# Patient Record
Sex: FEMALE | Race: WHITE | NOT HISPANIC OR LATINO | Employment: OTHER | URBAN - METROPOLITAN AREA
[De-identification: names, ages, dates, MRNs, and addresses within clinical notes are randomized per-mention and may not be internally consistent; named-entity substitution may affect disease eponyms.]

---

## 2017-01-05 ENCOUNTER — TELEPHONE (OUTPATIENT)
Dept: ONCOLOGY | Facility: CLINIC | Age: 67
End: 2017-01-05

## 2017-01-05 NOTE — TELEPHONE ENCOUNTER
"Oncology Telephone Triage Note    Assessment: Pt called in reporting severe low back pain and \"pressure\" between her shoulders and on the back of her neck.  She rated the pain in her lower back at an 8/10, and reports that the pain began last night.  She took 2 Norco tabs, which helped the pain to the point where she could sleep.  She also reports joint pain in her hips.  She denies fevers, n/v, swelling, cough.  She is wondering if the pain is related to her Neulasta injection.  She had the On-Pro placed last Wednesday, and the injection was activated on Thursday evening.    Plan:  Discussed with MICHELLE Pan, who agrees that the pain is likely due to the Neulasta.  She recommended continuing to treat with Norco and to call back if the pain continues to get worse.  She also recommended the patient try Claritin during her next cycle to prevent side effects from the Neulasta.  She recommended pt take it starting the last day of chemo for 3 or 4 days total.    Counseling/Education: Caller verbalized understanding of plan. Caller agrees with advice given.    "

## 2017-01-11 ENCOUNTER — TELEPHONE (OUTPATIENT)
Dept: ONCOLOGY | Facility: CLINIC | Age: 67
End: 2017-01-11

## 2017-01-16 ENCOUNTER — INFUSION THERAPY VISIT (OUTPATIENT)
Dept: ONCOLOGY | Facility: CLINIC | Age: 67
End: 2017-01-16
Attending: INTERNAL MEDICINE
Payer: MEDICARE

## 2017-01-16 VITALS
OXYGEN SATURATION: 98 % | DIASTOLIC BLOOD PRESSURE: 59 MMHG | WEIGHT: 122.8 LBS | HEART RATE: 92 BPM | HEIGHT: 60 IN | BODY MASS INDEX: 24.11 KG/M2 | TEMPERATURE: 98.6 F | RESPIRATION RATE: 16 BRPM | SYSTOLIC BLOOD PRESSURE: 129 MMHG

## 2017-01-16 DIAGNOSIS — T45.1X5A CHEMOTHERAPY-INDUCED NEUTROPENIA (H): ICD-10-CM

## 2017-01-16 DIAGNOSIS — C54.9 UTERUS CANCER, SARCOMA (H): Primary | ICD-10-CM

## 2017-01-16 DIAGNOSIS — C54.9 UTERUS CANCER, SARCOMA (H): ICD-10-CM

## 2017-01-16 DIAGNOSIS — T45.1X5A CHEMOTHERAPY INDUCED CARDIOMYOPATHY (H): Primary | ICD-10-CM

## 2017-01-16 DIAGNOSIS — D70.1 CHEMOTHERAPY-INDUCED NEUTROPENIA (H): ICD-10-CM

## 2017-01-16 DIAGNOSIS — I42.7 CHEMOTHERAPY INDUCED CARDIOMYOPATHY (H): Primary | ICD-10-CM

## 2017-01-16 LAB
ALBUMIN SERPL-MCNC: 3.4 G/DL (ref 3.4–5)
ALP SERPL-CCNC: 73 U/L (ref 40–150)
ALT SERPL W P-5'-P-CCNC: 21 U/L (ref 0–50)
ANION GAP SERPL CALCULATED.3IONS-SCNC: 6 MMOL/L (ref 3–14)
AST SERPL W P-5'-P-CCNC: 10 U/L (ref 0–45)
BASOPHILS # BLD AUTO: 0.1 10E9/L (ref 0–0.2)
BASOPHILS NFR BLD AUTO: 0.8 %
BILIRUB SERPL-MCNC: 0.2 MG/DL (ref 0.2–1.3)
BUN SERPL-MCNC: 16 MG/DL (ref 7–30)
CALCIUM SERPL-MCNC: 8.8 MG/DL (ref 8.5–10.1)
CHLORIDE SERPL-SCNC: 111 MMOL/L (ref 94–109)
CO2 SERPL-SCNC: 27 MMOL/L (ref 20–32)
CREAT SERPL-MCNC: 0.6 MG/DL (ref 0.52–1.04)
DIFFERENTIAL METHOD BLD: NORMAL
EOSINOPHIL # BLD AUTO: 0.1 10E9/L (ref 0–0.7)
EOSINOPHIL NFR BLD AUTO: 0.7 %
ERYTHROCYTE [DISTWIDTH] IN BLOOD BY AUTOMATED COUNT: 15 % (ref 10–15)
GFR SERPL CREATININE-BSD FRML MDRD: ABNORMAL ML/MIN/1.7M2
GLUCOSE SERPL-MCNC: 88 MG/DL (ref 70–99)
HCT VFR BLD AUTO: 36.9 % (ref 35–47)
HGB BLD-MCNC: 12 G/DL (ref 11.7–15.7)
IMM GRANULOCYTES # BLD: 0 10E9/L (ref 0–0.4)
IMM GRANULOCYTES NFR BLD: 0.5 %
LYMPHOCYTES # BLD AUTO: 2.1 10E9/L (ref 0.8–5.3)
LYMPHOCYTES NFR BLD AUTO: 28 %
MCH RBC QN AUTO: 29.1 PG (ref 26.5–33)
MCHC RBC AUTO-ENTMCNC: 32.5 G/DL (ref 31.5–36.5)
MCV RBC AUTO: 90 FL (ref 78–100)
MONOCYTES # BLD AUTO: 0.6 10E9/L (ref 0–1.3)
MONOCYTES NFR BLD AUTO: 8.6 %
NEUTROPHILS # BLD AUTO: 4.5 10E9/L (ref 1.6–8.3)
NEUTROPHILS NFR BLD AUTO: 61.4 %
NRBC # BLD AUTO: 0 10*3/UL
NRBC BLD AUTO-RTO: 0 /100
PLATELET # BLD AUTO: 366 10E9/L (ref 150–450)
POTASSIUM SERPL-SCNC: 4 MMOL/L (ref 3.4–5.3)
PROT SERPL-MCNC: 6.4 G/DL (ref 6.8–8.8)
RBC # BLD AUTO: 4.12 10E12/L (ref 3.8–5.2)
SODIUM SERPL-SCNC: 144 MMOL/L (ref 133–144)
WBC # BLD AUTO: 7.3 10E9/L (ref 4–11)

## 2017-01-16 PROCEDURE — 99214 OFFICE O/P EST MOD 30 MIN: CPT | Mod: ZP | Performed by: INTERNAL MEDICINE

## 2017-01-16 PROCEDURE — 96415 CHEMO IV INFUSION ADDL HR: CPT

## 2017-01-16 PROCEDURE — 96367 TX/PROPH/DG ADDL SEQ IV INF: CPT

## 2017-01-16 PROCEDURE — 96411 CHEMO IV PUSH ADDL DRUG: CPT

## 2017-01-16 PROCEDURE — 25000128 H RX IP 250 OP 636: Mod: ZF | Performed by: INTERNAL MEDICINE

## 2017-01-16 PROCEDURE — 96413 CHEMO IV INFUSION 1 HR: CPT

## 2017-01-16 PROCEDURE — 25000125 ZZHC RX 250: Mod: ZF | Performed by: INTERNAL MEDICINE

## 2017-01-16 PROCEDURE — 96375 TX/PRO/DX INJ NEW DRUG ADDON: CPT

## 2017-01-16 PROCEDURE — 80053 COMPREHEN METABOLIC PANEL: CPT | Performed by: INTERNAL MEDICINE

## 2017-01-16 PROCEDURE — 85025 COMPLETE CBC W/AUTO DIFF WBC: CPT | Performed by: INTERNAL MEDICINE

## 2017-01-16 RX ORDER — DOXORUBICIN HYDROCHLORIDE 2 MG/ML
25 INJECTION, SOLUTION INTRAVENOUS ONCE
Status: COMPLETED | OUTPATIENT
Start: 2017-01-16 | End: 2017-01-16

## 2017-01-16 RX ORDER — HEPARIN SODIUM,PORCINE 10 UNIT/ML
5 VIAL (ML) INTRAVENOUS ONCE
Status: COMPLETED | OUTPATIENT
Start: 2017-01-16 | End: 2017-01-16

## 2017-01-16 RX ORDER — DIPHENHYDRAMINE HYDROCHLORIDE AND LIDOCAINE HYDROCHLORIDE AND ALUMINUM HYDROXIDE AND MAGNESIUM HYDRO
5-10 KIT EVERY 6 HOURS PRN
Qty: 237 ML | Refills: 1 | Status: SHIPPED | OUTPATIENT
Start: 2017-01-16 | End: 2017-02-27

## 2017-01-16 RX ADMIN — DEXAMETHASONE SODIUM PHOSPHATE: 10 INJECTION, SOLUTION INTRAMUSCULAR; INTRAVENOUS at 12:27

## 2017-01-16 RX ADMIN — SODIUM CHLORIDE 250 ML: 9 INJECTION, SOLUTION INTRAVENOUS at 12:27

## 2017-01-16 RX ADMIN — SODIUM CHLORIDE 150 MG: 9 INJECTION, SOLUTION INTRAVENOUS at 12:38

## 2017-01-16 RX ADMIN — DACARBAZINE 380 MG: 200 INJECTION, POWDER, FOR SOLUTION INTRAVENOUS at 13:18

## 2017-01-16 RX ADMIN — DOXORUBICIN HYDROCHLORIDE 38 MG: 2 INJECTION, SOLUTION INTRAVENOUS at 13:12

## 2017-01-16 RX ADMIN — SODIUM CHLORIDE, PRESERVATIVE FREE 5 ML: 5 INJECTION INTRAVENOUS at 15:23

## 2017-01-16 ASSESSMENT — PAIN SCALES - GENERAL: PAINLEVEL: NO PAIN (0)

## 2017-01-16 ASSESSMENT — ANXIETY QUESTIONNAIRES
6. BECOMING EASILY ANNOYED OR IRRITABLE: NOT AT ALL
7. FEELING AFRAID AS IF SOMETHING AWFUL MIGHT HAPPEN: SEVERAL DAYS
3. WORRYING TOO MUCH ABOUT DIFFERENT THINGS: SEVERAL DAYS
IF YOU CHECKED OFF ANY PROBLEMS ON THIS QUESTIONNAIRE, HOW DIFFICULT HAVE THESE PROBLEMS MADE IT FOR YOU TO DO YOUR WORK, TAKE CARE OF THINGS AT HOME, OR GET ALONG WITH OTHER PEOPLE: NOT DIFFICULT AT ALL
1. FEELING NERVOUS, ANXIOUS, OR ON EDGE: NOT AT ALL
GAD7 TOTAL SCORE: 2
5. BEING SO RESTLESS THAT IT IS HARD TO SIT STILL: NOT AT ALL
2. NOT BEING ABLE TO STOP OR CONTROL WORRYING: NOT AT ALL

## 2017-01-16 ASSESSMENT — PATIENT HEALTH QUESTIONNAIRE - PHQ9: 5. POOR APPETITE OR OVEREATING: NOT AT ALL

## 2017-01-16 NOTE — Clinical Note
1/16/2017       RE: Maggie Navarrete  [address]  [facility] [address]     Dear Colleague,    Thank you for referring your patient, Maggie Navarrete, to the [facility]. Please see a copy of my visit note below.    [facility]    MEDICAL ONCOLOGY PROGRESS NOTE   Jan 16, 2017    Oncologic history:  1. January 2016, she presents to her PCP with 3 weeks of abdominal pain and severe constipation. She is treated for constipation and improves.  2. 3/21/2016, worsening pelvic pressure prompts pelvic examination with an enlarged uterus noted. Transvaginal ultrasound shows enlargement of subserosal fibroids originally seen on MRI in 2006.  3. 5/9/2016, MRI pelvis is obtained, which shows several enhancing uterine masses involving an enlarged uterus, felt radiographically compatible with benign uterine leiomyomata.  3. Summer 2016, she experiences worsening low back pain thought at first thought to be sacroiliitis, but pain persists after corticosteroid injection. Continues with severe constipation.  4. 9/8/2016, she undergoes colonoscopy for persistent abdominal pain. She has sigmoid polyp snared. Abdominal pain and diarrhea persist after colonoscopy prep.  5. 9/14/2016, she has worsening abdominal pain and is evaluated in the ED. CT-scan shows a large subserosal fibroid measuring 9.4 x 9.6 x 9.8 cm, as well as very large fibroids along the posterior uterine body more inferiorly, increased in size to 14 cm in greatest diameter compared to 10 cm in greatest diameter on MRI of 5/9/2016.  6. 9/29/2016, she has staging CT-CAP, which shows a small left sided pleural effusion and a 3 mm pulmonary nodule in the RML.  7. October 2016, self-refers to [facility].  8. 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  9. 11/4/2016, undergoes total abdominal hysterectomy and BSO, under Dr. Harsh Camarillo. Surgery was originally presumed for leiomyoma,  "however, intraoperatively there was focal adherence of a mass to the pelvic right sidewall and cul-de-sac, which led to peritoneal resection and omentectomy. Final pathology, pT2b: showed leiomyosarcoma involving the cul-de-sac and forming two separate masses measuring 10.5 x 6.5 x 4.5 cm and 10.7 x 7.9 x 7.5 cm.    HISTORY OF PRESENT ILLNESS  Maggie Navarrete is a 66 year old female with a diagnosis leiomyosarcoma.of the uterus. She underwent total abdominal hysterectomy and BSO at [facility], and she is receiving adjuvant chemotherapy with Doxorubicin and Dacarbazine. She has already received 1 cycle of chemotherapy and feels she tolerated it well. She had some fatigue, worse about 5-7 days after her last chemotherapy infusion, but this resolved and she was able to take a trip to [place] with his sister. Otherwise, she denies any new or worrisome signs or symptoms. No nausea or vomiting, though did have some \"indigesion\" with chemotherapy. No diarrhea or constipation type changes. No change in appetite or weight loss, and no abdominal pains. She would like to continue with adjuvant chemotherapy.    Lab Results   Component Value Date    WBC 7.3 01/16/2017    HGB 12.0 01/16/2017    HCT 36.9 01/16/2017     01/16/2017     01/16/2017    POTASSIUM 4.0 01/16/2017    CHLORIDE 111* 01/16/2017    CO2 27 01/16/2017    BUN 16 01/16/2017    CR 0.60 01/16/2017    GLC 88 01/16/2017    AST 10 01/16/2017    ALT 21 01/16/2017    ALKPHOS 73 01/16/2017    BILITOTAL 0.2 01/16/2017    INR 0.98 12/23/2016       REVIEW OF SYSTEMS  A 12-point ROS negative except as in HPI    Current Outpatient Prescriptions   Medication Sig Dispense Refill     RaNITidine HCl (ZANTAC PO) Take 150 mg by mouth daily       HYDROcodone-acetaminophen (NORCO) 5-325 MG per tablet 1-2 tablets every 6 hours as needed for pain 30 tablet 0     LORazepam (ATIVAN) 0.5 MG tablet Take 1 tablet (0.5 mg) by mouth every 4 hours as needed (Anxiety, Nausea/Vomiting or " Sleep) 30 tablet 2     prochlorperazine (COMPAZINE) 10 MG tablet Take 1 tablet (10 mg) by mouth every 6 hours as needed (Nausea/Vomiting) 30 tablet 2     ondansetron (ZOFRAN) 4 MG tablet Take 1 tablet (4 mg) by mouth every 8 hours as needed (Nausea/Vomiting) 30 tablet 3     MELATONIN PO Take 2.5 mg by mouth       enoxaparin (LOVENOX) 80 MG/0.8ML injection        venlafaxine (EFFEXOR-XR) 75 MG 24 hr capsule Take 1 capsule (75 mg) by mouth daily 90 capsule 1     levothyroxine (SYNTHROID,LEVOTHROID) 100 MCG tablet Take 100 mcg by mouth daily       zolpidem (AMBIEN) 5 MG tablet Take 1 tablet (5 mg) by mouth nightly as needed for sleep 25 tablet 0     alendronate (FOSAMAX) 35 MG tablet Take 1 tablet (35 mg) by mouth every 7 days 12 tablet 3     Calcium Citrate-Vitamin D (CALCIUM CITRATE + PO) Take 200 mg by mouth daily as needed (She supplements to meet her 1,200mg goal depending on how much dietary calcium she has gotten that day (up to 400mg once daily))       Multiple Vitamin (MULTIVITAMINS PO) Take 1 tablet by mouth daily.       DPH-Lido-AlHydr-MgHydr-Simeth (FIRST-MOUTHWASH BLM) SUSP Swish and swallow 5-10 mLs in mouth every 6 hours as needed 237 mL 1     tretinoin (RETIN-A) 0.025 % cream Apply a pea-sized amount to cleansed and dried face at night 45 g 3       Allergies   Allergen Reactions     Erythromycin GI Disturbance     Tramadol Nausea     Immunization History   Administered Date(s) Administered     Hepatitis A Vac Ped/Adol-2 Dose 02/13/2006, 03/28/2016     Hepatitis B 02/01/2013, 03/04/2013, 08/07/2013     Influenza (H1N1) 12/17/2009     Influenza (High Dose) 3 valent vaccine 09/28/2016     Influenza (IIV3) 12/09/2004, 10/03/2012     MMR 04/08/2016     Pneumococcal (PCV 13) 03/16/2015     Pneumococcal 23 valent 03/21/2016     TDAP (BOOSTRIX AGES 10-64) 11/03/2011     Tdap (Adacel,Boostrix) 02/19/1998     Typhoid Oral 03/28/2016     Zoster vaccine, live 12/17/2012       Past Medical History   Diagnosis Date      Anxiety      PONV (postoperative nausea and vomiting)      Ptosis of eyelid, bilateral      Thyroid disease      thyroid nodule resolved 2014     Kidney stone 2011     Disorder of bone and cartilage, unspecified      Arthritis      pain in feet and knees     Posterior vitreous detachment of left eye 2011     Posterior vitreous detachment of right eye 2011     GERD (gastroesophageal reflux disease)      Hx of previous reproductive problem 1980     Esophageal reflux 2015     Personal history of colonic polyps 2016     Small, benign     Stomach problems 2015     Abdominal pain, Diarreha         PHYSICAL EXAMINATION  /59 mmHg  Pulse 92  Temp(Src) 98.6  F (37  C) (Oral)  Resp 16  Ht 1.524 m (5')  Wt 55.702 kg (122 lb 12.8 oz)  BMI 23.98 kg/m2  SpO2 98%  Wt Readings from Last 2 Encounters:   01/16/17 55.702 kg (122 lb 12.8 oz)   12/28/16 56.654 kg (124 lb 14.4 oz)     Physical Exam   Constitutional: She is oriented to person, place, and time. She appears well-developed and well-nourished. No distress.   HENT:   Head: Normocephalic and atraumatic.   Right Ear: External ear normal.   Left Ear: External ear normal.   Nose: Nose normal.   Mouth/Throat: Oropharynx is clear and moist.   Eyes: Conjunctivae and EOM are normal. Pupils are equal, round, and reactive to light. No scleral icterus.   Neck: Normal range of motion. Neck supple.   Cardiovascular: Normal rate, regular rhythm and normal heart sounds.    No murmur heard.  Pulmonary/Chest: Effort normal and breath sounds normal. She has no wheezes.   Abdominal: Soft. Bowel sounds are normal. She exhibits no distension. There is no tenderness.   Musculoskeletal: Normal range of motion. She exhibits no edema.   Lymphadenopathy:     She has no cervical adenopathy.   Neurological: She is alert and oriented to person, place, and time. No cranial nerve deficit. She exhibits normal muscle tone. Coordination normal.   Skin: Skin is warm and dry. No rash noted.    Psychiatric: She has a normal mood and affect. Her behavior is normal. Judgment and thought content normal.   Nursing note and vitals reviewed.      IMPRESSION AND PLAN  #1 Locally advanced leiomyosarcoma of the uterus, pT2b Nx M0, Stage IIB, status-post resection and currently receiving adjuvant chemotherapy  It was a pleasure to see Ms. Navarrete today. She has tolerated her first cycle of adjuvant chemotherapy with doxorubicin and dacarbazine with minimal side effects. She would like to continue with adjuvant chemotherapy as laid out by Dr. Le in his consultation note. I believe he was considering 3 cycles of the regimen, though I am not entirely sure if he would recommend proceeding with a total of 4 to 6 cycles. We will tentatively plan to restage with CT-chest and abdomen/pelvis after a minimum of 3 cycles of therapy.    Patient is also interested in genetic analysis of her tumor and is interested in discussing any available tests, which could predict recurrence risk or relapse score. I will look into this for her and we can discuss on her return visit in 3 weeks.    Ab Warner M.D.   of Medicine  Hematology, Oncology and Transplantation  [facility]    1. Uterus cancer, sarcoma (H)

## 2017-01-16 NOTE — MR AVS SNAPSHOT
After Visit Summary   1/16/2017    Maggie Navarrete    MRN: [id number]           Patient Information     Date Of Birth          1950        Visit Information        Provider Department      1/16/2017 11:30 AM UC 30 ATC; UC ONCOLOGY INFUSION [facility]        Today's Diagnoses     Chemotherapy induced cardiomyopathy (H)    -  1     Chemotherapy-induced neutropenia (H)         Uterus cancer, sarcoma (H)           Care Instructions    Contact numbers:  Triage Main Line: [phone]  After hours: [phone]    Call with chills and/or temperature greater than or equal to 100.5 and questions or concerns.    If after hours, weekends, or holidays, call main hospital  at  [phone] and ask for Oncology doctor on call.           January 2017 Sunday Monday Tuesday Wednesday Thursday Friday Saturday   1     2     3     4     5     6     7       8     9     10     11     12     13     14       15     16     [facility] DRAW   10:15 AM   (15 min.)    [facility] LAB DRAW   [facility] Draw     [facility] RETURN   11:00 AM   (30 min.)   Ab Gutierrez MD   [facility]     [facility] ONC INFUSION 120   11:30 AM   (120 min.)   UC ONCOLOGY INFUSION   [facility] 17     [facility] ONC INFUSION 120    1:30 PM   (120 min.)   UC ONCOLOGY INFUSION   [facility] 18     [facility] ONC INFUSION 120    1:00 PM   (120 min.)   UC ONCOLOGY INFUSION   [facility] 19     20     21       22     23     24     25     26     27     28       29     30     31 February 2017 Sunday Monday Tuesday Wednesday Thursday Friday Saturday                  1     2     3     4       5     6     [facility] DRAW    9:30 AM   (15 min.)   [facility] DRAW   [facility] Draw     [facility] RETURN   10:00 AM   (30 min.)   Ab Gutierrez MD   [facility] 7     8     9      10     11       12     13     14     15     16     17     18       19     20     21     22     23     24     25       26     27     28                                      Lab Results:  Recent Results (from the past 12 hour(s))   Comprehensive metabolic panel    Collection Time: 01/16/17 10:56 AM   Result Value Ref Range    Sodium 144 133 - 144 mmol/L    Potassium 4.0 3.4 - 5.3 mmol/L    Chloride 111 (H) 94 - 109 mmol/L    Carbon Dioxide 27 20 - 32 mmol/L    Anion Gap 6 3 - 14 mmol/L    Glucose 88 70 - 99 mg/dL    Urea Nitrogen 16 7 - 30 mg/dL    Creatinine 0.60 0.52 - 1.04 mg/dL    GFR Estimate >90  Non  GFR Calc   >60 mL/min/1.7m2    GFR Estimate If Black >90   GFR Calc   >60 mL/min/1.7m2    Calcium 8.8 8.5 - 10.1 mg/dL    Bilirubin Total 0.2 0.2 - 1.3 mg/dL    Albumin 3.4 3.4 - 5.0 g/dL    Protein Total 6.4 (L) 6.8 - 8.8 g/dL    Alkaline Phosphatase 73 40 - 150 U/L    ALT 21 0 - 50 U/L    AST 10 0 - 45 U/L   CBC with platelets differential    Collection Time: 01/16/17 10:56 AM   Result Value Ref Range    WBC 7.3 4.0 - 11.0 10e9/L    RBC Count 4.12 3.8 - 5.2 10e12/L    Hemoglobin 12.0 11.7 - 15.7 g/dL    Hematocrit 36.9 35.0 - 47.0 %    MCV 90 78 - 100 fl    MCH 29.1 26.5 - 33.0 pg    MCHC 32.5 31.5 - 36.5 g/dL    RDW 15.0 10.0 - 15.0 %    Platelet Count 366 150 - 450 10e9/L    Diff Method Automated Method     % Neutrophils 61.4 %    % Lymphocytes 28.0 %    % Monocytes 8.6 %    % Eosinophils 0.7 %    % Basophils 0.8 %    % Immature Granulocytes 0.5 %    Nucleated RBCs 0 0 /100    Absolute Neutrophil 4.5 1.6 - 8.3 10e9/L    Absolute Lymphocytes 2.1 0.8 - 5.3 10e9/L    Absolute Monocytes 0.6 0.0 - 1.3 10e9/L    Absolute Eosinophils 0.1 0.0 - 0.7 10e9/L    Absolute Basophils 0.1 0.0 - 0.2 10e9/L    Abs Immature Granulocytes 0.0 0 - 0.4 10e9/L    Absolute Nucleated RBC 0.0                Follow-ups after your visit        Your next 10 appointments already scheduled     Jan 17, 2017   1:30 PM   Infusion 120 with UC ONCOLOGY INFUSION, UC 32 ATC   [facility] ([facility])    [address] [place] [id number]   [phone]            Jan 18, 2017  1:00 PM   Infusion 120 with UC ONCOLOGY INFUSION, UC 10 ATC   [facility] ([facility]    [address] [place] [address]   [phone]            Feb 06, 2017  9:30 AM   [facility] Lab Draw with  [facility] LAB DRAW   [facility] Draw ([facility])    [address] [place] [id number]   [phone]            Feb 06, 2017 10:00 AM   (Arrive by 9:45 AM)   Return Visit with Ab Aguilar MD   [facility])    [address] [place] [id number] [phone]              Who to contact     If you have questions or need follow up information about today's clinic visit or your schedule please contact [facility] directly at [phone].  Normal or non-critical lab and imaging results will be communicated to you by [email or web address], letter or phone within 4 business days after the clinic has received the results. If you do not hear from us within 7 days, please contact the clinic through [email or web address] or phone. If you have a critical or abnormal lab result, we will notify you by phone as soon as possible.  Submit refill requests through Dianxin or call your pharmacy and they will forward the refill request to us. Please allow 3 business days for your refill to be completed.          Additional Information About Your Visit        [email or web address] Information     Dianxin gives you secure access to your electronic health record. If you see a primary care provider, you can also send messages to your care team and make appointments. If you have questions, please call your primary care  clinic.  If you do not have a primary care provider, please call [phone] and they will assist you.        Care EveryWhere ID     This is your Care EveryWhere ID. This could be used by other organizations to access your [place] medical records  [id number]         Blood Pressure from Last 3 Encounters:   01/16/17 129/59   12/28/16 145/68   12/27/16 154/70    Weight from Last 3 Encounters:   01/16/17 55.702 kg (122 lb 12.8 oz)   12/28/16 56.654 kg (124 lb 14.4 oz)   12/23/16 53.978 kg (119 lb)              We Performed the Following     CBC with platelets differential     Comprehensive metabolic panel        Primary Care Provider Office Phone # Fax #    Kristine Flynn MD [phone]        [facility] [address] [facility] [address]  [facility] [address]        Thank you!     Thank you for choosing [facility]  for your care. Our goal is always to provide you with excellent care. Hearing back from our patients is one way we can continue to improve our services. Please take a few minutes to complete the written survey that you may receive in the mail after your visit with us. Thank you!             Your Updated Medication List - Protect others around you: Learn how to safely use, store and throw away your medicines at www.disposemymeds.org.          This list is accurate as of: 1/16/17  1:45 PM.  Always use your most recent med list.                   Brand Name Dispense Instructions for use    alendronate 35 MG tablet    FOSAMAX    12 tablet    Take 1 tablet (35 mg) by mouth every 7 days       CALCIUM CITRATE + PO      Take 200 mg by mouth daily as needed (She supplements to meet her 1,200mg goal depending on how much dietary calcium she has gotten that day (up to 400mg once daily))       enoxaparin 80 MG/0.8ML injection    LOVENOX         HYDROcodone-acetaminophen 5-325 MG per tablet    NORCO    30 tablet    1-2 tablets every 6 hours as needed for pain       levothyroxine 100  MCG tablet    SYNTHROID/LEVOTHROID     Take 100 mcg by mouth daily       LORazepam 0.5 MG tablet    ATIVAN    30 tablet    Take 1 tablet (0.5 mg) by mouth every 4 hours as needed (Anxiety, Nausea/Vomiting or Sleep)       MELATONIN PO      Take 2.5 mg by mouth       MULTIVITAMINS PO      Take 1 tablet by mouth daily.       ondansetron 4 MG tablet    ZOFRAN    30 tablet    Take 1 tablet (4 mg) by mouth every 8 hours as needed (Nausea/Vomiting)       prochlorperazine 10 MG tablet    COMPAZINE    30 tablet    Take 1 tablet (10 mg) by mouth every 6 hours as needed (Nausea/Vomiting)       tretinoin 0.025 % cream    RETIN-A    45 g    Apply a pea-sized amount to cleansed and dried face at night       venlafaxine 75 MG 24 hr capsule    EFFEXOR-XR    90 capsule    Take 1 capsule (75 mg) by mouth daily       ZANTAC PO      Take 150 mg by mouth daily       zolpidem 5 MG tablet    AMBIEN    25 tablet    Take 1 tablet (5 mg) by mouth nightly as needed for sleep

## 2017-01-16 NOTE — PATIENT INSTRUCTIONS
Contact numbers:  Triage Main Line: [phone]  After hours: [phone]    Call with chills and/or temperature greater than or equal to 100.5 and questions or concerns.    If after hours, weekends, or holidays, call main hospital  at  [phone] and ask for Oncology doctor on call.           January 2017 Sunday Monday Tuesday Wednesday Thursday Friday Saturday   1     2     3     4     5     6     7       8     9     10     11     12     13     14       15     16     [facility]   10:15 AM   (15 min.)   [facility]     [facility] RETURN   11:00 AM   (30 min.)   Ab Gutierrez MD   [facility]     UMP ONC INFUSION 120   11:30 AM   (120 min.)   UC ONCOLOGY INFUSION   [facility] 17     UMP ONC INFUSION 120    1:30 PM   (120 min.)   UC ONCOLOGY INFUSION   [facility] 18     UMP ONC INFUSION 120    1:00 PM   (120 min.)   UC ONCOLOGY INFUSION   [facility] 19     20     21       22     23     24     25     26     27     28       29     30     31 February 2017 Sunday Monday Tuesday Wednesday Thursday Friday Saturday                  1     2     3     4       5     6     [facility] DRAW    9:30 AM   (15 min.)   [facility] DRAW   [facility] Lab Draw     [facility] RETURN   10:00 AM   (30 min.)   Ab Gutierrez MD   [facility] 7     8     9     10     11       12     13     14     15     16     17     18       19     20     21     22     23     24     25       26     27     28                                      Lab Results:  Recent Results (from the past 12 hour(s))   Comprehensive metabolic panel    Collection Time: 01/16/17 10:56 AM   Result Value Ref Range    Sodium 144 133 - 144 mmol/L    Potassium 4.0 3.4 - 5.3 mmol/L    Chloride 111 (H) 94 - 109 mmol/L    Carbon Dioxide 27 20 - 32 mmol/L    Anion Gap 6 3 -  14 mmol/L    Glucose 88 70 - 99 mg/dL    Urea Nitrogen 16 7 - 30 mg/dL    Creatinine 0.60 0.52 - 1.04 mg/dL    GFR Estimate >90  Non  GFR Calc   >60 mL/min/1.7m2    GFR Estimate If Black >90   GFR Calc   >60 mL/min/1.7m2    Calcium 8.8 8.5 - 10.1 mg/dL    Bilirubin Total 0.2 0.2 - 1.3 mg/dL    Albumin 3.4 3.4 - 5.0 g/dL    Protein Total 6.4 (L) 6.8 - 8.8 g/dL    Alkaline Phosphatase 73 40 - 150 U/L    ALT 21 0 - 50 U/L    AST 10 0 - 45 U/L   CBC with platelets differential    Collection Time: 01/16/17 10:56 AM   Result Value Ref Range    WBC 7.3 4.0 - 11.0 10e9/L    RBC Count 4.12 3.8 - 5.2 10e12/L    Hemoglobin 12.0 11.7 - 15.7 g/dL    Hematocrit 36.9 35.0 - 47.0 %    MCV 90 78 - 100 fl    MCH 29.1 26.5 - 33.0 pg    MCHC 32.5 31.5 - 36.5 g/dL    RDW 15.0 10.0 - 15.0 %    Platelet Count 366 150 - 450 10e9/L    Diff Method Automated Method     % Neutrophils 61.4 %    % Lymphocytes 28.0 %    % Monocytes 8.6 %    % Eosinophils 0.7 %    % Basophils 0.8 %    % Immature Granulocytes 0.5 %    Nucleated RBCs 0 0 /100    Absolute Neutrophil 4.5 1.6 - 8.3 10e9/L    Absolute Lymphocytes 2.1 0.8 - 5.3 10e9/L    Absolute Monocytes 0.6 0.0 - 1.3 10e9/L    Absolute Eosinophils 0.1 0.0 - 0.7 10e9/L    Absolute Basophils 0.1 0.0 - 0.2 10e9/L    Abs Immature Granulocytes 0.0 0 - 0.4 10e9/L    Absolute Nucleated RBC 0.0

## 2017-01-16 NOTE — NURSING NOTE
Chief Complaint   Patient presents with     Blood Draw     Labs drawn by RN along with vitals obtained. Labs sent down to laboratory and patientchecked into providers appt.

## 2017-01-16 NOTE — NURSING NOTE
Maggie Navarrete is a 66 year old female who presents for:  Chief Complaint   Patient presents with     Blood Draw     Labs drawn by RN along with vitals obtained. Labs sent down to laboratory and patientchecked into providers appt.     Oncology Clinic Visit     Patient is here for consultation         Initial Vitals:  /59 mmHg  Pulse 92  Temp(Src) 98.6  F (37  C) (Oral)  Resp 16  Ht 1.524 m (5')  Wt 55.702 kg (122 lb 12.8 oz)  BMI 23.98 kg/m2  SpO2 98% Estimated body mass index is 23.98 kg/(m^2) as calculated from the following:    Height as of this encounter: 1.524 m (5').    Weight as of this encounter: 55.702 kg (122 lb 12.8 oz).. Body surface area is 1.54 meters squared. BP completed using cuff size: NA (Not Taken)  No Pain (0) No LMP recorded. Patient is postmenopausal. Allergies and medications reviewed.     Medications: Medication refills not needed today.  Pharmacy name entered into SnapMyAd:    [id number] [address] AT [facility] [address] & [address] [place] [address] PHARMACY MAIL DELIVERY - [place], [place] - [address]  [facility] - [place], [place] - [address] [facility]    Comments: Patient denied pain when asked. Vitals taken in lab today    6 minutes for nursing intake (face to face time)   Chantal Escoto LPN

## 2017-01-16 NOTE — PROGRESS NOTES
[facility]    MEDICAL ONCOLOGY PROGRESS NOTE   Jan 16, 2017    Oncologic history:  1. January 2016, she presents to her PCP with 3 weeks of abdominal pain and severe constipation. She is treated for constipation and improves.  2. 3/21/2016, worsening pelvic pressure prompts pelvic examination with an enlarged uterus noted. Transvaginal ultrasound shows enlargement of subserosal fibroids originally seen on MRI in 2006.  3. 5/9/2016, MRI pelvis is obtained, which shows several enhancing uterine masses involving an enlarged uterus, felt radiographically compatible with benign uterine leiomyomata.  3. Summer 2016, she experiences worsening low back pain thought at first thought to be sacroiliitis, but pain persists after corticosteroid injection. Continues with severe constipation.  4. 9/8/2016, she undergoes colonoscopy for persistent abdominal pain. She has sigmoid polyp snared. Abdominal pain and diarrhea persist after colonoscopy prep.  5. 9/14/2016, she has worsening abdominal pain and is evaluated in the ED. CT-scan shows a large subserosal fibroid measuring 9.4 x 9.6 x 9.8 cm, as well as very large fibroids along the posterior uterine body more inferiorly, increased in size to 14 cm in greatest diameter compared to 10 cm in greatest diameter on MRI of 5/9/2016.  6. 9/29/2016, she has staging CT-CAP, which shows a small left sided pleural effusion and a 3 mm pulmonary nodule in the RML.  7. October 2016, self-refers to [facility].  8. 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  9. 11/4/2016, undergoes total abdominal hysterectomy and BSO, under Dr. Hasrh Camarillo. Surgery was originally presumed for leiomyoma, however, intraoperatively there was focal adherence of a mass to the pelvic right sidewall and cul-de-sac, which led to peritoneal resection and omentectomy. Final pathology, pT2b: showed leiomyosarcoma involving the cul-de-sac and forming two  "separate masses measuring 10.5 x 6.5 x 4.5 cm and 10.7 x 7.9 x 7.5 cm.    HISTORY OF PRESENT ILLNESS  Maggie Navarrete is a 66 year old female with a diagnosis leiomyosarcoma.of the uterus. She underwent total abdominal hysterectomy and BSO at [place], and she is receiving adjuvant chemotherapy with Doxorubicin and Dacarbazine. She has already received 1 cycle of chemotherapy and feels she tolerated it well. She had some fatigue, worse about 5-7 days after her last chemotherapy infusion, but this resolved and she was able to take a trip to [place] with his sister. Otherwise, she denies any new or worrisome signs or symptoms. No nausea or vomiting, though did have some \"indigesion\" with chemotherapy. No diarrhea or constipation type changes. No change in appetite or weight loss, and no abdominal pains. She would like to continue with adjuvant chemotherapy.    Lab Results   Component Value Date    WBC 7.3 01/16/2017    HGB 12.0 01/16/2017    HCT 36.9 01/16/2017     01/16/2017     01/16/2017    POTASSIUM 4.0 01/16/2017    CHLORIDE 111* 01/16/2017    CO2 27 01/16/2017    BUN 16 01/16/2017    CR 0.60 01/16/2017    GLC 88 01/16/2017    AST 10 01/16/2017    ALT 21 01/16/2017    ALKPHOS 73 01/16/2017    BILITOTAL 0.2 01/16/2017    INR 0.98 12/23/2016       REVIEW OF SYSTEMS  A 12-point ROS negative except as in HPI    Current Outpatient Prescriptions   Medication Sig Dispense Refill     RaNITidine HCl (ZANTAC PO) Take 150 mg by mouth daily       HYDROcodone-acetaminophen (NORCO) 5-325 MG per tablet 1-2 tablets every 6 hours as needed for pain 30 tablet 0     LORazepam (ATIVAN) 0.5 MG tablet Take 1 tablet (0.5 mg) by mouth every 4 hours as needed (Anxiety, Nausea/Vomiting or Sleep) 30 tablet 2     prochlorperazine (COMPAZINE) 10 MG tablet Take 1 tablet (10 mg) by mouth every 6 hours as needed (Nausea/Vomiting) 30 tablet 2     ondansetron (ZOFRAN) 4 MG tablet Take 1 tablet (4 mg) by mouth every 8 hours as needed " (Nausea/Vomiting) 30 tablet 3     MELATONIN PO Take 2.5 mg by mouth       enoxaparin (LOVENOX) 80 MG/0.8ML injection        venlafaxine (EFFEXOR-XR) 75 MG 24 hr capsule Take 1 capsule (75 mg) by mouth daily 90 capsule 1     levothyroxine (SYNTHROID,LEVOTHROID) 100 MCG tablet Take 100 mcg by mouth daily       zolpidem (AMBIEN) 5 MG tablet Take 1 tablet (5 mg) by mouth nightly as needed for sleep 25 tablet 0     alendronate (FOSAMAX) 35 MG tablet Take 1 tablet (35 mg) by mouth every 7 days 12 tablet 3     Calcium Citrate-Vitamin D (CALCIUM CITRATE + PO) Take 200 mg by mouth daily as needed (She supplements to meet her 1,200mg goal depending on how much dietary calcium she has gotten that day (up to 400mg once daily))       Multiple Vitamin (MULTIVITAMINS PO) Take 1 tablet by mouth daily.       DPH-Lido-AlHydr-MgHydr-Simeth (FIRST-MOUTHWASH BLM) SUSP Swish and swallow 5-10 mLs in mouth every 6 hours as needed 237 mL 1     tretinoin (RETIN-A) 0.025 % cream Apply a pea-sized amount to cleansed and dried face at night 45 g 3       Allergies   Allergen Reactions     Erythromycin GI Disturbance     Tramadol Nausea     Immunization History   Administered Date(s) Administered     Hepatitis A Vac Ped/Adol-2 Dose 02/13/2006, 03/28/2016     Hepatitis B 02/01/2013, 03/04/2013, 08/07/2013     Influenza (H1N1) 12/17/2009     Influenza (High Dose) 3 valent vaccine 09/28/2016     Influenza (IIV3) 12/09/2004, 10/03/2012     MMR 04/08/2016     Pneumococcal (PCV 13) 03/16/2015     Pneumococcal 23 valent 03/21/2016     TDAP (BOOSTRIX AGES 10-64) 11/03/2011     Tdap (Adacel,Boostrix) 02/19/1998     Typhoid Oral 03/28/2016     Zoster vaccine, live 12/17/2012       Past Medical History   Diagnosis Date     Anxiety      PONV (postoperative nausea and vomiting)      Ptosis of eyelid, bilateral      Thyroid disease      thyroid nodule resolved 2014     Kidney stone 2011     Disorder of bone and cartilage, unspecified      Arthritis      pain  in feet and knees     Posterior vitreous detachment of left eye 2011     Posterior vitreous detachment of right eye 2011     GERD (gastroesophageal reflux disease)      Hx of previous reproductive problem 1980     Esophageal reflux 2015     Personal history of colonic polyps 2016     Small, benign     Stomach problems 2015     Abdominal pain, Diarreha         PHYSICAL EXAMINATION  /59 mmHg  Pulse 92  Temp(Src) 98.6  F (37  C) (Oral)  Resp 16  Ht 1.524 m (5')  Wt 55.702 kg (122 lb 12.8 oz)  BMI 23.98 kg/m2  SpO2 98%  Wt Readings from Last 2 Encounters:   01/16/17 55.702 kg (122 lb 12.8 oz)   12/28/16 56.654 kg (124 lb 14.4 oz)     Physical Exam   Constitutional: She is oriented to person, place, and time. She appears well-developed and well-nourished. No distress.   HENT:   Head: Normocephalic and atraumatic.   Right Ear: External ear normal.   Left Ear: External ear normal.   Nose: Nose normal.   Mouth/Throat: Oropharynx is clear and moist.   Eyes: Conjunctivae and EOM are normal. Pupils are equal, round, and reactive to light. No scleral icterus.   Neck: Normal range of motion. Neck supple.   Cardiovascular: Normal rate, regular rhythm and normal heart sounds.    No murmur heard.  Pulmonary/Chest: Effort normal and breath sounds normal. She has no wheezes.   Abdominal: Soft. Bowel sounds are normal. She exhibits no distension. There is no tenderness.   Musculoskeletal: Normal range of motion. She exhibits no edema.   Lymphadenopathy:     She has no cervical adenopathy.   Neurological: She is alert and oriented to person, place, and time. No cranial nerve deficit. She exhibits normal muscle tone. Coordination normal.   Skin: Skin is warm and dry. No rash noted.   Psychiatric: She has a normal mood and affect. Her behavior is normal. Judgment and thought content normal.   Nursing note and vitals reviewed.      IMPRESSION AND PLAN  #1 Locally advanced leiomyosarcoma of the uterus, pT2b Nx M0, Stage IIB,  status-post resection and currently receiving adjuvant chemotherapy  It was a pleasure to see Ms. Navarrete today. She has tolerated her first cycle of adjuvant chemotherapy with doxorubicin and dacarbazine with minimal side effects. She would like to continue with adjuvant chemotherapy as laid out by Dr. Le in his consultation note. I believe he was considering 3 cycles of the regimen, though I am not entirely sure if he would recommend proceeding with a total of 4 to 6 cycles. We will tentatively plan to restage with CT-chest and abdomen/pelvis after a minimum of 3 cycles of therapy.    Patient is also interested in genetic analysis of her tumor and is interested in discussing any available tests, which could predict recurrence risk or relapse score. I will look into this for her and we can discuss on her return visit in 3 weeks.    Ab Warner M.D.   of Medicine  Hematology, Oncology and Transplantation  [facility]    1. Uterus cancer, sarcoma (H)

## 2017-01-17 ENCOUNTER — INFUSION THERAPY VISIT (OUTPATIENT)
Dept: ONCOLOGY | Facility: CLINIC | Age: 67
End: 2017-01-17
Attending: INTERNAL MEDICINE
Payer: MEDICARE

## 2017-01-17 VITALS
RESPIRATION RATE: 16 BRPM | HEART RATE: 99 BPM | OXYGEN SATURATION: 97 % | TEMPERATURE: 98.3 F | SYSTOLIC BLOOD PRESSURE: 138 MMHG | DIASTOLIC BLOOD PRESSURE: 62 MMHG

## 2017-01-17 DIAGNOSIS — C54.9 UTERUS CANCER, SARCOMA (H): ICD-10-CM

## 2017-01-17 DIAGNOSIS — T45.1X5A CHEMOTHERAPY INDUCED CARDIOMYOPATHY (H): Primary | ICD-10-CM

## 2017-01-17 DIAGNOSIS — I42.7 CHEMOTHERAPY INDUCED CARDIOMYOPATHY (H): Primary | ICD-10-CM

## 2017-01-17 DIAGNOSIS — T45.1X5A CHEMOTHERAPY-INDUCED NEUTROPENIA (H): ICD-10-CM

## 2017-01-17 DIAGNOSIS — D70.1 CHEMOTHERAPY-INDUCED NEUTROPENIA (H): ICD-10-CM

## 2017-01-17 PROCEDURE — 96411 CHEMO IV PUSH ADDL DRUG: CPT

## 2017-01-17 PROCEDURE — 25000125 ZZHC RX 250: Mod: ZF | Performed by: INTERNAL MEDICINE

## 2017-01-17 PROCEDURE — 96413 CHEMO IV INFUSION 1 HR: CPT

## 2017-01-17 PROCEDURE — 96375 TX/PRO/DX INJ NEW DRUG ADDON: CPT

## 2017-01-17 PROCEDURE — 96415 CHEMO IV INFUSION ADDL HR: CPT

## 2017-01-17 PROCEDURE — 25000128 H RX IP 250 OP 636: Mod: ZF | Performed by: INTERNAL MEDICINE

## 2017-01-17 RX ORDER — HEPARIN SODIUM,PORCINE 10 UNIT/ML
5 VIAL (ML) INTRAVENOUS ONCE
Status: DISCONTINUED | OUTPATIENT
Start: 2017-01-17 | End: 2017-01-17 | Stop reason: HOSPADM

## 2017-01-17 RX ORDER — DOXORUBICIN HYDROCHLORIDE 2 MG/ML
25 INJECTION, SOLUTION INTRAVENOUS ONCE
Status: COMPLETED | OUTPATIENT
Start: 2017-01-17 | End: 2017-01-17

## 2017-01-17 RX ORDER — HEPARIN SODIUM (PORCINE) LOCK FLUSH IV SOLN 100 UNIT/ML 100 UNIT/ML
500 SOLUTION INTRAVENOUS EVERY 8 HOURS
Status: DISCONTINUED | OUTPATIENT
Start: 2017-01-17 | End: 2017-01-17 | Stop reason: HOSPADM

## 2017-01-17 RX ADMIN — DEXAMETHASONE SODIUM PHOSPHATE: 10 INJECTION, SOLUTION INTRAMUSCULAR; INTRAVENOUS at 14:08

## 2017-01-17 RX ADMIN — SODIUM CHLORIDE 250 ML: 9 INJECTION, SOLUTION INTRAVENOUS at 14:08

## 2017-01-17 RX ADMIN — DACARBAZINE 380 MG: 200 INJECTION, POWDER, FOR SOLUTION INTRAVENOUS at 14:29

## 2017-01-17 RX ADMIN — SODIUM CHLORIDE, PRESERVATIVE FREE 500 UNITS: 5 INJECTION INTRAVENOUS at 16:25

## 2017-01-17 RX ADMIN — DOXORUBICIN HYDROCHLORIDE 38 MG: 2 INJECTION, SOLUTION INTRAVENOUS at 14:23

## 2017-01-17 ASSESSMENT — ANXIETY QUESTIONNAIRES: GAD7 TOTAL SCORE: 2

## 2017-01-17 NOTE — PROGRESS NOTES
Infusion Nursing Note:  Patient presents today for Cycle 2 Day 2 Doxorubicin / Dacarbazime.    Patient seen by provider today: No    Note: Patient offers no new complaints or concerns today. She denied fevers / chills.    Intravenous Access:  Implanted Port.    Treatment Conditions:  Not Applicable.      Post Infusion Assessment:  Patient tolerated infusion without incident.  Blood return noted pre and post infusion.  Site patent and intact, free from redness, edema or discomfort.  No evidence of extravasations.  Access discontinued per protocol.    Discharge Plan:   Prescription refills given for Magic Mouthwash.  Discharge instructions reviewed with: Patient.  Patient and/or family verbalized understanding of discharge instructions and all questions answered.  AVS to patient via [employer].  Patient will return tomorrow for next appointment.   Patient discharged in stable condition accompanied by: self.  Departure Mode: Ambulatory.  Face to Face time: 0 minutes.    Beto Melendrez RN.

## 2017-01-17 NOTE — PATIENT INSTRUCTIONS
Contact Numbers    [facility] Main Line: [phone]  [facility] Triage:  [phone]    Call triage with chills and/or temperature greater than or equal to 100.5, uncontrolled nausea/vomiting, diarrhea, constipation, dizziness, shortness of breath, chest pain, bleeding, unexplained bruising, or any new/concerning symptoms, questions/concerns.     If you are having any concerning symptoms or wish to speak to a provider before your next infusion visit, please call your care coordinator or triage to notify them so we can adequately serve you.       After Hours: [phone]    If after hours, weekends, or holidays, call main hospital  and ask for Oncology doctor on call.         January 2017 Sunday Monday Tuesday Wednesday Thursday Friday Saturday   1     2     3     4     5     6     7       8     9     10     11     12     13     14       15     16     [facility] DRAW   10:15 AM   (15 min.)    [facility]     [facility] RETURN   11:00 AM   (30 min.)   Ab Gutierrez MD   [facility]   11:30 AM   (120 min.)   [facility] ON[facility]OGY INFUSION   [facility] 17     [facility] ONC INFUSION 120    1:30 PM   (120 min.)   [facility] ONCOLOGY INFUSION   [facility] 18     [facility] ONC INFUSION 120    1:00 PM   (120 min.)   [facility] ONCOLOGY INFUSION   [facility] 19     20     21       22     23     24     25     26     27     28       29     30     CONSULT HOD   11:00 AM   (60 min.)   Vivienne Zimmerman, BIENVENIDO   [facility] [place], Nutrition Services 31 February 2017 Sunday Monday Tuesday Wednesday Thursday Friday Saturday                  1     2     3     4       5     6     [facility] DRAW    9:30 AM   (15 min.)   [facility] DRAW   [facility] Mason[facility] Lab Draw     [facility] RETURN   10:00 AM   (30 min.)   Ab Gutierrez MD [facility] ONC  INFUSION 120   11:00 AM   (120 min.)    ONCOLOGY INFUSION   [facility] 7     [facility] NEW WITH ROOM    9:00 AM   (60 min.)   Massimo York PSYD   [facility] ONC INFUSION 120   10:30 AM   (120 min.)    ONCOLOGY INFUSION   [facility] 8     [facility] ONC INFUSION 120    2:00 PM   (120 min.)    ONCOLOGY INFUSION   [facility] 9     10     11       12     13     14     15     16     17     18       19     20     21     22     23     24     25       26     27     28                                      Lab Results:  No results found for this or any previous visit (from the past 12 hour(s)).

## 2017-01-18 ENCOUNTER — INFUSION THERAPY VISIT (OUTPATIENT)
Dept: ONCOLOGY | Facility: CLINIC | Age: 67
End: 2017-01-18
Attending: INTERNAL MEDICINE
Payer: MEDICARE

## 2017-01-18 VITALS
TEMPERATURE: 97.1 F | HEART RATE: 77 BPM | DIASTOLIC BLOOD PRESSURE: 68 MMHG | OXYGEN SATURATION: 100 % | RESPIRATION RATE: 20 BRPM | SYSTOLIC BLOOD PRESSURE: 144 MMHG

## 2017-01-18 DIAGNOSIS — C54.9 UTERUS CANCER, SARCOMA (H): ICD-10-CM

## 2017-01-18 DIAGNOSIS — I42.7 CHEMOTHERAPY INDUCED CARDIOMYOPATHY (H): Primary | ICD-10-CM

## 2017-01-18 DIAGNOSIS — T45.1X5A CHEMOTHERAPY-INDUCED NEUTROPENIA (H): ICD-10-CM

## 2017-01-18 DIAGNOSIS — T45.1X5A CHEMOTHERAPY INDUCED CARDIOMYOPATHY (H): Primary | ICD-10-CM

## 2017-01-18 DIAGNOSIS — D70.1 CHEMOTHERAPY-INDUCED NEUTROPENIA (H): ICD-10-CM

## 2017-01-18 PROCEDURE — 96415 CHEMO IV INFUSION ADDL HR: CPT

## 2017-01-18 PROCEDURE — 25000125 ZZHC RX 250: Mod: ZF | Performed by: INTERNAL MEDICINE

## 2017-01-18 PROCEDURE — 96413 CHEMO IV INFUSION 1 HR: CPT

## 2017-01-18 PROCEDURE — 96377 APPLICATON ON-BODY INJECTOR: CPT | Mod: 59

## 2017-01-18 PROCEDURE — 25000128 H RX IP 250 OP 636: Mod: ZF | Performed by: INTERNAL MEDICINE

## 2017-01-18 PROCEDURE — 96375 TX/PRO/DX INJ NEW DRUG ADDON: CPT

## 2017-01-18 PROCEDURE — 96411 CHEMO IV PUSH ADDL DRUG: CPT

## 2017-01-18 RX ORDER — DOXORUBICIN HYDROCHLORIDE 2 MG/ML
25 INJECTION, SOLUTION INTRAVENOUS ONCE
Status: COMPLETED | OUTPATIENT
Start: 2017-01-18 | End: 2017-01-18

## 2017-01-18 RX ORDER — HEPARIN SODIUM (PORCINE) LOCK FLUSH IV SOLN 100 UNIT/ML 100 UNIT/ML
5 SOLUTION INTRAVENOUS ONCE
Status: COMPLETED | OUTPATIENT
Start: 2017-01-18 | End: 2017-01-18

## 2017-01-18 RX ORDER — HEPARIN SODIUM,PORCINE 10 UNIT/ML
5 VIAL (ML) INTRAVENOUS ONCE
Status: DISCONTINUED | OUTPATIENT
Start: 2017-01-18 | End: 2017-01-18 | Stop reason: HOSPADM

## 2017-01-18 RX ADMIN — SODIUM CHLORIDE 250 ML: 9 INJECTION, SOLUTION INTRAVENOUS at 13:17

## 2017-01-18 RX ADMIN — DOXORUBICIN HYDROCHLORIDE 38 MG: 2 INJECTION, SOLUTION INTRAVENOUS at 13:40

## 2017-01-18 RX ADMIN — SODIUM CHLORIDE, PRESERVATIVE FREE 5 ML: 5 INJECTION INTRAVENOUS at 15:51

## 2017-01-18 RX ADMIN — DACARBAZINE 380 MG: 200 INJECTION, POWDER, FOR SOLUTION INTRAVENOUS at 13:47

## 2017-01-18 RX ADMIN — PEGFILGRASTIM 6 MG: KIT SUBCUTANEOUS at 15:00

## 2017-01-18 RX ADMIN — DEXAMETHASONE SODIUM PHOSPHATE: 10 INJECTION, SOLUTION INTRAMUSCULAR; INTRAVENOUS at 13:17

## 2017-01-18 NOTE — PROGRESS NOTES
Infusion Nursing Note:  Maggie Navarrete presents today for Cycle 2 Day 3 Adriamycin, Dacarbazine.    Patient seen by provider today: No    Note: Adriamycin stop time: 13:46pm    Intravenous Access:  Implanted Port.    Treatment Conditions:  Not Applicable.      Post Infusion Assessment:  Patient tolerated infusion without incident.  Blood return noted pre and post infusion.  Blood return noted during Adriamycin administration every 2 cc.  Site patent and intact, free from redness, edema or discomfort.  No evidence of extravasations.    Access discontinued per protocol.  Neulasta Onpro On-Body injector applied to left arm at 3:00 pm with light facing down.  Writer discussed Neulasta injection would start on 1/19/17 at 6:00 pm, approximately 27 hours after application applied today.  Written and Verbal instruction reviewed with patient.  Pt instructed when the dose delivery starts, it will take about 45 minutes to complete.  Pt aware Neulasta Onpro On-Body should have green flashing light and to call triage or on-call MD if injector flashes red or appears to be leaking. Pt aware to keep Onpro On-Body Neualsta 4 inches away from electrical equipment and to avoid showering 4 hours prior to injection.       Discharge Plan:   Patient declined prescription refills.  Discharge instructions reviewed with: Patient and friend.  Patient verbalized understanding of discharge instructions and all questions answered.  Copy of AVS reviewed with patient.  Patient will return 1/19/17 for next appointment.  Patient discharged in stable condition accompanied by: self and friend.  Departure Mode: Ambulatory.    [place] RN

## 2017-01-18 NOTE — MR AVS SNAPSHOT
After Visit Summary   1/18/2017    Maggie Navarrete    MRN: [id number]           Patient Information     Date Of Birth          1950        Visit Information        Provider Department      1/18/2017 1:00 PM BI 10 ATC;  ONCOLOGY INFUSION [facility]        Today's Diagnoses     Chemotherapy induced cardiomyopathy (H)    -  1     Uterus cancer, sarcoma (H)         Chemotherapy-induced neutropenia (H)           Care Instructions    Your Neulasta device will start tomorrow at 6:00pm.  You can remove your device tomorrow at 7:00pm.    Contact Numbers    [facility] Main Line: [phone]  [facility] Triage:  [phone]    Call triage with chills and/or temperature greater than or equal to 100.5, uncontrolled nausea/vomiting, diarrhea, constipation, dizziness, shortness of breath, chest pain, bleeding, unexplained bruising, or any new/concerning symptoms, questions/concerns.     If you are having any concerning symptoms or wish to speak to a provider before your next infusion visit, please call your care coordinator or triage to notify them so we can adequately serve you.       After Hours: [phone]    If after hours, weekends, or holidays, call main hospital  and ask for Oncology doctor on call.        January 2017 Sunday Monday Tuesday Wednesday Thursday Friday Saturday   1     2     3     4     5     6     7       8     9     10     11     12     13     14       15     16     [facility] LAB DRAW   10:15 AM   (15 min.)    [facility] LAB DRAW   [facility] Lab Draw     [facility] RETURN   11:00 AM   (30 min.)   Ab Gutierrez MD   [facility]     [facility] ONC INFUSION 120   11:30 AM   (120 min.)    ONCOLOGY INFUSION   [facility] 17     [facility] ONC INFUSION 120    1:30 PM   (120 min.)    ONCOLOGY INFUSION   [facility] 18     [facility] ONC INFUSION 120    1:00 PM   (120 min.)    ONCOLOGY INFUSION   [facility] 19     20     21       22     23     24     25     26     27     28       29     30     CONSULT HOD   11:00 AM   (60 min.)   Vivienne Zimmerman RD   [facility] [place], Nutrition Services 31 February 2017 Sunday Monday Tuesday Wednesday Thursday Friday Saturday                  1     2     3     4       5     6     [facility] MASONIC LAB DRAW    9:30 AM   (15 min.)    MASONIC LAB DRAW   [facility] Masonic Lab Draw     [facility] RETURN   10:00 AM   (30 min.)   Ab Gutierrez MD   [facility] ONC INFUSION 120   11:00 AM   (120 min.)    ONCOLOGY INFUSION   [facility] 7     [facility] NEW WITH ROOM    9:00 AM   (60 min.)   Massimo York PSYD   [facility] ONC INFUSION 120   10:30 AM   (120 min.)    ONCOLOGY INFUSION   [facility] 8     [facility] ONC INFUSION 120    2:00 PM   (120 min.)    ONCOLOGY INFUSION   [facility] 9     10     11       12     13     14     15     16     17     18       19     20     21     22     23     24     25       26     27     28                                      Lab Results:  No results found for this or any previous visit (from the past 12 hour(s)).            Follow-ups after your visit        Your next 10 appointments already scheduled     Jan 30, 2017 11:00 AM   Consult HOD with Vivienne Still RD   [facility] [place], Nutrition Services ([facility])    [address] [place] [id number]               Feb 06, 2017  9:30 AM   [facility] Lab Draw with  MASONIC LAB DRAW   [facility] Masonic Lab Draw ([facility] [place])    [address] [address]   [phone]            Feb 06, 2017 10:00 AM   (Arrive by 9:45 AM)   Return Visit with Ab Aguilar MD   [facility] ([facility] [place])    [address] [place] [id number]   [phone]            Feb 06, 2017 11:00 AM   Infusion 120 with UC ONCOLOGY INFUSION, [facility] 18 ATC   [facility]    [address] [place] [address]   [phone]            Feb 07, 2017  9:00 AM   (Arrive by 8:45 AM)   NEW WITH ROOM with Massimo York PSYD,  2 114 CONSULT RM   [facility]    [address] [place] [address]   [phone]            Feb 07, 2017 10:30 AM   Infusion 120 with UC ONCOLOGY INFUSION, UC 22 ATC   [facility]    [address] [place] [address]   [phone]            Feb 08, 2017  2:00 PM   Infusion 120 with UC ONCOLOGY INFUSION   [facility]    [address] [place] [address]   [phone]              Who to contact     If you have questions or need follow up information about today's clinic visit or your schedule please contact [facility] directly at [phone].  Normal or non-critical lab and imaging results will be communicated to you by [employer], letter or phone within 4 business days after the clinic has received the results. If you do not hear from us within 7 days, please contact the clinic through [employer] or phone. If you have a critical or abnormal lab result, we will notify you by phone as soon as possible.  Submit refill requests through [employer] or call your pharmacy and they will forward the refill request to us. Please allow 3 business days for your refill to be completed.          Additional Information About Your Visit        [employer] Information     [employer] gives you secure access to your electronic health record. If you see a primary  care provider, you can also send messages to your care team and make appointments. If you have questions, please call your primary care clinic.  If you do not have a primary care provider, please call [phone] and they will assist you.        Care EveryWhere ID     This is your Care EveryWhere ID. This could be used by other organizations to access your [facility] medical records  [id number]        Your Vitals Were     Pulse Temperature Respirations Pulse Oximetry          77 97.1  F (36.2  C) (Oral) 20 100%         Blood Pressure from Last 3 Encounters:   01/18/17 144/68   01/17/17 138/62   01/16/17 129/59    Weight from Last 3 Encounters:   01/16/17 55.702 kg (122 lb 12.8 oz)   12/28/16 56.654 kg (124 lb 14.4 oz)   12/23/16 53.978 kg (119 lb)              Today, you had the following     No orders found for display       Primary Care Provider Office Phone # Fax #    Kristine Flynn MD [phone]        [facility] [address] [facility] [address]  [facility] [address]        Thank you!     Thank you for choosing [facility]  for your care. Our goal is always to provide you with excellent care. Hearing back from our patients is one way we can continue to improve our services. Please take a few minutes to complete the written survey that you may receive in the mail after your visit with us. Thank you!             Your Updated Medication List - Protect others around you: Learn how to safely use, store and throw away your medicines at www.disposemymeds.org.          This list is accurate as of: 1/18/17  2:50 PM.  Always use your most recent med list.                   Brand Name Dispense Instructions for use    alendronate 35 MG tablet    FOSAMAX    12 tablet    Take 1 tablet (35 mg) by mouth every 7 days       CALCIUM CITRATE + PO      Take 200 mg by mouth daily as needed (She supplements to meet her 1,200mg goal depending on how much dietary calcium she has gotten that day (up  to 400mg once daily))       enoxaparin 80 MG/0.8ML injection    LOVENOX         FIRST-MOUTHWASH BLM Susp     237 mL    Swish and swallow 5-10 mLs in mouth every 6 hours as needed       HYDROcodone-acetaminophen 5-325 MG per tablet    NORCO    30 tablet    1-2 tablets every 6 hours as needed for pain       levothyroxine 100 MCG tablet    SYNTHROID/LEVOTHROID     Take 100 mcg by mouth daily       LORazepam 0.5 MG tablet    ATIVAN    30 tablet    Take 1 tablet (0.5 mg) by mouth every 4 hours as needed (Anxiety, Nausea/Vomiting or Sleep)       MELATONIN PO      Take 2.5 mg by mouth       MULTIVITAMINS PO      Take 1 tablet by mouth daily.       ondansetron 4 MG tablet    ZOFRAN    30 tablet    Take 1 tablet (4 mg) by mouth every 8 hours as needed (Nausea/Vomiting)       prochlorperazine 10 MG tablet    COMPAZINE    30 tablet    Take 1 tablet (10 mg) by mouth every 6 hours as needed (Nausea/Vomiting)       tretinoin 0.025 % cream    RETIN-A    45 g    Apply a pea-sized amount to cleansed and dried face at night       venlafaxine 75 MG 24 hr capsule    EFFEXOR-XR    90 capsule    Take 1 capsule (75 mg) by mouth daily       ZANTAC PO      Take 150 mg by mouth daily       zolpidem 5 MG tablet    AMBIEN    25 tablet    Take 1 tablet (5 mg) by mouth nightly as needed for sleep

## 2017-01-18 NOTE — PATIENT INSTRUCTIONS
Your Neulasta device will start tomorrow at 6:00pm.  You can remove your device tomorrow at 7:00pm.    Contact Numbers    [facility] Main Line: [phone]  [facility] Triage:  [phone]    Call triage with chills and/or temperature greater than or equal to 100.5, uncontrolled nausea/vomiting, diarrhea, constipation, dizziness, shortness of breath, chest pain, bleeding, unexplained bruising, or any new/concerning symptoms, questions/concerns.     If you are having any concerning symptoms or wish to speak to a provider before your next infusion visit, please call your care coordinator or triage to notify them so we can adequately serve you.       After Hours: [phone]    If after hours, weekends, or holidays, call main hospital  and ask for Oncology doctor on call.        January 2017 Sunday Monday Tuesday Wednesday Thursday Friday Saturday   1     2     3     4     5     6     7       8     9     10     11     12     13     14       15     16     [facility] DRAW   10:15 AM   (15 min.)    [facility]   [facility]     [facility] RETURN   11:00 AM   (30 min.)   Ab Gutierrez MD   [facility] ONC INFUSION 120   11:30 AM   (120 min.)    ONCOLOGY INFUSION   [facility] 17     [facility] ONC INFUSION 120    1:30 PM   (120 min.)    ONCOLOGY INFUSION   [facility] 18     [facility] ONC INFUSION 120    1:00 PM   (120 min.)    ONCOLOGY INFUSION   [facility] 19     20     21       22     23     24     25     26     27     28       29     30     CONSULT HOD   11:00 AM   (60 min.)   Vivienne Zimmerman, RD   [facility] [place] 31 February 2017 Sunday Monday Tuesday Wednesday Thursday Friday Saturday                  1     2     3     4       5     6     P [facility] DRAW    9:30 AM   (15 min.)    [facility] DRAW   [facility] Draw     [facility]  RETURN   10:00 AM   (30 min.)   Ab Gutierrez MD   [facility] ONC INFUSION 120   11:00 AM   (120 min.)    ONCOLOGY INFUSION   [facility] 7     [facility] NEW WITH ROOM    9:00 AM   (60 min.)   Massimo York PSYD   [facility] ONC INFUSION 120   10:30 AM   (120 min.)    ONCOLOGY INFUSION   formerly [facility] 8     [facility] ONC INFUSION 120    2:00 PM   (120 min.)    ONCOLOGY INFUSION   formerly [facility] 9     10     11       12     13     14     15     16     17     18       19     20     21     22     23     24     25       26     27     28                                      Lab Results:  No results found for this or any previous visit (from the past 12 hour(s)).

## 2017-02-06 ENCOUNTER — INFUSION THERAPY VISIT (OUTPATIENT)
Dept: ONCOLOGY | Facility: CLINIC | Age: 67
End: 2017-02-06
Attending: INTERNAL MEDICINE
Payer: MEDICARE

## 2017-02-06 ENCOUNTER — APPOINTMENT (OUTPATIENT)
Dept: LAB | Facility: CLINIC | Age: 67
End: 2017-02-06
Attending: INTERNAL MEDICINE
Payer: MEDICARE

## 2017-02-06 VITALS
HEART RATE: 108 BPM | WEIGHT: 122.4 LBS | DIASTOLIC BLOOD PRESSURE: 65 MMHG | SYSTOLIC BLOOD PRESSURE: 129 MMHG | OXYGEN SATURATION: 99 % | TEMPERATURE: 97.7 F | RESPIRATION RATE: 18 BRPM | HEIGHT: 60 IN | BODY MASS INDEX: 24.03 KG/M2

## 2017-02-06 DIAGNOSIS — C49.9 LEIOMYOSARCOMA (H): Primary | ICD-10-CM

## 2017-02-06 DIAGNOSIS — T45.1X5A CHEMOTHERAPY-INDUCED NEUTROPENIA (H): ICD-10-CM

## 2017-02-06 DIAGNOSIS — C54.9 UTERUS CANCER, SARCOMA (H): ICD-10-CM

## 2017-02-06 DIAGNOSIS — D70.1 CHEMOTHERAPY-INDUCED NEUTROPENIA (H): ICD-10-CM

## 2017-02-06 DIAGNOSIS — T45.1X5A CHEMOTHERAPY INDUCED CARDIOMYOPATHY (H): Primary | ICD-10-CM

## 2017-02-06 DIAGNOSIS — C54.9 UTERUS CANCER, SARCOMA (H): Primary | ICD-10-CM

## 2017-02-06 DIAGNOSIS — I42.7 CHEMOTHERAPY INDUCED CARDIOMYOPATHY (H): Primary | ICD-10-CM

## 2017-02-06 LAB
ALBUMIN SERPL-MCNC: 3.3 G/DL (ref 3.4–5)
ALP SERPL-CCNC: 74 U/L (ref 40–150)
ALT SERPL W P-5'-P-CCNC: 23 U/L (ref 0–50)
ANION GAP SERPL CALCULATED.3IONS-SCNC: 10 MMOL/L (ref 3–14)
AST SERPL W P-5'-P-CCNC: 9 U/L (ref 0–45)
BASOPHILS # BLD AUTO: 0 10E9/L (ref 0–0.2)
BASOPHILS NFR BLD AUTO: 0.6 %
BILIRUB SERPL-MCNC: 0.2 MG/DL (ref 0.2–1.3)
BUN SERPL-MCNC: 18 MG/DL (ref 7–30)
CALCIUM SERPL-MCNC: 8.7 MG/DL (ref 8.5–10.1)
CHLORIDE SERPL-SCNC: 111 MMOL/L (ref 94–109)
CO2 SERPL-SCNC: 24 MMOL/L (ref 20–32)
CREAT SERPL-MCNC: 0.71 MG/DL (ref 0.52–1.04)
DIFFERENTIAL METHOD BLD: ABNORMAL
EOSINOPHIL # BLD AUTO: 0 10E9/L (ref 0–0.7)
EOSINOPHIL NFR BLD AUTO: 0.6 %
ERYTHROCYTE [DISTWIDTH] IN BLOOD BY AUTOMATED COUNT: 16.4 % (ref 10–15)
GFR SERPL CREATININE-BSD FRML MDRD: 82 ML/MIN/1.7M2
GLUCOSE SERPL-MCNC: 102 MG/DL (ref 70–99)
HCT VFR BLD AUTO: 35.4 % (ref 35–47)
HGB BLD-MCNC: 11.6 G/DL (ref 11.7–15.7)
IMM GRANULOCYTES # BLD: 0 10E9/L (ref 0–0.4)
IMM GRANULOCYTES NFR BLD: 0.6 %
LYMPHOCYTES # BLD AUTO: 1.9 10E9/L (ref 0.8–5.3)
LYMPHOCYTES NFR BLD AUTO: 30.1 %
MCH RBC QN AUTO: 29.2 PG (ref 26.5–33)
MCHC RBC AUTO-ENTMCNC: 32.8 G/DL (ref 31.5–36.5)
MCV RBC AUTO: 89 FL (ref 78–100)
MONOCYTES # BLD AUTO: 0.7 10E9/L (ref 0–1.3)
MONOCYTES NFR BLD AUTO: 11.1 %
NEUTROPHILS # BLD AUTO: 3.6 10E9/L (ref 1.6–8.3)
NEUTROPHILS NFR BLD AUTO: 57 %
NRBC # BLD AUTO: 0 10*3/UL
NRBC BLD AUTO-RTO: 0 /100
PLATELET # BLD AUTO: 440 10E9/L (ref 150–450)
POTASSIUM SERPL-SCNC: 3.8 MMOL/L (ref 3.4–5.3)
PROT SERPL-MCNC: 6.1 G/DL (ref 6.8–8.8)
RBC # BLD AUTO: 3.97 10E12/L (ref 3.8–5.2)
SODIUM SERPL-SCNC: 145 MMOL/L (ref 133–144)
WBC # BLD AUTO: 6.2 10E9/L (ref 4–11)

## 2017-02-06 PROCEDURE — 96415 CHEMO IV INFUSION ADDL HR: CPT

## 2017-02-06 PROCEDURE — 96367 TX/PROPH/DG ADDL SEQ IV INF: CPT

## 2017-02-06 PROCEDURE — 80053 COMPREHEN METABOLIC PANEL: CPT | Performed by: INTERNAL MEDICINE

## 2017-02-06 PROCEDURE — 25000128 H RX IP 250 OP 636: Mod: ZF | Performed by: INTERNAL MEDICINE

## 2017-02-06 PROCEDURE — 96411 CHEMO IV PUSH ADDL DRUG: CPT

## 2017-02-06 PROCEDURE — 25000125 ZZHC RX 250: Mod: ZF | Performed by: INTERNAL MEDICINE

## 2017-02-06 PROCEDURE — 85025 COMPLETE CBC W/AUTO DIFF WBC: CPT | Performed by: INTERNAL MEDICINE

## 2017-02-06 PROCEDURE — 99213 OFFICE O/P EST LOW 20 MIN: CPT | Mod: ZP | Performed by: INTERNAL MEDICINE

## 2017-02-06 PROCEDURE — 96375 TX/PRO/DX INJ NEW DRUG ADDON: CPT

## 2017-02-06 PROCEDURE — 96413 CHEMO IV INFUSION 1 HR: CPT

## 2017-02-06 RX ORDER — HEPARIN SODIUM,PORCINE 10 UNIT/ML
5 VIAL (ML) INTRAVENOUS ONCE
Status: COMPLETED | OUTPATIENT
Start: 2017-02-06 | End: 2017-02-06

## 2017-02-06 RX ORDER — DOXORUBICIN HYDROCHLORIDE 2 MG/ML
25 INJECTION, SOLUTION INTRAVENOUS ONCE
Status: COMPLETED | OUTPATIENT
Start: 2017-02-06 | End: 2017-02-06

## 2017-02-06 RX ORDER — HEPARIN SODIUM (PORCINE) LOCK FLUSH IV SOLN 100 UNIT/ML 100 UNIT/ML
5 SOLUTION INTRAVENOUS EVERY 8 HOURS
Status: DISCONTINUED | OUTPATIENT
Start: 2017-02-06 | End: 2017-02-16 | Stop reason: HOSPADM

## 2017-02-06 RX ADMIN — DOXORUBICIN HYDROCHLORIDE 38 MG: 2 INJECTION, SOLUTION INTRAVENOUS at 11:45

## 2017-02-06 RX ADMIN — SODIUM CHLORIDE, PRESERVATIVE FREE 5 ML: 5 INJECTION INTRAVENOUS at 13:47

## 2017-02-06 RX ADMIN — DEXAMETHASONE SODIUM PHOSPHATE: 10 INJECTION, SOLUTION INTRAMUSCULAR; INTRAVENOUS at 11:03

## 2017-02-06 RX ADMIN — SODIUM CHLORIDE 500 ML: 9 INJECTION, SOLUTION INTRAVENOUS at 11:04

## 2017-02-06 RX ADMIN — SODIUM CHLORIDE 150 MG: 9 INJECTION, SOLUTION INTRAVENOUS at 11:14

## 2017-02-06 RX ADMIN — SODIUM CHLORIDE, PRESERVATIVE FREE 5 ML: 5 INJECTION INTRAVENOUS at 09:39

## 2017-02-06 RX ADMIN — DACARBAZINE 380 MG: 200 INJECTION, POWDER, FOR SOLUTION INTRAVENOUS at 11:49

## 2017-02-06 ASSESSMENT — PAIN SCALES - GENERAL: PAINLEVEL: NO PAIN (0)

## 2017-02-06 NOTE — NURSING NOTE
Maggie Navarrete is a 66 year old female who presents for:  Chief Complaint   Patient presents with     Port Draw     port accessed with power needle for labs and infusion, heparin locked and vitals checked     Oncology Clinic Visit     return patient for follow up/infusion follow up related to leiomyosarcoma         Initial Vitals:  /65 mmHg  Pulse 108  Temp(Src) 97.7  F (36.5  C) (Oral)  Resp 18  Ht 1.524 m (5')  Wt 55.52 kg (122 lb 6.4 oz)  BMI 23.90 kg/m2  SpO2 99% Estimated body mass index is 23.9 kg/(m^2) as calculated from the following:    Height as of this encounter: 1.524 m (5').    Weight as of this encounter: 55.52 kg (122 lb 6.4 oz).. Body surface area is 1.53 meters squared. BP completed using cuff size: NA (Not Taken)  No Pain (0) No LMP recorded. Patient is postmenopausal. Allergies and medications reviewed.     Medications: Medication refills not needed today.  Pharmacy name entered into [employer]:    [id number] [address] AT [facility] [address] & [address] [place] [address] PHARMACY MAIL DELIVERY - [place], [place] - [address]  [facility] - [place], [place] - [address]    Comments: patient denied pain/discomfort.     5 minutes for nursing intake (face to face time)   Laya Mabry CMA

## 2017-02-06 NOTE — LETTER
2/6/2017       RE: Maggie Navarrete  [address]  [facility] [address]     Dear Colleague,    Thank you for referring your patient, Maggie Navarrete, to the [facility]. Please see a copy of my visit note below.    MEDICAL ONCOLOGY PROGRESS NOTE   Feb 6, 2017    Oncologic history:  1. January 2016, she presents to her PCP with 3 weeks of abdominal pain and severe constipation. She is treated for constipation and improves.  2. 3/21/2016, worsening pelvic pressure prompts pelvic examination with an enlarged uterus noted. Transvaginal ultrasound shows enlargement of subserosal fibroids originally seen on MRI in 2006.  3. 5/9/2016, MRI pelvis is obtained, which shows several enhancing uterine masses involving an enlarged uterus, felt radiographically compatible with benign uterine leiomyomata.  3. Summer 2016, she experiences worsening low back pain thought at first thought to be sacroiliitis, but pain persists after corticosteroid injection. Continues with severe constipation.  4. 9/8/2016, she undergoes colonoscopy for persistent abdominal pain. She has sigmoid polyp snared. Abdominal pain and diarrhea persist after colonoscopy prep.  5. 9/14/2016, she has worsening abdominal pain and is evaluated in the ED. CT-scan shows a large subserosal fibroid measuring 9.4 x 9.6 x 9.8 cm, as well as very large fibroids along the posterior uterine body more inferiorly, increased in size to 14 cm in greatest diameter compared to 10 cm in greatest diameter on MRI of 5/9/2016.  6. 9/29/2016, she has staging CT-CAP, which shows a small left sided pleural effusion and a 3 mm pulmonary nodule in the RML.  7. October 2016, self-refers to [facility].  8. 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  9. 11/4/2016, undergoes total abdominal hysterectomy and BSO, under Dr. Harsh Camarillo. Surgery was originally presumed for leiomyoma, however, intraoperatively  there was focal adherence of a mass to the pelvic right sidewall and cul-de-sac, which led to peritoneal resection and omentectomy. Final pathology, pT2b: showed leiomyosarcoma involving the cul-de-sac and forming two separate masses measuring 10.5 x 6.5 x 4.5 cm and 10.7 x 7.9 x 7.5 cm.    HISTORY OF PRESENT ILLNESS  Maggie Navarrete is a 66 year old female with a diagnosis leiomyosarcoma.of the uterus. She is currently receiving adjuvant Doxorubicin and Dacarbazine. She has received 2 cycles of chemotherapy and she continues to tolerate therapy. She has some fatigue, but this typically resolves after the first week of therapy. She denies any new or worrisome signs or symptoms. No abdominal or pelvic pain, no diarrhea or constipation. Fortunately her appetite has not changed much, though she has noticed some taste change. Labs today show normal WBC of 6.2, Hemoglobin of 11.6, and normal platelet count. Renal and liver function tests are normal.    Lab Results   Component Value Date    WBC 6.2 02/06/2017    HGB 11.6 (L) 02/06/2017    HCT 35.4 02/06/2017     02/06/2017     (H) 02/06/2017    POTASSIUM 3.8 02/06/2017    CHLORIDE 111 (H) 02/06/2017    CO2 24 02/06/2017    BUN 18 02/06/2017    CR 0.71 02/06/2017     (H) 02/06/2017    AST 9 02/06/2017    ALT 23 02/06/2017    ALKPHOS 74 02/06/2017    BILITOTAL 0.2 02/06/2017    INR 0.98 12/23/2016       REVIEW OF SYSTEMS  A 12-point ROS negative except as in HPI    Current Outpatient Prescriptions   Medication Sig Dispense Refill     sterile water, bottle, irrigation        RaNITidine HCl (ZANTAC PO) Take 150 mg by mouth daily       LORazepam (ATIVAN) 0.5 MG tablet Take 1 tablet (0.5 mg) by mouth every 4 hours as needed (Anxiety, Nausea/Vomiting or Sleep) 30 tablet 2     ondansetron (ZOFRAN) 4 MG tablet Take 1 tablet (4 mg) by mouth every 8 hours as needed (Nausea/Vomiting) 30 tablet 3     MELATONIN PO Take 2.5 mg by mouth       enoxaparin (LOVENOX) 80  MG/0.8ML injection        venlafaxine (EFFEXOR-XR) 75 MG 24 hr capsule Take 1 capsule (75 mg) by mouth daily 90 capsule 1     levothyroxine (SYNTHROID,LEVOTHROID) 100 MCG tablet Take 100 mcg by mouth daily       zolpidem (AMBIEN) 5 MG tablet Take 1 tablet (5 mg) by mouth nightly as needed for sleep 25 tablet 0     alendronate (FOSAMAX) 35 MG tablet Take 1 tablet (35 mg) by mouth every 7 days 12 tablet 3     tretinoin (RETIN-A) 0.025 % cream Apply a pea-sized amount to cleansed and dried face at night 45 g 3     Calcium Citrate-Vitamin D (CALCIUM CITRATE + PO) Take 200 mg by mouth daily as needed (She supplements to meet her 1,200mg goal depending on how much dietary calcium she has gotten that day (up to 400mg once daily))       Multiple Vitamin (MULTIVITAMINS PO) Take 1 tablet by mouth daily.       DPH-Lido-AlHydr-MgHydr-Simeth (FIRST-MOUTHWASH BLM) SUSP Swish and swallow 5-10 mLs in mouth every 6 hours as needed 237 mL 1     HYDROcodone-acetaminophen (NORCO) 5-325 MG per tablet 1-2 tablets every 6 hours as needed for pain 30 tablet 0     prochlorperazine (COMPAZINE) 10 MG tablet Take 1 tablet (10 mg) by mouth every 6 hours as needed (Nausea/Vomiting) 30 tablet 2       Past Medical History   Diagnosis Date     Anxiety      Arthritis      pain in feet and knees     Disorder of bone and cartilage, unspecified      Esophageal reflux 2015     GERD (gastroesophageal reflux disease)      Hx of previous reproductive problem 1980     Kidney stone 2011     Personal history of colonic polyps 2016     Small, benign     PONV (postoperative nausea and vomiting)      Posterior vitreous detachment of left eye 2011     Posterior vitreous detachment of right eye 2011     Ptosis of eyelid, bilateral      Stomach problems 2015     Abdominal pain, Diarreha     Thyroid disease      thyroid nodule resolved 2014         PHYSICAL EXAMINATION  /65  Pulse 108  Temp 97.7  F (36.5  C) (Oral)  Resp 18  Ht 1.524 m (5')  Wt 55.5 kg (122 lb  6.4 oz)  SpO2 99%  BMI 23.9 kg/m2  Wt Readings from Last 2 Encounters:   02/06/17 55.5 kg (122 lb 6.4 oz)   01/16/17 55.7 kg (122 lb 12.8 oz)     Physical Exam   Constitutional: She is oriented to person, place, and time. She appears well-developed and well-nourished. No distress.   HENT:   Head: Normocephalic and atraumatic.   Mouth/Throat: Oropharynx is clear and moist.   Eyes: Conjunctivae and EOM are normal. Pupils are equal, round, and reactive to light. No scleral icterus.   Neck: Normal range of motion. Neck supple.   Cardiovascular: Normal rate, regular rhythm and normal heart sounds.    No murmur heard.  Pulmonary/Chest: Effort normal and breath sounds normal. She has no wheezes.   Abdominal: Soft. Bowel sounds are normal. She exhibits no distension. There is no tenderness.   Musculoskeletal: Normal range of motion. She exhibits no edema.   Lymphadenopathy:     She has no cervical adenopathy.   Neurological: She is alert and oriented to person, place, and time. No cranial nerve deficit. She exhibits normal muscle tone. Coordination normal.   Skin: Skin is warm and dry. No rash noted.   Psychiatric: She has a normal mood and affect. Her behavior is normal. Judgment and thought content normal.   Nursing note and vitals reviewed.      IMPRESSION AND PLAN  #1 Locally advanced leiomyosarcoma of the uterus, pT2b Nx M0, Stage IIB, status-post resection, currently on adjuvant chemotherapy  It was a pleasure to see Ms. Navarrete today. She continues to do well on adjuvant doxorubicin and dacarbazine. The plan is for her to receive a total of 3 to 4 cycles. We will tentatively plan to restage with CT-chest and abdomen/pelvis after her 3rd cycle of therapy. At that time we can discuss whether to proceed with another 1-3 cycles of therapy, perhaps with input from Dr. Le and Dr. Alvarado.    Ab Warner M.D.   of Medicine  Hematology, Oncology and Transplantation  [facility]  [place]    Uterus cancer, sarcoma (H)

## 2017-02-06 NOTE — MR AVS SNAPSHOT
After Visit Summary   2/6/2017    Maggie Navarrete    MRN: [id number]           Patient Information     Date Of Birth          1950        Visit Information        Provider Department      2/6/2017 11:00 AM  18 ATC;  ONCOLOGY INFUSION [facility]        Today's Diagnoses     Chemotherapy induced cardiomyopathy (H)    -  1     Chemotherapy-induced neutropenia (H)         Uterus cancer, sarcoma (H)           Care Instructions    Contact Numbers  [facility]: [phone]    After Hours:  [phone]  Triage: [phone]    Please call the [facility] Triage line if you experience a temperature greater than or equal to 100.5, shaking chills, have uncontrolled nausea, vomiting and/or diarrhea, dizziness, shortness of breath, chest pain, bleeding, unexplained bruising, or if you have any other new/concerning symptoms, questions or concerns.     If it is after hours, weekends, or holidays, please call the main hospital  at  [phone] and ask to speak to the Oncology doctor on call.     If you are having any concerning symptoms or wish to speak to a provider before your next infusion visit, please call your care coordinator or triage to notify them so we can adequately serve you.     If you need a refill on a narcotic prescription or other medication, please call triage before your infusion appointment.         February 2017 Sunday Monday Tuesday Wednesday Thursday Friday Saturday                  1     2     3     4       5     6     [facility] LAB DRAW    9:30 AM   (15 min.)   [facility] LAB DRAW   [facility] Lab Draw     [facility] RETURN   10:00 AM   (30 min.)   Ab Gutierrez MD   [facility] ONC INFUSION 120   11:00 AM   (120 min.)    ONCOLOGY INFUSION   [facility]     CONSULT HOD    2:30 PM   (60 min.)   Vivienne Zimmerman RD   [facility] [place], Nutrition Services 7     [facility]     9:00 AM   (60 min.)   Massimo York PSYD   [facility]     [facility] ONC INFUSION 120   10:30 AM   (120 min.)   UC ONCOLOGY INFUSION   [facility] 8     [facility] ONC INFUSION 120    2:00 PM   (120 min.)   UC ONCOLOGY INFUSION   [facility] 9     10     11       12     13     14     15     16     17     18       19     20     21     22     23     24     [facility] DRAW   10:30 AM   (15 min.)   [facility] DRAW   [facility] Lab Draw     CT CHEST ABDOMEN PELVIS WWO   11:00 AM   (20 min.)   UCCT2   [facility] CT 25       26     27     [facility] RETURN   11:30 AM   (30 min.)   Ab Gutierrez MD   [facility]     [facility] ONC INFUSION 120    1:00 PM   (120 min.)   UC ONCOLOGY INFUSION   [facility] 28     [facility] ONC INFUSION 120    1:30 PM   (120 min.)   UC ONCOLOGY INFUSION   [facility]                                March 2017 Sunday Monday Tuesday Wednesday Thursday Friday Saturday                  1     [facility] ONC INFUSION 120    1:30 PM   (120 min.)   UC ONCOLOGY INFUSION   [facility] 2     3     4       5     6     7     8     9     10     11       12     13     14     15     16     17     18       19     20     21     22     23     24     25       26     27     28     29     30     31                      Recent Results (from the past 24 hour(s))   Comprehensive metabolic panel    Collection Time: 02/06/17  9:47 AM   Result Value Ref Range    Sodium 145 (H) 133 - 144 mmol/L    Potassium 3.8 3.4 - 5.3 mmol/L    Chloride 111 (H) 94 - 109 mmol/L    Carbon Dioxide 24 20 - 32 mmol/L    Anion Gap 10 3 - 14 mmol/L    Glucose 102 (H) 70 - 99 mg/dL    Urea Nitrogen 18 7 - 30 mg/dL    Creatinine 0.71 0.52 - 1.04 mg/dL    GFR Estimate 82 >60 mL/min/1.7m2    GFR Estimate If Black >90   GFR Calc   >60 mL/min/1.7m2    Calcium 8.7 8.5 - 10.1 mg/dL    Bilirubin  Total 0.2 0.2 - 1.3 mg/dL    Albumin 3.3 (L) 3.4 - 5.0 g/dL    Protein Total 6.1 (L) 6.8 - 8.8 g/dL    Alkaline Phosphatase 74 40 - 150 U/L    ALT 23 0 - 50 U/L    AST 9 0 - 45 U/L   CBC with platelets differential    Collection Time: 02/06/17  9:47 AM   Result Value Ref Range    WBC 6.2 4.0 - 11.0 10e9/L    RBC Count 3.97 3.8 - 5.2 10e12/L    Hemoglobin 11.6 (L) 11.7 - 15.7 g/dL    Hematocrit 35.4 35.0 - 47.0 %    MCV 89 78 - 100 fl    MCH 29.2 26.5 - 33.0 pg    MCHC 32.8 31.5 - 36.5 g/dL    RDW 16.4 (H) 10.0 - 15.0 %    Platelet Count 440 150 - 450 10e9/L    Diff Method Automated Method     % Neutrophils 57.0 %    % Lymphocytes 30.1 %    % Monocytes 11.1 %    % Eosinophils 0.6 %    % Basophils 0.6 %    % Immature Granulocytes 0.6 %    Nucleated RBCs 0 0 /100    Absolute Neutrophil 3.6 1.6 - 8.3 10e9/L    Absolute Lymphocytes 1.9 0.8 - 5.3 10e9/L    Absolute Monocytes 0.7 0.0 - 1.3 10e9/L    Absolute Eosinophils 0.0 0.0 - 0.7 10e9/L    Absolute Basophils 0.0 0.0 - 0.2 10e9/L    Abs Immature Granulocytes 0.0 0 - 0.4 10e9/L    Absolute Nucleated RBC 0.0                  Follow-ups after your visit        Your next 10 appointments already scheduled     Feb 06, 2017  2:30 PM   Consult HOD with Vivienne Still RD   [facility] [place], Nutrition Services ([facility] [place])    [address]  [place] [address]               Feb 07, 2017  9:00 AM   (Arrive by 8:45 AM)   NEW WITH ROOM with Massimo York PSYD,  2 114 CONSULT [facility] ([facility])    [address]  2nd Floor  [place] [facility] [address] [phone]            Feb 07, 2017 10:30 AM   Infusion 120 with UC ONCOLOGY INFUSION, UC 22 [facility] ([facility])    [address] [place] [address]   [phone]            Feb 08, 2017  2:00 PM   Infusion 120 with UC  ONCOLOGY INFUSION,  14 ATC   [facility] ([facility])    [address]  2nd Floor  [place] [facility] [facility] [address]   [phone]            Feb 24, 2017 10:30 AM   [facility] Draw with [facility] LAB DRAW   [facility] Lab Draw ([facility])    [address]  2nd [facility] [address]   [phone]            Feb 24, 2017 11:00 AM   (Arrive by 10:45 AM)   CT CHEST ABDOMEN PELVIS W/O & W CONTRAST with UCCT2   [facility] CT ([facility])    [address]  1st Floor  [facility] [address]   [phone]           Please bring any scans or X-rays taken at other hospitals, if similar tests were done. Also bring a list of your medicines, including vitamins, minerals and over-the-counter drugs. It is safest to leave personal items at home.  Be sure to tell your doctor:   If you have any allergies.   If there s any chance you are pregnant.   If you are breastfeeding.   If you have any special needs.  You may have contrast for this exam. To prepare:   Do not eat or drink for 2 hours before your exam. If you need to take medicine, you may take it with small sips of water. (We may ask you to take liquid medicine as well.)   The day before your exam, drink extra fluids at least six 8-ounce glasses (unless your doctor tells you to restrict your fluids).  Patients over 70 or patients with diabetes or kidney problems:   If you haven t had a blood test (creatinine test) within the last 30 days, go to your clinic or Diagnostic Imaging Department for this test.  If you have diabetes:   If your kidney function is normal, continue taking your metformin (Avandamet, Glucophage, Glucovance, Metaglip) on the day of your exam.   If your kidney function is abnormal, wait 48 hours before restarting this medicine.  You will have oral contrast for this exam:   You will drink the contrast at home. Get  this from your clinic or Diagnostic Imaging Department. Please follow the directions given.  Please wear loose clothing, such as a sweat suit or jogging clothes. Avoid snaps, zippers and other metal. We may ask you to undress and put on a hospital gown.  If you have any questions, please call the Imaging Department where you will have your exam.            Feb 27, 2017 11:30 AM   (Arrive by 11:15 AM)   Return Visit with Ab Aguilar MD   [facility] ([facility] [place])    [address]  [address]nd [facility] [address] [phone]              Future tests that were ordered for you today     Open Future Orders        Priority Expected Expires Ordered    CT Chest Abdomen Pelvis w/o & w Contrast Routine  2/6/2018 2/6/2017            Who to contact     If you have questions or need follow up information about today's clinic visit or your schedule please contact [facility] directly at [phone].  Normal or non-critical lab and imaging results will be communicated to you by [employer], letter or phone within 4 business days after the clinic has received the results. If you do not hear from us within 7 days, please contact the clinic through [employer] or phone. If you have a critical or abnormal lab result, we will notify you by phone as soon as possible.  Submit refill requests through [employer] or call your pharmacy and they will forward the refill request to us. Please allow 3 business days for your refill to be completed.          Additional Information About Your Visit        [employer] Information     [employer] gives you secure access to your electronic health record. If you see a primary care provider, you can also send messages to your care team and make appointments. If you have questions, please call your primary care clinic.  If you do not have a primary care provider, please call [phone] and they will assist you.        Care  EveryWhere ID     This is your Care EveryWhere ID. This could be used by other organizations to access your [facility] medical records  [id number]         Blood Pressure from Last 3 Encounters:   02/06/17 129/65   01/18/17 144/68   01/17/17 138/62    Weight from Last 3 Encounters:   02/06/17 55.52 kg (122 lb 6.4 oz)   01/16/17 55.702 kg (122 lb 12.8 oz)   12/28/16 56.654 kg (124 lb 14.4 oz)              We Performed the Following     CBC with platelets differential     Comprehensive metabolic panel     Treatment Conditions        Primary Care Provider Office Phone # Fax #    Kristine Flynn MD [phone]        [facility] [address]  [place] [address]        Thank you!     Thank you for choosing [facility]  for your care. Our goal is always to provide you with excellent care. Hearing back from our patients is one way we can continue to improve our services. Please take a few minutes to complete the written survey that you may receive in the mail after your visit with us. Thank you!             Your Updated Medication List - Protect others around you: Learn how to safely use, store and throw away your medicines at www.disposemymeds.org.          This list is accurate as of: 2/6/17 11:32 AM.  Always use your most recent med list.                   Brand Name Dispense Instructions for use    alendronate 35 MG tablet    FOSAMAX    12 tablet    Take 1 tablet (35 mg) by mouth every 7 days       CALCIUM CITRATE + PO      Take 200 mg by mouth daily as needed (She supplements to meet her 1,200mg goal depending on how much dietary calcium she has gotten that day (up to 400mg once daily))       enoxaparin 80 MG/0.8ML injection    LOVENOX         FIRST-MOUTHWASH BLM Susp     237 mL    Swish and swallow 5-10 mLs in mouth every 6 hours as needed       HYDROcodone-acetaminophen 5-325 MG per tablet    NORCO    30 tablet    1-2 tablets every 6 hours as needed for pain        levothyroxine 100 MCG tablet    SYNTHROID/LEVOTHROID     Take 100 mcg by mouth daily       LORazepam 0.5 MG tablet    ATIVAN    30 tablet    Take 1 tablet (0.5 mg) by mouth every 4 hours as needed (Anxiety, Nausea/Vomiting or Sleep)       MELATONIN PO      Take 2.5 mg by mouth       MULTIVITAMINS PO      Take 1 tablet by mouth daily.       ondansetron 4 MG tablet    ZOFRAN    30 tablet    Take 1 tablet (4 mg) by mouth every 8 hours as needed (Nausea/Vomiting)       prochlorperazine 10 MG tablet    COMPAZINE    30 tablet    Take 1 tablet (10 mg) by mouth every 6 hours as needed (Nausea/Vomiting)       sterile water (bottle) irrigation          tretinoin 0.025 % cream    RETIN-A    45 g    Apply a pea-sized amount to cleansed and dried face at night       venlafaxine 75 MG 24 hr capsule    EFFEXOR-XR    90 capsule    Take 1 capsule (75 mg) by mouth daily       ZANTAC PO      Take 150 mg by mouth daily       zolpidem 5 MG tablet    AMBIEN    25 tablet    Take 1 tablet (5 mg) by mouth nightly as needed for sleep

## 2017-02-06 NOTE — PATIENT INSTRUCTIONS
Contact Numbers  [facility]: [phone]    After Hours:  [phone]  Triage: [phone]    Please call the [facility] Triage line if you experience a temperature greater than or equal to 100.5, shaking chills, have uncontrolled nausea, vomiting and/or diarrhea, dizziness, shortness of breath, chest pain, bleeding, unexplained bruising, or if you have any other new/concerning symptoms, questions or concerns.     If it is after hours, weekends, or holidays, please call the main hospital  at  [phone] and ask to speak to the Oncology doctor on call.     If you are having any concerning symptoms or wish to speak to a provider before your next infusion visit, please call your care coordinator or triage to notify them so we can adequately serve you.     If you need a refill on a narcotic prescription or other medication, please call triage before your infusion appointment.         February 2017 Sunday Monday Tuesday Wednesday Thursday Friday Saturday                  1     2     3     4       5     6     [facility] LAB DRAW    9:30 AM   (15 min.)    [facility] LAB DRAW   [facility] Lab Draw     [facility] RETURN   10:00 AM   (30 min.)   Ab Gutierrez MD   [facility] ONC INFUSION 120   11:00 AM   (120 min.)    ONCOLOGY INFUSION   [facility]     CONSULT HOD    2:30 PM   (60 min.)   Vivienne Zimmerman RD   [facility] [place], Nutrition Services 7     [facility] WITH ROOM    9:00 AM   (60 min.)   Massimo York PSYD   [facility] ONC INFUSION 120   10:30 AM   (120 min.)    ONCOLOGY INFUSION   [facility] 8     [facility] ONC INFUSION 120    2:00 PM   (120 min.)    ONCOLOGY INFUSION   [facility] 9     10     11       12     13     14     15     16     17     18       19     20     21     22     23     24     [facility] LAB DRAW   10:30 AM   (15 min.)   [facility] LAB  DRAW   [facility] Lab Draw     CT CHEST ABDOMEN PELVIS WWO   11:00 AM   (20 min.)   UCCT2   [facility] CT 25       26     27     [facility] RETURN   11:30 AM   (30 min.)   Ab Gutierrez MD   [facility]     [facility] ONC INFUSION 120    1:00 PM   (120 min.)   UC ONCOLOGY INFUSION   [facility] 28     [facility] ONC INFUSION 120    1:30 PM   (120 min.)   UC ONCOLOGY INFUSION   [facility]                                March 2017 Sunday Monday Tuesday Wednesday Thursday Friday Saturday                  1     [facility] ONC INFUSION 120    1:30 PM   (120 min.)   UC ONCOLOGY INFUSION   [facility] 2     3     4       5     6     7     8     9     10     11       12     13     14     15     16     17     18       19     20     21     22     23     24     25       26     27     28     29     30     31                      Recent Results (from the past 24 hour(s))   Comprehensive metabolic panel    Collection Time: 02/06/17  9:47 AM   Result Value Ref Range    Sodium 145 (H) 133 - 144 mmol/L    Potassium 3.8 3.4 - 5.3 mmol/L    Chloride 111 (H) 94 - 109 mmol/L    Carbon Dioxide 24 20 - 32 mmol/L    Anion Gap 10 3 - 14 mmol/L    Glucose 102 (H) 70 - 99 mg/dL    Urea Nitrogen 18 7 - 30 mg/dL    Creatinine 0.71 0.52 - 1.04 mg/dL    GFR Estimate 82 >60 mL/min/1.7m2    GFR Estimate If Black >90   GFR Calc   >60 mL/min/1.7m2    Calcium 8.7 8.5 - 10.1 mg/dL    Bilirubin Total 0.2 0.2 - 1.3 mg/dL    Albumin 3.3 (L) 3.4 - 5.0 g/dL    Protein Total 6.1 (L) 6.8 - 8.8 g/dL    Alkaline Phosphatase 74 40 - 150 U/L    ALT 23 0 - 50 U/L    AST 9 0 - 45 U/L   CBC with platelets differential    Collection Time: 02/06/17  9:47 AM   Result Value Ref Range    WBC 6.2 4.0 - 11.0 10e9/L    RBC Count 3.97 3.8 - 5.2 10e12/L    Hemoglobin 11.6 (L) 11.7 - 15.7 g/dL    Hematocrit 35.4 35.0 - 47.0 %    MCV 89 78 - 100 fl    MCH 29.2 26.5 - 33.0 pg    MCHC  32.8 31.5 - 36.5 g/dL    RDW 16.4 (H) 10.0 - 15.0 %    Platelet Count 440 150 - 450 10e9/L    Diff Method Automated Method     % Neutrophils 57.0 %    % Lymphocytes 30.1 %    % Monocytes 11.1 %    % Eosinophils 0.6 %    % Basophils 0.6 %    % Immature Granulocytes 0.6 %    Nucleated RBCs 0 0 /100    Absolute Neutrophil 3.6 1.6 - 8.3 10e9/L    Absolute Lymphocytes 1.9 0.8 - 5.3 10e9/L    Absolute Monocytes 0.7 0.0 - 1.3 10e9/L    Absolute Eosinophils 0.0 0.0 - 0.7 10e9/L    Absolute Basophils 0.0 0.0 - 0.2 10e9/L    Abs Immature Granulocytes 0.0 0 - 0.4 10e9/L    Absolute Nucleated RBC 0.0

## 2017-02-06 NOTE — NURSING NOTE
Chief Complaint   Patient presents with     Port Draw     port accessed with power needle for labs and infusion, heparin locked and vitals checked

## 2017-02-06 NOTE — PROGRESS NOTES
Infusion Nursing Note:  Maggie Navarreet presents today for C3D1 Adriamycin-Dacarbazine.    Patient seen by provider today: Yes: Dr Warner    Treatment Conditions:  HGB     11.6   2/6/2017  WBC      6.2   2/6/2017   ANEU      3.6   2/6/2017  PLT      440   2/6/2017     NA      145   2/6/2017                POTASSIUM      3.8   2/6/2017        No results found for this basename: mag         CR     0.71   2/6/2017                SUSAN      8.7   2/6/2017             BILITOTAL      0.2   2/6/2017        ALBUMIN      3.3   2/6/2017                 ALT       23   2/6/2017        AST        9   2/6/2017  Results reviewed, labs MET treatment parameters, ok to proceed with treatment.  ECHO/MUGA completed 11/29/16  EF 70%.    Intravenous Access:  Implanted Port.  Access left intact at time of discharge.      Note:   Results reviewed, copy given to patient.  Proceed with treatment.    + BR checked and noted every 2ccs while administering Adriamycin in a free flowing NS line.  Copy of AVS given to patient. + Blood return from PORT pre and post infusion.  Tolerated infusion without incident. No Prescriptions filled today.   D/C in care of self.  Pt will return 2/7 for C3D2 Adriamycin-Dacarbazine.       Carolyn Kauffman RN    Administrations This Visit     0.9% sodium chloride BOLUS     Admin Date Action Dose Route Administered By             02/06/2017 New Bag 500 mL Intravenous Carolyn Kauffman RN                    dacarbazine (DTIC) 380 mg in NaCl 0.9 % 313 mL CHEMOTHERAPY     Admin Date Action Dose Route Administered By             02/06/2017 New Bag 380 mg Intravenous Carolyn Kauffman RN                    DOXOrubicin (ADRIAMYCIN) injection CHEMO 38 mg     Admin Date Action Dose Route Administered By             02/06/2017 Given 38 mg Intravenous Carolyn Kauffman RN                    fosaprepitant (EMEND) 150 mg in NaCl 0.9 % intermittent infusion     Admin Date Action Dose Route Administered By              02/06/2017 New Bag 150 mg Intravenous Carolyn Kauffman RN                    heparin lock flush 10 UNIT/ML injection 5 mL     Admin Date Action Dose Route Administered By             02/06/2017 Given 5 mL Intracatheter Carolyn Kauffman RN                    ondansetron (ZOFRAN) 8 mg, dexamethasone (DECADRON) 12 mg in NaCl 0.9 % 50 mL intermittent infusion     Admin Date Action Dose Route Administered By             02/06/2017 New Bag   Intravenous Carolyn Kauffman RN                    sodium chloride (PF) 0.9% PF flush 10 mL     Admin Date Action Dose Route Administered By             02/06/2017 Given 10 mL Intracatheter Carolyn Kauffman RN

## 2017-02-07 ENCOUNTER — OFFICE VISIT (OUTPATIENT)
Dept: ONCOLOGY | Facility: CLINIC | Age: 67
End: 2017-02-07
Attending: PSYCHOLOGIST
Payer: MEDICARE

## 2017-02-07 ENCOUNTER — INFUSION THERAPY VISIT (OUTPATIENT)
Dept: ONCOLOGY | Facility: CLINIC | Age: 67
End: 2017-02-07
Attending: INTERNAL MEDICINE
Payer: MEDICARE

## 2017-02-07 VITALS
DIASTOLIC BLOOD PRESSURE: 70 MMHG | RESPIRATION RATE: 18 BRPM | HEART RATE: 94 BPM | OXYGEN SATURATION: 100 % | SYSTOLIC BLOOD PRESSURE: 152 MMHG | TEMPERATURE: 97.2 F

## 2017-02-07 DIAGNOSIS — C54.9 UTERUS CANCER, SARCOMA (H): ICD-10-CM

## 2017-02-07 DIAGNOSIS — D70.1 CHEMOTHERAPY-INDUCED NEUTROPENIA (H): ICD-10-CM

## 2017-02-07 DIAGNOSIS — T45.1X5A CHEMOTHERAPY INDUCED CARDIOMYOPATHY (H): Primary | ICD-10-CM

## 2017-02-07 DIAGNOSIS — F43.22 ADJUSTMENT DISORDER WITH ANXIOUS MOOD: Primary | ICD-10-CM

## 2017-02-07 DIAGNOSIS — T45.1X5A CHEMOTHERAPY-INDUCED NEUTROPENIA (H): ICD-10-CM

## 2017-02-07 DIAGNOSIS — I42.7 CHEMOTHERAPY INDUCED CARDIOMYOPATHY (H): Primary | ICD-10-CM

## 2017-02-07 PROCEDURE — 25000128 H RX IP 250 OP 636: Mod: ZF | Performed by: INTERNAL MEDICINE

## 2017-02-07 PROCEDURE — 90791 PSYCH DIAGNOSTIC EVALUATION: CPT | Mod: ZP | Performed by: PSYCHOLOGIST

## 2017-02-07 PROCEDURE — 96411 CHEMO IV PUSH ADDL DRUG: CPT

## 2017-02-07 PROCEDURE — 96375 TX/PRO/DX INJ NEW DRUG ADDON: CPT

## 2017-02-07 PROCEDURE — 25000125 ZZHC RX 250: Mod: ZF | Performed by: INTERNAL MEDICINE

## 2017-02-07 PROCEDURE — 96413 CHEMO IV INFUSION 1 HR: CPT

## 2017-02-07 PROCEDURE — 96415 CHEMO IV INFUSION ADDL HR: CPT

## 2017-02-07 RX ORDER — DOXORUBICIN HYDROCHLORIDE 2 MG/ML
25 INJECTION, SOLUTION INTRAVENOUS ONCE
Status: COMPLETED | OUTPATIENT
Start: 2017-02-07 | End: 2017-02-07

## 2017-02-07 RX ORDER — HEPARIN SODIUM,PORCINE 10 UNIT/ML
5 VIAL (ML) INTRAVENOUS ONCE
Status: COMPLETED | OUTPATIENT
Start: 2017-02-07 | End: 2017-02-07

## 2017-02-07 RX ADMIN — SODIUM CHLORIDE, PRESERVATIVE FREE 5 ML: 5 INJECTION INTRAVENOUS at 13:03

## 2017-02-07 RX ADMIN — DEXAMETHASONE SODIUM PHOSPHATE: 10 INJECTION, SOLUTION INTRAMUSCULAR; INTRAVENOUS at 10:43

## 2017-02-07 RX ADMIN — SODIUM CHLORIDE 250 ML: 9 INJECTION, SOLUTION INTRAVENOUS at 10:43

## 2017-02-07 RX ADMIN — DOXORUBICIN HYDROCHLORIDE 38 MG: 2 INJECTION, SOLUTION INTRAVENOUS at 10:58

## 2017-02-07 RX ADMIN — DACARBAZINE 380 MG: 200 INJECTION, POWDER, FOR SOLUTION INTRAVENOUS at 11:05

## 2017-02-07 ASSESSMENT — PAIN SCALES - GENERAL: PAINLEVEL: NO PAIN (0)

## 2017-02-07 NOTE — PROGRESS NOTES
"Confidential Summary of Oncology Psychology Initial Visit    Maggie Navarrete is a 66 year old female who was referred by Dr. Pino Aguilar for anxiety, stress, and emotional adjustment to illness.   The patient was seen for an initial 50 minute evaluation on 2/7/2017.    Presenting Concerns: Initially Ms. Navarrete discussed her concerns related to her diagnosis and how it reminds her of her son dying at age 32 due to a \"brain tumor.\" She reported feeling anger due to her diagnosis but is managing that emotional state with mediation and time. She endorsed symptoms of anxiety such as worry, concern regarding the future, and intrusive thoughts about her cancer. Another source of stress for her is her grandchildren live in [place] and she is not able to see them very often. She was able to travel to see them recently but now has no travel plans and worries about their welfare.     Mental Status/Interview:   Orientation: Maggie Navarrete was alert, attentive, and oriented to time, place, and person  Affect: Affect was appropriate to the situation and showed normal range and stability.  Speech: Speech was clear with normal fluency, rate, tone, and volume.  Memory: Although not formally assessed, immediate, recent, and remote memory appeared to be intact.   Insight and Judgement: Maggie Navarrete demonstrates adequate awareness of the issues discussed and was able to come to reasonable conclusions.  Thought: Thought patterns were coherent and logical. Hallucinations were denied and delusions were not evident.   Lethality: Maggie Navarrete denied suicidal or homicidal ideation or intention.        Impression: Ms. Maggie Navarrete is adjusting to her illness and its treatments. She endorses some symptoms of anxiety as well as anger related to her illness. She reported a good support network, financial stability, and fully understands her diagnosis and treatment. She requested an appointment with oncology psychology as a protective " measure to ensure she is managing the emotional aspects of her illness. She was encouraged to continue to engage in emotionally protective behaviors and return for additional appointments as needed.     Diagnosis:   Encounter Diagnosis   Name Primary?     Adjustment disorder with anxious mood Yes     Recommendations/Plan:   1. Maintain her emotionally protective behaviors which were discussed during this appointment  2. Return for additional appointments as needed.     Thank you for this opportunity to participate in your care of this patient.    Massimo oYrk Psy.D., L.P.  Director, Oncology Supportive Care

## 2017-02-07 NOTE — PATIENT INSTRUCTIONS
Contact Numbers  [facility] [place]/After Hours: [phone]  [facility] Triage line: [phone]      Please call the triage or after hours line if you experience a temperature greater than or equal to 100.5, shaking chills, have uncontrolled nausea, vomiting and/or diarrhea, dizziness, shortness of breath, chest pain, bleeding, unexplained bruising, or if you have any other new/concerning symptoms, questions or concerns.     If it is after hours, weekends, or holidays, please call the main hospital  at  [phone] and ask to speak to the Oncology doctor on call.     If you are having any concerning symptoms or wish to speak to a provider before your next infusion visit, please call to notify us so we can adequately serve you.     If you need a refill on a narcotic prescription or other medication, please call before your infusion appointment.                 February 2017 Sunday Monday Tuesday Wednesday Thursday Friday Saturday                  1     2     3     4       5     6     [facility] DRAW    9:30 AM   (15 min.)    [facility]   [facility]     [facility] RETURN   10:00 AM   (30 min.)   Ab Gutierrez MD   [facility] ONC INFUSION 120   11:00 AM   (120 min.)    ONCOLOGY INFUSION   [facility] 7     [facility] NEW WITH ROOM    9:00 AM   (60 min.)   Massimo York PSYD   [facility] ONC INFUSION 120   10:30 AM   (120 min.)    ONCOLOGY INFUSION   [facility] 8     [facility] ONC INFUSION 120    2:00 PM   (120 min.)    ONCOLOGY INFUSION   [facility] 9     10     11       12     13     14     15     16     17     18       19     20     21     22     23     24     [facility] DRAW   10:30 AM   (15 min.)   [facility]   [facility] Lab Draw     CT CHEST ABDOMEN PELVIS WWO   11:00 AM   (20 min.)   UCCT2   [facility] CT 25       26     27     [facility]  RETURN   11:30 AM   (30 min.)   Ab Gutierrez MD   [facility]     CONSULT HOD    1:00 PM   (60 min.)   Vivienne Zimmerman RD   [facility] [place] Services     [facility] ONC INFUSION 120    1:00 PM   (120 min.)    ONCOLOGY INFUSION   [facility] 28     [facility] ONC INFUSION 120    1:30 PM   (120 min.)    ONCOLOGY INFUSION   [facility]                                March 2017 Sunday Monday Tuesday Wednesday Thursday Friday Saturday                  1     [facility] ONC INFUSION 120    1:30 PM   (120 min.)    ONCOLOGY INFUSION   [facility] 2     3     4       5     6     7     8     9     10     11       12     13     14     15     16     17     18       19     20     21     22     23     24     25       26     27     28     29     30     31                       Lab Results:  No results found for this or any previous visit (from the past 12 hour(s)).

## 2017-02-07 NOTE — Clinical Note
"2/7/2017       RE: Maggie Navarrete  [address]  [place] [address]     Dear Colleague,    Thank you for referring your patient, Maggie Navarrete, to the [facility]. Please see a copy of my visit note below.    Confidential Summary of Oncology Psychology Initial Visit    Maggie Navarrete is a 66 year old female who was referred by Dr. Pino Aguilar for anxiety, stress, and emotional adjustment to illness.   The patient was seen for an initial 50 minute evaluation on 2/7/2017.    Presenting Concerns: Initially Ms. Navarrete discussed her concerns related to her diagnosis and how it reminds her of her son dying at age 32 due to a \"brain tumor.\" She reported feeling anger due to her diagnosis but is managing that emotional state with mediation and time. She endorsed symptoms of anxiety such as worry, concern regarding the future, and intrusive thoughts about her cancer. Another source of stress for her is her grandchildren live in [place] and she is not able to see them very often. She was able to travel to see them recently but now has no travel plans and worries about their welfare.     Mental Status/Interview:   Orientation: Maggie Navarrete was alert, attentive, and oriented to time, place, and person  Affect: Affect was appropriate to the situation and showed normal range and stability.  Speech: Speech was clear with normal fluency, rate, tone, and volume.  Memory: Although not formally assessed, immediate, recent, and remote memory appeared to be intact.   Insight and Judgement: Maggie Navarrete demonstrates adequate awareness of the issues discussed and was able to come to reasonable conclusions.  Thought: Thought patterns were coherent and logical. Hallucinations were denied and delusions were not evident.   Lethality: Maggie Navarrete denied suicidal or homicidal ideation or intention.        Impression: Ms. Maggie Navarrete is adjusting to her illness and its treatments. She endorses " some symptoms of anxiety as well as anger related to her illness. She reported a good support network, financial stability, and fully understands her diagnosis and treatment. She requested an appointment with oncology psychology as a protective measure to ensure she is managing the emotional aspects of her illness. She was encouraged to continue to engage in emotionally protective behaviors and return for additional appointments as needed.     Diagnosis:   Encounter Diagnosis   Name Primary?     Adjustment disorder with anxious mood Yes     Recommendations/Plan:   1. Maintain her emotionally protective behaviors which were discussed during this appointment  2. Return for additional appointments as needed.     Thank you for this opportunity to participate in your care of this patient.    Yandy Ferguson.ANDRAE., L.P.  Director, Oncology Supportive Care     Again, thank you for allowing me to participate in the care of your patient.      Sincerely,    Massimo York PSYD

## 2017-02-07 NOTE — PROGRESS NOTES
Infusion Nursing Note:  Pt presents today for C3 D2 Adriamycin/Dacarbazine  No labs or treatment parameters today.    Note: Pt denies any N/V, fever or chills. No new issues/concerns noted overnight.  Proceed with treatment.    Intravenous Access:  Implanted Port.    Post Infusion Assessment: Administered Adriamycin IV push in free flowing NS line while checking for blood return every 2cc.   Patient tolerated infusion without incident.  Blood return noted pre and post infusion.  Port flushed and left intact at end of infusion.    Discharge Plan:   Patient declined prescription refills.  Discharge instructions reviewed with: Patient.  Patient and/or family verbalized understanding of discharge instructions and all questions answered.  [facility].  Pt will return tomorrow, 2/8, for D3 infusion.

## 2017-02-08 ENCOUNTER — INFUSION THERAPY VISIT (OUTPATIENT)
Dept: ONCOLOGY | Facility: CLINIC | Age: 67
End: 2017-02-08
Attending: INTERNAL MEDICINE
Payer: MEDICARE

## 2017-02-08 VITALS
DIASTOLIC BLOOD PRESSURE: 72 MMHG | TEMPERATURE: 96.2 F | RESPIRATION RATE: 16 BRPM | HEART RATE: 90 BPM | OXYGEN SATURATION: 100 % | SYSTOLIC BLOOD PRESSURE: 146 MMHG

## 2017-02-08 DIAGNOSIS — T45.1X5A CHEMOTHERAPY INDUCED CARDIOMYOPATHY (H): Primary | ICD-10-CM

## 2017-02-08 DIAGNOSIS — T45.1X5A CHEMOTHERAPY-INDUCED NEUTROPENIA (H): ICD-10-CM

## 2017-02-08 DIAGNOSIS — I42.7 CHEMOTHERAPY INDUCED CARDIOMYOPATHY (H): Primary | ICD-10-CM

## 2017-02-08 DIAGNOSIS — C54.9 UTERUS CANCER, SARCOMA (H): ICD-10-CM

## 2017-02-08 DIAGNOSIS — D70.1 CHEMOTHERAPY-INDUCED NEUTROPENIA (H): ICD-10-CM

## 2017-02-08 PROCEDURE — 96367 TX/PROPH/DG ADDL SEQ IV INF: CPT

## 2017-02-08 PROCEDURE — 96415 CHEMO IV INFUSION ADDL HR: CPT

## 2017-02-08 PROCEDURE — 96413 CHEMO IV INFUSION 1 HR: CPT

## 2017-02-08 PROCEDURE — 96411 CHEMO IV PUSH ADDL DRUG: CPT

## 2017-02-08 PROCEDURE — 25000128 H RX IP 250 OP 636: Mod: ZF | Performed by: INTERNAL MEDICINE

## 2017-02-08 PROCEDURE — 25000125 ZZHC RX 250: Mod: ZF | Performed by: INTERNAL MEDICINE

## 2017-02-08 PROCEDURE — 96377 APPLICATON ON-BODY INJECTOR: CPT | Mod: 59

## 2017-02-08 RX ORDER — HEPARIN SODIUM,PORCINE 10 UNIT/ML
5 VIAL (ML) INTRAVENOUS ONCE
Status: DISCONTINUED | OUTPATIENT
Start: 2017-02-08 | End: 2017-02-08 | Stop reason: HOSPADM

## 2017-02-08 RX ORDER — DOXORUBICIN HYDROCHLORIDE 2 MG/ML
25 INJECTION, SOLUTION INTRAVENOUS ONCE
Status: COMPLETED | OUTPATIENT
Start: 2017-02-08 | End: 2017-02-08

## 2017-02-08 RX ORDER — HEPARIN SODIUM (PORCINE) LOCK FLUSH IV SOLN 100 UNIT/ML 100 UNIT/ML
500 SOLUTION INTRAVENOUS ONCE
Status: COMPLETED | OUTPATIENT
Start: 2017-02-08 | End: 2017-02-08

## 2017-02-08 RX ADMIN — SODIUM CHLORIDE, PRESERVATIVE FREE 500 UNITS: 5 INJECTION INTRAVENOUS at 16:57

## 2017-02-08 RX ADMIN — SODIUM CHLORIDE 250 ML: 9 INJECTION, SOLUTION INTRAVENOUS at 14:27

## 2017-02-08 RX ADMIN — DOXORUBICIN HYDROCHLORIDE 38 MG: 2 INJECTION, SOLUTION INTRAVENOUS at 14:52

## 2017-02-08 RX ADMIN — DEXAMETHASONE SODIUM PHOSPHATE: 10 INJECTION, SOLUTION INTRAMUSCULAR; INTRAVENOUS at 14:28

## 2017-02-08 RX ADMIN — DACARBAZINE 380 MG: 200 INJECTION, POWDER, FOR SOLUTION INTRAVENOUS at 14:57

## 2017-02-08 RX ADMIN — PEGFILGRASTIM 6 MG: KIT SUBCUTANEOUS at 16:53

## 2017-02-08 NOTE — PROGRESS NOTES
Infusion Nursing Note:  Maggie Navarrete presents today for Cycle 3 Day 3 Adriamycin, Dacarbazine, Neulasta Onpro.    Patient seen by provider today: No    Note: Pt reports feeling tired but otherwise is tolerating chemo well.    Neulasta Onpro On-Body injector applied to right arm at 1700 with light facing down.  Writer discussed Neulasta injection would start tomorrow at 8pm, approximately 27 hours after application applied today.  Written and Verbal instruction reviewed with patient.  Pt instructed when the dose delivery starts, it will take about 45 minutes to complete.  Pt aware Neulasta Onpro On-Body should have green flashing light and to call triage or on-call MD if injector flashes red or appears to be leaking. Pt aware to keep Onpro On-Body Neualsta 4 inches away from electrical equipment and to avoid showering 4 hours prior to injection.     Intravenous Access:  Implanted Port.    Treatment Conditions:  Labs from 2/6 reviewed.    Post Infusion Assessment:  Patient tolerated infusion without incident.  Blood return noted pre and post infusion.  Blood return noted during Adriamycin administration every 2 cc.  Site patent and intact, free from redness, edema or discomfort.  No evidence of extravasations. Access discontinued per protocol.    Discharge Plan:   Copy of AVS reviewed with patient and/or family.  Patient will return 2/27 for next appointment.  Patient discharged in stable condition accompanied by: friend.  Departure Mode: Ambulatory.    Sona Witt RN

## 2017-02-16 NOTE — PROGRESS NOTES
MEDICAL ONCOLOGY PROGRESS NOTE   Feb 6, 2017    Oncologic history:  1. January 2016, she presents to her PCP with 3 weeks of abdominal pain and severe constipation. She is treated for constipation and improves.  2. 3/21/2016, worsening pelvic pressure prompts pelvic examination with an enlarged uterus noted. Transvaginal ultrasound shows enlargement of subserosal fibroids originally seen on MRI in 2006.  3. 5/9/2016, MRI pelvis is obtained, which shows several enhancing uterine masses involving an enlarged uterus, felt radiographically compatible with benign uterine leiomyomata.  3. Summer 2016, she experiences worsening low back pain thought at first thought to be sacroiliitis, but pain persists after corticosteroid injection. Continues with severe constipation.  4. 9/8/2016, she undergoes colonoscopy for persistent abdominal pain. She has sigmoid polyp snared. Abdominal pain and diarrhea persist after colonoscopy prep.  5. 9/14/2016, she has worsening abdominal pain and is evaluated in the ED. CT-scan shows a large subserosal fibroid measuring 9.4 x 9.6 x 9.8 cm, as well as very large fibroids along the posterior uterine body more inferiorly, increased in size to 14 cm in greatest diameter compared to 10 cm in greatest diameter on MRI of 5/9/2016.  6. 9/29/2016, she has staging CT-CAP, which shows a small left sided pleural effusion and a 3 mm pulmonary nodule in the RML.  7. October 2016, self-refers to [facility].  8. 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  9. 11/4/2016, undergoes total abdominal hysterectomy and BSO, under Dr. Harsh Camarillo. Surgery was originally presumed for leiomyoma, however, intraoperatively there was focal adherence of a mass to the pelvic right sidewall and cul-de-sac, which led to peritoneal resection and omentectomy. Final pathology, pT2b: showed leiomyosarcoma involving the cul-de-sac and forming two separate masses measuring  10.5 x 6.5 x 4.5 cm and 10.7 x 7.9 x 7.5 cm.    HISTORY OF PRESENT ILLNESS  Maggie Navarrete is a 66 year old female with a diagnosis leiomyosarcoma.of the uterus. She is currently receiving adjuvant Doxorubicin and Dacarbazine. She has received 2 cycles of chemotherapy and she continues to tolerate therapy. She has some fatigue, but this typically resolves after the first week of therapy. She denies any new or worrisome signs or symptoms. No abdominal or pelvic pain, no diarrhea or constipation. Fortunately her appetite has not changed much, though she has noticed some taste change. Labs today show normal WBC of 6.2, Hemoglobin of 11.6, and normal platelet count. Renal and liver function tests are normal.    Lab Results   Component Value Date    WBC 6.2 02/06/2017    HGB 11.6 (L) 02/06/2017    HCT 35.4 02/06/2017     02/06/2017     (H) 02/06/2017    POTASSIUM 3.8 02/06/2017    CHLORIDE 111 (H) 02/06/2017    CO2 24 02/06/2017    BUN 18 02/06/2017    CR 0.71 02/06/2017     (H) 02/06/2017    AST 9 02/06/2017    ALT 23 02/06/2017    ALKPHOS 74 02/06/2017    BILITOTAL 0.2 02/06/2017    INR 0.98 12/23/2016       REVIEW OF SYSTEMS  A 12-point ROS negative except as in HPI    Current Outpatient Prescriptions   Medication Sig Dispense Refill     sterile water, bottle, irrigation        RaNITidine HCl (ZANTAC PO) Take 150 mg by mouth daily       LORazepam (ATIVAN) 0.5 MG tablet Take 1 tablet (0.5 mg) by mouth every 4 hours as needed (Anxiety, Nausea/Vomiting or Sleep) 30 tablet 2     ondansetron (ZOFRAN) 4 MG tablet Take 1 tablet (4 mg) by mouth every 8 hours as needed (Nausea/Vomiting) 30 tablet 3     MELATONIN PO Take 2.5 mg by mouth       enoxaparin (LOVENOX) 80 MG/0.8ML injection        venlafaxine (EFFEXOR-XR) 75 MG 24 hr capsule Take 1 capsule (75 mg) by mouth daily 90 capsule 1     levothyroxine (SYNTHROID,LEVOTHROID) 100 MCG tablet Take 100 mcg by mouth daily       zolpidem (AMBIEN) 5 MG tablet  Take 1 tablet (5 mg) by mouth nightly as needed for sleep 25 tablet 0     alendronate (FOSAMAX) 35 MG tablet Take 1 tablet (35 mg) by mouth every 7 days 12 tablet 3     tretinoin (RETIN-A) 0.025 % cream Apply a pea-sized amount to cleansed and dried face at night 45 g 3     Calcium Citrate-Vitamin D (CALCIUM CITRATE + PO) Take 200 mg by mouth daily as needed (She supplements to meet her 1,200mg goal depending on how much dietary calcium she has gotten that day (up to 400mg once daily))       Multiple Vitamin (MULTIVITAMINS PO) Take 1 tablet by mouth daily.       DPH-Lido-AlHydr-MgHydr-Simeth (FIRST-MOUTHWASH BLM) SUSP Swish and swallow 5-10 mLs in mouth every 6 hours as needed 237 mL 1     HYDROcodone-acetaminophen (NORCO) 5-325 MG per tablet 1-2 tablets every 6 hours as needed for pain 30 tablet 0     prochlorperazine (COMPAZINE) 10 MG tablet Take 1 tablet (10 mg) by mouth every 6 hours as needed (Nausea/Vomiting) 30 tablet 2       Past Medical History   Diagnosis Date     Anxiety      Arthritis      pain in feet and knees     Disorder of bone and cartilage, unspecified      Esophageal reflux 2015     GERD (gastroesophageal reflux disease)      Hx of previous reproductive problem 1980     Kidney stone 2011     Personal history of colonic polyps 2016     Small, benign     PONV (postoperative nausea and vomiting)      Posterior vitreous detachment of left eye 2011     Posterior vitreous detachment of right eye 2011     Ptosis of eyelid, bilateral      Stomach problems 2015     Abdominal pain, Diarreha     Thyroid disease      thyroid nodule resolved 2014         PHYSICAL EXAMINATION  /65  Pulse 108  Temp 97.7  F (36.5  C) (Oral)  Resp 18  Ht 1.524 m (5')  Wt 55.5 kg (122 lb 6.4 oz)  SpO2 99%  BMI 23.9 kg/m2  Wt Readings from Last 2 Encounters:   02/06/17 55.5 kg (122 lb 6.4 oz)   01/16/17 55.7 kg (122 lb 12.8 oz)     Physical Exam   Constitutional: She is oriented to person, place, and time. She appears  well-developed and well-nourished. No distress.   HENT:   Head: Normocephalic and atraumatic.   Mouth/Throat: Oropharynx is clear and moist.   Eyes: Conjunctivae and EOM are normal. Pupils are equal, round, and reactive to light. No scleral icterus.   Neck: Normal range of motion. Neck supple.   Cardiovascular: Normal rate, regular rhythm and normal heart sounds.    No murmur heard.  Pulmonary/Chest: Effort normal and breath sounds normal. She has no wheezes.   Abdominal: Soft. Bowel sounds are normal. She exhibits no distension. There is no tenderness.   Musculoskeletal: Normal range of motion. She exhibits no edema.   Lymphadenopathy:     She has no cervical adenopathy.   Neurological: She is alert and oriented to person, place, and time. No cranial nerve deficit. She exhibits normal muscle tone. Coordination normal.   Skin: Skin is warm and dry. No rash noted.   Psychiatric: She has a normal mood and affect. Her behavior is normal. Judgment and thought content normal.   Nursing note and vitals reviewed.      IMPRESSION AND PLAN  #1 Locally advanced leiomyosarcoma of the uterus, pT2b Nx M0, Stage IIB, status-post resection, currently on adjuvant chemotherapy  It was a pleasure to see Ms. Navarrete today. She continues to do well on adjuvant doxorubicin and dacarbazine. The plan is for her to receive a total of 3 to 4 cycles. We will tentatively plan to restage with CT-chest and abdomen/pelvis after her 3rd cycle of therapy. At that time we can discuss whether to proceed with another 1-3 cycles of therapy, perhaps with input from Dr. Le and Dr. Alvarado.    Ab Warner M.D.   of Medicine  Hematology, Oncology and Transplantation  [facility]    Uterus cancer, sarcoma (H)

## 2017-02-20 DIAGNOSIS — C54.9 UTERUS CANCER, SARCOMA (H): Primary | ICD-10-CM

## 2017-02-23 ASSESSMENT — ENCOUNTER SYMPTOMS
HEMATURIA: 0
FATIGUE: 0
RECTAL BLEEDING: 0
EYE PAIN: 0
ARTHRALGIAS: 1
HOARSE VOICE: 0
SORE THROAT: 0
SWOLLEN GLANDS: 0
SINUS CONGESTION: 0
JAUNDICE: 0
SKIN CHANGES: 0
WEIGHT LOSS: 0
DECREASED APPETITE: 0
NAIL CHANGES: 1
EYE REDNESS: 1
TASTE DISTURBANCE: 0
ABDOMINAL PAIN: 0
NECK MASS: 0
EYE WATERING: 0
POOR WOUND HEALING: 0
DIFFICULTY URINATING: 0
NECK PAIN: 1
DYSURIA: 0
MYALGIAS: 0
PANIC: 0
NIGHT SWEATS: 0
HALLUCINATIONS: 0
SINUS PAIN: 0
DEPRESSION: 0
INSOMNIA: 0
DECREASED CONCENTRATION: 0
BOWEL INCONTINENCE: 0
SMELL DISTURBANCE: 0
WEIGHT GAIN: 0
VOMITING: 0
BACK PAIN: 0
FLANK PAIN: 0
MUSCLE CRAMPS: 0
ALTERED TEMPERATURE REGULATION: 0
BLOOD IN STOOL: 0
EYE IRRITATION: 1
HEARTBURN: 1
INCREASED ENERGY: 0
POLYPHAGIA: 0
POLYDIPSIA: 0
DIARRHEA: 0
STIFFNESS: 0
FEVER: 0
NERVOUS/ANXIOUS: 0
CHILLS: 0
MUSCLE WEAKNESS: 0
TROUBLE SWALLOWING: 0
BRUISES/BLEEDS EASILY: 1
RECTAL PAIN: 0
JOINT SWELLING: 0
NAUSEA: 0
BLOATING: 0
CONSTIPATION: 0
DOUBLE VISION: 0

## 2017-02-24 DIAGNOSIS — C54.9 UTERUS CANCER, SARCOMA (H): ICD-10-CM

## 2017-02-24 LAB
ALBUMIN SERPL-MCNC: 3.5 G/DL (ref 3.4–5)
ALP SERPL-CCNC: 82 U/L (ref 40–150)
ALT SERPL W P-5'-P-CCNC: 22 U/L (ref 0–50)
ANION GAP SERPL CALCULATED.3IONS-SCNC: 8 MMOL/L (ref 3–14)
AST SERPL W P-5'-P-CCNC: 8 U/L (ref 0–45)
BASOPHILS # BLD AUTO: 0 10E9/L (ref 0–0.2)
BASOPHILS NFR BLD AUTO: 0.4 %
BILIRUB SERPL-MCNC: 0.2 MG/DL (ref 0.2–1.3)
BUN SERPL-MCNC: 21 MG/DL (ref 7–30)
CALCIUM SERPL-MCNC: 9 MG/DL (ref 8.5–10.1)
CHLORIDE SERPL-SCNC: 112 MMOL/L (ref 94–109)
CO2 SERPL-SCNC: 26 MMOL/L (ref 20–32)
CREAT SERPL-MCNC: 0.73 MG/DL (ref 0.52–1.04)
DIFFERENTIAL METHOD BLD: ABNORMAL
EOSINOPHIL # BLD AUTO: 0.1 10E9/L (ref 0–0.7)
EOSINOPHIL NFR BLD AUTO: 1.6 %
ERYTHROCYTE [DISTWIDTH] IN BLOOD BY AUTOMATED COUNT: 18.2 % (ref 10–15)
GFR SERPL CREATININE-BSD FRML MDRD: 80 ML/MIN/1.7M2
GLUCOSE SERPL-MCNC: 83 MG/DL (ref 70–99)
HCT VFR BLD AUTO: 35.7 % (ref 35–47)
HGB BLD-MCNC: 11.7 G/DL (ref 11.7–15.7)
IMM GRANULOCYTES # BLD: 0.2 10E9/L (ref 0–0.4)
IMM GRANULOCYTES NFR BLD: 2.6 %
LYMPHOCYTES # BLD AUTO: 2.5 10E9/L (ref 0.8–5.3)
LYMPHOCYTES NFR BLD AUTO: 28 %
MCH RBC QN AUTO: 29.8 PG (ref 26.5–33)
MCHC RBC AUTO-ENTMCNC: 32.8 G/DL (ref 31.5–36.5)
MCV RBC AUTO: 91 FL (ref 78–100)
MONOCYTES # BLD AUTO: 1 10E9/L (ref 0–1.3)
MONOCYTES NFR BLD AUTO: 10.8 %
NEUTROPHILS # BLD AUTO: 5.1 10E9/L (ref 1.6–8.3)
NEUTROPHILS NFR BLD AUTO: 56.6 %
NRBC # BLD AUTO: 0 10*3/UL
NRBC BLD AUTO-RTO: 0 /100
PLATELET # BLD AUTO: 493 10E9/L (ref 150–450)
POTASSIUM SERPL-SCNC: 4.4 MMOL/L (ref 3.4–5.3)
PROT SERPL-MCNC: 6.7 G/DL (ref 6.8–8.8)
RBC # BLD AUTO: 3.93 10E12/L (ref 3.8–5.2)
SODIUM SERPL-SCNC: 146 MMOL/L (ref 133–144)
WBC # BLD AUTO: 9 10E9/L (ref 4–11)

## 2017-02-24 PROCEDURE — 85025 COMPLETE CBC W/AUTO DIFF WBC: CPT | Performed by: INTERNAL MEDICINE

## 2017-02-24 PROCEDURE — 25000128 H RX IP 250 OP 636: Performed by: INTERNAL MEDICINE

## 2017-02-24 PROCEDURE — 80053 COMPREHEN METABOLIC PANEL: CPT | Performed by: INTERNAL MEDICINE

## 2017-02-24 RX ORDER — HEPARIN SODIUM (PORCINE) LOCK FLUSH IV SOLN 100 UNIT/ML 100 UNIT/ML
5 SOLUTION INTRAVENOUS ONCE
Status: DISCONTINUED | OUTPATIENT
Start: 2017-02-24 | End: 2017-02-24 | Stop reason: CLARIF

## 2017-02-24 NOTE — NURSING NOTE
Chief Complaint   Patient presents with     Port Draw     Labs drawn by RN from VPT     Labs drawn from R AC.  Rayna Zhang RN

## 2017-02-27 ENCOUNTER — ONCOLOGY VISIT (OUTPATIENT)
Dept: ONCOLOGY | Facility: CLINIC | Age: 67
End: 2017-02-27
Attending: INTERNAL MEDICINE
Payer: MEDICARE

## 2017-02-27 ENCOUNTER — HOSPITAL ENCOUNTER (OUTPATIENT)
Dept: NUTRITION | Facility: CLINIC | Age: 67
Discharge: HOME | End: 2017-02-27
Attending: INTERNAL MEDICINE | Admitting: INTERNAL MEDICINE
Payer: MEDICARE

## 2017-02-27 VITALS
TEMPERATURE: 97.6 F | HEIGHT: 60 IN | SYSTOLIC BLOOD PRESSURE: 127 MMHG | DIASTOLIC BLOOD PRESSURE: 85 MMHG | WEIGHT: 126.5 LBS | BODY MASS INDEX: 24.84 KG/M2 | RESPIRATION RATE: 16 BRPM | OXYGEN SATURATION: 100 % | HEART RATE: 106 BPM

## 2017-02-27 DIAGNOSIS — T45.1X5A CHEMOTHERAPY-INDUCED NEUTROPENIA (H): ICD-10-CM

## 2017-02-27 DIAGNOSIS — C54.9 UTERUS CANCER, SARCOMA (H): Primary | ICD-10-CM

## 2017-02-27 DIAGNOSIS — D70.1 CHEMOTHERAPY-INDUCED NEUTROPENIA (H): ICD-10-CM

## 2017-02-27 DIAGNOSIS — K21.9 GASTROESOPHAGEAL REFLUX DISEASE WITHOUT ESOPHAGITIS: ICD-10-CM

## 2017-02-27 DIAGNOSIS — I42.7 CHEMOTHERAPY INDUCED CARDIOMYOPATHY (H): ICD-10-CM

## 2017-02-27 DIAGNOSIS — C54.9 MALIGNANT NEOPLASM OF CORPUS UTERI, EXCEPT ISTHMUS (H): ICD-10-CM

## 2017-02-27 DIAGNOSIS — I42.7 CHEMOTHERAPY INDUCED CARDIOMYOPATHY (H): Primary | ICD-10-CM

## 2017-02-27 DIAGNOSIS — T45.1X5A CHEMOTHERAPY INDUCED CARDIOMYOPATHY (H): Primary | ICD-10-CM

## 2017-02-27 DIAGNOSIS — C54.9 UTERUS CANCER, SARCOMA (H): ICD-10-CM

## 2017-02-27 DIAGNOSIS — T45.1X5A CHEMOTHERAPY INDUCED CARDIOMYOPATHY (H): ICD-10-CM

## 2017-02-27 PROCEDURE — 99214 OFFICE O/P EST MOD 30 MIN: CPT | Mod: ZP | Performed by: INTERNAL MEDICINE

## 2017-02-27 PROCEDURE — 96415 CHEMO IV INFUSION ADDL HR: CPT

## 2017-02-27 PROCEDURE — 99212 OFFICE O/P EST SF 10 MIN: CPT | Mod: ZF

## 2017-02-27 PROCEDURE — 25000125 ZZHC RX 250: Mod: ZF | Performed by: INTERNAL MEDICINE

## 2017-02-27 PROCEDURE — 96375 TX/PRO/DX INJ NEW DRUG ADDON: CPT

## 2017-02-27 PROCEDURE — 96413 CHEMO IV INFUSION 1 HR: CPT

## 2017-02-27 PROCEDURE — 96367 TX/PROPH/DG ADDL SEQ IV INF: CPT

## 2017-02-27 PROCEDURE — 96411 CHEMO IV PUSH ADDL DRUG: CPT

## 2017-02-27 PROCEDURE — 25000128 H RX IP 250 OP 636: Mod: ZF | Performed by: INTERNAL MEDICINE

## 2017-02-27 PROCEDURE — 97802 MEDICAL NUTRITION INDIV IN: CPT | Performed by: DIETITIAN, REGISTERED

## 2017-02-27 RX ORDER — EPINEPHRINE 0.3 MG/.3ML
0.3 INJECTION SUBCUTANEOUS EVERY 5 MIN PRN
Status: CANCELLED | OUTPATIENT
Start: 2017-02-28

## 2017-02-27 RX ORDER — ALBUTEROL SULFATE 90 UG/1
1-2 AEROSOL, METERED RESPIRATORY (INHALATION)
Status: CANCELLED
Start: 2017-03-01

## 2017-02-27 RX ORDER — DOXORUBICIN HYDROCHLORIDE 2 MG/ML
25 INJECTION, SOLUTION INTRAVENOUS ONCE
Status: COMPLETED | OUTPATIENT
Start: 2017-02-27 | End: 2017-02-27

## 2017-02-27 RX ORDER — SODIUM CHLORIDE 9 MG/ML
1000 INJECTION, SOLUTION INTRAVENOUS CONTINUOUS PRN
Status: CANCELLED
Start: 2017-02-28

## 2017-02-27 RX ORDER — LORAZEPAM 2 MG/ML
0.5 INJECTION INTRAMUSCULAR EVERY 4 HOURS PRN
Status: CANCELLED
Start: 2017-03-01

## 2017-02-27 RX ORDER — HEPARIN SODIUM,PORCINE 10 UNIT/ML
5 VIAL (ML) INTRAVENOUS ONCE
Status: CANCELLED | OUTPATIENT
Start: 2017-02-27

## 2017-02-27 RX ORDER — PANTOPRAZOLE SODIUM 40 MG/1
40 TABLET, DELAYED RELEASE ORAL DAILY
Qty: 30 TABLET | Refills: 0 | Status: SHIPPED | OUTPATIENT
Start: 2017-02-27 | End: 2017-05-01

## 2017-02-27 RX ORDER — METHYLPREDNISOLONE SODIUM SUCCINATE 125 MG/2ML
125 INJECTION, POWDER, LYOPHILIZED, FOR SOLUTION INTRAMUSCULAR; INTRAVENOUS
Status: CANCELLED
Start: 2017-02-28

## 2017-02-27 RX ORDER — MEPERIDINE HYDROCHLORIDE 25 MG/ML
25 INJECTION INTRAMUSCULAR; INTRAVENOUS; SUBCUTANEOUS EVERY 30 MIN PRN
Status: CANCELLED | OUTPATIENT
Start: 2017-02-27

## 2017-02-27 RX ORDER — ALBUTEROL SULFATE 0.83 MG/ML
2.5 SOLUTION RESPIRATORY (INHALATION)
Status: CANCELLED | OUTPATIENT
Start: 2017-03-01

## 2017-02-27 RX ORDER — DIPHENHYDRAMINE HYDROCHLORIDE 50 MG/ML
50 INJECTION INTRAMUSCULAR; INTRAVENOUS
Status: CANCELLED
Start: 2017-02-28

## 2017-02-27 RX ORDER — METHYLPREDNISOLONE SODIUM SUCCINATE 125 MG/2ML
125 INJECTION, POWDER, LYOPHILIZED, FOR SOLUTION INTRAMUSCULAR; INTRAVENOUS
Status: CANCELLED
Start: 2017-03-01

## 2017-02-27 RX ORDER — LORAZEPAM 2 MG/ML
0.5 INJECTION INTRAMUSCULAR EVERY 4 HOURS PRN
Status: CANCELLED
Start: 2017-02-27

## 2017-02-27 RX ORDER — DOXORUBICIN HYDROCHLORIDE 2 MG/ML
25 INJECTION, SOLUTION INTRAVENOUS ONCE
Status: CANCELLED | OUTPATIENT
Start: 2017-02-28

## 2017-02-27 RX ORDER — DOXORUBICIN HYDROCHLORIDE 2 MG/ML
25 INJECTION, SOLUTION INTRAVENOUS ONCE
Status: CANCELLED | OUTPATIENT
Start: 2017-02-27

## 2017-02-27 RX ORDER — SODIUM CHLORIDE 9 MG/ML
1000 INJECTION, SOLUTION INTRAVENOUS CONTINUOUS PRN
Status: CANCELLED
Start: 2017-03-01

## 2017-02-27 RX ORDER — EPINEPHRINE 0.3 MG/.3ML
0.3 INJECTION SUBCUTANEOUS EVERY 5 MIN PRN
Status: CANCELLED | OUTPATIENT
Start: 2017-03-01

## 2017-02-27 RX ORDER — DIPHENHYDRAMINE HYDROCHLORIDE 50 MG/ML
50 INJECTION INTRAMUSCULAR; INTRAVENOUS
Status: CANCELLED
Start: 2017-03-01

## 2017-02-27 RX ORDER — HEPARIN SODIUM,PORCINE 10 UNIT/ML
5 VIAL (ML) INTRAVENOUS ONCE
Status: CANCELLED | OUTPATIENT
Start: 2017-02-28

## 2017-02-27 RX ORDER — HEPARIN SODIUM,PORCINE 10 UNIT/ML
5 VIAL (ML) INTRAVENOUS ONCE
Status: CANCELLED | OUTPATIENT
Start: 2017-03-01

## 2017-02-27 RX ORDER — EPINEPHRINE 0.3 MG/.3ML
0.3 INJECTION SUBCUTANEOUS EVERY 5 MIN PRN
Status: CANCELLED | OUTPATIENT
Start: 2017-02-27

## 2017-02-27 RX ORDER — METHYLPREDNISOLONE SODIUM SUCCINATE 125 MG/2ML
125 INJECTION, POWDER, LYOPHILIZED, FOR SOLUTION INTRAMUSCULAR; INTRAVENOUS
Status: CANCELLED
Start: 2017-02-27

## 2017-02-27 RX ORDER — SODIUM CHLORIDE 9 MG/ML
1000 INJECTION, SOLUTION INTRAVENOUS CONTINUOUS PRN
Status: CANCELLED
Start: 2017-02-27

## 2017-02-27 RX ORDER — MEPERIDINE HYDROCHLORIDE 25 MG/ML
25 INJECTION INTRAMUSCULAR; INTRAVENOUS; SUBCUTANEOUS EVERY 30 MIN PRN
Status: CANCELLED | OUTPATIENT
Start: 2017-03-01

## 2017-02-27 RX ORDER — MEPERIDINE HYDROCHLORIDE 25 MG/ML
25 INJECTION INTRAMUSCULAR; INTRAVENOUS; SUBCUTANEOUS EVERY 30 MIN PRN
Status: CANCELLED | OUTPATIENT
Start: 2017-02-28

## 2017-02-27 RX ORDER — ALBUTEROL SULFATE 0.83 MG/ML
2.5 SOLUTION RESPIRATORY (INHALATION)
Status: CANCELLED | OUTPATIENT
Start: 2017-02-27

## 2017-02-27 RX ORDER — ALBUTEROL SULFATE 90 UG/1
1-2 AEROSOL, METERED RESPIRATORY (INHALATION)
Status: CANCELLED
Start: 2017-02-27

## 2017-02-27 RX ORDER — LORAZEPAM 2 MG/ML
0.5 INJECTION INTRAMUSCULAR EVERY 4 HOURS PRN
Status: CANCELLED
Start: 2017-02-28

## 2017-02-27 RX ORDER — DIPHENHYDRAMINE HYDROCHLORIDE 50 MG/ML
50 INJECTION INTRAMUSCULAR; INTRAVENOUS
Status: CANCELLED
Start: 2017-02-27

## 2017-02-27 RX ORDER — ALBUTEROL SULFATE 90 UG/1
1-2 AEROSOL, METERED RESPIRATORY (INHALATION)
Status: CANCELLED
Start: 2017-02-28

## 2017-02-27 RX ORDER — HEPARIN SODIUM,PORCINE 10 UNIT/ML
5 VIAL (ML) INTRAVENOUS ONCE
Status: COMPLETED | OUTPATIENT
Start: 2017-02-27 | End: 2017-02-27

## 2017-02-27 RX ORDER — DOXORUBICIN HYDROCHLORIDE 2 MG/ML
25 INJECTION, SOLUTION INTRAVENOUS ONCE
Status: CANCELLED | OUTPATIENT
Start: 2017-03-01

## 2017-02-27 RX ORDER — ALBUTEROL SULFATE 0.83 MG/ML
2.5 SOLUTION RESPIRATORY (INHALATION)
Status: CANCELLED | OUTPATIENT
Start: 2017-02-28

## 2017-02-27 RX ADMIN — DACARBAZINE 380 MG: 200 INJECTION, POWDER, FOR SOLUTION INTRAVENOUS at 14:13

## 2017-02-27 RX ADMIN — SODIUM CHLORIDE 150 MG: 9 INJECTION, SOLUTION INTRAVENOUS at 13:42

## 2017-02-27 RX ADMIN — SODIUM CHLORIDE 250 ML: 9 INJECTION, SOLUTION INTRAVENOUS at 13:20

## 2017-02-27 RX ADMIN — SODIUM CHLORIDE, PRESERVATIVE FREE 5 ML: 5 INJECTION INTRAVENOUS at 16:07

## 2017-02-27 RX ADMIN — DEXAMETHASONE SODIUM PHOSPHATE: 10 INJECTION, SOLUTION INTRAMUSCULAR; INTRAVENOUS at 13:20

## 2017-02-27 RX ADMIN — DOXORUBICIN HYDROCHLORIDE 38 MG: 2 INJECTION, SOLUTION INTRAVENOUS at 14:06

## 2017-02-27 ASSESSMENT — PAIN SCALES - GENERAL: PAINLEVEL: NO PAIN (0)

## 2017-02-27 NOTE — NURSING NOTE
Maggie Navarrete is a 66 year old female who presents for:  Chief Complaint   Patient presents with     Oncology Clinic Visit     Leiomyosarcoma        Initial Vitals:  /85 (BP Location: Left arm, Patient Position: Chair, Cuff Size: Adult Regular)  Pulse 106  Temp 97.6  F (36.4  C) (Oral)  Resp 16  Ht 1.524 m (5')  Wt 57.4 kg (126 lb 8 oz)  SpO2 100%  BMI 24.71 kg/m2 Estimated body mass index is 24.71 kg/(m^2) as calculated from the following:    Height as of this encounter: 1.524 m (5').    Weight as of this encounter: 57.4 kg (126 lb 8 oz).. Body surface area is 1.56 meters squared. BP completed using cuff size: regular  No Pain (0) No LMP recorded. Patient is postmenopausal. Allergies and medications reviewed.     Medications: Medication refills not needed today.  Pharmacy name entered into [facility]:    [id number] [address] AT [facility] OF [address] & [address] [place] [address] PHARMACY MAIL DELIVERY - [facility] [address] [facility] - [place], [place] - [address] [facility]    Comments:     7 minutes for nursing intake (face to face time)   Reena Cortez LPN

## 2017-02-27 NOTE — MR AVS SNAPSHOT
After Visit Summary   2/27/2017    Maggie Navarrete    MRN: [id number]           Patient Information     Date Of Birth          1950        Visit Information        Provider Department      2/27/2017 1:00 PM UC 18 ATC;  ONCOLOGY INFUSION [facility]        Today's Diagnoses     Chemotherapy induced cardiomyopathy (H)    -  1    Chemotherapy-induced neutropenia (H)        Uterus cancer, sarcoma (H)          Care Instructions    Contact Numbers    [facility] [place]: [phone]  [facility] [place]:  [phone]    Call triage with chills and/or temperature greater than or equal to 100.5, uncontrolled nausea/vomiting, diarrhea, constipation, dizziness, shortness of breath, chest pain, bleeding, unexplained bruising, or any new/concerning symptoms, questions/concerns.     If you are having any concerning symptoms or wish to speak to a provider before your next infusion visit, please call your care coordinator or triage to notify them so we can adequately serve you.       After Hours: [phone]    If after hours, weekends, or holidays, call main hospital  and ask for Oncology doctor on call.           February 2017 Sunday Monday Tuesday Wednesday Thursday Friday Saturday                  1     2     3     4       5     6     [facility] MASON[facility] LAB DRAW    9:30 AM   (15 min.)    MASONIC LAB DRAW   [facility] Lab Draw     [facility] RETURN    9:45 AM   (30 min.)   Ab Gutierrez MD   [facility] ONC INFUSION 120   11:00 AM   (120 min.)    ONCOLOGY INFUSION   [facility] 7     [facility] NEW WITH ROOM    8:45 AM   (60 min.)   Massimo York PsyD   [facility] ONC INFUSION 120   10:30 AM   (120 min.)    ONCOLOGY INFUSION   [facility] 8     [facility] ONC INFUSION 120    2:00 PM   (120 min.)    ONCOLOGY INFUSION   [facility] 9     10     11       12     13     14      15     16     17     18       19     20     21     22     23     24     [facility] LAB DRAW   10:30 AM   (15 min.)   [facility]   [facility]     CT CHEST ABDOMEN PELVIS WWO   10:45 AM   (20 min.)   UCCT2   [facility] CT 25       26     27     [facility] RETURN   11:15 AM   (30 min.)   Ab Gutierrez MD   [facility]     CONSULT HOD    1:00 PM   (60 min.)   Vivienne Zimmerman RD   [facility] [place]     [facility] ONC INFUSION 120    1:00 PM   (120 min.)   UC ONCOLOGY INFUSION   [facility] 28     [facility] ONC INFUSION 120    1:30 PM   (120 min.)   UC ONCOLOGY INFUSION   [facility]                                March 2017 Sunday Monday Tuesday Wednesday Thursday Friday Saturday                  1     [facility] ONC INFUSION 120    1:30 PM   (120 min.)   UC ONCOLOGY INFUSION   [facility] 2     3     4       5     6     7     8     9     10     11       12     13     14     15     16     17     18       19     20     [facility] LAB DRAW    9:00 AM   (15 min.)   [facility] LAB DRAW   [facility] Lab Draw     [facility] RETURN    9:15 AM   (30 min.)   Ab Gutierrez MD   [facility]     [facility] ONC INFUSION 120   11:00 AM   (120 min.)   UC ONCOLOGY INFUSION   [facility] 21     [facility] ONC INFUSION 120    1:30 PM   (120 min.)   UC ONCOLOGY INFUSION   [facility] 22     [facility] ONC INFUSION 120    1:30 PM   (120 min.)   UC ONCOLOGY INFUSION   [facility] 23     24     25       26     27     [facility] NEW   11:45 AM   (60 min.)   Antoni Godfrey MD   [facility] 28     29     30     31                    No results found for this or any previous visit (from the past 24 hour(s)).            Follow-ups after your visit        Your next 10 appointments already scheduled     Feb 28, 2017  1:30 PM CST    Infusion 120 with UC ONCOLOGY INFUSION, UC 27 ATC   [facility]    [address]  2[address]  [facility] [address]   [phone]            Mar 01, 2017  1:30 PM CST   Infusion 120 with UC ONCOLOGY INFUSION, UC 27 ATC   [facility]    [address]  2nd [facility] [address] [phone]            Mar 20, 2017  9:00 AM CDT   [facility] Lab Draw with UC MASONIC LAB DRAW   [facility] Lab Draw ([facility])    [address]  2[address] Floor  [facility] [address] [phone]            Mar 20, 2017  9:30 AM CDT   (Arrive by 9:15 AM)   Return Visit with Ab Aguilar MD   [facility])    [address]  [address]nd [facility] [address] [phone]            Mar 20, 2017 11:00 AM CDT   Infusion 120 with UC ONCOLOGY INFUSION, UC 25 ATC   [facility]    [address]  [address] [facility] [address] [phone]            Mar 21, 2017  1:30 PM CDT   Infusion 120 with UC ONCOLOGY INFUSION, UC 22 ATC   [facility]    [address] [address] [phone]              Who to contact     If you have questions or need follow up information about today's clinic visit or your schedule please contact [facility] directly at [phone].  Normal or non-critical lab and imaging results will be communicated to you by MyChart, letter or phone within 4 business days after the clinic has received the results. If you do not hear from us within 7 days, please contact the clinic through MyChart or phone. If you have a critical or abnormal lab result, we will notify you by phone as soon as  possible.  Submit refill requests through [employer] or call your pharmacy and they will forward the refill request to us. Please allow 3 business days for your refill to be completed.          Additional Information About Your Visit        O[employer]ar[employer] Information     [employer] gives you secure access to your electronic health record. If you see a primary care provider, you can also send messages to your care team and make appointments. If you have questions, please call your primary care clinic.  If you do not have a primary care provider, please call [phone] and they will assist you.        Care EveryWhere ID     This is your Care EveryWhere ID. This could be used by other organizations to access your [place] medical records  [id number]         Blood Pressure from Last 3 Encounters:   02/27/17 127/85   02/08/17 146/72   02/07/17 152/70    Weight from Last 3 Encounters:   02/27/17 57.4 kg (126 lb 8 oz)   02/06/17 55.5 kg (122 lb 6.4 oz)   01/16/17 55.7 kg (122 lb 12.8 oz)              Today, you had the following     No orders found for display         Today's Medication Changes          These changes are accurate as of: 2/27/17  5:37 PM.  If you have any questions, ask your nurse or doctor.               Start taking these medicines.        Dose/Directions    pantoprazole 40 MG EC tablet   Commonly known as:  PROTONIX   Used for:  Gastroesophageal reflux disease without esophagitis   Started by:  Ab Gutierrez MD        Dose:  40 mg   Take 1 tablet (40 mg) by mouth daily   Quantity:  30 tablet   Refills:  0         Stop taking these medicines if you haven't already. Please contact your care team if you have questions.     FIRST-MOUTHWASH BLM Susp   Stopped by:  Ab Gutierrez MD           sterile water (bottle) irrigation   Stopped by:  Ab Gutierrez MD                Where to get your medicines      These medications were sent to [facility] [address] [place] [address] 740  [facility]  [address] [facility] [address]     Phone:  [phone]     pantoprazole 40 MG EC tablet                Primary Care Provider Office Phone # Fax #    Kristine Flynn MD PhD [phone]        [facility] [address] [facility] [address]  [facility] [address]        Thank you!     Thank you for choosing [facility]  for your care. Our goal is always to provide you with excellent care. Hearing back from our patients is one way we can continue to improve our services. Please take a few minutes to complete the written survey that you may receive in the mail after your visit with us. Thank you!             Your Updated Medication List - Protect others around you: Learn how to safely use, store and throw away your medicines at www.disposemymeds.org.          This list is accurate as of: 2/27/17  5:37 PM.  Always use your most recent med list.                   Brand Name Dispense Instructions for use    alendronate 35 MG tablet    FOSAMAX    12 tablet    Take 1 tablet (35 mg) by mouth every 7 days       CALCIUM CITRATE + PO      Take 200 mg by mouth daily as needed (She supplements to meet her 1,200mg goal depending on how much dietary calcium she has gotten that day (up to 400mg once daily)) Reported on 2/27/2017       enoxaparin 80 MG/0.8ML injection    LOVENOX     80 mg daily       HYDROcodone-acetaminophen 5-325 MG per tablet    NORCO    30 tablet    1-2 tablets every 6 hours as needed for pain       levothyroxine 100 MCG tablet    SYNTHROID/LEVOTHROID     Take 100 mcg by mouth daily       LORazepam 0.5 MG tablet    ATIVAN    30 tablet    Take 1 tablet (0.5 mg) by mouth every 4 hours as needed (Anxiety, Nausea/Vomiting or Sleep)       MELATONIN PO      Take 2.5 mg by mouth nightly as needed       MULTIVITAMINS PO      Take 1 tablet by mouth daily Reported on 2/27/2017       ondansetron 4 MG tablet    ZOFRAN    30 tablet    Take 1 tablet (4 mg) by mouth  every 8 hours as needed (Nausea/Vomiting)       pantoprazole 40 MG EC tablet    PROTONIX    30 tablet    Take 1 tablet (40 mg) by mouth daily       prochlorperazine 10 MG tablet    COMPAZINE    30 tablet    Take 1 tablet (10 mg) by mouth every 6 hours as needed (Nausea/Vomiting)       tretinoin 0.025 % cream    RETIN-A    45 g    Apply a pea-sized amount to cleansed and dried face at night       venlafaxine 75 MG 24 hr capsule    EFFEXOR-XR    90 capsule    Take 1 capsule (75 mg) by mouth daily       ZANTAC PO      Take 150 mg by mouth 2 times daily       zolpidem 5 MG tablet    AMBIEN    25 tablet    Take 1 tablet (5 mg) by mouth nightly as needed for sleep

## 2017-02-27 NOTE — MR AVS SNAPSHOT
After Visit Summary   2/27/2017    Maggie Navarrete    MRN: [id number]           Patient Information     Date Of Birth          1950        Visit Information        Provider Department      2/27/2017 11:30 AM Ab Gutierrez MD [facility]        Today's Diagnoses     Uterus cancer, sarcoma (H)    -  1    Gastroesophageal reflux disease without esophagitis        Malignant neoplasm of corpus uteri, except isthmus (H)        Chemotherapy-induced neutropenia (H)        Chemotherapy induced cardiomyopathy (H)           Follow-ups after your visit        Additional Services     ONC/HEME ADULT REFERRAL       Your provider has referred you to: [facility]: [facility] [phone]   [email or web address]    Please be aware that coverage of these services is subject to the terms and limitations of your health insurance plan.  Call member services at your health plan with any benefit or coverage questions.      Please bring the following with you to your appointment:    (1) Any X-Rays, CTs or MRIs which have been performed.  Contact the facility where they were done to arrange for  prior to your scheduled appointment.   (2) List of current medications  (3) This referral request   (4) Any documents/labs given to you for this referral                  Follow-up notes from your care team     Return in about 3 weeks (around 3/20/2017).      Your next 10 appointments already scheduled     Feb 28, 2017  1:30 PM CST   Infusion 120 with UC ONCOLOGY INFUSION,  27 ATC   [facility] ([facility])    [address] [place] [address]  2nd [facility] [address] [phone]            Mar 01, 2017  1:30 PM CST   Infusion 120 with UC ONCOLOGY INFUSION, UC 27 ATC   [facility] ([facility])    [address] [place] [address]    [phone]            Mar 20, 2017  9:00 AM CDT   [facility] Lab Draw with UC MASONIC LAB DRAW   [facility] Lab Draw ([facility])    [address] [place] [id number] [phone]            Mar 20, 2017  9:30 AM CDT   (Arrive by 9:15 AM)   Return Visit with Ab Aguilar MD   [facility] ([facility])    [address] [place] [id number] [phone]            Mar 20, 2017 11:00 AM CDT   Infusion 120 with UC ONCOLOGY INFUSION, UC 25 ATC   [facility] ([facility])    [address] [place] [id number] [phone]            Mar 21, 2017  1:30 PM CDT   Infusion 120 with UC ONCOLOGY INFUSION, UC 22 ATC   [facility] ([facility])    [address] [place] [id number] [phone]              Who to contact     If you have questions or need follow up information about today's clinic visit or your schedule please contact [facility] directly at [phone].  Normal or non-critical lab and imaging results will be communicated to you by MyChart, letter or phone within 4 business days after the clinic has received the results. If you do not hear from us within 7 days, please contact the clinic through MyChart or phone. If you have a critical or abnormal lab result, we will notify you by phone as soon as possible.  Submit refill requests through [employer] or call your pharmacy and they will forward the refill request to us. Please allow 3 business days for your refill to be completed.          Additional Information About Your Visit        [employer] Inside Information     [employer] gives you secure access to your electronic health record. If you see a primary care provider, you can also send messages to your care team and make appointments.  If you have questions, please call your primary care clinic.  If you do not have a primary care provider, please call [phone] and they will assist you.        Care EveryWhere ID     This is your Care EveryWhere ID. This could be used by other organizations to access your [place] medical records  [id number]        Your Vitals Were     Pulse Temperature Respirations Height Pulse Oximetry BMI (Body Mass Index)    106 97.6  F (36.4  C) (Oral) 16 1.524 m (5') 100% 24.71 kg/m2       Blood Pressure from Last 3 Encounters:   02/27/17 127/85   02/08/17 146/72   02/07/17 152/70    Weight from Last 3 Encounters:   02/27/17 57.4 kg (126 lb 8 oz)   02/06/17 55.5 kg (122 lb 6.4 oz)   01/16/17 55.7 kg (122 lb 12.8 oz)              We Performed the Following     ONC/HEME ADULT REFERRAL          Today's Medication Changes          These changes are accurate as of: 2/27/17  5:11 PM.  If you have any questions, ask your nurse or doctor.               Start taking these medicines.        Dose/Directions    pantoprazole 40 MG EC tablet   Commonly known as:  PROTONIX   Used for:  Gastroesophageal reflux disease without esophagitis   Started by:  Ab Gutierrez MD        Dose:  40 mg   Take 1 tablet (40 mg) by mouth daily   Quantity:  30 tablet   Refills:  0         Stop taking these medicines if you haven't already. Please contact your care team if you have questions.     FIRST-MOUTHWASH BLM Susp   Stopped by:  Ab Gutierrez MD           sterile water (bottle) irrigation   Stopped by:  Ab Gutierrez MD                Where to get your medicines      These medications were sent to [facility] [address] [place] [address] 697 [facility]  700 [facility] [address]     Phone:  [phone]     pantoprazole 40 MG EC tablet                Primary Care Provider Office Phone # Fax #    Kristine Flynn MD PhD [phone]        [facility] [address] [place] [address]  381  [facility] [address]        Thank you!     Thank you for choosing [facility]  for your care. Our goal is always to provide you with excellent care. Hearing back from our patients is one way we can continue to improve our services. Please take a few minutes to complete the written survey that you may receive in the mail after your visit with us. Thank you!             Your Updated Medication List - Protect others around you: Learn how to safely use, store and throw away your medicines at www.disposemymeds.org.          This list is accurate as of: 2/27/17  5:11 PM.  Always use your most recent med list.                   Brand Name Dispense Instructions for use    alendronate 35 MG tablet    FOSAMAX    12 tablet    Take 1 tablet (35 mg) by mouth every 7 days       CALCIUM CITRATE + PO      Take 200 mg by mouth daily as needed (She supplements to meet her 1,200mg goal depending on how much dietary calcium she has gotten that day (up to 400mg once daily)) Reported on 2/27/2017       enoxaparin 80 MG/0.8ML injection    LOVENOX     80 mg daily       HYDROcodone-acetaminophen 5-325 MG per tablet    NORCO    30 tablet    1-2 tablets every 6 hours as needed for pain       levothyroxine 100 MCG tablet    SYNTHROID/LEVOTHROID     Take 100 mcg by mouth daily       LORazepam 0.5 MG tablet    ATIVAN    30 tablet    Take 1 tablet (0.5 mg) by mouth every 4 hours as needed (Anxiety, Nausea/Vomiting or Sleep)       MELATONIN PO      Take 2.5 mg by mouth nightly as needed       MULTIVITAMINS PO      Take 1 tablet by mouth daily Reported on 2/27/2017       ondansetron 4 MG tablet    ZOFRAN    30 tablet    Take 1 tablet (4 mg) by mouth every 8 hours as needed (Nausea/Vomiting)       pantoprazole 40 MG EC tablet    PROTONIX    30 tablet    Take 1 tablet (40 mg) by mouth daily       prochlorperazine 10 MG tablet    COMPAZINE    30 tablet    Take 1 tablet (10 mg) by mouth every 6 hours as needed (Nausea/Vomiting)        tretinoin 0.025 % cream    RETIN-A    45 g    Apply a pea-sized amount to cleansed and dried face at night       venlafaxine 75 MG 24 hr capsule    EFFEXOR-XR    90 capsule    Take 1 capsule (75 mg) by mouth daily       ZANTAC PO      Take 150 mg by mouth 2 times daily       zolpidem 5 MG tablet    AMBIEN    25 tablet    Take 1 tablet (5 mg) by mouth nightly as needed for sleep

## 2017-02-27 NOTE — PATIENT INSTRUCTIONS
Contact Numbers    [facility] [place] [phone]  [facility] [place]:  [phone]    Call triage with chills and/or temperature greater than or equal to 100.5, uncontrolled nausea/vomiting, diarrhea, constipation, dizziness, shortness of breath, chest pain, bleeding, unexplained bruising, or any new/concerning symptoms, questions/concerns.     If you are having any concerning symptoms or wish to speak to a provider before your next infusion visit, please call your care coordinator or triage to notify them so we can adequately serve you.       After Hours: [phone]    If after hours, weekends, or holidays, call main hospital  and ask for Oncology doctor on call.           February 2017 Sunday Monday Tuesday Wednesday Thursday Friday Saturday                  1     2     3     4       5     6     [facility] MASONIC LAB DRAW    9:30 AM   (15 min.)    MASONIC LAB DRAW   [facility] Lab Draw     [facility] RETURN    9:45 AM   (30 min.)   Ab Gutierrez MD   [facility] ONC INFUSION 120   11:00 AM   (120 min.)    ONCOLOGY INFUSION   [facility] 7     [facility] NEW WITH ROOM    8:45 AM   (60 min.)   Massimo York PsyD   [facility] ONC INFUSION 120   10:30 AM   (120 min.)    ONCOLOGY INFUSION   [facility] 8     [facility] ONC INFUSION 120    2:00 PM   (120 min.)    ONCOLOGY INFUSION   [facility] 9     10     11       12     13     14     15     16     17     18       19     20     21     22     23     24     [facility] MASONIC LAB DRAW   10:30 AM   (15 min.)    [facility] LAB DRAW   [facility] Lab Draw     CT CHEST ABDOMEN PELVIS WWO   10:45 AM   (20 min.)   UCCT2   [facility] CT 25       26     27     [facility] RETURN   11:15 AM   (30 min.)   Ab Gutierrez MD   [facility]     CONSULT HOD    1:00 PM   (60 min.)   Vivienne Zimmerman, BIENVENIDO   [facility] [place],  Nutrition Services     [facility] ONC INFUSION 120    1:00 PM   (120 min.)   UC ONCOLOGY INFUSION   [facility] 28     UMP ONC INFUSION 120    1:30 PM   (120 min.)   UC ONCOLOGY INFUSION   [facility]                                March 2017 Sunday Monday Tuesday Wednesday Thursday Friday Saturday                  1     [facility] ONC INFUSION 120    1:30 PM   (120 min.)   UC ONCOLOGY INFUSION   [facility] 2     3     4       5     6     7     8     9     10     11       12     13     14     15     16     17     18       19     20     [facility] MASONIC LAB DRAW    9:00 AM   (15 min.)   UC MASONIC LAB DRAW   [facility] Lab Draw     UMP RETURN    9:15 AM   (30 min.)   Ab Gutierrez MD   [facility]     UMP ONC INFUSION 120   11:00 AM   (120 min.)    ONCOLOGY INFUSION   [facility] 21     [facility] ONC INFUSION 120    1:30 PM   (120 min.)    ONCOLOGY INFUSION   [facility] 22     [facility] ONC INFUSION 120    1:30 PM   (120 min.)    ONCOLOGY INFUSION   [facility] 23     24     25       26     27     UMP NEW   11:45 AM   (60 min.)   Antoni Godfrey MD   [facility] 28     29     30     31                    No results found for this or any previous visit (from the past 24 hour(s)).

## 2017-02-27 NOTE — PROGRESS NOTES
"CLINICAL NUTRITION SERVICES - ASSESSMENT NOTE    REASON FOR ASSESSMENT  Maggie Navarrete is a 66 year old female seen by the dietitian for Medical Nutrition Therapy related to endometrial cancer referred by Dr. Pino Aguilar.   Pt accompanied by self.       NUTRITION HISTORY  Factors affecting nutrition intake include:decreased appetite and nausea    Maggie reports that her intake has been good/fair with her ongoing chemo treatments.  She reports that she has 3-4 bad days post treatment.  Her weight only fluctuates 1-2 lbs.    She has been taking anti-nausea meds which help a lot.  With these, she reports constipation so she takes MiraLax.    She reports that she typically has 2-3 meals/day, sometimes skipping dinner if she eats a late lunch.    She admits that she is not drinking enough water.  She does all of her shopping at the co-op and chooses mostly organic foods.     Diet recall:  Breakfast:  Smoothie (yogurt, fruit, flaxseed) or Danish toast or pancakes, +/- eggs  Lunch:  Soup and/or sandwich  Dinner:  Cottage cheese, fruit or potatoes, meat, vegetables  Snack:  fruit  Beverages:   Water, hot chocolate (home made), skim milk     ANTHROPOMETRICS  Height: 60\"  Weight: 122 lbs/55kg  BMI: 24.71 kg/m   Weight Status:  Normal BMI  IBW: 100 lbs  % IBW: 122%  Weight History:   Wt Readings from Last 5 Encounters:   02/27/17 57.4 kg (126 lb 8 oz)   02/06/17 55.5 kg (122 lb 6.4 oz)   01/16/17 55.7 kg (122 lb 12.8 oz)   12/28/16 56.7 kg (124 lb 14.4 oz)   12/23/16 54 kg (119 lb)     LABS  Labs reviewed    MEDICATIONS/VITAMINS/MINERALS  Medications reviewed    PROCEDURES WITH NUTRITIONAL IMPLICATIONS  Chemotherapy     ASSESSED NUTRITION NEEDS:  Estimated Energy Needs: 1650 kcals (30 Kcal/Kg)  Justification: maintenance  Estimated Protein Needs: 65 grams protein (1.2g pro/Kg)  Justification: maintenance  Estimated Fluid Needs: 2000  mL  Justification: maintenance    MALNUTRITION:  % Weight Loss:  None noted  % Intake:  " Decreased intake does not meet criteria for malnutrition   Subcutaneous Fat Loss:  None observed  Muscle Loss:  None observed  Fluid Retention:  None noted    Malnutrition Diagnosis: Patient does not meet two of the above criteria necessary for diagnosing malnutrition    NUTRITION DIAGNOSIS:  No nutrition diagnosis identified at this time    INTERVENTIONS  Recommendations / Nutrition Prescription  1. 5-6 small frequent meals  2. 1700kcal, 65g protein/day  Nutrition Education    Provided written & verbal education on:   - Ways to optimize kcal and protein intake. Discussed calorie and protein needs for maintenance of weight and nutrition status.  Advised pt to aim for 1710kcal and 65g protein via 5-6 small frequent meals.   - Coping with barriers - as chemotherapy progresses, eating may become more difficult and discussed ways to cope with this.    Provided pt with corresponding education materials on:  Six Small Frequent Meals, Tips to Increase the Protein in Your Diet and Protein Sources.      Pt verbalize understanding of materials provided during consult.   Patient Understanding: Excellent  Expected Compliance: Excellent     Goals  1.  Aim for 5-6 small frequent meals  2.  Aim for 1700kcal and 65g protein/day  3. Weight maintenance through radiation treatment      Follow-Up Plans: Pt has RD contact information for questions.    Pt encouraged to follow up with RD in 2 weeks at the time of infusion or radiation treatment.     MONITORING AND EVALUATION:  -Food intake, adequacy   -Weight trends     Time spent with patient: 60 minutes.  Vivienne Still RD, LD

## 2017-02-27 NOTE — LETTER
2/27/2017       RE: Maggie Navarrete  [address]  [facility] [address]     Dear Colleague,    Thank you for referring your patient, Maggie Navarrete, to the [facility]. Please see a copy of my visit note below.    MEDICAL ONCOLOGY PROGRESS NOTE   Feb 27, 2017    Oncologic history:  1. January 2016, she presents to her PCP with 3 weeks of abdominal pain and severe constipation. She is treated for constipation and improves.  2. 3/21/2016, worsening pelvic pressure prompts pelvic examination with an enlarged uterus noted. Transvaginal ultrasound shows enlargement of subserosal fibroids originally seen on MRI in 2006.  3. 5/9/2016, MRI pelvis is obtained, which shows several enhancing uterine masses involving an enlarged uterus, felt radiographically compatible with benign uterine leiomyomata.  3. Summer 2016, she experiences worsening low back pain thought at first thought to be sacroiliitis, but pain persists after corticosteroid injection. Continues with severe constipation.  4. 9/8/2016, she undergoes colonoscopy for persistent abdominal pain. She has sigmoid polyp snared. Abdominal pain and diarrhea persist after colonoscopy prep.  5. 9/14/2016, she has worsening abdominal pain and is evaluated in the ED. CT-scan shows a large subserosal fibroid measuring 9.4 x 9.6 x 9.8 cm, as well as very large fibroids along the posterior uterine body more inferiorly, increased in size to 14 cm in greatest diameter compared to 10 cm in greatest diameter on MRI of 5/9/2016.  6. 9/29/2016, she has staging CT-CAP, which shows a small left sided pleural effusion and a 3 mm pulmonary nodule in the RML.  7. October 2016, self-refers to [facility].  8. 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  9. 11/4/2016, undergoes total abdominal hysterectomy and BSO, under Dr. Harsh Camarillo. Surgery was originally presumed for leiomyoma, however, intraoperatively  there was focal adherence of a mass to the pelvic right sidewall and cul-de-sac, which led to peritoneal resection and omentectomy. Final pathology, pT2b: showed leiomyosarcoma involving the cul-de-sac and forming two separate masses measuring 10.5 x 6.5 x 4.5 cm and 10.7 x 7.9 x 7.5 cm.    HISTORY OF PRESENT ILLNESS  Maggie Navarrete is a 66 year old female with a diagnosis leiomyosarcoma.of the uterus. She is currently receiving adjuvant Doxorubicin and Dacarbazine. She has received 3 cycles of chemotherapy and she continues to tolerate therapy with only some indigestion symptoms. She has tried ranitidine, but that doesn't work very well for her. She otherwise denies nausea, vomiting, diarrhea or abdominal pain.    She did have a restaging CT-scan of the chest/abdomen and pelvis. There is no evidence for metastasis or local recurrence. She denies any new or worrisome signs or symptoms. No pelvic pain, mouth sores, fevers or chills. Labs were reviewed and are essentially normal.    Lab Results   Component Value Date    WBC 9.0 02/24/2017    HGB 11.7 02/24/2017    HCT 35.7 02/24/2017     (H) 02/24/2017     (H) 02/24/2017    POTASSIUM 4.4 02/24/2017    CHLORIDE 112 (H) 02/24/2017    CO2 26 02/24/2017    BUN 21 02/24/2017    CR 0.73 02/24/2017    GLC 83 02/24/2017    AST 8 02/24/2017    ALT 22 02/24/2017    ALKPHOS 82 02/24/2017    BILITOTAL 0.2 02/24/2017    INR 0.98 12/23/2016       REVIEW OF SYSTEMS  A 12-point ROS negative except as in HPI    Current Outpatient Prescriptions   Medication Sig Dispense Refill     pantoprazole (PROTONIX) 40 MG EC tablet Take 1 tablet (40 mg) by mouth daily 30 tablet 0     RaNITidine HCl (ZANTAC PO) Take 150 mg by mouth 2 times daily        ondansetron (ZOFRAN) 4 MG tablet Take 1 tablet (4 mg) by mouth every 8 hours as needed (Nausea/Vomiting) 30 tablet 3     MELATONIN PO Take 2.5 mg by mouth nightly as needed        enoxaparin (LOVENOX) 80 MG/0.8ML injection 80 mg daily         venlafaxine (EFFEXOR-XR) 75 MG 24 hr capsule Take 1 capsule (75 mg) by mouth daily 90 capsule 1     levothyroxine (SYNTHROID,LEVOTHROID) 100 MCG tablet Take 100 mcg by mouth daily        zolpidem (AMBIEN) 5 MG tablet Take 1 tablet (5 mg) by mouth nightly as needed for sleep 25 tablet 0     alendronate (FOSAMAX) 35 MG tablet Take 1 tablet (35 mg) by mouth every 7 days 12 tablet 3     HYDROcodone-acetaminophen (NORCO) 5-325 MG per tablet 1-2 tablets every 6 hours as needed for pain (Patient not taking: Reported on 2/27/2017) 30 tablet 0     LORazepam (ATIVAN) 0.5 MG tablet Take 1 tablet (0.5 mg) by mouth every 4 hours as needed (Anxiety, Nausea/Vomiting or Sleep) (Patient not taking: Reported on 2/27/2017) 30 tablet 2     prochlorperazine (COMPAZINE) 10 MG tablet Take 1 tablet (10 mg) by mouth every 6 hours as needed (Nausea/Vomiting) (Patient not taking: Reported on 2/27/2017) 30 tablet 2     tretinoin (RETIN-A) 0.025 % cream Apply a pea-sized amount to cleansed and dried face at night (Patient not taking: Reported on 2/27/2017) 45 g 3     Calcium Citrate-Vitamin D (CALCIUM CITRATE + PO) Take 200 mg by mouth daily as needed (She supplements to meet her 1,200mg goal depending on how much dietary calcium she has gotten that day (up to 400mg once daily)) Reported on 2/27/2017       Multiple Vitamin (MULTIVITAMINS PO) Take 1 tablet by mouth daily Reported on 2/27/2017         Past Medical History   Diagnosis Date     Anxiety      Arthritis      pain in feet and knees     Disorder of bone and cartilage, unspecified      Esophageal reflux 2015     GERD (gastroesophageal reflux disease)      Hx of previous reproductive problem 1980     Kidney stone 2011     Personal history of colonic polyps 2016     Small, benign     PONV (postoperative nausea and vomiting)      Posterior vitreous detachment of left eye 2011     Posterior vitreous detachment of right eye 2011     Ptosis of eyelid, bilateral      Stomach problems 2015      Abdominal pain, Diarreha     Thyroid disease      thyroid nodule resolved 2014         PHYSICAL EXAMINATION  /85 (BP Location: Left arm, Patient Position: Chair, Cuff Size: Adult Regular)  Pulse 106  Temp 97.6  F (36.4  C) (Oral)  Resp 16  Ht 1.524 m (5')  Wt 57.4 kg (126 lb 8 oz)  SpO2 100%  BMI 24.71 kg/m2  Wt Readings from Last 2 Encounters:   02/27/17 57.4 kg (126 lb 8 oz)   02/06/17 55.5 kg (122 lb 6.4 oz)     Physical Exam   Constitutional: She is oriented to person, place, and time. She appears well-developed and well-nourished. No distress.   HENT:   Head: Normocephalic and atraumatic.   Mouth/Throat: Oropharynx is clear and moist.   Eyes: Conjunctivae and EOM are normal. Pupils are equal, round, and reactive to light. No scleral icterus.   Cardiovascular: Normal rate, regular rhythm and normal heart sounds.    No murmur heard.  Pulmonary/Chest: Effort normal and breath sounds normal. She has no wheezes.   Abdominal: Soft. Bowel sounds are normal. She exhibits no distension. There is no tenderness.   Musculoskeletal: Normal range of motion. She exhibits no edema.   Lymphadenopathy:     She has no cervical adenopathy.   Neurological: She is alert and oriented to person, place, and time. No cranial nerve deficit. She exhibits normal muscle tone. Coordination normal.   Skin: Skin is warm and dry. No rash noted.   Psychiatric: She has a normal mood and affect. Her behavior is normal.   Nursing note and vitals reviewed.      IMPRESSION AND PLAN  #1 Locally advanced leiomyosarcoma of the uterus, pT2b Nx M0, Stage IIB, status-post resection, currently on adjuvant chemotherapy  It was a pleasure to see Ms. Nvaarrete today. She has done very well with adjuvant chemotherapy. Her CT-scan shows no evidence of recurrence or metastasis. She has some stubborn indigestion symptoms during chemotherapy, so we will try pantoprazole 40 mg daily. This can be taken twice daily if needed.    She also has not had a pelvic  exam since her surgical procedure. We are not very well set-up for a pelvic exam in our clinic, and she would be more comfortable with a female provider for that examination. I recommended that she consider meeting with Dr. Sargent, gynecologic oncologist and surgeon. I have placed a consult request and hope she can meet her to review her case.    Otherwise, we will plan for at least 4 to a max of 6 cycles of adjuvant therapy. Multiple questions answered.    bA Warner M.D.   of Medicine  Hematology, Oncology and Transplantation  [facility]    Uterus cancer, sarcoma (H)

## 2017-02-27 NOTE — PROGRESS NOTES
Infusion Nursing Note:  Maggie Navarrete presents today for Cycle 4 Day 1 Doxorubicin and Dacarbazine.    Patient seen and examined by Dr Warner in clinic prior to infusion.      Intravenous Access:  Implanted Port.  Needle left intact prior to infusion.    Treatment Conditions:  Lab Results   Component Value Date    HGB 11.7 02/24/2017     Lab Results   Component Value Date    WBC 9.0 02/24/2017      Lab Results   Component Value Date    ANEU 5.1 02/24/2017     Lab Results   Component Value Date     02/24/2017      Lab Results   Component Value Date     02/24/2017                   Lab Results   Component Value Date    POTASSIUM 4.4 02/24/2017           No results found for: MAG         Lab Results   Component Value Date    CR 0.73 02/24/2017                   Lab Results   Component Value Date    SUSAN 9.0 02/24/2017                Lab Results   Component Value Date    BILITOTAL 0.2 02/24/2017           Lab Results   Component Value Date    ALBUMIN 3.5 02/24/2017                    Lab Results   Component Value Date    ALT 22 02/24/2017           Lab Results   Component Value Date    AST 8 02/24/2017   ECHO/MUGA completed 11/26/16  EF 69%.  Results reviewed, labs MET treatment parameters, ok to proceed with treatment.    Note: Doxorubicin infused into free flowing NS. Positive blood return q2cc.  Port site without redness or swelling.  .      Post Infusion Assessment:  Patient tolerated infusion without incident.    Discharge Plan:   Patient declined prescription refills.  Copy of AVS reviewed with patient and/or family.  Patient will return tomorrow for cycle 4 day 2  Face to Face time: 0.    Dionne Beckwith RN

## 2017-02-28 ENCOUNTER — INFUSION THERAPY VISIT (OUTPATIENT)
Dept: ONCOLOGY | Facility: CLINIC | Age: 67
End: 2017-02-28
Attending: INTERNAL MEDICINE
Payer: MEDICARE

## 2017-02-28 VITALS
TEMPERATURE: 96.5 F | DIASTOLIC BLOOD PRESSURE: 66 MMHG | RESPIRATION RATE: 16 BRPM | SYSTOLIC BLOOD PRESSURE: 128 MMHG | HEART RATE: 106 BPM | OXYGEN SATURATION: 100 %

## 2017-02-28 DIAGNOSIS — C54.9 UTERUS CANCER, SARCOMA (H): ICD-10-CM

## 2017-02-28 DIAGNOSIS — I42.7 CHEMOTHERAPY INDUCED CARDIOMYOPATHY (H): Primary | ICD-10-CM

## 2017-02-28 DIAGNOSIS — D70.1 CHEMOTHERAPY-INDUCED NEUTROPENIA (H): ICD-10-CM

## 2017-02-28 DIAGNOSIS — T45.1X5A CHEMOTHERAPY INDUCED CARDIOMYOPATHY (H): Primary | ICD-10-CM

## 2017-02-28 DIAGNOSIS — T45.1X5A CHEMOTHERAPY-INDUCED NEUTROPENIA (H): ICD-10-CM

## 2017-02-28 PROCEDURE — 96375 TX/PRO/DX INJ NEW DRUG ADDON: CPT

## 2017-02-28 PROCEDURE — 25000125 ZZHC RX 250: Mod: ZF | Performed by: INTERNAL MEDICINE

## 2017-02-28 PROCEDURE — 96413 CHEMO IV INFUSION 1 HR: CPT

## 2017-02-28 PROCEDURE — 25000128 H RX IP 250 OP 636: Mod: ZF | Performed by: INTERNAL MEDICINE

## 2017-02-28 PROCEDURE — 96415 CHEMO IV INFUSION ADDL HR: CPT

## 2017-02-28 PROCEDURE — 96411 CHEMO IV PUSH ADDL DRUG: CPT

## 2017-02-28 RX ORDER — DOXORUBICIN HYDROCHLORIDE 2 MG/ML
25 INJECTION, SOLUTION INTRAVENOUS ONCE
Status: COMPLETED | OUTPATIENT
Start: 2017-02-28 | End: 2017-02-28

## 2017-02-28 RX ORDER — HEPARIN SODIUM,PORCINE 10 UNIT/ML
5 VIAL (ML) INTRAVENOUS ONCE
Status: COMPLETED | OUTPATIENT
Start: 2017-02-28 | End: 2017-02-28

## 2017-02-28 RX ADMIN — SODIUM CHLORIDE, PRESERVATIVE FREE 5 ML: 5 INJECTION INTRAVENOUS at 16:36

## 2017-02-28 RX ADMIN — DACARBAZINE 380 MG: 200 INJECTION, POWDER, FOR SOLUTION INTRAVENOUS at 14:34

## 2017-02-28 RX ADMIN — DOXORUBICIN HYDROCHLORIDE 38 MG: 2 INJECTION, SOLUTION INTRAVENOUS at 14:26

## 2017-02-28 RX ADMIN — SODIUM CHLORIDE 250 ML: 9 INJECTION, SOLUTION INTRAVENOUS at 13:58

## 2017-02-28 RX ADMIN — DEXAMETHASONE SODIUM PHOSPHATE: 10 INJECTION, SOLUTION INTRAMUSCULAR; INTRAVENOUS at 13:58

## 2017-02-28 NOTE — PROGRESS NOTES
Infusion Nursing Note:  Maggie Navarrete presents today for Cycle 4 Day 2 doxorubicin, dacarbazine.    Patient seen by provider today: No   present during visit today: Not Applicable.    Note: Patient was seen by Dr. Aguilar yesterday. She states she is feeling well and denies any complaints or changes.    Intravenous Access:  Implanted Port.    Treatment Conditions:  Lab Results   Component Value Date    HGB 11.7 02/24/2017     Lab Results   Component Value Date    WBC 9.0 02/24/2017      Lab Results   Component Value Date    ANEU 5.1 02/24/2017     Lab Results   Component Value Date     02/24/2017      Lab Results   Component Value Date     02/24/2017                   Lab Results   Component Value Date    POTASSIUM 4.4 02/24/2017           No results found for: MAG         Lab Results   Component Value Date    CR 0.73 02/24/2017                   Lab Results   Component Value Date    SUSAN 9.0 02/24/2017                Lab Results   Component Value Date    BILITOTAL 0.2 02/24/2017           Lab Results   Component Value Date    ALBUMIN 3.5 02/24/2017                    Lab Results   Component Value Date    ALT 22 02/24/2017           Lab Results   Component Value Date    AST 8 02/24/2017     ECHO/MUGA completed 11/29/16  EF 70%.  Not Applicable.      Post Infusion Assessment:  Patient tolerated infusion without incident.  Blood return noted pre and post infusion.  Blood return noted during administration every 3 cc.  Site patent and intact, free from redness, edema or discomfort.  No evidence of extravasations.  Port flushed and left intact for infusion tomorrow.     Discharge Plan:   Patient declined prescription refills.  Discharge instructions reviewed with: Patient.  Patient and/or family verbalized understanding of discharge instructions and all questions answered.  Copy of AVS reviewed with patient and/or family.  Patient will return 3/1/17 for next appointment.  Patient discharged in  stable condition accompanied by: self.  Departure Mode: Ambulatory.  Face to Face time: 0 minutes.    Lourdes Humphreys RN

## 2017-02-28 NOTE — MR AVS SNAPSHOT
After Visit Summary   2/28/2017    Maggie Navarrete    MRN: [id number]           Patient Information     Date Of Birth          1950        Visit Information        Provider Department      2/28/2017 1:30 PM  27 ATC;  ONCOLOGY INFUSION [facility]        Today's Diagnoses     Chemotherapy induced cardiomyopathy (H)    -  1    Chemotherapy-induced neutropenia (H)        Uterus cancer, sarcoma (H)          Care Instructions    Contact Numbers    [facility] Main Line: [phone]  [facility] Triage:  [phone]    Call triage with chills and/or temperature greater than or equal to 100.5, uncontrolled nausea/vomiting, diarrhea, constipation, dizziness, shortness of breath, chest pain, bleeding, unexplained bruising, or any new/concerning symptoms, questions/concerns.     If you are having any concerning symptoms or wish to speak to a provider before your next infusion visit, please call your care coordinator or triage to notify them so we can adequately serve you.       After Hours: [phone]    If after hours, weekends, or holidays, call main hospital  and ask for Oncology doctor on call.         February 2017 Sunday Monday Tuesday Wednesday Thursday Friday Saturday                  1     2     3     4       5     6     [facility] LAB DRAW    9:30 AM   (15 min.)    [facility] DRAW   [facility] Lab Draw     [facility] RETURN    9:45 AM   (30 min.)   Ab Gutierrez MD   [facility] ONC INFUSION 120   11:00 AM   (120 min.)    ONCOLOGY INFUSION   [facility] 7     [facility] NEW WITH ROOM    8:45 AM   (60 min.)   Massimo York PsyD   [facility] ONC INFUSION 120   10:30 AM   (120 min.)    ONCOLOGY INFUSION   [facility] 8     [facility] ONC INFUSION 120    2:00 PM   (120 min.)    ONCOLOGY INFUSION   [facility] 9     10     11       12     13     14     15      16     17     18       19     20     21     22     23     24     [facility]   10:30 AM   (15 min.)   [facility]   [facility] Lab Draw     CT CHEST ABDOMEN PELVIS WWO   10:45 AM   (20 min.)   UCCT2   [facility] CT 25       26     27     [facility] RETURN   11:15 AM   (30 min.)   Ab Gutierrez MD   [facility]     CONSULT HOD    1:00 PM   (60 min.)   Vivienne Zimmerman, BIENVENIDO   [facility] [place] Services     [facility] ONC INFUSION 120    1:00 PM   (120 min.)   [facility] ONCOLOGY INFUSION   [facility] 28     [facility] ONC INFUSION 120    1:30 PM   (120 min.)   [facility] ONCOLOGY INFUSION   [facility]                                March 2017 Sunday Monday Tuesday Wednesday Thursday Friday Saturday                  1     [facility] ONC INFUSION 120    1:30 PM   (120 min.)   [facility] ONCOLOGY INFUSION   [facility] 2     3     4       5     6     7     8     9     10     11       12     13     14     15     16     17     18       19     20     [facility] DRAW    9:00 AM   (15 min.)   [facility] DRAW   [facility] Lab Draw     [facility] RETURN    9:15 AM   (30 min.)   Ab Gutierrez MD   [facility]     [facility] ONC INFUSION 120   11:00 AM   (120 min.)   [facility] ONCOLOGY INFUSION   [facility] 21     [facility] ONC INFUSION 120    1:30 PM   (120 min.)   [facility] ONCOLOGY INFUSION   [facility] 22     [facility] ONC INFUSION 120    1:30 PM   (120 min.)   [facility] ONCOLOGY INFUSION   [facility] 23     24     25       26     27     [facility] NEW   11:45 AM   (60 min.)   Antoni Godfrey MD   [facility] 28     29     30     31                    No results found for this or any previous visit (from the past 24 hour(s)).            Follow-ups after your visit        Your next 10 appointments already scheduled     Mar 01, 2017  1:30 PM CST   Infusion  120 with UC ONCOLOGY INFUSION, UC 27 ATC   [facility] ([facility])    [address] [facility] [address] [phone]            Mar 20, 2017  9:00 AM CDT   [facility] Draw with [facility] DRAW   [facility] Lab Draw ([facility])    [address] [id number] [phone]            Mar 20, 2017  9:30 AM CDT   (Arrive by 9:15 AM)   Return Visit with Ab Aguilar MD   [facility] ([facility])    [address]  [address] [facility] [id number] [phone]            Mar 20, 2017 11:00 AM CDT   Infusion 120 with UC ONCOLOGY INFUSION, UC 25 ATC   [facility] ([facility])    [address] [id number] [phone]            Mar 21, 2017  1:30 PM CDT   Infusion 120 with UC ONCOLOGY INFUSION, UC 22 ATC   [facility] ([facility])    [address] [id number] [phone]            Mar 22, 2017  1:30 PM CDT   Infusion 120 with UC ONCOLOGY INFUSION, UC 28 ATC   [facility] ([facility])    [address] [place] [id number] [phone]              Who to contact     If you have questions or need follow up information about today's clinic visit or your schedule please contact [facility] CANCER [facility] directly at [phone].  Normal or non-critical lab and imaging results will be communicated to you by MyChart, letter or phone within 4 business days after the clinic has received the results. If you do not hear from us within 7 days, please contact the clinic through MyChart or phone. If you have a critical or abnormal lab result, we will notify you by phone as soon as possible.  Submit  refill requests through [employer] or call your pharmacy and they will forward the refill request to us. Please allow 3 business days for your refill to be completed.          Additional Information About Your Visit        Health [employer] Information     [employer] gives you secure access to your electronic health record. If you see a primary care provider, you can also send messages to your care team and make appointments. If you have questions, please call your primary care clinic.  If you do not have a primary care provider, please call [phone] and they will assist you.        Care EveryWhere ID     This is your Care EveryWhere ID. This could be used by other organizations to access your [place] medical records  [id number]        Your Vitals Were     Pulse Temperature Respirations Pulse Oximetry          106 96.5  F (35.8  C) (Oral) 16 100%         Blood Pressure from Last 3 Encounters:   02/28/17 128/66   02/27/17 127/85   02/08/17 146/72    Weight from Last 3 Encounters:   02/27/17 57.4 kg (126 lb 8 oz)   02/06/17 55.5 kg (122 lb 6.4 oz)   01/16/17 55.7 kg (122 lb 12.8 oz)              Today, you had the following     No orders found for display       Primary Care Provider Office Phone # Fax #    Kristine Flynn MD PhD [phone]        [facility] [address] [address]        Thank you!     Thank you for choosing [facility]ER CLIN[facility]  for your care. Our goal is always to provide you with excellent care. Hearing back from our patients is one way we can continue to improve our services. Please take a few minutes to complete the written survey that you may receive in the mail after your visit with us. Thank you!             Your Updated Medication List - Protect others around you: Learn how to safely use, store and throw away your medicines at www.disposemymeds.org.          This list is accurate as of: 2/28/17  2:12 PM.  Always use your most recent med list.                    Brand Name Dispense Instructions for use    alendronate 35 MG tablet    FOSAMAX    12 tablet    Take 1 tablet (35 mg) by mouth every 7 days       CALCIUM CITRATE + PO      Take 200 mg by mouth daily as needed (She supplements to meet her 1,200mg goal depending on how much dietary calcium she has gotten that day (up to 400mg once daily)) Reported on 2/27/2017       enoxaparin 80 MG/0.8ML injection    LOVENOX     80 mg daily       HYDROcodone-acetaminophen 5-325 MG per tablet    NORCO    30 tablet    1-2 tablets every 6 hours as needed for pain       levothyroxine 100 MCG tablet    SYNTHROID/LEVOTHROID     Take 100 mcg by mouth daily       LORazepam 0.5 MG tablet    ATIVAN    30 tablet    Take 1 tablet (0.5 mg) by mouth every 4 hours as needed (Anxiety, Nausea/Vomiting or Sleep)       MELATONIN PO      Take 2.5 mg by mouth nightly as needed       MULTIVITAMINS PO      Take 1 tablet by mouth daily Reported on 2/27/2017       ondansetron 4 MG tablet    ZOFRAN    30 tablet    Take 1 tablet (4 mg) by mouth every 8 hours as needed (Nausea/Vomiting)       pantoprazole 40 MG EC tablet    PROTONIX    30 tablet    Take 1 tablet (40 mg) by mouth daily       prochlorperazine 10 MG tablet    COMPAZINE    30 tablet    Take 1 tablet (10 mg) by mouth every 6 hours as needed (Nausea/Vomiting)       tretinoin 0.025 % cream    RETIN-A    45 g    Apply a pea-sized amount to cleansed and dried face at night       venlafaxine 75 MG 24 hr capsule    EFFEXOR-XR    90 capsule    Take 1 capsule (75 mg) by mouth daily       ZANTAC PO      Take 150 mg by mouth 2 times daily       zolpidem 5 MG tablet    AMBIEN    25 tablet    Take 1 tablet (5 mg) by mouth nightly as needed for sleep

## 2017-02-28 NOTE — PATIENT INSTRUCTIONS
Contact Numbers    [facility] Main Line: [phone]  [facility] Triage:  [phone]    Call triage with chills and/or temperature greater than or equal to 100.5, uncontrolled nausea/vomiting, diarrhea, constipation, dizziness, shortness of breath, chest pain, bleeding, unexplained bruising, or any new/concerning symptoms, questions/concerns.     If you are having any concerning symptoms or wish to speak to a provider before your next infusion visit, please call your care coordinator or triage to notify them so we can adequately serve you.       After Hours: [phone]    If after hours, weekends, or holidays, call main hospital  and ask for Oncology doctor on call.         February 2017 Sunday Monday Tuesday Wednesday Thursday Friday Saturday                  1     2     3     4       5     6     [facility] DRAW    9:30 AM   (15 min.)    [facility] LAB DRAW   [facility] Lab Draw     [facility] RETURN    9:45 AM   (30 min.)   Ab Gutierrez MD   [facility] ONC INFUSION 120   11:00 AM   (120 min.)    ONCOLOGY INFUSION   [facility] 7     [facility] NEW WITH ROOM    8:45 AM   (60 min.)   Massimo York PsyD   [facility] ONC INFUSION 120   10:30 AM   (120 min.)    ONCOLOGY INFUSION   [facility] 8     [facility] ONC INFUSION 120    2:00 PM   (120 min.)    ONCOLOGY INFUSION   [facility] 9     10     11       12     13     14     15     16     17     18       19     20     21     22     23     24     [facility] DRAW   10:30 AM   (15 min.)    [facility] LAB DRAW   [facility] Lab Draw     CT CHEST ABDOMEN PELVIS WWO   10:45 AM   (20 min.)   UCCT2   [facility] CT 25       26     27     [facility] RETURN   11:15 AM   (30 min.)   Ab Gutierrez MD   [facility]     CONSULT HOD    1:00 PM   (60 min.)   Vivienne Zimmerman, BIENVENIDO   [facility]  Services     [facility] ONC INFUSION 120    1:00 PM   (120 min.)   UC ONCOLOGY INFUSION   [facility] 28     [facility] ONC INFUSION 120    1:30 PM   (120 min.)   [facility] ONCOLOGY INFUSION   [facility]                                March 2017 Sunday Monday Tuesday Wednesday Thursday Friday Saturday                  1     [facility] ONC INFUSION 120    1:30 PM   (120 min.)   UC ONCOLOGY INFUSION   [facility] 2     3     4       5     6     7     8     9     10     11       12     13     14     15     16     17     18       19     20     [facility] LAB DRAW    9:00 AM   (15 min.)   [facility] DRAW   [facility] Lab Draw     [facility] RETURN    9:15 AM   (30 min.)   Ab Gutierrez MD   [facility]     [facility] ONC INFUSION 120   11:00 AM   (120 min.)   UC ONCOLOGY INFUSION   [facility] 21     [facility] ONC INFUSION 120    1:30 PM   (120 min.)    ONCOLOGY INFUSION   [facility] 22     [facility] ONC INFUSION 120    1:30 PM   (120 min.)    ONCOLOGY INFUSION   [facility] 23     24     25       26     27     [facility] NEW   11:45 AM   (60 min.)   Antoni Godfrey MD   [facility] 28     29     30     31                    No results found for this or any previous visit (from the past 24 hour(s)).

## 2017-03-01 ENCOUNTER — INFUSION THERAPY VISIT (OUTPATIENT)
Dept: ONCOLOGY | Facility: CLINIC | Age: 67
End: 2017-03-01
Attending: INTERNAL MEDICINE
Payer: MEDICARE

## 2017-03-01 VITALS
DIASTOLIC BLOOD PRESSURE: 67 MMHG | OXYGEN SATURATION: 100 % | SYSTOLIC BLOOD PRESSURE: 146 MMHG | TEMPERATURE: 97.4 F | RESPIRATION RATE: 16 BRPM | HEART RATE: 85 BPM

## 2017-03-01 DIAGNOSIS — T45.1X5A CHEMOTHERAPY-INDUCED NEUTROPENIA (H): ICD-10-CM

## 2017-03-01 DIAGNOSIS — C54.9 UTERUS CANCER, SARCOMA (H): ICD-10-CM

## 2017-03-01 DIAGNOSIS — T45.1X5A CHEMOTHERAPY INDUCED CARDIOMYOPATHY (H): Primary | ICD-10-CM

## 2017-03-01 DIAGNOSIS — I42.7 CHEMOTHERAPY INDUCED CARDIOMYOPATHY (H): Primary | ICD-10-CM

## 2017-03-01 DIAGNOSIS — D70.1 CHEMOTHERAPY-INDUCED NEUTROPENIA (H): ICD-10-CM

## 2017-03-01 PROCEDURE — 96367 TX/PROPH/DG ADDL SEQ IV INF: CPT

## 2017-03-01 PROCEDURE — 96377 APPLICATON ON-BODY INJECTOR: CPT | Mod: 59

## 2017-03-01 PROCEDURE — 25000128 H RX IP 250 OP 636: Mod: ZF | Performed by: INTERNAL MEDICINE

## 2017-03-01 PROCEDURE — 25000125 ZZHC RX 250: Mod: ZF | Performed by: INTERNAL MEDICINE

## 2017-03-01 PROCEDURE — 96413 CHEMO IV INFUSION 1 HR: CPT

## 2017-03-01 PROCEDURE — 96411 CHEMO IV PUSH ADDL DRUG: CPT

## 2017-03-01 PROCEDURE — 96415 CHEMO IV INFUSION ADDL HR: CPT

## 2017-03-01 RX ORDER — HEPARIN SODIUM,PORCINE 10 UNIT/ML
5 VIAL (ML) INTRAVENOUS ONCE
Status: DISCONTINUED | OUTPATIENT
Start: 2017-03-01 | End: 2017-03-01 | Stop reason: HOSPADM

## 2017-03-01 RX ORDER — HEPARIN SODIUM (PORCINE) LOCK FLUSH IV SOLN 100 UNIT/ML 100 UNIT/ML
5 SOLUTION INTRAVENOUS ONCE
Status: COMPLETED | OUTPATIENT
Start: 2017-03-01 | End: 2017-03-01

## 2017-03-01 RX ORDER — DOXORUBICIN HYDROCHLORIDE 2 MG/ML
25 INJECTION, SOLUTION INTRAVENOUS ONCE
Status: COMPLETED | OUTPATIENT
Start: 2017-03-01 | End: 2017-03-01

## 2017-03-01 RX ADMIN — SODIUM CHLORIDE, PRESERVATIVE FREE 5 ML: 5 INJECTION INTRAVENOUS at 16:46

## 2017-03-01 RX ADMIN — DOXORUBICIN HYDROCHLORIDE 38 MG: 2 INJECTION, SOLUTION INTRAVENOUS at 14:35

## 2017-03-01 RX ADMIN — SODIUM CHLORIDE 250 ML: 9 INJECTION, SOLUTION INTRAVENOUS at 14:10

## 2017-03-01 RX ADMIN — DACARBAZINE 380 MG: 200 INJECTION, POWDER, FOR SOLUTION INTRAVENOUS at 14:48

## 2017-03-01 RX ADMIN — PEGFILGRASTIM 6 MG: KIT SUBCUTANEOUS at 16:46

## 2017-03-01 RX ADMIN — DEXAMETHASONE SODIUM PHOSPHATE: 10 INJECTION, SOLUTION INTRAMUSCULAR; INTRAVENOUS at 14:10

## 2017-03-01 NOTE — PROGRESS NOTES
Infusion Nursing Note:  Mgagie Navarrete presents today for C4D3 Adriamycin, Dacarbazine, OnPro Neulasta.    Patient seen by provider today: No    Note: Patient reports some slight dizziness but no other complaints at this time.    Intravenous Access:  Implanted Port.      Treatment Conditions:  Lab Results   Component Value Date    HGB 11.7 02/24/2017     Lab Results   Component Value Date    WBC 9.0 02/24/2017      Lab Results   Component Value Date    ANEU 5.1 02/24/2017     Lab Results   Component Value Date     02/24/2017      Lab Results   Component Value Date     02/24/2017                   Lab Results   Component Value Date    POTASSIUM 4.4 02/24/2017           No results found for: MAG         Lab Results   Component Value Date    CR 0.73 02/24/2017                   Lab Results   Component Value Date    SUSAN 9.0 02/24/2017                Lab Results   Component Value Date    BILITOTAL 0.2 02/24/2017           Lab Results   Component Value Date    ALBUMIN 3.5 02/24/2017                    Lab Results   Component Value Date    ALT 22 02/24/2017           Lab Results   Component Value Date    AST 8 02/24/2017     Results reviewed, labs MET treatment parameters, ok to proceed with treatment.      Post Infusion Assessment:  Patient tolerated infusion without incident.  Blood return noted pre and post infusion.  Blood return noted during Adriamycin administration every 2 cc.  Site patent and intact, free from redness, edema or discomfort.  No evidence of extravasations.  Access discontinued per protocol.    Discharge Plan:   Patient declined prescription refills.  Discharge instructions reviewed with: Patient.  Patient and/or family verbalized understanding of discharge instructions and all questions answered.  AVS to patient via [employer].  Patient will return 3/20/17 for next appointment.   Patient discharged in stable condition accompanied by: self.  Departure Mode: Ambulatory.    oDnna Dominguez  RN

## 2017-03-01 NOTE — MR AVS SNAPSHOT
After Visit Summary   3/1/2017    Maggie Navarrete    MRN: [id number]           Patient Information     Date Of Birth          1950        Visit Information        Provider Department      3/1/2017 1:30 PM  27 ATC;  ONCOLOGY INFUSION Former[facility]        Today's Diagnoses     Chemotherapy induced cardiomyopathy (H)    -  1    Chemotherapy-induced neutropenia (H)        Uterus cancer, sarcoma (H)          Care Instructions    Contact Numbers    [facility] Main Line: [phone]    Triage Nurse Line: [phone]      Please call the [facility] Nurse Triage line if you experience a temperature greater than or equal to 100.5, shaking chills, have uncontrolled nausea, vomiting and/or diarrhea, dizziness, shortness of breath, bleeding not relieved with pressure, or if you have any other questions or concerns.     If it is after hours, weekends, or holidays, please call the main hospital  at  [phone] and ask to speak to the adult Oncology doctor on call.     If you are having any concerning symptoms or wish to speak to a provider before your next infusion visit, please call your care coordinator or triage them so we can adequately serve you.      If you need to refill your narcotic prescription or other medication, please call triage before your infusion appointment.        March 2017 Sunday Monday Tuesday Wednesday Thursday Friday Saturday                  1     [facility] ON[facility] INFUSION 120    1:30 PM   (120 min.)    ONCOLOGY INFUSION   [facility] 2     3     4       5     6     7     8     9     10     11       12     13     14     15     16     17     18       19     20     [facility]    9:00 AM   (15 min.)   [facility] LAB DRAW   [facility] Lab Draw     P RETURN    9:15 AM   (30 min.)   Ab Gutierrez MD   [facility] ONC INFUSION 120   11:00 AM   (120 min.)    ONCOLOGY INFUSION    [facility] 21     [facility] ONC INFUSION 120    1:30 PM   (120 min.)   UC ONCOLOGY INFUSION   [facility] 22     [facility] ONC INFUSION 120    1:30 PM   (120 min.)   UC ONCOLOGY INFUSION   [facility] 23     24     25       26     27     [facility] NEW   11:45 AM   (60 min.)   Antoni Godfrey MD   [facility] 28     29     30     31 April 2017 Sunday Monday Tuesday Wednesday Thursday Friday Saturday                                 1       2     3     4     5     6     7     8       9     10     11     12     13  Happy Birthday!     14     15       16     17     18     19     20     21     22       23     24     25     26     27     28     29       30                                             No results found for this or any previous visit (from the past 24 hour(s)).            Follow-ups after your visit        Your next 10 appointments already scheduled     Mar 20, 2017  9:00 AM CDT   [facility] Lab Draw with  [facility] LAB DRAW   [facility] Lab Draw ([facility])    [address]  [address] [facility] [address]   [phone]            Mar 20, 2017  9:30 AM CDT   (Arrive by 9:15 AM)   Return Visit with Ab Aguilar MD   [facility] ([facility])    [address]  [address] [facility] [address]   [phone]            Mar 20, 2017 11:00 AM CDT   Infusion 120 with UC ONCOLOGY INFUSION, UC 25 ATC   [facility] ([facility])    [address]  2nd Floor  [facility] [address]   [phone]            Mar 21, 2017  1:30 PM CDT   Infusion 120 with UC ONCOLOGY INFUSION, UC 22 ATC   [facility] ([facility])    [address]  2nd Floor  [facility] [address]   [phone]            Mar 22, 2017  1:30 PM CDT    Infusion 120 with UC ONCOLOGY INFUSION, UC 28 ATC   [facility] ([facility])    [address]  [address] [facility] [address] [phone]            Mar 27, 2017 12:00 PM CDT   (Arrive by 11:45 AM)   New Patient Visit with Antoni Godfrey MD   [facility] ([facility])    [address] [address] [phone]              Who to contact     If you have questions or need follow up information about today's clinic visit or your schedule please contact [facility] directly at [phone].  Normal or non-critical lab and imaging results will be communicated to you by All-Scraphart, letter or phone within 4 business days after the clinic has received the results. If you do not hear from us within 7 days, please contact the clinic through All-Scraphart or phone. If you have a critical or abnormal lab result, we will notify you by phone as soon as possible.  Submit refill requests through Omate or call your pharmacy and they will forward the refill request to us. Please allow 3 business days for your refill to be completed.          Additional Information About Your Visit        All-ScrapharPagPop Information     Omate gives you secure access to your electronic health record. If you see a primary care provider, you can also send messages to your care team and make appointments. If you have questions, please call your primary care clinic.  If you do not have a primary care provider, please call [phone] and they will assist you.        Care EveryWhere ID     This is your Care EveryWhere ID. This could be used by other organizations to access your [facility] medical records  [id number]        Your Vitals Were     Pulse Temperature Respirations Pulse Oximetry          85 97.4  F (36.3  C) (Oral) 16 100%         Blood Pressure from Last 3 Encounters:   03/01/17 146/67    02/28/17 128/66   02/27/17 127/85    Weight from Last 3 Encounters:   02/27/17 57.4 kg (126 lb 8 oz)   02/06/17 55.5 kg (122 lb 6.4 oz)   01/16/17 55.7 kg (122 lb 12.8 oz)              Today, you had the following     No orders found for display       Primary Care Provider Office Phone # Fax #    Kristine Flynn MD PhD [phone]        [facility] [address] [facility] [address]  [facility] [address]        Thank you!     Thank you for choosing [facility]  for your care. Our goal is always to provide you with excellent care. Hearing back from our patients is one way we can continue to improve our services. Please take a few minutes to complete the written survey that you may receive in the mail after your visit with us. Thank you!             Your Updated Medication List - Protect others around you: Learn how to safely use, store and throw away your medicines at www.disposemymeds.org.          This list is accurate as of: 3/1/17  4:53 PM.  Always use your most recent med list.                   Brand Name Dispense Instructions for use    alendronate 35 MG tablet    FOSAMAX    12 tablet    Take 1 tablet (35 mg) by mouth every 7 days       CALCIUM CITRATE + PO      Take 200 mg by mouth daily as needed (She supplements to meet her 1,200mg goal depending on how much dietary calcium she has gotten that day (up to 400mg once daily)) Reported on 2/27/2017       enoxaparin 80 MG/0.8ML injection    LOVENOX     80 mg daily       HYDROcodone-acetaminophen 5-325 MG per tablet    NORCO    30 tablet    1-2 tablets every 6 hours as needed for pain       levothyroxine 100 MCG tablet    SYNTHROID/LEVOTHROID     Take 100 mcg by mouth daily       LORazepam 0.5 MG tablet    ATIVAN    30 tablet    Take 1 tablet (0.5 mg) by mouth every 4 hours as needed (Anxiety, Nausea/Vomiting or Sleep)       MELATONIN PO      Take 2.5 mg by mouth nightly as needed       MULTIVITAMINS PO      Take 1 tablet by mouth  daily Reported on 2/27/2017       ondansetron 4 MG tablet    ZOFRAN    30 tablet    Take 1 tablet (4 mg) by mouth every 8 hours as needed (Nausea/Vomiting)       pantoprazole 40 MG EC tablet    PROTONIX    30 tablet    Take 1 tablet (40 mg) by mouth daily       prochlorperazine 10 MG tablet    COMPAZINE    30 tablet    Take 1 tablet (10 mg) by mouth every 6 hours as needed (Nausea/Vomiting)       tretinoin 0.025 % cream    RETIN-A    45 g    Apply a pea-sized amount to cleansed and dried face at night       venlafaxine 75 MG 24 hr capsule    EFFEXOR-XR    90 capsule    Take 1 capsule (75 mg) by mouth daily       ZANTAC PO      Take 150 mg by mouth 2 times daily       zolpidem 5 MG tablet    AMBIEN    25 tablet    Take 1 tablet (5 mg) by mouth nightly as needed for sleep

## 2017-03-01 NOTE — PATIENT INSTRUCTIONS
Contact Numbers    [facility] Main Line: [phone]    Triage Nurse Line: [phone]      Please call the [facility] Nurse Triage line if you experience a temperature greater than or equal to 100.5, shaking chills, have uncontrolled nausea, vomiting and/or diarrhea, dizziness, shortness of breath, bleeding not relieved with pressure, or if you have any other questions or concerns.     If it is after hours, weekends, or holidays, please call the main hospital  at  [phone] and ask to speak to the adult Oncology doctor on call.     If you are having any concerning symptoms or wish to speak to a provider before your next infusion visit, please call your care coordinator or triage them so we can adequately serve you.      If you need to refill your narcotic prescription or other medication, please call triage before your infusion appointment.        March 2017 Sunday Monday Tuesday Wednesday Thursday Friday Saturday                  1     P ONC INFUSION 120    1:30 PM   (120 min.)    ONCOLOGY INFUSION   [facility] 2     3     4       5     6     7     8     9     10     11       12     13     14     15     16     17     18       19     20     [facility] LAB DRAW    9:00 AM   (15 min.)    [facility] LAB DRAW   [facility] Lab Draw     [facility] RETURN    9:15 AM   (30 min.)   bA Gutierrez MD   [facility] ONC INFUSION 120   11:00 AM   (120 min.)    ONCOLOGY INFUSION   [facility] 21     [facility] ONC INFUSION 120    1:30 PM   (120 min.)   UC ONCOLOGY INFUSION   [facility] 22     [facility] ONC INFUSION 120    1:30 PM   (120 min.)    ONCOLOGY INFUSION   [facility] 23     24     25       26     27     [facility] NEW   11:45 AM   (60 min.)   Antoni Godfrey MD   [facility] 28     29     30     31 April 2017 Sunday Monday Tuesday Wednesday  Thursday Friday Saturday                                 1       2     3     4     5     6     7     8       9     10     11     12     13  Happy Birthday!     14     15       16     17     18     19     20     21     22       23     24     25     26     27     28     29       30                                             No results found for this or any previous visit (from the past 24 hour(s)).

## 2017-03-20 ENCOUNTER — INFUSION THERAPY VISIT (OUTPATIENT)
Dept: ONCOLOGY | Facility: CLINIC | Age: 67
End: 2017-03-20
Attending: INTERNAL MEDICINE
Payer: MEDICARE

## 2017-03-20 ENCOUNTER — APPOINTMENT (OUTPATIENT)
Dept: LAB | Facility: CLINIC | Age: 67
End: 2017-03-20
Attending: INTERNAL MEDICINE
Payer: MEDICARE

## 2017-03-20 VITALS
BODY MASS INDEX: 24.61 KG/M2 | HEART RATE: 107 BPM | WEIGHT: 126 LBS | OXYGEN SATURATION: 100 % | SYSTOLIC BLOOD PRESSURE: 120 MMHG | DIASTOLIC BLOOD PRESSURE: 68 MMHG | TEMPERATURE: 98.3 F

## 2017-03-20 DIAGNOSIS — C54.9 UTERUS CANCER, SARCOMA (H): ICD-10-CM

## 2017-03-20 DIAGNOSIS — I42.7 CHEMOTHERAPY INDUCED CARDIOMYOPATHY (H): Primary | ICD-10-CM

## 2017-03-20 DIAGNOSIS — D70.1 CHEMOTHERAPY-INDUCED NEUTROPENIA (H): ICD-10-CM

## 2017-03-20 DIAGNOSIS — T45.1X5A CHEMOTHERAPY INDUCED CARDIOMYOPATHY (H): Primary | ICD-10-CM

## 2017-03-20 DIAGNOSIS — I42.7 CHEMOTHERAPY INDUCED CARDIOMYOPATHY (H): ICD-10-CM

## 2017-03-20 DIAGNOSIS — T45.1X5A CHEMOTHERAPY-INDUCED NEUTROPENIA (H): ICD-10-CM

## 2017-03-20 DIAGNOSIS — T45.1X5A CHEMOTHERAPY INDUCED CARDIOMYOPATHY (H): ICD-10-CM

## 2017-03-20 LAB
ALBUMIN SERPL-MCNC: 3.2 G/DL (ref 3.4–5)
ALP SERPL-CCNC: 79 U/L (ref 40–150)
ALT SERPL W P-5'-P-CCNC: 20 U/L (ref 0–50)
ANION GAP SERPL CALCULATED.3IONS-SCNC: 10 MMOL/L (ref 3–14)
AST SERPL W P-5'-P-CCNC: 12 U/L (ref 0–45)
BASOPHILS # BLD AUTO: 0.1 10E9/L (ref 0–0.2)
BASOPHILS NFR BLD AUTO: 0.9 %
BILIRUB SERPL-MCNC: 0.3 MG/DL (ref 0.2–1.3)
BUN SERPL-MCNC: 18 MG/DL (ref 7–30)
CALCIUM SERPL-MCNC: 8.7 MG/DL (ref 8.5–10.1)
CHLORIDE SERPL-SCNC: 111 MMOL/L (ref 94–109)
CO2 SERPL-SCNC: 24 MMOL/L (ref 20–32)
CREAT SERPL-MCNC: 0.57 MG/DL (ref 0.52–1.04)
DIFFERENTIAL METHOD BLD: ABNORMAL
EOSINOPHIL # BLD AUTO: 0.1 10E9/L (ref 0–0.7)
EOSINOPHIL NFR BLD AUTO: 1.3 %
ERYTHROCYTE [DISTWIDTH] IN BLOOD BY AUTOMATED COUNT: 20.8 % (ref 10–15)
GFR SERPL CREATININE-BSD FRML MDRD: ABNORMAL ML/MIN/1.7M2
GLUCOSE SERPL-MCNC: 102 MG/DL (ref 70–99)
HCT VFR BLD AUTO: 31.6 % (ref 35–47)
HGB BLD-MCNC: 10.1 G/DL (ref 11.7–15.7)
IMM GRANULOCYTES # BLD: 0.1 10E9/L (ref 0–0.4)
IMM GRANULOCYTES NFR BLD: 0.7 %
LYMPHOCYTES # BLD AUTO: 1.4 10E9/L (ref 0.8–5.3)
LYMPHOCYTES NFR BLD AUTO: 20.2 %
MCH RBC QN AUTO: 30.1 PG (ref 26.5–33)
MCHC RBC AUTO-ENTMCNC: 32 G/DL (ref 31.5–36.5)
MCV RBC AUTO: 94 FL (ref 78–100)
MONOCYTES # BLD AUTO: 1 10E9/L (ref 0–1.3)
MONOCYTES NFR BLD AUTO: 13.9 %
NEUTROPHILS # BLD AUTO: 4.4 10E9/L (ref 1.6–8.3)
NEUTROPHILS NFR BLD AUTO: 63 %
NRBC # BLD AUTO: 0 10*3/UL
NRBC BLD AUTO-RTO: 0 /100
PLATELET # BLD AUTO: 495 10E9/L (ref 150–450)
POTASSIUM SERPL-SCNC: 3.8 MMOL/L (ref 3.4–5.3)
PROT SERPL-MCNC: 6.2 G/DL (ref 6.8–8.8)
RBC # BLD AUTO: 3.36 10E12/L (ref 3.8–5.2)
SODIUM SERPL-SCNC: 145 MMOL/L (ref 133–144)
WBC # BLD AUTO: 7 10E9/L (ref 4–11)

## 2017-03-20 PROCEDURE — 80053 COMPREHEN METABOLIC PANEL: CPT | Performed by: INTERNAL MEDICINE

## 2017-03-20 PROCEDURE — 96367 TX/PROPH/DG ADDL SEQ IV INF: CPT

## 2017-03-20 PROCEDURE — 96411 CHEMO IV PUSH ADDL DRUG: CPT

## 2017-03-20 PROCEDURE — 96375 TX/PRO/DX INJ NEW DRUG ADDON: CPT

## 2017-03-20 PROCEDURE — 99212 OFFICE O/P EST SF 10 MIN: CPT | Mod: ZF

## 2017-03-20 PROCEDURE — 96415 CHEMO IV INFUSION ADDL HR: CPT

## 2017-03-20 PROCEDURE — 25000128 H RX IP 250 OP 636: Mod: ZF | Performed by: INTERNAL MEDICINE

## 2017-03-20 PROCEDURE — 25000125 ZZHC RX 250: Mod: ZF | Performed by: INTERNAL MEDICINE

## 2017-03-20 PROCEDURE — 99214 OFFICE O/P EST MOD 30 MIN: CPT | Mod: ZP | Performed by: INTERNAL MEDICINE

## 2017-03-20 PROCEDURE — 96413 CHEMO IV INFUSION 1 HR: CPT

## 2017-03-20 PROCEDURE — 85025 COMPLETE CBC W/AUTO DIFF WBC: CPT | Performed by: INTERNAL MEDICINE

## 2017-03-20 RX ORDER — METHYLPREDNISOLONE SODIUM SUCCINATE 125 MG/2ML
125 INJECTION, POWDER, LYOPHILIZED, FOR SOLUTION INTRAMUSCULAR; INTRAVENOUS
Status: CANCELLED
Start: 2017-03-20

## 2017-03-20 RX ORDER — SODIUM CHLORIDE 9 MG/ML
1000 INJECTION, SOLUTION INTRAVENOUS CONTINUOUS PRN
Status: CANCELLED
Start: 2017-03-20

## 2017-03-20 RX ORDER — DIPHENHYDRAMINE HYDROCHLORIDE 50 MG/ML
50 INJECTION INTRAMUSCULAR; INTRAVENOUS
Status: CANCELLED
Start: 2017-03-20

## 2017-03-20 RX ORDER — ALBUTEROL SULFATE 0.83 MG/ML
2.5 SOLUTION RESPIRATORY (INHALATION)
Status: CANCELLED | OUTPATIENT
Start: 2017-03-21

## 2017-03-20 RX ORDER — ALBUTEROL SULFATE 0.83 MG/ML
2.5 SOLUTION RESPIRATORY (INHALATION)
Status: CANCELLED | OUTPATIENT
Start: 2017-03-22

## 2017-03-20 RX ORDER — HEPARIN SODIUM,PORCINE 10 UNIT/ML
5 VIAL (ML) INTRAVENOUS ONCE
Status: CANCELLED | OUTPATIENT
Start: 2017-03-21 | End: 2017-03-21

## 2017-03-20 RX ORDER — ALBUTEROL SULFATE 90 UG/1
1-2 AEROSOL, METERED RESPIRATORY (INHALATION)
Status: CANCELLED
Start: 2017-03-21

## 2017-03-20 RX ORDER — DOXORUBICIN HYDROCHLORIDE 2 MG/ML
25 INJECTION, SOLUTION INTRAVENOUS ONCE
Status: CANCELLED | OUTPATIENT
Start: 2017-03-22 | End: 2017-03-22

## 2017-03-20 RX ORDER — MEPERIDINE HYDROCHLORIDE 25 MG/ML
25 INJECTION INTRAMUSCULAR; INTRAVENOUS; SUBCUTANEOUS EVERY 30 MIN PRN
Status: CANCELLED | OUTPATIENT
Start: 2017-03-21

## 2017-03-20 RX ORDER — DOXORUBICIN HYDROCHLORIDE 2 MG/ML
25 INJECTION, SOLUTION INTRAVENOUS ONCE
Status: CANCELLED | OUTPATIENT
Start: 2017-03-21 | End: 2017-03-21

## 2017-03-20 RX ORDER — LORAZEPAM 2 MG/ML
0.5 INJECTION INTRAMUSCULAR EVERY 4 HOURS PRN
Status: CANCELLED
Start: 2017-03-21

## 2017-03-20 RX ORDER — MEPERIDINE HYDROCHLORIDE 25 MG/ML
25 INJECTION INTRAMUSCULAR; INTRAVENOUS; SUBCUTANEOUS EVERY 30 MIN PRN
Status: CANCELLED | OUTPATIENT
Start: 2017-03-20

## 2017-03-20 RX ORDER — EPINEPHRINE 0.3 MG/.3ML
0.3 INJECTION SUBCUTANEOUS EVERY 5 MIN PRN
Status: CANCELLED | OUTPATIENT
Start: 2017-03-22

## 2017-03-20 RX ORDER — EPINEPHRINE 0.3 MG/.3ML
0.3 INJECTION SUBCUTANEOUS EVERY 5 MIN PRN
Status: CANCELLED | OUTPATIENT
Start: 2017-03-20

## 2017-03-20 RX ORDER — METHYLPREDNISOLONE SODIUM SUCCINATE 125 MG/2ML
125 INJECTION, POWDER, LYOPHILIZED, FOR SOLUTION INTRAMUSCULAR; INTRAVENOUS
Status: CANCELLED
Start: 2017-03-21

## 2017-03-20 RX ORDER — ALBUTEROL SULFATE 90 UG/1
1-2 AEROSOL, METERED RESPIRATORY (INHALATION)
Status: CANCELLED
Start: 2017-03-20

## 2017-03-20 RX ORDER — LORAZEPAM 2 MG/ML
0.5 INJECTION INTRAMUSCULAR EVERY 4 HOURS PRN
Status: CANCELLED
Start: 2017-03-22

## 2017-03-20 RX ORDER — ALBUTEROL SULFATE 0.83 MG/ML
2.5 SOLUTION RESPIRATORY (INHALATION)
Status: CANCELLED | OUTPATIENT
Start: 2017-03-20

## 2017-03-20 RX ORDER — HEPARIN SODIUM,PORCINE 10 UNIT/ML
5 VIAL (ML) INTRAVENOUS ONCE
Status: CANCELLED | OUTPATIENT
Start: 2017-03-22 | End: 2017-03-22

## 2017-03-20 RX ORDER — SODIUM CHLORIDE 9 MG/ML
1000 INJECTION, SOLUTION INTRAVENOUS CONTINUOUS PRN
Status: CANCELLED
Start: 2017-03-22

## 2017-03-20 RX ORDER — HEPARIN SODIUM (PORCINE) LOCK FLUSH IV SOLN 100 UNIT/ML 100 UNIT/ML
5 SOLUTION INTRAVENOUS EVERY 8 HOURS
Status: DISCONTINUED | OUTPATIENT
Start: 2017-03-20 | End: 2017-03-20 | Stop reason: HOSPADM

## 2017-03-20 RX ORDER — DIPHENHYDRAMINE HYDROCHLORIDE 50 MG/ML
50 INJECTION INTRAMUSCULAR; INTRAVENOUS
Status: CANCELLED
Start: 2017-03-21

## 2017-03-20 RX ORDER — EPINEPHRINE 0.3 MG/.3ML
0.3 INJECTION SUBCUTANEOUS EVERY 5 MIN PRN
Status: CANCELLED | OUTPATIENT
Start: 2017-03-21

## 2017-03-20 RX ORDER — DOXORUBICIN HYDROCHLORIDE 2 MG/ML
25 INJECTION, SOLUTION INTRAVENOUS ONCE
Status: COMPLETED | OUTPATIENT
Start: 2017-03-20 | End: 2017-03-20

## 2017-03-20 RX ORDER — ALBUTEROL SULFATE 90 UG/1
1-2 AEROSOL, METERED RESPIRATORY (INHALATION)
Status: CANCELLED
Start: 2017-03-22

## 2017-03-20 RX ORDER — MEPERIDINE HYDROCHLORIDE 25 MG/ML
25 INJECTION INTRAMUSCULAR; INTRAVENOUS; SUBCUTANEOUS EVERY 30 MIN PRN
Status: CANCELLED | OUTPATIENT
Start: 2017-03-22

## 2017-03-20 RX ORDER — DIPHENHYDRAMINE HYDROCHLORIDE 50 MG/ML
50 INJECTION INTRAMUSCULAR; INTRAVENOUS
Status: CANCELLED
Start: 2017-03-22

## 2017-03-20 RX ORDER — DOXORUBICIN HYDROCHLORIDE 2 MG/ML
25 INJECTION, SOLUTION INTRAVENOUS ONCE
Status: CANCELLED | OUTPATIENT
Start: 2017-03-20 | End: 2017-03-20

## 2017-03-20 RX ORDER — METHYLPREDNISOLONE SODIUM SUCCINATE 125 MG/2ML
125 INJECTION, POWDER, LYOPHILIZED, FOR SOLUTION INTRAMUSCULAR; INTRAVENOUS
Status: CANCELLED
Start: 2017-03-22

## 2017-03-20 RX ORDER — LORAZEPAM 2 MG/ML
0.5 INJECTION INTRAMUSCULAR EVERY 4 HOURS PRN
Status: CANCELLED
Start: 2017-03-20

## 2017-03-20 RX ORDER — HEPARIN SODIUM,PORCINE 10 UNIT/ML
5 VIAL (ML) INTRAVENOUS ONCE
Status: COMPLETED | OUTPATIENT
Start: 2017-03-20 | End: 2017-03-20

## 2017-03-20 RX ORDER — HEPARIN SODIUM,PORCINE 10 UNIT/ML
5 VIAL (ML) INTRAVENOUS ONCE
Status: CANCELLED | OUTPATIENT
Start: 2017-03-20 | End: 2017-03-20

## 2017-03-20 RX ORDER — SODIUM CHLORIDE 9 MG/ML
1000 INJECTION, SOLUTION INTRAVENOUS CONTINUOUS PRN
Status: CANCELLED
Start: 2017-03-21

## 2017-03-20 RX ADMIN — SODIUM CHLORIDE 250 ML: 9 INJECTION, SOLUTION INTRAVENOUS at 11:08

## 2017-03-20 RX ADMIN — DEXAMETHASONE SODIUM PHOSPHATE: 10 INJECTION, SOLUTION INTRAMUSCULAR; INTRAVENOUS at 11:08

## 2017-03-20 RX ADMIN — DACARBAZINE 380 MG: 10 INJECTION, POWDER, FOR SOLUTION INTRAVENOUS at 11:56

## 2017-03-20 RX ADMIN — SODIUM CHLORIDE 150 MG: 9 INJECTION, SOLUTION INTRAVENOUS at 11:23

## 2017-03-20 RX ADMIN — DOXORUBICIN HYDROCHLORIDE 38 MG: 2 INJECTION, SOLUTION INTRAVENOUS at 11:52

## 2017-03-20 RX ADMIN — SODIUM CHLORIDE, PRESERVATIVE FREE 5 ML: 5 INJECTION INTRAVENOUS at 13:48

## 2017-03-20 RX ADMIN — SODIUM CHLORIDE, PRESERVATIVE FREE 5 ML: 5 INJECTION INTRAVENOUS at 09:34

## 2017-03-20 ASSESSMENT — PAIN SCALES - GENERAL: PAINLEVEL: NO PAIN (0)

## 2017-03-20 NOTE — PROGRESS NOTES
Infusion Nursing Note:  Maggie Navarrete presents today for cycle  5, day 1 adriamycin and dacarbazine  Patient seen by provider today: Yes: Dr Pino Aguilar   present during visit today: Not Applicable.    Note: N/A.    Intravenous Access:  Implanted Port.    Treatment Conditions:  Lab Results   Component Value Date    HGB 10.1 03/20/2017     Lab Results   Component Value Date    WBC 7.0 03/20/2017      Lab Results   Component Value Date    ANEU 4.4 03/20/2017     Lab Results   Component Value Date     03/20/2017      Lab Results   Component Value Date     03/20/2017                   Lab Results   Component Value Date    POTASSIUM 3.8 03/20/2017           No results found for: MAG         Lab Results   Component Value Date    CR 0.57 03/20/2017                   Lab Results   Component Value Date    SUSAN 8.7 03/20/2017                Lab Results   Component Value Date    BILITOTAL 0.3 03/20/2017           Lab Results   Component Value Date    ALBUMIN 3.2 03/20/2017                    Lab Results   Component Value Date    ALT 20 03/20/2017           Lab Results   Component Value Date    AST 12 03/20/2017     Results reviewed, labs MET treatment parameters, ok to proceed with treatment.  ECHO/MUGA completed 11/29/16  EF 70%.      Post Infusion Assessment:  Patient tolerated infusion without incident.  Blood return noted pre and post infusion.  Blood return noted during adriamycin administration every 2 cc and given into free flowing NS line  Site patent and intact, free from redness, edema or discomfort.  No evidence of extravasations.  Port flushed and needle left intact for infusion tomorrow.    Discharge Plan:   Patient declined prescription refills.  Discharge instructions reviewed with: Patient.  Patient and/or family verbalized understanding of discharge instructions and all questions answered.  Copy of AVS reviewed with patient and/or family.  Patient will return tomorrow for next  appointment.  Patient discharged in stable condition accompanied by: self.  Departure Mode: Ambulatory.  Face to Face time: 0.    Sona Poe RN

## 2017-03-20 NOTE — NURSING NOTE
Maggie Navarrete is a 66 year old female who presents for:  Chief Complaint   Patient presents with     Oncology Clinic Visit     Leiomyosarcoma        Initial Vitals:  There were no vitals taken for this visit. Estimated body mass index is 24.61 kg/(m^2) as calculated from the following:    Height as of 2/27/17: 1.524 m (5').    Weight as of an earlier encounter on 3/20/17: 57.2 kg (126 lb).. There is no height or weight on file to calculate BSA. BP completed using cuff size: NA (Not Taken)  No Pain (0) No LMP recorded. Patient is postmenopausal. Allergies and medications reviewed.     Medications: Medication refills not needed today.  Pharmacy name entered into [facility]:    [id number] - [facility] [address] AT [facility] [address] & [address] [place] [address] PHARMACY MAIL DELIVERY [place], [place] [address]  [facility] - [place], [place] - [address] [facility] [address]    Comments:     7 minutes for nursing intake (face to face time)   Reena Cortez LPN

## 2017-03-20 NOTE — LETTER
3/20/2017       RE: Maggie Navarrete  [address]  [facility] [address]     Dear Colleague,    Thank you for referring your patient, Maggie Navarrete, to the [facility]. Please see a copy of my visit note below.    MEDICAL ONCOLOGY PROGRESS NOTE   Mar 20, 2017    Oncologic history:  1. January 2016, she presents to her PCP with 3 weeks of abdominal pain and severe constipation. She is treated for constipation and improves.  2. 3/21/2016, worsening pelvic pressure prompts pelvic examination with an enlarged uterus noted. Transvaginal ultrasound shows enlargement of subserosal fibroids originally seen on MRI in 2006.  3. 5/9/2016, MRI pelvis is obtained, which shows several enhancing uterine masses involving an enlarged uterus, felt radiographically compatible with benign uterine leiomyomata.  3. Summer 2016, she experiences worsening low back pain thought at first thought to be sacroiliitis, but pain persists after corticosteroid injection. Continues with severe constipation.  4. 9/8/2016, she undergoes colonoscopy for persistent abdominal pain. She has sigmoid polyp snared. Abdominal pain and diarrhea persist after colonoscopy prep.  5. 9/14/2016, she has worsening abdominal pain and is evaluated in the ED. CT-scan shows a large subserosal fibroid measuring 9.4 x 9.6 x 9.8 cm, as well as very large fibroids along the posterior uterine body more inferiorly, increased in size to 14 cm in greatest diameter compared to 10 cm in greatest diameter on MRI of 5/9/2016.  6. 9/29/2016, she has staging CT-CAP, which shows a small left sided pleural effusion and a 3 mm pulmonary nodule in the RML.  7. October 2016, self-refers to [facility].  8. 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  9. 11/4/2016, undergoes total abdominal hysterectomy and BSO, under Dr. Harsh Camarillo. Surgery was originally presumed for leiomyoma, however, intraoperatively  there was focal adherence of a mass to the pelvic right sidewall and cul-de-sac, which led to peritoneal resection and omentectomy. Final pathology, pT2b: showed leiomyosarcoma involving the cul-de-sac and forming two separate masses measuring 10.5 x 6.5 x 4.5 cm and 10.7 x 7.9 x 7.5 cm.    HISTORY OF PRESENT ILLNESS  Maggie Navarrete is a 66 year old female with a diagnosis leiomyosarcoma.of the uterus. She is currently receiving adjuvant Doxorubicin and Dacarbazine and has received 4 cycles of chemotherapy. She is here for consideration of cycle 5. She tolerated her last cycle well without significant side effects. She has had no significant issues with GERD since starting the ranitidine. No other issues with nausea or vomiting. She has had some nasal dryness and occasional nose bleeds. Platelets today are normal at 495 and white count is normal at 7.0. She denies fevers or chills.      Lab Results   Component Value Date    WBC 7.0 03/20/2017    HGB 10.1 (L) 03/20/2017    HCT 31.6 (L) 03/20/2017     (H) 03/20/2017     (H) 02/24/2017    POTASSIUM 4.4 02/24/2017    CHLORIDE 112 (H) 02/24/2017    CO2 26 02/24/2017    BUN 21 02/24/2017    CR 0.73 02/24/2017    GLC 83 02/24/2017    AST 8 02/24/2017    ALT 22 02/24/2017    ALKPHOS 82 02/24/2017    BILITOTAL 0.2 02/24/2017    INR 0.98 12/23/2016       REVIEW OF SYSTEMS  A 12-point ROS negative except as in HPI    Current Outpatient Prescriptions   Medication Sig Dispense Refill     Apixaban (ELIQUIS PO) Take 2.5 mg by mouth 2 times daily       RaNITidine HCl (ZANTAC PO) Take 150 mg by mouth daily as needed        ondansetron (ZOFRAN) 4 MG tablet Take 1 tablet (4 mg) by mouth every 8 hours as needed (Nausea/Vomiting) 30 tablet 3     venlafaxine (EFFEXOR-XR) 75 MG 24 hr capsule Take 1 capsule (75 mg) by mouth daily 90 capsule 1     levothyroxine (SYNTHROID,LEVOTHROID) 100 MCG tablet Take 100 mcg by mouth daily        zolpidem (AMBIEN) 5 MG tablet Take 1 tablet  (5 mg) by mouth nightly as needed for sleep 25 tablet 0     alendronate (FOSAMAX) 35 MG tablet Take 1 tablet (35 mg) by mouth every 7 days 12 tablet 3     pantoprazole (PROTONIX) 40 MG EC tablet Take 1 tablet (40 mg) by mouth daily (Patient not taking: Reported on 3/20/2017) 30 tablet 0     HYDROcodone-acetaminophen (NORCO) 5-325 MG per tablet 1-2 tablets every 6 hours as needed for pain (Patient not taking: Reported on 2/27/2017) 30 tablet 0     LORazepam (ATIVAN) 0.5 MG tablet Take 1 tablet (0.5 mg) by mouth every 4 hours as needed (Anxiety, Nausea/Vomiting or Sleep) (Patient not taking: Reported on 2/27/2017) 30 tablet 2     prochlorperazine (COMPAZINE) 10 MG tablet Take 1 tablet (10 mg) by mouth every 6 hours as needed (Nausea/Vomiting) (Patient not taking: Reported on 2/27/2017) 30 tablet 2     MELATONIN PO Take 2.5 mg by mouth nightly as needed Reported on 3/20/2017       tretinoin (RETIN-A) 0.025 % cream Apply a pea-sized amount to cleansed and dried face at night (Patient not taking: Reported on 2/27/2017) 45 g 3     Calcium Citrate-Vitamin D (CALCIUM CITRATE + PO) Take 200 mg by mouth daily as needed (She supplements to meet her 1,200mg goal depending on how much dietary calcium she has gotten that day (up to 400mg once daily)) Reported on 3/20/2017       Multiple Vitamin (MULTIVITAMINS PO) Take 1 tablet by mouth daily Reported on 3/20/2017         Past Medical History   Diagnosis Date     Anxiety      Arthritis      pain in feet and knees     Disorder of bone and cartilage, unspecified      Esophageal reflux 2015     GERD (gastroesophageal reflux disease)      Hx of previous reproductive problem 1980     Kidney stone 2011     Personal history of colonic polyps 2016     Small, benign     PONV (postoperative nausea and vomiting)      Posterior vitreous detachment of left eye 2011     Posterior vitreous detachment of right eye 2011     Ptosis of eyelid, bilateral      Stomach problems 2015     Abdominal pain,  Diarreha     Thyroid disease      thyroid nodule resolved 2014         PHYSICAL EXAMINATION  There were no vitals taken for this visit.  Wt Readings from Last 2 Encounters:   02/27/17 57.4 kg (126 lb 8 oz)   02/06/17 55.5 kg (122 lb 6.4 oz)     Physical Exam   Constitutional: She is oriented to person, place, and time. She appears well-developed and well-nourished. No distress.   HENT:   Head: Normocephalic and atraumatic.   Mouth/Throat: Oropharynx is clear and moist.   Eyes: Conjunctivae and EOM are normal. Pupils are equal, round, and reactive to light. No scleral icterus.   Cardiovascular: Normal rate, regular rhythm and normal heart sounds.    No murmur heard.  Pulmonary/Chest: Effort normal and breath sounds normal. She has no wheezes.   Abdominal: Soft. Bowel sounds are normal. She exhibits no distension. There is no tenderness.   Musculoskeletal: Normal range of motion. She exhibits no edema.   Lymphadenopathy:     She has no cervical adenopathy.   Neurological: She is alert and oriented to person, place, and time. No cranial nerve deficit. She exhibits normal muscle tone. Coordination normal.   Skin: Skin is warm and dry. No rash noted.   Psychiatric: She has a normal mood and affect. Her behavior is normal.   Nursing note and vitals reviewed.      IMPRESSION AND PLAN  #1 Locally advanced leiomyosarcoma of the uterus, pT2b Nx M0, Stage IIB, status-post resection, currently on adjuvant chemotherapy  Continue with cycle 5 of chemotherapy. She will receive 6 cycles of adjuvant therapy and then will enter observation. I will have her follow in concert with gynecologic oncology for routine pelvic examinations in addition to observation CT-imaging.    Ab Warner M.D.   of Medicine  Hematology, Oncology and Transplantation  [facility]

## 2017-03-20 NOTE — MR AVS SNAPSHOT
After Visit Summary   3/20/2017    Maggie Navarrete    MRN: [id number]           Patient Information     Date Of Birth          1950        Visit Information        Provider Department      3/20/2017 9:30 AM Ab Gutierrez MD [facility]        Today's Diagnoses     Uterus cancer, sarcoma (H)        Chemotherapy-induced neutropenia (H)        Chemotherapy induced cardiomyopathy (H)           Follow-ups after your visit        Your next 10 appointments already scheduled     Apr 04, 2017  8:40 AM CDT   (Arrive by 8:25 AM)   Return Visit with Rebeka Kim PA-C   [facility] ([facility])    [address]  [address] [facility] [place] [address] [phone]            Apr 10, 2017  8:15 AM CDT   [facility] with [facility]   [facility] ([facility])    [address]  [address] [facility] [place] [address] [phone]            Apr 10, 2017  8:30 AM CDT   (Arrive by 8:15 AM)   Return Visit with Ab Aguilar MD   [facility] ([facility])    [address]  [address] [facility] [place] [address] [phone]            Apr 10, 2017  9:00 AM CDT   Infusion 120 with UC ONCOLOGY INFUSION, UC 18 ATC   [facility] ([facility])    [address]  [address] [facility] [place] [address] [phone]            Apr 11, 2017  2:00 PM CDT   Infusion 120 with UC ONCOLOGY INFUSION, UC 14 ATC   [facility] ([facility])    [address]  [address]nd [address]  [facility] [address]   [phone]            Apr 12, 2017  1:30 PM CDT   Infusion 120 with [facility] ONCOLOGY INFUSION, UC 22 ATC   [facility] ([facility])    [address]  [address] [facility] [place] [address]    [phone]              Who to contact     If you have questions or need follow up information about today's clinic visit or your schedule please contact [facility] directly at [phone].  Normal or non-critical lab and imaging results will be communicated to you by MyChart, letter or phone within 4 business days after the clinic has received the results. If you do not hear from us within 7 days, please contact the clinic through MyChart or phone. If you have a critical or abnormal lab result, we will notify you by phone as soon as possible.  Submit refill requests through [employer] or call your pharmacy and they will forward the refill request to us. Please allow 3 business days for your refill to be completed.          Additional Information About Your Visit        [employer] Information     [employer] gives you secure access to your electronic health record. If you see a primary care provider, you can also send messages to your care team and make appointments. If you have questions, please call your primary care clinic.  If you do not have a primary care provider, please call [phone] and they will assist you.        Care EveryWhere ID     This is your Care EveryWhere ID. This could be used by other organizations to access your [place] medical records  [id number]         Blood Pressure from Last 3 Encounters:   03/27/17 121/65   03/22/17 149/65   03/21/17 128/62    Weight from Last 3 Encounters:   03/27/17 57.4 kg (126 lb 8 oz)   03/22/17 58.1 kg (128 lb)   03/20/17 57.2 kg (126 lb)              Today, you had the following     No orders found for display         Today's Medication Changes          These changes are accurate as of: 3/20/17 11:59 PM.  If you have any questions, ask your nurse or doctor.               Stop taking these medicines if you haven't already. Please contact your care team if you have questions.     enoxaparin 80 MG/0.8ML injection   Commonly known as:  LOVENOX    Stopped by:  Ab Gutierrez MD                    Primary Care Provider Office Phone # Fax #    Kristine Fernando Flynn MD PhD [phone]        [facility] [address]  [facility] [address]        Thank you!     Thank you for choosing [facility]  for your care. Our goal is always to provide you with excellent care. Hearing back from our patients is one way we can continue to improve our services. Please take a few minutes to complete the written survey that you may receive in the mail after your visit with us. Thank you!             Your Updated Medication List - Protect others around you: Learn how to safely use, store and throw away your medicines at www.disposemymeds.org.          This list is accurate as of: 3/20/17 11:59 PM.  Always use your most recent med list.                   Brand Name Dispense Instructions for use    alendronate 35 MG tablet    FOSAMAX    12 tablet    Take 1 tablet (35 mg) by mouth every 7 days       CALCIUM CITRATE + PO      Take 200 mg by mouth daily as needed (She supplements to meet her 1,200mg goal depending on how much dietary calcium she has gotten that day (up to 400mg once daily)) Reported on 3/20/2017       ELIQUIS PO      Take 2.5 mg by mouth 2 times daily       HYDROcodone-acetaminophen 5-325 MG per tablet    NORCO    30 tablet    1-2 tablets every 6 hours as needed for pain       levothyroxine 100 MCG tablet    SYNTHROID/LEVOTHROID     Take 100 mcg by mouth daily       LORazepam 0.5 MG tablet    ATIVAN    30 tablet    Take 1 tablet (0.5 mg) by mouth every 4 hours as needed (Anxiety, Nausea/Vomiting or Sleep)       MELATONIN PO      Take 2.5 mg by mouth nightly as needed Reported on 3/20/2017       MULTIVITAMINS PO      Take 1 tablet by mouth daily Reported on 3/20/2017       ondansetron 4 MG tablet    ZOFRAN    30 tablet    Take 1 tablet (4 mg) by mouth every 8 hours as needed (Nausea/Vomiting)       pantoprazole 40 MG  EC tablet    PROTONIX    30 tablet    Take 1 tablet (40 mg) by mouth daily       prochlorperazine 10 MG tablet    COMPAZINE    30 tablet    Take 1 tablet (10 mg) by mouth every 6 hours as needed (Nausea/Vomiting)       venlafaxine 75 MG 24 hr capsule    EFFEXOR-XR    90 capsule    Take 1 capsule (75 mg) by mouth daily       ZANTAC PO      Take 150 mg by mouth daily as needed       zolpidem 5 MG tablet    AMBIEN    25 tablet    Take 1 tablet (5 mg) by mouth nightly as needed for sleep

## 2017-03-20 NOTE — PATIENT INSTRUCTIONS
Contact Numbers    [facility] Main Line: [phone]  [facility] Triage:  [phone]    Call triage with chills and/or temperature greater than or equal to 100.5, uncontrolled nausea/vomiting, diarrhea, constipation, dizziness, shortness of breath, chest pain, bleeding, unexplained bruising, or any new/concerning symptoms, questions/concerns.     If you are having any concerning symptoms or wish to speak to a provider before your next infusion visit, please call your care coordinator or triage to notify them so we can adequately serve you.       After Hours: [phone]    If after hours, weekends, or holidays, call main hospital  and ask for Oncology doctor on call.           March 2017 Sunday Monday Tuesday Wednesday Thursday Friday Saturday                  1     [facility] ONC INFUSION 120    1:30 PM   (120 min.)    ONCOLOGY INFUSION   [facility] 2     3     4       5     6     7     8     9     10     11       12     13     14     15     16     17     18       19     20     [facility] D[facility]    9:00 AM   (15 min.)    [facility] DRAW   [facility] Draw     [facility] RETURN    9:15 AM   (30 min.)   Ab Gutierrez MD   [facility]     [facility] ONC INFUSION 120   11:00 AM   (120 min.)   [facility] ONCOLOGY INFUSION   [facility] 21     [facility] ONC INFUSION 120    1:30 PM   (120 min.)    ONCOLOGY INFUSION   [facility] 22     [facility] ONC INFUSION 120    1:30 PM   (120 min.)    ONCOLOGY INFUSION   [facility] 23     24     25       26     27     [facility] NEW   11:45 AM   (60 min.)   Antoni Godfrey MD   [facility] 28     29     30     31                     April 2017 Sunday Monday Tuesday Wednesday Thursday Friday Saturday                                 1       2     3     4     5     6     7     8       9     10     11     12     13  Happy Birthday!     14     15       16     17     18     19      20     21     22       23     24     25     26     27     28     29       30                                               Recent Results (from the past 24 hour(s))   Comprehensive metabolic panel    Collection Time: 03/20/17  9:42 AM   Result Value Ref Range    Sodium 145 (H) 133 - 144 mmol/L    Potassium 3.8 3.4 - 5.3 mmol/L    Chloride 111 (H) 94 - 109 mmol/L    Carbon Dioxide 24 20 - 32 mmol/L    Anion Gap 10 3 - 14 mmol/L    Glucose 102 (H) 70 - 99 mg/dL    Urea Nitrogen 18 7 - 30 mg/dL    Creatinine 0.57 0.52 - 1.04 mg/dL    GFR Estimate >90  Non  GFR Calc   >60 mL/min/1.7m2    GFR Estimate If Black >90   GFR Calc   >60 mL/min/1.7m2    Calcium 8.7 8.5 - 10.1 mg/dL    Bilirubin Total 0.3 0.2 - 1.3 mg/dL    Albumin 3.2 (L) 3.4 - 5.0 g/dL    Protein Total 6.2 (L) 6.8 - 8.8 g/dL    Alkaline Phosphatase 79 40 - 150 U/L    ALT 20 0 - 50 U/L    AST 12 0 - 45 U/L   CBC with platelets differential    Collection Time: 03/20/17  9:42 AM   Result Value Ref Range    WBC 7.0 4.0 - 11.0 10e9/L    RBC Count 3.36 (L) 3.8 - 5.2 10e12/L    Hemoglobin 10.1 (L) 11.7 - 15.7 g/dL    Hematocrit 31.6 (L) 35.0 - 47.0 %    MCV 94 78 - 100 fl    MCH 30.1 26.5 - 33.0 pg    MCHC 32.0 31.5 - 36.5 g/dL    RDW 20.8 (H) 10.0 - 15.0 %    Platelet Count 495 (H) 150 - 450 10e9/L    Diff Method Automated Method     % Neutrophils 63.0 %    % Lymphocytes 20.2 %    % Monocytes 13.9 %    % Eosinophils 1.3 %    % Basophils 0.9 %    % Immature Granulocytes 0.7 %    Nucleated RBCs 0 0 /100    Absolute Neutrophil 4.4 1.6 - 8.3 10e9/L    Absolute Lymphocytes 1.4 0.8 - 5.3 10e9/L    Absolute Monocytes 1.0 0.0 - 1.3 10e9/L    Absolute Eosinophils 0.1 0.0 - 0.7 10e9/L    Absolute Basophils 0.1 0.0 - 0.2 10e9/L    Abs Immature Granulocytes 0.1 0 - 0.4 10e9/L    Absolute Nucleated RBC 0.0

## 2017-03-20 NOTE — PROGRESS NOTES
MEDICAL ONCOLOGY PROGRESS NOTE   Mar 20, 2017    Oncologic history:  1. January 2016, she presents to her PCP with 3 weeks of abdominal pain and severe constipation. She is treated for constipation and improves.  2. 3/21/2016, worsening pelvic pressure prompts pelvic examination with an enlarged uterus noted. Transvaginal ultrasound shows enlargement of subserosal fibroids originally seen on MRI in 2006.  3. 5/9/2016, MRI pelvis is obtained, which shows several enhancing uterine masses involving an enlarged uterus, felt radiographically compatible with benign uterine leiomyomata.  3. Summer 2016, she experiences worsening low back pain thought at first thought to be sacroiliitis, but pain persists after corticosteroid injection. Continues with severe constipation.  4. 9/8/2016, she undergoes colonoscopy for persistent abdominal pain. She has sigmoid polyp snared. Abdominal pain and diarrhea persist after colonoscopy prep.  5. 9/14/2016, she has worsening abdominal pain and is evaluated in the ED. CT-scan shows a large subserosal fibroid measuring 9.4 x 9.6 x 9.8 cm, as well as very large fibroids along the posterior uterine body more inferiorly, increased in size to 14 cm in greatest diameter compared to 10 cm in greatest diameter on MRI of 5/9/2016.  6. 9/29/2016, she has staging CT-CAP, which shows a small left sided pleural effusion and a 3 mm pulmonary nodule in the RML.  7. October 2016, self-refers to [facility].  8. 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  9. 11/4/2016, undergoes total abdominal hysterectomy and BSO, under Dr. Harsh Camarillo. Surgery was originally presumed for leiomyoma, however, intraoperatively there was focal adherence of a mass to the pelvic right sidewall and cul-de-sac, which led to peritoneal resection and omentectomy. Final pathology, pT2b: showed leiomyosarcoma involving the cul-de-sac and forming two separate masses measuring  10.5 x 6.5 x 4.5 cm and 10.7 x 7.9 x 7.5 cm.    HISTORY OF PRESENT ILLNESS  Maggie Navarrete is a 66 year old female with a diagnosis leiomyosarcoma.of the uterus. She is currently receiving adjuvant Doxorubicin and Dacarbazine and has received 4 cycles of chemotherapy. She is here for consideration of cycle 5. She tolerated her last cycle well without significant side effects. She has had no significant issues with GERD since starting the ranitidine. No other issues with nausea or vomiting. She has had some nasal dryness and occasional nose bleeds. Platelets today are normal at 495 and white count is normal at 7.0. She denies fevers or chills.      Lab Results   Component Value Date    WBC 7.0 03/20/2017    HGB 10.1 (L) 03/20/2017    HCT 31.6 (L) 03/20/2017     (H) 03/20/2017     (H) 02/24/2017    POTASSIUM 4.4 02/24/2017    CHLORIDE 112 (H) 02/24/2017    CO2 26 02/24/2017    BUN 21 02/24/2017    CR 0.73 02/24/2017    GLC 83 02/24/2017    AST 8 02/24/2017    ALT 22 02/24/2017    ALKPHOS 82 02/24/2017    BILITOTAL 0.2 02/24/2017    INR 0.98 12/23/2016       REVIEW OF SYSTEMS  A 12-point ROS negative except as in HPI    Current Outpatient Prescriptions   Medication Sig Dispense Refill     Apixaban (ELIQUIS PO) Take 2.5 mg by mouth 2 times daily       RaNITidine HCl (ZANTAC PO) Take 150 mg by mouth daily as needed        ondansetron (ZOFRAN) 4 MG tablet Take 1 tablet (4 mg) by mouth every 8 hours as needed (Nausea/Vomiting) 30 tablet 3     venlafaxine (EFFEXOR-XR) 75 MG 24 hr capsule Take 1 capsule (75 mg) by mouth daily 90 capsule 1     levothyroxine (SYNTHROID,LEVOTHROID) 100 MCG tablet Take 100 mcg by mouth daily        zolpidem (AMBIEN) 5 MG tablet Take 1 tablet (5 mg) by mouth nightly as needed for sleep 25 tablet 0     alendronate (FOSAMAX) 35 MG tablet Take 1 tablet (35 mg) by mouth every 7 days 12 tablet 3     pantoprazole (PROTONIX) 40 MG EC tablet Take 1 tablet (40 mg) by mouth daily (Patient not  taking: Reported on 3/20/2017) 30 tablet 0     HYDROcodone-acetaminophen (NORCO) 5-325 MG per tablet 1-2 tablets every 6 hours as needed for pain (Patient not taking: Reported on 2/27/2017) 30 tablet 0     LORazepam (ATIVAN) 0.5 MG tablet Take 1 tablet (0.5 mg) by mouth every 4 hours as needed (Anxiety, Nausea/Vomiting or Sleep) (Patient not taking: Reported on 2/27/2017) 30 tablet 2     prochlorperazine (COMPAZINE) 10 MG tablet Take 1 tablet (10 mg) by mouth every 6 hours as needed (Nausea/Vomiting) (Patient not taking: Reported on 2/27/2017) 30 tablet 2     MELATONIN PO Take 2.5 mg by mouth nightly as needed Reported on 3/20/2017       tretinoin (RETIN-A) 0.025 % cream Apply a pea-sized amount to cleansed and dried face at night (Patient not taking: Reported on 2/27/2017) 45 g 3     Calcium Citrate-Vitamin D (CALCIUM CITRATE + PO) Take 200 mg by mouth daily as needed (She supplements to meet her 1,200mg goal depending on how much dietary calcium she has gotten that day (up to 400mg once daily)) Reported on 3/20/2017       Multiple Vitamin (MULTIVITAMINS PO) Take 1 tablet by mouth daily Reported on 3/20/2017         Past Medical History   Diagnosis Date     Anxiety      Arthritis      pain in feet and knees     Disorder of bone and cartilage, unspecified      Esophageal reflux 2015     GERD (gastroesophageal reflux disease)      Hx of previous reproductive problem 1980     Kidney stone 2011     Personal history of colonic polyps 2016     Small, benign     PONV (postoperative nausea and vomiting)      Posterior vitreous detachment of left eye 2011     Posterior vitreous detachment of right eye 2011     Ptosis of eyelid, bilateral      Stomach problems 2015     Abdominal pain, Diarreha     Thyroid disease      thyroid nodule resolved 2014         PHYSICAL EXAMINATION  There were no vitals taken for this visit.  Wt Readings from Last 2 Encounters:   02/27/17 57.4 kg (126 lb 8 oz)   02/06/17 55.5 kg (122 lb 6.4 oz)      Physical Exam   Constitutional: She is oriented to person, place, and time. She appears well-developed and well-nourished. No distress.   HENT:   Head: Normocephalic and atraumatic.   Mouth/Throat: Oropharynx is clear and moist.   Eyes: Conjunctivae and EOM are normal. Pupils are equal, round, and reactive to light. No scleral icterus.   Cardiovascular: Normal rate, regular rhythm and normal heart sounds.    No murmur heard.  Pulmonary/Chest: Effort normal and breath sounds normal. She has no wheezes.   Abdominal: Soft. Bowel sounds are normal. She exhibits no distension. There is no tenderness.   Musculoskeletal: Normal range of motion. She exhibits no edema.   Lymphadenopathy:     She has no cervical adenopathy.   Neurological: She is alert and oriented to person, place, and time. No cranial nerve deficit. She exhibits normal muscle tone. Coordination normal.   Skin: Skin is warm and dry. No rash noted.   Psychiatric: She has a normal mood and affect. Her behavior is normal.   Nursing note and vitals reviewed.      IMPRESSION AND PLAN  #1 Locally advanced leiomyosarcoma of the uterus, pT2b Nx M0, Stage IIB, status-post resection, currently on adjuvant chemotherapy  Continue with cycle 5 of chemotherapy. She will receive 6 cycles of adjuvant therapy and then will enter observation. I will have her follow in concert with gynecologic oncology for routine pelvic examinations in addition to observation CT-imaging.    Ab Warner M.D.   of Medicine  Hematology, Oncology and Transplantation  [facility]

## 2017-03-20 NOTE — NURSING NOTE
Chief Complaint   Patient presents with     Port Draw     Patient is here today for labs to be drawn via her Port by RN. Vitals charted,labs collected and patient is checked into her appt.

## 2017-03-20 NOTE — MR AVS SNAPSHOT
After Visit Summary   3/20/2017    Maggie Navarrete    MRN: [id number]           Patient Information     Date Of Birth          1950        Visit Information        Provider Department      3/20/2017 11:00 AM UC 25 ATC;  ONCOLOGY INFUSION [facility]        Today's Diagnoses     Chemotherapy induced cardiomyopathy (H)    -  1    Uterus cancer, sarcoma (H)        Chemotherapy-induced neutropenia (H)          Care Instructions    Contact Numbers    [facility] Main Line: [phone]  [facility] Triage:  [phone]    Call triage with chills and/or temperature greater than or equal to 100.5, uncontrolled nausea/vomiting, diarrhea, constipation, dizziness, shortness of breath, chest pain, bleeding, unexplained bruising, or any new/concerning symptoms, questions/concerns.     If you are having any concerning symptoms or wish to speak to a provider before your next infusion visit, please call your care coordinator or triage to notify them so we can adequately serve you.       After Hours: [phone]    If after hours, weekends, or holidays, call main hospital  and ask for Oncology doctor on call.           March 2017 Sunday Monday Tuesday Wednesday Thursday Friday Saturday                  1     UMP ONC INFUSION 120    1:30 PM   (120 min.)    ONCOLOGY INFUSION   [facility] 2     3     4       5     6     7     8     9     10     11       12     13     14     15     16     17     18       19     20     [facility] DRAW    9:00 AM   (15 min.)    [facility] DRAW   [facility] Lab Draw     UMP RETURN    9:15 AM   (30 min.)   Ab Gutierrez MD   [facility]     UMP ONC INFUSION 120   11:00 AM   (120 min.)    ONCOLOGY INFUSION   [facility] 21     UMP ONC INFUSION 120    1:30 PM   (120 min.)    ONCOLOGY INFUSION   [facility] 22     UMP ONC INFUSION 120    1:30 PM   (120 min.)     ONCOLOGY INFUSION   [facility] 23     24     25       26     27     P NEW   11:45 AM   (60 min.)   Antoni Godfrey MD   [facility] 28 29 30 31 April 2017 Sunday Monday Tuesday Wednesday Thursday Friday Saturday                                 1       2     3     4     5     6     7     8       9     10     11     12     13  Happy Birthday!     14     15       16     17     18     19     20     21     22       23     24     25     26     27     28     29       30                                               Recent Results (from the past 24 hour(s))   Comprehensive metabolic panel    Collection Time: 03/20/17  9:42 AM   Result Value Ref Range    Sodium 145 (H) 133 - 144 mmol/L    Potassium 3.8 3.4 - 5.3 mmol/L    Chloride 111 (H) 94 - 109 mmol/L    Carbon Dioxide 24 20 - 32 mmol/L    Anion Gap 10 3 - 14 mmol/L    Glucose 102 (H) 70 - 99 mg/dL    Urea Nitrogen 18 7 - 30 mg/dL    Creatinine 0.57 0.52 - 1.04 mg/dL    GFR Estimate >90  Non  GFR Calc   >60 mL/min/1.7m2    GFR Estimate If Black >90   GFR Calc   >60 mL/min/1.7m2    Calcium 8.7 8.5 - 10.1 mg/dL    Bilirubin Total 0.3 0.2 - 1.3 mg/dL    Albumin 3.2 (L) 3.4 - 5.0 g/dL    Protein Total 6.2 (L) 6.8 - 8.8 g/dL    Alkaline Phosphatase 79 40 - 150 U/L    ALT 20 0 - 50 U/L    AST 12 0 - 45 U/L   CBC with platelets differential    Collection Time: 03/20/17  9:42 AM   Result Value Ref Range    WBC 7.0 4.0 - 11.0 10e9/L    RBC Count 3.36 (L) 3.8 - 5.2 10e12/L    Hemoglobin 10.1 (L) 11.7 - 15.7 g/dL    Hematocrit 31.6 (L) 35.0 - 47.0 %    MCV 94 78 - 100 fl    MCH 30.1 26.5 - 33.0 pg    MCHC 32.0 31.5 - 36.5 g/dL    RDW 20.8 (H) 10.0 - 15.0 %    Platelet Count 495 (H) 150 - 450 10e9/L    Diff Method Automated Method     % Neutrophils 63.0 %    % Lymphocytes 20.2 %    % Monocytes 13.9 %    % Eosinophils 1.3 %    % Basophils 0.9 %    % Immature  Granulocytes 0.7 %    Nucleated RBCs 0 0 /100    Absolute Neutrophil 4.4 1.6 - 8.3 10e9/L    Absolute Lymphocytes 1.4 0.8 - 5.3 10e9/L    Absolute Monocytes 1.0 0.0 - 1.3 10e9/L    Absolute Eosinophils 0.1 0.0 - 0.7 10e9/L    Absolute Basophils 0.1 0.0 - 0.2 10e9/L    Abs Immature Granulocytes 0.1 0 - 0.4 10e9/L    Absolute Nucleated RBC 0.0                Follow-ups after your visit        Your next 10 appointments already scheduled     Mar 21, 2017  1:30 PM CDT   Infusion 120 with UC ONCOLOGY INFUSION, UC 22 ATC   [facility] ([facility])    [address] [place] [id number] [phone]            Mar 22, 2017  1:30 PM CDT   Infusion 120 with UC ONCOLOGY INFUSION, UC 28 ATC   [facility] ([facility])    [address] [place] [address] [phone]            Mar 27, 2017 12:00 PM CDT   (Arrive by 11:45 AM)   New Patient Visit with Antoni Godfrey MD   [facility] ([facility])    [address] [place] [id number] [phone]              Who to contact     If you have questions or need follow up information about today's clinic visit or your schedule please contact [facility] directly at [phone].  Normal or non-critical lab and imaging results will be communicated to you by MyChart, letter or phone within 4 business days after the clinic has received the results. If you do not hear from us within 7 days, please contact the clinic through MyChart or phone. If you have a critical or abnormal lab result, we will notify you by phone as soon as possible.  Submit refill requests through [employer] or call your pharmacy and they will forward the refill request to us. Please allow 3 business days for your refill to be completed.          Additional Information About Your Visit         [employer] Information     [employer] gives you secure access to your electronic health record. If you see a primary care provider, you can also send messages to your care team and make appointments. If you have questions, please call your primary care clinic.  If you do not have a primary care provider, please call [phone] and they will assist you.        Care EveryWhere ID     This is your Care EveryWhere ID. This could be used by other organizations to access your [facility] medical records  [id number]        Your Vitals Were     Pulse Temperature Pulse Oximetry BMI (Body Mass Index)          107 98.3  F (36.8  C) (Oral) 100% 24.61 kg/m2         Blood Pressure from Last 3 Encounters:   03/20/17 120/68   03/01/17 146/67   02/28/17 128/66    Weight from Last 3 Encounters:   03/20/17 57.2 kg (126 lb)   02/27/17 57.4 kg (126 lb 8 oz)   02/06/17 55.5 kg (122 lb 6.4 oz)              We Performed the Following     CBC with platelets differential     Comprehensive metabolic panel     Treatment Conditions          Today's Medication Changes          These changes are accurate as of: 3/20/17 11:39 AM.  If you have any questions, ask your nurse or doctor.               Stop taking these medicines if you haven't already. Please contact your care team if you have questions.     enoxaparin 80 MG/0.8ML injection   Commonly known as:  LOVENOX   Stopped by:  Ab Gutierrez MD                    Primary Care Provider Office Phone # Fax #    Kristine Flynn MD PhD [phone]        [facility] [address] [facility] [address]  [facility] [address]        Thank you!     Thank you for choosing [facility]  for your care. Our goal is always to provide you with excellent care. Hearing back from our patients is one way we can continue to improve our services. Please take a few minutes to complete the written survey that you may receive in the mail after your visit with us. Thank you!              Your Updated Medication List - Protect others around you: Learn how to safely use, store and throw away your medicines at www.disposemymeds.org.          This list is accurate as of: 3/20/17 11:39 AM.  Always use your most recent med list.                   Brand Name Dispense Instructions for use    alendronate 35 MG tablet    FOSAMAX    12 tablet    Take 1 tablet (35 mg) by mouth every 7 days       CALCIUM CITRATE + PO      Take 200 mg by mouth daily as needed (She supplements to meet her 1,200mg goal depending on how much dietary calcium she has gotten that day (up to 400mg once daily)) Reported on 3/20/2017       ELIQUIS PO      Take 2.5 mg by mouth 2 times daily       HYDROcodone-acetaminophen 5-325 MG per tablet    NORCO    30 tablet    1-2 tablets every 6 hours as needed for pain       levothyroxine 100 MCG tablet    SYNTHROID/LEVOTHROID     Take 100 mcg by mouth daily       LORazepam 0.5 MG tablet    ATIVAN    30 tablet    Take 1 tablet (0.5 mg) by mouth every 4 hours as needed (Anxiety, Nausea/Vomiting or Sleep)       MELATONIN PO      Take 2.5 mg by mouth nightly as needed Reported on 3/20/2017       MULTIVITAMINS PO      Take 1 tablet by mouth daily Reported on 3/20/2017       ondansetron 4 MG tablet    ZOFRAN    30 tablet    Take 1 tablet (4 mg) by mouth every 8 hours as needed (Nausea/Vomiting)       pantoprazole 40 MG EC tablet    PROTONIX    30 tablet    Take 1 tablet (40 mg) by mouth daily       prochlorperazine 10 MG tablet    COMPAZINE    30 tablet    Take 1 tablet (10 mg) by mouth every 6 hours as needed (Nausea/Vomiting)       tretinoin 0.025 % cream    RETIN-A    45 g    Apply a pea-sized amount to cleansed and dried face at night       venlafaxine 75 MG 24 hr capsule    EFFEXOR-XR    90 capsule    Take 1 capsule (75 mg) by mouth daily       ZANTAC PO      Take 150 mg by mouth daily as needed       zolpidem 5 MG tablet    AMBIEN    25 tablet    Take 1 tablet (5 mg) by mouth nightly as needed  for sleep

## 2017-03-21 ENCOUNTER — INFUSION THERAPY VISIT (OUTPATIENT)
Dept: ONCOLOGY | Facility: CLINIC | Age: 67
End: 2017-03-21
Attending: INTERNAL MEDICINE
Payer: MEDICARE

## 2017-03-21 VITALS
RESPIRATION RATE: 16 BRPM | OXYGEN SATURATION: 100 % | HEART RATE: 97 BPM | DIASTOLIC BLOOD PRESSURE: 62 MMHG | SYSTOLIC BLOOD PRESSURE: 128 MMHG | TEMPERATURE: 97.9 F

## 2017-03-21 DIAGNOSIS — C54.9 UTERUS CANCER, SARCOMA (H): ICD-10-CM

## 2017-03-21 DIAGNOSIS — D70.1 CHEMOTHERAPY-INDUCED NEUTROPENIA (H): ICD-10-CM

## 2017-03-21 DIAGNOSIS — T45.1X5A CHEMOTHERAPY INDUCED CARDIOMYOPATHY (H): Primary | ICD-10-CM

## 2017-03-21 DIAGNOSIS — T45.1X5A CHEMOTHERAPY-INDUCED NEUTROPENIA (H): ICD-10-CM

## 2017-03-21 DIAGNOSIS — I42.7 CHEMOTHERAPY INDUCED CARDIOMYOPATHY (H): Primary | ICD-10-CM

## 2017-03-21 PROCEDURE — 96411 CHEMO IV PUSH ADDL DRUG: CPT

## 2017-03-21 PROCEDURE — 96375 TX/PRO/DX INJ NEW DRUG ADDON: CPT

## 2017-03-21 PROCEDURE — 25000128 H RX IP 250 OP 636: Mod: ZF | Performed by: INTERNAL MEDICINE

## 2017-03-21 PROCEDURE — 25000125 ZZHC RX 250: Mod: ZF | Performed by: INTERNAL MEDICINE

## 2017-03-21 PROCEDURE — 96415 CHEMO IV INFUSION ADDL HR: CPT

## 2017-03-21 PROCEDURE — 96413 CHEMO IV INFUSION 1 HR: CPT

## 2017-03-21 RX ORDER — DOXORUBICIN HYDROCHLORIDE 2 MG/ML
25 INJECTION, SOLUTION INTRAVENOUS ONCE
Status: COMPLETED | OUTPATIENT
Start: 2017-03-21 | End: 2017-03-21

## 2017-03-21 RX ORDER — HEPARIN SODIUM,PORCINE 10 UNIT/ML
5 VIAL (ML) INTRAVENOUS ONCE
Status: COMPLETED | OUTPATIENT
Start: 2017-03-21 | End: 2017-03-21

## 2017-03-21 RX ADMIN — DACARBAZINE 380 MG: 200 INJECTION, POWDER, FOR SOLUTION INTRAVENOUS at 14:56

## 2017-03-21 RX ADMIN — DEXAMETHASONE SODIUM PHOSPHATE: 10 INJECTION, SOLUTION INTRAMUSCULAR; INTRAVENOUS at 14:30

## 2017-03-21 RX ADMIN — DOXORUBICIN HYDROCHLORIDE 38 MG: 2 INJECTION, SOLUTION INTRAVENOUS at 14:46

## 2017-03-21 RX ADMIN — SODIUM CHLORIDE 250 ML: 9 INJECTION, SOLUTION INTRAVENOUS at 14:13

## 2017-03-21 RX ADMIN — SODIUM CHLORIDE, PRESERVATIVE FREE 5 ML: 5 INJECTION INTRAVENOUS at 17:01

## 2017-03-21 NOTE — PATIENT INSTRUCTIONS
Contact Numbers    [facility] [place]: [phone]  [facility] Triage:  [phone]    Call triage with chills and/or temperature greater than or equal to 100.5, uncontrolled nausea/vomiting, diarrhea, constipation, dizziness, shortness of breath, chest pain, bleeding, unexplained bruising, or any new/concerning symptoms, questions/concerns.     If you are having any concerning symptoms or wish to speak to a provider before your next infusion visit, please call your care coordinator or triage to notify them so we can adequately serve you.       After Hours: [phone]    If after hours, weekends, or holidays, call main hospital  and ask for Oncology doctor on call.           March 2017 Sunday Monday Tuesday Wednesday Thursday Friday Saturday                  1     UMP ONC INFUSION 120    1:30 PM   (120 min.)    ONCOLOGY INFUSION   [facility] 2     3     4       5     6     7     8     9     10     11       12     13     14     15     16     17     18       19     20     [facility] DRAW    9:00 AM   (15 min.)    [facility] DRAW   [facility] Lab Draw     UMP RETURN    9:15 AM   (30 min.)   Ab Gutierrez MD   [facility]     UMP ONC INFUSION 120   11:00 AM   (120 min.)   UC ONCOLOGY INFUSION   [facility] 21     UMP ONC INFUSION 120    1:30 PM   (120 min.)    ONCOLOGY INFUSION   [facility] 22     UMP ONC INFUSION 120    1:30 PM   (120 min.)    ONCOLOGY INFUSION   [facility] 23     24     25       26     27     UMP NEW   11:45 AM   (60 min.)   Antoni Godfrey MD   [facility] 28     29     30     31                     April 2017 Sunday Monday Tuesday Wednesday Thursday Friday Saturday                                 1       2     3     4     5     6     7     8       9     10     11     12     13  Happy Birthday!     14     15       16     17     18     19      20     21     22       23     24     25     26     27     28     29       30                                             No results found for this or any previous visit (from the past 24 hour(s)).

## 2017-03-21 NOTE — PROGRESS NOTES
Infusion Nursing Note:    Patient presents today for Cycle 5 Day 2 Adriamycin, Dacarbazine.      Lab Results   Component Value Date    HGB 10.1 03/20/2017     Lab Results   Component Value Date    WBC 7.0 03/20/2017      Lab Results   Component Value Date    ANEU 4.4 03/20/2017     Lab Results   Component Value Date     03/20/2017      Lab Results   Component Value Date     03/20/2017                   Lab Results   Component Value Date    POTASSIUM 3.8 03/20/2017           No results found for: MAG         Lab Results   Component Value Date    CR 0.57 03/20/2017                   Lab Results   Component Value Date    SUSAN 8.7 03/20/2017                Lab Results   Component Value Date    BILITOTAL 0.3 03/20/2017           Lab Results   Component Value Date    ALBUMIN 3.2 03/20/2017                    Lab Results   Component Value Date    ALT 20 03/20/2017           Lab Results   Component Value Date    AST 12 03/20/2017       Note: Pt reports no changes overnight, denies fever, nausea, or vomiting.    Echo 11/29/16 Echo EF 70%    Intravenous Access:  Implanted Port.    Post Infusion Assessment:  Patient tolerated infusion without incident.  Blood return noted pre and post infusion.  Blood return noted during administration every 2 cc during adriamycin.  No evidence of extravasations.  Port flushed and left intact for tomorrow.    Discharge Plan:   Patient declined prescription refills.  AVS to patient via [employer].  Patient will return tomorrow for next appointment.   Patient discharged in stable condition accompanied by: self.  Departure Mode: Ambulatory.

## 2017-03-21 NOTE — MR AVS SNAPSHOT
After Visit Summary   3/21/2017    Maggie Navarrete    MRN: [id number]           Patient Information     Date Of Birth          1950        Visit Information        Provider Department      3/21/2017 1:30 PM  22 ATC;  ONCOLOGY INFUSION [facility]        Today's Diagnoses     Chemotherapy induced cardiomyopathy (H)    -  1    Uterus cancer, sarcoma (H)        Chemotherapy-induced neutropenia (H)          Care Instructions    Contact Numbers    [facility] Main Line: [phone]  [facility] Triage:  [phone]    Call triage with chills and/or temperature greater than or equal to 100.5, uncontrolled nausea/vomiting, diarrhea, constipation, dizziness, shortness of breath, chest pain, bleeding, unexplained bruising, or any new/concerning symptoms, questions/concerns.     If you are having any concerning symptoms or wish to speak to a provider before your next infusion visit, please call your care coordinator or triage to notify them so we can adequately serve you.       After Hours: [phone]    If after hours, weekends, or holidays, call main hospital  and ask for Oncology doctor on call.           March 2017 Sunday Monday Tuesday Wednesday Thursday Friday Saturday                  1     UMP ONC INFUSION 120    1:30 PM   (120 min.)    ONCOLOGY INFUSION   [facility] 2     3     4       5     6     7     8     9     10     11       12     13     14     15     16     17     18       19     20     [facility] DRAW    9:00 AM   (15 min.)    [facility] DRAW   [facility] Lab Draw     UMP RETURN    9:15 AM   (30 min.)   Ab Gutierrez MD   [facility]     UMP ONC INFUSION 120   11:00 AM   (120 min.)    ONCOLOGY INFUSION   [facility] 21     UMP ONC INFUSION 120    1:30 PM   (120 min.)    ONCOLOGY INFUSION   [facility] 22     UMP ONC INFUSION 120    1:30 PM   (120 min.)     ONCOLOGY INFUSION   [facility] 23     24     25       26     27     UMP NEW   11:45 AM   (60 min.)   Antoni Godfrey MD   [facility] 28 29 30 31 April 2017 Sunday Monday Tuesday Wednesday Thursday Friday Saturday                                 1       2     3     4     5     6     7     8       9     10     11     12     13  Happy Birthday!     14     15       16     17     18     19     20     21     22       23     24     25     26     27     28     29       30                                             No results found for this or any previous visit (from the past 24 hour(s)).          Follow-ups after your visit        Your next 10 appointments already scheduled     Mar 22, 2017  1:30 PM CDT   Infusion 120 with  ONCOLOGY INFUSION, UC 28 ATC   [facility] ([facility])    [address] [place] [address] [phone]            Mar 27, 2017 12:00 PM CDT   (Arrive by 11:45 AM)   New Patient Visit with Antoni Godfrey MD   [facility] ([facility])    [address] [place] [address] [phone]              Who to contact     If you have questions or need follow up information about today's clinic visit or your schedule please contact [facility] directly at [phone].  Normal or non-critical lab and imaging results will be communicated to you by MyChart, letter or phone within 4 business days after the clinic has received the results. If you do not hear from us within 7 days, please contact the clinic through MyChart or phone. If you have a critical or abnormal lab result, we will notify you by phone as soon as possible.  Submit refill requests through [employer] or call your pharmacy and they will forward the refill request to us. Please allow 3 business  days for your refill to be completed.          Additional Information About Your Visit        MyChart Information     [employer] gives you secure access to your electronic health record. If you see a primary care provider, you can also send messages to your care team and make appointments. If you have questions, please call your primary care clinic.  If you do not have a primary care provider, please call [phone] and they will assist you.        Care EveryWhere ID     This is your Care EveryWhere ID. This could be used by other organizations to access your [facility] medical records  [id number]        Your Vitals Were     Pulse Temperature Respirations Pulse Oximetry          97 97.9  F (36.6  C) (Oral) 16 100%         Blood Pressure from Last 3 Encounters:   03/21/17 128/62   03/20/17 120/68   03/01/17 146/67    Weight from Last 3 Encounters:   03/20/17 57.2 kg (126 lb)   02/27/17 57.4 kg (126 lb 8 oz)   02/06/17 55.5 kg (122 lb 6.4 oz)              Today, you had the following     No orders found for display       Primary Care Provider Office Phone # Fax #    Kristine Flynn MD PhD [phone]        [facility]IC[facility] [address] [facility] [address]  [facility] [address]        Thank you!     Thank you for choosing [facility]  for your care. Our goal is always to provide you with excellent care. Hearing back from our patients is one way we can continue to improve our services. Please take a few minutes to complete the written survey that you may receive in the mail after your visit with us. Thank you!             Your Updated Medication List - Protect others around you: Learn how to safely use, store and throw away your medicines at www.disposemymeds.org.          This list is accurate as of: 3/21/17  5:42 PM.  Always use your most recent med list.                   Brand Name Dispense Instructions for use    alendronate 35 MG tablet    FOSAMAX    12 tablet    Take 1 tablet (35 mg) by  mouth every 7 days       CALCIUM CITRATE + PO      Take 200 mg by mouth daily as needed (She supplements to meet her 1,200mg goal depending on how much dietary calcium she has gotten that day (up to 400mg once daily)) Reported on 3/20/2017       ELIQUIS PO      Take 2.5 mg by mouth 2 times daily       HYDROcodone-acetaminophen 5-325 MG per tablet    NORCO    30 tablet    1-2 tablets every 6 hours as needed for pain       levothyroxine 100 MCG tablet    SYNTHROID/LEVOTHROID     Take 100 mcg by mouth daily       LORazepam 0.5 MG tablet    ATIVAN    30 tablet    Take 1 tablet (0.5 mg) by mouth every 4 hours as needed (Anxiety, Nausea/Vomiting or Sleep)       MELATONIN PO      Take 2.5 mg by mouth nightly as needed Reported on 3/20/2017       MULTIVITAMINS PO      Take 1 tablet by mouth daily Reported on 3/20/2017       ondansetron 4 MG tablet    ZOFRAN    30 tablet    Take 1 tablet (4 mg) by mouth every 8 hours as needed (Nausea/Vomiting)       pantoprazole 40 MG EC tablet    PROTONIX    30 tablet    Take 1 tablet (40 mg) by mouth daily       prochlorperazine 10 MG tablet    COMPAZINE    30 tablet    Take 1 tablet (10 mg) by mouth every 6 hours as needed (Nausea/Vomiting)       venlafaxine 75 MG 24 hr capsule    EFFEXOR-XR    90 capsule    Take 1 capsule (75 mg) by mouth daily       ZANTAC PO      Take 150 mg by mouth daily as needed       zolpidem 5 MG tablet    AMBIEN    25 tablet    Take 1 tablet (5 mg) by mouth nightly as needed for sleep

## 2017-03-22 ENCOUNTER — INFUSION THERAPY VISIT (OUTPATIENT)
Dept: ONCOLOGY | Facility: CLINIC | Age: 67
End: 2017-03-22
Attending: INTERNAL MEDICINE
Payer: MEDICARE

## 2017-03-22 VITALS
OXYGEN SATURATION: 99 % | HEART RATE: 92 BPM | BODY MASS INDEX: 25 KG/M2 | WEIGHT: 128 LBS | DIASTOLIC BLOOD PRESSURE: 65 MMHG | TEMPERATURE: 97.8 F | SYSTOLIC BLOOD PRESSURE: 149 MMHG

## 2017-03-22 DIAGNOSIS — I42.7 CHEMOTHERAPY INDUCED CARDIOMYOPATHY (H): Primary | ICD-10-CM

## 2017-03-22 DIAGNOSIS — T45.1X5A CHEMOTHERAPY-INDUCED NEUTROPENIA (H): ICD-10-CM

## 2017-03-22 DIAGNOSIS — D70.1 CHEMOTHERAPY-INDUCED NEUTROPENIA (H): ICD-10-CM

## 2017-03-22 DIAGNOSIS — T45.1X5A CHEMOTHERAPY INDUCED CARDIOMYOPATHY (H): Primary | ICD-10-CM

## 2017-03-22 DIAGNOSIS — C54.9 UTERUS CANCER, SARCOMA (H): ICD-10-CM

## 2017-03-22 PROCEDURE — 25000125 ZZHC RX 250: Mod: ZF | Performed by: INTERNAL MEDICINE

## 2017-03-22 PROCEDURE — 96411 CHEMO IV PUSH ADDL DRUG: CPT

## 2017-03-22 PROCEDURE — 96375 TX/PRO/DX INJ NEW DRUG ADDON: CPT

## 2017-03-22 PROCEDURE — 25000128 H RX IP 250 OP 636: Mod: ZF | Performed by: INTERNAL MEDICINE

## 2017-03-22 PROCEDURE — 96377 APPLICATON ON-BODY INJECTOR: CPT | Mod: 59

## 2017-03-22 PROCEDURE — 96415 CHEMO IV INFUSION ADDL HR: CPT

## 2017-03-22 PROCEDURE — 96413 CHEMO IV INFUSION 1 HR: CPT

## 2017-03-22 RX ORDER — HEPARIN SODIUM (PORCINE) LOCK FLUSH IV SOLN 100 UNIT/ML 100 UNIT/ML
500 SOLUTION INTRAVENOUS
Status: COMPLETED | OUTPATIENT
Start: 2017-03-22 | End: 2017-03-22

## 2017-03-22 RX ORDER — HEPARIN SODIUM,PORCINE 10 UNIT/ML
5 VIAL (ML) INTRAVENOUS ONCE
Status: DISCONTINUED | OUTPATIENT
Start: 2017-03-22 | End: 2017-03-22 | Stop reason: HOSPADM

## 2017-03-22 RX ORDER — DOXORUBICIN HYDROCHLORIDE 2 MG/ML
25 INJECTION, SOLUTION INTRAVENOUS ONCE
Status: COMPLETED | OUTPATIENT
Start: 2017-03-22 | End: 2017-03-22

## 2017-03-22 RX ADMIN — DACARBAZINE 380 MG: 200 INJECTION, POWDER, FOR SOLUTION INTRAVENOUS at 14:31

## 2017-03-22 RX ADMIN — SODIUM CHLORIDE 250 ML: 9 INJECTION, SOLUTION INTRAVENOUS at 14:12

## 2017-03-22 RX ADMIN — PEGFILGRASTIM 6 MG: KIT SUBCUTANEOUS at 16:20

## 2017-03-22 RX ADMIN — SODIUM CHLORIDE, PRESERVATIVE FREE 500 UNITS: 5 INJECTION INTRAVENOUS at 16:30

## 2017-03-22 RX ADMIN — DEXAMETHASONE SODIUM PHOSPHATE: 10 INJECTION, SOLUTION INTRAMUSCULAR; INTRAVENOUS at 14:12

## 2017-03-22 RX ADMIN — DOXORUBICIN HYDROCHLORIDE 38 MG: 2 INJECTION, SOLUTION INTRAVENOUS at 14:30

## 2017-03-22 NOTE — MR AVS SNAPSHOT
After Visit Summary   3/22/2017    Maggie Navarrete    MRN: [id number]           Patient Information     Date Of Birth          1950        Visit Information        Provider Department      3/22/2017 1:30 PM [facility] ATC; [facility]        Today's Diagnoses     Chemotherapy induced cardiomyopathy (H)    -  1    Chemotherapy-induced neutropenia (H)        Uterus cancer, sarcoma (H)           Follow-ups after your visit        Your next 10 appointments already scheduled     Mar 27, 2017 12:00 PM CDT   (Arrive by 11:45 AM)   New Patient Visit with Antoni Godfrey MD   [facility] ([facility])    [address]  2nd Floor  [facility] [address] [phone]              Who to contact     If you have questions or need follow up information about today's clinic visit or your schedule please contact [facility] directly at [phone].  Normal or non-critical lab and imaging results will be communicated to you by MyChart, letter or phone within 4 business days after the clinic has received the results. If you do not hear from us within 7 days, please contact the clinic through [employer] or phone. If you have a critical or abnormal lab result, we will notify you by phone as soon as possible.  Submit refill requests through [employer] or call your pharmacy and they will forward the refill request to us. Please allow 3 business days for your refill to be completed.          Additional Information About Your Visit        [employer] Information     [employer] gives you secure access to your electronic health record. If you see a primary care provider, you can also send messages to your care team and make appointments. If you have questions, please call your primary care clinic.  If you do not have a primary care provider, please call [phone] and they will assist you.        Care EveryWhere ID      This is your Care EveryWhere ID. This could be used by other organizations to access your [facility] medical records  [id number]        Your Vitals Were     Pulse Temperature Pulse Oximetry BMI (Body Mass Index)          92 97.8  F (36.6  C) (Tympanic) 99% 25 kg/m2         Blood Pressure from Last 3 Encounters:   03/22/17 149/65   03/21/17 128/62   03/20/17 120/68    Weight from Last 3 Encounters:   03/22/17 58.1 kg (128 lb)   03/20/17 57.2 kg (126 lb)   02/27/17 57.4 kg (126 lb 8 oz)              Today, you had the following     No orders found for display       Primary Care Provider Office Phone # Fax #    Kristine Flynn MD PhD [phone]        [facility] [address]  [facility] [address]        Thank you!     Thank you for choosing [facility]  for your care. Our goal is always to provide you with excellent care. Hearing back from our patients is one way we can continue to improve our services. Please take a few minutes to complete the written survey that you may receive in the mail after your visit with us. Thank you!             Your Updated Medication List - Protect others around you: Learn how to safely use, store and throw away your medicines at www.disposemymeds.org.          This list is accurate as of: 3/22/17  4:44 PM.  Always use your most recent med list.                   Brand Name Dispense Instructions for use    alendronate 35 MG tablet    FOSAMAX    12 tablet    Take 1 tablet (35 mg) by mouth every 7 days       CALCIUM CITRATE + PO      Take 200 mg by mouth daily as needed (She supplements to meet her 1,200mg goal depending on how much dietary calcium she has gotten that day (up to 400mg once daily)) Reported on 3/20/2017       ELIQUIS PO      Take 2.5 mg by mouth 2 times daily       HYDROcodone-acetaminophen 5-325 MG per tablet    NORCO    30 tablet    1-2 tablets every 6 hours as needed for pain       levothyroxine 100 MCG tablet     SYNTHROID/LEVOTHROID     Take 100 mcg by mouth daily       LORazepam 0.5 MG tablet    ATIVAN    30 tablet    Take 1 tablet (0.5 mg) by mouth every 4 hours as needed (Anxiety, Nausea/Vomiting or Sleep)       MELATONIN PO      Take 2.5 mg by mouth nightly as needed Reported on 3/20/2017       MULTIVITAMINS PO      Take 1 tablet by mouth daily Reported on 3/20/2017       ondansetron 4 MG tablet    ZOFRAN    30 tablet    Take 1 tablet (4 mg) by mouth every 8 hours as needed (Nausea/Vomiting)       pantoprazole 40 MG EC tablet    PROTONIX    30 tablet    Take 1 tablet (40 mg) by mouth daily       prochlorperazine 10 MG tablet    COMPAZINE    30 tablet    Take 1 tablet (10 mg) by mouth every 6 hours as needed (Nausea/Vomiting)       venlafaxine 75 MG 24 hr capsule    EFFEXOR-XR    90 capsule    Take 1 capsule (75 mg) by mouth daily       ZANTAC PO      Take 150 mg by mouth daily as needed       zolpidem 5 MG tablet    AMBIEN    25 tablet    Take 1 tablet (5 mg) by mouth nightly as needed for sleep

## 2017-03-24 ASSESSMENT — ENCOUNTER SYMPTOMS
POOR WOUND HEALING: 0
LEG SWELLING: 0
PALPITATIONS: 1
VOMITING: 0
TINGLING: 0
SORE THROAT: 0
SPUTUM PRODUCTION: 0
NAIL CHANGES: 1
CLAUDICATION: 0
SHORTNESS OF BREATH: 0
COUGH: 1
EYE IRRITATION: 1
NUMBNESS: 0
RESPIRATORY PAIN: 0
POLYDIPSIA: 0
COUGH DISTURBING SLEEP: 0
EYE PAIN: 0
DEPRESSION: 0
LIGHT-HEADEDNESS: 1
POLYPHAGIA: 0
PARALYSIS: 0
INSOMNIA: 0
SMELL DISTURBANCE: 0
FATIGUE: 1
POSTURAL DYSPNEA: 0
DIARRHEA: 0
TROUBLE SWALLOWING: 0
EYE REDNESS: 1
JAUNDICE: 0
TASTE DISTURBANCE: 1
BOWEL INCONTINENCE: 0
SINUS PAIN: 0
SINUS CONGESTION: 0
SPEECH CHANGE: 0
HEARTBURN: 1
SYNCOPE: 0
SNORES LOUDLY: 0
HOARSE VOICE: 0
INCREASED ENERGY: 1
WEIGHT GAIN: 0
DOUBLE VISION: 0
NERVOUS/ANXIOUS: 0
SEIZURES: 0
DECREASED APPETITE: 0
FEVER: 0
PANIC: 0
MEMORY LOSS: 1
NIGHT SWEATS: 0
TACHYCARDIA: 1
HYPOTENSION: 0
EYE WATERING: 1
SKIN CHANGES: 0
RECTAL BLEEDING: 0
DIZZINESS: 1
HALLUCINATIONS: 0
HEADACHES: 0
DECREASED CONCENTRATION: 0
LEG PAIN: 0
NECK MASS: 0
NAUSEA: 1
DYSPNEA ON EXERTION: 0
HYPERTENSION: 0
ORTHOPNEA: 0
TREMORS: 0
CHILLS: 0
CONSTIPATION: 1
ALTERED TEMPERATURE REGULATION: 0
RECTAL PAIN: 0
ABDOMINAL PAIN: 0
EXERCISE INTOLERANCE: 0
DISTURBANCES IN COORDINATION: 0
BLOATING: 0
WHEEZING: 0
HEMOPTYSIS: 0
WEIGHT LOSS: 0
LOSS OF CONSCIOUSNESS: 0
BLOOD IN STOOL: 0
SLEEP DISTURBANCES DUE TO BREATHING: 0
WEAKNESS: 0

## 2017-03-27 ENCOUNTER — ONCOLOGY VISIT (OUTPATIENT)
Dept: ONCOLOGY | Facility: CLINIC | Age: 67
End: 2017-03-27
Attending: OBSTETRICS & GYNECOLOGY
Payer: COMMERCIAL

## 2017-03-27 VITALS
OXYGEN SATURATION: 100 % | BODY MASS INDEX: 24.84 KG/M2 | RESPIRATION RATE: 20 BRPM | DIASTOLIC BLOOD PRESSURE: 65 MMHG | HEART RATE: 108 BPM | HEIGHT: 60 IN | WEIGHT: 126.5 LBS | SYSTOLIC BLOOD PRESSURE: 121 MMHG | TEMPERATURE: 97.9 F

## 2017-03-27 DIAGNOSIS — C54.9 MALIGNANT NEOPLASM OF CORPUS UTERI, EXCEPT ISTHMUS (H): ICD-10-CM

## 2017-03-27 PROCEDURE — 99212 OFFICE O/P EST SF 10 MIN: CPT | Mod: ZF

## 2017-03-27 PROCEDURE — 99205 OFFICE O/P NEW HI 60 MIN: CPT | Mod: ZP | Performed by: OBSTETRICS & GYNECOLOGY

## 2017-03-27 RX ORDER — TYPHOID VACC,LIVE,ATTENUATED 2B UNIT
CAPSULE,DELAYED RELEASE (ENTERIC COATED) ORAL
Refills: 0 | COMMUNITY
Start: 2016-03-30 | End: 2017-05-01

## 2017-03-27 RX ORDER — ATOVAQUONE AND PROGUANIL HYDROCHLORIDE 250; 100 MG/1; MG/1
TABLET, FILM COATED ORAL
Refills: 0 | COMMUNITY
Start: 2016-03-28 | End: 2017-04-10

## 2017-03-27 RX ORDER — DICYCLOMINE HYDROCHLORIDE 10 MG/1
CAPSULE ORAL
COMMUNITY
Start: 2016-09-21 | End: 2017-04-10

## 2017-03-27 RX ORDER — TRETINOIN 0.25 MG/G
CREAM TOPICAL
Refills: 3 | COMMUNITY
Start: 2016-10-10 | End: 2017-05-01

## 2017-03-27 RX ORDER — HYDROMORPHONE HYDROCHLORIDE 2 MG/1
TABLET ORAL
Refills: 0 | COMMUNITY
Start: 2016-11-02 | End: 2017-05-01

## 2017-03-27 ASSESSMENT — PAIN SCALES - GENERAL: PAINLEVEL: NO PAIN (0)

## 2017-03-27 NOTE — NURSING NOTE
Maggie Navarrete is a 66 year old female who presents for:  Chief Complaint   Patient presents with     Oncology Clinic Visit     new patient for consultation related to leimyosarcoma         Initial Vitals:  /65  Pulse 108  Temp 97.9  F (36.6  C) (Oral)  Resp 20  Ht 1.524 m (5')  Wt 57.4 kg (126 lb 8 oz)  SpO2 100%  BMI 24.71 kg/m2 Estimated body mass index is 24.71 kg/(m^2) as calculated from the following:    Height as of this encounter: 1.524 m (5').    Weight as of this encounter: 57.4 kg (126 lb 8 oz).. Body surface area is 1.56 meters squared. BP completed using cuff size: regular   No Pain (0) No LMP recorded. Patient is postmenopausal. Allergies and medications reviewed.     Medications: Medication refills not needed today.  Pharmacy name entered into [employer]:    [facility] [address] AT [facility] [address] [place] [address] PHARMACY MAIL DELIVERY - [facility] [address]  [facility] - [place], [place] - [address]    Comments: patient denied pain/discomfort.     5 minutes for nursing intake (face to face time)   Laya Mabry CMA

## 2017-03-27 NOTE — LETTER
3/27/2017       RE: Maggie Navarrete  [address]  [facility] [address]     Dear Colleague,    Thank you for referring your patient, Maggie Navarrete, to the [facility]. Please see a copy of my visit note below.                            Consult Notes on Referred Patient    Date: 3/27/2017       Dr. Ab Aguilar MD  [address] [address]       RE: Maggie Navarrete  : 1950  ALE: 3/27/2017    Dear Dr. Ab Aguilar:    I had the pleasure of seeing your patient Maggie Navarrete here at [facility] 3/27/2017.  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 She was followed for a number of months (please see Oncologic History below) before having surgery at [place] in 2016.     Dr. Aguilar in medical oncology has been coordinating her chemotherapy with doxorubicin and dacarbacin. She tolerates this well--some shortness of breath and nausea after chemotherapy, which resolve gradually with time. She has taken Zofran for her nausea. She also has some GERD which she manages with Zantac.     Currently she denies nausea, vomiting, pain. Minimally short of breath as above. Has not noticed that her abdomen is swelling. After some initial nausea, she has a very good appetite.     She is looking forward to finishing her chemotherapy on 2017 and then soon afterwards will celebrate her birthday and a week later go to [place] with her sister.     Today she does have questions about her initial care, specifically whether her treatment was comprised because she did not have surgery initially after noting enlarging fibroids on imaging.     Oncologic history:  2016, she presents to her PCP with 3 weeks of abdominal pain and severe constipation. She is treated for constipation and improves.    3/21/2016, worsening pelvic pressure prompts pelvic examination  with an enlarged uterus noted. Transvaginal ultrasound shows enlargement of subserosal fibroids originally seen on MRI in 2006.    5/9/2016, MRI pelvis is obtained, which shows several enhancing uterine masses involving an enlarged uterus, felt radiographically compatible with benign uterine leiomyomata.    Summer 2016, she experiences worsening low back pain thought at first thought to be sacroiliitis, but pain persists after corticosteroid injection. Continues with severe constipation.    9/8/2016, she undergoes colonoscopy for persistent abdominal pain. She has sigmoid polyp snared. Abdominal pain and diarrhea persist after colonoscopy prep.    9/14/2016, she has worsening abdominal pain and is evaluated in the ED. CT-scan shows a large subserosal fibroid measuring 9.4 x 9.6 x 9.8 cm, as well as very large fibroids along the posterior uterine body more inferiorly, increased in size to 14 cm in greatest diameter compared to 10 cm in greatest diameter on MRI of 5/9/2016.    12/26/2016-present: s/p 5 cycles 25 mg/m2 doxorubicin, 250 mg/m2 dacarbazine.     9/29/2016, she has staging CT-CAP, which shows a small left sided pleural effusion and a 3 mm pulmonary nodule in the RML.    October 2016, self-refers to [facility].    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    11/4/2016, undergoes total abdominal hysterectomy and BSO, under Dr. Harsh Camarillo. Surgery was originally presumed for leiomyoma, however, intraoperatively there was focal adherence of a mass to the pelvic right sidewall and cul-de-sac, which led to peritoneal resection and omentectomy. Final pathology, pT2b: showed leiomyosarcoma involving the cul-de-sac and forming two separate masses measuring 10.5 x 6.5 x 4.5 cm and 10.7 x 7.9 x 7.5 cm.    Review of Systems:  Systemic-         no weight changes; no fever; no chills; no night sweats; no appetite changes  Skin-         no rashes, or  lesions  Pulmonary-  no cough; no shortness of breath after recovering from chemotherapy  Cardiovascular- no chest pain; no palpitations  Gastrointestinal- no diarrhea; no constipation; no abdominal pain; no changes in bowel  habits; no blood in stool  Genitourinary- no urinary frequency; no urinary urgency; no dysuria; no pain; no abnormal vaginal discharge; no abnormal vaginal bleeding  Musculoskeletal- no myalgias; no arthralgias; no back pain  Psychiatric-        no depressed mood; no anxiety    Hematologic- no tender lymph nodes; no noticeable swellings or lumps   Endocrine- no hot flashes; no heat/cold intolerance         Neurological- no tremor; no numbness and tingling; no headaches; no difficulty  sleeping    Past Medical History:   Past Medical History:   Diagnosis Date     Anxiety      Arthritis     pain in feet and knees     Disorder of bone and cartilage, unspecified      Esophageal reflux 2015     GERD (gastroesophageal reflux disease)      Hx of previous reproductive problem 1980     Kidney stone 2011     Personal history of colonic polyps 2016    Small, benign     PONV (postoperative nausea and vomiting)      Posterior vitreous detachment of left eye 2011     Posterior vitreous detachment of right eye 2011     Ptosis of eyelid, bilateral      Stomach problems 2015    Abdominal pain, Diarreha     Thyroid disease     thyroid nodule resolved 2014       Past Surgical History:   Past Surgical History:   Procedure Laterality Date     ABDOMEN SURGERY  Now    Pain, diarrhea     BREAST SURGERY  2002    right breast benign     COLONOSCOPY  2008    due in 2018     COLONOSCOPY N/A 9/8/2016    Procedure: COMBINED COLONOSCOPY, SINGLE OR MULTIPLE BIOPSY/POLYPECTOMY BY BIOPSY;  Surgeon: Abner Pepper MD;  Location:  GI     ENT SURGERY  1975    tonsillectomy     GENITOURINARY SURGERY  1953    bladder surgery      GI SURGERY  2015    Severe constipation     GYN SURGERY  1977    C section     GYN SURGERY   1981    laparoscopy     GYN SURGERY  2007    myomectomy     REPAIR PTOSIS BILATERAL  2/15/2012    Procedure:REPAIR PTOSIS BILATERAL; BILATERAL UPPER LID PTOSIS REPAIR; Surgeon:BRYCE REYNOLDS; Location:[facility]       Health Maintenance:  Health Maintenance Due   Topic Date Due     DEPRESSION ACTION PLAN Q1 YR (NO INBASKET)  1950     ADVANCE DIRECTIVE PLANNING Q5 YRS (NO INBASKET)  04/13/1968     FALL RISK ASSESSMENT  04/13/2015     PHQ-9 Q3 MONTHS (NO INBASKET)  11/01/2016     MAMMO Q1 YR INBASKET MESSAGE  12/14/2016     Last Pap Smear: Last prior to hysterectomy 3/2015, NILM. All others NILM.   She has not had a history of abnormal Pap smears.  Last Mammogram: 9/13/2012              Result: Bi Rads 1      She has not had a history of abnormal mammograms.  Last DEXA Scan: 5/6/2015, osteopenia                  Current Medications:   has a current medication list which includes the following prescription(s): atovaquone-proguanil, dicyclomine, enoxaparin, enoxaparin, tretinoin, vivotif, sterile water (bottle), apixaban, pantoprazole, ranitidine hcl, lorazepam, prochlorperazine, ondansetron, melatonin, venlafaxine, levothyroxine, zolpidem, alendronate, calcium citrate-vitamin d, multiple vitamin, hydromorphone, and hydrocodone-acetaminophen.     Allergies:    Allergies   Allergen Reactions     Erythromycin GI Disturbance     Tramadol Nausea        Social History:  Social History   Substance Use Topics     Smoking status: Never Smoker     Smokeless tobacco: Never Used     Alcohol use Yes      Comment: Rarely     History   Drug Use No       Family History:   The patient's family history is notable for .  Family History   Problem Relation Age of Onset     Family History Negative Mother      glaucoma     Glaucoma Mother      HEART DISEASE Father      Glaucoma Father      HEART DISEASE Maternal Grandmother      HEART DISEASE Paternal Grandmother      CANCER Paternal Grandfather      stomach     Thyroid Disease Sister       Retinal detachment Sister      Hypertension Sister      Lipids Sister      HEART DISEASE Paternal Aunt      HEART DISEASE Paternal Uncle      Hypertension Sister      CEREBROVASCULAR DISEASE Sister      Anxiety Disorder Mother      Anxiety Disorder Sister      Depression Sister      Thyroid Disease Sister      Heart Failure Mother      DIABETES Mother      DIABETES Father      DIABETES Maternal Grandmother      Coronary Artery Disease Mother      Coronary Artery Disease Father      Hypertension Mother      Hypertension Sister      CEREBROVASCULAR DISEASE No family hx of      Breast Cancer Mother      Depression Mother      Depression Father      Depression Sister      Anxiety Disorder No family hx of      Anxiety Disorder Sister      OSTEOPOROSIS Father      OSTEOPOROSIS Other      Thyroid Disease Sister      Obesity Mother        Physical Exam:   /65  Pulse 108  Temp 97.9  F (36.6  C) (Oral)  Resp 20  Ht 1.524 m (5')  Wt 57.4 kg (126 lb 8 oz)  SpO2 100%  BMI 24.71 kg/m2  Body mass index is 24.71 kg/(m^2).    Gen:  healthy and alert, no distress  HEENT: no thyromegaly, no palpable nodules or masses  CV: regular rate and rhythm, no gallops, rubs or murmurs   Pulm: lungs clear, no rales, rhonchi or wheezes, normal diaphragmatic excursion  MSK: extremities non tender and without edema  Skin: no lesions or rashes   Neuro: normal gait, no gross defects   Psych: appropriate mood and affect  Heme: normal cervical, supraclavicular and inguinal lymph nodes  Abd: Soft, non-tender, non-distended, no organomegaly or masses  Pelvic: External genitalia and urethral meatus appears normal.  Vagina is smooth without nodularity or masses.  Cervix appears normal and without lesions.  Bimanual exam reveal no masses, nodularity or fullness.  Recto-vaginal exam confirms these findings.    Labs/Imagin2017 CT  IMPRESSION: In this patient with history of leiomyosarcoma of the  uterus:  1. No CT evidence of disease  progression.  2. Unchanged mildly prominent para-aortic lymph nodes, compared to  outside institution CT 11/29/2016. Continued attention on follow-up  imaging is recommended.  3. 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  4. Calcified and noncalcified pulmonary nodules, stable from  11/29/2016. Continued attention on follow-up imaging.    Assessment:  Maggie Navarrete is a 66 year old woman with IIB leiomyosarcoma, s/p hysterectomy, currently receiving doxorubicin/dacarbazine chemotherapy.     A total of 60 minutes was spent with the patient, 40 minutes of which were spent in counseling the patient and/or treatment planning.    Plan:   1.)    Continue with last cycle of chemotherapy as per Dr. Aguilar in medical Oncology. No need for repeat CT scan as most recent (2/24/2017) was not concerning. She will return to Gynecologic Oncology clinic after completing chemotherapy for surveillance. Because of the unpredictable nature of leiomyosarcoma recurrence, she will receive follow up every 3 months initially for 1 year and then every 6 months.   2.) Genetic risk factors were assessed and the patient does not meet the qualifications for a referral  3.) Labs and/or tests ordered include:  None currently.    Thank you for allowing us to participate in the care of your patient.       Sincerely,    Jodi Frazier, PGY2  OB/Gyn  3/27/2017, 12:56 PM     Attending note  I have examined this patient with Dr. Frazier who acted as a  Scribe.  I agree with the above findiings and plan.    Antoni Godfrey    CC  Patient Care Team:  Kristine Flynn MD PhD as PCP - General (Family Practice)  Filemon Rodríguez MD as MD (Family Practice)  Omaira Saul APRN CNP as Nurse Practitioner (Nurse Practitioner)  JOAN GOMEZ      Answers for HPI/ROS submitted by the patient on 3/24/2017   General Symptoms: Yes  Skin Symptoms: Yes  HENT Symptoms: Yes  EYE SYMPTOMS: Yes  HEART SYMPTOMS: Yes  LUNG  SYMPTOMS: Yes  INTESTINAL SYMPTOMS: Yes  URINARY SYMPTOMS: No  GYNECOLOGIC SYMPTOMS: No  BREAST SYMPTOMS: No  SKELETAL SYMPTOMS: No  BLOOD SYMPTOMS: No  NERVOUS SYSTEM SYMPTOMS: Yes  MENTAL HEALTH SYMPTOMS: Yes  Fever: No  Loss of appetite: No  Weight loss: No  Weight gain: No  Fatigue: Yes  Night sweats: No  Chills: No  Increased stress: No  Excessive hunger: No  Excessive thirst: No  Feeling hot or cold when others believe the temperature is normal: No  Loss of height: No  Post-operative complications: No  Surgical site pain: No  Hallucinations: No  Change in or Loss of Energy: Yes  Hyperactivity: No  Confusion: No  Changes in hair: Yes  Changes in moles/birth marks: No  Itching: No  Rashes: No  Changes in nails: Yes  Acne: No  Hair in places you don't want it: No  Change in facial hair: Yes  Warts: No  Non-healing sores: No  Scarring: No  Flaking of skin: No  Color changes of hands/feet in cold : No  Sun sensitivity: No  Skin thickening: No  Ear pain: No  Ear discharge: No  Hearing loss: No  Tinnitus: Yes  Nosebleeds: Yes  Congestion: No  Sinus pain: No  Trouble swallowing: No   Voice hoarseness: No  Mouth sores: Yes  Sore throat: No  Tooth pain: Yes  Gum tenderness: Yes  Bleeding gums: No  Change in taste: Yes  Change in sense of smell: No  Dry mouth: No  Hearing aid used: No  Neck lump: No  Eye pain: No  Vision loss: No  Dry eyes: Yes  Watery eyes: Yes  Eye bulging: No  Double vision: No  Flashing of lights: No  Spots: No  Floaters: No  Redness: Yes  Crossed eyes: No  Tunnel Vision: No  Yellowing of eyes: No  Eye irritation: Yes  Cough: Yes  Sputum or phlegm: No  Coughing up blood: No  Difficulty breating or shortness of breath: No  Snoring: No  Wheezing: No  Difficulty breathing on exertion: No  Respiratory pain: No  Nighttime Cough: No  Difficulty breathing when lying flat: No  Chest pain or pressure: No  Fast or irregular heartbeat: Yes  Pain in legs with walking: No  Swelling in feet or ankles: No  Trouble  breathing while lying down: No  Fingers or Toes appear blue: No  High blood pressure: No  Low blood pressure: No  Fainting: No  Murmurs: No  Chest pain on exertion: No  Chest pain at rest: No  Cramping pain in leg during exercise: No  Pacemaker: No  Varicose veins: No  Edema or swelling: No  Fast heart beat: Yes  Wake up at night with shortness of breath: No  Heart flutters: No  Light-headedness: Yes  Exercise intolerance: No  Heart burn or indigestion: Yes  Nausea: Yes  Vomiting: No  Abdominal pain: No  Bloating: No  Constipation: Yes  Diarrhea: No  Blood in stool: No  Black stools: No  Rectal or Anal pain: No  Fecal incontinence: No  Rectal bleeding: No  Yellowing of skin or eyes: No  Vomit with blood: No  Change in stools: No  Hemorrhoids: Yes  Trouble with coordination: No  Dizziness or trouble with balance: Yes  Fainting or black-out spells: No  Memory loss: Yes  Headache: No  Seizures: No  Speech problems: No  Tingling: No  Tremor: No  Weakness: No  Difficulty walking: No  Paralysis: No  Numbness: No  Nervous or Anxious: No  Depression: No  Trouble sleeping: No  Trouble thinking or concentrating: No  Mood changes: No  Panic attacks: No      Again, thank you for allowing me to participate in the care of your patient.      Sincerely,    Antoni Godfrey MD

## 2017-03-27 NOTE — PROGRESS NOTES
Consult Notes on Referred Patient    Date: 3/27/2017       Dr. Ab Aguilar MD  [address] [address]       RE: Maggie Navarrete  : 1950  ALE: 3/27/2017    Dear Dr. Ab Aguilar:    I had the pleasure of seeing your patient Maggie Navarrete here at the Gynecologic Cancer Clinic at the [facility] on 3/27/2017.  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 She was followed for a number of months (please see Oncologic History below) before having surgery at [place] in 2016.     Dr. Aguilar in medical oncology has been coordinating her chemotherapy with doxorubicin and dacarbacin. She tolerates this well--some shortness of breath and nausea after chemotherapy, which resolve gradually with time. She has taken Zofran for her nausea. She also has some GERD which she manages with Zantac.     Currently she denies nausea, vomiting, pain. Minimally short of breath as above. Has not noticed that her abdomen is swelling. After some initial nausea, she has a very good appetite.     She is looking forward to finishing her chemotherapy on 2017 and then soon afterwards will celebrate her birthday and a week later go to [place] with her sister.     Today she does have questions about her initial care, specifically whether her treatment was comprised because she did not have surgery initially after noting enlarging fibroids on imaging.     Oncologic history:  2016, she presents to her PCP with 3 weeks of abdominal pain and severe constipation. She is treated for constipation and improves.    3/21/2016, worsening pelvic pressure prompts pelvic examination with an enlarged uterus noted. Transvaginal ultrasound shows enlargement of subserosal fibroids originally seen on MRI in .    2016, MRI pelvis is obtained, which shows several enhancing uterine masses involving an enlarged uterus,  felt radiographically compatible with benign uterine leiomyomata.    Summer 2016, she experiences worsening low back pain thought at first thought to be sacroiliitis, but pain persists after corticosteroid injection. Continues with severe constipation.    9/8/2016, she undergoes colonoscopy for persistent abdominal pain. She has sigmoid polyp snared. Abdominal pain and diarrhea persist after colonoscopy prep.    9/14/2016, she has worsening abdominal pain and is evaluated in the ED. CT-scan shows a large subserosal fibroid measuring 9.4 x 9.6 x 9.8 cm, as well as very large fibroids along the posterior uterine body more inferiorly, increased in size to 14 cm in greatest diameter compared to 10 cm in greatest diameter on MRI of 5/9/2016.    12/26/2016-present: s/p 5 cycles 25 mg/m2 doxorubicin, 250 mg/m2 dacarbazine.     9/29/2016, she has staging CT-CAP, which shows a small left sided pleural effusion and a 3 mm pulmonary nodule in the RML.    October 2016, self-refers to [facility].    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    11/4/2016, undergoes total abdominal hysterectomy and BSO, under Dr. Harsh Camarillo. Surgery was originally presumed for leiomyoma, however, intraoperatively there was focal adherence of a mass to the pelvic right sidewall and cul-de-sac, which led to peritoneal resection and omentectomy. Final pathology, pT2b: showed leiomyosarcoma involving the cul-de-sac and forming two separate masses measuring 10.5 x 6.5 x 4.5 cm and 10.7 x 7.9 x 7.5 cm.    Review of Systems:  Systemic-         no weight changes; no fever; no chills; no night sweats; no appetite changes  Skin-         no rashes, or lesions  Pulmonary-  no cough; no shortness of breath after recovering from chemotherapy  Cardiovascular- no chest pain; no palpitations  Gastrointestinal- no diarrhea; no constipation; no abdominal pain; no changes in bowel  habits; no blood in  stool  Genitourinary- no urinary frequency; no urinary urgency; no dysuria; no pain; no abnormal vaginal discharge; no abnormal vaginal bleeding  Musculoskeletal- no myalgias; no arthralgias; no back pain  Psychiatric-        no depressed mood; no anxiety    Hematologic- no tender lymph nodes; no noticeable swellings or lumps   Endocrine- no hot flashes; no heat/cold intolerance         Neurological- no tremor; no numbness and tingling; no headaches; no difficulty  sleeping    Past Medical History:   Past Medical History:   Diagnosis Date     Anxiety      Arthritis     pain in feet and knees     Disorder of bone and cartilage, unspecified      Esophageal reflux 2015     GERD (gastroesophageal reflux disease)      Hx of previous reproductive problem 1980     Kidney stone 2011     Personal history of colonic polyps 2016    Small, benign     PONV (postoperative nausea and vomiting)      Posterior vitreous detachment of left eye 2011     Posterior vitreous detachment of right eye 2011     Ptosis of eyelid, bilateral      Stomach problems 2015    Abdominal pain, Diarreha     Thyroid disease     thyroid nodule resolved 2014       Past Surgical History:   Past Surgical History:   Procedure Laterality Date     ABDOMEN SURGERY  Now    Pain, diarrhea     BREAST SURGERY  2002    right breast benign     COLONOSCOPY  2008    due in 2018     COLONOSCOPY N/A 9/8/2016    Procedure: COMBINED COLONOSCOPY, SINGLE OR MULTIPLE BIOPSY/POLYPECTOMY BY BIOPSY;  Surgeon: Abner Pepper MD;  Location:  GI     ENT SURGERY  1975    tonsillectomy     GENITOURINARY SURGERY  1953    bladder surgery      GI SURGERY  2015    Severe constipation     GYN SURGERY  1977    C section     GYN SURGERY  1981    laparoscopy     GYN SURGERY  2007    myomectomy     REPAIR PTOSIS BILATERAL  2/15/2012    Procedure:REPAIR PTOSIS BILATERAL; BILATERAL UPPER LID PTOSIS REPAIR; Surgeon:BRYCE REYNOLDS; Location[facility]       Health Maintenance:  Health  Maintenance Due   Topic Date Due     DEPRESSION ACTION PLAN Q1 YR (NO INBASKET)  1950     ADVANCE DIRECTIVE PLANNING Q5 YRS (NO INBASKET)  04/13/1968     FALL RISK ASSESSMENT  04/13/2015     PHQ-9 Q3 MONTHS (NO INBASKET)  11/01/2016     MAMMO Q1 YR INBASKET MESSAGE  12/14/2016     Last Pap Smear: Last prior to hysterectomy 3/2015, NILM. All others NILM.   She has not had a history of abnormal Pap smears.  Last Mammogram: 9/13/2012              Result: Bi Rads 1      She has not had a history of abnormal mammograms.  Last DEXA Scan: 5/6/2015, osteopenia                  Current Medications:   has a current medication list which includes the following prescription(s): atovaquone-proguanil, dicyclomine, enoxaparin, enoxaparin, tretinoin, vivotif, sterile water (bottle), apixaban, pantoprazole, ranitidine hcl, lorazepam, prochlorperazine, ondansetron, melatonin, venlafaxine, levothyroxine, zolpidem, alendronate, calcium citrate-vitamin d, multiple vitamin, hydromorphone, and hydrocodone-acetaminophen.     Allergies:    Allergies   Allergen Reactions     Erythromycin GI Disturbance     Tramadol Nausea        Social History:  Social History   Substance Use Topics     Smoking status: Never Smoker     Smokeless tobacco: Never Used     Alcohol use Yes      Comment: Rarely     History   Drug Use No       Family History:   The patient's family history is notable for .  Family History   Problem Relation Age of Onset     Family History Negative Mother      glaucoma     Glaucoma Mother      HEART DISEASE Father      Glaucoma Father      HEART DISEASE Maternal Grandmother      HEART DISEASE Paternal Grandmother      CANCER Paternal Grandfather      stomach     Thyroid Disease Sister      Retinal detachment Sister      Hypertension Sister      Lipids Sister      HEART DISEASE Paternal Aunt      HEART DISEASE Paternal Uncle      Hypertension Sister      CEREBROVASCULAR DISEASE Sister      Anxiety Disorder Mother      Anxiety  Disorder Sister      Depression Sister      Thyroid Disease Sister      Heart Failure Mother      DIABETES Mother      DIABETES Father      DIABETES Maternal Grandmother      Coronary Artery Disease Mother      Coronary Artery Disease Father      Hypertension Mother      Hypertension Sister      CEREBROVASCULAR DISEASE No family hx of      Breast Cancer Mother      Depression Mother      Depression Father      Depression Sister      Anxiety Disorder No family hx of      Anxiety Disorder Sister      OSTEOPOROSIS Father      OSTEOPOROSIS Other      Thyroid Disease Sister      Obesity Mother        Physical Exam:   /65  Pulse 108  Temp 97.9  F (36.6  C) (Oral)  Resp 20  Ht 1.524 m (5')  Wt 57.4 kg (126 lb 8 oz)  SpO2 100%  BMI 24.71 kg/m2  Body mass index is 24.71 kg/(m^2).    Gen:  healthy and alert, no distress  HEENT: no thyromegaly, no palpable nodules or masses  CV: regular rate and rhythm, no gallops, rubs or murmurs   Pulm: lungs clear, no rales, rhonchi or wheezes, normal diaphragmatic excursion  MSK: extremities non tender and without edema  Skin: no lesions or rashes   Neuro: normal gait, no gross defects   Psych: appropriate mood and affect  Heme: normal cervical, supraclavicular and inguinal lymph nodes  Abd: Soft, non-tender, non-distended, no organomegaly or masses  Pelvic: External genitalia and urethral meatus appears normal.  Vagina is smooth without nodularity or masses.  Cervix appears normal and without lesions.  Bimanual exam reveal no masses, nodularity or fullness.  Recto-vaginal exam confirms these findings.    Labs/Imagin2017 CT  IMPRESSION: In this patient with history of leiomyosarcoma of the  uterus:  1. No CT evidence of disease progression.  2. Unchanged mildly prominent para-aortic lymph nodes, compared to  outside institution CT 2016. Continued attention on follow-up  imaging is recommended.  3. 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  4. Calcified and noncalcified pulmonary nodules, stable from  11/29/2016. Continued attention on follow-up imaging.    Assessment:  Maggie Navarrete is a 66 year old woman with IIB leiomyosarcoma, s/p hysterectomy, currently receiving doxorubicin/dacarbazine chemotherapy.     A total of 60 minutes was spent with the patient, 40 minutes of which were spent in counseling the patient and/or treatment planning.    Plan:   1.)    Continue with last cycle of chemotherapy as per Dr. Aguilar in medical Oncology. No need for repeat CT scan as most recent (2/24/2017) was not concerning. She will return to Gynecologic Oncology clinic after completing chemotherapy for surveillance. Because of the unpredictable nature of leiomyosarcoma recurrence, she will receive follow up every 3 months initially for 1 year and then every 6 months.   2.) Genetic risk factors were assessed and the patient does not meet the qualifications for a referral  3.) Labs and/or tests ordered include:  None currently.    Thank you for allowing us to participate in the care of your patient.       Sincerely,    Jodi Frazier, PGY2  OB/Gyn  3/27/2017, 12:56 PM     Attending note  I have examined this patient with Dr. Frazier who acted as a  Scribe.  I agree with the above findiings and plan.    Antoni Godfrey    CC  Patient Care Team:  Kristine Flynn MD PhD as PCP - General (Family Practice)  Filemon Rodríguez MD as MD (Family Practice)  Omaira Saul APRN CNP as Nurse Practitioner (Nurse Practitioner)  JOAN GOMEZ      Answers for HPI/ROS submitted by the patient on 3/24/2017   General Symptoms: Yes  Skin Symptoms: Yes  HENT Symptoms: Yes  EYE SYMPTOMS: Yes  HEART SYMPTOMS: Yes  LUNG SYMPTOMS: Yes  INTESTINAL SYMPTOMS: Yes  URINARY SYMPTOMS: No  GYNECOLOGIC SYMPTOMS: No  BREAST SYMPTOMS: No  SKELETAL SYMPTOMS: No  BLOOD SYMPTOMS: No  NERVOUS SYSTEM SYMPTOMS: Yes  MENTAL HEALTH SYMPTOMS: Yes  Fever: No  Loss of appetite:  No  Weight loss: No  Weight gain: No  Fatigue: Yes  Night sweats: No  Chills: No  Increased stress: No  Excessive hunger: No  Excessive thirst: No  Feeling hot or cold when others believe the temperature is normal: No  Loss of height: No  Post-operative complications: No  Surgical site pain: No  Hallucinations: No  Change in or Loss of Energy: Yes  Hyperactivity: No  Confusion: No  Changes in hair: Yes  Changes in moles/birth marks: No  Itching: No  Rashes: No  Changes in nails: Yes  Acne: No  Hair in places you don't want it: No  Change in facial hair: Yes  Warts: No  Non-healing sores: No  Scarring: No  Flaking of skin: No  Color changes of hands/feet in cold : No  Sun sensitivity: No  Skin thickening: No  Ear pain: No  Ear discharge: No  Hearing loss: No  Tinnitus: Yes  Nosebleeds: Yes  Congestion: No  Sinus pain: No  Trouble swallowing: No   Voice hoarseness: No  Mouth sores: Yes  Sore throat: No  Tooth pain: Yes  Gum tenderness: Yes  Bleeding gums: No  Change in taste: Yes  Change in sense of smell: No  Dry mouth: No  Hearing aid used: No  Neck lump: No  Eye pain: No  Vision loss: No  Dry eyes: Yes  Watery eyes: Yes  Eye bulging: No  Double vision: No  Flashing of lights: No  Spots: No  Floaters: No  Redness: Yes  Crossed eyes: No  Tunnel Vision: No  Yellowing of eyes: No  Eye irritation: Yes  Cough: Yes  Sputum or phlegm: No  Coughing up blood: No  Difficulty breating or shortness of breath: No  Snoring: No  Wheezing: No  Difficulty breathing on exertion: No  Respiratory pain: No  Nighttime Cough: No  Difficulty breathing when lying flat: No  Chest pain or pressure: No  Fast or irregular heartbeat: Yes  Pain in legs with walking: No  Swelling in feet or ankles: No  Trouble breathing while lying down: No  Fingers or Toes appear blue: No  High blood pressure: No  Low blood pressure: No  Fainting: No  Murmurs: No  Chest pain on exertion: No  Chest pain at rest: No  Cramping pain in leg during exercise:  No  Pacemaker: No  Varicose veins: No  Edema or swelling: No  Fast heart beat: Yes  Wake up at night with shortness of breath: No  Heart flutters: No  Light-headedness: Yes  Exercise intolerance: No  Heart burn or indigestion: Yes  Nausea: Yes  Vomiting: No  Abdominal pain: No  Bloating: No  Constipation: Yes  Diarrhea: No  Blood in stool: No  Black stools: No  Rectal or Anal pain: No  Fecal incontinence: No  Rectal bleeding: No  Yellowing of skin or eyes: No  Vomit with blood: No  Change in stools: No  Hemorrhoids: Yes  Trouble with coordination: No  Dizziness or trouble with balance: Yes  Fainting or black-out spells: No  Memory loss: Yes  Headache: No  Seizures: No  Speech problems: No  Tingling: No  Tremor: No  Weakness: No  Difficulty walking: No  Paralysis: No  Numbness: No  Nervous or Anxious: No  Depression: No  Trouble sleeping: No  Trouble thinking or concentrating: No  Mood changes: No  Panic attacks: No

## 2017-03-27 NOTE — MR AVS SNAPSHOT
After Visit Summary   3/27/2017    Maggie Navarrete    MRN: [id number]           Patient Information     Date Of Birth          1950        Visit Information        Provider Department      3/27/2017 12:00 PM Antoni Godfrey MD [facility]        Today's Diagnoses     Malignant neoplasm of corpus uteri, except isthmus (H)           Follow-ups after your visit        Your next 10 appointments already scheduled     Apr 10, 2017  8:15 AM CDT   [facility] Draw with [facility] DRAW   [facility] Draw ([facility])    [address] [place] [id number] [phone]            Apr 10, 2017  8:30 AM CDT   (Arrive by 8:15 AM)   Return Visit with Ab Aguilar MD   [facility] ([facility])    [address] [place] [id number] [phone]            Apr 10, 2017  9:00 AM CDT   Infusion 120 with UC ONCOLOGY INFUSION, UC 18 ATC   [facility] ([facility])    [address] [place] [id number] [phone]            Apr 11, 2017  2:00 PM CDT   Infusion 120 with UC ONCOLOGY INFUSION, UC 14 ATC   [facility] ([facility])    [address] [place] [id number] [phone]            Apr 12, 2017  1:30 PM CDT   Infusion 120 with UC ONCOLOGY INFUSION, UC 22 ATC   [facility] ([facility])    [address] [place] [id number] [phone]              Who to contact     If you have questions or need follow up information about today's clinic visit or your schedule please contact [facility] directly at [phone].  Normal or non-critical lab and imaging results will be communicated to you by MyChart, letter or phone  within 4 business days after the clinic has received the results. If you do not hear from us within 7 days, please contact the clinic through [employer] or phone. If you have a critical or abnormal lab result, we will notify you by phone as soon as possible.  Submit refill requests through [employer] or call your pharmacy and they will forward the refill request to us. Please allow 3 business days for your refill to be completed.          Additional Information About Your Visit        [employer] Information     [employer] gives you secure access to your electronic health record. If you see a primary care provider, you can also send messages to your care team and make appointments. If you have questions, please call your primary care clinic.  If you do not have a primary care provider, please call [phone] and they will assist you.        Care EveryWhere ID     This is your Care EveryWhere ID. This could be used by other organizations to access your [facility] medical records  [id number]        Your Vitals Were     Pulse Temperature Respirations Height Pulse Oximetry BMI (Body Mass Index)    108 97.9  F (36.6  C) (Oral) 20 1.524 m (5') 100% 24.71 kg/m2       Blood Pressure from Last 3 Encounters:   03/27/17 121/65   03/22/17 149/65   03/21/17 128/62    Weight from Last 3 Encounters:   03/27/17 57.4 kg (126 lb 8 oz)   03/22/17 58.1 kg (128 lb)   03/20/17 57.2 kg (126 lb)              Today, you had the following     No orders found for display       Primary Care Provider Office Phone # Fax #    Kristine Flynn MD PhD [phone]        [facility] [address] [facility] [address]  [facility] [address]        Thank you!     Thank you for choosing [facility]  for your care. Our goal is always to provide you with excellent care. Hearing back from our patients is one way we can continue to improve our services. Please take a few minutes to complete the written survey that you may receive in the mail  after your visit with us. Thank you!             Your Updated Medication List - Protect others around you: Learn how to safely use, store and throw away your medicines at www.disposemymeds.org.          This list is accurate as of: 3/27/17 11:59 PM.  Always use your most recent med list.                   Brand Name Dispense Instructions for use    alendronate 35 MG tablet    FOSAMAX    12 tablet    Take 1 tablet (35 mg) by mouth every 7 days       atovaquone-proguanil 250-100 MG per tablet    MALARONE     TK 1 T PO D. START 2 DAYS B EXPOSURE TO MALARIA AND. CONTINUE D TILL 7 DAYS AFTER EXPOSURE       CALCIUM CITRATE + PO      Take 200 mg by mouth daily as needed (She supplements to meet her 1,200mg goal depending on how much dietary calcium she has gotten that day (up to 400mg once daily)) Reported on 3/20/2017       dicyclomine 10 MG capsule    BENTYL         ELIQUIS PO      Take 2.5 mg by mouth 2 times daily       * enoxaparin 60 MG/0.6ML injection    LOVENOX         * enoxaparin 80 MG/0.8ML injection    LOVENOX         HYDROcodone-acetaminophen 5-325 MG per tablet    NORCO    30 tablet    1-2 tablets every 6 hours as needed for pain       HYDROmorphone 2 MG tablet    DILAUDID     Reported on 3/27/2017       levothyroxine 100 MCG tablet    SYNTHROID/LEVOTHROID     Take 100 mcg by mouth daily       LORazepam 0.5 MG tablet    ATIVAN    30 tablet    Take 1 tablet (0.5 mg) by mouth every 4 hours as needed (Anxiety, Nausea/Vomiting or Sleep)       MELATONIN PO      Take 2.5 mg by mouth nightly as needed Reported on 3/20/2017       MULTIVITAMINS PO      Take 1 tablet by mouth daily Reported on 3/20/2017       ondansetron 4 MG tablet    ZOFRAN    30 tablet    Take 1 tablet (4 mg) by mouth every 8 hours as needed (Nausea/Vomiting)       pantoprazole 40 MG EC tablet    PROTONIX    30 tablet    Take 1 tablet (40 mg) by mouth daily       prochlorperazine 10 MG tablet    COMPAZINE    30 tablet    Take 1 tablet (10 mg) by  mouth every 6 hours as needed (Nausea/Vomiting)       sterile water (bottle) irrigation          tretinoin 0.025 % cream    RETIN-A     SINTIA A PEA-SIZED AMOUNT TO CLEANSED AND DRIED FACE AT NIGHT       venlafaxine 75 MG 24 hr capsule    EFFEXOR-XR    90 capsule    Take 1 capsule (75 mg) by mouth daily       VIVOTIF CR capsule   Generic drug:  typhoid      TK 1 C  PO QOD.       ZANTAC PO      Take 150 mg by mouth daily as needed       zolpidem 5 MG tablet    AMBIEN    25 tablet    Take 1 tablet (5 mg) by mouth nightly as needed for sleep       * Notice:  This list has 2 medication(s) that are the same as other medications prescribed for you. Read the directions carefully, and ask your doctor or other care provider to review them with you.

## 2017-04-03 ENCOUNTER — TELEPHONE (OUTPATIENT)
Dept: ONCOLOGY | Facility: CLINIC | Age: 67
End: 2017-04-03

## 2017-04-03 NOTE — TELEPHONE ENCOUNTER
[facility] Telephone Triage Note    Assessment: Patient called in to triage reporting the following symptoms: mouth sores since last Thursday. Using magic mouth wash, peroxide, baking soda, salt water and honey. None effective. Less solid intake due to pain.    Recommendations: Discussed with Rebeka MARTINEZ.  Provided home care advice per verbal order to continue 4 times daily (1 tsp baking soda with 1 cup water) and Clinic visit  tomorrow per patient's request. Pt request Dexamethasone for mouth sores.    Follow-Up: Message sent to schedulers to add pt on to schedule.

## 2017-04-04 ENCOUNTER — ONCOLOGY VISIT (OUTPATIENT)
Dept: ONCOLOGY | Facility: CLINIC | Age: 67
End: 2017-04-04
Attending: PHYSICIAN ASSISTANT
Payer: COMMERCIAL

## 2017-04-04 VITALS
WEIGHT: 125 LBS | OXYGEN SATURATION: 100 % | HEIGHT: 60 IN | HEART RATE: 101 BPM | RESPIRATION RATE: 18 BRPM | SYSTOLIC BLOOD PRESSURE: 111 MMHG | TEMPERATURE: 98.2 F | BODY MASS INDEX: 24.54 KG/M2 | DIASTOLIC BLOOD PRESSURE: 74 MMHG

## 2017-04-04 DIAGNOSIS — C54.9 UTERUS CANCER, SARCOMA (H): ICD-10-CM

## 2017-04-04 DIAGNOSIS — K12.31 MUCOSITIS DUE TO CHEMOTHERAPY: Primary | ICD-10-CM

## 2017-04-04 PROCEDURE — 99214 OFFICE O/P EST MOD 30 MIN: CPT | Mod: ZP | Performed by: PHYSICIAN ASSISTANT

## 2017-04-04 PROCEDURE — 99212 OFFICE O/P EST SF 10 MIN: CPT | Mod: ZF

## 2017-04-04 RX ORDER — DEXAMETHASONE 0.5 MG/5ML
ELIXIR ORAL
Qty: 240 ML | Refills: 0 | Status: SHIPPED | OUTPATIENT
Start: 2017-04-04 | End: 2017-05-01

## 2017-04-04 NOTE — Clinical Note
4/4/2017       RE: Maggie Navarrete  [address]  [facility] [address]     Dear Colleague,    Thank you for referring your patient, Maggie Navarrete, to the [facility]. Please see a copy of my visit note below.    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  Please see Dr. Aguilar's dictation on 3/20/2017 for complete oncologic history. She is being treated with Doxorubicin and Dacarbazine and her last treatment was cycle 4 on 2/27/2017.     INTERVAL HISTORY:  Maggie called in yesterday regarding ongoing mouth sores since last chemotherapy and possible treatment. Maggie has had mouth sores following each cycle of chemotherapy. This time has been the worst. She has tried magic mouth wash, peroxide, baking soda with salt water, and honey. None have been effective and the sores are starting to impact her ability to eat. Her sister is followed by an oncologist and she asked her about what Maggie could do for her mouth sores. The recommendation was for dexamethasone mouth rinses. Maggie would like to try this for her ongoing mouth sores.     Review of Systems: Patient denies any of the following except if noted above: fever, chills, vision or hearing changes, chest pain, cough, dyspnea, abdominal pain, nausea, vomiting, diarrhea, constipation, urinary concerns, headaches, numbness, tingling or issues with mood.     Current Outpatient Prescriptions   Medication Sig Dispense Refill     dexamethasone 0.5 MG/5ML ELIX 10 ml 4 times a day swish for 2 minutes before spitting it out until symptoms resolve 240 mL 0     atovaquone-proguanil (MALARONE) 250-100 MG per tablet TK 1 T PO D. START 2 DAYS B EXPOSURE TO MALARIA AND. CONTINUE D TILL 7 DAYS AFTER EXPOSURE  0     dicyclomine (BENTYL) 10 MG capsule        enoxaparin (LOVENOX) 80 MG/0.8ML injection        enoxaparin (LOVENOX) 60 MG/0.6ML injection        HYDROmorphone  (DILAUDID) 2 MG tablet Reported on 3/27/2017  0     tretinoin (RETIN-A) 0.025 % cream SINTIA A PEA-SIZED AMOUNT TO CLEANSED AND DRIED FACE AT NIGHT  3     VIVOTIF CR capsule TK 1 C  PO QOD.  0     sterile water, bottle, irrigation        Apixaban (ELIQUIS PO) Take 2.5 mg by mouth 2 times daily       pantoprazole (PROTONIX) 40 MG EC tablet Take 1 tablet (40 mg) by mouth daily 30 tablet 0     RaNITidine HCl (ZANTAC PO) Take 150 mg by mouth daily as needed        HYDROcodone-acetaminophen (NORCO) 5-325 MG per tablet 1-2 tablets every 6 hours as needed for pain 30 tablet 0     LORazepam (ATIVAN) 0.5 MG tablet Take 1 tablet (0.5 mg) by mouth every 4 hours as needed (Anxiety, Nausea/Vomiting or Sleep) 30 tablet 2     prochlorperazine (COMPAZINE) 10 MG tablet Take 1 tablet (10 mg) by mouth every 6 hours as needed (Nausea/Vomiting) 30 tablet 2     ondansetron (ZOFRAN) 4 MG tablet Take 1 tablet (4 mg) by mouth every 8 hours as needed (Nausea/Vomiting) 30 tablet 3     MELATONIN PO Take 2.5 mg by mouth nightly as needed Reported on 3/20/2017       venlafaxine (EFFEXOR-XR) 75 MG 24 hr capsule Take 1 capsule (75 mg) by mouth daily 90 capsule 1     levothyroxine (SYNTHROID,LEVOTHROID) 100 MCG tablet Take 100 mcg by mouth daily        zolpidem (AMBIEN) 5 MG tablet Take 1 tablet (5 mg) by mouth nightly as needed for sleep 25 tablet 0     alendronate (FOSAMAX) 35 MG tablet Take 1 tablet (35 mg) by mouth every 7 days 12 tablet 3     Calcium Citrate-Vitamin D (CALCIUM CITRATE + PO) Take 200 mg by mouth daily as needed (She supplements to meet her 1,200mg goal depending on how much dietary calcium she has gotten that day (up to 400mg once daily)) Reported on 3/20/2017       Multiple Vitamin (MULTIVITAMINS PO) Take 1 tablet by mouth daily Reported on 3/20/2017          Allergies   Allergen Reactions     Erythromycin GI Disturbance     Tramadol Nausea     PHYSICAL EXAM:  /74 (BP Location: Left arm, Patient Position: Chair)  Pulse 101   Temp 98.2  F (36.8  C) (Oral)  Resp 18  Ht 1.524 m (5')  Wt 56.7 kg (125 lb)  SpO2 100%  BMI 24.41 kg/m2   Wt Readings from Last 10 Encounters:   04/04/17 56.7 kg (125 lb)   03/27/17 57.4 kg (126 lb 8 oz)   03/22/17 58.1 kg (128 lb)   03/20/17 57.2 kg (126 lb)   02/27/17 57.4 kg (126 lb 8 oz)   02/06/17 55.5 kg (122 lb 6.4 oz)   01/16/17 55.7 kg (122 lb 12.8 oz)   12/28/16 56.7 kg (124 lb 14.4 oz)   12/23/16 54 kg (119 lb)   12/20/16 54.4 kg (119 lb 14.4 oz)     General: alert, cooperative in no apparent distress  Skin: color pink, skin warm and moist, nails without clubbing or cyanosis, no suspicious nevi, rash, petechiae or ecchymoses  HEENT: normocephalic, atraumatic, PERRL, mucositis on sides of tongue with ulceration on the left side of tongue, mild erythema on side of tongue, dentition good, pharynx without exudate.   Neck: neck supple, no cervical or supraclavicular adenopathy  Respiratory: thorax is symmetric with good expansion, lungs clear to ausculation bilaterally, no wheezes, crackles or rhonchi  Cardiovascular: RRR, no murmurs or extra sounds  Gastrointestinal: active bowel sounds, soft, non-tender; no palpable masses or hepatosplenomegally  Peripheral Vascular: extremities are warm, without edema.     No labs done at today's visit.     Impression and Plan:  1. Oncology. 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 She is due for C6 on 4/10/2017. She is already scheduled for her labs, infusion and appointment with Dr. Aguilar on Monday.     2. 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 She has tried multiple treatment modalities without relief. She would like to try the dexamethasone oral elixir which was recently approved for chemotherapy induced mucositis.   - continue to use salt and soda   - follow with dexamethasone 10 ml/1 mg 4 times a day - swish for 2 minutes and spit.       Patricia L  Rich WELDON NP-C [facility]      Again, thank you for allowing me to participate in the care of your patient.      Sincerely,    EDGARD BlackburnC

## 2017-04-04 NOTE — LETTER
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  Please see Dr. Aguilar's dictation on 3/20/2017 for complete oncologic history. She is being treated with Doxorubicin and Dacarbazine and her last treatment was cycle 4 on 2/27/2017.     INTERVAL HISTORY:  Maggie called in yesterday regarding ongoing mouth sores since last chemotherapy and possible treatment. Maggie has had mouth sores following each cycle of chemotherapy. This time has been the worst. She has tried magic mouth wash, peroxide, baking soda with salt water, and honey. None have been effective and the sores are starting to impact her ability to eat. Her sister is followed by an oncologist and she asked her about what Maggie could do for her mouth sores. The recommendation was for dexamethasone mouth rinses. Maggie would like to try this for her ongoing mouth sores.     Review of Systems: Patient denies any of the following except if noted above: fever, chills, vision or hearing changes, chest pain, cough, dyspnea, abdominal pain, nausea, vomiting, diarrhea, constipation, urinary concerns, headaches, numbness, tingling or issues with mood.     Current Outpatient Prescriptions   Medication Sig Dispense Refill     dexamethasone 0.5 MG/5ML ELIX 10 ml 4 times a day swish for 2 minutes before spitting it out until symptoms resolve 240 mL 0     atovaquone-proguanil (MALARONE) 250-100 MG per tablet TK 1 T PO D. START 2 DAYS B EXPOSURE TO MALARIA AND. CONTINUE D TILL 7 DAYS AFTER EXPOSURE  0     dicyclomine (BENTYL) 10 MG capsule        enoxaparin (LOVENOX) 80 MG/0.8ML injection        enoxaparin (LOVENOX) 60 MG/0.6ML injection        HYDROmorphone (DILAUDID) 2 MG tablet Reported on 3/27/2017  0     tretinoin (RETIN-A) 0.025 % cream SINTIA A PEA-SIZED AMOUNT TO CLEANSED AND DRIED FACE AT NIGHT  3     VIVOTIF CR  capsule TK 1 C  PO QOD.  0     sterile water, bottle, irrigation        Apixaban (ELIQUIS PO) Take 2.5 mg by mouth 2 times daily       pantoprazole (PROTONIX) 40 MG EC tablet Take 1 tablet (40 mg) by mouth daily 30 tablet 0     RaNITidine HCl (ZANTAC PO) Take 150 mg by mouth daily as needed        HYDROcodone-acetaminophen (NORCO) 5-325 MG per tablet 1-2 tablets every 6 hours as needed for pain 30 tablet 0     LORazepam (ATIVAN) 0.5 MG tablet Take 1 tablet (0.5 mg) by mouth every 4 hours as needed (Anxiety, Nausea/Vomiting or Sleep) 30 tablet 2     prochlorperazine (COMPAZINE) 10 MG tablet Take 1 tablet (10 mg) by mouth every 6 hours as needed (Nausea/Vomiting) 30 tablet 2     ondansetron (ZOFRAN) 4 MG tablet Take 1 tablet (4 mg) by mouth every 8 hours as needed (Nausea/Vomiting) 30 tablet 3     MELATONIN PO Take 2.5 mg by mouth nightly as needed Reported on 3/20/2017       venlafaxine (EFFEXOR-XR) 75 MG 24 hr capsule Take 1 capsule (75 mg) by mouth daily 90 capsule 1     levothyroxine (SYNTHROID,LEVOTHROID) 100 MCG tablet Take 100 mcg by mouth daily        zolpidem (AMBIEN) 5 MG tablet Take 1 tablet (5 mg) by mouth nightly as needed for sleep 25 tablet 0     alendronate (FOSAMAX) 35 MG tablet Take 1 tablet (35 mg) by mouth every 7 days 12 tablet 3     Calcium Citrate-Vitamin D (CALCIUM CITRATE + PO) Take 200 mg by mouth daily as needed (She supplements to meet her 1,200mg goal depending on how much dietary calcium she has gotten that day (up to 400mg once daily)) Reported on 3/20/2017       Multiple Vitamin (MULTIVITAMINS PO) Take 1 tablet by mouth daily Reported on 3/20/2017          Allergies   Allergen Reactions     Erythromycin GI Disturbance     Tramadol Nausea     PHYSICAL EXAM:  /74 (BP Location: Left arm, Patient Position: Chair)  Pulse 101  Temp 98.2  F (36.8  C) (Oral)  Resp 18  Ht 1.524 m (5')  Wt 56.7 kg (125 lb)  SpO2 100%  BMI 24.41 kg/m2   Wt Readings from Last 10 Encounters:   04/04/17  56.7 kg (125 lb)   03/27/17 57.4 kg (126 lb 8 oz)   03/22/17 58.1 kg (128 lb)   03/20/17 57.2 kg (126 lb)   02/27/17 57.4 kg (126 lb 8 oz)   02/06/17 55.5 kg (122 lb 6.4 oz)   01/16/17 55.7 kg (122 lb 12.8 oz)   12/28/16 56.7 kg (124 lb 14.4 oz)   12/23/16 54 kg (119 lb)   12/20/16 54.4 kg (119 lb 14.4 oz)     General: alert, cooperative in no apparent distress  Skin: color pink, skin warm and moist, nails without clubbing or cyanosis, no suspicious nevi, rash, petechiae or ecchymoses  HEENT: normocephalic, atraumatic, PERRL, mucositis on sides of tongue with ulceration on the left side of tongue, mild erythema on side of tongue, dentition good, pharynx without exudate.   Neck: neck supple, no cervical or supraclavicular adenopathy  Respiratory: thorax is symmetric with good expansion, lungs clear to ausculation bilaterally, no wheezes, crackles or rhonchi  Cardiovascular: RRR, no murmurs or extra sounds  Gastrointestinal: active bowel sounds, soft, non-tender; no palpable masses or hepatosplenomegally  Peripheral Vascular: extremities are warm, without edema.     No labs done at today's visit.     Impression and Plan:  1. Oncology. 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 She is due for C6 on 4/10/2017. She is already scheduled for her labs, infusion and appointment with Dr. Aguilar on Monday.     2. 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 She has tried multiple treatment modalities without relief. She would like to try the dexamethasone oral elixir which was recently approved for chemotherapy induced mucositis.   - continue to use salt and soda   - follow with dexamethasone 10 ml/1 mg 4 times a day - swish for 2 minutes and spit.       Patricia Villanueva APRN NP-C [facility]    Patient was seen and examined by me, as noted above. SVITLANA Murphy acted as a scribe. I have reviewed and edited the above note.  Rebeka  JOLLY Kim

## 2017-04-04 NOTE — NURSING NOTE
Maggie Navarrete is a 66 year old female who presents for:  Chief Complaint   Patient presents with     Oncology Clinic Visit     Chemotherapy induced cardiomyopathy         Initial Vitals:  /74 (BP Location: Left arm, Patient Position: Chair)  Pulse 101  Temp 98.2  F (36.8  C) (Oral)  Resp 18  Ht 1.524 m (5')  Wt 56.7 kg (125 lb)  SpO2 100%  BMI 24.41 kg/m2 Estimated body mass index is 24.41 kg/(m^2) as calculated from the following:    Height as of this encounter: 1.524 m (5').    Weight as of this encounter: 56.7 kg (125 lb).. Body surface area is 1.55 meters squared. BP completed using cuff size: regular  Data Unavailable No LMP recorded. Patient is postmenopausal. Allergies and medications reviewed.     Medications: Medication refills not needed today.  Pharmacy name entered into [employer]:    [id number] - [facility] [address] AT [facility] [address] [place] [address] PHARMACY MAIL DELIVERY - [place], [place] - [address] [facility] [address]  [id number] - [place], [place] - [address] [facility]    Comments:     7 minutes for nursing intake (face to face time)   Mariah Burgos MA

## 2017-04-04 NOTE — PROGRESS NOTES
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  Please see Dr. Aguilar's dictation on 3/20/2017 for complete oncologic history. She is being treated with Doxorubicin and Dacarbazine and her last treatment was cycle 4 on 2/27/2017.     INTERVAL HISTORY:  Maggie called in yesterday regarding ongoing mouth sores since last chemotherapy and possible treatment. Maggie has had mouth sores following each cycle of chemotherapy. This time has been the worst. She has tried magic mouth wash, peroxide, baking soda with salt water, and honey. None have been effective and the sores are starting to impact her ability to eat. Her sister is followed by an oncologist and she asked her about what Maggie could do for her mouth sores. The recommendation was for dexamethasone mouth rinses. Maggie would like to try this for her ongoing mouth sores.     Review of Systems: Patient denies any of the following except if noted above: fever, chills, vision or hearing changes, chest pain, cough, dyspnea, abdominal pain, nausea, vomiting, diarrhea, constipation, urinary concerns, headaches, numbness, tingling or issues with mood.     Current Outpatient Prescriptions   Medication Sig Dispense Refill     dexamethasone 0.5 MG/5ML ELIX 10 ml 4 times a day swish for 2 minutes before spitting it out until symptoms resolve 240 mL 0     atovaquone-proguanil (MALARONE) 250-100 MG per tablet TK 1 T PO D. START 2 DAYS B EXPOSURE TO MALARIA AND. CONTINUE D TILL 7 DAYS AFTER EXPOSURE  0     dicyclomine (BENTYL) 10 MG capsule        enoxaparin (LOVENOX) 80 MG/0.8ML injection        enoxaparin (LOVENOX) 60 MG/0.6ML injection        HYDROmorphone (DILAUDID) 2 MG tablet Reported on 3/27/2017  0     tretinoin (RETIN-A) 0.025 % cream SINTIA A PEA-SIZED AMOUNT TO CLEANSED AND DRIED FACE AT NIGHT  3     VIVOTIF CR capsule TK 1 C  PO QOD.  0     sterile water, bottle, irrigation        Apixaban  (ELIQUIS PO) Take 2.5 mg by mouth 2 times daily       pantoprazole (PROTONIX) 40 MG EC tablet Take 1 tablet (40 mg) by mouth daily 30 tablet 0     RaNITidine HCl (ZANTAC PO) Take 150 mg by mouth daily as needed        HYDROcodone-acetaminophen (NORCO) 5-325 MG per tablet 1-2 tablets every 6 hours as needed for pain 30 tablet 0     LORazepam (ATIVAN) 0.5 MG tablet Take 1 tablet (0.5 mg) by mouth every 4 hours as needed (Anxiety, Nausea/Vomiting or Sleep) 30 tablet 2     prochlorperazine (COMPAZINE) 10 MG tablet Take 1 tablet (10 mg) by mouth every 6 hours as needed (Nausea/Vomiting) 30 tablet 2     ondansetron (ZOFRAN) 4 MG tablet Take 1 tablet (4 mg) by mouth every 8 hours as needed (Nausea/Vomiting) 30 tablet 3     MELATONIN PO Take 2.5 mg by mouth nightly as needed Reported on 3/20/2017       venlafaxine (EFFEXOR-XR) 75 MG 24 hr capsule Take 1 capsule (75 mg) by mouth daily 90 capsule 1     levothyroxine (SYNTHROID,LEVOTHROID) 100 MCG tablet Take 100 mcg by mouth daily        zolpidem (AMBIEN) 5 MG tablet Take 1 tablet (5 mg) by mouth nightly as needed for sleep 25 tablet 0     alendronate (FOSAMAX) 35 MG tablet Take 1 tablet (35 mg) by mouth every 7 days 12 tablet 3     Calcium Citrate-Vitamin D (CALCIUM CITRATE + PO) Take 200 mg by mouth daily as needed (She supplements to meet her 1,200mg goal depending on how much dietary calcium she has gotten that day (up to 400mg once daily)) Reported on 3/20/2017       Multiple Vitamin (MULTIVITAMINS PO) Take 1 tablet by mouth daily Reported on 3/20/2017          Allergies   Allergen Reactions     Erythromycin GI Disturbance     Tramadol Nausea     PHYSICAL EXAM:  /74 (BP Location: Left arm, Patient Position: Chair)  Pulse 101  Temp 98.2  F (36.8  C) (Oral)  Resp 18  Ht 1.524 m (5')  Wt 56.7 kg (125 lb)  SpO2 100%  BMI 24.41 kg/m2   Wt Readings from Last 10 Encounters:   04/04/17 56.7 kg (125 lb)   03/27/17 57.4 kg (126 lb 8 oz)   03/22/17 58.1 kg (128 lb)    03/20/17 57.2 kg (126 lb)   02/27/17 57.4 kg (126 lb 8 oz)   02/06/17 55.5 kg (122 lb 6.4 oz)   01/16/17 55.7 kg (122 lb 12.8 oz)   12/28/16 56.7 kg (124 lb 14.4 oz)   12/23/16 54 kg (119 lb)   12/20/16 54.4 kg (119 lb 14.4 oz)     General: alert, cooperative in no apparent distress  Skin: color pink, skin warm and moist, nails without clubbing or cyanosis, no suspicious nevi, rash, petechiae or ecchymoses  HEENT: normocephalic, atraumatic, PERRL, mucositis on sides of tongue with ulceration on the left side of tongue, mild erythema on side of tongue, dentition good, pharynx without exudate.   Neck: neck supple, no cervical or supraclavicular adenopathy  Respiratory: thorax is symmetric with good expansion, lungs clear to ausculation bilaterally, no wheezes, crackles or rhonchi  Cardiovascular: RRR, no murmurs or extra sounds  Gastrointestinal: active bowel sounds, soft, non-tender; no palpable masses or hepatosplenomegally  Peripheral Vascular: extremities are warm, without edema.     No labs done at today's visit.     Impression and Plan:  1. Oncology. 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 She is due for C6 on 4/10/2017. She is already scheduled for her labs, infusion and appointment with Dr. Aguilar on Monday.     2. 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 She has tried multiple treatment modalities without relief. She would like to try the dexamethasone oral elixir which was recently approved for chemotherapy induced mucositis.   - continue to use salt and soda   - follow with dexamethasone 10 ml/1 mg 4 times a day - swish for 2 minutes and spit.       Patricia SHANE OCN    Patient was seen and examined by me, as noted above. SVITLANA Murphy acted as a scribe. I have reviewed and edited the above note.  Rebeka Kim PA-C

## 2017-04-04 NOTE — MR AVS SNAPSHOT
After Visit Summary   4/4/2017    Maggie Navarrete    MRN: [id number]           Patient Information     Date Of Birth          1950        Visit Information        Provider Department      4/4/2017 8:40 AM Rebeka Kim PA-C [facility]        Today's Diagnoses     Mucositis due to chemotherapy    -  1    Uterus cancer, sarcoma (H)           Follow-ups after your visit        Your next 10 appointments already scheduled     Apr 10, 2017  8:15 AM CDT   [facility] Lab Draw with [facility] LAB DRAW   [facility] Draw ([facility])    [address] [place] [id number] [phone]            Apr 10, 2017  8:30 AM CDT   (Arrive by 8:15 AM)   Return Visit with Ab Aguilar MD   [facility] ([facility])    [address] [place] [id number] [phone]            Apr 10, 2017  9:00 AM CDT   Infusion 120 with UC ONCOLOGY INFUSION, UC 18 ATC   [facility] ([facility])    [address] [id number] [phone]            Apr 11, 2017  2:00 PM CDT   Infusion 120 with UC ONCOLOGY INFUSION, UC 14 ATC   [facility] ([facility])    [address] [place] [id number] [phone]            Apr 12, 2017  1:30 PM CDT   Infusion 120 with UC ONCOLOGY INFUSION, UC 22 ATC   [facility])    [address] [place] [id number] [phone]              Who to contact     If you have questions or need follow up information about today's clinic visit or your schedule please contact [facility] directly at [phone].  Normal or non-critical lab and imaging results will be communicated to you by MyChart, letter or  phone within 4 business days after the clinic has received the results. If you do not hear from us within 7 days, please contact the clinic through F[employer] or phone. If you have a critical or abnormal lab result, we will notify you by phone as soon as possible.  Submit refill requests through F[employer]usion or call your pharmacy and they will forward the refill request to us. Please allow 3 business days for your refill to be completed.          Additional Information About Your Visit        [facility] [facility] Information     Fyusion gives you secure access to your electronic health record. If you see a primary care provider, you can also send messages to your care team and make appointments. If you have questions, please call your primary care clinic.  If you do not have a primary care provider, please call [phone] and they will assist you.        Care EveryWhere ID     This is your Care EveryWhere ID. This could be used by other organizations to access your [place] medical records  [id number]        Your Vitals Were     Pulse Temperature Respirations Height Pulse Oximetry BMI (Body Mass Index)    101 98.2  F (36.8  C) (Oral) 18 1.524 m (5') 100% 24.41 kg/m2       Blood Pressure from Last 3 Encounters:   04/04/17 111/74   03/27/17 121/65   03/22/17 149/65    Weight from Last 3 Encounters:   04/04/17 56.7 kg (125 lb)   03/27/17 57.4 kg (126 lb 8 oz)   03/22/17 58.1 kg (128 lb)              Today, you had the following     No orders found for display         Today's Medication Changes          These changes are accurate as of: 4/4/17 11:59 PM.  If you have any questions, ask your nurse or doctor.               Start taking these medicines.        Dose/Directions    dexamethasone 0.5 MG/5ML Elix   Used for:  Mucositis due to chemotherapy   Started by:  Rebeka Kim PA-C        10 ml 4 times a day swish for 2 minutes before spitting it out until symptoms resolve   Quantity:  240 mL   Refills:  0            Where to get  your medicines      These medications were sent to [facility] [address] [place] [address] - [address]  [address] [facility] [place] [place] [address]     Phone:  [phone]     dexamethasone 0.5 MG/5ML Elix                Primary Care Provider Office Phone # Fax #    Kristine Flynn MD PhD [phone]        [facility] [address] [facility] [address]  [facility] [address]        Thank you!     Thank you for choosing [facility]  for your care. Our goal is always to provide you with excellent care. Hearing back from our patients is one way we can continue to improve our services. Please take a few minutes to complete the written survey that you may receive in the mail after your visit with us. Thank you!             Your Updated Medication List - Protect others around you: Learn how to safely use, store and throw away your medicines at www.disposemymeds.org.          This list is accurate as of: 4/4/17 11:59 PM.  Always use your most recent med list.                   Brand Name Dispense Instructions for use    alendronate 35 MG tablet    FOSAMAX    12 tablet    Take 1 tablet (35 mg) by mouth every 7 days       atovaquone-proguanil 250-100 MG per tablet    MALARONE     TK 1 T PO D. START 2 DAYS B EXPOSURE TO MALARIA AND. CONTINUE D TILL 7 DAYS AFTER EXPOSURE       CALCIUM CITRATE + PO      Take 200 mg by mouth daily as needed (She supplements to meet her 1,200mg goal depending on how much dietary calcium she has gotten that day (up to 400mg once daily)) Reported on 3/20/2017       dexamethasone 0.5 MG/5ML Elix     240 mL    10 ml 4 times a day swish for 2 minutes before spitting it out until symptoms resolve       dicyclomine 10 MG capsule    BENTYL         ELIQUIS PO      Take 2.5 mg by mouth 2 times daily       * enoxaparin 60 MG/0.6ML injection    LOVENOX         * enoxaparin 80 MG/0.8ML injection    LOVENOX         HYDROcodone-acetaminophen 5-325 MG per tablet     NORCO    30 tablet    1-2 tablets every 6 hours as needed for pain       HYDROmorphone 2 MG tablet    DILAUDID     Reported on 3/27/2017       levothyroxine 100 MCG tablet    SYNTHROID/LEVOTHROID     Take 100 mcg by mouth daily       LORazepam 0.5 MG tablet    ATIVAN    30 tablet    Take 1 tablet (0.5 mg) by mouth every 4 hours as needed (Anxiety, Nausea/Vomiting or Sleep)       MELATONIN PO      Take 2.5 mg by mouth nightly as needed Reported on 3/20/2017       MULTIVITAMINS PO      Take 1 tablet by mouth daily Reported on 3/20/2017       ondansetron 4 MG tablet    ZOFRAN    30 tablet    Take 1 tablet (4 mg) by mouth every 8 hours as needed (Nausea/Vomiting)       pantoprazole 40 MG EC tablet    PROTONIX    30 tablet    Take 1 tablet (40 mg) by mouth daily       prochlorperazine 10 MG tablet    COMPAZINE    30 tablet    Take 1 tablet (10 mg) by mouth every 6 hours as needed (Nausea/Vomiting)       sterile water (bottle) irrigation          tretinoin 0.025 % cream    RETIN-A     SINTIA A PEA-SIZED AMOUNT TO CLEANSED AND DRIED FACE AT NIGHT       venlafaxine 75 MG 24 hr capsule    EFFEXOR-XR    90 capsule    Take 1 capsule (75 mg) by mouth daily       VIVOTIF CR capsule   Generic drug:  typhoid      TK 1 C  PO QOD.       ZANTAC PO      Take 150 mg by mouth daily as needed       zolpidem 5 MG tablet    AMBIEN    25 tablet    Take 1 tablet (5 mg) by mouth nightly as needed for sleep       * Notice:  This list has 2 medication(s) that are the same as other medications prescribed for you. Read the directions carefully, and ask your doctor or other care provider to review them with you.

## 2017-04-10 ENCOUNTER — ONCOLOGY VISIT (OUTPATIENT)
Dept: ONCOLOGY | Facility: CLINIC | Age: 67
End: 2017-04-10
Attending: INTERNAL MEDICINE
Payer: MEDICARE

## 2017-04-10 ENCOUNTER — APPOINTMENT (OUTPATIENT)
Dept: LAB | Facility: CLINIC | Age: 67
End: 2017-04-10
Attending: INTERNAL MEDICINE
Payer: MEDICARE

## 2017-04-10 VITALS
HEIGHT: 60 IN | TEMPERATURE: 97.5 F | BODY MASS INDEX: 24.72 KG/M2 | SYSTOLIC BLOOD PRESSURE: 111 MMHG | RESPIRATION RATE: 16 BRPM | DIASTOLIC BLOOD PRESSURE: 67 MMHG | OXYGEN SATURATION: 100 % | WEIGHT: 125.9 LBS | HEART RATE: 91 BPM

## 2017-04-10 DIAGNOSIS — C49.9 LEIOMYOSARCOMA (H): Primary | ICD-10-CM

## 2017-04-10 PROCEDURE — 99214 OFFICE O/P EST MOD 30 MIN: CPT | Mod: ZP | Performed by: INTERNAL MEDICINE

## 2017-04-10 PROCEDURE — 36591 DRAW BLOOD OFF VENOUS DEVICE: CPT

## 2017-04-10 PROCEDURE — 25000128 H RX IP 250 OP 636: Mod: ZF | Performed by: INTERNAL MEDICINE

## 2017-04-10 PROCEDURE — 99212 OFFICE O/P EST SF 10 MIN: CPT | Mod: ZF

## 2017-04-10 RX ORDER — HEPARIN SODIUM (PORCINE) LOCK FLUSH IV SOLN 100 UNIT/ML 100 UNIT/ML
5 SOLUTION INTRAVENOUS ONCE
Status: COMPLETED | OUTPATIENT
Start: 2017-04-10 | End: 2017-04-10

## 2017-04-10 RX ADMIN — SODIUM CHLORIDE, PRESERVATIVE FREE 5 ML: 5 INJECTION INTRAVENOUS at 08:39

## 2017-04-10 ASSESSMENT — PAIN SCALES - GENERAL: PAINLEVEL: NO PAIN (0)

## 2017-04-10 NOTE — MR AVS SNAPSHOT
After Visit Summary   4/10/2017    Maggie Navarrete    MRN: [id number]           Patient Information     Date Of Birth          1950        Visit Information        Provider Department      4/10/2017 8:30 AM Ab Gutierrez MD [facility]        Today's Diagnoses     Leiomyosarcoma (H)    -  1       Follow-ups after your visit        Follow-up notes from your care team     Return in about 3 weeks (around 5/1/2017).      Your next 10 appointments already scheduled     May 01, 2017 10:00 AM CDT   [facility] Lab Draw with  [place] LAB DRAW   [facility] Draw ([facility])    [address] [place] [id number] [phone]            May 01, 2017 10:30 AM CDT   (Arrive by 10:15 AM)   Return Visit with Ab Aguilar MD   [facility] ([facility])    [address] [place] [address] [phone]              Who to contact     If you have questions or need follow up information about today's clinic visit or your schedule please contact [facility] directly at [phone].  Normal or non-critical lab and imaging results will be communicated to you by [employer], letter or phone within 4 business days after the clinic has received the results. If you do not hear from us within 7 days, please contact the clinic through [employer] or phone. If you have a critical or abnormal lab result, we will notify you by phone as soon as possible.  Submit refill requests through [employer] or call your pharmacy and they will forward the refill request to us. Please allow 3 business days for your refill to be completed.          Additional Information About Your Visit        MyChart Information     Inter[employer] gives you secure access to your electronic health record. If you see a primary care provider, you can also send messages to your care team and  make appointments. If you have questions, please call your primary care clinic.  If you do not have a primary care provider, please call [phone] and they will assist you.        Care EveryWhere ID     This is your Care EveryWhere ID. This could be used by other organizations to access your [place] medical records  [id number]        Your Vitals Were     Pulse Temperature Respirations Height Pulse Oximetry BMI (Body Mass Index)    91 97.5  F (36.4  C) (Oral) 16 1.524 m (5') 100% 24.59 kg/m2       Blood Pressure from Last 3 Encounters:   04/10/17 111/67   04/04/17 111/74   03/27/17 121/65    Weight from Last 3 Encounters:   04/10/17 57.1 kg (125 lb 14.4 oz)   04/04/17 56.7 kg (125 lb)   03/27/17 57.4 kg (126 lb 8 oz)              Today, you had the following     No orders found for display         Today's Medication Changes          These changes are accurate as of: 4/10/17  1:00 PM.  If you have any questions, ask your nurse or doctor.               These medicines have changed or have updated prescriptions.        Dose/Directions    dexamethasone 0.5 MG/5ML Elix   This may have changed:    - when to take this  - reasons to take this  - additional instructions   Used for:  Mucositis due to chemotherapy        10 ml 4 times a day swish for 2 minutes before spitting it out until symptoms resolve   Quantity:  240 mL   Refills:  0         Stop taking these medicines if you haven't already. Please contact your care team if you have questions.     atovaquone-proguanil 250-100 MG per tablet   Commonly known as:  MALARONE   Stopped by:  Ab Gutierrez MD           dicyclomine 10 MG capsule   Commonly known as:  BENTYL   Stopped by:  Ab Gutierrez MD           enoxaparin 60 MG/0.6ML injection   Commonly known as:  LOVENOX   Stopped by:  Ab Gutierrez MD           enoxaparin 80 MG/0.8ML injection   Commonly known as:  LOVENOX   Stopped by:  Ab Gutierrez MD                     Primary Care Provider Office Phone # Fax #    Kristine Flynn MD PhD [phone]        [facility] [address] [facility] [address]  [facility] [address]        Thank you!     Thank you for choosing [facility]  for your care. Our goal is always to provide you with excellent care. Hearing back from our patients is one way we can continue to improve our services. Please take a few minutes to complete the written survey that you may receive in the mail after your visit with us. Thank you!             Your Updated Medication List - Protect others around you: Learn how to safely use, store and throw away your medicines at www.disposemymeds.org.          This list is accurate as of: 4/10/17  1:00 PM.  Always use your most recent med list.                   Brand Name Dispense Instructions for use    alendronate 35 MG tablet    FOSAMAX    12 tablet    Take 1 tablet (35 mg) by mouth every 7 days       CALCIUM CITRATE + PO      Take 200 mg by mouth daily as needed (She supplements to meet her 1,200mg goal depending on how much dietary calcium she has gotten that day (up to 400mg once daily)) Reported on 4/10/2017       dexamethasone 0.5 MG/5ML Elix     240 mL    10 ml 4 times a day swish for 2 minutes before spitting it out until symptoms resolve       ELIQUIS PO      Take 2.5 mg by mouth 2 times daily       HYDROcodone-acetaminophen 5-325 MG per tablet    NORCO    30 tablet    1-2 tablets every 6 hours as needed for pain       HYDROmorphone 2 MG tablet    DILAUDID     Reported on 4/10/2017       levothyroxine 100 MCG tablet    SYNTHROID/LEVOTHROID     Take 100 mcg by mouth daily       LORazepam 0.5 MG tablet    ATIVAN    30 tablet    Take 1 tablet (0.5 mg) by mouth every 4 hours as needed (Anxiety, Nausea/Vomiting or Sleep)       MELATONIN PO      Take 2.5 mg by mouth nightly as needed Reported on 4/10/2017       MULTIVITAMINS PO      Take 1 tablet by mouth daily Reported on 4/10/2017        ondansetron 4 MG tablet    ZOFRAN    30 tablet    Take 1 tablet (4 mg) by mouth every 8 hours as needed (Nausea/Vomiting)       pantoprazole 40 MG EC tablet    PROTONIX    30 tablet    Take 1 tablet (40 mg) by mouth daily       prochlorperazine 10 MG tablet    COMPAZINE    30 tablet    Take 1 tablet (10 mg) by mouth every 6 hours as needed (Nausea/Vomiting)       sterile water (bottle) irrigation          tretinoin 0.025 % cream    RETIN-A     Reported on 4/10/2017       venlafaxine 75 MG 24 hr capsule    EFFEXOR-XR    90 capsule    Take 1 capsule (75 mg) by mouth daily       VIVOTIF CR capsule   Generic drug:  typhoid      Reported on 4/10/2017       ZANTAC PO      Take 150 mg by mouth daily as needed       zolpidem 5 MG tablet    AMBIEN    25 tablet    Take 1 tablet (5 mg) by mouth nightly as needed for sleep

## 2017-04-10 NOTE — LETTER
4/10/2017       RE: Maggie Navarrete  [address]  [facility] [address]     Dear Colleague,    Thank you for referring your patient, Maggie Navarrete, to the [facility]. Please see a copy of my visit note below.    MEDICAL ONCOLOGY PROGRESS NOTE   Apr 10, 2017    Oncologic history:  1. January 2016, she presents to her PCP with 3 weeks of abdominal pain and severe constipation. She is treated for constipation and improves.  2. 3/21/2016, worsening pelvic pressure prompts pelvic examination with an enlarged uterus noted. Transvaginal ultrasound shows enlargement of subserosal fibroids originally seen on MRI in 2006.  3. 5/9/2016, MRI pelvis is obtained, which shows several enhancing uterine masses involving an enlarged uterus, felt radiographically compatible with benign uterine leiomyomata.  3. Summer 2016, she experiences worsening low back pain thought at first thought to be sacroiliitis, but pain persists after corticosteroid injection. Continues with severe constipation.  4. 9/8/2016, she undergoes colonoscopy for persistent abdominal pain. She has sigmoid polyp snared. Abdominal pain and diarrhea persist after colonoscopy prep.  5. 9/14/2016, she has worsening abdominal pain and is evaluated in the ED. CT-scan shows a large subserosal fibroid measuring 9.4 x 9.6 x 9.8 cm, as well as very large fibroids along the posterior uterine body more inferiorly, increased in size to 14 cm in greatest diameter compared to 10 cm in greatest diameter on MRI of 5/9/2016.  6. 9/29/2016, she has staging CT-CAP, which shows a small left sided pleural effusion and a 3 mm pulmonary nodule in the RML.  7. October 2016, self-refers to [facility].  8. 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  9. 11/4/2016, undergoes total abdominal hysterectomy and BSO, under Dr. Harsh Camarillo. Surgery was originally presumed for leiomyoma, however, intraoperatively  there was focal adherence of a mass to the pelvic right sidewall and cul-de-sac, which led to peritoneal resection and omentectomy. Final pathology, pT2b: showed leiomyosarcoma involving the cul-de-sac and forming two separate masses measuring 10.5 x 6.5 x 4.5 cm and 10.7 x 7.9 x 7.5 cm.  10. 12/26/2016, she starts adjuvant chemotherapy with doxorubicin 25 mg/m2/day and DTIC 250 mg/m2/day , days 1-3 every 21 weeks.    HISTORY OF PRESENT ILLNESS  Maggie Navarrete is a 66 year old female with a diagnosis leiomyosarcoma.of the uterus. She is currently receiving adjuvant Doxorubicin and Dacarbazine and has received 5 cycles of chemotherapy. She is here for consideration of cycle 6. She had worsening of mucositis with her last cycle and felt it to be severe this time. She saw Rebeka Kim on the 4th and she was started on dexamethasone swish and spit. She has also been trying Honey for relief of symptoms and finds this helps. She was also able to visit with Dr. Godfrey on 3/27 and plan is for clinical monitoring and observation visits after completion of adjuvant chemotherapy treatments.    Labs obtained today are satisfactory for chemotherapy. Hemoglobin is 9.3 g/dL, white count and platelet counts are normal.    Lab Results   Component Value Date    WBC 6.2 04/10/2017    HGB 9.3 (L) 04/10/2017    HCT 29.8 (L) 04/10/2017     (H) 04/10/2017     04/10/2017    POTASSIUM 3.9 04/10/2017    CHLORIDE 110 (H) 04/10/2017    CO2 25 04/10/2017    BUN 18 04/10/2017    CR 0.60 04/10/2017    GLC 80 04/10/2017    AST 12 04/10/2017    ALT 17 04/10/2017    ALKPHOS 79 04/10/2017    BILITOTAL 0.3 04/10/2017    INR 0.98 12/23/2016       REVIEW OF SYSTEMS  A 12-point ROS negative except as in HPI    Current Outpatient Prescriptions   Medication Sig Dispense Refill     dexamethasone 0.5 MG/5ML ELIX 10 ml 4 times a day swish for 2 minutes before spitting it out until symptoms resolve (Patient taking differently: daily as needed 10  ml 4 times a day swish for 2 minutes before spitting it out until symptoms resolve) 240 mL 0     sterile water, bottle, irrigation        Apixaban (ELIQUIS PO) Take 2.5 mg by mouth 2 times daily       RaNITidine HCl (ZANTAC PO) Take 150 mg by mouth daily as needed        HYDROcodone-acetaminophen (NORCO) 5-325 MG per tablet 1-2 tablets every 6 hours as needed for pain 30 tablet 0     LORazepam (ATIVAN) 0.5 MG tablet Take 1 tablet (0.5 mg) by mouth every 4 hours as needed (Anxiety, Nausea/Vomiting or Sleep) 30 tablet 2     ondansetron (ZOFRAN) 4 MG tablet Take 1 tablet (4 mg) by mouth every 8 hours as needed (Nausea/Vomiting) 30 tablet 3     venlafaxine (EFFEXOR-XR) 75 MG 24 hr capsule Take 1 capsule (75 mg) by mouth daily 90 capsule 1     levothyroxine (SYNTHROID,LEVOTHROID) 100 MCG tablet Take 100 mcg by mouth daily        alendronate (FOSAMAX) 35 MG tablet Take 1 tablet (35 mg) by mouth every 7 days 12 tablet 3     HYDROmorphone (DILAUDID) 2 MG tablet Reported on 4/10/2017  0     tretinoin (RETIN-A) 0.025 % cream Reported on 4/10/2017  3     VIVOTIF CR capsule Reported on 4/10/2017  0     pantoprazole (PROTONIX) 40 MG EC tablet Take 1 tablet (40 mg) by mouth daily (Patient not taking: Reported on 4/10/2017) 30 tablet 0     prochlorperazine (COMPAZINE) 10 MG tablet Take 1 tablet (10 mg) by mouth every 6 hours as needed (Nausea/Vomiting) (Patient not taking: Reported on 4/10/2017) 30 tablet 2     MELATONIN PO Take 2.5 mg by mouth nightly as needed Reported on 4/10/2017       zolpidem (AMBIEN) 5 MG tablet Take 1 tablet (5 mg) by mouth nightly as needed for sleep (Patient not taking: Reported on 4/10/2017) 25 tablet 0     Calcium Citrate-Vitamin D (CALCIUM CITRATE + PO) Take 200 mg by mouth daily as needed (She supplements to meet her 1,200mg goal depending on how much dietary calcium she has gotten that day (up to 400mg once daily)) Reported on 4/10/2017       Multiple Vitamin (MULTIVITAMINS PO) Take 1 tablet by  mouth daily Reported on 4/10/2017         Past Medical History:   Diagnosis Date     Anxiety      Arthritis     pain in feet and knees     Disorder of bone and cartilage, unspecified      Esophageal reflux 2015     GERD (gastroesophageal reflux disease)      Hx of previous reproductive problem 1980     Kidney stone 2011     Personal history of colonic polyps 2016    Small, benign     PONV (postoperative nausea and vomiting)      Posterior vitreous detachment of left eye 2011     Posterior vitreous detachment of right eye 2011     Ptosis of eyelid, bilateral      Stomach problems 2015    Abdominal pain, Diarreha     Thyroid disease     thyroid nodule resolved 2014         PHYSICAL EXAMINATION  /67 (BP Location: Right arm, Patient Position: Chair, Cuff Size: Adult Regular)  Pulse 91  Temp 97.5  F (36.4  C) (Oral)  Resp 16  Ht 1.524 m (5')  Wt 57.1 kg (125 lb 14.4 oz)  SpO2 100%  BMI 24.59 kg/m2  Wt Readings from Last 2 Encounters:   04/10/17 57.1 kg (125 lb 14.4 oz)   04/04/17 56.7 kg (125 lb)     Physical Exam   Constitutional: She is oriented to person, place, and time. She appears well-developed and well-nourished. No distress.   HENT:   Head: Atraumatic.   Right and left lateral tongue with healing mucositis   Eyes: Conjunctivae and EOM are normal. Pupils are equal, round, and reactive to light. No scleral icterus.   Cardiovascular: Normal rate, regular rhythm and normal heart sounds.    No murmur heard.  Pulmonary/Chest: Effort normal and breath sounds normal. She has no wheezes.   Musculoskeletal: Normal range of motion. She exhibits no edema.   Lymphadenopathy:     She has no cervical adenopathy.   Neurological: She is alert and oriented to person, place, and time. No cranial nerve deficit. She exhibits normal muscle tone. Coordination normal.   Skin: Skin is warm and dry. No rash noted.   Psychiatric: She has a normal mood and affect. Her behavior is normal.   Nursing note and vitals  reviewed.      IMPRESSION AND PLAN  #1 Locally advanced leiomyosarcoma of the uterus, pT2b Nx M0, Stage IIB, status-post resection, currently on adjuvant chemotherapy  We will hold chemotherapy today so as not to cause her painful mucositis during an upcoming planned trip to [facility] with her sister in 10 days. I reviewed the ability to reduce the dose of chemotherapy. It would be reasonable to reduce the dose of doxorubicin by 20%. Or could consider reduction of the DTIC by similar amount. I recommend we wait until she returns from her trip in about 3 weeks for her to receive cycle 6. She will think about it and let us know how she would like to proceed. She is thinking she might just forego cycle 6 entirely.  I will send a message to Dr. Le at [facility] to update him on the status of adjuvant therapy.    Plan to see her back in about 3 weeks for a review of her status and final decision on cycle 6.    Ab Warner M.D.   of Medicine  Hematology, Oncology and Transplantation  [facility]

## 2017-04-10 NOTE — PROGRESS NOTES
MEDICAL ONCOLOGY PROGRESS NOTE   Apr 10, 2017    Oncologic history:  1. January 2016, she presents to her PCP with 3 weeks of abdominal pain and severe constipation. She is treated for constipation and improves.  2. 3/21/2016, worsening pelvic pressure prompts pelvic examination with an enlarged uterus noted. Transvaginal ultrasound shows enlargement of subserosal fibroids originally seen on MRI in 2006.  3. 5/9/2016, MRI pelvis is obtained, which shows several enhancing uterine masses involving an enlarged uterus, felt radiographically compatible with benign uterine leiomyomata.  3. Summer 2016, she experiences worsening low back pain thought at first thought to be sacroiliitis, but pain persists after corticosteroid injection. Continues with severe constipation.  4. 9/8/2016, she undergoes colonoscopy for persistent abdominal pain. She has sigmoid polyp snared. Abdominal pain and diarrhea persist after colonoscopy prep.  5. 9/14/2016, she has worsening abdominal pain and is evaluated in the ED. CT-scan shows a large subserosal fibroid measuring 9.4 x 9.6 x 9.8 cm, as well as very large fibroids along the posterior uterine body more inferiorly, increased in size to 14 cm in greatest diameter compared to 10 cm in greatest diameter on MRI of 5/9/2016.  6. 9/29/2016, she has staging CT-CAP, which shows a small left sided pleural effusion and a 3 mm pulmonary nodule in the RML.  7. October 2016[facility]  8. 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  9. 11/4/2016, undergoes total abdominal hysterectomy and BSO, under Dr. Harsh Camarillo. Surgery was originally presumed for leiomyoma, however, intraoperatively there was focal adherence of a mass to the pelvic right sidewall and cul-de-sac, which led to peritoneal resection and omentectomy. Final pathology, pT2b: showed leiomyosarcoma involving the cul-de-sac and forming two separate masses measuring  10.5 x 6.5 x 4.5 cm and 10.7 x 7.9 x 7.5 cm.  10. 12/26/2016, she starts adjuvant chemotherapy with doxorubicin 25 mg/m2/day and DTIC 250 mg/m2/day , days 1-3 every 21 weeks.    HISTORY OF PRESENT ILLNESS  Maggie Navarrete is a 66 year old female with a diagnosis leiomyosarcoma.of the uterus. She is currently receiving adjuvant Doxorubicin and Dacarbazine and has received 5 cycles of chemotherapy. She is here for consideration of cycle 6. She had worsening of mucositis with her last cycle and felt it to be severe this time. She saw Rebeka Kim on the 4th and she was started on dexamethasone swish and spit. She has also been trying Honey for relief of symptoms and finds this helps. She was also able to visit with Dr. Godfrey on 3/27 and plan is for clinical monitoring and observation visits after completion of adjuvant chemotherapy treatments.    Labs obtained today are satisfactory for chemotherapy. Hemoglobin is 9.3 g/dL, white count and platelet counts are normal.    Lab Results   Component Value Date    WBC 6.2 04/10/2017    HGB 9.3 (L) 04/10/2017    HCT 29.8 (L) 04/10/2017     (H) 04/10/2017     04/10/2017    POTASSIUM 3.9 04/10/2017    CHLORIDE 110 (H) 04/10/2017    CO2 25 04/10/2017    BUN 18 04/10/2017    CR 0.60 04/10/2017    GLC 80 04/10/2017    AST 12 04/10/2017    ALT 17 04/10/2017    ALKPHOS 79 04/10/2017    BILITOTAL 0.3 04/10/2017    INR 0.98 12/23/2016       REVIEW OF SYSTEMS  A 12-point ROS negative except as in HPI    Current Outpatient Prescriptions   Medication Sig Dispense Refill     dexamethasone 0.5 MG/5ML ELIX 10 ml 4 times a day swish for 2 minutes before spitting it out until symptoms resolve (Patient taking differently: daily as needed 10 ml 4 times a day swish for 2 minutes before spitting it out until symptoms resolve) 240 mL 0     sterile water, bottle, irrigation        Apixaban (ELIQUIS PO) Take 2.5 mg by mouth 2 times daily       RaNITidine HCl (ZANTAC PO) Take 150 mg by  mouth daily as needed        HYDROcodone-acetaminophen (NORCO) 5-325 MG per tablet 1-2 tablets every 6 hours as needed for pain 30 tablet 0     LORazepam (ATIVAN) 0.5 MG tablet Take 1 tablet (0.5 mg) by mouth every 4 hours as needed (Anxiety, Nausea/Vomiting or Sleep) 30 tablet 2     ondansetron (ZOFRAN) 4 MG tablet Take 1 tablet (4 mg) by mouth every 8 hours as needed (Nausea/Vomiting) 30 tablet 3     venlafaxine (EFFEXOR-XR) 75 MG 24 hr capsule Take 1 capsule (75 mg) by mouth daily 90 capsule 1     levothyroxine (SYNTHROID,LEVOTHROID) 100 MCG tablet Take 100 mcg by mouth daily        alendronate (FOSAMAX) 35 MG tablet Take 1 tablet (35 mg) by mouth every 7 days 12 tablet 3     HYDROmorphone (DILAUDID) 2 MG tablet Reported on 4/10/2017  0     tretinoin (RETIN-A) 0.025 % cream Reported on 4/10/2017  3     VIVOTIF CR capsule Reported on 4/10/2017  0     pantoprazole (PROTONIX) 40 MG EC tablet Take 1 tablet (40 mg) by mouth daily (Patient not taking: Reported on 4/10/2017) 30 tablet 0     prochlorperazine (COMPAZINE) 10 MG tablet Take 1 tablet (10 mg) by mouth every 6 hours as needed (Nausea/Vomiting) (Patient not taking: Reported on 4/10/2017) 30 tablet 2     MELATONIN PO Take 2.5 mg by mouth nightly as needed Reported on 4/10/2017       zolpidem (AMBIEN) 5 MG tablet Take 1 tablet (5 mg) by mouth nightly as needed for sleep (Patient not taking: Reported on 4/10/2017) 25 tablet 0     Calcium Citrate-Vitamin D (CALCIUM CITRATE + PO) Take 200 mg by mouth daily as needed (She supplements to meet her 1,200mg goal depending on how much dietary calcium she has gotten that day (up to 400mg once daily)) Reported on 4/10/2017       Multiple Vitamin (MULTIVITAMINS PO) Take 1 tablet by mouth daily Reported on 4/10/2017         Past Medical History:   Diagnosis Date     Anxiety      Arthritis     pain in feet and knees     Disorder of bone and cartilage, unspecified      Esophageal reflux 2015     GERD (gastroesophageal reflux  disease)      Hx of previous reproductive problem 1980     Kidney stone 2011     Personal history of colonic polyps 2016    Small, benign     PONV (postoperative nausea and vomiting)      Posterior vitreous detachment of left eye 2011     Posterior vitreous detachment of right eye 2011     Ptosis of eyelid, bilateral      Stomach problems 2015    Abdominal pain, Diarreha     Thyroid disease     thyroid nodule resolved 2014         PHYSICAL EXAMINATION  /67 (BP Location: Right arm, Patient Position: Chair, Cuff Size: Adult Regular)  Pulse 91  Temp 97.5  F (36.4  C) (Oral)  Resp 16  Ht 1.524 m (5')  Wt 57.1 kg (125 lb 14.4 oz)  SpO2 100%  BMI 24.59 kg/m2  Wt Readings from Last 2 Encounters:   04/10/17 57.1 kg (125 lb 14.4 oz)   04/04/17 56.7 kg (125 lb)     Physical Exam   Constitutional: She is oriented to person, place, and time. She appears well-developed and well-nourished. No distress.   HENT:   Head: Atraumatic.   Right and left lateral tongue with healing mucositis   Eyes: Conjunctivae and EOM are normal. Pupils are equal, round, and reactive to light. No scleral icterus.   Cardiovascular: Normal rate, regular rhythm and normal heart sounds.    No murmur heard.  Pulmonary/Chest: Effort normal and breath sounds normal. She has no wheezes.   Musculoskeletal: Normal range of motion. She exhibits no edema.   Lymphadenopathy:     She has no cervical adenopathy.   Neurological: She is alert and oriented to person, place, and time. No cranial nerve deficit. She exhibits normal muscle tone. Coordination normal.   Skin: Skin is warm and dry. No rash noted.   Psychiatric: She has a normal mood and affect. Her behavior is normal.   Nursing note and vitals reviewed.      IMPRESSION AND PLAN  #1 Locally advanced leiomyosarcoma of the uterus, pT2b Nx M0, Stage IIB, status-post resection, currently on adjuvant chemotherapy  We will hold chemotherapy today so as not to cause her painful mucositis during an  upcoming planned trip to [facility] with her sister in 10 days. I reviewed the ability to reduce the dose of chemotherapy. It would be reasonable to reduce the dose of doxorubicin by 20%. Or could consider reduction of the DTIC by similar amount. I recommend we wait until she returns from her trip in about 3 weeks for her to receive cycle 6. She will think about it and let us know how she would like to proceed. She is thinking she might just forego cycle 6 entirely.  I will send a message to Dr. Le at [facility] to update him on the status of adjuvant therapy.    Plan to see her back in about 3 weeks for a review of her status and final decision on cycle 6.    Ab Warner M.D.   of Medicine  Hematology, Oncology and Transplantation  [facility]

## 2017-04-10 NOTE — NURSING NOTE
Chief Complaint   Patient presents with     Port Draw     Labs drawn by RN from port. VS taken.      Port accessed by RN with 20g 3/4in Power Port needle. Labs drawn, port flushed with NS and Heparin.   Rayna Zhang RN

## 2017-05-01 ENCOUNTER — APPOINTMENT (OUTPATIENT)
Dept: LAB | Facility: CLINIC | Age: 67
End: 2017-05-01
Attending: INTERNAL MEDICINE
Payer: MEDICARE

## 2017-05-01 ENCOUNTER — ONCOLOGY VISIT (OUTPATIENT)
Dept: ONCOLOGY | Facility: CLINIC | Age: 67
End: 2017-05-01
Attending: INTERNAL MEDICINE
Payer: MEDICARE

## 2017-05-01 VITALS
HEART RATE: 98 BPM | SYSTOLIC BLOOD PRESSURE: 130 MMHG | BODY MASS INDEX: 25.76 KG/M2 | OXYGEN SATURATION: 99 % | HEIGHT: 60 IN | WEIGHT: 131.2 LBS | DIASTOLIC BLOOD PRESSURE: 63 MMHG | TEMPERATURE: 98.4 F | RESPIRATION RATE: 17 BRPM

## 2017-05-01 DIAGNOSIS — T45.1X5A: ICD-10-CM

## 2017-05-01 DIAGNOSIS — E04.1 THYROID NODULE: ICD-10-CM

## 2017-05-01 DIAGNOSIS — C54.9 MALIGNANT NEOPLASM OF CORPUS UTERI, EXCEPT ISTHMUS (H): Primary | ICD-10-CM

## 2017-05-01 LAB
ALBUMIN SERPL-MCNC: 3.5 G/DL (ref 3.4–5)
ALP SERPL-CCNC: 93 U/L (ref 40–150)
ALT SERPL W P-5'-P-CCNC: 23 U/L (ref 0–50)
ANION GAP SERPL CALCULATED.3IONS-SCNC: 10 MMOL/L (ref 3–14)
AST SERPL W P-5'-P-CCNC: 17 U/L (ref 0–45)
BASOPHILS # BLD AUTO: 0 10E9/L (ref 0–0.2)
BASOPHILS NFR BLD AUTO: 0.7 %
BILIRUB SERPL-MCNC: 0.5 MG/DL (ref 0.2–1.3)
BUN SERPL-MCNC: 19 MG/DL (ref 7–30)
CALCIUM SERPL-MCNC: 8.9 MG/DL (ref 8.5–10.1)
CHLORIDE SERPL-SCNC: 109 MMOL/L (ref 94–109)
CO2 SERPL-SCNC: 26 MMOL/L (ref 20–32)
CREAT SERPL-MCNC: 0.61 MG/DL (ref 0.52–1.04)
DIFFERENTIAL METHOD BLD: NORMAL
EOSINOPHIL # BLD AUTO: 0.6 10E9/L (ref 0–0.7)
EOSINOPHIL NFR BLD AUTO: 9.9 %
ERYTHROCYTE [DISTWIDTH] IN BLOOD BY AUTOMATED COUNT: 14.3 % (ref 10–15)
GFR SERPL CREATININE-BSD FRML MDRD: >90 ML/MIN/1.7M2
GLUCOSE SERPL-MCNC: 80 MG/DL (ref 70–99)
HCT VFR BLD AUTO: 39.3 % (ref 35–47)
HGB BLD-MCNC: 12.5 G/DL (ref 11.7–15.7)
IMM GRANULOCYTES # BLD: 0 10E9/L (ref 0–0.4)
IMM GRANULOCYTES NFR BLD: 0.2 %
LYMPHOCYTES # BLD AUTO: 1.8 10E9/L (ref 0.8–5.3)
LYMPHOCYTES NFR BLD AUTO: 30.2 %
MCH RBC QN AUTO: 31.7 PG (ref 26.5–33)
MCHC RBC AUTO-ENTMCNC: 31.8 G/DL (ref 31.5–36.5)
MCV RBC AUTO: 100 FL (ref 78–100)
MONOCYTES # BLD AUTO: 0.6 10E9/L (ref 0–1.3)
MONOCYTES NFR BLD AUTO: 9.7 %
NEUTROPHILS # BLD AUTO: 2.9 10E9/L (ref 1.6–8.3)
NEUTROPHILS NFR BLD AUTO: 49.3 %
NRBC # BLD AUTO: 0 10*3/UL
NRBC BLD AUTO-RTO: 0 /100
PLATELET # BLD AUTO: 247 10E9/L (ref 150–450)
POTASSIUM SERPL-SCNC: 4.2 MMOL/L (ref 3.4–5.3)
PROT SERPL-MCNC: 6.4 G/DL (ref 6.8–8.8)
RBC # BLD AUTO: 3.94 10E12/L (ref 3.8–5.2)
SODIUM SERPL-SCNC: 145 MMOL/L (ref 133–144)
T4 FREE SERPL-MCNC: 1.06 NG/DL (ref 0.76–1.46)
TSH SERPL DL<=0.005 MIU/L-ACNC: 0.21 MU/L (ref 0.4–4)
WBC # BLD AUTO: 5.9 10E9/L (ref 4–11)

## 2017-05-01 PROCEDURE — 84439 ASSAY OF FREE THYROXINE: CPT | Performed by: INTERNAL MEDICINE

## 2017-05-01 PROCEDURE — 80053 COMPREHEN METABOLIC PANEL: CPT | Performed by: INTERNAL MEDICINE

## 2017-05-01 PROCEDURE — 85025 COMPLETE CBC W/AUTO DIFF WBC: CPT | Performed by: INTERNAL MEDICINE

## 2017-05-01 PROCEDURE — 36415 COLL VENOUS BLD VENIPUNCTURE: CPT

## 2017-05-01 PROCEDURE — 84443 ASSAY THYROID STIM HORMONE: CPT | Performed by: INTERNAL MEDICINE

## 2017-05-01 PROCEDURE — 99212 OFFICE O/P EST SF 10 MIN: CPT | Mod: ZF

## 2017-05-01 PROCEDURE — 99213 OFFICE O/P EST LOW 20 MIN: CPT | Mod: GC | Performed by: INTERNAL MEDICINE

## 2017-05-01 ASSESSMENT — PAIN SCALES - GENERAL: PAINLEVEL: NO PAIN (0)

## 2017-05-01 NOTE — NURSING NOTE
Oncology Rooming Note    May 1, 2017 10:39 AM   Maggie Navarrete is a 67 year old female who presents for: Blood Draw and Oncology Clinic Visit    Initial Vitals: /63  Pulse 98  Temp 98.4  F (36.9  C) (Oral)  Resp 17  Ht 1.524 m (5')  Wt 59.5 kg (131 lb 3.2 oz)  SpO2 99%  BMI 25.62 kg/m2 Estimated body mass index is 25.62 kg/(m^2) as calculated from the following:    Height as of this encounter: 1.524 m (5').    Weight as of this encounter: 59.5 kg (131 lb 3.2 oz). Body surface area is 1.59 meters squared.  No Pain (0) Comment: Data Unavailable   No LMP recorded. Patient is postmenopausal.  Allergies reviewed: Yes  Medications reviewed: Yes    Medications: Medication refills not needed today.  Pharmacy name entered into [id number]:    [id number] - [facility] [address] AT [facility] OF [address] & [address] [place] [address] PHARMACY MAIL DELIVERY - [facility] [address] [facility] - [place], [place] - [address] [facility]    Clinical concerns:no Dr. Pringle  was notified.    5 minutes for nursing intake (face to face time)     Laya Mabry CMA

## 2017-05-01 NOTE — MR AVS SNAPSHOT
After Visit Summary   5/1/2017    Maggie Navarrete    MRN: [id number]           Patient Information     Date Of Birth          1950        Visit Information        Provider Department      5/1/2017 10:30 AM Ab Gutierrez MD [facility]        Today's Diagnoses     Malignant neoplasm of corpus uteri, except isthmus (H)    -  1    Thyroid nodule        Adverse effect of doxorubicin           Follow-ups after your visit        Follow-up notes from your care team     Return in about 4 weeks (around 5/29/2017).      Your next 10 appointments already scheduled     May 09, 2017   Procedure with Eric Gibson PA-C   [facility] ([facility] [place])    [address] [place] [id number] [phone]           Located in the Clinics and Surgery Center at [address] [place] [address].   parking is very convenient and highly recommended.  is a $6 flat rate fee.  Both  and self parkers should enter the main arrival plaza from [facility]; parking attendants will direct you based on your parking preference.            May 09, 2017  1:30 PM CDT   (Arrive by 12:00 PM)   IR PORT REMOVAL RIGHT with [id number]   [facility] ([facility] [place])    [address] [place] [id number]              1. Your doctor will need to do a history and physical within 7 days before this procedure. 2. Your doctor decide will which medications should not be taken the morning of the exam. 3. Laboratory tests are to be obtained by your doctor prior to the exam (Basic Metabolic Panel, CBCP, PTT and INR) (No labs needed if you are having a tunneled catheter exchange or removal) 4. If you have allergies to x-ray contrast or iodine, contact your doctor or a Radiology nurse prior to the exam day for instructions. 5. Someone will need to drive you to and  from the hospital. 6. If you are or may be pregnant, contact your doctor or a Radiology nurse prior to the day of the exam. 7. If you have diabetes, check with your doctor or a Radiology nurse to see if your insulin needs to be adjusted for the exam. 8. If you are taking a medication called Glucophage or Glucovance; these medications need to be held the day of the exam and for approximately 48 hours following. A blood sample must be drawn so your creatinine level can be checked before resuming this medication. 9. If you are taking Coumadin (to thin you blood) please contact your doctor or a Radiology nurse at least 3 days before the exam for special instructions. 10. You should not have received contrast within 48 hours of this exam. 11. The day before your exam you may eat your regular diet and are encouraged to drink at least 2 quarts of clear liquids. Drink no alcoholic beverages for 24 hours prior to the exam. 12. If you have a colostomy you will need to irrigate it with tap water at 8PM the evening before and again at 6AM the morning of the exam. 13. Do not smoke for 24 hours prior to the procedure. 14. Birth to 4 years: - Breast feeding must be stopped 4 hours prior to exam - Solid food or formula must be stopped 6 hours prior to exam - Tube feedings must be stopped 6 hours prior to exam 15. 4-10 years old: - Nothing to eat or drink 6 hours prior to exam 16. 10+ years old: - Nothing to eat or drink 8 hours prior to exam 17. The morning of the exam you may brush your teeth and take medications as directed with a sip of water. 18. When discharged, you cannot drive until morning, and an adult must be with you until then. You should stay in the [facility] overnight. 19. Bring a list of all drugs you are taking; include supplements and over-the-counter medications. Wear comfortable clothes and leave your valuables at home.            Jun 01, 2017 10:30 AM CDT   Ech Complete with [id number]   [facility] ([facility])    [address]  3rd Floor  [facility] [id number] [phone]           1.  Please bring or wear a comfortable two-piece outfit. 2.  You may eat, drink and take your normal medicines. 3.  For any questions that cannot be answered, please contact the ordering physician            Jun 01, 2017 11:40 AM CDT   (Arrive by 11:25 AM)   CT CHEST ABDOMEN PELVIS W/O & W CONTRAST with UCCT2   [facility] CT ([facility] [place])    [address]  [address] [facility] [address]   [phone]           Please bring any scans or X-rays taken at other hospitals, if similar tests were done. Also bring a list of your medicines, including vitamins, minerals and over-the-counter drugs. It is safest to leave personal items at home.  Be sure to tell your doctor:   If you have any allergies.   If there s any chance you are pregnant.   If you are breastfeeding.   If you have any special needs.  You may have contrast for this exam. To prepare:   Do not eat or drink for 2 hours before your exam. If you need to take medicine, you may take it with small sips of water. (We may ask you to take liquid medicine as well.)   The day before your exam, drink extra fluids at least six 8-ounce glasses (unless your doctor tells you to restrict your fluids).  Patients over 70 or patients with diabetes or kidney problems:   If you haven t had a blood test (creatinine test) within the last 30 days, go to your clinic or Diagnostic Imaging Department for this test.  If you have diabetes:   If your kidney function is normal, continue taking your metformin (Avandamet, Glucophage, Glucovance, Metaglip) on the day of your exam.   If your kidney function is abnormal, wait 48 hours before restarting this medicine.  You will have oral contrast for this exam:   You will drink the contrast at home. Get this from your clinic or Diagnostic Imaging Department. Please follow the  directions given.  Please wear loose clothing, such as a sweat suit or jogging clothes. Avoid snaps, zippers and other metal. We may ask you to undress and put on a hospital gown.  If you have any questions, please call the Imaging Department where you will have your exam.            Jun 02, 2017  1:15 PM CDT   [facility] Lab Draw with  [facility] LAB DRAW   [facility] Lab Draw ([facility])    [address] [place] [id number] [phone]            Jun 02, 2017  2:00 PM CDT   (Arrive by 1:45 PM)   Return Visit with Ab Aguilar MD   [facility] ([facility])    [address] [place] [id number] [phone]            Jun 19, 2017  4:30 PM CDT   (Arrive by 4:15 PM)   Return Visit with Antoni Godfrey MD   [facility] ([facility])    [address] [place] [id number] [phone]              Who to contact     If you have questions or need follow up information about today's clinic visit or your schedule please contact [facility] directly at [phone].  Normal or non-critical lab and imaging results will be communicated to you by Flexiroamhart, letter or phone within 4 business days after the clinic has received the results. If you do not hear from us within 7 days, please contact the clinic through MyChart or phone. If you have a critical or abnormal lab result, we will notify you by phone as soon as possible.  Submit refill requests through Libratone or call your pharmacy and they will forward the refill request to us. Please allow 3 business days for your refill to be completed.          Additional Information About Your Visit        Libratone Information     L[employer]atone gives you secure access to your electronic health record. If you see a primary care provider, you can also send messages  to your care team and make appointments. If you have questions, please call your primary care clinic.  If you do not have a primary care provider, please call [phone] and they will assist you.        Care EveryWhere ID     This is your Care EveryWhere ID. This could be used by other organizations to access your [place] medical records  [id number]        Your Vitals Were     Pulse Temperature Respirations Height Pulse Oximetry BMI (Body Mass Index)    98 98.4  F (36.9  C) (Oral) 17 1.524 m (5') 99% 25.62 kg/m2       Blood Pressure from Last 3 Encounters:   No data found for BP    Weight from Last 3 Encounters:   No data found for Wt              Today, you had the following     No orders found for display       Primary Care Provider Office Phone # Fax #    Kristine Flynn MD PhD [phone]        [facility] [address] [facility] [address]  [facility] [address]        Thank you!     Thank you for choosing [facility]  for your care. Our goal is always to provide you with excellent care. Hearing back from our patients is one way we can continue to improve our services. Please take a few minutes to complete the written survey that you may receive in the mail after your visit with us. Thank you!             Your Updated Medication List - Protect others around you: Learn how to safely use, store and throw away your medicines at www.disposemymeds.org.          This list is accurate as of: 5/1/17 11:59 PM.  Always use your most recent med list.                   Brand Name Dispense Instructions for use    alendronate 35 MG tablet    FOSAMAX    12 tablet    Take 1 tablet (35 mg) by mouth every 7 days       CALCIUM CITRATE + PO      Take 200 mg by mouth daily as needed (She supplements to meet her 1,200mg goal depending on how much dietary calcium she has gotten that day (up to 400mg once daily)) Reported on 5/1/2017       levothyroxine 100 MCG tablet    SYNTHROID/LEVOTHROID     Take  100 mcg by mouth daily       MELATONIN PO      Take 2.5 mg by mouth nightly as needed Reported on 5/1/2017       MULTIVITAMINS PO      Take 1 tablet by mouth daily Reported on 4/10/2017       sterile water (bottle) irrigation      Reported on 5/1/2017       venlafaxine 75 MG 24 hr capsule    EFFEXOR-XR    90 capsule    Take 1 capsule (75 mg) by mouth daily       ZANTAC PO      Take 150 mg by mouth daily as needed       zolpidem 5 MG tablet    AMBIEN    25 tablet    Take 1 tablet (5 mg) by mouth nightly as needed for sleep

## 2017-05-01 NOTE — LETTER
5/1/2017       RE: Maggie Navarrete  [address]  [facility] [id number]     Dear Colleague,    Thank you for referring your patient, Maggie Navarrete, to the [facility]. Please see a copy of my visit note below.    MEDICAL ONCOLOGY PROGRESS NOTE   May 1, 2017    Oncologic history:  1. January 2016, she presents to her PCP with 3 weeks of abdominal pain and severe constipation. She is treated for constipation and improves.  2. 3/21/2016, worsening pelvic pressure prompts pelvic examination with an enlarged uterus noted. Transvaginal ultrasound shows enlargement of subserosal fibroids originally seen on MRI in 2006.  3. 5/9/2016, MRI pelvis is obtained, which shows several enhancing uterine masses involving an enlarged uterus, felt radiographically compatible with benign uterine leiomyomata.  3. Summer 2016, she experiences worsening low back pain thought at first thought to be sacroiliitis, but pain persists after corticosteroid injection. Continues with severe constipation.  4. 9/8/2016, she undergoes colonoscopy for persistent abdominal pain. She has sigmoid polyp snared. Abdominal pain and diarrhea persist after colonoscopy prep.  5. 9/14/2016, she has worsening abdominal pain and is evaluated in the ED. CT-scan shows a large subserosal fibroid measuring 9.4 x 9.6 x 9.8 cm, as well as very large fibroids along the posterior uterine body more inferiorly, increased in size to 14 cm in greatest diameter compared to 10 cm in greatest diameter on MRI of 5/9/2016.  6. 9/29/2016, she has staging CT-CAP, which shows a small left sided pleural effusion and a 3 mm pulmonary nodule in the RML.  7. October 2016, self-refers to [facility].  8. 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  9. 11/4/2016, undergoes total abdominal hysterectomy and BSO, under Dr. Harsh Camarillo. Surgery was originally presumed for leiomyoma, however, intraoperatively  there was focal adherence of a mass to the pelvic right sidewall and cul-de-sac, which led to peritoneal resection and omentectomy. Final pathology, pT2b: showed leiomyosarcoma involving the cul-de-sac and forming two separate masses measuring 10.5 x 6.5 x 4.5 cm and 10.7 x 7.9 x 7.5 cm.  10. 12/26/2016, she starts adjuvant chemotherapy with doxorubicin 25 mg/m2/day and DTIC 250 mg/m2/day , days 1-3 every 21 weeks.    HISTORY OF PRESENT ILLNESS  HISTORY OF PRESENT ILLNESS:    Ms. Navarrete is here for followup with the diagnosis of leiomyosarcoma of the uterus.  She is currently on adjuvant chemotherapy with doxorubicin and dacarbazine.  She has received 5 cycles out of 6.  She presented last visit with severe mucositis, so her sixth cycle was postponed.  She is here today and she feels great.  She just came back from a trip to [facility], which she enjoyed a lot.  She says her mucositis has healed completely.  She was eating and enjoying food with no problems.  She denies any other symptoms such as headaches, bone pain, shortness of breath, chest pain, nausea, vomiting, diarrhea or any other problems.  She decided not to receive her sixth cycle of adjuvant chemotherapy.                  Lab Results   Component Value Date    WBC 5.9 05/01/2017    HGB 12.5 05/01/2017    HCT 39.3 05/01/2017     05/01/2017     04/10/2017    POTASSIUM 3.9 04/10/2017    CHLORIDE 110 (H) 04/10/2017    CO2 25 04/10/2017    BUN 18 04/10/2017    CR 0.60 04/10/2017    GLC 80 04/10/2017    AST 12 04/10/2017    ALT 17 04/10/2017    ALKPHOS 79 04/10/2017    BILITOTAL 0.3 04/10/2017    INR 0.98 12/23/2016     Recent Labs   Lab Test  04/10/17   0849  03/20/17   0942   NA  142  145*   POTASSIUM  3.9  3.8   CHLORIDE  110*  111*   CO2  25  24   ANIONGAP  8  10   GLC  80  102*   BUN  18  18   CR  0.60  0.57   SUSAN  8.6  8.7         REVIEW OF SYSTEMS  A 12-point ROS negative except as in HPI    Current Outpatient Prescriptions   Medication Sig  Dispense Refill     RaNITidine HCl (ZANTAC PO) Take 150 mg by mouth daily as needed        venlafaxine (EFFEXOR-XR) 75 MG 24 hr capsule Take 1 capsule (75 mg) by mouth daily 90 capsule 1     levothyroxine (SYNTHROID,LEVOTHROID) 100 MCG tablet Take 100 mcg by mouth daily        zolpidem (AMBIEN) 5 MG tablet Take 1 tablet (5 mg) by mouth nightly as needed for sleep 25 tablet 0     alendronate (FOSAMAX) 35 MG tablet Take 1 tablet (35 mg) by mouth every 7 days 12 tablet 3     Calcium Citrate-Vitamin D (CALCIUM CITRATE + PO) Take 200 mg by mouth daily as needed (She supplements to meet her 1,200mg goal depending on how much dietary calcium she has gotten that day (up to 400mg once daily)) Reported on 5/1/2017       Multiple Vitamin (MULTIVITAMINS PO) Take 1 tablet by mouth daily Reported on 4/10/2017       sterile water, bottle, irrigation Reported on 5/1/2017       MELATONIN PO Take 2.5 mg by mouth nightly as needed Reported on 5/1/2017         Past Medical History:   Diagnosis Date     Anxiety      Arthritis     pain in feet and knees     Disorder of bone and cartilage, unspecified      Esophageal reflux 2015     GERD (gastroesophageal reflux disease)      Hx of previous reproductive problem 1980     Kidney stone 2011     Personal history of colonic polyps 2016    Small, benign     PONV (postoperative nausea and vomiting)      Posterior vitreous detachment of left eye 2011     Posterior vitreous detachment of right eye 2011     Ptosis of eyelid, bilateral      Stomach problems 2015    Abdominal pain, Diarreha     Thyroid disease     thyroid nodule resolved 2014         PHYSICAL EXAMINATION  /63  Pulse 98  Temp 98.4  F (36.9  C) (Oral)  Resp 17  Ht 1.524 m (5')  Wt 59.5 kg (131 lb 3.2 oz)  SpO2 99%  BMI 25.62 kg/m2  Wt Readings from Last 2 Encounters:   05/01/17 59.5 kg (131 lb 3.2 oz)   04/10/17 57.1 kg (125 lb 14.4 oz)     Physical Exam   Constitutional: She is oriented to person, place, and time. She  appears well-developed and well-nourished. No distress.   HENT:   Head: Atraumatic.   Right and left lateral tongue with healing mucositis   Eyes: Conjunctivae and EOM are normal. Pupils are equal, round, and reactive to light. No scleral icterus.   Cardiovascular: Normal rate, regular rhythm and normal heart sounds.    No murmur heard.  Pulmonary/Chest: Effort normal and breath sounds normal. She has no wheezes.   Musculoskeletal: Normal range of motion. She exhibits no edema.   Lymphadenopathy:     She has no cervical adenopathy.   Neurological: She is alert and oriented to person, place, and time. No cranial nerve deficit. She exhibits normal muscle tone. Coordination normal.   Skin: Skin is warm and dry. No rash noted.   Psychiatric: She has a normal mood and affect. Her behavior is normal.   Nursing note and vitals reviewed.      IMPRESSION AND PLAN    #1 Locally advanced leiomyosarcoma of the uterus, pT2b Nx M0, Stage IIB, status-post resection, currently on adjuvant chemotherapy  The 6th cycle of chemotherapy was held due to painful mucositis. She had a good trip to [facility] and her mucositis completely healed.    We discuss moving forward with the last cycle of adjuvant chemo however she has decided that five cycles are enough for her and she doesn't want to proceed with the 6th.  We will get baseline TTE and remove the port since she completed her adjuvant chemo.    We will continue with surveillance at this time.      Patient seen and discussed with dr. Alana M.D.    Debbie Whitehead MD  Hem-Onc Fellow      Again, thank you for allowing me to participate in the care of your patient.      Sincerely,    Ab Aguilar MD

## 2017-05-01 NOTE — PROGRESS NOTES
MEDICAL ONCOLOGY PROGRESS NOTE   May 1, 2017    Oncologic history:  1. January 2016, she presents to her PCP with 3 weeks of abdominal pain and severe constipation. She is treated for constipation and improves.  2. 3/21/2016, worsening pelvic pressure prompts pelvic examination with an enlarged uterus noted. Transvaginal ultrasound shows enlargement of subserosal fibroids originally seen on MRI in 2006.  3. 5/9/2016, MRI pelvis is obtained, which shows several enhancing uterine masses involving an enlarged uterus, felt radiographically compatible with benign uterine leiomyomata.  3. Summer 2016, she experiences worsening low back pain thought at first thought to be sacroiliitis, but pain persists after corticosteroid injection. Continues with severe constipation.  4. 9/8/2016, she undergoes colonoscopy for persistent abdominal pain. She has sigmoid polyp snared. Abdominal pain and diarrhea persist after colonoscopy prep.  5. 9/14/2016, she has worsening abdominal pain and is evaluated in the ED. CT-scan shows a large subserosal fibroid measuring 9.4 x 9.6 x 9.8 cm, as well as very large fibroids along the posterior uterine body more inferiorly, increased in size to 14 cm in greatest diameter compared to 10 cm in greatest diameter on MRI of 5/9/2016.  6. 9/29/2016, she has staging CT-CAP, which shows a small left sided pleural effusion and a 3 mm pulmonary nodule in the RML.  7. October 2016, self-refers to [facility].  8. 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  9. 11/4/2016, undergoes total abdominal hysterectomy and BSO, under Dr. Harsh Camarillo. Surgery was originally presumed for leiomyoma, however, intraoperatively there was focal adherence of a mass to the pelvic right sidewall and cul-de-sac, which led to peritoneal resection and omentectomy. Final pathology, pT2b: showed leiomyosarcoma involving the cul-de-sac and forming two separate masses measuring  10.5 x 6.5 x 4.5 cm and 10.7 x 7.9 x 7.5 cm.  10. 12/26/2016, she starts adjuvant chemotherapy with doxorubicin 25 mg/m2/day and DTIC 250 mg/m2/day , days 1-3 every 21 weeks.    HISTORY OF PRESENT ILLNESS  Ms. Navarrete is here for followup with the diagnosis of leiomyosarcoma of the uterus.  She is currently on adjuvant chemotherapy with doxorubicin and dacarbazine.  She has received 5 cycles out of 6.  She presented last visit with severe mucositis, so her sixth cycle was postponed.  She is here today and she feels great.  She just came back from a trip to [facility], which she enjoyed a lot.  She says her mucositis has healed completely.  She was eating and enjoying food with no problems.  She denies any other symptoms such as headaches, bone pain, shortness of breath, chest pain, nausea, vomiting, diarrhea or any other problems.  She decided not to receive her sixth cycle of adjuvant chemotherapy.        Lab Results   Component Value Date    WBC 5.9 05/01/2017    HGB 12.5 05/01/2017    HCT 39.3 05/01/2017     05/01/2017     04/10/2017    POTASSIUM 3.9 04/10/2017    CHLORIDE 110 (H) 04/10/2017    CO2 25 04/10/2017    BUN 18 04/10/2017    CR 0.60 04/10/2017    GLC 80 04/10/2017    AST 12 04/10/2017    ALT 17 04/10/2017    ALKPHOS 79 04/10/2017    BILITOTAL 0.3 04/10/2017    INR 0.98 12/23/2016     Recent Labs   Lab Test  04/10/17   0849  03/20/17   0942   NA  142  145*   POTASSIUM  3.9  3.8   CHLORIDE  110*  111*   CO2  25  24   ANIONGAP  8  10   GLC  80  102*   BUN  18  18   CR  0.60  0.57   SUSAN  8.6  8.7         REVIEW OF SYSTEMS  A 12-point ROS negative except as in HPI    Current Outpatient Prescriptions   Medication Sig Dispense Refill     RaNITidine HCl (ZANTAC PO) Take 150 mg by mouth daily as needed        venlafaxine (EFFEXOR-XR) 75 MG 24 hr capsule Take 1 capsule (75 mg) by mouth daily 90 capsule 1     levothyroxine (SYNTHROID,LEVOTHROID) 100 MCG tablet Take 100 mcg by mouth daily        zolpidem  (AMBIEN) 5 MG tablet Take 1 tablet (5 mg) by mouth nightly as needed for sleep 25 tablet 0     alendronate (FOSAMAX) 35 MG tablet Take 1 tablet (35 mg) by mouth every 7 days 12 tablet 3     Calcium Citrate-Vitamin D (CALCIUM CITRATE + PO) Take 200 mg by mouth daily as needed (She supplements to meet her 1,200mg goal depending on how much dietary calcium she has gotten that day (up to 400mg once daily)) Reported on 5/1/2017       Multiple Vitamin (MULTIVITAMINS PO) Take 1 tablet by mouth daily Reported on 4/10/2017       sterile water, bottle, irrigation Reported on 5/1/2017       MELATONIN PO Take 2.5 mg by mouth nightly as needed Reported on 5/1/2017         Past Medical History:   Diagnosis Date     Anxiety      Arthritis     pain in feet and knees     Disorder of bone and cartilage, unspecified      Esophageal reflux 2015     GERD (gastroesophageal reflux disease)      Hx of previous reproductive problem 1980     Kidney stone 2011     Personal history of colonic polyps 2016    Small, benign     PONV (postoperative nausea and vomiting)      Posterior vitreous detachment of left eye 2011     Posterior vitreous detachment of right eye 2011     Ptosis of eyelid, bilateral      Stomach problems 2015    Abdominal pain, Diarreha     Thyroid disease     thyroid nodule resolved 2014         PHYSICAL EXAMINATION  /63  Pulse 98  Temp 98.4  F (36.9  C) (Oral)  Resp 17  Ht 1.524 m (5')  Wt 59.5 kg (131 lb 3.2 oz)  SpO2 99%  BMI 25.62 kg/m2  Wt Readings from Last 2 Encounters:   05/01/17 59.5 kg (131 lb 3.2 oz)   04/10/17 57.1 kg (125 lb 14.4 oz)     Physical Exam   Constitutional: She is oriented to person, place, and time. She appears well-developed and well-nourished. No distress.   HENT:   Head: Atraumatic.   Right and left lateral tongue with healing mucositis   Eyes: Conjunctivae and EOM are normal. Pupils are equal, round, and reactive to light. No scleral icterus.   Cardiovascular: Normal rate, regular  rhythm and normal heart sounds.    No murmur heard.  Pulmonary/Chest: Effort normal and breath sounds normal. She has no wheezes.   Musculoskeletal: Normal range of motion. She exhibits no edema.   Lymphadenopathy:     She has no cervical adenopathy.   Neurological: She is alert and oriented to person, place, and time. No cranial nerve deficit. She exhibits normal muscle tone. Coordination normal.   Skin: Skin is warm and dry. No rash noted.   Psychiatric: She has a normal mood and affect. Her behavior is normal.   Nursing note and vitals reviewed.      IMPRESSION AND PLAN  #1 Locally advanced leiomyosarcoma of the uterus, pT2b Nx M0, Stage IIB, status-post resection, currently on adjuvant chemotherapy  The 6th cycle of chemotherapy was held due to painful mucositis. She had a good trip to [facility] and her mucositis completely healed.   We discussed moving forward with the last cycle of adjuvant chemo however she has decided that five cycles are enough for her and she doesn't want to proceed with the 6th. We will get baseline TTE and remove the port since she completed her adjuvant chemo.    We will continue with surveillance at this time.    Patient seen and discussed with Dr. Warner.  Debbie Whitehead MD  Hem-Onc Fellow    ---  I saw and evaluated the patient. I agree with the findings and the plan of care as documented in the residents' note. We will remove the port and continue with observation.    Ab Warner M.D.   of Medicine  Hematology, Oncology and Transplantation  [facility]

## 2017-05-01 NOTE — NURSING NOTE
Chief Complaint   Patient presents with     Blood Draw     Pt with uterus ca labs collected via venipuncture.

## 2017-05-04 DIAGNOSIS — C55 LEIOMYOSARCOMA OF UTERUS (H): Primary | ICD-10-CM

## 2017-05-09 ENCOUNTER — SURGERY (OUTPATIENT)
Age: 67
End: 2017-05-09

## 2017-05-09 ENCOUNTER — HOSPITAL ENCOUNTER (OUTPATIENT)
Facility: AMBULATORY SURGERY CENTER | Age: 67
End: 2017-05-09
Attending: PHYSICIAN ASSISTANT

## 2017-05-09 VITALS
OXYGEN SATURATION: 100 % | SYSTOLIC BLOOD PRESSURE: 129 MMHG | RESPIRATION RATE: 14 BRPM | WEIGHT: 131 LBS | HEIGHT: 60 IN | BODY MASS INDEX: 25.72 KG/M2 | TEMPERATURE: 98.3 F | DIASTOLIC BLOOD PRESSURE: 78 MMHG

## 2017-05-09 LAB
APTT PPP: 25 SEC (ref 22–37)
ERYTHROCYTE [DISTWIDTH] IN BLOOD BY AUTOMATED COUNT: 13.3 % (ref 10–15)
HCT VFR BLD AUTO: 41 % (ref 35–47)
HGB BLD-MCNC: 13.4 G/DL (ref 11.7–15.7)
INR PPP: 0.93 (ref 0.86–1.14)
MCH RBC QN AUTO: 31.6 PG (ref 26.5–33)
MCHC RBC AUTO-ENTMCNC: 32.7 G/DL (ref 31.5–36.5)
MCV RBC AUTO: 97 FL (ref 78–100)
PLATELET # BLD AUTO: 206 10E9/L (ref 150–450)
RBC # BLD AUTO: 4.24 10E12/L (ref 3.8–5.2)
WBC # BLD AUTO: 6.1 10E9/L (ref 4–11)

## 2017-05-09 RX ORDER — LIDOCAINE 40 MG/G
CREAM TOPICAL
Status: DISCONTINUED | OUTPATIENT
Start: 2017-05-09 | End: 2017-05-10 | Stop reason: HOSPADM

## 2017-05-09 RX ORDER — SODIUM CHLORIDE 9 MG/ML
INJECTION, SOLUTION INTRAVENOUS CONTINUOUS
Status: DISCONTINUED | OUTPATIENT
Start: 2017-05-09 | End: 2017-05-10 | Stop reason: HOSPADM

## 2017-05-09 RX ADMIN — SODIUM CHLORIDE 50 ML: 9 INJECTION, SOLUTION INTRAVENOUS at 13:10

## 2017-05-09 RX ADMIN — Medication 8 ML: at 13:09

## 2017-05-09 NOTE — IP AVS SNAPSHOT
MRN:[id number]                      After Visit Summary   5/9/2017    Maggie Navarrete    MRN: [id number]           Thank you!     Thank you for choosing [facility] for your care. Our goal is always to provide you with excellent care. Hearing back from our patients is one way we can continue to improve our services. Please take a few minutes to complete the written survey that you may receive in the mail after you visit with us. Thank you!        Patient Information     Date Of Birth          1950        About your hospital stay     You were admitted on:  May 9, 2017 You last received care in the:  [facility]    You were discharged on:  May 9, 2017       Who to Call     For medical emergencies, please call 911.  For non-urgent questions about your medical care, please call your primary care provider or clinic, [phone]  For questions related to your surgery, please call your surgery clinic        Attending Provider     Provider Specialty    Eric Gibson PA-C Radiology       Primary Care Provider Office Phone # Fax #    Rhea Lin [phone]        [facility]ANS [address] [place] [address] [phone]  [place] [phone]        Your next 10 appointments already scheduled     May 09, 2017   Procedure with Eric Gibson PA-C   [facility] ([facility])    [address] [place] [address]  [address] [place] [id number] [phone]           Located in the Clinics and Surgery Center at [address] [place] [address].   parking is very convenient and highly recommended.  is a $6 flat rate fee.  Both  and self parkers should enter the main arrival plaza from [facility]; parking attendants will direct you based on your parking preference.            May 09, 2017  1:30 PM CDT   (Arrive by 12:00 PM)   IR PORT REMOVAL RIGHT with [id number]   [facility] (  [facility] [place])    [address] [facility]  5th Floor  [facility] [address]              1. Your doctor will need to do a history and physical within 7 days before this procedure. 2. Your doctor decide will which medications should not be taken the morning of the exam. 3. Laboratory tests are to be obtained by your doctor prior to the exam (Basic Metabolic Panel, CBCP, PTT and INR) (No labs needed if you are having a tunneled catheter exchange or removal) 4. If you have allergies to x-ray contrast or iodine, contact your doctor or a Radiology nurse prior to the exam day for instructions. 5. Someone will need to drive you to and from the hospital. 6. If you are or may be pregnant, contact your doctor or a Radiology nurse prior to the day of the exam. 7. If you have diabetes, check with your doctor or a Radiology nurse to see if your insulin needs to be adjusted for the exam. 8. If you are taking a medication called Glucophage or Glucovance; these medications need to be held the day of the exam and for approximately 48 hours following. A blood sample must be drawn so your creatinine level can be checked before resuming this medication. 9. If you are taking Coumadin (to thin you blood) please contact your doctor or a Radiology nurse at least 3 days before the exam for special instructions. 10. You should not have received contrast within 48 hours of this exam. 11. The day before your exam you may eat your regular diet and are encouraged to drink at least 2 quarts of clear liquids. Drink no alcoholic beverages for 24 hours prior to the exam. 12. If you have a colostomy you will need to irrigate it with tap water at 8PM the evening before and again at 6AM the morning of the exam. 13. Do not smoke for 24 hours prior to the procedure. 14. Birth to 4 years: - Breast feeding must be stopped 4 hours prior to exam - Solid food or formula must be stopped 6 hours prior to exam - Tube feedings must be stopped  6 hours prior to exam 15. 4-10 years old: - Nothing to eat or drink 6 hours prior to exam 16. 10+ years old: - Nothing to eat or drink 8 hours prior to exam 17. The morning of the exam you may brush your teeth and take medications as directed with a sip of water. 18. When discharged, you cannot drive until morning, and an adult must be with you until then. You should stay in the [facility] overnight. 19. Bring a list of all drugs you are taking; include supplements and over-the-counter medications. Wear comfortable clothes and leave your valuables at home.            Jun 01, 2017 10:30 AM CDT   Ech Complete with [address] Echo ([facility])    [address] [facility]  3rd Floor  [facility] [address] [phone]           1.  Please bring or wear a comfortable two-piece outfit. 2.  You may eat, drink and take your normal medicines. 3.  For any questions that cannot be answered, please contact the ordering physician            Jun 01, 2017 11:40 AM CDT   (Arrive by 11:25 AM)   CT CHEST ABDOMEN PELVIS W/O & W CONTRAST with [address] [facility] [address] ([facility])    [address] [facility]  1st [facility] [address] [phone]           Please bring any scans or X-rays taken at other hospitals, if similar tests were done. Also bring a list of your medicines, including vitamins, minerals and over-the-counter drugs. It is safest to leave personal items at home.  Be sure to tell your doctor:   If you have any allergies.   If there s any chance you are pregnant.   If you are breastfeeding.   If you have any special needs.  You may have contrast for this exam. To prepare:   Do not eat or drink for 2 hours before your exam. If you need to take medicine, you may take it with small sips of water. (We may ask you to take liquid medicine as well.)   The day before your exam, drink extra fluids at least six 8-ounce glasses (unless your doctor  tells you to restrict your fluids).  Patients over 70 or patients with diabetes or kidney problems:   If you haven t had a blood test (creatinine test) within the last 30 days, go to your clinic or Diagnostic Imaging Department for this test.  If you have diabetes:   If your kidney function is normal, continue taking your metformin (Avandamet, Glucophage, Glucovance, Metaglip) on the day of your exam.   If your kidney function is abnormal, wait 48 hours before restarting this medicine.  You will have oral contrast for this exam:   You will drink the contrast at home. Get this from your clinic or Diagnostic Imaging Department. Please follow the directions given.  Please wear loose clothing, such as a sweat suit or jogging clothes. Avoid snaps, zippers and other metal. We may ask you to undress and put on a hospital gown.  If you have any questions, please call the Imaging Department where you will have your exam.            Jun 02, 2017  1:15 PM CDT   [facility] Lab Draw with  [facility] LAB DRAW   [facility] ([place] [place] [facility])    [address] [place] [address]   [phone]            Jun 02, 2017  2:00 PM CDT   (Arrive by 1:45 PM)   Return Visit with Ab Aguilar MD   [facility])    [address] [place] [address]   [phone]            Jun 19, 2017  4:30 PM CDT   (Arrive by 4:15 PM)   Return Visit with Antoni Godfrey MD   [facility])    [address] [place] [address]   [phone]              Further instructions from your care team         A collaboration between [facility] and [facility]  Experts in minimally invasive, targeted treatments performed using imaging guidance    Venous Access Device, Port Catheter or  Tunneled Central Line Removal    Today you had your existing venous access device removed, either because it was no longer needed or because there was malfunction or infection issues.    One of our Radiology PAs performed this procedure for you today:  ? SETH Casas, JOLLY      After you go home:  - Drink plenty of fluids.  Generally 6-8 (8 ounce) glasses a day is recommended.  - Resume your regular diet unless otherwise ordered by a medical provider.  - Keep any applied tape/gauze dressings clean and dry.  Change tape/gauze dressings if they get wet or soiled.  - You may shower the following day after procedure, however cover and protect from moisture any tape/gauze dressings.  You may let water hit and run over dried skin glue, but do not scrub.  Pat the area dry after showering.  - Port removal incisions are closed with absorbable suture, meaning they do not need to be removed at a later date, and a topical skin adhesive (skin glue).  This glue will wear off in 7-14 days.  Do not remove before this time.  If 14 days have passed and residual glue is present, you may gently remove it.  - You may remove tape/gauze dressings after 5 days if the site looks closed and in the process of healing.  - Do not apply gels, lotions, or ointments to the glue site for the first 10 days as this may cause the glue to prematurely soften and fail.  - Do not perform strenuous activities or lift greater than 10 pounds for the next three days.  - If there is bleeding or oozing from the procedure site, apply firm pressure to the area for 5-10 minutes.  If the bleeding continues seek medical advice at the numbers below.  - Mild procedure site discomfort can be treated with an ice pack and over-the-counter pain relievers.              For 24 hours after any sedation used:  - Relax and take it easy.  No strenuous activities.  - Do not drive or operate machines at home or at work.  - No alcohol consumption.  - Do not make any  important or legal decisions.    Call our Interventional Radiology (IR) service if:  - If you start bleeding from the procedure site.  If you do start to bleed from the site, lie down and hold some pressure on the site.  Our radiology provider can help you decide if you need to return to the hospital.  - If you have new or worsening pain related to the procedure.  - If you have concerning swelling at the procedure site.  - If you develop persistent nausea or vomiting.  - If you develop hives or a rash or any unexplained itching.  - If you have a fever of greater than 100.5  F and chills in the first 5 days after procedure.  - Any other concerns related to your procedure.      [facility]  Interventional Radiology (IR)  [address] [facility]  2nd [facility] [place] [address]    Contact Number:  [phone]  (IR control desk)  - Monday - Friday 8:00 am - 4:30 pm    After hours for urgent concerns:  [phone]  - After 4:30 pm Monday - Friday, Weekends and Holidays.   - Ask for Interventional Radiology on-call.  Someone is available 24 hours a day.  - [facility] toll free number:  [phone]        Pending Results     Date and Time Order Name Status Description    5/9/2017 1211 IR PORT REMOVAL RIGHT In process             Admission Information     Date & Time Provider Department Dept. Phone    5/9/2017 Eric Gibson PA-C [facility] [phone]      Your Vitals Were     Blood Pressure Temperature Respirations Height Weight Pulse Oximetry    129/74 98.4  F (36.9  C) (Oral) 12 1.524 m (5') 59.4 kg (131 lb) 100%    BMI (Body Mass Index)                   25.58 kg/m2           MyChart Information     [employer] gives you secure access to your electronic health record. If you see a primary care provider, you can also send messages to your care team and make appointments. If you have questions, please call your primary care clinic.  If you do not  have a primary care provider, please call [phone] and they will assist you.      [employer] is an electronic gateway that provides easy, online access to your medical records. With [employer], you can request a clinic appointment, read your test results, renew a prescription or communicate with your care team.     To access your existing account, please contact your [facility] or call [phone] for assistance.        Care EveryWhere ID     This is your Care EveryWhere ID. This could be used by other organizations to access your [facility] medical records  [id number]           Review of your medicines      UNREVIEWED medicines. Ask your doctor about these medicines        Dose / Directions    alendronate 35 MG tablet   Commonly known as:  FOSAMAX   Used for:  Osteopenia        Dose:  35 mg   Take 1 tablet (35 mg) by mouth every 7 days   Quantity:  12 tablet   Refills:  3       CALCIUM CITRATE + PO        Dose:  200 mg   Take 200 mg by mouth daily as needed (She supplements to meet her 1,200mg goal depending on how much dietary calcium she has gotten that day (up to 400mg once daily)) Reported on 5/1/2017   Refills:  0       levothyroxine 100 MCG tablet   Commonly known as:  SYNTHROID/LEVOTHROID   Indication:  6 days on, 1 day off.        Dose:  100 mcg   Take 100 mcg by mouth daily   Refills:  0       MELATONIN PO        Dose:  2.5 mg   Take 2.5 mg by mouth nightly as needed Reported on 5/1/2017   Refills:  0       MULTIVITAMINS PO        Dose:  1 tablet   Take 1 tablet by mouth daily Reported on 4/10/2017   Refills:  0       sterile water (bottle) irrigation        Reported on 5/1/2017   Refills:  0       venlafaxine 75 MG 24 hr capsule   Commonly known as:  EFFEXOR-XR   Used for:  Generalized anxiety disorder        Dose:  75 mg   Take 1 capsule (75 mg) by mouth daily   Quantity:  90 capsule   Refills:  1       ZANTAC PO        Dose:  150 mg   Take 150 mg by mouth daily as needed    Refills:  0       zolpidem 5 MG tablet   Commonly known as:  AMBIEN   Used for:  Insomnia, unspecified        Dose:  5 mg   Take 1 tablet (5 mg) by mouth nightly as needed for sleep   Quantity:  25 tablet   Refills:  0                Protect others around you: Learn how to safely use, store and throw away your medicines at www.disposemymeds.org.             Medication List: This is a list of all your medications and when to take them. Check marks below indicate your daily home schedule. Keep this list as a reference.      Medications           Morning Afternoon Evening Bedtime As Needed    alendronate 35 MG tablet   Commonly known as:  FOSAMAX   Take 1 tablet (35 mg) by mouth every 7 days                                CALCIUM CITRATE + PO   Take 200 mg by mouth daily as needed (She supplements to meet her 1,200mg goal depending on how much dietary calcium she has gotten that day (up to 400mg once daily)) Reported on 5/1/2017                                levothyroxine 100 MCG tablet   Commonly known as:  SYNTHROID/LEVOTHROID   Take 100 mcg by mouth daily                                MELATONIN PO   Take 2.5 mg by mouth nightly as needed Reported on 5/1/2017                                MULTIVITAMINS PO   Take 1 tablet by mouth daily Reported on 4/10/2017                                sterile water (bottle) irrigation   Reported on 5/1/2017                                venlafaxine 75 MG 24 hr capsule   Commonly known as:  EFFEXOR-XR   Take 1 capsule (75 mg) by mouth daily                                ZANTAC PO   Take 150 mg by mouth daily as needed                                zolpidem 5 MG tablet   Commonly known as:  AMBIEN   Take 1 tablet (5 mg) by mouth nightly as needed for sleep

## 2017-05-09 NOTE — DISCHARGE INSTRUCTIONS
A collaboration between [facility] and [facility]  Experts in minimally invasive, targeted treatments performed using imaging guidance    Venous Access Device, Port Catheter or Tunneled Central Line Removal    Today you had your existing venous access device removed, either because it was no longer needed or because there was malfunction or infection issues.    One of our Radiology PAs performed this procedure for you today:  ? SETH Casas, JOLLY      After you go home:  - Drink plenty of fluids.  Generally 6-8 (8 ounce) glasses a day is recommended.  - Resume your regular diet unless otherwise ordered by a medical provider.  - Keep any applied tape/gauze dressings clean and dry.  Change tape/gauze dressings if they get wet or soiled.  - You may shower the following day after procedure, however cover and protect from moisture any tape/gauze dressings.  You may let water hit and run over dried skin glue, but do not scrub.  Pat the area dry after showering.  - Port removal incisions are closed with absorbable suture, meaning they do not need to be removed at a later date, and a topical skin adhesive (skin glue).  This glue will wear off in 7-14 days.  Do not remove before this time.  If 14 days have passed and residual glue is present, you may gently remove it.  - You may remove tape/gauze dressings after 5 days if the site looks closed and in the process of healing.  - Do not apply gels, lotions, or ointments to the glue site for the first 10 days as this may cause the glue to prematurely soften and fail.  - Do not perform strenuous activities or lift greater than 10 pounds for the next three days.  - If there is bleeding or oozing from the procedure site, apply firm pressure to the area for 5-10 minutes.  If the bleeding continues seek medical advice at the numbers below.  - Mild procedure site discomfort can be treated with an ice pack and  over-the-counter pain relievers.              For 24 hours after any sedation used:  - Relax and take it easy.  No strenuous activities.  - Do not drive or operate machines at home or at work.  - No alcohol consumption.  - Do not make any important or legal decisions.    Call our Interventional Radiology (IR) service if:  - If you start bleeding from the procedure site.  If you do start to bleed from the site, lie down and hold some pressure on the site.  Our radiology provider can help you decide if you need to return to the hospital.  - If you have new or worsening pain related to the procedure.  - If you have concerning swelling at the procedure site.  - If you develop persistent nausea or vomiting.  - If you develop hives or a rash or any unexplained itching.  - If you have a fever of greater than 100.5  F and chills in the first 5 days after procedure.  - Any other concerns related to your procedure.      [facility]  Interventional Radiology (IR)  [address] [place] [address]    Contact Number:  [phone]  (IR control desk)  - Monday - Friday 8:00 am - 4:30 pm    After hours for urgent concerns:  [phone]  - After 4:30 pm Monday - Friday, Weekends and Holidays.   - Ask for Interventional Radiology on-call.  Someone is available 24 hours a day.  - [facility] toll free number:  [phone]

## 2017-05-09 NOTE — PROCEDURES
Interventional Radiology Brief Post Procedure Note    Procedure: @RISFRMTLINK([id number])@    Proceduralist: Eric Gibson PA-C and None    Assistant: None    Time Out: Prior to the start of the procedure and with procedural staff participation, I verbally confirmed the patient s identity using two indicators, relevant allergies, that the procedure was appropriate and matched the consent or emergent situation, and that the correct equipment/implants were available. Immediately prior to starting the procedure I conducted the Time Out with the procedural staff and re-confirmed the patient s name, procedure, and site/side. (The Joint Commission universal protocol was followed.)  Yes    Sedation: None. Local Anesthestic used    Findings: Completed removal of single lumen venous chest port via right chest. Dx: Uterine cancer; no longer needed. Paige    Estimated Blood Loss: Minimal    Fluoroscopy Time: Not applicable    SPECIMENS: None    Complications: 1. None     Condition: Stable    Plan:     Comments: See dictated procedure note for full details.    Eric Gibson PA-C

## 2017-05-09 NOTE — BRIEF OP NOTE
Completed removal of single lumen venous chest port via right chest. Dx: no longer needed. Paige

## 2017-05-09 NOTE — IP AVS SNAPSHOT
[facility]    [address] [place] [address]    Phone:  [phone]    Fax:  [phone]                                       After Visit Summary   5/9/2017    Maggie Navarrete    MRN: [id number]           After Visit Summary Signature Page     I have received my discharge instructions, and my questions have been answered. I have discussed any challenges I see with this plan with the nurse or doctor.    ..........................................................................................................................................  Patient/Patient Representative Signature      ..........................................................................................................................................  Patient Representative Print Name and Relationship to Patient    ..................................................               ................................................  Date                                            Time    ..........................................................................................................................................  Reviewed by Signature/Title    ...................................................              ..............................................  Date                                                            Time

## 2017-05-18 ENCOUNTER — OFFICE VISIT (OUTPATIENT)
Dept: INTERNAL MEDICINE | Facility: CLINIC | Age: 67
End: 2017-05-18

## 2017-05-18 VITALS
DIASTOLIC BLOOD PRESSURE: 85 MMHG | BODY MASS INDEX: 25.58 KG/M2 | WEIGHT: 131 LBS | SYSTOLIC BLOOD PRESSURE: 124 MMHG | HEART RATE: 90 BPM

## 2017-05-18 DIAGNOSIS — F41.1 GENERALIZED ANXIETY DISORDER: ICD-10-CM

## 2017-05-18 DIAGNOSIS — M85.9 DISORDER OF BONE DENSITY AND STRUCTURE, UNSPECIFIED: ICD-10-CM

## 2017-05-18 DIAGNOSIS — Z00.00 ROUTINE GENERAL MEDICAL EXAMINATION AT A HEALTH CARE FACILITY: Primary | ICD-10-CM

## 2017-05-18 DIAGNOSIS — Z12.31 VISIT FOR SCREENING MAMMOGRAM: ICD-10-CM

## 2017-05-18 DIAGNOSIS — G47.00 INSOMNIA, UNSPECIFIED: ICD-10-CM

## 2017-05-18 DIAGNOSIS — M85.80 OSTEOPENIA: ICD-10-CM

## 2017-05-18 RX ORDER — VENLAFAXINE HYDROCHLORIDE 75 MG/1
75 CAPSULE, EXTENDED RELEASE ORAL DAILY
Qty: 90 CAPSULE | Refills: 3 | Status: SHIPPED | OUTPATIENT
Start: 2017-05-18 | End: 2018-05-29

## 2017-05-18 RX ORDER — ZOLPIDEM TARTRATE 5 MG/1
5 TABLET ORAL
Qty: 25 TABLET | Refills: 0 | Status: SHIPPED | OUTPATIENT
Start: 2017-05-18 | End: 2017-12-11

## 2017-05-18 ASSESSMENT — PAIN SCALES - GENERAL: PAINLEVEL: NO PAIN (0)

## 2017-05-18 NOTE — MR AVS SNAPSHOT
After Visit Summary   5/18/2017    Maggie Navarrete    MRN: [id number]           Patient Information     Date Of Birth          1950        Visit Information        Provider Department      5/18/2017 9:40 AM Rhea Lin MD [facility]        Today's Diagnoses     Visit for screening mammogram    -  1    Osteopenia        Disorder of bone density and structure, unspecified         Insomnia, unspecified        Generalized anxiety disorder          Care Instructions    [facility] Center Medication Refill Request Information:  * Please contact your pharmacy regarding ANY request for medication refills.  ** [facility] Prescription Fax = [phone]  * Please allow 3 business days for routine medication refills.  * Please allow 5 business days for controlled substance medication refills.     [facility] Center Test Result notification information:  *You will be notified with in 7-10 days of your appointment day regarding the results of your test.  If you are on MyChart you will be notified as soon as the provider has reviewed the results and signed off on them.    Primary Care Center [phone]           Follow-ups after your visit        Follow-up notes from your care team     Return in about 2 months (around 7/18/2017) for test results (DXA).      Your next 10 appointments already scheduled     Jun 01, 2017 10:30 AM CDT   Ech Complete with [address] [place] ([facility])    [address] [place] [address] [phone]           1.  Please bring or wear a comfortable two-piece outfit. 2.  You may eat, drink and take your normal medicines. 3.  For any questions that cannot be answered, please contact the ordering physician            Jun 01, 2017 11:40 AM CDT   (Arrive by 11:25 AM)   CT CHEST ABDOMEN PELVIS W/O & W CONTRAST with [address] [facility] ([facility])    [address] [place] [address]  Se  1st Floor  [facility] [address] [phone]           Please bring any scans or X-rays taken at other hospitals, if similar tests were done. Also bring a list of your medicines, including vitamins, minerals and over-the-counter drugs. It is safest to leave personal items at home.  Be sure to tell your doctor:   If you have any allergies.   If there s any chance you are pregnant.   If you are breastfeeding.   If you have any special needs.  You may have contrast for this exam. To prepare:   Do not eat or drink for 2 hours before your exam. If you need to take medicine, you may take it with small sips of water. (We may ask you to take liquid medicine as well.)   The day before your exam, drink extra fluids at least six 8-ounce glasses (unless your doctor tells you to restrict your fluids).  Patients over 70 or patients with diabetes or kidney problems:   If you haven t had a blood test (creatinine test) within the last 30 days, go to your clinic or Diagnostic Imaging Department for this test.  If you have diabetes:   If your kidney function is normal, continue taking your metformin (Avandamet, Glucophage, Glucovance, Metaglip) on the day of your exam.   If your kidney function is abnormal, wait 48 hours before restarting this medicine.  You will have oral contrast for this exam:   You will drink the contrast at home. Get this from your clinic or Diagnostic Imaging Department. Please follow the directions given.  Please wear loose clothing, such as a sweat suit or jogging clothes. Avoid snaps, zippers and other metal. We may ask you to undress and put on a hospital gown.  If you have any questions, please call the Imaging Department where you will have your exam.            Jun 02, 2017  1:15 PM CDT   [facility] Draw with  [facility]   [facility] ([facility])    [address]  [address] [facility] [address] [phone]            Jun 02, 2017   2:00 PM CDT   (Arrive by 1:45 PM)   Return Visit with Ab Aguilar MD   [facility] ([facility])    [address] [place] [address] [phone]            Jun 19, 2017  4:30 PM CDT   (Arrive by 4:15 PM)   Return Visit with Antoni Godfrey MD   [facility] ([facility])    [address] [address] [phone]              Future tests that were ordered for you today     Open Future Orders        Priority Expected Expires Ordered    MA SCREENING DIGITAL BILAT - Future  (s+30) Routine  5/18/2018 5/18/2017    Dexa hip/pelvis/spine* Routine  5/18/2018 5/18/2017            Who to contact     Please call your clinic at [phone] to:    Ask questions about your health    Make or cancel appointments    Discuss your medicines    Learn about your test results    Speak to your doctor   If you have compliments or concerns about an experience at your clinic, or if you wish to file a complaint, please contact [facility] Patient Relations at [phone] or email us at [email or web address]         Additional Information About Your Visit        [email or web address] Information     ipDatatel gives you secure access to your electronic health record. If you see a primary care provider, you can also send messages to your care team and make appointments. If you have questions, please call your primary care clinic.  If you do not have a primary care provider, please call [phone] and they will assist you.      ipDatatel is an electronic gateway that provides easy, online access to your medical records. With ipDatatel, you can request a clinic appointment, read your test results, renew a prescription or communicate with your care team.     To access your existing account, please contact your [facility] Clinic or call  [phone] for assistance.        Care EveryWhere ID     This is your Care EveryWhere ID. This could be used by other organizations to access your [place] medical records  [id number]        Your Vitals Were     Pulse BMI (Body Mass Index)                90 25.58 kg/m2           Blood Pressure from Last 3 Encounters:   05/18/17 124/85   05/09/17 129/78   05/01/17 130/63    Weight from Last 3 Encounters:   05/18/17 59.4 kg (131 lb)   05/09/17 59.4 kg (131 lb)   05/01/17 59.5 kg (131 lb 3.2 oz)                 Where to get your medicines      These medications were sent to [facility] - [place], [place] - [address]  [address] [address]     Phone:  [phone]     venlafaxine 75 MG 24 hr capsule         Some of these will need a paper prescription and others can be bought over the counter.  Ask your nurse if you have questions.     Bring a paper prescription for each of these medications     zolpidem 5 MG tablet          Primary Care Provider Office Phone # Fax #    Rhea Lin [phone]        PH[facility] [address] [facility] [address] [address]  [facility] [address]        Thank you!     Thank you for choosing [facility]  for your care. Our goal is always to provide you with excellent care. Hearing back from our patients is one way we can continue to improve our services. Please take a few minutes to complete the written survey that you may receive in the mail after your visit with us. Thank you!             Your Updated Medication List - Protect others around you: Learn how to safely use, store and throw away your medicines at www.disposemymeds.org.          This list is accurate as of: 5/18/17 10:33 AM.  Always use your most recent med list.                   Brand Name Dispense Instructions for use    alendronate 35 MG tablet    FOSAMAX    12 tablet    Take 1 tablet (35 mg) by mouth every 7 days       CALCIUM CITRATE + PO      Take 200 mg  by mouth daily as needed (She supplements to meet her 1,200mg goal depending on how much dietary calcium she has gotten that day (up to 400mg once daily)) Reported on 5/1/2017       levothyroxine 100 MCG tablet    SYNTHROID/LEVOTHROID     Take 100 mcg by mouth daily       MELATONIN PO      Take 2.5 mg by mouth nightly as needed Reported on 5/1/2017       MULTIVITAMINS PO      Take 1 tablet by mouth daily Reported on 4/10/2017       sterile water (bottle) irrigation      Reported on 5/1/2017       venlafaxine 75 MG 24 hr capsule    EFFEXOR-XR    90 capsule    Take 1 capsule (75 mg) by mouth daily       ZANTAC PO      Take 150 mg by mouth daily as needed       zolpidem 5 MG tablet    AMBIEN    25 tablet    Take 1 tablet (5 mg) by mouth nightly as needed for sleep

## 2017-05-18 NOTE — PATIENT INSTRUCTIONS
[facility] Medication Refill Request Information:  * Please contact your pharmacy regarding ANY request for medication refills.  ** [facility] Prescription Fax = [phone]  * Please allow 3 business days for routine medication refills.  * Please allow 5 business days for controlled substance medication refills.     [facility] Test Result notification information:  *You will be notified with in 7-10 days of your appointment day regarding the results of your test.  If you are on MyChart you will be notified as soon as the provider has reviewed the results and signed off on them.    [facility] [phone]

## 2017-05-18 NOTE — PROGRESS NOTES
CC:  Establish care/preventative health maintenance    S:  Maggie is here for the above.  She is the sister of another patient of mine (Radha).  We reviewed her medical history. She was diagnosed with leiomyosarcoma of the uterus ultimately in 2016 and underwent RACHEL/BSO at that time. Started adjuvant chemotherapy Dec 2016.  Had associated mucositis, otherwise tolerated it.  Decided not to do the final of six cycles.  Also had thyroid surgery at [place] for a nodule which turned out to be benign.  Is on replacement levothyroxine.    We reviewed her routine health care maintenance:  Osteopenia:  Was on Fosamax for 10-15 years in the past, at some point stopped it then restarted.  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  We discussed this.    Otherwise 10 point ROS negative.  Patient Active Problem List   Diagnosis     MDD (major depressive disorder)     Presbyopia - Both Eyes     Osteopenia     CAROLA (generalized anxiety disorder)     Vaginal atrophy     Uterus cancer, sarcoma (H)     Chemotherapy-induced neutropenia (H)     Chemotherapy induced cardiomyopathy (H)     Past Medical History:   Diagnosis Date     Anxiety      Arthritis     pain in feet and knees     Disorder of bone and cartilage, unspecified      Esophageal reflux      GERD (gastroesophageal reflux disease)      Hx of previous reproductive problem      Kidney stone      Personal history of colonic polyps     Small, benign     PONV (postoperative nausea and vomiting)      Posterior vitreous detachment of left eye      Posterior vitreous detachment of right eye      Ptosis of eyelid, bilateral       Stomach problems 2015    Abdominal pain, Diarreha     Thyroid disease     thyroid nodule resolved 2014     Past Surgical History:   Procedure Laterality Date     ABDOMEN SURGERY  Now    Pain, diarrhea     BREAST SURGERY  2002    right breast benign     COLONOSCOPY  2008    due in 2018     COLONOSCOPY N/A 9/8/2016    Procedure: COMBINED COLONOSCOPY, SINGLE OR MULTIPLE BIOPSY/POLYPECTOMY BY BIOPSY;  Surgeon: Abner Pepper MD;  Location:  GI     ENT SURGERY  1975    tonsillectomy     GENITOURINARY SURGERY  1953    bladder surgery      GI SURGERY  2015    Severe constipation     GYN SURGERY  1977    C section     GYN SURGERY  1981    laparoscopy     GYN SURGERY  2007    myomectomy     REMOVE PORT VASCULAR ACCESS Right 5/9/2017    Procedure: REMOVE PORT VASCULAR ACCESS;  Right Port Removal;  Surgeon: Eric Gibson PA-C;  Location:  OR     REPAIR PTOSIS BILATERAL  2/15/2012    Procedure:REPAIR PTOSIS BILATERAL; BILATERAL UPPER LID PTOSIS REPAIR; Surgeon:BRYCE REYNOLDS; Location:[facility]     Family History   Problem Relation Age of Onset     Family History Negative Mother      glaucoma     Glaucoma Mother      Anxiety Disorder Mother      Heart Failure Mother      DIABETES Mother      Coronary Artery Disease Mother      Hypertension Mother      Breast Cancer Mother      Depression Mother      Obesity Mother      HEART DISEASE Father      Glaucoma Father      DIABETES Father      Coronary Artery Disease Father      Depression Father      OSTEOPOROSIS Father      HEART DISEASE Maternal Grandmother      DIABETES Maternal Grandmother      HEART DISEASE Paternal Grandmother      CANCER Paternal Grandfather      stomach     Thyroid Disease Sister      Retinal detachment Sister      Hypertension Sister      Hypertension Sister      Lipids Sister      CEREBROVASCULAR DISEASE Sister      Depression Sister      HEART DISEASE Paternal Aunt      HEART DISEASE Paternal Uncle      Anxiety Disorder Sister       Depression Sister      Thyroid Disease Sister      Thyroid Disease Sister      OSTEOPOROSIS Other      Social History     Social History     Marital status: Single     Spouse name: N/A     Number of children: N/A     Years of education: N/A     Occupational History     Not on file.     Social History Main Topics     Smoking status: Never Smoker     Smokeless tobacco: Never Used     Alcohol use Yes      Comment: Social, not often per patient.     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 Walking daily.    How much calcium per day? Supplement and through diet       How much caffeine per day? 3 times a week    How much vitamin D per day? Supplement    Do you/your family wear seatbelts?  Yes    Do you/your family use safety helmets? NA    Do you/your family use sunscreen? Sometimes    Do you/your family keep firearms in the home? No    Do you/your family have a smoke detector(s)? Yes    Do you feel safe in your home? Yes    Has anyone ever touched you in an unwanted manner? 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  2 grandsons now in [place] with the mother.  In a house.  .       Current Outpatient Prescriptions   Medication Sig Dispense Refill     sterile water, bottle, irrigation Reported on 2017       RaNITidine HCl (ZANTAC PO) Take 150 mg by mouth daily as needed        MELATONIN PO Take 2.5 mg by mouth nightly as needed Reported on 2017       venlafaxine (EFFEXOR-XR) 75  MG 24 hr capsule Take 1 capsule (75 mg) by mouth daily 90 capsule 1     levothyroxine (SYNTHROID,LEVOTHROID) 100 MCG tablet Take 100 mcg by mouth daily        zolpidem (AMBIEN) 5 MG tablet Take 1 tablet (5 mg) by mouth nightly as needed for sleep 25 tablet 0     alendronate (FOSAMAX) 35 MG tablet Take 1 tablet (35 mg) by mouth every 7 days 12 tablet 3     Calcium Citrate-Vitamin D (CALCIUM CITRATE + PO) Take 200 mg by mouth daily as needed (She supplements to meet her 1,200mg goal depending on how much dietary calcium she has gotten that day (up to 400mg once daily)) Reported on 5/1/2017       Multiple Vitamin (MULTIVITAMINS PO) Take 1 tablet by mouth daily Reported on 4/10/2017       Allergies   Allergen Reactions     Erythromycin GI Disturbance     Tramadol Nausea       Physical Examination:  General:  Pleasant, alert, no acute distress  Eyes:  Pupils 2-3 mm, sclera white, EOM's full.    Ears:  TM's normal, EAC's patent, clear of cerumen  Nose:  Nasal passages clear, turbinates not swollen  Throat/Mouth:  No pharyngeal erythema or exudate, oral mucosa and tongue moist, no suspicious oral lesions  Neck:  Full AROM, supple, thyroid smooth, symmetric, not enlarged, no nodules  Lungs:  Clear to auscultation throughout, no wheezes, rhonchi or rales.  C/V:  Regular rate and rhythm, no murmurs, rubs or gallops.  No JVD, no carotid bruits.  No peripheral edema.   Abdomen:  Not distended.  Bowel sounds active.  No tenderness, no hepatosplenomegaly or masses.  No CVA tenderness or masses.  Lymph:  No cervical lymph nodes.  Neuro:  Alert and oriented, face symmetric. No tremor.  Gait steady.    M/S:   No joint deformities noted.  No joint swelling.  Skin:   No rashes or jaundice.  Normal moisture, good skin turgor.  Psych:  Broad range affect.  Not psychomotor slowed.  No signs of anxiety or agitation.    Maggie was seen today for referral.    Diagnoses and all orders for this visit:    Routine physical/establish  care    Visit for screening mammogram  -     MA SCREENING DIGITAL BILAT - Future  (s+30); Future    Osteopenia  -     Dexa hip/pelvis/spine*; Future    Insomnia, unspecified, intermittent  -     Refill zolpidem (AMBIEN) 5 MG tablet; Take 1 tablet (5 mg) by mouth nightly as needed for sleep    Generalized anxiety disorder, controlled  -     Refill venlafaxine (EFFEXOR-XR) 75 MG 24 hr capsule; Take 1 capsule (75 mg) by mouth daily    Follow up 2 months and as needed.    Rhea Lin M.D.  Internal Medicine  [facility] Center   pager [phone]

## 2017-05-18 NOTE — NURSING NOTE
Chief Complaint   Patient presents with     Referral     pt would like referrals     Ashlee Hdz CMA at 9:58 AM on 5/18/2017.

## 2017-05-27 ENCOUNTER — HEALTH MAINTENANCE LETTER (OUTPATIENT)
Age: 67
End: 2017-05-27

## 2017-05-31 ASSESSMENT — ENCOUNTER SYMPTOMS
BACK PAIN: 1
MUSCLE WEAKNESS: 0
NECK PAIN: 0
JOINT SWELLING: 0
MYALGIAS: 0
STIFFNESS: 1
MUSCLE CRAMPS: 0
ARTHRALGIAS: 1

## 2017-06-01 ENCOUNTER — RADIANT APPOINTMENT (OUTPATIENT)
Dept: CARDIOLOGY | Facility: CLINIC | Age: 67
End: 2017-06-01
Attending: INTERNAL MEDICINE

## 2017-06-01 DIAGNOSIS — T45.1X5A: ICD-10-CM

## 2017-06-01 DIAGNOSIS — C54.9 MALIGNANT NEOPLASM OF CORPUS UTERI, EXCEPT ISTHMUS (H): ICD-10-CM

## 2017-06-01 DIAGNOSIS — M85.80 OSTEOPENIA: Primary | ICD-10-CM

## 2017-06-02 ENCOUNTER — APPOINTMENT (OUTPATIENT)
Dept: LAB | Facility: CLINIC | Age: 67
End: 2017-06-02
Attending: INTERNAL MEDICINE
Payer: MEDICARE

## 2017-06-02 ENCOUNTER — ONCOLOGY VISIT (OUTPATIENT)
Dept: ONCOLOGY | Facility: CLINIC | Age: 67
End: 2017-06-02
Attending: INTERNAL MEDICINE
Payer: MEDICARE

## 2017-06-02 VITALS
WEIGHT: 132.3 LBS | TEMPERATURE: 99.4 F | DIASTOLIC BLOOD PRESSURE: 67 MMHG | RESPIRATION RATE: 16 BRPM | BODY MASS INDEX: 25.84 KG/M2 | HEART RATE: 109 BPM | OXYGEN SATURATION: 99 % | SYSTOLIC BLOOD PRESSURE: 145 MMHG

## 2017-06-02 DIAGNOSIS — E03.9 ACQUIRED HYPOTHYROIDISM: Primary | ICD-10-CM

## 2017-06-02 DIAGNOSIS — E04.1 THYROID NODULE: ICD-10-CM

## 2017-06-02 DIAGNOSIS — C54.9 UTERUS CANCER, SARCOMA (H): ICD-10-CM

## 2017-06-02 LAB
ALBUMIN SERPL-MCNC: 3.2 G/DL (ref 3.4–5)
ALP SERPL-CCNC: 98 U/L (ref 40–150)
ALT SERPL W P-5'-P-CCNC: 23 U/L (ref 0–50)
ANION GAP SERPL CALCULATED.3IONS-SCNC: 8 MMOL/L (ref 3–14)
AST SERPL W P-5'-P-CCNC: 17 U/L (ref 0–45)
BASOPHILS # BLD AUTO: 0 10E9/L (ref 0–0.2)
BASOPHILS NFR BLD AUTO: 0.4 %
BILIRUB SERPL-MCNC: 0.3 MG/DL (ref 0.2–1.3)
BUN SERPL-MCNC: 21 MG/DL (ref 7–30)
CALCIUM SERPL-MCNC: 8.9 MG/DL (ref 8.5–10.1)
CHLORIDE SERPL-SCNC: 108 MMOL/L (ref 94–109)
CO2 SERPL-SCNC: 28 MMOL/L (ref 20–32)
CREAT SERPL-MCNC: 0.84 MG/DL (ref 0.52–1.04)
DIFFERENTIAL METHOD BLD: NORMAL
EOSINOPHIL # BLD AUTO: 0.2 10E9/L (ref 0–0.7)
EOSINOPHIL NFR BLD AUTO: 3.3 %
ERYTHROCYTE [DISTWIDTH] IN BLOOD BY AUTOMATED COUNT: 13 % (ref 10–15)
GFR SERPL CREATININE-BSD FRML MDRD: 68 ML/MIN/1.7M2
GLUCOSE SERPL-MCNC: 100 MG/DL (ref 70–99)
HCT VFR BLD AUTO: 39 % (ref 35–47)
HGB BLD-MCNC: 13 G/DL (ref 11.7–15.7)
IMM GRANULOCYTES # BLD: 0 10E9/L (ref 0–0.4)
IMM GRANULOCYTES NFR BLD: 0.1 %
LYMPHOCYTES # BLD AUTO: 2.6 10E9/L (ref 0.8–5.3)
LYMPHOCYTES NFR BLD AUTO: 37.7 %
MCH RBC QN AUTO: 31.1 PG (ref 26.5–33)
MCHC RBC AUTO-ENTMCNC: 33.3 G/DL (ref 31.5–36.5)
MCV RBC AUTO: 93 FL (ref 78–100)
MONOCYTES # BLD AUTO: 0.5 10E9/L (ref 0–1.3)
MONOCYTES NFR BLD AUTO: 7.5 %
NEUTROPHILS # BLD AUTO: 3.5 10E9/L (ref 1.6–8.3)
NEUTROPHILS NFR BLD AUTO: 51 %
NRBC # BLD AUTO: 0 10*3/UL
NRBC BLD AUTO-RTO: 0 /100
PLATELET # BLD AUTO: 216 10E9/L (ref 150–450)
POTASSIUM SERPL-SCNC: 4.1 MMOL/L (ref 3.4–5.3)
PROT SERPL-MCNC: 6.4 G/DL (ref 6.8–8.8)
RBC # BLD AUTO: 4.18 10E12/L (ref 3.8–5.2)
SODIUM SERPL-SCNC: 143 MMOL/L (ref 133–144)
TSH SERPL DL<=0.05 MIU/L-ACNC: 0.04 MU/L (ref 0.4–4)
WBC # BLD AUTO: 7 10E9/L (ref 4–11)

## 2017-06-02 PROCEDURE — 85025 COMPLETE CBC W/AUTO DIFF WBC: CPT | Performed by: INTERNAL MEDICINE

## 2017-06-02 PROCEDURE — 99213 OFFICE O/P EST LOW 20 MIN: CPT | Performed by: INTERNAL MEDICINE

## 2017-06-02 PROCEDURE — 84443 ASSAY THYROID STIM HORMONE: CPT | Performed by: INTERNAL MEDICINE

## 2017-06-02 PROCEDURE — 99211 OFF/OP EST MAY X REQ PHY/QHP: CPT | Mod: ZF

## 2017-06-02 PROCEDURE — 36415 COLL VENOUS BLD VENIPUNCTURE: CPT | Performed by: INTERNAL MEDICINE

## 2017-06-02 PROCEDURE — 80053 COMPREHEN METABOLIC PANEL: CPT | Performed by: INTERNAL MEDICINE

## 2017-06-02 RX ORDER — LEVOTHYROXINE SODIUM 75 UG/1
75 TABLET ORAL DAILY
Qty: 30 TABLET | Refills: 3 | Status: SHIPPED | OUTPATIENT
Start: 2017-06-02 | End: 2017-06-02

## 2017-06-02 RX ORDER — LEVOTHYROXINE SODIUM 75 UG/1
75 TABLET ORAL DAILY
Qty: 30 TABLET | Refills: 3 | Status: SHIPPED | OUTPATIENT
Start: 2017-06-02 | End: 2017-09-15

## 2017-06-02 ASSESSMENT — PAIN SCALES - GENERAL: PAINLEVEL: NO PAIN (0)

## 2017-06-02 NOTE — LETTER
6/2/2017       RE: Maggie Navarrete  [address]  [facility] [address]     Dear Colleague,    Thank you for referring your patient, Maggie Navarrete, to the [facility]. Please see a copy of my visit note below.    MEDICAL ONCOLOGY PROGRESS NOTE   Jun 2, 2017    Oncologic history:  1. January 2016, she presents to her PCP with 3 weeks of abdominal pain and severe constipation. She is treated for constipation and improves.  2. 3/21/2016, worsening pelvic pressure prompts pelvic examination with an enlarged uterus noted. Transvaginal ultrasound shows enlargement of subserosal fibroids originally seen on MRI in 2006.  3. 5/9/2016, MRI pelvis is obtained, which shows several enhancing uterine masses involving an enlarged uterus, felt radiographically compatible with benign uterine leiomyomata.  3. Summer 2016, she experiences worsening low back pain thought at first thought to be sacroiliitis, but pain persists after corticosteroid injection. Continues with severe constipation.  4. 9/8/2016, she undergoes colonoscopy for persistent abdominal pain. She has sigmoid polyp snared. Abdominal pain and diarrhea persist after colonoscopy prep.  5. 9/14/2016, she has worsening abdominal pain and is evaluated in the ED. CT-scan shows a large subserosal fibroid measuring 9.4 x 9.6 x 9.8 cm, as well as very large fibroids along the posterior uterine body more inferiorly, increased in size to 14 cm in greatest diameter compared to 10 cm in greatest diameter on MRI of 5/9/2016.  6. 9/29/2016, she has staging CT-CAP, which shows a small left sided pleural effusion and a 3 mm pulmonary nodule in the RML.  7. October 2016, self-refers to [facility].  8. 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  9. 11/4/2016, undergoes total abdominal hysterectomy and BSO, under Dr. Harsh Camarillo. Surgery was originally presumed for leiomyoma, however, intraoperatively  there was focal adherence of a mass to the pelvic right sidewall and cul-de-sac, which led to peritoneal resection and omentectomy. Final pathology, pT2b: showed leiomyosarcoma involving the cul-de-sac and forming two separate masses measuring 10.5 x 6.5 x 4.5 cm and 10.7 x 7.9 x 7.5 cm.  10. 12/26/2016, she starts adjuvant chemotherapy with doxorubicin 25 mg/m2/day and DTIC 250 mg/m2/day , days 1-3 every 21 weeks.  11. 3/23/2017, she completes 5 cycles of adjuvant doxorubicin and dacarbazine    HISTORY OF PRESENT ILLNESS  Ms. Navarrete presents today in followup. She had complete resection and RACHEL for leiomyosarcoma of the uterus.  She completed 5 cycles of adjuvant chemotherapy with doxorubicin and dacarbazine in March. Since then she has been able to have her port removed, and she has recovered completely from adjuvant therapy treatments. Her hair has started to regrow and she denies any persistent or lingering symptoms or side effects from treatment. She currently denies nausea, vomiting, loss of appetite, diarrhea, or constipation. No headaches, shortness of breath, chest pain, or fevers/chills.    She had an echocardiogram, which showed a normal cardiac ejection fraction of 65% with no decrement resulting from therapy. CT-scan for restaging and this showed no evidence of recurrent or metastatic disease. She has small bilateral and stable nodularities in the lungs as well as a stable 9 mm cystic focus in the uncinate process of the pancreas, not significantly changed since at least 9/14/2016. This may represent sequelae of pancreatitis or potentially a side branch intraductal papillary mucinous neoplasm.    REVIEW OF SYSTEMS  A 12-point ROS negative except as in HPI    Current Outpatient Prescriptions   Medication Sig Dispense Refill     levothyroxine (SYNTHROID/LEVOTHROID) 75 MCG tablet Take 1 tablet (75 mcg) by mouth daily 30 tablet 3     zolpidem (AMBIEN) 5 MG tablet Take 1 tablet (5 mg) by mouth nightly as  needed for sleep 25 tablet 0     venlafaxine (EFFEXOR-XR) 75 MG 24 hr capsule Take 1 capsule (75 mg) by mouth daily 90 capsule 3     RaNITidine HCl (ZANTAC PO) Take 150 mg by mouth daily as needed        levothyroxine (SYNTHROID,LEVOTHROID) 100 MCG tablet Take 100 mcg by mouth daily        alendronate (FOSAMAX) 35 MG tablet Take 1 tablet (35 mg) by mouth every 7 days 12 tablet 3     Calcium Citrate-Vitamin D (CALCIUM CITRATE + PO) Take 200 mg by mouth daily as needed (She supplements to meet her 1,200mg goal depending on how much dietary calcium she has gotten that day (up to 400mg once daily)) Reported on 5/1/2017       Multiple Vitamin (MULTIVITAMINS PO) Take 1 tablet by mouth daily Reported on 4/10/2017       [DISCONTINUED] levothyroxine (SYNTHROID/LEVOTHROID) 75 MCG tablet Take 1 tablet (75 mcg) by mouth daily 30 tablet 3       Past Medical History:   Diagnosis Date     Anxiety      Arthritis     pain in feet and knees     Disorder of bone and cartilage, unspecified      Esophageal reflux 2015     GERD (gastroesophageal reflux disease)      Hx of previous reproductive problem 1980     Kidney stone 2011     Personal history of colonic polyps 2016    Small, benign     PONV (postoperative nausea and vomiting)      Posterior vitreous detachment of left eye 2011     Posterior vitreous detachment of right eye 2011     Ptosis of eyelid, bilateral      Stomach problems 2015    Abdominal pain, Diarreha     Thyroid disease     thyroid nodule resolved 2014       PHYSICAL EXAMINATION  /67  Pulse 109  Temp 99.4  F (37.4  C)  Resp 16  Wt 60 kg (132 lb 4.8 oz)  SpO2 99%  BMI 25.84 kg/m2  Wt Readings from Last 2 Encounters:   06/02/17 60 kg (132 lb 4.8 oz)   05/18/17 59.4 kg (131 lb)     Physical Exam   Constitutional: She is oriented to person, place, and time. She appears well-developed and well-nourished. No distress.   HENT:   Head: Atraumatic.   Eyes: Conjunctivae and EOM are normal. Pupils are equal, round,  and reactive to light. No scleral icterus.   Cardiovascular: Normal rate, regular rhythm and normal heart sounds.    No murmur heard.  Pulmonary/Chest: Effort normal and breath sounds normal. She has no wheezes.   Abdominal: Soft. She exhibits no distension. There is no tenderness.   Musculoskeletal: Normal range of motion. She exhibits no edema.   Lymphadenopathy:     She has no cervical adenopathy.   Neurological: She is alert and oriented to person, place, and time. No cranial nerve deficit. She exhibits normal muscle tone. Coordination normal.   Skin: Skin is warm and dry. No rash noted.   Psychiatric: She has a normal mood and affect. Her behavior is normal.   Nursing note and vitals reviewed.    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  It was a pleasure to see Ms. Navarrete today. She has completely recovered from her adjuvant therapy treatment and an echocardiogram confirmed normal cardiac function. Her hair has started to grow back in and life is starting to settle into a more familiar routine. She has had her port removed, and her staging CT-scan today was negative for recurrent or metastatic disease.    We will plan to have her return in 3-4 months time with repeat CT-CAP. Questions answered.    Ab Warner M.D.   of Medicine  Hematology, Oncology and Transplantation  [facility]

## 2017-06-02 NOTE — MR AVS SNAPSHOT
After Visit Summary   6/2/2017    Maggie Navarrete    MRN: [id number]           Patient Information     Date Of Birth          1950        Visit Information        Provider Department      6/2/2017 2:00 PM Ab Gutierrez MD [facility]        Today's Diagnoses     Acquired hypothyroidism    -  1    Uterus cancer, sarcoma (H)        Thyroid nodule           Follow-ups after your visit        Your next 10 appointments already scheduled     Jun 19, 2017  4:30 PM CDT   (Arrive by 4:15 PM)   Return Visit with Antoni Godfrey MD   [facility] ([facility])    [address] [place] [address]  2nd Floor  [facility] [address] [phone]              Future tests that were ordered for you today     Open Future Orders        Priority Expected Expires Ordered    CT Chest Abdomen Pelvis w/o & w Contrast Routine  6/2/2018 6/2/2017    FAMILY PRACTICE REFERRAL Routine  6/1/2018 6/1/2017            Who to contact     If you have questions or need follow up information about today's clinic visit or your schedule please contact [facility] directly at [phone].  Normal or non-critical lab and imaging results will be communicated to you by MyChart, letter or phone within 4 business days after the clinic has received the results. If you do not hear from us within 7 days, please contact the clinic through [employer] or phone. If you have a critical or abnormal lab result, we will notify you by phone as soon as possible.  Submit refill requests through [employer]bercheck or call your pharmacy and they will forward the refill request to us. Please allow 3 business days for your refill to be completed.          Additional Information About Your Visit        MyChart Information     I[employer]check gives you secure access to your electronic health record. If you see a primary care provider, you can also send messages to your care team and make  appointments. If you have questions, please call your primary care clinic.  If you do not have a primary care provider, please call [phone] and they will assist you.        Care EveryWhere ID     This is your Care EveryWhere ID. This could be used by other organizations to access your [facility] medical records  [id number]        Your Vitals Were     Pulse Temperature Respirations Pulse Oximetry BMI (Body Mass Index)       109 99.4  F (37.4  C) 16 99% 25.84 kg/m2        Blood Pressure from Last 3 Encounters:   06/02/17 145/67   05/18/17 124/85   05/09/17 129/78    Weight from Last 3 Encounters:   06/02/17 60 kg (132 lb 4.8 oz)   05/18/17 59.4 kg (131 lb)   05/09/17 59.4 kg (131 lb)              We Performed the Following     CBC with platelets differential - NOW     CBC with platelets differential     Comprehensive metabolic panel - NOW     Comprehensive metabolic panel     TSH          Today's Medication Changes          These changes are accurate as of: 6/2/17  4:14 PM.  If you have any questions, ask your nurse or doctor.               These medicines have changed or have updated prescriptions.        Dose/Directions    * levothyroxine 100 MCG tablet   Commonly known as:  SYNTHROID/LEVOTHROID   Indication:  6 days on, 1 day off.   This may have changed:  Another medication with the same name was added. Make sure you understand how and when to take each.   Changed by:  Kristine Flynn MD PhD        Dose:  100 mcg   Take 100 mcg by mouth daily   Refills:  0       * levothyroxine 75 MCG tablet   Commonly known as:  SYNTHROID/LEVOTHROID   This may have changed:  You were already taking a medication with the same name, and this prescription was added. Make sure you understand how and when to take each.   Used for:  Acquired hypothyroidism   Changed by:  Ab Gutierrez MD        Dose:  75 mcg   Take 1 tablet (75 mcg) by mouth daily   Quantity:  30 tablet   Refills:  3       * Notice:  This list has  2 medication(s) that are the same as other medications prescribed for you. Read the directions carefully, and ask your doctor or other care provider to review them with you.         Where to get your medicines      Some of these will need a paper prescription and others can be bought over the counter.  Ask your nurse if you have questions.     Bring a paper prescription for each of these medications     levothyroxine 75 MCG tablet                Primary Care Provider Office Phone # Fax #    Rhea Lin [phone]        [facility] [address] [facility] [address]  [facility] [address]        Thank you!     Thank you for choosing [facility]  for your care. Our goal is always to provide you with excellent care. Hearing back from our patients is one way we can continue to improve our services. Please take a few minutes to complete the written survey that you may receive in the mail after your visit with us. Thank you!             Your Updated Medication List - Protect others around you: Learn how to safely use, store and throw away your medicines at www.disposemymeds.org.          This list is accurate as of: 6/2/17  4:14 PM.  Always use your most recent med list.                   Brand Name Dispense Instructions for use    alendronate 35 MG tablet    FOSAMAX    12 tablet    Take 1 tablet (35 mg) by mouth every 7 days       CALCIUM CITRATE + PO      Take 200 mg by mouth daily as needed (She supplements to meet her 1,200mg goal depending on how much dietary calcium she has gotten that day (up to 400mg once daily)) Reported on 5/1/2017       * levothyroxine 100 MCG tablet    SYNTHROID/LEVOTHROID     Take 100 mcg by mouth daily       * levothyroxine 75 MCG tablet    SYNTHROID/LEVOTHROID    30 tablet    Take 1 tablet (75 mcg) by mouth daily       MULTIVITAMINS PO      Take 1 tablet by mouth daily Reported on 4/10/2017       venlafaxine 75 MG 24 hr capsule    EFFEXOR-XR    90 capsule     Take 1 capsule (75 mg) by mouth daily       ZANTAC PO      Take 150 mg by mouth daily as needed       zolpidem 5 MG tablet    AMBIEN    25 tablet    Take 1 tablet (5 mg) by mouth nightly as needed for sleep       * Notice:  This list has 2 medication(s) that are the same as other medications prescribed for you. Read the directions carefully, and ask your doctor or other care provider to review them with you.

## 2017-06-02 NOTE — NURSING NOTE
Oncology Rooming Note    June 2, 2017 1:48 PM   Maggie Navarrete is a 67 year old female who presents for:    Chief Complaint   Patient presents with     Labs Only     venipuncture, vitals checked     Oncology Clinic Visit     leiomyosarcoma      Initial Vitals: /67  Pulse 109  Temp 99.4  F (37.4  C)  Resp 16  Wt 60 kg (132 lb 4.8 oz)  SpO2 99%  BMI 25.84 kg/m2 Estimated body mass index is 25.84 kg/(m^2) as calculated from the following:    Height as of 5/9/17: 1.524 m (5').    Weight as of this encounter: 60 kg (132 lb 4.8 oz). Body surface area is 1.59 meters squared.  No Pain (0) Comment: Data Unavailable   No LMP recorded. Patient is postmenopausal.  Allergies reviewed: Yes  Medications reviewed: Yes    Medications: Medication refills not needed today.  Pharmacy name entered into [facility]-[employer]:    [id number] - [facility] [address] [facility] [address] [place] [address] PHARMACY MAIL DELIVERY - [facility] [address] [facility] - [place], [place] - [address] [facility]    Clinical concerns: no doc was NOT notified.    3 minutes for nursing intake (face to face time)     Gustabo Chambers CMA

## 2017-06-02 NOTE — PROGRESS NOTES
MEDICAL ONCOLOGY PROGRESS NOTE   Jun 2, 2017    Oncologic history:  1. January 2016, she presents to her PCP with 3 weeks of abdominal pain and severe constipation. She is treated for constipation and improves.  2. 3/21/2016, worsening pelvic pressure prompts pelvic examination with an enlarged uterus noted. Transvaginal ultrasound shows enlargement of subserosal fibroids originally seen on MRI in 2006.  3. 5/9/2016, MRI pelvis is obtained, which shows several enhancing uterine masses involving an enlarged uterus, felt radiographically compatible with benign uterine leiomyomata.  3. Summer 2016, she experiences worsening low back pain thought at first thought to be sacroiliitis, but pain persists after corticosteroid injection. Continues with severe constipation.  4. 9/8/2016, she undergoes colonoscopy for persistent abdominal pain. She has sigmoid polyp snared. Abdominal pain and diarrhea persist after colonoscopy prep.  5. 9/14/2016, she has worsening abdominal pain and is evaluated in the ED. CT-scan shows a large subserosal fibroid measuring 9.4 x 9.6 x 9.8 cm, as well as very large fibroids along the posterior uterine body more inferiorly, increased in size to 14 cm in greatest diameter compared to 10 cm in greatest diameter on MRI of 5/9/2016.  6. 9/29/2016, she has staging CT-CAP, which shows a small left sided pleural effusion and a 3 mm pulmonary nodule in the RML.  7. October 2016, self-refers to [facility].  8. 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  9. 11/4/2016, undergoes total abdominal hysterectomy and BSO, under Dr. Harsh Camarillo. Surgery was originally presumed for leiomyoma, however, intraoperatively there was focal adherence of a mass to the pelvic right sidewall and cul-de-sac, which led to peritoneal resection and omentectomy. Final pathology, pT2b: showed leiomyosarcoma involving the cul-de-sac and forming two separate masses measuring  10.5 x 6.5 x 4.5 cm and 10.7 x 7.9 x 7.5 cm.  10. 12/26/2016, she starts adjuvant chemotherapy with doxorubicin 25 mg/m2/day and DTIC 250 mg/m2/day , days 1-3 every 21 weeks.  11. 3/23/2017, she completes 5 cycles of adjuvant doxorubicin and dacarbazine    HISTORY OF PRESENT ILLNESS  Ms. Navarrete presents today in followup. She had complete resection and RACHEL for leiomyosarcoma of the uterus.  She completed 5 cycles of adjuvant chemotherapy with doxorubicin and dacarbazine in March. Since then she has been able to have her port removed, and she has recovered completely from adjuvant therapy treatments. Her hair has started to regrow and she denies any persistent or lingering symptoms or side effects from treatment. She currently denies nausea, vomiting, loss of appetite, diarrhea, or constipation. No headaches, shortness of breath, chest pain, or fevers/chills.    She had an echocardiogram, which showed a normal cardiac ejection fraction of 65% with no decrement resulting from therapy. CT-scan for restaging and this showed no evidence of recurrent or metastatic disease. She has small bilateral and stable nodularities in the lungs as well as a stable 9 mm cystic focus in the uncinate process of the pancreas, not significantly changed since at least 9/14/2016. This may represent sequelae of pancreatitis or potentially a side branch intraductal papillary mucinous neoplasm.    REVIEW OF SYSTEMS  A 12-point ROS negative except as in HPI    Current Outpatient Prescriptions   Medication Sig Dispense Refill     levothyroxine (SYNTHROID/LEVOTHROID) 75 MCG tablet Take 1 tablet (75 mcg) by mouth daily 30 tablet 3     zolpidem (AMBIEN) 5 MG tablet Take 1 tablet (5 mg) by mouth nightly as needed for sleep 25 tablet 0     venlafaxine (EFFEXOR-XR) 75 MG 24 hr capsule Take 1 capsule (75 mg) by mouth daily 90 capsule 3     RaNITidine HCl (ZANTAC PO) Take 150 mg by mouth daily as needed        levothyroxine (SYNTHROID,LEVOTHROID) 100 MCG  tablet Take 100 mcg by mouth daily        alendronate (FOSAMAX) 35 MG tablet Take 1 tablet (35 mg) by mouth every 7 days 12 tablet 3     Calcium Citrate-Vitamin D (CALCIUM CITRATE + PO) Take 200 mg by mouth daily as needed (She supplements to meet her 1,200mg goal depending on how much dietary calcium she has gotten that day (up to 400mg once daily)) Reported on 5/1/2017       Multiple Vitamin (MULTIVITAMINS PO) Take 1 tablet by mouth daily Reported on 4/10/2017       [DISCONTINUED] levothyroxine (SYNTHROID/LEVOTHROID) 75 MCG tablet Take 1 tablet (75 mcg) by mouth daily 30 tablet 3       Past Medical History:   Diagnosis Date     Anxiety      Arthritis     pain in feet and knees     Disorder of bone and cartilage, unspecified      Esophageal reflux 2015     GERD (gastroesophageal reflux disease)      Hx of previous reproductive problem 1980     Kidney stone 2011     Personal history of colonic polyps 2016    Small, benign     PONV (postoperative nausea and vomiting)      Posterior vitreous detachment of left eye 2011     Posterior vitreous detachment of right eye 2011     Ptosis of eyelid, bilateral      Stomach problems 2015    Abdominal pain, Diarreha     Thyroid disease     thyroid nodule resolved 2014       PHYSICAL EXAMINATION  /67  Pulse 109  Temp 99.4  F (37.4  C)  Resp 16  Wt 60 kg (132 lb 4.8 oz)  SpO2 99%  BMI 25.84 kg/m2  Wt Readings from Last 2 Encounters:   06/02/17 60 kg (132 lb 4.8 oz)   05/18/17 59.4 kg (131 lb)     Physical Exam   Constitutional: She is oriented to person, place, and time. She appears well-developed and well-nourished. No distress.   HENT:   Head: Atraumatic.   Eyes: Conjunctivae and EOM are normal. Pupils are equal, round, and reactive to light. No scleral icterus.   Cardiovascular: Normal rate, regular rhythm and normal heart sounds.    No murmur heard.  Pulmonary/Chest: Effort normal and breath sounds normal. She has no wheezes.   Abdominal: Soft. She exhibits no  distension. There is no tenderness.   Musculoskeletal: Normal range of motion. She exhibits no edema.   Lymphadenopathy:     She has no cervical adenopathy.   Neurological: She is alert and oriented to person, place, and time. No cranial nerve deficit. She exhibits normal muscle tone. Coordination normal.   Skin: Skin is warm and dry. No rash noted.   Psychiatric: She has a normal mood and affect. Her behavior is normal.   Nursing note and vitals reviewed.    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  It was a pleasure to see Ms. Navarrete today. She has completely recovered from her adjuvant therapy treatment and an echocardiogram confirmed normal cardiac function. Her hair has started to grow back in and life is starting to settle into a more familiar routine. She has had her port removed, and her staging CT-scan today was negative for recurrent or metastatic disease.    We will plan to have her return in 3-4 months time with repeat CT-CAP. Questions answered.    Ab Warner M.D.   of Medicine  Hematology, Oncology and Transplantation  [facility]    Answers for HPI/ROS submitted by the patient on 5/31/2017   General Symptoms: No  Skin Symptoms: No  HENT Symptoms: No  EYE SYMPTOMS: No  HEART SYMPTOMS: No  LUNG SYMPTOMS: No  INTESTINAL SYMPTOMS: No  URINARY SYMPTOMS: No  GYNECOLOGIC SYMPTOMS: No  BREAST SYMPTOMS: No  SKELETAL SYMPTOMS: Yes  BLOOD SYMPTOMS: No  NERVOUS SYSTEM SYMPTOMS: No  MENTAL HEALTH SYMPTOMS: No  Back pain: Yes  Muscle aches: No  Neck pain: No  Swollen joints: No  Joint pain: Yes  Bone pain: No  Muscle cramps: No  Muscle weakness: No  Joint stiffness: Yes  Bone fracture: No

## 2017-06-06 ENCOUNTER — TELEPHONE (OUTPATIENT)
Dept: INTERNAL MEDICINE | Facility: CLINIC | Age: 67
End: 2017-06-06

## 2017-06-19 ENCOUNTER — ONCOLOGY VISIT (OUTPATIENT)
Dept: ONCOLOGY | Facility: CLINIC | Age: 67
End: 2017-06-19
Attending: OBSTETRICS & GYNECOLOGY
Payer: MEDICARE

## 2017-06-19 VITALS
OXYGEN SATURATION: 100 % | DIASTOLIC BLOOD PRESSURE: 81 MMHG | HEART RATE: 90 BPM | HEIGHT: 60 IN | WEIGHT: 135.9 LBS | RESPIRATION RATE: 16 BRPM | TEMPERATURE: 98.3 F | BODY MASS INDEX: 26.68 KG/M2 | SYSTOLIC BLOOD PRESSURE: 129 MMHG

## 2017-06-19 DIAGNOSIS — C54.9 UTERUS CANCER, SARCOMA (H): Primary | ICD-10-CM

## 2017-06-19 PROCEDURE — 99212 OFFICE O/P EST SF 10 MIN: CPT | Mod: ZF

## 2017-06-19 PROCEDURE — 99213 OFFICE O/P EST LOW 20 MIN: CPT | Mod: ZP | Performed by: OBSTETRICS & GYNECOLOGY

## 2017-06-19 PROCEDURE — 99212 OFFICE O/P EST SF 10 MIN: CPT | Mod: ZF | Performed by: OBSTETRICS & GYNECOLOGY

## 2017-06-19 ASSESSMENT — PAIN SCALES - GENERAL: PAINLEVEL: NO PAIN (0)

## 2017-06-19 NOTE — NURSING NOTE
Oncology Rooming Note    June 19, 2017 4:42 PM   Maggie Navarrete is a 67 year old female who presents for:    Chief Complaint   Patient presents with     Oncology Clinic Visit     Uterus cancer, sarcoma (H), 3 Mo F/U Post-Chemo.     Initial Vitals: /81  Pulse 90  Temp 98.3  F (36.8  C) (Oral)  Resp 16  Ht 1.524 m (5')  Wt 61.6 kg (135 lb 14.4 oz)  SpO2 100%  BMI 26.54 kg/m2 Estimated body mass index is 26.54 kg/(m^2) as calculated from the following:    Height as of this encounter: 1.524 m (5').    Weight as of this encounter: 61.6 kg (135 lb 14.4 oz). Body surface area is 1.61 meters squared.  No Pain (0) Comment: Data Unavailable   No LMP recorded. Patient is postmenopausal.  Allergies reviewed: Yes  Medications reviewed: Yes    Medications: Medication refills not needed today.  Pharmacy name entered into [facility]:    [id number] - [facility] [address] AT [facility] OF [address] & [address] [place] [address] PHARMACY MAIL DELIVERY - [place], [place] - [address] [facility] - [place], [place] - [address] [facility]    Clinical concerns: None Dr Godfrey was NOT notified.    7 minutes for nursing intake (face to face time)     Reena Cortez LPN

## 2017-06-19 NOTE — MR AVS SNAPSHOT
After Visit Summary   6/19/2017    Maggie Navarrete    MRN: [id number]           Patient Information     Date Of Birth          1950        Visit Information        Provider Department      6/19/2017 4:30 PM Antoni Godfrey MD [facility]        Today's Diagnoses     Uterus cancer, sarcoma (H)    -  1       Follow-ups after your visit        Your next 10 appointments already scheduled     Jul 30, 2018 10:20 AM CDT   (Arrive by 10:05 AM)   Return Active Treatment with SHIRAZ Parks CNP   [facility] ([facility])    [address]  [address]  [facility] [id number] [phone]            Aug 17, 2018 11:00 AM CDT   CT CHEST ABDOMEN PELVIS W/O & W CONTRAST with UCCT1   [facility] CT ([facility])    [address]  1st Floor  [facility] [id number] [phone]           Please bring any scans or X-rays taken at other hospitals, if similar tests were done. Also bring a list of your medicines, including vitamins, minerals and over-the-counter drugs. It is safest to leave personal items at home.  Be sure to tell your doctor:   If you have any allergies.   If there s any chance you are pregnant.   If you are breastfeeding.  How to prepare:   Do not eat or drink for 2 hours before your exam. If you need to take medicine, you may take it with small sips of water. (We may ask you to take liquid medicine as well.)   Please wear loose clothing, such as a sweat suit or jogging clothes. Avoid snaps, zippers and other metal. We may ask you to undress and put on a hospital gown.  Please arrive 30 minutes early for your CT. Once in the department you might be asked to drink water 15-20 minutes prior to your exam.  If indicated you may be asked to drink an oral contrast in advance of your CT.  If this is the case, the imaging team will let you know or be in contact with you  prior to your appointment  Patients over 70 or patients with diabetes or kidney problems:   If you haven t had a blood test (creatinine test) within the last 30 days, the Cardiologist/Radiologist may require you to get this test prior to your exam.  If you have diabetes:   Continue to take your metformin medication on the day of your exam  If you have any questions, please call the Imaging Department where you will have your exam.            Aug 17, 2018 11:30 AM CDT   Lab with  LAB   [facility] ([facility])    [address]  1st Floor  [facility] [id number] [phone]            Aug 20, 2018  3:30 PM CDT   (Arrive by 3:15 PM)   Return Visit with Ab Aguilar MD   [facility] ([facility])    [address]  [address]  [facility] [address] [phone]              Who to contact     If you have questions or need follow up information about today's clinic visit or your schedule please contact [facility] directly at [phone].  Normal or non-critical lab and imaging results will be communicated to you by MyChart, letter or phone within 4 business days after the clinic has received the results. If you do not hear from us within 7 days, please contact the clinic through [employer] or phone. If you have a critical or abnormal lab result, we will notify you by phone as soon as possible.  Submit refill requests through [employer] or call your pharmacy and they will forward the refill request to us. Please allow 3 business days for your refill to be completed.          Additional Information About Your Visit        [employer] Information     [employer] gives you secure access to your electronic health record. If you see a primary care provider, you can also send messages to your care team and make appointments. If you have questions, please call your primary care clinic.  If you do not have a primary  care provider, please call [phone] and they will assist you.        Care EveryWhere ID     This is your Care EveryWhere ID. This could be used by other organizations to access your [place] medical records  [id number]        Your Vitals Were     Pulse Temperature Respirations Height Pulse Oximetry BMI (Body Mass Index)    90 98.3  F (36.8  C) (Oral) 16 1.524 m (5') 100% 26.54 kg/m2       Blood Pressure from Last 3 Encounters:   04/23/18 133/72   04/20/18 155/90   01/15/18 134/82    Weight from Last 3 Encounters:   04/23/18 65.2 kg (143 lb 12.8 oz)   04/20/18 65.2 kg (143 lb 11.2 oz)   01/15/18 64.4 kg (142 lb)              Today, you had the following     No orders found for display       Primary Care Provider Office Phone # Fax #    Rhea Lin [phone]       [address] [place] [address] [address] [place] [address]        Equal Access to Services     [facility]: Hadii sivakumar nance hadasho Soomaali, waaxda luqadaha, qaybta kaalmada adeegyagisselle, geneva mendoza . So [facility] [phone].    ATENCIÓN: Si habla español, tiene a cage disposición servicios gratuitos de asistencia lingüística. [facility] [phone].    We comply with applicable federal civil rights laws and [place] laws. We do not discriminate on the basis of race, color, national origin, age, disability, sex, sexual orientation, or gender identity.            Thank you!     Thank you for choosing [facility]  for your care. Our goal is always to provide you with excellent care. Hearing back from our patients is one way we can continue to improve our services. Please take a few minutes to complete the written survey that you may receive in the mail after your visit with us. Thank you!             Your Updated Medication List - Protect others around you: Learn how to safely use, store and throw away your medicines at www.disposemymeds.org.          This list is accurate as of 6/19/17 11:59 PM.   Always use your most recent med list.                   Brand Name Dispense Instructions for use Diagnosis    CALCIUM CITRATE + PO      Take 200 mg by mouth daily as needed (She supplements to meet her 1,200mg goal depending on how much dietary calcium she has gotten that day (up to 400mg once daily)) Reported on 5/1/2017        MULTIVITAMINS PO      Take 1 tablet by mouth daily Reported on 4/10/2017        venlafaxine 75 MG 24 hr capsule    EFFEXOR-XR    90 capsule    Take 1 capsule (75 mg) by mouth daily    Generalized anxiety disorder

## 2017-06-19 NOTE — PROGRESS NOTES
Follow Up Note  Gynecologic Oncology   June 19, 2017     HPI:  Ms. Navarrete presents today for follow up regarding a previous diagnosis of Stage IIb Leiomyosarcoma. She was originally diagnosed during surgery indicated for a presumed leiomyoma in November 2016. At that time, RACHEL / BSO, peritoneal resection, and omentectomy was performed. As of this month she received a CT scan (6/1/17) which was negative for metastatic disease, echocardiogram (6/1/17) which showed normal cardiac function, and had her port removed without complication (5/9/17). Her last administration of chemotherapy was March 2017; she completed 5/6 cycles and discontinued early due to painful mucositis.     Today she presents for follow up. She reports feeling well and is in good spirits. She denies abdominal pain, nausea, vomiting, diarrhea, constipation, bloody stool, dysuria, hematuria, vaginal discharge, or vaginal bleeding. She does not have any specific concerns at this time.      Her oncologic history is included below:    Oncologic history:  January 2016, she presents to her PCP with 3 weeks of abdominal pain and severe constipation. She is treated for constipation and improves.    3/21/2016, worsening pelvic pressure prompts pelvic examination with an enlarged uterus noted. Transvaginal ultrasound shows enlargement of subserosal fibroids originally seen on MRI in 2006.    5/9/2016, MRI pelvis is obtained, which shows several enhancing uterine masses involving an enlarged uterus, felt radiographically compatible with benign uterine leiomyomata.    Summer 2016, she experiences worsening low back pain thought at first thought to be sacroiliitis, but pain persists after corticosteroid injection. Continues with severe constipation.    9/8/2016, she undergoes colonoscopy for persistent abdominal pain. She has sigmoid polyp snared. Abdominal pain and diarrhea persist after colonoscopy prep.    9/14/2016, she has worsening abdominal pain and is  evaluated in the ED. CT-scan shows a large subserosal fibroid measuring 9.4 x 9.6 x 9.8 cm, as well as very large fibroids along the posterior uterine body more inferiorly, increased in size to 14 cm in greatest diameter compared to 10 cm in greatest diameter on MRI of 5/9/2016.    12/26/2016-present: s/p 5 cycles 25 mg/m2 doxorubicin, 250 mg/m2 dacarbazine.     9/29/2016, she has staging CT-CAP, which shows a small left sided pleural effusion and a 3 mm pulmonary nodule in the RML.    October 2016, self-refers to [facility].    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    11/4/2016, undergoes total abdominal hysterectomy and BSO, under Dr. Harsh Camarillo. Surgery was originally presumed for leiomyoma, however, intraoperatively there was focal adherence of a mass to the pelvic right sidewall and cul-de-sac, which led to peritoneal resection and omentectomy. Final pathology, pT2b: showed leiomyosarcoma involving the cul-de-sac and forming two separate masses measuring 10.5 x 6.5 x 4.5 cm and 10.7 x 7.9 x 7.5 cm.    Review of Systems:  Systemic-         no weight changes; no fever; no chills; no night sweats; no appetite changes  Skin-         no rashes, or lesions  Pulmonary-  no cough; no shortness of breath   Cardiovascular- no chest pain; no palpitations  Gastrointestinal- no diarrhea; no constipation; no abdominal pain; no changes in bowel  habits; no blood in stool  Genitourinary- no urinary frequency; no urinary urgency; no dysuria; no pain; no abnormal vaginal discharge; no abnormal vaginal bleeding  Musculoskeletal- no myalgias; no arthralgias; no back pain  Psychiatric-        no depressed mood; no anxiety    Hematologic- no tender lymph nodes; no noticeable swellings or lumps   Endocrine- no hot flashes; no heat/cold intolerance         Neurological- no tremor; no numbness and tingling; no headaches; no difficulty  sleeping    Physical Exam:   /81   Pulse 90  Temp 98.3  F (36.8  C) (Oral)  Resp 16  Ht 1.524 m (5')  Wt 61.6 kg (135 lb 14.4 oz)  SpO2 100%  BMI 26.54 kg/m2  Body mass index is 26.54 kg/(m^2).    Gen:  healthy and alert, no distress  HEENT: no thyromegaly, no palpable nodules or masses  CV: regular rate and rhythm, no gallops, rubs or murmurs   Pulm: lungs clear, no rales, rhonchi or wheezes, normal diaphragmatic excursion  Breast Exam: No masses or irregularities noted. No dimpling, breast or muscle tenderness. No axillary lymphadenopathy. No nipple discharge.   MSK: extremities non tender and without edema  Skin: no lesions or rashes   Neuro: normal gait, no gross defects   Psych: appropriate mood and affect  Heme: normal cervical, supraclavicular and inguinal lymph nodes  Abd: Soft, non-tender, non-distended, no organomegaly or masses  Pelvic: External genitalia and urethral meatus appears normal.  Vagina is smooth without nodularity or masses. Bimanual exam reveal no masses, nodularity or fullness.  Recto-vaginal exam confirms these findings.    Labs/Imaging:  CT Chest / Abdomen / Pelvis (6/1/17)  IMPRESSION: In this patient with history of neuroma sarcoma of the  uterus,   1. Stable scattered bilateral pulmonary nodules. No new/suspicious  pulmonary nodules.  2. Stable small retroperitoneal lymph nodes. No new lymphadenopathy  within the abdomen or pelvis.  3. Otherwise no new evidence of metastatic disease to the chest,  abdomen or pelvis.      I have personally reviewed the examination and initial interpretation  and I agree with the findings.    Echocardiogram (6/1/17)  Interpretation Summary  Global and regional left ventricular function is normal with an EF of 55-60%.  Global peak LV longitudinal strain is averaged at -19%. This is within  reported normal limits (normal <-18%).  Right ventricular function, chamber size, wall motion, and thickness are  normal.    Assessment:  Maggie Navarrete is a 66 year old woman with IIB  leiomyosarcoma, s/p RACHEL / BSO, peritoneal resection, and omentectomy, and 5/6 chemotherapy cycles with doxorubicin and dacarbazine.       Plan:   1.)    Stage IIB Leiomyosarcoma  Patient shows no signs or symptoms suggestive of malignancy at this time. In the context of recently negative CT findings, it is likely that she is currently in remission. However, given the potential for metastatic recurrence she should continue with follow up for pelvic exams at regular intervals (every 3 months for the next two years)  - Follow up in 3 months  - Chest XR annually  2.) Genetic risk factors were assessed and the patient does not meet the qualifications for a referral  3.) Labs and/or tests ordered include:  None currently.    Scribed by Tony Yu, MS4, on behalf of Antoni Godfrey MD.        Antoni Godfrey

## 2017-06-19 NOTE — LETTER
6/19/2017       RE: Maggie Navarrete  [address]  [facility] [address]     Dear Colleague,    Thank you for referring your patient, Maggie Navarrete, to the [facility]. Please see a copy of my visit note below.    Follow Up Note  Gynecologic Oncology   June 19, 2017     HPI:  Ms. Navarrete presents today for follow up regarding a previous diagnosis of Stage IIb Leiomyosarcoma. She was originally diagnosed during surgery indicated for a presumed leiomyoma in November 2016. At that time, RACHEL / BSO, peritoneal resection, and omentectomy was performed. As of this month she received a CT scan (6/1/17) which was negative for metastatic disease, echocardiogram (6/1/17) which showed normal cardiac function, and had her port removed without complication (5/9/17). Her last administration of chemotherapy was March 2017; she completed 5/6 cycles and discontinued early due to painful mucositis.     Today she presents for follow up. She reports feeling well and is in good spirits. She denies abdominal pain, nausea, vomiting, diarrhea, constipation, bloody stool, dysuria, hematuria, vaginal discharge, or vaginal bleeding. She does not have any specific concerns at this time.      Her oncologic history is included below:    Oncologic history:  January 2016, she presents to her PCP with 3 weeks of abdominal pain and severe constipation. She is treated for constipation and improves.    3/21/2016, worsening pelvic pressure prompts pelvic examination with an enlarged uterus noted. Transvaginal ultrasound shows enlargement of subserosal fibroids originally seen on MRI in 2006.    5/9/2016, MRI pelvis is obtained, which shows several enhancing uterine masses involving an enlarged uterus, felt radiographically compatible with benign uterine leiomyomata.    Summer 2016, she experiences worsening low back pain thought at first thought to be sacroiliitis, but pain persists after corticosteroid injection. Continues  with severe constipation.    9/8/2016, she undergoes colonoscopy for persistent abdominal pain. She has sigmoid polyp snared. Abdominal pain and diarrhea persist after colonoscopy prep.    9/14/2016, she has worsening abdominal pain and is evaluated in the ED. CT-scan shows a large subserosal fibroid measuring 9.4 x 9.6 x 9.8 cm, as well as very large fibroids along the posterior uterine body more inferiorly, increased in size to 14 cm in greatest diameter compared to 10 cm in greatest diameter on MRI of 5/9/2016.    12/26/2016-present: s/p 5 cycles 25 mg/m2 doxorubicin, 250 mg/m2 dacarbazine.     9/29/2016, she has staging CT-CAP, which shows a small left sided pleural effusion and a 3 mm pulmonary nodule in the RML.    October 2016, self-refers to [facility].    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    11/4/2016, undergoes total abdominal hysterectomy and BSO, under Dr. Harsh Camarillo. Surgery was originally presumed for leiomyoma, however, intraoperatively there was focal adherence of a mass to the pelvic right sidewall and cul-de-sac, which led to peritoneal resection and omentectomy. Final pathology, pT2b: showed leiomyosarcoma involving the cul-de-sac and forming two separate masses measuring 10.5 x 6.5 x 4.5 cm and 10.7 x 7.9 x 7.5 cm.    Review of Systems:  Systemic-         no weight changes; no fever; no chills; no night sweats; no appetite changes  Skin-         no rashes, or lesions  Pulmonary-  no cough; no shortness of breath   Cardiovascular- no chest pain; no palpitations  Gastrointestinal- no diarrhea; no constipation; no abdominal pain; no changes in bowel  habits; no blood in stool  Genitourinary- no urinary frequency; no urinary urgency; no dysuria; no pain; no abnormal vaginal discharge; no abnormal vaginal bleeding  Musculoskeletal- no myalgias; no arthralgias; no back pain  Psychiatric-        no depressed mood; no anxiety     Hematologic- no tender lymph nodes; no noticeable swellings or lumps   Endocrine- no hot flashes; no heat/cold intolerance         Neurological- no tremor; no numbness and tingling; no headaches; no difficulty  sleeping    Physical Exam:   /81  Pulse 90  Temp 98.3  F (36.8  C) (Oral)  Resp 16  Ht 1.524 m (5')  Wt 61.6 kg (135 lb 14.4 oz)  SpO2 100%  BMI 26.54 kg/m2  Body mass index is 26.54 kg/(m^2).    Gen:  healthy and alert, no distress  HEENT: no thyromegaly, no palpable nodules or masses  CV: regular rate and rhythm, no gallops, rubs or murmurs   Pulm: lungs clear, no rales, rhonchi or wheezes, normal diaphragmatic excursion  Breast Exam: No masses or irregularities noted. No dimpling, breast or muscle tenderness. No axillary lymphadenopathy. No nipple discharge.   MSK: extremities non tender and without edema  Skin: no lesions or rashes   Neuro: normal gait, no gross defects   Psych: appropriate mood and affect  Heme: normal cervical, supraclavicular and inguinal lymph nodes  Abd: Soft, non-tender, non-distended, no organomegaly or masses  Pelvic: External genitalia and urethral meatus appears normal.  Vagina is smooth without nodularity or masses. Bimanual exam reveal no masses, nodularity or fullness.  Recto-vaginal exam confirms these findings.    Labs/Imaging:  CT Chest / Abdomen / Pelvis (6/1/17)  IMPRESSION: In this patient with history of neuroma sarcoma of the  uterus,   1. Stable scattered bilateral pulmonary nodules. No new/suspicious  pulmonary nodules.  2. Stable small retroperitoneal lymph nodes. No new lymphadenopathy  within the abdomen or pelvis.  3. Otherwise no new evidence of metastatic disease to the chest,  abdomen or pelvis.      I have personally reviewed the examination and initial interpretation  and I agree with the findings.    Echocardiogram (6/1/17)  Interpretation Summary  Global and regional left ventricular function is normal with an EF of 55-60%.  Global peak LV  longitudinal strain is averaged at -19%. This is within  reported normal limits (normal <-18%).  Right ventricular function, chamber size, wall motion, and thickness are  normal.    Assessment:  Maggie Navarrete is a 66 year old woman with IIB leiomyosarcoma, s/p RACHEL / BSO, peritoneal resection, and omentectomy, and 5/6 chemotherapy cycles with doxorubicin and dacarbazine.       Plan:   1.)    Stage IIB Leiomyosarcoma  Patient shows no signs or symptoms suggestive of malignancy at this time. In the context of recently negative CT findings, it is likely that she is currently in remission. However, given the potential for metastatic recurrence she should continue with follow up for pelvic exams at regular intervals (every 3 months for the next two years)  - Follow up in 3 months  - Chest XR annually  2.) Genetic risk factors were assessed and the patient does not meet the qualifications for a referral  3.) Labs and/or tests ordered include:  None currently.    Scribed by Tony Yu, MS4, on behalf of Antoni Godfrey MD.          Again, thank you for allowing me to participate in the care of your patient.      Sincerely,    Antoni Godfrey MD

## 2017-06-28 DIAGNOSIS — H35.371 ERM OD (EPIRETINAL MEMBRANE, RIGHT EYE): Primary | ICD-10-CM

## 2017-07-05 ENCOUNTER — OFFICE VISIT (OUTPATIENT)
Dept: OPHTHALMOLOGY | Facility: CLINIC | Age: 67
End: 2017-07-05
Attending: OPHTHALMOLOGY
Payer: MEDICARE

## 2017-07-05 ENCOUNTER — RADIANT APPOINTMENT (OUTPATIENT)
Dept: MAMMOGRAPHY | Facility: CLINIC | Age: 67
End: 2017-07-05

## 2017-07-05 DIAGNOSIS — H35.371 ERM OD (EPIRETINAL MEMBRANE, RIGHT EYE): ICD-10-CM

## 2017-07-05 DIAGNOSIS — Z12.31 VISIT FOR SCREENING MAMMOGRAM: ICD-10-CM

## 2017-07-05 PROCEDURE — 99213 OFFICE O/P EST LOW 20 MIN: CPT | Mod: ZF

## 2017-07-05 PROCEDURE — 92134 CPTRZ OPH DX IMG PST SGM RTA: CPT | Mod: ZF | Performed by: OPHTHALMOLOGY

## 2017-07-05 ASSESSMENT — TONOMETRY
OD_IOP_MMHG: 18
IOP_METHOD: TONOPEN
OS_IOP_MMHG: 18

## 2017-07-05 ASSESSMENT — CONF VISUAL FIELD
OD_NORMAL: 1
OS_NORMAL: 1

## 2017-07-05 ASSESSMENT — VISUAL ACUITY
OS_CC+: -1
CORRECTION_TYPE: CONTACTS
METHOD: SNELLEN - LINEAR
OD_CC: 20/20
OS_CC: 20/20

## 2017-07-05 ASSESSMENT — EXTERNAL EXAM - LEFT EYE: OS_EXAM: NORMAL

## 2017-07-05 ASSESSMENT — SLIT LAMP EXAM - LIDS
COMMENTS: NORMAL
COMMENTS: NORMAL

## 2017-07-05 ASSESSMENT — EXTERNAL EXAM - RIGHT EYE: OD_EXAM: NORMAL

## 2017-07-05 NOTE — MR AVS SNAPSHOT
After Visit Summary   7/5/2017    Maggie Navarrete    MRN: [id number]           Patient Information     Date Of Birth          1950        Visit Information        Provider Department      7/5/2017 3:00 PM Melissa Nguyen MD [facility]        Today's Diagnoses     ERM OD (epiretinal membrane, right eye)           Follow-ups after your visit        Follow-up notes from your care team     Return in about 1 year (around 7/5/2018).      Your next 10 appointments already scheduled     Jul 10, 2017 10:30 AM CDT   Return Visit with Kristine Flynn MD PhD   [facility] ([facility])    [place] [facility]  3rd Flr,[address]  [facility] [address]  [facility] [address]   [phone]            Sep 08, 2017 11:00 AM CDT   (Arrive by 10:45 AM)   CT CHEST ABDOMEN PELVIS W/O & W CONTRAST with UCCT1   [facility] ([facility])    [address]  1st Floor  [facility] [address] [phone]           Please bring any scans or X-rays taken at other hospitals, if similar tests were done. Also bring a list of your medicines, including vitamins, minerals and over-the-counter drugs. It is safest to leave personal items at home.  Be sure to tell your doctor:   If you have any allergies.   If there s any chance you are pregnant.   If you are breastfeeding.   If you have any special needs.  You may have contrast for this exam. To prepare:   Do not eat or drink for 2 hours before your exam. If you need to take medicine, you may take it with small sips of water. (We may ask you to take liquid medicine as well.)   The day before your exam, drink extra fluids at least six 8-ounce glasses (unless your doctor tells you to restrict your fluids).  Patients over 70 or patients with diabetes or kidney problems:   If you haven t had a blood test (creatinine test) within the last 30 days, go to your clinic or Diagnostic Imaging  Department for this test.  If you have diabetes:   If your kidney function is normal, continue taking your metformin (Avandamet, Glucophage, Glucovance, Metaglip) on the day of your exam.   If your kidney function is abnormal, wait 48 hours before restarting this medicine.  You will have oral contrast for this exam:   You will drink the contrast at home. Get this from your clinic or Diagnostic Imaging Department. Please follow the directions given.  Please wear loose clothing, such as a sweat suit or jogging clothes. Avoid snaps, zippers and other metal. We may ask you to undress and put on a hospital gown.  If you have any questions, please call the Imaging Department where you will have your exam.            Sep 11, 2017  3:30 PM CDT   (Arrive by 3:15 PM)   Return Visit with Antoni Godfrey MD   [facility] ([facility])    [address] [place] [address] [phone]            Sep 11, 2017  4:30 PM CDT   (Arrive by 4:15 PM)   Return Visit with Ab Aguilar MD   [facility] ([facility])    [address] [place] [address] [phone]              Who to contact     Please call your clinic at [phone] to:    Ask questions about your health    Make or cancel appointments    Discuss your medicines    Learn about your test results    Speak to your doctor   If you have compliments or concerns about an experience at your clinic, or if you wish to file a complaint, please contact [facility] Patient Relations at [phone] or email us at [email or web address]         Additional Information About Your Visit        MyChart Information     [employer] gives you secure access to your electronic health record. If you see a primary care provider, you can also send messages to your care team and make appointments. If you have  questions, please call your primary care clinic.  If you do not have a primary care provider, please call [phone] and they will assist you.      [employer] is an electronic gateway that provides easy, online access to your medical records. With [employer], you can request a clinic appointment, read your test results, renew a prescription or communicate with your care team.     To access your existing account, please contact your [facility] or call [phone] for assistance.        Care EveryWhere ID     This is your Care EveryWhere ID. This could be used by other organizations to access your [place] medical records  [id number]         Blood Pressure from Last 3 Encounters:   06/19/17 129/81   06/02/17 145/67   05/18/17 124/85    Weight from Last 3 Encounters:   06/19/17 61.6 kg (135 lb 14.4 oz)   06/02/17 60 kg (132 lb 4.8 oz)   05/18/17 59.4 kg (131 lb)              We Performed the Following     OCT Retina Spectralis OU (both eyes)        Primary Care Provider Office Phone # Fax #    Rhea Lin [phone]        PHYSICIANS [address] [facility] [address]  [facility] [address]        Equal Access to Services     OSCAR ADLER : Hadii aad ku hadasho Soomaali, waaxda luqadaha, qaybta kaalmada adeegyada, geneva perdue. So [facility] [phone].    ATENCIÓN: Si habla español, tiene a cage disposición servicios gratuitos de asistencia lingüística. Llame al [phone].    We comply with applicable federal civil rights laws and [place] laws. We do not discriminate on the basis of race, color, national origin, age, disability sex, sexual orientation or gender identity.            Thank you!     Thank you for choosing [facility]  for your care. Our goal is always to provide you with excellent care. Hearing back from our patients is one way we can continue to improve our services. Please take a few minutes to complete the written survey that you  may receive in the mail after your visit with us. Thank you!             Your Updated Medication List - Protect others around you: Learn how to safely use, store and throw away your medicines at www.disposemymeds.org.          This list is accurate as of: 7/5/17  5:28 PM.  Always use your most recent med list.                   Brand Name Dispense Instructions for use Diagnosis    alendronate 35 MG tablet    FOSAMAX    12 tablet    Take 1 tablet (35 mg) by mouth every 7 days    Osteopenia       CALCIUM CITRATE + PO      Take 200 mg by mouth daily as needed (She supplements to meet her 1,200mg goal depending on how much dietary calcium she has gotten that day (up to 400mg once daily)) Reported on 5/1/2017        * levothyroxine 100 MCG tablet    SYNTHROID/LEVOTHROID     Take 100 mcg by mouth daily        * levothyroxine 75 MCG tablet    SYNTHROID/LEVOTHROID    30 tablet    Take 1 tablet (75 mcg) by mouth daily    Acquired hypothyroidism       MULTIVITAMINS PO      Take 1 tablet by mouth daily Reported on 4/10/2017        venlafaxine 75 MG 24 hr capsule    EFFEXOR-XR    90 capsule    Take 1 capsule (75 mg) by mouth daily    Generalized anxiety disorder       ZANTAC PO      Take 150 mg by mouth daily as needed        zolpidem 5 MG tablet    AMBIEN    25 tablet    Take 1 tablet (5 mg) by mouth nightly as needed for sleep    Insomnia, unspecified       * Notice:  This list has 2 medication(s) that are the same as other medications prescribed for you. Read the directions carefully, and ask your doctor or other care provider to review them with you.

## 2017-07-05 NOTE — PROGRESS NOTES
CC: follow up Posterior vitreous detachment (PVD)   HPI: Maggie Navarrete is a  63 year old year-old patient presenting for follow up myopia, Posterior vitreous detachment (PVD). Vision stable although having some difficulty at night with glare. Wears CTLs and doing well in them.     OCULAR IMAGING  Optical Coherence Tomography 7-5-17  Right eye: normal foveal contour  Left eye: normal foveal contour; superior temporal area of Retinal pigment epithelium changes, without  Subretinal fluid     Assessment & Plan:     1. Vitreous degeneration, bilateral  Stable  Retina detachment precautions were discussed with the patient (presence or increased in flashes, floaters or a curtain in the visual field) and was asked to return if any of the those occur    2. Myopia, bilateral  Stable     3. Senile nuclear sclerosis, bilateral  Mild. Observe    4. Contact lens user  -doing well  -ATs as needed  - do not overwear CL     5. Trace Epiretinal membrane right eye   No metamorphopsia  - mild, observe,    return to clinic: 1 yr with Optical Coherence Tomography and fundus pictures        ~~~~~~~~~~~~~~~~~~~~~~~~~~~~~~~~~~   Complete documentation of historical and exam elements from today's encounter can be found in the full encounter summary report (not reduplicated in this progress note).  I personally obtained the chief complaint(s) and history of present illness.  I confirmed and edited as necessary the review of systems, past medical/surgical history, family history, social history, and examination findings as documented by others; and I examined the patient myself.  I personally reviewed the relevant tests, images, and reports as documented above.  I formulated and edited as necessary the assessment and plan and discussed the findings and management plan with the patient and family    Melissa Nguyen MD  .  Retina Service   Department of Ophthalmology and Visual Neurosciences   [facility]  [place]  Phone: [phone]   Fax: [phone]

## 2017-07-05 NOTE — NURSING NOTE
Chief Complaints and History of Present Illnesses   Patient presents with     Yearly Exam     yearly retina check     HPI    Affected eye(s):  Both   Symptoms:     No floaters   No flashes   No glare   No halos      Duration:  1 year      Do you have eye pain now?:  No      Comments:  vitreous degeneration yearly follow up  Wear ctl very happy  systane BID BE  systane gel QD BE  Penelope Ponce COA 3:35 PM July 5, 2017

## 2017-07-10 ENCOUNTER — OFFICE VISIT (OUTPATIENT)
Dept: FAMILY MEDICINE | Facility: CLINIC | Age: 67
End: 2017-07-10
Attending: FAMILY MEDICINE
Payer: MEDICARE

## 2017-07-10 VITALS
BODY MASS INDEX: 26.5 KG/M2 | HEIGHT: 60 IN | DIASTOLIC BLOOD PRESSURE: 79 MMHG | SYSTOLIC BLOOD PRESSURE: 124 MMHG | WEIGHT: 135 LBS | HEART RATE: 98 BPM

## 2017-07-10 DIAGNOSIS — M85.89 OSTEOPENIA OF MULTIPLE SITES: Primary | ICD-10-CM

## 2017-07-10 LAB
ANION GAP SERPL CALCULATED.3IONS-SCNC: 9 MMOL/L (ref 3–14)
BUN SERPL-MCNC: 20 MG/DL (ref 7–30)
CALCIUM SERPL-MCNC: 9.3 MG/DL (ref 8.5–10.1)
CHLORIDE SERPL-SCNC: 109 MMOL/L (ref 94–109)
CO2 SERPL-SCNC: 26 MMOL/L (ref 20–32)
CREAT SERPL-MCNC: 0.72 MG/DL (ref 0.52–1.04)
GFR SERPL CREATININE-BSD FRML MDRD: 81 ML/MIN/1.7M2
GLUCOSE SERPL-MCNC: 76 MG/DL (ref 70–99)
MAGNESIUM SERPL-MCNC: 2.2 MG/DL (ref 1.6–2.3)
PHOSPHATE SERPL-MCNC: 3.7 MG/DL (ref 2.5–4.5)
POTASSIUM SERPL-SCNC: 4.3 MMOL/L (ref 3.4–5.3)
SODIUM SERPL-SCNC: 144 MMOL/L (ref 133–144)

## 2017-07-10 PROCEDURE — 99213 OFFICE O/P EST LOW 20 MIN: CPT | Mod: ZF

## 2017-07-10 PROCEDURE — 80048 BASIC METABOLIC PNL TOTAL CA: CPT | Performed by: FAMILY MEDICINE

## 2017-07-10 PROCEDURE — 83735 ASSAY OF MAGNESIUM: CPT | Performed by: FAMILY MEDICINE

## 2017-07-10 PROCEDURE — 36415 COLL VENOUS BLD VENIPUNCTURE: CPT | Performed by: FAMILY MEDICINE

## 2017-07-10 PROCEDURE — 84100 ASSAY OF PHOSPHORUS: CPT | Performed by: FAMILY MEDICINE

## 2017-07-10 ASSESSMENT — PAIN SCALES - GENERAL: PAINLEVEL: NO PAIN (0)

## 2017-07-10 NOTE — PATIENT INSTRUCTIONS
Stop alendronate  Postpone dental work till Sept  Patient should take 1200mg of calcium/day in divided doses supplements and diet combined and vitamin D3 1000IU/day.  Walking 30 min/day 5x/wk  Blood work to day   DXA next Junly 2018 and then see me   1.

## 2017-07-10 NOTE — MR AVS SNAPSHOT
After Visit Summary   7/10/2017    Maggie Navarrete    MRN: [id number]           Patient Information     Date Of Birth          1950        Visit Information        Provider Department      7/10/2017 10:30 AM Kristine Flynn MD PhD [facility]        Today's Diagnoses     Osteopenia of multiple sites    -  1      Care Instructions    Stop alendronate  Postpone dental work till Sept  Patient should take 1200mg of calcium/day in divided doses supplements and diet combined and vitamin D3 1000IU/day.  Walking 30 min/day 5x/wk  Blood work to day   DXA next Junly 2018 and then see me             Follow-ups after your visit        Follow-up notes from your care team     Return in about 1 year (around 7/10/2018).      Your next 10 appointments already scheduled     Sep 08, 2017 11:00 AM CDT   (Arrive by 10:45 AM)   CT CHEST ABDOMEN PELVIS W/O & W CONTRAST with UCCT1   [facility] CT ([facility])    [address] [address] [phone]           Please bring any scans or X-rays taken at other hospitals, if similar tests were done. Also bring a list of your medicines, including vitamins, minerals and over-the-counter drugs. It is safest to leave personal items at home.  Be sure to tell your doctor:   If you have any allergies.   If there s any chance you are pregnant.   If you are breastfeeding.   If you have any special needs.  You may have contrast for this exam. To prepare:   Do not eat or drink for 2 hours before your exam. If you need to take medicine, you may take it with small sips of water. (We may ask you to take liquid medicine as well.)   The day before your exam, drink extra fluids at least six 8-ounce glasses (unless your doctor tells you to restrict your fluids).  Patients over 70 or patients with diabetes or kidney problems:   If you haven t had a blood test (creatinine test) within the last 30  days, go to your clinic or Diagnostic Imaging Department for this test.  If you have diabetes:   If your kidney function is normal, continue taking your metformin (Avandamet, Glucophage, Glucovance, Metaglip) on the day of your exam.   If your kidney function is abnormal, wait 48 hours before restarting this medicine.  You will have oral contrast for this exam:   You will drink the contrast at home. Get this from your clinic or Diagnostic Imaging Department. Please follow the directions given.  Please wear loose clothing, such as a sweat suit or jogging clothes. Avoid snaps, zippers and other metal. We may ask you to undress and put on a hospital gown.  If you have any questions, please call the Imaging Department where you will have your exam.            Sep 11, 2017  4:30 PM CDT   (Arrive by 4:15 PM)   Return Visit with Ab Aguilar MD   [facility]    [address] [place] [address] [phone]            Oct 09, 2017  9:30 AM CDT   (Arrive by 9:15 AM)   Return Visit with Antoni Godfrey MD   [facility]    [address] [place] [address] [phone]              Future tests that were ordered for you today     Open Future Orders        Priority Expected Expires Ordered    Phosphorus Routine  7/10/2018 7/10/2017    Magnesium Routine  7/10/2018 7/10/2017            Who to contact     Please call your clinic at [phone] to:    Ask questions about your health    Make or cancel appointments    Discuss your medicines    Learn about your test results    Speak to your doctor   If you have compliments or concerns about an experience at your clinic, or if you wish to file a complaint, please contact [facility] Patient Relations at [phone] or email us at [email or web address]          "Additional Information About Your Visit        [employer] Information     [employer] gives you secure access to your electronic health record. If you see a primary care provider, you can also send messages to your care team and make appointments. If you have questions, please call your primary care clinic.  If you do not have a primary care provider, please call [phone] and they will assist you.      [employer] is an electronic gateway that provides easy, online access to your medical records. With [employer], you can request a clinic appointment, read your test results, renew a prescription or communicate with your care team.     To access your existing account, please contact your [facility] or call [phone] for assistance.        Care EveryWhere ID     This is your Care EveryWhere ID. This could be used by other organizations to access your [place] medical records  [id number]        Your Vitals Were     Pulse Height BMI (Body Mass Index)             98 1.518 m (4' 11.75\") 26.59 kg/m2          Blood Pressure from Last 3 Encounters:   07/10/17 124/79   06/19/17 129/81   06/02/17 145/67    Weight from Last 3 Encounters:   07/10/17 61.2 kg (135 lb)   06/19/17 61.6 kg (135 lb 14.4 oz)   06/02/17 60 kg (132 lb 4.8 oz)              We Performed the Following     Basic metabolic panel        Primary Care Provider Office Phone # Fax #    Rhea Lin [phone]        PHYSICIANS [address] [facility] [address]  [facility] [address]        Equal Access to Services     OSCAR ADLER AH: Hadii aad ku hadasho Soomaali, waaxda luqadaha, qaybta kaalmada adeegyada, geneva perdue. So [facility] [phone].    ATENCIÓN: Si habla español, tiene a cage disposición servicios gratuitos de asistencia lingüística. Llame al [phone].    We comply with applicable federal civil rights laws and [place] laws. We do not discriminate on the basis of race, color, national " origin, age, disability sex, sexual orientation or gender identity.            Thank you!     Thank you for choosing [facility]  for your care. Our goal is always to provide you with excellent care. Hearing back from our patients is one way we can continue to improve our services. Please take a few minutes to complete the written survey that you may receive in the mail after your visit with us. Thank you!             Your Updated Medication List - Protect others around you: Learn how to safely use, store and throw away your medicines at www.disposemymeds.org.          This list is accurate as of: 7/10/17 11:30 AM.  Always use your most recent med list.                   Brand Name Dispense Instructions for use Diagnosis    alendronate 35 MG tablet    FOSAMAX    12 tablet    Take 1 tablet (35 mg) by mouth every 7 days    Osteopenia       CALCIUM CITRATE + PO      Take 200 mg by mouth daily as needed (She supplements to meet her 1,200mg goal depending on how much dietary calcium she has gotten that day (up to 400mg once daily)) Reported on 5/1/2017        levothyroxine 75 MCG tablet    SYNTHROID/LEVOTHROID    30 tablet    Take 1 tablet (75 mcg) by mouth daily    Acquired hypothyroidism       MULTIVITAMINS PO      Take 1 tablet by mouth daily Reported on 4/10/2017        venlafaxine 75 MG 24 hr capsule    EFFEXOR-XR    90 capsule    Take 1 capsule (75 mg) by mouth daily    Generalized anxiety disorder       ZANTAC PO      Take 150 mg by mouth daily as needed        zolpidem 5 MG tablet    AMBIEN    25 tablet    Take 1 tablet (5 mg) by mouth nightly as needed for sleep    Insomnia, unspecified

## 2017-07-10 NOTE — LETTER
7/10/2017       RE: Maggie Navarrete  [address]  [facility] [address]     Dear Colleague,    Thank you for referring your patient, Maggie Navarrete, to the [facility] at [facility]. Please see a copy of my visit note below.    Maggie is a  67 year old female post menopausal  that presents today for osteoporosis follow up patient was last seen 2015 for OP. Since then she was diagnosed with leiomyosarcoma and is s/p total abdominal hysteretomy and bilateral salpingo-oophorectomy on 16 and has completed 5 cycles of adjuvant doxorubicin and dacarbazine as of 2017.  She reports her regular daily routines such as taking vitamins and eating had been really disrupted but is getting back into a routine.      As for her osteoporosis, she took alendronate 35 mg for 10 years, had a drug holiday for 1 year then started alendronate again in . She has taken alendronate 35 mg consistently for the last 4 years even through her leiomyosarcoma treatment.  She is anticipating dental work in August.      Referring Physician: Referred Self    HPI   Have you ever had a bone density test? Yes  Where = [facility]  When = 17  Spine Tscore L1-L4 = -1.9 LOSS - 4.9%  Left neck Tscore = -2.1  Total left hip Tscore = -1.7 LOSS -2.3%  Right neck Tscore = -2.2  Total Right hip Tscore = -1.7 No loss   Have you received any x-ray dye or contrast in the last ten days? No  How many servings of dairy products do you consume per day? 2-3 Type: coffee with a cup of milk in morning, most days ice cream or yogurt, some cheese  Do you take a multi-vitamin daily? Sometimes, can't take with levothyroxine  Do you take a vitamin D supplement? No, in the multivitamin (1000 IU/day)   Do you take a calcium supplement daily?  Calcium citrate, hasn't taken for awhile  Do you take a supplement containing strontium?  No  Are you exposed to natural sunlight at least 20 minutes three times a week? Yes    Social History   reports that she has never smoked. She has never used smokeless tobacco. She reports that she drinks alcohol. She reports that she does not use illicit drugs.  Do you smoke cigarettes? No  Do you exercise? Yes. Details: walking dogs every day  Do you drink alcohol? Yes. Details: very occasional, 2 drinks a month    Medication History  Have you used any of the following medications?   Actonel (Risedronate): No   Aredia (Pamidronate): No   Boniva (Ibindronate): No   Didronil (Etidronate): No   Evista (Raloxifene): No   Fosamax (Alendronate): Yes   Forteo (Parathyroid hormone) injections: No   HCTZ (Thiazide): No   Calcitonin nasal spray: No   Reclast or Zometa (Zolendronate): No   Prolia (Denosumab): No    Current Outpatient Prescriptions   Medication Sig Dispense Refill     levothyroxine (SYNTHROID/LEVOTHROID) 75 MCG tablet Take 1 tablet (75 mcg) by mouth daily 30 tablet 3     zolpidem (AMBIEN) 5 MG tablet Take 1 tablet (5 mg) by mouth nightly as needed for sleep 25 tablet 0     venlafaxine (EFFEXOR-XR) 75 MG 24 hr capsule Take 1 capsule (75 mg) by mouth daily 90 capsule 3     RaNITidine HCl (ZANTAC PO) Take 150 mg by mouth daily as needed        [DISCONTINUED] levothyroxine (SYNTHROID,LEVOTHROID) 100 MCG tablet Take 100 mcg by mouth daily        alendronate (FOSAMAX) 35 MG tablet Take 1 tablet (35 mg) by mouth every 7 days 12 tablet 3     Calcium Citrate-Vitamin D (CALCIUM CITRATE + PO) Take 200 mg by mouth daily as needed (She supplements to meet her 1,200mg goal depending on how much dietary calcium she has gotten that day (up to 400mg once daily)) Reported on 5/1/2017       Multiple Vitamin (MULTIVITAMINS PO) Take 1 tablet by mouth daily Reported on 4/10/2017            Allergies   Allergen Reactions     Erythromycin GI Disturbance     Tramadol Nausea     Past Medical History  Family History   Problem Relation  Age of Onset     Family History Negative Mother      glaucoma     Glaucoma Mother      Anxiety Disorder Mother      Heart Failure Mother      DIABETES Mother      Coronary Artery Disease Mother      Hypertension Mother      Breast Cancer Mother      Depression Mother      Obesity Mother      HEART DISEASE Father      Glaucoma Father      DIABETES Father      Coronary Artery Disease Father      Depression Father      OSTEOPOROSIS Father      HEART DISEASE Maternal Grandmother      DIABETES Maternal Grandmother      HEART DISEASE Paternal Grandmother      CANCER Paternal Grandfather      stomach     Thyroid Disease Sister      Retinal detachment Sister      Hypertension Sister      Hypertension Sister      Lipids Sister      CEREBROVASCULAR DISEASE Sister      Depression Sister      HEART DISEASE Paternal Aunt      HEART DISEASE Paternal Uncle      Anxiety Disorder Sister      Depression Sister      Thyroid Disease Sister      Thyroid Disease Sister      OSTEOPOROSIS Other        ROS:  General: weight gain  Head/Eyes: none  Ears/Nose/Throat: none  Cardiovascular: none  Respiratory: none  Gastrointestinal: none  Breast: none  Genitourinary: incontinence, frequency/urgency and vaginal dryness  Sexual Function: none  Musculoskeletal: joint pain, back pain, loss of balance and numbness/tingling  Skin: none  Neurological: numbness/tingling  Mental Health: none  Endocrine: none    Clinic Measurements  Vitals: There were no vitals taken for this visit.  BMI= There is no height or weight on file to calculate BMI.    Physical exam  Constitutional: Well appearing woman in no acute distress.   Psychological: appropriate mood.  Neck: No thyroidmegaly.  no carotid bruits.  Cardiovascular: regular rate and rhythm, normal S1 and S2, no murmurs, rubs or gallops, peripheral pulses full and symmetric   Respiratory: clear to auscultation, no wheezes or crackles, normal breath sounds..  Musculoskeletal: full range of motion    Spine:  "Straight, not tender and adequate range of motion in spinal flexion, extension,lateral movement, rotational movement  Skin: no concerning lesions, no jaundice.  Neurological: normal gait, no tremor.     LAB /79  Pulse 98  Ht 1.518 m (4' 11.75\")  Wt 61.2 kg (135 lb)  BMI 26.59 kg/m2   Vertebra; Fracture Assessment: N/A  Dexa Scan: 5/25/17    Risk Factors: age, sex, post-menopausal, chemo therapy, hypothyroidism    ASSESSMENT:  Maggie is a 68 yo F with hx of leiomyosarcoma s/p RACHEL and BSO, s/p 5 cycles of chemo with osteopenia by spine and femoral neck and total hip measurements on DEXA 5/17.  She took alendronate 35 mg x 10 years, 1 year drug holiday, and now has been takign alendronate 35 mg x 4 years.  She tolerates the medication well. She has not been taking a multivitamin, calcium or vitamin D regularly. She is anticipating dental work in late August and will now move it up to September and will stop alendronate now.    PLAN:  1. Stop alendronate - will reassess medications in a year, possibly Prolia at that time  2. Postpone dental work until September  3. Take 1200 mg of calcium/day in divided doses supplements and diet combined  4. Take vitamin D3 1000 IU/day - in her multivitamin  5.  Continue to walk 30 minutes/day 5 times a week  6.  Blood work today (BMP, magnesium, phos)    Amy Schumer, MD  Ob/Gyn PGY-1    I saw the patient with the resident and agree with the findings and the plan of care as documented in the resident's note.  I personally reviewed history and exam findings.  Key findings: DEXA is showing insignificant loss at the hip and minor loss at the spine - pt has been on alendronate 4 yrs and has planned dental work - will stop alendronate (it has a lasting effect)   and get a f/u DXA next July 2018 to assess bone loss      Kristine Flynn MD, PhD  I spent  25 minutes with this patient.  > 50% of time spent in counseling and coordination of care      "

## 2017-07-10 NOTE — PROGRESS NOTES
Maggie is a  67 year old female post menopausal  that presents today for osteoporosis follow up patient was last seen 2015 for OP. Since then she was diagnosed with leiomyosarcoma and is s/p total abdominal hysteretomy and bilateral salpingo-oophorectomy on 16 and has completed 5 cycles of adjuvant doxorubicin and dacarbazine as of 2017.  She reports her regular daily routines such as taking vitamins and eating had been really disrupted but is getting back into a routine.      As for her osteoporosis, she took alendronate 35 mg for 10 years, had a drug holiday for 1 year then started alendronate again in . She has taken alendronate 35 mg consistently for the last 4 years even through her leiomyosarcoma treatment.  She is anticipating dental work in August.      Referring Physician: Referred Self    HPI     Have you ever had a bone density test? Yes  Where = [facility]  When = 17  Spine Tscore L1-L4 = -1.9 LOSS - 4.9%  Left neck Tscore = -2.1  Total left hip Tscore = -1.7 LOSS -2.3%  Right neck Tscore = -2.2  Total Right hip Tscore = -1.7 No loss   Have you received any x-ray dye or contrast in the last ten days? No  How many servings of dairy products do you consume per day? 2-3 Type: coffee with a cup of milk in morning, most days ice cream or yogurt, some cheese  Do you take a multi-vitamin daily? Sometimes, can't take with levothyroxine  Do you take a vitamin D supplement? No, in the multivitamin (1000 IU/day)   Do you take a calcium supplement daily?  Calcium citrate, hasn't taken for awhile  Do you take a supplement containing strontium? No  Are you exposed to natural sunlight at least 20 minutes three times a week? Yes    Social History   reports that she has never smoked. She has never used smokeless tobacco. She reports that she drinks alcohol. She reports that she does not use illicit drugs.  Do you smoke cigarettes?  No  Do you exercise? Yes. Details: walking dogs every day  Do you drink alcohol? Yes. Details: very occasional, 2 drinks a month    Medication History  Have you used any of the following medications?   Actonel (Risedronate): No   Aredia (Pamidronate): No   Boniva (Ibindronate): No   Didronil (Etidronate): No   Evista (Raloxifene): No   Fosamax (Alendronate): Yes   Forteo (Parathyroid hormone) injections: No   HCTZ (Thiazide): No   Calcitonin nasal spray: No   Reclast or Zometa (Zolendronate): No   Prolia (Denosumab): No    Current Outpatient Prescriptions   Medication Sig Dispense Refill     levothyroxine (SYNTHROID/LEVOTHROID) 75 MCG tablet Take 1 tablet (75 mcg) by mouth daily 30 tablet 3     zolpidem (AMBIEN) 5 MG tablet Take 1 tablet (5 mg) by mouth nightly as needed for sleep 25 tablet 0     venlafaxine (EFFEXOR-XR) 75 MG 24 hr capsule Take 1 capsule (75 mg) by mouth daily 90 capsule 3     RaNITidine HCl (ZANTAC PO) Take 150 mg by mouth daily as needed        [DISCONTINUED] levothyroxine (SYNTHROID,LEVOTHROID) 100 MCG tablet Take 100 mcg by mouth daily        alendronate (FOSAMAX) 35 MG tablet Take 1 tablet (35 mg) by mouth every 7 days 12 tablet 3     Calcium Citrate-Vitamin D (CALCIUM CITRATE + PO) Take 200 mg by mouth daily as needed (She supplements to meet her 1,200mg goal depending on how much dietary calcium she has gotten that day (up to 400mg once daily)) Reported on 5/1/2017       Multiple Vitamin (MULTIVITAMINS PO) Take 1 tablet by mouth daily Reported on 4/10/2017            Allergies   Allergen Reactions     Erythromycin GI Disturbance     Tramadol Nausea       Past Medical History    Family History   Problem Relation Age of Onset     Family History Negative Mother      glaucoma     Glaucoma Mother      Anxiety Disorder Mother      Heart Failure Mother      DIABETES Mother      Coronary Artery Disease Mother      Hypertension Mother      Breast Cancer Mother      Depression Mother      Obesity  "Mother      HEART DISEASE Father      Glaucoma Father      DIABETES Father      Coronary Artery Disease Father      Depression Father      OSTEOPOROSIS Father      HEART DISEASE Maternal Grandmother      DIABETES Maternal Grandmother      HEART DISEASE Paternal Grandmother      CANCER Paternal Grandfather      stomach     Thyroid Disease Sister      Retinal detachment Sister      Hypertension Sister      Hypertension Sister      Lipids Sister      CEREBROVASCULAR DISEASE Sister      Depression Sister      HEART DISEASE Paternal Aunt      HEART DISEASE Paternal Uncle      Anxiety Disorder Sister      Depression Sister      Thyroid Disease Sister      Thyroid Disease Sister      OSTEOPOROSIS Other        ROS:  General: weight gain  Head/Eyes: none  Ears/Nose/Throat: none  Cardiovascular: none  Respiratory: none  Gastrointestinal: none  Breast: none  Genitourinary: incontinence, frequency/urgency and vaginal dryness  Sexual Function: none  Musculoskeletal: joint pain, back pain, loss of balance and numbness/tingling  Skin: none  Neurological: numbness/tingling  Mental Health: none  Endocrine: none    Clinic Measurements  Vitals: There were no vitals taken for this visit.  BMI= There is no height or weight on file to calculate BMI.    Physical exam  Constitutional: Well appearing woman in no acute distress.   Psychological: appropriate mood.  Neck: No thyroidmegaly.  no carotid bruits.  Cardiovascular: regular rate and rhythm, normal S1 and S2, no murmurs, rubs or gallops, peripheral pulses full and symmetric   Respiratory: clear to auscultation, no wheezes or crackles, normal breath sounds..  Musculoskeletal: full range of motion    Spine: Straight, not tender and adequate range of motion in spinal flexion, extension,lateral movement, rotational movement  Skin: no concerning lesions, no jaundice.  Neurological: normal gait, no tremor.     LAB /79  Pulse 98  Ht 1.518 m (4' 11.75\")  Wt 61.2 kg (135 lb)  BMI " 26.59 kg/m2   Vertebra; Fracture Assessment: N/A  Dexa Scan: 5/25/17    Risk Factors: age, sex, post-menopausal, chemo therapy, hypothyroidism    ASSESSMENT:  Maggie is a 66 yo F with hx of leiomyosarcoma s/p RACHEL and BSO, s/p 5 cycles of chemo with osteopenia by spine and femoral neck and total hip measurements on DEXA 5/17.  She took alendronate 35 mg x 10 years, 1 year drug holiday, and now has been takign alendronate 35 mg x 4 years.  She tolerates the medication well. She has not been taking a multivitamin, calcium or vitamin D regularly. She is anticipating dental work in late August and will now move it up to September and will stop alendronate now.    PLAN:  1. Stop alendronate - will reassess medications in a year, possibly Prolia at that time  2. Postpone dental work until September  3. Take 1200 mg of calcium/day in divided doses supplements and diet combined  4. Take vitamin D3 1000 IU/day - in her multivitamin  5.  Continue to walk 30 minutes/day 5 times a week  6.  Blood work today (BMP, magnesium, phos)    Amy Schumer, MD  Ob/Gyn PGY-1    I saw the patient with the resident and agree with the findings and the plan of care as documented in the resident's note.  I personally reviewed history and exam findings.  Key findings: DEXA is showing insignificant loss at the hip and minor loss at the spine - pt has been on alendronate 4 yrs and has planned dental work - will stop alendronate (it has a lasting effect)   and get a f/u DXA next July 2018 to assess bone loss      Kristine Flynn MD, PhD  I spent  25 minutes with this patient.  > 50% of time spent in counseling and coordination of care

## 2017-09-15 DIAGNOSIS — E03.9 ACQUIRED HYPOTHYROIDISM: ICD-10-CM

## 2017-09-15 NOTE — TELEPHONE ENCOUNTER
levothyroxine     Last Written Prescription Date: 6/2/17  Last Quantity: 30, # refills: 3  Last Office Visit with [facility], [facility] or [facility] prescribing provider: 6/2/17 with Dr. Aguilar  Next office visit: 9/21/17 with Dr. Aguilar       TSH   Date Value Ref Range Status   06/02/2017 0.04 (L) 0.40 - 4.00 mU/L Final

## 2017-09-21 ENCOUNTER — ONCOLOGY VISIT (OUTPATIENT)
Dept: ONCOLOGY | Facility: CLINIC | Age: 67
End: 2017-09-21
Attending: INTERNAL MEDICINE
Payer: MEDICARE

## 2017-09-21 VITALS
WEIGHT: 142.2 LBS | SYSTOLIC BLOOD PRESSURE: 130 MMHG | HEIGHT: 60 IN | DIASTOLIC BLOOD PRESSURE: 79 MMHG | RESPIRATION RATE: 16 BRPM | TEMPERATURE: 97.3 F | OXYGEN SATURATION: 99 % | HEART RATE: 109 BPM | BODY MASS INDEX: 27.92 KG/M2

## 2017-09-21 DIAGNOSIS — C49.9 LEIOMYOSARCOMA (H): Primary | ICD-10-CM

## 2017-09-21 PROCEDURE — 99213 OFFICE O/P EST LOW 20 MIN: CPT | Mod: ZP | Performed by: INTERNAL MEDICINE

## 2017-09-21 PROCEDURE — 99212 OFFICE O/P EST SF 10 MIN: CPT | Mod: ZF

## 2017-09-21 RX ORDER — LEVOTHYROXINE SODIUM 100 UG/1
75 TABLET ORAL
COMMUNITY
Start: 2017-05-04 | End: 2017-12-11 | Stop reason: DRUGHIGH

## 2017-09-21 ASSESSMENT — ENCOUNTER SYMPTOMS
TREMORS: 0
DIZZINESS: 0
MYALGIAS: 0
NUMBNESS: 0
WEAKNESS: 0
HEADACHES: 0
LOSS OF CONSCIOUSNESS: 0
PARALYSIS: 0
DISTURBANCES IN COORDINATION: 0
MUSCLE CRAMPS: 0
ARTHRALGIAS: 1
SEIZURES: 0
STIFFNESS: 0
MUSCLE WEAKNESS: 0
BACK PAIN: 1
NECK PAIN: 1
JOINT SWELLING: 0
MEMORY LOSS: 0
TINGLING: 0
SPEECH CHANGE: 0

## 2017-09-21 ASSESSMENT — PAIN SCALES - GENERAL: PAINLEVEL: NO PAIN (0)

## 2017-09-21 NOTE — NURSING NOTE
"Oncology Rooming Note    September 21, 2017 6:40 PM   Maggie Navarrete is a 67 year old female who presents for:    Chief Complaint   Patient presents with     Oncology Clinic Visit     Return-     Initial Vitals: /79 (BP Location: Left arm, Patient Position: Sitting, Cuff Size: Adult Regular)  Pulse 109  Temp 97.3  F (36.3  C) (Tympanic)  Resp 16  Ht 1.518 m (4' 11.76\")  Wt 64.5 kg (142 lb 3.2 oz)  SpO2 99%  BMI 27.99 kg/m2 Estimated body mass index is 27.99 kg/(m^2) as calculated from the following:    Height as of this encounter: 1.518 m (4' 11.76\").    Weight as of this encounter: 64.5 kg (142 lb 3.2 oz). Body surface area is 1.65 meters squared.  No Pain (0) Comment: Data Unavailable   No LMP recorded. Patient is postmenopausal.  Allergies reviewed: Yes  Medications reviewed: Yes    Medications: Medication refills not needed today.  Pharmacy name entered into PacerPro:    [id number] - [facility] [address] AT [facility] OF [address] & [address] [place] [address] PHARMACY MAIL DELIVERY - [place], [place] - [address] [facility] - [place], [place] - [address] [facility]    Clinical concerns: No new concerns    6 minutes for nursing intake (face to face time)     Diane Ruiz LPN            "

## 2017-09-21 NOTE — LETTER
9/21/2017       RE: Maggie Navarrete  [address]  [facility] [address]     Dear Colleague,    Thank you for referring your patient, Maggie Navarrete, to the [facility]. Please see a copy of my visit note below.    MEDICAL ONCOLOGY PROGRESS NOTE   Sep 21, 2017    Oncologic history:  1. January 2016, she presents to her PCP with 3 weeks of abdominal pain and severe constipation. She is treated for constipation and improves.  2. 3/21/2016, worsening pelvic pressure prompts pelvic examination with an enlarged uterus noted. Transvaginal ultrasound shows enlargement of subserosal fibroids originally seen on MRI in 2006.  3. 5/9/2016, MRI pelvis is obtained, which shows several enhancing uterine masses involving an enlarged uterus, felt radiographically compatible with benign uterine leiomyomata.  3. Summer 2016, she experiences worsening low back pain thought at first thought to be sacroiliitis, but pain persists after corticosteroid injection. Continues with severe constipation.  4. 9/8/2016, she undergoes colonoscopy for persistent abdominal pain. She has sigmoid polyp snared. Abdominal pain and diarrhea persist after colonoscopy prep.  5. 9/14/2016, she has worsening abdominal pain and is evaluated in the ED. CT-scan shows a large subserosal fibroid measuring 9.4 x 9.6 x 9.8 cm, as well as very large fibroids along the posterior uterine body more inferiorly, increased in size to 14 cm in greatest diameter compared to 10 cm in greatest diameter on MRI of 5/9/2016.  6. 9/29/2016, she has staging CT-CAP, which shows a small left sided pleural effusion and a 3 mm pulmonary nodule in the RML.  7. October 2016, self-refers to [facility].  8. 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  9. 11/4/2016, undergoes total abdominal hysterectomy and BSO, under Dr. Harsh Camarillo. Surgery was originally presumed for leiomyoma, however, intraoperatively  there was focal adherence of a mass to the pelvic right sidewall and cul-de-sac, which led to peritoneal resection and omentectomy. Final pathology, pT2b: showed leiomyosarcoma involving the cul-de-sac and forming two separate masses measuring 10.5 x 6.5 x 4.5 cm and 10.7 x 7.9 x 7.5 cm.  10. 12/26/2016, she starts adjuvant chemotherapy with doxorubicin 25 mg/m2/day and DTIC 250 mg/m2/day , days 1-3 every 21 weeks.  11. 3/23/2017, she completes 5 cycles of adjuvant doxorubicin and dacarbazine    HISTORY OF PRESENT ILLNESS  Ms. Navarrete presents today in followup. She completed 5 cycles of adjuvant chemotherapy with doxorubicin and dacarbazine in March and she presents today for routine observation visit and review of restaging scans. She had CT-CAP which shows no evidence of metastatic disease in the chest or in the liver. There is a nonspecific focus of enhancement in liver favored to be a vascular shunt (segment 4A). Stable/unchanged small pulmonary nodules. Small 2mm left lower lobe subpleural nodule. Nothing for pelvic recurrence. She will be seeing Dr. Godfrey in gyn-onc next week for review.    She feels well and denies nausea, vomiting, loss of appetite, diarrhea, or constipation. No headaches, shortness of breath, chest pain, or fevers/chills.      REVIEW OF SYSTEMS  A 12-point ROS negative except as in HPI    Current Outpatient Prescriptions   Medication Sig Dispense Refill     zolpidem (AMBIEN) 5 MG tablet Take 1 tablet (5 mg) by mouth nightly as needed for sleep 25 tablet 0     venlafaxine (EFFEXOR-XR) 75 MG 24 hr capsule Take 1 capsule (75 mg) by mouth daily 90 capsule 3     RaNITidine HCl (ZANTAC PO) Take 150 mg by mouth daily as needed        Calcium Citrate-Vitamin D (CALCIUM CITRATE + PO) Take 200 mg by mouth daily as needed (She supplements to meet her 1,200mg goal depending on how much dietary calcium she has gotten that day (up to 400mg once daily)) Reported on 5/1/2017       Multiple Vitamin  "(MULTIVITAMINS PO) Take 1 tablet by mouth daily Reported on 4/10/2017       levothyroxine (SYNTHROID/LEVOTHROID) 75 MCG tablet TAKE 1 TABLET EVERY DAY 90 tablet 3     levothyroxine (SYNTHROID/LEVOTHROID) 100 MCG tablet        alendronate (FOSAMAX) 35 MG tablet Take 1 tablet (35 mg) by mouth every 7 days (Patient not taking: Reported on 9/21/2017) 12 tablet 3       Past Medical History:   Diagnosis Date     Anxiety      Arthritis     pain in feet and knees     Disorder of bone and cartilage, unspecified      Esophageal reflux 2015     GERD (gastroesophageal reflux disease)      Hx of previous reproductive problem 1980     Kidney stone 2011     Personal history of colonic polyps 2016    Small, benign     PONV (postoperative nausea and vomiting)      Posterior vitreous detachment of left eye 2011     Posterior vitreous detachment of right eye 2011     Ptosis of eyelid, bilateral      Stomach problems 2015    Abdominal pain, Diarreha     Thyroid disease     thyroid nodule resolved 2014       PHYSICAL EXAMINATION  /79 (BP Location: Left arm, Patient Position: Sitting, Cuff Size: Adult Regular)  Pulse 109  Temp 97.3  F (36.3  C) (Tympanic)  Resp 16  Ht 1.518 m (4' 11.76\")  Wt 64.5 kg (142 lb 3.2 oz)  SpO2 99%  BMI 27.99 kg/m2  Wt Readings from Last 2 Encounters:   09/21/17 64.5 kg (142 lb 3.2 oz)   07/10/17 61.2 kg (135 lb)     Physical Exam   Constitutional: She is oriented to person, place, and time. She appears well-developed and well-nourished. No distress.   HENT:   Head: Atraumatic.   Mouth/Throat: Oropharynx is clear and moist.   Eyes: Conjunctivae and EOM are normal. Pupils are equal, round, and reactive to light. No scleral icterus.   Neck: Normal range of motion.   Cardiovascular: Normal rate, regular rhythm and normal heart sounds.    No murmur heard.  Pulmonary/Chest: Effort normal and breath sounds normal. She has no wheezes.   Abdominal: Soft. She exhibits no distension. There is no tenderness. "   Musculoskeletal: Normal range of motion. She exhibits no edema.   Lymphadenopathy:     She has no cervical adenopathy.   Neurological: She is alert and oriented to person, place, and time. No cranial nerve deficit. She exhibits normal muscle tone. Coordination normal.   Skin: Skin is warm and dry. No rash noted.   Psychiatric: She has a normal mood and affect. Her behavior is normal.   Nursing note and vitals reviewed.    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  It was a pleasure to see Ms. Navarrete today. Restaging CT-scan today is negative for recurrent or metastatic disease. She will be seeing Dr. Godfrey next week for his review.  I will plan to see her back in 3-4 months time with repeat CT-CAP.  Questions answered.    Ab Warner M.D.   of Medicine  Hematology, Oncology and Transplantation  [facility]

## 2017-09-21 NOTE — MR AVS SNAPSHOT
After Visit Summary   9/21/2017    Maggie Navarrete    MRN: [id number]           Patient Information     Date Of Birth          1950        Visit Information        Provider Department      9/21/2017 6:30 PM Ab Gutierrez MD [facility]        Today's Diagnoses     Leiomyosarcoma (H)    -  1       Follow-ups after your visit        Follow-up notes from your care team     Return in about 3 months (around 12/21/2017).      Your next 10 appointments already scheduled     Oct 09, 2017  9:30 AM CDT   (Arrive by 9:15 AM)   Return Visit with Antoni Godfrey MD   [facility] ([facility] [place])    [address] [facility] [address]  [address] [facility] [address] [phone]              Who to contact     If you have questions or need follow up information about today's clinic visit or your schedule please contact [facility] directly at [phone].  Normal or non-critical lab and imaging results will be communicated to you by vMobohart, letter or phone within 4 business days after the clinic has received the results. If you do not hear from us within 7 days, please contact the clinic through vMobohart or phone. If you have a critical or abnormal lab result, we will notify you by phone as soon as possible.  Submit refill requests through Pharnext or call your pharmacy and they will forward the refill request to us. Please allow 3 business days for your refill to be completed.          Additional Information About Your Visit        MyChart Information     Pharnext gives you secure access to your electronic health record. If you see a primary care provider, you can also send messages to your care team and make appointments. If you have questions, please call your primary care clinic.  If you do not have a primary care provider, please call [phone] and they will assist you.        Care EveryWhere ID     This is your  "Care EveryWhere ID. This could be used by other organizations to access your [place] medical records  [id number]        Your Vitals Were     Pulse Temperature Respirations Height Pulse Oximetry BMI (Body Mass Index)    109 97.3  F (36.3  C) (Tympanic) 16 1.518 m (4' 11.76\") 99% 27.99 kg/m2       Blood Pressure from Last 3 Encounters:   09/21/17 130/79   07/10/17 124/79   06/19/17 129/81    Weight from Last 3 Encounters:   09/21/17 64.5 kg (142 lb 3.2 oz)   07/10/17 61.2 kg (135 lb)   06/19/17 61.6 kg (135 lb 14.4 oz)              Today, you had the following     No orders found for display       Primary Care Provider Office Phone # Fax #    Rhea Lin [phone]       [address] [place] [address] 741  [facility] [address]        Equal Access to Services     RENNY [facility] : Hadii aad ku hadasho Soomaali, waaxda luqadaha, qaybta kaalmada adeegyada, waxay russelin eusebia mendoza . So [facility] [phone].    ATENCIÓN: Si habla español, tiene a cage disposición servicios gratuitos de asistencia lingüística. Llame al [phone].    We comply with applicable federal civil rights laws and [place] laws. We do not discriminate on the basis of race, color, national origin, age, disability sex, sexual orientation or gender identity.            Thank you!     Thank you for choosing [facility]  for your care. Our goal is always to provide you with excellent care. Hearing back from our patients is one way we can continue to improve our services. Please take a few minutes to complete the written survey that you may receive in the mail after your visit with us. Thank you!             Your Updated Medication List - Protect others around you: Learn how to safely use, store and throw away your medicines at www.disposemymeds.org.          This list is accurate as of: 9/21/17 11:59 PM.  Always use your most recent med list.                   Brand Name Dispense Instructions for use " Diagnosis    alendronate 35 MG tablet    FOSAMAX    12 tablet    Take 1 tablet (35 mg) by mouth every 7 days    Osteopenia       CALCIUM CITRATE + PO      Take 200 mg by mouth daily as needed (She supplements to meet her 1,200mg goal depending on how much dietary calcium she has gotten that day (up to 400mg once daily)) Reported on 5/1/2017        * levothyroxine 100 MCG tablet    SYNTHROID/LEVOTHROID          * levothyroxine 75 MCG tablet    SYNTHROID/LEVOTHROID    90 tablet    TAKE 1 TABLET EVERY DAY    Acquired hypothyroidism       MULTIVITAMINS PO      Take 1 tablet by mouth daily Reported on 4/10/2017        venlafaxine 75 MG 24 hr capsule    EFFEXOR-XR    90 capsule    Take 1 capsule (75 mg) by mouth daily    Generalized anxiety disorder       ZANTAC PO      Take 150 mg by mouth daily as needed        zolpidem 5 MG tablet    AMBIEN    25 tablet    Take 1 tablet (5 mg) by mouth nightly as needed for sleep    Insomnia, unspecified       * Notice:  This list has 2 medication(s) that are the same as other medications prescribed for you. Read the directions carefully, and ask your doctor or other care provider to review them with you.

## 2017-09-25 RX ORDER — LEVOTHYROXINE SODIUM 75 UG/1
TABLET ORAL
Qty: 90 TABLET | Refills: 3 | Status: SHIPPED | OUTPATIENT
Start: 2017-09-25 | End: 2018-03-19

## 2017-09-26 NOTE — PROGRESS NOTES
MEDICAL ONCOLOGY PROGRESS NOTE   Sep 21, 2017    Oncologic history:  1. January 2016, she presents to her PCP with 3 weeks of abdominal pain and severe constipation. She is treated for constipation and improves.  2. 3/21/2016, worsening pelvic pressure prompts pelvic examination with an enlarged uterus noted. Transvaginal ultrasound shows enlargement of subserosal fibroids originally seen on MRI in 2006.  3. 5/9/2016, MRI pelvis is obtained, which shows several enhancing uterine masses involving an enlarged uterus, felt radiographically compatible with benign uterine leiomyomata.  3. Summer 2016, she experiences worsening low back pain thought at first thought to be sacroiliitis, but pain persists after corticosteroid injection. Continues with severe constipation.  4. 9/8/2016, she undergoes colonoscopy for persistent abdominal pain. She has sigmoid polyp snared. Abdominal pain and diarrhea persist after colonoscopy prep.  5. 9/14/2016, she has worsening abdominal pain and is evaluated in the ED. CT-scan shows a large subserosal fibroid measuring 9.4 x 9.6 x 9.8 cm, as well as very large fibroids along the posterior uterine body more inferiorly, increased in size to 14 cm in greatest diameter compared to 10 cm in greatest diameter on MRI of 5/9/2016.  6. 9/29/2016, she has staging CT-CAP, which shows a small left sided pleural effusion and a 3 mm pulmonary nodule in the RML.  7. October 2016, self-refers to [facility].  8. 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  9. 11/4/2016, undergoes total abdominal hysterectomy and BSO, under Dr. Harsh Camarillo. Surgery was originally presumed for leiomyoma, however, intraoperatively there was focal adherence of a mass to the pelvic right sidewall and cul-de-sac, which led to peritoneal resection and omentectomy. Final pathology, pT2b: showed leiomyosarcoma involving the cul-de-sac and forming two separate masses measuring  10.5 x 6.5 x 4.5 cm and 10.7 x 7.9 x 7.5 cm.  10. 12/26/2016, she starts adjuvant chemotherapy with doxorubicin 25 mg/m2/day and DTIC 250 mg/m2/day , days 1-3 every 21 weeks.  11. 3/23/2017, she completes 5 cycles of adjuvant doxorubicin and dacarbazine    HISTORY OF PRESENT ILLNESS  Ms. Navarrete presents today in followup. She completed 5 cycles of adjuvant chemotherapy with doxorubicin and dacarbazine in March and she presents today for routine observation visit and review of restaging scans. She had CT-CAP which shows no evidence of metastatic disease in the chest or in the liver. There is a nonspecific focus of enhancement in liver favored to be a vascular shunt (segment 4A). Stable/unchanged small pulmonary nodules. Small 2mm left lower lobe subpleural nodule. Nothing for pelvic recurrence. She will be seeing Dr. Godfrey in gyn-onc next week for review.    She feels well and denies nausea, vomiting, loss of appetite, diarrhea, or constipation. No headaches, shortness of breath, chest pain, or fevers/chills.    Answers for HPI/ROS submitted by the patient on 9/21/2017   General Symptoms: No  Skin Symptoms: No  HENT Symptoms: No  EYE SYMPTOMS: No  HEART SYMPTOMS: No  LUNG SYMPTOMS: No  INTESTINAL SYMPTOMS: No  URINARY SYMPTOMS: No  GYNECOLOGIC SYMPTOMS: No  BREAST SYMPTOMS: No  SKELETAL SYMPTOMS: Yes  BLOOD SYMPTOMS: No  NERVOUS SYSTEM SYMPTOMS: Yes  MENTAL HEALTH SYMPTOMS: No  Back pain: Yes  Muscle aches: No  Neck pain: Yes  Swollen joints: No  Joint pain: Yes  Bone pain: No  Muscle cramps: No  Muscle weakness: No  Joint stiffness: No  Bone fracture: No  Trouble with coordination: No  Dizziness or trouble with balance: No  Fainting or black-out spells: No  Memory loss: No  Headache: No  Seizures: No  Speech problems: No  Tingling: No  Tremor: No  Weakness: No  Difficulty walking: No  Paralysis: No  Numbness: No    REVIEW OF SYSTEMS  A 12-point ROS negative except as in HPI    Current Outpatient Prescriptions  "  Medication Sig Dispense Refill     zolpidem (AMBIEN) 5 MG tablet Take 1 tablet (5 mg) by mouth nightly as needed for sleep 25 tablet 0     venlafaxine (EFFEXOR-XR) 75 MG 24 hr capsule Take 1 capsule (75 mg) by mouth daily 90 capsule 3     RaNITidine HCl (ZANTAC PO) Take 150 mg by mouth daily as needed        Calcium Citrate-Vitamin D (CALCIUM CITRATE + PO) Take 200 mg by mouth daily as needed (She supplements to meet her 1,200mg goal depending on how much dietary calcium she has gotten that day (up to 400mg once daily)) Reported on 5/1/2017       Multiple Vitamin (MULTIVITAMINS PO) Take 1 tablet by mouth daily Reported on 4/10/2017       levothyroxine (SYNTHROID/LEVOTHROID) 75 MCG tablet TAKE 1 TABLET EVERY DAY 90 tablet 3     levothyroxine (SYNTHROID/LEVOTHROID) 100 MCG tablet        alendronate (FOSAMAX) 35 MG tablet Take 1 tablet (35 mg) by mouth every 7 days (Patient not taking: Reported on 9/21/2017) 12 tablet 3       Past Medical History:   Diagnosis Date     Anxiety      Arthritis     pain in feet and knees     Disorder of bone and cartilage, unspecified      Esophageal reflux 2015     GERD (gastroesophageal reflux disease)      Hx of previous reproductive problem 1980     Kidney stone 2011     Personal history of colonic polyps 2016    Small, benign     PONV (postoperative nausea and vomiting)      Posterior vitreous detachment of left eye 2011     Posterior vitreous detachment of right eye 2011     Ptosis of eyelid, bilateral      Stomach problems 2015    Abdominal pain, Diarreha     Thyroid disease     thyroid nodule resolved 2014       PHYSICAL EXAMINATION  /79 (BP Location: Left arm, Patient Position: Sitting, Cuff Size: Adult Regular)  Pulse 109  Temp 97.3  F (36.3  C) (Tympanic)  Resp 16  Ht 1.518 m (4' 11.76\")  Wt 64.5 kg (142 lb 3.2 oz)  SpO2 99%  BMI 27.99 kg/m2  Wt Readings from Last 2 Encounters:   09/21/17 64.5 kg (142 lb 3.2 oz)   07/10/17 61.2 kg (135 lb)     Physical Exam "   Constitutional: She is oriented to person, place, and time. She appears well-developed and well-nourished. No distress.   HENT:   Head: Atraumatic.   Mouth/Throat: Oropharynx is clear and moist.   Eyes: Conjunctivae and EOM are normal. Pupils are equal, round, and reactive to light. No scleral icterus.   Neck: Normal range of motion.   Cardiovascular: Normal rate, regular rhythm and normal heart sounds.    No murmur heard.  Pulmonary/Chest: Effort normal and breath sounds normal. She has no wheezes.   Abdominal: Soft. She exhibits no distension. There is no tenderness.   Musculoskeletal: Normal range of motion. She exhibits no edema.   Lymphadenopathy:     She has no cervical adenopathy.   Neurological: She is alert and oriented to person, place, and time. No cranial nerve deficit. She exhibits normal muscle tone. Coordination normal.   Skin: Skin is warm and dry. No rash noted.   Psychiatric: She has a normal mood and affect. Her behavior is normal.   Nursing note and vitals reviewed.    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  It was a pleasure to see Ms. Navarrete today. Restaging CT-scan today is negative for recurrent or metastatic disease. She will be seeing Dr. Godfrey next week for his review.  I will plan to see her back in 3-4 months time with repeat CT-CAP.  Questions answered.    Ab Warner M.D.   of Medicine  Hematology, Oncology and Transplantation  [facility]

## 2017-10-06 ASSESSMENT — ENCOUNTER SYMPTOMS
ARTHRALGIAS: 1
MYALGIAS: 0
BACK PAIN: 1
JOINT SWELLING: 0
STIFFNESS: 0
MUSCLE WEAKNESS: 0
MUSCLE CRAMPS: 0
NECK PAIN: 1

## 2017-10-09 ENCOUNTER — ONCOLOGY VISIT (OUTPATIENT)
Dept: ONCOLOGY | Facility: CLINIC | Age: 67
End: 2017-10-09
Attending: OBSTETRICS & GYNECOLOGY
Payer: MEDICARE

## 2017-10-09 VITALS
DIASTOLIC BLOOD PRESSURE: 83 MMHG | SYSTOLIC BLOOD PRESSURE: 129 MMHG | HEIGHT: 60 IN | WEIGHT: 140.3 LBS | HEART RATE: 82 BPM | OXYGEN SATURATION: 100 % | TEMPERATURE: 98 F | RESPIRATION RATE: 16 BRPM | BODY MASS INDEX: 27.55 KG/M2

## 2017-10-09 DIAGNOSIS — C49.9 LEIOMYOSARCOMA (H): Primary | ICD-10-CM

## 2017-10-09 PROCEDURE — 25000128 H RX IP 250 OP 636: Mod: ZF | Performed by: INTERNAL MEDICINE

## 2017-10-09 PROCEDURE — 90662 IIV NO PRSV INCREASED AG IM: CPT | Mod: ZF | Performed by: INTERNAL MEDICINE

## 2017-10-09 PROCEDURE — 99212 OFFICE O/P EST SF 10 MIN: CPT | Mod: ZF

## 2017-10-09 PROCEDURE — 99213 OFFICE O/P EST LOW 20 MIN: CPT | Mod: ZP | Performed by: OBSTETRICS & GYNECOLOGY

## 2017-10-09 PROCEDURE — G0008 ADMIN INFLUENZA VIRUS VAC: HCPCS

## 2017-10-09 RX ADMIN — INFLUENZA A VIRUSA/MICHIGAN/45/2015 X-275 (H1N1) ANTIGEN (FORMALDEHYDE INACTIVATED), INFLUENZA A VIRUS A/HONG KONG/4801/2014 X-263B (H3N2) ANTIGEN (FORMALDEHYDE INACTIVATED), AND INFLUENZA B VIRUS B/BRISBANE/60/2008 ANTIGEN (FORMALDEHYDE INACTIVATED) 0.5 ML: 60; 60; 60 INJECTION, SUSPENSION INTRAMUSCULAR at 10:15

## 2017-10-09 ASSESSMENT — PAIN SCALES - GENERAL: PAINLEVEL: NO PAIN (0)

## 2017-10-09 NOTE — PROGRESS NOTES
Follow Up Note  Gynecologic Oncology   10/9/2017      HPI:  Ms. Navarrete presents today for follow up regarding a previous diagnosis of Stage IIb Leiomyosarcoma. She was originally diagnosed during surgery indicated for a presumed leiomyoma in November 2016. At that time,  She received a CT scan (6/1/17) which was negative for metastatic disease, echocardiogram (6/1/17) which showed normal cardiac function, and had her port removed without complication (5/9/17). Her last administration of chemotherapy was March 2017; she completed 5/6 cycles and discontinued early due to painful mucositis.     Today she presents for follow up. She reports feeling well and is in good spirits. She denies abdominal pain, nausea, vomiting, diarrhea, constipation, bloody stool, dysuria, hematuria, vaginal discharge, or vaginal bleeding. She does not have any specific concerns at this time.      Her oncologic history is included below:    Oncologic history:    Summer 2016, she experiences worsening low back pain thought at first thought to be sacroiliitis, but pain persists after corticosteroid injection. Continues with severe constipation.    9/8/2016, she undergoes colonoscopy for persistent abdominal pain. She has sigmoid polyp snared. Abdominal pain and diarrhea persist after colonoscopy prep.    9/14/2016, she has worsening abdominal pain and is evaluated in the ED. CT-scan shows a large subserosal fibroid measuring 9.4 x 9.6 x 9.8 cm, as well as very large fibroids along the posterior uterine body more inferiorly, increased in size to 14 cm in greatest diameter compared to 10 cm in greatest diameter on MRI of 5/9/2016.    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    11/4/2016, undergoes total abdominal hysterectomy and BSO, under Dr. Harsh Camarillo. Surgery was originally presumed for leiomyoma, however, intraoperatively there was focal adherence of a mass to the pelvic right sidewall  "and cul-de-sac, which led to peritoneal resection and omentectomy. Final pathology, pT2b: showed leiomyosarcoma involving the cul-de-sac and forming two separate masses measuring 10.5 x 6.5 x 4.5 cm and 10.7 x 7.9 x 7.5 cm.     12/26/2016- s/p 6 cycles 25 mg/m2 doxorubicin, 250 mg/m2 dacarbazine.         Review of Systems:  Answers for HPI/ROS submitted by the patient on 10/6/2017   General Symptoms: No  Skin Symptoms: No  HENT Symptoms: No  EYE SYMPTOMS: No  HEART SYMPTOMS: No  LUNG SYMPTOMS: No  INTESTINAL SYMPTOMS: No  URINARY SYMPTOMS: No  GYNECOLOGIC SYMPTOMS: No  BREAST SYMPTOMS: No  SKELETAL SYMPTOMS: Yes  BLOOD SYMPTOMS: No  NERVOUS SYSTEM SYMPTOMS: No  MENTAL HEALTH SYMPTOMS: No  Back pain: Yes  Muscle aches: No  Neck pain: Yes  Swollen joints: No  Joint pain: Yes  Bone pain: No  Muscle cramps: No  Muscle weakness: No  Joint stiffness: No  Bone fracture: No    Physical Exam:   PS 1  VS: /83  Pulse 82  Temp 98  F (36.7  C) (Oral)  Resp 16  Ht 1.518 m (4' 11.76\")  Wt 63.6 kg (140 lb 4.8 oz)  SpO2 100%  BMI 27.62 kg/m2      Gen:  healthy and alert, no distress  HEENT: no thyromegaly, no palpable nodules or masses  CV: regular rate and rhythm, no gallops, rubs or murmurs   Pulm: lungs clear, no rales, rhonchi or wheezes, normal diaphragmatic excursion  Breast Exam: No masses or irregularities noted. No dimpling, breast or muscle tenderness. No axillary lymphadenopathy. No nipple discharge.   MSK: extremities non tender and without edema  Skin: no lesions or rashes   Neuro: normal gait, no gross defects   Psych: appropriate mood and affect  Heme: normal cervical, supraclavicular and inguinal lymph nodes  Abd: Soft, non-tender, non-distended, no organomegaly or masses  Pelvic: External genitalia and urethral meatus appears normal.  Vagina is smooth without nodularity or masses. Bimanual exam reveal no masses, nodularity or fullness.  Recto-vaginal exam confirms these findings.    Labs/Imaging:  CT " Chest / Abdomen / Pelvis (6/1/17)  IMPRESSION: In this patient with history of neuroma sarcoma of the  uterus,   1. Stable scattered bilateral pulmonary nodules. No new/suspicious  pulmonary nodules.  2. Stable small retroperitoneal lymph nodes. No new lymphadenopathy  within the abdomen or pelvis.  3. Otherwise no new evidence of metastatic disease to the chest,  abdomen or pelvis.      I have personally reviewed the examination and initial interpretation  and I agree with the findings.    Echocardiogram (6/1/17)  Interpretation Summary  Global and regional left ventricular function is normal with an EF of 55-60%.  Global peak LV longitudinal strain is averaged at -19%. This is within  reported normal limits (normal <-18%).  Right ventricular function, chamber size, wall motion, and thickness are  normal.    Assessment:  Maggie Navarrete is a 66 year old woman with IIB leiomyosarcoma, s/p RACHEL / BSO, peritoneal resection, and omentectomy, and 5/6 chemotherapy cycles with doxorubicin and dacarbazine.       Plan:   1.)    Stage IIB Leiomyosarcoma  Patient shows no signs or symptoms suggestive of malignancy at this time. In the context of recently negative CT findings, it is likely that she is currently in remission. However, given the potential for metastatic recurrence she should continue with follow up for pelvic exams at regular intervals (every 3 months for the next two years)  - Follow up in 3 months  - Chest XR annually, CT in 6-9 months    2.) Genetic risk factors were assessed and the patient does not meet the qualifications for a referral  3.) Labs and/or tests ordered include:  None currently.     Antoni Godfrey MD.

## 2017-10-09 NOTE — MR AVS SNAPSHOT
After Visit Summary   10/9/2017    Maggie Navarrete    MRN: [id number]           Patient Information     Date Of Birth          1950        Visit Information        Provider Department      10/9/2017 9:30 AM Antoni Godfrey MD [facility]        Today's Diagnoses     Leiomyosarcoma (H)    -  1       Follow-ups after your visit        Follow-up notes from your care team     Return in about 3 months (around 1/9/2018).      Your next 10 appointments already scheduled     Yfn 15, 2018  9:00 AM CST   (Arrive by 8:45 AM)   Return Visit with Antoni Godfrey MD   [facility] ([facility] [place])    [address] [address] [phone]              Who to contact     If you have questions or need follow up information about today's clinic visit or your schedule please contact [facility] directly at [phone].  Normal or non-critical lab and imaging results will be communicated to you by MyChart, letter or phone within 4 business days after the clinic has received the results. If you do not hear from us within 7 days, please contact the clinic through MotorwayBuddyhart or phone. If you have a critical or abnormal lab result, we will notify you by phone as soon as possible.  Submit refill requests through [employer] Wireless or call your pharmacy and they will forward the refill request to us. Please allow 3 business days for your refill to be completed.          Additional Information About Your Visit        MyChart Information     [employer] gives you secure access to your electronic health record. If you see a primary care provider, you can also send messages to your care team and make appointments. If you have questions, please call your primary care clinic.  If you do not have a primary care provider, please call [phone] and they will assist you.        Care EveryWhere ID     This is your  "Care EveryWhere ID. This could be used by other organizations to access your [place] medical records  [id number]        Your Vitals Were     Pulse Temperature Respirations Height Pulse Oximetry BMI (Body Mass Index)    82 98  F (36.7  C) (Oral) 16 1.518 m (4' 11.76\") 100% 27.62 kg/m2       Blood Pressure from Last 3 Encounters:   10/09/17 129/83   09/21/17 130/79   07/10/17 124/79    Weight from Last 3 Encounters:   10/09/17 63.6 kg (140 lb 4.8 oz)   09/21/17 64.5 kg (142 lb 3.2 oz)   07/10/17 61.2 kg (135 lb)              Today, you had the following     No orders found for display       Primary Care Provider Office Phone # Fax #    Rhea Lin [phone]       [address] [place] [address] 7[address]  [facility] [address]        Equal Access to Services     RENNY [facility] : Hadii sivakumar ku hadasho Soomaali, waaxda luqadaha, qaybta kaalmada adeegyada, geneva mendoza . So [facility] [phone].    ATENCIÓN: Si mitzyla aime, tiene a cage disposición servicios gratuitos de asistencia lingüística. Llame al [phone].    We comply with applicable federal civil rights laws and [place] laws. We do not discriminate on the basis of race, color, national origin, age, disability, sex, sexual orientation, or gender identity.            Thank you!     Thank you for choosing [facility]  for your care. Our goal is always to provide you with excellent care. Hearing back from our patients is one way we can continue to improve our services. Please take a few minutes to complete the written survey that you may receive in the mail after your visit with us. Thank you!             Your Updated Medication List - Protect others around you: Learn how to safely use, store and throw away your medicines at www.disposemymeds.org.          This list is accurate as of: 10/9/17 10:37 AM.  Always use your most recent med list.                   Brand Name Dispense Instructions for use Diagnosis    " alendronate 35 MG tablet    FOSAMAX    12 tablet    Take 1 tablet (35 mg) by mouth every 7 days    Osteopenia       CALCIUM CITRATE + PO      Take 200 mg by mouth daily as needed (She supplements to meet her 1,200mg goal depending on how much dietary calcium she has gotten that day (up to 400mg once daily)) Reported on 5/1/2017        * levothyroxine 100 MCG tablet    SYNTHROID/LEVOTHROID          * levothyroxine 75 MCG tablet    SYNTHROID/LEVOTHROID    90 tablet    TAKE 1 TABLET EVERY DAY    Acquired hypothyroidism       MULTIVITAMINS PO      Take 1 tablet by mouth daily Reported on 4/10/2017        venlafaxine 75 MG 24 hr capsule    EFFEXOR-XR    90 capsule    Take 1 capsule (75 mg) by mouth daily    Generalized anxiety disorder       ZANTAC PO      Take 150 mg by mouth daily as needed        zolpidem 5 MG tablet    AMBIEN    25 tablet    Take 1 tablet (5 mg) by mouth nightly as needed for sleep    Insomnia, unspecified       * Notice:  This list has 2 medication(s) that are the same as other medications prescribed for you. Read the directions carefully, and ask your doctor or other care provider to review them with you.

## 2017-10-09 NOTE — LETTER
10/9/2017       RE: Maggie Navarrete  [address]  [facility] [address]     Dear Colleague,    Thank you for referring your patient, Maggie Navarrete, to the [facility]. Please see a copy of my visit note below.    Follow Up Note  Gynecologic Oncology   10/9/2017      HPI:  Ms. Navarrete presents today for follow up regarding a previous diagnosis of Stage IIb Leiomyosarcoma. She was originally diagnosed during surgery indicated for a presumed leiomyoma in November 2016. At that time,  She received a CT scan (6/1/17) which was negative for metastatic disease, echocardiogram (6/1/17) which showed normal cardiac function, and had her port removed without complication (5/9/17). Her last administration of chemotherapy was March 2017; she completed 5/6 cycles and discontinued early due to painful mucositis.     Today she presents for follow up. She reports feeling well and is in good spirits. She denies abdominal pain, nausea, vomiting, diarrhea, constipation, bloody stool, dysuria, hematuria, vaginal discharge, or vaginal bleeding. She does not have any specific concerns at this time.      Her oncologic history is included below:    Oncologic history:    Summer 2016, she experiences worsening low back pain thought at first thought to be sacroiliitis, but pain persists after corticosteroid injection. Continues with severe constipation.    9/8/2016, she undergoes colonoscopy for persistent abdominal pain. She has sigmoid polyp snared. Abdominal pain and diarrhea persist after colonoscopy prep.    9/14/2016, she has worsening abdominal pain and is evaluated in the ED. CT-scan shows a large subserosal fibroid measuring 9.4 x 9.6 x 9.8 cm, as well as very large fibroids along the posterior uterine body more inferiorly, increased in size to 14 cm in greatest diameter compared to 10 cm in greatest diameter on MRI of 5/9/2016.    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    11/4/2016, undergoes total abdominal hysterectomy and BSO, under Dr. Harsh Camarillo. Surgery was originally presumed for leiomyoma, however, intraoperatively there was focal adherence of a mass to the pelvic right sidewall and cul-de-sac, which led to peritoneal resection and omentectomy. Final pathology, pT2b: showed leiomyosarcoma involving the cul-de-sac and forming two separate masses measuring 10.5 x 6.5 x 4.5 cm and 10.7 x 7.9 x 7.5 cm.     12/26/2016- s/p 6 cycles 25 mg/m2 doxorubicin, 250 mg/m2 dacarbazine.         Review of Systems:  Answers for HPI/ROS submitted by the patient on 10/6/2017   General Symptoms: No  Skin Symptoms: No  HENT Symptoms: No  EYE SYMPTOMS: No  HEART SYMPTOMS: No  LUNG SYMPTOMS: No  INTESTINAL SYMPTOMS: No  URINARY SYMPTOMS: No  GYNECOLOGIC SYMPTOMS: No  BREAST SYMPTOMS: No  SKELETAL SYMPTOMS: Yes  BLOOD SYMPTOMS: No  NERVOUS SYSTEM SYMPTOMS: No  MENTAL HEALTH SYMPTOMS: No  Back pain: Yes  Muscle aches: No  Neck pain: Yes  Swollen joints: No  Joint pain: Yes  Bone pain: No  Muscle cramps: No  Muscle weakness: No  Joint stiffness: No  Bone fracture: No    Physical Exam:   PS 1  VS: /83  Pulse 82  Temp 98  F (36.7  C) (Oral)  Resp 16  Ht 1.518 m (4' 11.76\")  Wt 63.6 kg (140 lb 4.8 oz)  SpO2 100%  BMI 27.62 kg/m2      Gen:  healthy and alert, no distress  HEENT: no thyromegaly, no palpable nodules or masses  CV: regular rate and rhythm, no gallops, rubs or murmurs   Pulm: lungs clear, no rales, rhonchi or wheezes, normal diaphragmatic excursion  Breast Exam: No masses or irregularities noted. No dimpling, breast or muscle tenderness. No axillary lymphadenopathy. No nipple discharge.   MSK: extremities non tender and without edema  Skin: no lesions or rashes   Neuro: normal gait, no gross defects   Psych: appropriate mood and affect  Heme: normal cervical, supraclavicular and inguinal lymph nodes  Abd: Soft, non-tender, non-distended, no " organomegaly or masses  Pelvic: External genitalia and urethral meatus appears normal.  Vagina is smooth without nodularity or masses. Bimanual exam reveal no masses, nodularity or fullness.  Recto-vaginal exam confirms these findings.    Labs/Imaging:  CT Chest / Abdomen / Pelvis (6/1/17)  IMPRESSION: In this patient with history of neuroma sarcoma of the  uterus,   1. Stable scattered bilateral pulmonary nodules. No new/suspicious  pulmonary nodules.  2. Stable small retroperitoneal lymph nodes. No new lymphadenopathy  within the abdomen or pelvis.  3. Otherwise no new evidence of metastatic disease to the chest,  abdomen or pelvis.      I have personally reviewed the examination and initial interpretation  and I agree with the findings.    Echocardiogram (6/1/17)  Interpretation Summary  Global and regional left ventricular function is normal with an EF of 55-60%.  Global peak LV longitudinal strain is averaged at -19%. This is within  reported normal limits (normal <-18%).  Right ventricular function, chamber size, wall motion, and thickness are  normal.    Assessment:  Maggie Navarrete is a 66 year old woman with IIB leiomyosarcoma, s/p RACHEL / BSO, peritoneal resection, and omentectomy, and 5/6 chemotherapy cycles with doxorubicin and dacarbazine.       Plan:   1.)    Stage IIB Leiomyosarcoma  Patient shows no signs or symptoms suggestive of malignancy at this time. In the context of recently negative CT findings, it is likely that she is currently in remission. However, given the potential for metastatic recurrence she should continue with follow up for pelvic exams at regular intervals (every 3 months for the next two years)  - Follow up in 3 months  - Chest XR annually, CT in 6-9 months    2.) Genetic risk factors were assessed and the patient does not meet the qualifications for a referral  3.) Labs and/or tests ordered include:  None currently.     nAtoni Godfrey MD.

## 2017-10-09 NOTE — NURSING NOTE
"Oncology Rooming Note    October 9, 2017 9:45 AM   Maggie Navarrete is a 67 year old female who presents for:    Chief Complaint   Patient presents with     Oncology Clinic Visit     return patient visit for 3 month follow up related to Uterus cancer, sarcoma (H)     Initial Vitals: /83  Pulse 82  Temp 98  F (36.7  C) (Oral)  Resp 16  Ht 1.518 m (4' 11.76\")  Wt 63.6 kg (140 lb 4.8 oz)  SpO2 100%  BMI 27.62 kg/m2 Estimated body mass index is 27.62 kg/(m^2) as calculated from the following:    Height as of this encounter: 1.518 m (4' 11.76\").    Weight as of this encounter: 63.6 kg (140 lb 4.8 oz). Body surface area is 1.64 meters squared.  No Pain (0) Comment: Data Unavailable   No LMP recorded. Patient is postmenopausal.  Allergies reviewed: Yes  Medications reviewed: Yes    Medications: Medication refills not needed today.  Pharmacy name entered into [facility]:    [facility] [address] AT formerly [facility] & [address] [place] [address] PHARMACY MAIL DELIVERY - [facility] [address] [facility] [address] [place] [address]    Clinical concerns: no concerns dr. martins was notified.    7 minutes for nursing intake (face to face time)     Laya Mabry CMA                Maggie Navarrete      1.  Has the patient received the information for the influenza vaccine? YES    2.  Does the patient have any of the following contraindications?     Allergy to eggs? No     Allergic reaction to previous influenza vaccines? No     Any other problems to previous influenza vaccines? No     Paralyzed by Guillain-Otis syndrome? No     Currently pregnant? NO     Current moderate or severe illness? No     Allergy to contact lens solution? No    3.  The vaccine has been administered in the usual fashion and the patient was instructed to wait 20 minutes before leaving the building in the event of an allergic reaction: YES    Vaccination given by.  Recorded by Laya" West

## 2017-11-30 ENCOUNTER — TELEPHONE (OUTPATIENT)
Dept: OPHTHALMOLOGY | Facility: CLINIC | Age: 67
End: 2017-11-30

## 2017-11-30 NOTE — TELEPHONE ENCOUNTER
----- Message from Iliana Henriquez sent at 11/30/2017 11:50 AM CST -----  Regarding: Pt wants to know if she should get her contacts checked every year?...  Contact: [phone]      Please call pt at [phone].    Thank you,  Rob MARTIN    Please DO NOT send this message and/or reply back to sender.  Call Center Representatives DO NOT respond to messages.

## 2017-11-30 NOTE — TELEPHONE ENCOUNTER
Left message stating contact lens exams should be done yearly for contact lens users and to continuing to order contacts -- contact lens Rx good for one year typically.  Reviewed should be seen sooner if contact lens provider recommends  Migel Coello RN 12:06 PM 11/30/17    Direct number provided for further questions  Migel Coello RN 12:06 PM 11/30/17

## 2017-12-04 ENCOUNTER — OFFICE VISIT (OUTPATIENT)
Dept: OPTOMETRY | Facility: CLINIC | Age: 67
End: 2017-12-04

## 2017-12-04 DIAGNOSIS — H52.4 MYOPIA WITH PRESBYOPIA OF BOTH EYES: ICD-10-CM

## 2017-12-04 DIAGNOSIS — H52.13 MYOPIA WITH PRESBYOPIA OF BOTH EYES: ICD-10-CM

## 2017-12-04 DIAGNOSIS — H02.889 MEIBOMIAN GLAND DYSFUNCTION: Primary | ICD-10-CM

## 2017-12-04 ASSESSMENT — REFRACTION_MANIFEST
OS_AXIS: 055
OD_SPHERE: -9.25
OS_CYLINDER: +1.00
OS_SPHERE: -9.00
OD_CYLINDER: SPHERE

## 2017-12-04 ASSESSMENT — CONF VISUAL FIELD
OD_NORMAL: 1
OS_NORMAL: 1

## 2017-12-04 ASSESSMENT — REFRACTION_WEARINGRX
OD_CYLINDER: +0.25
OS_SPHERE: -8.75
OS_ADD: +2.50
OD_ADD: +2.50
OS_CYLINDER: +1.00
OD_AXIS: 120
OD_SPHERE: -8.75
OS_AXIS: 032

## 2017-12-04 ASSESSMENT — VISUAL ACUITY
OS_CC: 20/25
OD_CC: 20/20
METHOD: SNELLEN - LINEAR
CORRECTION_TYPE: CONTACTS

## 2017-12-04 ASSESSMENT — EXTERNAL EXAM - LEFT EYE: OS_EXAM: NORMAL

## 2017-12-04 ASSESSMENT — SLIT LAMP EXAM - LIDS
COMMENTS: 1-2+ MGD
COMMENTS: 1-2+ MGD

## 2017-12-04 ASSESSMENT — TONOMETRY
OD_IOP_MMHG: 22
OS_IOP_MMHG: 22
IOP_METHOD: ICARE

## 2017-12-04 ASSESSMENT — EXTERNAL EXAM - RIGHT EYE: OD_EXAM: NORMAL

## 2017-12-04 NOTE — MR AVS SNAPSHOT
After Visit Summary   12/4/2017    Maggie Navarrete    MRN: [id number]           Patient Information     Date Of Birth          1950        Visit Information        Provider Department      12/4/2017 10:30 AM Diane Dunham, [facility]        Today's Diagnoses     Meibomian gland dysfunction    -  1    Myopia with presbyopia of both eyes           Follow-ups after your visit        Your next 10 appointments already scheduled     Dec 11, 2017 12:40 PM CST   (Arrive by 12:25 PM)   Return Visit with Rhea Lin MD   [facility] ([facility])    [address]  4th Floor  [facility] [id number] [phone]            Yfn 15, 2018  9:00 AM CST   (Arrive by 8:45 AM)   Return Visit with Antoni Godfrey MD   [facility] ([facility])    [address]  2nd [facility] [id number] [phone]              Who to contact     Please call your clinic at [phone] to:    Ask questions about your health    Make or cancel appointments    Discuss your medicines    Learn about your test results    Speak to your doctor   If you have compliments or concerns about an experience at your clinic, or if you wish to file a complaint, please contact [facility] Patient Relations at [phone] or email us at [email or web address]         Additional Information About Your Visit        MyChart Information     [employer] gives you secure access to your electronic health record. If you see a primary care provider, you can also send messages to your care team and make appointments. If you have questions, please call your primary care clinic.  If you do not have a primary care provider, please call [phone] and they will assist you.      [employer] is an electronic gateway that provides easy, online access to your medical records. With [employer], you can  request a clinic appointment, read your test results, renew a prescription or communicate with your care team.     To access your existing account, please contact your [facility] or call [phone] for assistance.        Care EveryWhere ID     This is your Care EveryWhere ID. This could be used by other organizations to access your [place] medical records  [id number]         Blood Pressure from Last 3 Encounters:   10/09/17 129/83   09/21/17 130/79   07/10/17 124/79    Weight from Last 3 Encounters:   10/09/17 63.6 kg (140 lb 4.8 oz)   09/21/17 64.5 kg (142 lb 3.2 oz)   07/10/17 61.2 kg (135 lb)              We Performed the Following     REFRACTION [[id number]]        Primary Care Provider Office Phone # Fax #    Rhea Lin [phone]       [address] [place] [address] [address] [place] [id number]        Equal Access to Services     OSCAR ADLER : Hadii aad ku hadasho Soomaali, waaxda luqadaha, qaybta kaalmada adeegyada, waxay russelin haycorby mendoza . So [facility] [phone].    ATENCIÓN: Si mitzyla esphema, tiene a cage disposición servicios gratuitos de asistencia lingüística. Llame al [phone].    We comply with applicable federal civil rights laws and [place] laws. We do not discriminate on the basis of race, color, national origin, age, disability, sex, sexual orientation, or gender identity.            Thank you!     Thank you for choosing EYE CLINIC  for your care. Our goal is always to provide you with excellent care. Hearing back from our patients is one way we can continue to improve our services. Please take a few minutes to complete the written survey that you may receive in the mail after your visit with us. Thank you!             Your Updated Medication List - Protect others around you: Learn how to safely use, store and throw away your medicines at www.disposemymeds.org.          This list is accurate as of: 12/4/17 11:17 AM.  Always use your  most recent med list.                   Brand Name Dispense Instructions for use Diagnosis    alendronate 35 MG tablet    FOSAMAX    12 tablet    Take 1 tablet (35 mg) by mouth every 7 days    Osteopenia       CALCIUM CITRATE + PO      Take 200 mg by mouth daily as needed (She supplements to meet her 1,200mg goal depending on how much dietary calcium she has gotten that day (up to 400mg once daily)) Reported on 5/1/2017        * levothyroxine 100 MCG tablet    SYNTHROID/LEVOTHROID          * levothyroxine 75 MCG tablet    SYNTHROID/LEVOTHROID    90 tablet    TAKE 1 TABLET EVERY DAY    Acquired hypothyroidism       MULTIVITAMINS PO      Take 1 tablet by mouth daily Reported on 4/10/2017        venlafaxine 75 MG 24 hr capsule    EFFEXOR-XR    90 capsule    Take 1 capsule (75 mg) by mouth daily    Generalized anxiety disorder       ZANTAC PO      Take 150 mg by mouth daily as needed        zolpidem 5 MG tablet    AMBIEN    25 tablet    Take 1 tablet (5 mg) by mouth nightly as needed for sleep    Insomnia, unspecified       * Notice:  This list has 2 medication(s) that are the same as other medications prescribed for you. Read the directions carefully, and ask your doctor or other care provider to review them with you.

## 2017-12-04 NOTE — PROGRESS NOTES
A/P  1.) Myopia/Presbyopia OU  -Good comfort/fit in DVO GP's - not updated today  -Spec Rx updated, small myopic shift 2' to cataract    2.) Cataract OU  -Mild, not visually significant  -Continue to monitor    3.) MGD OU  -Mild dry eye symptoms  -Rec starting fish oil/omega-3, Refresh Optive Advance/Systane Balance    Monitor 1-2 years routine, continue f/u with Dr. Nguyen

## 2017-12-11 ENCOUNTER — OFFICE VISIT (OUTPATIENT)
Dept: INTERNAL MEDICINE | Facility: CLINIC | Age: 67
End: 2017-12-11

## 2017-12-11 VITALS
DIASTOLIC BLOOD PRESSURE: 83 MMHG | BODY MASS INDEX: 28.39 KG/M2 | RESPIRATION RATE: 20 BRPM | OXYGEN SATURATION: 100 % | HEART RATE: 91 BPM | WEIGHT: 144.2 LBS | SYSTOLIC BLOOD PRESSURE: 134 MMHG

## 2017-12-11 DIAGNOSIS — G47.00 INSOMNIA, UNSPECIFIED TYPE: ICD-10-CM

## 2017-12-11 DIAGNOSIS — R21 RASH: Primary | ICD-10-CM

## 2017-12-11 DIAGNOSIS — E03.9 HYPOTHYROIDISM, UNSPECIFIED TYPE: ICD-10-CM

## 2017-12-11 LAB — TSH SERPL DL<=0.005 MIU/L-ACNC: 1.06 MU/L (ref 0.4–4)

## 2017-12-11 RX ORDER — ZOLPIDEM TARTRATE 5 MG/1
5 TABLET ORAL
Qty: 25 TABLET | Refills: 0 | Status: SHIPPED | OUTPATIENT
Start: 2017-12-11 | End: 2018-08-15

## 2017-12-11 ASSESSMENT — PAIN SCALES - GENERAL: PAINLEVEL: MODERATE PAIN (5)

## 2017-12-11 NOTE — PROGRESS NOTES
CC:  Rash, left hand, TSH due, rx refill    S:  Here for the above.  Tiny patches of mumps on dorsum of left hand, noticed it after holding hands with young children.  Wondering if it could be warts. Has had eczema in the remote past. Asymptomatic.  Needs zolpidem refill.  Uses it on occasion for travel related insomnia.  Aware of potential side effects and warnings regarding drowsiness, sleep talking/walking, amnesia, not combining it with alcohol/sedatives, not driving while drowsiness, etc.  Planning travel to [facility] for whale watching, and travelling to [place] where her grandchildren are.  Due for TSH.  This would be a f/u TSH after a change in dose from 100 to 75 mcg daily.  Takes the rx properly.  Asymptomatic.  Has gained some weight since not being able to exercise as much as usual following fracture to a metatarsal around [facility].  Discussed typical time to healing and alternative strategies for non weight bearing exercising.  Also discussed meal portion control.    Patient Active Problem List   Diagnosis     MDD (major depressive disorder)     Presbyopia - Both Eyes     Osteopenia     CAROLA (generalized anxiety disorder)     Vaginal atrophy     Uterus cancer, sarcoma (H)     Chemotherapy-induced neutropenia (H)     Chemotherapy induced cardiomyopathy (H)     Current Outpatient Prescriptions   Medication Sig Dispense Refill     zolpidem (AMBIEN) 5 MG tablet Take 1 tablet (5 mg) by mouth nightly as needed for sleep 25 tablet 0     levothyroxine (SYNTHROID/LEVOTHROID) 75 MCG tablet TAKE 1 TABLET EVERY DAY 90 tablet 3     venlafaxine (EFFEXOR-XR) 75 MG 24 hr capsule Take 1 capsule (75 mg) by mouth daily 90 capsule 3     Calcium Citrate-Vitamin D (CALCIUM CITRATE + PO) Take 200 mg by mouth daily as needed (She supplements to meet her 1,200mg goal depending on how much dietary calcium she has gotten that day (up to 400mg once daily)) Reported on 5/1/2017       Multiple Vitamin (MULTIVITAMINS PO)  Take 1 tablet by mouth daily Reported on 4/10/2017       [DISCONTINUED] levothyroxine (SYNTHROID/LEVOTHROID) 100 MCG tablet 75 mcg        [DISCONTINUED] zolpidem (AMBIEN) 5 MG tablet Take 1 tablet (5 mg) by mouth nightly as needed for sleep 25 tablet 0     Allergies   Allergen Reactions     Erythromycin GI Disturbance     Tramadol Nausea     /83 (BP Location: Right arm, Patient Position: Sitting, Cuff Size: Adult Regular)  Pulse 91  Resp 20  Wt 65.4 kg (144 lb 3.2 oz)  SpO2 100%  BMI 28.39 kg/m2  Gen:  Pleasant, NAD  Left hand dorsum has up to 5 mm patches containing a few tiny clear, smooth, regular bordered papules.  No redness, scaling, fissures, features of warts, ulceration.    Maggie was seen today for wart and thyroid problem.    Diagnoses and all orders for this visit:    Rash, most likely dishydrotic eczema (barely present)  Advised her to keep her hands well moisturized, prn 0.5 % OTC hydrocortisone if symptomatic.    Insomnia, unspecified type  -     Refill zolpidem (AMBIEN) 5 MG tablet; Take 1 tablet (5 mg) by mouth nightly as needed for sleep    Hypothyroidism, unspecified type  -     TSH    F/U in May for routine physical  Total time spent 15 minutes.  More than 50% of the time spent with Ms. Navarrete on counseling / coordinating her care        Rhea Lin M.D.  Internal Medicine  Primary Care Center   pager [phone]

## 2017-12-11 NOTE — PATIENT INSTRUCTIONS
[facility]: [phone]     [facility] Medication Refill Request Information:  * Please contact your pharmacy regarding ANY request for medication refills.  ** [facility] Prescription Fax = [phone]  * Please allow 3 business days for routine medication refills.  * Please allow 5 business days for controlled substance medication refills.     [facility] Test Result notification information:  *You will be notified with in 7-10 days of your appointment day regarding the results of your test.  If you are on MyChart you will be notified as soon as the provider has reviewed the results and signed off on them.  At your visit today, we discussed your risk for falls and preventive options.    Fall Prevention  Falls often occur due to slipping, tripping or losing your balance. Millions of people fall every year and injure themselves. Here are ways to reduce your risk of falling again.    Think about your fall, was there anything that caused your fall that can be fixed, removed, or replaced?    Make your home safe by keeping walkways clear of objects you may trip over.    Use non-slip pads under rugs. Do not use area rugs or small throw rugs.    Use non-slip mats in bathtubs and showers.    Install handrails and lights on staircases.    Do not walk in poorly lit areas.    Do not stand on chairs or wobbly ladders.    Use caution when reaching overhead or looking upward. This position can cause a loss of balance.    Be sure your shoes fit properly, have non-slip bottoms and are in good condition.     Wear shoes both inside and out. Avoid going barefoot or wearing slippers.    Be cautious when going up and down stairs, curbs, and when walking on uneven sidewalks.    If your balance is poor, consider using a cane or walker.    If your fall was related to alcohol use, stop or limit alcohol intake.     If your fall was related to use of sleeping medicines, talk to your doctor about this. You may need to  reduce your dosage at bedtime if you awaken during the night to go to the bathroom.      To reduce the need for nighttime bathroom trips:    Avoid drinking fluids for several hours before going to bed    Empty your bladder before going to bed    Men can keep a urinal at the bedside    Stay as active as you can. Balance, flexibility, strength, and endurance all come from exercise. They all play a role in preventing falls. Ask your healthcare provider which types of activity are right for you.    Get your vision checked on a regular basis.    If you have pets, know where they are before you stand up or walk so you don't trip over them.    Use night lights.  Date Last Reviewed: 11/5/2015 2000-2017 [employer]. [address] [place] [address], [place] [address]. All rights reserved. This information is not intended as a substitute for professional medical care. Always follow your healthcare professional's instructions.

## 2017-12-11 NOTE — NURSING NOTE
"Patient has broken right foot and cannot do timed up and go test.Lucy Escoto LPN 12:45 PM on 12/11/2017.  Chief Complaint   Patient presents with     Wart     \" I think I have warts on my right hand.\"     Thyroid Problem     \" I would like my thyroid levels checked.\"   Lucy Escoto LPN 12:45 PM on 12/11/2017    Rooming Note    Health Maintenance  Health Maintenance Due   Topic Date Due     DEPRESSION ACTION PLAN Q1 YR  04/13/1968     ADVANCE DIRECTIVE PLANNING Q5 YRS  04/13/2005     FALL RISK ASSESSMENT  04/13/2015     PHQ-9 Q3 MONTHS  11/01/2016   All health maintenance items discussed and pended.  Lucy Escoto LPN 12:46 PM on 12/11/2017    "

## 2017-12-11 NOTE — MR AVS SNAPSHOT
After Visit Summary   12/11/2017    Maggie Navarrete    MRN: [id number]           Patient Information     Date Of Birth          1950        Visit Information        Provider Department      12/11/2017 12:40 PM Rhea Lin MD [facility]        Today's Diagnoses     Rash    -  1    Insomnia, unspecified type        Hypothyroidism, unspecified type          Care Instructions    Primary Care Center: [phone]     Primary Care Center Medication Refill Request Information:  * Please contact your pharmacy regarding ANY request for medication refills.  ** [facility] Prescription Fax = [phone]  * Please allow 3 business days for routine medication refills.  * Please allow 5 business days for controlled substance medication refills.     Primary Care Center Test Result notification information:  *You will be notified with in 7-10 days of your appointment day regarding the results of your test.  If you are on MyChart you will be notified as soon as the provider has reviewed the results and signed off on them.  At your visit today, we discussed your risk for falls and preventive options.    Fall Prevention  Falls often occur due to slipping, tripping or losing your balance. Millions of people fall every year and injure themselves. Here are ways to reduce your risk of falling again.    Think about your fall, was there anything that caused your fall that can be fixed, removed, or replaced?    Make your home safe by keeping walkways clear of objects you may trip over.    Use non-slip pads under rugs. Do not use area rugs or small throw rugs.    Use non-slip mats in bathtubs and showers.    Install handrails and lights on staircases.    Do not walk in poorly lit areas.    Do not stand on chairs or wobbly ladders.    Use caution when reaching overhead or looking upward. This position can cause a loss of balance.    Be sure your shoes fit properly, have non-slip bottoms and are in good  condition.     Wear shoes both inside and out. Avoid going barefoot or wearing slippers.    Be cautious when going up and down stairs, curbs, and when walking on uneven sidewalks.    If your balance is poor, consider using a cane or walker.    If your fall was related to alcohol use, stop or limit alcohol intake.     If your fall was related to use of sleeping medicines, talk to your doctor about this. You may need to reduce your dosage at bedtime if you awaken during the night to go to the bathroom.      To reduce the need for nighttime bathroom trips:    Avoid drinking fluids for several hours before going to bed    Empty your bladder before going to bed    Men can keep a urinal at the bedside    Stay as active as you can. Balance, flexibility, strength, and endurance all come from exercise. They all play a role in preventing falls. Ask your healthcare provider which types of activity are right for you.    Get your vision checked on a regular basis.    If you have pets, know where they are before you stand up or walk so you don't trip over them.    Use night lights.  Date Last Reviewed: 11/5/2015 2000-2017 [employer]. [address] [place] [address]. All rights reserved. This information is not intended as a substitute for professional medical care. Always follow your healthcare professional's instructions.                Follow-ups after your visit        Follow-up notes from your care team     Return for Physical Exam May 2018.      Your next 10 appointments already scheduled     Dec 11, 2017  1:45 PM CST   LAB with  LAB    Health Lab ([facility])    [address] [place] [address] [phone]           Please do not eat 10-12 hours before your appointment if you are coming in fasting for labs on lipids, cholesterol, or glucose (sugar). This does not apply to pregnant women. Water, hot tea and black coffee (with nothing added)  are okay. Do not drink other fluids, diet soda or chew gum.            Yfn 15, 2018  9:00 AM CST   (Arrive by 8:45 AM)   Return Visit with Antoni Godfrey MD   [facility] ([facility])    [address] [address] [phone]              Who to contact     Please call your clinic at [phone] to:    Ask questions about your health    Make or cancel appointments    Discuss your medicines    Learn about your test results    Speak to your doctor   If you have compliments or concerns about an experience at your clinic, or if you wish to file a complaint, please contact [facility] Patient Relations at [phone] or email us at [email or web address]         Additional Information About Your Visit        MyChart Information     [employer] gives you secure access to your electronic health record. If you see a primary care provider, you can also send messages to your care team and make appointments. If you have questions, please call your primary care clinic.  If you do not have a primary care provider, please call [phone] and they will assist you.      [employer] is an electronic gateway that provides easy, online access to your medical records. With [employer], you can request a clinic appointment, read your test results, renew a prescription or communicate with your care team.     To access your existing account, please contact your [facility] or call [phone] for assistance.        Care EveryWhere ID     This is your Care EveryWhere ID. This could be used by other organizations to access your [facility] medical records  [id number]        Your Vitals Were     Pulse Respirations Pulse Oximetry BMI (Body Mass Index)          91 20 100% 28.39 kg/m2         Blood Pressure from Last 3 Encounters:   12/11/17 134/83   10/09/17 129/83   09/21/17 130/79    Weight from  Last 3 Encounters:   12/11/17 65.4 kg (144 lb 3.2 oz)   10/09/17 63.6 kg (140 lb 4.8 oz)   09/21/17 64.5 kg (142 lb 3.2 oz)                 Today's Medication Changes          These changes are accurate as of: 12/11/17  1:23 PM.  If you have any questions, ask your nurse or doctor.               These medicines have changed or have updated prescriptions.        Dose/Directions    levothyroxine 75 MCG tablet   Commonly known as:  SYNTHROID/LEVOTHROID   This may have changed:  Another medication with the same name was removed. Continue taking this medication, and follow the directions you see here.   Used for:  Acquired hypothyroidism   Changed by:  Ab Gutierrez MD        TAKE 1 TABLET EVERY DAY   Quantity:  90 tablet   Refills:  3            Where to get your medicines      Some of these will need a paper prescription and others can be bought over the counter.  Ask your nurse if you have questions.     Bring a paper prescription for each of these medications     zolpidem 5 MG tablet                Primary Care Provider Office Phone # Fax #    Rhea Lin [phone]       [address] [place] [address] 741  [facility] [address]        Equal Access to Services     RENNY ADLER : Hadii sivakumar nance hademeliao Soelizabeth, waaxda luqadaha, qaybta kaalmada adeegyada, geneva perdue. So [facility] [phone].    ATENCIÓN: Si habla español, tiene a cage disposición servicios gratuitos de asistencia lingüística. Llame al [phone].    We comply with applicable federal civil rights laws and [place] laws. We do not discriminate on the basis of race, color, national origin, age, disability, sex, sexual orientation, or gender identity.            Thank you!     Thank you for choosing [facility]  for your care. Our goal is always to provide you with excellent care. Hearing back from our patients is one way we can continue to improve our services. Please take a few minutes  to complete the written survey that you may receive in the mail after your visit with us. Thank you!             Your Updated Medication List - Protect others around you: Learn how to safely use, store and throw away your medicines at www.disposemymeds.org.          This list is accurate as of: 12/11/17  1:23 PM.  Always use your most recent med list.                   Brand Name Dispense Instructions for use Diagnosis    CALCIUM CITRATE + PO      Take 200 mg by mouth daily as needed (She supplements to meet her 1,200mg goal depending on how much dietary calcium she has gotten that day (up to 400mg once daily)) Reported on 5/1/2017        levothyroxine 75 MCG tablet    SYNTHROID/LEVOTHROID    90 tablet    TAKE 1 TABLET EVERY DAY    Acquired hypothyroidism       MULTIVITAMINS PO      Take 1 tablet by mouth daily Reported on 4/10/2017        venlafaxine 75 MG 24 hr capsule    EFFEXOR-XR    90 capsule    Take 1 capsule (75 mg) by mouth daily    Generalized anxiety disorder       zolpidem 5 MG tablet    AMBIEN    25 tablet    Take 1 tablet (5 mg) by mouth nightly as needed for sleep    Insomnia, unspecified type

## 2018-01-11 ENCOUNTER — TELEPHONE (OUTPATIENT)
Dept: ONCOLOGY | Facility: CLINIC | Age: 68
End: 2018-01-11

## 2018-01-11 NOTE — TELEPHONE ENCOUNTER
Pt called questioning whether or not Dr. Aguilar feels that it is really necessary for her to come in for a f/u appt on 1/22/18 since she has decided not to have a repeat CT CAP despite his recommendations and since she also follows with Dr. Godfrey.     Pt states that she willing keep the appt if Dr. Aguilar feels that it is necessary otherwise would be willing to wait until March when she would like to be re-scanned. Told Pt that I would reach out to Dr. Aguilar with her que and call her back with his response.     Called Pt back to inform her that Dr. Aguilar is ok with her waiting until March to be re-scanned and to be seen for a f/u. Asked Pt is she would like me to transfer her call over to scheduling to help arrange this and she stated that that will call at a later date to do so.

## 2018-01-13 NOTE — PROGRESS NOTES
"Follow Up Note  Gynecologic Oncology     1/15/2018     HPI:  Ms. Navarrete presents today for follow up regarding a previous diagnosis of Stage IIb Leiomyosarcoma. She was originally diagnosed during surgery indicated for a presumed leiomyoma in November 2016. At that time,  She received a CT scan (6/1/17) which was negative for metastatic disease, echocardiogram (6/1/17) which showed normal cardiac function, and had her port removed without complication (5/9/17). Her last administration of chemotherapy was March 2017; she completed 5/6 cycles and discontinued early due to painful mucositis.     Today she presents for follow up. She reports feeling really well, states \"things are going great\". She broke her right foot when she was [place] shopping, but is doing better now. Didn't have surgery, has been walking more now although she hasn't been able to do her normal workout classes (yoga, [place]). She denies abdominal pain, nausea, vomiting, diarrhea, constipation, bloody stool, dysuria, hematuria, vaginal discharge, or vaginal bleeding. She is not sexually active. She does not have any specific concerns at this time.  Going on a [employer] in [place] next month, has been traveling a lot.    Her oncologic history is included below:    Oncologic history:    Summer 2016, she experiences worsening low back pain thought at first thought to be sacroiliitis, but pain persists after corticosteroid injection. Continues with severe constipation.    9/8/2016, she undergoes colonoscopy for persistent abdominal pain. She has sigmoid polyp snared. Abdominal pain and diarrhea persist after colonoscopy prep.    9/14/2016, she has worsening abdominal pain and is evaluated in the ED. CT-scan shows a large subserosal fibroid measuring 9.4 x 9.6 x 9.8 cm, as well as very large fibroids along the posterior uterine body more inferiorly, increased in size to 14 cm in greatest diameter compared to 10 cm in greatest diameter on MRI of " 5/9/2016.    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    11/4/2016, undergoes total abdominal hysterectomy and BSO, under Dr. Harsh Camarillo. Surgery was originally presumed for leiomyoma, however, intraoperatively there was focal adherence of a mass to the pelvic right sidewall and cul-de-sac, which led to peritoneal resection and omentectomy. Final pathology, pT2b: showed leiomyosarcoma involving the cul-de-sac and forming two separate masses measuring 10.5 x 6.5 x 4.5 cm and 10.7 x 7.9 x 7.5 cm.    12/26/2016 - 3/2016: s/p 5/6 cycles 25 mg/m2 doxorubicin, 250 mg/m2 dacarbazine.         Review of Systems:  See below  Answers for HPI/ROS submitted by the patient on 1/14/2018   General Symptoms: No  Skin Symptoms: No  HENT Symptoms: No  EYE SYMPTOMS: No  HEART SYMPTOMS: No  LUNG SYMPTOMS: No  INTESTINAL SYMPTOMS: No  URINARY SYMPTOMS: No  GYNECOLOGIC SYMPTOMS: No  BREAST SYMPTOMS: No  SKELETAL SYMPTOMS: Yes  BLOOD SYMPTOMS: No  NERVOUS SYSTEM SYMPTOMS: No  MENTAL HEALTH SYMPTOMS: No  Back pain: No  Muscle aches: No  Neck pain: No  Swollen joints: No  Joint pain: No  Bone pain: Yes  Muscle cramps: No  Muscle weakness: No  Joint stiffness: No  Bone fracture: No      Physical Exam:     VS: /82  Pulse 86  Temp 97.9  F (36.6  C) (Oral)  Resp 14  Wt 64.4 kg (142 lb)  SpO2 100%  BMI 27.96 kg/m2    Gen:  healthy and alert, no distress  HEENT: no thyromegaly, no palpable nodules or masses  CV: regular rate and rhythm, no gallops, rubs or murmurs   Pulm: lungs clear, no rales, rhonchi or wheezes, normal diaphragmatic excursion  MSK: extremities non tender and without edema  Skin: no lesions or rashes   Neuro: normal gait, no gross defects   Psych: appropriate mood and affect  Heme: normal cervical, supraclavicular and inguinal lymph nodes  Abd: Soft, non-tender, non-distended, no organomegaly or masses  Pelvic: External genitalia and urethral meatus appears  normal.  Vagina is smooth without nodularity or masses. Bimanual exam reveal no masses, nodularity or fullness.  Recto-vaginal exam confirms these findings.        Labs/Imaging:  CT Chest / Abdomen / Pelvis (9/19/17)      IMPRESSION: In this patient with history of sarcoma of the uterus,  1. Nonspecific focus of enhancement in segment 4A is favored as a  vascular shunt. Continued attention would be beneficial.  Alternatively, MRI with Eovist could be considered.  2. Unchanged scattered bilateral pulmonary nodules. New focal  subpleural 2 mm nodule in the left lower lobe, attention on follow-up  recommended.  3. Small unchanged retroperitoneal lymph nodes.      Assessment:  Maggie Navarrete is a woman with IIB leiomyosarcoma, s/p RACHEL/BSO, peritoneal resection, and omentectomy, and 5/6 chemotherapy cycles with doxorubicin and dacarbazine.       Plan:   1.)    Stage IIB Leiomyosarcoma:  Patient shows no signs or symptoms suggestive of malignancy at this time. In the context of recently negative CT findings, it is likely that she is currently in remission. However, given the potential for metastatic recurrence she should continue with follow up for pelvic exams at regular intervals (every 3 months for the next two years)  - Follow up in 3 months  - Chest XR annually, CT scan with next visit.  2.) Genetic risk factors were assessed and the patient does not meet the qualifications for a referral.  3.) Labs and/or tests ordered include:  None currently.    Antoni Godfrey MD.       Tt: 25 mins  CT: 15 mins

## 2018-01-14 ASSESSMENT — ENCOUNTER SYMPTOMS
NECK PAIN: 0
STIFFNESS: 0
BACK PAIN: 0
MUSCLE WEAKNESS: 0
MYALGIAS: 0
JOINT SWELLING: 0
ARTHRALGIAS: 0
MUSCLE CRAMPS: 0

## 2018-01-15 ENCOUNTER — ONCOLOGY VISIT (OUTPATIENT)
Dept: ONCOLOGY | Facility: CLINIC | Age: 68
End: 2018-01-15
Attending: INTERNAL MEDICINE
Payer: COMMERCIAL

## 2018-01-15 VITALS
OXYGEN SATURATION: 100 % | RESPIRATION RATE: 14 BRPM | DIASTOLIC BLOOD PRESSURE: 82 MMHG | TEMPERATURE: 97.9 F | WEIGHT: 142 LBS | HEART RATE: 86 BPM | BODY MASS INDEX: 27.96 KG/M2 | SYSTOLIC BLOOD PRESSURE: 134 MMHG

## 2018-01-15 DIAGNOSIS — C54.9 UTERUS CANCER, SARCOMA (H): ICD-10-CM

## 2018-01-15 DIAGNOSIS — C49.9 LEIOMYOSARCOMA (H): Primary | ICD-10-CM

## 2018-01-15 PROCEDURE — 99214 OFFICE O/P EST MOD 30 MIN: CPT | Mod: ZP | Performed by: OBSTETRICS & GYNECOLOGY

## 2018-01-15 PROCEDURE — G0463 HOSPITAL OUTPT CLINIC VISIT: HCPCS | Mod: ZF

## 2018-01-15 ASSESSMENT — PAIN SCALES - GENERAL: PAINLEVEL: NO PAIN (0)

## 2018-01-15 NOTE — NURSING NOTE
"Oncology Rooming Note    January 15, 2018 9:12 AM   Maggie Navarrete is a 67 year old female who presents for:    Chief Complaint   Patient presents with     Oncology Clinic Visit     CA of Uterus, 3 month f/u     Initial Vitals: /82  Pulse 86  Temp 97.9  F (36.6  C) (Oral)  Resp 14  Wt 64.4 kg (142 lb)  SpO2 100%  BMI 27.96 kg/m2 Estimated body mass index is 27.96 kg/(m^2) as calculated from the following:    Height as of 10/9/17: 1.518 m (4' 11.76\").    Weight as of this encounter: 64.4 kg (142 lb). Body surface area is 1.65 meters squared.  No Pain (0) Comment: Data Unavailable   No LMP recorded. Patient is postmenopausal.  Allergies reviewed: Yes  Medications reviewed: Yes    Medications: Medication refills not needed today.  Pharmacy name entered into [employer]:    [id number] - [facility] [address] AT [facility] [address] [facility]ARMACY MAIL DELIVERY - [facility] [address] [facility] [address] [place] [address] [address] [facility]    Clinical concerns: Patient states there are no new concerns to discuss with provider.  Dr Godfrey was not notified.       6 minutes for nursing intake (face to face time)     Ronna Lane CMA              "

## 2018-01-15 NOTE — LETTER
"1/15/2018       RE: Maggie Navarrete  [address]  [place] [address]     Dear Colleague,    Thank you for referring your patient, Maggie Navarrete, to the [facility]. Please see a copy of my visit note below.    Follow Up Note  Gynecologic Oncology     1/15/2018     HPI:  Ms. Navarrete presents today for follow up regarding a previous diagnosis of Stage IIb Leiomyosarcoma. She was originally diagnosed during surgery indicated for a presumed leiomyoma in November 2016. At that time,  She received a CT scan (6/1/17) which was negative for metastatic disease, echocardiogram (6/1/17) which showed normal cardiac function, and had her port removed without complication (5/9/17). Her last administration of chemotherapy was March 2017; she completed 5/6 cycles and discontinued early due to painful mucositis.     Today she presents for follow up. She reports feeling really well, states \"things are going great\". She broke her right foot when she was [place] shopping, but is doing better now. Didn't have surgery, has been walking more now although she hasn't been able to do her normal workout classes (yoga, [place]). She denies abdominal pain, nausea, vomiting, diarrhea, constipation, bloody stool, dysuria, hematuria, vaginal discharge, or vaginal bleeding. She is not sexually active. She does not have any specific concerns at this time.  Going on a [employer] in [place] next month, has been traveling a lot.    Her oncologic history is included below:    Oncologic history:    Summer 2016, she experiences worsening low back pain thought at first thought to be sacroiliitis, but pain persists after corticosteroid injection. Continues with severe constipation.    9/8/2016, she undergoes colonoscopy for persistent abdominal pain. She has sigmoid polyp snared. Abdominal pain and diarrhea persist after colonoscopy prep.    9/14/2016, she has worsening abdominal pain and is evaluated in the ED. CT-scan shows " a large subserosal fibroid measuring 9.4 x 9.6 x 9.8 cm, as well as very large fibroids along the posterior uterine body more inferiorly, increased in size to 14 cm in greatest diameter compared to 10 cm in greatest diameter on MRI of 5/9/2016.    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    11/4/2016, undergoes total abdominal hysterectomy and BSO, under Dr. Harsh Camarillo. Surgery was originally presumed for leiomyoma, however, intraoperatively there was focal adherence of a mass to the pelvic right sidewall and cul-de-sac, which led to peritoneal resection and omentectomy. Final pathology, pT2b: showed leiomyosarcoma involving the cul-de-sac and forming two separate masses measuring 10.5 x 6.5 x 4.5 cm and 10.7 x 7.9 x 7.5 cm.    12/26/2016 - 3/2016: s/p 5/6 cycles 25 mg/m2 doxorubicin, 250 mg/m2 dacarbazine.         Review of Systems:  See below  Answers for HPI/ROS submitted by the patient on 1/14/2018   General Symptoms: No  Skin Symptoms: No  HENT Symptoms: No  EYE SYMPTOMS: No  HEART SYMPTOMS: No  LUNG SYMPTOMS: No  INTESTINAL SYMPTOMS: No  URINARY SYMPTOMS: No  GYNECOLOGIC SYMPTOMS: No  BREAST SYMPTOMS: No  SKELETAL SYMPTOMS: Yes  BLOOD SYMPTOMS: No  NERVOUS SYSTEM SYMPTOMS: No  MENTAL HEALTH SYMPTOMS: No  Back pain: No  Muscle aches: No  Neck pain: No  Swollen joints: No  Joint pain: No  Bone pain: Yes  Muscle cramps: No  Muscle weakness: No  Joint stiffness: No  Bone fracture: No      Physical Exam:     VS: /82  Pulse 86  Temp 97.9  F (36.6  C) (Oral)  Resp 14  Wt 64.4 kg (142 lb)  SpO2 100%  BMI 27.96 kg/m2    Gen:  healthy and alert, no distress  HEENT: no thyromegaly, no palpable nodules or masses  CV: regular rate and rhythm, no gallops, rubs or murmurs   Pulm: lungs clear, no rales, rhonchi or wheezes, normal diaphragmatic excursion  MSK: extremities non tender and without edema  Skin: no lesions or rashes   Neuro: normal gait, no gross  defects   Psych: appropriate mood and affect  Heme: normal cervical, supraclavicular and inguinal lymph nodes  Abd: Soft, non-tender, non-distended, no organomegaly or masses  Pelvic: External genitalia and urethral meatus appears normal.  Vagina is smooth without nodularity or masses. Bimanual exam reveal no masses, nodularity or fullness.  Recto-vaginal exam confirms these findings.        Labs/Imaging:  CT Chest / Abdomen / Pelvis (9/19/17)      IMPRESSION: In this patient with history of sarcoma of the uterus,  1. Nonspecific focus of enhancement in segment 4A is favored as a  vascular shunt. Continued attention would be beneficial.  Alternatively, MRI with Eovist could be considered.  2. Unchanged scattered bilateral pulmonary nodules. New focal  subpleural 2 mm nodule in the left lower lobe, attention on follow-up  recommended.  3. Small unchanged retroperitoneal lymph nodes.      Assessment:  Maggie Navarrete is a woman with IIB leiomyosarcoma, s/p RACHEL/BSO, peritoneal resection, and omentectomy, and 5/6 chemotherapy cycles with doxorubicin and dacarbazine.       Plan:   1.)    Stage IIB Leiomyosarcoma:  Patient shows no signs or symptoms suggestive of malignancy at this time. In the context of recently negative CT findings, it is likely that she is currently in remission. However, given the potential for metastatic recurrence she should continue with follow up for pelvic exams at regular intervals (every 3 months for the next two years)  - Follow up in 3 months  - Chest XR annually, CT scan with next visit.  2.) Genetic risk factors were assessed and the patient does not meet the qualifications for a referral.  3.) Labs and/or tests ordered include:  None currently.    Antoni Godfrey MD.       Tt: 25 mins  CT: 15 mins

## 2018-01-15 NOTE — MR AVS SNAPSHOT
After Visit Summary   1/15/2018    Maggie Navarrete    MRN: [id number]           Patient Information     Date Of Birth          1950        Visit Information        Provider Department      1/15/2018 9:00 AM Antoni Godfrey MD [facility]        Today's Diagnoses     Leiomyosarcoma (H)    -  1      Care Instructions    Follow up in 3 months with Dr. Godfrey. We will do another CT scan in April as well.          Follow-ups after your visit        Follow-up notes from your care team     Return in about 3 months (around 4/15/2018).      Your next 10 appointments already scheduled     Jan 22, 2018  9:00 AM CST   (Arrive by 8:45 AM)   Return Visit with Ab Aguilar MD   [facility] ([facility])    [address] [place] [address]  [address] [place] [address] [phone]              Who to contact     If you have questions or need follow up information about today's clinic visit or your schedule please contact [facility], located within [facility] directly at [phone].  Normal or non-critical lab and imaging results will be communicated to you by Neurotechhart, letter or phone within 4 business days after the clinic has received the results. If you do not hear from us within 7 days, please contact the clinic through Neurotechhart or phone. If you have a critical or abnormal lab result, we will notify you by phone as soon as possible.  Submit refill requests through Qumas or call your pharmacy and they will forward the refill request to us. Please allow 3 business days for your refill to be completed.          Additional Information About Your Visit        MyChart Information     Qumas gives you secure access to your electronic health record. If you see a primary care provider, you can also send messages to your care team and make appointments. If you have questions, please call your primary care clinic.  If you do not have a  primary care provider, please call [phone] and they will assist you.        Care EveryWhere ID     This is your Care EveryWhere ID. This could be used by other organizations to access your [facility] medical records  [id number]        Your Vitals Were     Pulse Temperature Respirations Pulse Oximetry BMI (Body Mass Index)       86 97.9  F (36.6  C) (Oral) 14 100% 27.96 kg/m2        Blood Pressure from Last 3 Encounters:   01/15/18 134/82   12/11/17 134/83   10/09/17 129/83    Weight from Last 3 Encounters:   01/15/18 64.4 kg (142 lb)   12/11/17 65.4 kg (144 lb 3.2 oz)   10/09/17 63.6 kg (140 lb 4.8 oz)              Today, you had the following     No orders found for display       Primary Care Provider Office Phone # Fax #    Rhea Lin [phone]       [address] [place] [address] [address] [place] [address]        Equal Access to Services     [facility] : Hadii aad ku hadasho Soomaali, waaxda luqadaha, qaybta kaalmada adeegyada, waxay russelin haycorby mendoza . So [facility] [phone].    ATENCIÓN: Si habla español, tiene a cage disposición servicios gratuitos de asistencia lingüística. Llame al [phone].    We comply with applicable federal civil rights laws and [place] laws. We do not discriminate on the basis of race, color, national origin, age, disability, sex, sexual orientation, or gender identity.            Thank you!     Thank you for choosing [facility]  for your care. Our goal is always to provide you with excellent care. Hearing back from our patients is one way we can continue to improve our services. Please take a few minutes to complete the written survey that you may receive in the mail after your visit with us. Thank you!             Your Updated Medication List - Protect others around you: Learn how to safely use, store and throw away your medicines at www.disposemymeds.org.          This list is accurate as of: 1/15/18 10:04 AM.  Always use  your most recent med list.                   Brand Name Dispense Instructions for use Diagnosis    CALCIUM CITRATE + PO      Take 200 mg by mouth daily as needed (She supplements to meet her 1,200mg goal depending on how much dietary calcium she has gotten that day (up to 400mg once daily)) Reported on 5/1/2017        levothyroxine 75 MCG tablet    SYNTHROID/LEVOTHROID    90 tablet    TAKE 1 TABLET EVERY DAY    Acquired hypothyroidism       MULTIVITAMINS PO      Take 1 tablet by mouth daily Reported on 4/10/2017        venlafaxine 75 MG 24 hr capsule    EFFEXOR-XR    90 capsule    Take 1 capsule (75 mg) by mouth daily    Generalized anxiety disorder       zolpidem 5 MG tablet    AMBIEN    25 tablet    Take 1 tablet (5 mg) by mouth nightly as needed for sleep    Insomnia, unspecified type

## 2018-02-07 DIAGNOSIS — T75.3XXD MOTION SICKNESS, SUBSEQUENT ENCOUNTER: ICD-10-CM

## 2018-02-07 RX ORDER — SCOLOPAMINE TRANSDERMAL SYSTEM 1 MG/1
1 PATCH, EXTENDED RELEASE TRANSDERMAL
Qty: 5 PATCH | Refills: 1 | Status: SHIPPED | OUTPATIENT
Start: 2018-02-07 | End: 2018-06-16

## 2018-03-19 DIAGNOSIS — E03.9 ACQUIRED HYPOTHYROIDISM: ICD-10-CM

## 2018-03-19 RX ORDER — LEVOTHYROXINE SODIUM 75 UG/1
75 TABLET ORAL DAILY
Qty: 90 TABLET | Refills: 3 | Status: SHIPPED | OUTPATIENT
Start: 2018-03-19 | End: 2019-02-25

## 2018-04-19 ENCOUNTER — RADIANT APPOINTMENT (OUTPATIENT)
Dept: CT IMAGING | Facility: CLINIC | Age: 68
End: 2018-04-19
Attending: OBSTETRICS & GYNECOLOGY
Payer: MEDICARE

## 2018-04-19 DIAGNOSIS — C54.9 UTERUS CANCER, SARCOMA (H): ICD-10-CM

## 2018-04-19 DIAGNOSIS — C49.9 LEIOMYOSARCOMA (H): ICD-10-CM

## 2018-04-19 LAB
CREAT BLD-MCNC: 0.8 MG/DL (ref 0.52–1.04)
GFR SERPL CREATININE-BSD FRML MDRD: 71 ML/MIN/1.7M2

## 2018-04-19 RX ORDER — IOPAMIDOL 755 MG/ML
86 INJECTION, SOLUTION INTRAVASCULAR ONCE
Status: COMPLETED | OUTPATIENT
Start: 2018-04-19 | End: 2018-04-19

## 2018-04-19 RX ADMIN — IOPAMIDOL 86 ML: 755 INJECTION, SOLUTION INTRAVASCULAR at 11:42

## 2018-04-19 NOTE — DISCHARGE INSTRUCTIONS

## 2018-04-20 ENCOUNTER — ONCOLOGY VISIT (OUTPATIENT)
Dept: ONCOLOGY | Facility: CLINIC | Age: 68
End: 2018-04-20
Attending: INTERNAL MEDICINE
Payer: COMMERCIAL

## 2018-04-20 VITALS
BODY MASS INDEX: 28.21 KG/M2 | SYSTOLIC BLOOD PRESSURE: 155 MMHG | RESPIRATION RATE: 16 BRPM | WEIGHT: 143.7 LBS | TEMPERATURE: 97.4 F | DIASTOLIC BLOOD PRESSURE: 90 MMHG | HEART RATE: 98 BPM | HEIGHT: 60 IN | OXYGEN SATURATION: 100 %

## 2018-04-20 DIAGNOSIS — C49.9 SARCOMA OF SOFT TISSUE (H): Primary | ICD-10-CM

## 2018-04-20 PROCEDURE — 99214 OFFICE O/P EST MOD 30 MIN: CPT | Mod: ZP | Performed by: INTERNAL MEDICINE

## 2018-04-20 PROCEDURE — G0463 HOSPITAL OUTPT CLINIC VISIT: HCPCS | Mod: ZF

## 2018-04-20 ASSESSMENT — PAIN SCALES - GENERAL: PAINLEVEL: NO PAIN (0)

## 2018-04-20 NOTE — MR AVS SNAPSHOT
After Visit Summary   4/20/2018    Maggie Navarrete    MRN: [id number]           Patient Information     Date Of Birth          1950        Visit Information        Provider Department      4/20/2018 3:30 PM Ab Gutierrez MD [facility]        Today's Diagnoses     Sarcoma of soft tissue (H)    -  1       Follow-ups after your visit        Follow-up notes from your care team     Return in about 4 months (around 8/20/2018).      Your next 10 appointments already scheduled     Jul 30, 2018 10:20 AM CDT   (Arrive by 10:05 AM)   Return Active Treatment with SHIRAZ Parks CNP   [facility] ([facility])    [address]  [address]  [facility] [id number] [phone]            Aug 17, 2018 11:00 AM CDT   (Arrive by 10:45 AM)   CT CHEST ABDOMEN PELVIS W/O & W CONTRAST with UCCT1   [facility] [place] [facility] ([facility])    [address]  1st Floor  [facility] [id number] [phone]           Please bring any scans or X-rays taken at other hospitals, if similar tests were done. Also bring a list of your medicines, including vitamins, minerals and over-the-counter drugs. It is safest to leave personal items at home.  Be sure to tell your doctor:   If you have any allergies.   If there s any chance you are pregnant.   If you are breastfeeding.  How to prepare:   Do not eat or drink for 2 hours before your exam. If you need to take medicine, you may take it with small sips of water. (We may ask you to take liquid medicine as well.)   Please wear loose clothing, such as a sweat suit or jogging clothes. Avoid snaps, zippers and other metal. We may ask you to undress and put on a hospital gown.  Please arrive 30 minutes early for your CT. Once in the department you might be asked to drink water 15-20 minutes prior to your exam.  If indicated you may be asked to drink an oral  contrast in advance of your CT.  If this is the case, the imaging team will let you know or be in contact with you prior to your appointment  Patients over 70 or patients with diabetes or kidney problems:   If you haven t had a blood test (creatinine test) within the last 30 days, the Cardiologist/Radiologist may require you to get this test prior to your exam.  If you have diabetes:   Continue to take your metformin medication on the day of your exam  If you have any questions, please call the Imaging Department where you will have your exam.            Aug 17, 2018 11:30 AM CDT   Lab with BI LAB   [facility] ([facility])    [address]  1st Floor  [facility] [id number] [phone]            Aug 20, 2018  3:30 PM CDT   (Arrive by 3:15 PM)   Return Visit with Ab Aguilar MD   [facility] ([facility])    [address]  [address]  [facility] [id number] [phone]              Future tests that were ordered for you today     Open Future Orders        Priority Expected Expires Ordered    CT Chest abdomen pelvis w & w/o contrast Routine  4/23/2019 4/23/2018            Who to contact     If you have questions or need follow up information about today's clinic visit or your schedule please contact [facility] directly at [phone].  Normal or non-critical lab and imaging results will be communicated to you by MyChart, letter or phone within 4 business days after the clinic has received the results. If you do not hear from us within 7 days, please contact the clinic through MyChart or phone. If you have a critical or abnormal lab result, we will notify you by phone as soon as possible.  Submit refill requests through Trackway or call your pharmacy and they will forward the refill request to us. Please allow 3 business days for your refill to be completed.          Additional Information  "About Your Visit        MyChart Information     [employer] gives you secure access to your electronic health record. If you see a primary care provider, you can also send messages to your care team and make appointments. If you have questions, please call your primary care clinic.  If you do not have a primary care provider, please call [phone] and they will assist you.        Care EveryWhere ID     This is your Care EveryWhere ID. This could be used by other organizations to access your [facility] medical records  [id number]        Your Vitals Were     Pulse Temperature Respirations Height Pulse Oximetry BMI (Body Mass Index)    98 97.4  F (36.3  C) (Oral) 16 1.518 m (4' 11.76\") 100% 28.29 kg/m2       Blood Pressure from Last 3 Encounters:   04/23/18 133/72   04/20/18 155/90   01/15/18 134/82    Weight from Last 3 Encounters:   04/23/18 65.2 kg (143 lb 12.8 oz)   04/20/18 65.2 kg (143 lb 11.2 oz)   01/15/18 64.4 kg (142 lb)              Today, you had the following     No orders found for display       Primary Care Provider Office Phone # Fax #    Rhea Lin [phone]       [address] [place] [address] [address] [place] [address]        Equal Access to Services     OSCAR ADLER : Hadii sivakumar nance hadasho Soomaali, waaxda luqadaha, qaybta kaalmada adeegyada, geneva mendoza . So [facility] [phone].    ATENCIÓN: Si habla español, tiene a cage disposición servicios gratuitos de asistencia lingüística. Llame al [phone].    We comply with applicable federal civil rights laws and [place] laws. We do not discriminate on the basis of race, color, national origin, age, disability, sex, sexual orientation, or gender identity.            Thank you!     Thank you for choosing [facility]  for your care. Our goal is always to provide you with excellent care. Hearing back from our patients is one way we can continue to improve our services. Please take a few minutes to " complete the written survey that you may receive in the mail after your visit with us. Thank you!             Your Updated Medication List - Protect others around you: Learn how to safely use, store and throw away your medicines at www.disposemymeds.org.          This list is accurate as of 4/20/18 11:59 PM.  Always use your most recent med list.                   Brand Name Dispense Instructions for use Diagnosis    CALCIUM CITRATE + PO      Take 200 mg by mouth daily as needed (She supplements to meet her 1,200mg goal depending on how much dietary calcium she has gotten that day (up to 400mg once daily)) Reported on 5/1/2017        levothyroxine 75 MCG tablet    SYNTHROID/LEVOTHROID    90 tablet    Take 1 tablet (75 mcg) by mouth daily    Acquired hypothyroidism       MULTIVITAMINS PO      Take 1 tablet by mouth daily Reported on 4/10/2017        scopolamine 72 hr patch    TRANSDERM    5 patch    Place 1 patch onto the skin every 72 hours    Motion sickness, subsequent encounter       venlafaxine 75 MG 24 hr capsule    EFFEXOR-XR    90 capsule    Take 1 capsule (75 mg) by mouth daily    Generalized anxiety disorder       zolpidem 5 MG tablet    AMBIEN    25 tablet    Take 1 tablet (5 mg) by mouth nightly as needed for sleep    Insomnia, unspecified type

## 2018-04-20 NOTE — NURSING NOTE
"Oncology Rooming Note    April 20, 2018 3:59 PM   Maggie Navarrete is a 68 year old female who presents for:    Chief Complaint   Patient presents with     Oncology Clinic Visit     Return: Leiomyosarcoma      Initial Vitals: /90 (BP Location: Right arm, Patient Position: Chair, Cuff Size: Adult Regular)  Pulse 98  Temp 97.4  F (36.3  C) (Oral)  Resp 16  Ht 1.518 m (4' 11.76\")  Wt 65.2 kg (143 lb 11.2 oz)  SpO2 100%  BMI 28.29 kg/m2 Estimated body mass index is 28.29 kg/(m^2) as calculated from the following:    Height as of this encounter: 1.518 m (4' 11.76\").    Weight as of this encounter: 65.2 kg (143 lb 11.2 oz). Body surface area is 1.66 meters squared.  No Pain (0) Comment: Data Unavailable   No LMP recorded. Patient is postmenopausal.  Allergies reviewed: YES  Medications reviewed: YES    Medications: Medication refills not needed today.  Pharmacy name entered into TrumpIT:    [id number] - [facility] [address] AT [facility] OF [address] & [address] [place] [address] PHARMACY [facility] - [place], [place] - [address] [facility] - [place], [place] - [address] [facility]    Clinical concerns: no new concerns.  Pt received flu shot elsewhere. See Immunizations     6 minutes for nursing intake (face to face time)     Amanda Monge CMA                "

## 2018-04-20 NOTE — LETTER
4/20/2018       RE: Maggie Navarrete  [address]  [facility] [address]     Dear Colleague,    Thank you for referring your patient, Maggie Navarrete, to the [facility]. Please see a copy of my visit note below.    [facility]  MEDICAL ONCOLOGY PROGRESS NOTE   Apr 20, 2018    Oncologic history:  1. January 2016, she presents to her PCP with 3 weeks of abdominal pain and severe constipation. She is treated for constipation and improves.  2. 3/21/2016, worsening pelvic pressure prompts pelvic examination with an enlarged uterus noted. Transvaginal ultrasound shows enlargement of subserosal fibroids originally seen on MRI in 2006.  3. 5/9/2016, MRI pelvis is obtained, which shows several enhancing uterine masses involving an enlarged uterus, felt radiographically compatible with benign uterine leiomyomata.  3. Summer 2016, she experiences worsening low back pain thought at first thought to be sacroiliitis, but pain persists after corticosteroid injection. Continues with severe constipation.  4. 9/8/2016, she undergoes colonoscopy for persistent abdominal pain. She has sigmoid polyp snared. Abdominal pain and diarrhea persist after colonoscopy prep.  5. 9/14/2016, she has worsening abdominal pain and is evaluated in the ED. CT-scan shows a large subserosal fibroid measuring 9.4 x 9.6 x 9.8 cm, as well as very large fibroids along the posterior uterine body more inferiorly, increased in size to 14 cm in greatest diameter compared to 10 cm in greatest diameter on MRI of 5/9/2016.  6. 9/29/2016, she has staging CT-CAP, which shows a small left sided pleural effusion and a 3 mm pulmonary nodule in the RML.  7. October 2016, self-refers to [facility].  8. 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  9. 11/4/2016, undergoes total abdominal hysterectomy and BSO, under Dr. Harsh Camarillo. Surgery was originally presumed for leiomyoma,  however, intraoperatively there was focal adherence of a mass to the pelvic right sidewall and cul-de-sac, which led to peritoneal resection and omentectomy. Final pathology, pT2b: showed leiomyosarcoma involving the cul-de-sac and forming two separate masses measuring 10.5 x 6.5 x 4.5 cm and 10.7 x 7.9 x 7.5 cm.  10. 12/26/2016, she starts adjuvant chemotherapy with doxorubicin 25 mg/m2/day and DTIC 250 mg/m2/day , days 1-3 every 21 weeks.  11. 3/23/2017, she completes 5 cycles of adjuvant doxorubicin and dacarbazine    HISTORY OF PRESENT ILLNESS  Ms. Navarrete presents today in follow-up and to review restaging studies.     She is feeling well and denies any major complaints. She denies nausea, vomiting, diarrhea, or abdominal pains. No new urinary symptoms. She continues to travel with her sister and they recently travelled to [place] together. She also travels to [place] to visit with her children and grandchildren a few times a year. Otherwise, she denies headaches, shortness of breath, chest pain, or fevers/chills.    CT-CAP was reviewed together with the patient today, and there is no evidence for recurrent or metastatic disease. Renal function tests show creatinine is normal at 0.8. ECOG performance status is 0.    REVIEW OF SYSTEMS  A 12-point ROS negative except as in HPI    Current Outpatient Prescriptions   Medication Sig Dispense Refill     Calcium Citrate-Vitamin D (CALCIUM CITRATE + PO) Take 200 mg by mouth daily as needed (She supplements to meet her 1,200mg goal depending on how much dietary calcium she has gotten that day (up to 400mg once daily)) Reported on 5/1/2017       levothyroxine (SYNTHROID/LEVOTHROID) 75 MCG tablet Take 1 tablet (75 mcg) by mouth daily 90 tablet 3     Multiple Vitamin (MULTIVITAMINS PO) Take 1 tablet by mouth daily Reported on 4/10/2017       venlafaxine (EFFEXOR-XR) 75 MG 24 hr capsule Take 1 capsule (75 mg) by mouth daily 90 capsule 3     scopolamine (TRANSDERM) 72 hr patch  "Place 1 patch onto the skin every 72 hours (Patient not taking: Reported on 4/20/2018) 5 patch 1     zolpidem (AMBIEN) 5 MG tablet Take 1 tablet (5 mg) by mouth nightly as needed for sleep (Patient not taking: Reported on 4/20/2018) 25 tablet 0       Past Medical History:   Diagnosis Date     Anxiety      Arthritis     pain in feet and knees     Disorder of bone and cartilage, unspecified      Esophageal reflux 2015     GERD (gastroesophageal reflux disease)      Hx of previous reproductive problem 1980     Kidney stone 2011     Personal history of colonic polyps 2016    Small, benign     PONV (postoperative nausea and vomiting)      Posterior vitreous detachment of left eye 2011     Posterior vitreous detachment of right eye 2011     Ptosis of eyelid, bilateral      Stomach problems 2015    Abdominal pain, Diarreha     Thyroid disease     thyroid nodule resolved 2014       PHYSICAL EXAMINATION  /90 (BP Location: Right arm, Patient Position: Chair, Cuff Size: Adult Regular)  Pulse 98  Temp 97.4  F (36.3  C) (Oral)  Resp 16  Ht 1.518 m (4' 11.76\")  Wt 65.2 kg (143 lb 11.2 oz)  SpO2 100%  BMI 28.29 kg/m2  Wt Readings from Last 2 Encounters:   04/23/18 65.2 kg (143 lb 12.8 oz)   04/20/18 65.2 kg (143 lb 11.2 oz)     Physical Exam   Constitutional: She is oriented to person, place, and time. She appears well-developed and well-nourished. No distress.   HENT:   Head: Atraumatic.   Mouth/Throat: Oropharynx is clear and moist.   Eyes: Conjunctivae and EOM are normal. Pupils are equal, round, and reactive to light. No scleral icterus.   Neck: Normal range of motion.   Cardiovascular: Normal rate, regular rhythm and normal heart sounds.    No murmur heard.  Pulmonary/Chest: Effort normal and breath sounds normal. She has no wheezes.   Abdominal: Soft. She exhibits no distension. There is no tenderness.   Musculoskeletal: Normal range of motion. She exhibits no edema.   Lymphadenopathy:     She has no cervical " adenopathy.   Neurological: She is alert and oriented to person, place, and time. No cranial nerve deficit. She exhibits normal muscle tone. Coordination normal.   Skin: Skin is warm and dry. No rash noted.   Psychiatric: She has a normal mood and affect. Her behavior is normal.   Nursing note and vitals reviewed.    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  It was a pleasure to see Ms. Navarrete today. Restaging CT-scan today is negative for recurrent or metastatic disease. She is feeling well and denies any complaints. She will also be seeing Dr. Godfrey next week for his review.    I will plan to see her back in 4 to 6 months with repeat CT-CAP and labs.     Questions answered.    Ab Warner M.D.   of Medicine  Hematology, Oncology and Transplantation

## 2018-04-22 NOTE — PROGRESS NOTES
Follow Up Note  Gynecologic Oncology     4/23/2018     HPI:  Ms. Navarrete presents today for follow up regarding a previous diagnosis of Stage IIb Leiomyosarcoma. She was originally diagnosed during surgery indicated for a presumed leiomyoma in November 2016. At that time,  She received a CT scan (6/1/17) which was negative for metastatic disease, echocardiogram (6/1/17) which showed normal cardiac function, and had her port removed without complication (5/9/17). Her last administration of chemotherapy was March 2017; she completed 5/6 cycles and discontinued early due to painful mucositis.     Today she presents for follow up.     Her oncologic history is included below:    Oncologic history:    Summer 2016, she experiences worsening low back pain thought at first thought to be sacroiliitis, but pain persists after corticosteroid injection. Continues with severe constipation.    9/8/2016, she undergoes colonoscopy for persistent abdominal pain. She has sigmoid polyp snared. Abdominal pain and diarrhea persist after colonoscopy prep.    9/14/2016, she has worsening abdominal pain and is evaluated in the ED. CT-scan shows a large subserosal fibroid measuring 9.4 x 9.6 x 9.8 cm, as well as very large fibroids along the posterior uterine body more inferiorly, increased in size to 14 cm in greatest diameter compared to 10 cm in greatest diameter on MRI of 5/9/2016.    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    11/4/2016, undergoes total abdominal hysterectomy and BSO, under Dr. Harsh Camarillo. Surgery was originally presumed for leiomyoma, however, intraoperatively there was focal adherence of a mass to the pelvic right sidewall and cul-de-sac, which led to peritoneal resection and omentectomy. Final pathology, pT2b: showed leiomyosarcoma involving the cul-de-sac and forming two separate masses measuring 10.5 x 6.5 x 4.5 cm and 10.7 x 7.9 x 7.5 cm.    12/26/2016 -  3/2016: s/p 5/6 cycles 25 mg/m2 doxorubicin, 250 mg/m2 dacarbazine.     2/2017: CT: MPRESSION: In this patient with history of leiomyosarcoma of the  uterus:  1. No CT evidence of disease progression.  2. Unchanged mildly prominent para-aortic lymph nodes, compared to  outside institution CT 11/29/2016. Continued attention on follow-up  imaging is recommended.  3. 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  4. Calcified and noncalcified pulmonary nodules, stable from  11/29/2016. Continued attention on follow-up imaging.    4/2018:  CT:  IMPRESSION:   1. Stable changes of hysterectomy and bilateral salpingo-oophorectomy  without evidence of local recurrence.  2. Stable small pulmonary nodules  3. Mild thickening and hyperenhancement of the gastric mucosa and  proximal duodenum compatible with inflammation/infection.  4. Stable indeterminate hyperenhancing lesion in segment 4A      Review of Systems:  See below        Physical Exam:     VS: /72  Pulse 92  Temp 97.8  F (36.6  C) (Oral)  Resp 16  Wt 65.2 kg (143 lb 12.8 oz)  SpO2 99%  BMI 28.31 kg/m2  PS: 1    Gen:  healthy and alert, no distress  HEENT: no thyromegaly, no palpable nodules or masses  CV: regular rate and rhythm, no gallops, rubs or murmurs   Pulm: lungs clear, no rales, rhonchi or wheezes, normal diaphragmatic excursion  MSK: extremities non tender and without edema  Skin: no lesions or rashes   Neuro: normal gait, no gross defects   Psych: appropriate mood and affect  Heme: normal cervical, supraclavicular and inguinal lymph nodes  Abd: Soft, non-tender, non-distended, no organomegaly or masses  Pelvic: External genitalia and urethral meatus appears normal.  Vagina is smooth without nodularity or masses. Bimanual exam reveal no masses, nodularity or fullness.  Recto-vaginal exam confirms these findings.      Assessment:  Maggie Navarrete is a woman with IIB leiomyosarcoma, s/p RACHEL/BSO, peritoneal resection, and  omentectomy, and 5/6 chemotherapy cycles with doxorubicin and dacarbazine.       Plan:   1.)    Stage IIB Leiomyosarcoma:    Patient shows no signs or symptoms suggestive of malignancy at this time. In the context of recently negative CT findings, it is likely that she is currently in remission. However, given the potential for metastatic recurrence she should continue with follow up for pelvic exams at regular intervals (every 3 months for the next two years)  - Follow up in 3 months  - Chest XR annually, CT scan with next visit.  2.) Genetic risk factors were assessed and the patient does not meet the qualifications for a referral.  3.) Labs and/or tests ordered include:  None currently.    Antoni Godfrey MD.       Tt: 25 mins  CT: 15 mins

## 2018-04-23 ENCOUNTER — ONCOLOGY VISIT (OUTPATIENT)
Dept: ONCOLOGY | Facility: CLINIC | Age: 68
End: 2018-04-23
Attending: OBSTETRICS & GYNECOLOGY
Payer: COMMERCIAL

## 2018-04-23 VITALS
SYSTOLIC BLOOD PRESSURE: 133 MMHG | BODY MASS INDEX: 28.31 KG/M2 | RESPIRATION RATE: 16 BRPM | TEMPERATURE: 97.8 F | HEART RATE: 92 BPM | WEIGHT: 143.8 LBS | OXYGEN SATURATION: 99 % | DIASTOLIC BLOOD PRESSURE: 72 MMHG

## 2018-04-23 DIAGNOSIS — C55 LEIOMYOSARCOMA OF UTERUS (H): Primary | ICD-10-CM

## 2018-04-23 DIAGNOSIS — C54.9 UTERUS CANCER, SARCOMA (H): Primary | ICD-10-CM

## 2018-04-23 PROCEDURE — G0463 HOSPITAL OUTPT CLINIC VISIT: HCPCS | Mod: ZF

## 2018-04-23 PROCEDURE — 99214 OFFICE O/P EST MOD 30 MIN: CPT | Mod: ZP | Performed by: OBSTETRICS & GYNECOLOGY

## 2018-04-23 ASSESSMENT — PAIN SCALES - GENERAL: PAINLEVEL: NO PAIN (0)

## 2018-04-23 NOTE — NURSING NOTE
"Oncology Rooming Note    April 23, 2018 9:32 AM   Maggie Navarrete is a 68 year old female who presents for:    No chief complaint on file.    Initial Vitals: /72  Pulse 92  Temp 97.8  F (36.6  C) (Oral)  Resp 16  Wt 65.2 kg (143 lb 12.8 oz)  SpO2 99%  BMI 28.31 kg/m2 Estimated body mass index is 28.31 kg/(m^2) as calculated from the following:    Height as of 4/20/18: 1.518 m (4' 11.76\").    Weight as of this encounter: 65.2 kg (143 lb 12.8 oz). Body surface area is 1.66 meters squared.  No Pain (0) Comment: Data Unavailable   No LMP recorded. Patient is postmenopausal.  Allergies reviewed: Yes  Medications reviewed: Yes    Medications: Medication refills not needed today.  Pharmacy name entered into [facility]:    [id number] - [facility] [address] AT [facility] OF [address] PHARMACY MAIL DELIVERY - [facility] [address] [facility] [address] - [address]    Clinical concerns: Patient states there are no new concerns to discuss with provider.  Dr Godfrey was not notified.       10 minutes for nursing intake (face to face time)     Ronna Lane CMA                "

## 2018-04-23 NOTE — MR AVS SNAPSHOT
After Visit Summary   4/23/2018    Maggie Navarrete    MRN: [id number]           Patient Information     Date Of Birth          1950        Visit Information        Provider Department      4/23/2018 9:30 AM Antoni Godfrey MD [facility]        Today's Diagnoses     Leiomyosarcoma of uterus (H)    -  1       Follow-ups after your visit        Follow-up notes from your care team     Return in about 3 months (around 7/23/2018).      Your next 10 appointments already scheduled     Jul 30, 2018 10:20 AM CDT   (Arrive by 10:05 AM)   Return Active Treatment with SHIRAZ Parks CNP   [facility] ([facility])    [address] [place] [address]  [address]  [facility] [id number] [phone]            Aug 20, 2018  3:30 PM CDT   (Arrive by 3:15 PM)   Return Visit with Ab Aguilar MD   [facility] ([facility])    [address] [place] [address]  [address]  [facility] [id number] [phone]              Future tests that were ordered for you today     Open Future Orders        Priority Expected Expires Ordered    CT Chest abdomen pelvis w & w/o contrast Routine  4/23/2019 4/23/2018            Who to contact     If you have questions or need follow up information about today's clinic visit or your schedule please contact [facility] directly at [phone].  Normal or non-critical lab and imaging results will be communicated to you by MyChart, letter or phone within 4 business days after the clinic has received the results. If you do not hear from us within 7 days, please contact the clinic through MyChart or phone. If you have a critical or abnormal lab result, we will notify you by phone as soon as possible.  Submit refill requests through [employer]OOK or call your pharmacy and they will forward the refill request to us. Please allow 3 business days for your refill  to be completed.          Additional Information About Your Visit        MyChart Information     [email or web address] gives you secure access to your electronic health record. If you see a primary care provider, you can also send messages to your care team and make appointments. If you have questions, please call your primary care clinic.  If you do not have a primary care provider, please call [phone] and they will assist you.        Care EveryWhere ID     This is your Care EveryWhere ID. This could be used by other organizations to access your [facility] medical records  [id number]        Your Vitals Were     Pulse Temperature Respirations Pulse Oximetry BMI (Body Mass Index)       92 97.8  F (36.6  C) (Oral) 16 99% 28.31 kg/m2        Blood Pressure from Last 3 Encounters:   04/23/18 133/72   04/20/18 155/90   01/15/18 134/82    Weight from Last 3 Encounters:   04/23/18 65.2 kg (143 lb 12.8 oz)   04/20/18 65.2 kg (143 lb 11.2 oz)   01/15/18 64.4 kg (142 lb)              Today, you had the following     No orders found for display       Primary Care Provider Office Phone # Fax #    Rhea Lin [phone]       [address] [place] [address] [address] [place] [address]        Equal Access to Services     OSCAR ADLER : Hadii sivakumar haywardo Sonixonali, waaxda luqadaha, qaybta kaalmada adeegyada, geneva perdue. So [facility] [phone].    ATENCIÓN: Si habla español, tiene a cage disposición servicios gratuitos de asistencia lingüística. [facility] [phone].    We comply with applicable federal civil rights laws and [place] laws. We do not discriminate on the basis of race, color, national origin, age, disability, sex, sexual orientation, or gender identity.            Thank you!     Thank you for choosing [facility]  for your care. Our goal is always to provide you with excellent care. Hearing back from our patients is one way we can continue to improve our services.  Please take a few minutes to complete the written survey that you may receive in the mail after your visit with us. Thank you!             Your Updated Medication List - Protect others around you: Learn how to safely use, store and throw away your medicines at www.disposemymeds.org.          This list is accurate as of 4/23/18 10:25 AM.  Always use your most recent med list.                   Brand Name Dispense Instructions for use Diagnosis    CALCIUM CITRATE + PO      Take 200 mg by mouth daily as needed (She supplements to meet her 1,200mg goal depending on how much dietary calcium she has gotten that day (up to 400mg once daily)) Reported on 5/1/2017        levothyroxine 75 MCG tablet    SYNTHROID/LEVOTHROID    90 tablet    Take 1 tablet (75 mcg) by mouth daily    Acquired hypothyroidism       MULTIVITAMINS PO      Take 1 tablet by mouth daily Reported on 4/10/2017        scopolamine 72 hr patch    TRANSDERM    5 patch    Place 1 patch onto the skin every 72 hours    Motion sickness, subsequent encounter       venlafaxine 75 MG 24 hr capsule    EFFEXOR-XR    90 capsule    Take 1 capsule (75 mg) by mouth daily    Generalized anxiety disorder       zolpidem 5 MG tablet    AMBIEN    25 tablet    Take 1 tablet (5 mg) by mouth nightly as needed for sleep    Insomnia, unspecified type

## 2018-04-23 NOTE — LETTER
4/23/2018       RE: Maggie Navarrete  [address]   [facility] [address]     Dear Colleague,    Thank you for referring your patient, Maggie Navarrete, to the [facility]. Please see a copy of my visit note below.    Follow Up Note  Gynecologic Oncology     4/23/2018     HPI:  Ms. Navarrete presents today for follow up regarding a previous diagnosis of Stage IIb Leiomyosarcoma. She was originally diagnosed during surgery indicated for a presumed leiomyoma in November 2016. At that time,  She received a CT scan (6/1/17) which was negative for metastatic disease, echocardiogram (6/1/17) which showed normal cardiac function, and had her port removed without complication (5/9/17). Her last administration of chemotherapy was March 2017; she completed 5/6 cycles and discontinued early due to painful mucositis.     Today she presents for follow up.     Her oncologic history is included below:    Oncologic history:    Summer 2016, she experiences worsening low back pain thought at first thought to be sacroiliitis, but pain persists after corticosteroid injection. Continues with severe constipation.    9/8/2016, she undergoes colonoscopy for persistent abdominal pain. She has sigmoid polyp snared. Abdominal pain and diarrhea persist after colonoscopy prep.    9/14/2016, she has worsening abdominal pain and is evaluated in the ED. CT-scan shows a large subserosal fibroid measuring 9.4 x 9.6 x 9.8 cm, as well as very large fibroids along the posterior uterine body more inferiorly, increased in size to 14 cm in greatest diameter compared to 10 cm in greatest diameter on MRI of 5/9/2016.    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    11/4/2016, undergoes total abdominal hysterectomy and BSO, under Dr. Harsh Camarillo. Surgery was originally presumed for leiomyoma, however, intraoperatively there was focal adherence of a mass to the pelvic right sidewall  and cul-de-sac, which led to peritoneal resection and omentectomy. Final pathology, pT2b: showed leiomyosarcoma involving the cul-de-sac and forming two separate masses measuring 10.5 x 6.5 x 4.5 cm and 10.7 x 7.9 x 7.5 cm.    12/26/2016 - 3/2016: s/p 5/6 cycles 25 mg/m2 doxorubicin, 250 mg/m2 dacarbazine.     2/2017: CT: MPRESSION: In this patient with history of leiomyosarcoma of the  uterus:  1. No CT evidence of disease progression.  2. Unchanged mildly prominent para-aortic lymph nodes, compared to  outside institution CT 11/29/2016. Continued attention on follow-up  imaging is recommended.  3. 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  4. Calcified and noncalcified pulmonary nodules, stable from  11/29/2016. Continued attention on follow-up imaging.    4/2018:  CT:  IMPRESSION:   1. Stable changes of hysterectomy and bilateral salpingo-oophorectomy  without evidence of local recurrence.  2. Stable small pulmonary nodules  3. Mild thickening and hyperenhancement of the gastric mucosa and  proximal duodenum compatible with inflammation/infection.  4. Stable indeterminate hyperenhancing lesion in segment 4A      Review of Systems:  See below        Physical Exam:     VS: /72  Pulse 92  Temp 97.8  F (36.6  C) (Oral)  Resp 16  Wt 65.2 kg (143 lb 12.8 oz)  SpO2 99%  BMI 28.31 kg/m2  PS: 1    Gen:  healthy and alert, no distress  HEENT: no thyromegaly, no palpable nodules or masses  CV: regular rate and rhythm, no gallops, rubs or murmurs   Pulm: lungs clear, no rales, rhonchi or wheezes, normal diaphragmatic excursion  MSK: extremities non tender and without edema  Skin: no lesions or rashes   Neuro: normal gait, no gross defects   Psych: appropriate mood and affect  Heme: normal cervical, supraclavicular and inguinal lymph nodes  Abd: Soft, non-tender, non-distended, no organomegaly or masses  Pelvic: External genitalia and urethral meatus appears normal.  Vagina is smooth  without nodularity or masses. Bimanual exam reveal no masses, nodularity or fullness.  Recto-vaginal exam confirms these findings.      Assessment:  Maggie Navarrete is a woman with IIB leiomyosarcoma, s/p RACHEL/BSO, peritoneal resection, and omentectomy, and 5/6 chemotherapy cycles with doxorubicin and dacarbazine.       Plan:   1.)    Stage IIB Leiomyosarcoma:    Patient shows no signs or symptoms suggestive of malignancy at this time. In the context of recently negative CT findings, it is likely that she is currently in remission. However, given the potential for metastatic recurrence she should continue with follow up for pelvic exams at regular intervals (every 3 months for the next two years)  - Follow up in 3 months  - Chest XR annually, CT scan with next visit.  2.) Genetic risk factors were assessed and the patient does not meet the qualifications for a referral.  3.) Labs and/or tests ordered include:  None currently.    Antoni Godfrey MD.       Tt: 25 mins  CT: 15 mins

## 2018-04-24 NOTE — PROGRESS NOTES
[facility]  MEDICAL ONCOLOGY PROGRESS NOTE   Apr 20, 2018    Oncologic history:  1. January 2016, she presents to her PCP with 3 weeks of abdominal pain and severe constipation. She is treated for constipation and improves.  2. 3/21/2016, worsening pelvic pressure prompts pelvic examination with an enlarged uterus noted. Transvaginal ultrasound shows enlargement of subserosal fibroids originally seen on MRI in 2006.  3. 5/9/2016, MRI pelvis is obtained, which shows several enhancing uterine masses involving an enlarged uterus, felt radiographically compatible with benign uterine leiomyomata.  3. Summer 2016, she experiences worsening low back pain thought at first thought to be sacroiliitis, but pain persists after corticosteroid injection. Continues with severe constipation.  4. 9/8/2016, she undergoes colonoscopy for persistent abdominal pain. She has sigmoid polyp snared. Abdominal pain and diarrhea persist after colonoscopy prep.  5. 9/14/2016, she has worsening abdominal pain and is evaluated in the ED. CT-scan shows a large subserosal fibroid measuring 9.4 x 9.6 x 9.8 cm, as well as very large fibroids along the posterior uterine body more inferiorly, increased in size to 14 cm in greatest diameter compared to 10 cm in greatest diameter on MRI of 5/9/2016.  6. 9/29/2016, she has staging CT-CAP, which shows a small left sided pleural effusion and a 3 mm pulmonary nodule in the RML.  7. October 2016, self-refers to [facility].  8. 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  9. 11/4/2016, undergoes total abdominal hysterectomy and BSO, under Dr. Harsh Camarillo. Surgery was originally presumed for leiomyoma, however, intraoperatively there was focal adherence of a mass to the pelvic right sidewall and cul-de-sac, which led to peritoneal resection and omentectomy. Final pathology, pT2b: showed leiomyosarcoma involving the cul-de-sac and forming two  separate masses measuring 10.5 x 6.5 x 4.5 cm and 10.7 x 7.9 x 7.5 cm.  10. 12/26/2016, she starts adjuvant chemotherapy with doxorubicin 25 mg/m2/day and DTIC 250 mg/m2/day , days 1-3 every 21 weeks.  11. 3/23/2017, she completes 5 cycles of adjuvant doxorubicin and dacarbazine    HISTORY OF PRESENT ILLNESS  Ms. Navarrete presents today in follow-up and to review restaging studies.     She is feeling well and denies any major complaints. She denies nausea, vomiting, diarrhea, or abdominal pains. No new urinary symptoms. She continues to travel with her sister and they recently travelled to [place] together. She also travels to [place] to visit with her children and grandchildren a few times a year. Otherwise, she denies headaches, shortness of breath, chest pain, or fevers/chills.    CT-CAP was reviewed together with the patient today, and there is no evidence for recurrent or metastatic disease. Renal function tests show creatinine is normal at 0.8. ECOG performance status is 0.    REVIEW OF SYSTEMS  A 12-point ROS negative except as in HPI    Current Outpatient Prescriptions   Medication Sig Dispense Refill     Calcium Citrate-Vitamin D (CALCIUM CITRATE + PO) Take 200 mg by mouth daily as needed (She supplements to meet her 1,200mg goal depending on how much dietary calcium she has gotten that day (up to 400mg once daily)) Reported on 5/1/2017       levothyroxine (SYNTHROID/LEVOTHROID) 75 MCG tablet Take 1 tablet (75 mcg) by mouth daily 90 tablet 3     Multiple Vitamin (MULTIVITAMINS PO) Take 1 tablet by mouth daily Reported on 4/10/2017       venlafaxine (EFFEXOR-XR) 75 MG 24 hr capsule Take 1 capsule (75 mg) by mouth daily 90 capsule 3     scopolamine (TRANSDERM) 72 hr patch Place 1 patch onto the skin every 72 hours (Patient not taking: Reported on 4/20/2018) 5 patch 1     zolpidem (AMBIEN) 5 MG tablet Take 1 tablet (5 mg) by mouth nightly as needed for sleep (Patient not taking: Reported on 4/20/2018) 25 tablet 0  "      Past Medical History:   Diagnosis Date     Anxiety      Arthritis     pain in feet and knees     Disorder of bone and cartilage, unspecified      Esophageal reflux 2015     GERD (gastroesophageal reflux disease)      Hx of previous reproductive problem 1980     Kidney stone 2011     Personal history of colonic polyps 2016    Small, benign     PONV (postoperative nausea and vomiting)      Posterior vitreous detachment of left eye 2011     Posterior vitreous detachment of right eye 2011     Ptosis of eyelid, bilateral      Stomach problems 2015    Abdominal pain, Diarreha     Thyroid disease     thyroid nodule resolved 2014       PHYSICAL EXAMINATION  /90 (BP Location: Right arm, Patient Position: Chair, Cuff Size: Adult Regular)  Pulse 98  Temp 97.4  F (36.3  C) (Oral)  Resp 16  Ht 1.518 m (4' 11.76\")  Wt 65.2 kg (143 lb 11.2 oz)  SpO2 100%  BMI 28.29 kg/m2  Wt Readings from Last 2 Encounters:   04/23/18 65.2 kg (143 lb 12.8 oz)   04/20/18 65.2 kg (143 lb 11.2 oz)     Physical Exam   Constitutional: She is oriented to person, place, and time. She appears well-developed and well-nourished. No distress.   HENT:   Head: Atraumatic.   Mouth/Throat: Oropharynx is clear and moist.   Eyes: Conjunctivae and EOM are normal. Pupils are equal, round, and reactive to light. No scleral icterus.   Neck: Normal range of motion.   Cardiovascular: Normal rate, regular rhythm and normal heart sounds.    No murmur heard.  Pulmonary/Chest: Effort normal and breath sounds normal. She has no wheezes.   Abdominal: Soft. She exhibits no distension. There is no tenderness.   Musculoskeletal: Normal range of motion. She exhibits no edema.   Lymphadenopathy:     She has no cervical adenopathy.   Neurological: She is alert and oriented to person, place, and time. No cranial nerve deficit. She exhibits normal muscle tone. Coordination normal.   Skin: Skin is warm and dry. No rash noted.   Psychiatric: She has a normal mood and " affect. Her behavior is normal.   Nursing note and vitals reviewed.    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  It was a pleasure to see Ms. Navarrete today. Restaging CT-scan today is negative for recurrent or metastatic disease. She is feeling well and denies any complaints. She will also be seeing Dr. Godfrey next week for his review.    I will plan to see her back in 4 to 6 months with repeat CT-CAP and labs.     Questions answered.    Ab Warner M.D.   of Medicine  Hematology, Oncology and Transplantation

## 2018-05-29 DIAGNOSIS — F41.1 GENERALIZED ANXIETY DISORDER: ICD-10-CM

## 2018-05-31 RX ORDER — VENLAFAXINE HYDROCHLORIDE 75 MG/1
75 CAPSULE, EXTENDED RELEASE ORAL DAILY
Qty: 90 CAPSULE | Refills: 1 | Status: SHIPPED | OUTPATIENT
Start: 2018-05-31 | End: 2018-11-27

## 2018-05-31 NOTE — TELEPHONE ENCOUNTER
Called patient and left message to schedule annual physical with Dr. Lin in the Primary Care Clinic.

## 2018-05-31 NOTE — TELEPHONE ENCOUNTER
Patient meets criteria for refill of VENLAFAXINE HCL ER 75 MG Capsule Extended Release 24 Hour.  She is due for physical as of May 2018,scheduling has been notified to contact the pt for appointment.

## 2018-06-13 ASSESSMENT — ENCOUNTER SYMPTOMS
DYSURIA: 0
TASTE DISTURBANCE: 0
MUSCLE WEAKNESS: 0
LOSS OF CONSCIOUSNESS: 0
DOUBLE VISION: 0
DISTURBANCES IN COORDINATION: 0
TINGLING: 0
EYE WATERING: 0
DIFFICULTY URINATING: 0
FLANK PAIN: 0
MYALGIAS: 1
EYE PAIN: 1
SEIZURES: 0
EYE IRRITATION: 1
TREMORS: 0
TROUBLE SWALLOWING: 0
NECK MASS: 0
NUMBNESS: 0
BACK PAIN: 1
SMELL DISTURBANCE: 0
SINUS CONGESTION: 1
NECK PAIN: 1
STIFFNESS: 1
WEAKNESS: 0
PARALYSIS: 0
ARTHRALGIAS: 1
SINUS PAIN: 0
SPEECH CHANGE: 0
MEMORY LOSS: 0
MUSCLE CRAMPS: 0
EYE REDNESS: 0
HEMATURIA: 0
JOINT SWELLING: 0
SORE THROAT: 0
HOARSE VOICE: 0
DIZZINESS: 1
HEADACHES: 1

## 2018-06-13 ASSESSMENT — ACTIVITIES OF DAILY LIVING (ADL)
IN_THE_PAST_7_DAYS,_DID_YOU_NEED_HELP_FROM_OTHERS_TO_PERFORM_EVERYDAY_ACTIVITIES_SUCH_AS_EATING,_GETTING_DRESSED,_GROOMING,_BATHING,_WALKING,_OR_USING_THE_TOILET: N
IN_THE_PAST_7_DAYS,_DID_YOU_NEED_HELP_FROM_OTHERS_TO_TAKE_CARE_OF_THINGS_SUCH_AS_LAUNDRY_AND_HOUSEKEEPING,_BANKING,_SHOPPING,_USING_THE_TELEPHONE,_FOOD_PREPARATION,_TRANSPORTATION,_OR_TAKING_YOUR_OWN_MEDICATIONS?: N

## 2018-06-16 ENCOUNTER — RADIANT APPOINTMENT (OUTPATIENT)
Dept: GENERAL RADIOLOGY | Facility: CLINIC | Age: 68
End: 2018-06-16
Attending: INTERNAL MEDICINE
Payer: MEDICARE

## 2018-06-16 ENCOUNTER — OFFICE VISIT (OUTPATIENT)
Dept: INTERNAL MEDICINE | Facility: CLINIC | Age: 68
End: 2018-06-16
Payer: MEDICARE

## 2018-06-16 VITALS
BODY MASS INDEX: 27.8 KG/M2 | DIASTOLIC BLOOD PRESSURE: 77 MMHG | WEIGHT: 141.6 LBS | SYSTOLIC BLOOD PRESSURE: 117 MMHG | HEART RATE: 89 BPM | HEIGHT: 60 IN

## 2018-06-16 DIAGNOSIS — G89.29 CHRONIC PAIN OF BOTH KNEES: ICD-10-CM

## 2018-06-16 DIAGNOSIS — Z12.31 VISIT FOR SCREENING MAMMOGRAM: ICD-10-CM

## 2018-06-16 DIAGNOSIS — M25.562 CHRONIC PAIN OF BOTH KNEES: ICD-10-CM

## 2018-06-16 DIAGNOSIS — M25.561 CHRONIC PAIN OF BOTH KNEES: ICD-10-CM

## 2018-06-16 DIAGNOSIS — Z00.00 ROUTINE GENERAL MEDICAL EXAMINATION AT A HEALTH CARE FACILITY: Primary | ICD-10-CM

## 2018-06-16 ASSESSMENT — PAIN SCALES - GENERAL: PAINLEVEL: MODERATE PAIN (5)

## 2018-06-16 NOTE — PROGRESS NOTES
[facility]  Primary Care Center   Rhea Lin MD  06/16/2018      Chief Complaint:   Physical       History of Present Illness:   Maggie Navarrete is a 68 year old female with a history of leiomyosarcoma with chemotherapy, thyroid disease, and arthritis, who presents alone for an annual physical.    Cancer status: She saw Dr. Godfrey of oncology about one month ago where she showed no signs of malignancy. Of note, the patient has regular visits with an oncologist and gynecologist to monitor her leiomyosarcoma. She notes that the oncologist wants her to have CT scans more often than her gynecologist. She is hesitant to get CTs because she has read that they can cause sarcomas.  We talked about the events leading up to her self-referring to [facility].    Foot fracture: She broke her foot in November and it was healed on May 17th. She notes her recovery took a while as she was not using her boot properly. She ended up using a bone stimulator which worked to heal her foot. Since it has healed, she has been doing water aerobics and weights at the [facility].     Joint pain: She does note her knees, hips, and neck have been hurting. She states the pain occurs when she initially stands up to move around but subsides within a minute of walking around. The patient does not take Tylenol for the pain right now due to the long-lasting affects it can have. She has not previously been diagnosed with arthritis but assumes she has it. She notes her dad had arthritis. The patient also believes this pain could be due to her weight which she is dissatisfied with. She notes her mother had trigger fingers. The patient does not feel the need to do physical therapy to manage the pain at this point. She denies swelling and redness in these places.    Other concerns discussed:  1. Mammogram-needed   2. Shingrix- advised      Review of Systems:   Pertinent items are noted in HPI, remainder of complete ROS is negative.      Active  Medications:      Calcium Citrate-Vitamin D (CALCIUM CITRATE + PO), Take 200 mg by mouth daily as needed (She supplements to meet her 1,200mg goal depending on how much dietary calcium she has gotten that day (up to 400mg once daily)) Reported on 2017, Disp: , Rfl:      levothyroxine (SYNTHROID/LEVOTHROID) 75 MCG tablet, Take 1 tablet (75 mcg) by mouth daily, Disp: 90 tablet, Rfl: 3     Multiple Vitamin (MULTIVITAMINS PO), Take 1 tablet by mouth daily Reported on 4/10/2017, Disp: , Rfl:      scopolamine (TRANSDERM) 72 hr patch, Place 1 patch onto the skin every 72 hours (Patient not taking: Reported on 2018), Disp: 5 patch, Rfl: 1     venlafaxine (EFFEXOR-XR) 75 MG 24 hr capsule, Take 1 capsule (75 mg) by mouth daily, Disp: 90 capsule, Rfl: 1     zolpidem (AMBIEN) 5 MG tablet, Take 1 tablet (5 mg) by mouth nightly as needed for sleep (Patient not taking: Reported on 2018), Disp: 25 tablet, Rfl: 0      Allergies:   Erythromycin  Tramadol      Past Medical History:  Anxiety  Arthritis  Disorder of bone and cartilage, unspecified  Esophageal reflux  Gastroesophageal reflux disease (GERD)   Benign colonic polyps  Postoperative nausea and vomiting   Posterior vitreous detachment of left and right eye  Ptosis of eyelid, bilateral  Stomach problems  Thyroid disease  Major depressive disorder  Uterus cancer, sarcoma   Chemotherapy-induced neutropenia  Chemotherapy-induced cardiomyopathy     Past Surgical History:  Abdomen surgery  Breast surgery  Tonsillectomy  Bladder surgery  GI surgery   section  Laparoscopy  Myomectomy    Remove port vascular access  Repair ptosis bilateral     Family History:   Glaucoma  Anxiety  Coronary artery disease (Mother, Father)  Diabetes  Hypertension  Breast cancer  Depression  Obesity  Osteoporosis  Stomach cancer  Thyroid disease  Retinal detachment  Cerebrovascular disease     Social History:   The patient is , a nonsmoker, and occasionally consumes alcohol.  "She is a research coordinator for pediatric cancer. She had one son die of brain cancer in 2010.     Physical Exam:   /77  Pulse 89  Ht 1.511 m (4' 11.5\")  Wt 64.2 kg (141 lb 9.6 oz)  BMI 28.12 kg/m2     General:  Pleasant, alert, no acute distress  Eyes:  Pupils 2-3 mm, sclera white, EOM's full.    Ears:  TM's normal, EAC's patent, clear of cerumen  Nose:  Nasal passages clear, turbinates not swollen, no polyps visualized.  Throat/Mouth:  No pharyngeal erythema or exudate, oral mucosa and tongue moist, no suspicious oral lesions  Neck:  Full AROM, supple, thyroid smooth, symmetric, not enlarge, no nodules  Lungs:  Clear to auscultation throughout, no wheezes, rhonchi or rales.  C/V:  Regular rate and rhythm, no murmurs, rubs or gallops.  No JVD, no carotid bruits.  No peripheral edema.    Breast exam:  No masses, skin changes, nipple discharge or retraction.   Abdomen:  Not distended.  Bowel sounds active.  No tenderness, no hepatosplenomegaly or masses.  No CVA tenderness or masses.   Lymph:  No cervical, axillary or inguinal lymph nodes.  Neuro:   Face symmetric. No tremor.  Gait steady.   M/S:  No joint deformities noted.  No joint swelling. Pain along joint line on both knees. Suprapatellar, patellar tendon not tender, insertion sights at medial and lateral collateral ligaments. Anserine bursa on left knee. Can flex past 90 degrees both sides, No crepitance. Negative Drawer's sign bilaterally. Negative Filemon's test.  Skin:  No suspicious lesions.  No rashes or jaundice.  Normal moisture, good skin turgor.   Psych:  Broad range affect.  Not psychomotor slowed.  No signs of anxiety or agitation.     Assessment and Plan:  1. History of leiomyosarcoma   Continue visits with oncology and gynecology     2. Joint pain (knees, right hip)  Patient was advised to take Tylenol as needed to alleviate joint pain. Physical therapy offered for further intervention, declined at this time.   Xray knees    3. Visit " for screening mammogram  Due for a routine mammogram. Discussed scheduling this for the future.   - MA SCREENING DIGITAL BILAT - Future  (s+30)        Follow-up: Patient should follow up in clinic on a p.r.n. basis.         Scribe Disclosure:  We, Petrona Lorenzo and Merry Norton, are serving as the scribes to document services personally performed by Rhea Lin MD at this visit, based upon the provider's statements to me. All documentation has been reviewed by the aforementioned provider prior to being entered into the official medical record.     Portions of this medical record were completed by a scribe. UPON MY REVIEW AND AUTHENTICATION BY ELECTRONIC SIGNATURE, this confirms (a) I performed the applicable clinical services, and (b) the record is accurate.     Answers for HPI/ROS submitted by the patient on 6/13/2018   General Symptoms: No  Skin Symptoms: No  HENT Symptoms: Yes  EYE SYMPTOMS: Yes  HEART SYMPTOMS: No  LUNG SYMPTOMS: No  INTESTINAL SYMPTOMS: No  URINARY SYMPTOMS: Yes  GYNECOLOGIC SYMPTOMS: No  BREAST SYMPTOMS: No  SKELETAL SYMPTOMS: Yes  BLOOD SYMPTOMS: No  NERVOUS SYSTEM SYMPTOMS: Yes  MENTAL HEALTH SYMPTOMS: No  Ear pain: No  Ear discharge: No  Hearing loss: No  Tinnitus: No  Nosebleeds: No  Congestion: Yes  Sinus pain: No  Trouble swallowing: No   Voice hoarseness: No  Mouth sores: No  Sore throat: No  Tooth pain: Yes  Gum tenderness: No  Bleeding gums: No  Change in taste: No  Change in sense of smell: No  Dry mouth: Yes  Hearing aid used: No  Neck lump: No  Eye pain: Yes  Vision loss: No  Dry eyes: Yes  Watery eyes: No  Eye bulging: No  Double vision: No  Flashing of lights: No  Spots: No  Floaters: No  Redness: No  Crossed eyes: No  Tunnel Vision: No  Yellowing of eyes: No  Eye irritation: Yes  Trouble holding urine or incontinence: Yes  Pain or burning: No  Trouble starting or stopping: No  Increased frequency of urination: No  Blood in urine: No  Decreased frequency of  urination: No  Frequent nighttime urination: No  Flank pain: No  Difficulty emptying bladder: No  Back pain: Yes  Muscle aches: Yes  Neck pain: Yes  Swollen joints: No  Joint pain: Yes  Bone pain: Yes  Muscle cramps: No  Muscle weakness: No  Joint stiffness: Yes  Bone fracture: No  Trouble with coordination: No  Dizziness or trouble with balance: Yes  Fainting or black-out spells: No  Memory loss: No  Headache: Yes  Seizures: No  Speech problems: No  Tingling: No  Tremor: No  Weakness: No  Difficulty walking: No  Paralysis: No  Numbness: No

## 2018-06-16 NOTE — MR AVS SNAPSHOT
After Visit Summary   6/16/2018    Maggie Navarrete    MRN: [id number]           Patient Information     Date Of Birth          1950        Visit Information        Provider Department      6/16/2018 11:30 AM Rhea Lin MD [facility]        Today's Diagnoses     Visit for screening mammogram    -  1    Routine general medical examination at a health care facility        Chronic pain of both knees          Care Instructions    Please cell to schedule x-ray:  [phone] [facility] [place]  [phone] [facility]  [phone] [facility]    Breast Center ([facility]) [phone] ([facility] 2nd Floor)  Breast Center ([facility]) [phone] ([address])     X-ray is located on the first floor.           Follow-ups after your visit        Your next 10 appointments already scheduled     Aug 13, 2018 10:20 AM CDT   (Arrive by 10:05 AM)   Return Active Treatment with SHIRAZ Parks CNP   [facility] ([facility])    [address]  [address]  [facility] [address] [phone]            Aug 17, 2018 11:00 AM CDT   CT CHEST ABDOMEN PELVIS W/O & W CONTRAST with UCCT1   [facility] ([facility])    [address]  1st Floor  [facility] [address] [phone]           Please bring any scans or X-rays taken at other hospitals, if similar tests were done. Also bring a list of your medicines, including vitamins, minerals and over-the-counter drugs. It is safest to leave personal items at home.  Be sure to tell your doctor:   If you have any allergies.   If there s any chance you are pregnant.   If you are breastfeeding.  How to prepare:   Do not eat or drink for 2 hours before your exam. If you need to take medicine, you may take it with small sips of water. (We may ask you to take liquid medicine as well.)   Please  wear loose clothing, such as a sweat suit or jogging clothes. Avoid snaps, zippers and other metal. We may ask you to undress and put on a hospital gown.  Please arrive 30 minutes early for your CT. Once in the department you might be asked to drink water 15-20 minutes prior to your exam.  If indicated you may be asked to drink an oral contrast in advance of your CT.  If this is the case, the imaging team will let you know or be in contact with you prior to your appointment  Patients over 70 or patients with diabetes or kidney problems:   If you haven t had a blood test (creatinine test) within the last 30 days, the Cardiologist/Radiologist may require you to get this test prior to your exam.  If you have diabetes:   Continue to take your metformin medication on the day of your exam  If you have any questions, please call the Imaging Department where you will have your exam.            Aug 17, 2018 11:30 AM CDT   Lab with  LAB   [facility] ([facility])    [address] [place] [address] Se  1st Floor  [facility] [address] [phone]            Aug 20, 2018  3:30 PM CDT   (Arrive by 3:15 PM)   Return Visit with Ab Aguilar MD   [facility] ([facility])    [address] [place] [address]  [address]  [facility] [address] [phone]              Future tests that were ordered for you today     Open Future Orders        Priority Expected Expires Ordered    MA SCREENING DIGITAL BILAT - Future  (s+30) Routine  6/16/2019 6/16/2018    XR Knee Bilateral 3 Views Routine 6/16/2018 6/16/2019 6/16/2018            Who to contact     Please call your clinic at [phone] to:    Ask questions about your health    Make or cancel appointments    Discuss your medicines    Learn about your test results    Speak to your doctor            Additional Information About Your Visit        GIV[employer]xhart Information     [employer] gives you secure access to your electronic  "health record. If you see a primary care provider, you can also send messages to your care team and make appointments. If you have questions, please call your primary care clinic.  If you do not have a primary care provider, please call [phone] and they will assist you.      [employer] is an electronic gateway that provides easy, online access to your medical records. With [employer], you can request a clinic appointment, read your test results, renew a prescription or communicate with your care team.     To access your existing account, please contact your [facility] or call [phone] for assistance.        Care EveryWhere ID     This is your Care EveryWhere ID. This could be used by other organizations to access your [facility] medical records  [id number]        Your Vitals Were     Pulse Height BMI (Body Mass Index)             89 1.511 m (4' 11.5\") 28.12 kg/m2          Blood Pressure from Last 3 Encounters:   06/16/18 117/77   04/23/18 133/72   04/20/18 155/90    Weight from Last 3 Encounters:   06/16/18 64.2 kg (141 lb 9.6 oz)   04/23/18 65.2 kg (143 lb 12.8 oz)   04/20/18 65.2 kg (143 lb 11.2 oz)                 Today's Medication Changes          These changes are accurate as of 6/16/18 12:47 PM.  If you have any questions, ask your nurse or doctor.               Stop taking these medicines if you haven't already. Please contact your care team if you have questions.     scopolamine 72 hr patch   Commonly known as:  TRANSDERM   Stopped by:  Rhea Lin MD                    Primary Care Provider Office Phone # Fax #    Rhea Lin [phone]       [address] [place] [address]  [facility] [address]        Equal Access to Services     OSCAR ADLER : shawna Escoto, geneva ferguson. So [facility] [phone].    ATENCIÓN: Si habla español, tiene a cage disposición servicios " april de asistencia lingüística. Chen alves [phone].    We comply with applicable federal civil rights laws and [place] laws. We do not discriminate on the basis of race, color, national origin, age, disability, sex, sexual orientation, or gender identity.            Thank you!     Thank you for choosing [facility]  for your care. Our goal is always to provide you with excellent care. Hearing back from our patients is one way we can continue to improve our services. Please take a few minutes to complete the written survey that you may receive in the mail after your visit with us. Thank you!             Your Updated Medication List - Protect others around you: Learn how to safely use, store and throw away your medicines at www.disposemymeds.org.          This list is accurate as of 6/16/18 12:47 PM.  Always use your most recent med list.                   Brand Name Dispense Instructions for use Diagnosis    CALCIUM CITRATE + PO      Take 200 mg by mouth daily as needed (She supplements to meet her 1,200mg goal depending on how much dietary calcium she has gotten that day (up to 400mg once daily)) Reported on 5/1/2017        levothyroxine 75 MCG tablet    SYNTHROID/LEVOTHROID    90 tablet    Take 1 tablet (75 mcg) by mouth daily    Acquired hypothyroidism       MULTIVITAMINS PO      Take 1 tablet by mouth daily Reported on 4/10/2017        venlafaxine 75 MG 24 hr capsule    EFFEXOR-XR    90 capsule    Take 1 capsule (75 mg) by mouth daily    Generalized anxiety disorder       zolpidem 5 MG tablet    AMBIEN    25 tablet    Take 1 tablet (5 mg) by mouth nightly as needed for sleep    Insomnia, unspecified type

## 2018-06-16 NOTE — PATIENT INSTRUCTIONS
Please cell to schedule x-ray:  [phone] [facility] [place]  [phone] [facility]  [phone] [facility]    Breast Center ([facility]) [phone] ([facility] 2nd Floor)  Breast Center ([facility]) [phone] ([address])     X-ray is located on the first floor.

## 2018-06-17 ASSESSMENT — PATIENT HEALTH QUESTIONNAIRE - PHQ9: SUM OF ALL RESPONSES TO PHQ QUESTIONS 1-9: 2

## 2018-07-16 ENCOUNTER — RADIANT APPOINTMENT (OUTPATIENT)
Dept: MAMMOGRAPHY | Facility: CLINIC | Age: 68
End: 2018-07-16
Attending: INTERNAL MEDICINE
Payer: MEDICARE

## 2018-07-16 DIAGNOSIS — Z12.31 VISIT FOR SCREENING MAMMOGRAM: ICD-10-CM

## 2018-08-15 ENCOUNTER — ONCOLOGY VISIT (OUTPATIENT)
Dept: ONCOLOGY | Facility: CLINIC | Age: 68
End: 2018-08-15
Attending: NURSE PRACTITIONER
Payer: COMMERCIAL

## 2018-08-15 VITALS
SYSTOLIC BLOOD PRESSURE: 123 MMHG | DIASTOLIC BLOOD PRESSURE: 78 MMHG | TEMPERATURE: 98.4 F | BODY MASS INDEX: 28.38 KG/M2 | WEIGHT: 142.9 LBS | OXYGEN SATURATION: 99 % | HEART RATE: 97 BPM

## 2018-08-15 DIAGNOSIS — G47.00 INSOMNIA, UNSPECIFIED TYPE: ICD-10-CM

## 2018-08-15 DIAGNOSIS — C54.9 UTERUS CANCER, SARCOMA (H): Primary | ICD-10-CM

## 2018-08-15 DIAGNOSIS — M25.50 PAIN IN JOINT, MULTIPLE SITES: ICD-10-CM

## 2018-08-15 DIAGNOSIS — R26.89 POOR BALANCE: ICD-10-CM

## 2018-08-15 DIAGNOSIS — R41.3 POOR SHORT TERM MEMORY: ICD-10-CM

## 2018-08-15 PROCEDURE — 99214 OFFICE O/P EST MOD 30 MIN: CPT | Mod: ZP | Performed by: NURSE PRACTITIONER

## 2018-08-15 PROCEDURE — G0463 HOSPITAL OUTPT CLINIC VISIT: HCPCS

## 2018-08-15 RX ORDER — ZOLPIDEM TARTRATE 5 MG/1
5 TABLET ORAL
Qty: 20 TABLET | Refills: 0 | Status: SHIPPED | OUTPATIENT
Start: 2018-08-15 | End: 2019-04-05

## 2018-08-15 RX ORDER — SCOLOPAMINE TRANSDERMAL SYSTEM 1 MG/1
PATCH, EXTENDED RELEASE TRANSDERMAL
COMMUNITY
Start: 2018-02-07 | End: 2018-08-27

## 2018-08-15 ASSESSMENT — PAIN SCALES - GENERAL: PAINLEVEL: NO PAIN (0)

## 2018-08-15 NOTE — MR AVS SNAPSHOT
"              After Visit Summary   8/15/2018    Maggie Navarrete    MRN: [id number]           Patient Information     Date Of Birth          1950        Visit Information        Provider Department      8/15/2018 10:20 AM Cierra Fernandez APRN [facility]        Today's Diagnoses     Uterus cancer, sarcoma (H)    -  1    Pain in joint, multiple sites        Poor balance        Poor short term memory        Insomnia, unspecified type           Follow-ups after your visit        Additional Services     OCCUPATIONAL THERAPY REFERRAL       *This therapy referral will be filtered to a centralized scheduling office at [facility] and the patient will receive a call to schedule an appointment at a [place] location most convenient for them. *     [facility] provides Occupational Therapy evaluation and treatment and many specialty services across the [place] system.  If requesting a specialty program, please choose from the list below.    If you have not heard from the scheduling office within 2 business days, please call [phone] for all locations, with the exception of [place], please call [phone] and [facility], please call [phone]    Treatment: Evaluation & Treatment  Special Instructions/Modalities: cognitive rehab due to short term memory issues after chemo  Special Programs: Cognition Assessment  and cognitive rehab    Please be aware that coverage of these services is subject to the terms and limitations of your health insurance plan.  Call member services at your health plan with any benefit or coverage questions.      **Note to Provider:  If you are referring outside of [place] for the therapy appointment, please list the name of the location in the \"special instructions\" above, print the referral and give to the patient to schedule the appointment.            PHYSICAL THERAPY REFERRAL       *This therapy referral will be " "filtered to a centralized scheduling office at [facility] and the patient will receive a call to schedule an appointment at a [place] location most convenient for them. *     [facility] provides Physical Therapy evaluation and treatment and many specialty services across the [place] system.  If requesting a specialty program, please choose from the list below.    If you have not heard from the scheduling office within 2 business days, please call [phone] for all locations, with the exception of [place], please call [phone] and [facility], please call [phone]  Treatment: Evaluation & Treatment  Special Instructions/Modalities: arthralgias s/p chemotherapy, cancer rehab, balance  Special Programs: Cancer Rehabilitation (PT and OT Evaluate & Treat)    Please be aware that coverage of these services is subject to the terms and limitations of your health insurance plan.  Call member services at your health plan with any benefit or coverage questions.      **Note to Provider:  If you are referring outside of [place] for the therapy appointment, please list the name of the location in the \"special instructions\" above, print the referral and give to the patient to schedule the appointment.                  Your next 10 appointments already scheduled     Aug 20, 2018 10:00 AM CDT   Cancer Rehab Eval with Mara Salguero, PT   [facility] ([facility])    [address]  [address]  [facility] [id number] [phone]            Aug 24, 2018 11:30 AM CDT   Lab with  LAB    Health Lab ([facility])    [address]  1st Floor  [facility] [id number] [phone]            Sep 05, 2018 10:00 AM CDT   Cancer Rehab Eval with Florida Mccann, OT   [facility]    [address]  [address]  [facility] [id number] [phone]              Who to " contact     If you have questions or need follow up information about today's clinic visit or your schedule please contact [facility] directly at [phone].  Normal or non-critical lab and imaging results will be communicated to you by [employer], letter or phone within 4 business days after the clinic has received the results. If you do not hear from us within 7 days, please contact the clinic through [employer] or phone. If you have a critical or abnormal lab result, we will notify you by phone as soon as possible.  Submit refill requests through [employer] or call your pharmacy and they will forward the refill request to us. Please allow 3 business days for your refill to be completed.          Additional Information About Your Visit        [employer] Information     [employer] gives you secure access to your electronic health record. If you see a primary care provider, you can also send messages to your care team and make appointments. If you have questions, please call your primary care clinic.  If you do not have a primary care provider, please call [phone] and they will assist you.        Care EveryWhere ID     This is your Care EveryWhere ID. This could be used by other organizations to access your [place] medical records  [id number]        Your Vitals Were     Pulse Temperature Pulse Oximetry BMI (Body Mass Index)          97 98.4  F (36.9  C) (Oral) 99% 28.38 kg/m2         Blood Pressure from Last 3 Encounters:   08/15/18 123/78   06/16/18 117/77   04/23/18 133/72    Weight from Last 3 Encounters:   08/15/18 64.8 kg (142 lb 14.4 oz)   06/16/18 64.2 kg (141 lb 9.6 oz)   04/23/18 65.2 kg (143 lb 12.8 oz)              We Performed the Following     OCCUPATIONAL THERAPY REFERRAL     PHYSICAL THERAPY REFERRAL          Where to get your medicines      Some of these will need a paper prescription and others can be bought over the counter.  Ask your nurse if you have questions.     Bring a paper  prescription for each of these medications     zolpidem 5 MG tablet          Primary Care Provider Office Phone # Fax #    Rhea Lin [phone]       [address] [place] [address]        Equal Access to Services     OSCAR ADLER : Hadii sivakumar nance yesyo Sonixonali, waaxda luqadaha, qaybta kaalmada adeegyada, geneva russelin hayaaleola randolph ruyary perdue. So [facility] [phone].    ATENCIÓN: Si habla español, tiene a cage disposición servicios gratuitos de asistencia lingüística. Llame al [phone].    We comply with applicable federal civil rights laws and [place] laws. We do not discriminate on the basis of race, color, national origin, age, disability, sex, sexual orientation, or gender identity.            Thank you!     Thank you for choosing [facility]  for your care. Our goal is always to provide you with excellent care. Hearing back from our patients is one way we can continue to improve our services. Please take a few minutes to complete the written survey that you may receive in the mail after your visit with us. Thank you!             Your Updated Medication List - Protect others around you: Learn how to safely use, store and throw away your medicines at www.disposemymeds.org.          This list is accurate as of 8/15/18  4:56 PM.  Always use your most recent med list.                   Brand Name Dispense Instructions for use Diagnosis    CALCIUM CITRATE + PO      Take 200 mg by mouth daily as needed (She supplements to meet her 1,200mg goal depending on how much dietary calcium she has gotten that day (up to 400mg once daily)) Reported on 5/1/2017        levothyroxine 75 MCG tablet    SYNTHROID/LEVOTHROID    90 tablet    Take 1 tablet (75 mcg) by mouth daily    Acquired hypothyroidism       MULTIVITAMINS PO      Take 1 tablet by mouth daily Reported on 4/10/2017        scopolamine 72 hr patch    TRANSDERM     Only uses when traveling         venlafaxine 75 MG 24 hr capsule    EFFEXOR-XR    90 capsule    Take 1 capsule (75 mg) by mouth daily    Generalized anxiety disorder       zolpidem 5 MG tablet    AMBIEN    20 tablet    Take 1 tablet (5 mg) by mouth nightly as needed for sleep (Only uses if traveling or cannot sleep,)    Insomnia, unspecified type

## 2018-08-15 NOTE — NURSING NOTE
"Oncology Rooming Note    August 15, 2018 10:41 AM   Maggie Navarrete is a 68 year old female who presents for:    Chief Complaint   Patient presents with     Oncology Clinic Visit     Uterine Ca; 3 Month f/u     Initial Vitals: /78  Pulse 97  Temp 98.4  F (36.9  C) (Oral)  Wt 64.8 kg (142 lb 14.4 oz)  SpO2 99%  BMI 28.38 kg/m2 Estimated body mass index is 28.38 kg/(m^2) as calculated from the following:    Height as of 6/16/18: 1.511 m (4' 11.5\").    Weight as of this encounter: 64.8 kg (142 lb 14.4 oz). Body surface area is 1.65 meters squared.  No Pain (0) Comment: Data Unavailable   No LMP recorded. Patient has had a hysterectomy.  Allergies reviewed: Yes  Medications reviewed: Yes    Medications: Medication refills not needed today.  Pharmacy name entered into [employer]:    [email or web address] - [place], [place] - [address] [facility] - [place], [place] - [address] [place] [address]    Clinical concerns: Patient states there are no new concerns to discuss with provider.  Cierra was not notified.       8 minutes for nursing intake (face to face time)     Ronna Lane CMA              "

## 2018-08-15 NOTE — LETTER
"8/15/2018       RE: Maggie Navarrete  [address]  [facility] [address]     Dear Colleague,    Thank you for referring your patient, Maggie Navarrete, to the [facility]. Please see a copy of my visit note below.    Gynecologic Oncology Follow-Up Visit    RE: Maggie Navarrete  MRN: [id number]  : 1950  Date of Visit: 08/15/2018    CC: Maggie Navarrete  is a 68 year old female with a history of stage IIB leiomyosarcoma of the uterus. She completed treatment with RACHEL-BSO, peritoneal resection, and omentectomy on 16 followed by completion of five cycles doxorubicin and darcarbazine 3/23/17. She presents today for a three month surveillance visit.    HPI: Maggie comes to the clinic feeling well from a gynecologic perspective. She does note chronic arthralgias since completing chemotherapy as well as occasional issues with balance. Has chronic back pain she relates to \"a bulged disc\" and has exercises she performs this. Plans to get back to a more consistent exercise plan- has a swim class after her visit today. Chronic urinary frequency and urgency for many years, predating her leiomyosarcoma diagnosis. Reports this is stable. Notes vaginal dryness but denies need for intervention- uses Replens if this bothers her. Not sexually active. Reports poor short term memory after chemotherapy. Takes Ambien for sleep rarely (while on vacation and while her family is visiting) and would like a refill of this- has taken 25 in the past eight months and has not had adverse effects. Up to date on seeing her PCP, mammogram, and colonoscopy. She denies unintended weight loss, fatigue, weakness, changes in vision or hearing, shortness of breath, cough, chest pain, abdominal pain, dyspepsia, nausea, vomiting, constipation, diarrhea, bloating, dysuria, hematuria, pelvic pain, vaginal bleeding, vaginal discharge, numbness or tingling, or swelling of the extremities.       Oncology History:  " 2016: Low back pain persisting after corticosteroid injection, severe constipation     9/8/2016: Colonoscopy for persistent abdominal pain, sigmoid polyp snared. Abdominal pain and diarrhea persist after colonoscopy prep.     9/14/2016: she has worsening abdominal pain and is evaluated in the ED. CT-scan shows a large subserosal fibroid measuring 9.4 x 9.6 x 9.8 cm, as well as very large fibroids along the posterior uterine body more inferiorly, increased in size to 14 cm in greatest diameter compared to 10 cm in greatest diameter on MRI of 5/9/2016.     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     11/4/2016: Total abdominal hysterectomy and BSO, under Dr. Harsh Camarillo. Surgery was originally presumed for leiomyoma, however, intraoperatively there was focal adherence of a mass to the pelvic right sidewall and cul-de-sac, which led to peritoneal resection and omentectomy. Final pathology, pT2b: showed leiomyosarcoma involving the cul-de-sac and forming two separate masses measuring 10.5 x 6.5 x 4.5 cm and 10.7 x 7.9 x 7.5 cm.     12/26/2016 - 3/2016: s/p 5/6 cycles 25 mg/m2 doxorubicin, 250 mg/m2 dacarbazine.      2/2017: CT: IMPRESSION: In this patient with history of leiomyosarcoma of the  uterus:  1. No CT evidence of disease progression.  2. Unchanged mildly prominent para-aortic lymph nodes, compared to  outside institution CT 11/29/2016. Continued attention on follow-up  imaging is recommended.  3. 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  4. Calcified and noncalcified pulmonary nodules, stable from  11/29/2016. Continued attention on follow-up imaging.     4/2018:  CT:  IMPRESSION:   1. Stable changes of hysterectomy and bilateral salpingo-oophorectomy  without evidence of local recurrence.  2. Stable small pulmonary nodules  3. Mild thickening and hyperenhancement of the gastric mucosa and  proximal duodenum compatible with  inflammation/infection.  4. Stable indeterminate hyperenhancing lesion in segment 4A    Past Medical History:   Diagnosis Date     Anxiety      Arthritis     pain in feet and knees     Disorder of bone and cartilage, unspecified      Esophageal reflux 2015     GERD (gastroesophageal reflux disease)      Hx of previous reproductive problem 1980     Kidney stone 2011     Personal history of colonic polyps 2016    Small, benign     PONV (postoperative nausea and vomiting)      Posterior vitreous detachment of left eye 2011     Posterior vitreous detachment of right eye 2011     Ptosis of eyelid, bilateral      Stomach problems 2015    Abdominal pain, Diarreha     Thyroid disease     thyroid nodule resolved 2014       Past Surgical History:   Procedure Laterality Date     ABDOMEN SURGERY  Now    Pain, diarrhea     BREAST SURGERY  2002    right breast benign     COLONOSCOPY  2008    due in 2018     COLONOSCOPY N/A 9/8/2016    Procedure: COMBINED COLONOSCOPY, SINGLE OR MULTIPLE BIOPSY/POLYPECTOMY BY BIOPSY;  Surgeon: Abner Pepper MD;  Location:  GI     ENT SURGERY  1975    tonsillectomy     GENITOURINARY SURGERY  1953    bladder surgery      GI SURGERY  2015    Severe constipation     GYN SURGERY  1977    C section     GYN SURGERY  1981    laparoscopy     GYN SURGERY  2007    myomectomy     REMOVE PORT VASCULAR ACCESS Right 5/9/2017    Procedure: REMOVE PORT VASCULAR ACCESS;  Right Port Removal;  Surgeon: Eric Gibson PA-C;  Location:  OR     REPAIR PTOSIS BILATERAL  2/15/2012    Procedure:REPAIR PTOSIS BILATERAL; BILATERAL UPPER LID PTOSIS REPAIR; Surgeon:BRYCE REYNOLDS; Location[facility]       Social History     Social History     Marital status: Single     Spouse name: N/A     Number of children: N/A     Years of education: N/A     Occupational History     Not on file.     Social History Main Topics     Smoking status: Never Smoker     Smokeless tobacco: Never Used     Alcohol use Yes       Comment: Social, not often per patient.     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 Walking daily.    How much calcium per day? Supplement and through diet       How much caffeine per day? 3 times a week    How much vitamin D per day? Supplement    Do you/your family wear seatbelts?  Yes    Do you/your family use safety helmets? NA    Do you/your family use sunscreen? Sometimes    Do you/your family keep firearms in the home? No    Do you/your family have a smoke detector(s)? Yes    Do you feel safe in your home? Yes    Has anyone ever touched you in an unwanted manner? 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  2 grandsons now in [place] with the mother.  In a house.  .         Family History   Problem Relation Age of Onset     Family History Negative Mother      glaucoma     Glaucoma Mother      Anxiety Disorder Mother      Heart Failure Mother      Diabetes Mother      Coronary Artery Disease Mother      Hypertension Mother      Breast Cancer Mother      Depression Mother      Obesity Mother      HEART DISEASE Father      Glaucoma Father      Diabetes Father      Coronary Artery Disease Father      Depression Father      Osteoperosis Father      HEART DISEASE Maternal Grandmother      Diabetes Maternal Grandmother      HEART DISEASE Paternal Grandmother      Cancer Paternal Grandfather      stomach     Thyroid Disease Sister      Retinal detachment Sister      Hypertension  Sister      Hypertension Sister      Lipids Sister      Cerebrovascular Disease Sister      Depression Sister      HEART DISEASE Paternal Aunt      HEART DISEASE Paternal Uncle      Anxiety Disorder Sister      Depression Sister      Thyroid Disease Sister      Thyroid Disease Sister      Osteoperosis Other        Current Outpatient Prescriptions   Medication     Calcium Citrate-Vitamin D (CALCIUM CITRATE + PO)     levothyroxine (SYNTHROID/LEVOTHROID) 75 MCG tablet     Multiple Vitamin (MULTIVITAMINS PO)     venlafaxine (EFFEXOR-XR) 75 MG 24 hr capsule     zolpidem (AMBIEN) 5 MG tablet     No current facility-administered medications for this visit.           Allergies   Allergen Reactions     Erythromycin GI Disturbance     Tramadol Nausea         Health Maintenance  Pap smear: No longer indicated  Mammogram: Last performed 7/16/18, due 7/2019  Colonoscopy: Last performed 9/8/16, due 9/2021  Annual wellness exam: Performed 6/16/18, due 6/2019      Review of Systems  General: + difficulty sleeping. Denies fatigue, changes in weight, weakness, appetite changes, night sweats, hot flashes, fever, chills  HEENT: Denies headaches, hair loss, visual difficulty or disturbances, masses, head injury, tinnitus, hearing loss, epistaxis, congestion, problems with teeth or gums, dysphonia, or dysphagia  Pulmonary: Denies cough, sputum, hemoptysis, shortness of breath, dyspnea on exertion, wheezing, or allergies  Cardiovascular: Denies chest pain, fainting, palpitations, murmurs, activity intolerance, swelling in legs, or high blood pressure  Gastrointestinal: Denies nausea, vomiting, constipation, diarrhea, abdominal pain, bloating, heartburn, melena, hematochezia, or jaundice  Genitourinary: + vaginal dryness, urinary frequency/urgency. Denies dysuria, hematuria, cloudy or malodorous urine, incontinence, repeat urinary tract infections, flank pain, pelvic pain, vaginal bleeding, vaginal discharge  Sexual Function:  N/A  Integumentary: Denies rashes, sores, changing moles, or scarring  Hematologic: Denies swollen lymph nodes, masses, easy bruising, or easy bleeding  Musculoskeletal: + back pain, arthralgias. Denies falls, myalgias, stiffness, muscle weakness, or muscle cramps  Neurologic: + poor short term memory, balance issues. Denies numbness or tingling,  difficulty with walking, dizziness, seizures, or tremors  Psychiatric: Denies anxiety, depression, mood changes, suicidal thoughts, or difficulty concentrating  Endocrine: Denies polydipsia, polyuria, temperature intolerance, or history of thyroid disease      OBJECTIVE:    Physical Exam:    /78  Pulse 97  Temp 98.4  F (36.9  C) (Oral)  Wt 64.8 kg (142 lb 14.4 oz)  SpO2 99%  BMI 28.38 kg/m2    CONSTITUTIONAL: Alert non-toxic appearing female in no acute distress  HEAD: Normocephalic, atraumatic  EYES: PERRLA; no scleral icterus  ENT: Oropharynx pink without lesions  NECK: Neck supple without lymphadenopathy  RESPIRATORY: Lungs clear to auscultation, no increased work of breathing noted  CV: Regular rate and rhythm, S1S2, no clicks, murmurs, rubs, or gallops; bilateral lower extremities without edema, dorsalis pedis pulses 2+ bilaterally  GASTROINTESTINAL: Normoactive bowel sounds x4 quadrants, abdomen soft, non-distended, and non-tender to palpation without masses or organomegaly  GENITOURINARY: External genitalia and urethral meatus pink without lesions, masses, or excoriation. Vagina atrophic and stenotic without masses or lesions, requiring an adolescent Pepe speculum. Vaginal cuff without nodularity, masses, or lesions. Cervix surgically absent. Bimanual exam reveals no masses or fullness. Rectovaginal exam confirms these findings.  LYMPHATIC: Cervical, supraclavicular, and inguinal nodes without lymphadenopathy  MUSCULOSKELETAL: Moves all extremities, no obvious muscle wasting  NEUROLOGIC: No gross deficits, normal gait  SKIN: Appropriate color for  race, warm and dry, no rashes or lesions to unclothed skin  PSYCHIATRIC: Pleasant and interactive, affect bright, makes appropriate eye contact, thought process linear      Assessment/Plan:  1) Stage IIB leiomyosarcoma: No signs of recurrence. Continue surveillance every three months x2 years (through 3/2019) followed by every six months x3 years (through 3/3022) then annually thereafter with pelvic/rectal exam. To follow up next month with Dr. Aguilar and CT CAP per Dr. Aguilar's recommendations. Reviewed signs and symptoms  of recurrence including but not limited to bleeding from vagina, bladder, or rectum, bloating, early satiety, swelling in the lower extremities, abdominal or lower back pain, changes in bowel or bladder patterns, shortness of breath, increased fatigue, unexplained weight loss, and night sweats. Reviewed signs and symptoms for when she should contact the clinic or seek additional care. Patient to contact the clinic with any questions or concerns in the interim.  2) Arthralgias and poor balance: Potentially sequelae from chemotherapy, stable but bothersome. Referral for cancer rehab.  3) Poor short term memory: Stable but bothersome, potentially related to chemotherapy. Referral for cognitive rehab.  4) Health maintenance issues discussed today include to follow up with PCP for co-morbid conditions and non-gynecologic issues. Commended on excellent health maintenance follow up. Ambien refilled, #20, as she uses this sparingly and has had no side effects- reviewed safety, further refills to come from her PCP.  5) Patient verbalized understanding of and agreement with plan.    A total of 20 minutes of face to face time spent with patient, over 50% of which was spent in counseling and coordination of care.    SHIRAZ Parks, FNP-C  Division of Gynecologic Oncology  [facility]  Pager: [phone]

## 2018-08-15 NOTE — PROGRESS NOTES
"Gynecologic Oncology Follow-Up Visit    RE: Maggie Navarrete  MRN: [id number]  : 1950  Date of Visit: 08/15/2018    CC: Maggie Navarrete  is a 68 year old female with a history of stage IIB leiomyosarcoma of the uterus. She completed treatment with RACHEL-BSO, peritoneal resection, and omentectomy on 16 followed by completion of five cycles doxorubicin and darcarbazine 3/23/17. She presents today for a three month surveillance visit.    HPI: Maggie comes to the clinic feeling well from a gynecologic perspective. She does note chronic arthralgias since completing chemotherapy as well as occasional issues with balance. Has chronic back pain she relates to \"a bulged disc\" and has exercises she performs this. Plans to get back to a more consistent exercise plan- has a swim class after her visit today. Chronic urinary frequency and urgency for many years, predating her leiomyosarcoma diagnosis. Reports this is stable. Notes vaginal dryness but denies need for intervention- uses Replens if this bothers her. Not sexually active. Reports poor short term memory after chemotherapy. Takes Ambien for sleep rarely (while on vacation and while her family is visiting) and would like a refill of this- has taken 25 in the past eight months and has not had adverse effects. Up to date on seeing her PCP, mammogram, and colonoscopy. She denies unintended weight loss, fatigue, weakness, changes in vision or hearing, shortness of breath, cough, chest pain, abdominal pain, dyspepsia, nausea, vomiting, constipation, diarrhea, bloating, dysuria, hematuria, pelvic pain, vaginal bleeding, vaginal discharge, numbness or tingling, or swelling of the extremities.       Oncology History:  Summer 2016: Low back pain persisting after corticosteroid injection, severe constipation     2016: Colonoscopy for persistent abdominal pain, sigmoid polyp snared. Abdominal pain and diarrhea persist after colonoscopy prep.     2016: she has " worsening abdominal pain and is evaluated in the ED. CT-scan shows a large subserosal fibroid measuring 9.4 x 9.6 x 9.8 cm, as well as very large fibroids along the posterior uterine body more inferiorly, increased in size to 14 cm in greatest diameter compared to 10 cm in greatest diameter on MRI of 5/9/2016.     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     11/4/2016: Total abdominal hysterectomy and BSO, under Dr. Harsh Camarillo. Surgery was originally presumed for leiomyoma, however, intraoperatively there was focal adherence of a mass to the pelvic right sidewall and cul-de-sac, which led to peritoneal resection and omentectomy. Final pathology, pT2b: showed leiomyosarcoma involving the cul-de-sac and forming two separate masses measuring 10.5 x 6.5 x 4.5 cm and 10.7 x 7.9 x 7.5 cm.     12/26/2016 - 3/2016: s/p 5/6 cycles 25 mg/m2 doxorubicin, 250 mg/m2 dacarbazine.      2/2017: CT: IMPRESSION: In this patient with history of leiomyosarcoma of the  uterus:  1. No CT evidence of disease progression.  2. Unchanged mildly prominent para-aortic lymph nodes, compared to  outside institution CT 11/29/2016. Continued attention on follow-up  imaging is recommended.  3. 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  4. Calcified and noncalcified pulmonary nodules, stable from  11/29/2016. Continued attention on follow-up imaging.     4/2018:  CT:  IMPRESSION:   1. Stable changes of hysterectomy and bilateral salpingo-oophorectomy  without evidence of local recurrence.  2. Stable small pulmonary nodules  3. Mild thickening and hyperenhancement of the gastric mucosa and  proximal duodenum compatible with inflammation/infection.  4. Stable indeterminate hyperenhancing lesion in segment 4A    Past Medical History:   Diagnosis Date     Anxiety      Arthritis     pain in feet and knees     Disorder of bone and cartilage, unspecified      Esophageal reflux  2015     GERD (gastroesophageal reflux disease)      Hx of previous reproductive problem 1980     Kidney stone 2011     Personal history of colonic polyps 2016    Small, benign     PONV (postoperative nausea and vomiting)      Posterior vitreous detachment of left eye 2011     Posterior vitreous detachment of right eye 2011     Ptosis of eyelid, bilateral      Stomach problems 2015    Abdominal pain, Diarreha     Thyroid disease     thyroid nodule resolved 2014       Past Surgical History:   Procedure Laterality Date     ABDOMEN SURGERY  Now    Pain, diarrhea     BREAST SURGERY  2002    right breast benign     COLONOSCOPY  2008    due in 2018     COLONOSCOPY N/A 9/8/2016    Procedure: COMBINED COLONOSCOPY, SINGLE OR MULTIPLE BIOPSY/POLYPECTOMY BY BIOPSY;  Surgeon: Abner Pepper MD;  Location:  GI     ENT SURGERY  1975    tonsillectomy     GENITOURINARY SURGERY  1953    bladder surgery      GI SURGERY  2015    Severe constipation     GYN SURGERY  1977    C section     GYN SURGERY  1981    laparoscopy     GYN SURGERY  2007    myomectomy     REMOVE PORT VASCULAR ACCESS Right 5/9/2017    Procedure: REMOVE PORT VASCULAR ACCESS;  Right Port Removal;  Surgeon: Eric Gibson PA-C;  Location:  OR     REPAIR PTOSIS BILATERAL  2/15/2012    Procedure:REPAIR PTOSIS BILATERAL; BILATERAL UPPER LID PTOSIS REPAIR; Surgeon:BRYCE REYNOLDS; Location:[facility]       Social History     Social History     Marital status: Single     Spouse name: N/A     Number of children: N/A     Years of education: N/A     Occupational History     Not on file.     Social History Main Topics     Smoking status: Never Smoker     Smokeless tobacco: Never Used     Alcohol use Yes      Comment: Social, not often per patient.     Drug use: No     Sexual activity: Not Currently     Partners: Male     Other Topics Concern     Caffeine Concern Yes     coffee few times/wk     Sleep Concern No     Stress Concern Yes     care taker of her  mom     Weight Concern Yes     wants to lose wt        Special Diet No     Exercise Yes     walking 45 minutes/day     Seat Belt Yes     Social History Narrative    How much exercise per week? Walking daily.    How much calcium per day? Supplement and through diet       How much caffeine per day? 3 times a week    How much vitamin D per day? Supplement    Do you/your family wear seatbelts?  Yes    Do you/your family use safety helmets? NA    Do you/your family use sunscreen? Sometimes    Do you/your family keep firearms in the home? No    Do you/your family have a smoke detector(s)? Yes    Do you feel safe in your home? Yes    Has anyone ever touched you in an unwanted manner? 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  2 grandsons now in [place] with the mother.  In a house.  .         Family History   Problem Relation Age of Onset     Family History Negative Mother      glaucoma     Glaucoma Mother      Anxiety Disorder Mother      Heart Failure Mother      Diabetes Mother      Coronary Artery Disease Mother      Hypertension Mother      Breast Cancer Mother      Depression Mother      Obesity Mother      HEART DISEASE Father      Glaucoma Father      Diabetes Father      Coronary Artery Disease Father      Depression Father      Osteoperosis Father      HEART DISEASE Maternal Grandmother      Diabetes Maternal Grandmother      HEART DISEASE Paternal Grandmother      Cancer Paternal Grandfather      stomach     Thyroid Disease Sister      Retinal detachment Sister      Hypertension Sister      Hypertension Sister      Lipids Sister      Cerebrovascular Disease Sister      Depression Sister      HEART DISEASE Paternal Aunt      HEART DISEASE Paternal Uncle      Anxiety Disorder Sister      Depression Sister      Thyroid Disease  Sister      Thyroid Disease Sister      Osteoperosis Other        Current Outpatient Prescriptions   Medication     Calcium Citrate-Vitamin D (CALCIUM CITRATE + PO)     levothyroxine (SYNTHROID/LEVOTHROID) 75 MCG tablet     Multiple Vitamin (MULTIVITAMINS PO)     venlafaxine (EFFEXOR-XR) 75 MG 24 hr capsule     zolpidem (AMBIEN) 5 MG tablet     No current facility-administered medications for this visit.           Allergies   Allergen Reactions     Erythromycin GI Disturbance     Tramadol Nausea         Health Maintenance  Pap smear: No longer indicated  Mammogram: Last performed 7/16/18, due 7/2019  Colonoscopy: Last performed 9/8/16, due 9/2021  Annual wellness exam: Performed 6/16/18, due 6/2019      Review of Systems  General: + difficulty sleeping. Denies fatigue, changes in weight, weakness, appetite changes, night sweats, hot flashes, fever, chills  HEENT: Denies headaches, hair loss, visual difficulty or disturbances, masses, head injury, tinnitus, hearing loss, epistaxis, congestion, problems with teeth or gums, dysphonia, or dysphagia  Pulmonary: Denies cough, sputum, hemoptysis, shortness of breath, dyspnea on exertion, wheezing, or allergies  Cardiovascular: Denies chest pain, fainting, palpitations, murmurs, activity intolerance, swelling in legs, or high blood pressure  Gastrointestinal: Denies nausea, vomiting, constipation, diarrhea, abdominal pain, bloating, heartburn, melena, hematochezia, or jaundice  Genitourinary: + vaginal dryness, urinary frequency/urgency. Denies dysuria, hematuria, cloudy or malodorous urine, incontinence, repeat urinary tract infections, flank pain, pelvic pain, vaginal bleeding, vaginal discharge  Sexual Function: N/A  Integumentary: Denies rashes, sores, changing moles, or scarring  Hematologic: Denies swollen lymph nodes, masses, easy bruising, or easy bleeding  Musculoskeletal: + back pain, arthralgias. Denies falls, myalgias, stiffness, muscle weakness, or muscle  cramps  Neurologic: + poor short term memory, balance issues. Denies numbness or tingling,  difficulty with walking, dizziness, seizures, or tremors  Psychiatric: Denies anxiety, depression, mood changes, suicidal thoughts, or difficulty concentrating  Endocrine: Denies polydipsia, polyuria, temperature intolerance, or history of thyroid disease      OBJECTIVE:    Physical Exam:    /78  Pulse 97  Temp 98.4  F (36.9  C) (Oral)  Wt 64.8 kg (142 lb 14.4 oz)  SpO2 99%  BMI 28.38 kg/m2    CONSTITUTIONAL: Alert non-toxic appearing female in no acute distress  HEAD: Normocephalic, atraumatic  EYES: PERRLA; no scleral icterus  ENT: Oropharynx pink without lesions  NECK: Neck supple without lymphadenopathy  RESPIRATORY: Lungs clear to auscultation, no increased work of breathing noted  CV: Regular rate and rhythm, S1S2, no clicks, murmurs, rubs, or gallops; bilateral lower extremities without edema, dorsalis pedis pulses 2+ bilaterally  GASTROINTESTINAL: Normoactive bowel sounds x4 quadrants, abdomen soft, non-distended, and non-tender to palpation without masses or organomegaly  GENITOURINARY: External genitalia and urethral meatus pink without lesions, masses, or excoriation. Vagina atrophic and stenotic without masses or lesions, requiring an adolescent Pepe speculum. Vaginal cuff without nodularity, masses, or lesions. Cervix surgically absent. Bimanual exam reveals no masses or fullness. Rectovaginal exam confirms these findings.  LYMPHATIC: Cervical, supraclavicular, and inguinal nodes without lymphadenopathy  MUSCULOSKELETAL: Moves all extremities, no obvious muscle wasting  NEUROLOGIC: No gross deficits, normal gait  SKIN: Appropriate color for race, warm and dry, no rashes or lesions to unclothed skin  PSYCHIATRIC: Pleasant and interactive, affect bright, makes appropriate eye contact, thought process linear      Assessment/Plan:  1) Stage IIB leiomyosarcoma: No signs of recurrence. Continue  surveillance every three months x2 years (through 3/2019) followed by every six months x3 years (through 3/3022) then annually thereafter with pelvic/rectal exam. To follow up next month with Dr. Aguilar and CT CAP per Dr. Aguilar's recommendations. Reviewed signs and symptoms  of recurrence including but not limited to bleeding from vagina, bladder, or rectum, bloating, early satiety, swelling in the lower extremities, abdominal or lower back pain, changes in bowel or bladder patterns, shortness of breath, increased fatigue, unexplained weight loss, and night sweats. Reviewed signs and symptoms for when she should contact the clinic or seek additional care. Patient to contact the clinic with any questions or concerns in the interim.  2) Arthralgias and poor balance: Potentially sequelae from chemotherapy, stable but bothersome. Referral for cancer rehab.  3) Poor short term memory: Stable but bothersome, potentially related to chemotherapy. Referral for cognitive rehab.  4) Health maintenance issues discussed today include to follow up with PCP for co-morbid conditions and non-gynecologic issues. Commended on excellent health maintenance follow up. Ambien refilled, #20, as she uses this sparingly and has had no side effects- reviewed safety, further refills to come from her PCP.  5) Patient verbalized understanding of and agreement with plan.    A total of 20 minutes of face to face time spent with patient, over 50% of which was spent in counseling and coordination of care.    SHIRAZ Parks, FNP-C  Division of Gynecologic Oncology  [facility]  Pager: [phone]

## 2018-08-20 ENCOUNTER — HOSPITAL ENCOUNTER (OUTPATIENT)
Dept: PHYSICAL THERAPY | Facility: CLINIC | Age: 68
Setting detail: THERAPIES SERIES
End: 2018-08-20
Attending: NURSE PRACTITIONER
Payer: MEDICARE

## 2018-08-20 PROCEDURE — G8979 MOBILITY GOAL STATUS: HCPCS | Mod: GP,CI | Performed by: PHYSICAL THERAPIST

## 2018-08-20 PROCEDURE — G8978 MOBILITY CURRENT STATUS: HCPCS | Mod: GP,CI | Performed by: PHYSICAL THERAPIST

## 2018-08-20 PROCEDURE — 40000719 ZZHC STATISTIC PT DEPARTMENT NEURO VISIT: Performed by: PHYSICAL THERAPIST

## 2018-08-20 PROCEDURE — 97110 THERAPEUTIC EXERCISES: CPT | Mod: GP | Performed by: PHYSICAL THERAPIST

## 2018-08-20 PROCEDURE — 97161 PT EVAL LOW COMPLEX 20 MIN: CPT | Mod: GP | Performed by: PHYSICAL THERAPIST

## 2018-08-20 NOTE — PROGRESS NOTES
[facility]        OUTPATIENT PHYSICAL THERAPY FUNCTIONAL EVALUATION  PLAN OF TREATMENT FOR OUTPATIENT REHABILITATION  (COMPLETE FOR INITIAL CLAIMS ONLY)  Patient's Last Name, First Name, M.I.  YOB: 1950  Maggie Navarrete     Provider's Name   [facility]   Medical Record No.  [id number]     Start of Care Date:  08/20/18   Onset Date:  11/06/17   Type:     _X__PT   ____OT  ____SLP Medical Diagnosis:        PT Diagnosis:  difficulty with gait Visits from SOC:  1                              __________________________________________________________________________________  Plan of Treatment/Functional Goals:  balance training, gait training, neuromuscular re-education, ROM, strengthening, stretching     Electrical Stimulation/Russion Stimulation     GOALS  SLS  1. Patient will double time in single leg stance, to both sides greater than 20s in order to resume high level  balance from per PLOF and to assist with safe return to classes at the [facility].  11/17/18    HEP  2. Patient will be independent with a home program to include balance and stretching to compliment what she is doing during the week at the [facility].  11/17/18    Therapy Frequency:  other (see comments) Every other week x3 sessions, monthly thereafter  Predicted Duration of Therapy Intervention:  90 days    Mara Salguero, PT                                    I CERTIFY THE NEED FOR THESE SERVICES FURNISHED UNDER        THIS PLAN OF TREATMENT AND WHILE UNDER MY CARE     (Physician co-signature of this document indicates review and certification of the therapy plan).                Certification Date From:  8/20/18    Certification Date To:   11/17/18    Referring Provider:  Cierra Fernandez CNP    Initial Assessment  See Epic Evaluation- Start of Care Date: 08/20/18

## 2018-08-20 NOTE — PROGRESS NOTES
08/20/18 0900   Quick Adds   Quick Adds Certification   Type of Visit Initial OP PT Evaluation   General Information   Start of Care Date 08/20/18   Referring Physician Cierra Fernandez CNP   Orders Evaluate and Treat as Indicated   Additional Orders Cancer Rehab   Order Date 08/15/18   Medical Diagnosis Pain in joint, multiple sites, poor balance   Onset of illness/injury or Date of Surgery 11/06/17   Surgical/Medical history reviewed Yes   Pertinent history of current problem (include personal factors and/or comorbidities that impact the POC) Pt diagnosed with stage II leiomyosarcoma of the uterus in 11/16, s/p RACHEL/SBO periotoneal resection omentectomy, completed 5 cycles of chemo ending 3/23/17 and doing well until she broke her 5th metatarasal when talking fast and tripped. She was in a boot and on modified rest precautions until May and since May, having more hip/knee pain. Most recent xrays show bilateral mild OA of the knee.   Prior level of function comment independent, lives alone, active at the [facility] with water aerobics, strength class and tries to walk her dogs daily x45 minutes   Patient role/Employment history Retired   Living environment [place]   Home/Community Accessibility Comments stair without rails up to bedroom   Assistive Devices Comments no AD   Patient/Family Goals Statement I'm achy, is there anything that can help with this.   Fall Risk Screen   Fall screen completed by PT   Have you fallen 2 or more times in the past year? No   Have you fallen and had an injury in the past year? Yes   Timed Up and Go score (seconds) 7.4s   Is patient a fall risk? No   System Outcome Measures   FACIT Fatigue Subscale (score out of 52). The higher the score, the better the QOL. 48   Pain   Pain comments achy type pain in hips and knees, worse since being in a boot. Reports and observe the first few steps are uncomfortable with noticable trendelenberg pattern.    Cognitive Status Examination    Orientation orientation to person, place and time   Cognitive Comment seeing OT for cognitive 'chemo brain' evaluation. Noticing she's more distracted and forgetful of names/places since chemo. Unsure if this is aging or chemo effect.   Range of Motion (ROM)   ROM Comment right side flexibility more impaired than left with recent boot usage. More discomfort with IT band stretching/gluts on right than left.   Strength   Strength Comments hip flexors 4-/5 bilaterally, otherwise 5/5 BLE   Transfer Skills   Transfer Comments discomfort in knees when rising up to standing with UE assist.   Gait   Gait Comments ambulates unaided, first steps after sitting with depression of right hip.   Gait Special Tests Functional Gait Assessment Score out of 30   Score out of 30 24   Balance Special Tests Single Leg Stance Right,   Right, seconds 6 Seconds   Balance Special Tests Single Leg Stance Left   Left, seconds 12 Seconds   Sensory Examination   Sensory Perception Comments denies N/T   Modality Interventions   Planned Modality Interventions Electrical Stimulation/Russion Stimulation   Planned Therapy Interventions   Planned Therapy Interventions balance training;gait training;neuromuscular re-education;ROM;strengthening;stretching   Clinical Impression   Criteria for Skilled Therapeutic Interventions Met yes, treatment indicated   PT Diagnosis difficulty with gait   Influenced by the following impairments achy pain, limited flexibility in IT band, impaired balance, immobilization limiting balance and deconditioning   Functional limitations due to impairments hip and knee pain from prolonged immobilization, below baseline aerobic tolerance, impaired balance   Clinical Presentation Stable/Uncomplicated   Clinical Presentation Rationale progressing as expected   Clinical Decision Making (Complexity) Low complexity   Therapy Frequency other (see comments)   Predicted Duration of Therapy Intervention (days/wks) 90 days   Risk &  Benefits of therapy have been explained Yes   Patient, Family & other staff in agreement with plan of care Yes   Education Assessment   Preferred Learning Style Listening   Barriers to Learning No barriers   GOALS   PT Eval Goals 1;2   Goal 1   Goal Identifier SLS   Goal Description 1. Patient will double time in single leg stance, to both sides greater than 20s in order to resume high level  balance from per PLOF and to assist with safe return to classes at the [facility].   Target Date 11/17/18   Goal 2   Goal Identifier HEP   Goal Description 2. Patient will be independent with a home program to include balance and stretching to compliment what she is doing during the week at the [facility].   Target Date 11/17/18   Total Evaluation Time   Total Evaluation Time (Minutes) 12

## 2018-08-27 ENCOUNTER — OFFICE VISIT (OUTPATIENT)
Dept: FAMILY MEDICINE | Facility: CLINIC | Age: 68
End: 2018-08-27
Attending: FAMILY MEDICINE
Payer: MEDICARE

## 2018-08-27 VITALS
HEIGHT: 60 IN | WEIGHT: 142.6 LBS | BODY MASS INDEX: 28 KG/M2 | HEART RATE: 83 BPM | DIASTOLIC BLOOD PRESSURE: 83 MMHG | SYSTOLIC BLOOD PRESSURE: 131 MMHG

## 2018-08-27 DIAGNOSIS — M85.89 OSTEOPENIA OF MULTIPLE SITES: Primary | ICD-10-CM

## 2018-08-27 LAB
CALCIUM SERPL-MCNC: 9.1 MG/DL (ref 8.5–10.1)
MAGNESIUM SERPL-MCNC: 2.1 MG/DL (ref 1.6–2.3)
PHOSPHATE SERPL-MCNC: 2.9 MG/DL (ref 2.5–4.5)

## 2018-08-27 PROCEDURE — 83735 ASSAY OF MAGNESIUM: CPT | Performed by: FAMILY MEDICINE

## 2018-08-27 PROCEDURE — 36415 COLL VENOUS BLD VENIPUNCTURE: CPT | Performed by: FAMILY MEDICINE

## 2018-08-27 PROCEDURE — 82310 ASSAY OF CALCIUM: CPT | Performed by: FAMILY MEDICINE

## 2018-08-27 PROCEDURE — 84100 ASSAY OF PHOSPHORUS: CPT | Performed by: FAMILY MEDICINE

## 2018-08-27 PROCEDURE — G0463 HOSPITAL OUTPT CLINIC VISIT: HCPCS

## 2018-08-27 ASSESSMENT — PAIN SCALES - GENERAL: PAINLEVEL: NO PAIN (0)

## 2018-08-27 NOTE — LETTER
8/27/2018       RE: Maggie Navarrete  [address]  [facility] [address]     Dear Colleague,    Thank you for referring your patient, Maggie Navarrete, to the [facility] at [facility]. Please see a copy of my visit note below.    Maggie is a  68 year old female post menopausal] [GR1, P1] that presents today for osteoporosis follow up patient was last seen 7/10/2017. She took alendronate 35mg for 10 yrs and had a 1 yr drug holiday  and then started alendronate in 2013 35mg for the last 4 yrs. Had dental work last fall so stopped alendronate.  Has been off meds now 1 yr. Last DXA was 5/2017.  Did fracture a metatarsal bone in right foot. Wore a boot from mid Nov - May 17 - had to use bone stimulator    Now has healed.   Referring Physician: Referred Dr Rhea Lin     HPI     Have you ever had a bone density test? Yes  Where = [facility]  When = 5/2017,2015,2011,2009  Spine Tscore L1-L4 = -1.9 LOSS - 4.9%  Left neck Tscore = -2.1  Total left hip Tscore = -1.7 LOSS -2.3%  Right neck Tscore = -2.2  Total Right hip Tscore = -1.7 No loss   Have you received any x-ray dye or contrast in the last ten days? No  How many servings of dairy products do you consume per day? 2-3 Type: milk, yogurt, ice cream   Do you take a multi-vitamin daily? Yes  Do you take a vitamin D supplement? No  Do you take a calcium supplement daily?  Calcium citrate 2/day has vit D   Do you take a supplement containing strontium? No  Are you exposed to natural sunlight at least 20 minutes three times a week? Yes    Social History   reports that she has never smoked. She has never used smokeless tobacco. She reports that she drinks alcohol. She reports that she does not use illicit drugs.  Do you smoke cigarettes? No  Do you exercise? Yes. Details: walking daily for 30-45 minutes; weights 2x/wk   Do you drink alcohol? Rarely couple times/yr     Medication History  Have you used  any of the following medications?   Actonel (Risedronate): No   Aredia (Pamidronate): No   Boniva (Ibindronate): No   Didronil (Etidronate): No   Evista (Raloxifene): No   Fosamax (Alendronate): yes in the past 10 yr and off 1 yr and then 4 yrs and stopped 9/2017 - 35mg   Forteo (Parathyroid hormone) injections: No   HCTZ (Thiazide): No   Calcitonin nasal spray: No   Reclast or Zometa (Zolendronate): No   Prolia (Denosumab): No   HT - many yrs    Current Outpatient Prescriptions   Medication Sig Dispense Refill     Calcium Citrate-Vitamin D (CALCIUM CITRATE + PO) Take 200 mg by mouth daily as needed (She supplements to meet her 1,200mg goal depending on how much dietary calcium she has gotten that day (up to 400mg once daily)) Reported on 5/1/2017       levothyroxine (SYNTHROID/LEVOTHROID) 75 MCG tablet Take 1 tablet (75 mcg) by mouth daily 90 tablet 3     Multiple Vitamin (MULTIVITAMINS PO) Take 1 tablet by mouth daily Reported on 4/10/2017       venlafaxine (EFFEXOR-XR) 75 MG 24 hr capsule Take 1 capsule (75 mg) by mouth daily 90 capsule 1     zolpidem (AMBIEN) 5 MG tablet Take 1 tablet (5 mg) by mouth nightly as needed for sleep (Only uses if traveling or cannot sleep,) (Patient not taking: Reported on 8/27/2018) 20 tablet 0          Allergies   Allergen Reactions     Erythromycin GI Disturbance     Tramadol Nausea       Past Medical History    Family History   Problem Relation Age of Onset     Family History Negative Mother      glaucoma     Glaucoma Mother      Anxiety Disorder Mother      Heart Failure Mother      Diabetes Mother      Coronary Artery Disease Mother      Hypertension Mother      Breast Cancer Mother      Depression Mother      Obesity Mother      HEART DISEASE Father      Glaucoma Father      Diabetes Father      Coronary Artery Disease Father      Depression Father      Osteoperosis Father      HEART DISEASE Maternal Grandmother      Diabetes Maternal Grandmother      HEART DISEASE Paternal  Grandmother      Cancer Paternal Grandfather      stomach     Thyroid Disease Sister      Retinal detachment Sister      Hypertension Sister      Hypertension Sister      Lipids Sister      Cerebrovascular Disease Sister      Depression Sister      HEART DISEASE Paternal Aunt      HEART DISEASE Paternal Uncle      Anxiety Disorder Sister      Depression Sister      Thyroid Disease Sister      Thyroid Disease Sister      Osteoperosis Other        ROS:  Answers for HPI/ROS submitted by the patient on 8/24/2018   PHQ-2 Score: 0    General: none  Head/Eyes: none  Ears/Nose/Throat: none  Cardiovascular: none  Respiratory: none  Gastrointestinal: heartburn  Breast: none  Genitourinary: vaginal dryness  Sexual Function: none  Musculoskeletal: joint pain, stiffness and back pain  Skin: none  Neurological: none  Mental Health: none  Endocrine: none    Clinic Measurements  Vitals: /83  Pulse 83  BMI= Body mass index is 28.32 kg/(m^2).    Physical exam  Constitutional: Well appearing woman in no acute distress.   Psychological: appropriate mood.  Neck: No thyroidmegaly.  no carotid bruits.  Cardiovascular: regular rate and rhythm, normal S1 and S2, no murmurs, rubs or gallops, peripheral pulses full and symmetric   Respiratory: clear to auscultation, no wheezes or crackles, normal breath sounds.  Musculoskeletal: good range of motion, no edema and motor strength is equal in the upper and lower extremities    Spine: [Straight/not tender and good range of motion (Flexion, extension,lateral movement, rotational movement].   Skin: no concerning lesions, no jaundice.  Neurological: cranial nerves intact, normal strength, reflexes at patella and biceps normal, normal gait, no tremor.     LAB  Vertebra; Fracture Assessment: will get this if can't get a DXA   Dexa Scan: 5/2017    FRAX Assessment Tool:  12% for 10 risk Major Osteoporotic, 2.4% for 10 risk of Hip Fracture]  Risk Factors: age, sex, post-menopausal, chemo therapy,  hypothyroidism    ASSESSMENT:  PM female with T scores of osteopenia who was on alendronate (35mg)  for 10 yrs, then 1 yr holiday and followed by 4 yrs of alendronate. Last fall she stopped it due to dental work and has been off it.  She had a metatarsal fx which took a long time (6 months) to heal - this is not a fragility fracture.  Her FRAX shows moderate risk of fracture.  We discussed calcium and vit D and exercise.  I think she will likely  need to resume medication but would like to get a DXA first - if insurance won't cover then would get a VFA and DXA next 5/2019 and start her on preventative treatment -  alendronate - would resume with 70mg weekly.         PLAN:  Get a DEXA to assess if loss over the yr holiday  if insurance will pay  If can't get a  DEXA then get a VFA (let me know so I can order it)   Blood work today  Will restart alendronate if osteopenia - take 70mg/wk  If can't get a DEXA now will get one 5/2019 - must be on the same machine  Patient should take 1200mg of calcium/day in divided doses and vitamin D3 1000IU/day.  Dietary calcium is best     I spent a total of 30 minutes face-to-face with the patient during today's office visit.  Over 50% of this time was spent counseling the patient and/or coordinating care regarding review of medications for bones, review of last DXA, review of calcium and exercise..  See note for details.    Thank you for allowing me to participate in the care of your patient.  Please do not hesitate to call with questions or concerns.    Sincerely,    Kristine Flynn MD, PhD    CC Dr Lin

## 2018-08-27 NOTE — PROGRESS NOTES
Maggie is a  68 year old female post menopausal] [GR1, P1] that presents today for osteoporosis follow up patient was last seen 7/10/2017. She took alendronate 35mg for 10 yrs and had a 1 yr drug holiday  and then started alendronate in 2013 35mg for the last 4 yrs. Had dental work last fall so stopped alendronate.  Has been off meds now 1 yr. Last DXA was 5/2017.  Did fracture a metatarsal bone in right foot. Wore a boot from mid Nov - May 17 - had to use bone stimulator    Now has healed.   Referring Physician: Referred Dr Rhea Lin     HPI     Have you ever had a bone density test? Yes  Where = [facility]  When = 5/2017,2015,2011,2009  Spine Tscore L1-L4 = -1.9 LOSS - 4.9%  Left neck Tscore = -2.1  Total left hip Tscore = -1.7 LOSS -2.3%  Right neck Tscore = -2.2  Total Right hip Tscore = -1.7 No loss   Have you received any x-ray dye or contrast in the last ten days? No  How many servings of dairy products do you consume per day? 2-3 Type: milk, yogurt, ice cream   Do you take a multi-vitamin daily? Yes  Do you take a vitamin D supplement? No  Do you take a calcium supplement daily?  Calcium citrate 2/day has vit D   Do you take a supplement containing strontium? No  Are you exposed to natural sunlight at least 20 minutes three times a week? Yes    Social History   reports that she has never smoked. She has never used smokeless tobacco. She reports that she drinks alcohol. She reports that she does not use illicit drugs.  Do you smoke cigarettes? No  Do you exercise? Yes. Details: walking daily for 30-45 minutes; weights 2x/wk   Do you drink alcohol? Rarely couple times/yr     Medication History  Have you used any of the following medications?   Actonel (Risedronate): No   Aredia (Pamidronate): No   Boniva (Ibindronate): No   Didronil (Etidronate): No   Evista (Raloxifene): No   Fosamax (Alendronate): yes in the past 10 yr and off 1 yr and then 4 yrs and stopped 9/2017 - 35mg   Forteo (Parathyroid  hormone) injections: No   HCTZ (Thiazide): No   Calcitonin nasal spray: No   Reclast or Zometa (Zolendronate): No   Prolia (Denosumab): No   HT - many yrs    Current Outpatient Prescriptions   Medication Sig Dispense Refill     Calcium Citrate-Vitamin D (CALCIUM CITRATE + PO) Take 200 mg by mouth daily as needed (She supplements to meet her 1,200mg goal depending on how much dietary calcium she has gotten that day (up to 400mg once daily)) Reported on 5/1/2017       levothyroxine (SYNTHROID/LEVOTHROID) 75 MCG tablet Take 1 tablet (75 mcg) by mouth daily 90 tablet 3     Multiple Vitamin (MULTIVITAMINS PO) Take 1 tablet by mouth daily Reported on 4/10/2017       venlafaxine (EFFEXOR-XR) 75 MG 24 hr capsule Take 1 capsule (75 mg) by mouth daily 90 capsule 1     zolpidem (AMBIEN) 5 MG tablet Take 1 tablet (5 mg) by mouth nightly as needed for sleep (Only uses if traveling or cannot sleep,) (Patient not taking: Reported on 8/27/2018) 20 tablet 0          Allergies   Allergen Reactions     Erythromycin GI Disturbance     Tramadol Nausea       Past Medical History    Family History   Problem Relation Age of Onset     Family History Negative Mother      glaucoma     Glaucoma Mother      Anxiety Disorder Mother      Heart Failure Mother      Diabetes Mother      Coronary Artery Disease Mother      Hypertension Mother      Breast Cancer Mother      Depression Mother      Obesity Mother      HEART DISEASE Father      Glaucoma Father      Diabetes Father      Coronary Artery Disease Father      Depression Father      Osteoperosis Father      HEART DISEASE Maternal Grandmother      Diabetes Maternal Grandmother      HEART DISEASE Paternal Grandmother      Cancer Paternal Grandfather      stomach     Thyroid Disease Sister      Retinal detachment Sister      Hypertension Sister      Hypertension Sister      Lipids Sister      Cerebrovascular Disease Sister      Depression Sister      HEART DISEASE Paternal Aunt      HEART DISEASE  Paternal Uncle      Anxiety Disorder Sister      Depression Sister      Thyroid Disease Sister      Thyroid Disease Sister      Osteoperosis Other        ROS:  Answers for HPI/ROS submitted by the patient on 8/24/2018   PHQ-2 Score: 0    General: none  Head/Eyes: none  Ears/Nose/Throat: none  Cardiovascular: none  Respiratory: none  Gastrointestinal: heartburn  Breast: none  Genitourinary: vaginal dryness  Sexual Function: none  Musculoskeletal: joint pain, stiffness and back pain  Skin: none  Neurological: none  Mental Health: none  Endocrine: none    Clinic Measurements  Vitals: /83  Pulse 83  BMI= Body mass index is 28.32 kg/(m^2).    Physical exam  Constitutional: Well appearing woman in no acute distress.   Psychological: appropriate mood.  Neck: No thyroidmegaly.  no carotid bruits.  Cardiovascular: regular rate and rhythm, normal S1 and S2, no murmurs, rubs or gallops, peripheral pulses full and symmetric   Respiratory: clear to auscultation, no wheezes or crackles, normal breath sounds.  Musculoskeletal: good range of motion, no edema and motor strength is equal in the upper and lower extremities    Spine: [Straight/not tender and good range of motion (Flexion, extension,lateral movement, rotational movement].   Skin: no concerning lesions, no jaundice.  Neurological: cranial nerves intact, normal strength, reflexes at patella and biceps normal, normal gait, no tremor.     LAB  Vertebra; Fracture Assessment: will get this if can't get a DXA   Dexa Scan: 5/2017    FRAX Assessment Tool:  12% for 10 risk Major Osteoporotic, 2.4% for 10 risk of Hip Fracture]  Risk Factors: age, sex, post-menopausal, chemo therapy, hypothyroidism    ASSESSMENT:  PM female with T scores of osteopenia who was on alendronate (35mg)  for 10 yrs, then 1 yr holiday and followed by 4 yrs of alendronate. Last fall she stopped it due to dental work and has been off it.  She had a metatarsal fx which took a long time (6 months) to  heal - this is not a fragility fracture.  Her FRAX shows moderate risk of fracture.  We discussed calcium and vit D and exercise.  I think she will likely  need to resume medication but would like to get a DXA first - if insurance won't cover then would get a VFA and DXA next 5/2019 and start her on preventative treatment -  alendronate - would resume with 70mg weekly.         PLAN:  Get a DEXA to assess if loss over the yr holiday  if insurance will pay  If can't get a  DEXA then get a VFA (let me know so I can order it)   Blood work today  Will restart alendronate if osteopenia - take 70mg/wk  If can't get a DEXA now will get one 5/2019 - must be on the same machine  Patient should take 1200mg of calcium/day in divided doses and vitamin D3 1000IU/day.  Dietary calcium is best     I spent a total of 30 minutes face-to-face with the patient during today's office visit.  Over 50% of this time was spent counseling the patient and/or coordinating care regarding review of medications for bones, review of last DXA, review of calcium and exercise..  See note for details.    Thank you for allowing me to participate in the care of your patient.  Please do not hesitate to call with questions or concerns.    Sincerely,    Kristine Flynn MD, PhD  CC Dr Lin

## 2018-08-27 NOTE — PATIENT INSTRUCTIONS
Get a DEXA if insurance will pay  If can't get a  DEXA then get a VFA (let me know so I can order it)   Blood work today  Will restart alendronate if osteopenia - take 70mg/wk  If can't get a DEXA now will get one 5/2019 - must be on the same machine  Patient should take 1200mg of calcium/day in divided doses and vitamin D3 1000IU/day.  Dietary calcium is best

## 2018-08-27 NOTE — MR AVS SNAPSHOT
After Visit Summary   8/27/2018    Maggie Navarrete    MRN: [id number]           Patient Information     Date Of Birth          1950        Visit Information        Provider Department      8/27/2018 9:00 AM Kristine Flynn MD PhD [facility]        Today's Diagnoses     Osteopenia of multiple sites    -  1      Care Instructions    Get a DEXA if insurance will pay  If can't get a  DEXA then get a VFA (let me know so I can order it)   Blood work today  Will restart alendronate if osteopenia - take 70mg/wk  If can't get a DEXA now will get one 5/2019 - must be on the same machine  Patient should take 1200mg of calcium/day in divided doses and vitamin D3 1000IU/day.  Dietary calcium is best           Follow-ups after your visit        Follow-up notes from your care team     Return in about 1 year (around 8/27/2019).      Your next 10 appointments already scheduled     Sep 05, 2018 10:00 AM CDT   Cancer Rehab Eval with Florida Mccann OT   [facility] ([facility])    [address]  [address] [place] [address]   [phone]            Sep 26, 2018  2:20 PM CDT   CT CHEST ABDOMEN PELVIS W/O & W CONTRAST with UCCT2   [facility] [facility] ([facility])    [address]  [facility] [address] [phone]           Please bring any scans or X-rays taken at other hospitals, if similar tests were done. Also bring a list of your medicines, including vitamins, minerals and over-the-counter drugs. It is safest to leave personal items at home.  Be sure to tell your doctor:   If you have any allergies.   If there s any chance you are pregnant.   If you are breastfeeding.  How to prepare:   Do not eat or drink for 2 hours before your exam. If you need to take medicine, you may take it with small sips of water. (We may ask you to take liquid medicine as well.)   Please wear loose clothing,  such as a sweat suit or jogging clothes. Avoid snaps, zippers and other metal. We may ask you to undress and put on a hospital gown.  Please arrive 30 minutes early for your CT. Once in the department you might be asked to drink water 15-20 minutes prior to your exam.  If indicated you may be asked to drink an oral contrast in advance of your CT.  If this is the case, the imaging team will let you know or be in contact with you prior to your appointment  Patients over 70 or patients with diabetes or kidney problems:   If you haven t had a blood test (creatinine test) within the last 30 days, the Cardiologist/Radiologist may require you to get this test prior to your exam.  If you have diabetes:   Continue to take your metformin medication on the day of your exam  If you have any questions, please call the Imaging Department where you will have your exam.            Sep 27, 2018 10:30 AM CDT   (Arrive by 10:15 AM)   Return Visit with Ab Aguilar MD   [facility] ([facility])    [address] [place] [address]  [address] [place] [address] [phone]              Future tests that were ordered for you today     Open Future Orders        Priority Expected Expires Ordered    Dexa Hip, Pelvis, Spine Routine  8/27/2019 8/27/2018    Magnesium Routine  8/27/2019 8/27/2018    Calcium Routine  8/27/2019 8/27/2018    Phosphorus Routine  8/27/2019 8/27/2018            Who to contact     Please call your clinic at [phone] to:    Ask questions about your health    Make or cancel appointments    Discuss your medicines    Learn about your test results    Speak to your doctor            Additional Information About Your Visit        MyChart Information     [employer] gives you secure access to your electronic health record. If you see a primary care provider, you can also send messages to your care team and make appointments. If you have questions, please call your primary  [facility] clinic.  If you do not have a primary care provider, please call [phone] and they will assist you.      [employer] is an electronic gateway that provides easy, online access to your medical records. With [employer], you can request a clinic appointment, read your test results, renew a prescription or communicate with your care team.     To access your existing account, please contact your [facility] or call [phone] for assistance.        Care EveryWhere ID     This is your Care EveryWhere ID. This could be used by other organizations to access your [facility] medical records  [id number]        Your Vitals Were     Pulse                   83            Blood Pressure from Last 3 Encounters:   08/27/18 131/83   08/15/18 123/78   06/16/18 117/77    Weight from Last 3 Encounters:   08/15/18 64.8 kg (142 lb 14.4 oz)   06/16/18 64.2 kg (141 lb 9.6 oz)   04/23/18 65.2 kg (143 lb 12.8 oz)               Primary Care Provider Office Phone # Fax #    Rhea Lin [phone]       [address] [place] [address] [address]  [facility] [address]        Equal Access to Services     OSCAR ADLER AH: Hadii sivakumar nance hadasho Sonixonali, waaxda luqadaha, qaybta kaalmada adeegyada, geneva perdue. So [facility] [phone].    ATENCIÓN: Si habla español, tiene a cage disposición servicios gratuitos de asistencia lingüística. Llame al [phone].    We comply with applicable federal civil rights laws and [place] laws. We do not discriminate on the basis of race, color, national origin, age, disability, sex, sexual orientation, or gender identity.            Thank you!     Thank you for choosing [facility]  for your care. Our goal is always to provide you with excellent care. Hearing back from our patients is one way we can continue to improve our services. Please take a few minutes to complete the written survey that you may receive in the mail after  your visit with us. Thank you!             Your Updated Medication List - Protect others around you: Learn how to safely use, store and throw away your medicines at www.disposemymeds.org.          This list is accurate as of 8/27/18  9:30 AM.  Always use your most recent med list.                   Brand Name Dispense Instructions for use Diagnosis    CALCIUM CITRATE + PO      Take 200 mg by mouth daily as needed (She supplements to meet her 1,200mg goal depending on how much dietary calcium she has gotten that day (up to 400mg once daily)) Reported on 5/1/2017        levothyroxine 75 MCG tablet    SYNTHROID/LEVOTHROID    90 tablet    Take 1 tablet (75 mcg) by mouth daily    Acquired hypothyroidism       MULTIVITAMINS PO      Take 1 tablet by mouth daily Reported on 4/10/2017        venlafaxine 75 MG 24 hr capsule    EFFEXOR-XR    90 capsule    Take 1 capsule (75 mg) by mouth daily    Generalized anxiety disorder       zolpidem 5 MG tablet    AMBIEN    20 tablet    Take 1 tablet (5 mg) by mouth nightly as needed for sleep (Only uses if traveling or cannot sleep,)    Insomnia, unspecified type

## 2018-08-28 DIAGNOSIS — M85.89 OSTEOPENIA OF MULTIPLE SITES: Primary | ICD-10-CM

## 2018-08-28 NOTE — PROGRESS NOTES
My chart message said pt called insurance and they will pay for a DXA if medically necessary - so will order it to determine if she should restart alendronate following her year holiday.   Kristine Flynn MD, PhD

## 2018-09-04 ENCOUNTER — RADIANT APPOINTMENT (OUTPATIENT)
Dept: BONE DENSITY | Facility: CLINIC | Age: 68
End: 2018-09-04
Attending: FAMILY MEDICINE
Payer: MEDICARE

## 2018-09-04 DIAGNOSIS — M85.89 OSTEOPENIA OF MULTIPLE SITES: ICD-10-CM

## 2018-09-05 ENCOUNTER — HOSPITAL ENCOUNTER (OUTPATIENT)
Dept: OCCUPATIONAL THERAPY | Facility: CLINIC | Age: 68
Setting detail: THERAPIES SERIES
End: 2018-09-05
Attending: NURSE PRACTITIONER
Payer: MEDICARE

## 2018-09-05 PROCEDURE — G9169 MEMORY GOAL STATUS: HCPCS | Mod: GO,CI | Performed by: REHABILITATION PRACTITIONER

## 2018-09-05 PROCEDURE — G9168 MEMORY CURRENT STATUS: HCPCS | Mod: GO,CI | Performed by: REHABILITATION PRACTITIONER

## 2018-09-05 PROCEDURE — 97535 SELF CARE MNGMENT TRAINING: CPT | Mod: GO | Performed by: REHABILITATION PRACTITIONER

## 2018-09-05 PROCEDURE — 40000361 ZZHC STATISTIC OT CANCER REHAB VISIT: Performed by: REHABILITATION PRACTITIONER

## 2018-09-05 PROCEDURE — 97166 OT EVAL MOD COMPLEX 45 MIN: CPT | Mod: GO | Performed by: REHABILITATION PRACTITIONER

## 2018-09-05 NOTE — PROGRESS NOTES
[facility]          OUTPATIENT OCCUPATIONAL THERAPY  EVALUATION  PLAN OF TREATMENT FOR OUTPATIENT REHABILITATION  (COMPLETE FOR INITIAL CLAIMS ONLY)  Patient's Last Name, First Name, M.I.  YOB: 1950  Maggie Navarrete  NIKKI                        Provider's Name  [facility] Medical Record No.  [id number]                               Onset Date:     08/15/18   Start of Care Date:     09/05/18   Type:     ___PT   _X_OT   ___SLP Medical Diagnosis:     stage II leiomyosarcoma of the uterus                          OT Diagnosis:     Impaired ADL/IADL I Visits from SOC:  1   _________________________________________________________________________________  Plan of Treatment/Functional Goals:  ADL training, Cognitive skills, Community/work reintegration, IADL training         Goals  Goal Identifier: 1 Memory compensation strategies  Goal Description: Pt will identify and utilize 2 memory compensation strategies to improve recall for ADL/IADL tasks and use 7/7 days per pt report.   Target Date: 10/31/18     Goal Identifier: 2 problem solving  Goal Description: Pt will demonstrate 90% accuracy on complex problem solving, new learning task with use of 1-2 strategies to improve organization with mod I.  Target Date: 10/31/18     Goal Identifier: 3 Attention  Goal Description: Pt will demonstrate 90% accuracy and score WNL on multiple divided attention tasks to ensure safety with IADL's.   Target Date: 10/31/18              Therapy Frequency: 2x/month x 2 months     Predicted Duration of Therapy Intervention (days/wks): 8 weeks  [place] [facility] OT          I CERTIFY THE NEED FOR THESE SERVICES FURNISHED UNDER        THIS PLAN OF TREATMENT AND WHILE UNDER MY CARE     (Physician co-signature of this document indicates review and certification of the therapy plan).                           Referring Physician: Cierra Fernandez NP     Initial Assessment        See Epic Evaluation      Start Of Care Date: 09/05/18

## 2018-09-05 NOTE — PROGRESS NOTES
09/05/18 1000   Quick Adds   Type of Visit Initial Outpatient Occupational Therapy Evaluation       Present No   General Information   Start Of Care Date 09/05/18   Referring Physician Cierra Fernandez RN   Orders Evaluate and treat as indicated   Other Orders PT   Orders Date 08/15/18   Medical Diagnosis stage II leiomyosarcoma of the uterus   Onset of Illness/Injury or Date of Surgery 08/15/18   Precautions/Limitations No known precautions/limitations   Surgical/Medical History Reviewed Yes   Additional Occupational Profile Info/Pertinent History of Current Problem Pt diagnosed with stage II leiomyosarcoma of the uterus in 11/16, s/p RACHEL/SBO periotoneal resection omentectomy, completed 5 cycles of chemo ending 3/23/17 and doing well until she broke her 5th metatarasal when talking fast and tripped. She was in a boot and on modified rest precautions until May   Comments/Observations Grand kids live in [place], pt   Role/Living Environment   Current Community Support Family/friend caregiver   Patient role/Employment history Retired   Community/Avocational Activities independent, lives alone, active at the [facility] with water aerobics, strength class and tries to walk her dogs daily x45 minutes   Current Living Environment House   Home/Community Accessibility Comments Pt has no difficulty getting around home   Prior Level - Transfers Independent   Prior Level - Ambulation Independent   Prior Level - ADLS Independent   Prior Responsibilities - IADL Meal Preparation;Housekeeping;Laundry;Shopping;Yardwork;Medication management;Finances;Driving;   Patient/family Goals Statement Improve cognitive function, improve recall and attention   Fall Risk Screen   Fall screen completed by PT   System Outcome Measures   FACIT Fatigue Subscale (score out of 52). The higher the score, the better the QOL. 48   Cognitive Status Examination   Orientation Orientation to person, place and time   Level of  Consciousness Alert   Follows Commands and Answers Questions 100% of the time   Personal Safety and Judgment Intact   Memory Impaired   Memory Comments Will further assess   Attention Reports problems attending   Executive Function Self awareness/monitoring impaired;Working memory impaired, decreased storage of information for performing tasks   Cognitive Comment Pt reports some decline in memory, difficulty attending while driving, difficulty with recalling names, reading information and recalling detail, repeating self.  Pt reports also not being able to pathfind at time.    Visual Perception   Visual Perception No deficits were identified;Wears glasses   Visual Perception Comments Wears readers and contacts   Hand Strength   Hand Dominance Right   Left Hand  (pounds) 45 pounds   Right Hand  (pounds) 50 pounds   Activity Tolerance   Activity Tolerance Pt is independent in all ADL tasks, reports endurance is ok, got fatigued with going to fair.     Instrumental Activities of Daily Living Assessment   IADL Assessment/Observations I with managing finances, medications and drives.    Planned Therapy Interventions   Planned Therapy Interventions ADL training;Cognitive skills;Community/work reintegration;IADL training   Adult OT Eval Goals   OT Eval Goals (Adult) 1;2;3   OT Goal 1   Goal Identifier 1 Memory compensation strategies   Goal Description Pt will identify and utilize 2 memory compensation strategies to improve recall for ADL/IADL tasks and use 7/7 days per pt report.    Target Date 10/31/18   OT Goal 2   Goal Identifier 2 problem solving   Goal Description Pt will demonstrate 90% accuracy on complex problem solving, new learning task with use of 1-2 strategies to improve organization with mod I.   Target Date 10/31/18   OT Goal 3   Goal Identifier 3 Attention   Goal Description Pt will demonstrate 90% accuracy and score WNL on multiple divided attention tasks to ensure safety with IADL's.    Target  Date 10/31/18   Clinical Impression   Criteria for Skilled Therapeutic Interventions Met Yes, treatment indicated   OT Diagnosis Impaired ADL/IADL I   Influenced by the following impairments Impaired attention and recall   Assessment of Occupational Performance 3-5 Performance Deficits   Identified Performance Deficits Recall for ADL/IADL, community reintegration and divided attention for IADL tasks   Clinical Decision Making (Complexity) Moderate complexity   Therapy Frequency 2x/month x 2 months   Predicted Duration of Therapy Intervention (days/wks) 8 weeks   Risks and Benefits of Treatment have been explained. Yes   Patient, Family & other staff in agreement with plan of care Yes   Clinical Impression Comments Pt demonstrates clinical need for skilled OT services to improve ADL/IADL independence and safety.  Pt is motivated to improve and is capable of new learning.   Education Assessment   Barriers To Learning Cognitive   Preferred Learning Style Reading;Demonstration   Total Evaluation Time   Total Evaluation Time 18

## 2018-09-20 ENCOUNTER — HOSPITAL ENCOUNTER (OUTPATIENT)
Dept: PHYSICAL THERAPY | Facility: CLINIC | Age: 68
Setting detail: THERAPIES SERIES
End: 2018-09-20
Attending: NURSE PRACTITIONER
Payer: MEDICARE

## 2018-09-20 PROCEDURE — 40000360 ZZHC STATISTIC PT CANCER REHAB VISIT: Performed by: PHYSICAL THERAPIST

## 2018-09-20 PROCEDURE — 97112 NEUROMUSCULAR REEDUCATION: CPT | Mod: GP | Performed by: PHYSICAL THERAPIST

## 2018-09-20 PROCEDURE — 97110 THERAPEUTIC EXERCISES: CPT | Mod: GP | Performed by: PHYSICAL THERAPIST

## 2018-09-21 ENCOUNTER — RADIANT APPOINTMENT (OUTPATIENT)
Dept: CT IMAGING | Facility: CLINIC | Age: 68
End: 2018-09-21
Attending: INTERNAL MEDICINE
Payer: MEDICARE

## 2018-09-21 DIAGNOSIS — C54.9 UTERUS CANCER, SARCOMA (H): ICD-10-CM

## 2018-09-21 RX ORDER — IOPAMIDOL 755 MG/ML
86 INJECTION, SOLUTION INTRAVASCULAR ONCE
Status: COMPLETED | OUTPATIENT
Start: 2018-09-21 | End: 2018-09-21

## 2018-09-21 RX ADMIN — IOPAMIDOL 86 ML: 755 INJECTION, SOLUTION INTRAVASCULAR at 12:33

## 2018-09-21 NOTE — DISCHARGE INSTRUCTIONS

## 2018-09-27 ENCOUNTER — ONCOLOGY VISIT (OUTPATIENT)
Dept: ONCOLOGY | Facility: CLINIC | Age: 68
End: 2018-09-27
Attending: INTERNAL MEDICINE
Payer: COMMERCIAL

## 2018-09-27 VITALS
WEIGHT: 144 LBS | DIASTOLIC BLOOD PRESSURE: 79 MMHG | BODY MASS INDEX: 29.03 KG/M2 | HEIGHT: 59 IN | SYSTOLIC BLOOD PRESSURE: 120 MMHG | RESPIRATION RATE: 16 BRPM | HEART RATE: 101 BPM | OXYGEN SATURATION: 100 %

## 2018-09-27 DIAGNOSIS — C54.9 MALIGNANT NEOPLASM OF CORPUS UTERI, EXCEPT ISTHMUS (H): Primary | ICD-10-CM

## 2018-09-27 PROCEDURE — 99214 OFFICE O/P EST MOD 30 MIN: CPT | Mod: ZP | Performed by: INTERNAL MEDICINE

## 2018-09-27 PROCEDURE — G0463 HOSPITAL OUTPT CLINIC VISIT: HCPCS | Mod: ZF

## 2018-09-27 ASSESSMENT — PAIN SCALES - GENERAL: PAINLEVEL: NO PAIN (0)

## 2018-09-27 NOTE — LETTER
9/27/2018       RE: Maggie Navarrete  [address]  [place] [address]     Dear Colleague,    Thank you for referring your patient, Maggie Navarrete, to the [facility]. Please see a copy of my visit note below.    [facility]  MEDICAL ONCOLOGY PROGRESS NOTE   Sep 27, 2018    Oncologic history:  1. January 2016, she presents to her PCP with 3 weeks of abdominal pain and severe constipation. She is treated for constipation and improves.  2. 3/21/2016, worsening pelvic pressure prompts pelvic examination with an enlarged uterus noted. Transvaginal ultrasound shows enlargement of subserosal fibroids originally seen on MRI in 2006.  3. 5/9/2016, MRI pelvis is obtained, which shows several enhancing uterine masses involving an enlarged uterus, felt radiographically compatible with benign uterine leiomyomata.  3. Summer 2016, she experiences worsening low back pain thought at first thought to be sacroiliitis, but pain persists after corticosteroid injection. Continues with severe constipation.  4. 9/8/2016, she undergoes colonoscopy for persistent abdominal pain. She has sigmoid polyp snared. Abdominal pain and diarrhea persist after colonoscopy prep.  5. 9/14/2016, she has worsening abdominal pain and is evaluated in the ED. CT-scan shows a large subserosal fibroid measuring 9.4 x 9.6 x 9.8 cm, as well as very large fibroids along the posterior uterine body more inferiorly, increased in size to 14 cm in greatest diameter compared to 10 cm in greatest diameter on MRI of 5/9/2016.  6. 9/29/2016, she has staging CT-CAP, which shows a small left sided pleural effusion and a 3 mm pulmonary nodule in the RML.  7. October 2016, self-refers to [facility].  8. 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  9. 11/4/2016, undergoes total abdominal hysterectomy and BSO, under Dr. Harsh Camarillo. Surgery was originally presumed for leiomyoma,  however, intraoperatively there was focal adherence of a mass to the pelvic right sidewall and cul-de-sac, which led to peritoneal resection and omentectomy. Final pathology, pT2b: showed leiomyosarcoma involving the cul-de-sac and forming two separate masses measuring 10.5 x 6.5 x 4.5 cm and 10.7 x 7.9 x 7.5 cm.  10. 12/26/2016, she starts adjuvant chemotherapy with doxorubicin 25 mg/m2/day and DTIC 250 mg/m2/day , days 1-3 every 21 weeks.  11. 3/23/2017, she completes 5 cycles of adjuvant doxorubicin and dacarbazine    HISTORY OF PRESENT ILLNESS  Ms. Navarrete presents today in follow-up and to review restaging studies.     She is feeling well and denies any major complaints. She denies nausea, vomiting, diarrhea, or abdominal pains. No new urinary symptoms. She continues to travel with her sister and they recently travelled to [place] together to visit her children and grandchildren. Otherwise, she denies headaches, shortness of breath, chest pain, or fevers/chills.    CT-CAP was reviewed together with the patient today, and there is no evidence for recurrent or metastatic disease.    ECOG performance status is 0.    REVIEW OF SYSTEMS  A 12-point ROS negative except as in HPI    Current Outpatient Prescriptions   Medication Sig Dispense Refill     Calcium Citrate-Vitamin D (CALCIUM CITRATE + PO) Take 200 mg by mouth daily as needed (She supplements to meet her 1,200mg goal depending on how much dietary calcium she has gotten that day (up to 400mg once daily)) Reported on 5/1/2017       Ibuprofen (ADVIL PO) Take 400 mg by mouth       levothyroxine (SYNTHROID/LEVOTHROID) 75 MCG tablet Take 1 tablet (75 mcg) by mouth daily 90 tablet 3     Multiple Vitamin (MULTIVITAMINS PO) Take 1 tablet by mouth daily Reported on 4/10/2017       venlafaxine (EFFEXOR-XR) 75 MG 24 hr capsule Take 1 capsule (75 mg) by mouth daily 90 capsule 1     zolpidem (AMBIEN) 5 MG tablet Take 1 tablet (5 mg) by mouth nightly as needed for sleep  "(Only uses if traveling or cannot sleep,) (Patient not taking: Reported on 8/27/2018) 20 tablet 0       Past Medical History:   Diagnosis Date     Anxiety      Arthritis     pain in feet and knees     Disorder of bone and cartilage, unspecified      Esophageal reflux 2015     GERD (gastroesophageal reflux disease)      Hx of previous reproductive problem 1980     Kidney stone 2011     Personal history of colonic polyps 2016    Small, benign     PONV (postoperative nausea and vomiting)      Posterior vitreous detachment of left eye 2011     Posterior vitreous detachment of right eye 2011     Ptosis of eyelid, bilateral      Stomach problems 2015    Abdominal pain, Diarreha     Thyroid disease     thyroid nodule resolved 2014       PHYSICAL EXAMINATION  /79 (BP Location: Right arm)  Pulse 101  Resp 16  Ht 1.511 m (4' 11.49\")  Wt 65.3 kg (144 lb)  SpO2 100%  BMI 28.61 kg/m2  Wt Readings from Last 2 Encounters:   09/27/18 65.3 kg (144 lb)   08/27/18 64.7 kg (142 lb 9.6 oz)     Physical Exam   Constitutional: She is oriented to person, place, and time. She appears well-developed and well-nourished. No distress.   HENT:   Head: Atraumatic.   Mouth/Throat: Oropharynx is clear and moist.   Eyes: Conjunctivae and EOM are normal. Pupils are equal, round, and reactive to light. No scleral icterus.   Neck: Normal range of motion.   Cardiovascular: Normal rate, regular rhythm and normal heart sounds.    No murmur heard.  Pulmonary/Chest: Effort normal and breath sounds normal. She has no wheezes.   Abdominal: Soft. She exhibits no distension. There is no tenderness.   Musculoskeletal: Normal range of motion. She exhibits no edema.   Lymphadenopathy:     She has no cervical adenopathy.   Neurological: She is alert and oriented to person, place, and time. No cranial nerve deficit. She exhibits normal muscle tone. Coordination normal.   Skin: Skin is warm and dry. No rash noted.   Psychiatric: She has a normal mood and " affect. Her behavior is normal.   Nursing note and vitals reviewed.    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  It was a pleasure to see Ms. Navarrete today. Restaging CT-scan today is negative for recurrent or metastatic disease. She is feeling well and denies any complaints.    I will plan to see her back in 4 months with MRI abdomen and pelvis and chest X-ray. She prefers non-radiation based imaging going forward.    Questions answered.    Ab Warner M.D.   of Medicine  Hematology, Oncology and Transplantation

## 2018-09-27 NOTE — NURSING NOTE
"Oncology Rooming Note    September 27, 2018 10:44 AM   Maggie Navarrete is a 68 year old female who presents for:    Chief Complaint   Patient presents with     Oncology Clinic Visit     return - Leiomyosarcoma      Initial Vitals: /79 (BP Location: Right arm)  Pulse 101  Resp 16  Ht 1.511 m (4' 11.49\")  Wt 65.3 kg (144 lb)  SpO2 100%  BMI 28.61 kg/m2 Estimated body mass index is 28.61 kg/(m^2) as calculated from the following:    Height as of this encounter: 1.511 m (4' 11.49\").    Weight as of this encounter: 65.3 kg (144 lb). Body surface area is 1.66 meters squared.  No Pain (0) Comment: Data Unavailable   No LMP recorded. Patient has had a hysterectomy.  Allergies reviewed: Yes  Medications reviewed: Yes    Medications: Medication refills not needed today.  Pharmacy name entered into [employer]:    [facility] - [place], [place] - [address]  [facility] - [address]    Clinical concerns: no new concerns      6 minutes for nursing intake (face to face time)     June Johnson CMA            "

## 2018-09-27 NOTE — MR AVS SNAPSHOT
After Visit Summary   9/27/2018    Maggie Navarrete    MRN: [id number]           Patient Information     Date Of Birth          1950        Visit Information        Provider Department      9/27/2018 10:30 AM Ab Gutierrez MD [facility]        Today's Diagnoses     Malignant neoplasm of corpus uteri, except isthmus (H)    -  1       Follow-ups after your visit        Follow-up notes from your care team     Return in about 4 months (around 1/27/2019).      Your next 10 appointments already scheduled     Oct 16, 2018 10:30 AM CDT   Neuro Treatment with [place] Mccann OT   [facility] Occupational Therapy ([facility])    [address] [place] [address]  [address]  [facility] [address]   [phone]            Jan 22, 2019 11:00 AM CST   (Arrive by 10:30 AM)   MR ABDOMEN W/O & W CONTRAST with [address] MRI ([facility])    [address]  1st Floor  [facility] [id number] [phone]           How do I prepare for my exam? (Food and drink instructions) Do not eat or drink for 6 hours prior to exam. **If you will be receiving sedation or general anesthesia, please see special notes below.**  How do I prepare for my exam? (Other instructions) Take your medicines as usual, unless your doctor tells you not to. You may or may not receive IV contrast for this exam pending the discretion of the Radiologist.  **If you will be receiving sedation or general anesthesia, please see special notes below.**  What should I wear: The MRI machine uses a strong magnet. Please wear clothes without metal (snaps, zippers). A sweatsuit works well, or we may give you a hospital gown. Please remove any body piercings and hair extensions before you arrive. You will also remove watches, jewelry, hairpins, wallets, dentures, partial dental plates and hearing aids. You may wear contact lenses, and you may be able to wear your rings. We  have a safe place to keep your personal items, but it is safer to leave them at home.  How long does the exam take: Most tests take 30 to 60 minutes.  HOWEVER, IF YOUR DOCTOR PRESCRIBES ANESTHESIA please plan on spending four to five hours in the recovery room.  What should I bring: Bring a list of your current medicines to your exam (including vitamins, minerals and over-the-counter drugs). Also bring the results of similar scans you may have had.  Do I need a : **If you will be receiving sedation or general anesthesia, please see special notes below.**  What should I do after the exam: No Restrictions, You may resume normal activities.  What is this test: MRI (magnetic resonance imaging) uses a strong magnet and radio waves to look inside the body. An MRA (magnetic resonance angiogram) does the same thing, but it lets us look at your blood vessels. A computer turns the radio waves into pictures showing cross sections of the body, much like slices of bread. This helps us see any problems more clearly. You may receive fluid (called  contrast ) before or during your scan. The fluid helps us see the pictures better. We give the fluid through an IV (small needle in your arm).  Who should I call with questions: If you have any questions, please contact your Imaging Department exam site. Directions, parking instructions, and other information is available on our website, [email or web address].            Jan 22, 2019 12:00 PM CST   XR CHEST 2 VIEWS with UCXR1   [facility] ([facility])    [address] [address] [phone]           How do I prepare for my exam? (Food and drink instructions) No Food and Drink Restrictions.  How do I prepare for my exam? (Other instructions) You do not need to do anything special for this exam.  What should I wear: Wear comfortable clothes.  How long does the exam take: Most scans take less than 5  minutes.  What should I bring: Bring a list of your medicines, including vitamins, minerals and over-the-counter drugs. It is safest to leave personal items at home.  Do I need a :  No  is needed.  What do I need to tell my doctor: Tell your doctor if there s any chance you are pregnant.  What should I do after the exam: No restrictions, You may resume normal activities.  What is this test: An image of a specific body part shown in shades of black and white.  Who should I call with questions: If you have any questions, please call the Imaging Department where you will have your exam. Directions, parking instructions, and other information is available on our website, [email or web address].            Jan 25, 2019 11:00 AM CST   MR PELVIS (GYN) W CONTRAST with [address] MRI ([facility])    [address] [place] [address] [phone]           How do I prepare for my exam? (Food and drink instructions) **If you will be receiving sedation or general anesthesia, please see special notes below.**  How do I prepare for my exam? (Other instructions) Take your medicines as usual, unless your doctor tells you not to. You may or may not receive intravenous (IV) contrast for this exam pending the discretion of the Radiologist.  You do not need to do anything special to prepare.  **If you will be receiving sedation or general anesthesia, please see special notes below.**  What should I wear: The MRI machine uses a strong magnet. Please wear clothes without metal (snaps, zippers). A sweatsuit works well, or we may give you a hospital gown. Please remove any body piercings and hair extensions before you arrive. You will also remove watches, jewelry, hairpins, wallets, dentures, partial dental plates and hearing aids. You may wear contact lenses, and you may be able to wear your rings. We have a safe place to keep your personal items, but it is  safer to leave them at home.  How long does the exam take: Most tests take 30 to 60 minutes.  HOWEVER, IF YOUR DOCTOR PRESCRIBES ANESTHESIA please plan on spending four to five hours in the recovery room.  What should I bring:  Bring a list of your current medicines to your exam (including vitamins, minerals and over-the-counter drugs).  Do I need a :  **If you will be receiving sedation or general anesthesia, please see special notes below.**  What should I do after the exam: No Restrictions, You may resume normal activities.  What is this test: MRI (magnetic resonance imaging) uses a strong magnet and radio waves to look inside the body. An MRA (magnetic resonance angiogram) does the same thing, but it lets us look at your blood vessels. A computer turns the radio waves into pictures showing cross sections of the body, much like slices of bread. This helps us see any problems more clearly. You may receive fluid (called  contrast ) before or during your scan. The fluid helps us see the pictures better. We give the fluid through an IV (small needle in your arm).  Who should I call with questions:  Please call the Imaging Department at your exam site with any questions. Directions, parking instructions, and other information is available on our website, [email or web address].  How do I prepare if I m having sedation or anesthesia? **IMPORTANT** THE INSTRUCTIONS BELOW ARE ONLY FOR THOSE PATIENTS WHO HAVE BEEN TOLD THEY WILL RECEIVE SEDATION OR GENERAL ANESTHESIA DURING THEIR MRI PROCEDURE:  IF YOU WILL RECEIVE SEDATION (take medicine to help you relax during your exam): You must get the medicine from your doctor before you arrive. Bring the medicine to the exam. Do not take it at home. Arrive one hour early. Bring someone who can take you home after the test. Your medicine will make you sleepy. After the exam, you may not drive, take a bus or take a taxi by yourself. No eating 8 hours before your exam. You may  have clear liquids up until 4 hours before your exam. (Clear liquids include water, clear tea, black coffee and fruit juice without pulp.)  IF YOU WILL RECEIVE ANESTHESIA (be asleep for your exam): Arrive 1 1/2 hours early. Bring someone who can take you home after the test. You may not drive, take a bus or take a taxi by yourself. No eating 8 hours before your exam. You may have clear liquids up until 4 hours before your exam. (Clear liquids include water, clear tea, black coffee and fruit juice without pulp.)            Jan 28, 2019 11:15 AM CST   (Arrive by 11:00 AM)   Return Visit with Ab Aguilar MD   [facility] ([facility])    [address]  [address]  [facility] [place] [address] [phone]              Who to contact     If you have questions or need follow up information about today's clinic visit or your schedule please contact [facility] directly at [phone].  Normal or non-critical lab and imaging results will be communicated to you by MyChart, letter or phone within 4 business days after the clinic has received the results. If you do not hear from us within 7 days, please contact the clinic through [employer] or phone. If you have a critical or abnormal lab result, we will notify you by phone as soon as possible.  Submit refill requests through [employer] or call your pharmacy and they will forward the refill request to us. Please allow 3 business days for your refill to be completed.          Additional Information About Your Visit        [employer] Information     [employer] gives you secure access to your electronic health record. If you see a primary care provider, you can also send messages to your care team and make appointments. If you have questions, please call your primary care clinic.  If you do not have a primary care provider, please call [phone] and they will assist you.        Care EveryWhere ID     This is  "your Care EveryWhere ID. This could be used by other organizations to access your [facility] medical records  [id number]        Your Vitals Were     Pulse Respirations Height Pulse Oximetry BMI (Body Mass Index)       101 16 1.511 m (4' 11.49\") 100% 28.61 kg/m2        Blood Pressure from Last 3 Encounters:   09/27/18 120/79   08/27/18 131/83   08/15/18 123/78    Weight from Last 3 Encounters:   09/27/18 65.3 kg (144 lb)   08/27/18 64.7 kg (142 lb 9.6 oz)   08/15/18 64.8 kg (142 lb 14.4 oz)               Primary Care Provider Office Phone # Fax #    Rhea Lin [phone]       [address] [place] [address] [address] [place] [address]        Equal Access to Services     [facility] : Hadii sivakumar marcum Soelizabeth, waaxda liberty, qaybta kaalmada mary, geneva mendoza . So [facility] [phone].    ATENCIÓN: Si habla español, tiene a cage disposición servicios gratuitos de asistencia lingüística. Chen al [phone].    We comply with applicable federal civil rights laws and [place] laws. We do not discriminate on the basis of race, color, national origin, age, disability, sex, sexual orientation, or gender identity.            Thank you!     Thank you for choosing [facility]  for your care. Our goal is always to provide you with excellent care. Hearing back from our patients is one way we can continue to improve our services. Please take a few minutes to complete the written survey that you may receive in the mail after your visit with us. Thank you!             Your Updated Medication List - Protect others around you: Learn how to safely use, store and throw away your medicines at www.disposemymeds.org.          This list is accurate as of 9/27/18 11:59 PM.  Always use your most recent med list.                   Brand Name Dispense Instructions for use Diagnosis    ADVIL PO      Take 400 mg by mouth        CALCIUM CITRATE + PO      Take 200 mg by mouth " daily as needed (She supplements to meet her 1,200mg goal depending on how much dietary calcium she has gotten that day (up to 400mg once daily)) Reported on 5/1/2017        levothyroxine 75 MCG tablet    SYNTHROID/LEVOTHROID    90 tablet    Take 1 tablet (75 mcg) by mouth daily    Acquired hypothyroidism       MULTIVITAMINS PO      Take 1 tablet by mouth daily Reported on 4/10/2017        venlafaxine 75 MG 24 hr capsule    EFFEXOR-XR    90 capsule    Take 1 capsule (75 mg) by mouth daily    Generalized anxiety disorder       zolpidem 5 MG tablet    AMBIEN    20 tablet    Take 1 tablet (5 mg) by mouth nightly as needed for sleep (Only uses if traveling or cannot sleep,)    Insomnia, unspecified type

## 2018-10-02 NOTE — PROGRESS NOTES
[facility]  MEDICAL ONCOLOGY PROGRESS NOTE   Sep 27, 2018    Oncologic history:  1. January 2016, she presents to her PCP with 3 weeks of abdominal pain and severe constipation. She is treated for constipation and improves.  2. 3/21/2016, worsening pelvic pressure prompts pelvic examination with an enlarged uterus noted. Transvaginal ultrasound shows enlargement of subserosal fibroids originally seen on MRI in 2006.  3. 5/9/2016, MRI pelvis is obtained, which shows several enhancing uterine masses involving an enlarged uterus, felt radiographically compatible with benign uterine leiomyomata.  3. Summer 2016, she experiences worsening low back pain thought at first thought to be sacroiliitis, but pain persists after corticosteroid injection. Continues with severe constipation.  4. 9/8/2016, she undergoes colonoscopy for persistent abdominal pain. She has sigmoid polyp snared. Abdominal pain and diarrhea persist after colonoscopy prep.  5. 9/14/2016, she has worsening abdominal pain and is evaluated in the ED. CT-scan shows a large subserosal fibroid measuring 9.4 x 9.6 x 9.8 cm, as well as very large fibroids along the posterior uterine body more inferiorly, increased in size to 14 cm in greatest diameter compared to 10 cm in greatest diameter on MRI of 5/9/2016.  6. 9/29/2016, she has staging CT-CAP, which shows a small left sided pleural effusion and a 3 mm pulmonary nodule in the RML.  7. October 2016, self-refers to [facility].  8. 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  9. 11/4/2016, undergoes total abdominal hysterectomy and BSO, under Dr. Harsh Camarillo. Surgery was originally presumed for leiomyoma, however, intraoperatively there was focal adherence of a mass to the pelvic right sidewall and cul-de-sac, which led to peritoneal resection and omentectomy. Final pathology, pT2b: showed leiomyosarcoma involving the cul-de-sac and forming two  separate masses measuring 10.5 x 6.5 x 4.5 cm and 10.7 x 7.9 x 7.5 cm.  10. 12/26/2016, she starts adjuvant chemotherapy with doxorubicin 25 mg/m2/day and DTIC 250 mg/m2/day , days 1-3 every 21 weeks.  11. 3/23/2017, she completes 5 cycles of adjuvant doxorubicin and dacarbazine    HISTORY OF PRESENT ILLNESS  Ms. Navarrete presents today in follow-up and to review restaging studies.     She is feeling well and denies any major complaints. She denies nausea, vomiting, diarrhea, or abdominal pains. No new urinary symptoms. She continues to travel with her sister and they recently travelled to [place] together to visit her children and grandchildren. Otherwise, she denies headaches, shortness of breath, chest pain, or fevers/chills.    CT-CAP was reviewed together with the patient today, and there is no evidence for recurrent or metastatic disease.    ECOG performance status is 0.    REVIEW OF SYSTEMS  A 12-point ROS negative except as in HPI    Current Outpatient Prescriptions   Medication Sig Dispense Refill     Calcium Citrate-Vitamin D (CALCIUM CITRATE + PO) Take 200 mg by mouth daily as needed (She supplements to meet her 1,200mg goal depending on how much dietary calcium she has gotten that day (up to 400mg once daily)) Reported on 5/1/2017       Ibuprofen (ADVIL PO) Take 400 mg by mouth       levothyroxine (SYNTHROID/LEVOTHROID) 75 MCG tablet Take 1 tablet (75 mcg) by mouth daily 90 tablet 3     Multiple Vitamin (MULTIVITAMINS PO) Take 1 tablet by mouth daily Reported on 4/10/2017       venlafaxine (EFFEXOR-XR) 75 MG 24 hr capsule Take 1 capsule (75 mg) by mouth daily 90 capsule 1     zolpidem (AMBIEN) 5 MG tablet Take 1 tablet (5 mg) by mouth nightly as needed for sleep (Only uses if traveling or cannot sleep,) (Patient not taking: Reported on 8/27/2018) 20 tablet 0       Past Medical History:   Diagnosis Date     Anxiety      Arthritis     pain in feet and knees     Disorder of bone and cartilage,  "unspecified      Esophageal reflux 2015     GERD (gastroesophageal reflux disease)      Hx of previous reproductive problem 1980     Kidney stone 2011     Personal history of colonic polyps 2016    Small, benign     PONV (postoperative nausea and vomiting)      Posterior vitreous detachment of left eye 2011     Posterior vitreous detachment of right eye 2011     Ptosis of eyelid, bilateral      Stomach problems 2015    Abdominal pain, Diarreha     Thyroid disease     thyroid nodule resolved 2014       PHYSICAL EXAMINATION  /79 (BP Location: Right arm)  Pulse 101  Resp 16  Ht 1.511 m (4' 11.49\")  Wt 65.3 kg (144 lb)  SpO2 100%  BMI 28.61 kg/m2  Wt Readings from Last 2 Encounters:   09/27/18 65.3 kg (144 lb)   08/27/18 64.7 kg (142 lb 9.6 oz)     Physical Exam   Constitutional: She is oriented to person, place, and time. She appears well-developed and well-nourished. No distress.   HENT:   Head: Atraumatic.   Mouth/Throat: Oropharynx is clear and moist.   Eyes: Conjunctivae and EOM are normal. Pupils are equal, round, and reactive to light. No scleral icterus.   Neck: Normal range of motion.   Cardiovascular: Normal rate, regular rhythm and normal heart sounds.    No murmur heard.  Pulmonary/Chest: Effort normal and breath sounds normal. She has no wheezes.   Abdominal: Soft. She exhibits no distension. There is no tenderness.   Musculoskeletal: Normal range of motion. She exhibits no edema.   Lymphadenopathy:     She has no cervical adenopathy.   Neurological: She is alert and oriented to person, place, and time. No cranial nerve deficit. She exhibits normal muscle tone. Coordination normal.   Skin: Skin is warm and dry. No rash noted.   Psychiatric: She has a normal mood and affect. Her behavior is normal.   Nursing note and vitals reviewed.    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  It was a pleasure to see Ms. Navarrete today. Restaging CT-scan today is negative for recurrent or metastatic disease. She is feeling well and denies any complaints.    I will plan to see her back in 4 months with MRI abdomen and pelvis and chest X-ray. She prefers non-radiation based imaging going forward.    Questions answered.    Ab Warner M.D.   of Medicine  Hematology, Oncology and Transplantation

## 2018-10-16 ENCOUNTER — HOSPITAL ENCOUNTER (OUTPATIENT)
Dept: OCCUPATIONAL THERAPY | Facility: CLINIC | Age: 68
Setting detail: THERAPIES SERIES
End: 2018-10-16
Attending: NURSE PRACTITIONER
Payer: MEDICARE

## 2018-10-16 PROCEDURE — 40000361 ZZHC STATISTIC OT CANCER REHAB VISIT: Performed by: REHABILITATION PRACTITIONER

## 2018-10-16 PROCEDURE — 97535 SELF CARE MNGMENT TRAINING: CPT | Mod: GO | Performed by: REHABILITATION PRACTITIONER

## 2018-10-16 PROCEDURE — G9169 MEMORY GOAL STATUS: HCPCS | Mod: GO,CI | Performed by: REHABILITATION PRACTITIONER

## 2018-10-16 PROCEDURE — G9170 MEMORY D/C STATUS: HCPCS | Mod: GO,CI | Performed by: REHABILITATION PRACTITIONER

## 2018-10-16 PROCEDURE — 97110 THERAPEUTIC EXERCISES: CPT | Mod: GO | Performed by: REHABILITATION PRACTITIONER

## 2018-10-17 NOTE — PROGRESS NOTES
Outpatient Occupational Therapy Discharge Note     Patient: Maggie Navarrete  : 1950    Beginning/End Dates of Reporting Period:  18 to 10/17/2018    Referring Provider: Cierra Vizcaino Diagnosis: Impaired ADL/IADL I    Client Self Report: Pt reports getting a book from [employer] and comprehension tasks are difficult.  Pt also reports she is trying to learn Nigerien. Pt does feel as if her emotional status directly impacts her recall.     Goals:     Goal Identifier 1 Memory compensation strategies   Goal Description Pt will identify and utilize 2 memory compensation strategies to improve recall for ADL/IADL tasks and use 7/7 days per pt report.    Target Date 10/31/18   Date Met  10/16/18   Progress:     Goal Identifier 2 problem solving   Goal Description Pt will demonstrate 90% accuracy on complex problem solving, new learning task with use of 1-2 strategies to improve organization with mod I.   Target Date 10/31/18   Date Met  10/16/18   Progress:     Goal Identifier 3 Attention   Goal Description Pt will demonstrate 90% accuracy and score WNL on multiple divided attention tasks to ensure safety with IADL's.    Target Date 10/31/18   Date Met   (Goal deferred per pt request)   Progress:       Progress Toward Goals:   Progress this reporting period: Goals 1 and 2, goal 3 discontinued per pt request.  Pt demonstrates good use of memory strategies during ADL tasks.     Plan:  Discharge from therapy.    Discharge:    Reason for Discharge: Patient chooses to discontinue therapy.      Discharge Plan: Patient to continue home program.

## 2018-11-19 ENCOUNTER — ONCOLOGY VISIT (OUTPATIENT)
Dept: ONCOLOGY | Facility: CLINIC | Age: 68
End: 2018-11-19
Attending: NURSE PRACTITIONER
Payer: MEDICARE

## 2018-11-19 VITALS
WEIGHT: 142 LBS | OXYGEN SATURATION: 100 % | TEMPERATURE: 97.1 F | HEART RATE: 91 BPM | SYSTOLIC BLOOD PRESSURE: 118 MMHG | DIASTOLIC BLOOD PRESSURE: 82 MMHG | BODY MASS INDEX: 28.21 KG/M2

## 2018-11-19 DIAGNOSIS — Z85.42 ENCOUNTER FOR FOLLOW-UP SURVEILLANCE OF UTERINE CANCER: Primary | ICD-10-CM

## 2018-11-19 DIAGNOSIS — Z08 ENCOUNTER FOR FOLLOW-UP SURVEILLANCE OF UTERINE CANCER: Primary | ICD-10-CM

## 2018-11-19 PROCEDURE — G0463 HOSPITAL OUTPT CLINIC VISIT: HCPCS | Mod: ZF

## 2018-11-19 PROCEDURE — 99213 OFFICE O/P EST LOW 20 MIN: CPT | Mod: ZP | Performed by: NURSE PRACTITIONER

## 2018-11-19 ASSESSMENT — PAIN SCALES - GENERAL: PAINLEVEL: NO PAIN (0)

## 2018-11-19 NOTE — LETTER
2018       RE: Maggie Navarrete  [address]  [facility] [address]     Dear Colleague,    Thank you for referring your patient, Maggie Navarrete, to the [facility]. Please see a copy of my visit note below.    Gynecologic Oncology Follow-Up Visit    RE: Maggie Navarrete  MRN: [id number]  : 1950  Date of Visit: 2018    CC: Maggie Navarrete  is a 68 year old female with a history of stage IIB leiomyosarcoma of the uterus. She completed treatment with RACHEL-BSO, peritoneal resection, and omentectomy on 16 followed by completion of five cycles doxorubicin and darcarbazine 3/23/17. She presents today for a three month surveillance visit.    HPI: Maggie comes to the clinic feeling well from a gynecologic perspective. She saw cancer rehab for arthralgias/balance and cognitive rehab for memory issues with improvement. Notes vaginal dryness but denies need for intervention- uses [facility] if this bothers her. Not sexually active. No pelvic pain, vaginal bleeding, bloating, early satiety, or abdominal pain. Up to date on seeing her PCP, mammogram, and colonoscopy as well as Dr. Aguilar with medical oncology. Up to date on influenza vaccine. She would like to lose weight and would like to see a dietitian to help with this. Seeing a therapist to help manage her relationship with her daughter in law better who lives in [facility].      Oncology History:  Summer 2016: Low back pain persisting after corticosteroid injection, severe constipation     2016: Colonoscopy for persistent abdominal pain, sigmoid polyp snared. Abdominal pain and diarrhea persist after colonoscopy prep.     2016: she has worsening abdominal pain and is evaluated in the ED. CT-scan shows a large subserosal fibroid measuring 9.4 x 9.6 x 9.8 cm, as well as very large fibroids along the posterior uterine body more inferiorly, increased in size to 14 cm in greatest diameter compared to 10 cm in greatest  diameter on MRI of 5/9/2016.     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     11/4/2016: Total abdominal hysterectomy and BSO, under Dr. Harsh Camarillo. Surgery was originally presumed for leiomyoma, however, intraoperatively there was focal adherence of a mass to the pelvic right sidewall and cul-de-sac, which led to peritoneal resection and omentectomy. Final pathology, pT2b: showed leiomyosarcoma involving the cul-de-sac and forming two separate masses measuring 10.5 x 6.5 x 4.5 cm and 10.7 x 7.9 x 7.5 cm.     12/26/2016 - 3/2016: s/p 5/6 cycles 25 mg/m2 doxorubicin, 250 mg/m2 dacarbazine.      2/2017: CT: IMPRESSION: In this patient with history of leiomyosarcoma of the  uterus:  1. No CT evidence of disease progression.  2. Unchanged mildly prominent para-aortic lymph nodes, compared to  outside institution CT 11/29/2016. Continued attention on follow-up  imaging is recommended.  3. 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  4. Calcified and noncalcified pulmonary nodules, stable from  11/29/2016. Continued attention on follow-up imaging.     4/2018:  CT:  IMPRESSION:   1. Stable changes of hysterectomy and bilateral salpingo-oophorectomy  without evidence of local recurrence.  2. Stable small pulmonary nodules  3. Mild thickening and hyperenhancement of the gastric mucosa and  proximal duodenum compatible with inflammation/infection.  4. Stable indeterminate hyperenhancing lesion in segment 4A    9/21/18: CT CAP impression:  IMPRESSION: This patient with a history of uterine leiomyosarcoma:  1. No evidence of locally recurrent or metastatic disease in the  chest, abdomen, or pelvis.  2. Unchanged sub-6 mm pulmonary nodules.  3. Unchanged arterial enhancing lesions in the liver, stable dating  back to at least 2/24/2017, suggesting hepatic hemangiomas.     Past Medical History:   Diagnosis Date     Anxiety      Arthritis     pain in feet  and knees     Disorder of bone and cartilage, unspecified      Esophageal reflux 2015     GERD (gastroesophageal reflux disease)      Hx of previous reproductive problem 1980     Kidney stone 2011     Personal history of colonic polyps 2016    Small, benign     PONV (postoperative nausea and vomiting)      Posterior vitreous detachment of left eye 2011     Posterior vitreous detachment of right eye 2011     Ptosis of eyelid, bilateral      Stomach problems 2015    Abdominal pain, Diarreha     Thyroid disease     thyroid nodule resolved 2014       Past Surgical History:   Procedure Laterality Date     ABDOMEN SURGERY  Now    Pain, diarrhea     BREAST SURGERY  2002    right breast benign     COLONOSCOPY  2008    due in 2018     COLONOSCOPY N/A 9/8/2016    Procedure: COMBINED COLONOSCOPY, SINGLE OR MULTIPLE BIOPSY/POLYPECTOMY BY BIOPSY;  Surgeon: Abner Pepper MD;  Location:  [facility]     ENT SURGERY  1975    tonsillectomy     GENITOURINARY SURGERY  1953    bladder surgery      GI SURGERY  2015    Severe constipation     GYN SURGERY  1977    C section     GYN SURGERY  1981    laparoscopy     GYN SURGERY  2007    myomectomy     REMOVE PORT VASCULAR ACCESS Right 5/9/2017    Procedure: REMOVE PORT VASCULAR ACCESS;  Right Port Removal;  Surgeon: Eric Gibson PA-C;  Location: [facility]     REPAIR PTOSIS BILATERAL  2/15/2012    Procedure:REPAIR PTOSIS BILATERAL; BILATERAL UPPER LID PTOSIS REPAIR; Surgeon:BRYCE REYNOLDS; Location:[facility]       Social History     Social History     Marital status: Single     Spouse name: N/A     Number of children: N/A     Years of education: N/A     Occupational History     Not on file.     Social History Main Topics     Smoking status: Never Smoker     Smokeless tobacco: Never Used     Alcohol use Yes      Comment: Social, not often per patient.     Drug use: No     Sexual activity: Not Currently     Partners: Male     Other Topics Concern     Caffeine Concern Yes     coffee  few times/wk     Sleep Concern No     Stress Concern Yes     care taker of her mom     Weight Concern Yes     wants to lose wt        Special Diet No     Exercise Yes     walking 45 minutes/day     Seat Belt Yes     Social History Narrative    How much exercise per week? Walking daily.    How much calcium per day? Supplement and through diet       How much caffeine per day? 3 times a week    How much vitamin D per day? Supplement    Do you/your family wear seatbelts?  Yes    Do you/your family use safety helmets? NA    Do you/your family use sunscreen? Sometimes    Do you/your family keep firearms in the home? No    Do you/your family have a smoke detector(s)? Yes    Do you feel safe in your home? Yes    Has anyone ever touched you in an unwanted manner? 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  2 grandsons now in [place] with the mother.  In a house.  .         Family History   Problem Relation Age of Onset     Family History Negative Mother      glaucoma     Glaucoma Mother      Anxiety Disorder Mother      Heart Failure Mother      Diabetes Mother      Coronary Artery Disease Mother      Hypertension Mother      Breast Cancer Mother      Depression Mother      Obesity Mother      HEART DISEASE Father      Glaucoma Father      Diabetes Father      Coronary Artery Disease Father      Depression Father      Osteoporosis Father      HEART DISEASE Maternal Grandmother      Diabetes Maternal Grandmother      HEART DISEASE Paternal Grandmother      Cancer Paternal Grandfather      stomach     Thyroid Disease Sister      Retinal detachment Sister      Hypertension Sister      Hypertension Sister      Lipids Sister      Cerebrovascular Disease Sister      Depression Sister      HEART DISEASE Paternal Aunt      HEART DISEASE Paternal  Uncle      Anxiety Disorder Sister      Depression Sister      Thyroid Disease Sister      Thyroid Disease Sister      Osteoporosis Other        Current Outpatient Prescriptions   Medication     Calcium Citrate-Vitamin D (CALCIUM CITRATE + PO)     Ibuprofen (ADVIL PO)     levothyroxine (SYNTHROID/LEVOTHROID) 75 MCG tablet     Multiple Vitamin (MULTIVITAMINS PO)     venlafaxine (EFFEXOR-XR) 75 MG 24 hr capsule     zolpidem (AMBIEN) 5 MG tablet     No current facility-administered medications for this visit.           Allergies   Allergen Reactions     Erythromycin GI Disturbance     Tramadol Nausea         Health Maintenance  Pap smear: No longer indicated  Mammogram: Last performed 7/16/18, due 7/2019  Colonoscopy: Last performed 9/8/16, due 9/2021  Annual wellness exam: Performed 6/16/18, due 6/2019      Review of Systems  General: Denies fatigue, changes in weight, weakness, appetite changes, night sweats, hot flashes, fever, chills, difficulty sleeping  HEENT: Denies headaches, hair loss, visual difficulty or disturbances, masses, head injury, tinnitus, hearing loss, epistaxis, congestion, problems with teeth or gums, dysphonia, or dysphagia  Pulmonary: Denies cough, sputum, hemoptysis, shortness of breath, dyspnea on exertion, wheezing, or allergies  Cardiovascular: Denies chest pain, fainting, palpitations, murmurs, activity intolerance, swelling in legs, or high blood pressure  Gastrointestinal: + heartburn. Denies nausea, vomiting, constipation, diarrhea, abdominal pain, bloating, melena, hematochezia, or jaundice  Genitourinary: + vaginal dryness. Denies dysuria, urinary urgency/frequency, hematuria, cloudy or malodorous urine, incontinence, repeat urinary tract infections, flank pain, pelvic pain, vaginal bleeding, vaginal discharge  Sexual Function: N/A  Integumentary: Denies rashes, sores, changing moles, or scarring  Hematologic: Denies swollen lymph nodes, masses, easy bruising, or easy  bleeding  Musculoskeletal: + back pain, arthralgias. Denies falls, myalgias, stiffness, muscle weakness, or muscle cramps  Neurologic: + poor short term memory. Denies numbness or tingling, balance issues, difficulty with walking, dizziness, seizures, or tremors  Psychiatric: Denies anxiety, depression, mood changes, suicidal thoughts, or difficulty concentrating  Endocrine: Denies polydipsia, polyuria, temperature intolerance, or history of thyroid disease      OBJECTIVE:    Physical Exam:    /82  Pulse 91  Temp 97.1  F (36.2  C) (Oral)  Wt 64.4 kg (142 lb)  SpO2 100%  BMI 28.21 kg/m2    CONSTITUTIONAL: Alert non-toxic appearing female in no acute distress  HEAD: Normocephalic, atraumatic  EYES: PERRLA; no scleral icterus  ENT: Oropharynx pink without lesions  NECK: Neck supple without lymphadenopathy  RESPIRATORY: Lungs clear to auscultation, no increased work of breathing noted  CV: Regular rate and rhythm, S1S2, no clicks, murmurs, rubs, or gallops; bilateral lower extremities without edema, dorsalis pedis pulses 2+ bilaterally  GASTROINTESTINAL: Normoactive bowel sounds x4 quadrants, abdomen soft, non-distended, and non-tender to palpation without masses or organomegaly  GENITOURINARY: External genitalia and urethral meatus pink without lesions, masses, or excoriation. Vagina hypoestrogenic and stenotic without masses or lesions, requiring an adolescent Pepe speculum. Vaginal cuff without nodularity, masses, or lesions. Cervix surgically absent. Bimanual exam reveals no masses or fullness. Rectovaginal exam confirms these findings.  LYMPHATIC: Cervical, supraclavicular, and inguinal nodes without lymphadenopathy  MUSCULOSKELETAL: Moves all extremities, no obvious muscle wasting  NEUROLOGIC: No gross deficits, normal gait  SKIN: Appropriate color for race, warm and dry, no rashes or lesions to unclothed skin  PSYCHIATRIC: Pleasant and interactive, affect bright, makes appropriate eye contact,  thought process linear      Assessment/Plan:  1) Stage IIB leiomyosarcoma: No signs of recurrence. Continue surveillance every three months x2 years (through 3/2019) followed by every six months x3 years (through 3/3022) then annually thereafter with pelvic/rectal exam. Continue follow up with Dr. Aguilar as scheduled- offered to consolidate her visits and see her after Dr. Aguilar's visit on 1/29/18, however, she declines this. Reviewed signs and symptoms  of recurrence including but not limited to bleeding from vagina, bladder, or rectum, bloating, early satiety, swelling in the lower extremities, abdominal or lower back pain, changes in bowel or bladder patterns, shortness of breath, increased fatigue, unexplained weight loss, and night sweats. Reviewed signs and symptoms for when she should contact the clinic or seek additional care. Patient to contact the clinic with any questions or concerns in the interim.  2) Weight management: BMI 28.21, referral to dietitian placed.  3) Health maintenance issues discussed today include to follow up with PCP for co-morbid conditions and non-gynecologic issues. Commended on excellent health maintenance follow up.   4) Patient verbalized understanding of and agreement with plan.    A total of 20 minutes of face to face time spent with patient, over 50% of which was spent in counseling and coordination of care.    SHIRAZ Parks, FNP-C  Division of Gynecologic Oncology  [facility]  Pager: [phone]

## 2018-11-19 NOTE — NURSING NOTE
"Oncology Rooming Note    November 19, 2018 11:10 AM   Maggie Navarrete is a 68 year old female who presents for:    Chief Complaint   Patient presents with     Oncology Clinic Visit     Uterine CA; 3 month f/u     Initial Vitals: /82  Pulse 91  Temp 97.1  F (36.2  C) (Oral)  Wt 64.4 kg (142 lb)  SpO2 100%  BMI 28.21 kg/m2 Estimated body mass index is 28.21 kg/(m^2) as calculated from the following:    Height as of 9/27/18: 1.511 m (4' 11.49\").    Weight as of this encounter: 64.4 kg (142 lb). Body surface area is 1.64 meters squared.  No Pain (0) Comment: Data Unavailable   No LMP recorded. Patient has had a hysterectomy.  Allergies reviewed: Yes  Medications reviewed: Yes    Medications: Medication refills not needed today.  Pharmacy name entered into [employer]:    [facility] - [place], [place] - [address]  [facility] - [address]    Clinical concerns:      8 minutes for nursing intake (face to face time)     Ronna Lane CMA              "

## 2018-11-19 NOTE — PROGRESS NOTES
Gynecologic Oncology Follow-Up Visit    RE: Maggie Navarrete  MRN: [id number]  : 1950  Date of Visit: 2018    CC: Maggie Navarrete  is a 68 year old female with a history of stage IIB leiomyosarcoma of the uterus. She completed treatment with RACHEL-BSO, peritoneal resection, and omentectomy on 16 followed by completion of five cycles doxorubicin and darcarbazine 3/23/17. She presents today for a three month surveillance visit.    HPI: Magige comes to the clinic feeling well from a gynecologic perspective. She saw cancer rehab for arthralgias/balance and cognitive rehab for memory issues with improvement. Notes vaginal dryness but denies need for intervention- uses [facility] if this bothers her. Not sexually active. No pelvic pain, vaginal bleeding, bloating, early satiety, or abdominal pain. Up to date on seeing her PCP, mammogram, and colonoscopy as well as Dr. Aguilar with medical oncology. Up to date on influenza vaccine. She would like to lose weight and would like to see a dietitian to help with this. Seeing a therapist to help manage her relationship with her daughter in law better who lives in [facility].      Oncology History:  Summer 2016: Low back pain persisting after corticosteroid injection, severe constipation     2016: Colonoscopy for persistent abdominal pain, sigmoid polyp snared. Abdominal pain and diarrhea persist after colonoscopy prep.     2016: she has worsening abdominal pain and is evaluated in the ED. CT-scan shows a large subserosal fibroid measuring 9.4 x 9.6 x 9.8 cm, as well as very large fibroids along the posterior uterine body more inferiorly, increased in size to 14 cm in greatest diameter compared to 10 cm in greatest diameter on MRI of 2016.     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     2016: Total abdominal hysterectomy and BSO, under Dr. Harsh Camarillo. Surgery was originally presumed for leiomyoma,  however, intraoperatively there was focal adherence of a mass to the pelvic right sidewall and cul-de-sac, which led to peritoneal resection and omentectomy. Final pathology, pT2b: showed leiomyosarcoma involving the cul-de-sac and forming two separate masses measuring 10.5 x 6.5 x 4.5 cm and 10.7 x 7.9 x 7.5 cm.     12/26/2016 - 3/2016: s/p 5/6 cycles 25 mg/m2 doxorubicin, 250 mg/m2 dacarbazine.      2/2017: CT: IMPRESSION: In this patient with history of leiomyosarcoma of the  uterus:  1. No CT evidence of disease progression.  2. Unchanged mildly prominent para-aortic lymph nodes, compared to  outside institution CT 11/29/2016. Continued attention on follow-up  imaging is recommended.  3. 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  4. Calcified and noncalcified pulmonary nodules, stable from  11/29/2016. Continued attention on follow-up imaging.     4/2018:  CT:  IMPRESSION:   1. Stable changes of hysterectomy and bilateral salpingo-oophorectomy  without evidence of local recurrence.  2. Stable small pulmonary nodules  3. Mild thickening and hyperenhancement of the gastric mucosa and  proximal duodenum compatible with inflammation/infection.  4. Stable indeterminate hyperenhancing lesion in segment 4A    9/21/18: CT CAP impression:  IMPRESSION: This patient with a history of uterine leiomyosarcoma:  1. No evidence of locally recurrent or metastatic disease in the  chest, abdomen, or pelvis.  2. Unchanged sub-6 mm pulmonary nodules.  3. Unchanged arterial enhancing lesions in the liver, stable dating  back to at least 2/24/2017, suggesting hepatic hemangiomas.     Past Medical History:   Diagnosis Date     Anxiety      Arthritis     pain in feet and knees     Disorder of bone and cartilage, unspecified      Esophageal reflux 2015     GERD (gastroesophageal reflux disease)      Hx of previous reproductive problem 1980     Kidney stone 2011     Personal history of colonic polyps 2016    Small,  benign     PONV (postoperative nausea and vomiting)      Posterior vitreous detachment of left eye 2011     Posterior vitreous detachment of right eye 2011     Ptosis of eyelid, bilateral      Stomach problems 2015    Abdominal pain, Diarreha     Thyroid disease     thyroid nodule resolved 2014       Past Surgical History:   Procedure Laterality Date     ABDOMEN SURGERY  Now    Pain, diarrhea     BREAST SURGERY  2002    right breast benign     COLONOSCOPY  2008    due in 2018     COLONOSCOPY N/A 9/8/2016    Procedure: COMBINED COLONOSCOPY, SINGLE OR MULTIPLE BIOPSY/POLYPECTOMY BY BIOPSY;  Surgeon: Abner Pepper MD;  Location:  GI     ENT SURGERY  1975    tonsillectomy     GENITOURINARY SURGERY  1953    bladder surgery      GI SURGERY  2015    Severe constipation     GYN SURGERY  1977    C section     GYN SURGERY  1981    laparoscopy     GYN SURGERY  2007    myomectomy     REMOVE PORT VASCULAR ACCESS Right 5/9/2017    Procedure: REMOVE PORT VASCULAR ACCESS;  Right Port Removal;  Surgeon: Eric Gibson PA-C;  Location:  OR     REPAIR PTOSIS BILATERAL  2/15/2012    Procedure:REPAIR PTOSIS BILATERAL; BILATERAL UPPER LID PTOSIS REPAIR; Surgeon:BRYCE REYNOLDS; Location:[facility]       Social History     Social History     Marital status: Single     Spouse name: N/A     Number of children: N/A     Years of education: N/A     Occupational History     Not on file.     Social History Main Topics     Smoking status: Never Smoker     Smokeless tobacco: Never Used     Alcohol use Yes      Comment: Social, not often per patient.     Drug use: No     Sexual activity: Not Currently     Partners: Male     Other Topics Concern     Caffeine Concern Yes     coffee few times/wk     Sleep Concern No     Stress Concern Yes     care taker of her mom     Weight Concern Yes     wants to lose wt        Special Diet No     Exercise Yes     walking 45 minutes/day     Seat Belt Yes     Social History Narrative    How  much exercise per week? Walking daily.    How much calcium per day? Supplement and through diet       How much caffeine per day? 3 times a week    How much vitamin D per day? Supplement    Do you/your family wear seatbelts?  Yes    Do you/your family use safety helmets? NA    Do you/your family use sunscreen? Sometimes    Do you/your family keep firearms in the home? No    Do you/your family have a smoke detector(s)? Yes    Do you feel safe in your home? Yes    Has anyone ever touched you in an unwanted manner? 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  2 grandsons now in [place] with the mother.  In a house.  .         Family History   Problem Relation Age of Onset     Family History Negative Mother      glaucoma     Glaucoma Mother      Anxiety Disorder Mother      Heart Failure Mother      Diabetes Mother      Coronary Artery Disease Mother      Hypertension Mother      Breast Cancer Mother      Depression Mother      Obesity Mother      HEART DISEASE Father      Glaucoma Father      Diabetes Father      Coronary Artery Disease Father      Depression Father      Osteoporosis Father      HEART DISEASE Maternal Grandmother      Diabetes Maternal Grandmother      HEART DISEASE Paternal Grandmother      Cancer Paternal Grandfather      stomach     Thyroid Disease Sister      Retinal detachment Sister      Hypertension Sister      Hypertension Sister      Lipids Sister      Cerebrovascular Disease Sister      Depression Sister      HEART DISEASE Paternal Aunt      HEART DISEASE Paternal Uncle      Anxiety Disorder Sister      Depression Sister      Thyroid Disease Sister      Thyroid Disease Sister      Osteoporosis Other        Current Outpatient Prescriptions   Medication     Calcium Citrate-Vitamin D (CALCIUM CITRATE + PO)      Ibuprofen (ADVIL PO)     levothyroxine (SYNTHROID/LEVOTHROID) 75 MCG tablet     Multiple Vitamin (MULTIVITAMINS PO)     venlafaxine (EFFEXOR-XR) 75 MG 24 hr capsule     zolpidem (AMBIEN) 5 MG tablet     No current facility-administered medications for this visit.           Allergies   Allergen Reactions     Erythromycin GI Disturbance     Tramadol Nausea         Health Maintenance  Pap smear: No longer indicated  Mammogram: Last performed 7/16/18, due 7/2019  Colonoscopy: Last performed 9/8/16, due 9/2021  Annual wellness exam: Performed 6/16/18, due 6/2019      Review of Systems  General: Denies fatigue, changes in weight, weakness, appetite changes, night sweats, hot flashes, fever, chills, difficulty sleeping  HEENT: Denies headaches, hair loss, visual difficulty or disturbances, masses, head injury, tinnitus, hearing loss, epistaxis, congestion, problems with teeth or gums, dysphonia, or dysphagia  Pulmonary: Denies cough, sputum, hemoptysis, shortness of breath, dyspnea on exertion, wheezing, or allergies  Cardiovascular: Denies chest pain, fainting, palpitations, murmurs, activity intolerance, swelling in legs, or high blood pressure  Gastrointestinal: + heartburn. Denies nausea, vomiting, constipation, diarrhea, abdominal pain, bloating, melena, hematochezia, or jaundice  Genitourinary: + vaginal dryness. Denies dysuria, urinary urgency/frequency, hematuria, cloudy or malodorous urine, incontinence, repeat urinary tract infections, flank pain, pelvic pain, vaginal bleeding, vaginal discharge  Sexual Function: N/A  Integumentary: Denies rashes, sores, changing moles, or scarring  Hematologic: Denies swollen lymph nodes, masses, easy bruising, or easy bleeding  Musculoskeletal: + back pain, arthralgias. Denies falls, myalgias, stiffness, muscle weakness, or muscle cramps  Neurologic: + poor short term memory. Denies numbness or tingling, balance issues, difficulty with walking, dizziness, seizures, or  tremors  Psychiatric: Denies anxiety, depression, mood changes, suicidal thoughts, or difficulty concentrating  Endocrine: Denies polydipsia, polyuria, temperature intolerance, or history of thyroid disease      OBJECTIVE:    Physical Exam:    /82  Pulse 91  Temp 97.1  F (36.2  C) (Oral)  Wt 64.4 kg (142 lb)  SpO2 100%  BMI 28.21 kg/m2    CONSTITUTIONAL: Alert non-toxic appearing female in no acute distress  HEAD: Normocephalic, atraumatic  EYES: PERRLA; no scleral icterus  ENT: Oropharynx pink without lesions  NECK: Neck supple without lymphadenopathy  RESPIRATORY: Lungs clear to auscultation, no increased work of breathing noted  CV: Regular rate and rhythm, S1S2, no clicks, murmurs, rubs, or gallops; bilateral lower extremities without edema, dorsalis pedis pulses 2+ bilaterally  GASTROINTESTINAL: Normoactive bowel sounds x4 quadrants, abdomen soft, non-distended, and non-tender to palpation without masses or organomegaly  GENITOURINARY: External genitalia and urethral meatus pink without lesions, masses, or excoriation. Vagina hypoestrogenic and stenotic without masses or lesions, requiring an adolescent Pepe speculum. Vaginal cuff without nodularity, masses, or lesions. Cervix surgically absent. Bimanual exam reveals no masses or fullness. Rectovaginal exam confirms these findings.  LYMPHATIC: Cervical, supraclavicular, and inguinal nodes without lymphadenopathy  MUSCULOSKELETAL: Moves all extremities, no obvious muscle wasting  NEUROLOGIC: No gross deficits, normal gait  SKIN: Appropriate color for race, warm and dry, no rashes or lesions to unclothed skin  PSYCHIATRIC: Pleasant and interactive, affect bright, makes appropriate eye contact, thought process linear      Assessment/Plan:  1) Stage IIB leiomyosarcoma: No signs of recurrence. Continue surveillance every three months x2 years (through 3/2019) followed by every six months x3 years (through 3/3022) then annually thereafter with  pelvic/rectal exam. Continue follow up with Dr. Aguilar as scheduled- offered to consolidate her visits and see her after Dr. Aguilar's visit on 1/29/18, however, she declines this. Reviewed signs and symptoms  of recurrence including but not limited to bleeding from vagina, bladder, or rectum, bloating, early satiety, swelling in the lower extremities, abdominal or lower back pain, changes in bowel or bladder patterns, shortness of breath, increased fatigue, unexplained weight loss, and night sweats. Reviewed signs and symptoms for when she should contact the clinic or seek additional care. Patient to contact the clinic with any questions or concerns in the interim.  2) Weight management: BMI 28.21, referral to dietitian placed.  3) Health maintenance issues discussed today include to follow up with PCP for co-morbid conditions and non-gynecologic issues. Commended on excellent health maintenance follow up.   4) Patient verbalized understanding of and agreement with plan.    A total of 20 minutes of face to face time spent with patient, over 50% of which was spent in counseling and coordination of care.    SHIRAZ Parks, FNP-C  Division of Gynecologic Oncology  [facility]  Pager: [phone]

## 2018-11-19 NOTE — MR AVS SNAPSHOT
After Visit Summary   11/19/2018    Maggie Navarrete    MRN: [id number]           Patient Information     Date Of Birth          1950        Visit Information        Provider Department      11/19/2018 11:00 AM Cierra Fernandez APRN CNP [facility]        Today's Diagnoses     Encounter for follow-up surveillance of uterine cancer    -  1       Follow-ups after your visit        Follow-up notes from your care team     Return in about 3 months (around 2/19/2019), or if symptoms worsen or fail to improve, for surveillance with Cierra.      Your next 10 appointments already scheduled     Nov 28, 2018  3:00 PM CST   (Arrive by 2:45 PM)   Return Visit with Vivienne Still RD   [facility]    [address] [place] [address]  [address]  [facility] [id number] [phone]            Jan 22, 2019 11:00 AM CST   (Arrive by 10:30 AM)   MR ABDOMEN W/O & W CONTRAST with [address] [facility] ([facility])    [address] [place] [address]  1st Floor  [facility] [id number] [phone]           How do I prepare for my exam? (Food and drink instructions) Do not eat or drink for 6 hours prior to exam. **If you will be receiving sedation or general anesthesia, please see special notes below.**  How do I prepare for my exam? (Other instructions) Take your medicines as usual, unless your doctor tells you not to. You may or may not receive IV contrast for this exam pending the discretion of the Radiologist.  **If you will be receiving sedation or general anesthesia, please see special notes below.**  What should I wear: The MRI machine uses a strong magnet. Please wear clothes without metal (snaps, zippers). A sweatsuit works well, or we may give you a hospital gown. Please remove any body piercings and hair extensions before you arrive. You will also remove watches, jewelry, hairpins, wallets,  dentures, partial dental plates and hearing aids. You may wear contact lenses, and you may be able to wear your rings. We have a safe place to keep your personal items, but it is safer to leave them at home.  How long does the exam take: Most tests take 30 to 60 minutes.  HOWEVER, IF YOUR DOCTOR PRESCRIBES ANESTHESIA please plan on spending four to five hours in the recovery room.  What should I bring: Bring a list of your current medicines to your exam (including vitamins, minerals and over-the-counter drugs). Also bring the results of similar scans you may have had.  Do I need a : **If you will be receiving sedation or general anesthesia, please see special notes below.**  What should I do after the exam: No Restrictions, You may resume normal activities.  What is this test: MRI (magnetic resonance imaging) uses a strong magnet and radio waves to look inside the body. An MRA (magnetic resonance angiogram) does the same thing, but it lets us look at your blood vessels. A computer turns the radio waves into pictures showing cross sections of the body, much like slices of bread. This helps us see any problems more clearly. You may receive fluid (called  contrast ) before or during your scan. The fluid helps us see the pictures better. We give the fluid through an IV (small needle in your arm).  Who should I call with questions: If you have any questions, please contact your Imaging Department exam site. Directions, parking instructions, and other information is available on our website, [email or web address].            Jan 22, 2019 12:00 PM CST   XR CHEST 2 VIEWS with UCXR1   [facility] ([facility])    [address] [address] [phone]           How do I prepare for my exam? (Food and drink instructions) No Food and Drink Restrictions.  How do I prepare for my exam? (Other instructions) You do not need to do anything special for  this exam.  What should I wear: Wear comfortable clothes.  How long does the exam take: Most scans take less than 5 minutes.  What should I bring: Bring a list of your medicines, including vitamins, minerals and over-the-counter drugs. It is safest to leave personal items at home.  Do I need a :  No  is needed.  What do I need to tell my doctor: Tell your doctor if there s any chance you are pregnant.  What should I do after the exam: No restrictions, You may resume normal activities.  What is this test: An image of a specific body part shown in shades of black and white.  Who should I call with questions: If you have any questions, please call the Imaging Department where you will have your exam. Directions, parking instructions, and other information is available on our website, [email or web address].            Jan 25, 2019 11:00 AM CST   MR PELVIS (GYN) W CONTRAST with [address] [facility] ([facility])    [address] [place] [address] [phone]           How do I prepare for my exam? (Food and drink instructions) **If you will be receiving sedation or general anesthesia, please see special notes below.**  How do I prepare for my exam? (Other instructions) Take your medicines as usual, unless your doctor tells you not to. You may or may not receive intravenous (IV) contrast for this exam pending the discretion of the Radiologist.  You do not need to do anything special to prepare.  **If you will be receiving sedation or general anesthesia, please see special notes below.**  What should I wear: The MRI machine uses a strong magnet. Please wear clothes without metal (snaps, zippers). A sweatsuit works well, or we may give you a hospital gown. Please remove any body piercings and hair extensions before you arrive. You will also remove watches, jewelry, hairpins, wallets, dentures, partial dental plates and hearing aids. You may wear  contact lenses, and you may be able to wear your rings. We have a safe place to keep your personal items, but it is safer to leave them at home.  How long does the exam take: Most tests take 30 to 60 minutes.  HOWEVER, IF YOUR DOCTOR PRESCRIBES ANESTHESIA please plan on spending four to five hours in the recovery room.  What should I bring:  Bring a list of your current medicines to your exam (including vitamins, minerals and over-the-counter drugs).  Do I need a :  **If you will be receiving sedation or general anesthesia, please see special notes below.**  What should I do after the exam: No Restrictions, You may resume normal activities.  What is this test: MRI (magnetic resonance imaging) uses a strong magnet and radio waves to look inside the body. An MRA (magnetic resonance angiogram) does the same thing, but it lets us look at your blood vessels. A computer turns the radio waves into pictures showing cross sections of the body, much like slices of bread. This helps us see any problems more clearly. You may receive fluid (called  contrast ) before or during your scan. The fluid helps us see the pictures better. We give the fluid through an IV (small needle in your arm).  Who should I call with questions:  Please call the Imaging Department at your exam site with any questions. Directions, parking instructions, and other information is available on our website, [email or web address].  How do I prepare if I m having sedation or anesthesia? **IMPORTANT** THE INSTRUCTIONS BELOW ARE ONLY FOR THOSE PATIENTS WHO HAVE BEEN TOLD THEY WILL RECEIVE SEDATION OR GENERAL ANESTHESIA DURING THEIR MRI PROCEDURE:  IF YOU WILL RECEIVE SEDATION (take medicine to help you relax during your exam): You must get the medicine from your doctor before you arrive. Bring the medicine to the exam. Do not take it at home. Arrive one hour early. Bring someone who can take you home after the test. Your medicine will make you sleepy.  After the exam, you may not drive, take a bus or take a taxi by yourself. No eating 8 hours before your exam. You may have clear liquids up until 4 hours before your exam. (Clear liquids include water, clear tea, black coffee and fruit juice without pulp.)  IF YOU WILL RECEIVE ANESTHESIA (be asleep for your exam): Arrive 1 1/2 hours early. Bring someone who can take you home after the test. You may not drive, take a bus or take a taxi by yourself. No eating 8 hours before your exam. You may have clear liquids up until 4 hours before your exam. (Clear liquids include water, clear tea, black coffee and fruit juice without pulp.)            Jan 28, 2019 11:15 AM CST   (Arrive by 11:00 AM)   Return Visit with Ab Aguilar MD   [facility] ([facility])    [address] [place] [address]  [address] [place] [address] [phone]            Feb 22, 2019 11:10 AM CST   (Arrive by 10:55 AM)   Return Visit with SHIRAZ Parks CNP   [facility] ([facility])    [address] [place] [address]  [address]  [facility] [address] [phone]              Who to contact     If you have questions or need follow up information about today's clinic visit or your schedule please contact [facility] directly at [phone].  Normal or non-critical lab and imaging results will be communicated to you by MyChart, letter or phone within 4 business days after the clinic has received the results. If you do not hear from us within 7 days, please contact the clinic through MyChart or phone. If you have a critical or abnormal lab result, we will notify you by phone as soon as possible.  Submit refill requests through [employer] or call your pharmacy and they will forward the refill request to us. Please allow 3 business days for your refill to be completed.          Additional Information About Your Visit        [employer] HospitalPostcard & Tag  Information     [employer] gives you secure access to your electronic health record. If you see a primary care provider, you can also send messages to your care team and make appointments. If you have questions, please call your primary care clinic.  If you do not have a primary care provider, please call [phone] and they will assist you.        Care EveryWhere ID     This is your Care EveryWhere ID. This could be used by other organizations to access your [facility] medical records  [id number]        Your Vitals Were     Pulse Temperature Pulse Oximetry BMI (Body Mass Index)          91 97.1  F (36.2  C) (Oral) 100% 28.21 kg/m2         Blood Pressure from Last 3 Encounters:   11/19/18 118/82   09/27/18 120/79   08/27/18 131/83    Weight from Last 3 Encounters:   11/19/18 64.4 kg (142 lb)   09/27/18 65.3 kg (144 lb)   08/27/18 64.7 kg (142 lb 9.6 oz)              We Performed the Following     DIETITIAN FOLLOW-UP        Primary Care Provider Office Phone # Fax #    Rhea Lin [phone]       [address] [place] [address] [address] [place] [id number]        Equal Access to Services     OSCAR ADLER : Hadii sivakumar haywardo Soelizabeth, waaxda luqadaha, qaybta kaalmada adeegyada, geneva perdue. So [facility] [phone].    ATENCIÓN: Si habla español, tiene a cage disposición servicios gratuitos de asistencia lingüística. Llame al [phone].    We comply with applicable federal civil rights laws and [place] laws. We do not discriminate on the basis of race, color, national origin, age, disability, sex, sexual orientation, or gender identity.            Thank you!     Thank you for choosing [facility]  for your care. Our goal is always to provide you with excellent care. Hearing back from our patients is one way we can continue to improve our services. Please take a few minutes to complete the written survey that you may receive in the mail after your visit with  us. Thank you!             Your Updated Medication List - Protect others around you: Learn how to safely use, store and throw away your medicines at www.disposemymeds.org.          This list is accurate as of 11/19/18 12:23 PM.  Always use your most recent med list.                   Brand Name Dispense Instructions for use Diagnosis    ADVIL PO      Take 400 mg by mouth        CALCIUM CITRATE + PO      Take 200 mg by mouth daily as needed (She supplements to meet her 1,200mg goal depending on how much dietary calcium she has gotten that day (up to 400mg once daily)) Reported on 5/1/2017        levothyroxine 75 MCG tablet    SYNTHROID/LEVOTHROID    90 tablet    Take 1 tablet (75 mcg) by mouth daily    Acquired hypothyroidism       MULTIVITAMINS PO      Take 1 tablet by mouth daily Reported on 4/10/2017        venlafaxine 75 MG 24 hr capsule    EFFEXOR-XR    90 capsule    Take 1 capsule (75 mg) by mouth daily    Generalized anxiety disorder       zolpidem 5 MG tablet    AMBIEN    20 tablet    Take 1 tablet (5 mg) by mouth nightly as needed for sleep (Only uses if traveling or cannot sleep,)    Insomnia, unspecified type

## 2018-11-27 DIAGNOSIS — F41.1 GENERALIZED ANXIETY DISORDER: ICD-10-CM

## 2018-11-28 ENCOUNTER — ONCOLOGY VISIT (OUTPATIENT)
Dept: ONCOLOGY | Facility: CLINIC | Age: 68
End: 2018-11-28
Attending: DIETITIAN, REGISTERED
Payer: MEDICARE

## 2018-11-28 DIAGNOSIS — C54.9 UTERUS CANCER, SARCOMA (H): Primary | ICD-10-CM

## 2018-11-28 DIAGNOSIS — E66.3 OVERWEIGHT: ICD-10-CM

## 2018-11-28 PROCEDURE — 97802 MEDICAL NUTRITION INDIV IN: CPT | Mod: ZF | Performed by: DIETITIAN, REGISTERED

## 2018-11-28 RX ORDER — VENLAFAXINE HYDROCHLORIDE 75 MG/1
CAPSULE, EXTENDED RELEASE ORAL
Qty: 90 CAPSULE | Refills: 1 | Status: SHIPPED | OUTPATIENT
Start: 2018-11-28 | End: 2019-04-05

## 2018-11-28 NOTE — TELEPHONE ENCOUNTER
Last Clinic Visit: 6/16/2018  [facility]  Per last clinic note, follow up on a PRN basis.

## 2018-11-28 NOTE — MR AVS SNAPSHOT
After Visit Summary   11/28/2018    Maggie Navarrete    MRN: [id number]           Patient Information     Date Of Birth          1950        Visit Information        Provider Department      11/28/2018 3:00 PM Vivienne Zimmerman, BIENVENIDO [facility]        Today's Diagnoses     Uterus cancer, sarcoma (H)    -  1    Overweight           Follow-ups after your visit        Your next 10 appointments already scheduled     Jan 22, 2019 11:00 AM CST   (Arrive by 10:30 AM)   MR ABDOMEN W/O & W CONTRAST with [address] [facility] ([facility])    [address] [place] [address] [phone]           How do I prepare for my exam? (Food and drink instructions) Do not eat or drink for 6 hours prior to exam. **If you will be receiving sedation or general anesthesia, please see special notes below.**  How do I prepare for my exam? (Other instructions) Take your medicines as usual, unless your doctor tells you not to. You may or may not receive IV contrast for this exam pending the discretion of the Radiologist.  **If you will be receiving sedation or general anesthesia, please see special notes below.**  What should I wear: The MRI machine uses a strong magnet. Please wear clothes without metal (snaps, zippers). A sweatsuit works well, or we may give you a hospital gown. Please remove any body piercings and hair extensions before you arrive. You will also remove watches, jewelry, hairpins, wallets, dentures, partial dental plates and hearing aids. You may wear contact lenses, and you may be able to wear your rings. We have a safe place to keep your personal items, but it is safer to leave them at home.  How long does the exam take: Most tests take 30 to 60 minutes.  HOWEVER, IF YOUR DOCTOR PRESCRIBES ANESTHESIA please plan on spending four to five hours in the recovery room.  What should I bring: Bring a list of your  current medicines to your exam (including vitamins, minerals and over-the-counter drugs). Also bring the results of similar scans you may have had.  Do I need a : **If you will be receiving sedation or general anesthesia, please see special notes below.**  What should I do after the exam: No Restrictions, You may resume normal activities.  What is this test: MRI (magnetic resonance imaging) uses a strong magnet and radio waves to look inside the body. An MRA (magnetic resonance angiogram) does the same thing, but it lets us look at your blood vessels. A computer turns the radio waves into pictures showing cross sections of the body, much like slices of bread. This helps us see any problems more clearly. You may receive fluid (called  contrast ) before or during your scan. The fluid helps us see the pictures better. We give the fluid through an IV (small needle in your arm).  Who should I call with questions: If you have any questions, please contact your Imaging Department exam site. Directions, parking instructions, and other information is available on our website, [email or web address].            Jan 22, 2019 12:00 PM CST   XR CHEST 2 VIEWS with UCXR1   [facility] ([facility])    [address] [address] [phone]           How do I prepare for my exam? (Food and drink instructions) No Food and Drink Restrictions.  How do I prepare for my exam? (Other instructions) You do not need to do anything special for this exam.  What should I wear: Wear comfortable clothes.  How long does the exam take: Most scans take less than 5 minutes.  What should I bring: Bring a list of your medicines, including vitamins, minerals and over-the-counter drugs. It is safest to leave personal items at home.  Do I need a :  No  is needed.  What do I need to tell my doctor: Tell your doctor if there s any chance you are pregnant.  What  should I do after the exam: No restrictions, You may resume normal activities.  What is this test: An image of a specific body part shown in shades of black and white.  Who should I call with questions: If you have any questions, please call the Imaging Department where you will have your exam. Directions, parking instructions, and other information is available on our website, [email or web address].            Jan 25, 2019 11:00 AM CST   MR PELVIS (GYN) W CONTRAST with UC1   [facility] ([facility])    [address] [place] [address] [phone]           How do I prepare for my exam? (Food and drink instructions) **If you will be receiving sedation or general anesthesia, please see special notes below.**  How do I prepare for my exam? (Other instructions) Take your medicines as usual, unless your doctor tells you not to. You may or may not receive intravenous (IV) contrast for this exam pending the discretion of the Radiologist.  You do not need to do anything special to prepare.  **If you will be receiving sedation or general anesthesia, please see special notes below.**  What should I wear: The MRI machine uses a strong magnet. Please wear clothes without metal (snaps, zippers). A sweatsuit works well, or we may give you a hospital gown. Please remove any body piercings and hair extensions before you arrive. You will also remove watches, jewelry, hairpins, wallets, dentures, partial dental plates and hearing aids. You may wear contact lenses, and you may be able to wear your rings. We have a safe place to keep your personal items, but it is safer to leave them at home.  How long does the exam take: Most tests take 30 to 60 minutes.  HOWEVER, IF YOUR DOCTOR PRESCRIBES ANESTHESIA please plan on spending four to five hours in the recovery room.  What should I bring:  Bring a list of your current medicines to your exam (including vitamins,  minerals and over-the-counter drugs).  Do I need a :  **If you will be receiving sedation or general anesthesia, please see special notes below.**  What should I do after the exam: No Restrictions, You may resume normal activities.  What is this test: MRI (magnetic resonance imaging) uses a strong magnet and radio waves to look inside the body. An MRA (magnetic resonance angiogram) does the same thing, but it lets us look at your blood vessels. A computer turns the radio waves into pictures showing cross sections of the body, much like slices of bread. This helps us see any problems more clearly. You may receive fluid (called  contrast ) before or during your scan. The fluid helps us see the pictures better. We give the fluid through an IV (small needle in your arm).  Who should I call with questions:  Please call the Imaging Department at your exam site with any questions. Directions, parking instructions, and other information is available on our website, [email or web address].  How do I prepare if I m having sedation or anesthesia? **IMPORTANT** THE INSTRUCTIONS BELOW ARE ONLY FOR THOSE PATIENTS WHO HAVE BEEN TOLD THEY WILL RECEIVE SEDATION OR GENERAL ANESTHESIA DURING THEIR MRI PROCEDURE:  IF YOU WILL RECEIVE SEDATION (take medicine to help you relax during your exam): You must get the medicine from your doctor before you arrive. Bring the medicine to the exam. Do not take it at home. Arrive one hour early. Bring someone who can take you home after the test. Your medicine will make you sleepy. After the exam, you may not drive, take a bus or take a taxi by yourself. No eating 8 hours before your exam. You may have clear liquids up until 4 hours before your exam. (Clear liquids include water, clear tea, black coffee and fruit juice without pulp.)  IF YOU WILL RECEIVE ANESTHESIA (be asleep for your exam): Arrive 1 1/2 hours early. Bring someone who can take you home after the test. You may not drive, take a  bus or take a taxi by yourself. No eating 8 hours before your exam. You may have clear liquids up until 4 hours before your exam. (Clear liquids include water, clear tea, black coffee and fruit juice without pulp.)            Jan 28, 2019 11:15 AM CST   (Arrive by 11:00 AM)   Return Visit with Ab Aguilar MD   [facility] ([facility])    [address] [place] [address]  [address]  [facility] [address] [phone]            Feb 22, 2019 11:10 AM CST   (Arrive by 10:55 AM)   Return Visit with SHIRAZ Parks CNP   [facility] ([facility])    [address] [place] [address]  [address]  [facility] [address] [phone]              Who to contact     If you have questions or need follow up information about today's clinic visit or your schedule please contact [facility] directly at [phone].  Normal or non-critical lab and imaging results will be communicated to you by MyChart, letter or phone within 4 business days after the clinic has received the results. If you do not hear from us within 7 days, please contact the clinic through [employer] or phone. If you have a critical or abnormal lab result, we will notify you by phone as soon as possible.  Submit refill requests through [employer] or call your pharmacy and they will forward the refill request to us. Please allow 3 business days for your refill to be completed.          Additional Information About Your Visit        [employer] Information     [employer] gives you secure access to your electronic health record. If you see a primary care provider, you can also send messages to your care team and make appointments. If you have questions, please call your primary care clinic.  If you do not have a primary care provider, please call [phone] and they will assist you.        Care EveryWhere ID     This is your Care EveryWhere ID. This could be used by  other organizations to access your [place] medical records  [id number]         Blood Pressure from Last 3 Encounters:   11/19/18 118/82   09/27/18 120/79   08/27/18 131/83    Weight from Last 3 Encounters:   11/19/18 64.4 kg (142 lb)   09/27/18 65.3 kg (144 lb)   08/27/18 64.7 kg (142 lb 9.6 oz)              We Performed the Following     MNT INDIVIDUAL INITIAL EA 15 MIN        Primary Care Provider Office Phone # Fax #    Rhea Lin [phone]       [address] [place] [address]  [facility] [address]        Equal Access to Services     OSCAR ADLER : Hadnorman Shaver, wazuri koenig, qafernanda juarezalmada mary, geneva perdue. So [facility] [phone].    ATENCIÓN: Si habla español, tiene a cage disposición servicios gratuitos de asistencia lingüística. Llame al [phone].    We comply with applicable federal civil rights laws and [place] laws. We do not discriminate on the basis of race, color, national origin, age, disability, sex, sexual orientation, or gender identity.            Thank you!     Thank you for choosing [facility]  for your care. Our goal is always to provide you with excellent care. Hearing back from our patients is one way we can continue to improve our services. Please take a few minutes to complete the written survey that you may receive in the mail after your visit with us. Thank you!             Your Updated Medication List - Protect others around you: Learn how to safely use, store and throw away your medicines at www.disposemymeds.org.          This list is accurate as of 11/28/18 11:59 PM.  Always use your most recent med list.                   Brand Name Dispense Instructions for use Diagnosis    ADVIL PO      Take 400 mg by mouth        CALCIUM CITRATE + PO      Take 200 mg by mouth daily as needed (She supplements to meet her 1,200mg goal depending on how much dietary calcium she has gotten that day (up  to 400mg once daily)) Reported on 5/1/2017        levothyroxine 75 MCG tablet    SYNTHROID/LEVOTHROID    90 tablet    Take 1 tablet (75 mcg) by mouth daily    Acquired hypothyroidism       MULTIVITAMINS PO      Take 1 tablet by mouth daily Reported on 4/10/2017        venlafaxine 75 MG 24 hr capsule    EFFEXOR-XR    90 capsule    TAKE 1 CAPSULE EVERY DAY    Generalized anxiety disorder       zolpidem 5 MG tablet    AMBIEN    20 tablet    Take 1 tablet (5 mg) by mouth nightly as needed for sleep (Only uses if traveling or cannot sleep,)    Insomnia, unspecified type

## 2018-11-28 NOTE — LETTER
"11/28/2018       RE: Maggie Navarrete  [address]  [facility] [address]     Dear Colleague,    Thank you for referring your patient, Maggie Navarrete, to the [facility]. Please see a copy of my visit note below.    CLINICAL NUTRITION SERVICES - ASSESSMENT NOTE    Maggie Navarrete 68 year old referred for MNT related to weight management, post cancer treatment    Time Spent: 60 minutes  Visit Type:  initial  Referring Physician:  Cierra Fernandez  Pt accompanied by:  self.    NUTRITION HISTORY  Factors affecting nutrition intake include: none at this time  Current diet: general  Current appetite/intake: good    Maggie presents today with desire to lose weight to improve health and reduce risk for cancer.    She is familiar to writer from 1-2 years ago for nutrition during cancer treatment.   Since her cancer treatment ~1.5 years ago, she has gained 20 lbs.    She reports that this is associated with improved health after treatment and foot injury with inability to exercise.   She typically has 3 meals/day, but will eat breakfast 3-4 hours after waking.  She will occasionally skip lunch. She admits to drinking very little water.  She tends to drink more milk than water (~2-3 cups milk/day).   She has been engaging in routine exercise, 4 days/week.  She will do aerobic x 2 days and strength training x 2 days.     Diet Recall  Breakfast 1 pancake (home made), fruit, +/- sausage   Lunch Skips or 2-3 oz meat, vegetables and grain   Dinner Fish or chicken, vegetables, +/- grain, 1 cup 1% milk   Snacks Nuts, fruit   Beverages 2 cups water, 2-3 cups milk   Alcohol No, only special occasions - [place]  (Tiramisu)   Dining out No - all home made meals by herself.      ANTHROPOMETRICS  Height: 59\"  Weight: 142 lb/64kg  BMI: 28  Weight Status:  Overweight BMI 25-29.9  IBW: 100 lbs  % IBW: 142%  Weight History: up ~20 lbs x past 1.5 years    Dosing Weight: 110 " "lbs/50kg      Medications/vitamins/minerals/herbals:   Reviewed    LABS  Labs reviewed    ASSESSED NUTRITION NEEDS:  Estimated Energy Needs: 5839-8510 kcals (20-25 Kcal/Kg)  Justification: overweight  Estimated Protein Needs: 50 grams protein (0.8g pro/Kg)  Justification: maintenance  Estimated Fluid Needs: 4592-7946  mL   Justification: maintenance    NUTRITION DIAGNOSIS:  Food and nutrition-related knowledge deficit related to nutrition needs for weight loss as evidenced by pt with questions regarding calorie requirements for weight loss.     INTERVENTIONS  Nutrition Education     Provided written & verbal education on:   Discussed dietary and behavioral modifications to promote weight loss (portion control, meal consistency, measuring foods, 9\"portion plate method, fiber sources, protein sources, eating pace, exercise and avoidance of disordered eating patterns (skipping meals).  Advised pt to aim for 1200-1300kcal/day for desired weight loss.  Encouraged pt to start tracking her intake (calories) to help ensure she is at goal for weight loss success.  Provided pt with seamus choices for tracking.     Vitamins/minerals - suggested pt continue to take vitamin D and calcium with dx of osteopenia.     Provided pt with corresponding education materials/handouts on:  1200kcal meal plan, 100-kcal snack ideas, list of quick meal ideas and resources for healthful living websites.   and Protein Sources.      Pt verbalize understanding of materials provided during consult.   Patient Understanding: Excellent  Expected Compliance: Excellent    Goals  1.  Aim for 1200-1300kcal and 50g protein/day  2. Weight loss towards 125-130 lbs      Follow-Up Plans: Pt has RD contact information for questions.      Vivienne Still RD, LD          "

## 2018-11-29 NOTE — PROGRESS NOTES
"CLINICAL NUTRITION SERVICES - ASSESSMENT NOTE    Maggie Navarrete 68 year old referred for MNT related to weight management, post cancer treatment    Time Spent: 60 minutes  Visit Type: initial  Referring Physician: Cierra Fernandez  Pt accompanied by: self.    NUTRITION HISTORY  Factors affecting nutrition intake include: none at this time  Current diet: general  Current appetite/intake: good    Maggie presents today with desire to lose weight to improve health and reduce risk for cancer.    She is familiar to writer from 1-2 years ago for nutrition during cancer treatment.   Since her cancer treatment ~1.5 years ago, she has gained 20 lbs.    She reports that this is associated with improved health after treatment and foot injury with inability to exercise.   She typically has 3 meals/day, but will eat breakfast 3-4 hours after waking.  She will occasionally skip lunch. She admits to drinking very little water.  She tends to drink more milk than water (~2-3 cups milk/day).   She has been engaging in routine exercise, 4 days/week.  She will do aerobic x 2 days and strength training x 2 days.     Diet Recall  Breakfast 1 pancake (home made), fruit, +/- sausage   Lunch Skips or 2-3 oz meat, vegetables and grain   Dinner Fish or chicken, vegetables, +/- grain, 1 cup 1% milk   Snacks Nuts, fruit   Beverages 2 cups water, 2-3 cups milk   Alcohol No, only special occasions - [place]  (Tiramisu)   Dining out No - all home made meals by herself.      ANTHROPOMETRICS  Height: 59\"  Weight: 142 lb/64kg  BMI: 28  Weight Status:  Overweight BMI 25-29.9  IBW: 100 lbs  % IBW: 142%  Weight History: up ~20 lbs x past 1.5 years    Dosing Weight: 110 lbs/50kg      Medications/vitamins/minerals/herbals:   Reviewed    LABS  Labs reviewed    ASSESSED NUTRITION NEEDS:  Estimated Energy Needs: 1884-2665 kcals (20-25 Kcal/Kg)  Justification: overweight  Estimated Protein Needs: 50 grams protein (0.8g pro/Kg)  Justification: " "maintenance  Estimated Fluid Needs: 1583-6559  mL   Justification: maintenance    NUTRITION DIAGNOSIS:  Food and nutrition-related knowledge deficit related to nutrition needs for weight loss as evidenced by pt with questions regarding calorie requirements for weight loss.     INTERVENTIONS  Nutrition Education     Provided written & verbal education on:   Discussed dietary and behavioral modifications to promote weight loss (portion control, meal consistency, measuring foods, 9\"portion plate method, fiber sources, protein sources, eating pace, exercise and avoidance of disordered eating patterns (skipping meals).  Advised pt to aim for 1200-1300kcal/day for desired weight loss.  Encouraged pt to start tracking her intake (calories) to help ensure she is at goal for weight loss success.  Provided pt with seamus choices for tracking.     Vitamins/minerals - suggested pt continue to take vitamin D and calcium with dx of osteopenia.     Provided pt with corresponding education materials/handouts on:  1200kcal meal plan, 100-kcal snack ideas, list of quick meal ideas and resources for healthful living websites.   and Protein Sources.      Pt verbalize understanding of materials provided during consult.   Patient Understanding: Excellent  Expected Compliance: Excellent    Goals  1.  Aim for 1200-1300kcal and 50g protein/day  2. Weight loss towards 125-130 lbs      Follow-Up Plans: Pt has RD contact information for questions.      Vivienne Still, BIENVENIDO, LD      " Patient is a 66y old  Female with PMHx of HTN, HLD, DM who presents c/o of worsening left lateral malleolar ulcer and pain x 1 month. Per patient one month ago she had varicosity that "burst" in the same location and subsequently developed progressive blistering and ulceration of the skin. Pt failed outpatient tx of Keflex, bactrim and amoxicillin. Ulcer has worsened since Tuesday per patient, with associated subjective fever/chills, purulent drainage from the wound, N/V, multiple episodes of diarrhea, and severe sharp pain in the LLE not controlled with Percocet. Patient  admitted to vascular service. Wound Cx were taken. She was started on vanco and zosyn and local wound care. Cultures grew pseudomonas. Vanco was stopped. Zosyn continued. Wound improved but pt continued to c/o pain in wound. Pain was controlled with percocet and gabapentin. Pt was d/c home in stable condition. Patient is a 66y old  Female with PMHx of HTN, HLD, DM who presents c/o of worsening left lateral malleolar ulcer and pain x 1 month. Per patient one month ago she had varicosity that "burst" in the same location and subsequently developed progressive blistering and ulceration of the skin. Pt failed outpatient tx of Keflex, bactrim and amoxicillin. Ulcer has worsened since Tuesday per patient, with associated subjective fever/chills, purulent drainage from the wound, N/V, multiple episodes of diarrhea, and severe sharp pain in the LLE not controlled with Percocet. Patient  admitted to vascular service. Wound Cx were taken. She was started on vanco and zosyn and local wound care. Cultures grew pseudomonas. Vanco was stopped. Zosyn continued. Wound improved but pt continued to c/o pain in wound. Pain was controlled with percocet and gabapentin.

## 2019-01-22 ENCOUNTER — ANCILLARY PROCEDURE (OUTPATIENT)
Dept: MRI IMAGING | Facility: CLINIC | Age: 69
End: 2019-01-22
Attending: INTERNAL MEDICINE
Payer: MEDICARE

## 2019-01-22 ENCOUNTER — ANCILLARY PROCEDURE (OUTPATIENT)
Dept: GENERAL RADIOLOGY | Facility: CLINIC | Age: 69
End: 2019-01-22
Attending: INTERNAL MEDICINE
Payer: MEDICARE

## 2019-01-22 DIAGNOSIS — C54.9 MALIGNANT NEOPLASM OF CORPUS UTERI, EXCEPT ISTHMUS (H): ICD-10-CM

## 2019-01-22 NOTE — DISCHARGE INSTRUCTIONS
MRI Contrast Discharge Instructions    The IV contrast you received today will pass out of your body in your  urine. This will happen in the next 24 hours. You will not feel this process.  Your urine will not change color.    Drink at least 4 extra glasses of water or juice today (unless your doctor  has restricted your fluids). This reduces the stress on your kidneys.  You may take your regular medicines.    If you are on dialysis: It is best to have dialysis today.    If you have a reaction: Most reactions happen right away. If you have  any new symptoms after leaving the hospital (such as hives or swelling),  call your hospital at the correct number below. Or call your family doctor.  If you have breathing distress or wheezing, call 911.    Special instructions: ***    I have read and understand the above information.    Signature:______________________________________ Date:___________    Staff:__________________________________________ Date:___________     Time:__________    [place] [facility] Departments:    ___[place]: [phone]  ___[facility]: [phone]  ___[place]: [phone] ___[facility]: [phone]  ___[facility]: [phone]  ___[facility]: [phone]  ___[phone]  ___[facility]: [phone]  ___[phone]

## 2019-01-25 ENCOUNTER — ANCILLARY PROCEDURE (OUTPATIENT)
Dept: MRI IMAGING | Facility: CLINIC | Age: 69
End: 2019-01-25
Attending: INTERNAL MEDICINE
Payer: MEDICARE

## 2019-01-25 DIAGNOSIS — C54.9 MALIGNANT NEOPLASM OF CORPUS UTERI, EXCEPT ISTHMUS (H): ICD-10-CM

## 2019-01-25 RX ORDER — GADOBUTROL 604.72 MG/ML
7.5 INJECTION INTRAVENOUS ONCE
Status: COMPLETED | OUTPATIENT
Start: 2019-01-25 | End: 2019-01-25

## 2019-01-25 RX ADMIN — GADOBUTROL 7 ML: 604.72 INJECTION INTRAVENOUS at 11:35

## 2019-01-28 ENCOUNTER — ONCOLOGY VISIT (OUTPATIENT)
Dept: ONCOLOGY | Facility: CLINIC | Age: 69
End: 2019-01-28
Attending: INTERNAL MEDICINE
Payer: MEDICARE

## 2019-01-28 VITALS
HEIGHT: 60 IN | DIASTOLIC BLOOD PRESSURE: 82 MMHG | HEART RATE: 103 BPM | BODY MASS INDEX: 27.98 KG/M2 | TEMPERATURE: 97.1 F | WEIGHT: 142.5 LBS | SYSTOLIC BLOOD PRESSURE: 136 MMHG | OXYGEN SATURATION: 98 % | RESPIRATION RATE: 16 BRPM

## 2019-01-28 DIAGNOSIS — C49.9 LEIOMYOSARCOMA (H): Primary | ICD-10-CM

## 2019-01-28 PROCEDURE — G0463 HOSPITAL OUTPT CLINIC VISIT: HCPCS | Mod: ZF

## 2019-01-28 PROCEDURE — 99213 OFFICE O/P EST LOW 20 MIN: CPT | Mod: ZP | Performed by: INTERNAL MEDICINE

## 2019-01-28 ASSESSMENT — PAIN SCALES - GENERAL: PAINLEVEL: NO PAIN (0)

## 2019-01-28 ASSESSMENT — MIFFLIN-ST. JEOR: SCORE: 1097.88

## 2019-01-28 NOTE — LETTER
RE: Maggie Navarrete  [address] [facility] [id number]     Dear Colleague,    Thank you for referring your patient, Maggie Navarrete, to the [facility]. Please see a copy of my visit note below.    [facility]  MEDICAL ONCOLOGY PROGRESS NOTE   Jan 28, 2019    Oncologic history:  1. January 2016, she presents to her PCP with 3 weeks of abdominal pain and severe constipation. She is treated for constipation and improves.  2. 3/21/2016, worsening pelvic pressure prompts pelvic examination with an enlarged uterus noted. Transvaginal ultrasound shows enlargement of subserosal fibroids originally seen on MRI in 2006.  3. 5/9/2016, MRI pelvis is obtained, which shows several enhancing uterine masses involving an enlarged uterus, felt radiographically compatible with benign uterine leiomyomata.  3. Summer 2016, she experiences worsening low back pain thought at first thought to be sacroiliitis, but pain persists after corticosteroid injection. Continues with severe constipation.  4. 9/8/2016, she undergoes colonoscopy for persistent abdominal pain. She has sigmoid polyp snared. Abdominal pain and diarrhea persist after colonoscopy prep.  5. 9/14/2016, she has worsening abdominal pain and is evaluated in the ED. CT-scan shows a large subserosal fibroid measuring 9.4 x 9.6 x 9.8 cm, as well as very large fibroids along the posterior uterine body more inferiorly, increased in size to 14 cm in greatest diameter compared to 10 cm in greatest diameter on MRI of 5/9/2016.  6. 9/29/2016, she has staging CT-CAP, which shows a small left sided pleural effusion and a 3 mm pulmonary nodule in the RML.  7. October 2016, self-refers to [facility].  8. 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  9. 11/4/2016, undergoes total abdominal hysterectomy and BSO, under Dr. Harsh Camarillo. Surgery was originally presumed for leiomyoma, however,  intraoperatively there was focal adherence of a mass to the pelvic right sidewall and cul-de-sac, which led to peritoneal resection and omentectomy. Final pathology, pT2b: showed leiomyosarcoma involving the cul-de-sac and forming two separate masses measuring 10.5 x 6.5 x 4.5 cm and 10.7 x 7.9 x 7.5 cm.  10. 12/26/2016, she starts adjuvant chemotherapy with doxorubicin 25 mg/m2/day and DTIC 250 mg/m2/day , days 1-3 every 21 weeks.  11. 3/23/2017, she completes 5 cycles of adjuvant doxorubicin and dacarbazine    HISTORY OF PRESENT ILLNESS  Ms. Navarrete presents today in follow-up and to review restaging studies.     She is feeling well and denies any major complaints. She denies nausea, vomiting, diarrhea, or abdominal pains. No new urinary symptoms. No headaches, shortness of breath, chest pain, or fevers/chills.    MRI abdomen and pelvis are negative for local recurrence or metastasis. Chest X-ray similarly negative. Imaging was reviewed together with the patient today.  ECOG performance status is 0.    Current Outpatient Medications   Medication Sig Dispense Refill     Calcium Citrate-Vitamin D (CALCIUM CITRATE + PO) Take 200 mg by mouth daily as needed (She supplements to meet her 1,200mg goal depending on how much dietary calcium she has gotten that day (up to 400mg once daily)) Reported on 5/1/2017       levothyroxine (SYNTHROID/LEVOTHROID) 75 MCG tablet Take 1 tablet (75 mcg) by mouth daily 90 tablet 3     Multiple Vitamin (MULTIVITAMINS PO) Take 1 tablet by mouth daily Reported on 4/10/2017       venlafaxine (EFFEXOR-XR) 75 MG 24 hr capsule TAKE 1 CAPSULE EVERY DAY 90 capsule 1     zolpidem (AMBIEN) 5 MG tablet Take 1 tablet (5 mg) by mouth nightly as needed for sleep (Only uses if traveling or cannot sleep,) 20 tablet 0     Ibuprofen (ADVIL PO) Take 400 mg by mouth         Past Medical History:   Diagnosis Date     Anxiety      Arthritis     pain in feet and knees     Disorder of bone and cartilage,  unspecified      Esophageal reflux 2015     GERD (gastroesophageal reflux disease)      Hx of previous reproductive problem 1980     Kidney stone 2011     Personal history of colonic polyps 2016    Small, benign     PONV (postoperative nausea and vomiting)      Posterior vitreous detachment of left eye 2011     Posterior vitreous detachment of right eye 2011     Ptosis of eyelid, bilateral      Stomach problems 2015    Abdominal pain, Diarreha     Thyroid disease     thyroid nodule resolved 2014     PHYSICAL EXAMINATION  /82   Pulse 103   Temp 97.1  F (36.2  C) (Oral)   Resp 16   Ht 1.524 m (5')   Wt 64.6 kg (142 lb 8 oz)   SpO2 98%   Breastfeeding? No   BMI 27.83 kg/m     Wt Readings from Last 2 Encounters:   01/28/19 64.6 kg (142 lb 8 oz)   11/19/18 64.4 kg (142 lb)     Physical Exam   Constitutional: She is oriented to person, place, and time. She appears well-developed and well-nourished. No distress.   HENT:   Head: Atraumatic.   Mouth/Throat: Oropharynx is clear and moist.   Eyes: Conjunctivae and EOM are normal. Pupils are equal, round, and reactive to light. No scleral icterus.   Neck: Normal range of motion.   Cardiovascular: Normal rate, regular rhythm and normal heart sounds.   No murmur heard.  Pulmonary/Chest: Effort normal and breath sounds normal. She has no wheezes.   Abdominal: Soft. She exhibits no distension. There is no tenderness.   Musculoskeletal: Normal range of motion. She exhibits no edema.   Lymphadenopathy:     She has no cervical adenopathy.   Neurological: She is alert and oriented to person, place, and time. No cranial nerve deficit. She exhibits normal muscle tone. Coordination normal.   Skin: Skin is warm and dry. No rash noted.   Psychiatric: She has a normal mood and affect. Her behavior is normal.   Nursing note and vitals reviewed.    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  It was a pleasure to see Ms. Navarrete today. Restaging scans today are negative for recurrent or metastatic disease. She is feeling well and denies any complaints.    I will plan to see her back in about 4 months with MRI abdomen and pelvis and chest X-ray. She prefers non-radiation based imaging going forward.    Questions answered.    Ab Warner M.D.   of Medicine  Hematology, Oncology and Transplantation

## 2019-01-28 NOTE — NURSING NOTE
Oncology Rooming Note    January 28, 2019 11:11 AM   Maggie Navarrete is a 68 year old female who presents for:    Chief Complaint   Patient presents with     Oncology Clinic Visit     Return: Leiomyosarcoma     Initial Vitals: /82   Pulse 103   Temp 97.1  F (36.2  C) (Oral)   Resp 16   Ht 1.524 m (5')   Wt 64.6 kg (142 lb 8 oz)   SpO2 98%   Breastfeeding? No   BMI 27.83 kg/m   Estimated body mass index is 27.83 kg/m  as calculated from the following:    Height as of this encounter: 1.524 m (5').    Weight as of this encounter: 64.6 kg (142 lb 8 oz). Body surface area is 1.65 meters squared.  No Pain (0) Comment: Data Unavailable   No LMP recorded. Patient has had a hysterectomy.  Allergies reviewed: Yes  Medications reviewed: Yes    Medications: MEDICATION REFILLS NEEDED TODAY. Provider was notified.  Levothyroxine  Pharmacy name entered into [employer]:    [facility] - [place], [place] [facility] [address] [address]  [facility] - [address]    Clinical concerns: no new concerns today, just would like to hear a good report Dr. Pino Aguilar was notified.    10 minutes for nursing intake (face to face time)     Sonya Frost CMA

## 2019-02-03 NOTE — PROGRESS NOTES
[facility]  MEDICAL ONCOLOGY PROGRESS NOTE   Jan 28, 2019    Oncologic history:  1. January 2016, she presents to her PCP with 3 weeks of abdominal pain and severe constipation. She is treated for constipation and improves.  2. 3/21/2016, worsening pelvic pressure prompts pelvic examination with an enlarged uterus noted. Transvaginal ultrasound shows enlargement of subserosal fibroids originally seen on MRI in 2006.  3. 5/9/2016, MRI pelvis is obtained, which shows several enhancing uterine masses involving an enlarged uterus, felt radiographically compatible with benign uterine leiomyomata.  3. Summer 2016, she experiences worsening low back pain thought at first thought to be sacroiliitis, but pain persists after corticosteroid injection. Continues with severe constipation.  4. 9/8/2016, she undergoes colonoscopy for persistent abdominal pain. She has sigmoid polyp snared. Abdominal pain and diarrhea persist after colonoscopy prep.  5. 9/14/2016, she has worsening abdominal pain and is evaluated in the ED. CT-scan shows a large subserosal fibroid measuring 9.4 x 9.6 x 9.8 cm, as well as very large fibroids along the posterior uterine body more inferiorly, increased in size to 14 cm in greatest diameter compared to 10 cm in greatest diameter on MRI of 5/9/2016.  6. 9/29/2016, she has staging CT-CAP, which shows a small left sided pleural effusion and a 3 mm pulmonary nodule in the RML.  7. October 2016, self-refers to [facility].  8. 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  9. 11/4/2016, undergoes total abdominal hysterectomy and BSO, under Dr. Harsh Camarillo. Surgery was originally presumed for leiomyoma, however, intraoperatively there was focal adherence of a mass to the pelvic right sidewall and cul-de-sac, which led to peritoneal resection and omentectomy. Final pathology, pT2b: showed leiomyosarcoma involving the cul-de-sac and forming two  separate masses measuring 10.5 x 6.5 x 4.5 cm and 10.7 x 7.9 x 7.5 cm.  10. 12/26/2016, she starts adjuvant chemotherapy with doxorubicin 25 mg/m2/day and DTIC 250 mg/m2/day , days 1-3 every 21 weeks.  11. 3/23/2017, she completes 5 cycles of adjuvant doxorubicin and dacarbazine    HISTORY OF PRESENT ILLNESS  Ms. Navarrete presents today in follow-up and to review restaging studies.     She is feeling well and denies any major complaints. She denies nausea, vomiting, diarrhea, or abdominal pains. No new urinary symptoms. No headaches, shortness of breath, chest pain, or fevers/chills.    MRI abdomen and pelvis are negative for local recurrence or metastasis. Chest X-ray similarly negative. Imaging was reviewed together with the patient today.  ECOG performance status is 0.    REVIEW OF SYSTEMS  A 12-point ROS negative except as in HPI    Current Outpatient Medications   Medication Sig Dispense Refill     Calcium Citrate-Vitamin D (CALCIUM CITRATE + PO) Take 200 mg by mouth daily as needed (She supplements to meet her 1,200mg goal depending on how much dietary calcium she has gotten that day (up to 400mg once daily)) Reported on 5/1/2017       levothyroxine (SYNTHROID/LEVOTHROID) 75 MCG tablet Take 1 tablet (75 mcg) by mouth daily 90 tablet 3     Multiple Vitamin (MULTIVITAMINS PO) Take 1 tablet by mouth daily Reported on 4/10/2017       venlafaxine (EFFEXOR-XR) 75 MG 24 hr capsule TAKE 1 CAPSULE EVERY DAY 90 capsule 1     zolpidem (AMBIEN) 5 MG tablet Take 1 tablet (5 mg) by mouth nightly as needed for sleep (Only uses if traveling or cannot sleep,) 20 tablet 0     Ibuprofen (ADVIL PO) Take 400 mg by mouth         Past Medical History:   Diagnosis Date     Anxiety      Arthritis     pain in feet and knees     Disorder of bone and cartilage, unspecified      Esophageal reflux 2015     GERD (gastroesophageal reflux disease)      Hx of previous reproductive problem 1980     Kidney stone 2011     Personal history of colonic  polyps 2016    Small, benign     PONV (postoperative nausea and vomiting)      Posterior vitreous detachment of left eye 2011     Posterior vitreous detachment of right eye 2011     Ptosis of eyelid, bilateral      Stomach problems 2015    Abdominal pain, Diarreha     Thyroid disease     thyroid nodule resolved 2014       PHYSICAL EXAMINATION  /82   Pulse 103   Temp 97.1  F (36.2  C) (Oral)   Resp 16   Ht 1.524 m (5')   Wt 64.6 kg (142 lb 8 oz)   SpO2 98%   Breastfeeding? No   BMI 27.83 kg/m    Wt Readings from Last 2 Encounters:   01/28/19 64.6 kg (142 lb 8 oz)   11/19/18 64.4 kg (142 lb)     Physical Exam   Constitutional: She is oriented to person, place, and time. She appears well-developed and well-nourished. No distress.   HENT:   Head: Atraumatic.   Mouth/Throat: Oropharynx is clear and moist.   Eyes: Conjunctivae and EOM are normal. Pupils are equal, round, and reactive to light. No scleral icterus.   Neck: Normal range of motion.   Cardiovascular: Normal rate, regular rhythm and normal heart sounds.   No murmur heard.  Pulmonary/Chest: Effort normal and breath sounds normal. She has no wheezes.   Abdominal: Soft. She exhibits no distension. There is no tenderness.   Musculoskeletal: Normal range of motion. She exhibits no edema.   Lymphadenopathy:     She has no cervical adenopathy.   Neurological: She is alert and oriented to person, place, and time. No cranial nerve deficit. She exhibits normal muscle tone. Coordination normal.   Skin: Skin is warm and dry. No rash noted.   Psychiatric: She has a normal mood and affect. Her behavior is normal.   Nursing note and vitals reviewed.    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  It was a pleasure to see Ms. Navarrete today. Restaging scans today are negative for recurrent or metastatic disease. She is feeling well and denies  any complaints.    I will plan to see her back in about 4 months with MRI abdomen and pelvis and chest X-ray. She prefers non-radiation based imaging going forward.    Questions answered.    Ab Warner M.D.   of Medicine  Hematology, Oncology and Transplantation

## 2019-02-21 ASSESSMENT — ENCOUNTER SYMPTOMS
LOSS OF CONSCIOUSNESS: 0
SPEECH CHANGE: 0
HEADACHES: 1
SEIZURES: 0
MEMORY LOSS: 0
WEAKNESS: 0
DISTURBANCES IN COORDINATION: 0
TINGLING: 0
NUMBNESS: 0
TREMORS: 0
DIZZINESS: 0
PARALYSIS: 0

## 2019-02-21 NOTE — PROGRESS NOTES
Gynecologic Oncology Follow-Up Visit    RE: Maggie Navarrete  MRN: [id number]  : 1950  Date of Visit: 2019    CC: Maggie Navarrete  is a 68 year old female with a history of stage IIB leiomyosarcoma of the uterus. She completed treatment with RACHEL-BSO, peritoneal resection, and omentectomy on 16 followed by completion of five cycles doxorubicin and darcarbazine 3/23/17. She presents today for a three month surveillance visit.    HPI: Maggie comes to the clinic feeling well from a gynecologic perspective. Notes vaginal dryness but denies need for intervention- uses Replens if this bothers her. Not sexually active. No pelvic pain, vaginal bleeding, bloating, early satiety, or abdominal pain. Up to date on seeing her PCP, mammogram, and colonoscopy as well as Dr. Aguilar with medical oncology. Has been traveling recently. Did fall on the ice a few times and has some soreness to her R shin but denies any changes in sensation, temperature, or strength.      Oncology History:  Summer 2016: Low back pain persisting after corticosteroid injection, severe constipation     2016: Colonoscopy for persistent abdominal pain, sigmoid polyp snared. Abdominal pain and diarrhea persist after colonoscopy prep.     2016: she has worsening abdominal pain and is evaluated in the ED. CT-scan shows a large subserosal fibroid measuring 9.4 x 9.6 x 9.8 cm, as well as very large fibroids along the posterior uterine body more inferiorly, increased in size to 14 cm in greatest diameter compared to 10 cm in greatest diameter on MRI of 2016.     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     2016: Total abdominal hysterectomy and BSO, under Dr. Harsh Camarillo. Surgery was originally presumed for leiomyoma, however, intraoperatively there was focal adherence of a mass to the pelvic right sidewall and cul-de-sac, which led to peritoneal resection and omentectomy.  Final pathology, pT2b: showed leiomyosarcoma involving the cul-de-sac and forming two separate masses measuring 10.5 x 6.5 x 4.5 cm and 10.7 x 7.9 x 7.5 cm.     12/26/2016 - 3/2016: s/p 5/6 cycles 25 mg/m2 doxorubicin, 250 mg/m2 dacarbazine.      2/2017: CT: IMPRESSION: In this patient with history of leiomyosarcoma of the  uterus:  1. No CT evidence of disease progression.  2. Unchanged mildly prominent para-aortic lymph nodes, compared to  outside institution CT 11/29/2016. Continued attention on follow-up  imaging is recommended.  3. 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  4. Calcified and noncalcified pulmonary nodules, stable from  11/29/2016. Continued attention on follow-up imaging.     4/2018:  CT:  IMPRESSION:   1. Stable changes of hysterectomy and bilateral salpingo-oophorectomy  without evidence of local recurrence.  2. Stable small pulmonary nodules  3. Mild thickening and hyperenhancement of the gastric mucosa and  proximal duodenum compatible with inflammation/infection.  4. Stable indeterminate hyperenhancing lesion in segment 4A    9/21/18: CT CAP impression:  IMPRESSION: This patient with a history of uterine leiomyosarcoma:  1. No evidence of locally recurrent or metastatic disease in the  chest, abdomen, or pelvis.  2. Unchanged sub-6 mm pulmonary nodules.  3. Unchanged arterial enhancing lesions in the liver, stable dating  back to at least 2/24/2017, suggesting hepatic hemangiomas.     Past Medical History:   Diagnosis Date     Anxiety      Arthritis     pain in feet and knees     Disorder of bone and cartilage, unspecified      Esophageal reflux 2015     GERD (gastroesophageal reflux disease)      Hx of previous reproductive problem 1980     Kidney stone 2011     Personal history of colonic polyps 2016    Small, benign     PONV (postoperative nausea and vomiting)      Posterior vitreous detachment of left eye 2011     Posterior vitreous detachment of right eye 2011      Ptosis of eyelid, bilateral      Stomach problems 2015    Abdominal pain, Diarreha     Thyroid disease     thyroid nodule resolved 2014       Past Surgical History:   Procedure Laterality Date     ABDOMEN SURGERY  Now    Pain, diarrhea     BREAST SURGERY  2002    right breast benign     COLONOSCOPY  2008    due in 2018     COLONOSCOPY N/A 9/8/2016    Procedure: COMBINED COLONOSCOPY, SINGLE OR MULTIPLE BIOPSY/POLYPECTOMY BY BIOPSY;  Surgeon: Abner Pepper MD;  [facility]     ENT SURGERY  1975    tonsillectomy     GENITOURINARY SURGERY  1953    bladder surgery      GI SURGERY  2015    Severe constipation     GYN SURGERY  1977    C section     GYN SURGERY  1981    laparoscopy     GYN SURGERY  2007    myomectomy     REMOVE PORT VASCULAR ACCESS Right 5/9/2017    Procedure: REMOVE PORT VASCULAR ACCESS;  Right Port Removal;  Surgeon: Eric Gibson PA-C;  Location: [facility]     REPAIR PTOSIS BILATERAL  2/15/2012    Procedure:REPAIR PTOSIS BILATERAL; BILATERAL UPPER LID PTOSIS REPAIR; Surgeon:BRYCE REYNOLDS; Location: [facility]       Social History     Socioeconomic History     Marital status: Single     Spouse name: Not on file     Number of children: Not on file     Years of education: Not on file     Highest education level: Not on file   Occupational History     Not on file   Social Needs     Financial resource strain: Not on file     Food insecurity:     Worry: Not on file     Inability: Not on file     Transportation needs:     Medical: Not on file     Non-medical: Not on file   Tobacco Use     Smoking status: Never Smoker     Smokeless tobacco: Never Used   Substance and Sexual Activity     Alcohol use: Yes     Comment: Social, not often per patient.     Drug use: No     Sexual activity: Not Currently     Partners: Male   Lifestyle     Physical activity:     Days per week: Not on file     Minutes per session: Not on file     Stress: Not on file   Relationships     Social connections:      Talks on phone: Not on file     Gets together: Not on file     Attends Caodaism service: Not on file     Active member of club or organization: Not on file     Attends meetings of clubs or organizations: Not on file     Relationship status: Not on file     Intimate partner violence:     Fear of current or ex partner: Not on file     Emotionally abused: Not on file     Physically abused: Not on file     Forced sexual activity: Not on file   Other Topics Concern     Parent/sibling w/ CABG, MI or angioplasty before 65F 55M? 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 Walking daily.    How much calcium per day? Supplement and through diet       How much caffeine per day? 3 times a week    How much vitamin D per day? Supplement    Do you/your family wear seatbelts?  Yes    Do you/your family use safety helmets? NA    Do you/your family use sunscreen? Sometimes    Do you/your family keep firearms in the home? No    Do you/your family have a smoke detector(s)? Yes    Do you feel safe in your home? Yes    Has anyone ever touched you in an unwanted manner? 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  2 grandsons now in [place] with the mother.  In a house.  .         Family History   Problem Relation Age of Onset     Family History Negative  Mother         glaucoma     Glaucoma Mother      Anxiety Disorder Mother      Heart Failure Mother      Diabetes Mother      Coronary Artery Disease Mother      Hypertension Mother      Breast Cancer Mother      Depression Mother      Obesity Mother      Heart Disease Father      Glaucoma Father      Diabetes Father      Coronary Artery Disease Father      Depression Father      Osteoporosis Father      Heart Disease Maternal Grandmother      Diabetes Maternal Grandmother      Heart Disease Paternal Grandmother      Cancer Paternal Grandfather         stomach     Thyroid Disease Sister      Retinal detachment Sister      Hypertension Sister      Hypertension Sister      Lipids Sister      Cerebrovascular Disease Sister      Depression Sister      Heart Disease Paternal Aunt      Heart Disease Paternal Uncle      Anxiety Disorder Sister      Depression Sister      Thyroid Disease Sister      Thyroid Disease Sister      Osteoporosis Other        Current Outpatient Medications   Medication     Calcium Citrate-Vitamin D (CALCIUM CITRATE + PO)     Ibuprofen (ADVIL PO)     levothyroxine (SYNTHROID/LEVOTHROID) 75 MCG tablet     Multiple Vitamin (MULTIVITAMINS PO)     venlafaxine (EFFEXOR-XR) 75 MG 24 hr capsule     zolpidem (AMBIEN) 5 MG tablet     No current facility-administered medications for this visit.           Allergies   Allergen Reactions     Erythromycin GI Disturbance     Tramadol Nausea         Health Maintenance  Pap smear: No longer indicated  Mammogram: Last performed 7/16/18, due 7/2019  Colonoscopy: Last performed 9/8/16, due 9/2021  Annual wellness exam: Performed 6/16/18, due 6/2019      Review of Systems  Answers for HPI/ROS submitted by the patient on 2/21/2019   General Symptoms: No  Skin Symptoms: No  HENT Symptoms: No  EYE SYMPTOMS: No  HEART SYMPTOMS: No  LUNG SYMPTOMS: No  INTESTINAL SYMPTOMS: No  URINARY SYMPTOMS: No  GYNECOLOGIC SYMPTOMS: No  BREAST SYMPTOMS: No  SKELETAL SYMPTOMS: No  BLOOD  SYMPTOMS: No  NERVOUS SYSTEM SYMPTOMS: Yes  MENTAL HEALTH SYMPTOMS: No  Trouble with coordination: No  Dizziness or trouble with balance: No  Fainting or black-out spells: No  Memory loss: No  Headache: Yes  Seizures: No  Speech problems: No  Tingling: No  Tremor: No  Weakness: No  Difficulty walking: No  Paralysis: No  Numbness: No    I have reviewed the patient's ROS and discussed all pertinent information as noted in the HPI.      OBJECTIVE:    Physical Exam:    /77   Pulse 97   Temp 98.3  F (36.8  C) (Oral)   Wt 64.9 kg (143 lb)   SpO2 99%   BMI 27.93 kg/m      CONSTITUTIONAL: Alert non-toxic appearing female in no acute distress  HEAD: Normocephalic, atraumatic  EYES: PERRLA; no scleral icterus  ENT: Oropharynx pink without lesions  NECK: Neck supple without lymphadenopathy  RESPIRATORY: Lungs clear to auscultation, no increased work of breathing noted  CV: Regular rate and rhythm, S1S2, no clicks, murmurs, rubs, or gallops; bilateral lower extremities without edema, dorsalis pedis pulses 2+ bilaterally  GASTROINTESTINAL: Normoactive bowel sounds x4 quadrants, abdomen soft, non-distended, and non-tender to palpation without masses or organomegaly  GENITOURINARY: External genitalia and urethral meatus pink without lesions, masses, or excoriation. Vagina hypoestrogenic and stenotic without masses or lesions. Vaginal cuff without nodularity, masses, or lesions. Cervix surgically absent. Bimanual exam reveals no masses or fullness. Rectovaginal exam confirms these findings.  LYMPHATIC: Cervical, supraclavicular, and inguinal nodes without lymphadenopathy  MUSCULOSKELETAL: Moves all extremities, no obvious muscle wasting; R shin without ecchymosis or abnormality  NEUROLOGIC: No gross deficits, normal gait  SKIN: Appropriate color for race, warm and dry, no rashes or lesions to unclothed skin  PSYCHIATRIC: Pleasant and interactive, affect bright, makes appropriate eye contact, thought process  linear      Assessment/Plan:  1) Stage IIB leiomyosarcoma: No evidence of recurrent disease. Continue surveillance every six months x3 years (through 3/3022) then annually thereafter with pelvic/rectal exam. Continue follow up with Dr. Aguilar as scheduled. Reviewed signs and symptoms  of recurrence including but not limited to bleeding from vagina, bladder, or rectum, bloating, early satiety, swelling in the lower extremities, abdominal or lower back pain, changes in bowel or bladder patterns, shortness of breath, increased fatigue, unexplained weight loss, and night sweats. Reviewed signs and symptoms for when she should contact the clinic or seek additional care. Patient to contact the clinic with any questions or concerns in the interim.  2) Health maintenance issues discussed today include to follow up with PCP for co-morbid conditions and non-gynecologic issues. Commended on excellent health maintenance follow up. To see her PCP if her R shin worsens or fails to improve.  3) Patient verbalized understanding of and agreement with plan.    A total of 15 minutes of face to face time spent with patient, over 50% of which was spent in counseling and coordination of care.    SHIRAZ Parks, FNP-C  Division of Gynecologic Oncology  [facility] [place]  Pager: [phone]

## 2019-02-22 ENCOUNTER — ONCOLOGY VISIT (OUTPATIENT)
Dept: ONCOLOGY | Facility: CLINIC | Age: 69
End: 2019-02-22
Attending: NURSE PRACTITIONER
Payer: MEDICARE

## 2019-02-22 VITALS
BODY MASS INDEX: 27.93 KG/M2 | SYSTOLIC BLOOD PRESSURE: 115 MMHG | WEIGHT: 143 LBS | TEMPERATURE: 98.3 F | HEART RATE: 97 BPM | DIASTOLIC BLOOD PRESSURE: 77 MMHG | OXYGEN SATURATION: 99 %

## 2019-02-22 DIAGNOSIS — Z85.42 ENCOUNTER FOR FOLLOW-UP SURVEILLANCE OF UTERINE CANCER: Primary | ICD-10-CM

## 2019-02-22 DIAGNOSIS — Z08 ENCOUNTER FOR FOLLOW-UP SURVEILLANCE OF UTERINE CANCER: Primary | ICD-10-CM

## 2019-02-22 PROCEDURE — G0463 HOSPITAL OUTPT CLINIC VISIT: HCPCS | Mod: ZF

## 2019-02-22 PROCEDURE — 99213 OFFICE O/P EST LOW 20 MIN: CPT | Mod: ZP | Performed by: NURSE PRACTITIONER

## 2019-02-22 ASSESSMENT — PAIN SCALES - GENERAL: PAINLEVEL: NO PAIN (0)

## 2019-02-22 NOTE — NURSING NOTE
Oncology Rooming Note    February 22, 2019 11:24 AM   Maggie Navarrete is a 68 year old female who presents for:    Chief Complaint   Patient presents with     Oncology Clinic Visit     Uterine Ca; Return     Initial Vitals: /77   Pulse 97   Temp 98.3  F (36.8  C) (Oral)   Wt 64.9 kg (143 lb)   SpO2 99%   BMI 27.93 kg/m   Estimated body mass index is 27.93 kg/m  as calculated from the following:    Height as of 1/28/19: 1.524 m (5').    Weight as of this encounter: 64.9 kg (143 lb). Body surface area is 1.66 meters squared.  No Pain (0) Comment: Data Unavailable   No LMP recorded. Patient has had a hysterectomy.  Allergies reviewed: Yes  Medications reviewed: Yes    Medications: Medication refills not needed today.  Pharmacy name entered into [facility]:    [facility] - [place], [place] - [address]  [facility] [place] - [address] C[place]

## 2019-02-22 NOTE — LETTER
2019       RE: Maggie Navarrete  [address]  [facility] [address]     Dear Colleague,    Thank you for referring your patient, Maggie Navarrete, to the [facility]. Please see a copy of my visit note below.    Gynecologic Oncology Follow-Up Visit    RE: Maggie Navarrete  MRN: [id number]  : 1950  Date of Visit: 2019    CC: Maggie Navarrete  is a 68 year old female with a history of stage IIB leiomyosarcoma of the uterus. She completed treatment with RACHEL-BSO, peritoneal resection, and omentectomy on 16 followed by completion of five cycles doxorubicin and darcarbazine 3/23/17. She presents today for a three month surveillance visit.    HPI: Maggie comes to the clinic feeling well from a gynecologic perspective. Notes vaginal dryness but denies need for intervention- uses Replens if this bothers her. Not sexually active. No pelvic pain, vaginal bleeding, bloating, early satiety, or abdominal pain. Up to date on seeing her PCP, mammogram, and colonoscopy as well as Dr. Aguilar with medical oncology. Has been traveling recently. Did fall on the ice a few times and has some soreness to her R shin but denies any changes in sensation, temperature, or strength.    Oncology History:  Summer 2016: Low back pain persisting after corticosteroid injection, severe constipation     2016: Colonoscopy for persistent abdominal pain, sigmoid polyp snared. Abdominal pain and diarrhea persist after colonoscopy prep.     2016: she has worsening abdominal pain and is evaluated in the ED. CT-scan shows a large subserosal fibroid measuring 9.4 x 9.6 x 9.8 cm, as well as very large fibroids along the posterior uterine body more inferiorly, increased in size to 14 cm in greatest diameter compared to 10 cm in greatest diameter on MRI of 2016.     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     2016: Total abdominal hysterectomy  and BSO, under Dr. Harsh Camarillo. Surgery was originally presumed for leiomyoma, however, intraoperatively there was focal adherence of a mass to the pelvic right sidewall and cul-de-sac, which led to peritoneal resection and omentectomy. Final pathology, pT2b: showed leiomyosarcoma involving the cul-de-sac and forming two separate masses measuring 10.5 x 6.5 x 4.5 cm and 10.7 x 7.9 x 7.5 cm.     12/26/2016 - 3/2016: s/p 5/6 cycles 25 mg/m2 doxorubicin, 250 mg/m2 dacarbazine.      2/2017: CT: IMPRESSION: In this patient with history of leiomyosarcoma of the  uterus:  1. No CT evidence of disease progression.  2. Unchanged mildly prominent para-aortic lymph nodes, compared to  outside institution CT 11/29/2016. Continued attention on follow-up  imaging is recommended.  3. 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  4. Calcified and noncalcified pulmonary nodules, stable from  11/29/2016. Continued attention on follow-up imaging.     4/2018:  CT:  IMPRESSION:   1. Stable changes of hysterectomy and bilateral salpingo-oophorectomy  without evidence of local recurrence.  2. Stable small pulmonary nodules  3. Mild thickening and hyperenhancement of the gastric mucosa and  proximal duodenum compatible with inflammation/infection.  4. Stable indeterminate hyperenhancing lesion in segment 4A    9/21/18: CT CAP impression:  IMPRESSION: This patient with a history of uterine leiomyosarcoma:  1. No evidence of locally recurrent or metastatic disease in the  chest, abdomen, or pelvis.  2. Unchanged sub-6 mm pulmonary nodules.  3. Unchanged arterial enhancing lesions in the liver, stable dating  back to at least 2/24/2017, suggesting hepatic hemangiomas.     Past Medical History:   Diagnosis Date     Anxiety      Arthritis     pain in feet and knees     Disorder of bone and cartilage, unspecified      Esophageal reflux 2015     GERD (gastroesophageal reflux disease)      Hx of previous reproductive problem  1980     Kidney stone 2011     Personal history of colonic polyps 2016    Small, benign     PONV (postoperative nausea and vomiting)      Posterior vitreous detachment of left eye 2011     Posterior vitreous detachment of right eye 2011     Ptosis of eyelid, bilateral      Stomach problems 2015    Abdominal pain, Diarreha     Thyroid disease     thyroid nodule resolved 2014       Past Surgical History:   Procedure Laterality Date     ABDOMEN SURGERY  Now    Pain, diarrhea     BREAST SURGERY  2002    right breast benign     COLONOSCOPY  2008    due in 2018     COLONOSCOPY N/A 9/8/2016    Procedure: COMBINED COLONOSCOPY, SINGLE OR MULTIPLE BIOPSY/POLYPECTOMY BY BIOPSY;  Surgeon: Abner Pepper MD;  Location:  GI     ENT SURGERY  1975    tonsillectomy     GENITOURINARY SURGERY  1953    bladder surgery      GI SURGERY  2015    Severe constipation     GYN SURGERY  1977    C section     GYN SURGERY  1981    laparoscopy     GYN SURGERY  2007    myomectomy     REMOVE PORT VASCULAR ACCESS Right 5/9/2017    Procedure: REMOVE PORT VASCULAR ACCESS;  Right Port Removal;  Surgeon: Eric Gibson PA-C;  Location:  OR     REPAIR PTOSIS BILATERAL  2/15/2012    Procedure:REPAIR PTOSIS BILATERAL; BILATERAL UPPER LID PTOSIS REPAIR; Surgeon:BRYCE REYNOLDS; Location:[facility]       Social History     Socioeconomic History     Marital status: Single     Spouse name: Not on file     Number of children: Not on file     Years of education: Not on file     Highest education level: Not on file   Occupational History     Not on file   Social Needs     Financial resource strain: Not on file     Food insecurity:     Worry: Not on file     Inability: Not on file     Transportation needs:     Medical: Not on file     Non-medical: Not on file   Tobacco Use     Smoking status: Never Smoker     Smokeless tobacco: Never Used   Substance and Sexual Activity     Alcohol use: Yes     Comment: Social, not often per patient.     Drug  use: No     Sexual activity: Not Currently     Partners: Male   Lifestyle     Physical activity:     Days per week: Not on file     Minutes per session: Not on file     Stress: Not on file   Relationships     Social connections:     Talks on phone: Not on file     Gets together: Not on file     Attends Cheondoism service: Not on file     Active member of club or organization: Not on file     Attends meetings of clubs or organizations: Not on file     Relationship status: Not on file     Intimate partner violence:     Fear of current or ex partner: Not on file     Emotionally abused: Not on file     Physically abused: Not on file     Forced sexual activity: Not on file   Other Topics Concern     Parent/sibling w/ CABG, MI or angioplasty before 65F 55M? 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 Walking daily.    How much calcium per day? Supplement and through diet       How much caffeine per day? 3 times a week    How much vitamin D per day? Supplement    Do you/your family wear seatbelts?  Yes    Do you/your family use safety helmets? NA    Do you/your family use sunscreen? Sometimes    Do you/your family keep firearms in the home? No    Do you/your family have a smoke detector(s)? Yes    Do you feel safe in your home? Yes    Has anyone ever touched you in an unwanted manner? 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  2 grandsons now in [place] with the mother.  In a house.  .         Family History   Problem Relation Age of Onset     Family History Negative Mother         glaucoma     Glaucoma Mother      Anxiety Disorder Mother      Heart Failure Mother      Diabetes Mother      Coronary Artery Disease Mother      Hypertension Mother      Breast Cancer Mother      Depression Mother      Obesity Mother      Heart Disease Father      Glaucoma Father      Diabetes Father      Coronary Artery Disease Father      Depression Father      Osteoporosis Father      Heart Disease Maternal Grandmother      Diabetes Maternal Grandmother      Heart Disease Paternal Grandmother      Cancer Paternal Grandfather         stomach     Thyroid Disease Sister      Retinal detachment Sister      Hypertension Sister      Hypertension Sister      Lipids Sister      Cerebrovascular Disease Sister      Depression Sister      Heart Disease Paternal Aunt      Heart Disease Paternal Uncle      Anxiety Disorder Sister      Depression Sister      Thyroid Disease Sister      Thyroid Disease Sister      Osteoporosis Other        Current Outpatient Medications   Medication     Calcium Citrate-Vitamin D (CALCIUM CITRATE + PO)     Ibuprofen (ADVIL PO)     levothyroxine (SYNTHROID/LEVOTHROID) 75 MCG tablet     Multiple Vitamin (MULTIVITAMINS PO)     venlafaxine (EFFEXOR-XR) 75 MG 24 hr capsule     zolpidem (AMBIEN) 5 MG tablet     No current facility-administered medications for this visit.        Allergies   Allergen Reactions     Erythromycin GI Disturbance     Tramadol Nausea       Health Maintenance  Pap smear: No longer indicated  Mammogram: Last performed 18, due 2019  Colonoscopy: Last performed 16, due 2021  Annual wellness exam: Performed 18, due 2019    I have reviewed the patient's ROS and discussed all pertinent information as noted in the  HPI.    OBJECTIVE:    Physical Exam:    /77   Pulse 97   Temp 98.3  F (36.8  C) (Oral)   Wt 64.9 kg (143 lb)   SpO2 99%   BMI 27.93 kg/m       CONSTITUTIONAL: Alert non-toxic appearing female in no acute distress  HEAD: Normocephalic, atraumatic  EYES: PERRLA; no scleral icterus  ENT: Oropharynx pink without lesions  NECK: Neck supple without lymphadenopathy  RESPIRATORY: Lungs clear to auscultation, no increased work of breathing noted  CV: Regular rate and rhythm, S1S2, no clicks, murmurs, rubs, or gallops; bilateral lower extremities without edema, dorsalis pedis pulses 2+ bilaterally  GASTROINTESTINAL: Normoactive bowel sounds x4 quadrants, abdomen soft, non-distended, and non-tender to palpation without masses or organomegaly  GENITOURINARY: External genitalia and urethral meatus pink without lesions, masses, or excoriation. Vagina hypoestrogenic and stenotic without masses or lesions. Vaginal cuff without nodularity, masses, or lesions. Cervix surgically absent. Bimanual exam reveals no masses or fullness. Rectovaginal exam confirms these findings.  LYMPHATIC: Cervical, supraclavicular, and inguinal nodes without lymphadenopathy  MUSCULOSKELETAL: Moves all extremities, no obvious muscle wasting; R shin without ecchymosis or abnormality  NEUROLOGIC: No gross deficits, normal gait  SKIN: Appropriate color for race, warm and dry, no rashes or lesions to unclothed skin  PSYCHIATRIC: Pleasant and interactive, affect bright, makes appropriate eye contact, thought process linear    Assessment/Plan:  1) Stage IIB leiomyosarcoma: No evidence of recurrent disease. Continue surveillance every six months x3 years (through 3/3022) then annually thereafter with pelvic/rectal exam. Continue follow up with Dr. Aguilar as scheduled. Reviewed signs and symptoms  of recurrence including but not limited to bleeding from vagina, bladder, or rectum, bloating, early satiety, swelling in the lower extremities, abdominal or  lower back pain, changes in bowel or bladder patterns, shortness of breath, increased fatigue, unexplained weight loss, and night sweats. Reviewed signs and symptoms for when she should contact the clinic or seek additional care. Patient to contact the clinic with any questions or concerns in the interim.  2) Health maintenance issues discussed today include to follow up with PCP for co-morbid conditions and non-gynecologic issues. Commended on excellent health maintenance follow up. To see her PCP if her R shin worsens or fails to improve.  3) Patient verbalized understanding of and agreement with plan.    A total of 15 minutes of face to face time spent with patient, over 50% of which was spent in counseling and coordination of care.    SHIRAZ Parks, FNP-C  Division of Gynecologic Oncology  [facility]  Pager: [phone]

## 2019-02-25 DIAGNOSIS — E03.9 ACQUIRED HYPOTHYROIDISM: ICD-10-CM

## 2019-02-25 RX ORDER — LEVOTHYROXINE SODIUM 75 UG/1
75 TABLET ORAL DAILY
Qty: 90 TABLET | Refills: 0 | Status: SHIPPED | OUTPATIENT
Start: 2019-02-25 | End: 2019-05-20

## 2019-02-25 NOTE — TELEPHONE ENCOUNTER
Last Clinic Visit: 6/16/2018  [facility]    TSH overdue - clinic notified

## 2019-04-05 ENCOUNTER — TELEPHONE (OUTPATIENT)
Dept: INTERNAL MEDICINE | Facility: CLINIC | Age: 69
End: 2019-04-05

## 2019-04-05 ENCOUNTER — OFFICE VISIT (OUTPATIENT)
Dept: INTERNAL MEDICINE | Facility: CLINIC | Age: 69
End: 2019-04-05
Payer: MEDICARE

## 2019-04-05 VITALS
SYSTOLIC BLOOD PRESSURE: 115 MMHG | WEIGHT: 140.4 LBS | HEART RATE: 82 BPM | RESPIRATION RATE: 16 BRPM | HEIGHT: 60 IN | DIASTOLIC BLOOD PRESSURE: 80 MMHG | OXYGEN SATURATION: 100 % | BODY MASS INDEX: 27.56 KG/M2

## 2019-04-05 DIAGNOSIS — E03.9 HYPOTHYROIDISM, UNSPECIFIED TYPE: ICD-10-CM

## 2019-04-05 DIAGNOSIS — L60.9 LONGITUDINAL NAIL RIDGE: ICD-10-CM

## 2019-04-05 DIAGNOSIS — F41.1 GENERALIZED ANXIETY DISORDER: ICD-10-CM

## 2019-04-05 DIAGNOSIS — M79.661 PAIN OF RIGHT LOWER LEG: ICD-10-CM

## 2019-04-05 DIAGNOSIS — G47.00 INSOMNIA, UNSPECIFIED TYPE: ICD-10-CM

## 2019-04-05 DIAGNOSIS — M79.661 PAIN OF RIGHT LOWER LEG: Primary | ICD-10-CM

## 2019-04-05 DIAGNOSIS — E78.5 HYPERLIPIDEMIA LDL GOAL <130: ICD-10-CM

## 2019-04-05 LAB
CHOLEST SERPL-MCNC: 208 MG/DL
HDLC SERPL-MCNC: 68 MG/DL
LDLC SERPL CALC-MCNC: 121 MG/DL
NONHDLC SERPL-MCNC: 140 MG/DL
TRIGL SERPL-MCNC: 95 MG/DL
TSH SERPL DL<=0.005 MIU/L-ACNC: 1.14 MU/L (ref 0.4–4)

## 2019-04-05 RX ORDER — VENLAFAXINE HYDROCHLORIDE 75 MG/1
75 CAPSULE, EXTENDED RELEASE ORAL DAILY
Qty: 90 CAPSULE | Refills: 3 | Status: SHIPPED | OUTPATIENT
Start: 2019-04-05 | End: 2019-05-20

## 2019-04-05 RX ORDER — ZOLPIDEM TARTRATE 5 MG/1
5 TABLET ORAL
Qty: 20 TABLET | Refills: 0 | Status: SHIPPED | OUTPATIENT
Start: 2019-04-05 | End: 2019-08-19

## 2019-04-05 ASSESSMENT — MIFFLIN-ST. JEOR: SCORE: 1088.35

## 2019-04-05 ASSESSMENT — PAIN SCALES - GENERAL: PAINLEVEL: NO PAIN (0)

## 2019-04-05 NOTE — PATIENT INSTRUCTIONS
[facility] Medication Refill Request Information:  * Please contact your pharmacy regarding ANY request for medication refills.  ** [facility] Prescription Fax = [phone]  * Please allow 3 business days for routine medication refills.  * Please allow 5 business days for controlled substance medication refills.     [facility] Test Result notification information:  *You will be notified with in 7-10 days of your appointment day regarding the results of your test.  If you are on MyChart you will be notified as soon as the provider has reviewed the results and signed off on them.    [facility] [phone]

## 2019-04-05 NOTE — TELEPHONE ENCOUNTER
Prior Authorization Retail Medication Request    Medication/Dose: Diclofenac 3 % Gel  ICD code (if different than what is on RX):  M79.661  Previously Tried and Failed:  unknown  Rationale:  none    Insurance Name:  medicare  Insurance ID:  [id number]

## 2019-04-05 NOTE — NURSING NOTE
Chief Complaint   Patient presents with     Fall     Pt complains of a fall that happened in January. Complains of right leg pain when applying pressure.         Adrian Ace, EMT on 4/5/2019 at 7:39 AM

## 2019-04-05 NOTE — TELEPHONE ENCOUNTER
Central Prior Authorization Team   Phone: [phone]    PA Initiation    Medication: Diclofenac 3 % Gel  Insurance Company: [employer] [place] - Phone [phone] Fax [phone]  Pharmacy Filling the Rx: [facility] [address] [place], [place] - [address]  Filling Pharmacy Phone: [phone]  Filling Pharmacy Fax: [phone]  Start Date: 4/5/2019

## 2019-04-05 NOTE — PROGRESS NOTES
CC:  Right knee pain    HPI:  Slipped on ice about 3 months ago, landed on right lateral knee  Continues to have right lateral leg pain, just distal to knee, hurts to lie on it or put pressure on it  Hasn't tried OTC analgesics, just avoiding leaning on it, d  Aggravated by doing yoga child's pose, getting up off floor  Burning type pain, severe when painful, resolves quickly with removing pressure to area  No buckling or giving way, no bruising, swelling, redness, rash, no radiation down leg, no leg/foot weakness   Similar episode in past, lasted 1 year, laterla left thigh  Also, thumbnails have ridges and wanted me to take a look at them  And needs some rx refills  And reviewed HCM    Patient Active Problem List   Diagnosis     MDD (major depressive disorder)     Presbyopia - Both Eyes     Osteopenia     CAROLA (generalized anxiety disorder)     Vaginal atrophy     Uterus cancer, sarcoma (H)     Chemotherapy-induced neutropenia (H)     Chemotherapy induced cardiomyopathy (H)     Encounter for follow-up surveillance of uterine cancer     Overweight     Leiomyosarcoma (H)        Current Outpatient Medications   Medication Sig Dispense Refill     Calcium Citrate-Vitamin D (CALCIUM CITRATE + PO) Take 200 mg by mouth daily as needed (She supplements to meet her 1,200mg goal depending on how much dietary calcium she has gotten that day (up to 400mg once daily)) Reported on 5/1/2017       diclofenac sodium 3 % GEL Apply gel to affected area on right leg as directed 100 g 11     Ibuprofen (ADVIL PO) Take 400 mg by mouth       levothyroxine (SYNTHROID/LEVOTHROID) 75 MCG tablet Take 1 tablet (75 mcg) by mouth daily 90 tablet 0     Multiple Vitamin (MULTIVITAMINS PO) Take 1 tablet by mouth daily Reported on 4/10/2017       venlafaxine (EFFEXOR-XR) 75 MG 24 hr capsule Take 1 capsule (75 mg) by mouth daily 90 capsule 3     zolpidem (AMBIEN) 5 MG tablet Take 1 tablet (5 mg) by mouth nightly as needed for sleep (Only uses if  traveling or cannot sleep,) 20 tablet 0     Allergies   Allergen Reactions     Erythromycin GI Disturbance     Tramadol Nausea     /80 (BP Location: Right arm, Patient Position: Sitting, Cuff Size: Adult Regular)   Pulse 82   Resp 16   Ht 1.524 m (5')   Wt 63.7 kg (140 lb 6.4 oz)   SpO2 100%   BMI 27.42 kg/m    Right knee exam reveals tenderness along lateral knee extending distally about 6 inches.  No other tender areas.  Has FROM. Some crepitus on extension.  No Baker's cyst.  Intact MCL, LCL, ACL, PCL's, negative Clemencia's  No motor or light touch sensory deficit  Fingernails normal except for thumbnails appear dry and have some nail-toned vertical ridges.  No onychomycosis, paronychia, beau's lines, pitting, etc.    Maggie was seen today for fall.    Diagnoses and all orders for this visit:    Pain of right lower leg, possibly irritation of peroneal nerve given fall/ trauma to that area.  No other findings on exam  -     diclofenac sodium 3 % GEL; Apply gel to affected area on right leg as directed  -     Avoid any pressure to the area.  RTC if any sensory/motor symptoms develop, or if symptoms persist    Generalized anxiety disorder  -     Refill venlafaxine (EFFEXOR-XR) 75 MG 24 hr capsule; Take 1 capsule (75 mg) by mouth daily    Insomnia, unspecified type  -     Refill zolpidem (AMBIEN) 5 MG tablet; Take 1 tablet (5 mg) by mouth nightly as needed for sleep (Only uses if traveling or cannot sleep,)    Hyperlipidemia LDL goal <130  -     Lipid Profile FASTING; Future    Hypothyroidism, unspecified type  -     TSH; Future    Thumbnail vertical ridges, no other nail findings, not concerning for manifestation of chronic disease or condition  - Moisturize, avoid wet-dry cycling    F/U in June for routine physical.  She will investigate whether it will be a Medicare Wellness or Routine Physical type visit.    Total time spent 40 minutes.  More than 50% of the time spent with Ms. Navarrete on counseling /  coordinating her care      Rhea Lin M.D.  Internal Medicine  [facility]   pager [phone]

## 2019-04-08 NOTE — TELEPHONE ENCOUNTER
Patient's coverage through [facility] is discount plan. Will re-initiated P/A requst through [facility].

## 2019-04-09 NOTE — TELEPHONE ENCOUNTER
PRIOR AUTHORIZATION DENIED    Medication: Diclofenac 3 % Gel - P/A DENIED    Denial Date: 4/8/2019    Denial Rational: The off label use for this patient is not medically accepted per Medicare            Appeal Information:

## 2019-05-13 ENCOUNTER — ANCILLARY PROCEDURE (OUTPATIENT)
Dept: MRI IMAGING | Facility: CLINIC | Age: 69
End: 2019-05-13
Payer: MEDICARE

## 2019-05-13 ENCOUNTER — ANCILLARY PROCEDURE (OUTPATIENT)
Dept: GENERAL RADIOLOGY | Facility: CLINIC | Age: 69
End: 2019-05-13
Payer: MEDICARE

## 2019-05-13 DIAGNOSIS — C49.9 LEIOMYOSARCOMA (H): ICD-10-CM

## 2019-05-13 RX ORDER — GADOBUTROL 604.72 MG/ML
7.5 INJECTION INTRAVENOUS ONCE
Status: COMPLETED | OUTPATIENT
Start: 2019-05-13 | End: 2019-05-13

## 2019-05-13 RX ADMIN — GADOBUTROL 7.5 ML: 604.72 INJECTION INTRAVENOUS at 12:20

## 2019-05-13 NOTE — DISCHARGE INSTRUCTIONS
MRI Contrast Discharge Instructions    The IV contrast you received today will pass out of your body in your  urine. This will happen in the next 24 hours. You will not feel this process.  Your urine will not change color.    Drink at least 4 extra glasses of water or juice today (unless your doctor  has restricted your fluids). This reduces the stress on your kidneys.  You may take your regular medicines.    If you are on dialysis: It is best to have dialysis today.    If you have a reaction: Most reactions happen right away. If you have  any new symptoms after leaving the hospital (such as hives or swelling),  call your hospital at the correct number below. Or call your family doctor.  If you have breathing distress or wheezing, call 911.    Special instructions: ***    I have read and understand the above information.    Signature:______________________________________ Date:___________    Staff:__________________________________________ Date:___________     Time:__________    [place] [facility] Departments:    ___[place]: [phone]  ___[facility]: [phone]  ___[place]: [phone] ___[facility]: [phone]  ___[facility]: [phone]  ___[facility]: [phone]  ___[phone]  ___[facility]: [phone]  ___[phone]

## 2019-05-17 ENCOUNTER — ANCILLARY PROCEDURE (OUTPATIENT)
Dept: MRI IMAGING | Facility: CLINIC | Age: 69
End: 2019-05-17
Payer: MEDICARE

## 2019-05-17 DIAGNOSIS — C49.9 LEIOMYOSARCOMA (H): ICD-10-CM

## 2019-05-17 RX ORDER — GADOBUTROL 604.72 MG/ML
7.5 INJECTION INTRAVENOUS ONCE
Status: COMPLETED | OUTPATIENT
Start: 2019-05-17 | End: 2019-05-17

## 2019-05-17 RX ADMIN — GADOBUTROL 7.5 ML: 604.72 INJECTION INTRAVENOUS at 12:05

## 2019-05-17 NOTE — DISCHARGE INSTRUCTIONS
MRI Contrast Discharge Instructions    The IV contrast you received today will pass out of your body in your  urine. This will happen in the next 24 hours. You will not feel this process.  Your urine will not change color.    Drink at least 4 extra glasses of water or juice today (unless your doctor  has restricted your fluids). This reduces the stress on your kidneys.  You may take your regular medicines.    If you are on dialysis: It is best to have dialysis today.    If you have a reaction: Most reactions happen right away. If you have  any new symptoms after leaving the hospital (such as hives or swelling),  call your hospital at the correct number below. Or call your family doctor.  If you have breathing distress or wheezing, call 911.    Special instructions: ***    I have read and understand the above information.    Signature:______________________________________ Date:___________    Staff:__________________________________________ Date:___________     Time:__________    [place] [facility] Departments:    ___[place]: [phone]  ___[facility]: [phone]  ___[place]: [phone] ___[facility]: [phone]  ___[facility] [place]: [phone]  ___[facility]: [phone]  ___[phone]  ___[facility]: [phone]  ___[phone]

## 2019-05-20 ENCOUNTER — ONCOLOGY VISIT (OUTPATIENT)
Dept: ONCOLOGY | Facility: CLINIC | Age: 69
End: 2019-05-20
Attending: INTERNAL MEDICINE
Payer: MEDICARE

## 2019-05-20 VITALS
DIASTOLIC BLOOD PRESSURE: 82 MMHG | OXYGEN SATURATION: 99 % | RESPIRATION RATE: 16 BRPM | BODY MASS INDEX: 27.82 KG/M2 | HEART RATE: 89 BPM | TEMPERATURE: 97.1 F | HEIGHT: 60 IN | SYSTOLIC BLOOD PRESSURE: 132 MMHG | WEIGHT: 141.7 LBS

## 2019-05-20 DIAGNOSIS — C49.9 SARCOMA (H): Primary | ICD-10-CM

## 2019-05-20 DIAGNOSIS — E03.9 ACQUIRED HYPOTHYROIDISM: ICD-10-CM

## 2019-05-20 DIAGNOSIS — F41.1 GENERALIZED ANXIETY DISORDER: ICD-10-CM

## 2019-05-20 PROCEDURE — G0463 HOSPITAL OUTPT CLINIC VISIT: HCPCS | Mod: ZF

## 2019-05-20 PROCEDURE — 99213 OFFICE O/P EST LOW 20 MIN: CPT | Mod: ZP | Performed by: INTERNAL MEDICINE

## 2019-05-20 ASSESSMENT — PAIN SCALES - GENERAL: PAINLEVEL: NO PAIN (0)

## 2019-05-20 ASSESSMENT — MIFFLIN-ST. JEOR: SCORE: 1089.25

## 2019-05-20 NOTE — PROGRESS NOTES
[facility]  MEDICAL ONCOLOGY PROGRESS NOTE   May 20, 2019    Oncologic history:  1. January 2016, she presents to her PCP with 3 weeks of abdominal pain and severe constipation. She is treated for constipation and improves.  2. 3/21/2016, worsening pelvic pressure prompts pelvic examination with an enlarged uterus noted. Transvaginal ultrasound shows enlargement of subserosal fibroids originally seen on MRI in 2006.  3. 5/9/2016, MRI pelvis is obtained, which shows several enhancing uterine masses involving an enlarged uterus, felt radiographically compatible with benign uterine leiomyomata.  3. Summer 2016, she experiences worsening low back pain thought at first thought to be sacroiliitis, but pain persists after corticosteroid injection. Continues with severe constipation.  4. 9/8/2016, she undergoes colonoscopy for persistent abdominal pain. She has sigmoid polyp snared. Abdominal pain and diarrhea persist after colonoscopy prep.  5. 9/14/2016, she has worsening abdominal pain and is evaluated in the ED. CT-scan shows a large subserosal fibroid measuring 9.4 x 9.6 x 9.8 cm, as well as very large fibroids along the posterior uterine body more inferiorly, increased in size to 14 cm in greatest diameter compared to 10 cm in greatest diameter on MRI of 5/9/2016.  6. 9/29/2016, she has staging CT-CAP, which shows a small left sided pleural effusion and a 3 mm pulmonary nodule in the RML.  7. October 2016, self-refers to [facility].  8. 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  9. 11/4/2016, undergoes total abdominal hysterectomy and BSO, under Dr. Harsh Camarillo. Surgery was originally presumed for leiomyoma, however, intraoperatively there was focal adherence of a mass to the pelvic right sidewall and cul-de-sac, which led to peritoneal resection and omentectomy. Final pathology, pT2b: showed leiomyosarcoma involving the cul-de-sac and forming two  separate masses measuring 10.5 x 6.5 x 4.5 cm and 10.7 x 7.9 x 7.5 cm.  10. 12/26/2016, she starts adjuvant chemotherapy with doxorubicin 25 mg/m2/day and DTIC 250 mg/m2/day , days 1-3 every 21 weeks.  11. 3/23/2017, she completes 5 cycles of adjuvant doxorubicin and dacarbazine    HISTORY OF PRESENT ILLNESS  Ms. Navarrete presents today in follow-up and to review restaging studies.     She is feeling well and denies any major complaints. She denies nausea, vomiting, diarrhea, or abdominal pains. No new urinary symptoms. No headaches, shortness of breath, chest pain, or fevers/chills.    MRI abdomen and pelvis are negative for local recurrence or metastasis. Chest X-ray similarly negative. Imaging was reviewed together with the patient today.  ECOG performance status is 0.    REVIEW OF SYSTEMS  A 12-point ROS negative except as in HPI    Current Outpatient Medications   Medication Sig Dispense Refill     Calcium Citrate-Vitamin D (CALCIUM CITRATE + PO) Take 200 mg by mouth daily as needed (She supplements to meet her 1,200mg goal depending on how much dietary calcium she has gotten that day (up to 400mg once daily)) Reported on 5/1/2017       diclofenac sodium 3 % GEL Apply gel to affected area on right leg as directed 100 g 11     Ibuprofen (ADVIL PO) Take 400 mg by mouth       levothyroxine (SYNTHROID/LEVOTHROID) 75 MCG tablet Take 1 tablet (75 mcg) by mouth daily 90 tablet 0     Multiple Vitamin (MULTIVITAMINS PO) Take 1 tablet by mouth daily Reported on 4/10/2017       venlafaxine (EFFEXOR-XR) 75 MG 24 hr capsule Take 1 capsule (75 mg) by mouth daily 90 capsule 3     zolpidem (AMBIEN) 5 MG tablet Take 1 tablet (5 mg) by mouth nightly as needed for sleep (Only uses if traveling or cannot sleep,) 20 tablet 0       Past Medical History:   Diagnosis Date     Anxiety      Arthritis     pain in feet and knees     Disorder of bone and cartilage, unspecified      Esophageal reflux 2015     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     GERD (gastroesophageal reflux disease)      Hx of previous reproductive problem 1980     Kidney stone 2011     Leiomyosarcoma (H) 11/14/2016     Personal history of colonic polyps 2016    Small, benign     PONV (postoperative nausea and vomiting)      Posterior vitreous detachment of left eye 2011     Posterior vitreous detachment of right eye 2011     Ptosis of eyelid, bilateral      Stomach problems 2015    Abdominal pain, Diarreha     Thyroid disease     thyroid nodule resolved 2014       PHYSICAL EXAMINATION  /82   Pulse 89   Temp 97.1  F (36.2  C) (Oral)   Resp 16   Ht 1.524 m (5')   Wt 64.3 kg (141 lb 11.2 oz)   SpO2 99%   BMI 27.67 kg/m    Wt Readings from Last 2 Encounters:   05/20/19 64.3 kg (141 lb 11.2 oz)   04/05/19 63.7 kg (140 lb 6.4 oz)     Physical Exam   Constitutional: She is oriented to person, place, and time. She appears well-developed and well-nourished. No distress.   HENT:   Head: Atraumatic.   Mouth/Throat: Oropharynx is clear and moist.   Eyes: Pupils are equal, round, and reactive to light. Conjunctivae and EOM are normal. No scleral icterus.   Neck: Normal range of motion.   Cardiovascular:   No murmur heard.  Pulmonary/Chest: Effort normal.   Abdominal: She exhibits no distension. There is no tenderness.   Musculoskeletal: Normal range of motion. She exhibits no edema.   Lymphadenopathy:     She has no cervical adenopathy.   Neurological: She is alert and oriented to person, place, and time. No cranial nerve deficit. She exhibits normal muscle tone. Coordination normal.   Skin: Skin is warm and dry. No rash noted.   Psychiatric: She has a normal mood and affect. Her behavior is normal.   Nursing note and vitals reviewed.    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  It was a pleasure to see Ms. Navarrete today.  Restaging scans today are negative for recurrent or metastatic disease. She is feeling well and denies any complaints. She has pelvic examinations every 6 months.    I will plan to see her back in about 5-6 months with CT-CAP. We will prioritize use of MRI-abdomen and pelvis with chest X-rays when possible.    Questions answered.    Ab Warner M.D.   of Medicine  Hematology, Oncology and Transplantation

## 2019-05-20 NOTE — LETTER
5/20/2019       RE: Maggie Navarrete  [address]  [facility] [address]     Dear Colleague,    Thank you for referring your patient, Maggie Navarrete, to the [facility]. Please see a copy of my visit note below.    [facility]  MEDICAL ONCOLOGY PROGRESS NOTE   May 20, 2019    Oncologic history:  1. January 2016, she presents to her PCP with 3 weeks of abdominal pain and severe constipation. She is treated for constipation and improves.  2. 3/21/2016, worsening pelvic pressure prompts pelvic examination with an enlarged uterus noted. Transvaginal ultrasound shows enlargement of subserosal fibroids originally seen on MRI in 2006.  3. 5/9/2016, MRI pelvis is obtained, which shows several enhancing uterine masses involving an enlarged uterus, felt radiographically compatible with benign uterine leiomyomata.  3. Summer 2016, she experiences worsening low back pain thought at first thought to be sacroiliitis, but pain persists after corticosteroid injection. Continues with severe constipation.  4. 9/8/2016, she undergoes colonoscopy for persistent abdominal pain. She has sigmoid polyp snared. Abdominal pain and diarrhea persist after colonoscopy prep.  5. 9/14/2016, she has worsening abdominal pain and is evaluated in the ED. CT-scan shows a large subserosal fibroid measuring 9.4 x 9.6 x 9.8 cm, as well as very large fibroids along the posterior uterine body more inferiorly, increased in size to 14 cm in greatest diameter compared to 10 cm in greatest diameter on MRI of 5/9/2016.  6. 9/29/2016, she has staging CT-CAP, which shows a small left sided pleural effusion and a 3 mm pulmonary nodule in the RML.  7. October 2016, self-refers to [facility].  8. 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  9. 11/4/2016, undergoes total abdominal hysterectomy and BSO, under Dr. Harsh Camarillo. Surgery was originally presumed for leiomyoma,  however, intraoperatively there was focal adherence of a mass to the pelvic right sidewall and cul-de-sac, which led to peritoneal resection and omentectomy. Final pathology, pT2b: showed leiomyosarcoma involving the cul-de-sac and forming two separate masses measuring 10.5 x 6.5 x 4.5 cm and 10.7 x 7.9 x 7.5 cm.  10. 12/26/2016, she starts adjuvant chemotherapy with doxorubicin 25 mg/m2/day and DTIC 250 mg/m2/day , days 1-3 every 21 weeks.  11. 3/23/2017, she completes 5 cycles of adjuvant doxorubicin and dacarbazine    HISTORY OF PRESENT ILLNESS  Ms. Navarrete presents today in follow-up and to review restaging studies.     She is feeling well and denies any major complaints. She denies nausea, vomiting, diarrhea, or abdominal pains. No new urinary symptoms. No headaches, shortness of breath, chest pain, or fevers/chills.    MRI abdomen and pelvis are negative for local recurrence or metastasis. Chest X-ray similarly negative. Imaging was reviewed together with the patient today.  ECOG performance status is 0.    REVIEW OF SYSTEMS  A 12-point ROS negative except as in HPI    Current Outpatient Medications   Medication Sig Dispense Refill     Calcium Citrate-Vitamin D (CALCIUM CITRATE + PO) Take 200 mg by mouth daily as needed (She supplements to meet her 1,200mg goal depending on how much dietary calcium she has gotten that day (up to 400mg once daily)) Reported on 5/1/2017       diclofenac sodium 3 % GEL Apply gel to affected area on right leg as directed 100 g 11     Ibuprofen (ADVIL PO) Take 400 mg by mouth       levothyroxine (SYNTHROID/LEVOTHROID) 75 MCG tablet Take 1 tablet (75 mcg) by mouth daily 90 tablet 0     Multiple Vitamin (MULTIVITAMINS PO) Take 1 tablet by mouth daily Reported on 4/10/2017       venlafaxine (EFFEXOR-XR) 75 MG 24 hr capsule Take 1 capsule (75 mg) by mouth daily 90 capsule 3     zolpidem (AMBIEN) 5 MG tablet Take 1 tablet (5 mg) by mouth nightly as needed for sleep (Only uses if traveling  or cannot sleep,) 20 tablet 0       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     GERD (gastroesophageal reflux disease)      Hx of previous reproductive problem 1980     Kidney stone 2011     Leiomyosarcoma (H) 11/14/2016     Personal history of colonic polyps 2016    Small, benign     PONV (postoperative nausea and vomiting)      Posterior vitreous detachment of left eye 2011     Posterior vitreous detachment of right eye 2011     Ptosis of eyelid, bilateral      Stomach problems 2015    Abdominal pain, Diarreha     Thyroid disease     thyroid nodule resolved 2014       PHYSICAL EXAMINATION  /82   Pulse 89   Temp 97.1  F (36.2  C) (Oral)   Resp 16   Ht 1.524 m (5')   Wt 64.3 kg (141 lb 11.2 oz)   SpO2 99%   BMI 27.67 kg/m     Wt Readings from Last 2 Encounters:   05/20/19 64.3 kg (141 lb 11.2 oz)   04/05/19 63.7 kg (140 lb 6.4 oz)     Physical Exam   Constitutional: She is oriented to person, place, and time. She appears well-developed and well-nourished. No distress.   HENT:   Head: Atraumatic.   Mouth/Throat: Oropharynx is clear and moist.   Eyes: Pupils are equal, round, and reactive to light. Conjunctivae and EOM are normal. No scleral icterus.   Neck: Normal range of motion.   Cardiovascular:   No murmur heard.  Pulmonary/Chest: Effort normal.   Abdominal: She exhibits no distension. There is no tenderness.   Musculoskeletal: Normal range of motion. She exhibits no edema.   Lymphadenopathy:     She has no cervical adenopathy.   Neurological: She is alert and oriented to person, place, and time. No cranial nerve deficit. She exhibits normal muscle tone. Coordination normal.   Skin: Skin is warm and dry. No rash noted.   Psychiatric: She has a normal mood and affect. Her behavior is normal.   Nursing  note and vitals reviewed.    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  It was a pleasure to see Ms. Navarrete today. Restaging scans today are negative for recurrent or metastatic disease. She is feeling well and denies any complaints. She has pelvic examinations every 6 months.    I will plan to see her back in about 5-6 months with CT-CAP. We will prioritize use of MRI-abdomen and pelvis with chest X-rays when possible.    Questions answered.    Ab Warner M.D.   of Medicine  Hematology, Oncology and Transplantation

## 2019-05-20 NOTE — NURSING NOTE
Oncology Rooming Note    May 20, 2019 10:17 AM   Maggie Navarrete is a 69 year old female who presents for:    Chief Complaint   Patient presents with     Oncology Clinic Visit     RETURN VISIT; LEIOMYOSARCOMA FOLLOW UP; VITALS COMPLETED BY [facility]      Initial Vitals: /82   Pulse 89   Temp 97.1  F (36.2  C) (Oral)   Resp 16   Ht 1.524 m (5')   Wt 64.3 kg (141 lb 11.2 oz)   SpO2 99%   BMI 27.67 kg/m   Estimated body mass index is 27.67 kg/m  as calculated from the following:    Height as of this encounter: 1.524 m (5').    Weight as of this encounter: 64.3 kg (141 lb 11.2 oz). Body surface area is 1.65 meters squared.  No Pain (0) Comment: Data Unavailable   No LMP recorded. Patient has had a hysterectomy.  Allergies reviewed: Yes  Medications reviewed: Yes    Medications: Medication refills not needed today.  Pharmacy name entered into [employer]:    [facility] - [place] [place] [facility] [address] [facility] [address]    Clinical concerns: No new concerns today  Dr. Aguilar  was notified.      Yohana Camacho

## 2019-05-21 RX ORDER — LEVOTHYROXINE SODIUM 75 UG/1
TABLET ORAL
Qty: 90 TABLET | Refills: 3 | Status: SHIPPED | OUTPATIENT
Start: 2019-05-21 | End: 2020-01-17

## 2019-05-21 RX ORDER — VENLAFAXINE HYDROCHLORIDE 75 MG/1
CAPSULE, EXTENDED RELEASE ORAL
Qty: 90 CAPSULE | Refills: 2 | Status: SHIPPED | OUTPATIENT
Start: 2019-05-21 | End: 2019-10-22

## 2019-05-29 ENCOUNTER — DOCUMENTATION ONLY (OUTPATIENT)
Dept: CARE COORDINATION | Facility: CLINIC | Age: 69
End: 2019-05-29

## 2019-06-28 ENCOUNTER — OFFICE VISIT (OUTPATIENT)
Dept: INTERNAL MEDICINE | Facility: CLINIC | Age: 69
End: 2019-06-28
Payer: MEDICARE

## 2019-06-28 VITALS
BODY MASS INDEX: 27.54 KG/M2 | WEIGHT: 141 LBS | OXYGEN SATURATION: 100 % | HEART RATE: 89 BPM | DIASTOLIC BLOOD PRESSURE: 74 MMHG | SYSTOLIC BLOOD PRESSURE: 109 MMHG

## 2019-06-28 DIAGNOSIS — Z78.0 ASYMPTOMATIC POSTMENOPAUSAL STATUS: ICD-10-CM

## 2019-06-28 DIAGNOSIS — M25.561 CHRONIC PAIN OF RIGHT KNEE: Primary | ICD-10-CM

## 2019-06-28 DIAGNOSIS — G89.29 CHRONIC PAIN OF RIGHT KNEE: Primary | ICD-10-CM

## 2019-06-28 DIAGNOSIS — F41.1 GENERALIZED ANXIETY DISORDER: ICD-10-CM

## 2019-06-28 DIAGNOSIS — F41.9 ANXIETY DISORDER, UNSPECIFIED TYPE: ICD-10-CM

## 2019-06-28 DIAGNOSIS — Z12.31 ENCOUNTER FOR SCREENING MAMMOGRAM FOR MALIGNANT NEOPLASM OF BREAST: ICD-10-CM

## 2019-06-28 RX ORDER — VENLAFAXINE HYDROCHLORIDE 37.5 MG/1
37.5 CAPSULE, EXTENDED RELEASE ORAL DAILY
Qty: 90 CAPSULE | Refills: 3 | Status: SHIPPED | OUTPATIENT
Start: 2019-06-28 | End: 2019-07-29

## 2019-06-28 ASSESSMENT — PAIN SCALES - GENERAL: PAINLEVEL: NO PAIN (0)

## 2019-06-28 NOTE — PROGRESS NOTES
CC:  F/u right knee pain and other conditions    HPI:  4/5/19 saw me, had slipped on ice on January, landed on right lateral knee  Now back to being able to move normally, including with yoga and getting up off floor.  No pain, swelling, redness, buckling, falls, etc. X rays tricompartmental OA, no fractures    Followed up with oncology 5/20/19 (Dr. Warner) for hx locally advanced leiomyosarcoma of uterus, s/p resection and 5 cycles of adjuvant doxorubicin and dacarbazine.  Restating scans negaive.  F/U pelvic exams every 6 months., F/U 5-6 months with imaging.    HCM reviewed in detail  Dr. Flynn for bone density management  Considering treatment osteoporosis  Reviewed scans  Due for mammogram  Reviewed meds.  Discussed trial of reducing dose of Effexor.    Patient Active Problem List   Diagnosis     MDD (major depressive disorder)     Presbyopia - Both Eyes     Osteopenia     CAROLA (generalized anxiety disorder)     Vaginal atrophy     Uterus cancer, sarcoma (H)     Chemotherapy-induced neutropenia (H)     Chemotherapy induced cardiomyopathy (H)     Encounter for follow-up surveillance of uterine cancer     Overweight     Leiomyosarcoma (H)     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     GERD (gastroesophageal reflux disease)      Hx of previous reproductive problem 1980     Kidney stone 2011     Leiomyosarcoma (H) 11/14/2016     Personal history of colonic polyps 2016    Small, benign     PONV (postoperative nausea and vomiting)      Posterior vitreous detachment of left eye 2011     Posterior vitreous detachment of right eye 2011     Ptosis of eyelid, bilateral      Stomach problems 2015    Abdominal pain, Diarreha     Thyroid disease     thyroid nodule resolved 2014     Past Surgical History:   Procedure  Laterality Date     ABDOMEN SURGERY  Now    Pain, diarrhea     BREAST SURGERY  2002    right breast benign     COLONOSCOPY  2008    due in 2018     COLONOSCOPY N/A 9/8/2016    Procedure: COMBINED COLONOSCOPY, SINGLE OR MULTIPLE BIOPSY/POLYPECTOMY BY BIOPSY;  Surgeon: Abner Pepper MD;  Location:  GI     ENT SURGERY  1975    tonsillectomy     GENITOURINARY SURGERY  1953    bladder surgery      GI SURGERY  2015    Severe constipation     GYN SURGERY  1977    C section     GYN SURGERY  1981    laparoscopy     GYN SURGERY  2007    myomectomy     hysterecomy  11/14/2016    leiomyosarcoma     OTHER SURGICAL HISTORY Right 10/27/2017    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     REMOVE PORT VASCULAR ACCESS Right 5/9/2017    Procedure: REMOVE PORT VASCULAR ACCESS;  Right Port Removal;  Surgeon: Eric Gibson PA-C;  Location:  OR     REPAIR PTOSIS BILATERAL  2/15/2012    Procedure:REPAIR PTOSIS BILATERAL; BILATERAL UPPER LID PTOSIS REPAIR; Surgeon:BRYCE REYNOLDS; Location:[facility]     Current Outpatient Medications   Medication Sig Dispense Refill     Calcium Citrate-Vitamin D (CALCIUM CITRATE + PO) Take 200 mg by mouth daily as needed (She supplements to meet her 1,200mg goal depending on how much dietary calcium she has gotten that day (up to 400mg once daily)) Reported on 5/1/2017       diclofenac sodium 3 % GEL Apply gel to affected area on right leg as directed 100 g 11     Ibuprofen (ADVIL PO) Take 400 mg by mouth       levothyroxine (SYNTHROID/LEVOTHROID) 75 MCG tablet TAKE 1 TABLET EVERY DAY 90 tablet 3     Multiple Vitamin (MULTIVITAMINS PO) Take 1 tablet by mouth daily Reported on 4/10/2017       venlafaxine (EFFEXOR-XR) 37.5 MG 24 hr capsule Take 1 capsule (37.5 mg) by mouth daily 90 capsule 3     venlafaxine (EFFEXOR-XR) 75 MG 24 hr capsule TAKE 1 CAPSULE EVERY DAY 90 capsule 2     zolpidem (AMBIEN) 5 MG tablet Take 1 tablet (5 mg) by mouth nightly as  needed for sleep (Only uses if traveling or cannot sleep,) 20 tablet 0     Allergies   Allergen Reactions     Erythromycin GI Disturbance     Tramadol Nausea         /74   Pulse 89   Wt 64 kg (141 lb)   SpO2 100%   BMI 27.54 kg/m       BP Readings from Last 6 Encounters:   06/28/19 109/74   05/20/19 132/82   04/05/19 115/80   02/22/19 115/77   01/28/19 136/82   11/19/18 118/82   Physical Examination:    General:  Conversant, generally healthy appearing, no acute distress  Head: atraumatic  Eyes:  Pupils 2-3 mm, sclera white, EOM's full, conjunctiva moist, no lid lag    Ears:  TM's normal, EAC's patent, clear of cerumen  Nose:  Nasal passages clear, turbinates not swollen  Throat/Mouth:  No pharyngeal erythema, exudate, ulcers, oral mucosa and tongue moist, normal hard and soft palate  Neck:  Trachea midline, Full AROM, supple, thyroid smooth, symmetric, not enlarged, no nodules, no neck lymphadenopathy  Lungs:  Clear to auscultation throughout, no wheezes, rhonchi or rales. Normal respiratory effort and no intercostal retractions.  C/V:  Regular rate and rhythm, no murmurs, rubs or gallops.  No JVD, normal carotid upstroke and amplitude, no carotid bruits.  Abdomen:  Not distended.  Bowel sounds active.  No tenderness, no hepatosplenomegaly or masses.  No CVA tenderness or masses.  Lymph:  No cervical lymph nodes.  Neuro: Alert and oriented, face symmetric. No tremor.  M/S:   No joint deformities noted.  No joint swelling.  Skin:   Normal temperature., turgor and texture. No rashes, suspicious lesions,  jaundice or ulcers    Extremities:  No peripheral edema, no digital cyanosis  Psych:  Alert and oriented. Appropriate affect.  Not psychomotor slowed.  No signs of anxiety or agitation.    Maggie was seen today for fall.    Diagnoses and all orders for this visit:    Chronic pain of right knee, fall January 2019, now improved.    Asymptomatic postmenopausal status  -     Mammogram, routine screening;  Future    Anxiety disorder, unspecified type  -     Decrease venlafaxine (EFFEXOR-XR) from 150 mg to 37.5 MG 24 hr capsule; Take 1 capsule (37.5 mg) by mouth daily    Encounter for screening mammogram for malignant neoplasm of breast   -     Mammogram, routine screening; Future    Total time spent 40 minutes.  More than 50% of the time spent with Ms. Navarrete on counseling / coordinating her care      F/U in January, and as needed    Rhea Lin M.D.  Internal Medicine  [facility]   pager [phone]

## 2019-06-28 NOTE — NURSING NOTE
Chief Complaint   Patient presents with     Fall     Pt is here to follow up from a fall from Jan. 2019.      Carol Krishnan LPN at 8:47 AM on 6/28/2019.

## 2019-06-28 NOTE — PATIENT INSTRUCTIONS
[facility] Medication Refill Request Information:  * Please contact your pharmacy regarding ANY request for medication refills.  ** [facility] Prescription Fax = [phone]  * Please allow 3 business days for routine medication refills.  * Please allow 5 business days for controlled substance medication refills.     [facility] Test Result notification information:  *You will be notified with in 7-10 days of your appointment day regarding the results of your test.  If you are on MyChart you will be notified as soon as the provider has reviewed the results and signed off on them.    [facility]: [phone]     Breast [place] ([facility]) [phone] (2nd Floor [facility])

## 2019-07-01 ASSESSMENT — ANXIETY QUESTIONNAIRES
2. NOT BEING ABLE TO STOP OR CONTROL WORRYING: NOT AT ALL
GAD7 TOTAL SCORE: 0
4. TROUBLE RELAXING: NOT AT ALL
7. FEELING AFRAID AS IF SOMETHING AWFUL MIGHT HAPPEN: NOT AT ALL
7. FEELING AFRAID AS IF SOMETHING AWFUL MIGHT HAPPEN: NOT AT ALL
5. BEING SO RESTLESS THAT IT IS HARD TO SIT STILL: NOT AT ALL
GAD7 TOTAL SCORE: 0
6. BECOMING EASILY ANNOYED OR IRRITABLE: NOT AT ALL
1. FEELING NERVOUS, ANXIOUS, OR ON EDGE: NOT AT ALL
3. WORRYING TOO MUCH ABOUT DIFFERENT THINGS: NOT AT ALL

## 2019-07-29 ENCOUNTER — OFFICE VISIT (OUTPATIENT)
Dept: FAMILY MEDICINE | Facility: CLINIC | Age: 69
End: 2019-07-29
Attending: FAMILY MEDICINE
Payer: MEDICARE

## 2019-07-29 VITALS
HEIGHT: 59 IN | HEART RATE: 94 BPM | SYSTOLIC BLOOD PRESSURE: 116 MMHG | WEIGHT: 140.8 LBS | DIASTOLIC BLOOD PRESSURE: 76 MMHG | BODY MASS INDEX: 28.39 KG/M2

## 2019-07-29 DIAGNOSIS — M85.89 OSTEOPENIA OF MULTIPLE SITES: ICD-10-CM

## 2019-07-29 DIAGNOSIS — M85.89 OSTEOPENIA OF MULTIPLE SITES: Primary | ICD-10-CM

## 2019-07-29 LAB
CALCIUM SERPL-MCNC: 9.4 MG/DL (ref 8.5–10.1)
MAGNESIUM SERPL-MCNC: 2.2 MG/DL (ref 1.6–2.3)
PHOSPHATE SERPL-MCNC: 3 MG/DL (ref 2.5–4.5)

## 2019-07-29 PROCEDURE — 83735 ASSAY OF MAGNESIUM: CPT | Performed by: FAMILY MEDICINE

## 2019-07-29 PROCEDURE — 36415 COLL VENOUS BLD VENIPUNCTURE: CPT | Performed by: FAMILY MEDICINE

## 2019-07-29 PROCEDURE — G0463 HOSPITAL OUTPT CLINIC VISIT: HCPCS | Mod: ZF

## 2019-07-29 PROCEDURE — 84100 ASSAY OF PHOSPHORUS: CPT | Performed by: FAMILY MEDICINE

## 2019-07-29 PROCEDURE — 82306 VITAMIN D 25 HYDROXY: CPT | Performed by: FAMILY MEDICINE

## 2019-07-29 PROCEDURE — 82310 ASSAY OF CALCIUM: CPT | Performed by: FAMILY MEDICINE

## 2019-07-29 ASSESSMENT — MIFFLIN-ST. JEOR: SCORE: 1069.29

## 2019-07-29 ASSESSMENT — ANXIETY QUESTIONNAIRES
1. FEELING NERVOUS, ANXIOUS, OR ON EDGE: SEVERAL DAYS
3. WORRYING TOO MUCH ABOUT DIFFERENT THINGS: NOT AT ALL
GAD7 TOTAL SCORE: 1
2. NOT BEING ABLE TO STOP OR CONTROL WORRYING: NOT AT ALL
6. BECOMING EASILY ANNOYED OR IRRITABLE: NOT AT ALL
7. FEELING AFRAID AS IF SOMETHING AWFUL MIGHT HAPPEN: NOT AT ALL
5. BEING SO RESTLESS THAT IT IS HARD TO SIT STILL: NOT AT ALL

## 2019-07-29 ASSESSMENT — PATIENT HEALTH QUESTIONNAIRE - PHQ9
5. POOR APPETITE OR OVEREATING: NOT AT ALL
SUM OF ALL RESPONSES TO PHQ QUESTIONS 1-9: 0

## 2019-07-29 ASSESSMENT — PAIN SCALES - GENERAL: PAINLEVEL: NO PAIN (0)

## 2019-07-29 NOTE — LETTER
7/29/2019       RE: Maggie Navarrete  [address]  [facility] [id number]     Dear Colleague,    Thank you for referring your patient, Maggie Navarrete, to the [facility] at [facility] Please see a copy of my visit note below.    Maggie is a  69 year old female post menopausal] [GR1, P1] that presents today for osteoporosis follow up patient was last seen 8/2018.  . She took alendronate 35mg for 10 yrs and had a 1 yr drug holiday  and then re-started alendronate in 2013 35mg from 4610-4234. Had dental work fall 2017  so stopped alendronate.  Has been off meds now 2 yr. Last DXA was 9/2018.   and no significant bone loss, so continued the drug holiday. Comes in today for f/u.    Referring Physician: Referred Rhea Lin MD    HPI     Have you ever had a bone density test? Yes  Where = [facility]  When = 9/2018,2017,2015,2013,2011,2009   Spine Tscore = -1.8  Left neck Tscore = -1.9  Total left hip Tscore = -1.5  Right neck Tscore = -2.1  Total Right hip Tscore = -1.9  Have you received any x-ray dye or contrast in the last ten days? No  How many servings of dairy products do you consume per day? 2-3 Type: milk 2-3x/day   Do you take a multi-vitamin daily? Yes   Do you take a vitamin D supplement? No  Do you take a calcium supplement daily?  Calcium citrate 1-2 pills;  2pills = 600mg/  Do you take a supplement containing strontium? No  Are you exposed to natural sunlight at least 20 minutes three times a week? Yes    Social History   reports that she has never smoked. She has never used smokeless tobacco. She reports that she drinks alcohol. She reports that she does not use drugs.  Do you smoke cigarettes? No  Do you exercise? Yes. Details: walking 45 min daily; and does strengthening  2x/wk   Do you drink alcohol? Yes. Details: few times/yr     Medication History  Have you used any of the following medications?   Actonel (Risedronate):  No   Aredia (Pamidronate): No   Boniva (Ibindronate): No   Didronil (Etidronate): No   Evista (Raloxifene): No   Fosamax (Alendronate): yes in the past but not since fall 2017    Forteo (Parathyroid hormone) injections: No   HCTZ (Thiazide): No   Calcitonin nasal spray: No   Reclast or Zometa (Zolendronate): No   Prolia (Denosumab): No    Current Outpatient Medications   Medication Sig Dispense Refill     Calcium Citrate-Vitamin D (CALCIUM CITRATE + PO) Take 200 mg by mouth daily as needed (She supplements to meet her 1,200mg goal depending on how much dietary calcium she has gotten that day (up to 400mg once daily)) Reported on 5/1/2017       Ibuprofen (ADVIL PO) Take 400 mg by mouth       levothyroxine (SYNTHROID/LEVOTHROID) 75 MCG tablet TAKE 1 TABLET EVERY DAY 90 tablet 3     Multiple Vitamin (MULTIVITAMINS PO) Take 1 tablet by mouth daily Reported on 4/10/2017       venlafaxine (EFFEXOR-XR) 75 MG 24 hr capsule TAKE 1 CAPSULE EVERY DAY 90 capsule 2     zolpidem (AMBIEN) 5 MG tablet Take 1 tablet (5 mg) by mouth nightly as needed for sleep (Only uses if traveling or cannot sleep,) 20 tablet 0          Allergies   Allergen Reactions     Erythromycin GI Disturbance     Tramadol Nausea       Past Medical History    Family History   Problem Relation Age of Onset     Family History Negative Mother         glaucoma     Glaucoma Mother      Anxiety Disorder Mother      Heart Failure Mother      Diabetes Mother      Coronary Artery Disease Mother      Hypertension Mother      Breast Cancer Mother      Depression Mother      Obesity Mother      Heart Disease Father      Glaucoma Father      Diabetes Father      Coronary Artery Disease Father      Depression Father      Osteoporosis Father      Heart Disease Maternal Grandmother      Diabetes Maternal Grandmother      Heart Disease Paternal Grandmother      Cancer Paternal Grandfather         stomach     Thyroid Disease Sister      Retinal detachment Sister       "Hypertension Sister      Hypertension Sister      Lipids Sister      Cerebrovascular Disease Sister      Depression Sister      Heart Disease Paternal Aunt      Heart Disease Paternal Uncle      Anxiety Disorder Sister      Depression Sister      Thyroid Disease Sister      Thyroid Disease Sister      Osteoporosis Other        ROS:  General: none  Head/Eyes: spots/floaters  Ears/Nose/Throat: none  Cardiovascular: none  Respiratory: none  Gastrointestinal: none  Breast: none  Genitourinary: frequency/urgency and vaginal dryness  Sexual Function: none  Musculoskeletal: joint pain and loss of balance  Skin: none  Neurological: memory loss  Mental Health: none  Endocrine: none    Clinic Measurements  Vitals: /76   Pulse 94   Ht 1.499 m (4' 11\")   Wt 63.9 kg (140 lb 12.8 oz)   BMI 28.44 kg/m     BMI= Body mass index is 28.44 kg/m .    Physical exam  Constitutional: Well appearing woman in no acute distress.   Psychological: appropriate mood.  Neck: No thyroidmegaly.  no carotid bruits.  Cardiovascular: regular rate and rhythm, normal S1 and S2, no murmurs, rubs or gallops, peripheral pulses full and symmetric   Respiratory: clear to auscultation, no wheezes or crackles, normal breath sounds.  Musculoskeletal: good  range of motion, no edema and motor strength is equal in the upper and lower extremities    Spine: [mild upper thoracic curvature, not tender and reasonable range of motion (Flexion, extension,lateral movement, rotational movement].   Skin: no concerning lesions, no jaundice.  Neurological: cranial nerves intact, normal strength, reflexes at patella and biceps normal, normal gait, no tremor.     LAB  Vertebra; Fracture Assessment: NA  Dexa Scan: 9/2018 - due 9/2020    FRAX Assessment Tool:  12% for 10 risk Major Osteoporotic, 2.3% for 10 risk of Hip Fracture  Risk Factors: age, sex, post-menopausal, chemo therapy, hypothyroidism       ASSESSMENT:  70 yo female with osteopenia by T scores who has been " on a drug holiday since fall 2017.  Her DXA 9/2018 did not show significant bone loss -and she wants to continue the drug holiday - discussed calcium and vitamin D and exercise.  Will stay off alendronate another yr. Will check vitamin D level and calcium, mag and phosphorus.    PLAN:  Take another yr of drug holiday  Get DXA next 9/2020  See me after the DXA  Patient should take 1200mg of calcium/day in divided doses  - all dietary and supplements together and vitamin D3 1000IU/day.  Blood work today     I spent a total of 25 minutes face-to-face with the patient during today's office visit.  Over 50% of this time was spent counseling the patient and/or coordinating care regarding DXA 2018, medications, calcium and vit D .  See note for details.          Thank you for allowing me to participate in the care of your patient.  Please do not hesitate to call with questions or concerns.    Sincerely,    Kristine Flynn MD, PhD  Rhea Lin MD       Again, thank you for allowing me to participate in the care of your patient.      Sincerely,    Kristine Flynn MD PhD

## 2019-07-29 NOTE — PATIENT INSTRUCTIONS
Take another yr drug holiday  Get DXA next 9/2020  See me after the DXA  Patient should take 1200mg of calcium/day in divided doses  - all dietary and supplements together and vitamin D3 1000IU/day.  Blood work today

## 2019-07-29 NOTE — PROGRESS NOTES
Maggie is a  69 year old female post menopausal] [GR1, P1] that presents today for osteoporosis follow up patient was last seen 8/2018.  . She took alendronate 35mg for 10 yrs and had a 1 yr drug holiday  and then re-started alendronate in 2013 35mg from 1457-0338. Had dental work fall 2017  so stopped alendronate.  Has been off meds now 2 yr. Last DXA was 9/2018.   and no significant bone loss, so continued the drug holiday. Comes in today for f/u.    Referring Physician: Referred Rhea Lin MD    HPI     Have you ever had a bone density test? Yes  Where = [facility]  When = 9/2018,2017,2015,2013,2011,2009   Spine Tscore = -1.8  Left neck Tscore = -1.9  Total left hip Tscore = -1.5  Right neck Tscore = -2.1  Total Right hip Tscore = -1.9  Have you received any x-ray dye or contrast in the last ten days? No  How many servings of dairy products do you consume per day? 2-3 Type: milk 2-3x/day   Do you take a multi-vitamin daily? Yes   Do you take a vitamin D supplement? No  Do you take a calcium supplement daily?  Calcium citrate 1-2 pills;  2pills = 600mg/  Do you take a supplement containing strontium? No  Are you exposed to natural sunlight at least 20 minutes three times a week? Yes    Social History   reports that she has never smoked. She has never used smokeless tobacco. She reports that she drinks alcohol. She reports that she does not use drugs.  Do you smoke cigarettes? No  Do you exercise? Yes. Details: walking 45 min daily; and does strengthening  2x/wk   Do you drink alcohol? Yes. Details: few times/yr     Medication History  Have you used any of the following medications?   Actonel (Risedronate): No   Aredia (Pamidronate): No   Boniva (Ibindronate): No   Didronil (Etidronate): No   Evista (Raloxifene): No   Fosamax (Alendronate): yes in the past but not since fall 2017    Forteo (Parathyroid hormone) injections: No   HCTZ (Thiazide): No   Calcitonin nasal spray: No   Reclast or Zometa  (Zolendronate): No   Prolia (Denosumab): No    Current Outpatient Medications   Medication Sig Dispense Refill     Calcium Citrate-Vitamin D (CALCIUM CITRATE + PO) Take 200 mg by mouth daily as needed (She supplements to meet her 1,200mg goal depending on how much dietary calcium she has gotten that day (up to 400mg once daily)) Reported on 5/1/2017       Ibuprofen (ADVIL PO) Take 400 mg by mouth       levothyroxine (SYNTHROID/LEVOTHROID) 75 MCG tablet TAKE 1 TABLET EVERY DAY 90 tablet 3     Multiple Vitamin (MULTIVITAMINS PO) Take 1 tablet by mouth daily Reported on 4/10/2017       venlafaxine (EFFEXOR-XR) 75 MG 24 hr capsule TAKE 1 CAPSULE EVERY DAY 90 capsule 2     zolpidem (AMBIEN) 5 MG tablet Take 1 tablet (5 mg) by mouth nightly as needed for sleep (Only uses if traveling or cannot sleep,) 20 tablet 0          Allergies   Allergen Reactions     Erythromycin GI Disturbance     Tramadol Nausea       Past Medical History    Family History   Problem Relation Age of Onset     Family History Negative Mother         glaucoma     Glaucoma Mother      Anxiety Disorder Mother      Heart Failure Mother      Diabetes Mother      Coronary Artery Disease Mother      Hypertension Mother      Breast Cancer Mother      Depression Mother      Obesity Mother      Heart Disease Father      Glaucoma Father      Diabetes Father      Coronary Artery Disease Father      Depression Father      Osteoporosis Father      Heart Disease Maternal Grandmother      Diabetes Maternal Grandmother      Heart Disease Paternal Grandmother      Cancer Paternal Grandfather         stomach     Thyroid Disease Sister      Retinal detachment Sister      Hypertension Sister      Hypertension Sister      Lipids Sister      Cerebrovascular Disease Sister      Depression Sister      Heart Disease Paternal Aunt      Heart Disease Paternal Uncle      Anxiety Disorder Sister      Depression Sister      Thyroid Disease Sister      Thyroid Disease Sister       "Osteoporosis Other        ROS:  General: none  Head/Eyes: spots/floaters  Ears/Nose/Throat: none  Cardiovascular: none  Respiratory: none  Gastrointestinal: none  Breast: none  Genitourinary: frequency/urgency and vaginal dryness  Sexual Function: none  Musculoskeletal: joint pain and loss of balance  Skin: none  Neurological: memory loss  Mental Health: none  Endocrine: none    Clinic Measurements  Vitals: /76   Pulse 94   Ht 1.499 m (4' 11\")   Wt 63.9 kg (140 lb 12.8 oz)   BMI 28.44 kg/m    BMI= Body mass index is 28.44 kg/m .    Physical exam  Constitutional: Well appearing woman in no acute distress.   Psychological: appropriate mood.  Neck: No thyroidmegaly.  no carotid bruits.  Cardiovascular: regular rate and rhythm, normal S1 and S2, no murmurs, rubs or gallops, peripheral pulses full and symmetric   Respiratory: clear to auscultation, no wheezes or crackles, normal breath sounds.  Musculoskeletal: good  range of motion, no edema and motor strength is equal in the upper and lower extremities    Spine: [mild upper thoracic curvature, not tender and reasonable range of motion (Flexion, extension,lateral movement, rotational movement].   Skin: no concerning lesions, no jaundice.  Neurological: cranial nerves intact, normal strength, reflexes at patella and biceps normal, normal gait, no tremor.     LAB  Vertebra; Fracture Assessment: NA  Dexa Scan: 9/2018 - due 9/2020    FRAX Assessment Tool:  12% for 10 risk Major Osteoporotic, 2.3% for 10 risk of Hip Fracture  Risk Factors: age, sex, post-menopausal, chemo therapy, hypothyroidism       ASSESSMENT:  68 yo female with osteopenia by T scores who has been on a drug holiday since fall 2017.  Her DXA 9/2018 did not show significant bone loss -and she wants to continue the drug holiday - discussed calcium and vitamin D and exercise.  Will stay off alendronate another yr. Will check vitamin D level and calcium, mag and phosphorus.    PLAN:  Take another yr " of drug holiday  Get DXA next 9/2020  See me after the DXA  Patient should take 1200mg of calcium/day in divided doses  - all dietary and supplements together and vitamin D3 1000IU/day.  Blood work today     I spent a total of 25 minutes face-to-face with the patient during today's office visit.  Over 50% of this time was spent counseling the patient and/or coordinating care regarding DXA 2018, medications, calcium and vit D .  See note for details.          Thank you for allowing me to participate in the care of your patient.  Please do not hesitate to call with questions or concerns.    Sincerely,    Kristine Flynn MD, PhD  Rhea Lin MD

## 2019-07-30 LAB — DEPRECATED CALCIDIOL+CALCIFEROL SERPL-MC: 42 UG/L (ref 20–75)

## 2019-08-16 NOTE — PROGRESS NOTES
Gynecologic Oncology Follow-Up Visit    RE: Maggie Navarrete  MRN: [id number]  : 1950  Date of Visit: 2019    CC: Maggie Navarrete  is a 69 year old female with a history of stage IIB leiomyosarcoma of the uterus. She completed treatment with RACHEL-BSO, peritoneal resection, and omentectomy on 16 followed by completion of five cycles doxorubicin and darcarbazine 3/23/17. She presents today for a six month surveillance visit.    HPI: Maggie comes to the clinic feeling well from a gynecologic perspective. Notes vaginal dryness but denies need for intervention- uses Replens prior to her pelvic exams but otherwise is not bothersome. Not sexually active. No pelvic pain, vaginal bleeding, bloating, early satiety, or abdominal pain. Up to date on seeing her PCP, mammogram, and colonoscopy as well as Dr. Aguilar with medical oncology. Continues to travel frequently- would like a refill of zolpidem, takes when traveling and has never had any abnormal sleep behaviors with this. Notes anxiety related to her best friend's cancer diagnosis- being treated for melanoma, reports she will be hospitalized for a stem cell transplant. Chronic issues with urinary frequency and incontinence present for at least the past ten years, stable, denies changes and denies need for intervention. Chronic arthralgias secondary to OA, sees Dr. Flynn for osteoporosis. No other concerns today.      Oncology History:  Summer 2016: Low back pain persisting after corticosteroid injection, severe constipation     2016: Colonoscopy for persistent abdominal pain, sigmoid polyp snared. Abdominal pain and diarrhea persist after colonoscopy prep.     2016: she has worsening abdominal pain and is evaluated in the ED. CT-scan shows a large subserosal fibroid measuring 9.4 x 9.6 x 9.8 cm, as well as very large fibroids along the posterior uterine body more inferiorly, increased in size to 14 cm in greatest diameter compared to 10 cm in  greatest diameter on MRI of 5/9/2016.     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     11/4/2016: Total abdominal hysterectomy and BSO, under Dr. Harsh Camarillo. Surgery was originally presumed for leiomyoma, however, intraoperatively there was focal adherence of a mass to the pelvic right sidewall and cul-de-sac, which led to peritoneal resection and omentectomy. Final pathology, pT2b: showed leiomyosarcoma involving the cul-de-sac and forming two separate masses measuring 10.5 x 6.5 x 4.5 cm and 10.7 x 7.9 x 7.5 cm.     12/26/2016 - 3/2016: s/p 5/6 cycles 25 mg/m2 doxorubicin, 250 mg/m2 dacarbazine.      2/2017: CT: IMPRESSION: In this patient with history of leiomyosarcoma of the  uterus:  1. No CT evidence of disease progression.  2. Unchanged mildly prominent para-aortic lymph nodes, compared to  outside institution CT 11/29/2016. Continued attention on follow-up  imaging is recommended.  3. 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  4. Calcified and noncalcified pulmonary nodules, stable from  11/29/2016. Continued attention on follow-up imaging.     4/2018:  CT:  IMPRESSION:   1. Stable changes of hysterectomy and bilateral salpingo-oophorectomy  without evidence of local recurrence.  2. Stable small pulmonary nodules  3. Mild thickening and hyperenhancement of the gastric mucosa and  proximal duodenum compatible with inflammation/infection.  4. Stable indeterminate hyperenhancing lesion in segment 4A    9/21/18: CT CAP impression:  IMPRESSION: This patient with a history of uterine leiomyosarcoma:  1. No evidence of locally recurrent or metastatic disease in the  chest, abdomen, or pelvis.  2. Unchanged sub-6 mm pulmonary nodules.  3. Unchanged arterial enhancing lesions in the liver, stable dating  back to at least 2/24/2017, suggesting hepatic hemangiomas.     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     GERD (gastroesophageal reflux disease)      Hx of previous reproductive problem 1980     Kidney stone 2011     Leiomyosarcoma (H) 11/14/2016     Personal history of colonic polyps 2016    Small, benign     PONV (postoperative nausea and vomiting)      Posterior vitreous detachment of left eye 2011     Posterior vitreous detachment of right eye 2011     Ptosis of eyelid, bilateral      Stomach problems 2015    Abdominal pain, Diarreha     Thyroid disease     thyroid nodule resolved 2014       Past Surgical History:   Procedure Laterality Date     ABDOMEN SURGERY  Now    Pain, diarrhea     BREAST SURGERY  2002    right breast benign     COLONOSCOPY  2008    due in 2018     COLONOSCOPY N/A 9/8/2016    Procedure: COMBINED COLONOSCOPY, SINGLE OR MULTIPLE BIOPSY/POLYPECTOMY BY BIOPSY;  Surgeon: Abner Pepper MD;  Location:  GI     ENT SURGERY  1975    tonsillectomy     GENITOURINARY SURGERY  1953    bladder surgery      GI SURGERY  2015    Severe constipation     GYN SURGERY  1977    C section     GYN SURGERY  1981    laparoscopy     GYN SURGERY  2007    myomectomy     hysterecomy  11/14/2016    leiomyosarcoma     OTHER SURGICAL HISTORY Right 10/27/2017    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     REMOVE PORT VASCULAR ACCESS Right 5/9/2017    Procedure: REMOVE PORT VASCULAR ACCESS;  Right Port Removal;  Surgeon: Eric Gibson PA-C;  Location:  OR     REPAIR PTOSIS BILATERAL  2/15/2012    Procedure:REPAIR PTOSIS BILATERAL; BILATERAL UPPER LID PTOSIS REPAIR; Surgeon:BRYCE REYNOLDS; Location:[facility]       Social History     Socioeconomic History     Marital status: Single     Spouse name: Not on file     Number of children: Not on file     Years of education: Not on file     Highest education  level: Not on file   Occupational History     Not on file   Social Needs     Financial resource strain: Not on file     Food insecurity:     Worry: Not on file     Inability: Not on file     Transportation needs:     Medical: Not on file     Non-medical: Not on file   Tobacco Use     Smoking status: Never Smoker     Smokeless tobacco: Never Used   Substance and Sexual Activity     Alcohol use: Yes     Comment: Social, not often per patient.     Drug use: No     Sexual activity: Not Currently     Partners: Male   Lifestyle     Physical activity:     Days per week: Not on file     Minutes per session: Not on file     Stress: Not on file   Relationships     Social connections:     Talks on phone: Not on file     Gets together: Not on file     Attends [employer] service: Not on file     Active member of club or organization: Not on file     Attends meetings of clubs or organizations: Not on file     Relationship status: Not on file     Intimate partner violence:     Fear of current or ex partner: Not on file     Emotionally abused: Not on file     Physically abused: Not on file     Forced sexual activity: Not on file   Other Topics Concern     Parent/sibling w/ CABG, MI or angioplasty before 65F 55M? 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 Walking daily.    How much calcium per day? Supplement and through diet       How much caffeine per day? 3 times a week    How much vitamin D per day? Supplement    Do you/your  family wear seatbelts?  Yes    Do you/your family use safety helmets? NA    Do you/your family use sunscreen? Sometimes    Do you/your family keep firearms in the home? No    Do you/your family have a smoke detector(s)? Yes    Do you feel safe in your home? Yes    Has anyone ever touched you in an unwanted manner? 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  2 grandsons now in [place] with the mother.  In a house.  .         Family History   Problem Relation Age of Onset     Family History Negative Mother         glaucoma     Glaucoma Mother      Anxiety Disorder Mother      Heart Failure Mother      Diabetes Mother      Coronary Artery Disease Mother      Hypertension Mother      Breast Cancer Mother      Depression Mother      Obesity Mother      Heart Disease Father      Glaucoma Father      Diabetes Father      Coronary Artery Disease Father      Depression Father      Osteoporosis Father      Heart Disease Maternal Grandmother      Diabetes Maternal Grandmother      Heart Disease Paternal Grandmother      Cancer Paternal Grandfather         stomach     Thyroid Disease Sister      Retinal detachment Sister      Hypertension Sister      Hypertension Sister      Lipids Sister      Cerebrovascular Disease Sister      Depression Sister      Heart Disease Paternal Aunt      Heart Disease Paternal Uncle      Anxiety Disorder Sister      Depression Sister      Thyroid Disease Sister      Thyroid Disease Sister      Osteoporosis Other        Current Outpatient Medications   Medication     Calcium Citrate-Vitamin D (CALCIUM CITRATE + PO)     Ibuprofen (ADVIL PO)     levothyroxine (SYNTHROID/LEVOTHROID) 75 MCG tablet     Multiple Vitamin (MULTIVITAMINS PO)     venlafaxine (EFFEXOR-XR) 75 MG 24 hr capsule     zolpidem (AMBIEN) 5 MG tablet  "    No current facility-administered medications for this visit.           Allergies   Allergen Reactions     Erythromycin GI Disturbance     Tramadol Nausea         Health Maintenance  Pap smear: No longer indicated  Mammogram: Last performed 8/19/19, due 8/2020  Colonoscopy: Last performed 9/8/16, due 9/2021  Annual wellness exam: Performed 6/2019, due 6/2020      Review of Systems  10 point ROS performed and negative except as listed in HPI    OBJECTIVE:    Physical Exam:    /80   Pulse 87   Temp 98.3  F (36.8  C) (Oral)   Resp 16   Ht 1.499 m (4' 11\")   Wt 63.5 kg (140 lb)   SpO2 99%   BMI 28.28 kg/m      CONSTITUTIONAL: Alert non-toxic appearing female in no acute distress  HEAD: Normocephalic, atraumatic  EYES: PERRLA; no scleral icterus  ENT: Oropharynx pink without lesions  NECK: Neck supple without lymphadenopathy  RESPIRATORY: Lungs clear to auscultation, no increased work of breathing noted  CV: Regular rate and rhythm, S1S2, no clicks, murmurs, rubs, or gallops; bilateral lower extremities without edema, dorsalis pedis pulses 2+ bilaterally  GASTROINTESTINAL: Normoactive bowel sounds x4 quadrants, abdomen soft, non-distended, and non-tender to palpation without masses or organomegaly  GENITOURINARY: External genitalia and urethral meatus pink without lesions, masses, or excoriation. Vagina hypoestrogenic and stenotic without masses or lesions. Vaginal cuff without nodularity, masses, or lesions. Cervix surgically absent. Bimanual exam reveals no masses or fullness. Rectovaginal exam confirms these findings.  LYMPHATIC: Cervical, supraclavicular, and inguinal nodes without lymphadenopathy  MUSCULOSKELETAL: Moves all extremities, no obvious muscle wasting  NEUROLOGIC: No gross deficits, normal gait  SKIN: Appropriate color for race, warm and dry, no rashes or lesions to unclothed skin  PSYCHIATRIC: Pleasant and interactive, affect bright but slightly tearful when discussing her best friend, " makes appropriate eye contact, thought process linear      Assessment/Plan:  1) Stage IIB leiomyosarcoma: No evidence of recurrent disease. Continue surveillance every six months x3 years (through 3/3022) then annually thereafter with pelvic/rectal exam. Continue follow up with Dr. Aguilar as scheduled. Reviewed signs and symptoms  of recurrence including but not limited to bleeding from vagina, bladder, or rectum, bloating, early satiety, swelling in the lower extremities, abdominal or lower back pain, changes in bowel or bladder patterns, shortness of breath, increased fatigue, unexplained weight loss, and night sweats. Reviewed signs and symptoms for when she should contact the clinic or seek additional care. Patient to contact the clinic with any questions or concerns in the interim.  2) Health maintenance issues discussed today include to follow up with PCP for co-morbid conditions and non-gynecologic issues. Zolpidem 5mg, #20, no refills ordered- uses sparingly with travel and has not had abnormal behaviors with this. [place]  reviewed, safety with zolpidem reviewed.   3) Patient verbalized understanding of and agreement with plan.    A total of 20 minutes of face to face time spent with patient, over 50% of which was spent in counseling and coordination of care.    SHIRAZ Parks, FNP-C  Division of Gynecologic Oncology  [facility]  Pager: [phone]

## 2019-08-19 ENCOUNTER — ANCILLARY PROCEDURE (OUTPATIENT)
Dept: MAMMOGRAPHY | Facility: CLINIC | Age: 69
End: 2019-08-19
Attending: INTERNAL MEDICINE
Payer: MEDICARE

## 2019-08-19 ENCOUNTER — ONCOLOGY VISIT (OUTPATIENT)
Dept: ONCOLOGY | Facility: CLINIC | Age: 69
End: 2019-08-19
Attending: NURSE PRACTITIONER
Payer: MEDICARE

## 2019-08-19 VITALS
OXYGEN SATURATION: 99 % | HEART RATE: 87 BPM | RESPIRATION RATE: 16 BRPM | WEIGHT: 140 LBS | BODY MASS INDEX: 28.22 KG/M2 | HEIGHT: 59 IN | TEMPERATURE: 98.3 F | SYSTOLIC BLOOD PRESSURE: 135 MMHG | DIASTOLIC BLOOD PRESSURE: 80 MMHG

## 2019-08-19 DIAGNOSIS — Z78.0 ASYMPTOMATIC POSTMENOPAUSAL STATUS: ICD-10-CM

## 2019-08-19 DIAGNOSIS — Z12.31 ENCOUNTER FOR SCREENING MAMMOGRAM FOR MALIGNANT NEOPLASM OF BREAST: ICD-10-CM

## 2019-08-19 DIAGNOSIS — C49.9 LEIOMYOSARCOMA (H): Primary | ICD-10-CM

## 2019-08-19 DIAGNOSIS — G47.00 INSOMNIA, UNSPECIFIED TYPE: ICD-10-CM

## 2019-08-19 PROBLEM — Z08 ENCOUNTER FOR FOLLOW-UP SURVEILLANCE OF UTERINE CANCER: Status: RESOLVED | Noted: 2018-11-19 | Resolved: 2019-08-19

## 2019-08-19 PROBLEM — Z85.42 ENCOUNTER FOR FOLLOW-UP SURVEILLANCE OF UTERINE CANCER: Status: RESOLVED | Noted: 2018-11-19 | Resolved: 2019-08-19

## 2019-08-19 PROBLEM — S92.354A CLOSED NONDISPLACED FRACTURE OF FIFTH RIGHT METATARSAL BONE: Status: ACTIVE | Noted: 2018-04-30

## 2019-08-19 PROCEDURE — G0463 HOSPITAL OUTPT CLINIC VISIT: HCPCS | Mod: ZF

## 2019-08-19 PROCEDURE — 99213 OFFICE O/P EST LOW 20 MIN: CPT | Mod: ZP | Performed by: NURSE PRACTITIONER

## 2019-08-19 RX ORDER — ZOLPIDEM TARTRATE 5 MG/1
5 TABLET ORAL
Qty: 20 TABLET | Refills: 0 | Status: SHIPPED | OUTPATIENT
Start: 2019-08-19 | End: 2021-08-04

## 2019-08-19 ASSESSMENT — PAIN SCALES - GENERAL: PAINLEVEL: NO PAIN (0)

## 2019-08-19 ASSESSMENT — MIFFLIN-ST. JEOR: SCORE: 1065.67

## 2019-08-19 NOTE — LETTER
2019       RE: Maggie Navarrete  [address]  [facility] [address]     Dear Colleague,    Thank you for referring your patient, Maggie Navarrete, to the [facility]. Please see a copy of my visit note below.    Gynecologic Oncology Follow-Up Visit    RE: Maggie Navarrete  MRN: [id number]  : 1950  Date of Visit: 2019    CC: Maggie Navarrete  is a 69 year old female with a history of stage IIB leiomyosarcoma of the uterus. She completed treatment with RACHEL-BSO, peritoneal resection, and omentectomy on 16 followed by completion of five cycles doxorubicin and darcarbazine 3/23/17. She presents today for a six month surveillance visit.    HPI: Maggie comes to the clinic feeling well from a gynecologic perspective. Notes vaginal dryness but denies need for intervention- uses Replens prior to her pelvic exams but otherwise is not bothersome. Not sexually active. No pelvic pain, vaginal bleeding, bloating, early satiety, or abdominal pain. Up to date on seeing her PCP, mammogram, and colonoscopy as well as Dr. Aguilar with medical oncology. Continues to travel frequently- would like a refill of zolpidem, takes when traveling and has never had any abnormal sleep behaviors with this. Notes anxiety related to her best friend's cancer diagnosis- being treated for melanoma, reports she will be hospitalized for a stem cell transplant. Chronic issues with urinary frequency and incontinence present for at least the past ten years, stable, denies changes and denies need for intervention. Chronic arthralgias secondary to OA, sees Dr. Flynn for osteoporosis. No other concerns today.      Oncology History:  Summer 2016: Low back pain persisting after corticosteroid injection, severe constipation     2016: Colonoscopy for persistent abdominal pain, sigmoid polyp snared. Abdominal pain and diarrhea persist after colonoscopy prep.     2016: she has worsening abdominal pain and is  evaluated in the ED. CT-scan shows a large subserosal fibroid measuring 9.4 x 9.6 x 9.8 cm, as well as very large fibroids along the posterior uterine body more inferiorly, increased in size to 14 cm in greatest diameter compared to 10 cm in greatest diameter on MRI of 5/9/2016.     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     11/4/2016: Total abdominal hysterectomy and BSO, under Dr. Harsh Camarillo. Surgery was originally presumed for leiomyoma, however, intraoperatively there was focal adherence of a mass to the pelvic right sidewall and cul-de-sac, which led to peritoneal resection and omentectomy. Final pathology, pT2b: showed leiomyosarcoma involving the cul-de-sac and forming two separate masses measuring 10.5 x 6.5 x 4.5 cm and 10.7 x 7.9 x 7.5 cm.     12/26/2016 - 3/2016: s/p 5/6 cycles 25 mg/m2 doxorubicin, 250 mg/m2 dacarbazine.      2/2017: CT: IMPRESSION: In this patient with history of leiomyosarcoma of the  uterus:  1. No CT evidence of disease progression.  2. Unchanged mildly prominent para-aortic lymph nodes, compared to  outside institution CT 11/29/2016. Continued attention on follow-up  imaging is recommended.  3. 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  4. Calcified and noncalcified pulmonary nodules, stable from  11/29/2016. Continued attention on follow-up imaging.     4/2018:  CT:  IMPRESSION:   1. Stable changes of hysterectomy and bilateral salpingo-oophorectomy  without evidence of local recurrence.  2. Stable small pulmonary nodules  3. Mild thickening and hyperenhancement of the gastric mucosa and  proximal duodenum compatible with inflammation/infection.  4. Stable indeterminate hyperenhancing lesion in segment 4A    9/21/18: CT CAP impression:  IMPRESSION: This patient with a history of uterine leiomyosarcoma:  1. No evidence of locally recurrent or metastatic disease in the  chest, abdomen, or pelvis.  2.  Unchanged sub-6 mm pulmonary nodules.  3. Unchanged arterial enhancing lesions in the liver, stable dating  back to at least 2/24/2017, suggesting hepatic hemangiomas.     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     GERD (gastroesophageal reflux disease)      Hx of previous reproductive problem 1980     Kidney stone 2011     Leiomyosarcoma (H) 11/14/2016     Personal history of colonic polyps 2016    Small, benign     PONV (postoperative nausea and vomiting)      Posterior vitreous detachment of left eye 2011     Posterior vitreous detachment of right eye 2011     Ptosis of eyelid, bilateral      Stomach problems 2015    Abdominal pain, Diarreha     Thyroid disease     thyroid nodule resolved 2014       Past Surgical History:   Procedure Laterality Date     ABDOMEN SURGERY  Now    Pain, diarrhea     BREAST SURGERY  2002    right breast benign     COLONOSCOPY  2008    due in 2018     COLONOSCOPY N/A 9/8/2016    Procedure: COMBINED COLONOSCOPY, SINGLE OR MULTIPLE BIOPSY/POLYPECTOMY BY BIOPSY;  Surgeon: Abner Pepper MD;  Location:  GI     ENT SURGERY  1975    tonsillectomy     GENITOURINARY SURGERY  1953    bladder surgery      GI SURGERY  2015    Severe constipation     GYN SURGERY  1977    C section     GYN SURGERY  1981    laparoscopy     GYN SURGERY  2007    myomectomy     hysterecomy  11/14/2016    leiomyosarcoma     OTHER SURGICAL HISTORY Right 10/27/2017    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     REMOVE PORT VASCULAR ACCESS Right 5/9/2017    Procedure: REMOVE PORT VASCULAR ACCESS;  Right Port Removal;  Surgeon: Eric iGbson PA-C;  Location:  OR     REPAIR PTOSIS BILATERAL  2/15/2012    Procedure:REPAIR PTOSIS BILATERAL; BILATERAL UPPER LID PTOSIS  REPAIR; Surgeon:BRYCE REYNOLDS; Location:[facility]       Social History     Socioeconomic History     Marital status: Single     Spouse name: Not on file     Number of children: Not on file     Years of education: Not on file     Highest education level: Not on file   Occupational History     Not on file   Social Needs     Financial resource strain: Not on file     Food insecurity:     Worry: Not on file     Inability: Not on file     Transportation needs:     Medical: Not on file     Non-medical: Not on file   Tobacco Use     Smoking status: Never Smoker     Smokeless tobacco: Never Used   Substance and Sexual Activity     Alcohol use: Yes     Comment: Social, not often per patient.     Drug use: No     Sexual activity: Not Currently     Partners: Male   Lifestyle     Physical activity:     Days per week: Not on file     Minutes per session: Not on file     Stress: Not on file   Relationships     Social connections:     Talks on phone: Not on file     Gets together: Not on file     Attends [employer] service: Not on file     Active member of club or organization: Not on file     Attends meetings of clubs or organizations: Not on file     Relationship status: Not on file     Intimate partner violence:     Fear of current or ex partner: Not on file     Emotionally abused: Not on file     Physically abused: Not on file     Forced sexual activity: Not on file   Other Topics Concern     Parent/sibling w/ CABG, MI or angioplasty before 65F 55M? 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 Walking daily.    How much calcium per day? Supplement and through diet       How much caffeine per day? 3 times a week    How much vitamin D per day? Supplement    Do you/your family wear seatbelts?  Yes    Do you/your family use safety helmets? NA    Do you/your family use sunscreen? Sometimes    Do you/your family keep firearms in the home? No    Do you/your family have a smoke detector(s)? Yes    Do you feel safe in your home? Yes    Has anyone ever touched you in an unwanted manner? 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  2 grandsons now in [place] with the mother.  In a house.  .         Family History   Problem Relation Age of Onset     Family History Negative Mother         glaucoma     Glaucoma Mother      Anxiety Disorder Mother      Heart Failure Mother      Diabetes Mother      Coronary Artery Disease Mother      Hypertension Mother      Breast Cancer Mother      Depression Mother      Obesity Mother      Heart Disease Father      Glaucoma Father      Diabetes Father      Coronary Artery Disease Father      Depression Father      Osteoporosis Father      Heart Disease Maternal Grandmother      Diabetes Maternal Grandmother      Heart Disease Paternal Grandmother      Cancer Paternal Grandfather         stomach     Thyroid Disease Sister      Retinal detachment Sister      Hypertension Sister      Hypertension Sister      Lipids Sister      Cerebrovascular Disease Sister      Depression Sister      Heart Disease Paternal Aunt      Heart Disease Paternal Uncle      Anxiety Disorder Sister      Depression Sister      Thyroid Disease Sister      Thyroid Disease Sister      Osteoporosis Other        Current Outpatient Medications   Medication     Calcium  "Citrate-Vitamin D (CALCIUM CITRATE + PO)     Ibuprofen (ADVIL PO)     levothyroxine (SYNTHROID/LEVOTHROID) 75 MCG tablet     Multiple Vitamin (MULTIVITAMINS PO)     venlafaxine (EFFEXOR-XR) 75 MG 24 hr capsule     zolpidem (AMBIEN) 5 MG tablet     No current facility-administered medications for this visit.           Allergies   Allergen Reactions     Erythromycin GI Disturbance     Tramadol Nausea         Health Maintenance  Pap smear: No longer indicated  Mammogram: Last performed 8/19/19, due 8/2020  Colonoscopy: Last performed 9/8/16, due 9/2021  Annual wellness exam: Performed 6/2019, due 6/2020      Review of Systems  10 point ROS performed and negative except as listed in HPI    OBJECTIVE:    Physical Exam:    /80   Pulse 87   Temp 98.3  F (36.8  C) (Oral)   Resp 16   Ht 1.499 m (4' 11\")   Wt 63.5 kg (140 lb)   SpO2 99%   BMI 28.28 kg/m       CONSTITUTIONAL: Alert non-toxic appearing female in no acute distress  HEAD: Normocephalic, atraumatic  EYES: PERRLA; no scleral icterus  ENT: Oropharynx pink without lesions  NECK: Neck supple without lymphadenopathy  RESPIRATORY: Lungs clear to auscultation, no increased work of breathing noted  CV: Regular rate and rhythm, S1S2, no clicks, murmurs, rubs, or gallops; bilateral lower extremities without edema, dorsalis pedis pulses 2+ bilaterally  GASTROINTESTINAL: Normoactive bowel sounds x4 quadrants, abdomen soft, non-distended, and non-tender to palpation without masses or organomegaly  GENITOURINARY: External genitalia and urethral meatus pink without lesions, masses, or excoriation. Vagina hypoestrogenic and stenotic without masses or lesions. Vaginal cuff without nodularity, masses, or lesions. Cervix surgically absent. Bimanual exam reveals no masses or fullness. Rectovaginal exam confirms these findings.  LYMPHATIC: Cervical, supraclavicular, and inguinal nodes without lymphadenopathy  MUSCULOSKELETAL: Moves all extremities, no obvious muscle " wasting  NEUROLOGIC: No gross deficits, normal gait  SKIN: Appropriate color for race, warm and dry, no rashes or lesions to unclothed skin  PSYCHIATRIC: Pleasant and interactive, affect bright but slightly tearful when discussing her best friend, makes appropriate eye contact, thought process linear      Assessment/Plan:  1) Stage IIB leiomyosarcoma: No evidence of recurrent disease. Continue surveillance every six months x3 years (through 3/3022) then annually thereafter with pelvic/rectal exam. Continue follow up with Dr. Aguilar as scheduled. Reviewed signs and symptoms  of recurrence including but not limited to bleeding from vagina, bladder, or rectum, bloating, early satiety, swelling in the lower extremities, abdominal or lower back pain, changes in bowel or bladder patterns, shortness of breath, increased fatigue, unexplained weight loss, and night sweats. Reviewed signs and symptoms for when she should contact the clinic or seek additional care. Patient to contact the clinic with any questions or concerns in the interim.  2) Health maintenance issues discussed today include to follow up with PCP for co-morbid conditions and non-gynecologic issues. Zolpidem 5mg, #20, no refills ordered- uses sparingly with travel and has not had abnormal behaviors with this. [place]  reviewed, safety with zolpidem reviewed.   3) Patient verbalized understanding of and agreement with plan.    A total of 20 minutes of face to face time spent with patient, over 50% of which was spent in counseling and coordination of care.    SHIRAZ Parks, FNP-C  Division of Gynecologic Oncology  [facility]  Pager: [phone]

## 2019-08-19 NOTE — NURSING NOTE
"Oncology Rooming Note    August 19, 2019 11:45 AM   Maggie Navarrete is a 69 year old female who presents for:    Chief Complaint   Patient presents with     Oncology Clinic Visit     Return Uterine Leiomyosarcoma     Initial Vitals: /80   Pulse 87   Temp 98.3  F (36.8  C) (Oral)   Resp 16   Ht 1.499 m (4' 11\")   Wt 63.5 kg (140 lb)   SpO2 99%   BMI 28.28 kg/m   Estimated body mass index is 28.28 kg/m  as calculated from the following:    Height as of this encounter: 1.499 m (4' 11\").    Weight as of this encounter: 63.5 kg (140 lb). Body surface area is 1.63 meters squared.  No Pain (0) Comment: Data Unavailable   No LMP recorded. Patient has had a hysterectomy.  Allergies reviewed: Yes  Medications reviewed: Yes    Medications: Medication refills not needed today.  Pharmacy name entered into [employer]: [facility] [address] [place] [address] - [address] [place] [address]    Clinical concerns: No new concerns.         Tracy Tello CMA              "

## 2019-09-28 ENCOUNTER — HEALTH MAINTENANCE LETTER (OUTPATIENT)
Age: 69
End: 2019-09-28

## 2019-10-03 ENCOUNTER — TELEPHONE (OUTPATIENT)
Dept: INTERNAL MEDICINE | Facility: CLINIC | Age: 69
End: 2019-10-03

## 2019-10-03 DIAGNOSIS — M25.561 CHRONIC PAIN OF RIGHT KNEE: Primary | ICD-10-CM

## 2019-10-03 DIAGNOSIS — G89.29 CHRONIC PAIN OF RIGHT KNEE: Primary | ICD-10-CM

## 2019-10-03 NOTE — TELEPHONE ENCOUNTER
[facility] Call Center    Phone Message    May a detailed message be left on voicemail: yes    Reason for Call: Order(s): Other:   Reason for requested: PT for knee pain  Date needed: Asap  Provider name: Dr. Lin  Comments: Pt wants the order sent to [facility] in [place], fax # [phone]. Please call Pt back to confirm completion.      Action Taken: Message routed to:  [facility]

## 2019-10-22 ENCOUNTER — TELEPHONE (OUTPATIENT)
Dept: INTERNAL MEDICINE | Facility: CLINIC | Age: 69
End: 2019-10-22

## 2019-10-22 DIAGNOSIS — F41.1 GENERALIZED ANXIETY DISORDER: ICD-10-CM

## 2019-10-22 RX ORDER — VENLAFAXINE HYDROCHLORIDE 75 MG/1
75 CAPSULE, EXTENDED RELEASE ORAL DAILY
Qty: 90 CAPSULE | Refills: 0 | Status: SHIPPED | OUTPATIENT
Start: 2019-10-22 | End: 2020-01-17

## 2019-10-22 NOTE — TELEPHONE ENCOUNTER
[facility] Call Center    Phone Message    May a detailed message be left on voicemail: yes    Reason for Call: Medication Refill Request    Has the patient contacted the pharmacy for the refill? Yes   Name of medication being requested: venlafaxine (EFFEXOR-XR) 75 MG 24 hr capsule  Provider who prescribed the medication: Rhea Lin  Pharmacy: [facility] - [facility] [address] [facility]  Date medication is needed: asap         Action Taken: Message routed to:  [facility]

## 2019-11-07 ENCOUNTER — TRANSFERRED RECORDS (OUTPATIENT)
Dept: HEALTH INFORMATION MANAGEMENT | Facility: CLINIC | Age: 69
End: 2019-11-07

## 2019-11-19 ENCOUNTER — ANCILLARY PROCEDURE (OUTPATIENT)
Dept: CT IMAGING | Facility: CLINIC | Age: 69
End: 2019-11-19
Attending: INTERNAL MEDICINE
Payer: MEDICARE

## 2019-11-19 DIAGNOSIS — C49.9 SARCOMA (H): ICD-10-CM

## 2019-11-19 RX ORDER — IOPAMIDOL 755 MG/ML
86 INJECTION, SOLUTION INTRAVASCULAR ONCE
Status: COMPLETED | OUTPATIENT
Start: 2019-11-19 | End: 2019-11-19

## 2019-11-19 RX ADMIN — IOPAMIDOL 86 ML: 755 INJECTION, SOLUTION INTRAVASCULAR at 10:00

## 2019-11-21 ENCOUNTER — ONCOLOGY VISIT (OUTPATIENT)
Dept: ONCOLOGY | Facility: CLINIC | Age: 69
End: 2019-11-21
Attending: INTERNAL MEDICINE
Payer: MEDICARE

## 2019-11-21 VITALS
BODY MASS INDEX: 28.71 KG/M2 | OXYGEN SATURATION: 100 % | SYSTOLIC BLOOD PRESSURE: 157 MMHG | WEIGHT: 142.4 LBS | HEIGHT: 59 IN | HEART RATE: 84 BPM | TEMPERATURE: 98 F | RESPIRATION RATE: 14 BRPM | DIASTOLIC BLOOD PRESSURE: 93 MMHG

## 2019-11-21 DIAGNOSIS — C49.9 LEIOMYOSARCOMA (H): Primary | ICD-10-CM

## 2019-11-21 PROCEDURE — 99215 OFFICE O/P EST HI 40 MIN: CPT | Mod: ZP | Performed by: INTERNAL MEDICINE

## 2019-11-21 PROCEDURE — G0463 HOSPITAL OUTPT CLINIC VISIT: HCPCS | Mod: ZF

## 2019-11-21 ASSESSMENT — MIFFLIN-ST. JEOR: SCORE: 1076.8

## 2019-11-21 ASSESSMENT — PAIN SCALES - GENERAL: PAINLEVEL: NO PAIN (0)

## 2019-11-21 NOTE — NURSING NOTE
"Oncology Rooming Note    November 21, 2019 3:28 PM   Maggie Navarrete is a 69 year old female who presents for:    Chief Complaint   Patient presents with     Oncology Clinic Visit     [facility] RETURN- LEIOMYOSARCOMA     Initial Vitals: BP (!) 157/93 (BP Location: Right arm, Patient Position: Chair, Cuff Size: Adult Regular)   Pulse 84   Temp 98  F (36.7  C)   Resp 14   Ht 1.499 m (4' 11.02\")   Wt 64.6 kg (142 lb 6.4 oz)   SpO2 100%   BMI 28.75 kg/m   Estimated body mass index is 28.75 kg/m  as calculated from the following:    Height as of this encounter: 1.499 m (4' 11.02\").    Weight as of this encounter: 64.6 kg (142 lb 6.4 oz). Body surface area is 1.64 meters squared.  No Pain (0) Comment: Data Unavailable   No LMP recorded. Patient has had a hysterectomy.  Allergies reviewed: Yes  Medications reviewed: Yes    Medications: Medication refills not needed today.  Pharmacy name entered into [employer]: [facility] [id number]    Clinical concerns: No new concerns. Lauren was notified.      Edmond Prado LPN            "

## 2019-11-21 NOTE — PROGRESS NOTES
[facility]  MEDICAL ONCOLOGY PROGRESS NOTE   Nov 21, 2019    Oncologic history:  1. January 2016, she presents to her PCP with 3 weeks of abdominal pain and severe constipation. She is treated for constipation and improves.  2. 3/21/2016, worsening pelvic pressure prompts pelvic examination with an enlarged uterus noted. Transvaginal ultrasound shows enlargement of subserosal fibroids originally seen on MRI in 2006.  3. 5/9/2016, MRI pelvis is obtained, which shows several enhancing uterine masses involving an enlarged uterus, felt radiographically compatible with benign uterine leiomyomata.  3. Summer 2016, she experiences worsening low back pain thought at first thought to be sacroiliitis, but pain persists after corticosteroid injection. Continues with severe constipation.  4. 9/8/2016, she undergoes colonoscopy for persistent abdominal pain. She has sigmoid polyp snared. Abdominal pain and diarrhea persist after colonoscopy prep.  5. 9/14/2016, she has worsening abdominal pain and is evaluated in the ED. CT-scan shows a large subserosal fibroid measuring 9.4 x 9.6 x 9.8 cm, as well as very large fibroids along the posterior uterine body more inferiorly, increased in size to 14 cm in greatest diameter compared to 10 cm in greatest diameter on MRI of 5/9/2016.  6. 9/29/2016, she has staging CT-CAP, which shows a small left sided pleural effusion and a 3 mm pulmonary nodule in the RML.  7. October 2016, self-refers to [facility] [place] [facility].  8. 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  9. 11/4/2016, undergoes total abdominal hysterectomy and BSO, under Dr. Harsh Camarillo. Surgery was originally presumed for leiomyoma, however, intraoperatively there was focal adherence of a mass to the pelvic right sidewall and cul-de-sac, which led to peritoneal resection and omentectomy. Final pathology, pT2b: showed leiomyosarcoma involving the cul-de-sac and forming two  separate masses measuring 10.5 x 6.5 x 4.5 cm and 10.7 x 7.9 x 7.5 cm.  10. 12/26/2016, she starts adjuvant chemotherapy with doxorubicin 25 mg/m2/day and DTIC 250 mg/m2/day , days 1-3 every 21 weeks.  11. 3/23/2017, she completes 5 cycles of adjuvant doxorubicin and dacarbazine    HISTORY OF PRESENT ILLNESS  Ms. Navarrete presents today in follow-up and to review restaging studies. She is feeling well and denies any major complaints. She denies nausea, vomiting, diarrhea, or abdominal pains. No new urinary symptoms. No headaches, shortness of breath, chest pain, or fevers/chills. She has some additional trips planned in the coming weeks and months.    CT-chest abdomen and pelvis is negative for local recurrence or metastasis.    ECOG performance status is 0.    REVIEW OF SYSTEMS  A 12-point ROS negative except as in HPI    Current Outpatient Medications   Medication Sig Dispense Refill     Calcium Citrate-Vitamin D (CALCIUM CITRATE + PO) Take 200 mg by mouth daily as needed (She supplements to meet her 1,200mg goal depending on how much dietary calcium she has gotten that day (up to 400mg once daily)) Reported on 5/1/2017       levothyroxine (SYNTHROID/LEVOTHROID) 75 MCG tablet TAKE 1 TABLET EVERY DAY 90 tablet 3     Multiple Vitamin (MULTIVITAMINS PO) Take 1 tablet by mouth daily Reported on 4/10/2017       venlafaxine (EFFEXOR-XR) 75 MG 24 hr capsule Take 1 capsule (75 mg) by mouth daily 90 capsule 0     zolpidem (AMBIEN) 5 MG tablet Take 1 tablet (5 mg) by mouth nightly as needed for sleep (Only uses if traveling or cannot sleep,) 20 tablet 0       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     GERD (gastroesophageal reflux disease)      Hx of previous reproductive problem 1980     Kidney stone 2011     Leiomyosarcoma (H)  "11/14/2016     Personal history of colonic polyps 2016    Small, benign     PONV (postoperative nausea and vomiting)      Posterior vitreous detachment of left eye 2011     Posterior vitreous detachment of right eye 2011     Ptosis of eyelid, bilateral      Stomach problems 2015    Abdominal pain, Diarreha     Thyroid disease     thyroid nodule resolved 2014       PHYSICAL EXAMINATION  BP (!) 157/93 (BP Location: Right arm, Patient Position: Chair, Cuff Size: Adult Regular)   Pulse 84   Temp 98  F (36.7  C)   Resp 14   Ht 1.499 m (4' 11.02\")   Wt 64.6 kg (142 lb 6.4 oz)   SpO2 100%   BMI 28.75 kg/m    Wt Readings from Last 2 Encounters:   11/21/19 64.6 kg (142 lb 6.4 oz)   08/19/19 63.5 kg (140 lb)     Physical Exam  Vitals signs and nursing note reviewed.   Constitutional:       General: She is not in acute distress.     Appearance: She is well-developed.   HENT:      Head: Atraumatic.   Eyes:      General: No scleral icterus.     Conjunctiva/sclera: Conjunctivae normal.      Pupils: Pupils are equal, round, and reactive to light.   Neck:      Musculoskeletal: Normal range of motion.   Pulmonary:      Effort: Pulmonary effort is normal.   Musculoskeletal: Normal range of motion.   Skin:     General: Skin is warm and dry.      Findings: No rash.   Neurological:      Mental Status: She is alert and oriented to person, place, and time.      Cranial Nerves: No cranial nerve deficit.      Motor: No abnormal muscle tone.      Coordination: Coordination normal.   Psychiatric:         Behavior: Behavior normal.       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  It was a pleasure to see Ms. Navarrete today. Her recent restaging scans are negative for recurrent or metastatic disease. She is feeling well and denies any complaints. She has pelvic examinations every 6 months.    I will plan to see her " back in about 6 months with MRI-abdomen and pelvis with chest X-rays. CT-CAP yearly.    Questions answered.    Ab Warner M.D.   of Medicine  Hematology, Oncology and Transplantation

## 2019-11-21 NOTE — LETTER
RE: Maggie Navarrete  [address] [facility] [address]     Dear Colleague,    Thank you for referring your patient, Maggie Navarrete, to the [facility]. Please see a copy of my visit note below.    [facility]  MEDICAL ONCOLOGY PROGRESS NOTE   Nov 21, 2019    Oncologic history:  1. January 2016, she presents to her PCP with 3 weeks of abdominal pain and severe constipation. She is treated for constipation and improves.  2. 3/21/2016, worsening pelvic pressure prompts pelvic examination with an enlarged uterus noted. Transvaginal ultrasound shows enlargement of subserosal fibroids originally seen on MRI in 2006.  3. 5/9/2016, MRI pelvis is obtained, which shows several enhancing uterine masses involving an enlarged uterus, felt radiographically compatible with benign uterine leiomyomata.  3. Summer 2016, she experiences worsening low back pain thought at first thought to be sacroiliitis, but pain persists after corticosteroid injection. Continues with severe constipation.  4. 9/8/2016, she undergoes colonoscopy for persistent abdominal pain. She has sigmoid polyp snared. Abdominal pain and diarrhea persist after colonoscopy prep.  5. 9/14/2016, she has worsening abdominal pain and is evaluated in the ED. CT-scan shows a large subserosal fibroid measuring 9.4 x 9.6 x 9.8 cm, as well as very large fibroids along the posterior uterine body more inferiorly, increased in size to 14 cm in greatest diameter compared to 10 cm in greatest diameter on MRI of 5/9/2016.  6. 9/29/2016, she has staging CT-CAP, which shows a small left sided pleural effusion and a 3 mm pulmonary nodule in the RML.  7. October 2016, self-refers to [facility].  8. 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  9. 11/4/2016, undergoes total abdominal hysterectomy and BSO, under Dr. Harsh Camarillo. Surgery was originally presumed for leiomyoma, however,  intraoperatively there was focal adherence of a mass to the pelvic right sidewall and cul-de-sac, which led to peritoneal resection and omentectomy. Final pathology, pT2b: showed leiomyosarcoma involving the cul-de-sac and forming two separate masses measuring 10.5 x 6.5 x 4.5 cm and 10.7 x 7.9 x 7.5 cm.  10. 12/26/2016, she starts adjuvant chemotherapy with doxorubicin 25 mg/m2/day and DTIC 250 mg/m2/day , days 1-3 every 21 weeks.  11. 3/23/2017, she completes 5 cycles of adjuvant doxorubicin and dacarbazine    HISTORY OF PRESENT ILLNESS  Ms. Navarrete presents today in follow-up and to review restaging studies. She is feeling well and denies any major complaints. She denies nausea, vomiting, diarrhea, or abdominal pains. No new urinary symptoms. No headaches, shortness of breath, chest pain, or fevers/chills. She has some additional trips planned in the coming weeks and months.    CT-chest abdomen and pelvis is negative for local recurrence or metastasis.    ECOG performance status is 0.    REVIEW OF SYSTEMS  A 12-point ROS negative except as in HPI    Current Outpatient Medications   Medication Sig Dispense Refill     Calcium Citrate-Vitamin D (CALCIUM CITRATE + PO) Take 200 mg by mouth daily as needed (She supplements to meet her 1,200mg goal depending on how much dietary calcium she has gotten that day (up to 400mg once daily)) Reported on 5/1/2017       levothyroxine (SYNTHROID/LEVOTHROID) 75 MCG tablet TAKE 1 TABLET EVERY DAY 90 tablet 3     Multiple Vitamin (MULTIVITAMINS PO) Take 1 tablet by mouth daily Reported on 4/10/2017       venlafaxine (EFFEXOR-XR) 75 MG 24 hr capsule Take 1 capsule (75 mg) by mouth daily 90 capsule 0     zolpidem (AMBIEN) 5 MG tablet Take 1 tablet (5 mg) by mouth nightly as needed for sleep (Only uses if traveling or cannot sleep,) 20 tablet 0       Past Medical History:   Diagnosis Date     Anxiety      Arthritis     pain in feet and knees     Disorder of bone and cartilage,  "unspecified      Esophageal reflux 2015     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     GERD (gastroesophageal reflux disease)      Hx of previous reproductive problem 1980     Kidney stone 2011     Leiomyosarcoma (H) 11/14/2016     Personal history of colonic polyps 2016    Small, benign     PONV (postoperative nausea and vomiting)      Posterior vitreous detachment of left eye 2011     Posterior vitreous detachment of right eye 2011     Ptosis of eyelid, bilateral      Stomach problems 2015    Abdominal pain, Diarreha     Thyroid disease     thyroid nodule resolved 2014       PHYSICAL EXAMINATION  BP (!) 157/93 (BP Location: Right arm, Patient Position: Chair, Cuff Size: Adult Regular)   Pulse 84   Temp 98  F (36.7  C)   Resp 14   Ht 1.499 m (4' 11.02\")   Wt 64.6 kg (142 lb 6.4 oz)   SpO2 100%   BMI 28.75 kg/m     Wt Readings from Last 2 Encounters:   11/21/19 64.6 kg (142 lb 6.4 oz)   08/19/19 63.5 kg (140 lb)     Physical Exam  Vitals signs and nursing note reviewed.   Constitutional:       General: She is not in acute distress.     Appearance: She is well-developed.   HENT:      Head: Atraumatic.   Eyes:      General: No scleral icterus.     Conjunctiva/sclera: Conjunctivae normal.      Pupils: Pupils are equal, round, and reactive to light.   Neck:      Musculoskeletal: Normal range of motion.   Pulmonary:      Effort: Pulmonary effort is normal.   Musculoskeletal: Normal range of motion.   Skin:     General: Skin is warm and dry.      Findings: No rash.   Neurological:      Mental Status: She is alert and oriented to person, place, and time.      Cranial Nerves: No cranial nerve deficit.      Motor: No abnormal muscle tone.      Coordination: Coordination normal.   Psychiatric:         Behavior: Behavior normal.       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  It was a pleasure to see Ms. Navarrete today. Her recent restaging scans are negative for recurrent or metastatic disease. She is feeling well and denies any complaints. She has pelvic examinations every 6 months.    I will plan to see her back in about 6 months with MRI-abdomen and pelvis with chest X-rays. CT-CAP yearly.    Questions answered.    Ab Warner M.D.   of Medicine  Hematology, Oncology and Transplantation

## 2019-12-16 ENCOUNTER — TRANSFERRED RECORDS (OUTPATIENT)
Dept: HEALTH INFORMATION MANAGEMENT | Facility: CLINIC | Age: 69
End: 2019-12-16

## 2019-12-30 ENCOUNTER — TRANSFERRED RECORDS (OUTPATIENT)
Dept: HEALTH INFORMATION MANAGEMENT | Facility: CLINIC | Age: 69
End: 2019-12-30

## 2020-01-17 DIAGNOSIS — F41.1 GENERALIZED ANXIETY DISORDER: ICD-10-CM

## 2020-01-17 DIAGNOSIS — E03.9 ACQUIRED HYPOTHYROIDISM: ICD-10-CM

## 2020-01-17 RX ORDER — LEVOTHYROXINE SODIUM 75 UG/1
TABLET ORAL
Qty: 90 TABLET | Refills: 1 | Status: SHIPPED | OUTPATIENT
Start: 2020-01-17 | End: 2020-08-07

## 2020-01-17 RX ORDER — VENLAFAXINE HYDROCHLORIDE 75 MG/1
75 CAPSULE, EXTENDED RELEASE ORAL DAILY
Qty: 90 CAPSULE | Refills: 0 | Status: SHIPPED | OUTPATIENT
Start: 2020-01-17 | End: 2020-04-08

## 2020-01-17 NOTE — TELEPHONE ENCOUNTER
venlafaxine (EFFEXOR-XR) 75 MG 24 hr capsule   Last Written Prescription Date:  10-22-19  Last Fill Quantity: 90,   # refills: 0  Last Office Visit : 6-28-19  Future Office visit:  None    Remaining Levothyroxine refills sent to requested pharmacy.      Kathleen M Doege RN

## 2020-01-17 NOTE — TELEPHONE ENCOUNTER
[facility] Call Center    Phone Message    May a detailed message be left on voicemail: yes    Reason for Call: Medication Refill Request    Has the patient contacted the pharmacy for the refill? Yes   Name of medication being requested: venlafaxine (EFFEXOR-XR) 75 MG 24 hr capsule and levothyroxine (SYNTHROID/LEVOTHROID) 75 MCG tablet  Provider who prescribed the medication: [facility]  Pharmacy: [facility] ph: [phone]  Date medication is needed: now for effexor, in a month for synthroid  Please fax orders to [phone]. The pt states there was also [facility] on the new pharmacy info - [phone].   Please send the pt a My Chart mssg when that has been done. Thanks.      Action Taken: Message routed to:  [facility] ([facility] yariel [facility] med

## 2020-01-22 ENCOUNTER — ONCOLOGY VISIT (OUTPATIENT)
Dept: ONCOLOGY | Facility: CLINIC | Age: 70
End: 2020-01-22
Attending: NURSE PRACTITIONER
Payer: MEDICARE

## 2020-01-22 VITALS
DIASTOLIC BLOOD PRESSURE: 80 MMHG | TEMPERATURE: 98 F | WEIGHT: 142 LBS | RESPIRATION RATE: 16 BRPM | BODY MASS INDEX: 28.66 KG/M2 | SYSTOLIC BLOOD PRESSURE: 120 MMHG | OXYGEN SATURATION: 100 % | HEART RATE: 100 BPM

## 2020-01-22 DIAGNOSIS — F43.21 GRIEF: ICD-10-CM

## 2020-01-22 DIAGNOSIS — C49.9 LEIOMYOSARCOMA (H): Primary | ICD-10-CM

## 2020-01-22 PROCEDURE — G0463 HOSPITAL OUTPT CLINIC VISIT: HCPCS | Mod: ZF

## 2020-01-22 PROCEDURE — 99213 OFFICE O/P EST LOW 20 MIN: CPT | Mod: ZP | Performed by: NURSE PRACTITIONER

## 2020-01-22 ASSESSMENT — PAIN SCALES - GENERAL: PAINLEVEL: NO PAIN (0)

## 2020-01-22 NOTE — LETTER
2020       RE: Maggie Navarrete  [address]  [facility] [address]     Dear Colleague,    Thank you for referring your patient, Maggie Navarrete, to the [facility]. Please see a copy of my visit note below.    Gynecologic Oncology Follow-Up Visit    RE: Maggie Navarrete  MRN: [id number]  : 1950  Date of Visit: 2020    CC: Maggie Navarrete  is a 69 year old female with a history of stage IIB leiomyosarcoma of the uterus. She completed treatment with RACHEL-BSO, peritoneal resection, and omentectomy on 16 followed by completion of five cycles doxorubicin and darcarbazine 3/23/17. She presents today for a six month surveillance visit.    HPI: Maggie comes to the clinic feeling well from a gynecologic perspective. Not sexually active. No pelvic pain, vaginal bleeding, bloating, early satiety, or abdominal pain. Up to date on seeing her PCP, mammogram, and colonoscopy as well as Dr. Aguilar with medical oncology. She is grieving the death of her best friend- plans to resume [facility] but is on a six month waiting list. Would like to see psychology in the mean time. No other concerns today.      Oncology History:  Summer 2016: Low back pain persisting after corticosteroid injection, severe constipation     2016: Colonoscopy for persistent abdominal pain, sigmoid polyp snared. Abdominal pain and diarrhea persist after colonoscopy prep.     2016: she has worsening abdominal pain and is evaluated in the ED. CT-scan shows a large subserosal fibroid measuring 9.4 x 9.6 x 9.8 cm, as well as very large fibroids along the posterior uterine body more inferiorly, increased in size to 14 cm in greatest diameter compared to 10 cm in greatest diameter on MRI of 2016.     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     2016: Total abdominal hysterectomy and BSO, under   Harsh Camarillo. Surgery was originally presumed for leiomyoma, however, intraoperatively there was focal adherence of a mass to the pelvic right sidewall and cul-de-sac, which led to peritoneal resection and omentectomy. Final pathology, pT2b: showed leiomyosarcoma involving the cul-de-sac and forming two separate masses measuring 10.5 x 6.5 x 4.5 cm and 10.7 x 7.9 x 7.5 cm.     12/26/2016 - 3/2016: s/p 5/6 cycles 25 mg/m2 doxorubicin, 250 mg/m2 dacarbazine.      2/2017: CT: IMPRESSION: In this patient with history of leiomyosarcoma of the  uterus:  1. No CT evidence of disease progression.  2. Unchanged mildly prominent para-aortic lymph nodes, compared to  outside institution CT 11/29/2016. Continued attention on follow-up  imaging is recommended.  3. 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  4. Calcified and noncalcified pulmonary nodules, stable from  11/29/2016. Continued attention on follow-up imaging.     4/2018:  CT:  IMPRESSION:   1. Stable changes of hysterectomy and bilateral salpingo-oophorectomy  without evidence of local recurrence.  2. Stable small pulmonary nodules  3. Mild thickening and hyperenhancement of the gastric mucosa and  proximal duodenum compatible with inflammation/infection.  4. Stable indeterminate hyperenhancing lesion in segment 4A    9/21/18: CT CAP impression:  IMPRESSION: This patient with a history of uterine leiomyosarcoma:  1. No evidence of locally recurrent or metastatic disease in the  chest, abdomen, or pelvis.  2. Unchanged sub-6 mm pulmonary nodules.  3. Unchanged arterial enhancing lesions in the liver, stable dating  back to at least 2/24/2017, suggesting hepatic hemangiomas.     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     GERD (gastroesophageal  reflux disease)      Hx of previous reproductive problem 1980     Kidney stone 2011     Leiomyosarcoma (H) 11/14/2016     Personal history of colonic polyps 2016    Small, benign     PONV (postoperative nausea and vomiting)      Posterior vitreous detachment of left eye 2011     Posterior vitreous detachment of right eye 2011     Ptosis of eyelid, bilateral      Stomach problems 2015    Abdominal pain, Diarreha     Thyroid disease     thyroid nodule resolved 2014       Past Surgical History:   Procedure Laterality Date     ABDOMEN SURGERY  Now    Pain, diarrhea     BREAST SURGERY  2002    right breast benign     COLONOSCOPY  2008    due in 2018     COLONOSCOPY N/A 9/8/2016    Procedure: COMBINED COLONOSCOPY, SINGLE OR MULTIPLE BIOPSY/POLYPECTOMY BY BIOPSY;  Surgeon: Abner Pepper MD;  Location:  GI     ENT SURGERY  1975    tonsillectomy     GENITOURINARY SURGERY  1953    bladder surgery      GI SURGERY  2015    Severe constipation     GYN SURGERY  1977    C section     GYN SURGERY  1981    laparoscopy     GYN SURGERY  2007    myomectomy     hysterecomy  11/14/2016    leiomyosarcoma     OTHER SURGICAL HISTORY Right 10/27/2017    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     REMOVE PORT VASCULAR ACCESS Right 5/9/2017    Procedure: REMOVE PORT VASCULAR ACCESS;  Right Port Removal;  Surgeon: Eric Gibson PA-C;  Location:  OR     REPAIR PTOSIS BILATERAL  2/15/2012    Procedure:REPAIR PTOSIS BILATERAL; BILATERAL UPPER LID PTOSIS REPAIR; Surgeon:BRYCE REYNOLDS; Location:[facility]       Social History     Socioeconomic History     Marital status: Single     Spouse name: Not on file     Number of children: Not on file     Years of education: Not on file     Highest education level: Not on file   Occupational History     Not on file   Social Needs     Financial resource strain: Not on file     Food insecurity:     Worry: Not on file     Inability: Not on file      Transportation needs:     Medical: Not on file     Non-medical: Not on file   Tobacco Use     Smoking status: Never Smoker     Smokeless tobacco: Never Used   Substance and Sexual Activity     Alcohol use: Yes     Comment: Social, not often per patient.     Drug use: No     Sexual activity: Not Currently     Partners: Male   Lifestyle     Physical activity:     Days per week: Not on file     Minutes per session: Not on file     Stress: Not on file   Relationships     Social connections:     Talks on phone: Not on file     Gets together: Not on file     Attends [employer] service: Not on file     Active member of club or organization: Not on file     Attends meetings of clubs or organizations: Not on file     Relationship status: Not on file     Intimate partner violence:     Fear of current or ex partner: Not on file     Emotionally abused: Not on file     Physically abused: Not on file     Forced sexual activity: Not on file   Other Topics Concern     Parent/sibling w/ CABG, MI or angioplasty before 65F 55M? 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 Walking daily.    How much calcium per day? Supplement and through diet       How much caffeine per day? 3 times a week    How much vitamin D per day? Supplement    Do you/your family wear seatbelts?  Yes    Do you/your family use safety helmets? NA    Do you/your family use sunscreen? Sometimes    Do you/your family keep firearms in the home? No    Do you/your family  have a smoke detector(s)? Yes    Do you feel safe in your home? Yes    Has anyone ever touched you in an unwanted manner? 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  2 grandsons now in [place] with the mother.  In a house.  .         Family History   Problem Relation Age of Onset     Family History Negative Mother         glaucoma     Glaucoma Mother      Anxiety Disorder Mother      Heart Failure Mother      Diabetes Mother      Coronary Artery Disease Mother      Hypertension Mother      Breast Cancer Mother      Depression Mother      Obesity Mother      Heart Disease Father      Glaucoma Father      Diabetes Father      Coronary Artery Disease Father      Depression Father      Osteoporosis Father      Heart Disease Maternal Grandmother      Diabetes Maternal Grandmother      Heart Disease Paternal Grandmother      Cancer Paternal Grandfather         stomach     Thyroid Disease Sister      Retinal detachment Sister      Hypertension Sister      Hypertension Sister      Lipids Sister      Cerebrovascular Disease Sister      Depression Sister      Heart Disease Paternal Aunt      Heart Disease Paternal Uncle      Anxiety Disorder Sister      Depression Sister      Thyroid Disease Sister      Thyroid Disease Sister      Osteoporosis Other        Current Outpatient Medications   Medication     Calcium Citrate-Vitamin D (CALCIUM CITRATE + PO)     levothyroxine (SYNTHROID/LEVOTHROID) 75 MCG tablet     Multiple Vitamin (MULTIVITAMINS PO)     venlafaxine (EFFEXOR-XR) 75 MG 24 hr capsule     zolpidem (AMBIEN) 5 MG tablet     No current facility-administered medications for this visit.           Allergies   Allergen Reactions     Erythromycin GI Disturbance     Tramadol Nausea         Health Maintenance  Pap smear: No longer  indicated  Mammogram: Last performed 8/19/19, due 8/2020  Colonoscopy: Last performed 9/8/16, due 9/2021  Annual wellness exam: Performed 6/2019, due 6/2020      Review of Systems  10 point ROS performed and negative except as listed in HPI    OBJECTIVE:    Physical Exam:    /80   Pulse 100   Temp 98  F (36.7  C) (Oral)   Resp 16   Wt 64.4 kg (142 lb)   SpO2 100%   BMI 28.66 kg/m       CONSTITUTIONAL: Alert non-toxic appearing female in no acute physical distress  HEAD: Normocephalic, atraumatic  EYES: PERRLA; no scleral icterus  ENT: Oropharynx pink without lesions  NECK: Neck supple without lymphadenopathy  RESPIRATORY: Lungs clear to auscultation, no increased work of breathing noted  CV: Regular rate and rhythm, S1S2, no clicks, murmurs, rubs, or gallops; bilateral lower extremities without edema, dorsalis pedis pulses 2+ bilaterally  GASTROINTESTINAL: Normoactive bowel sounds x4 quadrants, abdomen soft, non-distended, and non-tender to palpation without masses or organomegaly  GENITOURINARY: External genitalia and urethral meatus pink without lesions, masses, or excoriation. Vagina hypoestrogenic and stenotic without masses or lesions. Vaginal cuff without nodularity, masses, or lesions. Cervix surgically absent. Bimanual exam reveals no masses or fullness. Rectovaginal exam confirms these findings.  LYMPHATIC: Cervical, supraclavicular, and inguinal nodes without lymphadenopathy  MUSCULOSKELETAL: Moves all extremities, no obvious muscle wasting  NEUROLOGIC: No gross deficits, normal gait  SKIN: Appropriate color for race, warm and dry, no rashes or lesions to unclothed skin  PSYCHIATRIC: Pleasant and interactive, well groomed, affect bright but tearful when discussing the death of her best friend, makes appropriate eye contact, thought process linear      Assessment/Plan:  1) Stage IIB leiomyosarcoma: No evidence of recurrent disease. Continue surveillance every six months x3 years (through 3/2022)  then annually thereafter with pelvic/rectal exam. Continue follow up with Dr. Agiular as scheduled. Reviewed signs and symptoms  of recurrence including but not limited to bleeding from vagina, bladder, or rectum, bloating, early satiety, swelling in the lower extremities, abdominal or lower back pain, changes in bowel or bladder patterns, shortness of breath, increased fatigue, unexplained weight loss, and night sweats. Reviewed signs and symptoms for when she should contact the clinic or seek additional care. Patient to contact the clinic with any questions or concerns in the interim.  2) Health maintenance issues discussed today include to follow up with PCP for co-morbid conditions and non-gynecologic issues. Referral to Dr. York with cancer psychology for grief.  3) Patient verbalized understanding of and agreement with plan.    A total of 20 minutes of face to face time spent with patient, over 50% of which was spent in counseling and coordination of care.    SHIRAZ Parks, FNP-C  Division of Gynecologic Oncology  [facility]  Pager: [phone]

## 2020-01-22 NOTE — PROGRESS NOTES
Gynecologic Oncology Follow-Up Visit    RE: Maggie Navarrete  MRN: [id number]  : 1950  Date of Visit: 2020    CC: Maggie Navarrete  is a 69 year old female with a history of stage IIB leiomyosarcoma of the uterus. She completed treatment with RACHEL-BSO, peritoneal resection, and omentectomy on 16 followed by completion of five cycles doxorubicin and darcarbazine 3/23/17. She presents today for a six month surveillance visit.    HPI: Maggie comes to the clinic feeling well from a gynecologic perspective. Not sexually active. No pelvic pain, vaginal bleeding, bloating, early satiety, or abdominal pain. Up to date on seeing her PCP, mammogram, and colonoscopy as well as Dr. Aguilar with medical oncology. She is grieving the death of her best friend- plans to resume therapy through [facility] but is on a six month waiting list. Would like to see psychology in the mean time. No other concerns today.      Oncology History:  Summer 2016: Low back pain persisting after corticosteroid injection, severe constipation     2016: Colonoscopy for persistent abdominal pain, sigmoid polyp snared. Abdominal pain and diarrhea persist after colonoscopy prep.     2016: she has worsening abdominal pain and is evaluated in the ED. CT-scan shows a large subserosal fibroid measuring 9.4 x 9.6 x 9.8 cm, as well as very large fibroids along the posterior uterine body more inferiorly, increased in size to 14 cm in greatest diameter compared to 10 cm in greatest diameter on MRI of 2016.     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     2016: Total abdominal hysterectomy and BSO, under Dr. Harsh Camarillo. Surgery was originally presumed for leiomyoma, however, intraoperatively there was focal adherence of a mass to the pelvic right sidewall and cul-de-sac, which led to peritoneal resection and omentectomy. Final pathology, pT2b:  showed leiomyosarcoma involving the cul-de-sac and forming two separate masses measuring 10.5 x 6.5 x 4.5 cm and 10.7 x 7.9 x 7.5 cm.     12/26/2016 - 3/2016: s/p 5/6 cycles 25 mg/m2 doxorubicin, 250 mg/m2 dacarbazine.      2/2017: CT: IMPRESSION: In this patient with history of leiomyosarcoma of the  uterus:  1. No CT evidence of disease progression.  2. Unchanged mildly prominent para-aortic lymph nodes, compared to  outside institution CT 11/29/2016. Continued attention on follow-up  imaging is recommended.  3. 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  4. Calcified and noncalcified pulmonary nodules, stable from  11/29/2016. Continued attention on follow-up imaging.     4/2018:  CT:  IMPRESSION:   1. Stable changes of hysterectomy and bilateral salpingo-oophorectomy  without evidence of local recurrence.  2. Stable small pulmonary nodules  3. Mild thickening and hyperenhancement of the gastric mucosa and  proximal duodenum compatible with inflammation/infection.  4. Stable indeterminate hyperenhancing lesion in segment 4A    9/21/18: CT CAP impression:  IMPRESSION: This patient with a history of uterine leiomyosarcoma:  1. No evidence of locally recurrent or metastatic disease in the  chest, abdomen, or pelvis.  2. Unchanged sub-6 mm pulmonary nodules.  3. Unchanged arterial enhancing lesions in the liver, stable dating  back to at least 2/24/2017, suggesting hepatic hemangiomas.     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     GERD (gastroesophageal reflux disease)      Hx of previous reproductive problem 1980     Kidney stone 2011     Leiomyosarcoma (H) 11/14/2016     Personal history of colonic polyps 2016    Small, benign     PONV (postoperative nausea and vomiting)      Posterior vitreous  detachment of left eye 2011     Posterior vitreous detachment of right eye 2011     Ptosis of eyelid, bilateral      Stomach problems 2015    Abdominal pain, Diarreha     Thyroid disease     thyroid nodule resolved 2014       Past Surgical History:   Procedure Laterality Date     ABDOMEN SURGERY  Now    Pain, diarrhea     BREAST SURGERY  2002    right breast benign     COLONOSCOPY  2008    due in 2018     COLONOSCOPY N/A 9/8/2016    Procedure: COMBINED COLONOSCOPY, SINGLE OR MULTIPLE BIOPSY/POLYPECTOMY BY BIOPSY;  Surgeon: Abner Pepper MD;  Location:  GI     ENT SURGERY  1975    tonsillectomy     GENITOURINARY SURGERY  1953    bladder surgery      GI SURGERY  2015    Severe constipation     GYN SURGERY  1977    C section     GYN SURGERY  1981    laparoscopy     GYN SURGERY  2007    myomectomy     hysterecomy  11/14/2016    leiomyosarcoma     OTHER SURGICAL HISTORY Right 10/27/2017    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     REMOVE PORT VASCULAR ACCESS Right 5/9/2017    Procedure: REMOVE PORT VASCULAR ACCESS;  Right Port Removal;  Surgeon: Eric Gibson PA-C;  Location:  OR     REPAIR PTOSIS BILATERAL  2/15/2012    Procedure:REPAIR PTOSIS BILATERAL; BILATERAL UPPER LID PTOSIS REPAIR; Surgeon:BRYCE REYNOLDS; Location:[facility]       Social History     Socioeconomic History     Marital status: Single     Spouse name: Not on file     Number of children: Not on file     Years of education: Not on file     Highest education level: Not on file   Occupational History     Not on file   Social Needs     Financial resource strain: Not on file     Food insecurity:     Worry: Not on file     Inability: Not on file     Transportation needs:     Medical: Not on file     Non-medical: Not on file   Tobacco Use     Smoking status: Never Smoker     Smokeless tobacco: Never Used   Substance and Sexual Activity     Alcohol use: Yes     Comment: Social, not often per  patient.     Drug use: No     Sexual activity: Not Currently     Partners: Male   Lifestyle     Physical activity:     Days per week: Not on file     Minutes per session: Not on file     Stress: Not on file   Relationships     Social connections:     Talks on phone: Not on file     Gets together: Not on file     Attends Baptism service: Not on file     Active member of club or organization: Not on file     Attends meetings of clubs or organizations: Not on file     Relationship status: Not on file     Intimate partner violence:     Fear of current or ex partner: Not on file     Emotionally abused: Not on file     Physically abused: Not on file     Forced sexual activity: Not on file   Other Topics Concern     Parent/sibling w/ CABG, MI or angioplasty before 65F 55M? 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 Walking daily.    How much calcium per day? Supplement and through diet       How much caffeine per day? 3 times a week    How much vitamin D per day? Supplement    Do you/your family wear seatbelts?  Yes    Do you/your family use safety helmets? NA    Do you/your family use sunscreen? Sometimes    Do you/your family keep firearms in the home? No    Do you/your family have a smoke detector(s)? Yes    Do you feel safe in your home? Yes    Has anyone ever touched you in an unwanted manner? 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  2 grandsons now in [place] with the mother.  In a house.  .         Family History   Problem Relation Age of Onset     Family History Negative Mother         glaucoma     Glaucoma Mother      Anxiety Disorder Mother      Heart Failure Mother      Diabetes Mother      Coronary Artery Disease Mother      Hypertension Mother      Breast Cancer Mother      Depression Mother      Obesity Mother      Heart Disease Father      Glaucoma Father      Diabetes Father      Coronary Artery Disease Father      Depression Father      Osteoporosis Father      Heart Disease Maternal Grandmother      Diabetes Maternal Grandmother      Heart Disease Paternal Grandmother      Cancer Paternal Grandfather         stomach     Thyroid Disease Sister      Retinal detachment Sister      Hypertension Sister      Hypertension Sister      Lipids Sister      Cerebrovascular Disease Sister      Depression Sister      Heart Disease Paternal Aunt      Heart Disease Paternal Uncle      Anxiety Disorder Sister      Depression Sister      Thyroid Disease Sister      Thyroid Disease Sister      Osteoporosis Other        Current Outpatient Medications   Medication     Calcium Citrate-Vitamin D (CALCIUM CITRATE + PO)     levothyroxine (SYNTHROID/LEVOTHROID) 75 MCG tablet     Multiple Vitamin (MULTIVITAMINS PO)     venlafaxine (EFFEXOR-XR) 75 MG 24 hr capsule     zolpidem (AMBIEN) 5 MG tablet     No current facility-administered medications for this visit.           Allergies   Allergen Reactions     Erythromycin GI Disturbance     Tramadol Nausea         Health Maintenance  Pap smear: No longer indicated  Mammogram: Last performed 19, due 2020  Colonoscopy: Last performed 16, due 2021  Annual wellness exam: Performed 2019, due 2020      Review of Systems  10 point ROS performed and negative except as listed in  HPI    OBJECTIVE:    Physical Exam:    /80   Pulse 100   Temp 98  F (36.7  C) (Oral)   Resp 16   Wt 64.4 kg (142 lb)   SpO2 100%   BMI 28.66 kg/m      CONSTITUTIONAL: Alert non-toxic appearing female in no acute physical distress  HEAD: Normocephalic, atraumatic  EYES: PERRLA; no scleral icterus  ENT: Oropharynx pink without lesions  NECK: Neck supple without lymphadenopathy  RESPIRATORY: Lungs clear to auscultation, no increased work of breathing noted  CV: Regular rate and rhythm, S1S2, no clicks, murmurs, rubs, or gallops; bilateral lower extremities without edema, dorsalis pedis pulses 2+ bilaterally  GASTROINTESTINAL: Normoactive bowel sounds x4 quadrants, abdomen soft, non-distended, and non-tender to palpation without masses or organomegaly  GENITOURINARY: External genitalia and urethral meatus pink without lesions, masses, or excoriation. Vagina hypoestrogenic and stenotic without masses or lesions. Vaginal cuff without nodularity, masses, or lesions. Cervix surgically absent. Bimanual exam reveals no masses or fullness. Rectovaginal exam confirms these findings.  LYMPHATIC: Cervical, supraclavicular, and inguinal nodes without lymphadenopathy  MUSCULOSKELETAL: Moves all extremities, no obvious muscle wasting  NEUROLOGIC: No gross deficits, normal gait  SKIN: Appropriate color for race, warm and dry, no rashes or lesions to unclothed skin  PSYCHIATRIC: Pleasant and interactive, well groomed, affect bright but tearful when discussing the death of her best friend, makes appropriate eye contact, thought process linear      Assessment/Plan:  1) Stage IIB leiomyosarcoma: No evidence of recurrent disease. Continue surveillance every six months x3 years (through 3/2022) then annually thereafter with pelvic/rectal exam. Continue follow up with Dr. Aguilar as scheduled. Reviewed signs and symptoms  of recurrence including but not limited to bleeding from vagina, bladder, or rectum, bloating, early  satiety, swelling in the lower extremities, abdominal or lower back pain, changes in bowel or bladder patterns, shortness of breath, increased fatigue, unexplained weight loss, and night sweats. Reviewed signs and symptoms for when she should contact the clinic or seek additional care. Patient to contact the clinic with any questions or concerns in the interim.  2) Health maintenance issues discussed today include to follow up with PCP for co-morbid conditions and non-gynecologic issues. Referral to Dr. York with cancer psychology for grief.  3) Patient verbalized understanding of and agreement with plan.    A total of 20 minutes of face to face time spent with patient, over 50% of which was spent in counseling and coordination of care.    SHIRAZ Parks, FNP-C  Division of Gynecologic Oncology  [facility]  Pager: [phone]

## 2020-01-28 ENCOUNTER — OFFICE VISIT (OUTPATIENT)
Dept: ONCOLOGY | Facility: CLINIC | Age: 70
End: 2020-01-28
Attending: PSYCHOLOGIST
Payer: MEDICARE

## 2020-01-28 DIAGNOSIS — F43.21 GRIEF: Primary | ICD-10-CM

## 2020-01-28 PROCEDURE — 90834 PSYTX W PT 45 MINUTES: CPT | Mod: ZP | Performed by: PSYCHOLOGIST

## 2020-01-28 PROCEDURE — 40000114 ZZH STATISTIC NO CHARGE CLINIC VISIT

## 2020-01-31 NOTE — PROGRESS NOTES
Confidential Summary of Oncology Psychology Followup Visit    Maggie Navarrete is a 69 year old female who presents for depression and grief reaction due to the loss of a close friend to cancer. The patient was seen for a 47 minute appointment on 1/28/2020.    Subjective: She reported sadness, loneliness, crying, rumination about her friend, feeling lost, social isolation, and family disruption. The appointment content then changed to her concerns about her grandchildren who currently live in [place] with their mother which makes it difficult for her to see them regularly. She is also concerned about other aspects of the relationship between herself and her daughter-in-law.     Objective: She demonstrated an appropriate range of affect during this appointment.     Diagnosis:   Encounter Diagnosis   Name Primary?     Grief Yes     Recommendations/Plan:  1. Use the communication skills discussed during this appointment with her daughter in law  2. Return for follow-up as needed.     Thank you for this opportunity to participate in your care of this patient.    Massimo York Psy.D., L.P.  Director, Oncology Supportive Care

## 2020-02-03 ENCOUNTER — TRANSFERRED RECORDS (OUTPATIENT)
Dept: HEALTH INFORMATION MANAGEMENT | Facility: CLINIC | Age: 70
End: 2020-02-03

## 2020-03-02 ENCOUNTER — TRANSFERRED RECORDS (OUTPATIENT)
Dept: HEALTH INFORMATION MANAGEMENT | Facility: CLINIC | Age: 70
End: 2020-03-02

## 2020-03-15 ENCOUNTER — HEALTH MAINTENANCE LETTER (OUTPATIENT)
Age: 70
End: 2020-03-15

## 2020-04-03 ENCOUNTER — TELEPHONE (OUTPATIENT)
Dept: ONCOLOGY | Facility: CLINIC | Age: 70
End: 2020-04-03

## 2020-04-03 NOTE — TELEPHONE ENCOUNTER
Pt called in to triage reporting for the past month getting intermittent RLQ pain, mainly occurring at night when she lays on her back. State pain is sharp and may last up to 5 minutes then resolves and does not occur again. Occasionally she has felt this during the day but most consistently is at night. Has not tried any OTC meds, stated no changes in bladder, bowels, fevers, chills, n/v/d, new drainage, lumps or distention. She started taking a lactose pill today to see if that helps. Stated she is still doing PT for her arthritic knees and having more pain in SI joints, feels it in the back. She state the pain is not severe enough to be seen, she just wanted care team to be alerted. She scheduled her next apts in June and ok to wait for call back next week when Dr. Aguilar is back.    Per Rebeka MARTINEZ: ok to monitor but if pain is increasing to where pt feels she needs medication to relieve, feels any lumps, bumps or have changes in bowels, pt to call back.  Pt advised of above and verbalized understanding.

## 2020-04-06 DIAGNOSIS — F41.1 GENERALIZED ANXIETY DISORDER: ICD-10-CM

## 2020-04-08 RX ORDER — VENLAFAXINE HYDROCHLORIDE 75 MG/1
75 CAPSULE, EXTENDED RELEASE ORAL DAILY
Qty: 90 CAPSULE | Refills: 0 | Status: SHIPPED | OUTPATIENT
Start: 2020-04-08 | End: 2020-07-17

## 2020-04-08 NOTE — TELEPHONE ENCOUNTER
venlafaxine (EFFEXOR-XR) 75 MG 24 hr capsule    Last Written Prescription Date:  1/17/20  Last Fill Quantity:90,   # refills: 0  Last Office Visit : 6/28/19  Future Office visit:  none    Creat past due. No message sent per addended protocol. 90 day to pharmacy

## 2020-04-20 ENCOUNTER — VIRTUAL VISIT (OUTPATIENT)
Dept: INTERNAL MEDICINE | Facility: CLINIC | Age: 70
End: 2020-04-20
Payer: MEDICARE

## 2020-04-20 DIAGNOSIS — G57.32 PERONEAL NEUROPATHY AT KNEE, LEFT: Primary | ICD-10-CM

## 2020-04-20 DIAGNOSIS — G57.31 PERONEAL NEUROPATHY AT KNEE, RIGHT: ICD-10-CM

## 2020-04-20 NOTE — PROGRESS NOTES
"Maggie Navarrete is a 70 year old female who is being evaluated via a billable telephone visit.      The patient has been notified of following:     \"This telephone visit will be conducted via a call between you and your physician/provider. We have found that certain health care needs can be provided without the need for a physical exam.  This service lets us provide the care you need with a short phone conversation.  If a prescription is necessary we can send it directly to your pharmacy.  If lab work is needed we can place an order for that and you can then stop by our lab to have the test done at a later time.    Telephone visits are billed at different rates depending on your insurance coverage. During this emergency period, for some insurers they may be billed the same as an in-person visit.  Please reach out to your insurance provider with any questions.    If during the course of the call the physician/provider feels a telephone visit is not appropriate, you will not be charged for this service.\"     Patient has given verbal consent for Telephone visit?  Yes      Subjective     Maggie Navarrete is a 70 year old female who presents to clinic today for the following health issues:    Chief Complaint   Patient presents with     Pain     pain in both knees. Pain has been there for awhile off and on. Been worse for the past few months     History of IIB leiomyosarcoma s/p RACHEL BSO and chemo, osteoporosis.  Patient reports pain in both legs for about a year.  It has been intermittent but worse recently.  She is currently doing PT for knee OA.  Pain is only present when on hands/knees.  States worse along outside of bilat knees, sharp, stabbing like pins and needles. Dissipates when she stands up.  She has tried voltaren gel and ibuprofen.  No swelling, no recent injuries, no neuropathy at rest, no weakness, no foot drop.      Patient Active Problem List   Diagnosis     Presbyopia - Both Eyes     Osteopenia     CAROLA " (generalized anxiety disorder)     Vaginal atrophy     Uterus cancer, sarcoma (H)     Chemotherapy-induced neutropenia (H)     Chemotherapy induced cardiomyopathy (H)     Overweight     Leiomyosarcoma (H)     Ptosis     Dry eyes     Closed nondisplaced fracture of fifth right metatarsal bone     Past Surgical History:   Procedure Laterality Date     ABDOMEN SURGERY  Now    Pain, diarrhea     BREAST SURGERY  2002    right breast benign     COLONOSCOPY  2008    due in 2018     COLONOSCOPY N/A 9/8/2016    Procedure: COMBINED COLONOSCOPY, SINGLE OR MULTIPLE BIOPSY/POLYPECTOMY BY BIOPSY;  Surgeon: Abner Pepper MD;  Location:  GI     ENT SURGERY  1975    tonsillectomy     GENITOURINARY SURGERY  1953    bladder surgery      GI SURGERY  2015    Severe constipation     GYN SURGERY  1977    C section     GYN SURGERY  1981    laparoscopy     GYN SURGERY  2007    myomectomy     hysterecomy  11/14/2016    leiomyosarcoma     OTHER SURGICAL HISTORY Right 10/27/2017    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     REMOVE PORT VASCULAR ACCESS Right 5/9/2017    Procedure: REMOVE PORT VASCULAR ACCESS;  Right Port Removal;  Surgeon: Eric Gibson PA-C;  Location:  OR     REPAIR PTOSIS BILATERAL  2/15/2012    Procedure:REPAIR PTOSIS BILATERAL; BILATERAL UPPER LID PTOSIS REPAIR; Surgeon:BRYCE REYNOLDS; Location: [place]       Social History     Tobacco Use     Smoking status: Never Smoker     Smokeless tobacco: Never Used   Substance Use Topics     Alcohol use: Yes     Comment: Social, not often per patient.     Family History   Problem Relation Age of Onset     Family History Negative Mother         glaucoma     Glaucoma Mother      Anxiety Disorder Mother      Heart Failure Mother      Diabetes Mother      Coronary Artery Disease Mother      Hypertension Mother      Breast Cancer Mother      Depression Mother      Obesity Mother      Heart Disease Father      Glaucoma Father       Diabetes Father      Coronary Artery Disease Father      Depression Father      Osteoporosis Father      Heart Disease Maternal Grandmother      Diabetes Maternal Grandmother      Heart Disease Paternal Grandmother      Cancer Paternal Grandfather         stomach     Thyroid Disease Sister      Retinal detachment Sister      Hypertension Sister      Hypertension Sister      Lipids Sister      Cerebrovascular Disease Sister      Depression Sister      Heart Disease Paternal Aunt      Heart Disease Paternal Uncle      Anxiety Disorder Sister      Depression Sister      Thyroid Disease Sister      Thyroid Disease Sister      Osteoporosis Other          Current Outpatient Medications   Medication Sig Dispense Refill     Calcium Citrate-Vitamin D (CALCIUM CITRATE + PO) Take 200 mg by mouth daily as needed (She supplements to meet her 1,200mg goal depending on how much dietary calcium she has gotten that day (up to 400mg once daily)) Reported on 5/1/2017       levothyroxine (SYNTHROID/LEVOTHROID) 75 MCG tablet TAKE 1 TABLET EVERY DAY 90 tablet 1     Multiple Vitamin (MULTIVITAMINS PO) Take 1 tablet by mouth daily Reported on 4/10/2017       venlafaxine (EFFEXOR-XR) 75 MG 24 hr capsule Take 1 capsule (75 mg) by mouth daily 90 capsule 0     zolpidem (AMBIEN) 5 MG tablet Take 1 tablet (5 mg) by mouth nightly as needed for sleep (Only uses if traveling or cannot sleep,) 20 tablet 0     Allergies   Allergen Reactions     Erythromycin GI Disturbance     Tramadol Nausea       Reviewed and updated as needed this visit by Provider         Review of Systems    ROS: 5 point ROS neg other than the symptoms noted above in the HPI.         Objective   Reported vitals:  There were no vitals taken for this visit.   healthy, alert, no distress and cooperative  PSYCH: Alert and oriented times 3; coherent speech, normal   rate and volume, able to articulate logical thoughts, able   to abstract reason, no tangential thoughts, no  hallucinations   or delusions  Her affect is normal  RESP: No cough, no audible wheezing, able to talk in full sentences  Remainder of exam unable to be completed due to telephone visits    Diagnostic Test Results:  Labs reviewed in Epic  none         Assessment/Plan:  Maggie was seen today for pain.  Given her pain is located along the self-described lateral aspect of both lower legs and presents only in a kneeling position, I do suspect a compression peroneal neuropathy rather than other peripheral neuropathy or neurologic problem. She does not report foot drop. We discussed the diagnosis, evaluation and management options. I am not concerned that this needs urgent evaluation. I did advise modification of her acitivities such as avoiding kneeling, wearing cushioned knee supports, etc.  It is unlikely an EMG would add to her evaluation at this time, but the order was placed at her request. This can wait until after COVID. I also suggested PT evaluation.  The patient is advised to f/u if the symptoms worsen.    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      Phone call duration: 18  Minutes E/M level3  9:15-9:33 AM    Fabiola Simons MD  Internal Medicine

## 2020-04-20 NOTE — NURSING NOTE
Chief Complaint   Patient presents with     Pain     pain in both knees. Pain has been there for awhile off and on. Been worse for the past few months     Kimberly Nissen, EMT at 8:09 AM on 4/20/2020

## 2020-04-21 ENCOUNTER — TRANSFERRED RECORDS (OUTPATIENT)
Dept: HEALTH INFORMATION MANAGEMENT | Facility: CLINIC | Age: 70
End: 2020-04-21

## 2020-05-21 ENCOUNTER — TRANSFERRED RECORDS (OUTPATIENT)
Dept: HEALTH INFORMATION MANAGEMENT | Facility: CLINIC | Age: 70
End: 2020-05-21

## 2020-06-01 ENCOUNTER — ANCILLARY PROCEDURE (OUTPATIENT)
Dept: GENERAL RADIOLOGY | Facility: CLINIC | Age: 70
End: 2020-06-01
Attending: INTERNAL MEDICINE
Payer: MEDICARE

## 2020-06-01 ENCOUNTER — ANCILLARY PROCEDURE (OUTPATIENT)
Dept: MRI IMAGING | Facility: CLINIC | Age: 70
End: 2020-06-01
Attending: INTERNAL MEDICINE
Payer: MEDICARE

## 2020-06-01 DIAGNOSIS — C49.9 LEIOMYOSARCOMA (H): ICD-10-CM

## 2020-06-01 RX ORDER — GADOBUTROL 604.72 MG/ML
7.5 INJECTION INTRAVENOUS ONCE
Status: COMPLETED | OUTPATIENT
Start: 2020-06-01 | End: 2020-06-01

## 2020-06-01 RX ADMIN — GADOBUTROL 6.5 ML: 604.72 INJECTION INTRAVENOUS at 08:56

## 2020-06-01 NOTE — DISCHARGE INSTRUCTIONS
MRI Contrast Discharge Instructions    The IV contrast you received today will pass out of your body in your  urine. This will happen in the next 24 hours. You will not feel this process.  Your urine will not change color.    Drink at least 4 extra glasses of water or juice today (unless your doctor  has restricted your fluids). This reduces the stress on your kidneys.  You may take your regular medicines.    If you are on dialysis: It is best to have dialysis today.    If you have a reaction: Most reactions happen right away. If you have  any new symptoms after leaving the hospital (such as hives or swelling),  call your hospital at the correct number below. Or call your family doctor.  If you have breathing distress or wheezing, call 911.    Special instructions: ***    I have read and understand the above information.    Signature:______________________________________ Date:___________    Staff:__________________________________________ Date:___________     Time:__________    [facility] Departments:    ___[place]: [phone]  ___[facility]: [phone]  ___[place]: [phone] ___[facility]: [phone]  ___[facility]: [phone]  ___[facility]: [phone]  ___[phone]  ___[facility]: [phone]  ___[phone]

## 2020-06-03 ENCOUNTER — ANCILLARY PROCEDURE (OUTPATIENT)
Dept: MRI IMAGING | Facility: CLINIC | Age: 70
End: 2020-06-03
Attending: INTERNAL MEDICINE
Payer: MEDICARE

## 2020-06-03 DIAGNOSIS — C49.9 LEIOMYOSARCOMA (H): ICD-10-CM

## 2020-06-03 RX ORDER — GADOBUTROL 604.72 MG/ML
7.5 INJECTION INTRAVENOUS ONCE
Status: COMPLETED | OUTPATIENT
Start: 2020-06-03 | End: 2020-06-03

## 2020-06-03 RX ADMIN — GADOBUTROL 6.5 ML: 604.72 INJECTION INTRAVENOUS at 11:11

## 2020-06-03 NOTE — DISCHARGE INSTRUCTIONS
MRI Contrast Discharge Instructions    The IV contrast you received today will pass out of your body in your  urine. This will happen in the next 24 hours. You will not feel this process.  Your urine will not change color.    Drink at least 4 extra glasses of water or juice today (unless your doctor  has restricted your fluids). This reduces the stress on your kidneys.  You may take your regular medicines.    If you are on dialysis: It is best to have dialysis today.    If you have a reaction: Most reactions happen right away. If you have  any new symptoms after leaving the hospital (such as hives or swelling),  call your hospital at the correct number below. Or call your family doctor.  If you have breathing distress or wheezing, call 911.    Special instructions: ***    I have read and understand the above information.    Signature:______________________________________ Date:___________    Staff:__________________________________________ Date:___________     Time:__________    [place] [facility] Departments:    ___[place]: [phone]  ___[facility]: [phone]  ___[place]: [phone] ___[facility]: [phone]  ___[facility]: [phone]  ___[facility]: [phone]  ___[phone]  ___[facility]: [phone]  ___[phone]

## 2020-06-04 ENCOUNTER — VIRTUAL VISIT (OUTPATIENT)
Dept: ONCOLOGY | Facility: CLINIC | Age: 70
End: 2020-06-04
Attending: INTERNAL MEDICINE
Payer: MEDICARE

## 2020-06-04 VITALS — BODY MASS INDEX: 27.66 KG/M2 | WEIGHT: 137 LBS

## 2020-06-04 DIAGNOSIS — C49.9 LEIOMYOSARCOMA (H): Primary | ICD-10-CM

## 2020-06-04 PROCEDURE — 99214 OFFICE O/P EST MOD 30 MIN: CPT | Mod: 95 | Performed by: INTERNAL MEDICINE

## 2020-06-04 PROCEDURE — 40001009 ZZH VIDEO/TELEPHONE VISIT; NO CHARGE

## 2020-06-04 ASSESSMENT — PAIN SCALES - GENERAL: PAINLEVEL: MILD PAIN (2)

## 2020-06-04 NOTE — PROGRESS NOTES
"Maggie Navarrete is a 70 year old female who is being evaluated via a billable video visit.      The patient has been notified of following:     \"This video visit will be conducted via a call between you and your physician/provider. We have found that certain health care needs can be provided without the need for an in-person physical exam.  This service lets us provide the care you need with a video conversation.  If a prescription is necessary we can send it directly to your pharmacy.  If lab work is needed we can place an order for that and you can then stop by our lab to have the test done at a later time.    Video visits are billed at different rates depending on your insurance coverage.  Please reach out to your insurance provider with any questions.    If during the course of the call the physician/provider feels a video visit is not appropriate, you will not be charged for this service.\"    Patient has given verbal consent for Video visit? Yes    How would you like to obtain your AVS? [place]    Patient would like the video invitation sent by: Text to cell phone: [phone]    Will anyone else be joining your video visit? No       Edmond Prado LPN      Video-Visit Details    Type of service:  Video Visit     Video Start Time: 2:40 pm  Video End Time: 3:10    Originating Location (pt. Location): Home    Distant Location (provider location):  [facility]     Platform used for Video Visit: Well      [facility]  MEDICAL ONCOLOGY PROGRESS NOTE   Jun 4, 2020    Oncologic history:  1. January 2016, she presents to her PCP with 3 weeks of abdominal pain and severe constipation. She is treated for constipation and improves.  2. 3/21/2016, worsening pelvic pressure prompts pelvic examination with an enlarged uterus noted. Transvaginal ultrasound shows enlargement of subserosal fibroids originally seen on MRI in 2006.  3. 5/9/2016, MRI pelvis is obtained, which shows several " enhancing uterine masses involving an enlarged uterus, felt radiographically compatible with benign uterine leiomyomata.  3. Summer 2016, she experiences worsening low back pain thought at first thought to be sacroiliitis, but pain persists after corticosteroid injection. Continues with severe constipation.  4. 9/8/2016, she undergoes colonoscopy for persistent abdominal pain. She has sigmoid polyp snared. Abdominal pain and diarrhea persist after colonoscopy prep.  5. 9/14/2016, she has worsening abdominal pain and is evaluated in the ED. CT-scan shows a large subserosal fibroid measuring 9.4 x 9.6 x 9.8 cm, as well as very large fibroids along the posterior uterine body more inferiorly, increased in size to 14 cm in greatest diameter compared to 10 cm in greatest diameter on MRI of 5/9/2016.  6. 9/29/2016, she has staging CT-CAP, which shows a small left sided pleural effusion and a 3 mm pulmonary nodule in the RML.  7. October 2016, self-refers to [facility].  8. 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  9. 11/4/2016, undergoes total abdominal hysterectomy and BSO, under Dr. Harsh Camarillo. Surgery was originally presumed for leiomyoma, however, intraoperatively there was focal adherence of a mass to the pelvic right sidewall and cul-de-sac, which led to peritoneal resection and omentectomy. Final pathology, pT2b: showed leiomyosarcoma involving the cul-de-sac and forming two separate masses measuring 10.5 x 6.5 x 4.5 cm and 10.7 x 7.9 x 7.5 cm.  10. 12/26/2016, she starts adjuvant chemotherapy with doxorubicin 25 mg/m2/day and DTIC 250 mg/m2/day , days 1-3 every 21 weeks.  11. 3/23/2017, she completes 5 cycles of adjuvant doxorubicin and dacarbazine    HISTORY OF PRESENT ILLNESS  Ms. Navarrete presents today in follow-up and to review restaging studies. She is feeling well and denies any major complaints. She denies nausea, vomiting, or diarrhea.    She had MRI  abdomen and pelvis, and this shows an enlarging pelvic sidewall mass. The mass measures 2.8 x 3.3 cm in size. This new area of concern was not previously reported, presumably due to proximity to bowel and the similar appearance to adjacent loops of bowel on the contrast enhanced imaging. On review of prior imaging studies, the lesion can be seen as far back as January 2019 as a small nodule measuring 1 cm in size directly adjacent to bowel, close to the pelvic sidewall.    Of note, she called in about 2 months ago with an intermittent right lower quadrant plan mainly occurring at night when she lays on her back. The pain was sharp and lasted up to 5 minutes at a time, then resolves and does not occur again. Occasionally she has felt this during the day but most consistently is at night. She hadn't tried any OTC meds, and stated there was no associated change in bladder, bowels at the time. The pain ultimately resolved on its own and follow-up was arranged for today.    She denies fevers, chills, nausea, vomiting, diarrhea. She denies any abdominal pain today.    ECOG performance status is 0.    REVIEW OF SYSTEMS  A 12-point ROS negative except as in HPI    Current Outpatient Medications   Medication Sig Dispense Refill     Calcium Citrate-Vitamin D (CALCIUM CITRATE + PO) Take 200 mg by mouth daily as needed (She supplements to meet her 1,200mg goal depending on how much dietary calcium she has gotten that day (up to 400mg once daily)) Reported on 5/1/2017       levothyroxine (SYNTHROID/LEVOTHROID) 75 MCG tablet TAKE 1 TABLET EVERY DAY 90 tablet 1     Multiple Vitamin (MULTIVITAMINS PO) Take 1 tablet by mouth daily Reported on 4/10/2017       venlafaxine (EFFEXOR-XR) 75 MG 24 hr capsule Take 1 capsule (75 mg) by mouth daily 90 capsule 0     zolpidem (AMBIEN) 5 MG tablet Take 1 tablet (5 mg) by mouth nightly as needed for sleep (Only uses if traveling or cannot sleep,) 20 tablet 0       Past Medical History:    Diagnosis Date     Anxiety      Arthritis     pain in feet and knees     Disorder of bone and cartilage, unspecified      Esophageal reflux 2015     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     GERD (gastroesophageal reflux disease)      Hx of previous reproductive problem 1980     Kidney stone 2011     Leiomyosarcoma (H) 11/14/2016     Personal history of colonic polyps 2016    Small, benign     PONV (postoperative nausea and vomiting)      Posterior vitreous detachment of left eye 2011     Posterior vitreous detachment of right eye 2011     Ptosis of eyelid, bilateral      Stomach problems 2015    Abdominal pain, Diarreha     Thyroid disease     thyroid nodule resolved 2014       PHYSICAL EXAMINATION  Wt 62.1 kg (137 lb)   BMI 27.66 kg/m    Wt Readings from Last 2 Encounters:   06/04/20 62.1 kg (137 lb)   01/22/20 64.4 kg (142 lb)   General: Well appearing, at home, in no distress  Head: Normocephalic  Eyes: EOMI, No icterus  ENT: Oropharynx clear  Lungs: Easy breathing on room air, speaking in complete sentences without dyspnea  Neuro: Cranial nerves 2-12 intact, no focal deficits  Skin: No rashes on exposed skin    The rest of a comprehensive physical examination is deferred due to PHE (public health emergency) video visit restrictions.      ASSESSMENT AND PLAN  #1 Locally advanced leiomyosarcoma of the uterus, pT2b Nx M0, Stage IIB, status-post resection and 5 cycles of adjuvant doxorubicin and dacarbazine  #2 Local recurrence, leiomyosarcoma of the uterus, right pelvic sidewall adjacent to iliac vessels and bowel  It was a pleasure to see Ms. Navarrete today.    Her recent restaging scans show evidence for recurrence involving the right pelvic side wall and bowel. She had chest x-ray, which was negative, but we will add CT-chest given today's findings to confirm absence of lung metastasis. If there is no evidence for lung metastasis, as I suspect, she could be an appropriate candidate  for re-resection if felt feasible by her surgical team.    We will obtain CT-chest today or ASAP. She will contact her [facility] surgical team to review the case and to be seen ASAP.     Questions answered.    Ab Warner M.D.   of Medicine  Hematology, Oncology and Transplantation

## 2020-06-04 NOTE — LETTER
"    6/4/2020         RE: Maggie Navarrete  [address]  [place] [facility] [place] [facility] [address]        Dear Colleague,    Thank you for referring your patient, Maggie Navarrete, to the [facility]. Please see a copy of my visit note below.    Maggie Navarrete is a 70 year old female who is being evaluated via a billable video visit.      The patient has been notified of following:     \"This video visit will be conducted via a call between you and your physician/provider. We have found that certain health care needs can be provided without the need for an in-person physical exam.  This service lets us provide the care you need with a video conversation.  If a prescription is necessary we can send it directly to your pharmacy.  If lab work is needed we can place an order for that and you can then stop by our lab to have the test done at a later time.    Video visits are billed at different rates depending on your insurance coverage.  Please reach out to your insurance provider with any questions.    If during the course of the call the physician/provider feels a video visit is not appropriate, you will not be charged for this service.\"    Patient has given verbal consent for Video visit? Yes    How would you like to obtain your AVS? [facility]    Patient would like the video invitation sent by: Text to cell phone: [phone]    Will anyone else be joining your video visit? No       Edmond Prado LPN      Video-Visit Details    Type of service:  Video Visit     Video Start Time: 2:40 pm  Video End Time: 3:10    Originating Location (pt. Location): Home    Distant Location (provider location):  [facility]     Platform used for Video Visit: [place]      [facility]  MEDICAL ONCOLOGY PROGRESS NOTE   Jun 4, 2020    Oncologic history:  1. January 2016, she presents to her PCP with 3 weeks of abdominal pain and severe constipation. She is treated for constipation and improves.  2. " 3/21/2016, worsening pelvic pressure prompts pelvic examination with an enlarged uterus noted. Transvaginal ultrasound shows enlargement of subserosal fibroids originally seen on MRI in 2006.  3. 5/9/2016, MRI pelvis is obtained, which shows several enhancing uterine masses involving an enlarged uterus, felt radiographically compatible with benign uterine leiomyomata.  3. Summer 2016, she experiences worsening low back pain thought at first thought to be sacroiliitis, but pain persists after corticosteroid injection. Continues with severe constipation.  4. 9/8/2016, she undergoes colonoscopy for persistent abdominal pain. She has sigmoid polyp snared. Abdominal pain and diarrhea persist after colonoscopy prep.  5. 9/14/2016, she has worsening abdominal pain and is evaluated in the ED. CT-scan shows a large subserosal fibroid measuring 9.4 x 9.6 x 9.8 cm, as well as very large fibroids along the posterior uterine body more inferiorly, increased in size to 14 cm in greatest diameter compared to 10 cm in greatest diameter on MRI of 5/9/2016.  6. 9/29/2016, she has staging CT-CAP, which shows a small left sided pleural effusion and a 3 mm pulmonary nodule in the RML.  7. October 2016, self-refers to [facility].  8. 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  9. 11/4/2016, undergoes total abdominal hysterectomy and BSO, under Dr. Harsh Camarillo. Surgery was originally presumed for leiomyoma, however, intraoperatively there was focal adherence of a mass to the pelvic right sidewall and cul-de-sac, which led to peritoneal resection and omentectomy. Final pathology, pT2b: showed leiomyosarcoma involving the cul-de-sac and forming two separate masses measuring 10.5 x 6.5 x 4.5 cm and 10.7 x 7.9 x 7.5 cm.  10. 12/26/2016, she starts adjuvant chemotherapy with doxorubicin 25 mg/m2/day and DTIC 250 mg/m2/day , days 1-3 every 21 weeks.  11. 3/23/2017, she completes 5 cycles of  adjuvant doxorubicin and dacarbazine    HISTORY OF PRESENT ILLNESS  Ms. Navarrete presents today in follow-up and to review restaging studies. She is feeling well and denies any major complaints. She denies nausea, vomiting, or diarrhea.    She had MRI abdomen and pelvis, and this shows an enlarging pelvic sidewall mass. The mass measures 2.8 x 3.3 cm in size. This new area of concern was not previously reported, presumably due to proximity to bowel and the similar appearance to adjacent loops of bowel on the contrast enhanced imaging. On review of prior imaging studies, the lesion can be seen as far back as January 2019 as a small nodule measuring 1 cm in size directly adjacent to bowel, close to the pelvic sidewall.    Of note, she called in about 2 months ago with an intermittent right lower quadrant plan mainly occurring at night when she lays on her back. The pain was sharp and lasted up to 5 minutes at a time, then resolves and does not occur again. Occasionally she has felt this during the day but most consistently is at night. She hadn't tried any OTC meds, and stated there was no associated change in bladder, bowels at the time. The pain ultimately resolved on its own and follow-up was arranged for today.    She denies fevers, chills, nausea, vomiting, diarrhea. She denies any abdominal pain today.    ECOG performance status is 0.    REVIEW OF SYSTEMS  A 12-point ROS negative except as in HPI    Current Outpatient Medications   Medication Sig Dispense Refill     Calcium Citrate-Vitamin D (CALCIUM CITRATE + PO) Take 200 mg by mouth daily as needed (She supplements to meet her 1,200mg goal depending on how much dietary calcium she has gotten that day (up to 400mg once daily)) Reported on 5/1/2017       levothyroxine (SYNTHROID/LEVOTHROID) 75 MCG tablet TAKE 1 TABLET EVERY DAY 90 tablet 1     Multiple Vitamin (MULTIVITAMINS PO) Take 1 tablet by mouth daily Reported on 4/10/2017       venlafaxine (EFFEXOR-XR) 75 MG  24 hr capsule Take 1 capsule (75 mg) by mouth daily 90 capsule 0     zolpidem (AMBIEN) 5 MG tablet Take 1 tablet (5 mg) by mouth nightly as needed for sleep (Only uses if traveling or cannot sleep,) 20 tablet 0       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     GERD (gastroesophageal reflux disease)      Hx of previous reproductive problem 1980     Kidney stone 2011     Leiomyosarcoma (H) 11/14/2016     Personal history of colonic polyps 2016    Small, benign     PONV (postoperative nausea and vomiting)      Posterior vitreous detachment of left eye 2011     Posterior vitreous detachment of right eye 2011     Ptosis of eyelid, bilateral      Stomach problems 2015    Abdominal pain, Diarreha     Thyroid disease     thyroid nodule resolved 2014       PHYSICAL EXAMINATION  Wt 62.1 kg (137 lb)   BMI 27.66 kg/m    Wt Readings from Last 2 Encounters:   06/04/20 62.1 kg (137 lb)   01/22/20 64.4 kg (142 lb)   General: Well appearing, at home, in no distress  Head: Normocephalic  Eyes: EOMI, No icterus  ENT: Oropharynx clear  Lungs: Easy breathing on room air, speaking in complete sentences without dyspnea  Neuro: Cranial nerves 2-12 intact, no focal deficits  Skin: No rashes on exposed skin    The rest of a comprehensive physical examination is deferred due to PHE (public health emergency) video visit restrictions.      ASSESSMENT AND PLAN  #1 Locally advanced leiomyosarcoma of the uterus, pT2b Nx M0, Stage IIB, status-post resection and 5 cycles of adjuvant doxorubicin and dacarbazine  #2 Local recurrence, leiomyosarcoma of the uterus, right pelvic sidewall adjacent to iliac vessels and bowel  It was a pleasure to see Ms. Navarrete today.    Her recent restaging scans show evidence for recurrence involving the right pelvic side  wall and bowel. She had chest x-ray, which was negative, but we will add CT-chest given today's findings to confirm absence of lung metastasis. If there is no evidence for lung metastasis, as I suspect, she could be an appropriate candidate for re-resection if felt feasible by her surgical team.    We will obtain CT-chest today or ASAP. She will contact her [facility] surgical team to review the case and to be seen ASAP.     Questions answered.    Ab Warner M.D.   of Medicine  Hematology, Oncology and Transplantation

## 2020-06-05 ENCOUNTER — HOSPITAL ENCOUNTER (OUTPATIENT)
Dept: CT IMAGING | Facility: CLINIC | Age: 70
Discharge: HOME | End: 2020-06-05
Attending: INTERNAL MEDICINE | Admitting: INTERNAL MEDICINE
Payer: MEDICARE

## 2020-06-05 DIAGNOSIS — C49.9 LEIOMYOSARCOMA (H): ICD-10-CM

## 2020-06-05 LAB
CREAT BLD-MCNC: 0.7 MG/DL (ref 0.52–1.04)
GFR SERPL CREATININE-BSD FRML MDRD: 83 ML/MIN/{1.73_M2}

## 2020-06-05 PROCEDURE — 71260 CT THORAX DX C+: CPT

## 2020-06-05 PROCEDURE — 25000128 H RX IP 250 OP 636: Performed by: STUDENT IN AN ORGANIZED HEALTH CARE EDUCATION/TRAINING PROGRAM

## 2020-06-05 PROCEDURE — 82565 ASSAY OF CREATININE: CPT

## 2020-06-05 RX ORDER — IOPAMIDOL 755 MG/ML
67 INJECTION, SOLUTION INTRAVASCULAR ONCE
Status: COMPLETED | OUTPATIENT
Start: 2020-06-05 | End: 2020-06-05

## 2020-06-05 RX ADMIN — IOPAMIDOL 67 ML: 755 INJECTION, SOLUTION INTRAVENOUS at 08:54

## 2020-06-25 ENCOUNTER — OFFICE VISIT (OUTPATIENT)
Dept: WOUND CARE | Facility: CLINIC | Age: 70
End: 2020-06-25
Payer: MEDICARE

## 2020-06-25 ENCOUNTER — HOSPITAL ENCOUNTER (OUTPATIENT)
Facility: CLINIC | Age: 70
Setting detail: SPECIMEN
Discharge: HOME | DRG: 698 | End: 2020-06-25
Admitting: INTERNAL MEDICINE
Payer: MEDICARE

## 2020-06-25 DIAGNOSIS — R30.0 DYSURIA: ICD-10-CM

## 2020-06-25 DIAGNOSIS — Z71.89 ENCOUNTER FOR SURGICAL COUNSELING: Primary | ICD-10-CM

## 2020-06-25 DIAGNOSIS — R30.0 DYSURIA: Primary | ICD-10-CM

## 2020-06-25 LAB
ALBUMIN UR-MCNC: >499 MG/DL
APPEARANCE UR: ABNORMAL
BACTERIA #/AREA URNS HPF: ABNORMAL /HPF
BILIRUB UR QL STRIP: NEGATIVE
COLOR UR AUTO: ABNORMAL
GLUCOSE UR STRIP-MCNC: NEGATIVE MG/DL
HGB UR QL STRIP: ABNORMAL
KETONES UR STRIP-MCNC: NEGATIVE MG/DL
LEUKOCYTE ESTERASE UR QL STRIP: ABNORMAL
MUCOUS THREADS #/AREA URNS LPF: PRESENT /LPF
NITRATE UR QL: NEGATIVE
PH UR STRIP: 7 PH (ref 5–7)
RBC #/AREA URNS AUTO: 63 /HPF (ref 0–2)
SOURCE: ABNORMAL
SP GR UR STRIP: 1.02 (ref 1–1.03)
SQUAMOUS #/AREA URNS AUTO: 1 /HPF (ref 0–1)
TRANS CELLS #/AREA URNS HPF: 4 /HPF
UROBILINOGEN UR STRIP-MCNC: 0 MG/DL (ref 0–2)
WBC #/AREA URNS AUTO: >182 /HPF (ref 0–5)
WBC CLUMPS #/AREA URNS HPF: PRESENT /HPF

## 2020-06-25 PROCEDURE — 87186 SC STD MICRODIL/AGAR DIL: CPT | Performed by: INTERNAL MEDICINE

## 2020-06-25 PROCEDURE — 87088 URINE BACTERIA CULTURE: CPT | Performed by: INTERNAL MEDICINE

## 2020-06-25 PROCEDURE — 87086 URINE CULTURE/COLONY COUNT: CPT | Performed by: INTERNAL MEDICINE

## 2020-06-25 NOTE — PROGRESS NOTES
Patient comes to wound clinic for wound assessment per request of dr. Aguilar. She has history of a Open surgical wound due to Surgical Wound: surgery 3 years for Leiomyo sarcoma of the uterus. 1 week ago for recurrent tumor.  Patient comes to clinic with a very well-healed midline incision.  The area that they had been packing is now also healed.  There is no erythema or swelling.  C/o painful urination. She will call triage to get order for UA. I will let Ambika Dickerson know also  Pt has our number should other issues arise. All questions answered for now.   Patient needs to be seen prn.   Dr. Barron was available for supervision of care if needed or if questions should arise and regarding plan of care. Cecilia Chang RN CWON

## 2020-06-26 ENCOUNTER — HOSPITAL ENCOUNTER (INPATIENT)
Facility: CLINIC | Age: 70
LOS: 3 days | Discharge: HOME | DRG: 698 | End: 2020-06-30
Attending: EMERGENCY MEDICINE | Admitting: OBSTETRICS & GYNECOLOGY
Payer: MEDICARE

## 2020-06-26 DIAGNOSIS — N30.00 ACUTE CYSTITIS WITHOUT HEMATURIA: Primary | ICD-10-CM

## 2020-06-26 DIAGNOSIS — A41.9 SEPSIS WITH ACUTE RENAL FAILURE WITHOUT SEPTIC SHOCK, DUE TO UNSPECIFIED ORGANISM, UNSPECIFIED ACUTE RENAL FAILURE TYPE (H): ICD-10-CM

## 2020-06-26 DIAGNOSIS — N28.9 ACUTE RENAL INSUFFICIENCY: ICD-10-CM

## 2020-06-26 DIAGNOSIS — Z20.828 EXPOSURE TO SARS-ASSOCIATED CORONAVIRUS: ICD-10-CM

## 2020-06-26 DIAGNOSIS — N39.0 COMPLICATED URINARY TRACT INFECTION: ICD-10-CM

## 2020-06-26 DIAGNOSIS — N17.9 SEPSIS WITH ACUTE RENAL FAILURE WITHOUT SEPTIC SHOCK, DUE TO UNSPECIFIED ORGANISM, UNSPECIFIED ACUTE RENAL FAILURE TYPE (H): ICD-10-CM

## 2020-06-26 DIAGNOSIS — C49.9 LEIOMYOSARCOMA (H): ICD-10-CM

## 2020-06-26 DIAGNOSIS — R65.20 SEPSIS WITH ACUTE RENAL FAILURE WITHOUT SEPTIC SHOCK, DUE TO UNSPECIFIED ORGANISM, UNSPECIFIED ACUTE RENAL FAILURE TYPE (H): ICD-10-CM

## 2020-06-26 PROCEDURE — 96365 THER/PROPH/DIAG IV INF INIT: CPT | Performed by: EMERGENCY MEDICINE

## 2020-06-26 PROCEDURE — 36415 COLL VENOUS BLD VENIPUNCTURE: CPT | Performed by: EMERGENCY MEDICINE

## 2020-06-26 PROCEDURE — 96361 HYDRATE IV INFUSION ADD-ON: CPT | Performed by: EMERGENCY MEDICINE

## 2020-06-26 PROCEDURE — 96366 THER/PROPH/DIAG IV INF ADDON: CPT | Performed by: EMERGENCY MEDICINE

## 2020-06-26 PROCEDURE — C9803 HOPD COVID-19 SPEC COLLECT: HCPCS | Performed by: EMERGENCY MEDICINE

## 2020-06-26 PROCEDURE — 99285 EMERGENCY DEPT VISIT HI MDM: CPT | Mod: Z6 | Performed by: EMERGENCY MEDICINE

## 2020-06-26 PROCEDURE — 99285 EMERGENCY DEPT VISIT HI MDM: CPT | Mod: 25 | Performed by: EMERGENCY MEDICINE

## 2020-06-26 ASSESSMENT — MIFFLIN-ST. JEOR: SCORE: 1053.86

## 2020-06-27 ENCOUNTER — APPOINTMENT (OUTPATIENT)
Dept: GENERAL RADIOLOGY | Facility: CLINIC | Age: 70
DRG: 698 | End: 2020-06-27
Attending: EMERGENCY MEDICINE
Payer: MEDICARE

## 2020-06-27 ENCOUNTER — APPOINTMENT (OUTPATIENT)
Dept: CT IMAGING | Facility: CLINIC | Age: 70
DRG: 698 | End: 2020-06-27
Payer: MEDICARE

## 2020-06-27 PROBLEM — N39.0 UTI (URINARY TRACT INFECTION): Status: ACTIVE | Noted: 2020-06-27

## 2020-06-27 LAB
ALBUMIN SERPL-MCNC: 2.3 G/DL (ref 3.4–5)
ALBUMIN UR-MCNC: 300 MG/DL
ALP SERPL-CCNC: 120 U/L (ref 40–150)
ALT SERPL W P-5'-P-CCNC: 42 U/L (ref 0–50)
ANION GAP SERPL CALCULATED.3IONS-SCNC: 10 MMOL/L (ref 3–14)
ANION GAP SERPL CALCULATED.3IONS-SCNC: 9 MMOL/L (ref 3–14)
APPEARANCE UR: ABNORMAL
AST SERPL W P-5'-P-CCNC: 30 U/L (ref 0–45)
BACTERIA #/AREA URNS HPF: ABNORMAL /HPF
BASOPHILS # BLD AUTO: 0 10E9/L (ref 0–0.2)
BASOPHILS # BLD AUTO: 0 10E9/L (ref 0–0.2)
BASOPHILS NFR BLD AUTO: 0.2 %
BASOPHILS NFR BLD AUTO: 0.2 %
BILIRUB SERPL-MCNC: 0.4 MG/DL (ref 0.2–1.3)
BILIRUB UR QL STRIP: NEGATIVE
BUN SERPL-MCNC: 29 MG/DL (ref 7–30)
BUN SERPL-MCNC: 32 MG/DL (ref 7–30)
CALCIUM SERPL-MCNC: 7.8 MG/DL (ref 8.5–10.1)
CALCIUM SERPL-MCNC: 8.2 MG/DL (ref 8.5–10.1)
CHLORIDE SERPL-SCNC: 104 MMOL/L (ref 94–109)
CHLORIDE SERPL-SCNC: 107 MMOL/L (ref 94–109)
CO2 SERPL-SCNC: 19 MMOL/L (ref 20–32)
CO2 SERPL-SCNC: 20 MMOL/L (ref 20–32)
COLOR UR AUTO: YELLOW
CREAT SERPL-MCNC: 1.66 MG/DL (ref 0.52–1.04)
CREAT SERPL-MCNC: 1.81 MG/DL (ref 0.52–1.04)
DIFFERENTIAL METHOD BLD: ABNORMAL
DIFFERENTIAL METHOD BLD: ABNORMAL
EOSINOPHIL # BLD AUTO: 0.1 10E9/L (ref 0–0.7)
EOSINOPHIL # BLD AUTO: 0.1 10E9/L (ref 0–0.7)
EOSINOPHIL NFR BLD AUTO: 0.8 %
EOSINOPHIL NFR BLD AUTO: 1.1 %
ERYTHROCYTE [DISTWIDTH] IN BLOOD BY AUTOMATED COUNT: 15.4 % (ref 10–15)
ERYTHROCYTE [DISTWIDTH] IN BLOOD BY AUTOMATED COUNT: 15.4 % (ref 10–15)
GFR SERPL CREATININE-BSD FRML MDRD: 28 ML/MIN/{1.73_M2}
GFR SERPL CREATININE-BSD FRML MDRD: 31 ML/MIN/{1.73_M2}
GLUCOSE SERPL-MCNC: 125 MG/DL (ref 70–99)
GLUCOSE SERPL-MCNC: 137 MG/DL (ref 70–99)
GLUCOSE UR STRIP-MCNC: NEGATIVE MG/DL
HCT VFR BLD AUTO: 31.1 % (ref 35–47)
HCT VFR BLD AUTO: 32.1 % (ref 35–47)
HGB BLD-MCNC: 10.1 G/DL (ref 11.7–15.7)
HGB BLD-MCNC: 10.6 G/DL (ref 11.7–15.7)
HGB UR QL STRIP: ABNORMAL
IMM GRANULOCYTES # BLD: 0.2 10E9/L (ref 0–0.4)
IMM GRANULOCYTES # BLD: 0.2 10E9/L (ref 0–0.4)
IMM GRANULOCYTES NFR BLD: 1.4 %
IMM GRANULOCYTES NFR BLD: 1.8 %
KETONES UR STRIP-MCNC: NEGATIVE MG/DL
LACTATE BLD-SCNC: 0.6 MMOL/L (ref 0.7–2)
LACTATE BLD-SCNC: 1.1 MMOL/L (ref 0.7–2)
LACTATE BLD-SCNC: 1.4 MMOL/L (ref 0.7–2)
LEUKOCYTE ESTERASE UR QL STRIP: ABNORMAL
LYMPHOCYTES # BLD AUTO: 0.4 10E9/L (ref 0.8–5.3)
LYMPHOCYTES # BLD AUTO: 0.6 10E9/L (ref 0.8–5.3)
LYMPHOCYTES NFR BLD AUTO: 3.7 %
LYMPHOCYTES NFR BLD AUTO: 4.4 %
MCH RBC QN AUTO: 29.6 PG (ref 26.5–33)
MCH RBC QN AUTO: 30.2 PG (ref 26.5–33)
MCHC RBC AUTO-ENTMCNC: 32.5 G/DL (ref 31.5–36.5)
MCHC RBC AUTO-ENTMCNC: 33 G/DL (ref 31.5–36.5)
MCV RBC AUTO: 91 FL (ref 78–100)
MCV RBC AUTO: 92 FL (ref 78–100)
MONOCYTES # BLD AUTO: 0.5 10E9/L (ref 0–1.3)
MONOCYTES # BLD AUTO: 0.7 10E9/L (ref 0–1.3)
MONOCYTES NFR BLD AUTO: 4.4 %
MONOCYTES NFR BLD AUTO: 4.6 %
MUCOUS THREADS #/AREA URNS LPF: PRESENT /LPF
NEUTROPHILS # BLD AUTO: 12.5 10E9/L (ref 1.6–8.3)
NEUTROPHILS # BLD AUTO: 9.6 10E9/L (ref 1.6–8.3)
NEUTROPHILS NFR BLD AUTO: 88.6 %
NEUTROPHILS NFR BLD AUTO: 88.8 %
NITRATE UR QL: POSITIVE
NRBC # BLD AUTO: 0 10*3/UL
NRBC # BLD AUTO: 0 10*3/UL
NRBC BLD AUTO-RTO: 0 /100
NRBC BLD AUTO-RTO: 0 /100
PH UR STRIP: 7.5 PH (ref 5–7)
PLATELET # BLD AUTO: 194 10E9/L (ref 150–450)
PLATELET # BLD AUTO: 206 10E9/L (ref 150–450)
POTASSIUM SERPL-SCNC: 3.6 MMOL/L (ref 3.4–5.3)
POTASSIUM SERPL-SCNC: 3.7 MMOL/L (ref 3.4–5.3)
PROT SERPL-MCNC: 6.6 G/DL (ref 6.8–8.8)
RBC # BLD AUTO: 3.41 10E12/L (ref 3.8–5.2)
RBC # BLD AUTO: 3.51 10E12/L (ref 3.8–5.2)
RBC #/AREA URNS AUTO: 3 /HPF (ref 0–2)
SARS-COV-2 PCR COMMENT: NORMAL
SARS-COV-2 RNA SPEC QL NAA+PROBE: NEGATIVE
SARS-COV-2 RNA SPEC QL NAA+PROBE: NORMAL
SODIUM SERPL-SCNC: 133 MMOL/L (ref 133–144)
SODIUM SERPL-SCNC: 136 MMOL/L (ref 133–144)
SOURCE: ABNORMAL
SP GR UR STRIP: 1.02 (ref 1–1.03)
SPECIMEN SOURCE: NORMAL
SPECIMEN SOURCE: NORMAL
SQUAMOUS #/AREA URNS AUTO: <1 /HPF (ref 0–1)
UROBILINOGEN UR STRIP-MCNC: NORMAL MG/DL (ref 0–2)
WBC # BLD AUTO: 10.8 10E9/L (ref 4–11)
WBC # BLD AUTO: 14.2 10E9/L (ref 4–11)
WBC #/AREA URNS AUTO: >182 /HPF (ref 0–5)
WBC CLUMPS #/AREA URNS HPF: PRESENT /HPF

## 2020-06-27 PROCEDURE — 83605 ASSAY OF LACTIC ACID: CPT | Performed by: STUDENT IN AN ORGANIZED HEALTH CARE EDUCATION/TRAINING PROGRAM

## 2020-06-27 PROCEDURE — 99222 1ST HOSP IP/OBS MODERATE 55: CPT | Mod: GC | Performed by: OBSTETRICS & GYNECOLOGY

## 2020-06-27 PROCEDURE — 25800030 ZZH RX IP 258 OP 636: Performed by: STUDENT IN AN ORGANIZED HEALTH CARE EDUCATION/TRAINING PROGRAM

## 2020-06-27 PROCEDURE — 81001 URINALYSIS AUTO W/SCOPE: CPT | Performed by: EMERGENCY MEDICINE

## 2020-06-27 PROCEDURE — 71045 X-RAY EXAM CHEST 1 VIEW: CPT

## 2020-06-27 PROCEDURE — 25000132 ZZH RX MED GY IP 250 OP 250 PS 637: Mod: GY | Performed by: STUDENT IN AN ORGANIZED HEALTH CARE EDUCATION/TRAINING PROGRAM

## 2020-06-27 PROCEDURE — 85025 COMPLETE CBC W/AUTO DIFF WBC: CPT | Performed by: STUDENT IN AN ORGANIZED HEALTH CARE EDUCATION/TRAINING PROGRAM

## 2020-06-27 PROCEDURE — 74176 CT ABD & PELVIS W/O CONTRAST: CPT

## 2020-06-27 PROCEDURE — 87088 URINE BACTERIA CULTURE: CPT | Performed by: EMERGENCY MEDICINE

## 2020-06-27 PROCEDURE — 87040 BLOOD CULTURE FOR BACTERIA: CPT | Performed by: EMERGENCY MEDICINE

## 2020-06-27 PROCEDURE — 80048 BASIC METABOLIC PNL TOTAL CA: CPT | Performed by: STUDENT IN AN ORGANIZED HEALTH CARE EDUCATION/TRAINING PROGRAM

## 2020-06-27 PROCEDURE — 83605 ASSAY OF LACTIC ACID: CPT | Performed by: OBSTETRICS & GYNECOLOGY

## 2020-06-27 PROCEDURE — 87086 URINE CULTURE/COLONY COUNT: CPT | Performed by: EMERGENCY MEDICINE

## 2020-06-27 PROCEDURE — 25000128 H RX IP 250 OP 636: Performed by: STUDENT IN AN ORGANIZED HEALTH CARE EDUCATION/TRAINING PROGRAM

## 2020-06-27 PROCEDURE — 83605 ASSAY OF LACTIC ACID: CPT | Performed by: EMERGENCY MEDICINE

## 2020-06-27 PROCEDURE — 25800030 ZZH RX IP 258 OP 636: Performed by: EMERGENCY MEDICINE

## 2020-06-27 PROCEDURE — U0003 INFECTIOUS AGENT DETECTION BY NUCLEIC ACID (DNA OR RNA); SEVERE ACUTE RESPIRATORY SYNDROME CORONAVIRUS 2 (SARS-COV-2) (CORONAVIRUS DISEASE [COVID-19]), AMPLIFIED PROBE TECHNIQUE, MAKING USE OF HIGH THROUGHPUT TECHNOLOGIES AS DESCRIBED BY CMS-2020-01-R: HCPCS | Performed by: EMERGENCY MEDICINE

## 2020-06-27 PROCEDURE — 12000001 ZZH R&B MED SURG/OB UMMC

## 2020-06-27 PROCEDURE — 87186 SC STD MICRODIL/AGAR DIL: CPT | Performed by: EMERGENCY MEDICINE

## 2020-06-27 PROCEDURE — 36415 COLL VENOUS BLD VENIPUNCTURE: CPT | Performed by: STUDENT IN AN ORGANIZED HEALTH CARE EDUCATION/TRAINING PROGRAM

## 2020-06-27 PROCEDURE — 36415 COLL VENOUS BLD VENIPUNCTURE: CPT | Performed by: OBSTETRICS & GYNECOLOGY

## 2020-06-27 PROCEDURE — 87800 DETECT AGNT MULT DNA DIREC: CPT | Performed by: EMERGENCY MEDICINE

## 2020-06-27 PROCEDURE — 87077 CULTURE AEROBIC IDENTIFY: CPT | Performed by: EMERGENCY MEDICINE

## 2020-06-27 PROCEDURE — 25000128 H RX IP 250 OP 636: Performed by: EMERGENCY MEDICINE

## 2020-06-27 PROCEDURE — 85025 COMPLETE CBC W/AUTO DIFF WBC: CPT | Performed by: EMERGENCY MEDICINE

## 2020-06-27 PROCEDURE — 80053 COMPREHEN METABOLIC PANEL: CPT | Performed by: EMERGENCY MEDICINE

## 2020-06-27 RX ORDER — OXYCODONE HYDROCHLORIDE 5 MG/1
5-10 TABLET ORAL
Status: DISCONTINUED | OUTPATIENT
Start: 2020-06-27 | End: 2020-06-30 | Stop reason: HOSPADM

## 2020-06-27 RX ORDER — ACETAMINOPHEN 325 MG/1
650 TABLET ORAL EVERY 4 HOURS PRN
Status: DISCONTINUED | OUTPATIENT
Start: 2020-06-27 | End: 2020-06-30 | Stop reason: HOSPADM

## 2020-06-27 RX ORDER — PROCHLORPERAZINE 25 MG
12.5 SUPPOSITORY, RECTAL RECTAL EVERY 12 HOURS PRN
Status: DISCONTINUED | OUTPATIENT
Start: 2020-06-27 | End: 2020-06-30 | Stop reason: HOSPADM

## 2020-06-27 RX ORDER — NALOXONE HYDROCHLORIDE 0.4 MG/ML
.1-.4 INJECTION, SOLUTION INTRAMUSCULAR; INTRAVENOUS; SUBCUTANEOUS
Status: DISCONTINUED | OUTPATIENT
Start: 2020-06-27 | End: 2020-06-30 | Stop reason: HOSPADM

## 2020-06-27 RX ORDER — AMOXICILLIN 250 MG
2 CAPSULE ORAL 2 TIMES DAILY PRN
Status: DISCONTINUED | OUTPATIENT
Start: 2020-06-27 | End: 2020-06-30

## 2020-06-27 RX ORDER — ONDANSETRON 4 MG/1
4 TABLET, ORALLY DISINTEGRATING ORAL EVERY 6 HOURS PRN
Status: DISCONTINUED | OUTPATIENT
Start: 2020-06-27 | End: 2020-06-30 | Stop reason: HOSPADM

## 2020-06-27 RX ORDER — LEVOTHYROXINE SODIUM 75 UG/1
75 TABLET ORAL DAILY
Status: DISCONTINUED | OUTPATIENT
Start: 2020-06-27 | End: 2020-06-30 | Stop reason: HOSPADM

## 2020-06-27 RX ORDER — PANTOPRAZOLE SODIUM 40 MG/1
40 TABLET, DELAYED RELEASE ORAL
COMMUNITY
Start: 2020-06-19 | End: 2020-08-31

## 2020-06-27 RX ORDER — PANTOPRAZOLE SODIUM 40 MG/1
40 TABLET, DELAYED RELEASE ORAL
Status: DISCONTINUED | OUTPATIENT
Start: 2020-06-27 | End: 2020-06-30 | Stop reason: HOSPADM

## 2020-06-27 RX ORDER — ONDANSETRON 2 MG/ML
4 INJECTION INTRAMUSCULAR; INTRAVENOUS EVERY 6 HOURS PRN
Status: DISCONTINUED | OUTPATIENT
Start: 2020-06-27 | End: 2020-06-30 | Stop reason: HOSPADM

## 2020-06-27 RX ORDER — PROCHLORPERAZINE MALEATE 5 MG
5 TABLET ORAL EVERY 6 HOURS PRN
Status: DISCONTINUED | OUTPATIENT
Start: 2020-06-27 | End: 2020-06-30 | Stop reason: HOSPADM

## 2020-06-27 RX ORDER — PIPERACILLIN SODIUM, TAZOBACTAM SODIUM 2; .25 G/10ML; G/10ML
2.25 INJECTION, POWDER, LYOPHILIZED, FOR SOLUTION INTRAVENOUS EVERY 6 HOURS
Status: DISCONTINUED | OUTPATIENT
Start: 2020-06-27 | End: 2020-06-27

## 2020-06-27 RX ORDER — POLYETHYLENE GLYCOL 3350 17 G/17G
17 POWDER, FOR SOLUTION ORAL DAILY PRN
Status: DISCONTINUED | OUTPATIENT
Start: 2020-06-27 | End: 2020-06-30 | Stop reason: HOSPADM

## 2020-06-27 RX ORDER — AMOXICILLIN 250 MG
1 CAPSULE ORAL 2 TIMES DAILY PRN
Status: DISCONTINUED | OUTPATIENT
Start: 2020-06-27 | End: 2020-06-30

## 2020-06-27 RX ORDER — SODIUM CHLORIDE 9 MG/ML
INJECTION, SOLUTION INTRAVENOUS CONTINUOUS
Status: DISCONTINUED | OUTPATIENT
Start: 2020-06-27 | End: 2020-06-29

## 2020-06-27 RX ORDER — PIPERACILLIN SODIUM, TAZOBACTAM SODIUM 3; .375 G/15ML; G/15ML
3.38 INJECTION, POWDER, LYOPHILIZED, FOR SOLUTION INTRAVENOUS EVERY 6 HOURS
Status: DISCONTINUED | OUTPATIENT
Start: 2020-06-28 | End: 2020-06-29

## 2020-06-27 RX ORDER — PIPERACILLIN SODIUM, TAZOBACTAM SODIUM 3; .375 G/15ML; G/15ML
3.38 INJECTION, POWDER, LYOPHILIZED, FOR SOLUTION INTRAVENOUS ONCE
Status: COMPLETED | OUTPATIENT
Start: 2020-06-27 | End: 2020-06-27

## 2020-06-27 RX ORDER — LIDOCAINE 40 MG/G
CREAM TOPICAL
Status: DISCONTINUED | OUTPATIENT
Start: 2020-06-27 | End: 2020-06-30 | Stop reason: HOSPADM

## 2020-06-27 RX ORDER — VENLAFAXINE HYDROCHLORIDE 75 MG/1
75 CAPSULE, EXTENDED RELEASE ORAL DAILY
Status: DISCONTINUED | OUTPATIENT
Start: 2020-06-27 | End: 2020-06-30 | Stop reason: HOSPADM

## 2020-06-27 RX ADMIN — PIPERACILLIN AND TAZOBACTAM 3.38 G: 3; .375 INJECTION, POWDER, LYOPHILIZED, FOR SOLUTION INTRAVENOUS at 02:14

## 2020-06-27 RX ADMIN — APIXABAN 2.5 MG: 2.5 TABLET, FILM COATED ORAL at 10:45

## 2020-06-27 RX ADMIN — PIPERACILLIN SODIUM AND TAZOBACTAM SODIUM 2.25 G: 2; .25 INJECTION, POWDER, LYOPHILIZED, FOR SOLUTION INTRAVENOUS at 14:02

## 2020-06-27 RX ADMIN — LEVOTHYROXINE SODIUM 75 MCG: 75 TABLET ORAL at 05:35

## 2020-06-27 RX ADMIN — SODIUM CHLORIDE 1000 ML: 9 INJECTION, SOLUTION INTRAVENOUS at 20:35

## 2020-06-27 RX ADMIN — SODIUM CHLORIDE: 9 INJECTION, SOLUTION INTRAVENOUS at 14:07

## 2020-06-27 RX ADMIN — PANTOPRAZOLE SODIUM 40 MG: 40 TABLET, DELAYED RELEASE ORAL at 10:16

## 2020-06-27 RX ADMIN — PIPERACILLIN SODIUM AND TAZOBACTAM SODIUM 2.25 G: 2; .25 INJECTION, POWDER, LYOPHILIZED, FOR SOLUTION INTRAVENOUS at 08:03

## 2020-06-27 RX ADMIN — ACETAMINOPHEN 650 MG: 325 TABLET, FILM COATED ORAL at 10:45

## 2020-06-27 RX ADMIN — VENLAFAXINE HYDROCHLORIDE 75 MG: 75 CAPSULE, EXTENDED RELEASE ORAL at 10:48

## 2020-06-27 RX ADMIN — ACETAMINOPHEN 650 MG: 325 TABLET, FILM COATED ORAL at 19:59

## 2020-06-27 RX ADMIN — SODIUM CHLORIDE 1000 ML: 9 INJECTION, SOLUTION INTRAVENOUS at 00:56

## 2020-06-27 RX ADMIN — SODIUM CHLORIDE: 9 INJECTION, SOLUTION INTRAVENOUS at 05:27

## 2020-06-27 RX ADMIN — PIPERACILLIN SODIUM AND TAZOBACTAM SODIUM 2.25 G: 2; .25 INJECTION, POWDER, LYOPHILIZED, FOR SOLUTION INTRAVENOUS at 19:43

## 2020-06-27 RX ADMIN — APIXABAN 2.5 MG: 2.5 TABLET, FILM COATED ORAL at 19:43

## 2020-06-27 ASSESSMENT — ENCOUNTER SYMPTOMS
COUGH: 0
ARTHRALGIAS: 0
COLOR CHANGE: 0
SHORTNESS OF BREATH: 1
VOMITING: 1
CONFUSION: 0
FEVER: 1
RHINORRHEA: 1
DYSURIA: 1
HEADACHES: 0
NAUSEA: 1
DIARRHEA: 1
SORE THROAT: 0
APPETITE CHANGE: 1
EYE REDNESS: 0
ACTIVITY CHANGE: 1
ABDOMINAL PAIN: 1
DIFFICULTY URINATING: 0
FATIGUE: 1
PALPITATIONS: 0

## 2020-06-27 ASSESSMENT — ACTIVITIES OF DAILY LIVING (ADL)
ADLS_ACUITY_SCORE: 11
RETIRED_EATING: 0-->INDEPENDENT
DRESS: 0-->INDEPENDENT
RETIRED_COMMUNICATION: 0-->UNDERSTANDS/COMMUNICATES WITHOUT DIFFICULTY
ADLS_ACUITY_SCORE: 13
SWALLOWING: 0-->SWALLOWS FOODS/LIQUIDS WITHOUT DIFFICULTY
TRANSFERRING: 0-->INDEPENDENT
COGNITION: 0 - NO COGNITION ISSUES REPORTED
AMBULATION: 0-->INDEPENDENT
TOILETING: 0-->INDEPENDENT
ADLS_ACUITY_SCORE: 13
ADLS_ACUITY_SCORE: 13
FALL_HISTORY_WITHIN_LAST_SIX_MONTHS: NO
BATHING: 0-->INDEPENDENT

## 2020-06-27 ASSESSMENT — PAIN DESCRIPTION - DESCRIPTORS
DESCRIPTORS: SORE
DESCRIPTORS: SORE

## 2020-06-27 ASSESSMENT — MIFFLIN-ST. JEOR: SCORE: 1061.5

## 2020-06-27 NOTE — PROGRESS NOTES
Writer (charge RN) spoke with Shruti Lynne MD with Gyn Onc regarding negative COVID swab obtained in ED. Per MD pt is LOW SUSPICION and verbal order given to discontinue special isolation precautions.

## 2020-06-27 NOTE — PROGRESS NOTES
Nurse paged regarding patient's sister laure present at bedside with questions, would like to speak with provider. Went to see the patient and her sister.     S: patient feels better since starting abx and getting tylenol this morning. Currently does not feel feverish or have chills.     O:   BP (P) 125/64 (BP Location: Left arm)   Pulse 102   Temp 100.4  F (38  C) (Oral)   Resp 18   Ht 1.524 m (5')   Wt 62 kg (136 lb 11 oz)   SpO2 98%   BMI 26.69 kg/m      A/P: Discussed results with patient and her sister, abd/pelvic CT negative for abscess, showed free fluid in pelvis, right ureteral stent in place without evidence of hydronephrosis. Consistent with normal post op changes. Currently on broad spectrum abx whilst cultures are pending. Will continue to monitor closely for s/sxs of infection    SIRI Lyons MD  Obstetrics & Gynecology, PGY-2  [facility] Pager: [phone]

## 2020-06-27 NOTE — ED NOTES
[facility], [place]   ED Nurse to Floor Handoff     Maggie Navarrete is a 70 year old female who speaks English and lives with family members,  in a home  They arrived in the ED by car from home    ED Chief Complaint: Fever; Shortness of Breath; Nausea; Fatigue; and Abdominal Pain    ED Dx;   Final diagnoses:   Sepsis with acute renal failure without septic shock, due to unspecified organism, unspecified acute renal failure type (H)   Complicated urinary tract infection - s/p recent GYN surgery, ureteral stent, and serrato catheter   Acute renal insufficiency   Leiomyosarcoma (H)         Needed?: No    Allergies:   Allergies   Allergen Reactions     Erythromycin GI Disturbance     Tramadol Nausea   .  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     GERD (gastroesophageal reflux disease)      Hx of previous reproductive problem 1980     Kidney stone 2011     Leiomyosarcoma (H) 11/14/2016     Personal history of colonic polyps 2016    Small, benign     PONV (postoperative nausea and vomiting)      Posterior vitreous detachment of left eye 2011     Posterior vitreous detachment of right eye 2011     Ptosis of eyelid, bilateral      Stomach problems 2015    Abdominal pain, Diarreha     Thyroid disease     thyroid nodule resolved 2014      Baseline Mental status: WDL  Current Mental Status changes: at basesline    Infection present or suspected this encounter: yes urinary  Sepsis suspected: No  Isolation type: Special Precautions-COVID-19     Activity level - Baseline/Home:  Independent  Activity Level - Current:   Independent    Bariatric equipment needed?: No    In the ED these meds were given:   Medications   piperacillin-tazobactam (ZOSYN) 3.375 g vial to attach to NS  100 mL bag (3.375 g Intravenous New Bag 6/27/20 0214)   0.9% sodium chloride BOLUS (1,000 mLs Intravenous New Bag 6/27/20 0056)       Drips running?  No    Home pump  No    Current LDAs  Peripheral IV 04/19/18 Right Upper forearm (Active)   Number of days: 800       Peripheral IV 06/27/20 Right Upper arm (Active)   Site Assessment WDL 06/27/20 0025   Line Status Saline locked 06/27/20 0025   Phlebitis Scale 0-->no symptoms 06/27/20 0025   Infiltration Scale 0 06/27/20 0025   Extravasation? No 06/27/20 0025   Number of days: 0       Port A Cath Single 12/23/16 Right Chest wall (Active)   Number of days: 1282       Incision/Surgical Site 02/15/12 Bilateral Eye (Active)   Number of days: 3055       Incision/Surgical Site 05/09/17 Anterior;Right Chest (Active)   Number of days: 1145       Incision/Surgical Site 05/09/17 Right Chest (Active)   Number of days: 1145       Labs results:   Labs Ordered and Resulted from Time of ED Arrival Up to the Time of Departure from the ED   CBC WITH PLATELETS DIFFERENTIAL - Abnormal; Notable for the following components:       Result Value    WBC 14.2 (*)     RBC Count 3.51 (*)     Hemoglobin 10.6 (*)     Hematocrit 32.1 (*)     RDW 15.4 (*)     Absolute Neutrophil 12.5 (*)     Absolute Lymphocytes 0.6 (*)     All other components within normal limits   COMPREHENSIVE METABOLIC PANEL - Abnormal; Notable for the following components:    Glucose 137 (*)     Urea Nitrogen 32 (*)     Creatinine 1.81 (*)     GFR Estimate 28 (*)     GFR Estimate If Black 32 (*)     Calcium 8.2 (*)     Albumin 2.3 (*)     Protein Total 6.6 (*)     All other components within normal limits   ROUTINE UA WITH MICROSCOPIC REFLEX TO CULTURE - Abnormal; Notable for the following components:    Blood Urine Small (*)     pH Urine 7.5 (*)     Protein Albumin Urine 300 (*)     Nitrite Urine Positive (*)     Leukocyte Esterase Urine Large (*)     WBC Urine >182 (*)     RBC Urine 3 (*)     WBC Clumps Present (*)      Bacteria Urine Moderate (*)     Mucous Urine Present (*)     All other components within normal limits   LACTIC ACID WHOLE BLOOD   COVID-19 VIRUS (CORONAVIRUS) BY PCR   SARS-COV-2 (COVID-19) VIRUS RT-PCR   STRAIGHT CATH FOR URINE   IP ASSIGN PROVIDER TEAM TO TREATMENT TEAM   BLOOD CULTURE   URINE CULTURE AEROBIC BACTERIAL   BLOOD CULTURE       Imaging Studies:   Recent Results (from the past 24 hour(s))   XR Chest Port 1 View    Narrative    EXAM: CHEST SINGLE VIEW PORTABLE  LOCATION: [facility]  DATE/TIME: 6/27/2020 12:12 AM    INDICATION: Shortness of breath and fever.    COMPARISON: 6/1/2020.    FINDINGS: A small dense nodular opacity projecting over the lateral aspect of the mid right lung is again noted and is most likely a calcified granuloma. The lungs are otherwise clear. Normal-sized cardiac silhouette. Mild elevation of the right   hemidiaphragm. Partial visualization of a pigtail catheter projecting over the medial aspect of the upper right hemiabdomen, likely a ureteral stent.      Impression    IMPRESSION: No evidence of active cardiopulmonary disease.        Recent vital signs:   /61   Pulse 97   Temp 100.5  F (38.1  C) (Oral)   Resp 16   Ht 1.524 m (5')   Wt 61.2 kg (135 lb)   SpO2 100%   BMI 26.37 kg/m      [place] Coma Scale Score: 15 (06/27/20 0005)       Cardiac Rhythm: Normal Sinus  Pt needs tele? No  Skin/wound Issues: None    Code Status: Full Code    Pain control: pt had none    Nausea control: pt had none    Abnormal labs/tests/findings requiring intervention: see epic    Family present during ED course? No   Family Comments/Social Situation comments: none    Tasks needing completion: None    Tabatha Fluhrer, RN    [phone] [facility]

## 2020-06-27 NOTE — PLAN OF CARE
D: Patient admitted 6/26 for fever, nausea and fatigue, + UTI. Hx: recurrent stage IIB Leiomyosarcoma, chemo induced CM, colonic polyps, GERD, kidney stones, anxiety, arthritis and DVT's. Recent pelvic wall mass resection on 6/16.    I: Monitored vitals and assessed patient status. CT done today, negative for abcess. Sepsis protocol triggered, lactic 1.1.  Changed: IV zosyn started this AM  Running: NS at 125 ml/hr  PRN: tylenol    A: VSS. A0x4. Tmax 103, team aware. No tele orders. Urinating adequately. +BM. Up ad igor.    P: Anticipate discharge home when medically stable, possibly tomorrow. Continue to assess and monitor, notify Gyn/Onc team with concerns.

## 2020-06-27 NOTE — PROGRESS NOTES
Admission          6/27/2020 5:00 AM  -----------------------------------------------------------  Reason for admission: UTI  Primary team notified of pt arrival.  Admitted from: ED  Via: stretcher  Accompanied by: self  Belongings: Placed in closet; valuables sent home with family  Admission Profile: incomplete  Teaching: orientation to unit and call light- call light within reach, call don't fall, use of console, meal times, when to call for the RN, and enforced importance of safety   Access: PIV  Telemetry: telemetry not ordered  Ht./Wt.: complete  Code Status verified on armband: yes  2 RN Skin Assessment Completed By: Shelton Landeros RN & Morena Hayes RN   Bed surface reassessed with algorithm and charted: yes  New bed surface ordered: no    Pt status: stable    Temp:  [99.1  F (37.3  C)-100.5  F (38.1  C)] 99.1  F (37.3  C)  Pulse:  [] 106  Resp:  [16-20] 20  BP: (107-127)/(58-76) 107/76  SpO2:  [95 %-100 %] 98 %

## 2020-06-27 NOTE — PROVIDER NOTIFICATION
Gyn/Onc team notified regarding patient's temp of 103. Instructed to give tylenol, patient refused at this time. Continue to assess and monitor.

## 2020-06-27 NOTE — ED PROVIDER NOTES
[place] EMERGENCY DEPARTMENT ([facility])  June 26, 2020  History     Chief Complaint   Patient presents with     Fever     Shortness of Breath     Nausea     Fatigue     Abdominal Pain     HPI  Maggie Navarrete is a 70 year old female with a medical history significant for recurrent leiomyosarcoma, colonic polyps, GERD, kidney stone, anxiety, arthritis, presbyopia, ptosis, osteopenia, and chemotherapy induced cardiomyopathy who presents the ED today complaining of fever, nausea, fatigue, SOB, and a possible UTI.     Patient has a history of recurrent leiomyosarcoma, was initially diagnosed about 3 years ago and had surgery.  She did undergo chemotherapy at that time.  She had recurrence of low pelvic pain in February and was ultimately diagnosed with a recurrence of leiomyosarcoma.  On June 16, 9 days ago at [facility], she underwent a laparotomy and tumor resection, FANNY, also had a ureteral stent placed as well as Kwan catheter placed.  Per instructions, the Kwan catheter was left in place until it was removed 3 days ago.  Initially after removal she began to notice some dysuria but this got better over the course of the past few days.  She has not noticed any obvious hematuria.  She is felt very tired and fatigued.  She has been short of breath since surgery.  No cough, no nasal congestion, no sore throat.  No chest pain.  She has had some nausea and vomited a few days ago.  She has had loose stools due to the bowel program she was on due to her postop pain meds.  She is currently off of her pain medications with the exception of Tylenol.  She has had some ongoing right lower quadrant discomfort which is not worse than it has been.  She has had no drainage from the incision and no surrounding redness.  Tonight she noticed a fever of 102.9 and was told to come into the emergency department.    Due to a prior history of postoperative DVT, she was instructed to complete 28 days of anticoagulation  with Eliquis.    I have reviewed the Medications, Allergies, Past Medical and Surgical History, and Social History in the [employer] system.  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     GERD (gastroesophageal reflux disease)      Hx of previous reproductive problem 1980     Kidney stone 2011     Leiomyosarcoma (H) 11/14/2016     Personal history of colonic polyps 2016    Small, benign     PONV (postoperative nausea and vomiting)      Posterior vitreous detachment of left eye 2011     Posterior vitreous detachment of right eye 2011     Ptosis of eyelid, bilateral      Stomach problems 2015    Abdominal pain, Diarreha     Thyroid disease     thyroid nodule resolved 2014       PAST SURGICAL HISTORY:   Past Surgical History:   Procedure Laterality Date     ABDOMEN SURGERY  Now    Pain, diarrhea     BREAST SURGERY  2002    right breast benign     COLONOSCOPY  2008    due in 2018     COLONOSCOPY N/A 9/8/2016    Procedure: COMBINED COLONOSCOPY, SINGLE OR MULTIPLE BIOPSY/POLYPECTOMY BY BIOPSY;  Surgeon: Abner Pepper MD;  Location:  GI     ENT SURGERY  1975    tonsillectomy     GENITOURINARY SURGERY  1953    bladder surgery      GI SURGERY  2015    Severe constipation     GYN SURGERY  1977    C section     GYN SURGERY  1981    laparoscopy     GYN SURGERY  2007    myomectomy     hysterecomy  11/14/2016    leiomyosarcoma     OTHER SURGICAL HISTORY Right 10/27/2017    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     REMOVE PORT VASCULAR ACCESS Right 5/9/2017    Procedure: REMOVE PORT VASCULAR ACCESS;  Right Port Removal;  Surgeon: Eric Gibson PA-C;  Location:  OR     REPAIR PTOSIS BILATERAL  2/15/2012    Procedure:REPAIR PTOSIS BILATERAL; BILATERAL UPPER  LID PTOSIS REPAIR; Surgeon:BRYCE REYNOLDS; Location:[facility]       Past medical history, past surgical history, medications, and allergies were reviewed with the patient. Additional pertinent items: None    FAMILY HISTORY:   Family History   Problem Relation Age of Onset     Family History Negative Mother         glaucoma     Glaucoma Mother      Anxiety Disorder Mother      Heart Failure Mother      Diabetes Mother      Coronary Artery Disease Mother      Hypertension Mother      Breast Cancer Mother      Depression Mother      Obesity Mother      Heart Disease Father      Glaucoma Father      Diabetes Father      Coronary Artery Disease Father      Depression Father      Osteoporosis Father      Heart Disease Maternal Grandmother      Diabetes Maternal Grandmother      Heart Disease Paternal Grandmother      Cancer Paternal Grandfather         stomach     Thyroid Disease Sister      Retinal detachment Sister      Hypertension Sister      Hypertension Sister      Lipids Sister      Cerebrovascular Disease Sister      Depression Sister      Heart Disease Paternal Aunt      Heart Disease Paternal Uncle      Anxiety Disorder Sister      Depression Sister      Thyroid Disease Sister      Thyroid Disease Sister      Osteoporosis Other        SOCIAL HISTORY:   Social History     Tobacco Use     Smoking status: Never Smoker     Smokeless tobacco: Never Used   Substance Use Topics     Alcohol use: Yes     Comment: Social, not often per patient.     Social history was reviewed with the patient. Additional pertinent items: None      Patient's Medications   New Prescriptions    No medications on file   Previous Medications    CALCIUM CITRATE-VITAMIN D (CALCIUM CITRATE + PO)    Take 200 mg by mouth daily as needed (She supplements to meet her 1,200mg goal depending on how much dietary calcium she has gotten that day (up to 400mg once daily)) Reported on 5/1/2017    LEVOTHYROXINE (SYNTHROID/LEVOTHROID) 75 MCG TABLET    TAKE 1  TABLET EVERY DAY    MULTIPLE VITAMIN (MULTIVITAMINS PO)    Take 1 tablet by mouth daily Reported on 4/10/2017    VENLAFAXINE (EFFEXOR-XR) 75 MG 24 HR CAPSULE    Take 1 capsule (75 mg) by mouth daily    ZOLPIDEM (AMBIEN) 5 MG TABLET    Take 1 tablet (5 mg) by mouth nightly as needed for sleep (Only uses if traveling or cannot sleep,)   Modified Medications    No medications on file   Discontinued Medications    No medications on file          Allergies   Allergen Reactions     Erythromycin GI Disturbance     Tramadol Nausea        Review of Systems   Constitutional: Positive for activity change, appetite change, fatigue and fever.   HENT: Positive for rhinorrhea. Negative for congestion and sore throat.         Seasonal allergies   Eyes: Negative for redness.   Respiratory: Positive for shortness of breath. Negative for cough.    Cardiovascular: Negative for chest pain and palpitations.   Gastrointestinal: Positive for abdominal pain, diarrhea, nausea and vomiting.   Genitourinary: Positive for dysuria. Negative for difficulty urinating.   Musculoskeletal: Negative for arthralgias.   Skin: Negative for color change.   Neurological: Negative for headaches.   Psychiatric/Behavioral: Negative for confusion.   All other systems reviewed and are negative.    A complete review of systems was performed with pertinent positives and negatives noted in the HPI, and all other systems negative.    Physical Exam   BP: 127/66  Pulse: 118  Temp: 100.5  F (38.1  C)  Resp: 16  Height: 152.4 cm (5')  Weight: 61.2 kg (135 lb)  SpO2: 98 %      Physical Exam  Vitals signs and nursing note reviewed.   Constitutional:       General: She is not in acute distress.     Appearance: She is well-developed. She is not diaphoretic.      Comments: Tired appearing elderly female   HENT:      Head: Normocephalic and atraumatic.   Eyes:      Conjunctiva/sclera: Conjunctivae normal.      Pupils: Pupils are equal, round, and reactive to light.    Cardiovascular:      Rate and Rhythm: Regular rhythm. Tachycardia present.      Heart sounds: Normal heart sounds. No murmur.   Pulmonary:      Effort: Pulmonary effort is normal. No respiratory distress.      Breath sounds: Normal breath sounds. No wheezing or rales.      Comments: Slightly diminished breath sounds B, no wheezing or rhonchi  Abdominal:      General: Bowel sounds are normal. There is no distension.      Palpations: Abdomen is soft.      Tenderness: There is no abdominal tenderness.      Comments: Midline laparotomy incision c/d/i. No surrounding cellulitis, no drainage.  Mild to moderate TTP around the incision and around RLQ region. Hypoactive bowel sounds   Skin:     General: Skin is warm and dry.   Neurological:      Mental Status: She is alert and oriented to person, place, and time.         ED Course        Procedures                           Results for orders placed or performed during the hospital encounter of 06/26/20 (from the past 24 hour(s))   CBC with platelets differential   Result Value Ref Range    WBC 14.2 (H) 4.0 - 11.0 10e9/L    RBC Count 3.51 (L) 3.8 - 5.2 10e12/L    Hemoglobin 10.6 (L) 11.7 - 15.7 g/dL    Hematocrit 32.1 (L) 35.0 - 47.0 %    MCV 92 78 - 100 fl    MCH 30.2 26.5 - 33.0 pg    MCHC 33.0 31.5 - 36.5 g/dL    RDW 15.4 (H) 10.0 - 15.0 %    Platelet Count 206 150 - 450 10e9/L    Diff Method Automated Method     % Neutrophils 88.6 %    % Lymphocytes 4.4 %    % Monocytes 4.6 %    % Eosinophils 0.8 %    % Basophils 0.2 %    % Immature Granulocytes 1.4 %    Nucleated RBCs 0 0 /100    Absolute Neutrophil 12.5 (H) 1.6 - 8.3 10e9/L    Absolute Lymphocytes 0.6 (L) 0.8 - 5.3 10e9/L    Absolute Monocytes 0.7 0.0 - 1.3 10e9/L    Absolute Eosinophils 0.1 0.0 - 0.7 10e9/L    Absolute Basophils 0.0 0.0 - 0.2 10e9/L    Abs Immature Granulocytes 0.2 0 - 0.4 10e9/L    Absolute Nucleated RBC 0.0    Comprehensive metabolic panel   Result Value Ref Range    Sodium 133 133 - 144 mmol/L     Potassium 3.6 3.4 - 5.3 mmol/L    Chloride 104 94 - 109 mmol/L    Carbon Dioxide 20 20 - 32 mmol/L    Anion Gap 9 3 - 14 mmol/L    Glucose 137 (H) 70 - 99 mg/dL    Urea Nitrogen 32 (H) 7 - 30 mg/dL    Creatinine 1.81 (H) 0.52 - 1.04 mg/dL    GFR Estimate 28 (L) >60 mL/min/[1.73_m2]    GFR Estimate If Black 32 (L) >60 mL/min/[1.73_m2]    Calcium 8.2 (L) 8.5 - 10.1 mg/dL    Bilirubin Total 0.4 0.2 - 1.3 mg/dL    Albumin 2.3 (L) 3.4 - 5.0 g/dL    Protein Total 6.6 (L) 6.8 - 8.8 g/dL    Alkaline Phosphatase 120 40 - 150 U/L    ALT 42 0 - 50 U/L    AST 30 0 - 45 U/L   Lactic acid   Result Value Ref Range    Lactic Acid 1.4 0.7 - 2.0 mmol/L   XR Chest Port 1 View    Narrative    EXAM: CHEST SINGLE VIEW PORTABLE  LOCATION: [facility]  DATE/TIME: 6/27/2020 12:12 AM    INDICATION: Shortness of breath and fever.    COMPARISON: 6/1/2020.    FINDINGS: A small dense nodular opacity projecting over the lateral aspect of the mid right lung is again noted and is most likely a calcified granuloma. The lungs are otherwise clear. Normal-sized cardiac silhouette. Mild elevation of the right   hemidiaphragm. Partial visualization of a pigtail catheter projecting over the medial aspect of the upper right hemiabdomen, likely a ureteral stent.      Impression    IMPRESSION: No evidence of active cardiopulmonary disease.    Symptomatic COVID-19 Virus (Coronavirus) by PCR    Specimen: Nasopharyngeal   Result Value Ref Range    COVID-19 Virus PCR to [facility] - Source Nasopharyngeal     COVID-19 Virus PCR to [facility] - Result       Test received-See reflex to IDDL test SARS CoV2 (COVID-19) Virus RT-PCR   UA with Microscopic reflex to Culture    Specimen: Urine clean catch; Midstream Urine   Result Value Ref Range    Color Urine Yellow     Appearance Urine Slightly Cloudy     Glucose Urine Negative NEG^Negative mg/dL    Bilirubin Urine Negative NEG^Negative    Ketones Urine Negative NEG^Negative mg/dL    Specific Gravity Urine 1.016  1.003 - 1.035    Blood Urine Small (A) NEG^Negative    pH Urine 7.5 (H) 5.0 - 7.0 pH    Protein Albumin Urine 300 (A) NEG^Negative mg/dL    Urobilinogen mg/dL Normal 0.0 - 2.0 mg/dL    Nitrite Urine Positive (A) NEG^Negative    Leukocyte Esterase Urine Large (A) NEG^Negative    Source Midstream Urine     WBC Urine >182 (H) 0 - 5 /HPF    RBC Urine 3 (H) 0 - 2 /HPF    WBC Clumps Present (A) NEG^Negative /HPF    Bacteria Urine Moderate (A) NEG^Negative /HPF    Squamous Epithelial /HPF Urine <1 0 - 1 /HPF    Mucous Urine Present (A) NEG^Negative /LPF   Urine Culture Aerobic Bacterial    Specimen: Midstream Urine   Result Value Ref Range    Specimen Description Midstream Urine     Special Requests Specimen received in preservative     Culture Micro PENDING      Medications   0.9% sodium chloride BOLUS (1,000 mLs Intravenous New Bag 6/27/20 0056)   piperacillin-tazobactam (ZOSYN) 3.375 g vial to attach to  mL bag (3.375 g Intravenous New Bag 6/27/20 0214)             Assessments & Plan (with Medical Decision Making)   Patient presents to the emergency department today for the above complaints.  On my evaluation she does appear to be quite fatigued and somewhat ill-appearing.  Temperature is 100.5 and she is tachycardic in the 110s. Certainly need to consider any possible source of infection in this recently postoperative patient.  UTI certainly possible given the fact that she had a postoperative Kwan catheter for several days.  Sepsis/bacteremia is possible.  Pulmonary source including pneumonia, viral or bacterial would be possible, COVID-19 would be possible.  Also need to consider the possibility of intra-abdominal abscess given her recent surgeries.    We did establish IV access and we did draw blood for laboratory analysis.  CBC demonstrates leukocytosis with a white count of 14.2, hemoglobin is 10.6, CMP demonstrates acute renal insufficiency with a creatinine of 1.81, most recent comparison from 1 week ago  on June 20, creatinine at that time was normal at 0.73 so this does indicate acute renal insufficiency, lactate within normal limits, blood culture x2 was obtained.  Cath UA demonstrates positive nitrates, large LE, greater than 182 white cells, 3 red cells, this was a catheterized specimen, this will be cultured.  Portable chest x-ray shows no acute infiltrates, COVID PCR swab has been ordered and is pending.     Patient did have a urine culture from 2 days ago, June 25, which demonstrated greater than 100,000 CFUs of Proteus which was pansensitive with the exception of resistant to nitrofurantoin.    She does meet sepsis criteria given fever, source of infection, tachycardia, leukocytosis, and acute organ insufficiency with renal insufficiency, so we have elected to treat her with Zosyn and IV fluids.  Patient will need to be admitted to the hospital under the GYN/ONC service at this time.    I have reviewed the nursing notes.    I have reviewed the findings, diagnosis, plan and need for follow up with the patient.    New Prescriptions    No medications on file       Final diagnoses:   Sepsis with acute renal failure without septic shock, due to unspecified organism, unspecified acute renal failure type (H)   Complicated urinary tract infection - s/p recent GYN surgery, ureteral stent, and serrato catheter   Acute renal insufficiency   Leiomyosarcoma (H)       6/26/2020   [facility], [place], EMERGENCY DEPARTMENT     Bety Bell MD  06/27/20 1916

## 2020-06-27 NOTE — PROVIDER NOTIFICATION
Gyn/onc notified of positive blood culture from 6/26 at 0022 showing gram negative rods. Continue to monitor.

## 2020-06-27 NOTE — ED TRIAGE NOTES
Pt states 6/16/2020 had laparotomy for sarcoma.  Pt arrived to ED for abdominal pain, sob, fever, and generalized fatigue since yesterday.

## 2020-06-27 NOTE — PROGRESS NOTES
Pt arrived to unit 6B room 32-1 from ED via stretcher at ~0500, ambulated from stretcher to bed independently. Pt A&O X4, VS: T-max 99.1, pulse 106 (no telemetry ordered), /76, O2 sats > 90% on RA. LS clear, BS+ X4, CMS intact. PIV in R arm patent & infusing NS @ 125mL/hr. Voiding clear yellow urine adequately, passing gas but no BM. Tolerating regular diet with no nausea/emesis. Denied pain. Gets up with minimal SBA, ambulates independently to restroom. Has call light within reach, able to make needs known, will continue to monitor per POC and update primary team with changes.

## 2020-06-27 NOTE — PROGRESS NOTES
Transfer  Transferred to:  bed 12-1   Via: wheelchair  Reason for transfer:Pt no longer appropriate for 6B- improved/worsened patient condition  Family: Aware of transfer  Belongings: Packed and sent with pt  Chart: Delivered with pt to next unit  Medications: Meds sent to new unit with pt  Report given to: Petrona bull RN.   Pt status: Alert and oriented denied pain or discomfort. verbalized understanding the need for transfer.

## 2020-06-27 NOTE — H&P
[facility]  Gynecology Oncology History and Physical    Maggie Navarrete MRN# [id number]   Age: 70 year old YOB: 1950     Date of Admission:  6/26/2020    Primary care provider: Rhea Lin             Chief Complaint:   Fevers, malaise         History of Present Illness:   Maggie Navarrete is a 70 year old female with recurrent stage IIB leiomyosarcoma who presents with post-operatively after undergoing XL and resection of pelvic wall mass on 6/16. She states she had been recovering appropriately after surgery, but developed generalized malaise over the last 1-2 days. She felt fatigued which she initially attributed to surgery. She started feeling fevers/chills today which prompted her to take her temperature at home which was 102F. She otherwise has been tolerating regualr diet. Denies nausea/vomitng. Having regular BMs. Was checked for a UTI at her incision check last week, but wasn't having any symptoms. Was found to have a UTI, but was not started on abx yet. She denies flank pain. Denies cough, CP, SOB.            Cancer Treatment History:   11/4/2016, undergoes total abdominal hysterectomy and BSO, under Dr. Harsh Camarillo. Surgery was originally presumed for leiomyoma, however, intraoperatively there was focal adherence of a mass to the pelvic right sidewall and cul-de-sac, which led to peritoneal resection and omentectomy. Final pathology, pT2b: showed leiomyosarcoma involving the cul-de-sac and forming two separate masses measuring 10.5 x 6.5 x 4.5 cm and 10.7 x 7.9 x 7.5 cm.    12/26/2016 - 3/2017: s/p 5/6 cycles 25 mg/m2 doxorubicin, 250 mg/m2 dacarbazine.    Routine suveillance CT A/P performed  Result Date: 6/10/2020  Impression: 1. Right adnexal mass suspicious for locally recurrent leiomyosarcoma. 2. Benign-appearing left hepatic lesion is stable since 2016, and potentially vascular in etiology. 3. 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    6/16/2020:   XL, tumor debulking, resection of pelvic sidewall mass  FINAL DIAGNOSIS   A.  Peritoneum, right pelvic sidewall mass, resection:   Involved by recurrent leiomyosarcoma (3.7 x 3.5 x 2.2 cm).   B.  Soft tissue, external iliac vessel margin, excision:   Acutely inflamed fibroadipose tissue, negative for tumor.   C.  Vessel, gonadal, excision:  Vessel with organized   thrombus and fibrous tissue, negative for tumor.   D.  Peritoneum, sigmoid mesentery, biopsy:  Fibroadipose   tissue, negative for tumor.   E.  Soft tissue, internal iliac margin, excision:   Fibroadipose tissue, negative for tumor.   F.  Ureter, right, resection:  Benign ureter, negative for   tumor.          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     GERD (gastroesophageal reflux disease)      Hx of previous reproductive problem 1980     Kidney stone 2011     Leiomyosarcoma (H) 11/14/2016     Personal history of colonic polyps 2016    Small, benign     PONV (postoperative nausea and vomiting)      Posterior vitreous detachment of left eye 2011     Posterior vitreous detachment of right eye 2011     Ptosis of eyelid, bilateral      Stomach problems 2015    Abdominal pain, Diarreha     Thyroid disease     thyroid nodule resolved 2014            Past Surgical History:      Past Surgical History:   Procedure Laterality Date     ABDOMEN SURGERY  Now    Pain, diarrhea     BREAST SURGERY  2002    right breast benign     COLONOSCOPY  2008    due in 2018     COLONOSCOPY N/A 9/8/2016    Procedure: COMBINED COLONOSCOPY, SINGLE OR MULTIPLE BIOPSY/POLYPECTOMY BY BIOPSY;  Surgeon: Abner Pepper MD;  Location:  GI     ENT SURGERY  1975    tonsillectomy     GENITOURINARY SURGERY  1953    bladder surgery      GI  SURGERY  2015    Severe constipation     GYN SURGERY  1977    C section     GYN SURGERY  1981    laparoscopy     GYN SURGERY  2007    myomectomy     hysterecomy  11/14/2016    leiomyosarcoma     OTHER SURGICAL HISTORY Right 10/27/2017    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     REMOVE PORT VASCULAR ACCESS Right 5/9/2017    Procedure: REMOVE PORT VASCULAR ACCESS;  Right Port Removal;  Surgeon: Eric Gibson PA-C;  Location:  OR     REPAIR PTOSIS BILATERAL  2/15/2012    Procedure:REPAIR PTOSIS BILATERAL; BILATERAL UPPER LID PTOSIS REPAIR; Surgeon:BRYCE REYNOLDS; Location:[facility]            Social History:     Social History     Tobacco Use     Smoking status: Never Smoker     Smokeless tobacco: Never Used   Substance Use Topics     Alcohol use: Yes     Comment: Social, not often per patient.            Family History:     Family History   Problem Relation Age of Onset     Family History Negative Mother         glaucoma     Glaucoma Mother      Anxiety Disorder Mother      Heart Failure Mother      Diabetes Mother      Coronary Artery Disease Mother      Hypertension Mother      Breast Cancer Mother      Depression Mother      Obesity Mother      Heart Disease Father      Glaucoma Father      Diabetes Father      Coronary Artery Disease Father      Depression Father      Osteoporosis Father      Heart Disease Maternal Grandmother      Diabetes Maternal Grandmother      Heart Disease Paternal Grandmother      Cancer Paternal Grandfather         stomach     Thyroid Disease Sister      Retinal detachment Sister      Hypertension Sister      Hypertension Sister      Lipids Sister      Cerebrovascular Disease Sister      Depression Sister      Heart Disease Paternal Aunt      Heart Disease Paternal Uncle      Anxiety Disorder Sister      Depression Sister      Thyroid Disease Sister      Thyroid Disease Sister      Osteoporosis Other             Immunizations:      Immunization History   Administered Date(s) Administered     HEPA 02/13/2006, 03/28/2016     HepB 02/01/2013, 03/04/2013, 08/07/2013     Influenza (H1N1) 12/17/2009     Influenza (High Dose) 3 valent vaccine 09/28/2016, 10/09/2017, 11/09/2018     Influenza (IIV3) PF 12/09/2004, 10/03/2012     MMR 04/08/2016     Pneumo Conj 13-V (2010&after) 03/16/2015     Pneumococcal 23 valent 03/21/2016     TDAP Vaccine (Boostrix) 11/03/2011     Td (Adult), Adsorbed 02/19/1998     Tdap (Adacel,Boostrix) 02/19/1998     Typhoid Oral 03/28/2016     Zoster vaccine, live 12/17/2012            Allergies:     Allergies   Allergen Reactions     Erythromycin GI Disturbance     Tramadol Nausea            Medications:     No current facility-administered medications for this encounter.      Current Outpatient Medications   Medication Sig     Calcium Citrate-Vitamin D (CALCIUM CITRATE + PO) Take 200 mg by mouth daily as needed (She supplements to meet her 1,200mg goal depending on how much dietary calcium she has gotten that day (up to 400mg once daily)) Reported on 5/1/2017     levothyroxine (SYNTHROID/LEVOTHROID) 75 MCG tablet TAKE 1 TABLET EVERY DAY     Multiple Vitamin (MULTIVITAMINS PO) Take 1 tablet by mouth daily Reported on 4/10/2017     venlafaxine (EFFEXOR-XR) 75 MG 24 hr capsule Take 1 capsule (75 mg) by mouth daily     zolpidem (AMBIEN) 5 MG tablet Take 1 tablet (5 mg) by mouth nightly as needed for sleep (Only uses if traveling or cannot sleep,)            Review of Systems:   CONSTITUTIONAL:  negative  RESPIRATORY:  negative  CARDIOVASCULAR:  negative  GASTROINTESTINAL:  negative  GENITOURINARY:  negative  HEMATOLOGIC/LYMPHATIC:  negative  ENDOCRINE:  negative         Physical Exam:     Patient Vitals for the past 24 hrs:   BP Temp Temp src Pulse Resp SpO2 Height Weight   06/27/20 0215 121/61 -- -- 97 -- 100 % -- --   06/27/20 0110 109/58 -- -- 96 -- 95 % -- --   06/27/20 0040 115/62 -- -- -- -- -- -- --   06/26/20 9890  127/66 100.5  F (38.1  C) Oral 118 16 98 % 1.524 m (5') 61.2 kg (135 lb)     General: NAD  CV: RRR, no m/g/r  Resp: CTAB  Abdomen: soft, nontender, nondistended  Inc: well healing, C/D/I  : deferred  Back: no CVA tenderness  Extremities: WWP, no edema         Data:     Results for orders placed or performed during the hospital encounter of 06/26/20 (from the past 24 hour(s))   CBC with platelets differential   Result Value Ref Range    WBC 14.2 (H) 4.0 - 11.0 10e9/L    RBC Count 3.51 (L) 3.8 - 5.2 10e12/L    Hemoglobin 10.6 (L) 11.7 - 15.7 g/dL    Hematocrit 32.1 (L) 35.0 - 47.0 %    MCV 92 78 - 100 fl    MCH 30.2 26.5 - 33.0 pg    MCHC 33.0 31.5 - 36.5 g/dL    RDW 15.4 (H) 10.0 - 15.0 %    Platelet Count 206 150 - 450 10e9/L    Diff Method Automated Method     % Neutrophils 88.6 %    % Lymphocytes 4.4 %    % Monocytes 4.6 %    % Eosinophils 0.8 %    % Basophils 0.2 %    % Immature Granulocytes 1.4 %    Nucleated RBCs 0 0 /100    Absolute Neutrophil 12.5 (H) 1.6 - 8.3 10e9/L    Absolute Lymphocytes 0.6 (L) 0.8 - 5.3 10e9/L    Absolute Monocytes 0.7 0.0 - 1.3 10e9/L    Absolute Eosinophils 0.1 0.0 - 0.7 10e9/L    Absolute Basophils 0.0 0.0 - 0.2 10e9/L    Abs Immature Granulocytes 0.2 0 - 0.4 10e9/L    Absolute Nucleated RBC 0.0    Comprehensive metabolic panel   Result Value Ref Range    Sodium 133 133 - 144 mmol/L    Potassium 3.6 3.4 - 5.3 mmol/L    Chloride 104 94 - 109 mmol/L    Carbon Dioxide 20 20 - 32 mmol/L    Anion Gap 9 3 - 14 mmol/L    Glucose 137 (H) 70 - 99 mg/dL    Urea Nitrogen 32 (H) 7 - 30 mg/dL    Creatinine 1.81 (H) 0.52 - 1.04 mg/dL    GFR Estimate 28 (L) >60 mL/min/[1.73_m2]    GFR Estimate If Black 32 (L) >60 mL/min/[1.73_m2]    Calcium 8.2 (L) 8.5 - 10.1 mg/dL    Bilirubin Total 0.4 0.2 - 1.3 mg/dL    Albumin 2.3 (L) 3.4 - 5.0 g/dL    Protein Total 6.6 (L) 6.8 - 8.8 g/dL    Alkaline Phosphatase 120 40 - 150 U/L    ALT 42 0 - 50 U/L    AST 30 0 - 45 U/L   Lactic acid   Result Value Ref Range     Lactic Acid 1.4 0.7 - 2.0 mmol/L   XR Chest Port 1 View    Narrative    EXAM: CHEST SINGLE VIEW PORTABLE  LOCATION: [facility]  DATE/TIME: 6/27/2020 12:12 AM    INDICATION: Shortness of breath and fever.    COMPARISON: 6/1/2020.    FINDINGS: A small dense nodular opacity projecting over the lateral aspect of the mid right lung is again noted and is most likely a calcified granuloma. The lungs are otherwise clear. Normal-sized cardiac silhouette. Mild elevation of the right   hemidiaphragm. Partial visualization of a pigtail catheter projecting over the medial aspect of the upper right hemiabdomen, likely a ureteral stent.      Impression    IMPRESSION: No evidence of active cardiopulmonary disease.    Symptomatic COVID-19 Virus (Coronavirus) by PCR    Specimen: Nasopharyngeal   Result Value Ref Range    COVID-19 Virus PCR to [facility] - Source Nasopharyngeal     COVID-19 Virus PCR to [facility] - Result       Test received-See reflex to IDDL test SARS CoV2 (COVID-19) Virus RT-PCR   SARS-CoV-2 COVID-19 Virus (Coronavirus) RT-PCR Nasopharyngeal    Specimen: Nasopharyngeal   Result Value Ref Range    SARS-CoV-2 Virus Specimen Source Nasopharyngeal     SARS-CoV-2 PCR Result NEGATIVE     SARS-CoV-2 PCR Comment       Testing was performed using the Xpert Xpress SARS-CoV-2 Assay on the Cepheid Gene-Xpert   Instrument Systems. Additional information about this Emergency Use Authorization (EUA)   assay can be found via the Lab Guide.     UA with Microscopic reflex to Culture    Specimen: Urine clean catch; Midstream Urine   Result Value Ref Range    Color Urine Yellow     Appearance Urine Slightly Cloudy     Glucose Urine Negative NEG^Negative mg/dL    Bilirubin Urine Negative NEG^Negative    Ketones Urine Negative NEG^Negative mg/dL    Specific Gravity Urine 1.016 1.003 - 1.035    Blood Urine Small (A) NEG^Negative    pH Urine 7.5 (H) 5.0 - 7.0 pH    Protein Albumin Urine 300 (A) NEG^Negative mg/dL     Urobilinogen mg/dL Normal 0.0 - 2.0 mg/dL    Nitrite Urine Positive (A) NEG^Negative    Leukocyte Esterase Urine Large (A) NEG^Negative    Source Midstream Urine     WBC Urine >182 (H) 0 - 5 /HPF    RBC Urine 3 (H) 0 - 2 /HPF    WBC Clumps Present (A) NEG^Negative /HPF    Bacteria Urine Moderate (A) NEG^Negative /HPF    Squamous Epithelial /HPF Urine <1 0 - 1 /HPF    Mucous Urine Present (A) NEG^Negative /LPF   Urine Culture Aerobic Bacterial    Specimen: Midstream Urine   Result Value Ref Range    Specimen Description Midstream Urine     Special Requests Specimen received in preservative     Culture Micro PENDING              Assessment and Plan:   Assessment: 70 year old female with recurrent stage IIB leimyosarcoma now post-op after XL, tumor resection (6/16) admitted for fevers and malaise. UA consistent with complicated UTI vs pyelonephritis. UCx showed pansensitive Proteus 6/25. Will also obtain CT A/P to rule out abscess, otherwise source of fever presumable urine. Blood Cx pending to r/o bacteremia..    Plan:  Dz: Recurrent Stage IIB leiomyosarcoma, s/p interval debulking at [place]  FEN: Reg diet, LR @125m/h  Pain: tylenol, oxycodone PRN  Heme: H/o PE, on Xarelto. Hgb 10.6.  CV: No issues  Pulm: PE as above.   GI: Senokot PRN  /ID: UA consistent with infection. Recent UCx (6/25) with proteus (pan-sensitive), not treated. WBC 14.2. Lactate 1.4. Blood Cx. Pending. BILLY 2/2 sepsis, IVF as above. Cr 1.81, repeat BMP in AM. Zosyn started, can narrow as cultures return and pending CT A/P.  Endocrine: Hypothyroid, home synthroid.  Psych/Neuro/MSK: No issues, post-op.  PPX: SCDs  Dispo:  Home when afebrile on PO antibiotics    Shruti Lynne MD  Gynecology Oncology PGY4  Pager: [phone]  6/27/2020 2:39 AM       Provider Disclosure:   I agree with above History, Review of Systems, Physical exam and Plan. I have reviewed the content of the documentation and have edited it as needed. I have seen and personally performed  the services documented here and the documentation accurately represents those services and the decisions I have made.     Electronically signed by:   Leonard Ferguson MD   Gynecologic Oncology   [facility]

## 2020-06-27 NOTE — PROGRESS NOTES
Admitted/transferred from:   2 RN full   skin assessment completed by Petrona Bashir and Cari VALLADARES  Skin assessment finding: issues found Previous midine inc and bruising.    Interventions/actions: skin interventions Cont to encourage good skin hygiene.      Will continue to monitor.

## 2020-06-27 NOTE — DISCHARGE SUMMARY
Gynecologic Oncology Discharge Summary    Maggie Navarrete  [id number]    Admit Date: 6/26/2020  Discharge Date: 06/30/20   Admitting Provider: Hamida Ferguson MD  Discharge Provider: Gypsy Huang MD    Admission Dx:   -Fevers  -Malaise   -Leiomyosarcoma Stage IIB   -Hx of pulmonary embolism  -GERD  -Hypothyroid    Discharge Dx:  -same as above, s/p resolution of fevers, malaise  -Acute cystitis leading to infection of the right ureteral stent  -Severe Sepsis (fever, tachycardia, BILLY), POA secondary to right ureteral stent infection.   -Acute kidney injury associated with sepsis  -Diarrhea    Patient Active Problem List   Diagnosis     Presbyopia - Both Eyes     Osteopenia     CAROLA (generalized anxiety disorder)     Vaginal atrophy     Uterus cancer, sarcoma (H)     Chemotherapy-induced neutropenia (H)     Chemotherapy induced cardiomyopathy (H)     Overweight     Leiomyosarcoma (H)     Ptosis     Dry eyes     Closed nondisplaced fracture of fifth right metatarsal bone     UTI (urinary tract infection)     Procedures:   - CT Abdomen/pelvis                                                        - Chest XRay    Prior to Admission Medications:  Medications Prior to Admission   Medication Sig Dispense Refill Last Dose     apixaban ANTICOAGULANT (ELIQUIS) 2.5 MG tablet Take 2.5 mg by mouth 2 times daily   6/26/2020 at Unknown time     levothyroxine (SYNTHROID/LEVOTHROID) 75 MCG tablet TAKE 1 TABLET EVERY DAY 90 tablet 1 6/26/2020 at Unknown time     Multiple Vitamin (MULTIVITAMINS PO) Take 1 tablet by mouth daily Reported on 4/10/2017   6/26/2020 at Unknown time     pantoprazole (PROTONIX) 40 MG EC tablet Take 40 mg by mouth daily before breakfast   6/26/2020 at Unknown time     venlafaxine (EFFEXOR-XR) 75 MG 24 hr capsule Take 1 capsule (75 mg) by mouth daily 90 capsule 0 6/26/2020 at Unknown time     zolpidem (AMBIEN) 5 MG tablet Take 1 tablet (5 mg) by mouth nightly as needed for sleep (Only uses if traveling or  cannot sleep,) 20 tablet 0 Past Week at Unknown time     [DISCONTINUED] Calcium Citrate-Vitamin D (CALCIUM CITRATE + PO) Take 200 mg by mouth daily as needed (She supplements to meet her 1,200mg goal depending on how much dietary calcium she has gotten that day (up to 400mg once daily)) Reported on 5/1/2017   Past Week at Unknown time     Discharge Medications:     Review of your medicines      START taking      Dose / Directions   ciprofloxacin 500 MG tablet  Commonly known as:  CIPRO  Indication:  Urinary Tract Infection      Dose:  500 mg  Take 1 tablet (500 mg) by mouth every 12 hours for 9 days  Quantity:  18 tablet  Refills:  0        CONTINUE these medicines which have NOT CHANGED      Dose / Directions   apixaban ANTICOAGULANT 2.5 MG tablet  Commonly known as:  ELIQUIS      Dose:  2.5 mg  Take 2.5 mg by mouth 2 times daily  Refills:  0     levothyroxine 75 MCG tablet  Commonly known as:  SYNTHROID/LEVOTHROID  Used for:  Acquired hypothyroidism      TAKE 1 TABLET EVERY DAY  Quantity:  90 tablet  Refills:  1     MULTIVITAMINS PO      Dose:  1 tablet  Take 1 tablet by mouth daily Reported on 4/10/2017  Refills:  0     pantoprazole 40 MG EC tablet  Commonly known as:  PROTONIX      Dose:  40 mg  Take 40 mg by mouth daily before breakfast  Refills:  0     venlafaxine 75 MG 24 hr capsule  Commonly known as:  EFFEXOR-XR  Used for:  Generalized anxiety disorder      Dose:  75 mg  Take 1 capsule (75 mg) by mouth daily  Quantity:  90 capsule  Refills:  0     zolpidem 5 MG tablet  Commonly known as:  AMBIEN  Used for:  Insomnia, unspecified type      Dose:  5 mg  Take 1 tablet (5 mg) by mouth nightly as needed for sleep (Only uses if traveling or cannot sleep,)  Quantity:  20 tablet  Refills:  0        STOP taking    CALCIUM CITRATE + PO              Where to get your medicines      These medications were sent to [facility] - [place], [place] - [address]  [address]  [address]    Phone:  [phone]     ciprofloxacin 500 MG tablet       Consultations:  - PT, OT    Brief History of Illness:  From H&P: Maggie Navarrete is a 70 year old female with recurrent stage IIB leiomyosarcoma who presents with post-operatively after undergoing XL and resection of pelvic wall mass on 6/16. She states she had been recovering appropriately after surgery, but developed generalized malaise over the last 1-2 days. She felt fatigued which she initially attributed to surgery. She started feeling fevers/chills today which prompted her to take her temperature at home which was 102F. She otherwise has been tolerating regualr diet. Denies nausea/vomitng. Having regular BMs. Was checked for a UTI at her incision check last week, but wasn't having any symptoms. Was found to have a UTI, but was not started on abx yet. She denies flank pain. Denies cough, CP, SOB.    Hospital Course:  Dz:   - Recurrent Stage IIB leiomyosarcoma, s/p interval debulking at [place]  FEN:   - She was maintained on a regular diet with maintained IVF and tolerated it without nausea and vomiting. Patient had mild hypokalemia, which was replaced with oral potassium. By discharge, she was able to maintain her hydration without IVF supplementation.  Pain:   - Her pain was controlled with tylenol and oxycodone PRN.  CV:   - She has no history of CV issues.  Her vital signs were stable while in house and she had no acute CV issues.  PULM:   - She has a history of a pulmonary embolis and was kept on her home eliquis. O2 supplementation was not needed, and her o2 sats remained within normal limits.  She had no acute pulmonary issues while in house.  HEME:   - She has a history of a pulmonary embolis and was kept on her home eliquis. She had no acute heme issues while in house.  GI:   - Patient had some mild diarrhea and C. Diff toxin was obtained and negative. Senokot PRN and protonix was ordered. At the time of discharge, she was tolerating a  regular diet without nausea and vomiting. She had no acute GI issues while in house.   :    - Patient has the history of right ureteral reimplantation and ureteral stent in place. On admission, her UA was consistent with infection, thus she was started on broad spectrum antibiotics, IV zosyn. A CT A/P was ordered to rule out abscess and it was within normal limits. Urine and blood cultures grew proteus mirabilis. Likely, patient's acute cystitis lead to infection of the right ureteral stent, which subsequently lead to bacteremia. The bacteremia induced the severe sepsis, presented as fever, tachycardia, and lactic acidosis. She had BILLY secondary to sepsis, her Cr increased to 1.81, but by the time of discharge it had improved to 1.10. Patient has been afebrile for 24 hours, and then she was transitioned to PO levaquin and was discharged on PO levaquin to complete a total of 14 days of antibiotcis. Prior to discharge, the patient was voiding spontaneously without difficulty.    ID:    - Her zosyn was initially dosed conservatively for her renal function and BILLY, however when the patient was febrile to a Tm of 103F, pharmacy was consulted and her zosyn was increased to cover broader spectrum bacteria with resolution in her fever. Patient was transitioned to oral Levaquin on 6/29.  ENDO:   - Her PTA synthroid was continued.   PSYCH/NEURO:   - She was continued on her home zolpidem PRN.  PPX:    - She was given SCDs, and IS during her hospital course.  She tolerated these prophylactic interventions without incident.  They were discontinued at the time of her discharge.    Discharge Instructions and Follow up:  Ms. Maggie Navarrete was discharged from the hospital with follow up for cancer treatment planning.    Discharge Diet: Regular  Discharge Activity: resume pre-hospitalization activity  Discharge Follow up: follow up with cancer care provider at [facility]    Discharge Disposition:  Discharged to home with sister  and OP PT  Discharge condition: Fair.    Discharge Staff: Gypsy Pineda MD ([email or web address])  N OBGYN PGY-3  Gyn Onc resident pager: [phone]  06/30/2020    I have reviewed and edited Dr. Pineda's Discharge Summary above.  Agree with summary of hospital course.    Gypsy Huang MD, MS, FACOG, FACS  7/3/2020  11:02 AM

## 2020-06-27 NOTE — PROVIDER NOTIFICATION
Notified provider regarding pt lab called and said pt had positive blood cultures on the R arm on 6/27. Proteus species.

## 2020-06-28 LAB
ANION GAP SERPL CALCULATED.3IONS-SCNC: 10 MMOL/L (ref 3–14)
BACTERIA SPEC CULT: ABNORMAL
BASOPHILS # BLD AUTO: 0 10E9/L (ref 0–0.2)
BASOPHILS # BLD AUTO: 0 10E9/L (ref 0–0.2)
BASOPHILS NFR BLD AUTO: 0.6 %
BASOPHILS NFR BLD AUTO: 0.7 %
BUN SERPL-MCNC: 17 MG/DL (ref 7–30)
CALCIUM SERPL-MCNC: 7.2 MG/DL (ref 8.5–10.1)
CHLORIDE SERPL-SCNC: 112 MMOL/L (ref 94–109)
CO2 SERPL-SCNC: 17 MMOL/L (ref 20–32)
CREAT SERPL-MCNC: 1.47 MG/DL (ref 0.52–1.04)
DIFFERENTIAL METHOD BLD: ABNORMAL
DIFFERENTIAL METHOD BLD: ABNORMAL
EOSINOPHIL # BLD AUTO: 0.1 10E9/L (ref 0–0.7)
EOSINOPHIL # BLD AUTO: 0.1 10E9/L (ref 0–0.7)
EOSINOPHIL NFR BLD AUTO: 1.7 %
EOSINOPHIL NFR BLD AUTO: 2.5 %
ERYTHROCYTE [DISTWIDTH] IN BLOOD BY AUTOMATED COUNT: 15.9 % (ref 10–15)
ERYTHROCYTE [DISTWIDTH] IN BLOOD BY AUTOMATED COUNT: 16.1 % (ref 10–15)
GFR SERPL CREATININE-BSD FRML MDRD: 36 ML/MIN/{1.73_M2}
GLUCOSE SERPL-MCNC: 86 MG/DL (ref 70–99)
HCT VFR BLD AUTO: 26.4 % (ref 35–47)
HCT VFR BLD AUTO: 28.4 % (ref 35–47)
HGB BLD-MCNC: 8.6 G/DL (ref 11.7–15.7)
HGB BLD-MCNC: 9.2 G/DL (ref 11.7–15.7)
IMM GRANULOCYTES # BLD: 0 10E9/L (ref 0–0.4)
IMM GRANULOCYTES # BLD: 0 10E9/L (ref 0–0.4)
IMM GRANULOCYTES NFR BLD: 0.5 %
IMM GRANULOCYTES NFR BLD: 0.6 %
LACTATE BLD-SCNC: 0.7 MMOL/L (ref 0.7–2)
LACTATE BLD-SCNC: 0.9 MMOL/L (ref 0.7–2)
LYMPHOCYTES # BLD AUTO: 0.4 10E9/L (ref 0.8–5.3)
LYMPHOCYTES # BLD AUTO: 0.7 10E9/L (ref 0.8–5.3)
LYMPHOCYTES NFR BLD AUTO: 12.8 %
LYMPHOCYTES NFR BLD AUTO: 8.1 %
Lab: ABNORMAL
MCH RBC QN AUTO: 30 PG (ref 26.5–33)
MCH RBC QN AUTO: 30.1 PG (ref 26.5–33)
MCHC RBC AUTO-ENTMCNC: 32.4 G/DL (ref 31.5–36.5)
MCHC RBC AUTO-ENTMCNC: 32.6 G/DL (ref 31.5–36.5)
MCV RBC AUTO: 92 FL (ref 78–100)
MCV RBC AUTO: 93 FL (ref 78–100)
MONOCYTES # BLD AUTO: 0.5 10E9/L (ref 0–1.3)
MONOCYTES # BLD AUTO: 0.6 10E9/L (ref 0–1.3)
MONOCYTES NFR BLD AUTO: 9.8 %
MONOCYTES NFR BLD AUTO: 9.8 %
NEUTROPHILS # BLD AUTO: 4.1 10E9/L (ref 1.6–8.3)
NEUTROPHILS # BLD AUTO: 4.2 10E9/L (ref 1.6–8.3)
NEUTROPHILS NFR BLD AUTO: 73.7 %
NEUTROPHILS NFR BLD AUTO: 79.2 %
NRBC # BLD AUTO: 0 10*3/UL
NRBC # BLD AUTO: 0 10*3/UL
NRBC BLD AUTO-RTO: 0 /100
NRBC BLD AUTO-RTO: 0 /100
PLATELET # BLD AUTO: 174 10E9/L (ref 150–450)
PLATELET # BLD AUTO: 176 10E9/L (ref 150–450)
POTASSIUM SERPL-SCNC: 4 MMOL/L (ref 3.4–5.3)
RBC # BLD AUTO: 2.86 10E12/L (ref 3.8–5.2)
RBC # BLD AUTO: 3.07 10E12/L (ref 3.8–5.2)
SODIUM SERPL-SCNC: 140 MMOL/L (ref 133–144)
SPECIMEN SOURCE: ABNORMAL
WBC # BLD AUTO: 5.2 10E9/L (ref 4–11)
WBC # BLD AUTO: 5.6 10E9/L (ref 4–11)

## 2020-06-28 PROCEDURE — 25000128 H RX IP 250 OP 636: Performed by: STUDENT IN AN ORGANIZED HEALTH CARE EDUCATION/TRAINING PROGRAM

## 2020-06-28 PROCEDURE — 36415 COLL VENOUS BLD VENIPUNCTURE: CPT | Performed by: STUDENT IN AN ORGANIZED HEALTH CARE EDUCATION/TRAINING PROGRAM

## 2020-06-28 PROCEDURE — 83605 ASSAY OF LACTIC ACID: CPT | Performed by: STUDENT IN AN ORGANIZED HEALTH CARE EDUCATION/TRAINING PROGRAM

## 2020-06-28 PROCEDURE — 85025 COMPLETE CBC W/AUTO DIFF WBC: CPT | Performed by: STUDENT IN AN ORGANIZED HEALTH CARE EDUCATION/TRAINING PROGRAM

## 2020-06-28 PROCEDURE — 25000132 ZZH RX MED GY IP 250 OP 250 PS 637: Mod: GY | Performed by: STUDENT IN AN ORGANIZED HEALTH CARE EDUCATION/TRAINING PROGRAM

## 2020-06-28 PROCEDURE — 25800030 ZZH RX IP 258 OP 636: Performed by: STUDENT IN AN ORGANIZED HEALTH CARE EDUCATION/TRAINING PROGRAM

## 2020-06-28 PROCEDURE — 80048 BASIC METABOLIC PNL TOTAL CA: CPT | Performed by: STUDENT IN AN ORGANIZED HEALTH CARE EDUCATION/TRAINING PROGRAM

## 2020-06-28 PROCEDURE — 12000001 ZZH R&B MED SURG/OB UMMC

## 2020-06-28 PROCEDURE — 99233 SBSQ HOSP IP/OBS HIGH 50: CPT | Mod: GC | Performed by: OBSTETRICS & GYNECOLOGY

## 2020-06-28 RX ADMIN — ACETAMINOPHEN 650 MG: 325 TABLET, FILM COATED ORAL at 07:47

## 2020-06-28 RX ADMIN — PIPERACILLIN AND TAZOBACTAM 3.38 G: 3; .375 INJECTION, POWDER, FOR SOLUTION INTRAVENOUS at 01:21

## 2020-06-28 RX ADMIN — APIXABAN 2.5 MG: 2.5 TABLET, FILM COATED ORAL at 20:26

## 2020-06-28 RX ADMIN — SODIUM CHLORIDE: 9 INJECTION, SOLUTION INTRAVENOUS at 18:14

## 2020-06-28 RX ADMIN — SODIUM CHLORIDE: 9 INJECTION, SOLUTION INTRAVENOUS at 06:49

## 2020-06-28 RX ADMIN — LEVOTHYROXINE SODIUM 75 MCG: 75 TABLET ORAL at 04:44

## 2020-06-28 RX ADMIN — OXYCODONE HYDROCHLORIDE 5 MG: 5 TABLET ORAL at 12:43

## 2020-06-28 RX ADMIN — APIXABAN 2.5 MG: 2.5 TABLET, FILM COATED ORAL at 07:47

## 2020-06-28 RX ADMIN — VENLAFAXINE HYDROCHLORIDE 75 MG: 75 CAPSULE, EXTENDED RELEASE ORAL at 07:48

## 2020-06-28 RX ADMIN — PIPERACILLIN AND TAZOBACTAM 3.38 G: 3; .375 INJECTION, POWDER, FOR SOLUTION INTRAVENOUS at 20:22

## 2020-06-28 RX ADMIN — PANTOPRAZOLE SODIUM 40 MG: 40 TABLET, DELAYED RELEASE ORAL at 06:48

## 2020-06-28 RX ADMIN — PIPERACILLIN AND TAZOBACTAM 3.38 G: 3; .375 INJECTION, POWDER, FOR SOLUTION INTRAVENOUS at 07:47

## 2020-06-28 RX ADMIN — PIPERACILLIN AND TAZOBACTAM 3.38 G: 3; .375 INJECTION, POWDER, FOR SOLUTION INTRAVENOUS at 13:49

## 2020-06-28 RX ADMIN — ACETAMINOPHEN 650 MG: 325 TABLET, FILM COATED ORAL at 20:26

## 2020-06-28 ASSESSMENT — PAIN DESCRIPTION - DESCRIPTORS
DESCRIPTORS: SORE

## 2020-06-28 ASSESSMENT — ACTIVITIES OF DAILY LIVING (ADL)
ADLS_ACUITY_SCORE: 11
ADLS_ACUITY_SCORE: 11
ADLS_ACUITY_SCORE: 13
ADLS_ACUITY_SCORE: 11
ADLS_ACUITY_SCORE: 13
ADLS_ACUITY_SCORE: 11

## 2020-06-28 ASSESSMENT — MIFFLIN-ST. JEOR: SCORE: 1096.04

## 2020-06-28 NOTE — PLAN OF CARE
Temperature 102.1  orally this shift, down to 99.6 orally after Tylenol.Lactic acid is .7.Fair appetite,had stools today, had not had a stool for two days. Kwan catheter placed today per order, good urine volumes. Ambulated in halls with sba, abdominal incision healing. Oxycodone for abdominal pain with relief.

## 2020-06-28 NOTE — PROGRESS NOTES
"Pt febrile to 102.9, HR 99. Other VS wnl. Went to evaluate the pt.     S: pt feeling \"a little warm\". Otherwise she denies any new symptoms. Still has mild RLQ abd pain. Denies chest pain, dizziness, shortness of breath. Has gotten up to go to the bathroom.       O:   /45 (BP Location: Left arm)   Pulse 102   Temp 102.9  F (39.4  C) (Oral)   Resp 16   Ht 1.524 m (5')   Wt 62 kg (136 lb 11 oz)   SpO2 97%   BMI 26.69 kg/m      Gen: appears comfortable resting in bed, alert and oriented to person, place, time   CV: RR  Pulm: anterior lung fields CTAB  GI: minimally tender to palpation diffusely, worse in RLQ  : per nurse, voided ~100cc prior to transfer, just had another void of 125cc, UOP adequate       A/P:  70 year old HD#1 admitted for fevers, sepsis, complicated UTI vs pyelonephritis. On D#1 of zosyn. Admitted with fever of 100.5F. Tmax 103F at 0950, defervesced this afternoon, and again febrile to 102.9F at 1945.     - Fluid bolus 1L, continue on maintenance at 125ml/hr  - Recheck VS in 2 hrs   - increased zosyn from 2.25g q6 to 3.375g q6 per pharmacy (renal dosage) to cover for GN  rods/pseudomonas - due to her kidneys will not increase to 4.5g q6  - CT A/p was wnl earlier and not concering for abscess, consider reimaging if clinical picture worsens   - recheck cbc, bmp in the AM or sooner if indicated     Discussed with SIRI Mills MD  Obstetrics & Gynecology, PGY-2  Gyn Onc Pager: [phone]    "

## 2020-06-28 NOTE — PROVIDER NOTIFICATION
Notified provider regarding pt temp of 102.9; OVSS on ra. Awaiting response.     Addendum: Provider at side of bed and assessing pt. Provider ordered LA, increased Zosyn strength, and 1000mL NS bolus. Will recheck temp in a hour and page back to provider results.

## 2020-06-28 NOTE — PLAN OF CARE
Pt was transferred from 6B to  at 1705.     AOx4. UAL. Tmax 102.9 providers notified and seen pt. Pt woke up and was confused of what time it was, once pt was oriented pt has been AOx4. Provides increased pt Zosyn strength, ordered a fluid bolus. OVSS on ra. +flatus, LBM on days. Tolerating reg diet, denies N/V. Pt had x1 unmeasured urine occurrence when pt arrived to unit from 6B, informed pt the need to safe. Pt reporting abd discomfort, medication offered but pt declined, aqua-k pad in use. Previous midline abd inc MERCEDEZ, intact. PIV infusing with IVMF. Cont POC.     Addendum: Lactic acid came back at 0.6 mmol/L.

## 2020-06-28 NOTE — PLAN OF CARE
VSS on Ra, denies nausea, denies pain. Aqua K pad in place. A&Ox4, calls appropriately. Up with SBA in room. Voiding adequately. Passing gas. Int IV Abx + IVMF infusing into R PIV. Previous midline incision CDI, no drainage. Cont POC.

## 2020-06-29 LAB
ANION GAP SERPL CALCULATED.3IONS-SCNC: 9 MMOL/L (ref 3–14)
BACTERIA SPEC CULT: ABNORMAL
BACTERIA SPEC CULT: ABNORMAL
BUN SERPL-MCNC: 13 MG/DL (ref 7–30)
CALCIUM SERPL-MCNC: 7.3 MG/DL (ref 8.5–10.1)
CHLORIDE SERPL-SCNC: 116 MMOL/L (ref 94–109)
CO2 SERPL-SCNC: 16 MMOL/L (ref 20–32)
CREAT SERPL-MCNC: 1.27 MG/DL (ref 0.52–1.04)
GFR SERPL CREATININE-BSD FRML MDRD: 43 ML/MIN/{1.73_M2}
GLUCOSE SERPL-MCNC: 113 MG/DL (ref 70–99)
POTASSIUM SERPL-SCNC: 3.7 MMOL/L (ref 3.4–5.3)
SODIUM SERPL-SCNC: 141 MMOL/L (ref 133–144)
SPECIMEN SOURCE: ABNORMAL

## 2020-06-29 PROCEDURE — 12000001 ZZH R&B MED SURG/OB UMMC

## 2020-06-29 PROCEDURE — 25000132 ZZH RX MED GY IP 250 OP 250 PS 637: Mod: GY | Performed by: STUDENT IN AN ORGANIZED HEALTH CARE EDUCATION/TRAINING PROGRAM

## 2020-06-29 PROCEDURE — 25000132 ZZH RX MED GY IP 250 OP 250 PS 637: Mod: GY | Performed by: NURSE PRACTITIONER

## 2020-06-29 PROCEDURE — 25800030 ZZH RX IP 258 OP 636: Performed by: STUDENT IN AN ORGANIZED HEALTH CARE EDUCATION/TRAINING PROGRAM

## 2020-06-29 PROCEDURE — 36415 COLL VENOUS BLD VENIPUNCTURE: CPT | Performed by: STUDENT IN AN ORGANIZED HEALTH CARE EDUCATION/TRAINING PROGRAM

## 2020-06-29 PROCEDURE — 25000128 H RX IP 250 OP 636: Performed by: OBSTETRICS & GYNECOLOGY

## 2020-06-29 PROCEDURE — 99233 SBSQ HOSP IP/OBS HIGH 50: CPT | Mod: GC | Performed by: OBSTETRICS & GYNECOLOGY

## 2020-06-29 PROCEDURE — 80048 BASIC METABOLIC PNL TOTAL CA: CPT | Performed by: STUDENT IN AN ORGANIZED HEALTH CARE EDUCATION/TRAINING PROGRAM

## 2020-06-29 PROCEDURE — 25000128 H RX IP 250 OP 636: Performed by: STUDENT IN AN ORGANIZED HEALTH CARE EDUCATION/TRAINING PROGRAM

## 2020-06-29 RX ORDER — PIPERACILLIN SODIUM, TAZOBACTAM SODIUM 3; .375 G/15ML; G/15ML
3.38 INJECTION, POWDER, LYOPHILIZED, FOR SOLUTION INTRAVENOUS EVERY 6 HOURS
Status: DISCONTINUED | OUTPATIENT
Start: 2020-06-29 | End: 2020-06-29

## 2020-06-29 RX ORDER — ZOLPIDEM TARTRATE 5 MG/1
5 TABLET ORAL
Status: DISCONTINUED | OUTPATIENT
Start: 2020-06-29 | End: 2020-06-30 | Stop reason: HOSPADM

## 2020-06-29 RX ORDER — CIPROFLOXACIN 500 MG/1
500 TABLET, FILM COATED ORAL EVERY 12 HOURS SCHEDULED
Status: DISCONTINUED | OUTPATIENT
Start: 2020-06-29 | End: 2020-06-30 | Stop reason: HOSPADM

## 2020-06-29 RX ADMIN — APIXABAN 2.5 MG: 2.5 TABLET, FILM COATED ORAL at 19:25

## 2020-06-29 RX ADMIN — ACETAMINOPHEN 650 MG: 325 TABLET, FILM COATED ORAL at 22:19

## 2020-06-29 RX ADMIN — ACETAMINOPHEN 650 MG: 325 TABLET, FILM COATED ORAL at 03:35

## 2020-06-29 RX ADMIN — LEVOTHYROXINE SODIUM 75 MCG: 75 TABLET ORAL at 07:03

## 2020-06-29 RX ADMIN — PIPERACILLIN AND TAZOBACTAM 3.38 G: 3; .375 INJECTION, POWDER, FOR SOLUTION INTRAVENOUS at 13:58

## 2020-06-29 RX ADMIN — PANTOPRAZOLE SODIUM 40 MG: 40 TABLET, DELAYED RELEASE ORAL at 08:24

## 2020-06-29 RX ADMIN — PIPERACILLIN AND TAZOBACTAM 3.38 G: 3; .375 INJECTION, POWDER, FOR SOLUTION INTRAVENOUS at 01:54

## 2020-06-29 RX ADMIN — VENLAFAXINE HYDROCHLORIDE 75 MG: 75 CAPSULE, EXTENDED RELEASE ORAL at 08:24

## 2020-06-29 RX ADMIN — CIPROFLOXACIN HYDROCHLORIDE 500 MG: 500 TABLET, FILM COATED ORAL at 19:25

## 2020-06-29 RX ADMIN — Medication 2.5 MG: at 00:14

## 2020-06-29 RX ADMIN — SODIUM CHLORIDE: 9 INJECTION, SOLUTION INTRAVENOUS at 03:21

## 2020-06-29 RX ADMIN — ZOLPIDEM TARTRATE 5 MG: 5 TABLET, FILM COATED ORAL at 22:19

## 2020-06-29 RX ADMIN — ACETAMINOPHEN 650 MG: 325 TABLET, FILM COATED ORAL at 08:25

## 2020-06-29 RX ADMIN — PIPERACILLIN AND TAZOBACTAM 3.38 G: 3; .375 INJECTION, POWDER, FOR SOLUTION INTRAVENOUS at 08:25

## 2020-06-29 RX ADMIN — APIXABAN 2.5 MG: 2.5 TABLET, FILM COATED ORAL at 08:24

## 2020-06-29 RX ADMIN — OXYCODONE HYDROCHLORIDE 5 MG: 5 TABLET ORAL at 16:38

## 2020-06-29 ASSESSMENT — PAIN DESCRIPTION - DESCRIPTORS
DESCRIPTORS: STABBING
DESCRIPTORS: SORE

## 2020-06-29 ASSESSMENT — ACTIVITIES OF DAILY LIVING (ADL)
ADLS_ACUITY_SCORE: 15

## 2020-06-29 NOTE — PLAN OF CARE
AOx4. UAL. Tmax 99.9 PRN Tylenol given; OVSS on ra. +flatus, LBM on days. Tolerating reg diet, denies N/V. Kwan with AUO. Pt reporting abd discomfort, PRN Oxycodone given and aqua-k pad in use. Previous midline abd inc MERCEDEZ, intact. PIV infusing with IVMF. LA check this jessica came back at 0.9 mmol/L. Cont POC.

## 2020-06-29 NOTE — PLAN OF CARE
Assumed care of pt [id number] hours  Alert & Orientated.  Pain managed with prn tylenol. Temp max this shift (98.7) Temp min (97.1). Up with SBA. Ambulated in the hallway. Had 1 BM. Kwan catheter in place with adequate urine output. PIV S/L in between IV abx. Tolerating reg diet with no complaints of N&V. Previous procedure midline abdominal incision CDI, MERCEDEZ. Continue with POC.

## 2020-06-29 NOTE — PROGRESS NOTES
"Gynecologic Oncology Daily Progress Note  2020    Maggie Navarrete  : 1950  MRN: [id number]    24 hour events:   > 24 h afebrile  Urine and blood cultures proteus+, pan-sensitive     Subjective: The patient is comfortable this morning. Described her night as \"interrupted\". Denies any new pain, SOB at rest, dizziness, fevers/chills.        Objective:  Patient Vitals for the past 24 hrs:   BP Temp Temp src Pulse Heart Rate Resp SpO2 Weight   20 0705 120/58 98.7  F (37.1  C) Oral 88 -- 18 94 % --   20 0322 -- 97.4  F (36.3  C) Oral -- -- -- -- --   20 2214 112/54 99  F (37.2  C) Oral -- 87 16 94 % --   20 2003 130/64 99.9  F (37.7  C) Oral 88 88 16 94 % --   20 1456 -- -- -- -- -- -- -- 65.5 kg (144 lb 4.8 oz)   20 1448 109/48 97.8  F (36.6  C) Oral 81 -- 16 94 % --   20 1209 124/63 97.1  F (36.2  C) Oral 76 -- 16 96 % --   20 0905 -- 99.6  F (37.6  C) Oral -- -- -- -- --   20 0726 124/56 102.1  F (38.9  C) Oral -- 94 16 97 % --       GEN - NAD, sitting comfortably in bed  CV - RRR  PULM - CTAB, no wheezing  ABD - soft, non-distended, appropriately tender  Incision: well approximated, no surrounding erythema   EXT - no edema, non-tender, SCDs in place      I/O last 3 completed shifts:  In: 3660 [P.O.:560; I.V.:3100]  Out: 1200 [Urine:1200]    I/O this shift:  In: 240 [P.O.:240]  Out: 200 [Urine:200]    Over the last 6 hours pt diuresed 1.1 ml/kg/hr     LABS:  BMP  Recent Labs   Lab 2014 20  0549 20  0028    136 133   POTASSIUM 4.0 3.7 3.6   CHLORIDE 112* 107 104   SUSAN 7.2* 7.8* 8.2*   CO2 17* 19* 20   BUN 17 29 32*   CR 1.47* 1.66* 1.81*   GLC 86 125* 137*     CBC  Recent Labs   Lab 20  0549 20  0028   WBC 5.6 5.2 10.8 14.2*   RBC 2.86* 3.07* 3.41* 3.51*   HGB 8.6* 9.2* 10.1* 10.6*   HCT 26.4* 28.4* 31.1* 32.1*   MCV 92 93 91 92   MCH 30.1 30.0 29.6 30.2   MCHC 32.6 32.4 32.5 33.0 "   RDW 16.1* 15.9* 15.4* 15.4*    176 194 206       Assessment: Maggie Navarrete is a 70 year old with history of recurrent Stage IIB leiomyosarcoma , now HD#3 admitted treatment of urosepsis. Currently on zosyn. Overall her clinical picture has improved with lactate 1.1>0.7 >0.9 this morning, > 24 h afebrile, excellent urine output, creatinine function improving (1.81>1.66>1.47) and other vitals wnl.      Plan:  Dz: Recurrent Stage IIB leiomyosarcoma, s/p interval debulking (XL, FANNY, resection pelvic sidewall mass, R ureteroneocystostomy, R ureteral stent placement) at [facility] 6/16/20. Final path: recurrent LMS. XL, BSO 2016, s/p 5C doxorubicin/dacarbazine.    FEN: Reg diet, s/p 1L, LR @125m/h    Pain: tylenol, oxycodone PRN  Heme: H/o PE, on PTA Eliquis. Hgb 10.6>9.2.  CV: No issues  Pulm: H/o PE as above.   GI: Senokot PRN, protonix. CT A/P wnl.   /ID: S/p R ureteral reimplantation + stent. Serrato 6/28. UA +nitrite, large LE. UCx (6/25) proteus (pansensitive, except macrobid), not treated. WBC 14.2>10.8. Lactate 1.4> 0.6> 0.7 Blood Cx: proteus, pan-sensitive. BILLY 2/2 sepsis, IVF as above. Cr 1.81>1.66>1.47. Tm 103F (0950, 6/27) Tl 102.9F (0726, 6/28)  D#4 Zosyn 2.25g>3.375g. COVID neg. Consult ID about long-term antibiotic regimen. Transition to PO regimen when appropriate. Keep serrato in place until 24 hours afebrile, blood cultures neg x 24 hours.   Endocrine: Hypothyroid, home synthroid.  Psych/Neuro/MSK: Home zolpidem PRN.  PPX: SCDs  Dispo:  Home when afebrile on PO antibiotics    Katy Faustin MD  Gyn/Onc, PGY-2  Katy Faustin MD  Gyn/Onc, PGY-2  06/29/20 7:24 AM       Provider Disclosure:   I agree with above History, Review of Systems, Physical exam and Plan. I have reviewed the content of the documentation and have edited it as needed. I have seen and personally performed the services documented here and the documentation accurately represents those services and the decisions I have made.      Electronically signed by:   Leonard Ferguson MD   Gynecologic Oncology   [facility]

## 2020-06-29 NOTE — PLAN OF CARE
[id number]  VSS. A&Ox4. Up SBA. Walked halls. Kwan removed this evening, voiding. PIV SL'd. Tolerating reg diet. Abx switched to po.

## 2020-06-29 NOTE — PLAN OF CARE
Max temp 99 oral, improving. AOVSS on RA. A&Ox4 yet forgetful at times. Denies nausea. Pt reports mild abdominal pain, PRN Tylenol given x1, relief noted. Aqua K pad in place. Tolerating regular diet. Kwan in place for reported chronic urinary retention, having AUO. PRN Ambien given for trouble sleeping. Pt able to get good sleep after this. Up with SBA to bathroom. Passing gas, no BM this shift. Previous procedure midline abdominal incision CDI, MERCEDEZ. IVMF + Int IV Abx infusing into R PIV. Plan to discharge on PO Abx once afebrile.

## 2020-06-29 NOTE — PROGRESS NOTES
Antimicrobial Stewardship Team Note    Antimicrobial Stewardship Program - A joint venture between [facility] and  Physicians to optimize antibiotic management.  NOT a formal consult - Restricted Antimicrobial Review     Patient: Maggie Navarrete  MRN: [id number]  Allergies: Erythromycin and Tramadol    Brief Summary: Maggie Navarrete is a 70 year old female admitted on 6/26/2020 post-operatively after undergoing resection of pelvic wall mass on 6/16/2020 at [facility]. Her PMH is significant for recurrent stage IIB leiomyosarcoma, colonic polyps, GERD, kidney stone, anxiety, arthritis, presbyopia, ptosis, osteopenia, and chemotherapy induced cardiomyopathy.      PHI:   On June 16, patient underwent a laparotomy and tumor resection, lysis of adhesions, ureteral stent and Kwan catheter placement.  Kwan catheter was left in place until 3 days prior to admission. Initially after removal she began to notice some dysuria but this got better over the course of few days.  She has not noticed any obvious hematuria. Patient endorsed feeling very tired, fatigued, and short of breath since surgery. Denied cough, nasal congestion, sore throat, or chest pain. States there has not been any drainage or redness at the incision site. She has had some nausea and vomited a few days ago.  She has had loose stools due to the bowel program she was on due to her postop pain meds. Patient states having some ongoing right lower quadrant discomfort which has not worsened and started feeling fevers/chills the day of admission with a home temp of 102F.    Per note, patient was evaluated for UTI given dysuria at her incision check appointment on 6/25. Patient was not started on antibiotics. However, urine culture returned positive with >100K colonies/ml pan-s Proteus mirabilis.     On presentation, midline laparotomy incision was clean, dry, and intact with no surrounding cellulitis or no drainage. Patient was tachycardic () with  low grade fever Tmax 100.5 and all other VS wnl satting 98% on RA. UA was positive with WBC >182, large LE, and positive nitrites. Zosyn was initiated for UTI. CT abdomen/pelvis without contrast consistent with normal post op changes with small amount of free fluid within the low pelvis, right ureteral stent in place with no hydronephrosis, and stable cystic changes of the left kidney.  COVID-19 test negative. Blood culture from 6/27 positive 2/2 for pan-s Proteus mirabilis as well as repeat urine culture with >100K pan-s Proteus mirabilis.          Active Anti-infective Medications   (From admission, onward)                Start     Stop    06/29/20 1400  piperacillin-tazobactam  3.375 g,   Intravenous,   EVERY 6 HOURS     Urinary Tract Infection        --          Assessment:   Proteus mirabilis bacteremia mostly likely 2/2 UTI as both urine culture from prior to admission and repeat positive for the same strain of Proteus mirabilis in blood cultures. Patient has remained afebrile last 24 hours with all other VS wnl. P. mirabilis from blood and urine cultures are both  pan-susceptible warranting narrower antibiotic therapy from Zosyn. Recommend deescalating to ciprofloxacin to help transition to oral antibiotic in preparation for discharge. Recommend checking EKG to insure QTc is not prolonged prior to initiation of ciprofloxacin. Consider treating for a total of 14 days given bacteremia from complicated UTI in the setting of ureteral stent.     Recommendations:  De-escalate zosyn to ciprofloxacin  mg q12h (renally adjusted)    Pharmacy took the following actions: Called/paged provider, Electronic note created.    Discussed with ID Staff: MD Heather Zavaleta, PharmD, BCIDP  Pager: [phone]    Vital Signs/Clinical Features:  Vitals         06/27 0700  -  06/28 0659 06/28 0700  -  06/29 0659 06/29 0700  -  06/29 1420   Most Recent    Temp ( F) 98.2 -  103    97.1 -  102.1    97.1 -  98.7      97.1 (36.2)    Pulse   102    76 -  88      88     88    Heart Rate 85 -  106    87 -  94      83     83    Resp 16 -  20      16      18     18    /48 -  130/60    109/48 -  130/64    120/58 -  127/53     127/53    SpO2 (%) 94 -  100    94 -  97    94 -  98     98            Labs  Estimated Creatinine Clearance: 34.8 mL/min (A) (based on SCr of 1.27 mg/dL (H)).  Recent Labs   Lab Test 06/02/17  1334 07/10/17  1145 06/27/20  0028 06/27/20  0549 06/28/20  0614 06/29/20  0653   CR 0.84 0.72 1.81* 1.66* 1.47* 1.27*       Recent Labs   Lab Test 05/01/17  1031 05/09/17  1215 06/02/17  1334 06/27/20  0028 06/27/20  0549 06/28/20  0614 06/28/20  2058   WBC 5.9 6.1 7.0 14.2* 10.8 5.2 5.6   ANEU 2.9  --  3.5 12.5* 9.6* 4.1 4.2   ALYM 1.8  --  2.6 0.6* 0.4* 0.4* 0.7*   ZHANG 0.6  --  0.5 0.7 0.5 0.5 0.6   AEOS 0.6  --  0.2 0.1 0.1 0.1 0.1   HGB 12.5 13.4 13.0 10.6* 10.1* 9.2* 8.6*   HCT 39.3 41.0 39.0 32.1* 31.1* 28.4* 26.4*    97 93 92 91 93 92    206 216 206 194 176 174       Recent Labs   Lab Test 02/24/17  1045 03/20/17  0942 04/10/17  0849 05/01/17  1031 06/02/17  1334 06/27/20  0028   BILITOTAL 0.2 0.3 0.3 0.5 0.3 0.4   ALKPHOS 82 79 79 93 98 120   ALBUMIN 3.5 3.2* 3.1* 3.5 3.2* 2.3*   AST 8 12 12 17 17 30   ALT 22 20 17 23 23 42       Recent Labs   Lab Test 03/04/14  1024 06/27/20  0028 06/27/20 2109 06/28/20 0748 06/28/20 2058   LACT  --  1.4 0.6* 0.7 0.9   CRP <0.2 Reference Values:  Low Risk:           <1.0 mg/L  Average Risk:       1.0-3.0 mg/L  High Risk:          >3.0 mg/L  Acute Inflammation: >8.0 mg/L  --   --   --   --              Culture Results:  7-Day Micro Results       Procedure Component Value Units Date/Time    Blood culture [[id number]]  (Abnormal) Collected:  06/27/20 0100    Order Status:  Completed Lab Status:  Final result Updated:  06/29/20 0904    Specimen:  Blood from Arm, Right      Specimen Description Blood Right Arm     Culture Micro Cultured on the 1st day of  incubation:  Proteus mirabilis  Susceptibility testing done on previous specimen        Critical Value/Significant Value, preliminary result only, called to and read back by  Cindy PLEITEZ RN @ 1700. 6/27/20. AV      Urine Culture Aerobic Bacterial [[id number]]  (Abnormal)  (Susceptibility) Collected:  06/27/20 0050    Order Status:  Completed Lab Status:  Final result Updated:  06/28/20 0746    Specimen:  Midstream Urine      Specimen Description Midstream Urine     Special Requests Specimen received in preservative     Culture Micro >100,000 colonies/mL  Proteus mirabilis      Susceptibility       Proteus mirabilis (1)       Antibiotic Interpretation Sensitivity Method Status    AMPICILLIN Sensitive <=2 ug/mL EDE Final    CEFAZOLIN Sensitive <=4 ug/mL EDE Final     Cefazolin EDE breakpoints are for the treatment of uncomplicated urinary tract   infections.  For the treatment of systemic infections, please contact the   laboratory for additional testing.    CEFOXITIN Sensitive <=4 ug/mL EDE Final    CEFTAZIDIME Sensitive <=1 ug/mL EDE Final    CEFTRIAXONE Sensitive <=1 ug/mL EDE Final    CIPROFLOXACIN Sensitive <=0.25 ug/mL EDE Final    GENTAMICIN Sensitive <=1 ug/mL EDE Final    LEVOFLOXACIN Sensitive <=0.12 ug/mL EDE Final    NITROFURANTOIN Resistant   EDE Final     Intrinsically Resistant    TOBRAMYCIN Sensitive <=1 ug/mL EDE Final    Trimethoprim/Sulfa Sensitive <=1/19 ug/mL EDE Final    AMPICILLIN/SULBACTAM Sensitive <=2 ug/mL EDE Final    Piperacillin/Tazo Sensitive <=4 ug/mL EDE Final    AMIKACIN [*]  Sensitive <=2 ug/mL EDE Final    CEFEPIME Sensitive <=1 ug/mL EDE Final    MEROPENEM [*]  Sensitive <=0.25 ug/mL EDE Final               [*]   Suppressed Antibiotic                   Blood culture [[id number]]  (Abnormal)  (Susceptibility) Collected:  06/27/20 0022    Order Status:  Completed Lab Status:  Preliminary result Updated:  06/29/20 0738    Specimen:  Blood from Arm, Right      Specimen Description Blood  Right Arm     Culture Micro Cultured on the 1st day of incubation:  Proteus mirabilis        Critical Value/Significant Value, preliminary result only, called to and read back by  Paula Hyatt RN at 1500 on 6/27/20 by MARIAN.        (Note)  POSITIVE for PROTEUS SPECIES by [employer] multiplex nucleic acid test.  Final identification and antimicrobial susceptibility testing will be  verified by standard methods.    Specimen tested with Verigene multiplex, gram-negative blood culture  nucleic acid test for the following targets: Acinetobacter sp.,  Citrobacter sp., Enterobacter sp., Proteus sp., E. coli, K.  pneumoniae/oxytoca, P. aeruginosa, and the following resistance  markers: CTXM, KPC, NDM, VIM, IMP and OXA.    Critical Value/Significant Value called to and read back by  Petrona Bashir RN 1732 6/27/20 AM        Susceptibility       Proteus mirabilis (1)       Antibiotic Interpretation Sensitivity Method Status    AMIKACIN Sensitive <=2 ug/mL EDE Preliminary    AMPICILLIN Sensitive <=2 ug/mL EDE Preliminary    AMPICILLIN/SULBACTAM Sensitive <=2 ug/mL EDE Preliminary    CEFEPIME Sensitive <=1 ug/mL EDE Preliminary    CEFTAZIDIME Sensitive <=1 ug/mL EDE Preliminary    CEFTRIAXONE Sensitive <=1 ug/mL EDE Preliminary    CIPROFLOXACIN Sensitive <=0.25 ug/mL EDE Preliminary    GENTAMICIN Sensitive <=1 ug/mL EDE Preliminary    LEVOFLOXACIN Sensitive <=0.12 ug/mL EDE Preliminary    Piperacillin/Tazo Sensitive <=4 ug/mL EDE Preliminary    TOBRAMYCIN Sensitive <=1 ug/mL EDE Preliminary    Trimethoprim/Sulfa Sensitive <=1/19 ug/mL EDE Preliminary    MEROPENEM Sensitive <=0.25 ug/mL EDE Preliminary                       Urine Culture Aerobic Bacterial [[id number]]  (Abnormal)  (Susceptibility) Collected:  06/25/20 1600    Order Status:  Completed Lab Status:  Final result Updated:  06/26/20 4370    Specimen:  Midstream Urine      Specimen Description Midstream Urine     Culture Micro >100,000 colonies/mL  Proteus mirabilis         <10,000 colonies/mL  urogenital duane      Susceptibility       Proteus mirabilis (1)       Antibiotic Interpretation Sensitivity Method Status    AMPICILLIN Sensitive <=2 ug/mL EDE Final    CEFAZOLIN Sensitive <=4 ug/mL DEE Final     Cefazolin EDE breakpoints are for the treatment of uncomplicated urinary tract   infections.  For the treatment of systemic infections, please contact the   laboratory for additional testing.    CEFOXITIN Sensitive <=4 ug/mL EDE Final    CEFTAZIDIME Sensitive <=1 ug/mL EDE Final    CEFTRIAXONE Sensitive <=1 ug/mL EDE Final    CIPROFLOXACIN Sensitive <=0.25 ug/mL EDE Final    GENTAMICIN Sensitive <=1 ug/mL EDE Final    LEVOFLOXACIN Sensitive <=0.12 ug/mL EDE Final    NITROFURANTOIN Resistant 64 ug/mL EDE Final     Intrinsically Resistant    TOBRAMYCIN Sensitive <=1 ug/mL EDE Final    Trimethoprim/Sulfa Sensitive <=1/19 ug/mL EDE Final    AMPICILLIN/SULBACTAM Sensitive <=2 ug/mL EDE Final    Piperacillin/Tazo Sensitive <=4 ug/mL EDE Final    AMIKACIN [*]  Sensitive <=2 ug/mL EDE Final    CEFEPIME Sensitive <=1 ug/mL EDE Final    MEROPENEM [*]  Sensitive <=0.25 ug/mL EDE Final               [*]   Suppressed Antibiotic                           Recent Labs   Lab Test 03/04/14  1016 03/16/15  1531 06/25/20  1612 06/27/20  0050   URINEPH 5.5 7.0 7.0 7.5*   NITRITE Negative Negative Negative Positive*   LEUKEST Trace* Negative Moderate* Large*   WBCU 3* <1 >182* >182*                         Imaging: Xr Chest Port 1 View    Result Date: 6/27/2020  EXAM: CHEST SINGLE VIEW PORTABLE LOCATION: [facility] DATE/TIME: 6/27/2020 12:12 AM INDICATION: Shortness of breath and fever. COMPARISON: 6/1/2020. FINDINGS: A small dense nodular opacity projecting over the lateral aspect of the mid right lung is again noted and is most likely a calcified granuloma. The lungs are otherwise clear. Normal-sized cardiac silhouette. Mild elevation of the right hemidiaphragm. Partial visualization of a  pigtail catheter projecting over the medial aspect of the upper right hemiabdomen, likely a ureteral stent.     IMPRESSION: No evidence of active cardiopulmonary disease.     Ct Abdomen Pelvis W/o Contrast    Result Date: 6/27/2020  EXAMINATION: CT ABDOMEN PELVIS W/O CONTRAST, 6/27/2020 9:05 AM TECHNIQUE:  Helical CT images from the lung bases through the symphysis pubis were obtained without IV contrast. COMPARISON: MR 6/3/2020, CT CAP 11/19/2019 HISTORY: Abd pain, acute, generalized; post-op Additional history: 70 year old female with?recurrent stage IIB leimyosarcoma now post-op after XL, tumor resection (6/16)?admitted for?fevers and malaise. UA consistent with complicated UTI vs pyelonephritis. UCx showed pansensitive Proteus 6/25. Will also obtain CT A/P to rule out abscess, otherwise source of fever presumable urine. Blood Cx pending to r/o bacteremia.. FINDINGS: Lung bases: Elevated right hemidiaphragm. Bilateral perihilar atelectasis. Trace right pleural effusion. Abdomen and pelvis: The liver, gallbladder, and pancreas are unremarkable. Granulomatous changes of the spleen. Stable thickening of the left adrenal gland. Unchanged parapelvic cysts of the left kidney. Right ureteral stent with pigtails in place. No hydronephrosis. Minimally distended bladder. Small amount of free fluid within the pelvis. No abdominal free air. Normal diameters of the abdominal vasculature. Status post hysterectomy. Bones and soft tissues: No concerning osseous or soft tissue findings. Sequela of previous midline abdominal incision.     IMPRESSION: 1. Small amount of free fluid within the low pelvis. 2. Right ureteral stent in place. No hydronephrosis. 3. Stable cystic changes of the left kidney. 4. Trace right pleural effusion with associated atelectasis. I have personally reviewed the examination and initial interpretation and I agree with the findings. NASIMA LOTT MD

## 2020-06-30 VITALS
DIASTOLIC BLOOD PRESSURE: 64 MMHG | HEIGHT: 60 IN | HEART RATE: 78 BPM | RESPIRATION RATE: 18 BRPM | BODY MASS INDEX: 28.33 KG/M2 | SYSTOLIC BLOOD PRESSURE: 129 MMHG | OXYGEN SATURATION: 93 % | TEMPERATURE: 97.4 F | WEIGHT: 144.3 LBS

## 2020-06-30 LAB
ANION GAP SERPL CALCULATED.3IONS-SCNC: 6 MMOL/L (ref 3–14)
BACTERIA SPEC CULT: ABNORMAL
BASOPHILS # BLD AUTO: 0 10E9/L (ref 0–0.2)
BASOPHILS NFR BLD AUTO: 0.4 %
BUN SERPL-MCNC: 12 MG/DL (ref 7–30)
C DIFF TOX B STL QL: NEGATIVE
CALCIUM SERPL-MCNC: 8.2 MG/DL (ref 8.5–10.1)
CHLORIDE SERPL-SCNC: 115 MMOL/L (ref 94–109)
CO2 SERPL-SCNC: 18 MMOL/L (ref 20–32)
CREAT SERPL-MCNC: 1.1 MG/DL (ref 0.52–1.04)
DIFFERENTIAL METHOD BLD: ABNORMAL
EOSINOPHIL # BLD AUTO: 0.2 10E9/L (ref 0–0.7)
EOSINOPHIL NFR BLD AUTO: 2.5 %
ERYTHROCYTE [DISTWIDTH] IN BLOOD BY AUTOMATED COUNT: 16.1 % (ref 10–15)
GFR SERPL CREATININE-BSD FRML MDRD: 51 ML/MIN/{1.73_M2}
GLUCOSE SERPL-MCNC: 116 MG/DL (ref 70–99)
HCT VFR BLD AUTO: 26.6 % (ref 35–47)
HGB BLD-MCNC: 8.8 G/DL (ref 11.7–15.7)
IMM GRANULOCYTES # BLD: 0.1 10E9/L (ref 0–0.4)
IMM GRANULOCYTES NFR BLD: 1.1 %
LYMPHOCYTES # BLD AUTO: 1.1 10E9/L (ref 0.8–5.3)
LYMPHOCYTES NFR BLD AUTO: 15 %
MAGNESIUM SERPL-MCNC: 1.9 MG/DL (ref 1.6–2.3)
MCH RBC QN AUTO: 29.4 PG (ref 26.5–33)
MCHC RBC AUTO-ENTMCNC: 33.1 G/DL (ref 31.5–36.5)
MCV RBC AUTO: 89 FL (ref 78–100)
MONOCYTES # BLD AUTO: 0.7 10E9/L (ref 0–1.3)
MONOCYTES NFR BLD AUTO: 9.6 %
NEUTROPHILS # BLD AUTO: 5.2 10E9/L (ref 1.6–8.3)
NEUTROPHILS NFR BLD AUTO: 71.4 %
NRBC # BLD AUTO: 0 10*3/UL
NRBC BLD AUTO-RTO: 0 /100
PLATELET # BLD AUTO: 247 10E9/L (ref 150–450)
PLATELET # BLD EST: ABNORMAL 10*3/UL
POTASSIUM SERPL-SCNC: 3.3 MMOL/L (ref 3.4–5.3)
RBC # BLD AUTO: 2.99 10E12/L (ref 3.8–5.2)
SODIUM SERPL-SCNC: 140 MMOL/L (ref 133–144)
SPECIMEN SOURCE: ABNORMAL
SPECIMEN SOURCE: NORMAL
WBC # BLD AUTO: 7.3 10E9/L (ref 4–11)

## 2020-06-30 PROCEDURE — 80048 BASIC METABOLIC PNL TOTAL CA: CPT | Performed by: OBSTETRICS & GYNECOLOGY

## 2020-06-30 PROCEDURE — 99238 HOSP IP/OBS DSCHRG MGMT 30/<: CPT | Mod: GC | Performed by: OBSTETRICS & GYNECOLOGY

## 2020-06-30 PROCEDURE — 25000132 ZZH RX MED GY IP 250 OP 250 PS 637: Mod: GY | Performed by: NURSE PRACTITIONER

## 2020-06-30 PROCEDURE — 40000893 ZZH STATISTIC PT IP EVAL DEFER

## 2020-06-30 PROCEDURE — 85025 COMPLETE CBC W/AUTO DIFF WBC: CPT | Performed by: OBSTETRICS & GYNECOLOGY

## 2020-06-30 PROCEDURE — 36415 COLL VENOUS BLD VENIPUNCTURE: CPT | Performed by: OBSTETRICS & GYNECOLOGY

## 2020-06-30 PROCEDURE — 25000132 ZZH RX MED GY IP 250 OP 250 PS 637: Mod: GY | Performed by: STUDENT IN AN ORGANIZED HEALTH CARE EDUCATION/TRAINING PROGRAM

## 2020-06-30 PROCEDURE — 87493 C DIFF AMPLIFIED PROBE: CPT | Performed by: NURSE PRACTITIONER

## 2020-06-30 PROCEDURE — 83735 ASSAY OF MAGNESIUM: CPT | Performed by: OBSTETRICS & GYNECOLOGY

## 2020-06-30 RX ORDER — POTASSIUM CHLORIDE 7.45 MG/ML
10 INJECTION INTRAVENOUS
Status: DISCONTINUED | OUTPATIENT
Start: 2020-06-30 | End: 2020-06-30 | Stop reason: HOSPADM

## 2020-06-30 RX ORDER — POTASSIUM CHLORIDE 750 MG/1
20-40 TABLET, EXTENDED RELEASE ORAL
Status: DISCONTINUED | OUTPATIENT
Start: 2020-06-30 | End: 2020-06-30 | Stop reason: HOSPADM

## 2020-06-30 RX ORDER — MAGNESIUM SULFATE HEPTAHYDRATE 40 MG/ML
4 INJECTION, SOLUTION INTRAVENOUS EVERY 4 HOURS PRN
Status: DISCONTINUED | OUTPATIENT
Start: 2020-06-30 | End: 2020-06-30 | Stop reason: HOSPADM

## 2020-06-30 RX ORDER — CIPROFLOXACIN 500 MG/1
500 TABLET, FILM COATED ORAL EVERY 12 HOURS
Qty: 18 TABLET | Refills: 0 | Status: SHIPPED | OUTPATIENT
Start: 2020-06-30 | End: 2020-07-09

## 2020-06-30 RX ORDER — POTASSIUM CHLORIDE 1.5 G/1.58G
20-40 POWDER, FOR SOLUTION ORAL
Status: DISCONTINUED | OUTPATIENT
Start: 2020-06-30 | End: 2020-06-30 | Stop reason: HOSPADM

## 2020-06-30 RX ORDER — POTASSIUM CHLORIDE 29.8 MG/ML
20 INJECTION INTRAVENOUS
Status: DISCONTINUED | OUTPATIENT
Start: 2020-06-30 | End: 2020-06-30 | Stop reason: HOSPADM

## 2020-06-30 RX ORDER — POTASSIUM CL/LIDO/0.9 % NACL 10MEQ/0.1L
10 INTRAVENOUS SOLUTION, PIGGYBACK (ML) INTRAVENOUS
Status: DISCONTINUED | OUTPATIENT
Start: 2020-06-30 | End: 2020-06-30 | Stop reason: HOSPADM

## 2020-06-30 RX ADMIN — ACETAMINOPHEN 650 MG: 325 TABLET, FILM COATED ORAL at 09:56

## 2020-06-30 RX ADMIN — POTASSIUM CHLORIDE 40 MEQ: 750 TABLET, EXTENDED RELEASE ORAL at 11:52

## 2020-06-30 RX ADMIN — APIXABAN 2.5 MG: 2.5 TABLET, FILM COATED ORAL at 09:56

## 2020-06-30 RX ADMIN — LEVOTHYROXINE SODIUM 75 MCG: 75 TABLET ORAL at 07:41

## 2020-06-30 RX ADMIN — PANTOPRAZOLE SODIUM 40 MG: 40 TABLET, DELAYED RELEASE ORAL at 09:56

## 2020-06-30 RX ADMIN — POTASSIUM CHLORIDE 20 MEQ: 750 TABLET, EXTENDED RELEASE ORAL at 13:51

## 2020-06-30 RX ADMIN — VENLAFAXINE HYDROCHLORIDE 75 MG: 75 CAPSULE, EXTENDED RELEASE ORAL at 09:56

## 2020-06-30 RX ADMIN — CIPROFLOXACIN HYDROCHLORIDE 500 MG: 500 TABLET, FILM COATED ORAL at 09:57

## 2020-06-30 ASSESSMENT — PAIN DESCRIPTION - DESCRIPTORS: DESCRIPTORS: ACHING

## 2020-06-30 ASSESSMENT — ACTIVITIES OF DAILY LIVING (ADL)
ADLS_ACUITY_SCORE: 15

## 2020-06-30 NOTE — PROGRESS NOTES
Gynecologic Oncology Daily Progress Note  2020    Maggie Navarrete  : 1950  MRN: [id number]    24 hour events:   - Transitioned from IV Zosyn to PO ciprofloxacin   - Discontinued IVFs   - Removed serrato, spontaneously voiding     Subjective: The patient is comfortable this morning. Denies any new pain, SOB at rest, dizziness, fevers/chills.  Had 3-4 water bowel movements yesterday. Denies any nausea, vomiting or abdominal pain.     Objective:    Patient Vitals for the past 24 hrs:   BP Temp Temp src Pulse Heart Rate Resp SpO2   20 0324 -- 97.3  F (36.3  C) Oral -- -- -- --   20 2300 110/45 98.8  F (37.1  C) Oral 94 94 18 93 %   20 1610 -- 98.3  F (36.8  C) Oral -- -- -- --   20 1433 128/51 98.1  F (36.7  C) Oral -- 93 18 96 %   20 1244 127/53 97.1  F (36.2  C) Oral -- 83 18 98 %   20 0825 -- 98.6  F (37  C) -- -- -- -- --   20 0705 120/58 98.7  F (37.1  C) Oral 88 -- 18 94 %       GEN - NAD, sitting comfortably in bed  CV - RRR  PULM - CTAB, no wheezing  ABD - soft, non-distended, appropriately tender  Incision: well approximated, no surrounding erythema   EXT - no edema, non-tender, SCDs in place      I/O last 3 completed shifts:  In: 2686.25 [P.O.:1160; I.V.:1526.25]  Out: 1575 [Urine:1575]    I/O this shift:  In: -   Out: 300 [Urine:300]    Over the last 6 hours pt diuresed 0.77 ml/kg/hr       Assessment: Maggie Navarrete is a 70 year old with history of recurrent Stage IIB leiomyosarcoma , now HD#4 admitted treatment of complicated UTI and bacteremia.      Plan:  Dz: Recurrent Stage IIB leiomyosarcoma, s/p interval debulking (XL, FANNY, resection pelvic sidewall mass, R ureteroneocystostomy, R ureteral stent placement) at [place] 20. Final path: recurrent LMS.     FEN: Reg diet    Pain: tylenol, oxycodone PRN  Heme: # Anticoagulated: on PTA Eliquis for h/o PE. Hgb stable with anemia of chronic disease.   CV: No issues  Pulm: # H/o PE, management as above.    GI: # Diarrhea: discontinued Senakot, collect C. Diff today.   :   # BILLY, improving, IVFs discontinued yesterday and still diuresing well   ID:   # Proteus mirabilis bacteremia, likely 2/2 UTI: now > 48 hours afebrile on D#2/14 ciprofloxacin PO   # Leukocytosis, resolved  # Lactic acidosis, resolved   Endocrine: # Hypothyroid, home synthroid.  Psych/Neuro/MSK: Home zolpidem PRN.  PPX: SCDs  Dispo: Plan for home today.      Katy Faustin MD  Gyn/Onc, PGY-2  06/30/20 7:01 AM     I have seen and examined the patient.  I have reviewed and edited Dr. Faustin's note above.    Gypsy Huang MD, MS, FACOG, FACS  6/30/2020  7:59 AM

## 2020-06-30 NOTE — PLAN OF CARE
7C PT Defer: Patient greeted supine in bed agreeable to working with therapy. Patient endorses working with OP therapy at baseline for balance and endurance deficits. At time of contact patient demonstrates high level of IND for transfers and walking in room. Patient also with good safety awareness, ability to perform x10 steps in place, and good anticipatory/reactive functional balance. At this time patient appropriate to return home with no acute PT needs.    PT -   Discharge Planner PT   Patient plan for discharge: to sister's house  Current status: IND for all transfers and mobility  Barriers to return to prior living situation: none  Recommendations for discharge: home with sister and OP PT  Rationale for recommendations: based on this contact patient will be appropriate to return home from PT perspective when medically stable and will benefit from continued OP PT.  Entered by: Cristi Barclay 06/30/2020 9:21 AM

## 2020-06-30 NOTE — PLAN OF CARE
VSS. SBA. Regular diet. Denies nausea and pain. Abdominal midline incision C/D/I and open to air. Pt voiding AUOP. Diarrhea x1 overnight. PIV-SL. Pt resting between cares. Continue POC.

## 2020-06-30 NOTE — PLAN OF CARE
Alert & Orientated. Vitals stable.  K+ replaced as per K+ replacement protocol. Up ad igor. Voiding adequate amounts of urine. Tolerating regular diet w/ no complaints of N&V. Two loose stools this shift. C-diff stool sample came back negative. Discharge instructions reviewed and signed by patient with no further questions or concerns. Discharge medications picked up by house orderly and personally given to patient.

## 2020-07-01 ENCOUNTER — PATIENT OUTREACH (OUTPATIENT)
Dept: ONCOLOGY | Facility: CLINIC | Age: 70
End: 2020-07-01

## 2020-07-08 VITALS
DIASTOLIC BLOOD PRESSURE: 63 MMHG | SYSTOLIC BLOOD PRESSURE: 101 MMHG | OXYGEN SATURATION: 99 % | BODY MASS INDEX: 26.37 KG/M2 | TEMPERATURE: 98.3 F | WEIGHT: 135 LBS | HEART RATE: 90 BPM | RESPIRATION RATE: 16 BRPM

## 2020-07-08 DIAGNOSIS — C49.9 LEIOMYOSARCOMA (H): ICD-10-CM

## 2020-07-08 DIAGNOSIS — C49.9 LEIOMYOSARCOMA (H): Primary | ICD-10-CM

## 2020-07-08 LAB
ALBUMIN SERPL-MCNC: 3 G/DL (ref 3.4–5)
ALP SERPL-CCNC: 112 U/L (ref 40–150)
ALT SERPL W P-5'-P-CCNC: 19 U/L (ref 0–50)
ANION GAP SERPL CALCULATED.3IONS-SCNC: 7 MMOL/L (ref 3–14)
AST SERPL W P-5'-P-CCNC: 13 U/L (ref 0–45)
BASOPHILS # BLD AUTO: 0 10E9/L (ref 0–0.2)
BASOPHILS NFR BLD AUTO: 0.4 %
BILIRUB SERPL-MCNC: 0.3 MG/DL (ref 0.2–1.3)
BUN SERPL-MCNC: 34 MG/DL (ref 7–30)
CALCIUM SERPL-MCNC: 9.2 MG/DL (ref 8.5–10.1)
CHLORIDE SERPL-SCNC: 108 MMOL/L (ref 94–109)
CO2 SERPL-SCNC: 25 MMOL/L (ref 20–32)
CREAT SERPL-MCNC: 1.37 MG/DL (ref 0.52–1.04)
DIFFERENTIAL METHOD BLD: ABNORMAL
EOSINOPHIL # BLD AUTO: 0.4 10E9/L (ref 0–0.7)
EOSINOPHIL NFR BLD AUTO: 3.6 %
ERYTHROCYTE [DISTWIDTH] IN BLOOD BY AUTOMATED COUNT: 15.8 % (ref 10–15)
GFR SERPL CREATININE-BSD FRML MDRD: 39 ML/MIN/{1.73_M2}
GLUCOSE SERPL-MCNC: 101 MG/DL (ref 70–99)
HCT VFR BLD AUTO: 33 % (ref 35–47)
HGB BLD-MCNC: 10.5 G/DL (ref 11.7–15.7)
IMM GRANULOCYTES # BLD: 0.1 10E9/L (ref 0–0.4)
IMM GRANULOCYTES NFR BLD: 0.7 %
LYMPHOCYTES # BLD AUTO: 1.7 10E9/L (ref 0.8–5.3)
LYMPHOCYTES NFR BLD AUTO: 17.8 %
MCH RBC QN AUTO: 29.5 PG (ref 26.5–33)
MCHC RBC AUTO-ENTMCNC: 31.8 G/DL (ref 31.5–36.5)
MCV RBC AUTO: 93 FL (ref 78–100)
MONOCYTES # BLD AUTO: 0.9 10E9/L (ref 0–1.3)
MONOCYTES NFR BLD AUTO: 9.5 %
NEUTROPHILS # BLD AUTO: 6.6 10E9/L (ref 1.6–8.3)
NEUTROPHILS NFR BLD AUTO: 68 %
NRBC # BLD AUTO: 0 10*3/UL
NRBC BLD AUTO-RTO: 0 /100
PLATELET # BLD AUTO: 490 10E9/L (ref 150–450)
POTASSIUM SERPL-SCNC: 4.2 MMOL/L (ref 3.4–5.3)
PROT SERPL-MCNC: 7.6 G/DL (ref 6.8–8.8)
RBC # BLD AUTO: 3.56 10E12/L (ref 3.8–5.2)
SODIUM SERPL-SCNC: 140 MMOL/L (ref 133–144)
WBC # BLD AUTO: 9.7 10E9/L (ref 4–11)

## 2020-07-08 PROCEDURE — 85025 COMPLETE CBC W/AUTO DIFF WBC: CPT | Performed by: INTERNAL MEDICINE

## 2020-07-08 PROCEDURE — 80053 COMPREHEN METABOLIC PANEL: CPT | Performed by: INTERNAL MEDICINE

## 2020-07-08 ASSESSMENT — PAIN SCALES - GENERAL: PAINLEVEL: NO PAIN (0)

## 2020-07-08 NOTE — NURSING NOTE
Chief Complaint   Patient presents with     Blood Draw     labs drawn by venipuncture by rn in lab.  vital signs taken.     Labs drawn by venipuncture by RN in lab.  Vital signs taken.      Ratna Villegas RN

## 2020-07-09 ENCOUNTER — VIRTUAL VISIT (OUTPATIENT)
Dept: ONCOLOGY | Facility: CLINIC | Age: 70
End: 2020-07-09
Attending: INTERNAL MEDICINE
Payer: MEDICARE

## 2020-07-09 DIAGNOSIS — C49.9 LEIOMYOSARCOMA (H): Primary | ICD-10-CM

## 2020-07-09 PROCEDURE — 40001009 ZZH VIDEO/TELEPHONE VISIT; NO CHARGE

## 2020-07-09 PROCEDURE — 99214 OFFICE O/P EST MOD 30 MIN: CPT | Mod: 95 | Performed by: INTERNAL MEDICINE

## 2020-07-09 RX ORDER — CIPROFLOXACIN 500 MG/1
500 TABLET, FILM COATED ORAL
COMMUNITY
Start: 2020-07-07 | End: 2020-07-10

## 2020-07-09 NOTE — LETTER
7/9/2020         RE: Maggie Navarrete  [address]  [facility] [address]        Dear Colleague,    Thank you for referring your patient, Maggie Navarrete, to the [facility]. Please see a copy of my visit note below.    [facility]  MEDICAL ONCOLOGY PROGRESS NOTE   Jul 9, 2020    Oncologic history:  1. January 2016, she presents to her PCP with 3 weeks of abdominal pain and severe constipation. She is treated for constipation and improves.  2. 3/21/2016, worsening pelvic pressure prompts pelvic examination with an enlarged uterus noted. Transvaginal ultrasound shows enlargement of subserosal fibroids originally seen on MRI in 2006.  3. 5/9/2016, MRI pelvis is obtained, which shows several enhancing uterine masses involving an enlarged uterus, felt radiographically compatible with benign uterine leiomyomata.  3. Summer 2016, she experiences worsening low back pain thought at first thought to be sacroiliitis, but pain persists after corticosteroid injection. Continues with severe constipation.  4. 9/8/2016, she undergoes colonoscopy for persistent abdominal pain. She has sigmoid polyp snared. Abdominal pain and diarrhea persist after colonoscopy prep.  5. 9/14/2016, she has worsening abdominal pain and is evaluated in the ED. CT-scan shows a large subserosal fibroid measuring 9.4 x 9.6 x 9.8 cm, as well as very large fibroids along the posterior uterine body more inferiorly, increased in size to 14 cm in greatest diameter compared to 10 cm in greatest diameter on MRI of 5/9/2016.  6. 9/29/2016, she has staging CT-CAP, which shows a small left sided pleural effusion and a 3 mm pulmonary nodule in the RML.  7. October 2016, self-refers to [facility].  8. 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  9. 11/4/2016, undergoes total abdominal hysterectomy and BSO, under Dr. Harsh Camarillo. Surgery was originally presumed for  leiomyoma, however, intraoperatively there was focal adherence of a mass to the pelvic right sidewall and cul-de-sac, which led to peritoneal resection and omentectomy. Final pathology, pT2b: showed leiomyosarcoma involving the cul-de-sac and forming two separate masses measuring 10.5 x 6.5 x 4.5 cm and 10.7 x 7.9 x 7.5 cm.  10. 12/26/2016, she starts adjuvant chemotherapy with doxorubicin 25 mg/m2/day and DTIC 250 mg/m2/day , days 1-3 every 21 weeks.  11. 3/23/2017, she completes 5 cycles of adjuvant doxorubicin and dacarbazine  12. 6/3/2020, restaging MRI shows a locally recurrent pelvic sidewall mass measuring 2.8 x 3.3 cm in size. This new area of concern was not previously reported, presumably due to proximity to bowel and the similar appearance to adjacent loops of bowel on the contrast enhanced imaging. On review of prior imaging studies, the lesion can be seen as far back as January 2019 as a small nodule measuring 1 cm in size directly adjacent to bowel, close to the pelvic sidewall.  13. 6/16/2020, she had resection of the mass, which was densely attached to the right ureter and attached to the right side of the sigmoid mesentery and external and internal iliac vessels.  There was external iliac vein injury with primary repair due to involvement of the mass. The ureter was also partially removed as part of the tumor mass. All margins and the ureter that was resected was negative for tumor. Pathology (JR-) showed a 3.7 x 3.5 x 2.2 cm thinly encapsulated mass, which was submitted for frozen sections.      HISTORY OF PRESENT ILLNESS  Ms. Navarrete presents today in follow-up and to review of her current status.     She had an MRI on 6/5/2020, which showed recurrence of the right pelvic sidewall mass. She had a midline incision and underwent resection of the mass, which was densely attached to the right ureter and attached to the right side of the sigmoid mesentery and external and internal iliac vessels.   There was external iliac vein injury with primary repair due to involvement of the mass. The ureter was also partially removed as part of the tumor mass. All margins and the ureter that was resected was negative for tumor.    She notes fatigue and some persistent post-surgical discomfort, but she is overall improving. She denies nausea, vomiting, or diarrhea. She denies fevers, chills, nausea, vomiting, diarrhea.    ECOG performance status is 0.    REVIEW OF SYSTEMS  A 12-point ROS negative except as in HPI    Current Outpatient Medications   Medication Sig Dispense Refill     apixaban ANTICOAGULANT (ELIQUIS) 2.5 MG tablet Take 2.5 mg by mouth 2 times daily       ciprofloxacin (CIPRO) 500 MG tablet Take 1 tablet (500 mg) by mouth every 12 hours for 9 days 18 tablet 0     levothyroxine (SYNTHROID/LEVOTHROID) 75 MCG tablet TAKE 1 TABLET EVERY DAY 90 tablet 1     Multiple Vitamin (MULTIVITAMINS PO) Take 1 tablet by mouth daily Reported on 4/10/2017       pantoprazole (PROTONIX) 40 MG EC tablet Take 40 mg by mouth daily before breakfast       venlafaxine (EFFEXOR-XR) 75 MG 24 hr capsule Take 1 capsule (75 mg) by mouth daily 90 capsule 0     zolpidem (AMBIEN) 5 MG tablet Take 1 tablet (5 mg) by mouth nightly as needed for sleep (Only uses if traveling or cannot sleep,) 20 tablet 0     ciprofloxacin (CIPRO) 500 MG tablet Take 500 mg by mouth         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  Wt Readings from Last 2 Encounters:   07/08/20 61.2 kg (135 lb)   06/28/20 65.5 kg (144 lb 4.8 oz)   General: Well appearing, at home, in no distress  Head: Normocephalic  Eyes: EOMI, No icterus  ENT: Oropharynx clear  Lungs: Easy breathing on room air, speaking in complete sentences without dyspnea  Neuro: Cranial nerves 2-12 intact, no focal deficits  Skin: No rashes on exposed skin    The rest of a comprehensive physical examination is deferred due to PHE (public health emergency) video visit restrictions.      ASSESSMENT AND PLAN  #1 Locally advanced leiomyosarcoma of the uterus, pT2b Nx M0, Stage IIB, status-post resection and 5 cycles of adjuvant doxorubicin and dacarbazine  #2 Local recurrence, leiomyosarcoma of the uterus, right pelvic sidewall adherent to iliac vessels and ureter  It was a pleasure to see Ms. Navarrete today. She was able to have surgical resection of the local recurrence. She has done well with surgery.    We reviewed my preference for adjuvant radiation therapy, given the adherence to structures along the right pelvic sidewall. She is at elevated risk of local recurrence. She also remains at risk for distant recurrence, but she has already had 5 cycles of adjuvant doxorubicin and dacarbazine. We reviewed that adjuvant chemotherapy could be considered, in which case I would recommend she consider gemcitabine and docetaxel for 6 cycles. Alternatively, were she to receive post-operative radiation, a year of Keytruda or Opdivo could be considered. While response rates for immunotherapy are low in sarcoma (<10% response), the 5-year survival benefit of adjuvant chemotherapy is in the range of 5-10% anyway.    She will also meet with the sarcoma oncology group at [place] in the  coming weeks and will also review with them. I will plan to see her afterwards to continue the discussion.    Questions answered.    Ab Warner M.D.   of Medicine  Hematology, Oncology and Transplantation      Maggie Navarrete is a 70 year old female who is being evaluated via a billable video visit.          Secondary Video Option (Doximity), send text message to:  [phone]    I have reviewed and updated the patient's allergies and medication list. Patient was asked if they had any patient reported vital signs to present, if yes, please see documented vitals.  Patient was also asked for their current weight and height, if presented, documented in vitals.    Concerns: Patient has no new concerns.      Refills: Zolpidem      PACO Can      Video-Visit Details    Type of service:  Video Visit    Video Start Time: 2:15 PM  Video End Time: 2:45 PM    Originating Location (pt. Location): Home    Distant Location (provider location):  [facility]     Platform used for Video Visit: Well        Again, thank you for allowing me to participate in the care of your patient.        Sincerely,        Ab Aguilar MD

## 2020-07-09 NOTE — PROGRESS NOTES
[facility]  MEDICAL ONCOLOGY PROGRESS NOTE   Jul 9, 2020    Oncologic history:  1. January 2016, she presents to her PCP with 3 weeks of abdominal pain and severe constipation. She is treated for constipation and improves.  2. 3/21/2016, worsening pelvic pressure prompts pelvic examination with an enlarged uterus noted. Transvaginal ultrasound shows enlargement of subserosal fibroids originally seen on MRI in 2006.  3. 5/9/2016, MRI pelvis is obtained, which shows several enhancing uterine masses involving an enlarged uterus, felt radiographically compatible with benign uterine leiomyomata.  3. Summer 2016, she experiences worsening low back pain thought at first thought to be sacroiliitis, but pain persists after corticosteroid injection. Continues with severe constipation.  4. 9/8/2016, she undergoes colonoscopy for persistent abdominal pain. She has sigmoid polyp snared. Abdominal pain and diarrhea persist after colonoscopy prep.  5. 9/14/2016, she has worsening abdominal pain and is evaluated in the ED. CT-scan shows a large subserosal fibroid measuring 9.4 x 9.6 x 9.8 cm, as well as very large fibroids along the posterior uterine body more inferiorly, increased in size to 14 cm in greatest diameter compared to 10 cm in greatest diameter on MRI of 5/9/2016.  6. 9/29/2016, she has staging CT-CAP, which shows a small left sided pleural effusion and a 3 mm pulmonary nodule in the RML.  7. October 2016, self-refers to [facility].  8. 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  9. 11/4/2016, undergoes total abdominal hysterectomy and BSO, under Dr. Harsh Camarillo. Surgery was originally presumed for leiomyoma, however, intraoperatively there was focal adherence of a mass to the pelvic right sidewall and cul-de-sac, which led to peritoneal resection and omentectomy. Final pathology, pT2b: showed leiomyosarcoma involving the cul-de-sac and forming two  separate masses measuring 10.5 x 6.5 x 4.5 cm and 10.7 x 7.9 x 7.5 cm.  10. 12/26/2016, she starts adjuvant chemotherapy with doxorubicin 25 mg/m2/day and DTIC 250 mg/m2/day , days 1-3 every 21 weeks.  11. 3/23/2017, she completes 5 cycles of adjuvant doxorubicin and dacarbazine  12. 6/3/2020, restaging MRI shows a locally recurrent pelvic sidewall mass measuring 2.8 x 3.3 cm in size. This new area of concern was not previously reported, presumably due to proximity to bowel and the similar appearance to adjacent loops of bowel on the contrast enhanced imaging. On review of prior imaging studies, the lesion can be seen as far back as January 2019 as a small nodule measuring 1 cm in size directly adjacent to bowel, close to the pelvic sidewall.  13. 6/16/2020, she had resection of the mass, which was densely attached to the right ureter and attached to the right side of the sigmoid mesentery and external and internal iliac vessels.  There was external iliac vein injury with primary repair due to involvement of the mass. The ureter was also partially removed as part of the tumor mass. All margins and the ureter that was resected was negative for tumor. Pathology (JR-) showed a 3.7 x 3.5 x 2.2 cm thinly encapsulated mass, which was submitted for frozen sections.      HISTORY OF PRESENT ILLNESS  Ms. Navarrete presents today in follow-up and to review of her current status.     She had an MRI on 6/5/2020, which showed recurrence of the right pelvic sidewall mass. She had a midline incision and underwent resection of the mass, which was densely attached to the right ureter and attached to the right side of the sigmoid mesentery and external and internal iliac vessels.  There was external iliac vein injury with primary repair due to involvement of the mass. The ureter was also partially removed as part of the tumor mass. All margins and the ureter that was resected was negative for tumor.    She notes fatigue and some  persistent post-surgical discomfort, but she is overall improving. She denies nausea, vomiting, or diarrhea. She denies fevers, chills, nausea, vomiting, diarrhea.    ECOG performance status is 0.    REVIEW OF SYSTEMS  A 12-point ROS negative except as in HPI    Current Outpatient Medications   Medication Sig Dispense Refill     apixaban ANTICOAGULANT (ELIQUIS) 2.5 MG tablet Take 2.5 mg by mouth 2 times daily       ciprofloxacin (CIPRO) 500 MG tablet Take 1 tablet (500 mg) by mouth every 12 hours for 9 days 18 tablet 0     levothyroxine (SYNTHROID/LEVOTHROID) 75 MCG tablet TAKE 1 TABLET EVERY DAY 90 tablet 1     Multiple Vitamin (MULTIVITAMINS PO) Take 1 tablet by mouth daily Reported on 4/10/2017       pantoprazole (PROTONIX) 40 MG EC tablet Take 40 mg by mouth daily before breakfast       venlafaxine (EFFEXOR-XR) 75 MG 24 hr capsule Take 1 capsule (75 mg) by mouth daily 90 capsule 0     zolpidem (AMBIEN) 5 MG tablet Take 1 tablet (5 mg) by mouth nightly as needed for sleep (Only uses if traveling or cannot sleep,) 20 tablet 0     ciprofloxacin (CIPRO) 500 MG tablet Take 500 mg by mouth         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  Wt Readings from Last 2  Encounters:   07/08/20 61.2 kg (135 lb)   06/28/20 65.5 kg (144 lb 4.8 oz)   General: Well appearing, at home, in no distress  Head: Normocephalic  Eyes: EOMI, No icterus  ENT: Oropharynx clear  Lungs: Easy breathing on room air, speaking in complete sentences without dyspnea  Neuro: Cranial nerves 2-12 intact, no focal deficits  Skin: No rashes on exposed skin    The rest of a comprehensive physical examination is deferred due to PHE (public health emergency) video visit restrictions.      ASSESSMENT AND PLAN  #1 Locally advanced leiomyosarcoma of the uterus, pT2b Nx M0, Stage IIB, status-post resection and 5 cycles of adjuvant doxorubicin and dacarbazine  #2 Local recurrence, leiomyosarcoma of the uterus, right pelvic sidewall adherent to iliac vessels and ureter  It was a pleasure to see Ms. Navarrete today. She was able to have surgical resection of the local recurrence. She has done well with surgery.    We reviewed my preference for adjuvant radiation therapy, given the adherence to structures along the right pelvic sidewall. She is at elevated risk of local recurrence. She also remains at risk for distant recurrence, but she has already had 5 cycles of adjuvant doxorubicin and dacarbazine. We reviewed that adjuvant chemotherapy could be considered, in which case I would recommend she consider gemcitabine and docetaxel for 6 cycles. Alternatively, were she to receive post-operative radiation, a year of Keytruda or Opdivo could be considered. While response rates for immunotherapy are low in sarcoma (<10% response), the 5-year survival benefit of adjuvant chemotherapy is in the range of 5-10% anyway.    She will also meet with the sarcoma oncology group at [place] in the coming weeks and will also review with them. I will plan to see her afterwards to continue the discussion.    Questions answered.    Ab Warner M.D.   of Medicine  Hematology, Oncology and Transplantation      Maggie JORDAN  "Rosalba is a 70 year old female who is being evaluated via a billable video visit.      The patient has been notified of following:     \"This video visit will be conducted via a call between you and your physician/provider. We have found that certain health care needs can be provided without the need for an in-person physical exam.  This service lets us provide the care you need with a video conversation.  If a prescription is necessary we can send it directly to your pharmacy.  If lab work is needed we can place an order for that and you can then stop by our lab to have the test done at a later time.    Video visits are billed at different rates depending on your insurance coverage.  Please reach out to your insurance provider with any questions.    If during the course of the call the physician/provider feels a video visit is not appropriate, you will not be charged for this service.\"    Patient has given verbal consent for Video visit? Yes    How would you like to obtain your AVS? MyChart       Will anyone else be joining your video visit? Yes: Radha (Sister) . How would they like to receive their invitation? Send to e-mail at: [email or web address]             Secondary Video Option (Doximity), send text message to:  [phone]    I have reviewed and updated the patient's allergies and medication list. Patient was asked if they had any patient reported vital signs to present, if yes, please see documented vitals.  Patient was also asked for their current weight and height, if presented, documented in vitals.    Concerns: Patient has no new concerns.      Refills: Zolpidem      PACO Can      Video-Visit Details    Type of service:  Video Visit    Video Start Time: 2:15 PM  Video End Time: 2:45 PM    Originating Location (pt. Location): Home    Distant Location (provider location):  [facility]     Platform used for Video Visit: [place]"

## 2020-07-14 DIAGNOSIS — F41.1 GENERALIZED ANXIETY DISORDER: ICD-10-CM

## 2020-07-16 NOTE — TELEPHONE ENCOUNTER
venlafaxine (EFFEXOR-XR) 75 MG  Last Written Prescription Date:  4/8/20  Last Fill Quantity: 90,   # refills: 0  Last Office Visit : 4/20/20  Future Office visit:  NONE    Routing refill request to provider for review/approval because:  ABN  CR   90 DAY RF PENDING

## 2020-07-17 RX ORDER — VENLAFAXINE HYDROCHLORIDE 75 MG/1
CAPSULE, EXTENDED RELEASE ORAL
Qty: 90 CAPSULE | Refills: 0 | Status: SHIPPED | OUTPATIENT
Start: 2020-07-17 | End: 2020-10-15

## 2020-07-29 DIAGNOSIS — C49.9 LEIOMYOSARCOMA (H): ICD-10-CM

## 2020-07-29 LAB
ALBUMIN SERPL-MCNC: 3.6 G/DL (ref 3.4–5)
ALP SERPL-CCNC: 102 U/L (ref 40–150)
ALT SERPL W P-5'-P-CCNC: 40 U/L (ref 0–50)
ANION GAP SERPL CALCULATED.3IONS-SCNC: 3 MMOL/L (ref 3–14)
AST SERPL W P-5'-P-CCNC: 24 U/L (ref 0–45)
BASOPHILS # BLD AUTO: 0.1 10E9/L (ref 0–0.2)
BASOPHILS NFR BLD AUTO: 0.7 %
BILIRUB SERPL-MCNC: 0.4 MG/DL (ref 0.2–1.3)
BUN SERPL-MCNC: 26 MG/DL (ref 7–30)
CALCIUM SERPL-MCNC: 9 MG/DL (ref 8.5–10.1)
CHLORIDE SERPL-SCNC: 109 MMOL/L (ref 94–109)
CO2 SERPL-SCNC: 27 MMOL/L (ref 20–32)
CREAT SERPL-MCNC: 0.92 MG/DL (ref 0.52–1.04)
DIFFERENTIAL METHOD BLD: ABNORMAL
EOSINOPHIL # BLD AUTO: 0.2 10E9/L (ref 0–0.7)
EOSINOPHIL NFR BLD AUTO: 2.6 %
ERYTHROCYTE [DISTWIDTH] IN BLOOD BY AUTOMATED COUNT: 15.4 % (ref 10–15)
GFR SERPL CREATININE-BSD FRML MDRD: 63 ML/MIN/{1.73_M2}
GLUCOSE SERPL-MCNC: 112 MG/DL (ref 70–99)
HCT VFR BLD AUTO: 34.6 % (ref 35–47)
HGB BLD-MCNC: 10.9 G/DL (ref 11.7–15.7)
IMM GRANULOCYTES # BLD: 0 10E9/L (ref 0–0.4)
IMM GRANULOCYTES NFR BLD: 0.1 %
LYMPHOCYTES # BLD AUTO: 2.9 10E9/L (ref 0.8–5.3)
LYMPHOCYTES NFR BLD AUTO: 41.4 %
MCH RBC QN AUTO: 29.3 PG (ref 26.5–33)
MCHC RBC AUTO-ENTMCNC: 31.5 G/DL (ref 31.5–36.5)
MCV RBC AUTO: 93 FL (ref 78–100)
MONOCYTES # BLD AUTO: 0.6 10E9/L (ref 0–1.3)
MONOCYTES NFR BLD AUTO: 8.2 %
NEUTROPHILS # BLD AUTO: 3.3 10E9/L (ref 1.6–8.3)
NEUTROPHILS NFR BLD AUTO: 47 %
NRBC # BLD AUTO: 0 10*3/UL
NRBC BLD AUTO-RTO: 0 /100
PLATELET # BLD AUTO: 227 10E9/L (ref 150–450)
POTASSIUM SERPL-SCNC: 4.4 MMOL/L (ref 3.4–5.3)
PROT SERPL-MCNC: 7.5 G/DL (ref 6.8–8.8)
RBC # BLD AUTO: 3.72 10E12/L (ref 3.8–5.2)
SODIUM SERPL-SCNC: 139 MMOL/L (ref 133–144)
WBC # BLD AUTO: 6.9 10E9/L (ref 4–11)

## 2020-07-30 ENCOUNTER — VIRTUAL VISIT (OUTPATIENT)
Dept: ONCOLOGY | Facility: CLINIC | Age: 70
End: 2020-07-30
Attending: INTERNAL MEDICINE
Payer: MEDICARE

## 2020-07-30 DIAGNOSIS — C49.9 LEIOMYOSARCOMA (H): Primary | ICD-10-CM

## 2020-07-30 PROCEDURE — 40001009 ZZH VIDEO/TELEPHONE VISIT; NO CHARGE

## 2020-07-30 PROCEDURE — 99214 OFFICE O/P EST MOD 30 MIN: CPT | Mod: 95 | Performed by: INTERNAL MEDICINE

## 2020-07-30 NOTE — LETTER
7/30/2020         RE: Maggie Navarrete  [address]  [facility] [address]        Dear Colleague,    Thank you for referring your patient, Maggie Navarrete, to the [facility]. Please see a copy of my visit note below.    Maggie Navarrete is a 70 year old female who is being evaluated via a billable video visit.          I have reviewed and updated the patient's allergies and medication list.    Concerns: No new concerns.   Refills: None needed.     Vitals - Patient Reported  Weight (Patient Reported): 65.5 kg (144 lb 6.4 oz)  Height (Patient Reported): 152.4 cm (5')  BMI (Based on Pt Reported Ht/Wt): 28.2  Pain Score: No Pain (0)      Karis Briscoe CMA      Video-Visit Details    Type of service:  Video Visit    Video Start Time: 5:30 PM  Video End Time: 6:00 PM    Originating Location (pt. Location): Home    Distant Location (provider location):  [facility]     Platform used for Video Visit: [facility] ONCOLOGY PROGRESS NOTE  [facility]  Jul 30, 2020    Oncologic history:  1. January 2016, she presents to her PCP with 3 weeks of abdominal pain and severe constipation. She is treated for constipation and improves.  2. 3/21/2016, worsening pelvic pressure prompts pelvic examination with an enlarged uterus noted. Transvaginal ultrasound shows enlargement of subserosal fibroids originally seen on MRI in 2006.  3. 5/9/2016, MRI pelvis is obtained, which shows several enhancing uterine masses involving an enlarged uterus, felt radiographically compatible with benign uterine leiomyomata.  3. Summer 2016, she experiences worsening low back pain thought at first thought to be sacroiliitis, but pain persists after corticosteroid injection. Continues with severe constipation.  4. 9/8/2016, she undergoes colonoscopy for persistent abdominal pain. She has sigmoid polyp snared. Abdominal pain and diarrhea persist after colonoscopy prep.  5. 9/14/2016, she has  worsening abdominal pain and is evaluated in the ED. CT-scan shows a large subserosal fibroid measuring 9.4 x 9.6 x 9.8 cm, as well as very large fibroids along the posterior uterine body more inferiorly, increased in size to 14 cm in greatest diameter compared to 10 cm in greatest diameter on MRI of 5/9/2016.  6. 9/29/2016, she has staging CT-CAP, which shows a small left sided pleural effusion and a 3 mm pulmonary nodule in the RML.  7. October 2016, self-refers to [facility].  8. 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  9. 11/4/2016, undergoes total abdominal hysterectomy and BSO, under Dr. Harsh Camarillo. Surgery was originally presumed for leiomyoma, however, intraoperatively there was focal adherence of a mass to the pelvic right sidewall and cul-de-sac, which led to peritoneal resection and omentectomy. Final pathology, pT2b: showed leiomyosarcoma involving the cul-de-sac and forming two separate masses measuring 10.5 x 6.5 x 4.5 cm and 10.7 x 7.9 x 7.5 cm.  10. 12/26/2016, she starts adjuvant chemotherapy with doxorubicin 25 mg/m2/day and DTIC 250 mg/m2/day , days 1-3 every 21 weeks.  11. 3/23/2017, she completes 5 cycles of adjuvant doxorubicin and dacarbazine  12. 6/3/2020, restaging MRI shows a locally recurrent pelvic sidewall mass measuring 2.8 x 3.3 cm in size. This new area of concern was not previously reported, presumably due to proximity to bowel and the similar appearance to adjacent loops of bowel on the contrast enhanced imaging. On review of prior imaging studies, the lesion can be seen as far back as January 2019 as a small nodule measuring 1 cm in size directly adjacent to bowel, close to the pelvic sidewall.  13. 6/16/2020, she had resection of the mass, which was densely attached to the right ureter and attached to the right side of the sigmoid mesentery and external and internal iliac vessels.  There was external iliac vein injury with  primary repair due to involvement of the mass. The ureter was also partially removed as part of the tumor mass. All margins and the ureter that was resected was negative for tumor. Pathology (JR-) showed a 3.7 x 3.5 x 2.2 cm thinly encapsulated mass, which was submitted for frozen sections.      HISTORY OF PRESENT ILLNESS  Ms. Navarrete presents today in follow-up and to review of her current status.     On 6/16/2020 she had resection of the mass, which was densely attached to the right ureter and attached to the right side of the sigmoid mesentery and external and internal iliac vessels.  There was external iliac vein injury with primary repair due to involvement of the mass. The ureter was also partially removed as part of the tumor mass. All margins and the ureter that was resected was negative for tumor.    She has completely recovered from surgery. She is back to taking long walks outdoors. She denies nausea, vomiting, or diarrhea. She denies fevers, chills, nausea, vomiting, diarrhea.    ECOG performance status is 0.    REVIEW OF SYSTEMS  A 12-point ROS negative except as in HPI    Current Outpatient Medications   Medication Sig Dispense Refill     levothyroxine (SYNTHROID/LEVOTHROID) 75 MCG tablet TAKE 1 TABLET EVERY DAY 90 tablet 1     Multiple Vitamin (MULTIVITAMINS PO) Take 1 tablet by mouth daily Reported on 4/10/2017       pantoprazole (PROTONIX) 40 MG EC tablet Take 40 mg by mouth daily before breakfast       venlafaxine (EFFEXOR-XR) 75 MG 24 hr capsule TAKE 1 CAPSULE DAILY 90 capsule 0     zolpidem (AMBIEN) 5 MG tablet Take 1 tablet (5 mg) by mouth nightly as needed for sleep (Only uses if traveling or cannot sleep,) 20 tablet 0       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  Wt Readings from Last 2 Encounters:   07/08/20 61.2 kg (135 lb)   06/28/20 65.5 kg (144 lb 4.8 oz)   General: Well appearing, at home, in no distress  Head: Normocephalic  Eyes: EOMI, No icterus  ENT: Oropharynx clear  Lungs: Easy breathing on room air, speaking in complete sentences without dyspnea  Neuro: Cranial nerves 2-12 intact, no focal deficits  Skin: No rashes on exposed skin    The rest of a comprehensive physical examination is deferred due to PHE (public health emergency) video visit restrictions.      ASSESSMENT AND PLAN  #1 Locally advanced leiomyosarcoma of the uterus, pT2b Nx M0, Stage IIB, status-post resection (11/2016) and 5 cycles of adjuvant doxorubicin and dacarbazine  #2 Local recurrence, leiomyosarcoma of the uterus, right pelvic sidewall adherent to iliac vessels and ureter s/p resection June 2020  It was a pleasure to see Ms. Navarrete today. In June she had surgical resection of the local recurrence. She has continued to do well after surgery. She was also able to meet with Dr. Michaud at [place], who recommended no adjuvant therapy.    She remains at risk of local recurrence and distant recurrence. However, favorable factors include undergoing complete tumor resection of the single tumor recurrence, and the recurrence was > 12 months from the initial surgery and completion of adjuvant chemotherapy.    Adjuvant chemotherapy could be considered, but would be  associated with toxicity and uncertain benefit beyond the adjuvant therapy she has already received. Radiation therapy would difficult to deliver post-operatively and be associated with toxicity to adjacent bowel and bladder. Immunotherapy would be associated with autoimmune toxicities and uncertain benefit. Her preference is for no additional treatment, which is very reasonable given limited data and additional toxicities associated with treatment.    We will plan to continue with close observation. Will plan to repeat CT-CAP in 2 months.    Questions answered.    Ab Warner M.D.   of Medicine  Hematology, Oncology and Transplantation

## 2020-07-30 NOTE — PROGRESS NOTES
"Maggie Navarrete is a 70 year old female who is being evaluated via a billable video visit.      The patient has been notified of following:     \"This video visit will be conducted via a call between you and your physician/provider. We have found that certain health care needs can be provided without the need for an in-person physical exam.  This service lets us provide the care you need with a video conversation.  If a prescription is necessary we can send it directly to your pharmacy.  If lab work is needed we can place an order for that and you can then stop by our lab to have the test done at a later time.    Video visits are billed at different rates depending on your insurance coverage.  Please reach out to your insurance provider with any questions.    If during the course of the call the physician/provider feels a video visit is not appropriate, you will not be charged for this service.\"    Patient has given verbal consent for Video visit? Yes    How would you like to obtain your AVS? MyChart    If you are dropped from the video visit, the video invite should be resent to: Send to e-mail at: [email or web address]    Will anyone else be joining your video visit? No         I have reviewed and updated the patient's allergies and medication list.    Concerns: No new concerns.   Refills: None needed.     Vitals - Patient Reported  Weight (Patient Reported): 65.5 kg (144 lb 6.4 oz)  Height (Patient Reported): 152.4 cm (5')  BMI (Based on Pt Reported Ht/Wt): 28.2  Pain Score: No Pain (0)      Kairs Briscoe CMA      Video-Visit Details    Type of service:  Video Visit    Video Start Time: 5:30 PM  Video End Time: 6:00 PM    Originating Location (pt. Location): Home    Distant Location (provider location):  [facility]     Platform used for Video Visit: [facility] ONCOLOGY PROGRESS NOTE  [facility]  Jul 30, 2020    Oncologic history:  1. January 2016, she presents to her PCP with 3 weeks " of abdominal pain and severe constipation. She is treated for constipation and improves.  2. 3/21/2016, worsening pelvic pressure prompts pelvic examination with an enlarged uterus noted. Transvaginal ultrasound shows enlargement of subserosal fibroids originally seen on MRI in 2006.  3. 5/9/2016, MRI pelvis is obtained, which shows several enhancing uterine masses involving an enlarged uterus, felt radiographically compatible with benign uterine leiomyomata.  3. Summer 2016, she experiences worsening low back pain thought at first thought to be sacroiliitis, but pain persists after corticosteroid injection. Continues with severe constipation.  4. 9/8/2016, she undergoes colonoscopy for persistent abdominal pain. She has sigmoid polyp snared. Abdominal pain and diarrhea persist after colonoscopy prep.  5. 9/14/2016, she has worsening abdominal pain and is evaluated in the ED. CT-scan shows a large subserosal fibroid measuring 9.4 x 9.6 x 9.8 cm, as well as very large fibroids along the posterior uterine body more inferiorly, increased in size to 14 cm in greatest diameter compared to 10 cm in greatest diameter on MRI of 5/9/2016.  6. 9/29/2016, she has staging CT-CAP, which shows a small left sided pleural effusion and a 3 mm pulmonary nodule in the RML.  7. October 2016, self-refers to [facility].  8. 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  9. 11/4/2016, undergoes total abdominal hysterectomy and BSO, under Dr. Harsh Camarillo. Surgery was originally presumed for leiomyoma, however, intraoperatively there was focal adherence of a mass to the pelvic right sidewall and cul-de-sac, which led to peritoneal resection and omentectomy. Final pathology, pT2b: showed leiomyosarcoma involving the cul-de-sac and forming two separate masses measuring 10.5 x 6.5 x 4.5 cm and 10.7 x 7.9 x 7.5 cm.  10. 12/26/2016, she starts adjuvant chemotherapy with doxorubicin 25 mg/m2/day  and DTIC 250 mg/m2/day , days 1-3 every 21 weeks.  11. 3/23/2017, she completes 5 cycles of adjuvant doxorubicin and dacarbazine  12. 6/3/2020, restaging MRI shows a locally recurrent pelvic sidewall mass measuring 2.8 x 3.3 cm in size. This new area of concern was not previously reported, presumably due to proximity to bowel and the similar appearance to adjacent loops of bowel on the contrast enhanced imaging. On review of prior imaging studies, the lesion can be seen as far back as January 2019 as a small nodule measuring 1 cm in size directly adjacent to bowel, close to the pelvic sidewall.  13. 6/16/2020, she had resection of the mass, which was densely attached to the right ureter and attached to the right side of the sigmoid mesentery and external and internal iliac vessels.  There was external iliac vein injury with primary repair due to involvement of the mass. The ureter was also partially removed as part of the tumor mass. All margins and the ureter that was resected was negative for tumor. Pathology (JR-) showed a 3.7 x 3.5 x 2.2 cm thinly encapsulated mass, which was submitted for frozen sections.      HISTORY OF PRESENT ILLNESS  Ms. Navarrete presents today in follow-up and to review of her current status.     On 6/16/2020 she had resection of the mass, which was densely attached to the right ureter and attached to the right side of the sigmoid mesentery and external and internal iliac vessels.  There was external iliac vein injury with primary repair due to involvement of the mass. The ureter was also partially removed as part of the tumor mass. All margins and the ureter that was resected was negative for tumor.    She has completely recovered from surgery. She is back to taking long walks outdoors. She denies nausea, vomiting, or diarrhea. She denies fevers, chills, nausea, vomiting, diarrhea.    ECOG performance status is 0.    REVIEW OF SYSTEMS  A 12-point ROS negative except as in  HPI    Current Outpatient Medications   Medication Sig Dispense Refill     levothyroxine (SYNTHROID/LEVOTHROID) 75 MCG tablet TAKE 1 TABLET EVERY DAY 90 tablet 1     Multiple Vitamin (MULTIVITAMINS PO) Take 1 tablet by mouth daily Reported on 4/10/2017       pantoprazole (PROTONIX) 40 MG EC tablet Take 40 mg by mouth daily before breakfast       venlafaxine (EFFEXOR-XR) 75 MG 24 hr capsule TAKE 1 CAPSULE DAILY 90 capsule 0     zolpidem (AMBIEN) 5 MG tablet Take 1 tablet (5 mg) by mouth nightly as needed for sleep (Only uses if traveling or cannot sleep,) 20 tablet 0       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  Wt Readings from Last 2 Encounters:   07/08/20 61.2 kg (135 lb)   06/28/20 65.5 kg (144 lb 4.8 oz)   General: Well appearing, at home, in no distress  Head: Normocephalic  Eyes: EOMI, No icterus  ENT: Oropharynx clear  Lungs: Easy breathing on room air, speaking in complete sentences without dyspnea  Neuro: Cranial nerves 2-12 intact, no focal deficits  Skin: No rashes on exposed skin    The rest of a comprehensive physical examination is deferred due to PHE (public health emergency) video visit restrictions.      ASSESSMENT AND PLAN  #1 Locally advanced  leiomyosarcoma of the uterus, pT2b Nx M0, Stage IIB, status-post resection (11/2016) and 5 cycles of adjuvant doxorubicin and dacarbazine  #2 Local recurrence, leiomyosarcoma of the uterus, right pelvic sidewall adherent to iliac vessels and ureter s/p resection June 2020  It was a pleasure to see Ms. Navarrete today. In June she had surgical resection of the local recurrence. She has continued to do well after surgery. She was also able to meet with Dr. Michaud at [place], who recommended no adjuvant therapy.    She remains at risk of local recurrence and distant recurrence. However, favorable factors include undergoing complete tumor resection of the single tumor recurrence, and the recurrence was > 12 months from the initial surgery and completion of adjuvant chemotherapy.    Adjuvant chemotherapy could be considered, but would be associated with toxicity and uncertain benefit beyond the adjuvant therapy she has already received. Radiation therapy would difficult to deliver post-operatively and be associated with toxicity to adjacent bowel and bladder. Immunotherapy would be associated with autoimmune toxicities and uncertain benefit. Her preference is for no additional treatment, which is very reasonable given limited data and additional toxicities associated with treatment.    We will plan to continue with close observation. Will plan to repeat CT-CAP in 2 months.    Questions answered.    Ab Warner M.D.   of Medicine  Hematology, Oncology and Transplantation

## 2020-07-31 NOTE — TELEPHONE ENCOUNTER
Oncology/Surgical Oncology Referral Request:     Specialty Requested: Medical Oncology     Referring Provider: Ab Gutierrez MD     Referring Clinic/Organization: [facility]    Records location: [facility]     Requested Provider (if specified): Not Specified

## 2020-08-03 ENCOUNTER — TELEPHONE (OUTPATIENT)
Dept: ONCOLOGY | Facility: CLINIC | Age: 70
End: 2020-08-03

## 2020-08-03 DIAGNOSIS — E03.9 ACQUIRED HYPOTHYROIDISM: ICD-10-CM

## 2020-08-03 NOTE — TELEPHONE ENCOUNTER
8/3/2020    I called Maggie today, as she left a voicemail message stating she had several questions about our upcoming genetic counseling visit on 8/18/20, but was unable to reach her. I left a non-detailed voicemail with my name and phone number.    Kym Ferraro MS, [facility]  Licensed Genetic Counselor  Office: [phone]  Pager: [phone]

## 2020-08-03 NOTE — TELEPHONE ENCOUNTER
8/3/2020    I called and spoke to Maggie today, regarding several questions she has about her upcoming visit with me on 8/18/2020. She stated she called Medicare to verify whether she would have coverage for the 85157 genetic counseling CPT code, but was informed that her provider needed to call and request this information. I stated that I was unfamiliar with this request from Medicare, as this is not a process we have had to follow in the past. I provided her the phone number for the financial counselors, as they may have more updated information regarding coverage for our video visit. We also discussed the [employer] online patient estimate calculator, available on the [facility] [employer] website and through [employer]. Maggie thanked me for this information and stated she would call the financial counselors.    We then briefly discussed insurance coverage for genetic testing. If during our consult we decide that genetic testing is appropriate for Maggie, the genetic testing laboratory will pursue a pre-authorization/benefits investigation on Maggie's behalf. This does not have to be done prior to our visit. Maggie verbalized understanding.    Maggie denied having any other questions at this time and confirmed that she has my contact information.    Kym Ferraro MS, [facility]  Licensed Genetic Counselor  Office: [phone]  Pager: [phone]

## 2020-08-07 RX ORDER — LEVOTHYROXINE SODIUM 75 UG/1
75 TABLET ORAL DAILY
Qty: 90 TABLET | Refills: 0 | Status: SHIPPED | OUTPATIENT
Start: 2020-08-07 | End: 2020-10-26

## 2020-08-07 NOTE — TELEPHONE ENCOUNTER
Last Clinic Visit: 4/5/19, no upcoming visits scheduled, overdue for TSH, 90 day refill per protocol, routed to clinic for follow up

## 2020-08-14 ENCOUNTER — TELEPHONE (OUTPATIENT)
Dept: OBGYN | Facility: CLINIC | Age: 70
End: 2020-08-14

## 2020-08-14 DIAGNOSIS — M85.80 OSTEOPENIA: Primary | ICD-10-CM

## 2020-08-14 DIAGNOSIS — M85.89 OSTEOPENIA OF MULTIPLE SITES: ICD-10-CM

## 2020-08-14 NOTE — TELEPHONE ENCOUNTER
----- Message from Imelda Conroy sent at 8/14/2020  1:39 PM CDT -----  Regarding: dexa  Contact: [phone]  Pt called looking to schedule a virtual visit with Dr. Flynn for her annual. Pt mentioned that Dr. Flynn wanted her to do a dexa but there are no orders. Please review and follow up with pt    Thanks  Imelda

## 2020-08-18 ENCOUNTER — PRE VISIT (OUTPATIENT)
Dept: ONCOLOGY | Facility: CLINIC | Age: 70
End: 2020-08-18

## 2020-08-18 ENCOUNTER — VIRTUAL VISIT (OUTPATIENT)
Dept: ONCOLOGY | Facility: CLINIC | Age: 70
End: 2020-08-18
Attending: INTERNAL MEDICINE
Payer: MEDICARE

## 2020-08-18 DIAGNOSIS — C55 UTERINE LEIOMYOSARCOMA (H): Primary | ICD-10-CM

## 2020-08-18 DIAGNOSIS — Z80.8 FAMILY HISTORY OF GLIOBLASTOMA: ICD-10-CM

## 2020-08-18 DIAGNOSIS — Z80.0 FAMILY HISTORY OF STOMACH CANCER: ICD-10-CM

## 2020-08-18 DIAGNOSIS — Z80.3 FAMILY HISTORY OF MALIGNANT NEOPLASM OF BREAST: ICD-10-CM

## 2020-08-18 PROCEDURE — 96040 ZZH GENETIC COUNSELING, EACH 30 MINUTES: CPT | Mod: PN,ZF | Performed by: GENETIC COUNSELOR, MS

## 2020-08-18 NOTE — LETTER
"    Cancer Risk Management  Program [facility]  [facility]  [facility]  Mailing Address  Cancer Risk Management Program  [facility]  [address]  [place] [address]    New patient appointments  [phone]  August 20, 2020    Maggie Navarrete  [address]  [facility] [address]      Dear Maggie,    It was a pleasure meeting with you on August 18, 2020. Here is a copy of the progress note from your recent genetic counseling visit to the Cancer Risk Management Program. If you have any additional questions, please feel free to call.    8/18/2020    Maggie Navarrete is a 70 year old female who is being evaluated via a billable video visit.      The patient has been notified of following:     \"This video visit will be conducted via a call between you and your provider. We have found that certain health care needs can be provided without the need for an in-person physical exam. This service lets us provide the care you need with a video conversation. If lab work is needed we can place an order for that and you can then stop by our lab to have the test done at a later time.    Video visits are billed at different rates depending on your insurance coverage. Please reach out to your insurance provider with any questions.    If during the course of the call the provider feels a video visit is not appropriate, you will not be charged for this service.\"    Patient has given verbal consent for Video visit? Yes  How would you like to obtain your AVS? MyChart  If you are dropped from the video visit, the video invite should be resent to: Send to e-mail at: [email or web address]  Will anyone else be joining your video visit? No    Referring Provider: Ab Aguilar MD    Presenting Information:   Given concerns regarding " "the potential for COVID-19 exposure during a clinic visit, Maggie elected for a video genetic counseling visit through the Cancer Risk Management Program to discuss her personal and family history of leiomyosarcoma, glioblastoma, breast cancer, and stomach cancer. We reviewed this history, cancer screening recommendations, and available genetic testing options.    Personal History:  Maggie is a 70 year old female. 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 The surgery also identified three intramural leiomyomas; a cellular uterine leiomyoma was previously removed at age 56. 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 Maggie also reports having two lipomas removed from her leg and thigh.    She had her first menstrual period at age 12, her first child at age 27, and went through menopause at age 52. She reports that she used hormone replacement therapy for approximately 8-10 years and stopped at age [age].      She has annual mammograms and her most recent mammogram in August 2019 was normal. She reports having a benign right-sided breast biopsy in her 40's. Her most recent colonoscopy in September 2016 identified one polyp (lymphoid aggregate) and follow-up was recommended in five years. She does not regularly do any other cancer screening at this time.     Of note, Maggie previously met with Fabiola Correa MS, [facility] in April 2011 and Comprehensive BRCAnalysis (BRCA1 and BRCA2 sequencing and deletion/duplication) was ordered from [facility]. This testing was negative/normal and a copy of the report was available for review today.    Family History: (Please see scanned pedigree for detailed family history information)    Maggie's son was diagnosed with a glioblastoma at age 30 and passed away at age 32. She also reports that he had multiple \"dorothy\" cysts removed " "from around his ears.    Maggie has two sisters, ages 68 and 73.    Her younger sister was diagnosed with a meningioma in her 40's and has a history of multiple \"dorothy\" cysts.    Both sisters have a history of uterine fibroids and her older sister has several \"skin tags\".    Maggie's mother was diagnosed with breast cancer in her 80's and passed away at age 92; treatment included a lumpectomy, radiation, and endocrine therapy. She also had an unknown type of skin cancer and uterine fibroids.    One sister was diagnosed with and passed away from leukemia in her 20's.    One sister passed away at age 18 from an infection.    Her mother passed away in her 70's and her father passed away in his 30/40's; neither had a cancer.    Maggie's father passed away at age 94 and never had a cancer.    One sister was diagnosed with and passed away from breast cancer in her 60/70's.    One sister was diagnosed with and passed away from stomach cancer in her 70's; she reportedly had an H. pylori infection and smoking history.    One sister has two children with cancer, including one son with esophageal cancer (unlikely to have a smoking history) and one daughter with thyroid cancer in her 40's.    His two brothers passed away in their 80's and never had a cancer history. One brother's son was diagnosed with unilateral kidney cancer at age 71.    His father was diagnosed with and passed away from stomach cancer in his 70's; he had a history of smoking.    His mother passed away in her 60's and never had a cancer.    Her maternal and paternal ethnicity is Ethiopian and Ashkenazi [place].    Discussion:    Maggie's personal and family history of multiple cancers is suggestive of a hereditary cancer syndrome.    We reviewed the features of sporadic, familial, and hereditary cancers. In looking at Maggie's family history, it is possible that a cancer susceptibility gene is present as Maggie has been diagnosed with a rare cancer type " and has multiple close relatives diagnosed with related cancers (brain, breast, kidney, stomach); several of these relatives were also diagnosed under age 50.    We discussed the natural history and genetics of several hereditary cancer syndromes, including Li-Fraumeni syndrome (LFS) and FH Tumor Predisposition Syndrome/Hereditary Leiomyomatosis and Renal Cell Cancer (HLRCC).     We first reviewed Maggie's prior genetic testing results, which did not identify a mutation in the BRCA1 and BRCA2 genes. This testing would be expected to identify the vast majority of currently identifiable mutations. That being said, this testing was completed nearly 10 years ago, so repeat testing may be reasonable to consider.    We then reviewed Li-Fraumeni syndrome (LFS), which is caused by a mutation in the TP53 gene. Cancers associated with LFS include: sarcomas, breast cancer, brain cancer, leukemia, lymphoma, adrenocortical carcinoma, and others. The hallmark cancer of LFS is sarcoma, while the most frequent cancer is female breast. Individuals with LFS are also at increased risk for developing multiple primary cancers in their lifetime. Given her history of a sarcoma and family history of brain, leukemia, and breast cancer, testing of the TP53 gene would be appropriate for Maggie.     We then reviewed HLRCC, which is caused by mutations in the FH gene. Individuals with HLRCC are at increased risk for kidney cancer and cutaneous/uterine leiomyomas (with possible progression to leiomyosarcoma). Given her personal and family history of leiomyosarcoma, uterine fibroids, and kidney cancer, testing of the FH gene is also reasonable to consider.    A detailed handout regarding these syndromes and the information we discussed will be provided to Maggie via [employer] and can be found in the after visit summary. Topics included: inheritance pattern, cancer risks, cancer screening recommendations, and also risks, benefits and limitations  of testing.    We discussed that there are also other additional genes that could cause increased risk for the cancers in her family. As many of these genes present with overlapping features in a family and accurate cancer risk cannot always be established based upon the pedigree analysis alone, it would be reasonable for Maggie to consider panel genetic testing to analyze multiple genes at once.    We reviewed genetic testing options for hereditary cancers: high/moderate risk custom panel (CustomNext-Cancer, 38 genes) and expanded pan-cancer panel (CancerNext-Expanded, 77 genes). Maggie expressed interest in learning as much information as possible from the testing. She opted for the CancerNext-Expanded panel.    Genetic testing is available for 77 genes associated with increased risk for many different cancers: CancerNext-Expanded (AIP, ALK, APC, DARIO, AXIN2, BAP1, BARD1, BLM, BMPR1A, BRCA1, BRCA2, BRIP1, CDC73, CDH1, CDK4, CDKN1B, CDKN2A, CHEK2, CTNNA1, DICER1, EPCAM, EGFR, EGLN1, FANCC, FH, FLCN, GALNT12, GREM1, HOXB13, KIF1B, KIT, LZTR1, MAX, MEN1, MET, MITF, MLH1, MRE11A, MSH2, MSH3, MSH6, MUTYH, NBN, NF1, NF2, NTHL1, PALB2, PDGFRA, PHOX2B, PMS2, POLD1, POLE, POT1, IRLXE4G, PTCH1, PTEN, RAD50, RAD51C, RAD51D, RB1, RECQL, RET, SDHA, SDHAF2, SDHB, SDHC, SDHD, SMAD4, SMARCA4, SMARCB1, SMARCE1, STK11, SUFU, DGFL487, TP53, TSC1, TSC2, VHL, and XRCC2).    We discussed that many of the genes in the CancerNext-Expanded panel are associated with specific hereditary cancer syndromes and have published management guidelines:  Hereditary Breast and Ovarian Cancer syndrome (BRCA1, BRCA2), Moraes syndrome (MLH1, MSH2, MSH6, PMS2, EPCAM), Familial Adenomatous Polyposis (APC), Hereditary Diffuse Gastric Cancer (CDH1), Familial Atypical Multiple Mole Melanoma syndrome (CDK4, CDKN2A), Juvenile Polyposis syndrome (BMPR1A, SMAD4), Cowden syndrome (PTEN), Li Fraumeni syndrome (TP53), Peutz-Jeghers syndrome (STK11), MUTYH  Associated Polyposis (MUTYH), Hereditary Leiomyomatosis and Renal Cell Cancer (FH), Xptk-Gmzq-Gzeb (FLCN), Hereditary Papillary Renal Carcinoma (MET), Hereditary Paraganglioma and Pheochromocytoma syndrome (SDHA, SDHAF2, SDHB, SDHC, SDHD), Multiple Endocrine Neoplasia type 2 (RET), Tuberous sclerosis complex (TSC1, TSC2), Von Hippel-Lindau disease (VHL), Neurofibromatosis type 1 (NF1), Neurofibromatosis type 2 (NF2), Multiple Endocrine Neoplasia type 1 (MEN1), Multiple Endocrine Neoplasia type 4 (CDKN1B), Gorlin syndrome (SUFU, PTCH1), and Collazo complex (WCWSM2M).    The DARIO, AXIN2, BRIP1, CHEK2, GREM1, MSH3, NBN, NTHL1, PALB2, POLD1, POLE, RAD51C, and RAD51D genes are associated with increased cancer risk and have published management guidelines for certain cancers.      The remaining genes (AIP, ALK, BAP1, BARD1, BLM, CDC73, CTNNA1, DICER1, EGFR, EGLN1, FANCC, GALNT12, HOXB13, KIF1B, KIT, LZTR1, MRE11A, MAX, MITF, PDGFRA, PHOX2B, POT1, RAD50, RECQL, RB1, SMARCA4, SMARCB1, SMARCE1, CWSR020, and XRCC2) are associated with increased cancer risk and may allow us to make medical recommendations when mutations are identified.      Consent was obtained over the video. Maggie requested that a saliva collection kit be shipped to her home. Once a saliva sample is collected, genetic testing using the CancerNext-Expanded panel will be sent to [facility]. Turn around time: 3-4 weeks after Humberto receives her saliva sample.     Medical Management: For Maggie, we reviewed that the information from genetic testing may determine:    additional cancer screening for which Maggie may qualify (i.e. regular full body/abdominal imaging, regular blood/urine tests, mammogram and breast MRI, more frequent colonoscopies, more frequent dermatologic exams, etc.),    options for risk reducing surgeries Maggie could consider (i.e. bilateral mastectomy, etc.),      and targeted chemotherapies for Maggie's if she were to  develop certain cancers in the future (i.e. immunotherapy for individuals with Moraes syndrome, PARP inhibitors, etc.).     These recommendations and possible targeted chemotherapies will be discussed in detail once genetic testing is completed.     Plan:  1) Today Maggie elected to proceed with genetic testing using the CancerNext-Expanded panel offered by [facility]. A saliva collection kit will be shipped to her home.  2) The results should be available in 3-4 weeks.  3) Maggie will be called to discuss the results.    Kym Ferraro MS, [facility]  Licensed Genetic Counselor  Office: [phone]  Pager: [phone]    Video-Visit Details  Type of service:  Video Visit  Video Start Time: 2:19pm  Video End Time: 3:32pm  Originating Location (pt. Location): Home  Distant Location (provider location):  [facility]   Platform used for Video Visit: [employer]

## 2020-08-19 NOTE — PATIENT INSTRUCTIONS
Assessing Cancer Risk  Only about 5-10% of cancers are thought to be due to an inherited cancer susceptibility gene.    These families often have:    Several people with the same or related types of cancer    Cancers diagnosed at a young age (before age 50)    Individuals with more than one primary cancer    Multiple generations of the family affected with cancer    We each inherit two copies of every gene in our bodies: one from our mother and one from our father.  Each gene has a specific job to do.  When a gene has a mistake or  mutation  in it, it does not work like it should.     Hereditary Leiomyomatosis and Renal Cell Cancer (HLRCC)  HLRCC is caused by mutations in the FH gene. Individuals with HLRCC typically develop tumors of the smooth muscle and skin, called leiomyomas. About 76% of individuals with HLRCC with develop these skin findings. Women can also develop leiomyomas of the uterus. Mutations in the FH gene also lead to higher risk to develop kidney cancer, typically papillary renal cancer type 2. Some data suggests that FH gene mutations may also lead to risk for PGLs/PCCs.    Li-Fraumeni Syndrome (LFS)  LFS is a cancer predisposition syndrome. Individuals with LFS are at an increased risk for developing cancer at a young age. The general lifetime risk for development of cancer is 50% by age 30 and 90% by age 60.  LFS is caused by a mutation in the TP53 gene.     Core Cancers: Sarcomas, Breast, Brain, Lung, Leukemias/Lymphomas, Adrenocortical carcinomas  Other Cancers: Gastrointestinal, Thyroid, Skin, Genitourinary    Inheriting a mutation does not mean a person has cancer or will develop cancer, but it does significantly increase his or her risk above the general population risk.    Inheritance   It is important to remember that inheriting a mutation does not mean a person will develop cancer, but it does significantly increase his or her risk above the general population risk.    These  mutations are inherited in an autosomal dominant pattern.  This means that if a parent has a mutation, each of his or her children will have a 50% chance of inheriting that same mutation.  Therefore, each child--male or female--would have a 50% chance of being at increased risk for developing cancer and the associated benign tumors.      Image obtained from Genetics Home Reference, 2013    Genetic Testing  Genetic testing involves a blood test and will look at the genetic information in the designated genes for any harmful mutations that are associated with increased cancer risk.  If possible, it is recommended that the person(s) who has had cancer or the characteristic tumors be tested first before other family members.    Results  There are three possible results of genetic testing:    Positive--a harmful mutation was identified     Negative--no mutation was identified     Variant of unknown significance--a variation was identified, but it is unclear how this impacts cancer risk in the family    Advantages and Disadvantages  There are advantages and disadvantages to genetic testing.    Advantages    May clarify your cancer risk    Can help you make medical decisions    May explain the cancers in your family    May give useful information to your family members (if you share your results)    Disadvantages    Possible negative emotional impact of learning about inherited cancer risk    Uncertainty in interpreting a negative test result in some situations    Possible genetic discrimination concerns (see below)    Genetic Information Nondiscrimination Act (MARY)  MARY is a federal law that protects individuals from health insurance or employment discrimination based on a genetic test result alone.  Although rare, there are currently no legal protections in terms of life insurance, long term care, or disability insurances.  Visit the National Human Genome Research [place] genome.gov/93456279 to learn  more.    Reducing Cancer Risk  The following screenings options are subject to change as guidelines may be updated. These options should be discussed with an individual's primary care provider to determine at what age to begin screening, what screening is appropriate, and if additional screening is necessary based on personal/family history factors.     [facility] screening:    Uterine leiomyomas: Annual gynecological exams    Cutaneous leiomyomas: Dermatological exams every 1-2 years    Renal cancer surveillance: Annual MRI of the abdomen with contrast     Blood work    Current screening recommendations for people with Li Fraumeni Syndrome include1,2:    Breast:  o Breast self-exams starting at age 18; repeated monthly  o Clinical breast exams starting at age 20-25; repeated every 6-12 months  o Annual mammogram and breast MRI starting at age 20-25 (or possibly younger)  o Consideration of preventative mastectomy    Colorectal: Consider colonoscopy starting no later than 25; repeated every 2-5 years     Other Cancers:   o Annual comprehensive physical exam  o Consider novel screening techniques and clinical trials: brain MRI, whole body MRI, abdominal and pelvic ultrasound    Skin: Consider annual dermatology exam    Radiation therapy for cancer should be used with caution in individuals with Li Fraumeni Syndrome.      Questions to Think About Regarding Genetic Testing    What effect will the test result have on me and my relationship with my family members if I have an inherited gene mutation?  If I don t have a gene mutation?    Should I share my test results, and how will my family react to this news, which may also affect them?    Are my children ready to learn new information that may one day affect their own health?    Please call us if you have any questions or concerns.   Cancer Risk Management Program [phone] ([phone])  ? Garcia Bell MS, [facility]  [phone]  ? Breanne Perez MS,  [facility]  [phone]  ? Karli Everett, MS, [facility]  [phone]  ? Kym Ferraro, MS, [facility]  [phone]  ? Kendra Cummings, MS, [facility]  [phone]  ? Celeste Doll, MS, [facility]  [phone]  ? Judy Moreno, MS, [facility]  [phone]

## 2020-08-20 ENCOUNTER — ANCILLARY PROCEDURE (OUTPATIENT)
Dept: BONE DENSITY | Facility: CLINIC | Age: 70
End: 2020-08-20
Attending: FAMILY MEDICINE
Payer: MEDICARE

## 2020-08-20 DIAGNOSIS — M85.89 OSTEOPENIA OF MULTIPLE SITES: ICD-10-CM

## 2020-08-22 NOTE — PROGRESS NOTES
"Maggie Navarrete is a 70 year old female who is being evaluated via a billable video visit.      The patient has been notified of following:     \"This video visit will be conducted via a call between you and your physician/provider. We have found that certain health care needs can be provided without the need for an in-person physical exam.  This service lets us provide the care you need with a video conversation.  If a prescription is necessary we can send it directly to your pharmacy.  If lab work is needed we can place an order for that and you can then stop by our lab to have the test done at a later time.    Video visits are billed at different rates depending on your insurance coverage.  Please reach out to your insurance provider with any questions.    If during the course of the call the physician/provider feels a video visit is not appropriate, you will not be charged for this service.\"    Patient has given verbal consent for Video visit? Yes  How would you like to obtain your AVS? MyChart  If you are dropped from the video visit, the video invite should be resent to: Text to cell phone: [phone]  Will anyone else be joining your video visit? No    Subjective     Maggie Navarrete is a 70 year old female who presents today via video visit for the following health issues:osteopenia           Video Start Time: 10:55      Video-Visit Details    Type of service:  Video Visit    Video End Time:11:23    Originating Location (pt. Location): Home    Distant Location (provider location):  [facility]     Platform used for Video Visit: Eben Serrano is a  70 year old female post menopausal] [GR1, P1] that presents today for osteoporosis follow up patient was last seen 7/2019. She took alendronate 35mg for 10 yrs and had a 1 yr drug holiday  and then re-started alendronate in 2013 35mg from [id number]. Had dental work fall 2017  so stopped alendronate.  Has been off meds now 3 " yr. Last DXA was 9/2018.   and no significant bone loss, so continued the drug holiday  Referring Physician: Referred Rhea Lin MD    HPI     Have you ever had a bone density test? Yes  Where = [facility] [place]  When = 8/2020 9/2018,2017,2015,2013,2011,2009   Spine Tscore = L1-4  -1.5  Left neck Tscore = -1.6  Total left hip Tscore = -1.5  Right neck Tscore = -1.8  Total Right hip Tscore = -1.8  Stable  Have you received any x-ray dye or contrast in the last ten days? No  How many servings of dairy products do you consume per day? 3-4 Type: milk,cheese, yogurt cottage cheese  Do you take a multi-vitamin daily? Yes 1000IU/day  Do you take a vitamin D supplement? No  Do you take a calcium supplement daily?  Calcium citrate 1-2 as needed   Do you take a supplement containing strontium? No  Are you exposed to natural sunlight at least 20 minutes three times a week? Yes    Social History   reports that she has never smoked. She has never used smokeless tobacco. She reports current alcohol use. She reports that she does not use drugs.  Do you smoke cigarettes? No  Do you exercise? Yes. Details: walking daily 45-60 min  Do you drink alcohol? No    Medication History  Have you used any of the following medications?   Actonel (Risedronate): No   Aredia (Pamidronate): No   Boniva (Ibindronate): No   Didronil (Etidronate): No   Evista (Raloxifene): No   Fosamax (Alendronate): yes in the past but not since fall of 2017    Forteo (Parathyroid hormone) injections: No   HCTZ (Thiazide): No   Calcitonin nasal spray: No   Reclast or Zometa (Zolendronate): No   Prolia (Denosumab): No    Current Outpatient Medications   Medication Sig Dispense Refill     levothyroxine (SYNTHROID/LEVOTHROID) 75 MCG tablet Take 1 tablet (75 mcg) by mouth daily For additional refills, please schedule a yearly appointment with Dr Lin at [phone] 90 tablet 0     Multiple Vitamin (MULTIVITAMINS PO) Take 1 tablet by mouth daily Reported on 4/10/2017        pantoprazole (PROTONIX) 40 MG EC tablet Take 40 mg by mouth daily before breakfast       venlafaxine (EFFEXOR-XR) 75 MG 24 hr capsule TAKE 1 CAPSULE DAILY 90 capsule 0     zolpidem (AMBIEN) 5 MG tablet Take 1 tablet (5 mg) by mouth nightly as needed for sleep (Only uses if traveling or cannot sleep,) 20 tablet 0          Allergies   Allergen Reactions     Erythromycin GI Disturbance     Tramadol Nausea       Past Medical History    Family History   Problem Relation Age of Onset     Family History Negative Mother         glaucoma     Glaucoma Mother      Anxiety Disorder Mother      Heart Failure Mother      Diabetes Mother      Coronary Artery Disease Mother      Hypertension Mother      Breast Cancer Mother      Depression Mother      Obesity Mother      Heart Disease Father      Glaucoma Father      Diabetes Father      Coronary Artery Disease Father      Depression Father      Osteoporosis Father      Heart Disease Maternal Grandmother      Diabetes Maternal Grandmother      Heart Disease Paternal Grandmother      Cancer Paternal Grandfather         stomach     Thyroid Disease Sister      Retinal detachment Sister      Hypertension Sister      Hypertension Sister      Lipids Sister      Cerebrovascular Disease Sister      Depression Sister      Heart Disease Paternal Aunt      Heart Disease Paternal Uncle      Anxiety Disorder Sister      Depression Sister      Thyroid Disease Sister      Thyroid Disease Sister      Osteoporosis Other        ROS:  General: none  Head/Eyes: none  Ears/Nose/Throat: none  Cardiovascular: none  Respiratory: none  Gastrointestinal: none  Breast: none  Genitourinary: none  Sexual Function: none  Musculoskeletal: joint pain  Has leimyoma sarcoma   Skin: none  Neurological: none  Mental Health: anxiety  Endocrine: thyroid disease    Clinic Measurements  Vitals: There were no vitals taken for this visit.  BMI= There is no height or weight on file to calculate BMI.    Physical  exam    GENERAL: Healthy, alert and no distress  EYES: Eyes grossly normal to inspection.  No discharge or erythema, or obvious scleral/conjunctival abnormalities.  RESP: No audible wheeze, cough, or visible cyanosis.  No visible retractions or increased work of breathing.    SKIN: Visible skin clear. No significant rash, abnormal pigmentation or lesions.  NEURO: Cranial nerves grossly intact.  Mentation and speech appropriate for age.  PSYCH: Mentation appears normal, affect normal/bright, judgement and insight intact, normal speech and appearance well-groomed.        LAB  Vertebra; Fracture Assessment: NA  Dexa Scan: 8/2020    FRAX Assessment Tool: [N/A, 12% for 10 risk Major Osteoporotic, 2.3% for 10 risk of Hip Fracture]  Risk Factors:  age, sex, post-menopausal, chemo therapy, hypothyroidism, +FHx      ASSESSMENT:  71 yo female with osteopenia by T scores who has been on a drug holiday since fall 2017.  Her most recent DEXA shows osteopenic T-scores.  Her hips remain stable.  We discussed calcium and vitamin D.  We discussed using IV Reclast as a preventative measure.  Her risk is moderate for fracture.  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  If she does this she then should definitely be on IV Reclast.  However if she does not we could try to get a DEXA in a year and label it as evaluating bone loss, and she also is willing to pay for it.  Medicare will not pay for it  short of 2 years.  Patient verbally reports that her wrist bone  was poor at one point, she used to do DEXA's  and it was measured many years ago.  However there is no report in the record and it is by verbal report only.  I explained to her would have to get an official reading to evaluate her wrist.  Usually we do not evaluate the wrist when we have good readings at the hip and spine.       PLAN:  Patient should take 1200mg of calcium/day in divided doses and vitamin D3 1000IU/day.  Continue walking  If you  decide to sart an aromatase inhibitor then I recommend IV reclast  Try to get a DXA next August and evaluate bone loss - medicare doesn't pay for a DXA short of 2 yrs.   See me 1 yr.     I spent a total of 28 minutes face-to-face with the patient during today's video visit.  This time was spent counseling the patient and/or coordinating care regarding review of DXA, discussing preventive measures. .  See note for details.    Thank you for allowing me to participate in the care of your patient.  Please do not hesitate to call with questions or concerns.    Sincerely,  Kristine Flynn MD, PhD  CC Rhea Lin MD

## 2020-08-23 DIAGNOSIS — C55 UTERINE LEIOMYOSARCOMA (H): ICD-10-CM

## 2020-08-23 DIAGNOSIS — Z80.8 FAMILY HISTORY OF GLIOBLASTOMA: ICD-10-CM

## 2020-08-23 DIAGNOSIS — Z80.3 FAMILY HISTORY OF MALIGNANT NEOPLASM OF BREAST: ICD-10-CM

## 2020-08-23 DIAGNOSIS — Z80.0 FAMILY HISTORY OF STOMACH CANCER: ICD-10-CM

## 2020-08-24 ENCOUNTER — VIRTUAL VISIT (OUTPATIENT)
Dept: FAMILY MEDICINE | Facility: CLINIC | Age: 70
End: 2020-08-24
Attending: FAMILY MEDICINE
Payer: MEDICARE

## 2020-08-24 DIAGNOSIS — M85.89 OSTEOPENIA OF MULTIPLE SITES: Primary | ICD-10-CM

## 2020-08-24 NOTE — LETTER
"8/24/2020       RE: Maggie Navarrete  [address]  [place] [address]     Dear Colleague,    Thank you for referring your patient, Maggie Navarrete, to the [facility] at [facility]. Please see a copy of my visit note below.    Maggie Navarrete is a 70 year old female who is being evaluated via a billable video visit.      The patient has been notified of following:     \"This video visit will be conducted via a call between you and your physician/provider. We have found that certain health care needs can be provided without the need for an in-person physical exam.  This service lets us provide the care you need with a video conversation.  If a prescription is necessary we can send it directly to your pharmacy.  If lab work is needed we can place an order for that and you can then stop by our lab to have the test done at a later time.    Video visits are billed at different rates depending on your insurance coverage.  Please reach out to your insurance provider with any questions.    If during the course of the call the physician/provider feels a video visit is not appropriate, you will not be charged for this service.\"    Patient has given verbal consent for Video visit? Yes  How would you like to obtain your AVS? MyChart  If you are dropped from the video visit, the video invite should be resent to: Text to cell phone: [phone]  Will anyone else be joining your video visit? No    Subjective     Maggie Navarrete is a 70 year old female who presents today via video visit for the following health issues:osteopenia           Video Start Time: 10:55      Video-Visit Details    Type of service:  Video Visit    Video End Time:11:23    Originating Location (pt. Location): Home    Distant Location (provider location):  [facility]     Platform used for Video Visit: Eben        Maggie is a  70 year old female post " menopausal] [GR1, P1] that presents today for osteoporosis follow up patient was last seen 7/2019. She took alendronate 35mg for 10 yrs and had a 1 yr drug holiday  and then re-started alendronate in 2013 35mg from [id number]. Had dental work fall 2017  so stopped alendronate.  Has been off meds now 3 yr. Last DXA was 9/2018.   and no significant bone loss, so continued the drug holiday  Referring Physician: Referred Rhea Lin MD    HPI     Have you ever had a bone density test? Yes  [place] [facility] [place]2020 9/2018,2017,2015,2013,2011,2009   Spine Tscore = L1-4  -1.5  Left neck Tscore = -1.6  Total left hip Tscore = -1.5  Right neck Tscore = -1.8  Total Right hip Tscore = -1.8  Stable  Have you received any x-ray dye or contrast in the last ten days? No  How many servings of dairy products do you consume per day? 3-4 Type: milk,cheese, yogurt cottage cheese  Do you take a multi-vitamin daily? Yes 1000IU/day  Do you take a vitamin D supplement? No  Do you take a calcium supplement daily?  Calcium citrate 1-2 as needed   Do you take a supplement containing strontium? No  Are you exposed to natural sunlight at least 20 minutes three times a week? Yes    Social History   reports that she has never smoked. She has never used smokeless tobacco. She reports current alcohol use. She reports that she does not use drugs.  Do you smoke cigarettes? No  Do you exercise? Yes. Details: walking daily 45-60 min  Do you drink alcohol? No    Medication History  Have you used any of the following medications?   Actonel (Risedronate): No   Aredia (Pamidronate): No   Boniva (Ibindronate): No   Didronil (Etidronate): No   Evista (Raloxifene): No   Fosamax (Alendronate): yes in the past but not since fall of 2017    Forteo (Parathyroid hormone) injections: No   HCTZ (Thiazide): No   Calcitonin nasal spray: No   Reclast or Zometa (Zolendronate): No   Prolia (Denosumab): No    Current Outpatient Medications   Medication Sig Dispense  Refill     levothyroxine (SYNTHROID/LEVOTHROID) 75 MCG tablet Take 1 tablet (75 mcg) by mouth daily For additional refills, please schedule a yearly appointment with Dr Lin at [phone] 90 tablet 0     Multiple Vitamin (MULTIVITAMINS PO) Take 1 tablet by mouth daily Reported on 4/10/2017       pantoprazole (PROTONIX) 40 MG EC tablet Take 40 mg by mouth daily before breakfast       venlafaxine (EFFEXOR-XR) 75 MG 24 hr capsule TAKE 1 CAPSULE DAILY 90 capsule 0     zolpidem (AMBIEN) 5 MG tablet Take 1 tablet (5 mg) by mouth nightly as needed for sleep (Only uses if traveling or cannot sleep,) 20 tablet 0          Allergies   Allergen Reactions     Erythromycin GI Disturbance     Tramadol Nausea       Past Medical History    Family History   Problem Relation Age of Onset     Family History Negative Mother         glaucoma     Glaucoma Mother      Anxiety Disorder Mother      Heart Failure Mother      Diabetes Mother      Coronary Artery Disease Mother      Hypertension Mother      Breast Cancer Mother      Depression Mother      Obesity Mother      Heart Disease Father      Glaucoma Father      Diabetes Father      Coronary Artery Disease Father      Depression Father      Osteoporosis Father      Heart Disease Maternal Grandmother      Diabetes Maternal Grandmother      Heart Disease Paternal Grandmother      Cancer Paternal Grandfather         stomach     Thyroid Disease Sister      Retinal detachment Sister      Hypertension Sister      Hypertension Sister      Lipids Sister      Cerebrovascular Disease Sister      Depression Sister      Heart Disease Paternal Aunt      Heart Disease Paternal Uncle      Anxiety Disorder Sister      Depression Sister      Thyroid Disease Sister      Thyroid Disease Sister      Osteoporosis Other        ROS:  General: none  Head/Eyes: none  Ears/Nose/Throat: none  Cardiovascular: none  Respiratory: none  Gastrointestinal: none  Breast: none  Genitourinary: none  Sexual  Function: none  Musculoskeletal: joint pain  Has leimyoma sarcoma   Skin: none  Neurological: none  Mental Health: anxiety  Endocrine: thyroid disease    Clinic Measurements  Vitals: There were no vitals taken for this visit.  BMI= There is no height or weight on file to calculate BMI.    Physical exam    GENERAL: Healthy, alert and no distress  EYES: Eyes grossly normal to inspection.  No discharge or erythema, or obvious scleral/conjunctival abnormalities.  RESP: No audible wheeze, cough, or visible cyanosis.  No visible retractions or increased work of breathing.    SKIN: Visible skin clear. No significant rash, abnormal pigmentation or lesions.  NEURO: Cranial nerves grossly intact.  Mentation and speech appropriate for age.  PSYCH: Mentation appears normal, affect normal/bright, judgement and insight intact, normal speech and appearance well-groomed.    LAB  Vertebra; Fracture Assessment: NA  Dexa Scan: 8/2020    FRAX Assessment Tool: [N/A, 12% for 10 risk Major Osteoporotic, 2.3% for 10 risk of Hip Fracture]  Risk Factors:  age, sex, post-menopausal, chemo therapy, hypothyroidism, +FHx    ASSESSMENT:  69 yo female with osteopenia by T scores who has been on a drug holiday since fall 2017.  Her most recent DEXA shows osteopenic T-scores.  Her hips remain stable.  We discussed calcium and vitamin D.  We discussed using IV Reclast as a preventative measure.  Her risk is moderate for fracture.  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  If she does this she then should definitely be on IV Reclast.  However if she does not we could try to get a DEXA in a year and label it as evaluating bone loss, and she also is willing to pay for it.  Medicare will not pay for it  short of 2 years.  Patient verbally reports that her wrist bone  was poor at one point, she used to do DEXA's  and it was measured many years ago.  However there is no report in the record and it is by verbal report only.   I explained to her would have to get an official reading to evaluate her wrist.  Usually we do not evaluate the wrist when we have good readings at the hip and spine.     PLAN:  Patient should take 1200mg of calcium/day in divided doses and vitamin D3 1000IU/day.  Continue walking  If you decide to sart an aromatase inhibitor then I recommend IV reclast  Try to get a DXA next August and evaluate bone loss - medicare doesn't pay for a DXA short of 2 yrs.   See me 1 yr.     I spent a total of 28 minutes face-to-face with the patient during today's video visit.  This time was spent counseling the patient and/or coordinating care regarding review of DXA, discussing preventive measures. .  See note for details.    Thank you for allowing me to participate in the care of your patient.  Please do not hesitate to call with questions or concerns.    Sincerely,  Kristine Flynn MD, PhD  CC Rhea Lin MD

## 2020-08-24 NOTE — PATIENT INSTRUCTIONS
Patient should take 1200mg of calcium/day in divided doses and vitamin D3 1000IU/day.  Continue walking  If you decide to sart an aromatase inhibitor then I recommend IV reclast  Try to get a DXA next August and evaluate bone loss - medicare doesn't pay for a DXA short of 2 yrs.   See me 1 yr.

## 2020-08-26 DIAGNOSIS — Z12.31 VISIT FOR SCREENING MAMMOGRAM: ICD-10-CM

## 2020-08-31 ENCOUNTER — OFFICE VISIT (OUTPATIENT)
Dept: FAMILY MEDICINE | Facility: CLINIC | Age: 70
End: 2020-08-31
Payer: MEDICARE

## 2020-08-31 VITALS — DIASTOLIC BLOOD PRESSURE: 72 MMHG | SYSTOLIC BLOOD PRESSURE: 122 MMHG

## 2020-08-31 DIAGNOSIS — D22.5 MELANOCYTIC NEVI OF TRUNK: Primary | ICD-10-CM

## 2020-08-31 DIAGNOSIS — L81.4 SOLAR LENTIGINOSIS: ICD-10-CM

## 2020-08-31 DIAGNOSIS — I78.1 CAPILLARY HEMANGIOMA: ICD-10-CM

## 2020-08-31 DIAGNOSIS — C54.9 UTERUS CANCER, SARCOMA (H): ICD-10-CM

## 2020-08-31 PROCEDURE — 99213 OFFICE O/P EST LOW 20 MIN: CPT | Performed by: FAMILY MEDICINE

## 2020-08-31 NOTE — PATIENT INSTRUCTIONS
"Yearly skin exam     SUN PROTECTION INSTRUCTIONS  Sun damage can lead to skin cancer and premature aging of the skin.      The best way to protect from sun damage to your skin is to avoid the sun during peak hours (10 am - 2 pm) even on overcast days.    Never use tanning beds. Tanning beds are associated with much higher risks of skin cancer.    All tanning damages the skin. Aim for ivory skin year round and you will have less trouble with your skin in years to come. There is no merit in getting \"a base tan\" before a warm weather vacation, as any tanning indicates your body's response to sun damage.    Stop smoking. Smokers have higher rates of skin cancer and also have premature skin wrinkling.    Use UPF sun-protective clothing, which while more expensive initially provides longer lasting coverage without having to worry about remembering to re-apply.  1. Wear a wide-brimmed hat and sunglasses.   2. Wear sun-protective clothing.  [employer] and other [employer] make sun protective clothing that are stylish, comfortable and cool.   [employer] and other [employer] make UV arm sleeves suitable for golfing, gardening and other activities.    Sunscreen instructions:  1. Use sunscreens with Zinc Oxide, Titanium Dioxide or Avobenzone to protect from UVA rays.  2. Use SPF 30-50+ to protect from UVB rays.  3. Re-apply every 2 hours even if water resistant.  4. Apply on your face every day even when cloudy and even in the winter. UVA \"aging rays\" penetrate window glass and are just as strong in the winter as in the summer.    FYI  You should use about 3 tablespoons of sunscreen to protect your whole body. Thus a typical eight ounce bottle of sunscreen should last 4 applications. Remember, that the SPF rating is compromised if you don t apply enough. Most people only apply 1/2 - 1/3 of the amount they need. Also don t forget areas such as your ears, feet, upper back and harder to reach places. Keep in mind that " these amounts should be increased for larger body sizes.    Sunscreens with titanium dioxide and/or zinc oxide in the active ingredients are physical blockers as opposed to chemical blockers. Chemical-free sunscreens should not irritate the skin.    Spray-on sunscreens may be used for touch-up application only, not as a base layer. Also, use with caution around small children due to inhalation risk.    SPF means sun protection factor, which is just the degree to which the sunscreen can protect against UVB rays. There is no rating system for UVA rays. SPF is calculated as the time skin will burn when sunscreen is applied vs. skin without sunscreen.    Water resistant sunscreens should be re-applied every 1-2 hours.    Product Recommendations:    Consider use of sunscreen sticks with Zinc Oxide and Titanium Dioxide active ingredients such as Neutrogena Pure&Free Baby Sunscreen Stick.    Good examples include: Blue Lizard, EltaMD, Solbar    Good daily moisturizers with SPF: Vanicream, CeraVe.    For sensitive skin, consider : Skin[employer] Essential Defense Mineral Shield Broad Spectrum SPF 35    Men: consider use of Neutrogena Triple Protect Facial Lotion    Avoid retinyl palmitate products.  Avoid combination products that include both sunscreen and insect repellant, as sunscreen should be applied every 2 hours, but insect repellant should not be applied as frequently.    For more information:  https://www.skincancer.org/prevention/sun-protection/sunscreen/sunscreens-safe-and-effective

## 2020-08-31 NOTE — PROGRESS NOTES
[facility] - PRIMARY CARE SKIN    CC: skin cancer screening (full-body)  SUBJECTIVE:   Maggie Navarrete is a(n) 70 year old female who presents to clinic today for a full-body skin exam.    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 The upper portion of the incision has been uncomfortable when bending at the waist.      Personal Medical History  Skin cancer: NO  Eczema Psoriasis Lupus   As child NO NO   Uterine leiomyosarcome  Family Medical History  Skin cancer: sister, mother - nonmelanoma  Eczema Psoriasis Lupus   NO NO NO     SPF- 15-30  Occupation: retired    Refer to electronic medical record (EMR) for past medical history and medications.      ROS: 14 point review of systems was negative except the symptoms listed above in the HPI.          OBJECTIVE:   GENERAL: healthy, alert and no distress.  HEENT: PERRL. Conjunctiva, sclera clear.  SKIN: Brooke Skin Type - II.  This patient was examined from the top of the head to the bottom of the feet  including scalp, face, neck, trunk, buttocks, both arms, both legs, both hands, both feet, and all fingers and toes. The dermatoscope was used to help evaluate pigmented lesions.  Skin Pertinent Findings:  Trunk, arms, legs, :           Brown, macule(s) most consistent with benign solar lentigo          Slightly raised, red lesion(s) consistent with capillary hemangioma          Brown macules of various sizes and shapes most consistent with (benign) melanocytic nevi                   Abdomen- surgical scar when healed, no palpable mass.      ASSESSMENT:     Encounter Diagnoses   Name Primary?     Melanocytic nevi of trunk Yes     Capillary hemangioma      Solar lentiginosis      Uterus cancer, sarcoma (H)          PLAN:   Patient Instructions   Yearly skin exam     SUN PROTECTION INSTRUCTIONS  Sun damage can lead to skin cancer and premature aging of the  "skin.    The best way to protect from sun damage to your skin is to avoid the sun during peak hours (10 am - 2 pm) even on overcast days.  Never use tanning beds. Tanning beds are associated with much higher risks of skin cancer.  All tanning damages the skin. Aim for ivory skin year round and you will have less trouble with your skin in years to come. There is no merit in getting \"a base tan\" before a warm weather vacation, as any tanning indicates your body's response to sun damage.  Stop smoking. Smokers have higher rates of skin cancer and also have premature skin wrinkling.  Use UPF sun-protective clothing, which while more expensive initially provides longer lasting coverage without having to worry about remembering to re-apply.  Wear a wide-brimmed hat and sunglasses.   Wear sun-protective clothing.  [employer]. and other [employer] make sun protective clothing that are stylish, comfortable and cool.   Duck [employer] Moose and other Non[employer] make UV arm sleeves suitable for golfing, gardening and other activities.  Sunscreen instructions:  Use sunscreens with Zinc Oxide, Titanium Dioxide or Avobenzone to protect from UVA rays.  Use SPF 30-50+ to protect from UVB rays.  Re-apply every 2 hours even if water resistant.  Apply on your face every day even when cloudy and even in the winter. UVA \"aging rays\" penetrate window glass and are just as strong in the winter as in the summer.    FYI  You should use about 3 tablespoons of sunscreen to protect your whole body. Thus a typical eight ounce bottle of sunscreen should last 4 applications. Remember, that the SPF rating is compromised if you don t apply enough. Most people only apply 1/2 - 1/3 of the amount they need. Also don t forget areas such as your ears, feet, upper back and harder to reach places. Keep in mind that these amounts should be increased for larger body sizes.    Sunscreens with titanium dioxide and/or zinc oxide in the active ingredients are " physical blockers as opposed to chemical blockers. Chemical-free sunscreens should not irritate the skin.    Spray-on sunscreens may be used for touch-up application only, not as a base layer. Also, use with caution around small children due to inhalation risk.    SPF means sun protection factor, which is just the degree to which the sunscreen can protect against UVB rays. There is no rating system for UVA rays. SPF is calculated as the time skin will burn when sunscreen is applied vs. skin without sunscreen.    Water resistant sunscreens should be re-applied every 1-2 hours.    Product Recommendations:  Consider use of sunscreen sticks with Zinc Oxide and Titanium Dioxide active ingredients such as Neutrogena Pure&Free Baby Sunscreen Stick.  Good examples include: Blue Lizard, EltaMD, Solbar  Good daily moisturizers with SPF: Vanicream, CeraVe.  For sensitive skin, consider : SkinMedica Essential Defense Mineral Shield Broad Spectrum SPF 35  Men: consider use of Neutrogena Triple Protect Facial Lotion    Avoid retinyl palmitate products.  Avoid combination products that include both sunscreen and insect repellant, as sunscreen should be applied every 2 hours, but insect repellant should not be applied as frequently.    For more information:  https://www.skincancer.org/prevention/sun-protection/sunscreen/sunscreens-safe-and-effective        The patient was counseled about sunscreens and sun avoidance. The patient was counseled to check the skin regularly and report any lesion that is new, changing, itching, scabbing, bleeding or otherwise bothersome. The patient was discharged ambulatory and in stable condition.        TT: 25 minutes.  CT: 15 minutes.

## 2020-08-31 NOTE — LETTER
8/31/2020         RE: Maggie Navarrete  [address]  [facility] [address]        Dear Colleague,    Thank you for referring your patient, Maggie Navarrete, to the [facility]. Please see a copy of my visit note below.    [facility] - PRIMARY CARE SKIN    CC: skin cancer screening (full-body)  SUBJECTIVE:   Maggie Navarrete is a(n) 70 year old female who presents to clinic today for a full-body skin exam.    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 The upper portion of the incision has been uncomfortable when bending at the waist.      Personal Medical History  Skin cancer: NO  Eczema Psoriasis Lupus   As child NO NO   Uterine leiomyosarcome  Family Medical History  Skin cancer: sister, mother - nonmelanoma  Eczema Psoriasis Lupus   NO NO NO     SPF- 15-30  Occupation: retired    Refer to electronic medical record (EMR) for past medical history and medications.      ROS: 14 point review of systems was negative except the symptoms listed above in the HPI.          OBJECTIVE:   GENERAL: healthy, alert and no distress.  HEENT: PERRL. Conjunctiva, sclera clear.  SKIN: Brooke Skin Type - II.  This patient was examined from the top of the head to the bottom of the feet  including scalp, face, neck, trunk, buttocks, both arms, both legs, both hands, both feet, and all fingers and toes. The dermatoscope was used to help evaluate pigmented lesions.  Skin Pertinent Findings:  Trunk, arms, legs, :           Brown, macule(s) most consistent with benign solar lentigo          Slightly raised, red lesion(s) consistent with capillary hemangioma          Brown macules of various sizes and shapes most consistent with (benign) melanocytic nevi                   Abdomen- surgical scar when healed, no palpable mass.      ASSESSMENT:     Encounter Diagnoses   Name Primary?     Melanocytic  "nevi of trunk Yes     Capillary hemangioma      Solar lentiginosis      Uterus cancer, sarcoma (H)          PLAN:   Patient Instructions   Yearly skin exam     SUN PROTECTION INSTRUCTIONS  Sun damage can lead to skin cancer and premature aging of the skin.      The best way to protect from sun damage to your skin is to avoid the sun during peak hours (10 am - 2 pm) even on overcast days.    Never use tanning beds. Tanning beds are associated with much higher risks of skin cancer.    All tanning damages the skin. Aim for ivory skin year round and you will have less trouble with your skin in years to come. There is no merit in getting \"a base tan\" before a warm weather vacation, as any tanning indicates your body's response to sun damage.    Stop smoking. Smokers have higher rates of skin cancer and also have premature skin wrinkling.    Use UPF sun-protective clothing, which while more expensive initially provides longer lasting coverage without having to worry about remembering to re-apply.  1. Wear a wide-brimmed hat and sunglasses.   2. Wear sun-protective clothing.  [employer] and other [employer] make sun protective clothing that are stylish, comfortable and cool.   Nitro PDF and other [employer] make UV arm sleeves suitable for golfing, gardening and other activities.    Sunscreen instructions:  1. Use sunscreens with Zinc Oxide, Titanium Dioxide or Avobenzone to protect from UVA rays.  2. Use SPF 30-50+ to protect from UVB rays.  3. Re-apply every 2 hours even if water resistant.  4. Apply on your face every day even when cloudy and even in the winter. UVA \"aging rays\" penetrate window glass and are just as strong in the winter as in the summer.    FYI  You should use about 3 tablespoons of sunscreen to protect your whole body. Thus a typical eight ounce bottle of sunscreen should last 4 applications. Remember, that the SPF rating is compromised if you don t apply enough. Most people only apply 1/2 " - 1/3 of the amount they need. Also don t forget areas such as your ears, feet, upper back and harder to reach places. Keep in mind that these amounts should be increased for larger body sizes.    Sunscreens with titanium dioxide and/or zinc oxide in the active ingredients are physical blockers as opposed to chemical blockers. Chemical-free sunscreens should not irritate the skin.    Spray-on sunscreens may be used for touch-up application only, not as a base layer. Also, use with caution around small children due to inhalation risk.    SPF means sun protection factor, which is just the degree to which the sunscreen can protect against UVB rays. There is no rating system for UVA rays. SPF is calculated as the time skin will burn when sunscreen is applied vs. skin without sunscreen.    Water resistant sunscreens should be re-applied every 1-2 hours.    Product Recommendations:    Consider use of sunscreen sticks with Zinc Oxide and Titanium Dioxide active ingredients such as Neutrogena Pure&Free Baby Sunscreen Stick.    Good examples include: Blue Lizard, EltaMD, Solbar    Good daily moisturizers with SPF: Vanicream, CeraVe.    For sensitive skin, consider : SkinMedica Essential Defense Mineral Shield Broad Spectrum SPF 35    Men: consider use of Neutrogena Triple Protect Facial Lotion    Avoid retinyl palmitate products.  Avoid combination products that include both sunscreen and insect repellant, as sunscreen should be applied every 2 hours, but insect repellant should not be applied as frequently.    For more information:  https://www.skincancer.org/prevention/sun-protection/sunscreen/sunscreens-safe-and-effective        The patient was counseled about sunscreens and sun avoidance. The patient was counseled to check the skin regularly and report any lesion that is new, changing, itching, scabbing, bleeding or otherwise bothersome. The patient was discharged ambulatory and in stable condition.        TT: 25  minutes.  CT: 15 minutes.          Again, thank you for allowing me to participate in the care of your patient.        Sincerely,        Susu Burton MD

## 2020-09-08 LAB
LAB SCANNED RESULT: NORMAL
MISCELLANEOUS TEST: NORMAL

## 2020-09-23 ENCOUNTER — ANCILLARY PROCEDURE (OUTPATIENT)
Dept: CT IMAGING | Facility: CLINIC | Age: 70
End: 2020-09-23
Attending: INTERNAL MEDICINE
Payer: MEDICARE

## 2020-09-23 DIAGNOSIS — C49.9 LEIOMYOSARCOMA (H): ICD-10-CM

## 2020-09-23 LAB
ALBUMIN SERPL-MCNC: 3.2 G/DL (ref 3.4–5)
ALP SERPL-CCNC: 77 U/L (ref 40–150)
ALT SERPL W P-5'-P-CCNC: 21 U/L (ref 0–50)
ANION GAP SERPL CALCULATED.3IONS-SCNC: 6 MMOL/L (ref 3–14)
AST SERPL W P-5'-P-CCNC: 8 U/L (ref 0–45)
BASOPHILS # BLD AUTO: 0 10E9/L (ref 0–0.2)
BASOPHILS NFR BLD AUTO: 0.5 %
BILIRUB SERPL-MCNC: 0.3 MG/DL (ref 0.2–1.3)
BUN SERPL-MCNC: 29 MG/DL (ref 7–30)
CALCIUM SERPL-MCNC: 8.7 MG/DL (ref 8.5–10.1)
CHLORIDE SERPL-SCNC: 105 MMOL/L (ref 94–109)
CO2 SERPL-SCNC: 28 MMOL/L (ref 20–32)
CREAT BLD-MCNC: 1 MG/DL (ref 0.52–1.04)
CREAT SERPL-MCNC: 0.9 MG/DL (ref 0.52–1.04)
DIFFERENTIAL METHOD BLD: NORMAL
EOSINOPHIL # BLD AUTO: 0.2 10E9/L (ref 0–0.7)
EOSINOPHIL NFR BLD AUTO: 3.8 %
ERYTHROCYTE [DISTWIDTH] IN BLOOD BY AUTOMATED COUNT: 14.6 % (ref 10–15)
GFR SERPL CREATININE-BSD FRML MDRD: 55 ML/MIN/{1.73_M2}
GFR SERPL CREATININE-BSD FRML MDRD: 65 ML/MIN/{1.73_M2}
GLUCOSE SERPL-MCNC: 72 MG/DL (ref 70–99)
HCT VFR BLD AUTO: 37.4 % (ref 35–47)
HGB BLD-MCNC: 12 G/DL (ref 11.7–15.7)
IMM GRANULOCYTES # BLD: 0 10E9/L (ref 0–0.4)
IMM GRANULOCYTES NFR BLD: 0.2 %
LYMPHOCYTES # BLD AUTO: 2.4 10E9/L (ref 0.8–5.3)
LYMPHOCYTES NFR BLD AUTO: 40.3 %
MCH RBC QN AUTO: 28.7 PG (ref 26.5–33)
MCHC RBC AUTO-ENTMCNC: 32.1 G/DL (ref 31.5–36.5)
MCV RBC AUTO: 90 FL (ref 78–100)
MONOCYTES # BLD AUTO: 0.6 10E9/L (ref 0–1.3)
MONOCYTES NFR BLD AUTO: 9.1 %
NEUTROPHILS # BLD AUTO: 2.8 10E9/L (ref 1.6–8.3)
NEUTROPHILS NFR BLD AUTO: 46.1 %
NRBC # BLD AUTO: 0 10*3/UL
NRBC BLD AUTO-RTO: 0 /100
PLATELET # BLD AUTO: 210 10E9/L (ref 150–450)
POTASSIUM SERPL-SCNC: 4 MMOL/L (ref 3.4–5.3)
PROT SERPL-MCNC: 6.5 G/DL (ref 6.8–8.8)
RBC # BLD AUTO: 4.18 10E12/L (ref 3.8–5.2)
SODIUM SERPL-SCNC: 138 MMOL/L (ref 133–144)
WBC # BLD AUTO: 6.1 10E9/L (ref 4–11)

## 2020-09-23 RX ORDER — IOPAMIDOL 755 MG/ML
82 INJECTION, SOLUTION INTRAVASCULAR ONCE
Status: COMPLETED | OUTPATIENT
Start: 2020-09-23 | End: 2020-09-23

## 2020-09-23 RX ADMIN — IOPAMIDOL 82 ML: 755 INJECTION, SOLUTION INTRAVASCULAR at 11:52

## 2020-09-24 ENCOUNTER — VIRTUAL VISIT (OUTPATIENT)
Dept: ONCOLOGY | Facility: CLINIC | Age: 70
End: 2020-09-24
Attending: INTERNAL MEDICINE
Payer: MEDICARE

## 2020-09-24 DIAGNOSIS — C55 UTERINE LEIOMYOSARCOMA (H): Primary | ICD-10-CM

## 2020-09-24 PROCEDURE — 40001009 ZZH VIDEO/TELEPHONE VISIT; NO CHARGE

## 2020-09-24 PROCEDURE — 99214 OFFICE O/P EST MOD 30 MIN: CPT | Mod: 95 | Performed by: INTERNAL MEDICINE

## 2020-09-24 RX ORDER — IBUPROFEN 200 MG
400 TABLET ORAL EVERY 4 HOURS PRN
COMMUNITY
End: 2021-04-29

## 2020-09-24 NOTE — LETTER
9/24/2020         RE: Maggie Navarrete  [address]  [place] [address]        Dear Colleague,    Thank you for referring your patient, Maggie Navarrete, to the [facility]. Please see a copy of my visit note below.    MEDICAL ONCOLOGY PROGRESS NOTE  Sarcoma Clinic  Sep 24, 2020    Oncologic history:  1. January 2016, she presents to her PCP with 3 weeks of abdominal pain and severe constipation. She is treated for constipation and improves.  2. 3/21/2016, worsening pelvic pressure prompts pelvic examination with an enlarged uterus noted. Transvaginal ultrasound shows enlargement of subserosal fibroids originally seen on MRI in 2006.  3. 5/9/2016, MRI pelvis is obtained, which shows several enhancing uterine masses involving an enlarged uterus, felt radiographically compatible with benign uterine leiomyomata.  3. Summer 2016, she experiences worsening low back pain thought at first thought to be sacroiliitis, but pain persists after corticosteroid injection. Continues with severe constipation.  4. 9/8/2016, she undergoes colonoscopy for persistent abdominal pain. She has sigmoid polyp snared. Abdominal pain and diarrhea persist after colonoscopy prep.  5. 9/14/2016, she has worsening abdominal pain and is evaluated in the ED. CT-scan shows a large subserosal fibroid measuring 9.4 x 9.6 x 9.8 cm, as well as very large fibroids along the posterior uterine body more inferiorly, increased in size to 14 cm in greatest diameter compared to 10 cm in greatest diameter on MRI of 5/9/2016.  6. 9/29/2016, she has staging CT-CAP, which shows a small left sided pleural effusion and a 3 mm pulmonary nodule in the RML.  7. October 2016, self-refers to [facility].  8. 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  9. 11/4/2016, undergoes total abdominal hysterectomy and BSO, under Dr. Harsh Camarillo. Surgery was originally presumed for leiomyoma,  however, intraoperatively there was focal adherence of a mass to the pelvic right sidewall and cul-de-sac, which led to peritoneal resection and omentectomy. Final pathology, pT2b: showed leiomyosarcoma involving the cul-de-sac and forming two separate masses measuring 10.5 x 6.5 x 4.5 cm and 10.7 x 7.9 x 7.5 cm.  10. 12/26/2016, she starts adjuvant chemotherapy with doxorubicin 25 mg/m2/day and DTIC 250 mg/m2/day , days 1-3 every 21 weeks.  11. 3/23/2017, she completes 5 cycles of adjuvant doxorubicin and dacarbazine  12. 6/3/2020, restaging MRI shows a locally recurrent pelvic sidewall mass measuring 2.8 x 3.3 cm in size. This new area of concern was not previously reported, presumably due to proximity to bowel and the similar appearance to adjacent loops of bowel on the contrast enhanced imaging. On review of prior imaging studies, the lesion can be seen as far back as January 2019 as a small nodule measuring 1 cm in size directly adjacent to bowel, close to the pelvic sidewall.  13. 6/16/2020, she had resection of the recurrent leiomyosarcoma tumor, which was densely attached to the right ureter and attached to the right side of the sigmoid mesentery and external and internal iliac vessels.  There was external iliac vein injury with primary repair due to involvement of the mass. The ureter was also partially removed as part of the tumor mass. All margins and the ureter that was resected was negative for tumor. Pathology (JR-) showed a 3.7 x 3.5 x 2.2 cm thinly encapsulated mass, which was submitted for frozen sections.      HISTORY OF PRESENT ILLNESS  Ms. Navarrete presents today in follow-up and to restaging.    She had her urinary stent removed in July. She is feeling well, and feels life is almost back to normal. She went up to [facility] last week with her dogs to enjoy the last week of summer.    CT-CAP shows no new evidence of recurrence or metastasis, but does show a small nohemi-rectal fluid collection  on the right. Possibly related to her surgery for ureteral mobilization/attachment at bladder or possibly a lymphocoele.    She denies nausea, vomiting, or diarrhea. She denies fevers, chills, nausea, vomiting, diarrhea.    ECOG performance status is 0.    REVIEW OF SYSTEMS  A 12-point ROS negative except as in HPI    Current Outpatient Medications   Medication Sig Dispense Refill     ibuprofen (ADVIL) 200 MG tablet Take 400 mg by mouth every 4 hours as needed for mild pain       levothyroxine (SYNTHROID/LEVOTHROID) 75 MCG tablet Take 1 tablet (75 mcg) by mouth daily For additional refills, please schedule a yearly appointment with Dr Lin at [phone] 90 tablet 0     Multiple Vitamin (MULTIVITAMINS PO) Take 1 tablet by mouth daily Reported on 4/10/2017       venlafaxine (EFFEXOR-XR) 75 MG 24 hr capsule TAKE 1 CAPSULE DAILY 90 capsule 0     zolpidem (AMBIEN) 5 MG tablet Take 1 tablet (5 mg) by mouth nightly as needed for sleep (Only uses if traveling or cannot sleep,) 20 tablet 0       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  Wt Readings from Last 2 Encounters:   07/08/20 61.2 kg (135 lb)   06/28/20 65.5 kg (144 lb 4.8 oz)   General: Well appearing, at home,  "in no distress  Head: Normocephalic  Eyes: EOMI, No icterus  ENT: Oropharynx clear  Lungs: Easy breathing on room air, speaking in complete sentences without dyspnea  Neuro: Cranial nerves 2-12 intact, no focal deficits  Skin: No rashes on exposed skin    The rest of a comprehensive physical examination is deferred due to PHE (public health emergency) video visit restrictions.      ASSESSMENT AND PLAN  #1 Locally advanced leiomyosarcoma of the uterus, pT2b Nx M0, Stage IIB, status-post resection (11/2016) and 5 cycles of adjuvant doxorubicin and dacarbazine  #2 Local recurrence, leiomyosarcoma of the uterus, right pelvic sidewall adherent to iliac vessels and ureter s/p resection June 2020  It was a pleasure to see Ms. Navarrete today. In June she had surgical resection of the local recurrence. She has continued to do well after surgery, but has a small perirectal fluid accumulation/lymphocoele we are seeing. I suspect this is related to her surgery or potentially the stent removal. I have asked her to discuss with her surgical team to make them aware.    Otherwise, we will plan to continue with close observation. Will plan to repeat CT-CAP in 3 months.    Questions answered.    Ab Warner M.D.   of Medicine  Hematology, Oncology and Transplantation        Maggie Navarrete is a 70 year old female who is being evaluated via a billable video visit.      The patient has been notified of following:     \"This video visit will be conducted via a call between you and your physician/provider. We have found that certain health care needs can be provided without the need for an in-person physical exam.  This service lets us provide the care you need with a video conversation.  If a prescription is necessary we can send it directly to your pharmacy.  If lab work is needed we can place an order for that and you can then stop by our lab to have the test done at a later time.    Video visits are billed " "at different rates depending on your insurance coverage.  Please reach out to your insurance provider with any questions.    If during the course of the call the physician/provider feels a video visit is not appropriate, you will not be charged for this service.\"    Patient has given verbal consent for Video visit? Yes  How would you like to obtain your AVS? [facility]     Text invite to [phone].     If you are dropped from the video visit, the video invite should be resent to: Text to cell phone: [phone]  Will anyone else be joining your video visit? No      Patient had no vitals to report.   0/10 on pain scale.     No refills needed.   No additional concerns.     Karis Bell [facility]      Video-Visit Details    Type of service:  Video Visit    Video Start Time: 12:50 PM  Video End Time: 1:28 PM    Originating Location (pt. Location): Home    Distant Location (provider location):  [facility]     Platform used for Video Visit: Well            Again, thank you for allowing me to participate in the care of your patient.        Sincerely,        Ab Aguilar MD    "

## 2020-09-24 NOTE — PROGRESS NOTES
MEDICAL ONCOLOGY PROGRESS NOTE  [facility]  Sep 24, 2020    Oncologic history:  1. January 2016, she presents to her PCP with 3 weeks of abdominal pain and severe constipation. She is treated for constipation and improves.  2. 3/21/2016, worsening pelvic pressure prompts pelvic examination with an enlarged uterus noted. Transvaginal ultrasound shows enlargement of subserosal fibroids originally seen on MRI in 2006.  3. 5/9/2016, MRI pelvis is obtained, which shows several enhancing uterine masses involving an enlarged uterus, felt radiographically compatible with benign uterine leiomyomata.  3. Summer 2016, she experiences worsening low back pain thought at first thought to be sacroiliitis, but pain persists after corticosteroid injection. Continues with severe constipation.  4. 9/8/2016, she undergoes colonoscopy for persistent abdominal pain. She has sigmoid polyp snared. Abdominal pain and diarrhea persist after colonoscopy prep.  5. 9/14/2016, she has worsening abdominal pain and is evaluated in the ED. CT-scan shows a large subserosal fibroid measuring 9.4 x 9.6 x 9.8 cm, as well as very large fibroids along the posterior uterine body more inferiorly, increased in size to 14 cm in greatest diameter compared to 10 cm in greatest diameter on MRI of 5/9/2016.  6. 9/29/2016, she has staging CT-CAP, which shows a small left sided pleural effusion and a 3 mm pulmonary nodule in the RML.  7. October 2016, self-refers to [facility].  8. 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  9. 11/4/2016, undergoes total abdominal hysterectomy and BSO, under Dr. Harsh Camarillo. Surgery was originally presumed for leiomyoma, however, intraoperatively there was focal adherence of a mass to the pelvic right sidewall and cul-de-sac, which led to peritoneal resection and omentectomy. Final pathology, pT2b: showed leiomyosarcoma involving the cul-de-sac and forming two separate  masses measuring 10.5 x 6.5 x 4.5 cm and 10.7 x 7.9 x 7.5 cm.  10. 12/26/2016, she starts adjuvant chemotherapy with doxorubicin 25 mg/m2/day and DTIC 250 mg/m2/day , days 1-3 every 21 weeks.  11. 3/23/2017, she completes 5 cycles of adjuvant doxorubicin and dacarbazine  12. 6/3/2020, restaging MRI shows a locally recurrent pelvic sidewall mass measuring 2.8 x 3.3 cm in size. This new area of concern was not previously reported, presumably due to proximity to bowel and the similar appearance to adjacent loops of bowel on the contrast enhanced imaging. On review of prior imaging studies, the lesion can be seen as far back as January 2019 as a small nodule measuring 1 cm in size directly adjacent to bowel, close to the pelvic sidewall.  13. 6/16/2020, she had resection of the recurrent leiomyosarcoma tumor, which was densely attached to the right ureter and attached to the right side of the sigmoid mesentery and external and internal iliac vessels.  There was external iliac vein injury with primary repair due to involvement of the mass. The ureter was also partially removed as part of the tumor mass. All margins and the ureter that was resected was negative for tumor. Pathology (JR-) showed a 3.7 x 3.5 x 2.2 cm thinly encapsulated mass, which was submitted for frozen sections.      HISTORY OF PRESENT ILLNESS  Ms. Navarrete presents today in follow-up and to restaging.    She had her urinary stent removed in July. She is feeling well, and feels life is almost back to normal. She went up to [facility] last week with her dogs to enjoy the last week of summer.    CT-CAP shows no new evidence of recurrence or metastasis, but does show a small nohemi-rectal fluid collection on the right. Possibly related to her surgery for ureteral mobilization/attachment at bladder or possibly a lymphocoele.    She denies nausea, vomiting, or diarrhea. She denies fevers, chills, nausea, vomiting, diarrhea.    ECOG performance status is  0.    REVIEW OF SYSTEMS  A 12-point ROS negative except as in HPI    Current Outpatient Medications   Medication Sig Dispense Refill     ibuprofen (ADVIL) 200 MG tablet Take 400 mg by mouth every 4 hours as needed for mild pain       levothyroxine (SYNTHROID/LEVOTHROID) 75 MCG tablet Take 1 tablet (75 mcg) by mouth daily For additional refills, please schedule a yearly appointment with Dr Lin at [phone] 90 tablet 0     Multiple Vitamin (MULTIVITAMINS PO) Take 1 tablet by mouth daily Reported on 4/10/2017       venlafaxine (EFFEXOR-XR) 75 MG 24 hr capsule TAKE 1 CAPSULE DAILY 90 capsule 0     zolpidem (AMBIEN) 5 MG tablet Take 1 tablet (5 mg) by mouth nightly as needed for sleep (Only uses if traveling or cannot sleep,) 20 tablet 0       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  Wt Readings from Last 2 Encounters:   07/08/20 61.2 kg (135 lb)   06/28/20 65.5 kg (144 lb 4.8 oz)   General: Well appearing, at home, in no distress  Head: Normocephalic  Eyes: EOMI, No icterus  ENT: Oropharynx clear  Lungs: Easy breathing on room air, speaking in complete sentences without dyspnea  Neuro: Cranial nerves 2-12 intact, no focal deficits  Skin: No rashes on exposed  "skin    The rest of a comprehensive physical examination is deferred due to [facility] (public health emergency) video visit restrictions.      ASSESSMENT AND PLAN  #1 Locally advanced leiomyosarcoma of the uterus, pT2b Nx M0, Stage IIB, status-post resection (11/2016) and 5 cycles of adjuvant doxorubicin and dacarbazine  #2 Local recurrence, leiomyosarcoma of the uterus, right pelvic sidewall adherent to iliac vessels and ureter s/p resection June 2020  It was a pleasure to see Ms. Navarrete today. In June she had surgical resection of the local recurrence. She has continued to do well after surgery, but has a small perirectal fluid accumulation/lymphocoele we are seeing. I suspect this is related to her surgery or potentially the stent removal. I have asked her to discuss with her surgical team to make them aware.    Otherwise, we will plan to continue with close observation. Will plan to repeat CT-CAP in 3 months.    Questions answered.    Ab Warner M.D.   of Medicine  Hematology, Oncology and Transplantation        Maggie Navarrete is a 70 year old female who is being evaluated via a billable video visit.      The patient has been notified of following:     \"This video visit will be conducted via a call between you and your physician/provider. We have found that certain health care needs can be provided without the need for an in-person physical exam.  This service lets us provide the care you need with a video conversation.  If a prescription is necessary we can send it directly to your pharmacy.  If lab work is needed we can place an order for that and you can then stop by our lab to have the test done at a later time.    Video visits are billed at different rates depending on your insurance coverage.  Please reach out to your insurance provider with any questions.    If during the course of the call the physician/provider feels a video visit is not appropriate, you will not be charged for " "this service.\"    Patient has given verbal consent for Video visit? Yes  How would you like to obtain your AVS? FaceOn Mobile     Text invite to [phone].     If you are dropped from the video visit, the video invite should be resent to: Text to cell phone: [phone]  Will anyone else be joining your video visit? No      Patient had no vitals to report.   0/10 on pain scale.     No refills needed.   No additional concerns.     Karis Bell CMA      Video-Visit Details    Type of service:  Video Visit    Video Start Time: 12:50 PM  Video End Time: 1:28 PM    Originating Location (pt. Location): Home    Distant Location (provider location):  [facility]     Platform used for Video Visit: TarahWell          "

## 2020-10-06 ENCOUNTER — VIRTUAL VISIT (OUTPATIENT)
Dept: ONCOLOGY | Facility: CLINIC | Age: 70
End: 2020-10-06
Attending: INTERNAL MEDICINE
Payer: MEDICARE

## 2020-10-06 DIAGNOSIS — Z80.3 FAMILY HISTORY OF MALIGNANT NEOPLASM OF BREAST: ICD-10-CM

## 2020-10-06 DIAGNOSIS — Z80.8 FAMILY HISTORY OF GLIOBLASTOMA: ICD-10-CM

## 2020-10-06 DIAGNOSIS — Z80.0 FAMILY HISTORY OF STOMACH CANCER: ICD-10-CM

## 2020-10-06 DIAGNOSIS — C55 UTERINE LEIOMYOSARCOMA (H): Primary | ICD-10-CM

## 2020-10-06 PROCEDURE — 96040 HC GENETIC COUNSELING, EACH 30 MINUTES: CPT | Mod: GT | Performed by: GENETIC COUNSELOR, MS

## 2020-10-06 PROCEDURE — 99207 PR NO CHARGE LOS: CPT | Performed by: GENETIC COUNSELOR, MS

## 2020-10-06 NOTE — LETTER
"    Cancer Risk Management  Program [facility]  [facility]  [facility]  Mailing Address  Cancer Risk Management Program  [facility]  [address] [facility] [address]  [place] [address]    New patient appointments  [phone]  October 9, 2020    Maggie Navarrete  [address]  [place] [address]      Dear Maggie,    It was a pleasure speaking with you over the phone recently regarding your genetic testing results. Here is a copy of the progress note summarizing our conversation. If you have any additional questions, please feel free to call.    10/6/2020    Maggie Navarrete is a 70 year old female who is being evaluated via a billable video visit.      The patient has been notified of following:     \"This video visit will be conducted via a call between you and your provider. We have found that certain health care needs can be provided without the need for an in-person physical exam. This service lets us provide the care you need with a video conversation. If lab work is needed we can place an order for that and you can then stop by our lab to have the test done at a later time.    Video visits are billed at different rates depending on your insurance coverage. Please reach out to your insurance provider with any questions.    If during the course of the call the provider feels a video visit is not appropriate, you will not be charged for this service.\"    Patient has given verbal consent for Video visit? Yes  How would you like to obtain your AVS? MyChart  If you are dropped from the video visit, the video invite should be resent to: Send to e-mail at: [email or web address]  Will anyone else be joining your video visit? No    Referring Provider: Ab Aguilar MD    Presenting Information:  Given concerns regarding the " potential for COVID-19 exposure during a clinic visit, Maggie elected for a video genetic counseling to discuss her genetic testing results. We previously met on 8/18/2020 and her saliva sample was collected on 8/23/2020. The CancerNext-Expanded panel was ordered from [facility]. This testing was done because of Maggie's personal and family history of multiple cancers.    Genetic Testing Result: NEGATIVE  Maggie is negative for mutations in the AIP, ALK, APC, DARIO, AXIN2, BAP1, BARD1, BLM, BMPR1A, BRCA1, BRCA2, BRIP1, CDC73, CDH1, CDK4, CDKN1B, CDKN2A, CHEK2, CTNNA1, DICER1, EPCAM, EGFR, EGLN1, FANCC, FH, FLCN, GALNT12, GREM1, HOXB13, KIF1B, KIT, LZTR1, MAX, MEN1, MET, MITF, MLH1, MRE11A, MSH2, MSH3, MSH6, MUTYH, NBN, NF1, NF2, NTHL1, PALB2, PDGFRA, PHOX2B, PMS2, POLD1, POLE, POT1, WZAJR1N, PTCH1, PTEN, RAD50, RAD51C, RAD51D, RB1, RECQL, RET, SDHA, SDHAF2, SDHB, SDHC, SDHD, SMAD4, SMARCA4, SMARCB1, SMARCE1, STK11, SUFU, THQF764, TP53, TSC1, TSC2, VHL, and XRCC2 genes. No mutations were found in any of the 77 genes analyzed. This test involved sequencing and deletion/duplication analysis of all genes with the exceptions of EGFR, EGLN1, HOXB13, KIT, MITF, PDGFRA, POLD1, POLE (sequencing only) and EPCAM, GREM1 (deletions/duplications only).    Testing did not detect an identifiable mutation associated with Hereditary Breast and Ovarian Cancer syndrome (BRCA1, BRCA2), Moraes syndrome (MLH1, MSH2, MSH6, PMS2, EPCAM), Familial Adenomatous Polyposis (APC), Hereditary Diffuse Gastric Cancer (CDH1), Familial Atypical Multiple Mole Melanoma syndrome (CDK4, CDKN2A), Juvenile Polyposis syndrome (BMPR1A, SMAD4), Cowden syndrome (PTEN), Li Fraumeni syndrome (TP53), Peutz-Jeghers syndrome (STK11), MUTYH Associated Polyposis (MUTYH), Hereditary Leiomyomatosis and Renal Cell Cancer (FH), Jogc-Fzan-Eiii (FLCN), Hereditary Papillary Renal Carcinoma (MET), Hereditary Paraganglioma and Pheochromocytoma syndrome (SDHA, SDHAF2, SDHB,  "SDHC, SDHD), Multiple Endocrine Neoplasia type 2 (RET), Tuberous sclerosis complex (TSC1, TSC2), Von Hippel-Lindau disease (VHL), Neurofibromatosis type 1 (NF1), Neurofibromatosis type 2 (NF2), Multiple Endocrine Neoplasia type 1 (MEN1), Multiple Endocrine Neoplasia type 4 (CDKN1B), Gorlin syndrome (SUFU, PTCH1), and Collazo complex ([id number]).    A copy of the test report can be found in the Laboratory tab, dated 8/23/2020, and named \"[facility]\". The report is scanned in as a linked document.    Interpretation:  We discussed several different interpretations of this negative test result.    1. One explanation may be that there is a different gene or combination of genes and environment that are associated with the cancers in this family that are not identifiable using this test. As such, Maggie is encouraged to contact me regularly to review any new genetic testing options that may be appropriate for her.  2. Another explanation may be that her other relatives with cancer, such as her son and/or mother, did have a mutation in one of these 77 genes and Maggie did not inherit it. If that is the case, Bryans leiomyosarcoma may have been sporadic and caused by random cellular changes.  3. There is also a small possibility that there is a mutation in one of these genes, and the testing laboratory could not find it with their current testing methods.       Screening:  Based on this negative test result, it is important for Maggie and her relatives to refer back to the family history for appropriate cancer screening.      Maggie should continue to follow all leiomyosarcoma treatment and screening recommendations as made by her medical providers.    Based on her personal and family history, Maggie has a 4.6% lifetime risk of developing breast cancer based on the JUSTYN 8 model. Therefore, Maggie does not meet current National Comprehensive Cancer Network (NCCN) guidelines for high risk breast screening, " which is offered to women with a 20% lifetime risk or higher. However, it is still important for Maggie to continue with routine breast screening under the care of her physicians.    Bryans close relatives may be at increased risk for leiomyosarcoma given the family history. They are encouraged to share the family history and any concerning symptoms with their primary care providers, as they may have more individualized recommendations.    Maggie s close female relatives may also remain at increased risk for breast cancer given their family history. Breast screening options should be discussed with an individual's primary care provider and a genetic counselor, to determine at what age to begin screening, what screening is appropriate, and if additional screening (such as breast MRI) is necessary based on personal/family history factors.    Maggie and her close relatives are likely at some increased risk for brain tumors given the family history. They are encouraged to share the family history and any concerning symptoms with their primary care provider, as they may have more individualized recommendations.    Other population cancer screening options, such as those recommended by the American Cancer Society and NCCN, are also appropriate for Maggie and her family. These screening recommendations may change if there are changes to Maggie's personal and/or family history of cancer. Final screening recommendations should be made by each individual's managing physician.     Inheritance:  We reviewed the autosomal dominant inheritance of mutations in these 77 genes. We discussed that Maggie did not pass on an identifiable mutation in these genes to her son based on this test result. Mutations in these genes do not skip generations.      Additional Testing Considerations:  Although Maggie's genetic testing result was negative, other relatives may still carry a gene mutation associated with hereditary cancer.  Genetic counseling is recommended for her siblings and extended paternal relatives to discuss their genetic testing options. Maggie's grandchildren could also consider meeting with a genetic counselor. If any of these relatives do pursue genetic testing, Maggie is encouraged to contact me so that we may review the impact of their test results on her.    Maggie explained that she is also in discussions with a research laboratory regarding the potential for paired genetic testing on her son's brain tumor and sample of healthy tissue. If this testing is pursued, I encouraged Maggie to contact me if she wishes to discuss the results and any impact they may have on Maggie and/or other relatives. She verbalized understanding.    Summary:  We do not have an explanation for Maggie's leiomyosarcoma or family history of cancer. While no genetic changes were identified, Maggie may still be at risk for certain cancers due to family history, environmental factors, or other genetic causes not identified by this test. Because of that, it is important that she continue with cancer screening based on her personal and family history as discussed above.    Genetic testing is rapidly advancing, and new cancer susceptibility genes will most likely be identified in the future. Therefore, I encouraged Maggie to contact me regularly or if there are changes in her personal or family history. This may change how we assess her cancer risk, screening, and the testing we would offer.    Plan:  1. Maggie will be mailed a copy of her test results.  2. She plans to follow-up with her medical providers, as needed.  3. She should contact me regularly and/or if her family history changes.    If Maggie has any further questions, I encouraged her to contact me at [phone].    Kym Ferraro MS, [facility]  Licensed Genetic Counselor  Office: [phone]  Pager: [phone]    Video-Visit Details  Type of service:  Video Visit  Video Start  Time: 3:24pm (patient experienced video/audio problems for approximately two minutes)  Video End Time: 3:50pm  Originating Location (pt. Location): Home  Distant Location (provider location):  [facility] CANCER [facility]   Platform used for Video Visit: [place]

## 2020-10-06 NOTE — Clinical Note
"Please print and send a copy of this letter and the patient's genetic testing report to the patient.    Please enclose test report: \"Send outs misc test Order: [id number]\"  "

## 2020-10-06 NOTE — PROGRESS NOTES
"10/9/2020    Maggie Navarrete is a 70 year old female who is being evaluated via a billable video visit.      The patient has been notified of following:     \"This video visit will be conducted via a call between you and your provider. We have found that certain health care needs can be provided without the need for an in-person physical exam. This service lets us provide the care you need with a video conversation. If lab work is needed we can place an order for that and you can then stop by our lab to have the test done at a later time.    Video visits are billed at different rates depending on your insurance coverage. Please reach out to your insurance provider with any questions.    If during the course of the call the provider feels a video visit is not appropriate, you will not be charged for this service.\"    Patient has given verbal consent for Video visit? Yes  How would you like to obtain your AVS? MyChart  If you are dropped from the video visit, the video invite should be resent to: Send to e-mail at: [email or web address]  Will anyone else be joining your video visit? No    Referring Provider: Ab Aguilar MD    Presenting Information:  Given concerns regarding the potential for COVID-19 exposure during a clinic visit, Maggie elected for a video genetic counseling to discuss her genetic testing results. We previously met on 8/18/2020 and her saliva sample was collected on 8/23/2020. The CancerNext-Expanded panel was ordered from [employer]. This testing was done because of Maggie's personal and family history of multiple cancers.    Genetic Testing Result: NEGATIVE  Maggie is negative for mutations in the AIP, ALK, APC, DARIO, AXIN2, BAP1, BARD1, BLM, BMPR1A, BRCA1, BRCA2, BRIP1, CDC73, CDH1, CDK4, CDKN1B, CDKN2A, CHEK2, CTNNA1, DICER1, EPCAM, EGFR, EGLN1, FANCC, FH, FLCN, GALNT12, GREM1, HOXB13, KIF1B, KIT, LZTR1, MAX, MEN1, MET, MITF, MLH1, MRE11A, MSH2, MSH3, MSH6, MUTYH, NBN, NF1, NF2, " "NTHL1, PALB2, PDGFRA, PHOX2B, PMS2, POLD1, POLE, POT1, ZMHPD8W, PTCH1, PTEN, RAD50, RAD51C, RAD51D, RB1, RECQL, RET, SDHA, SDHAF2, SDHB, SDHC, SDHD, SMAD4, SMARCA4, SMARCB1, SMARCE1, STK11, SUFU, KPFB621, TP53, TSC1, TSC2, VHL, and XRCC2 genes. No mutations were found in any of the 77 genes analyzed. This test involved sequencing and deletion/duplication analysis of all genes with the exceptions of EGFR, EGLN1, HOXB13, KIT, MITF, PDGFRA, POLD1, POLE (sequencing only) and EPCAM, GREM1 (deletions/duplications only).    Testing did not detect an identifiable mutation associated with Hereditary Breast and Ovarian Cancer syndrome (BRCA1, BRCA2), Moraes syndrome (MLH1, MSH2, MSH6, PMS2, EPCAM), Familial Adenomatous Polyposis (APC), Hereditary Diffuse Gastric Cancer (CDH1), Familial Atypical Multiple Mole Melanoma syndrome (CDK4, CDKN2A), Juvenile Polyposis syndrome (BMPR1A, SMAD4), Cowden syndrome (PTEN), Li Fraumeni syndrome (TP53), Peutz-Jeghers syndrome (STK11), MUTYH Associated Polyposis (MUTYH), Hereditary Leiomyomatosis and Renal Cell Cancer (FH), Cama-Uniw-Gbma (FLCN), Hereditary Papillary Renal Carcinoma (MET), Hereditary Paraganglioma and Pheochromocytoma syndrome (SDHA, SDHAF2, SDHB, SDHC, SDHD), Multiple Endocrine Neoplasia type 2 (RET), Tuberous sclerosis complex (TSC1, TSC2), Von Hippel-Lindau disease (VHL), Neurofibromatosis type 1 (NF1), Neurofibromatosis type 2 (NF2), Multiple Endocrine Neoplasia type 1 (MEN1), Multiple Endocrine Neoplasia type 4 (CDKN1B), Gorlin syndrome (SUFU, PTCH1), and Collazo complex (OQGBL6Y).    A copy of the test report can be found in the Laboratory tab, dated 8/23/2020, and named \"[facility]\". The report is scanned in as a linked document.    Interpretation:  We discussed several different interpretations of this negative test result.    1. One explanation may be that there is a different gene or combination of genes and environment that are associated with the cancers in " this family that are not identifiable using this test. As such, Maggie is encouraged to contact me regularly to review any new genetic testing options that may be appropriate for her.  2. Another explanation may be that her other relatives with cancer, such as her son and/or mother, did have a mutation in one of these 77 genes and Maggie did not inherit it. If that is the case, Bryans leiomyosarcoma may have been sporadic and caused by random cellular changes.  3. There is also a small possibility that there is a mutation in one of these genes, and the testing laboratory could not find it with their current testing methods.       Screening:  Based on this negative test result, it is important for Maggie and her relatives to refer back to the family history for appropriate cancer screening.      Maggie should continue to follow all leiomyosarcoma treatment and screening recommendations as made by her medical providers.    Based on her personal and family history, Maggie has a 4.6% lifetime risk of developing breast cancer based on the JUSTYN 8 model. Therefore, Maggie does not meet current National Comprehensive Cancer Network (NCCN) guidelines for high risk breast screening, which is offered to women with a 20% lifetime risk or higher. However, it is still important for Maggie to continue with routine breast screening under the care of her physicians.    Bryans close relatives may be at increased risk for leiomyosarcoma given the family history. They are encouraged to share the family history and any concerning symptoms with their primary care providers, as they may have more individualized recommendations.    Maggie s close female relatives may also remain at increased risk for breast cancer given their family history. Breast screening options should be discussed with an individual's primary care provider and a genetic counselor, to determine at what age to begin screening, what screening is  appropriate, and if additional screening (such as breast MRI) is necessary based on personal/family history factors.    Maggie and her close relatives are likely at some increased risk for brain tumors given the family history. They are encouraged to share the family history and any concerning symptoms with their primary care provider, as they may have more individualized recommendations.    Other population cancer screening options, such as those recommended by the American Cancer Society and NCCN, are also appropriate for Maggie and her family. These screening recommendations may change if there are changes to Maggie's personal and/or family history of cancer. Final screening recommendations should be made by each individual's managing physician.     Inheritance:  We reviewed the autosomal dominant inheritance of mutations in these 77 genes. We discussed that Maggie did not pass on an identifiable mutation in these genes to her son based on this test result. Mutations in these genes do not skip generations.      Additional Testing Considerations:  Although Maggie's genetic testing result was negative, other relatives may still carry a gene mutation associated with hereditary cancer. Genetic counseling is recommended for her siblings and extended paternal relatives to discuss their genetic testing options. Maggie's grandchildren could also consider meeting with a genetic counselor. If any of these relatives do pursue genetic testing, Maggie is encouraged to contact me so that we may review the impact of their test results on her.    Maggie explained that she is also in discussions with a research laboratory regarding the potential for paired genetic testing on her son's brain tumor and sample of healthy tissue. If this testing is pursued, I encouraged Maggie to contact me if she wishes to discuss the results and any impact they may have on Maggie and/or other relatives. She verbalized  understanding.    Summary:  We do not have an explanation for Maggie's leiomyosarcoma or family history of cancer. While no genetic changes were identified, Maggie may still be at risk for certain cancers due to family history, environmental factors, or other genetic causes not identified by this test. Because of that, it is important that she continue with cancer screening based on her personal and family history as discussed above.    Genetic testing is rapidly advancing, and new cancer susceptibility genes will most likely be identified in the future. Therefore, I encouraged Maggie to contact me regularly or if there are changes in her personal or family history. This may change how we assess her cancer risk, screening, and the testing we would offer.    Plan:  1. Maggie will be mailed a copy of her test results.  2. She plans to follow-up with her medical providers, as needed.  3. She should contact me regularly and/or if her family history changes.    If Maggie has any further questions, I encouraged her to contact me at [phone].    Kym Ferraro MS, [facility]  Licensed Genetic Counselor  Office: [phone]  Pager: [phone]    Video-Visit Details  Type of service:  Video Visit  Video Start Time: 3:24pm (patient experienced video/audio problems for approximately two minutes)  Video End Time: 3:50pm  Originating Location (pt. Location): Home  Distant Location (provider location):  [facility] CANCER [facility]   Platform used for Video Visit: [employer] App

## 2020-10-09 NOTE — PATIENT INSTRUCTIONS
Negative Genetic Test Result    Genetic Testing  You had a blood test that looked at the genetic information in one or more genes associated with increased cancer risk.  The testing looked for any harmful changes that would stop this particular gene from working like it should. If an individual does not have any harmful changes or variants of unknown significance found from their blood test, their genetic test result is reported as negative.       Results  The genetic test did not identify any pathogenic (harmful) changes in the genes that were tested. There are several possible explanations for a negative test result. Without knowing the gene mutation in your family, the cause of the cancer in you or your relatives is still unknown. Your genetic counselor can help interpret the result for you and your relatives. In this case, there are several reasons that may explain the negative test result:    There may be a gene mutation in the family that you did not inherit.     You may have a gene mutation in a different gene that was not included in the test, or has not yet been discovered.     The cancers in you or your family may be due to a combination of genetic factors and environment (multifactorial/familial).    The cancers in you or your family may be sporadic/random cancers.    There is very small chance that a mutation was not found by current testing methods.  As testing technology evolves over time, it may still be possible to identify a mutation in a gene that was not found on this test.    It is important to note which genes were included in your test. A list of these genes can be found on your test result.    Screening Recommendations  Due to this negative test result, cancer screening recommendations should be based on your personal and family history. This may include increased cancer screening for you and/or your family members. Your genetic counselor and health care provider can help make  appropriate recommendations.      Please call us if you have any questions or concerns.   Cancer Risk Management Program [phone]-CANCER ([phone])  ? Garcia Bell, MS, [facility] [phone]  ? Breanne Perez, MS, [facility]  [phone]  ? Karli Everett, MS, [facility]  [phone]  ? Celeste Doll, MS, [facility]  [phone]  ? Kym Ferraro, MS, [facility] [phone]  ? Kendra Cummings, MS, [facility] [phone]  ? Judy Moreno, MS, [facility]  [phone]

## 2020-10-12 DIAGNOSIS — F41.1 GENERALIZED ANXIETY DISORDER: ICD-10-CM

## 2020-10-15 ENCOUNTER — TELEPHONE (OUTPATIENT)
Dept: INTERNAL MEDICINE | Facility: CLINIC | Age: 70
End: 2020-10-15

## 2020-10-15 RX ORDER — VENLAFAXINE HYDROCHLORIDE 75 MG/1
CAPSULE, EXTENDED RELEASE ORAL
Qty: 90 CAPSULE | Refills: 1 | Status: SHIPPED | OUTPATIENT
Start: 2020-10-15 | End: 2021-04-11

## 2020-10-15 NOTE — TELEPHONE ENCOUNTER
[facility] Call Center    Phone Message    May a detailed message be left on voicemail: yes     Reason for Call: Medication Question or concern regarding medication   Prescription Clarification  Name of Medication: venlafaxine (EFFEXOR-XR) 75 MG 24 hr capsule  Prescribing Provider: Rhea Lin   Pharmacy: [facility] - [place], [place] - [address] AT PORTAL TO REGISTERED [facility] SITES   What on the order needs clarification? Patient would like a call back to discuss why her refill request was denied. Patient was offered a virtual appointment and did schedule with Dr. Lin on 10/26/2020. Please review and call patient back to advise.       Action Taken: Message routed to:  [facility]    Travel Screening: Not Applicable

## 2020-10-16 NOTE — TELEPHONE ENCOUNTER
Called patient and left vm:  Medication was not denied.  Medication was approved and sent electronically to [facility] - [place], [place] - [address] AT PORTAL TO REGISTERED [facility] SITES.  If there are any issues, have them call us at [phone].      Deo Weathers CMA ([facility]) at 10:45 AM on 10/16/2020

## 2020-10-26 ENCOUNTER — VIRTUAL VISIT (OUTPATIENT)
Dept: INTERNAL MEDICINE | Facility: CLINIC | Age: 70
End: 2020-10-26
Payer: MEDICARE

## 2020-10-26 DIAGNOSIS — Z71.3 ENCOUNTER FOR NUTRITIONAL COUNSELING: ICD-10-CM

## 2020-10-26 DIAGNOSIS — E03.9 ACQUIRED HYPOTHYROIDISM: ICD-10-CM

## 2020-10-26 DIAGNOSIS — E03.9 HYPOTHYROIDISM, UNSPECIFIED TYPE: Primary | ICD-10-CM

## 2020-10-26 DIAGNOSIS — F41.1 GENERALIZED ANXIETY DISORDER: ICD-10-CM

## 2020-10-26 DIAGNOSIS — C49.9 LEIOMYOSARCOMA (H): ICD-10-CM

## 2020-10-26 PROCEDURE — 99443 PR PHYSICIAN TELEPHONE EVALUATION 21-30 MIN: CPT | Mod: 95 | Performed by: INTERNAL MEDICINE

## 2020-10-26 RX ORDER — LEVOTHYROXINE SODIUM 75 UG/1
75 TABLET ORAL DAILY
Qty: 90 TABLET | Refills: 3 | Status: SHIPPED | OUTPATIENT
Start: 2020-10-26 | End: 2021-09-08

## 2020-10-26 ASSESSMENT — PATIENT HEALTH QUESTIONNAIRE - PHQ9: SUM OF ALL RESPONSES TO PHQ QUESTIONS 1-9: 5

## 2020-10-26 NOTE — PROGRESS NOTES
Virtual audio visit    CC:  Routine f/u chronic health conditions    HPI:    70 year old female  PMHx leiomyosarcoma of the uterus, CAROLA on venlafaxine, osteoporosis, hypothyroidism on levothyroxine.    Recent genetic consultation, results negative for mutation associated with hereditary cancer syndromes (see full report in EHR).    Last f/u with oncology 9/24/20.  Hx leiomyosarcoma of uterus, e/p resection 2016, s/p 5 cycles chemo rx, local recurrency right pelvic sidewall adherent to iliac vessels and ureter s/p resection 6/2020.   CT-CAP planned for December.    Consulted with Dr. Flynn most recently in Auguest 2020 regarding osteoporosis.  Discussed calcium and vitamin d, walking, etc.  Plan would be that if she starts an aromatase inhibitor in the future, then start IV reclast.  DXA one year.  She took alendronate 35mg for 10 yrs and had a 1 yr drug holiday  and then re-started alendronate in 2013 35mg from [id number]. Had dental work fall 2017  so stopped alendronate at that time.    Also has hx hypothyroidism, due for TSH.   Component      Latest Ref Rng & Units 4/5/2019   TSH      0.40 - 4.00 mU/L 1.14     Reviewed all of the above.  Overall Maggie is doing well.  Discussed a few thoughts about traveling, socializing and exercising in the context of limiting exposure to COVID.  Did PT for her knees x 6 months.  Left knee pain relieved.  Right knee persists with going up and down stairs.  Has appointment at [facility] in the near future.  She had some questions about IGF in milk and may be pursuing more of a vegan diet.  Enjoys meditation and supports an integrative approach to health care.  I suggested she consult with Dr. Ramirez at [facility] and provided a referral.    Patient Active Problem List   Diagnosis     Presbyopia - Both Eyes     Osteopenia     Generalized anxiety disorder     Vaginal atrophy     Uterus cancer, sarcoma (H)     Chemotherapy-induced neutropenia (H)     Chemotherapy induced  cardiomyopathy (H)     Overweight     Leiomyosarcoma (H)     Ptosis     Dry eyes     Closed nondisplaced fracture of fifth right metatarsal bone     UTI (urinary tract infection)     Past Surgical History:   Procedure Laterality Date     ABDOMEN SURGERY  Now    Pain, diarrhea     BREAST SURGERY  2002    right breast benign     COLONOSCOPY  2008    due in 2018     COLONOSCOPY N/A 9/8/2016    Procedure: COMBINED COLONOSCOPY, SINGLE OR MULTIPLE BIOPSY/POLYPECTOMY BY BIOPSY;  Surgeon: Abner Pepper MD;  Location:  GI     ENT SURGERY  1975    tonsillectomy     GENITOURINARY SURGERY  1953    bladder surgery      GI SURGERY  2015    Severe constipation     GYN SURGERY  1977    C section     GYN SURGERY  1981    laparoscopy     GYN SURGERY  2007    myomectomy     hysterecomy  11/14/2016    leiomyosarcoma     OTHER SURGICAL HISTORY Right 10/27/2017    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     REMOVE PORT VASCULAR ACCESS Right 5/9/2017    Procedure: REMOVE PORT VASCULAR ACCESS;  Right Port Removal;  Surgeon: Eric Gibson PA-C;  Location:  OR     REPAIR PTOSIS BILATERAL  2/15/2012    Procedure:REPAIR PTOSIS BILATERAL; BILATERAL UPPER LID PTOSIS REPAIR; Surgeon:BRYCE REYNOLDS; Location: [place]     Current Outpatient Medications   Medication Sig Dispense Refill     ibuprofen (ADVIL) 200 MG tablet Take 400 mg by mouth every 4 hours as needed for mild pain       levothyroxine (SYNTHROID/LEVOTHROID) 75 MCG tablet Take 1 tablet (75 mcg) by mouth daily For additional refills, please schedule a yearly appointment with Dr Lin at [phone] 90 tablet 0     Multiple Vitamin (MULTIVITAMINS PO) Take 1 tablet by mouth daily Reported on 4/10/2017       venlafaxine (EFFEXOR-XR) 75 MG 24 hr capsule TAKE 1 CAPSULE DAILY 90 capsule 1     zolpidem (AMBIEN) 5 MG tablet Take 1 tablet (5 mg) by mouth nightly as needed for sleep (Only uses if traveling or cannot sleep,) 20 tablet  "0     Allergies   Allergen Reactions     Erythromycin GI Disturbance     Tramadol Nausea     BP Readings from Last 6 Encounters:   08/31/20 122/72   07/08/20 101/63   06/30/20 129/64   01/22/20 120/80   11/21/19 (!) 157/93   08/19/19 135/80     Wt Readings from Last 5 Encounters:   07/08/20 61.2 kg (135 lb)   06/28/20 65.5 kg (144 lb 4.8 oz)   06/04/20 62.1 kg (137 lb)   01/22/20 64.4 kg (142 lb)   11/21/19 64.6 kg (142 lb 6.4 oz)     Estimated body mass index is 26.37 kg/m  as calculated from the following:    Height as of 6/26/20: 1.524 m (5').    Weight as of 7/8/20: 61.2 kg (135 lb).    Gen: Sounds alert, and in good spirits today.    Maggie was seen today for recheck medication.    Diagnoses and all orders for this visit:    Hypothyroidism, unspecified type  -     TSH; Future    Generalized anxiety disorder    Leiomyosarcoma (H)    Acquired hypothyroidism  -     levothyroxine (SYNTHROID/LEVOTHROID) 75 MCG tablet; Take 1 tablet (75 mcg) by mouth daily    Encounter for nutritional counseling  -     Other Specialty Referral; Future  -     Vitamin B12; Future      F/U 6 months and as needed    This patient is being evaluated via a billable telephone visit; THIS VISIT WAS INITIATED BY THE PT, as AN ALTERNATIVE TO IN PERSON VISIT .       The patient has has been notified of following:      \"This billable telephone visit will be conducted via a call between you and your physician/provider. We have found that certain health care needs can be provided without the need for a physical exam.  This service lets us provide the care you need with a short phone conversation.  If a prescription is necessary we can send it directly to your pharmacy.  If lab work is needed we can place an order for that and you can then stop by our lab to have the test done at a later time. We can also place orders for a limited number of imaging tests if they are deemed urgently necessary.     If during the course of the call the " "physician/provider feels a telephone visit is not appropriate, you will not be charged for this service.\"     Due to efforts to reduce the spread of COVID-19 in the clinic, state, nation, telephone visits are encouraged currently. Patient understands that diagnose and advice is limited by the inability to exam him/her/them face-to-face.    Patient has given verbal consent for the virtual audio visit? Yes  Did patient initiate this virtual visit? Yes    Person spoken to: patient    This was a synchronous virtual visit  Time call initiated:  1:32 pm  Time call ended:  2:07 pm  Total length of call:    Rhea Lin M.D.  Internal Medicine  [facility]       Patients: if you have questions or concerns about this progress note, please discuss them with the provider at at a future office visit.            "

## 2020-10-26 NOTE — PATIENT INSTRUCTIONS
[facility] Medication Refill Request Information:  * Please contact your pharmacy regarding ANY request for medication refills.  ** [facility] Prescription Fax = [phone]  * Please allow 3 business days for routine medication refills.  * Please allow 5 business days for controlled substance medication refills.     [facility] Test Result notification information:  *You will be notified with in 7-10 days of your appointment day regarding the results of your test.  If you are on MyChart you will be notified as soon as the provider has reviewed the results and signed off on them.    [facility]: [phone]

## 2020-10-26 NOTE — NURSING NOTE
Chief Complaint   Patient presents with     Recheck Medication     Patient calls in for refills of her medication and for a routine check in.         Adrian Ace MA on 10/26/2020 at 12:14 PM

## 2020-10-27 ENCOUNTER — TRANSFERRED RECORDS (OUTPATIENT)
Dept: HEALTH INFORMATION MANAGEMENT | Facility: CLINIC | Age: 70
End: 2020-10-27

## 2020-12-16 ENCOUNTER — ANCILLARY PROCEDURE (OUTPATIENT)
Dept: CT IMAGING | Facility: CLINIC | Age: 70
End: 2020-12-16
Attending: INTERNAL MEDICINE
Payer: MEDICARE

## 2020-12-16 DIAGNOSIS — C55 UTERINE LEIOMYOSARCOMA (H): ICD-10-CM

## 2020-12-16 DIAGNOSIS — Z71.3 ENCOUNTER FOR NUTRITIONAL COUNSELING: ICD-10-CM

## 2020-12-16 DIAGNOSIS — C49.9 LEIOMYOSARCOMA (H): ICD-10-CM

## 2020-12-16 DIAGNOSIS — E03.9 HYPOTHYROIDISM, UNSPECIFIED TYPE: ICD-10-CM

## 2020-12-16 LAB
ALBUMIN SERPL-MCNC: 3.5 G/DL (ref 3.4–5)
ALP SERPL-CCNC: 100 U/L (ref 40–150)
ALT SERPL W P-5'-P-CCNC: 22 U/L (ref 0–50)
ANION GAP SERPL CALCULATED.3IONS-SCNC: 6 MMOL/L (ref 3–14)
AST SERPL W P-5'-P-CCNC: 12 U/L (ref 0–45)
BASOPHILS # BLD AUTO: 0 10E9/L (ref 0–0.2)
BASOPHILS NFR BLD AUTO: 0.5 %
BILIRUB SERPL-MCNC: 0.4 MG/DL (ref 0.2–1.3)
BUN SERPL-MCNC: 27 MG/DL (ref 7–30)
CALCIUM SERPL-MCNC: 9 MG/DL (ref 8.5–10.1)
CHLORIDE SERPL-SCNC: 105 MMOL/L (ref 94–109)
CO2 SERPL-SCNC: 30 MMOL/L (ref 20–32)
CREAT BLD-MCNC: 1 MG/DL (ref 0.52–1.04)
CREAT SERPL-MCNC: 0.92 MG/DL (ref 0.52–1.04)
DIFFERENTIAL METHOD BLD: ABNORMAL
EOSINOPHIL # BLD AUTO: 0.2 10E9/L (ref 0–0.7)
EOSINOPHIL NFR BLD AUTO: 2.7 %
ERYTHROCYTE [DISTWIDTH] IN BLOOD BY AUTOMATED COUNT: 15.1 % (ref 10–15)
GFR SERPL CREATININE-BSD FRML MDRD: 55 ML/MIN/{1.73_M2}
GFR SERPL CREATININE-BSD FRML MDRD: 63 ML/MIN/{1.73_M2}
GLUCOSE SERPL-MCNC: 78 MG/DL (ref 70–99)
HCT VFR BLD AUTO: 43.2 % (ref 35–47)
HGB BLD-MCNC: 13.6 G/DL (ref 11.7–15.7)
IMM GRANULOCYTES # BLD: 0 10E9/L (ref 0–0.4)
IMM GRANULOCYTES NFR BLD: 0.3 %
LYMPHOCYTES # BLD AUTO: 2.4 10E9/L (ref 0.8–5.3)
LYMPHOCYTES NFR BLD AUTO: 37.5 %
MCH RBC QN AUTO: 28.8 PG (ref 26.5–33)
MCHC RBC AUTO-ENTMCNC: 31.5 G/DL (ref 31.5–36.5)
MCV RBC AUTO: 92 FL (ref 78–100)
MONOCYTES # BLD AUTO: 0.5 10E9/L (ref 0–1.3)
MONOCYTES NFR BLD AUTO: 7.8 %
NEUTROPHILS # BLD AUTO: 3.2 10E9/L (ref 1.6–8.3)
NEUTROPHILS NFR BLD AUTO: 51.2 %
NRBC # BLD AUTO: 0 10*3/UL
NRBC BLD AUTO-RTO: 0 /100
PLATELET # BLD AUTO: 227 10E9/L (ref 150–450)
POTASSIUM SERPL-SCNC: 4 MMOL/L (ref 3.4–5.3)
PROT SERPL-MCNC: 7.1 G/DL (ref 6.8–8.8)
RBC # BLD AUTO: 4.72 10E12/L (ref 3.8–5.2)
SODIUM SERPL-SCNC: 141 MMOL/L (ref 133–144)
TSH SERPL DL<=0.005 MIU/L-ACNC: 1.44 MU/L (ref 0.4–4)
VIT B12 SERPL-MCNC: 1608 PG/ML (ref 193–986)
WBC # BLD AUTO: 6.3 10E9/L (ref 4–11)

## 2020-12-16 PROCEDURE — 84443 ASSAY THYROID STIM HORMONE: CPT | Performed by: PATHOLOGY

## 2020-12-16 PROCEDURE — 36415 COLL VENOUS BLD VENIPUNCTURE: CPT | Performed by: PATHOLOGY

## 2020-12-16 PROCEDURE — 85025 COMPLETE CBC W/AUTO DIFF WBC: CPT | Performed by: PATHOLOGY

## 2020-12-16 PROCEDURE — 71260 CT THORAX DX C+: CPT | Mod: MG | Performed by: RADIOLOGY

## 2020-12-16 PROCEDURE — 80053 COMPREHEN METABOLIC PANEL: CPT | Performed by: PATHOLOGY

## 2020-12-16 PROCEDURE — 74177 CT ABD & PELVIS W/CONTRAST: CPT | Mod: MG | Performed by: RADIOLOGY

## 2020-12-16 PROCEDURE — G1004 CDSM NDSC: HCPCS | Mod: GC | Performed by: RADIOLOGY

## 2020-12-16 PROCEDURE — 82607 VITAMIN B-12: CPT | Performed by: PATHOLOGY

## 2020-12-16 RX ORDER — IOPAMIDOL 755 MG/ML
82 INJECTION, SOLUTION INTRAVASCULAR ONCE
Status: COMPLETED | OUTPATIENT
Start: 2020-12-16 | End: 2020-12-16

## 2020-12-16 RX ADMIN — IOPAMIDOL 82 ML: 755 INJECTION, SOLUTION INTRAVASCULAR at 12:07

## 2020-12-17 ENCOUNTER — VIRTUAL VISIT (OUTPATIENT)
Dept: ONCOLOGY | Facility: CLINIC | Age: 70
End: 2020-12-17
Attending: INTERNAL MEDICINE
Payer: MEDICARE

## 2020-12-17 DIAGNOSIS — C55 UTERINE LEIOMYOSARCOMA (H): Primary | ICD-10-CM

## 2020-12-17 PROCEDURE — 99214 OFFICE O/P EST MOD 30 MIN: CPT | Mod: 95 | Performed by: INTERNAL MEDICINE

## 2020-12-17 NOTE — PROGRESS NOTES
"Maggie Navarrete is a 70 year old female who is being evaluated via a billable video visit.      The patient has been notified of following:     \"This video visit will be conducted via a call between you and your physician/provider. We have found that certain health care needs can be provided without the need for an in-person physical exam.  This service lets us provide the care you need with a video conversation.  If a prescription is necessary we can send it directly to your pharmacy.  If lab work is needed we can place an order for that and you can then stop by our lab to have the test done at a later time.    Video visits are billed at different rates depending on your insurance coverage.  Please reach out to your insurance provider with any questions.    If during the course of the call the physician/provider feels a video visit is not appropriate, you will not be charged for this service.\"    Patient has given verbal consent for Video visit? Yes  How would you like to obtain your AVS? MyChart  If you are dropped from the video visit, the video invite should be resent to: Text to cell phone: [phone]  Will anyone else be joining your video visit? Yes: SISTER. How would they like to receive their invitation? Text to cell phone: [phone]      Vitals - Patient Reported  Weight (Patient Reported): 61.2 kg (135 lb)  Height (Patient Reported): 152.4 cm (5')  BMI (Based on Pt Reported Ht/Wt): 26.37  Pain Score: No Pain (0)    I have reviewed and updated patient's allergy and medication list.    Concerns: NONE  Refills: NONE    Gloira Michaud CMA    Video-Visit Details    Type of service:  Video Visit    Video Start Time: 10:15 AM  Video End Time: 10:45 AM    Originating Location (pt. Location): Home    Distant Location (provider location):  [facility] CANCER [facility]     Platform used for Video Visit: [facility] ONCOLOGY PROGRESS NOTE  Sarcoma Clinic  Dec 17, 2020    Oncologic " history:  1. January 2016, she presents to her PCP with 3 weeks of abdominal pain and severe constipation. She is treated for constipation and improves.  2. 3/21/2016, worsening pelvic pressure prompts pelvic examination with an enlarged uterus noted. Transvaginal ultrasound shows enlargement of subserosal fibroids originally seen on MRI in 2006.  3. 5/9/2016, MRI pelvis is obtained, which shows several enhancing uterine masses involving an enlarged uterus, felt radiographically compatible with benign uterine leiomyomata.  3. Summer 2016, she experiences worsening low back pain thought at first thought to be sacroiliitis, but pain persists after corticosteroid injection. Continues with severe constipation.  4. 9/8/2016, she undergoes colonoscopy for persistent abdominal pain. She has sigmoid polyp snared. Abdominal pain and diarrhea persist after colonoscopy prep.  5. 9/14/2016, she has worsening abdominal pain and is evaluated in the ED. CT-scan shows a large subserosal fibroid measuring 9.4 x 9.6 x 9.8 cm, as well as very large fibroids along the posterior uterine body more inferiorly, increased in size to 14 cm in greatest diameter compared to 10 cm in greatest diameter on MRI of 5/9/2016.  6. 9/29/2016, she has staging CT-CAP, which shows a small left sided pleural effusion and a 3 mm pulmonary nodule in the RML.  7. October 2016, self-refers to [facility].  8. 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  9. 11/4/2016, undergoes total abdominal hysterectomy and BSO, under Dr. Harsh Camarillo. Surgery was originally presumed for leiomyoma, however, intraoperatively there was focal adherence of a mass to the pelvic right sidewall and cul-de-sac, which led to peritoneal resection and omentectomy. Final pathology, pT2b: showed leiomyosarcoma involving the cul-de-sac and forming two separate masses measuring 10.5 x 6.5 x 4.5 cm and 10.7 x 7.9 x 7.5 cm.  10.  12/26/2016, she starts adjuvant chemotherapy with doxorubicin 25 mg/m2/day and DTIC 250 mg/m2/day , days 1-3 every 21 weeks.  11. 3/23/2017, she completes 5 cycles of adjuvant doxorubicin and dacarbazine  12. 6/3/2020, restaging MRI shows a locally recurrent pelvic sidewall mass measuring 2.8 x 3.3 cm in size. This new area of concern was not previously reported, presumably due to proximity to bowel and the similar appearance to adjacent loops of bowel on the contrast enhanced imaging. On review of prior imaging studies, the lesion can be seen as far back as January 2019 as a small nodule measuring 1 cm in size directly adjacent to bowel, close to the pelvic sidewall.  13. 6/16/2020, she had resection of the recurrent leiomyosarcoma tumor, which was densely attached to the right ureter and attached to the right side of the sigmoid mesentery and external and internal iliac vessels.  There was external iliac vein injury with primary repair due to involvement of the mass. The ureter was also partially removed as part of the tumor mass. All margins and the ureter that was resected was negative for tumor. Pathology (JR-) showed a 3.7 x 3.5 x 2.2 cm thinly encapsulated mass, which was submitted for frozen sections.      HISTORY OF PRESENT ILLNESS  Ms. Navarrete presents today in follow-up and to restaging.    CT-CAP shows no new evidence of recurrence or metastasis.    She denies nausea, vomiting, or diarrhea. She denies fevers, chills, nausea, vomiting, diarrhea. She feels well and is contemplating a trip to see her sister in [place].    ECOG performance status is 0.    REVIEW OF SYSTEMS  A 12-point ROS negative except as in HPI    Current Outpatient Medications   Medication Sig Dispense Refill     ibuprofen (ADVIL) 200 MG tablet Take 400 mg by mouth every 4 hours as needed for mild pain       levothyroxine (SYNTHROID/LEVOTHROID) 75 MCG tablet Take 1 tablet (75 mcg) by mouth daily 90 tablet 3     Multiple Vitamin  (MULTIVITAMINS PO) Take 1 tablet by mouth daily Reported on 4/10/2017       venlafaxine (EFFEXOR-XR) 75 MG 24 hr capsule TAKE 1 CAPSULE DAILY 90 capsule 1     zolpidem (AMBIEN) 5 MG tablet Take 1 tablet (5 mg) by mouth nightly as needed for sleep (Only uses if traveling or cannot sleep,) 20 tablet 0       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  Wt Readings from Last 2 Encounters:   07/08/20 61.2 kg (135 lb)   06/28/20 65.5 kg (144 lb 4.8 oz)   General: Well appearing, at home, in no distress  Head: Normocephalic  Eyes: EOMI, No icterus  ENT: Oropharynx clear  Lungs: Easy breathing on room air, speaking in complete sentences without dyspnea  Neuro: Cranial nerves 2-12 intact, no focal deficits  Skin: No rashes on exposed skin    The rest of a comprehensive physical examination is deferred due to PHE (public health emergency) video visit restrictions.      ASSESSMENT AND PLAN  #1 Locally advanced leiomyosarcoma of the uterus, pT2b Nx M0, Stage IIB, status-post resection (11/2016) and 5 cycles of adjuvant doxorubicin and dacarbazine  #2 Local recurrence, leiomyosarcoma of the uterus, right pelvic sidewall adherent to iliac vessels and ureter s/p resection June  2020  It was a pleasure to see Ms. Navarrete today.    No evidence of recurrence on recent imaging. She requests an IGF1 level, given sarcoma diagnosis this is reasonable. IGF1 signaling is increased in sarcoma, particularly the bone sarcomas, and can be targeted with inhibitors.    Plan to repeat CT-CAP in 4 months.    Questions answered.    Ab Warner M.D.   of Medicine  Hematology, Oncology and Transplantation

## 2021-02-19 NOTE — LETTER
SUBJECTIVE:  She still feels short of breath, unclear if she feels any better than yesterday.  refusing blood draws and vitals, she is taking her meds.    OBJECTIVE:    VITAL SIGNS:    Vital Signs (last 24 hrs)_____  Last Charted___________  Temp Oral      L 97.3F  (SEP 02 11:58)  Heart Rate Peripheral   78 bpm  (SEP 02 11:58)  Resp Rate          20 br/min  (SEP 02 11:58)  SBP      119 mmHg  (SEP 02 11:58)  DBP      82 mmHg  (SEP 02 11:58)     GENERAL: In bed, asking for remote, RN in room    LUNGS:  Clear bilaterally.     HEART:  Regular rate and rhythm.     ABDOMEN:  Soft.     EXTREMITIES:  Edematous.    LABORATORY:    Labs (Last four charted values)  WBC                  H 12.5 (AUG 31)   Hgb                  L 10.6 (AUG 31)   Hct                  33 (AUG 31)   Plt                  263 (AUG 31)   Na                   L 133 (AUG 31)   K                    C 2.1 (AUG 31)   CO2                  H 30 (AUG 31)   Cl                   L 93 (AUG 31)   Cr                   0.66 (AUG 31)   BUN                  13 (AUG 31)   Glucose              H 122 (AUG 31)   Mg                   L 1.0 (AUG 31)   Ca                   L 7.5 (AUG 31)   PT                   H 12.2 (AUG 31)   INR                  1.1 (AUG 31)   PTT                  27 (AUG 31)   Troponin             H 0.25 (AUG 31) H 0.25 (AUG 31)     IMPRESSION:    1. Shortness of breath secondary to pulmonary embolus.  2. Hypokalemia.  3. Hyponatremia.  4. Hypertension.  5. Schizophrenia.    PLAN:    1. The patient is refusing blood test, ultrasound, and echo as well as refusing her oxygen.  2. She is not a candidate for Coumadin as this would require blood draws.  3. The patient does have a history of schizophrenia, unsure what her competence is to make decisions for herself.  She is on eliquis  4. Psych to evaluate her.           "    12/17/2020         RE: Maggie Navarrete  [address]  [facility] [address]        Dear Colleague,    Thank you for referring your patient, Maggie Navarrete, to the [facility]. Please see a copy of my visit note below.    Maggie Navarrete is a 70 year old female who is being evaluated via a billable video visit.      The patient has been notified of following:     \"This video visit will be conducted via a call between you and your physician/provider. We have found that certain health care needs can be provided without the need for an in-person physical exam.  This service lets us provide the care you need with a video conversation.  If a prescription is necessary we can send it directly to your pharmacy.  If lab work is needed we can place an order for that and you can then stop by our lab to have the test done at a later time.    Video visits are billed at different rates depending on your insurance coverage.  Please reach out to your insurance provider with any questions.    If during the course of the call the physician/provider feels a video visit is not appropriate, you will not be charged for this service.\"    Patient has given verbal consent for Video visit? Yes  How would you like to obtain your AVS? MyChart  If you are dropped from the video visit, the video invite should be resent to: Text to cell phone: [phone]  Will anyone else be joining your video visit? Yes: SISTER. How would they like to receive their invitation? Text to cell phone: [phone]      Vitals - Patient Reported  Weight (Patient Reported): 61.2 kg (135 lb)  Height (Patient Reported): 152.4 cm (5')  BMI (Based on Pt Reported Ht/Wt): 26.37  Pain Score: No Pain (0)    I have reviewed and updated patient's allergy and medication list.    Concerns: NONE  Refills: NONE    Gloria Michaud CMA    Video-Visit Details    Type of service:  Video Visit    Video Start Time: 10:15 AM  Video End Time: 10:45 " AM    Originating Location (pt. Location): Home    Distant Location (provider location):  [facility] CANCER [facility]     Platform used for Video Visit: [facility]      MEDICAL ONCOLOGY PROGRESS NOTE  Sarcoma Clinic  Dec 17, 2020    Oncologic history:  1. January 2016, she presents to her PCP with 3 weeks of abdominal pain and severe constipation. She is treated for constipation and improves.  2. 3/21/2016, worsening pelvic pressure prompts pelvic examination with an enlarged uterus noted. Transvaginal ultrasound shows enlargement of subserosal fibroids originally seen on MRI in 2006.  3. 5/9/2016, MRI pelvis is obtained, which shows several enhancing uterine masses involving an enlarged uterus, felt radiographically compatible with benign uterine leiomyomata.  3. Summer 2016, she experiences worsening low back pain thought at first thought to be sacroiliitis, but pain persists after corticosteroid injection. Continues with severe constipation.  4. 9/8/2016, she undergoes colonoscopy for persistent abdominal pain. She has sigmoid polyp snared. Abdominal pain and diarrhea persist after colonoscopy prep.  5. 9/14/2016, she has worsening abdominal pain and is evaluated in the ED. CT-scan shows a large subserosal fibroid measuring 9.4 x 9.6 x 9.8 cm, as well as very large fibroids along the posterior uterine body more inferiorly, increased in size to 14 cm in greatest diameter compared to 10 cm in greatest diameter on MRI of 5/9/2016.  6. 9/29/2016, she has staging CT-CAP, which shows a small left sided pleural effusion and a 3 mm pulmonary nodule in the RML.  7. October 2016, self-refers to [facility].  8. 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  9. 11/4/2016, undergoes total abdominal hysterectomy and BSO, under Dr. Harsh Camarillo. Surgery was originally presumed for leiomyoma, however, intraoperatively there was focal adherence of a mass to the pelvic  right sidewall and cul-de-sac, which led to peritoneal resection and omentectomy. Final pathology, pT2b: showed leiomyosarcoma involving the cul-de-sac and forming two separate masses measuring 10.5 x 6.5 x 4.5 cm and 10.7 x 7.9 x 7.5 cm.  10. 12/26/2016, she starts adjuvant chemotherapy with doxorubicin 25 mg/m2/day and DTIC 250 mg/m2/day , days 1-3 every 21 weeks.  11. 3/23/2017, she completes 5 cycles of adjuvant doxorubicin and dacarbazine  12. 6/3/2020, restaging MRI shows a locally recurrent pelvic sidewall mass measuring 2.8 x 3.3 cm in size. This new area of concern was not previously reported, presumably due to proximity to bowel and the similar appearance to adjacent loops of bowel on the contrast enhanced imaging. On review of prior imaging studies, the lesion can be seen as far back as January 2019 as a small nodule measuring 1 cm in size directly adjacent to bowel, close to the pelvic sidewall.  13. 6/16/2020, she had resection of the recurrent leiomyosarcoma tumor, which was densely attached to the right ureter and attached to the right side of the sigmoid mesentery and external and internal iliac vessels.  There was external iliac vein injury with primary repair due to involvement of the mass. The ureter was also partially removed as part of the tumor mass. All margins and the ureter that was resected was negative for tumor. Pathology (JR-) showed a 3.7 x 3.5 x 2.2 cm thinly encapsulated mass, which was submitted for frozen sections.      HISTORY OF PRESENT ILLNESS  Ms. Navarrete presents today in follow-up and to restaging.    CT-CAP shows no new evidence of recurrence or metastasis.    She denies nausea, vomiting, or diarrhea. She denies fevers, chills, nausea, vomiting, diarrhea. She feels well and is contemplating a trip to see her sister in [place].    ECOG performance status is 0.    REVIEW OF SYSTEMS  A 12-point ROS negative except as in HPI    Current Outpatient Medications   Medication  Sig Dispense Refill     ibuprofen (ADVIL) 200 MG tablet Take 400 mg by mouth every 4 hours as needed for mild pain       levothyroxine (SYNTHROID/LEVOTHROID) 75 MCG tablet Take 1 tablet (75 mcg) by mouth daily 90 tablet 3     Multiple Vitamin (MULTIVITAMINS PO) Take 1 tablet by mouth daily Reported on 4/10/2017       venlafaxine (EFFEXOR-XR) 75 MG 24 hr capsule TAKE 1 CAPSULE DAILY 90 capsule 1     zolpidem (AMBIEN) 5 MG tablet Take 1 tablet (5 mg) by mouth nightly as needed for sleep (Only uses if traveling or cannot sleep,) 20 tablet 0       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  Wt Readings from Last 2 Encounters:   07/08/20 61.2 kg (135 lb)   06/28/20 65.5 kg (144 lb 4.8 oz)   General: Well appearing, at home, in no distress  Head: Normocephalic  Eyes: EOMI, No icterus  ENT: Oropharynx clear  Lungs: Easy breathing on room air, speaking in complete sentences without dyspnea  Neuro: Cranial nerves 2-12 intact, no focal deficits  Skin: No rashes on exposed skin    The rest of a comprehensive physical examination is deferred due to PHE (public health emergency) video visit restrictions.      ASSESSMENT AND PLAN  #1 Locally advanced leiomyosarcoma of the uterus,  pT2b Nx M0, Stage IIB, status-post resection (11/2016) and 5 cycles of adjuvant doxorubicin and dacarbazine  #2 Local recurrence, leiomyosarcoma of the uterus, right pelvic sidewall adherent to iliac vessels and ureter s/p resection June 2020  It was a pleasure to see Ms. Navarrete today.    No evidence of recurrence on recent imaging. She requests an IGF1 level, given sarcoma diagnosis this is reasonable. IGF1 signaling is increased in sarcoma, particularly the bone sarcomas, and can be targeted with inhibitors.    Plan to repeat CT-CAP in 4 months.    Questions answered.    Ab Warner M.D.   of Medicine  Hematology, Oncology and Transplantation

## 2021-04-07 DIAGNOSIS — F41.1 GENERALIZED ANXIETY DISORDER: Primary | ICD-10-CM

## 2021-04-11 RX ORDER — VENLAFAXINE HYDROCHLORIDE 75 MG/1
75 CAPSULE, EXTENDED RELEASE ORAL DAILY
Qty: 90 CAPSULE | Refills: 1 | Status: SHIPPED | OUTPATIENT
Start: 2021-04-11 | End: 2021-10-04

## 2021-04-14 ENCOUNTER — VIRTUAL VISIT (OUTPATIENT)
Dept: ONCOLOGY | Facility: CLINIC | Age: 71
End: 2021-04-14
Attending: NURSE PRACTITIONER
Payer: MEDICARE

## 2021-04-14 VITALS — WEIGHT: 135 LBS | BODY MASS INDEX: 26.37 KG/M2

## 2021-04-14 DIAGNOSIS — C49.9 LEIOMYOSARCOMA (H): Primary | ICD-10-CM

## 2021-04-14 PROCEDURE — 99213 OFFICE O/P EST LOW 20 MIN: CPT | Performed by: NURSE PRACTITIONER

## 2021-04-14 ASSESSMENT — PAIN SCALES - GENERAL: PAINLEVEL: NO PAIN (0)

## 2021-04-14 NOTE — PROGRESS NOTES
Maggie is a 71 year old who is being evaluated via a billable video visit.      How would you like to obtain your AVS? MyChart  If the video visit is dropped, the invitation should be resent by: Text to cell phone: [phone]  Will anyone else be joining your video visit? No      Video Start Time:    Video-Visit Details    Type of service:  Video Visit    Video End Time:   Originating Location (pt. Location): Home    Distant Location (provider location):  [facility] [place]     Platform used for Video Visit: [facility]

## 2021-04-14 NOTE — PROGRESS NOTES
Cancer Survivorship Clinic Visit  April 14, 2021     Oncologist: Dr Aguilar  PCP: Rhea Lin    Diagnosis:   Leiomyosarcoma -uterus    Treatment:      Hysterectomy and bilateral salpingo-oophorectomy  Doxorubicin plus dacarbazine from 12/26/2016 until 03/22/2016  Local recurrence leiomyosarcoma of the uterus right pelvis sidewall adherent to the iliac vessels and ureter  Status post resection June 2020          Interval History:  Rest of comprehensive ROS is negative    Current Outpatient Medications   Medication     levothyroxine (SYNTHROID/LEVOTHROID) 75 MCG tablet     Multiple Vitamin (MULTIVITAMINS PO)     venlafaxine (EFFEXOR-XR) 75 MG 24 hr capsule     zolpidem (AMBIEN) 5 MG tablet     ibuprofen (ADVIL) 200 MG tablet     No current facility-administered medications for this visit.      Allergies   Allergen Reactions     Erythromycin GI Disturbance     Tramadol Nausea     Family History   Problem Relation Age of Onset     Family History Negative Mother         glaucoma     Glaucoma Mother      Anxiety Disorder Mother      Heart Failure Mother      Diabetes Mother      Coronary Artery Disease Mother      Hypertension Mother      Breast Cancer Mother      Depression Mother      Obesity Mother      Heart Disease Father      Glaucoma Father      Diabetes Father      Coronary Artery Disease Father      Depression Father      Osteoporosis Father      Heart Disease Maternal Grandmother      Diabetes Maternal Grandmother      Heart Disease Paternal Grandmother      Cancer Paternal Grandfather         stomach     Thyroid Disease Sister      Retinal detachment Sister      Hypertension Sister      Hypertension Sister      Lipids Sister      Cerebrovascular Disease Sister      Depression Sister      Heart Disease Paternal Aunt      Heart Disease Paternal Uncle      Anxiety Disorder Sister      Depression Sister      Thyroid Disease Sister      Thyroid Disease Sister      Osteoporosis Other        Physical Exam:  Wt  61.2 kg (135 lb)   BMI 26.37 kg/m       Video visit exam   GENERAL: Healthy, alert and no distress  EYES: Eyes grossly normal to inspection.  No discharge or erythema, or obvious scleral/conjunctival abnormalities.  RESP: No audible wheeze, cough, or visible cyanosis.  No visible retractions or increased work of breathing.    SKIN: Visible skin clear. No significant rash, abnormal pigmentation or lesions.  NEURO: Cranial nerves grossly intact.  Mentation and speech appropriate for age.  PSYCH: Mentation appears normal, affect normal/bright, judgement and insight intact, normal speech and appearance well-groomed.        Assessment:   It was my pleasure to see Maggie Navarrete in the Cancer Survivorship Clinic for her diagnosis of leiomyosarcoma. Given her diagnosis and treatment, she was given a survivorship care plan as resulted from [facility] care plan program and here is the recommendations provided to her:  Cardiac complications- Given her exposure to Doxorubicin, an anthracycline, she is ar risk for cardiomyopathy, arythmias, left ventricular dysfunction. Last Echocardiogram was  In 2017. She denies any new cardiac issues.   Cognitive changes- She denies any short term memory loss since recovery from chemotherapy.   Risk of developing blood cancers- due to exposure to Doxorubicin she is a a small risk for Leukemia or MDS. For this reason she should have a CBC completed yearly.   Cancer screening-  She should continue to undergo cancer screening for women of her age group.    Healthy lifestyle-  She should refrain from tobacco. She should have an exercise program of 150 minutes of cardiovascular exercise per week.  She should maintain a healthy weight with a BMI between 20 and 25.  Her diet should include low fats.  She should see her primary care provider for screening and, if needed, treatment of her cholesterol, blood pressure and glucose.  She should receive the annual influenza vaccine.  When age appropriate,  she should receive the pneumococcal vaccine.  She should see her eye doctor and dentist routinely. She should limit her sun exposure and use sunscreens.     Continue follow-up with SHIRAZ Cazares CNP

## 2021-04-14 NOTE — LETTER
4/14/2021         RE: Maggie Navarrete  [address]  [facility] [address]        Dear Colleague,    Thank you for referring your patient, Maggie Navarrete, to the [facility]. Please see a copy of my visit note below.    Maggie is a 71 year old who is being evaluated via a billable video visit.      How would you like to obtain your AVS? MyChart  If the video visit is dropped, the invitation should be resent by: Text to cell phone: [phone]  Will anyone else be joining your video visit? No      Video Start Time:    Video-Visit Details    Type of service:  Video Visit    Video End Time:   Originating Location (pt. 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      Allergies   Allergen Reactions     Erythromycin GI Disturbance     Tramadol Nausea     Family History   Problem Relation Age of Onset     Family History Negative Mother         glaucoma     Glaucoma Mother      Anxiety Disorder Mother      Heart Failure Mother      Diabetes Mother      Coronary Artery  Disease Mother      Hypertension Mother      Breast Cancer Mother      Depression Mother      Obesity Mother      Heart Disease Father      Glaucoma Father      Diabetes Father      Coronary Artery Disease Father      Depression Father      Osteoporosis Father      Heart Disease Maternal Grandmother      Diabetes Maternal Grandmother      Heart Disease Paternal Grandmother      Cancer Paternal Grandfather         stomach     Thyroid Disease Sister      Retinal detachment Sister      Hypertension Sister      Hypertension Sister      Lipids Sister      Cerebrovascular Disease Sister      Depression Sister      Heart Disease Paternal Aunt      Heart Disease Paternal Uncle      Anxiety Disorder Sister      Depression Sister      Thyroid Disease Sister      Thyroid Disease Sister      Osteoporosis Other        Physical Exam:  Wt 61.2 kg (135 lb)   BMI 26.37 kg/m       Video visit exam   GENERAL: Healthy, alert and no distress  EYES: Eyes grossly normal to inspection.  No discharge or erythema, or obvious scleral/conjunctival abnormalities.  RESP: No audible wheeze, cough, or visible cyanosis.  No visible retractions or increased work of breathing.    SKIN: Visible skin clear. No significant rash, abnormal pigmentation or lesions.  NEURO: Cranial nerves grossly intact.  Mentation and speech appropriate for age.  PSYCH: Mentation appears normal, affect normal/bright, judgement and insight intact, normal speech and appearance well-groomed.        Assessment:   It was my pleasure to see Maggie Navarrete in the Cancer Survivorship Clinic for her diagnosis of leiomyosarcoma   . Given her diagnosis and treatment, she was given a survivorship care plan as resulted from [facility] care plan program and here is the recommendations provided to her:  Cardiac complications- Given her exposure to Doxorubicin, an anthracycline, she is ar risk for cardiomyopathy, arythmias, left ventricular dysfunction. Last Echocardiogram was  . She  denies any new cardiac issues.   Cognitive changes- She denies any short term memory loss since recovery from chemotherapy.   Risk of developing blood cancers- due to exposure to Doxorubicin she is a a small risk for Leukemia or MDS. For this reason she should have a CBC completed yearly.   Cancer screening-  She should continue to undergo cancer screening for women of her age group.  She will continue to see Gynecology as directed by their service given her endometrial cancer.  She had a colonoscopy in the last 1-2 years with a followup colonoscopy scheduled in 5 years.    Healthy lifestyle-  She should refrain from tobacco.  She should have an exercise program of 150 minutes of cardiovascular exercise per week.  She should maintain a healthy weight with a BMI between 20 and 25.  Her diet should include low fats.  She should see her primary care provider for screening and, if needed, treatment of her cholesterol, blood pressure and glucose.  She should receive the annual influenza vaccine.  When age appropriate, she should receive the pneumococcal vaccine.  She should see her eye doctor and dentist routinely. She should limit her sun exposure and use sunscreens.     Continue follow-up with SHIRAZ Cazares CNP            Again, thank you for allowing me to participate in the care of your patient.        Sincerely,        SHIRAZ Patrick CNP

## 2021-04-14 NOTE — LETTER
4/14/2021         RE: Maggie Navarrete  [address]  [facility] [address]        Dear Colleague,    Thank you for referring your patient, Maggie Navarrete, to the [facility]. Please see a copy of my visit note below.    Maggie is a 71 year old who is being evaluated via a billable video visit.      How would you like to obtain your AVS? MyChart  If the video visit is dropped, the invitation should be resent by: Text to cell phone: [phone]  Will anyone else be joining your video visit? No      Video Start Time:    Video-Visit Details    Type of service:  Video Visit    Video End Time:   Originating Location (pt. Location): Home    Distant Location (provider location):  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      Allergies   Allergen Reactions     Erythromycin GI Disturbance     Tramadol Nausea     Family History   Problem Relation Age of Onset     Family History Negative Mother         glaucoma     Glaucoma Mother      Anxiety Disorder Mother      Heart Failure Mother      Diabetes Mother      Coronary Artery  Disease Mother      Hypertension Mother      Breast Cancer Mother      Depression Mother      Obesity Mother      Heart Disease Father      Glaucoma Father      Diabetes Father      Coronary Artery Disease Father      Depression Father      Osteoporosis Father      Heart Disease Maternal Grandmother      Diabetes Maternal Grandmother      Heart Disease Paternal Grandmother      Cancer Paternal Grandfather         stomach     Thyroid Disease Sister      Retinal detachment Sister      Hypertension Sister      Hypertension Sister      Lipids Sister      Cerebrovascular Disease Sister      Depression Sister      Heart Disease Paternal Aunt      Heart Disease Paternal Uncle      Anxiety Disorder Sister      Depression Sister      Thyroid Disease Sister      Thyroid Disease Sister      Osteoporosis Other        Physical Exam:  Wt 61.2 kg (135 lb)   BMI 26.37 kg/m       Video visit exam   GENERAL: Healthy, alert and no distress  EYES: Eyes grossly normal to inspection.  No discharge or erythema, or obvious scleral/conjunctival abnormalities.  RESP: No audible wheeze, cough, or visible cyanosis.  No visible retractions or increased work of breathing.    SKIN: Visible skin clear. No significant rash, abnormal pigmentation or lesions.  NEURO: Cranial nerves grossly intact.  Mentation and speech appropriate for age.  PSYCH: Mentation appears normal, affect normal/bright, judgement and insight intact, normal speech and appearance well-groomed.        Assessment:   It was my pleasure to see Maggie Navarrete in the Cancer Survivorship Clinic for her diagnosis of leiomyosarcoma   . Given her diagnosis and treatment, she was given a survivorship care plan as resulted from [employer] care plan program and here is the recommendations provided to her:  Cardiac complications- Given her exposure to Doxorubicin, an anthracycline, she is ar risk for cardiomyopathy, arythmias, left ventricular dysfunction. Last Echocardiogram was  . She  denies any new cardiac issues.   Cognitive changes- She denies any short term memory loss since recovery from chemotherapy.   Risk of developing blood cancers- due to exposure to Doxorubicin she is a a small risk for Leukemia or MDS. For this reason she should have a CBC completed yearly.   Cancer screening-  She should continue to undergo cancer screening for women of her age group.  She will continue to see Gynecology as directed by their service given her endometrial cancer.  She had a colonoscopy in the last 1-2 years with a followup colonoscopy scheduled in 5 years.    Healthy lifestyle-  She should refrain from tobacco.  She should have an exercise program of 150 minutes of cardiovascular exercise per week.  She should maintain a healthy weight with a BMI between 20 and 25.  Her diet should include low fats.  She should see her primary care provider for screening and, if needed, treatment of her cholesterol, blood pressure and glucose.  She should receive the annual influenza vaccine.  When age appropriate, she should receive the pneumococcal vaccine.  She should see her eye doctor and dentist routinely. She should limit her sun exposure and use sunscreens.     Continue follow-up with SIHRAZ Cazares CNP            Again, thank you for allowing me to participate in the care of your patient.        Sincerely,        SHIRAZ Patrick CNP

## 2021-04-28 ENCOUNTER — ANCILLARY PROCEDURE (OUTPATIENT)
Dept: CT IMAGING | Facility: CLINIC | Age: 71
End: 2021-04-28
Attending: INTERNAL MEDICINE
Payer: MEDICARE

## 2021-04-28 DIAGNOSIS — C55 UTERINE LEIOMYOSARCOMA (H): ICD-10-CM

## 2021-04-28 DIAGNOSIS — C49.9 LEIOMYOSARCOMA (H): ICD-10-CM

## 2021-04-28 LAB
ALBUMIN SERPL-MCNC: 3.6 G/DL (ref 3.4–5)
ALP SERPL-CCNC: 94 U/L (ref 40–150)
ALT SERPL W P-5'-P-CCNC: 21 U/L (ref 0–50)
ANION GAP SERPL CALCULATED.3IONS-SCNC: 6 MMOL/L (ref 3–14)
AST SERPL W P-5'-P-CCNC: 10 U/L (ref 0–45)
BASOPHILS # BLD AUTO: 0 10E9/L (ref 0–0.2)
BASOPHILS NFR BLD AUTO: 0.3 %
BILIRUB SERPL-MCNC: 0.5 MG/DL (ref 0.2–1.3)
BUN SERPL-MCNC: 26 MG/DL (ref 7–30)
CALCIUM SERPL-MCNC: 9.1 MG/DL (ref 8.5–10.1)
CHLORIDE SERPL-SCNC: 110 MMOL/L (ref 94–109)
CO2 SERPL-SCNC: 28 MMOL/L (ref 20–32)
CREAT BLD-MCNC: 0.9 MG/DL (ref 0.52–1.04)
CREAT SERPL-MCNC: 0.91 MG/DL (ref 0.52–1.04)
DIFFERENTIAL METHOD BLD: NORMAL
EOSINOPHIL # BLD AUTO: 0.1 10E9/L (ref 0–0.7)
EOSINOPHIL NFR BLD AUTO: 1.9 %
ERYTHROCYTE [DISTWIDTH] IN BLOOD BY AUTOMATED COUNT: 14.6 % (ref 10–15)
GFR SERPL CREATININE-BSD FRML MDRD: 62 ML/MIN/{1.73_M2}
GFR SERPL CREATININE-BSD FRML MDRD: 63 ML/MIN/{1.73_M2}
GLUCOSE SERPL-MCNC: 63 MG/DL (ref 70–99)
HCT VFR BLD AUTO: 42.4 % (ref 35–47)
HGB BLD-MCNC: 13.7 G/DL (ref 11.7–15.7)
IMM GRANULOCYTES # BLD: 0 10E9/L (ref 0–0.4)
IMM GRANULOCYTES NFR BLD: 0.2 %
LYMPHOCYTES # BLD AUTO: 2.3 10E9/L (ref 0.8–5.3)
LYMPHOCYTES NFR BLD AUTO: 37.4 %
MCH RBC QN AUTO: 29.8 PG (ref 26.5–33)
MCHC RBC AUTO-ENTMCNC: 32.3 G/DL (ref 31.5–36.5)
MCV RBC AUTO: 92 FL (ref 78–100)
MONOCYTES # BLD AUTO: 0.4 10E9/L (ref 0–1.3)
MONOCYTES NFR BLD AUTO: 6.9 %
NEUTROPHILS # BLD AUTO: 3.3 10E9/L (ref 1.6–8.3)
NEUTROPHILS NFR BLD AUTO: 53.3 %
NRBC # BLD AUTO: 0 10*3/UL
NRBC BLD AUTO-RTO: 0 /100
PLATELET # BLD AUTO: 214 10E9/L (ref 150–450)
POTASSIUM SERPL-SCNC: 4 MMOL/L (ref 3.4–5.3)
PROT SERPL-MCNC: 7 G/DL (ref 6.8–8.8)
RBC # BLD AUTO: 4.6 10E12/L (ref 3.8–5.2)
SODIUM SERPL-SCNC: 143 MMOL/L (ref 133–144)
WBC # BLD AUTO: 6.2 10E9/L (ref 4–11)

## 2021-04-28 PROCEDURE — 71260 CT THORAX DX C+: CPT | Mod: MG | Performed by: STUDENT IN AN ORGANIZED HEALTH CARE EDUCATION/TRAINING PROGRAM

## 2021-04-28 PROCEDURE — 74177 CT ABD & PELVIS W/CONTRAST: CPT | Mod: MG | Performed by: STUDENT IN AN ORGANIZED HEALTH CARE EDUCATION/TRAINING PROGRAM

## 2021-04-28 PROCEDURE — 36415 COLL VENOUS BLD VENIPUNCTURE: CPT | Performed by: PATHOLOGY

## 2021-04-28 PROCEDURE — 85025 COMPLETE CBC W/AUTO DIFF WBC: CPT | Performed by: PATHOLOGY

## 2021-04-28 PROCEDURE — 84305 ASSAY OF SOMATOMEDIN: CPT | Performed by: INTERNAL MEDICINE

## 2021-04-28 PROCEDURE — G1004 CDSM NDSC: HCPCS | Performed by: STUDENT IN AN ORGANIZED HEALTH CARE EDUCATION/TRAINING PROGRAM

## 2021-04-28 PROCEDURE — 80053 COMPREHEN METABOLIC PANEL: CPT | Performed by: PATHOLOGY

## 2021-04-28 PROCEDURE — 82565 ASSAY OF CREATININE: CPT | Performed by: PATHOLOGY

## 2021-04-28 RX ORDER — IOPAMIDOL 755 MG/ML
82 INJECTION, SOLUTION INTRAVASCULAR ONCE
Status: COMPLETED | OUTPATIENT
Start: 2021-04-28 | End: 2021-04-28

## 2021-04-28 RX ADMIN — IOPAMIDOL 82 ML: 755 INJECTION, SOLUTION INTRAVASCULAR at 10:56

## 2021-04-29 ENCOUNTER — VIRTUAL VISIT (OUTPATIENT)
Dept: ONCOLOGY | Facility: CLINIC | Age: 71
End: 2021-04-29
Attending: INTERNAL MEDICINE
Payer: MEDICARE

## 2021-04-29 DIAGNOSIS — C54.9 UTERUS CANCER, SARCOMA (H): Primary | ICD-10-CM

## 2021-04-29 PROCEDURE — 99215 OFFICE O/P EST HI 40 MIN: CPT | Mod: 95 | Performed by: INTERNAL MEDICINE

## 2021-04-29 PROCEDURE — 999N001193 HC VIDEO/TELEPHONE VISIT; NO CHARGE

## 2021-04-29 NOTE — PROGRESS NOTES
Maggie is a 71 year old who is being evaluated via a billable video visit.      How would you like to obtain your AVS? MyChart  If the video visit is dropped, the invitation should be resent by: Text to cell phone: [phone]  Will anyone else be joining your video visit? Yes: Sister, Radha. How would they like to receive their invitation? Text to cell phone: [phone]       Vitals - Patient Reported  Weight (Patient Reported): 515.3 kg (1136 lb)  Height (Patient Reported): 152.4 cm (5')  BMI (Based on Pt Reported Ht/Wt): 221.86  Pain Score: No Pain (0)      Meredith Armstrong CMA on 4/29/2021 at 9:40 AM    Video-Visit Details  Type of service:  Video Visit  Video Start Time: 10:40 AM  Video End Time:11:10 AM  Originating Location (pt. Location): Home  Distant Location (provider location):  [facility]   Platform used for Video Visit: [facility] ONCOLOGY PROGRESS NOTE  Sarcoma Clinic  Apr 29, 2021    Oncologic history:  1. January 2016, she presents to her PCP with 3 weeks of abdominal pain and severe constipation. She is treated for constipation and improves.  2. 3/21/2016, worsening pelvic pressure prompts pelvic examination with an enlarged uterus noted. Transvaginal ultrasound shows enlargement of subserosal fibroids originally seen on MRI in 2006.  3. 5/9/2016, MRI pelvis is obtained, which shows several enhancing uterine masses involving an enlarged uterus, felt radiographically compatible with benign uterine leiomyomata.  3. Summer 2016, she experiences worsening low back pain thought at first thought to be sacroiliitis, but pain persists after corticosteroid injection. Continues with severe constipation.  4. 9/8/2016, she undergoes colonoscopy for persistent abdominal pain. She has sigmoid polyp snared. Abdominal pain and diarrhea persist after colonoscopy prep.  5. 9/14/2016, she has worsening abdominal pain and is evaluated in the ED. CT-scan shows a large  subserosal fibroid measuring 9.4 x 9.6 x 9.8 cm, as well as very large fibroids along the posterior uterine body more inferiorly, increased in size to 14 cm in greatest diameter compared to 10 cm in greatest diameter on MRI of 5/9/2016.  6. 9/29/2016, she has staging CT-CAP, which shows a small left sided pleural effusion and a 3 mm pulmonary nodule in the RML.  7. October 2016, self-refers to [facility].  8. 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  9. 11/4/2016, undergoes total abdominal hysterectomy and BSO, under Dr. Harsh Camarillo. Surgery was originally presumed for leiomyoma, however, intraoperatively there was focal adherence of a mass to the pelvic right sidewall and cul-de-sac, which led to peritoneal resection and omentectomy. Final pathology, pT2b: showed leiomyosarcoma involving the cul-de-sac and forming two separate masses measuring 10.5 x 6.5 x 4.5 cm and 10.7 x 7.9 x 7.5 cm.  10. 12/26/2016, she starts adjuvant chemotherapy with doxorubicin 25 mg/m2/day and DTIC 250 mg/m2/day , days 1-3 every 21 weeks.  11. 3/23/2017, she completes 5 cycles of adjuvant doxorubicin and dacarbazine  12. 6/3/2020, restaging MRI shows a locally recurrent pelvic sidewall mass measuring 2.8 x 3.3 cm in size. This new area of concern was not previously reported, presumably due to proximity to bowel and the similar appearance to adjacent loops of bowel on the contrast enhanced imaging. On review of prior imaging studies, the lesion can be seen as far back as January 2019 as a small nodule measuring 1 cm in size directly adjacent to bowel, close to the pelvic sidewall.  13. 6/16/2020, she had resection of the recurrent leiomyosarcoma tumor, which was densely attached to the right ureter and attached to the right side of the sigmoid mesentery and external and internal iliac vessels.  There was external iliac vein injury with primary repair due to involvement of the mass.  The ureter was also partially removed as part of the tumor mass. All margins and the ureter that was resected was negative for tumor. Pathology (JR-) showed a 3.7 x 3.5 x 2.2 cm thinly encapsulated mass, which was submitted for frozen sections.      HISTORY OF PRESENT ILLNESS  Ms. Navarrete presents today in follow-up and to restaging.    CT-CAP shows no new evidence of recurrence or metastasis. A lymph node adjacent to the pelvic side wall is noted, but no clear tumor recurrence. On her last visit she requested an IGF1 level, and this was within the established institutional reference range at 143.    She denies nausea, vomiting, or diarrhea. She denies fevers, chills, nausea, vomiting, diarrhea.     ECOG performance status is 0.    REVIEW OF SYSTEMS  A 12-point ROS negative except as in HPI    Current Outpatient Medications   Medication Sig Dispense Refill     levothyroxine (SYNTHROID/LEVOTHROID) 75 MCG tablet Take 1 tablet (75 mcg) by mouth daily 90 tablet 3     Multiple Vitamin (MULTIVITAMINS PO) Take 1 tablet by mouth daily Reported on 4/10/2017       venlafaxine (EFFEXOR-XR) 75 MG 24 hr capsule Take 1 capsule (75 mg) by mouth daily 90 capsule 1     zolpidem (AMBIEN) 5 MG tablet Take 1 tablet (5 mg) by mouth nightly as needed for sleep (Only uses if traveling or cannot sleep,) 20 tablet 0       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  Wt Readings from Last 2 Encounters:   04/14/21 61.2 kg (135 lb)   07/08/20 61.2 kg (135 lb)   General: Well appearing, at home, in no distress  Head: Normocephalic  Eyes: EOMI, No icterus  ENT: Oropharynx clear  Lungs: Easy breathing on room air, speaking in complete sentences without dyspnea  Neuro: Cranial nerves 2-12 intact, no focal deficits  Skin: No rashes on exposed skin    The rest of a comprehensive physical examination is deferred due to PHE (public health emergency) video visit restrictions.      ASSESSMENT AND PLAN  #1 Locally advanced leiomyosarcoma of the uterus, pT2b Nx M0, Stage IIB, status-post resection (11/2016) and 5 cycles of adjuvant doxorubicin and dacarbazine  #2 Local recurrence, leiomyosarcoma of the uterus, right pelvic sidewall adherent to iliac vessels and ureter s/p resection June 2020  It was a pleasure to see Ms. Navarrete today. We reviewed her scans, which show nothing for metastatic disease or recurrence in the right pelvis, only small intra abdominal lymph nodes and pelvic nodes. On my further review a small lymph node in question was visible and unchanged since at least June 2020 after her surgery. I was also able to review with radiology and there is no evidence of recurrence on the most recent imaging scans.     Plan to repeat CT-CAP in 3-4 months.    Questions answered.    Ab Warner M.D.   of Medicine  Hematology, Oncology and Transplantation

## 2021-04-29 NOTE — LETTER
4/29/2021         RE: Maggie Navarrete  [address]  [facility] [address]        Dear Colleague,    Thank you for referring your patient, Maggie Navarrete, to the [facility]. Please see a copy of my visit note below.    Maggie is a 71 year old who is being evaluated via a billable video visit.      How would you like to obtain your AVS? MyChart  If the video visit is dropped, the invitation should be resent by: Text to cell phone: [phone]  Will anyone else be joining your video visit? Yes: Sister, Radha. How would they like to receive their invitation? Text to cell phone: [phone]       Vitals - Patient Reported  Weight (Patient Reported): 515.3 kg (1136 lb)  Height (Patient Reported): 152.4 cm (5')  BMI (Based on Pt Reported Ht/Wt): 221.86  Pain Score: No Pain (0)      Meredith Armstrong CMA on 4/29/2021 at 9:40 AM    Video-Visit Details  Type of service:  Video Visit  Video Start Time: 10:40 AM  Video End Time:11:10 AM  Originating Location (pt. Location): Home  Distant Location (provider location):  [facility]   Platform used for Video Visit: [facility]       MEDICAL ONCOLOGY PROGRESS NOTE  [facility]  Apr 29, 2021    Oncologic history:  1. January 2016, she presents to her PCP with 3 weeks of abdominal pain and severe constipation. She is treated for constipation and improves.  2. 3/21/2016, worsening pelvic pressure prompts pelvic examination with an enlarged uterus noted. Transvaginal ultrasound shows enlargement of subserosal fibroids originally seen on MRI in 2006.  3. 5/9/2016, MRI pelvis is obtained, which shows several enhancing uterine masses involving an enlarged uterus, felt radiographically compatible with benign uterine leiomyomata.  3. Summer 2016, she experiences worsening low back pain thought at first thought to be sacroiliitis, but pain persists after corticosteroid injection. Continues with severe constipation.  4.  9/8/2016, she undergoes colonoscopy for persistent abdominal pain. She has sigmoid polyp snared. Abdominal pain and diarrhea persist after colonoscopy prep.  5. 9/14/2016, she has worsening abdominal pain and is evaluated in the ED. CT-scan shows a large subserosal fibroid measuring 9.4 x 9.6 x 9.8 cm, as well as very large fibroids along the posterior uterine body more inferiorly, increased in size to 14 cm in greatest diameter compared to 10 cm in greatest diameter on MRI of 5/9/2016.  6. 9/29/2016, she has staging CT-CAP, which shows a small left sided pleural effusion and a 3 mm pulmonary nodule in the RML.  7. October 2016, self-refers to [facility].  8. 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  9. 11/4/2016, undergoes total abdominal hysterectomy and BSO, under Dr. Harsh Camarillo. Surgery was originally presumed for leiomyoma, however, intraoperatively there was focal adherence of a mass to the pelvic right sidewall and cul-de-sac, which led to peritoneal resection and omentectomy. Final pathology, pT2b: showed leiomyosarcoma involving the cul-de-sac and forming two separate masses measuring 10.5 x 6.5 x 4.5 cm and 10.7 x 7.9 x 7.5 cm.  10. 12/26/2016, she starts adjuvant chemotherapy with doxorubicin 25 mg/m2/day and DTIC 250 mg/m2/day , days 1-3 every 21 weeks.  11. 3/23/2017, she completes 5 cycles of adjuvant doxorubicin and dacarbazine  12. 6/3/2020, restaging MRI shows a locally recurrent pelvic sidewall mass measuring 2.8 x 3.3 cm in size. This new area of concern was not previously reported, presumably due to proximity to bowel and the similar appearance to adjacent loops of bowel on the contrast enhanced imaging. On review of prior imaging studies, the lesion can be seen as far back as January 2019 as a small nodule measuring 1 cm in size directly adjacent to bowel, close to the pelvic sidewall.  13. 6/16/2020, she had resection of the recurrent  leiomyosarcoma tumor, which was densely attached to the right ureter and attached to the right side of the sigmoid mesentery and external and internal iliac vessels.  There was external iliac vein injury with primary repair due to involvement of the mass. The ureter was also partially removed as part of the tumor mass. All margins and the ureter that was resected was negative for tumor. Pathology (JR-) showed a 3.7 x 3.5 x 2.2 cm thinly encapsulated mass, which was submitted for frozen sections.      HISTORY OF PRESENT ILLNESS  Ms. Navarrete presents today in follow-up and to restaging.    CT-CAP shows no new evidence of recurrence or metastasis. A lymph node adjacent to the pelvic side wall is noted, but no clear tumor recurrence. On her last visit she requested an IGF1 level, and this was within the established institutional reference range at 143.    She denies nausea, vomiting, or diarrhea. She denies fevers, chills, nausea, vomiting, diarrhea.     ECOG performance status is 0.    REVIEW OF SYSTEMS  A 12-point ROS negative except as in HPI    Current Outpatient Medications   Medication Sig Dispense Refill     levothyroxine (SYNTHROID/LEVOTHROID) 75 MCG tablet Take 1 tablet (75 mcg) by mouth daily 90 tablet 3     Multiple Vitamin (MULTIVITAMINS PO) Take 1 tablet by mouth daily Reported on 4/10/2017       venlafaxine (EFFEXOR-XR) 75 MG 24 hr capsule Take 1 capsule (75 mg) by mouth daily 90 capsule 1     zolpidem (AMBIEN) 5 MG tablet Take 1 tablet (5 mg) by mouth nightly as needed for sleep (Only uses if traveling or cannot sleep,) 20 tablet 0       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  Wt Readings from Last 2 Encounters:   04/14/21 61.2 kg (135 lb)   07/08/20 61.2 kg (135 lb)   General: Well appearing, at home, in no distress  Head: Normocephalic  Eyes: EOMI, No icterus  ENT: Oropharynx clear  Lungs: Easy breathing on room air, speaking in complete sentences without dyspnea  Neuro: Cranial nerves 2-12 intact, no focal deficits  Skin: No rashes on exposed skin    The rest of a comprehensive physical examination is deferred due to PHE (public health emergency) video visit restrictions.      ASSESSMENT AND PLAN  #1 Locally advanced leiomyosarcoma of the uterus, pT2b Nx M0, Stage IIB, status-post resection (11/2016) and 5 cycles of adjuvant doxorubicin and dacarbazine  #2 Local recurrence, leiomyosarcoma of the uterus, right pelvic sidewall adherent to iliac vessels and ureter s/p resection June 2020  It was a pleasure to see Ms. Navarrete today. We reviewed her scans, which show nothing for metastatic disease or recurrence in the right pelvis, only small intra abdominal lymph nodes and pelvic nodes. On my further review a small lymph node in question was visible and unchanged since at least June 2020 after her surgery. I was also able to review with radiology and there is no evidence of recurrence on the most recent imaging scans.     Plan to repeat CT-CAP in 3-4 months.    Questions answered.    Ab Warner M.D.   of Medicine  Hematology, Oncology and Transplantation           Again, thank you for allowing me to participate in the care of your  patient.        Sincerely,        Ab Aguilar MD

## 2021-04-30 LAB — IGF-I BLD-MCNC: 143 NG/ML (ref 24–224)

## 2021-05-08 ENCOUNTER — HEALTH MAINTENANCE LETTER (OUTPATIENT)
Age: 71
End: 2021-05-08

## 2021-05-11 PROBLEM — E03.9 HYPOTHYROIDISM: Status: ACTIVE | Noted: 2021-05-11

## 2021-05-11 PROBLEM — G62.9 NEUROPATHY, PERIPHERAL: Status: ACTIVE | Noted: 2021-05-11

## 2021-05-11 PROBLEM — E66.3 OVERWEIGHT: Status: ACTIVE | Noted: 2018-11-29

## 2021-05-11 PROBLEM — Z98.890 POST-OPERATIVE NAUSEA AND VOMITING: Status: ACTIVE | Noted: 2021-05-11

## 2021-05-11 PROBLEM — H04.123 DRY EYES: Status: ACTIVE | Noted: 2019-02-11

## 2021-05-11 PROBLEM — R11.2 POST-OPERATIVE NAUSEA AND VOMITING: Status: ACTIVE | Noted: 2021-05-11

## 2021-05-11 PROBLEM — H52.13 MYOPIA OF BOTH EYES: Status: ACTIVE | Noted: 2019-02-11

## 2021-05-11 PROBLEM — Z86.718 HISTORY OF DEEP VENOUS THROMBOSIS: Status: ACTIVE | Noted: 2021-05-11

## 2021-05-11 RX ORDER — ZOSTER VACCINE RECOMBINANT, ADJUVANTED 50 MCG/0.5
KIT INTRAMUSCULAR
COMMUNITY
Start: 2020-12-11 | End: 2021-08-02

## 2021-07-07 ENCOUNTER — TRANSFERRED RECORDS (OUTPATIENT)
Dept: HEALTH INFORMATION MANAGEMENT | Facility: CLINIC | Age: 71
End: 2021-07-07

## 2021-07-28 ENCOUNTER — LAB (OUTPATIENT)
Dept: LAB | Facility: CLINIC | Age: 71
End: 2021-07-28
Attending: INTERNAL MEDICINE
Payer: MEDICARE

## 2021-07-28 ENCOUNTER — ANCILLARY PROCEDURE (OUTPATIENT)
Dept: CT IMAGING | Facility: CLINIC | Age: 71
End: 2021-07-28
Attending: INTERNAL MEDICINE
Payer: MEDICARE

## 2021-07-28 VITALS
BODY MASS INDEX: 27.73 KG/M2 | TEMPERATURE: 97.7 F | HEART RATE: 93 BPM | OXYGEN SATURATION: 99 % | WEIGHT: 142 LBS | RESPIRATION RATE: 16 BRPM | SYSTOLIC BLOOD PRESSURE: 142 MMHG | DIASTOLIC BLOOD PRESSURE: 84 MMHG

## 2021-07-28 DIAGNOSIS — C49.9 LEIOMYOSARCOMA (H): ICD-10-CM

## 2021-07-28 DIAGNOSIS — C54.9 UTERUS CANCER, SARCOMA (H): ICD-10-CM

## 2021-07-28 LAB
ALBUMIN SERPL-MCNC: 3.6 G/DL (ref 3.4–5)
ALP SERPL-CCNC: 82 U/L (ref 40–150)
ALT SERPL W P-5'-P-CCNC: 20 U/L (ref 0–50)
ANION GAP SERPL CALCULATED.3IONS-SCNC: 1 MMOL/L (ref 3–14)
AST SERPL W P-5'-P-CCNC: 14 U/L (ref 0–45)
BASOPHILS # BLD AUTO: 0 10E3/UL (ref 0–0.2)
BASOPHILS NFR BLD AUTO: 1 %
BILIRUB SERPL-MCNC: 0.4 MG/DL (ref 0.2–1.3)
BUN SERPL-MCNC: 24 MG/DL (ref 7–30)
CALCIUM SERPL-MCNC: 9.1 MG/DL (ref 8.5–10.1)
CHLORIDE BLD-SCNC: 112 MMOL/L (ref 94–109)
CO2 SERPL-SCNC: 29 MMOL/L (ref 20–32)
CREAT BLD-MCNC: 1 MG/DL (ref 0.5–1)
CREAT SERPL-MCNC: 0.83 MG/DL (ref 0.52–1.04)
EOSINOPHIL # BLD AUTO: 0.1 10E3/UL (ref 0–0.7)
EOSINOPHIL NFR BLD AUTO: 2 %
ERYTHROCYTE [DISTWIDTH] IN BLOOD BY AUTOMATED COUNT: 13.8 % (ref 10–15)
GFR SERPL CREATININE-BSD FRML MDRD: 57 ML/MIN/1.73M2
GFR SERPL CREATININE-BSD FRML MDRD: 71 ML/MIN/1.73M2
GLUCOSE BLD-MCNC: 66 MG/DL (ref 70–99)
HCT VFR BLD AUTO: 41.9 % (ref 35–47)
HGB BLD-MCNC: 14 G/DL (ref 11.7–15.7)
IMM GRANULOCYTES # BLD: 0 10E3/UL
IMM GRANULOCYTES NFR BLD: 0 %
LYMPHOCYTES # BLD AUTO: 2.3 10E3/UL (ref 0.8–5.3)
LYMPHOCYTES NFR BLD AUTO: 39 %
MCH RBC QN AUTO: 30.2 PG (ref 26.5–33)
MCHC RBC AUTO-ENTMCNC: 33.4 G/DL (ref 31.5–36.5)
MCV RBC AUTO: 90 FL (ref 78–100)
MONOCYTES # BLD AUTO: 0.5 10E3/UL (ref 0–1.3)
MONOCYTES NFR BLD AUTO: 9 %
NEUTROPHILS # BLD AUTO: 2.8 10E3/UL (ref 1.6–8.3)
NEUTROPHILS NFR BLD AUTO: 49 %
NRBC # BLD AUTO: 0 10E3/UL
NRBC BLD AUTO-RTO: 0 /100
PLATELET # BLD AUTO: 200 10E3/UL (ref 150–450)
POTASSIUM BLD-SCNC: 3.7 MMOL/L (ref 3.4–5.3)
PROT SERPL-MCNC: 6.7 G/DL (ref 6.8–8.8)
RBC # BLD AUTO: 4.64 10E6/UL (ref 3.8–5.2)
SODIUM SERPL-SCNC: 142 MMOL/L (ref 133–144)
WBC # BLD AUTO: 5.8 10E3/UL (ref 4–11)

## 2021-07-28 PROCEDURE — 82040 ASSAY OF SERUM ALBUMIN: CPT

## 2021-07-28 PROCEDURE — 36415 COLL VENOUS BLD VENIPUNCTURE: CPT

## 2021-07-28 PROCEDURE — G1004 CDSM NDSC: HCPCS | Performed by: RADIOLOGY

## 2021-07-28 PROCEDURE — 74177 CT ABD & PELVIS W/CONTRAST: CPT | Mod: ME | Performed by: RADIOLOGY

## 2021-07-28 PROCEDURE — 71260 CT THORAX DX C+: CPT | Mod: ME | Performed by: RADIOLOGY

## 2021-07-28 PROCEDURE — 80053 COMPREHEN METABOLIC PANEL: CPT | Performed by: PATHOLOGY

## 2021-07-28 PROCEDURE — 85025 COMPLETE CBC W/AUTO DIFF WBC: CPT

## 2021-07-28 RX ORDER — HEPARIN SODIUM,PORCINE 10 UNIT/ML
5 VIAL (ML) INTRAVENOUS
Status: CANCELLED | OUTPATIENT
Start: 2021-07-28

## 2021-07-28 RX ORDER — HEPARIN SODIUM (PORCINE) LOCK FLUSH IV SOLN 100 UNIT/ML 100 UNIT/ML
5 SOLUTION INTRAVENOUS
Status: CANCELLED | OUTPATIENT
Start: 2021-07-28

## 2021-07-28 RX ORDER — IOPAMIDOL 755 MG/ML
86 INJECTION, SOLUTION INTRAVASCULAR ONCE
Status: COMPLETED | OUTPATIENT
Start: 2021-07-28 | End: 2021-07-28

## 2021-07-28 RX ADMIN — IOPAMIDOL 86 ML: 755 INJECTION, SOLUTION INTRAVASCULAR at 11:04

## 2021-07-28 ASSESSMENT — PAIN SCALES - GENERAL: PAINLEVEL: NO PAIN (0)

## 2021-07-29 ENCOUNTER — VIRTUAL VISIT (OUTPATIENT)
Dept: ONCOLOGY | Facility: CLINIC | Age: 71
End: 2021-07-29
Attending: INTERNAL MEDICINE
Payer: MEDICARE

## 2021-07-29 DIAGNOSIS — R93.5 ABNORMAL ABDOMINAL CT SCAN: ICD-10-CM

## 2021-07-29 DIAGNOSIS — C55 LEIOMYOSARCOMA OF UTERUS (H): Primary | ICD-10-CM

## 2021-07-29 PROCEDURE — 99215 OFFICE O/P EST HI 40 MIN: CPT | Mod: 95 | Performed by: INTERNAL MEDICINE

## 2021-07-29 NOTE — LETTER
7/29/2021         RE: Maggie Navarrete  [address]  [facility] [address]        Dear Colleague,    Thank you for referring your patient, Maggie Navarrete, to the [facility]. Please see a copy of my visit note below.    Maggie is a 71 year old who is being evaluated via a billable video visit.      How would you like to obtain your AVS? MyChart  If the video visit is dropped, the invitation should be resent by: Send to e-mail at: [email or web address]  Will anyone else be joining your video visit? Yes: SISTER. How would they like to receive their invitation? Text to cell phone: [phone]  {If patient encounters technical issues they should call [phone] :[id number]}  Vitals - Patient Reported  Weight (Patient Reported): 64.4 kg (142 lb)  Height (Patient Reported): 152.4 cm (5')  BMI (Based on Pt Reported Ht/Wt): 27.73  Pain Score: No Pain (0)- LEFT GROIN AREA.    Carline Lawler MA    Video-Visit Details  Type of service:  Video Visit  Video Start Time: 2:15 PM  Video End Time:2:45 PM  Originating Location (pt. Location): Home  Distant Location (provider location):  [facility]   Platform used for Video Visit: [facility] ONCOLOGY PROGRESS NOTE  [facility]  Jul 29, 2021    Oncologic history:  1. January 2016, she presents to her PCP with 3 weeks of abdominal pain and severe constipation. She is treated for constipation and improves.  2. 3/21/2016, worsening pelvic pressure prompts pelvic examination with an enlarged uterus noted. Transvaginal ultrasound shows enlargement of subserosal fibroids originally seen on MRI in 2006.  3. 5/9/2016, MRI pelvis is obtained, which shows several enhancing uterine masses involving an enlarged uterus, felt radiographically compatible with benign uterine leiomyomata.  3. Summer 2016, she experiences worsening low back pain thought at first thought to be sacroiliitis, but pain persists after  corticosteroid injection. Continues with severe constipation.  4. 9/8/2016, she undergoes colonoscopy for persistent abdominal pain. She has sigmoid polyp snared. Abdominal pain and diarrhea persist after colonoscopy prep.  5. 9/14/2016, she has worsening abdominal pain and is evaluated in the ED. CT-scan shows a large subserosal fibroid measuring 9.4 x 9.6 x 9.8 cm, as well as very large fibroids along the posterior uterine body more inferiorly, increased in size to 14 cm in greatest diameter compared to 10 cm in greatest diameter on MRI of 5/9/2016.  6. 9/29/2016, she has staging CT-CAP, which shows a small left sided pleural effusion and a 3 mm pulmonary nodule in the RML.  7. October 2016, self-refers to [facility].  8. 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  9. 11/4/2016, undergoes total abdominal hysterectomy and BSO, under Dr. Harsh Camarillo. Surgery was originally presumed for leiomyoma, however, intraoperatively there was focal adherence of a mass to the pelvic right sidewall and cul-de-sac, which led to peritoneal resection and omentectomy. Final pathology, pT2b: showed leiomyosarcoma involving the cul-de-sac and forming two separate masses measuring 10.5 x 6.5 x 4.5 cm and 10.7 x 7.9 x 7.5 cm.  10. 12/26/2016, she starts adjuvant chemotherapy with doxorubicin 25 mg/m2/day and DTIC 250 mg/m2/day , days 1-3 every 21 weeks.  11. 3/23/2017, she completes 5 cycles of adjuvant doxorubicin and dacarbazine  12. 6/3/2020, restaging MRI shows a locally recurrent pelvic sidewall mass measuring 2.8 x 3.3 cm in size. This new area of concern was not previously reported, presumably due to proximity to bowel and the similar appearance to adjacent loops of bowel on the contrast enhanced imaging. On review of prior imaging studies, the lesion can be seen as far back as January 2019 as a small nodule measuring 1 cm in size directly adjacent to bowel, close to the  pelvic sidewall.  13. 6/16/2020, she had resection of the recurrent leiomyosarcoma tumor, which was densely attached to the right ureter and attached to the right side of the sigmoid mesentery and external and internal iliac vessels.  There was external iliac vein injury with primary repair due to involvement of the mass. The ureter was also partially removed as part of the tumor mass. All margins and the ureter that was resected was negative for tumor. Pathology (JR-) showed a 3.7 x 3.5 x 2.2 cm thinly encapsulated mass, which was submitted for frozen sections.      HISTORY OF PRESENT ILLNESS  Ms. Navarrete presents today in follow-up and to restaging.    CT-CAP shows no new evidence of recurrence or metastasis. A lymph node adjacent to the pelvic side wall is noted, but no clear tumor recurrence.     On her last visit she requested an IGF1 level, and this was within the established institutional reference range at 143. Going to [place] for an overseas adventure. Planning to go in September. Her grandchildren came from [facility] and they got vaccinated.    She also started Caryn a program looking at stool microbiome for input on diet and general health    She denies nausea, vomiting, or diarrhea. She denies fevers, chills, nausea, vomiting, diarrhea.     ECOG performance status is 0.    REVIEW OF SYSTEMS  A 12-point ROS negative except as in HPI    Current Outpatient Medications   Medication Sig Dispense Refill     levothyroxine (SYNTHROID/LEVOTHROID) 75 MCG tablet Take 1 tablet (75 mcg) by mouth daily 90 tablet 3     venlafaxine (EFFEXOR-XR) 75 MG 24 hr capsule Take 1 capsule (75 mg) by mouth daily 90 capsule 1     zolpidem (AMBIEN) 5 MG tablet Take 1 tablet (5 mg) by mouth nightly as needed for sleep (Only uses if traveling or cannot sleep,) 20 tablet 0     Multiple Vitamin (MULTIVITAMINS PO) Take 1 tablet by mouth daily Reported on 4/10/2017       PROGESTERONE MICRONIZED PO        SHINGRIX injection PHARMACY  ADMINISTERED         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  Wt Readings from Last 2 Encounters:   07/28/21 64.4 kg (142 lb)   04/14/21 61.2 kg (135 lb)   General: Well appearing, at home, in no distress  Head: Normocephalic  Eyes: EOMI, No icterus  ENT: Oropharynx clear  Lungs: Easy breathing on room air, speaking in complete sentences without dyspnea  Neuro: Cranial nerves 2-12 intact, no focal deficits  Skin: No rashes on exposed skin    The rest of a comprehensive physical examination is deferred due to PHE (public health emergency) video visit restrictions.      Labs:  Ancillary Procedure on 07/28/2021   Component Date Value Ref Range Status     Creatinine POCT 07/28/2021 1.0  0.5 - 1.0 mg/dL Final     GFR, ESTIMATED POCT 07/28/2021 57* >60 mL/min/1.73m2 Final   Lab on 07/28/2021   Component Date Value Ref Range Status     Sodium 07/28/2021 142  133 - 144 mmol/L Final     Potassium 07/28/2021 3.7  3.4 - 5.3 mmol/L Final     Chloride 07/28/2021 112* 94 - 109 mmol/L Final     Carbon Dioxide (CO2) 07/28/2021 29  20 - 32 mmol/L Final     Anion Gap 07/28/2021 1* 3 - 14 mmol/L Final     Urea Nitrogen 07/28/2021 24  7 - 30 mg/dL Final     Creatinine 07/28/2021 0.83   0.52 - 1.04 mg/dL Final     Calcium 07/28/2021 9.1  8.5 - 10.1 mg/dL Final     Glucose 07/28/2021 66* 70 - 99 mg/dL Final     Alkaline Phosphatase 07/28/2021 82  40 - 150 U/L Final     AST 07/28/2021 14  0 - 45 U/L Final     ALT 07/28/2021 20  0 - 50 U/L Final     Protein Total 07/28/2021 6.7* 6.8 - 8.8 g/dL Final     Albumin 07/28/2021 3.6  3.4 - 5.0 g/dL Final     Bilirubin Total 07/28/2021 0.4  0.2 - 1.3 mg/dL Final     GFR Estimate 07/28/2021 71  >60 mL/min/1.73m2 Final     WBC Count 07/28/2021 5.8  4.0 - 11.0 10e3/uL Final     RBC Count 07/28/2021 4.64  3.80 - 5.20 10e6/uL Final     Hemoglobin 07/28/2021 14.0  11.7 - 15.7 g/dL Final     Hematocrit 07/28/2021 41.9  35.0 - 47.0 % Final     MCV 07/28/2021 90  78 - 100 fL Final     MCH 07/28/2021 30.2  26.5 - 33.0 pg Final     MCHC 07/28/2021 33.4  31.5 - 36.5 g/dL Final     RDW 07/28/2021 13.8  10.0 - 15.0 % Final     Platelet Count 07/28/2021 200  150 - 450 10e3/uL Final     % Neutrophils 07/28/2021 49  % Final     % Lymphocytes 07/28/2021 39  % Final     % Monocytes 07/28/2021 9  % Final     % Eosinophils 07/28/2021 2  % Final     % Basophils 07/28/2021 1  % Final     % Immature Granulocytes 07/28/2021 0  % Final     NRBCs per 100 WBC 07/28/2021 0  <1 /100 Final     Absolute Neutrophils 07/28/2021 2.8  1.6 - 8.3 10e3/uL Final     Absolute Lymphocytes 07/28/2021 2.3  0.8 - 5.3 10e3/uL Final     Absolute Monocytes 07/28/2021 0.5  0.0 - 1.3 10e3/uL Final     Absolute Eosinophils 07/28/2021 0.1  0.0 - 0.7 10e3/uL Final     Absolute Basophils 07/28/2021 0.0  0.0 - 0.2 10e3/uL Final     Absolute Immature Granulocytes 07/28/2021 0.0  <=0.0 10e3/uL Final     Absolute NRBCs 07/28/2021 0.0  10e3/uL Final         Imaging:  CT Chest/Abdomen/Pelvis w Contrast  Narrative: EXAMINATION: CT CHEST/ABDOMEN/PELVIS W CONTRAST, 7/28/2021 11:30 AM    TECHNIQUE: Helical CT images from the thoracic inlet through the  symphysis pubis were obtained with intravenous contrast  utilizing  biphasic liver protocol. Contrast dose: Isovue 370 86cc    COMPARISON: CT 4/28/21, 9/29/2016. MRI 6/3/20     HISTORY: Soft tissue sarcoma, retroperitoneal/intra-abdominal, assess  treatment response; Uterus cancer, sarcoma (H)    FINDINGS:    Chest: Right thyroid gland is surgically absent. Left thyroid gland is  unremarkable. No supraclavicular or axillary lymphadenopathy.  Calcified lower mediastinal and right hilar. Cardiac size is normal.  No pericardial effusion. Aortic and pulmonary artery caliber within  normal limits. Visualized portions of the aortic arch branching  vessels are unremarkable. Esophagus is unremarkable.    Trachea and bronchi are patent and clear of debris. No evidence of  airspace disease. Calcifications in the anterior middle lobe. No  pneumothorax. No pleural effusion.    Abdomen and pelvis: Subcentimeter cyst in the lateral left hepatic  edge. Presumed focal fatty deposition along the falciform ligament,  stable. Gallbladder is partially dilated and unremarkable. No intra-  or extrahepatic biliary dilation. Pancreatic atrophy. The spleen and  adrenal glands are unremarkable.     Unchanged atrophy of the right kidney with a focal area of scarring in  the lateral mid pole (series 8 image 256) and small right-sided extra  renal pelvis. Multiple left-sided cortical renal cysts and parapelvic  cysts. No hydronephrosis of either kidney. Urinary bladder is  unremarkable. Postoperative changes of hysterectomy.    Stomach, small intestine, and colon are unremarkable. Appendix is  visualized and normal in caliber.     No suspicious abdominal or pelvic lymphadenopathy. No free fluid. No  pneumoperitoneum.    Abdominal aorta is normal in caliber and patent. Celiac and mesenteric  artery origins are unremarkable. Portal veins and IVC are patent.    Bones and soft tissues: No suspicious osseous findings. Soft tissue is  unremarkable.  Impression: IMPRESSION: In this patient with history of  uterine leiomyosarcoma:  1. No evidence of locally recurrent or metastatic disease.    I have personally reviewed the examination and initial interpretation  and I agree with the findings.    KHANH HANDLEY MD         SYSTEM ID:  [id number]          ASSESSMENT AND PLAN    #1 Locally advanced leiomyosarcoma of the uterus, pT2b Nx M0, Stage IIB, status-post resection (11/2016) and 5 cycles of adjuvant doxorubicin and dacarbazine  #2 Local recurrence, leiomyosarcoma of the uterus, right pelvic sidewall adherent to iliac vessels and ureter s/p resection June 2020  It was a pleasure to see Ms. Navarrete today. We reviewed her scans, which show nothing for metastatic disease or recurrence in the right pelvis, only small intra abdominal lymph nodes and pelvic nodes.     Plan to repeat CT-CAP in 4 months.    Questions answered.    Ab Warner M.D.   of Medicine  Hematology, Oncology and Transplantation           Again, thank you for allowing me to participate in the care of your patient.        Sincerely,        Ab Aguilar MD

## 2021-07-29 NOTE — PROGRESS NOTES
Maggie is a 71 year old who is being evaluated via a billable video visit.      How would you like to obtain your AVS? MyChart  If the video visit is dropped, the invitation should be resent by: Send to e-mail at: [email or web address]  Will anyone else be joining your video visit? Yes: SISTER. How would they like to receive their invitation? Text to cell phone: [phone]    Vitals - Patient Reported  Weight (Patient Reported): 64.4 kg (142 lb)  Height (Patient Reported): 152.4 cm (5')  BMI (Based on Pt Reported Ht/Wt): 27.73  Pain Score: No Pain (0)- LEFT GROIN AREA.    Carline Lawler MA    Video-Visit Details  Type of service:  Video Visit  Video Start Time: 2:15 PM  Video End Time:2:45 PM  Originating Location (pt. Location): Home  Distant Location (provider location):  [facility]   Platform used for Video Visit: [facility] ONCOLOGY PROGRESS NOTE  [facility]  Jul 29, 2021    Oncologic history:  1. January 2016, she presents to her PCP with 3 weeks of abdominal pain and severe constipation. She is treated for constipation and improves.  2. 3/21/2016, worsening pelvic pressure prompts pelvic examination with an enlarged uterus noted. Transvaginal ultrasound shows enlargement of subserosal fibroids originally seen on MRI in 2006.  3. 5/9/2016, MRI pelvis is obtained, which shows several enhancing uterine masses involving an enlarged uterus, felt radiographically compatible with benign uterine leiomyomata.  3. Summer 2016, she experiences worsening low back pain thought at first thought to be sacroiliitis, but pain persists after corticosteroid injection. Continues with severe constipation.  4. 9/8/2016, she undergoes colonoscopy for persistent abdominal pain. She has sigmoid polyp snared. Abdominal pain and diarrhea persist after colonoscopy prep.  5. 9/14/2016, she has worsening abdominal pain and is evaluated in the ED. CT-scan shows a large subserosal fibroid  measuring 9.4 x 9.6 x 9.8 cm, as well as very large fibroids along the posterior uterine body more inferiorly, increased in size to 14 cm in greatest diameter compared to 10 cm in greatest diameter on MRI of 5/9/2016.  6. 9/29/2016, she has staging CT-CAP, which shows a small left sided pleural effusion and a 3 mm pulmonary nodule in the RML.  7. October 2016, self-refers to [facility].  8. 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  9. 11/4/2016, undergoes total abdominal hysterectomy and BSO, under Dr. Harsh Camarillo. Surgery was originally presumed for leiomyoma, however, intraoperatively there was focal adherence of a mass to the pelvic right sidewall and cul-de-sac, which led to peritoneal resection and omentectomy. Final pathology, pT2b: showed leiomyosarcoma involving the cul-de-sac and forming two separate masses measuring 10.5 x 6.5 x 4.5 cm and 10.7 x 7.9 x 7.5 cm.  10. 12/26/2016, she starts adjuvant chemotherapy with doxorubicin 25 mg/m2/day and DTIC 250 mg/m2/day , days 1-3 every 21 weeks.  11. 3/23/2017, she completes 5 cycles of adjuvant doxorubicin and dacarbazine  12. 6/3/2020, restaging MRI shows a locally recurrent pelvic sidewall mass measuring 2.8 x 3.3 cm in size. This new area of concern was not previously reported, presumably due to proximity to bowel and the similar appearance to adjacent loops of bowel on the contrast enhanced imaging. On review of prior imaging studies, the lesion can be seen as far back as January 2019 as a small nodule measuring 1 cm in size directly adjacent to bowel, close to the pelvic sidewall.  13. 6/16/2020, she had resection of the recurrent leiomyosarcoma tumor, which was densely attached to the right ureter and attached to the right side of the sigmoid mesentery and external and internal iliac vessels.  There was external iliac vein injury with primary repair due to involvement of the mass. The ureter was also  partially removed as part of the tumor mass. All margins and the ureter that was resected was negative for tumor. Pathology (JR-) showed a 3.7 x 3.5 x 2.2 cm thinly encapsulated mass, which was submitted for frozen sections.      HISTORY OF PRESENT ILLNESS  Ms. Navarrete presents today in follow-up and to restaging.    CT-CAP shows no new evidence of recurrence or metastasis. A lymph node adjacent to the pelvic side wall is noted, but no clear tumor recurrence.     On her last visit she requested an IGF1 level, and this was within the established institutional reference range at 143. Going to [employer] for an overseas adventure. Planning to go in September. Her grandchildren came from [facility] and they got vaccinated.    She also started Caryn a program looking at stool microbiome for input on diet and general health    She denies nausea, vomiting, or diarrhea. She denies fevers, chills, nausea, vomiting, diarrhea.     ECOG performance status is 0.    REVIEW OF SYSTEMS  A 12-point ROS negative except as in HPI    Current Outpatient Medications   Medication Sig Dispense Refill     levothyroxine (SYNTHROID/LEVOTHROID) 75 MCG tablet Take 1 tablet (75 mcg) by mouth daily 90 tablet 3     venlafaxine (EFFEXOR-XR) 75 MG 24 hr capsule Take 1 capsule (75 mg) by mouth daily 90 capsule 1     zolpidem (AMBIEN) 5 MG tablet Take 1 tablet (5 mg) by mouth nightly as needed for sleep (Only uses if traveling or cannot sleep,) 20 tablet 0     Multiple Vitamin (MULTIVITAMINS PO) Take 1 tablet by mouth daily Reported on 4/10/2017       PROGESTERONE MICRONIZED PO        SHINGRIX injection PHARMACY ADMINISTERED         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     GERD (gastroesophageal reflux disease)      Hx of previous reproductive problem  1980     Kidney stone 2011     Leiomyosarcoma (H) 11/14/2016     Personal history of colonic polyps 2016    Small, benign     PONV (postoperative nausea and vomiting)      Posterior vitreous detachment of left eye 2011     Posterior vitreous detachment of right eye 2011     Ptosis of eyelid, bilateral      Stomach problems 2015    Abdominal pain, Diarreha     Thyroid disease     thyroid nodule resolved 2014       PHYSICAL EXAMINATION  There were no vitals taken for this visit.  Wt Readings from Last 2 Encounters:   07/28/21 64.4 kg (142 lb)   04/14/21 61.2 kg (135 lb)   General: Well appearing, at home, in no distress  Head: Normocephalic  Eyes: EOMI, No icterus  ENT: Oropharynx clear  Lungs: Easy breathing on room air, speaking in complete sentences without dyspnea  Neuro: Cranial nerves 2-12 intact, no focal deficits  Skin: No rashes on exposed skin    The rest of a comprehensive physical examination is deferred due to PHE (public health emergency) video visit restrictions.      Labs:  Ancillary Procedure on 07/28/2021   Component Date Value Ref Range Status     Creatinine POCT 07/28/2021 1.0  0.5 - 1.0 mg/dL Final     GFR, ESTIMATED POCT 07/28/2021 57* >60 mL/min/1.73m2 Final   Lab on 07/28/2021   Component Date Value Ref Range Status     Sodium 07/28/2021 142  133 - 144 mmol/L Final     Potassium 07/28/2021 3.7  3.4 - 5.3 mmol/L Final     Chloride 07/28/2021 112* 94 - 109 mmol/L Final     Carbon Dioxide (CO2) 07/28/2021 29  20 - 32 mmol/L Final     Anion Gap 07/28/2021 1* 3 - 14 mmol/L Final     Urea Nitrogen 07/28/2021 24  7 - 30 mg/dL Final     Creatinine 07/28/2021 0.83  0.52 - 1.04 mg/dL Final     Calcium 07/28/2021 9.1  8.5 - 10.1 mg/dL Final     Glucose 07/28/2021 66* 70 - 99 mg/dL Final     Alkaline Phosphatase 07/28/2021 82  40 - 150 U/L Final     AST 07/28/2021 14  0 - 45 U/L Final     ALT 07/28/2021 20  0 - 50 U/L Final     Protein Total 07/28/2021 6.7* 6.8 - 8.8 g/dL Final     Albumin 07/28/2021 3.6   3.4 - 5.0 g/dL Final     Bilirubin Total 07/28/2021 0.4  0.2 - 1.3 mg/dL Final     GFR Estimate 07/28/2021 71  >60 mL/min/1.73m2 Final     WBC Count 07/28/2021 5.8  4.0 - 11.0 10e3/uL Final     RBC Count 07/28/2021 4.64  3.80 - 5.20 10e6/uL Final     Hemoglobin 07/28/2021 14.0  11.7 - 15.7 g/dL Final     Hematocrit 07/28/2021 41.9  35.0 - 47.0 % Final     MCV 07/28/2021 90  78 - 100 fL Final     MCH 07/28/2021 30.2  26.5 - 33.0 pg Final     MCHC 07/28/2021 33.4  31.5 - 36.5 g/dL Final     RDW 07/28/2021 13.8  10.0 - 15.0 % Final     Platelet Count 07/28/2021 200  150 - 450 10e3/uL Final     % Neutrophils 07/28/2021 49  % Final     % Lymphocytes 07/28/2021 39  % Final     % Monocytes 07/28/2021 9  % Final     % Eosinophils 07/28/2021 2  % Final     % Basophils 07/28/2021 1  % Final     % Immature Granulocytes 07/28/2021 0  % Final     NRBCs per 100 WBC 07/28/2021 0  <1 /100 Final     Absolute Neutrophils 07/28/2021 2.8  1.6 - 8.3 10e3/uL Final     Absolute Lymphocytes 07/28/2021 2.3  0.8 - 5.3 10e3/uL Final     Absolute Monocytes 07/28/2021 0.5  0.0 - 1.3 10e3/uL Final     Absolute Eosinophils 07/28/2021 0.1  0.0 - 0.7 10e3/uL Final     Absolute Basophils 07/28/2021 0.0  0.0 - 0.2 10e3/uL Final     Absolute Immature Granulocytes 07/28/2021 0.0  <=0.0 10e3/uL Final     Absolute NRBCs 07/28/2021 0.0  10e3/uL Final         Imaging:  CT Chest/Abdomen/Pelvis w Contrast  Narrative: EXAMINATION: CT CHEST/ABDOMEN/PELVIS W CONTRAST, 7/28/2021 11:30 AM    TECHNIQUE: Helical CT images from the thoracic inlet through the  symphysis pubis were obtained with intravenous contrast utilizing  biphasic liver protocol. Contrast dose: Isovue 370 86cc    COMPARISON: CT 4/28/21, 9/29/2016. MRI 6/3/20     HISTORY: Soft tissue sarcoma, retroperitoneal/intra-abdominal, assess  treatment response; Uterus cancer, sarcoma (H)    FINDINGS:    Chest: Right thyroid gland is surgically absent. Left thyroid gland is  unremarkable. No supraclavicular  or axillary lymphadenopathy.  Calcified lower mediastinal and right hilar. Cardiac size is normal.  No pericardial effusion. Aortic and pulmonary artery caliber within  normal limits. Visualized portions of the aortic arch branching  vessels are unremarkable. Esophagus is unremarkable.    Trachea and bronchi are patent and clear of debris. No evidence of  airspace disease. Calcifications in the anterior middle lobe. No  pneumothorax. No pleural effusion.    Abdomen and pelvis: Subcentimeter cyst in the lateral left hepatic  edge. Presumed focal fatty deposition along the falciform ligament,  stable. Gallbladder is partially dilated and unremarkable. No intra-  or extrahepatic biliary dilation. Pancreatic atrophy. The spleen and  adrenal glands are unremarkable.     Unchanged atrophy of the right kidney with a focal area of scarring in  the lateral mid pole (series 8 image 256) and small right-sided extra  renal pelvis. Multiple left-sided cortical renal cysts and parapelvic  cysts. No hydronephrosis of either kidney. Urinary bladder is  unremarkable. Postoperative changes of hysterectomy.    Stomach, small intestine, and colon are unremarkable. Appendix is  visualized and normal in caliber.     No suspicious abdominal or pelvic lymphadenopathy. No free fluid. No  pneumoperitoneum.    Abdominal aorta is normal in caliber and patent. Celiac and mesenteric  artery origins are unremarkable. Portal veins and IVC are patent.    Bones and soft tissues: No suspicious osseous findings. Soft tissue is  unremarkable.  Impression: IMPRESSION: In this patient with history of uterine leiomyosarcoma:  1. No evidence of locally recurrent or metastatic disease.    I have personally reviewed the examination and initial interpretation  and I agree with the findings.    KHANH HANDLEY MD         SYSTEM ID:  [id number]          ASSESSMENT AND PLAN    #1 Locally advanced leiomyosarcoma of the uterus, pT2b Nx M0, Stage IIB, status-post  resection (11/2016) and 5 cycles of adjuvant doxorubicin and dacarbazine  #2 Local recurrence, leiomyosarcoma of the uterus, right pelvic sidewall adherent to iliac vessels and ureter s/p resection June 2020  It was a pleasure to see Ms. Navarrete today. We reviewed her scans, which show nothing for metastatic disease or recurrence in the right pelvis, only small intra abdominal lymph nodes and pelvic nodes.     Plan to repeat CT-CAP in 4 months.    Questions answered.    Ab Warner M.D.   of Medicine  Hematology, Oncology and Transplantation

## 2021-08-01 PROBLEM — M25.50 ARTHRALGIA, UNSPECIFIED JOINT: Status: ACTIVE | Noted: 2021-08-01

## 2021-08-01 PROBLEM — R79.89 LOW TSH LEVEL: Status: ACTIVE | Noted: 2021-08-01

## 2021-08-01 PROBLEM — E78.5 DYSLIPIDEMIA: Status: ACTIVE | Noted: 2021-08-01

## 2021-08-01 PROBLEM — M81.0 OSTEOPOROSIS OF FOREARM: Status: ACTIVE | Noted: 2021-08-01

## 2021-08-01 NOTE — PROGRESS NOTES
[facility]     Patient Name: Maggie Navarrete  YOB: 1950  MRN: [id number]    Date of Service: August 2, 2021  Chief Complaint: follow -up.          HISTORY OF PRESENT ILLNESS:    Reports that she is very happy her CT scan on Wednesday showed no recurrence of her cancer. She reports a visit to the foot orthopedic doctor for her right foot pain.   She requests PT for her foot and a recent new onset left groin pain when she has been walking for a long time.   She also reports a lesion under her tongue and prominent papillae on her posterior tongue.     She denies any fevers, chills, headaches, nausea, vomiting, diarrhea, constipation, weakness, abdominal pain, hematemesis, hematochezia, melena, symptoms of dysuria, hematuria or other discomfort except as discussed above.      She endorses left hip pain as discussed above. She doesn't need an refills at this time.   She is open for a colonoscopy since she is due, her Tdap booster as it is due and a referral to PT (general).     Medications and allergies reviewed by me today.          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     GERD (gastroesophageal reflux disease)      Hx of previous reproductive problem 1980     Kidney stone 2011     Leiomyosarcoma (H) 11/14/2016     Personal history of colonic polyps 2016    Small, benign     PONV (postoperative nausea and vomiting)      Posterior vitreous detachment of left eye 2011     Posterior vitreous detachment of right eye 2011     Ptosis of eyelid, bilateral      Stomach problems 2015    Abdominal pain, Diarreha     Thyroid disease     thyroid nodule resolved 2014            REVIEW OF SYSTEMS:     10 point ROS was negative except as noted in HPI         HOME MEDICATIONS:      Current Outpatient Medications   Medication     levothyroxine (SYNTHROID/LEVOTHROID) 75 MCG tablet     venlafaxine (EFFEXOR-XR) 75 MG 24 hr capsule     zolpidem (AMBIEN) 5 MG tablet     diatrizoate meglumine-sodium (GASTROGRAFIN/GASTROVIEW) 66-10 % solution     Multiple Vitamin (MULTIVITAMINS PO)     PROGESTERONE MICRONIZED PO     No current facility-administered medications for this visit.            PHYSICAL EXAM:   Vitals: /79   Pulse 96   Wt 64.4 kg (142 lb)   SpO2 100%   BMI 27.73 kg/m      Wt Readings from Last 4 Encounters:   08/02/21 64.4 kg (142 lb)   07/28/21 64.4 kg (142 lb)   04/14/21 61.2 kg (135 lb)   07/08/20 61.2 kg (135 lb)       GENERAL: Alert, interactive, NAD  HEENT: Normocephalic, atraumatic, PERRL, EOMI, no conjunctivitis, anicteric sclera, tympanic membranes translucent with normal light reflex, no cervical LAD, no thyromegaly         Oropharynx: non-erythematous, enlarged papillae in the dorsal aspect of the posterior 1/3 of the tongue. 0.4mm diameter whitish appearing tissue on the right inferior aspect of the tongue.        Normal texture of the salivary glands on the floor of the lower jaw.     LUNGS: clear to auscultation bilaterally, no crackles or wheezes  HEART: regular rate and rhythm, normal S1 and S2, no murmur appreciated  ABDOMEN: Soft, nontender, nondistended. +BS, no HSM or masses, no rebound or guarding.  MUSCULOSKELETAL: no tenderness to spinous processes, no chest wall tenderness               Tenderness to palpation (mild) along the left sacroiliac joints.                Left Hip: No inguinal lymphadenopathy on examination.                No pain on pressure application to the greater trochanter. No pain on adduction, abduction, internal or external rotation of the hip joint. Mild pain to palpation  in the yady-medial soft tissue although negative straight leg raise on that side.     EXTREMITIES: No LE edema bilaterally, 2+ DP and radial pulses bialterally.    SKIN: Warm and dry, no jaundice or acute rashes  NEURO: CN II-XII grossly intact, 5/5 upper and lower extremity strength bilaterally, sensation intact, normal gait  PSYCH: A&O to person, place and time. appropriate mood, normal speech, linear thought.            DATA:     Imaging: pending pelvic xray as ordered           ASSESSMENT & PLAN:   71 y.o. lady with history of leiomyosarcoma of the uterus (2016 dx) s/p 5 chemo rx cycles with local recurrence in right pelvic sidewall adherent to iliac vessels and ureter s/p resection (6/2020), CAROLA, Osteoporosis (T-2.6 left distal radius 2009), Hypothyroidism, Joint pains/OA  Of knees, Right metatarsalgia and isoloated gastrocnemius contracture presents for physical and routine follow-up of health care maintenance.     Interval History   Since Dr. Tanner man last saw Maggie in Oct of last year:  She  Most recently saw Dr. Pino Aguilar on 4/29/21.  Note reviewed today, no evidence of recurrence of her leiomyosarcoma.     # Hypothyroidism  - Continue Levothyroxine as before.   - F/u TSH results from this visit and if need be adjustments as necessary.     # Prominent supraglossal papillae in the posterior tongue   # Right subglossal tissue      0.4mm diameter whitish appearing tissue on the right inferior aspect of the tongue.  - follow up with ENT to rule out any pathology      - ENT referral placed.     # Left Hip Pain   # Tender Sacroiliac joint (greater on left than right)   Patient reports that after she has walked a long distance, she has this left groin pain.    - Pelvic Xray with hip views as ordered.    - Will consider PT as ordered.       Hx of leiomyosarcoma with 5 rounds of chemo  Hx recurrence right pelvic sidewall (adeherent to illiacs and ureter)   s/p resection (6/2020)   Recent CT showed no evidence of recurrence.    - Surveillance as per oncology     # Metatarsalgia     She saw Dr. Harvey for right foot metatarsalgia (note reviewed today).      Patient  referred to physical therapy  For this and other joint pain as above.       #Healthcare Maintenance:  - Colonoscopy due today  ( last one in 2016 with polypectomy; recs were from 5 years from then) Colonoscopy referral placed.    - Vaccinations: patient due for Tdap today - given in clinic.    - Last mammography was in 2020; next will be due in 2022  -  DXA 8/20/20 lowest T score -1.8 RFN and RTH      Other health care team members:  Oncology Dr. Pino Aguilar  Cancer survivorship APRN Vahid Izquierdo  FM/osteoporosis Dr. Gee Mcgregor Orthopedics:  Dr. Harvey  [facility] Gyn:  Dr. Harsh Camarillo ;        Return to clinic: in October as scheduled.   Patient seen with and care plan discussed with attending physician, Rhea Ureña H.  who agreed with above.    Dyan Urbina MD  Internal Medicine, PGY-1  [phone]    Teaching Physician Note:  I was present during the visit and the patient was seen and examined by me.   I discussed the case with the resident and agree with the note as documented by the resident with the following exceptions:  None.    Rhea Lin M.D.  Internal Medicine         Patients: if you have questions or concerns about this progress note, please discuss them with the provider at a future office visit.

## 2021-08-02 ENCOUNTER — OFFICE VISIT (OUTPATIENT)
Dept: INTERNAL MEDICINE | Facility: CLINIC | Age: 71
End: 2021-08-02
Payer: MEDICARE

## 2021-08-02 VITALS
OXYGEN SATURATION: 100 % | HEART RATE: 96 BPM | BODY MASS INDEX: 27.73 KG/M2 | WEIGHT: 142 LBS | SYSTOLIC BLOOD PRESSURE: 116 MMHG | DIASTOLIC BLOOD PRESSURE: 79 MMHG

## 2021-08-02 DIAGNOSIS — E03.9 HYPOTHYROIDISM, UNSPECIFIED TYPE: ICD-10-CM

## 2021-08-02 DIAGNOSIS — M81.0 OSTEOPOROSIS OF FOREARM: ICD-10-CM

## 2021-08-02 DIAGNOSIS — Z12.11 SPECIAL SCREENING FOR MALIGNANT NEOPLASM OF COLON: ICD-10-CM

## 2021-08-02 DIAGNOSIS — E78.5 DYSLIPIDEMIA: ICD-10-CM

## 2021-08-02 DIAGNOSIS — Z23 NEED FOR VACCINATION: ICD-10-CM

## 2021-08-02 DIAGNOSIS — M25.552 HIP PAIN, LEFT: ICD-10-CM

## 2021-08-02 DIAGNOSIS — R79.89 LOW TSH LEVEL: ICD-10-CM

## 2021-08-02 DIAGNOSIS — M25.50 ARTHRALGIA, UNSPECIFIED JOINT: Primary | ICD-10-CM

## 2021-08-02 DIAGNOSIS — Z00.00 HEALTHCARE MAINTENANCE: ICD-10-CM

## 2021-08-02 PROCEDURE — 99214 OFFICE O/P EST MOD 30 MIN: CPT | Mod: GC | Performed by: INTERNAL MEDICINE

## 2021-08-02 NOTE — PATIENT INSTRUCTIONS
Referrals from this visit   1. Ear Nose and Throat   2. Colonoscopy   3. Physical Therapy     Imaging: Hip/Pelvic Xray downstairs     Vaccinations: Tdap booster     Labs: Please go downstairs for labwork and possible imaging of our hip    [facility] Medication Refill Request Information:  * Please contact your pharmacy regarding ANY request for medication refills.  ** [facility] Pres[facility] Fax = [phone]  * Please allow 3 business days for routine medication refills.  * Please allow 5 business days for controlled substance medication refills.     [facility] Test Result notification information:  *You will be notified with in 7-10 days of your appointment day regarding the results of your test.  If you are on MyChart you will be notified as soon as the provider has reviewed the results and signed off on them.

## 2021-08-02 NOTE — NURSING NOTE
Chief Complaint   Patient presents with     Pain     Pt is having joint pain, and left sided groin area. Pt has more when with movement 4/10. Duration couple months.       Carol Krishnan LPN at 8:59 AM on 8/2/2021.

## 2021-08-03 DIAGNOSIS — G47.00 INSOMNIA, UNSPECIFIED TYPE: ICD-10-CM

## 2021-08-03 NOTE — TELEPHONE ENCOUNTER
[facility] Call Center    Phone Message    May a detailed message be left on voicemail: yes     Reason for Call: Medication Refill Request    Has the patient contacted the pharmacy for the refill? Yes   Name of medication being requested: zolpidem (AMBIEN) 5 MG tablet  Provider who prescribed the medication: dr. Lin    Pharmacy:    [facility] [address] [place] [id number]    Date medication is needed: Before Thursday patient will be in town for an appointment and would like to pick it up then.        Action Taken: Message routed to:  [facility] P[facility]    Travel Screening: Not Applicable

## 2021-08-04 RX ORDER — ZOLPIDEM TARTRATE 5 MG/1
5 TABLET ORAL
Qty: 20 TABLET | Refills: 0 | Status: SHIPPED | OUTPATIENT
Start: 2021-08-04 | End: 2023-11-17

## 2021-08-05 ENCOUNTER — LAB (OUTPATIENT)
Dept: LAB | Facility: CLINIC | Age: 71
End: 2021-08-05
Attending: INTERNAL MEDICINE
Payer: MEDICARE

## 2021-08-05 ENCOUNTER — ANCILLARY PROCEDURE (OUTPATIENT)
Dept: GENERAL RADIOLOGY | Facility: CLINIC | Age: 71
End: 2021-08-05
Attending: INTERNAL MEDICINE
Payer: MEDICARE

## 2021-08-05 DIAGNOSIS — M81.0 OSTEOPOROSIS OF FOREARM: ICD-10-CM

## 2021-08-05 DIAGNOSIS — Z00.00 HEALTHCARE MAINTENANCE: ICD-10-CM

## 2021-08-05 DIAGNOSIS — E03.9 HYPOTHYROIDISM, UNSPECIFIED TYPE: ICD-10-CM

## 2021-08-05 DIAGNOSIS — M25.552 HIP PAIN, LEFT: ICD-10-CM

## 2021-08-05 DIAGNOSIS — C49.9 LEIOMYOSARCOMA (H): ICD-10-CM

## 2021-08-05 LAB
ALBUMIN SERPL-MCNC: 3.3 G/DL (ref 3.4–5)
ALP SERPL-CCNC: 80 U/L (ref 40–150)
ALT SERPL W P-5'-P-CCNC: 29 U/L (ref 0–50)
ANION GAP SERPL CALCULATED.3IONS-SCNC: 4 MMOL/L (ref 3–14)
AST SERPL W P-5'-P-CCNC: 11 U/L (ref 0–45)
BASOPHILS # BLD AUTO: 0 10E3/UL (ref 0–0.2)
BASOPHILS NFR BLD AUTO: 1 %
BILIRUB SERPL-MCNC: 0.2 MG/DL (ref 0.2–1.3)
BUN SERPL-MCNC: 22 MG/DL (ref 7–30)
CALCIUM SERPL-MCNC: 9 MG/DL (ref 8.5–10.1)
CHLORIDE BLD-SCNC: 112 MMOL/L (ref 94–109)
CHOLEST SERPL-MCNC: 181 MG/DL
CO2 SERPL-SCNC: 27 MMOL/L (ref 20–32)
CREAT SERPL-MCNC: 0.9 MG/DL (ref 0.52–1.04)
DEPRECATED CALCIDIOL+CALCIFEROL SERPL-MC: 45 UG/L (ref 20–75)
EOSINOPHIL # BLD AUTO: 0.2 10E3/UL (ref 0–0.7)
EOSINOPHIL NFR BLD AUTO: 4 %
ERYTHROCYTE [DISTWIDTH] IN BLOOD BY AUTOMATED COUNT: 13.9 % (ref 10–15)
FASTING STATUS PATIENT QL REPORTED: YES
GFR SERPL CREATININE-BSD FRML MDRD: 65 ML/MIN/1.73M2
GLUCOSE BLD-MCNC: 83 MG/DL (ref 70–99)
HCT VFR BLD AUTO: 39.5 % (ref 35–47)
HDLC SERPL-MCNC: 64 MG/DL
HGB BLD-MCNC: 13 G/DL (ref 11.7–15.7)
IMM GRANULOCYTES # BLD: 0 10E3/UL
IMM GRANULOCYTES NFR BLD: 0 %
LDLC SERPL CALC-MCNC: 96 MG/DL
LYMPHOCYTES # BLD AUTO: 2.5 10E3/UL (ref 0.8–5.3)
LYMPHOCYTES NFR BLD AUTO: 45 %
MCH RBC QN AUTO: 29.4 PG (ref 26.5–33)
MCHC RBC AUTO-ENTMCNC: 32.9 G/DL (ref 31.5–36.5)
MCV RBC AUTO: 89 FL (ref 78–100)
MONOCYTES # BLD AUTO: 0.6 10E3/UL (ref 0–1.3)
MONOCYTES NFR BLD AUTO: 10 %
NEUTROPHILS # BLD AUTO: 2.2 10E3/UL (ref 1.6–8.3)
NEUTROPHILS NFR BLD AUTO: 40 %
NONHDLC SERPL-MCNC: 117 MG/DL
NRBC # BLD AUTO: 0 10E3/UL
NRBC BLD AUTO-RTO: 0 /100
PLATELET # BLD AUTO: 193 10E3/UL (ref 150–450)
POTASSIUM BLD-SCNC: 3.7 MMOL/L (ref 3.4–5.3)
PROT SERPL-MCNC: 6.3 G/DL (ref 6.8–8.8)
RBC # BLD AUTO: 4.42 10E6/UL (ref 3.8–5.2)
SODIUM SERPL-SCNC: 143 MMOL/L (ref 133–144)
TRIGL SERPL-MCNC: 104 MG/DL
TSH SERPL DL<=0.005 MIU/L-ACNC: 0.5 MU/L (ref 0.4–4)
WBC # BLD AUTO: 5.4 10E3/UL (ref 4–11)

## 2021-08-05 PROCEDURE — 73523 X-RAY EXAM HIPS BI 5/> VIEWS: CPT | Performed by: RADIOLOGY

## 2021-08-05 PROCEDURE — 84443 ASSAY THYROID STIM HORMONE: CPT | Performed by: PATHOLOGY

## 2021-08-05 PROCEDURE — 80061 LIPID PANEL: CPT | Performed by: PATHOLOGY

## 2021-08-05 PROCEDURE — 82306 VITAMIN D 25 HYDROXY: CPT | Performed by: INTERNAL MEDICINE

## 2021-08-05 PROCEDURE — 80053 COMPREHEN METABOLIC PANEL: CPT | Performed by: PATHOLOGY

## 2021-08-05 PROCEDURE — 36415 COLL VENOUS BLD VENIPUNCTURE: CPT | Performed by: PATHOLOGY

## 2021-08-05 PROCEDURE — 85025 COMPLETE CBC W/AUTO DIFF WBC: CPT | Performed by: PATHOLOGY

## 2021-08-06 NOTE — TELEPHONE ENCOUNTER
FUTURE VISIT INFORMATION      FUTURE VISIT INFORMATION:    Date: 10/12/2021    Time: 10:55AM    Location: [facility]  REFERRAL INFORMATION:    Referring provider:  Rhea Lin MD    Referring providers clinic:  [facility]     Reason for visit/diagnosis  Prominent sublingual tissue on right side, prominent papillae; please evaluate to see if anything of concern. recs in epic, ref by Rhea Lin MD in [facility] [place] [facility], appt made by pt - ok to ryan Ocasio RN (pt wants OCT)    RECORDS REQUESTED FROM:       Clinic name Comments Records Status Imaging Status   [facility]  8/2/2021 note and referral from Rhea Lin MD [facility] [place] 12/4/2009 note from Dr Kang  [facility]

## 2021-08-25 ENCOUNTER — TELEPHONE (OUTPATIENT)
Dept: INTERNAL MEDICINE | Facility: CLINIC | Age: 71
End: 2021-08-25
Payer: MEDICARE

## 2021-08-25 DIAGNOSIS — Z78.0 ASYMPTOMATIC MENOPAUSAL STATE: Primary | ICD-10-CM

## 2021-08-25 NOTE — TELEPHONE ENCOUNTER
Madelaine, I ordered the DXA.  Dr. Flynn warned her last year (and I concur), that Medicare will likely not cover it.  Medicare typically only covers one every 2 years.  Also, the sensitivity of the bone densitometry machine is not high enough to detect significant changes in a one year period.  Please advise her of the above.  Thanks.  SB      Left message for pt to call back.  Madelaine Gay RN 11:07 AM on 8/25/2021.

## 2021-08-26 ENCOUNTER — TELEPHONE (OUTPATIENT)
Dept: GASTROENTEROLOGY | Facility: CLINIC | Age: 71
End: 2021-08-26

## 2021-08-26 NOTE — TELEPHONE ENCOUNTER
Screening Questions  1. Are you active on mychart? YES    2. What insurance is in the chart? Medicare    2.  Ordering/Referring Provider: Rhea Lin MD in [facility] [place] [facility]    3. BMI 27.3    4. Are you on daily home oxygen? NO    5. Do you have a history of difficult airway? NO    6. Have you had a heart, lung, or liver transplant? NO    7. Are you currently on dialysis? NO    8. Have you had a stroke or Transient ischemic atttack (TIA) within 6 months? NO    9. In the past 6 months, have you had any heart related issues including cardiomyopathy or heart attack?         If yes, did it require cardiac stenting or other implantable device?NO    10. Do you have any implantable devices in your body (pacemaker, defib, LVAD)? NO    11. Do you take nitroglycerin? If yes, how often? NO    12. Are you currently taking any blood thinners?NO    13. Are you a diabetic? NO    14. (Females) Are you currently pregnant? NO  If yes, how many weeks?    15. Have you had a procedure in the past that was difficult to tolerate with conscious sedation? Any allergies to Fentanyl or Versed NO    16. Are you taking any scheduled prescription narcotics more than once daily? NO    17. Do you have any chemical dependencies such as alcohol, street drugs, or methadone? NO    18. Do you have any history of post-traumatic stress syndrome or mental health issues? ANXIETY    19. Do you transfer independently? YES    20.  Do you have any issues with constipation? NO    21. Preferred Pharmacy for Pre Prescription [facility]     Scheduling Details    Which Colonoscopy Prep was Sent?: Miralax  Procedure Scheduled: COLONOSCOPY  Provider/Surgeon: Dr. Villarreal  Date of Procedure: 10/18/2021  Location: [facility] [place]  Caller (Please ask for phone number if not scheduled by patient): KASHIF SWIFT      Sedation Type: CS  Conscious Sedation- Needs  for 6 hours after the procedure  MAC/General-Needs  for 24 hours after procedure    Pre-op  Required at [facility], [facility] and OR for MAC sedation:   (if yes advise patient they will need a pre-op prior to procedure)      Is patient on blood thinners? -NO (If yes- inform patient to follow up with PCP or provider for follow up instructions)     Informed patient they will need an adult  YES  Cannot take any type of public or medical transportation alone    Informed Patient of COVID Test Requirement YES    Confirmed Nurse will call to complete assessment YES    Additional comments: NO

## 2021-08-30 ENCOUNTER — ANCILLARY PROCEDURE (OUTPATIENT)
Dept: MAMMOGRAPHY | Facility: CLINIC | Age: 71
End: 2021-08-30
Attending: INTERNAL MEDICINE
Payer: MEDICARE

## 2021-08-30 ENCOUNTER — ANCILLARY PROCEDURE (OUTPATIENT)
Dept: BONE DENSITY | Facility: CLINIC | Age: 71
End: 2021-08-30
Attending: INTERNAL MEDICINE
Payer: MEDICARE

## 2021-08-30 DIAGNOSIS — Z78.0 ASYMPTOMATIC MENOPAUSAL STATE: ICD-10-CM

## 2021-08-30 DIAGNOSIS — Z12.31 VISIT FOR SCREENING MAMMOGRAM: ICD-10-CM

## 2021-08-30 PROCEDURE — 77063 BREAST TOMOSYNTHESIS BI: CPT | Mod: GC | Performed by: RADIOLOGY

## 2021-08-30 PROCEDURE — 77080 DXA BONE DENSITY AXIAL: CPT | Performed by: INTERNAL MEDICINE

## 2021-08-30 PROCEDURE — 77067 SCR MAMMO BI INCL CAD: CPT | Mod: GC | Performed by: RADIOLOGY

## 2021-09-07 NOTE — PROGRESS NOTES
Medicare Annual Wellness Visit         NATI Navarrete presents today for an initial, Medicare Wellness Visit    A Health Risk Assessment Questionnaire was completed by the patient and we reviewed the results in detail.  A copy of the questionnaire was scanned into the electronic health record for reference.    Other health care team members:  Oncology Dr. Warner  Cancer Survivorship NP Vahid Izquierdo  FM/osteoporosis Dr. Flynn  Ob/gyn at [facility]:  Dr. Harsh Camarillo  Oncology at [facility] Dr. Mario Gaytan    Past medical history is notable for   1. Leiomyosarcoma of the uterus (stage IIB, s/p resection 2016, s/p 5 cycles doxorubicin and dacarbazine, local recurrency right pelvic sidewall adherent to iliac vessels and ureter s/p resection 6/2020,   2. Anxiety,   3. Osteoporosis, most recent DXA 8/30/21, lowest T -2.4 RFN  4. Hypothyroidism  5. Chronic knee pain    ROS: see separate note     Medical History     Patient Active Problem List   Diagnosis     Presbyopia - Both Eyes     Osteopenia     Generalized anxiety disorder     Vaginal atrophy     Leiomyosarcoma of uterus (H)     Chemotherapy-induced neutropenia (H)     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)     Overweight     Leiomyosarcoma (H)     Ptosis     Dry eyes     Closed nondisplaced fracture of fifth right metatarsal bone     History of deep venous thrombosis     Hypothyroidism     Myopia of both eyes     Neuropathy, peripheral     Post-operative nausea and vomiting     Osteoporosis of forearm     Low TSH level     Arthralgia, unspecified joint     Dyslipidemia     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     GERD (gastroesophageal reflux disease)      Hx of previous reproductive problem 1980     Kidney stone 2011     Leiomyosarcoma (H) 11/14/2016      Personal history of colonic polyps 2016    Small, benign     PONV (postoperative nausea and vomiting)      Posterior vitreous detachment of left eye 2011     Posterior vitreous detachment of right eye 2011     Ptosis of eyelid, bilateral      Stomach problems 2015    Abdominal pain, Diarreha     Thyroid disease     thyroid nodule resolved 2014     Past Surgical History:   Procedure Laterality Date     ABDOMEN SURGERY  Now    Pain, diarrhea     BREAST SURGERY  2002    right breast benign     COLONOSCOPY  2008    due in 2018     COLONOSCOPY N/A 9/8/2016    Procedure: COMBINED COLONOSCOPY, SINGLE OR MULTIPLE BIOPSY/POLYPECTOMY BY BIOPSY;  Surgeon: Abner Pepper MD;  Location:  GI     ENT SURGERY  1975    tonsillectomy     GENITOURINARY SURGERY  1953    bladder surgery      GI SURGERY  2015    Severe constipation     GYN SURGERY  1977    C section     GYN SURGERY  1981    laparoscopy     GYN SURGERY  2007    myomectomy     hysterecomy  11/14/2016    leiomyosarcoma     OTHER SURGICAL HISTORY Right 10/27/2017    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     REMOVE PORT VASCULAR ACCESS Right 5/9/2017    Procedure: REMOVE PORT VASCULAR ACCESS;  Right Port Removal;  Surgeon: Eric Gibson PA-C;  Location:  OR     REPAIR PTOSIS BILATERAL  2/15/2012    Procedure:REPAIR PTOSIS BILATERAL; BILATERAL UPPER LID PTOSIS REPAIR; Surgeon:BRYCE REYNOLDS; Location: [place]     Family History   Problem Relation Age of Onset     Family History Negative Mother         glaucoma     Glaucoma Mother      Anxiety Disorder Mother      Heart Failure Mother      Diabetes Mother      Coronary Artery Disease Mother      Hypertension Mother      Breast Cancer Mother      Depression Mother      Obesity Mother      Heart Disease Father      Glaucoma Father      Diabetes Father      Coronary Artery Disease Father      Depression Father      Osteoporosis Father      Heart Disease Maternal  "Grandmother      Diabetes Maternal Grandmother      Heart Disease Paternal Grandmother      Cancer Paternal Grandfather         stomach     Thyroid Disease Sister      Retinal detachment Sister      Hypertension Sister      Hypertension Sister      Lipids Sister      Cerebrovascular Disease Sister      Depression Sister      Heart Disease Paternal Aunt      Heart Disease Paternal Uncle      Anxiety Disorder Sister      Depression Sister      Thyroid Disease Sister      Thyroid Disease Sister      Osteoporosis Other      Social History     Tobacco Use     Smoking status: Never Smoker     Smokeless tobacco: Never Used   Substance Use Topics     Alcohol use: Yes     Comment: Social, not often per patient.     Drug use: No     Current Outpatient Medications   Medication Sig Dispense Refill     diatrizoate meglumine-sodium (GASTROGRAFIN/GASTROVIEW) 66-10 % solution Take 60 mLs by mouth once for 1 dose 60 mL 1     levothyroxine (SYNTHROID/LEVOTHROID) 75 MCG tablet Take 1 tablet (75 mcg) by mouth daily 90 tablet 3     Multiple Vitamin (MULTIVITAMINS PO) Take 1 tablet by mouth daily Reported on 4/10/2017       PROGESTERONE MICRONIZED PO        venlafaxine (EFFEXOR-XR) 75 MG 24 hr capsule Take 1 capsule (75 mg) by mouth daily 90 capsule 1     zolpidem (AMBIEN) 5 MG tablet Take 1 tablet (5 mg) by mouth nightly as needed for sleep (Only uses if traveling or cannot sleep,) 20 tablet 0     Allergies   Allergen Reactions     Erythromycin GI Disturbance     Tramadol Nausea         FUNCTIONAL ABILITY/SAFETY SCREENING     Fall Risk Assessment:    Get up and go test score: 1  Time: 8 sec    The \"Get up and go\" test for gait assessment in older adult patients[1]   Have the patient sit in a straight-backed high-seat chair   Instructions for patient:   Get up (without use of armrests, if possible)   Stand still momentarily   Walk forward 10 feet (3 meters)   Turn around and walk back to chair   Turn and be seated   Factors to note: " "  Sitting balance   Transfers from sitting to standing   Pace and stability of walking   Ability to turn without staggering   Modified qualitative scoring[2]   (1) No fall risk Well-coordinated movements, without walking aid   (2) Low fall risk Controlled, but adjusted movements   (3) Some fall risk Uncoordinated movements   (4) High fall risk Supervision necessary   (5) Very high fall risk Physical support of stand by physical support necessary   Timed \"Up and Go\" test reference values (record time from initial rising to re-seating)[3]   Age (years) Mean time in seconds (95% CI)   60 to 69 8.1 (7.1 to 9.0)   70 to 79 9.2 (8.2 to 10.2)   80 to 99 11.3 (10.0 to 12.7)       EVALUATION OF COGNITIVE FUNCTION     Mood/affect: Normal  Appearance: Normal  Family member/caregiver input: n/a  Mini Cog Scoring   3/3 words recalled  Clock Draw Test result:  Normal    SCREENING FOR PREVENTION and EARLY DETECTION     Corrected Visual acuity: if indicated, rooming staff performed and documented in their notes    Hearing test:  Whisper and finger rub tests intact    Immunizations reviewed.  Any immunizations due were ordered, unless refused by the patient.    Preventative screening tests recommended according to the following guidelines:  Colon: to age 75, 75-85 if healthy/high life expectancy   Colonoscopy 9/8/16 negative except for fibroid, diminutive polyp mid sigmoid.  Path:  Nonneoplastic colonic mucosa with lymphoid aggregates  Cervical: to age 65, after 65 only if prior screening positive or hx cancer  Breast: to age 74, every 1-2 years, 75+ if healthy/high life expectancy   Mammogram 8/30/21 negative  Prostate: individualize, to age 69, discuss risk/benefits  Lung: asymptomatic to 80, 30+ pack year smoking history, currently smoke or quit within last 15 years  Lipids: every 4-6 years  Component      Latest Ref Rng & Units 8/5/2021   Cholesterol      <200 mg/dL 181   Triglycerides      <150 mg/dL 104   HDL Cholesterol      " >=50 mg/dL 64   LDL Cholesterol Calculated      <=100 mg/dL 96     Diabetes: to age 70 if overweight/obese  Hepatitis C: once if born between 1945 and 1965  Component      Latest Ref Rng & Units 9/28/2016   Hepatitis C Antibody      NR Nonreactive . . .     HIV: to age 65, older if at increased risk  Bone density: age 65+   DXA 8/30/21 lowest T -2.3 RFN  AAA: men 65-75 who have every smoked,  Immunizations are up to date except for Td due this year.         Physical Exam     Vitals:  BP Readings from Last 6 Encounters:   08/02/21 116/79   07/28/21 (!) 142/84   08/31/20 122/72   07/08/20 101/63   06/30/20 129/64   01/22/20 120/80     Wt Readings from Last 5 Encounters:   08/02/21 64.4 kg (142 lb)   07/28/21 64.4 kg (142 lb)   04/14/21 61.2 kg (135 lb)   07/08/20 61.2 kg (135 lb)   06/28/20 65.5 kg (144 lb 4.8 oz)     Estimated body mass index is 27.73 kg/m  as calculated from the following:    Height as of 6/26/20: 1.524 m (5').    Weight as of 8/2/21: 64.4 kg (142 lb).  General:  Alert, appears generally healthy, not pale, range of affect within normal limits, breathing comfortably, actively engaged in conversation. No cough, wheeze, shortness of breath. EOM's intact, no facial assymetry, no dysarthria.          Assessment and Plan         Maggie was seen today for wellness visit.    Diagnoses and all orders for this visit:    Encounter for Medicare annual wellness exam    Acquired hypothyroidism  -     Refill levothyroxine (SYNTHROID/LEVOTHROID) 75 MCG tablet; Take 1 tablet (75 mcg) by mouth daily    Hearing difficulty, unspecified laterality  -     Adult Audiology Referral; Future      Options for treatment and follow-up care were reviewed with the patient and/or guardian engaged in the decision making process and verbalized understanding of the options discussed and agreed with the final plan.    A written summary of today's visit and recommendations was given to the patient (see AVS)    F/U 6 months (routine)  and ahmet Lin M.D.  Internal Medicine  [facility]   pager [phone]

## 2021-09-07 NOTE — PATIENT INSTRUCTIONS
"  Here is a summary of today's Medicare Annual Wellness visit:    You completed a questionnaire called a \"Health Risk Assessment\".   We discussed general health advice  Your medical and family history were reviewed today  Your height, weight, blood pressure and other vital signs were measured today.    You do not appear to have any significant cognitive impairment at the time of this visit.  We reviewed any recommendations for screening tests  Refer to your Medicare coverage for screenings, shots and other preventative services.  If you haven't already, complete your Advance Directives and forward it to our clinic.  We reviewed your vaccination recommendations.    Medicare Wellness visits are typically covered once every 12 months.  They are not the same as a \"routine\" or \"annual\" physical. Routine/annual physicals are typically NOT covered by Medicare.  Medicare Wellness visits do not include a \"routine physical exam\" and do not cover any specific medical problems or complaints.    You will have to make a separate visit to address specific problems and review your chronic medical conditions.    "

## 2021-09-08 ENCOUNTER — VIRTUAL VISIT (OUTPATIENT)
Dept: INTERNAL MEDICINE | Facility: CLINIC | Age: 71
End: 2021-09-08
Payer: MEDICARE

## 2021-09-08 DIAGNOSIS — E03.9 ACQUIRED HYPOTHYROIDISM: ICD-10-CM

## 2021-09-08 DIAGNOSIS — H91.90 HEARING DIFFICULTY, UNSPECIFIED LATERALITY: ICD-10-CM

## 2021-09-08 DIAGNOSIS — Z00.00 ENCOUNTER FOR MEDICARE ANNUAL WELLNESS EXAM: Primary | ICD-10-CM

## 2021-09-08 PROCEDURE — G0438 PPPS, INITIAL VISIT: HCPCS | Mod: 95 | Performed by: INTERNAL MEDICINE

## 2021-09-08 RX ORDER — LEVOTHYROXINE SODIUM 75 UG/1
75 TABLET ORAL DAILY
Qty: 90 TABLET | Refills: 3 | Status: SHIPPED | OUTPATIENT
Start: 2021-09-08 | End: 2022-07-05

## 2021-09-08 NOTE — PROGRESS NOTES
"ROS:  Travelling to [place] 9/17/21.   Questions about breast cancer screening.  Fall about 1.5 years ago, knee pain improved, elbow still hurts when e.g. holding phone, putting pressure on it.  Thumb can \"wiggle\" when holding a phone.  Possibly concerned about parkinson's. Discussed signs of symptoms of parkinson's vs. Other tremors.  Advised to come in for physical exam if symptoms escalate.  Medication refills:  Levothyroxine  Questions about vitamins.    On program that has to do with getting microbiome balanced.  Measured glucose response level and stool microbiome testing.  Program gives recommendations for diet. Mainly plant based foods advised.  High fiber berries instead of more sugary fruits such as watermelon, tex. No supplements.  Drinks 3 glasses of milk a day.  Fish 4-5 times a week. Eats nuts and dark green leafy vegetables.  Walks dogs regularly, typically goes to the [employer] (holding off until after trip to [place]).  Luis has been in touch with her about COVID booster immunization.  Just had it 8/27/21.  Also signed up for a study at the  to follow immune response to COVID vaccination.  Overall feeling well.  "

## 2021-09-08 NOTE — PROGRESS NOTES
Pt is doing visit on cell phone. If you can not see pt on video, please call pt. Questions answered under Questionnaires. Candida Morelos LPN 9/8/2021 2:59 PM

## 2021-09-08 NOTE — PROGRESS NOTES
Medicare Wellness Health Risk Assessment Questionnaire  Dr. Rhea Lin updated 2019    What is your marital status?     Who lives in your household? One (self)  Does your home have loose rugs in the hallway:    No    Does your home have grab bars in the bathroom:  Yes     Does your home have handrails on the stairs? Yes     Does your home have poorly lit areas?  No    How many times have you fallen in the last year? None    How many times have you been to the Emergency Room in the last year? none    How many times have you been hospitalized in the last year? none    Do you feel threatened or controlled by a partner, ex-partner or anyone in your life?    No    Has anyone hurt you physically, for example by pushing, hitting, slapping or kicking you   or forcing you to have sex? No    Are you sexually active?  No      Do you need help with the phone, transportation, shopping, preparing meals, housework, laundry, medications or managing money?    No    Have you noticed any hearing difficulties?  Maybe, per sister, interested in having hearing checked.    Do you wear hearing aids? No    Have you seen a hearing professional such as an audiologist in the last 1 year?  No    Do you have vision difficulty? No    Do you wear glasses or contacts? Yes, contacts, had eye exam 9/2/21    Have you seen an eye doctor in the last 1 year? Yes     Women: What year did you stop having periods (approximate age)? Age 52    Women: Any vaginal bleeding in the last year?  Yes  No    Women: Have you ever had an abnormal Pap smear?  No    How many servings of fruits and vegetables do you eat a day? 5    How often do you exercise in a week? Daily    What kind of exercise do you do? Walking, and will be resuming aerobic water exercises and yoga.    Do you wear a seatbelt when driving or riding in a vehicle?   Yes   Do you use tobacco?  No    Do you use e-cigarettes?  No    Do you use any other drugs? No         Do you  drink alcohol?  Yes    If you drink alcohol, how many drinks per week? rarely    Over the last 2 weeks, how often have you been bothered by feeling down, depressed, or hopeless? A little bit, does have access to therapy, does not feel like needs an increase in dose of her venlafaxine.    Over the last 2 weeks, how often have you had little interest or pleasure in doing things?   Not at all (0)     Have you completed an Advance Directives document? Yes    If yes, have you given a copy to the clinic? No

## 2021-09-17 ENCOUNTER — TELEPHONE (OUTPATIENT)
Dept: OTOLARYNGOLOGY | Facility: CLINIC | Age: 71
End: 2021-09-17

## 2021-09-17 NOTE — TELEPHONE ENCOUNTER
Writer called to reschedule pt due to change in Dr. Keys's schedule. Pt was offered an appointment on 9/19 as well as 9/28, pt denied both options due to being out of town. Pt was rescheduled for 10/19/21 at 11:15am. Pt expressed understadning

## 2021-09-20 NOTE — TELEPHONE ENCOUNTER
FUTURE VISIT INFORMATION      FUTURE VISIT INFORMATION:    Date: 10/19/2021    Time: 11:15AM    [place] [facility] [place]  REFERRAL INFORMATION:    Referring provider:  Rhea Lin MD    Referring providers clinic:  [facility]     Reason for visit/diagnosis  Prominent sublingual tissue on right side, prominent papillae; please evaluate to see if anything of concern. recs in epic, ref by Rhea Lin MD in [facility] INTERNAL MEDICINE, appt made by pt - ok to ryan Ocasio RN (pt wants OCT)    RECORDS REQUESTED FROM:       Clinic name Comments Records Status Imaging Status   [facility] Internal [facility]  8/2/2021 note from Rhea Lin MD Epic    Imaging 8/6/2014  Thyroid [facility] PACS   [facility] 12/4/2009 note from Dr Kang  [facility]

## 2021-09-26 DIAGNOSIS — Z11.59 ENCOUNTER FOR SCREENING FOR OTHER VIRAL DISEASES: ICD-10-CM

## 2021-09-29 ENCOUNTER — TELEPHONE (OUTPATIENT)
Dept: ONCOLOGY | Facility: CLINIC | Age: 71
End: 2021-09-29

## 2021-09-29 NOTE — TELEPHONE ENCOUNTER
----- Message from Luisito Gregory sent at 9/15/2021  3:33 PM CDT -----  Regarding: Lauren November [facility]  Needs:    CT CAP/labs/Musi in November    -Nick

## 2021-10-04 ENCOUNTER — TELEPHONE (OUTPATIENT)
Dept: INTERNAL MEDICINE | Facility: CLINIC | Age: 71
End: 2021-10-04

## 2021-10-04 DIAGNOSIS — F41.1 GENERALIZED ANXIETY DISORDER: ICD-10-CM

## 2021-10-04 RX ORDER — VENLAFAXINE HYDROCHLORIDE 75 MG/1
75 CAPSULE, EXTENDED RELEASE ORAL DAILY
Qty: 90 CAPSULE | Refills: 1 | Status: SHIPPED | OUTPATIENT
Start: 2021-10-04 | End: 2022-05-10

## 2021-10-04 NOTE — TELEPHONE ENCOUNTER
[facility] Call Center    Phone Message    May a detailed message be left on voicemail: yes     Reason for Call: Medication Refill Request    Has the patient contacted the pharmacy for the refill? Yes   Name of medication being requested: venlafaxine (EFFEXOR-XR) 75 MG 24 hr capsule     Provider who prescribed the medication: Dr. Lin  Pharmacy: [facility] mail order  Date medication is needed: 10/4/21         Action Taken: Message routed to:  [facility] ([facility] gautam

## 2021-10-07 ENCOUNTER — THERAPY VISIT (OUTPATIENT)
Dept: PHYSICAL THERAPY | Facility: CLINIC | Age: 71
End: 2021-10-07
Payer: MEDICARE

## 2021-10-07 DIAGNOSIS — M25.561 CHRONIC PAIN OF RIGHT KNEE: ICD-10-CM

## 2021-10-07 DIAGNOSIS — G89.29 CHRONIC PAIN OF RIGHT KNEE: ICD-10-CM

## 2021-10-07 DIAGNOSIS — R10.32 LEFT GROIN PAIN: ICD-10-CM

## 2021-10-07 DIAGNOSIS — M79.671 RIGHT FOOT PAIN: ICD-10-CM

## 2021-10-07 PROCEDURE — 97110 THERAPEUTIC EXERCISES: CPT | Mod: GP | Performed by: PHYSICAL THERAPIST

## 2021-10-07 PROCEDURE — 97161 PT EVAL LOW COMPLEX 20 MIN: CPT | Mod: GP | Performed by: PHYSICAL THERAPIST

## 2021-10-07 ASSESSMENT — ACTIVITIES OF DAILY LIVING (ADL)
HOW_WOULD_YOU_RATE_THE_CURRENT_FUNCTION_OF_YOUR_KNEE_DURING_YOUR_USUAL_DAILY_ACTIVITIES_ON_A_SCALE_FROM_0_TO_100_WITH_100_BEING_YOUR_LEVEL_OF_KNEE_FUNCTION_PRIOR_TO_YOUR_INJURY_AND_0_BEING_THE_INABILITY_TO_PERFORM_ANY_OF_YOUR_USUAL_DAILY_ACTIVITIES?: 75
SWELLING: I DO NOT HAVE THE SYMPTOM
RAW_SCORE: 56
SQUAT: ACTIVITY IS SOMEWHAT DIFFICULT
GIVING WAY, BUCKLING OR SHIFTING OF KNEE: I DO NOT HAVE THE SYMPTOM
LIMPING: I HAVE THE SYMPTOM BUT IT DOES NOT AFFECT MY ACTIVITY
KNEE_ACTIVITY_OF_DAILY_LIVING_SUM: 56
PAIN: THE SYMPTOM AFFECTS MY ACTIVITY SLIGHTLY
WEAKNESS: I DO NOT HAVE THE SYMPTOM
RISE FROM A CHAIR: ACTIVITY IS MINIMALLY DIFFICULT
AS_A_RESULT_OF_YOUR_KNEE_INJURY,_HOW_WOULD_YOU_RATE_YOUR_CURRENT_LEVEL_OF_DAILY_ACTIVITY?: NEARLY NORMAL
SIT WITH YOUR KNEE BENT: ACTIVITY IS NOT DIFFICULT
HOW_WOULD_YOU_RATE_THE_OVERALL_FUNCTION_OF_YOUR_KNEE_DURING_YOUR_USUAL_DAILY_ACTIVITIES?: NEARLY NORMAL
KNEE_ACTIVITY_OF_DAILY_LIVING_SCORE: 80
KNEEL ON THE FRONT OF YOUR KNEE: ACTIVITY IS MINIMALLY DIFFICULT
WALK: ACTIVITY IS MINIMALLY DIFFICULT
STAND: ACTIVITY IS NOT DIFFICULT
STIFFNESS: THE SYMPTOM AFFECTS MY ACTIVITY SLIGHTLY
GO DOWN STAIRS: ACTIVITY IS SOMEWHAT DIFFICULT
GO UP STAIRS: ACTIVITY IS SOMEWHAT DIFFICULT

## 2021-10-07 NOTE — LETTER
DEPARTMENT OF HEALTH AND HUMAN SERVICES  [facility] MEDICA[facility]    PLAN/UPDATED PLAN OF PROGRESS FOR OUTPATIENT REHABILITATION    PATIENTS NAME:  Maggie Navarrete     : 1950    PROVIDER NUMBER:    [id number]    [id number]     PROVIDER NAME: [facility] [place]    MEDICAL RECORD NUMBER: [id number]     START OF CARE DATE:  SOC Date: 10/07/21   TYPE:  PT    PRIMARY/TREATMENT DIAGNOSIS: (Pertinent Medical Diagnosis)     Chronic pain of right knee  Left groin pain  Right foot pain    VISITS FROM START OF CARE:  Rxs Used: 1     Physical Therapy Initial Evaluation  Subjective:  The history is provided by the patient. No  was used.   Patient Health History  Maggie Navarrete being seen for R knee, R foot and L groin pain.   Date of Onset: Has always had knee pain, the foot pain started 9 months ago and the L groin pain for 5 months  MD order placed on 21.   Problem occurred: unknown   Pain is reported as 7/10 on pain scale.  General health as reported by patient is good.  Pertinent medical history includes: cancer, overweight, history of fractures, menopausal and depression.   Red flags:  Calf pain-swelling-warmth (L calf swelling after trip to [place]).  Medical allergies: none.   Surgeries include:  Other.    Current medications:  Anti-depressants.    Current occupation is Retired.   Primary job tasks include:  Prolonged sitting and driving.                Therapist Generated HPI Evaluation  Problem details: L anterior groin pain after walking.  R>L medial R knee pain that is worse with squats, ascend/descend stairs and kneeling  Varying R foot pain that can be lateral, medial or anterior.  THis is worse if she's not active or not stretching.         This is a chronic condition.  Where condition occurred: for unknown reasons.  Pain is described as aching and is intermittent.  Pain is worse during the day.  PATIENTS NAME:  Maggie Navarrete    : 1950    Since onset symptoms are unchanged.  Associated symptoms:  Loss of motion/stiffness. Exacerbated by: Groin pain is worse after walking.  Relieved by: walking helps the knee and foot, but makes L groin worse.  Knee is better if she avoids stairs and squats.  The R foot is better with stretching.  Special tests included:  X-ray (mild OA R knee, broke R 5th metatarsal 3 years ago but recent xray is negative. xray of hip was negative).  Previous treatment includes physical therapy. There was none improvement following previous treatment.  Barriers include:  None as reported by patient.    Objective:  Standing Alignment:    Pelvic:  Iliac crest high L  Gait:    Gait Type:  Normal     Deviations:  Hip:  Trendelenberg L and Trendelenberg RAnkle:  Push off decr R  Ankle/Foot Evaluation  ROM:  AROM is normal.  Strength:    Dorsiflexion:  Left: 5/5     Pain:   Right: 5/5   Pain:  Plantarflexion: Left: 5/5   Pain:   Right: 4+/5  Pain:  Inversion:Left: 5/5  Pain:     Right: 5/5  Pain:  Eversion:Left: 5/5  Pain:  Right: 5-/5  Pain:  PALPATION:   Left ankle tenderness not present at:   achilles tendon  Right ankle tenderness present at:   achilles tendon   Hip Evaluation  HIP AROM:  AROM:    Left Hip:     Normal    Right Hip:   Normal  Hip Strength:    Flexion:   Left: 5-/5   Pain:  Right: 5-/5   Pain:  Extension:  Left: 5/5  Pain:Right: 5/5    Pain:    Abduction:  Left: 3+/5     Pain:Right: 4-/5    Pain:  External Rotation:  Left: 5/5   Pain:  Right: 5/5   Pain:  Hip Special Testing:    Left hip negative for the following special tests:  Dorian; Fadir/Labrum or SLR  Right hip negative for the following special tests:  Dorian; Fadir/Labrum or SLR    Hip Palpation:    Left hip tenderness present at:   Greater Trachanter  Left hip tenderness not present at:  PSIS  Right hip tenderness not present at:  Greater Trachanter or PSIS  Knee Evaluation:  ROM:  AROM: normal    Strength:   Extension:  Left: 5/5   Pain:       Right: 5/5   Pain:  Flexion:  Left: 5/5   Pain:      Right: 5/5   Pain:    Quad Set Left: Good    Pain:   Quad Set Right: Good    Pain:  Palpation:    Left knee tenderness not present at:  Medial Joint Line; Lateral Joint Line; Patellar   PATIENTS NAME:  Maggie Navarrete   : 1950    Medial and Patellar Lateral  Right knee tenderness present at:  Medial Joint Line; Patellar Medial and Patellar Lateral  Right knee tenderness not present at:  Lateral Joint Line  Functional Testing:    Proprioception:   Stork Balance Test:  Left:  18  Right:  30+  % of Uninvolved:     Assessment/Plan:    Patient is a 71 year old female with left side hip, right side knee and right side ankle complaints.    Patient has the following significant findings with corresponding treatment plan.                Diagnosis 1:  L groin pain likely SI  Pain -  self management, education and home program  Decreased strength - therapeutic exercise, therapeutic activities and home program  Impaired muscle performance - neuro re-education and home program  Decreased function - therapeutic activities and home program  Diagnosis 2:  R knee pain likely medial meniscal tear   Pain -  self management, education and home program  Decreased strength - therapeutic exercise, therapeutic activities and home program  Impaired balance - neuro re-education and therapeutic activities  Impaired muscle performance - neuro re-education and home program  Decreased function - therapeutic activities and home program  Diagnosis 3:  R foot pain  Pain -  manual therapy, self management, education and home program  Decreased ROM/flexibility - manual therapy, therapeutic exercise and home program  Decreased strength - therapeutic exercise, therapeutic activities and home program  Impaired muscle performance - neuro re-education and home program  Decreased function - therapeutic activities and home program     Therapy Evaluation Codes:   1) History comprised of:   Personal  factors that impact the plan of care:      None.    Comorbidity factors that impact the plan of care are:      None.     Medications impacting care: None.  2) Examination of Body Systems comprised of:   Body structures and functions that impact the plan of care:      Ankle, Knee and Pelvis.   Activity limitations that impact the plan of care are:      Squatting/kneeling, Stairs and Walking.  3) Clinical presentation characteristics are:   Stable/Uncomplicated.  4) Decision-Making    Low complexity using standardized patient assessment instrument and/or measureable assessment of functional outcome.  Cumulative Therapy Evaluation is: Low complexity.    Previous and current functional limitations:  (See Goal Flow Sheet for this information)    Short term and Long term goals: (See Goal Flow Sheet for this information)     Communication ability:  Patient appears to be able to clearly communicate and understand verbal and written communication and follow directions correctly.  Treatment Explanation - The following has been discussed with the patient:   RX ordered/plan of care  Anticipated outcomes  Possible risks and side effects  This patient would benefit from PT intervention to resume normal activities.   Rehab potential is good.    Frequency:  1 X week, once daily  Duration:  for 4 weeks tapering to 2 X a month over 8 weeks  Discharge Plan:  Achieve all LTG.  Independent in home treatment program.  Reach maximal therapeutic benefit.    Please refer to the daily flowsheet for treatment today, total treatment time and time spent performing 1:1 timed codes.         Caregiver Signature/Credentials _____________________________ Date ________       Treating Provider: analia Albert   I have reviewed and certified the need for these services and plan of treatment while under my care.        PHYSICIAN'S SIGNATURE:   _________________________________________  Date___________   Rhea Joy MD      Certification period:   "Beginning of Cert date period: 10/07/21 to  End of Cert period date: 01/04/22     Functional Level Progress Report: Please see attached \"Goal Flow sheet for Functional level.\"    ____X____ Continue Services or       ________ DC Services                Service dates: From  SOC Date: 10/07/21 date to present                         "

## 2021-10-07 NOTE — PROGRESS NOTES
Physical Therapy Initial Evaluation  Subjective:  The history is provided by the patient. No  was used.   Patient Health History  Maggie Navarrete being seen for R knee, R foot and L groin pain.     Date of Onset: Has always had knee pain, the foot pain started 9 months ago and the L groin pain for 5 months  MD order placed on 9/16/21.   Problem occurred: unknown   Pain is reported as 7/10 on pain scale.  General health as reported by patient is good.  Pertinent medical history includes: cancer, overweight, history of fractures, menopausal and depression.   Red flags:  Calf pain-swelling-warmth (L calf swelling after trip to [place]).  Medical allergies: none.   Surgeries include:  Other.    Current medications:  Anti-depressants.    Current occupation is Retired.   Primary job tasks include:  Prolonged sitting and driving.                  Therapist Generated HPI Evaluation  Problem details: L anterior groin pain after walking.  R>L medial R knee pain that is worse with squats, ascend/descend stairs and kneeling  Varying R foot pain that can be lateral, medial or anterior.  THis is worse if she's not active or not stretching.           This is a chronic condition.    Where condition occurred: for unknown reasons.    Pain is described as aching and is intermittent.  Pain is worse during the day.  Since onset symptoms are unchanged.  Associated symptoms:  Loss of motion/stiffness. Exacerbated by: Groin pain is worse after walking.  Relieved by: walking helps the knee and foot, but makes L groin worse.  Knee is better if she avoids stairs and squats.  The R foot is better with stretching.  Special tests included:  X-ray (mild OA R knee, broke R 5th metatarsal 3 years ago but recent xray is negative. xray of hip was negative).  Previous treatment includes physical therapy. There was none improvement following previous treatment.  Barriers include:  None as reported by patient.                         Objective:  Standing Alignment:          Pelvic:  Iliac crest high L          Gait:    Gait Type:  Normal     Deviations:  Hip:  Trendelenberg L and Trendelenberg RAnkle:  Push off decr R          Ankle/Foot Evaluation  ROM:  AROM is normal.      Strength:    Dorsiflexion:  Left: 5/5     Pain:   Right: 5/5   Pain:  Plantarflexion: Left: 5/5   Pain:   Right: 4+/5  Pain:  Inversion:Left: 5/5  Pain:     Right: 5/5  Pain:  Eversion:Left: 5/5  Pain:  Right: 5-/5  Pain:                      PALPATION:     Left ankle tenderness not present at:   achilles tendon  Right ankle tenderness present at:   achilles tendon                                             Hip Evaluation  HIP AROM:  AROM:    Left Hip:     Normal    Right Hip:   Normal                    Hip Strength:    Flexion:   Left: 5-/5   Pain:  Right: 5-/5   Pain:                    Extension:  Left: 5/5  Pain:Right: 5/5    Pain:    Abduction:  Left: 3+/5     Pain:Right: 4-/5    Pain:      External Rotation:  Left: 5/5   Pain:  Right: 5/5   Pain:            Hip Special Testing:      Left hip negative for the following special tests:  Dorian; Fadir/Labrum or SLR  Right hip negative for the following special tests:  Dorian; Fadir/Labrum or SLR    Hip Palpation:    Left hip tenderness present at:   Greater Trachanter  Left hip tenderness not present at:  PSIS    Right hip tenderness not present at:  Greater Trachanter or PSIS       Knee Evaluation:  ROM:  AROM: normal            Strength:     Extension:  Left: 5/5   Pain:      Right: 5/5   Pain:  Flexion:  Left: 5/5   Pain:      Right: 5/5   Pain:    Quad Set Left: Good    Pain:   Quad Set Right: Good    Pain:      Palpation:      Left knee tenderness not present at:  Medial Joint Line; Lateral Joint Line; Patellar Medial and Patellar Lateral  Right knee tenderness present at:  Medial Joint Line; Patellar Medial and Patellar Lateral  Right knee tenderness not present at:  Lateral Joint Line      Functional Testing:             Proprioception:   Stork Balance Test:  Left:  18  Right:  30+  % of Uninvolved:           General     ROS    Assessment/Plan:    Patient is a 71 year old female with left side hip, right side knee and right side ankle complaints.    Patient has the following significant findings with corresponding treatment plan.                Diagnosis 1:  L groin pain likely SI  Pain -  self management, education and home program  Decreased strength - therapeutic exercise, therapeutic activities and home program  Impaired muscle performance - neuro re-education and home program  Decreased function - therapeutic activities and home program  Diagnosis 2:  R knee pain likely medial meniscal tear   Pain -  self management, education and home program  Decreased strength - therapeutic exercise, therapeutic activities and home program  Impaired balance - neuro re-education and therapeutic activities  Impaired muscle performance - neuro re-education and home program  Decreased function - therapeutic activities and home program  Diagnosis 3:  R foot pain  Pain -  manual therapy, self management, education and home program  Decreased ROM/flexibility - manual therapy, therapeutic exercise and home program  Decreased strength - therapeutic exercise, therapeutic activities and home program  Impaired muscle performance - neuro re-education and home program  Decreased function - therapeutic activities and home program     Therapy Evaluation Codes:   1) History comprised of:   Personal factors that impact the plan of care:      None.    Comorbidity factors that impact the plan of care are:      None.     Medications impacting care: None.  2) Examination of Body Systems comprised of:   Body structures and functions that impact the plan of care:      Ankle, Knee and Pelvis.   Activity limitations that impact the plan of care are:      Squatting/kneeling, Stairs and Walking.  3) Clinical presentation characteristics  are:   Stable/Uncomplicated.  4) Decision-Making    Low complexity using standardized patient assessment instrument and/or measureable assessment of functional outcome.  Cumulative Therapy Evaluation is: Low complexity.    Previous and current functional limitations:  (See Goal Flow Sheet for this information)    Short term and Long term goals: (See Goal Flow Sheet for this information)     Communication ability:  Patient appears to be able to clearly communicate and understand verbal and written communication and follow directions correctly.  Treatment Explanation - The following has been discussed with the patient:   RX ordered/plan of care  Anticipated outcomes  Possible risks and side effects  This patient would benefit from PT intervention to resume normal activities.   Rehab potential is good.    Frequency:  1 X week, once daily  Duration:  for 4 weeks tapering to 2 X a month over 8 weeks  Discharge Plan:  Achieve all LTG.  Independent in home treatment program.  Reach maximal therapeutic benefit.    Please refer to the daily flowsheet for treatment today, total treatment time and time spent performing 1:1 timed codes.

## 2021-10-08 ENCOUNTER — TELEPHONE (OUTPATIENT)
Dept: GASTROENTEROLOGY | Facility: CLINIC | Age: 71
End: 2021-10-08

## 2021-10-08 ENCOUNTER — HOSPITAL ENCOUNTER (OUTPATIENT)
Dept: ULTRASOUND IMAGING | Facility: CLINIC | Age: 71
Discharge: HOME | End: 2021-10-08
Attending: PHYSICIAN ASSISTANT | Admitting: PHYSICIAN ASSISTANT
Payer: MEDICARE

## 2021-10-08 ENCOUNTER — OFFICE VISIT (OUTPATIENT)
Dept: FAMILY MEDICINE | Facility: CLINIC | Age: 71
End: 2021-10-08
Payer: MEDICARE

## 2021-10-08 VITALS
DIASTOLIC BLOOD PRESSURE: 84 MMHG | OXYGEN SATURATION: 98 % | HEART RATE: 92 BPM | BODY MASS INDEX: 28.79 KG/M2 | WEIGHT: 147.4 LBS | SYSTOLIC BLOOD PRESSURE: 144 MMHG | TEMPERATURE: 97.3 F | RESPIRATION RATE: 20 BRPM

## 2021-10-08 DIAGNOSIS — M79.662 PAIN OF LEFT CALF: Primary | ICD-10-CM

## 2021-10-08 DIAGNOSIS — Z23 NEED FOR PROPHYLACTIC VACCINATION AND INOCULATION AGAINST INFLUENZA: ICD-10-CM

## 2021-10-08 PROCEDURE — 93971 EXTREMITY STUDY: CPT | Mod: LT

## 2021-10-08 PROCEDURE — 99213 OFFICE O/P EST LOW 20 MIN: CPT | Mod: 25 | Performed by: PHYSICIAN ASSISTANT

## 2021-10-08 PROCEDURE — 90662 IIV NO PRSV INCREASED AG IM: CPT | Performed by: PHYSICIAN ASSISTANT

## 2021-10-08 PROCEDURE — G0008 ADMIN INFLUENZA VIRUS VAC: HCPCS | Performed by: PHYSICIAN ASSISTANT

## 2021-10-08 NOTE — PROGRESS NOTES
Assessment and Plan:     (M79.662) Pain of left calf  (primary encounter diagnosis)  Comment: history of one previous DVT, provoked, after surgery  Plan: US Lower Extremity Venous Duplex Left  Ordered stat      Latonya Herring PA-C        Bimal Serrano is a 71 year old who presents for the following health issues  HPI     Recent vacation to [place]    Got back from [place] on 10/2  Noticed pain/cramping and tightness in left lower leg on 9/20 while in [place]    Has history of a blood clot after surgery in 2016  Was on lovenox x 3 months     She is concerned about a blood clot    No shortness of breath, no hemoptysis, no chest pain, no fever/chills  She denies any right lower extremity symptoms    Review of Systems   See above      Objective    BP (!) 144/84 (BP Location: Left arm, Patient Position: Sitting, Cuff Size: Adult Regular)   Pulse 92   Temp 97.3  F (36.3  C) (Temporal)   Resp 20   Wt 66.9 kg (147 lb 6.4 oz)   SpO2 98%   Breastfeeding No   BMI 28.79 kg/m    Body mass index is 28.79 kg/m .     Physical Exam     GENERAL: healthy, alert and no distress  RESP: lungs clear to auscultation - no rales, no rhonchi, no wheezes  CV: regular rates and rhythm, normal S1 S2, no S3 or S4 and no murmur, no click or rub   MS: extremities- no gross deformities noted, no edema bilat lower extremities, DP and PT pulses palpable bilaterally

## 2021-10-08 NOTE — TELEPHONE ENCOUNTER
Attempted to contact patient regarding upcoming colonoscopy procedure on 10.18.2021 for pre assessment questions. No answer.     Left message to return call to [phone] #2    Covid test?  [employer]hart message sent.    Arrival time: 1130    Facility location: [facility]    Sedation type: CS    Bowel prep recommendation: miralax and magnesium citrate prep    Marlys Buckner RN

## 2021-10-08 NOTE — LETTER
October 8, 2021      Maggie Navarrete  [address]  [facility] [address]              Dear Maggie,          Colonoscopy  Your exam is on 10/18/21  Arrival Time: 11:30am  Please note that your procedure time may change  [facility] at: [facility]; [address], [address], [place] [address]    Please arrive with an adult who can drive you home after the exam and stay with you for the next 24 hours, unless your provider says otherwise.   The medicines used in the exam will make you sleepy. You will not be able to drive. You cannot take a medical cab, taxi, Uber, train or bus by yourself.    For questions or appointments, call: [facility]: [phone] Monday through Friday, 7 a.m. to 4:30 p.m.  (If it is after hours, call [phone]. Ask for the GI fellow resident on call.)  ----------------------------------------------------------------------------------------------------------------------------------  Split-Dose MiraLAX  Prep Instructions for your Colonoscopy    Please read these instructions carefully at least 7 days before your colonoscopy.    You must flush the bowel totally of waste so that the doctor can have a clear view and do a thorough exam.  Without your efforts, we may have to repeat the exam.    Getting ready     A nurse will call you about 1 week before your exam to review prep instructions with you.     You must arrange for an adult to drive you home after your exam. Your colonoscopy cannot be done unless you have a ride. If you need to use public transportation, someone must ride with you and stay with you for a minimum of 6 hours.    Check with your insurance company to be sure they will cover this exam.     7 days before the exam: 10/11/21  Talk to your doctor:  If you take blood-thinners (such as Coumadin, Plavix, Xarelto), your prescription or schedule may need to change before the test.  - Stop taking fiber and iron  supplements   - Stop eating nuts and foods that contain seeds. These can stay in the colon for many days and they can clog up the colonoscope.   - Go to the drug store and buy:   - Four (4) Dulcolax (Bisacodyl) tablets   - One 8.3 ounce bottle of Miralax   - Four (4) Simethicone (Mylicon) gas relief tablets  - 64 ounce of Gatorade (not red or purple)   - 10 ounce bottle of clear Magnesium Citrate       3 days before the exam: 10/15/21  - Begin a low-fiber diet (see attached information on a low fiber diet).   - Drink at least 4 to 6 large glasses of sports drink (not red or purple) each day.      One day before the exam: 10/17/21  - Only clear liquid diet is allowed (see attached list). Drink at least 8 to 10 full glasses of clear liquids during the day.   - You will start drinking the colonoscopy prep solution at ~ 5 PM. The solution is taken in two steps. Note that the second step is ideally taken ~ 6 hours before the procedure; therefore, the timing of this step depends on your appointment time for the examination.   - It is very important to drink the solution quickly because that generates the necessary flush through the intestine.   - Once you start drinking the solution, stay near a toilet while using this medicine.   -Besides diarrhea (watery stools), you may have mild cramping, bloating and nausea.      Step One  - At 4 pm, take 2 Dulcolax (Bisacodyl) tablets.   - At 4 pm, mix the entire bottle of Miralax with 64 ounces of Gatorade in a pitcher and stir to dissolve the powder. Chill if desired, but do not add ice.   - At 5 pm, start drinking an 8-ounce glass of the Miralax and Gatorade mixture every 15 minutes until the pitcher is half empty (about 4 glasses).  Store the rest in the refrigerator.   - Bowel movements usually begin about one hour after your finish this first dose of Miralax. The stool is likely to be brown at this stage.    - You can put some petroleum jelly (Vaseline) on the skin around your  anus after each bowel movement to prevent irritation. You can also use wet wipes.   - Continue to drink clear liquids.      Step Two   If you arrive before 11 AM:  - At 8 PM take 2 Dulcolax (Bisacodyl) tablets.   - At 8 PM start drinking an 8-ounce glass of Miralax and Gatorade mixture every 15 minutes until the pitcher is empty (about 4 glasses).   - Take four Simethicone (Mylicon) gas relief tablets after the prep is finished.   - Drink 10 ounces of clear Magnesium Citrate 6 hours prior to your scheduled arrival to the endoscopy unit.    If you arrive after 11 AM:  - At 6 AM on the day of the exam take 2 Dulcolax (Bisacodyl) tablets.   - At 6 AM on the day of the exam start drinking an 8-ounce glass of Miralax and Gatorade mixture every 15 minutes until the pitcher is empty (about 4 glasses).   - Take four Simethicone (Mylicon) gas relief tablets after the prep is finished   - Drink 10 ounces of clear Magnesium Citrate 6 hours prior to your scheduled arrival to the endoscopy unit.          Day of exam: 10/18/21  - If you must take medicine, you may take it with sips of water. Do not take diabetes medicine by mouth until after your exam.   - Drinking of liquids, including the colonoscopy prep solution, should not continue beyond 4 hours before the procedure.     - Do not chew or swallow anything including water or gum for at least 4 hours before your colonoscopy. This is a safety issue, and we may need to cancel your exam if you do not observe this policy.   - If you have asthma: Bring your inhaler with you.   - Please arrive with an adult who can take you home after the test and stay with you for a minimum of 6 hours: the medicine used will make you sleepy. If you do not have someone to drive you home, we may cancel your test.        Frequently Asked Questions:     Why should Miralax be mixed with Gatorade? It is important that your body does not develop an imbalance of electrolytes with the large volume of fluid  in this prep. Gatorade contains those electrolytes.     Why should the Miralax prep be taken in several steps? The stool is flushed out by a large wave of fluid going through the colon. Just sipping a large volume of the solution will not achieve the desired result. Studies have shown that two smaller waves (or more in some cases) are better than one large one.      Why are the second Miralax dose and Magnesium Citrate so close to the exam? The intestine continues to produce mucus and waste. Longer intervals between the prep and the exam can lead to less than desired results. However, the stomach must be empty at the time of the exam in order to allow safe sedation. Therefore, there should be nothing by mouth 4 hours before the exam is started.     What are clear liquids?   You may have:  - Water, tea, coffee (no cream)  - Soda pop, Gatorade (not red or purple)  - Clear nutrition drinks (Enlive, Resource Breeze)   - Jell-O, Popsicles (no milk or fruit pieces) or sorbet (not red or purple)  - Fat-free soup broth or bouillon  - Plain hard candy, such as clear life savers (not red or purple)  - Clear juices and fruit-flavored drinks such as apple juice, white grape juice, Hi-C and Kojo-Aid (not red or purple)   Do not have:  - Milk or milk products such as ice cream, malts or shakes  - Red or purple drinks of any kind such as cranberry juice or grape juice. Avoid red or purple Jell-O, Popsicles, Kojo-Aid, sorbet and candy  - Juices with pulp such as orange, grapefruit, pineapple or tomato juice  - Cream soups of any kind  - Alcohol       What is a Low Fiber Diet?   You may have:  - Starches: White bread, rolls, biscuits, croissants, Karen toast, white flour tortillas, waffles, pancakes, Malagasy toast; white rice, noodles, pasta, macaroni; cooked and peeled potatoes; plain crackers, saltines; cooked farina or cream of rice; puffed rice, corn flakes, Rice Krispies, Special K   - Vegetables: vegetable broths   - Fruits and  fruit juices: Strained fruit juice, canned fruit without seeds or skin (not pineapple), applesauce, pear sauce, ripe bananas, melons (not watermelon)   - Milk products: Milk (plain or flavored), cheese, cottage cheese, yogurt (no berries), custard, ice cream    - Proteins: Tender, well-cooked ground beef, lamb, veal, ham, pork, chicken, turkey, fish or organ meats; eggs; creamy peanut butter   - Fats and condiments:  Margarine, butter, oils, mayonnaise, sour cream, salad dressing, plain gravy; spices, cooked herbs; sugar, clear jelly, honey, syrup   - Snacks, sweets and drinks: Pretzels, hard candy; plain cakes and cookies (no nuts or seeds); gelatin, plain pudding, sherbet, Popsicles; coffee, tea, carbonated ( fizzy ) drinks Do not have:  - Starches: Breads or rolls that contain nuts, seeds or fruit; whole wheat or whole grain breads that contain more than 1 gram of fiber per slice; cornbread; corn or whole wheat tortillas; potatoes with skin; brown rice, wild rice, kasha (buckwheat)   - Vegetables: Any raw or steamed vegetables; vegetables with seeds; corn in any form   - Fruits and fruit juices: Prunes, prune juice, raisins and other dried fruits, berries and other fruits with seeds, canned pineapple uices with pulp such as orange, grapefruit, pineapple or tomato juice  - Milk products: Any yogurt with nuts, seeds or berries   - Proteins: Tough, fibrous meats with gristle; cooked dried beans, peas or lentils; crunchy peanut butter   - Fats and condiments: Pickles, olives, relish, horseradish; jam, marmalade, preserves   - Snacks, sweets and drinks: Popcorn, nuts, seeds, granola, coconut, candies made with nuts or seeds; all desserts that contain nuts, seeds, raisins and other dried fruits, coconut, whole grains or bran.        Thank you for choosing [facility], for your procedure. If you are sent a survey regarding your care, please take the time to complete the questionnaire.

## 2021-10-08 NOTE — TELEPHONE ENCOUNTER
Pre assessment questions completed for upcoming colonoscopy procedure scheduled on 10/18/21    COVID test scheduled? Patient to call and schedule. Gave Covid test scheduling number [phone].    Procedural arrival time and facility location reviewed.    Designated  policy reviewed. Instructed to have someone stay 6 hours post procedure.     Bowel prep recommendation: Miralax/Magnesium citrate/Dulcolax     Reviewed Miralax prep instructions with patient. No fiber/iron supplements or foods that contain nuts/seeds 7 days prior to procedure. Will resend instructions via mychart and letter per request.     Anticoagulation/blood thinners? no    Electronic implanted devices? no    Patient verbalized understanding and had no questions or concerns at this time.    Florida Byrne RN

## 2021-10-10 NOTE — PROGRESS NOTES
ASSESSMENT:  This is a 71 PM female with osteopenia by T scores - she has several risk factors - she has been on alendronate in the past - last time 4 yrs ago - her recent DXA shows loss in the spine - hips are stable - her FRAX is moderate risk but it does not include the spine.  We discussed calcium and vit D.  She would benefit from going back on alendronate and repeat the DXA in 2 yrs.  She apparently did not start an aromatase inhibitor.     PLAN:  Start alendronate 70mg/wk   Let me know if you get stomach burning  Patient should take 1200mg of calcium/day in divided doses - diet and all supplements  and vitamin D3 1000IU/day.  DEXA in 8/2023   See me 1 yr   Watch your blood pressure - goal is 130/80 or less       Thank you for allowing me to participate in the care of your patient.  Please do not hesitate to call with questions or concerns.    Sincerely,    Kristine Flynn MD, PhD      Time note (e5, 40'): The total time (on the date of service) for this service was >60 minutes, including discussion/face-to-face, chart review, interpretation not otherwise reported, documentation, and updating of the computerized record.            Maggie is a  71 year old female post menopausal] [GR1, P1] that presents today for osteoporosis follow up patient was last seen 8/2020. She took alendronate 35mg for 10 yrs and had a 1 yr drug holiday  and then re-started alendronate in 2013 35mg from 8436-8123. Had dental work fall 2017  so stopped alendronate.  Has been off meds now 4yr. 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   Referring Physician: Referred  [facility]      HPI     Have you ever had a bone density test? Yes  Where = [facility]  When = 8/2021 8/2020 9/2018,2017,2015,2013,2011,2009   Spine Tscore = L1-3  -2.4  LOSS -6.5%  Left neck Tscore = -1,9  Total left hip Tscore = -1.5  Right neck Tscore =  -2.3   Total Right hip Tscore = -1.9  Have you received any x-ray dye or contrast in the last ten days? No  How many servings of dairy products do you consume per day? 3 Type: milk - 3 glasses/day  [facility]   Do you take a multi-vitamin daily? No  Do you take a vitamin D supplement? Yes in winter   Do you take a calcium supplement daily?  Calcium citrate 315mg  1 /day if misses a glass of milk  Do you take a supplement containing strontium? No  Are you exposed to natural sunlight at least 20 minutes three times a week? Yes    Social History   reports that she has never smoked. She has never used smokeless tobacco. She reports current alcohol use. She reports that she does not use drugs.  Do you smoke cigarettes? No  Do you exercise? Yes. Details: walking every day 45 minutes  - strength class   Do you drink alcohol? Rarely     Medication History  Have you used any of the following medications?   Actonel (Risedronate): No   Aredia (Pamidronate): No   Boniva (Ibindronate): No   Didronil (Etidronate): No   Evista (Raloxifene): No   Fosamax (Alendronate): in the past 7889-1237  35mg/wk      Forteo (Parathyroid hormone) injections: No   HCTZ (Thiazide): No   Calcitonin nasal spray: No   Reclast or Zometa (Zolendronate): No   Prolia (Denosumab): No    Current Outpatient Medications   Medication Sig Dispense Refill     levothyroxine (SYNTHROID/LEVOTHROID) 75 MCG tablet Take 1 tablet (75 mcg) by mouth daily 90 tablet 3     venlafaxine (EFFEXOR-XR) 75 MG 24 hr capsule Take 1 capsule (75 mg) by mouth daily 90 capsule 1     zolpidem (AMBIEN) 5 MG tablet Take 1 tablet (5 mg) by mouth nightly as needed for sleep (Only uses if traveling or cannot sleep,) 20 tablet 0          Allergies   Allergen Reactions     Erythromycin GI Disturbance     Tramadol Nausea       Past Medical History    Family History   Problem Relation Age of Onset     Family History Negative Mother         glaucoma     Glaucoma Mother      Anxiety Disorder Mother   "    Heart Failure Mother      Diabetes Mother      Coronary Artery Disease Mother      Hypertension Mother      Breast Cancer Mother      Depression Mother      Obesity Mother      Heart Disease Father      Glaucoma Father      Diabetes Father      Coronary Artery Disease Father      Depression Father      Osteoporosis Father      Heart Disease Maternal Grandmother      Diabetes Maternal Grandmother      Heart Disease Paternal Grandmother      Cancer Paternal Grandfather         stomach     Thyroid Disease Sister      Retinal detachment Sister      Hypertension Sister      Hypertension Sister      Lipids Sister      Cerebrovascular Disease Sister      Depression Sister      Heart Disease Paternal Aunt      Heart Disease Paternal Uncle      Anxiety Disorder Sister      Depression Sister      Thyroid Disease Sister      Thyroid Disease Sister      Osteoporosis Other        ROS:  General: weight gain  Head/Eyes: hair loss  Ears/Nose/Throat: none  Cardiovascular: none  Respiratory: none  Gastrointestinal: none  Breast: none  Genitourinary: incontinence and vaginal dryness  Sexual Function: none  Musculoskeletal: joint pain  Skin: none  Neurological: numbness/tingling  Mental Health: none  Endocrine: temperature intolerance    Clinic Measurements  Vitals: BP (!) 161/74   Pulse 84   Ht 1.511 m (4' 11.5\")   Wt 66.6 kg (146 lb 14.4 oz)   BMI 29.17 kg/m    BMI= Body mass index is 29.17 kg/m .    Physical exam  Constitutional: Well appearing woman in no acute distress.   Psychological: appropriate mood.  Neck: No thyroidmegaly.  no carotid bruits.  Cardiovascular: regular rate and rhythm, normal S1 and S2, no murmurs, rubs or gallops, peripheral pulses full and symmetric   Respiratory: clear to auscultation, no wheezes or crackles, normal breath sounds.  Musculoskeletal: full range of motion, no edema and motor strength is equal in the upper and lower extremities    Spine: Straight, not tender, Flexion good, Extension " good, Lateral movement good, Rotational movement adequate  Skin: no concerning lesions, no jaundice.  Neurological: cranial nerves intact, normal strength, reflexes at patella and biceps normal, normal gait, no tremor.     LAB  Vertebra; Fracture Assessment: NA  Dexa Scan: 8/2021    FRAX Assessment Tool:  11% for 10 risk Major Osteoporotic, 2.2% for 10 risk of Hip Fracture]  Risk Factors: Risk Factors:  age, sex, post-menopausal, chemo therapy, hypothyroidism, +FHx    DXA reviewed and old notes.   Kristine Flynn MD, PhD

## 2021-10-11 ENCOUNTER — OFFICE VISIT (OUTPATIENT)
Dept: FAMILY MEDICINE | Facility: CLINIC | Age: 71
End: 2021-10-11
Attending: FAMILY MEDICINE
Payer: MEDICARE

## 2021-10-11 VITALS
SYSTOLIC BLOOD PRESSURE: 145 MMHG | WEIGHT: 146.9 LBS | HEART RATE: 84 BPM | DIASTOLIC BLOOD PRESSURE: 84 MMHG | BODY MASS INDEX: 28.84 KG/M2 | HEIGHT: 60 IN

## 2021-10-11 DIAGNOSIS — M85.89 OSTEOPENIA OF MULTIPLE SITES: Primary | ICD-10-CM

## 2021-10-11 PROCEDURE — 99215 OFFICE O/P EST HI 40 MIN: CPT | Performed by: FAMILY MEDICINE

## 2021-10-11 PROCEDURE — G0463 HOSPITAL OUTPT CLINIC VISIT: HCPCS

## 2021-10-11 RX ORDER — ALENDRONATE SODIUM 70 MG/1
70 TABLET ORAL
Qty: 12 TABLET | Refills: 3 | Status: SHIPPED | OUTPATIENT
Start: 2021-10-11 | End: 2021-10-11

## 2021-10-11 RX ORDER — ALENDRONATE SODIUM 70 MG/1
70 TABLET ORAL
Qty: 12 TABLET | Refills: 3 | Status: SHIPPED | OUTPATIENT
Start: 2021-10-11 | End: 2022-10-13 | Stop reason: ALTCHOICE

## 2021-10-11 ASSESSMENT — MIFFLIN-ST. JEOR: SCORE: 1094.89

## 2021-10-11 NOTE — PATIENT INSTRUCTIONS
Start alendronate 70mg/wk   Let me know if you get stomach burning  Patient should take 1200mg of calcium/day in divided doses - diet and all supplements  and vitamin D3 1000IU/day.  DEXA in 8/2023   See me 1 yr   Watch your blood pressure - goal is 130/80 or less

## 2021-10-11 NOTE — RESULT ENCOUNTER NOTE
Results discussed directly with patient on the phone.  Symptoms likely 2/2 to calf strain.  Can try tylenol and stretching.  Any further details documented in the note.   Latonya Herring PA-C

## 2021-10-12 ENCOUNTER — PRE VISIT (OUTPATIENT)
Dept: OTOLARYNGOLOGY | Facility: CLINIC | Age: 71
End: 2021-10-12

## 2021-10-14 ENCOUNTER — THERAPY VISIT (OUTPATIENT)
Dept: PHYSICAL THERAPY | Facility: CLINIC | Age: 71
End: 2021-10-14
Payer: MEDICARE

## 2021-10-14 DIAGNOSIS — M79.671 RIGHT FOOT PAIN: ICD-10-CM

## 2021-10-14 DIAGNOSIS — M25.561 CHRONIC PAIN OF RIGHT KNEE: ICD-10-CM

## 2021-10-14 DIAGNOSIS — R10.32 LEFT GROIN PAIN: ICD-10-CM

## 2021-10-14 DIAGNOSIS — G89.29 CHRONIC PAIN OF RIGHT KNEE: ICD-10-CM

## 2021-10-14 PROCEDURE — 97110 THERAPEUTIC EXERCISES: CPT | Mod: GP | Performed by: PHYSICAL THERAPIST

## 2021-10-14 PROCEDURE — 97112 NEUROMUSCULAR REEDUCATION: CPT | Mod: GP | Performed by: PHYSICAL THERAPIST

## 2021-10-15 ENCOUNTER — LAB (OUTPATIENT)
Dept: URGENT CARE | Facility: URGENT CARE | Age: 71
End: 2021-10-15
Attending: INTERNAL MEDICINE
Payer: MEDICARE

## 2021-10-15 DIAGNOSIS — Z11.59 ENCOUNTER FOR SCREENING FOR OTHER VIRAL DISEASES: ICD-10-CM

## 2021-10-15 LAB — SARS-COV-2 RNA RESP QL NAA+PROBE: NEGATIVE

## 2021-10-15 PROCEDURE — U0003 INFECTIOUS AGENT DETECTION BY NUCLEIC ACID (DNA OR RNA); SEVERE ACUTE RESPIRATORY SYNDROME CORONAVIRUS 2 (SARS-COV-2) (CORONAVIRUS DISEASE [COVID-19]), AMPLIFIED PROBE TECHNIQUE, MAKING USE OF HIGH THROUGHPUT TECHNOLOGIES AS DESCRIBED BY CMS-2020-01-R: HCPCS

## 2021-10-15 PROCEDURE — U0005 INFEC AGEN DETEC AMPLI PROBE: HCPCS

## 2021-10-18 ENCOUNTER — HOSPITAL ENCOUNTER (OUTPATIENT)
Facility: AMBULATORY SURGERY CENTER | Age: 71
Discharge: HOME | End: 2021-10-18
Attending: INTERNAL MEDICINE | Admitting: INTERNAL MEDICINE
Payer: MEDICARE

## 2021-10-18 VITALS
SYSTOLIC BLOOD PRESSURE: 119 MMHG | HEART RATE: 81 BPM | HEIGHT: 60 IN | BODY MASS INDEX: 28.47 KG/M2 | WEIGHT: 145 LBS | RESPIRATION RATE: 17 BRPM | DIASTOLIC BLOOD PRESSURE: 75 MMHG | TEMPERATURE: 97.5 F | OXYGEN SATURATION: 98 %

## 2021-10-18 LAB — COLONOSCOPY: NORMAL

## 2021-10-18 PROCEDURE — 999N000127 HC STATISTIC PERIPHERAL IV START W US GUIDANCE

## 2021-10-18 PROCEDURE — G0105 COLORECTAL SCRN; HI RISK IND: HCPCS

## 2021-10-18 RX ORDER — LIDOCAINE 40 MG/G
CREAM TOPICAL
Status: DISCONTINUED | OUTPATIENT
Start: 2021-10-18 | End: 2021-10-19 | Stop reason: HOSPADM

## 2021-10-18 RX ORDER — FLUMAZENIL 0.1 MG/ML
0.2 INJECTION, SOLUTION INTRAVENOUS
Status: CANCELLED | OUTPATIENT
Start: 2021-10-18 | End: 2021-10-19

## 2021-10-18 RX ORDER — NALOXONE HYDROCHLORIDE 0.4 MG/ML
0.4 INJECTION, SOLUTION INTRAMUSCULAR; INTRAVENOUS; SUBCUTANEOUS
Status: CANCELLED | OUTPATIENT
Start: 2021-10-18

## 2021-10-18 RX ORDER — ONDANSETRON 2 MG/ML
4 INJECTION INTRAMUSCULAR; INTRAVENOUS
Status: DISCONTINUED | OUTPATIENT
Start: 2021-10-18 | End: 2021-10-19 | Stop reason: HOSPADM

## 2021-10-18 RX ORDER — SIMETHICONE
LIQUID (ML) MISCELLANEOUS PRN
Status: DISCONTINUED | OUTPATIENT
Start: 2021-10-18 | End: 2021-10-18 | Stop reason: HOSPADM

## 2021-10-18 RX ORDER — PROCHLORPERAZINE MALEATE 5 MG
5 TABLET ORAL EVERY 6 HOURS PRN
Status: CANCELLED | OUTPATIENT
Start: 2021-10-18

## 2021-10-18 RX ORDER — ONDANSETRON 4 MG/1
4 TABLET, ORALLY DISINTEGRATING ORAL EVERY 6 HOURS PRN
Status: CANCELLED | OUTPATIENT
Start: 2021-10-18

## 2021-10-18 RX ORDER — ONDANSETRON 2 MG/ML
4 INJECTION INTRAMUSCULAR; INTRAVENOUS EVERY 6 HOURS PRN
Status: CANCELLED | OUTPATIENT
Start: 2021-10-18

## 2021-10-18 RX ORDER — NALOXONE HYDROCHLORIDE 0.4 MG/ML
0.2 INJECTION, SOLUTION INTRAMUSCULAR; INTRAVENOUS; SUBCUTANEOUS
Status: CANCELLED | OUTPATIENT
Start: 2021-10-18

## 2021-10-18 RX ORDER — FENTANYL CITRATE 50 UG/ML
INJECTION, SOLUTION INTRAMUSCULAR; INTRAVENOUS PRN
Status: DISCONTINUED | OUTPATIENT
Start: 2021-10-18 | End: 2021-10-18 | Stop reason: HOSPADM

## 2021-10-18 RX ORDER — SODIUM CHLORIDE, SODIUM LACTATE, POTASSIUM CHLORIDE, CALCIUM CHLORIDE 600; 310; 30; 20 MG/100ML; MG/100ML; MG/100ML; MG/100ML
500 INJECTION, SOLUTION INTRAVENOUS CONTINUOUS
Status: DISCONTINUED | OUTPATIENT
Start: 2021-10-18 | End: 2021-10-19 | Stop reason: HOSPADM

## 2021-10-18 RX ADMIN — SODIUM CHLORIDE, SODIUM LACTATE, POTASSIUM CHLORIDE, CALCIUM CHLORIDE 500 ML: 600; 310; 30; 20 INJECTION, SOLUTION INTRAVENOUS at 13:07

## 2021-10-18 ASSESSMENT — MIFFLIN-ST. JEOR: SCORE: 1086.28

## 2021-10-19 ENCOUNTER — OFFICE VISIT (OUTPATIENT)
Dept: OTOLARYNGOLOGY | Facility: CLINIC | Age: 71
End: 2021-10-19
Attending: INTERNAL MEDICINE
Payer: MEDICARE

## 2021-10-19 ENCOUNTER — PRE VISIT (OUTPATIENT)
Dept: OTOLARYNGOLOGY | Facility: CLINIC | Age: 71
End: 2021-10-19

## 2021-10-19 VITALS — BODY MASS INDEX: 27.68 KG/M2 | HEART RATE: 91 BPM | HEIGHT: 60 IN | OXYGEN SATURATION: 100 % | WEIGHT: 141 LBS

## 2021-10-19 DIAGNOSIS — K13.70 ORAL LESION: Primary | ICD-10-CM

## 2021-10-19 PROCEDURE — 99203 OFFICE O/P NEW LOW 30 MIN: CPT | Performed by: OTOLARYNGOLOGY

## 2021-10-19 ASSESSMENT — MIFFLIN-ST. JEOR: SCORE: 1068.13

## 2021-10-19 ASSESSMENT — PAIN SCALES - GENERAL: PAINLEVEL: NO PAIN (0)

## 2021-10-19 NOTE — LETTER
10/19/2021       RE: Maggie Navarrete  [address]  [facility] [address]     Dear Colleague,    Thank you for referring your patient, Maggie Navarrete, to the [facility] [place] [facility]. Please see a copy of my visit note below.      Otolaryngology Clinic      Name: Maggie Navarrete  MRN: [id number]  Age: 71 year old  : 1950  Referring provider: Rhea Lin  10/19/2021      Chief Complaint:  Consultation    History of Present Illness:   Maggie Navarrete is a 71 year old female who presents for consultation regarding her sublingual tissue on the right side of her tongue. On 2021 the patient was seen by Dr. Lin who recommended ENT referral for her prominent supra glossal papillae on the posterior tongue.     Today, the patient reports that she often gets red or white bumps under her tongue. She asked her dentist and doctor about these who recommended referral to me. She denies tobacco use.      Active Medications:     Current Outpatient Medications:      alendronate (FOSAMAX) 70 MG tablet, Take 1 tablet (70 mg) by mouth every 7 days, Disp: 12 tablet, Rfl: 3     levothyroxine (SYNTHROID/LEVOTHROID) 75 MCG tablet, Take 1 tablet (75 mcg) by mouth daily, Disp: 90 tablet, Rfl: 3     venlafaxine (EFFEXOR-XR) 75 MG 24 hr capsule, Take 1 capsule (75 mg) by mouth daily, Disp: 90 capsule, Rfl: 1     zolpidem (AMBIEN) 5 MG tablet, Take 1 tablet (5 mg) by mouth nightly as needed for sleep (Only uses if traveling or cannot sleep,), Disp: 20 tablet, Rfl: 0      Allergies:   Erythromycin and Tramadol      Past Medical History:  Past Medical History:   Diagnosis Date     Anxiety      Arthritis     pain in feet and knees     Disorder of bone and cartilage, unspecified      Esophageal reflux      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     GERD (gastroesophageal reflux disease)      Hx  of previous reproductive problem 1980     Kidney stone 2011     Leiomyosarcoma (H) 11/14/2016     Personal history of colonic polyps 2016    Small, benign     PONV (postoperative nausea and vomiting)      Posterior vitreous detachment of left eye 2011     Posterior vitreous detachment of right eye 2011     Ptosis of eyelid, bilateral      Sepsis (H) 07/2020     Stomach problems 2015    Abdominal pain, Diarreha     Thyroid disease     thyroid nodule resolved 2014     Patient Active Problem List   Diagnosis     Presbyopia - Both Eyes     Osteopenia     Generalized anxiety disorder     Vaginal atrophy     Leiomyosarcoma of uterus (H)     Chemotherapy-induced neutropenia (H)     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)     Overweight     Leiomyosarcoma (H)     Ptosis     Dry eyes     Closed nondisplaced fracture of fifth right metatarsal bone     History of deep venous thrombosis     Hypothyroidism     Myopia of both eyes     Neuropathy, peripheral     Post-operative nausea and vomiting     Osteoporosis of forearm     Low TSH level     Arthralgia, unspecified joint     Dyslipidemia     Chronic pain of right knee     Left groin pain     Right foot pain     Past Surgical History:  Past Surgical History:   Procedure Laterality Date     ABDOMEN SURGERY  Now    Pain, diarrhea     BREAST SURGERY  2002    right breast benign     COLONOSCOPY  2008    due in 2018     COLONOSCOPY N/A 09/08/2016    Procedure: COMBINED COLONOSCOPY, SINGLE OR MULTIPLE BIOPSY/POLYPECTOMY BY BIOPSY;  Surgeon: Abner Pepper MD;  Location:  GI     COLONOSCOPY N/A 10/18/2021    Procedure: COLONOSCOPY;  Surgeon: Jamila Villarreal MD;  Location: [facility] OR     ENT SURGERY  1975    tonsillectomy     GENITOURINARY SURGERY  1953    bladder surgery      GI SURGERY  2015    Severe constipation     GYN SURGERY  1977    C section     GYN SURGERY  1981    laparoscopy     GYN SURGERY  2007    myomectomy     hysterecomy  11/14/2016    leiomyosarcoma      OTHER SURGICAL HISTORY Right 10/27/2017    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     REMOVE PORT VASCULAR ACCESS Right 05/09/2017    Procedure: REMOVE PORT VASCULAR ACCESS;  Right Port Removal;  Surgeon: Eric Gibson PA-C;  Location:  OR     REPAIR PTOSIS BILATERAL  02/15/2012    Procedure:REPAIR PTOSIS BILATERAL; BILATERAL UPPER LID PTOSIS REPAIR; Surgeon:BRYCE REYNOLDS; Location: [facility]     TUMOR REMOVAL  06/2020     Family History:   Family History   Problem Relation Age of Onset     Family History Negative Mother         glaucoma     Glaucoma Mother      Anxiety Disorder Mother      Heart Failure Mother      Diabetes Mother      Coronary Artery Disease Mother      Hypertension Mother      Breast Cancer Mother      Depression Mother      Obesity Mother      Heart Disease Father      Glaucoma Father      Diabetes Father      Coronary Artery Disease Father      Depression Father      Osteoporosis Father      Heart Disease Maternal Grandmother      Diabetes Maternal Grandmother      Heart Disease Paternal Grandmother      Cancer Paternal Grandfather         stomach     Thyroid Disease Sister      Retinal detachment Sister      Hypertension Sister      Hypertension Sister      Lipids Sister      Cerebrovascular Disease Sister      Depression Sister      Heart Disease Paternal Aunt      Heart Disease Paternal Uncle      Anxiety Disorder Sister      Depression Sister      Thyroid Disease Sister      Thyroid Disease Sister      Osteoporosis Other       Social History:   Social History     Tobacco Use     Smoking status: Never Smoker     Smokeless tobacco: Never Used   Substance Use Topics     Alcohol use: Yes     Comment: Social, not often per patient.     Drug use: No     Review of Systems:   Pertinent items are noted in HPI or as in patient entered ROS below, remainder of complete ROS is negative.   No flowsheet data found.      Physical Exam:   Pulse 91   Ht 1.511  "m (4' 11.5\")   Wt 64 kg (141 lb)   SpO2 100%   BMI 28.00 kg/m     Constitutional:  The patient was unaccompanied, well-groomed, and in no acute distress.    Skin:  Warm and pink.    Neurologic:  Alert and oriented x 3.  CN's III-XII within normal limits.  Voice normal.   Psychiatric:  The patient's affect was calm, cooperative, and appropriate.    Respiratory:  Breathing comfortably without stridor or exertion of accessory muscles.    Eyes: Extraocular movement intact.    Head:  Normocephalic and atraumatic.  No lesions or scars.    Ears:  Pinnae and tragus non-tender. EAC's and TM's were clear.  Nose:  Sinuses were non-tender.  Anterior rhinoscopy revealed midline septum and absence of purulence or polyps.    OC/OP:  Normal tongue, floor of mouth, buccal mucosa, and palate.  No lesions or masses on inspection or palpation.  No abnormal lymph tissue in the oropharynx.  The pterygoid region is non-tender.  Some areas of slightly prominent sublingual venous plexus.   Neck:  Supple with normal laryngeal and tracheal landmarks.  The parotid beds were without masses.  No palpable thyroid.  Lymphatic:  There is no palpable lymphadenopathy in the neck.     Assessment and Plan:  No diagnosis found.   Maggie Navarrete is a 71 year old female who presents for consultation regarding her sublingual tissue on the right side of her tongue. I provided her reassurance regarding the small lesions under her tongue, specifying that they are not pre-cancerous in my opinion. Additionally, I explained that she should not be incredibly concerned unless the symptoms persist for greater than 6 weeks.     Follow-up: Return if symptoms worsen or fail to improve.      Scribe Disclosure:  I, Ning Boyd, am serving as a scribe to document services personally performed by Jose M Keys MD at this visit, based upon the provider's statements to me. All documentation has been reviewed by the aforementioned provider prior to being " entered into the official medical record.        Again, thank you for allowing me to participate in the care of your patient.      Sincerely,    Jose M Keys MD

## 2021-10-19 NOTE — PATIENT INSTRUCTIONS
Be careful of lesions lasting 6 weeks     1. Please follow-up in clinic is symptoms persist more then 6 weeks.  2. Please call the ENT clinic with any questions,concerns, new or worsening symptoms.    -Clinic number is [phone]   - [facility] direct line (Dr. Keys's nurse) [phone]

## 2021-10-19 NOTE — PROGRESS NOTES
Otolaryngology Clinic      Name: Maggie Navarrete  MRN: [id number]  Age: 71 year old  : 1950  Referring provider: Rhea Lin  10/19/2021      Chief Complaint:  Consultation    History of Present Illness:   Maggie Navarrete is a 71 year old female who presents for consultation regarding her sublingual tissue on the right side of her tongue. On 2021 the patient was seen by Dr. Lin who recommended ENT referral for her prominent supra glossal papillae on the posterior tongue.     Today, the patient reports that she often gets red or white bumps under her tongue. She asked her dentist and doctor about these who recommended referral to me. She denies tobacco use.      Active Medications:     Current Outpatient Medications:      alendronate (FOSAMAX) 70 MG tablet, Take 1 tablet (70 mg) by mouth every 7 days, Disp: 12 tablet, Rfl: 3     levothyroxine (SYNTHROID/LEVOTHROID) 75 MCG tablet, Take 1 tablet (75 mcg) by mouth daily, Disp: 90 tablet, Rfl: 3     venlafaxine (EFFEXOR-XR) 75 MG 24 hr capsule, Take 1 capsule (75 mg) by mouth daily, Disp: 90 capsule, Rfl: 1     zolpidem (AMBIEN) 5 MG tablet, Take 1 tablet (5 mg) by mouth nightly as needed for sleep (Only uses if traveling or cannot sleep,), Disp: 20 tablet, Rfl: 0      Allergies:   Erythromycin and Tramadol      Past Medical History:  Past Medical History:   Diagnosis Date     Anxiety      Arthritis     pain in feet and knees     Disorder of bone and cartilage, unspecified      Esophageal reflux      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     GERD (gastroesophageal reflux disease)      Hx of previous reproductive problem      Kidney stone      Leiomyosarcoma (H) 2016     Personal history of colonic polyps 2016    Small, benign     PONV (postoperative nausea and vomiting)      Posterior vitreous detachment of left eye      Posterior vitreous detachment of right eye      Ptosis of eyelid,  bilateral      Sepsis (H) 07/2020     Stomach problems 2015    Abdominal pain, Diarreha     Thyroid disease     thyroid nodule resolved 2014     Patient Active Problem List   Diagnosis     Presbyopia - Both Eyes     Osteopenia     Generalized anxiety disorder     Vaginal atrophy     Leiomyosarcoma of uterus (H)     Chemotherapy-induced neutropenia (H)     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)     Overweight     Leiomyosarcoma (H)     Ptosis     Dry eyes     Closed nondisplaced fracture of fifth right metatarsal bone     History of deep venous thrombosis     Hypothyroidism     Myopia of both eyes     Neuropathy, peripheral     Post-operative nausea and vomiting     Osteoporosis of forearm     Low TSH level     Arthralgia, unspecified joint     Dyslipidemia     Chronic pain of right knee     Left groin pain     Right foot pain     Past Surgical History:  Past Surgical History:   Procedure Laterality Date     ABDOMEN SURGERY  Now    Pain, diarrhea     BREAST SURGERY  2002    right breast benign     COLONOSCOPY  2008    due in 2018     COLONOSCOPY N/A 09/08/2016    Procedure: COMBINED COLONOSCOPY, SINGLE OR MULTIPLE BIOPSY/POLYPECTOMY BY BIOPSY;  Surgeon: Abner Pepper MD;  Location:  GI     COLONOSCOPY N/A 10/18/2021    Procedure: COLONOSCOPY;  Surgeon: Jamila Villarreal MD;  Location: [facility] OR     ENT SURGERY  1975    tonsillectomy     GENITOURINARY SURGERY  1953    bladder surgery      GI SURGERY  2015    Severe constipation     GYN SURGERY  1977    C section     GYN SURGERY  1981    laparoscopy     GYN SURGERY  2007    myomectomy     hysterecomy  11/14/2016    leiomyosarcoma     OTHER SURGICAL HISTORY Right 10/27/2017    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     REMOVE PORT VASCULAR ACCESS Right 05/09/2017    Procedure: REMOVE PORT VASCULAR ACCESS;  Right Port Removal;  Surgeon: Eric Gibson PA-C;  Location: [facility] OR     REPAIR PTOSIS BILATERAL   "02/15/2012    Procedure:REPAIR PTOSIS BILATERAL; BILATERAL UPPER LID PTOSIS REPAIR; Surgeon:BRYCE REYNOLDS; Location: [facility]     TUMOR REMOVAL  06/2020     Family History:   Family History   Problem Relation Age of Onset     Family History Negative Mother         glaucoma     Glaucoma Mother      Anxiety Disorder Mother      Heart Failure Mother      Diabetes Mother      Coronary Artery Disease Mother      Hypertension Mother      Breast Cancer Mother      Depression Mother      Obesity Mother      Heart Disease Father      Glaucoma Father      Diabetes Father      Coronary Artery Disease Father      Depression Father      Osteoporosis Father      Heart Disease Maternal Grandmother      Diabetes Maternal Grandmother      Heart Disease Paternal Grandmother      Cancer Paternal Grandfather         stomach     Thyroid Disease Sister      Retinal detachment Sister      Hypertension Sister      Hypertension Sister      Lipids Sister      Cerebrovascular Disease Sister      Depression Sister      Heart Disease Paternal Aunt      Heart Disease Paternal Uncle      Anxiety Disorder Sister      Depression Sister      Thyroid Disease Sister      Thyroid Disease Sister      Osteoporosis Other       Social History:   Social History     Tobacco Use     Smoking status: Never Smoker     Smokeless tobacco: Never Used   Substance Use Topics     Alcohol use: Yes     Comment: Social, not often per patient.     Drug use: No     Review of Systems:   Pertinent items are noted in HPI or as in patient entered ROS below, remainder of complete ROS is negative.   No flowsheet data found.      Physical Exam:   Pulse 91   Ht 1.511 m (4' 11.5\")   Wt 64 kg (141 lb)   SpO2 100%   BMI 28.00 kg/m     Constitutional:  The patient was unaccompanied, well-groomed, and in no acute distress.    Skin:  Warm and pink.    Neurologic:  Alert and oriented x 3.  CN's III-XII within normal limits.  Voice normal.   Psychiatric:  The patient's affect was calm, " cooperative, and appropriate.    Respiratory:  Breathing comfortably without stridor or exertion of accessory muscles.    Eyes: Extraocular movement intact.    Head:  Normocephalic and atraumatic.  No lesions or scars.    Ears:  Pinnae and tragus non-tender. EAC's and TM's were clear.  Nose:  Sinuses were non-tender.  Anterior rhinoscopy revealed midline septum and absence of purulence or polyps.    OC/OP:  Normal tongue, floor of mouth, buccal mucosa, and palate.  No lesions or masses on inspection or palpation.  No abnormal lymph tissue in the oropharynx.  The pterygoid region is non-tender.  Some areas of slightly prominent sublingual venous plexus.   Neck:  Supple with normal laryngeal and tracheal landmarks.  The parotid beds were without masses.  No palpable thyroid.  Lymphatic:  There is no palpable lymphadenopathy in the neck.     Assessment and Plan:  No diagnosis found.   Maggie Navarrete is a 71 year old female who presents for consultation regarding her sublingual tissue on the right side of her tongue. I provided her reassurance regarding the small lesions under her tongue, specifying that they are not pre-cancerous in my opinion. Additionally, I explained that she should not be incredibly concerned unless the symptoms persist for greater than 6 weeks.     Follow-up: Return if symptoms worsen or fail to improve.      Scribe Disclosure:  I, Ning Boyd, am serving as a scribe to document services personally performed by Jose M Keys MD at this visit, based upon the provider's statements to me. All documentation has been reviewed by the aforementioned provider prior to being entered into the official medical record.

## 2021-10-22 ENCOUNTER — OFFICE VISIT (OUTPATIENT)
Dept: AUDIOLOGY | Facility: CLINIC | Age: 71
End: 2021-10-22
Attending: INTERNAL MEDICINE
Payer: MEDICARE

## 2021-10-22 DIAGNOSIS — H90.3 SENSORINEURAL HEARING LOSS (SNHL) OF BOTH EARS: Primary | ICD-10-CM

## 2021-10-22 DIAGNOSIS — H91.90 HEARING DIFFICULTY, UNSPECIFIED LATERALITY: ICD-10-CM

## 2021-10-22 PROCEDURE — 92550 TYMPANOMETRY & REFLEX THRESH: CPT | Performed by: AUDIOLOGIST

## 2021-10-22 PROCEDURE — 92557 COMPREHENSIVE HEARING TEST: CPT | Performed by: AUDIOLOGIST

## 2021-10-22 NOTE — PROGRESS NOTES
AUDIOLOGY REPORT    SUBJECTIVE:  Maggie Navarrete is a 71 year old female who was seen in the Audiology Clinic at the [facility] for audiologic evaluation, referred by Rhea Lin M.D.. They were not accompanied today by anyone today. The patient has not been seen previously in this clinic for assessment. The patient reports difficulty hearing in background noise and in large groups. She reports a history of BPPV that has resolved and she is currently seeing PT for continued imbalance. She reports one fall in June after slipping on some steps. Maggie reports intermittent bilateral tinnitus. The patient denies otalgia, otorrhea, aural fullness, history of ear surgeries, or history of noise. The patient notes difficulty with communication in a variety of listening situations.     OBJECTIVE:  Abuse Screening:  Do you feel unsafe at home or work/school? No  Do you feel threatened by someone? No  Does anyone try to keep you from having contact with others, or doing things outside of your home? No  Physical signs of abuse present? No     Fall Risk Screen:  1. Have you fallen two or more times in the past year? No  2. Have you fallen and had an injury in the past year? No    Otoscopic exam indicates ears are clear of cerumen bilaterally     Pure Tone Thresholds assessed using conventional audiometry with good  reliability from 250-8000 Hz bilaterally using insert earphones and circumaural headphones.    RIGHT:  normal sloping to mild sensorineural hearing loss rising to normal     LEFT:    normal sloping to moderate sensorineural hearing loss rising to normal   Asymmetry of 10-15 dB noted from 2-4 kHz with left ear poorer.    Tympanogram:    RIGHT: normal eardrum mobility    LEFT:   normal eardrum mobility    Reflexes (reported by stimulus ear):  RIGHT: Ipsilateral is present at normal levels  RIGHT: Contralateral is present at normal levels  LEFT:   Ipsilateral is present  at normal levels  LEFT:   Contralateral is present at normal levels      Speech Reception Threshold:    RIGHT: 15 dB HL    LEFT:   10 dB HL  Word Recognition Score:     RIGHT: 92% at 60 dB HL using NU-6 recorded word list.    LEFT:   100% at 60 dB HL using NU-6 recorded word list.      ASSESSMENT: Today's results indicate bilateral sensorineural hearing loss. There are no previous audiograms available for comparison. Today s results were discussed with the patient in detail.     PLAN:  Patient was counseled regarding hearing loss and impact on communication. Patient is not an ideal candidate for amplification at this time. Patient may desire trial with amplification in future if she notes increased difficulty. Handout on good communication strategies was given to patient. Follow up with ENT may be warranted if asymmetry worsens or other symptoms arise. It is recommended that patient recheck hearing in 1-2 years to monitor asymmetry or sooner if changes are noted/concerns arise. Please call this clinic with questions regarding these results or recommendations.        Cori Padron CCC-A  Licensed Audiologist   [place] #[id number]

## 2021-11-02 ENCOUNTER — THERAPY VISIT (OUTPATIENT)
Dept: PHYSICAL THERAPY | Facility: CLINIC | Age: 71
End: 2021-11-02
Payer: MEDICARE

## 2021-11-02 DIAGNOSIS — G89.29 CHRONIC PAIN OF RIGHT KNEE: ICD-10-CM

## 2021-11-02 DIAGNOSIS — R10.32 LEFT GROIN PAIN: ICD-10-CM

## 2021-11-02 DIAGNOSIS — M25.561 CHRONIC PAIN OF RIGHT KNEE: ICD-10-CM

## 2021-11-02 DIAGNOSIS — M79.671 RIGHT FOOT PAIN: ICD-10-CM

## 2021-11-02 PROCEDURE — 97110 THERAPEUTIC EXERCISES: CPT | Mod: GP | Performed by: PHYSICAL THERAPIST

## 2021-11-02 PROCEDURE — 97112 NEUROMUSCULAR REEDUCATION: CPT | Mod: GP | Performed by: PHYSICAL THERAPIST

## 2021-11-04 ENCOUNTER — NURSE TRIAGE (OUTPATIENT)
Dept: INTERNAL MEDICINE | Facility: CLINIC | Age: 71
End: 2021-11-04

## 2021-11-04 ENCOUNTER — APPOINTMENT (OUTPATIENT)
Dept: CT IMAGING | Facility: CLINIC | Age: 71
DRG: 390 | End: 2021-11-04
Attending: EMERGENCY MEDICINE
Payer: MEDICARE

## 2021-11-04 ENCOUNTER — HOSPITAL ENCOUNTER (INPATIENT)
Facility: CLINIC | Age: 71
LOS: 2 days | Discharge: HOME | DRG: 390 | End: 2021-11-06
Attending: EMERGENCY MEDICINE | Admitting: HOSPITALIST
Payer: MEDICARE

## 2021-11-04 DIAGNOSIS — K56.600 PARTIAL SMALL BOWEL OBSTRUCTION (H): ICD-10-CM

## 2021-11-04 DIAGNOSIS — R11.2 INTRACTABLE VOMITING WITH NAUSEA, UNSPECIFIED VOMITING TYPE: ICD-10-CM

## 2021-11-04 LAB
ALBUMIN SERPL-MCNC: 3.6 G/DL (ref 3.4–5)
ALBUMIN UR-MCNC: 10 MG/DL
ALP SERPL-CCNC: 85 U/L (ref 40–150)
ALT SERPL W P-5'-P-CCNC: 19 U/L (ref 0–50)
ANION GAP SERPL CALCULATED.3IONS-SCNC: 3 MMOL/L (ref 3–14)
APPEARANCE UR: CLEAR
AST SERPL W P-5'-P-CCNC: 14 U/L (ref 0–45)
BASOPHILS # BLD AUTO: 0 10E3/UL (ref 0–0.2)
BASOPHILS NFR BLD AUTO: 0 %
BILIRUB SERPL-MCNC: 0.7 MG/DL (ref 0.2–1.3)
BILIRUB UR QL STRIP: NEGATIVE
BUN SERPL-MCNC: 21 MG/DL (ref 7–30)
CALCIUM SERPL-MCNC: 8.9 MG/DL (ref 8.5–10.1)
CHLORIDE BLD-SCNC: 109 MMOL/L (ref 94–109)
CO2 SERPL-SCNC: 27 MMOL/L (ref 20–32)
COLOR UR AUTO: YELLOW
CREAT SERPL-MCNC: 0.86 MG/DL (ref 0.52–1.04)
EOSINOPHIL # BLD AUTO: 0 10E3/UL (ref 0–0.7)
EOSINOPHIL NFR BLD AUTO: 1 %
ERYTHROCYTE [DISTWIDTH] IN BLOOD BY AUTOMATED COUNT: 14.6 % (ref 10–15)
GFR SERPL CREATININE-BSD FRML MDRD: 68 ML/MIN/1.73M2
GLUCOSE BLD-MCNC: 113 MG/DL (ref 70–99)
GLUCOSE UR STRIP-MCNC: NEGATIVE MG/DL
HCT VFR BLD AUTO: 44.7 % (ref 35–47)
HGB BLD-MCNC: 14.9 G/DL (ref 11.7–15.7)
HGB UR QL STRIP: ABNORMAL
HOLD SPECIMEN: NORMAL
HOLD SPECIMEN: NORMAL
IMM GRANULOCYTES # BLD: 0 10E3/UL
IMM GRANULOCYTES NFR BLD: 0 %
KETONES UR STRIP-MCNC: 80 MG/DL
LEUKOCYTE ESTERASE UR QL STRIP: NEGATIVE
LIPASE SERPL-CCNC: 63 U/L (ref 73–393)
LYMPHOCYTES # BLD AUTO: 2.6 10E3/UL (ref 0.8–5.3)
LYMPHOCYTES NFR BLD AUTO: 33 %
MCH RBC QN AUTO: 29.5 PG (ref 26.5–33)
MCHC RBC AUTO-ENTMCNC: 33.3 G/DL (ref 31.5–36.5)
MCV RBC AUTO: 89 FL (ref 78–100)
MONOCYTES # BLD AUTO: 0.9 10E3/UL (ref 0–1.3)
MONOCYTES NFR BLD AUTO: 12 %
MUCOUS THREADS #/AREA URNS LPF: PRESENT /LPF
NEUTROPHILS # BLD AUTO: 4.2 10E3/UL (ref 1.6–8.3)
NEUTROPHILS NFR BLD AUTO: 54 %
NITRATE UR QL: NEGATIVE
NRBC # BLD AUTO: 0 10E3/UL
NRBC BLD AUTO-RTO: 0 /100
PH UR STRIP: 5.5 [PH] (ref 5–7)
PLATELET # BLD AUTO: 262 10E3/UL (ref 150–450)
POTASSIUM BLD-SCNC: 4.2 MMOL/L (ref 3.4–5.3)
PROT SERPL-MCNC: 7.2 G/DL (ref 6.8–8.8)
RBC # BLD AUTO: 5.05 10E6/UL (ref 3.8–5.2)
RBC URINE: 1 /HPF
SARS-COV-2 RNA RESP QL NAA+PROBE: NEGATIVE
SODIUM SERPL-SCNC: 139 MMOL/L (ref 133–144)
SP GR UR STRIP: 1.02 (ref 1–1.03)
UROBILINOGEN UR STRIP-MCNC: NORMAL MG/DL
WBC # BLD AUTO: 7.8 10E3/UL (ref 4–11)
WBC URINE: 6 /HPF

## 2021-11-04 PROCEDURE — 81001 URINALYSIS AUTO W/SCOPE: CPT | Performed by: EMERGENCY MEDICINE

## 2021-11-04 PROCEDURE — 96361 HYDRATE IV INFUSION ADD-ON: CPT

## 2021-11-04 PROCEDURE — 85041 AUTOMATED RBC COUNT: CPT | Performed by: EMERGENCY MEDICINE

## 2021-11-04 PROCEDURE — 258N000003 HC RX IP 258 OP 636: Performed by: EMERGENCY MEDICINE

## 2021-11-04 PROCEDURE — 87635 SARS-COV-2 COVID-19 AMP PRB: CPT | Performed by: EMERGENCY MEDICINE

## 2021-11-04 PROCEDURE — G1004 CDSM NDSC: HCPCS

## 2021-11-04 PROCEDURE — 250N000009 HC RX 250: Performed by: EMERGENCY MEDICINE

## 2021-11-04 PROCEDURE — 99223 1ST HOSP IP/OBS HIGH 75: CPT | Mod: AI | Performed by: HOSPITALIST

## 2021-11-04 PROCEDURE — 82040 ASSAY OF SERUM ALBUMIN: CPT | Performed by: EMERGENCY MEDICINE

## 2021-11-04 PROCEDURE — 250N000013 HC RX MED GY IP 250 OP 250 PS 637: Performed by: EMERGENCY MEDICINE

## 2021-11-04 PROCEDURE — 99285 EMERGENCY DEPT VISIT HI MDM: CPT | Mod: 25

## 2021-11-04 PROCEDURE — 250N000011 HC RX IP 250 OP 636: Performed by: EMERGENCY MEDICINE

## 2021-11-04 PROCEDURE — 80053 COMPREHEN METABOLIC PANEL: CPT | Performed by: EMERGENCY MEDICINE

## 2021-11-04 PROCEDURE — 120N000001 HC R&B MED SURG/OB

## 2021-11-04 PROCEDURE — C9803 HOPD COVID-19 SPEC COLLECT: HCPCS

## 2021-11-04 PROCEDURE — 83690 ASSAY OF LIPASE: CPT | Performed by: EMERGENCY MEDICINE

## 2021-11-04 PROCEDURE — 36415 COLL VENOUS BLD VENIPUNCTURE: CPT | Performed by: EMERGENCY MEDICINE

## 2021-11-04 PROCEDURE — 82565 ASSAY OF CREATININE: CPT | Performed by: EMERGENCY MEDICINE

## 2021-11-04 PROCEDURE — 96374 THER/PROPH/DIAG INJ IV PUSH: CPT

## 2021-11-04 PROCEDURE — 85025 COMPLETE CBC W/AUTO DIFF WBC: CPT | Performed by: EMERGENCY MEDICINE

## 2021-11-04 RX ORDER — NALOXONE HYDROCHLORIDE 0.4 MG/ML
0.4 INJECTION, SOLUTION INTRAMUSCULAR; INTRAVENOUS; SUBCUTANEOUS
Status: DISCONTINUED | OUTPATIENT
Start: 2021-11-04 | End: 2021-11-06 | Stop reason: HOSPADM

## 2021-11-04 RX ORDER — ONDANSETRON 4 MG/1
4 TABLET, ORALLY DISINTEGRATING ORAL EVERY 6 HOURS PRN
Status: DISCONTINUED | OUTPATIENT
Start: 2021-11-04 | End: 2021-11-06 | Stop reason: HOSPADM

## 2021-11-04 RX ORDER — ONDANSETRON 2 MG/ML
4 INJECTION INTRAMUSCULAR; INTRAVENOUS EVERY 30 MIN PRN
Status: DISCONTINUED | OUTPATIENT
Start: 2021-11-04 | End: 2021-11-04

## 2021-11-04 RX ORDER — CARBOXYMETHYLCELLULOSE SODIUM 5 MG/ML
1 SOLUTION/ DROPS OPHTHALMIC 4 TIMES DAILY PRN
COMMUNITY

## 2021-11-04 RX ORDER — PROCHLORPERAZINE 25 MG
12.5 SUPPOSITORY, RECTAL RECTAL EVERY 12 HOURS PRN
Status: DISCONTINUED | OUTPATIENT
Start: 2021-11-04 | End: 2021-11-06 | Stop reason: HOSPADM

## 2021-11-04 RX ORDER — HYDROMORPHONE HCL IN WATER/PF 6 MG/30 ML
0.2 PATIENT CONTROLLED ANALGESIA SYRINGE INTRAVENOUS
Status: DISCONTINUED | OUTPATIENT
Start: 2021-11-04 | End: 2021-11-06 | Stop reason: HOSPADM

## 2021-11-04 RX ORDER — ONDANSETRON 2 MG/ML
4 INJECTION INTRAMUSCULAR; INTRAVENOUS EVERY 6 HOURS PRN
Status: DISCONTINUED | OUTPATIENT
Start: 2021-11-04 | End: 2021-11-06 | Stop reason: HOSPADM

## 2021-11-04 RX ORDER — IOPAMIDOL 755 MG/ML
71 INJECTION, SOLUTION INTRAVASCULAR ONCE
Status: COMPLETED | OUTPATIENT
Start: 2021-11-04 | End: 2021-11-04

## 2021-11-04 RX ORDER — NALOXONE HYDROCHLORIDE 0.4 MG/ML
0.2 INJECTION, SOLUTION INTRAMUSCULAR; INTRAVENOUS; SUBCUTANEOUS
Status: DISCONTINUED | OUTPATIENT
Start: 2021-11-04 | End: 2021-11-06 | Stop reason: HOSPADM

## 2021-11-04 RX ORDER — PROCHLORPERAZINE MALEATE 5 MG
5 TABLET ORAL EVERY 6 HOURS PRN
Status: DISCONTINUED | OUTPATIENT
Start: 2021-11-04 | End: 2021-11-06 | Stop reason: HOSPADM

## 2021-11-04 RX ORDER — MAGNESIUM HYDROXIDE/ALUMINUM HYDROXICE/SIMETHICONE 120; 1200; 1200 MG/30ML; MG/30ML; MG/30ML
20 SUSPENSION ORAL ONCE
Status: COMPLETED | OUTPATIENT
Start: 2021-11-04 | End: 2021-11-04

## 2021-11-04 RX ORDER — DEXTROSE MONOHYDRATE, SODIUM CHLORIDE, AND POTASSIUM CHLORIDE 50; 1.49; 4.5 G/1000ML; G/1000ML; G/1000ML
INJECTION, SOLUTION INTRAVENOUS CONTINUOUS
Status: DISCONTINUED | OUTPATIENT
Start: 2021-11-05 | End: 2021-11-06 | Stop reason: HOSPADM

## 2021-11-04 RX ORDER — LIDOCAINE 40 MG/G
CREAM TOPICAL
Status: DISCONTINUED | OUTPATIENT
Start: 2021-11-04 | End: 2021-11-06 | Stop reason: HOSPADM

## 2021-11-04 RX ADMIN — SODIUM CHLORIDE 1000 ML: 9 INJECTION, SOLUTION INTRAVENOUS at 13:07

## 2021-11-04 RX ADMIN — IOPAMIDOL 71 ML: 755 INJECTION, SOLUTION INTRAVENOUS at 18:50

## 2021-11-04 RX ADMIN — ONDANSETRON 4 MG: 2 INJECTION INTRAMUSCULAR; INTRAVENOUS at 19:03

## 2021-11-04 RX ADMIN — SODIUM CHLORIDE 60 ML: 9 INJECTION, SOLUTION INTRAVENOUS at 18:50

## 2021-11-04 RX ADMIN — ALUMINUM HYDROXIDE, MAGNESIUM HYDROXIDE, AND SIMETHICONE 20 ML: 200; 200; 20 SUSPENSION ORAL at 19:37

## 2021-11-04 ASSESSMENT — ENCOUNTER SYMPTOMS
VOMITING: 1
FREQUENCY: 0
CONSTIPATION: 0
ABDOMINAL PAIN: 1
FLANK PAIN: 0
DYSURIA: 0
HEMATURIA: 0
FEVER: 0
DIARRHEA: 0
BLOOD IN STOOL: 0
NAUSEA: 1

## 2021-11-04 ASSESSMENT — ACTIVITIES OF DAILY LIVING (ADL)
FALL_HISTORY_WITHIN_LAST_SIX_MONTHS: NO
ADLS_ACUITY_SCORE: 7
ADLS_ACUITY_SCORE: 12
VISION_MANAGEMENT: GLASSES ON
WALKING_OR_CLIMBING_STAIRS_DIFFICULTY: NO
DIFFICULTY_EATING/SWALLOWING: NO
CONCENTRATING,_REMEMBERING_OR_MAKING_DECISIONS_DIFFICULTY: NO
ADLS_ACUITY_SCORE: 7
DRESSING/BATHING_DIFFICULTY: NO
ADLS_ACUITY_SCORE: 7
WEAR_GLASSES_OR_BLIND: YES
DIFFICULTY_COMMUNICATING: NO
TOILETING_ISSUES: NO
DOING_ERRANDS_INDEPENDENTLY_DIFFICULTY: NO
HEARING_DIFFICULTY_OR_DEAF: NO

## 2021-11-04 ASSESSMENT — MIFFLIN-ST. JEOR: SCORE: 1055.67

## 2021-11-04 NOTE — TELEPHONE ENCOUNTER
Pt reports she has been vomiting since Tuesday. She thinks if bile but states body fluid looks brown. Has a HA, intermittent abdominal pain and low grade fever 99 F. Denies chest pain, SOB or weakness. She was advised to be seen at ED for further evaluation of symptom.  Pt verbalized agreement with plan.     Reason for Disposition    MODERATE vomiting (e.g., 3 - 5 times/day) and age > 60    Additional Information    Negative: Shock suspected (e.g., cold/pale/clammy skin, too weak to stand, low BP, rapid pulse)    Negative: Difficult to awaken or acting confused (e.g., disoriented, slurred speech)    Negative: Sounds like a life-threatening emergency to the triager    Negative: Vomiting occurs only while coughing    Negative: Pregnant < 20 Weeks and nausea/vomiting began in early pregnancy (i.e., 4-8 weeks pregnant)    Negative: Chest pain    Negative: Headache is main symptom    Negative: SEVERE vomiting (e.g., 6 or more times/day) (Exception: patient sounds well, is drinking liquids, does not sound dehydrated, and vomiting has lasted less than 24 hours)    Protocols used: VOMITING-A-OH

## 2021-11-04 NOTE — ED PROVIDER NOTES
History   Chief Complaint:  Abdominal Pain and Nausea & Vomiting       HPI   Maggie Navarrete is a 71 year old female, not anticoagulated, with history of GERD, uterine leiomyosarcoma, DVT, dyslipidemia, and hypothyroidism who presents with two days of nausea, dark brown emesis, and abdominal pain. The pain is unchanged with movement. Her last BM was yesterday and normal. She has not taken her regular medications in two days due to the vomiting. She had similar symptoms a couple of months ago, however that only lasted one day. She denies any constipation, diarrhea, black or bloody stool, fever, chest pain, urinary issues, flank pain, pelvic pain, or hematemesis. She has a history of multiple abdominal surgeries. She has been off of Fosamax for years but recently restarted. She denies any other symptoms.    Review of Systems   Constitutional: Negative for fever.   Cardiovascular: Negative for chest pain.   Gastrointestinal: Positive for abdominal pain, nausea and vomiting. Negative for blood in stool, constipation and diarrhea.   Genitourinary: Negative for decreased urine volume, dyspareunia, dysuria, enuresis, flank pain, frequency, hematuria, pelvic pain and urgency.   All other systems reviewed and are negative.      Allergies:  Erythromycin  Tramadol    Medications:  Fosamax  Levothyroxine  Effexor  Ambien     Past Medical History:     Anxiety  Arthritis  GERD  Kidney stone  Leiomyosarcoma  PONV  Posterior vitreous detachment, bilateral  Sepsis   Osteopenia  Anxiety  Vaginal atrophy  Cardiomyopathy secondary to drug  Hypothyroidism  Dyslipidemia   DVT  Peripheral neuropathy       Past Surgical History:    Right breast surgery  Tonsillectomy   Bladder surgery   C section   hysterectomy   Myomectomy   Right thyroid lobectomy   Repair ptosis, bilateral  Tumor removal, pelvis   Remove vascular port access      Family History:    Mother - glaucoma, anxiety, heart failure, diabetes, CAD, hypertension, breast cancer,  "depression, obesity  Father - glaucoma, diabetes, CAD, depression, osteoporosis   Sister - thyroid, lipids, hypertension, depression, cerebrovascular disease, anxiety     Social History:  Patient presents alone.  Her PCP is Dr. ZION Lin.    Physical Exam     Patient Vitals for the past 24 hrs:   BP Temp Temp src Pulse Resp SpO2 Height Weight   11/04/21 2337 (!) 140/86 98.6  F (37  C) Oral 91 18 99 % -- --   11/04/21 2300 125/78 -- -- 95 -- -- -- --   11/04/21 2030 (!) 135/106 -- -- 93 -- 96 % -- --   11/04/21 1930 (!) 134/95 -- -- -- -- -- -- --   11/04/21 1253 (!) 148/83 99  F (37.2  C) Temporal 107 20 96 % 1.499 m (4' 11\") 63.5 kg (140 lb)       Physical Exam  General: Nontoxic. Resting comfortably  Head:  Scalp, face, and head appear normal  Eyes:  Pupils are equal, round    Conjunctivae non-injected and sclerae white  ENT:    The external nose is normal    Pinnae are normal  Neck:  Normal range of motion    There is no rigidity noted    Trachea is in the midline  CV:  Regular rate and rhythm     Normal S1/S2, no S3/S4    No murmur or rub. Radial pulses 2+ bilaterally.  Resp:  Lungs are clear and equal bilaterally  There is no tachypnea    No increased work of breathing    No rales, wheezing, or rhonchi  GI:  Abdomen is soft, no rigidity or guarding    No distension, or mass    Mild periumbilical and LLQ tenderness. No rebound tenderness   MS:  Normal muscular tone    Symmetric motor strength    No lower extremity edema  Skin:  No rash or acute skin lesions noted  Neuro: Awake and alert  Speech is normal and fluent  Moves all extremities spontaneously  Psych:  Normal affect. Appropriate interactions.      Emergency Department Course     Imaging:  CT Abdomen Pelvis w Contrast   Final Result   IMPRESSION:    1.  Multiple dilated loops of fluid-filled small bowel could represent an incomplete small bowel obstruction. There is also fluid within the ascending and transverse colon and an enterocolitis also possible. " This could be further assessed with a small    bowel follow-through exam.   2.  Mild stranding adjacent to the bladder could be secondary to cystitis. Clinical and laboratory correlation recommended.   3.  Sigmoid diverticulosis.   4.  Cortical atrophy right kidney, and prominent cortical and parapelvic cysts left kidney, unchanged.        Report per radiology    Laboratory:  Labs Ordered and Resulted from Time of ED Arrival to Time of ED Departure   COMPREHENSIVE METABOLIC PANEL - Abnormal       Result Value    Sodium 139      Potassium 4.2      Chloride 109      Carbon Dioxide (CO2) 27      Anion Gap 3      Urea Nitrogen 21      Creatinine 0.86      Calcium 8.9      Glucose 113 (*)     Alkaline Phosphatase 85      AST 14      ALT 19      Protein Total 7.2      Albumin 3.6      Bilirubin Total 0.7      GFR Estimate 68     LIPASE - Abnormal    Lipase 63 (*)    ROUTINE UA WITH MICROSCOPIC REFLEX TO CULTURE - Abnormal    Color Urine Yellow      Appearance Urine Clear      Glucose Urine Negative      Bilirubin Urine Negative      Ketones Urine 80  (*)     Specific Gravity Urine 1.020      Blood Urine Trace (*)     pH Urine 5.5      Protein Albumin Urine 10  (*)     Urobilinogen Urine Normal      Nitrite Urine Negative      Leukocyte Esterase Urine Negative      Mucus Urine Present (*)     RBC Urine 1      WBC Urine 6 (*)    COVID-19 VIRUS (CORONAVIRUS) BY PCR - Normal    SARS CoV2 PCR Negative     CBC WITH PLATELETS AND DIFFERENTIAL    WBC Count 7.8      RBC Count 5.05      Hemoglobin 14.9      Hematocrit 44.7      MCV 89      MCH 29.5      MCHC 33.3      RDW 14.6      Platelet Count 262      % Neutrophils 54      % Lymphocytes 33      % Monocytes 12      % Eosinophils 1      % Basophils 0      % Immature Granulocytes 0      NRBCs per 100 WBC 0      Absolute Neutrophils 4.2      Absolute Lymphocytes 2.6      Absolute Monocytes 0.9      Absolute Eosinophils 0.0      Absolute Basophils 0.0      Absolute Immature  Granulocytes 0.0      Absolute NRBCs 0.0          Emergency Department Course:  Reviewed:  I reviewed nursing notes, vitals, past medical history and Care Everywhere    Assessments/Consults:  ED Course as of Nov 04 2342   Thu Nov 04, 2021   1753 I obtained history and examined the patient as noted above.      1837 I rechecked the patient.      1941 I rechecked the patient and updated her on findings.      1954 I spoke with Dr. Dorantes, admitting hospitalist.        Interventions:  1307 NS 1L IV Bolus  1903 Zofran 4 mg IV  1937 Maalox 20 mL PO    Disposition:  The patient was admitted to the hospital under the care of Dr. Dorantes.     Impression & Plan     CMS Diagnoses: None    Medical Decision Making:  Maggie Navarrete is a 71 year old female who presents for evaluation of abdominal pain and vomiting. On my evaluation she is well appearing, hemodynamically stable and afebrile. Abdominal exam is benign without evidence of peritonitis or acute surgical emergency. Broad ddx considered. CT reveals multiple dilated loops of bowel and fluid-filled colon consistent with possible partial small bowel obstruction versus enterocolitis. No fever, diarrhea or leukocytosis to indicate infection. Patient treated with IVF and antiemetics. Stomach is not distended on CT. No indication for NGT at this time. No indication for emergent surgical consultation. Will admit to medicine for further cares and bowel rest.  There are no signs of surgical emergencies or intraabdominal catastrophes such as volvulus, gut ischemia, perforation, etc.  Case discussed with the hospitalist and patient admitted in stable condition.     Diagnosis:    ICD-10-CM    1. Partial small bowel obstruction (H)  K56.600    2. Intractable vomiting with nausea, unspecified vomiting type  R11.2        Scribe Disclosure:  CLARISSE, Alysia Helms, am serving as a scribe at 5:34 PM on 11/4/2021 to document services personally performed by Edwin Montejo MD based on my  observations and the provider's statements to me.              Edwin Montejo MD  11/04/21 1797

## 2021-11-04 NOTE — ED TRIAGE NOTES
Abdominal pain since Tuesday, today had some brown emesis. Pt feels dehydrated, has abdominal cramping also.

## 2021-11-05 LAB
ANION GAP SERPL CALCULATED.3IONS-SCNC: 2 MMOL/L (ref 3–14)
BUN SERPL-MCNC: 17 MG/DL (ref 7–30)
CALCIUM SERPL-MCNC: 7.6 MG/DL (ref 8.5–10.1)
CHLORIDE BLD-SCNC: 114 MMOL/L (ref 94–109)
CO2 SERPL-SCNC: 25 MMOL/L (ref 20–32)
CREAT SERPL-MCNC: 0.74 MG/DL (ref 0.52–1.04)
ERYTHROCYTE [DISTWIDTH] IN BLOOD BY AUTOMATED COUNT: 14.4 % (ref 10–15)
GFR SERPL CREATININE-BSD FRML MDRD: 82 ML/MIN/1.73M2
GLUCOSE BLD-MCNC: 126 MG/DL (ref 70–99)
HCT VFR BLD AUTO: 37.9 % (ref 35–47)
HGB BLD-MCNC: 12.1 G/DL (ref 11.7–15.7)
MCH RBC QN AUTO: 28.9 PG (ref 26.5–33)
MCHC RBC AUTO-ENTMCNC: 31.9 G/DL (ref 31.5–36.5)
MCV RBC AUTO: 91 FL (ref 78–100)
PLATELET # BLD AUTO: 209 10E3/UL (ref 150–450)
POTASSIUM BLD-SCNC: 3.8 MMOL/L (ref 3.4–5.3)
RBC # BLD AUTO: 4.19 10E6/UL (ref 3.8–5.2)
SODIUM SERPL-SCNC: 141 MMOL/L (ref 133–144)
WBC # BLD AUTO: 5.4 10E3/UL (ref 4–11)

## 2021-11-05 PROCEDURE — 258N000003 HC RX IP 258 OP 636: Performed by: HOSPITALIST

## 2021-11-05 PROCEDURE — 36415 COLL VENOUS BLD VENIPUNCTURE: CPT | Performed by: HOSPITALIST

## 2021-11-05 PROCEDURE — 250N000013 HC RX MED GY IP 250 OP 250 PS 637: Performed by: INTERNAL MEDICINE

## 2021-11-05 PROCEDURE — 250N000013 HC RX MED GY IP 250 OP 250 PS 637: Performed by: HOSPITALIST

## 2021-11-05 PROCEDURE — 99232 SBSQ HOSP IP/OBS MODERATE 35: CPT | Performed by: INTERNAL MEDICINE

## 2021-11-05 PROCEDURE — 99222 1ST HOSP IP/OBS MODERATE 55: CPT | Performed by: SURGERY

## 2021-11-05 PROCEDURE — 85027 COMPLETE CBC AUTOMATED: CPT | Performed by: HOSPITALIST

## 2021-11-05 PROCEDURE — 120N000001 HC R&B MED SURG/OB

## 2021-11-05 PROCEDURE — 80048 BASIC METABOLIC PNL TOTAL CA: CPT | Performed by: HOSPITALIST

## 2021-11-05 RX ORDER — VENLAFAXINE HYDROCHLORIDE 75 MG/1
75 CAPSULE, EXTENDED RELEASE ORAL DAILY
Status: DISCONTINUED | OUTPATIENT
Start: 2021-11-05 | End: 2021-11-06 | Stop reason: HOSPADM

## 2021-11-05 RX ORDER — ZOLPIDEM TARTRATE 5 MG/1
5 TABLET ORAL
Status: DISCONTINUED | OUTPATIENT
Start: 2021-11-05 | End: 2021-11-06

## 2021-11-05 RX ORDER — LEVOTHYROXINE SODIUM 75 UG/1
75 TABLET ORAL
Status: DISCONTINUED | OUTPATIENT
Start: 2021-11-05 | End: 2021-11-06 | Stop reason: HOSPADM

## 2021-11-05 RX ORDER — CALCIUM CARBONATE 500 MG/1
500 TABLET, CHEWABLE ORAL DAILY PRN
Status: DISCONTINUED | OUTPATIENT
Start: 2021-11-05 | End: 2021-11-06 | Stop reason: HOSPADM

## 2021-11-05 RX ORDER — CARBOXYMETHYLCELLULOSE SODIUM 5 MG/ML
1 SOLUTION/ DROPS OPHTHALMIC 4 TIMES DAILY PRN
Status: DISCONTINUED | OUTPATIENT
Start: 2021-11-05 | End: 2021-11-06 | Stop reason: HOSPADM

## 2021-11-05 RX ORDER — FAMOTIDINE 10 MG
10 TABLET ORAL 2 TIMES DAILY
Status: DISCONTINUED | OUTPATIENT
Start: 2021-11-05 | End: 2021-11-06 | Stop reason: HOSPADM

## 2021-11-05 RX ADMIN — POTASSIUM CHLORIDE, DEXTROSE MONOHYDRATE AND SODIUM CHLORIDE: 150; 5; 450 INJECTION, SOLUTION INTRAVENOUS at 10:28

## 2021-11-05 RX ADMIN — LEVOTHYROXINE SODIUM 75 MCG: 75 TABLET ORAL at 12:22

## 2021-11-05 RX ADMIN — ZOLPIDEM TARTRATE 5 MG: 5 TABLET ORAL at 00:26

## 2021-11-05 RX ADMIN — VENLAFAXINE HYDROCHLORIDE 75 MG: 75 CAPSULE, EXTENDED RELEASE ORAL at 13:51

## 2021-11-05 RX ADMIN — FAMOTIDINE 10 MG: 10 TABLET ORAL at 20:21

## 2021-11-05 RX ADMIN — ZOLPIDEM TARTRATE 5 MG: 5 TABLET ORAL at 22:21

## 2021-11-05 RX ADMIN — POTASSIUM CHLORIDE, DEXTROSE MONOHYDRATE AND SODIUM CHLORIDE: 150; 5; 450 INJECTION, SOLUTION INTRAVENOUS at 00:26

## 2021-11-05 RX ADMIN — POTASSIUM CHLORIDE, DEXTROSE MONOHYDRATE AND SODIUM CHLORIDE: 150; 5; 450 INJECTION, SOLUTION INTRAVENOUS at 22:21

## 2021-11-05 ASSESSMENT — ACTIVITIES OF DAILY LIVING (ADL)
ADLS_ACUITY_SCORE: 4

## 2021-11-05 NOTE — PROVIDER NOTIFICATION
"MD Notification    Notified Person: MD    Notified Person Name: Dr. Hooks    Notification Date/Time: 11/05/21 12:04 PM    Notification Interaction: online paging    Purpose of Notification: \"Pt on clear liquids, asking about home meds, possible to order?\"    Orders Received: Home meds ordered    Comments:  "

## 2021-11-05 NOTE — ED NOTES
"[facility]  ED Nurse Handoff Report    ED Chief complaint: Abdominal Pain and Nausea & Vomiting      ED Diagnosis:   Final diagnoses:   Partial small bowel obstruction (H)   Intractable vomiting with nausea, unspecified vomiting type       Code Status: Full Code    Allergies:   Allergies   Allergen Reactions     Erythromycin GI Disturbance     Tramadol Nausea       Patient Story: pt presents from home for nausea and frequent vomiting over past 2 days  Focused Assessment:  VSS, pt independent with care and ambulation, A & O X 4    Treatments and/or interventions provided: maalox and zofran  Patient's response to treatments and/or interventions: reports feeling better    To be done/followed up on inpatient unit:      Does this patient have any cognitive concerns?:   Activity level - Baseline/Home:  Independent  Activity Level - Current:   Independent    Patient's Preferred language: English   Needed?: No    Isolation: None  Infection: Not Applicable  Patient tested for COVID 19 prior to admission: YES  Bariatric?: No    Vital Signs:   Vitals:    11/04/21 1253 11/04/21 1930   BP: (!) 148/83 (!) 134/95   Pulse: 107    Resp: 20    Temp: 99  F (37.2  C)    TempSrc: Temporal    SpO2: 96%    Weight: 63.5 kg (140 lb)    Height: 1.499 m (4' 11\")        Cardiac Rhythm:     Was the PSS-3 completed:   Yes  What interventions are required if any?               Family Comments: sister present at bedside  OBS brochure/video discussed/provided to patient/family: Yes              Name of person given brochure if not patient:               Relationship to patient:     For the majority of the shift this patient's behavior was Green.   Behavioral interventions performed were .    ED NURSE PHONE NUMBER: [id number]       "

## 2021-11-05 NOTE — PLAN OF CARE
A&Ox4. VSS on RA. IV infusing D5 .45 NS w/ KCL. Tolerating clear liquid diet. Independent. Ambulated in halls x1. Shower completed. Bowel sounds hypoactive. CMS intact. Possible discharge tomorrow pending progress.     no

## 2021-11-05 NOTE — PHARMACY-ADMISSION MEDICATION HISTORY
Pharmacy Medication History  Admission medication history interview status for the 11/4/2021  admission is complete. See EPIC admission navigator for prior to admission medications     Location of Interview: Patient room  Medication history sources: Patient and Surescripts    Significant changes made to the medication list:  None    In the past week, patient estimated taking medication this percent of the time: 50-90% due to illness    Additional medication history information:   None    Medication reconciliation completed by provider prior to medication history? No    Time spent in this activity: 5 min    Prior to Admission medications    Medication Sig Last Dose Taking? Auth Provider   alendronate (FOSAMAX) 70 MG tablet Take 1 tablet (70 mg) by mouth every 7 days 10/31/2021 at Unknown time Yes Kristine Flynn MD PhD   carboxymethylcellulose PF (REFRESH PLUS) 0.5 % ophthalmic solution Place 1 drop into both eyes 4 times daily as needed for dry eyes  at PRN Yes Unknown, Entered By History   levothyroxine (SYNTHROID/LEVOTHROID) 75 MCG tablet Take 1 tablet (75 mcg) by mouth daily 11/2/2021 at Unknown time Yes Rhea Lin MD   venlafaxine (EFFEXOR-XR) 75 MG 24 hr capsule Take 1 capsule (75 mg) by mouth daily 11/2/2021 at Unknown time Yes Rhea Lin MD   zolpidem (AMBIEN) 5 MG tablet Take 1 tablet (5 mg) by mouth nightly as needed for sleep (Only uses if traveling or cannot sleep,)  at PRN Yes Rhea Lin MD       The information provided in this note is only as accurate as the sources available at the time of update(s)

## 2021-11-05 NOTE — PROGRESS NOTES
"[facility]  Hospitalist Progress Note  Shelton Hooks MD  11/05/2021    Assessment & Plan   Maggie Navarrete is a 71 year old female with a history of leiomyosarcoma, GERD, anxiety, and hypothyroidism who presented to the ER with abdominal pain, nausea, and vomiting.  Evaluation in the ER was concerning for partial small bowel obstruction.  Hospitalist service was contacted to admit her for further evaluation management.     Likely partial small bowel obstruction  Several day history of abdominal pain, nausea, vomiting.  CT scan suggestive of partial small bowel obstruction.  Has a history of multiple prior intra-abdominal surgeries.  No prior history of bowel obstruction.    Admit to inpatient.    Clear liquid diet, advance as per surgery    IV fluids.    As needed pain and nausea medications.    antacid     Hypothyroidism    resume levothyroxine       Anxiety    Resume venlafaxine       COVID-19 PCR negative    Routine admission COVID-19 PCR testing was negative on 11/4/2021.    She received the Pfizer Covid vaccine on 1/28/2021, 2/18/2021, and 8/27/2021.        Diet:   clear liquid diet  DVT Prophylaxis: Pneumatic Compression Devices  Kwan Catheter: Not present  Central Lines: None  Code Status:   FULL CODE        Clinically Significant Risk Factors Present on Admission        Disposition Plan  -- anticipate home in 1-2 days      Interval History   -- chart reviewed  -- surgery consult appreciated  -- on clear liquid diet and ambulating  -- some heart burn symptoms    -Data reviewed today: I reviewed all new labs and imaging over the last 24 hours. I personally reviewed no images or EKG's today.    Physical Exam    , Blood pressure (!) 147/85, pulse 81, temperature 97.8  F (36.6  C), temperature source Oral, resp. rate 20, height 1.499 m (4' 11\"), weight 63.5 kg (140 lb), SpO2 99 %, not currently breastfeeding.  Vitals:    11/04/21 1253   Weight: 63.5 kg (140 lb)     Vital Signs with " Ranges  Temp:  [97.8  F (36.6  C)-98.6  F (37  C)] 97.8  F (36.6  C)  Pulse:  [81-95] 81  Resp:  [18-20] 20  BP: (125-147)/() 147/85  SpO2:  [96 %-100 %] 99 %  I/O's Last 24 hours  No intake/output data recorded.    Constitutional: Awake, alert, cooperative, no apparent distress  Respiratory: Clear to auscultation bilaterally, no crackles or wheezing  Cardiovascular: Regular rate and rhythm, normal S1 and S2   GI: hypoactive bowel sounds, soft, non-distended, non-tender  Skin/Integumen: No rashes, no cyanosis, no edema  Other:      Medications   All medications were reviewed.    dextrose 5% and 0.45% NaCl + KCl 20 mEq/L 100 mL/hr at 11/05/21 1028       famotidine  20 mg Intravenous Q12H     famotidine  10 mg Oral BID     levothyroxine  75 mcg Oral QAM AC     sodium chloride (PF)  3 mL Intracatheter Q8H     venlafaxine  75 mg Oral Daily        Data   Recent Labs   Lab 11/05/21  0539 11/04/21  1307   WBC 5.4 7.8   HGB 12.1 14.9   MCV 91 89    262    139   POTASSIUM 3.8 4.2   CHLORIDE 114* 109   CO2 25 27   BUN 17 21   CR 0.74 0.86   ANIONGAP 2* 3   SUSAN 7.6* 8.9   * 113*   ALBUMIN  --  3.6   PROTTOTAL  --  7.2   BILITOTAL  --  0.7   ALKPHOS  --  85   ALT  --  19   AST  --  14   LIPASE  --  63*       Recent Results (from the past 24 hour(s))   CT Abdomen Pelvis w Contrast    Narrative    EXAM: CT ABDOMEN PELVIS W CONTRAST  LOCATION: [facility]  DATE/TIME: 11/4/2021 6:34 PM    INDICATION: Nausea/vomiting Abdominal pain, acute, nonlocalized  COMPARISON: CT chest, abdomen and pelvis 07/20/2021  TECHNIQUE: CT scan of the abdomen and pelvis was performed following injection of IV contrast. Multiplanar reformats were obtained. Dose reduction techniques were used.  CONTRAST: 71 mL [id number]    FINDINGS:   LOWER CHEST: A few tiny pleural-based pulmonary nodules left lower lobe are unchanged.    HEPATOBILIARY: Normal.    PANCREAS: Normal.    SPLEEN: Calcified  granuloma.    ADRENAL GLANDS: Normal.    KIDNEYS/BLADDER: Cortical atrophy and scarring right kidney. No hydronephrosis. Cortical and parapelvic cysts left kidney. No stones., Subtle stranding adjacent to a mildly distended bladder.    BOWEL: Multiple dilated loops of fluid-filled small bowel. There is also fluid within the cecum, ascending, and transverse colon. Diverticula sigmoid colon, without evidence for diverticulitis.    LYMPH NODES: Normal.    VASCULATURE: Unremarkable.    PELVIC ORGANS: Hysterectomy.    MUSCULOSKELETAL: Degenerative disc disease L4 level.      Impression    IMPRESSION:   1.  Multiple dilated loops of fluid-filled small bowel could represent an incomplete small bowel obstruction. There is also fluid within the ascending and transverse colon and an enterocolitis also possible. This could be further assessed with a small   bowel follow-through exam.  2.  Mild stranding adjacent to the bladder could be secondary to cystitis. Clinical and laboratory correlation recommended.  3.  Sigmoid diverticulosis.  4.  Cortical atrophy right kidney, and prominent cortical and parapelvic cysts left kidney, unchanged.       Shelton Hooks MD  Text Page  (7am to 6pm)

## 2021-11-05 NOTE — PLAN OF CARE
[id number]: Patient alert and oriented, VSS on room air. Up independently. Tolerating clear liquids, noted some discomfort in abdomen, declined pain medication. PIV infusing. Voiding adequately. Discharge pending diet advancement.

## 2021-11-05 NOTE — PROGRESS NOTES
Maggie arrived from the ED around 2330. Requested water and a popsicle upon arrival to floor, but not given as pt is NPO for general surgery consult today. Up independent. Bowel sounds hypoactive x4. Last BM 11/4. Intermittent abdominal discomfort and fullness. No emesis this shift. D5 1/2 NS with 20K infusing at 100 mL/hr. PRN Ambien given per pt request. Slept well overnight. Comes from home.     Plan: TBD

## 2021-11-05 NOTE — H&P
[facility]    History and Physical - Hospitalist Service       Date of Admission:  11/4/2021    Assessment & Plan      Maggie Navarrete is a 71 year old female with a history of leiomyosarcoma, GERD, anxiety, and hypothyroidism who presented to the ER with abdominal pain, nausea, and vomiting.  Evaluation in the ER was concerning for partial small bowel obstruction.  Hospitalist service was contacted to admit her for further evaluation management.    Likely partial small bowel obstruction  Several day history of abdominal pain, nausea, vomiting.  CT scan suggestive of partial small bowel obstruction.  Has a history of multiple prior intra-abdominal surgeries.  No prior history of bowel obstruction.    Admit to inpatient.    NPO.    IV fluids.    As needed pain and nausea medications.    Consult general surgery, appreciate their assistance.    Symptoms currently seem to be fairly well controlled, if she develops significant recurrent pain or nausea/vomiting, would consider NG tube placement, however will hold off for now.    Hypothyroidism    Hold PTA levothyroxine for now, restart when able to tolerate oral intake.    Anxiety    Hold PTA venlafaxine for now, restart when able to tolerate oral intake.    COVID-19 PCR negative    Routine admission COVID-19 PCR testing was negative on 11/4/2021.    She received the Pfizer Covid vaccine on 1/28/2021, 2/18/2021, and 8/27/2021.     Diet:   NPO  DVT Prophylaxis: Pneumatic Compression Devices  Kwan Catheter: Not present  Central Lines: None  Code Status:   FULL CODE    Clinically Significant Risk Factors Present on Admission                   Disposition Plan   Expected discharge: admit to inpatient. I expect that she will be in the hospital at least two midnights.     The patient's care was discussed with the Patient and ER Team.    Zaid Dorantes MD  [facility]  Securely message with the Vocera Web Console (learn  more here)  Text page via [facility] Paging/Directory        ______________________________________________________________________    Chief Complaint   Abdominal pain, nausea/vomiting    History is obtained from the patient    History of Present Illness   Maggie Navarrete is a 71 year old female with a history of leiomyosarcoma, GERD, anxiety, and hypothyroidism who presented to the ER with abdominal pain, nausea, and vomiting..  About 2 days ago she developed abdominal pain and nausea/vomiting.  Her last bowel movement was yesterday morning and was normal.  She has not really been able to eat or drink much due to the nausea.  She has not taken her medications last 2 days due to the nausea and vomiting.  She denies any fevers, chills, sweats, chest pain, or shortness of breath.  No urinary symptoms or neurologic changes.  Due to the ongoing symptoms, she came into the ER for evaluation.    In the ER she was evaluated by Dr. Montejo.  Blood pressure was mildly elevated, other vitals were okay.  Labs were fairly unremarkable.  CT of the abdomen/pelvis showed multiple dilated loops of fluid-filled small bowel concerning for possible incomplete small bowel obstruction.  There is also some fluid in the ascending and transverse colon and enterocolitis was felt to be possible based on CT findings.  She was given IV fluids and nausea medications.  The hospitalist service was contacted to admit her for further evaluation and management.    She has a history of leiomyosarcoma.  She has had a hysterectomy and multiple other intra-abdominal surgeries.  Denies any prior history of bowel obstruction.  She denies any recent sick contacts or known exposures to COVID-19.    Review of Systems    The 10 point Review of Systems is negative other than noted in the HPI or here.    Past Medical History    I have reviewed this patient's medical history and updated it with pertinent information if needed.   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     GERD (gastroesophageal reflux disease)      Hx of previous reproductive problem 1980     Kidney stone 2011     Leiomyosarcoma (H) 11/14/2016     Personal history of colonic polyps 2016    Small, benign     PONV (postoperative nausea and vomiting)      Posterior vitreous detachment of left eye 2011     Posterior vitreous detachment of right eye 2011     Ptosis of eyelid, bilateral      Sepsis (H) 07/2020     Stomach problems 2015    Abdominal pain, Diarreha     Thyroid disease     thyroid nodule resolved 2014     Past Surgical History   I have reviewed this patient's surgical history and updated it with pertinent information if needed.  Past Surgical History:   Procedure Laterality Date     ABDOMEN SURGERY  Now    Pain, diarrhea     BREAST SURGERY  2002    right breast benign     COLONOSCOPY  2008    due in 2018     COLONOSCOPY N/A 09/08/2016    Procedure: COMBINED COLONOSCOPY, SINGLE OR MULTIPLE BIOPSY/POLYPECTOMY BY BIOPSY;  Surgeon: Abner Pepper MD;  Location:  GI     COLONOSCOPY N/A 10/18/2021    Procedure: COLONOSCOPY;  Surgeon: Jamila Villarreal MD;  [place] [facility] [place]     ENT SURGERY  1975    tonsillectomy     GENITOURINARY SURGERY  1953    bladder surgery      GI SURGERY  2015    Severe constipation     GYN SURGERY  1977    C section     GYN SURGERY  1981    laparoscopy     GYN SURGERY  2007    myomectomy     hysterecomy  11/14/2016    leiomyosarcoma     OTHER SURGICAL HISTORY Right 10/27/2017    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     REMOVE PORT VASCULAR ACCESS Right 05/09/2017    Procedure: REMOVE PORT VASCULAR ACCESS;  Right Port Removal;  Surgeon: Eric Gibson PA-C;  Location: [facility] OR     REPAIR PTOSIS BILATERAL  02/15/2012     Procedure:REPAIR PTOSIS BILATERAL; BILATERAL UPPER LID PTOSIS REPAIR; Surgeon:BRYCE REYNOLDS; Location:[facility]     TUMOR REMOVAL  06/2020     Social History   I have reviewed this patient's social history and updated it with pertinent information if needed.  Social History     Tobacco Use     Smoking status: Never Smoker     Smokeless tobacco: Never Used   Substance Use Topics     Alcohol use: Yes     Comment: Social, not often per patient.     Drug use: No     Family History   I have reviewed this patient's family history and updated it with pertinent information if needed.  Family History   Problem Relation Age of Onset     Family History Negative Mother         glaucoma     Glaucoma Mother      Anxiety Disorder Mother      Heart Failure Mother      Diabetes Mother      Coronary Artery Disease Mother      Hypertension Mother      Breast Cancer Mother      Depression Mother      Obesity Mother      Heart Disease Father      Glaucoma Father      Diabetes Father      Coronary Artery Disease Father      Depression Father      Osteoporosis Father      Heart Disease Maternal Grandmother      Diabetes Maternal Grandmother      Heart Disease Paternal Grandmother      Cancer Paternal Grandfather         stomach     Thyroid Disease Sister      Retinal detachment Sister      Hypertension Sister      Hypertension Sister      Lipids Sister      Cerebrovascular Disease Sister      Depression Sister      Heart Disease Paternal Aunt      Heart Disease Paternal Uncle      Anxiety Disorder Sister      Depression Sister      Thyroid Disease Sister      Thyroid Disease Sister      Osteoporosis Other      Prior to Admission Medications   Prior to Admission Medications   Prescriptions Last Dose Informant Patient Reported? Taking?   alendronate (FOSAMAX) 70 MG tablet   No No   Sig: Take 1 tablet (70 mg) by mouth every 7 days   levothyroxine (SYNTHROID/LEVOTHROID) 75 MCG tablet   No No   Sig: Take 1 tablet (75 mcg) by mouth daily    venlafaxine (EFFEXOR-XR) 75 MG 24 hr capsule   No No   Sig: Take 1 capsule (75 mg) by mouth daily   zolpidem (AMBIEN) 5 MG tablet   No No   Sig: Take 1 tablet (5 mg) by mouth nightly as needed for sleep (Only uses if traveling or cannot sleep,)      Facility-Administered Medications: None     Allergies   Allergies   Allergen Reactions     Erythromycin GI Disturbance     Tramadol Nausea     Physical Exam   Vital Signs: Temp: 99  F (37.2  C) Temp src: Temporal BP: (!) 135/106 Pulse: 93   Resp: 20 SpO2: 96 % O2 Device: None (Room air)    Weight: 140 lbs 0 oz    Constitutional: awake, alert, cooperative, no apparent distress, no acute distress  Eyes: sclera clear, conjunctiva normal  ENT: oral pharynx with moist mucous membranes  Respiratory: clear to auscultation bilaterally, no crackles or wheezing  Cardiovascular: regular rate and rhythm, normal S1 and S2, no murmur noted  GI: bowel sounds decreased, soft, mild distention and generalized tenderness  Skin: warm, dry  Musculoskeletal: no lower extremity pitting edema present  Neurologic: awake, alert, oriented to name, place and time, motor is 5 out of 5 bilaterally, sensory is intact    Data   Data reviewed today: I reviewed all medications, new labs and imaging results over the last 24 hours. I personally reviewed no images or EKG's today.    Recent Labs   Lab 11/04/21  1307   WBC 7.8   HGB 14.9   MCV 89         POTASSIUM 4.2   CHLORIDE 109   CO2 27   BUN 21   CR 0.86   ANIONGAP 3   SUSAN 8.9   *   ALBUMIN 3.6   PROTTOTAL 7.2   BILITOTAL 0.7   ALKPHOS 85   ALT 19   AST 14   LIPASE 63*     Recent Results (from the past 24 hour(s))   CT Abdomen Pelvis w Contrast    Narrative    EXAM: CT ABDOMEN PELVIS W CONTRAST  LOCATION: [facility]  DATE/TIME: 11/4/2021 6:34 PM    INDICATION: Nausea/vomiting Abdominal pain, acute, nonlocalized  COMPARISON: CT chest, abdomen and pelvis 07/20/2021  TECHNIQUE: CT scan of the abdomen and  pelvis was performed following injection of IV contrast. Multiplanar reformats were obtained. Dose reduction techniques were used.  CONTRAST: 71 mL Isovue-370    FINDINGS:   LOWER CHEST: A few tiny pleural-based pulmonary nodules left lower lobe are unchanged.    HEPATOBILIARY: Normal.    PANCREAS: Normal.    SPLEEN: Calcified granuloma.    ADRENAL GLANDS: Normal.    KIDNEYS/BLADDER: Cortical atrophy and scarring right kidney. No hydronephrosis. Cortical and parapelvic cysts left kidney. No stones., Subtle stranding adjacent to a mildly distended bladder.    BOWEL: Multiple dilated loops of fluid-filled small bowel. There is also fluid within the cecum, ascending, and transverse colon. Diverticula sigmoid colon, without evidence for diverticulitis.    LYMPH NODES: Normal.    VASCULATURE: Unremarkable.    PELVIC ORGANS: Hysterectomy.    MUSCULOSKELETAL: Degenerative disc disease L4 level.      Impression    IMPRESSION:   1.  Multiple dilated loops of fluid-filled small bowel could represent an incomplete small bowel obstruction. There is also fluid within the ascending and transverse colon and an enterocolitis also possible. This could be further assessed with a small   bowel follow-through exam.  2.  Mild stranding adjacent to the bladder could be secondary to cystitis. Clinical and laboratory correlation recommended.  3.  Sigmoid diverticulosis.  4.  Cortical atrophy right kidney, and prominent cortical and parapelvic cysts left kidney, unchanged.

## 2021-11-05 NOTE — PROGRESS NOTES
RECEIVING UNIT ED HANDOFF REVIEW    ED Nurse Handoff Report was reviewed by: Marcela Meeks RN on November 4, 2021 at 10:41 PM

## 2021-11-05 NOTE — CONSULTS
General Surgery Consultation    Maggie Navarrete MRN#: [id number]   Age: 71 year old YOB: 1950     Date of Admission:  11/4/2021  Reason for consult: SBO       Requesting physician: Zaid Dorantes       Surgeon:        Shelton Yeh        Chief Complaint:   Abdominal pain  Small bowel obstruction     History is obtained from the patient and chart review.         History of Present Illness:   General Surgery was asked to evaluate this patient at the request of Dr. Dorantes for partial small bowel obstruction.    This patient is a 71 year old  female who presents with few days of abdominal pain and associated N/V.  This started as more GERD a few days ago, then progressed to abdominal pain and constipation.  She has had this occasionally in the past and questions if attributed to her transition to more of a plant based diet.  Last two times she had lentils prior to this starting.  Additionally, she had a colonoscopy within the the past two weeks.  She has had multiple abdominal surgeries so worried about scar tissue as cause.    On exam today, she is feeling much better however.  She started passing flatus last night.  No BM yet.  Her appetite has returned and she is hungry.  Denies nausea.  Still with some abdominal discomfort to right side of abdomen.          Past Medical History:   Maggie Navarrete  has a past medical history of Anxiety, Arthritis, Disorder of bone and cartilage, unspecified, Esophageal reflux (2015), Follicular adenoma of thyroid gland, GERD (gastroesophageal reflux disease), previous reproductive problem (1980), Kidney stone (2011), 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          Past Surgical History:      Past Surgical History:   Procedure Laterality Date     ABDOMEN SURGERY  Now    Pain, diarrhea     BREAST SURGERY  2002    right breast benign     COLONOSCOPY  2008    due in 2018     COLONOSCOPY N/A 09/08/2016    Procedure: COMBINED COLONOSCOPY, SINGLE OR MULTIPLE BIOPSY/POLYPECTOMY BY BIOPSY;  Surgeon: Abner Pepper MD;  Location:  GI     COLONOSCOPY N/A 10/18/2021    Procedure: COLONOSCOPY;  Surgeon: Jamila Villarreal MD;  [place]: [facility] [place]     ENT SURGERY  1975    tonsillectomy     GENITOURINARY SURGERY  1953    bladder surgery      GI SURGERY  2015    Severe constipation     GYN SURGERY  1977    C section     GYN SURGERY  1981    laparoscopy     GYN SURGERY  2007    myomectomy     hysterecomy  11/14/2016    leiomyosarcoma     OTHER SURGICAL HISTORY Right 10/27/2017    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     REMOVE PORT VASCULAR ACCESS Right 05/09/2017    Procedure: REMOVE PORT VASCULAR ACCESS;  Right Port Removal;  Surgeon: Eric Gibson PA-C;  Location:  OR     REPAIR PTOSIS BILATERAL  02/15/2012    Procedure:REPAIR PTOSIS BILATERAL; BILATERAL UPPER LID PTOSIS REPAIR; Surgeon:BRYCE REYNOLDS; Location: [facility]     TUMOR REMOVAL  06/2020             Social History:     Social History     Tobacco Use     Smoking status: Never Smoker     Smokeless tobacco: Never Used   Substance Use Topics     Alcohol use: Yes     Comment: Social, not often per patient.             Family History:   This patient has no significant family history          Allergies:     Allergies   Allergen Reactions     Erythromycin GI Disturbance     Tramadol Nausea             Medications:     Prior to Admission medications    Medication Sig Start Date End Date Taking? Authorizing Provider   alendronate (FOSAMAX) 70 MG tablet Take 1 tablet (70 mg) by mouth every 7 days 10/11/21  Yes Kristine Flynn MD PhD   carboxymethylcellulose PF (REFRESH PLUS) 0.5 % ophthalmic solution  "Place 1 drop into both eyes 4 times daily as needed for dry eyes   Yes Unknown, Entered By History   levothyroxine (SYNTHROID/LEVOTHROID) 75 MCG tablet Take 1 tablet (75 mcg) by mouth daily 9/8/21  Yes Rhea Lin MD   venlafaxine (EFFEXOR-XR) 75 MG 24 hr capsule Take 1 capsule (75 mg) by mouth daily 10/4/21  Yes Rhea Lin MD   zolpidem (AMBIEN) 5 MG tablet Take 1 tablet (5 mg) by mouth nightly as needed for sleep (Only uses if traveling or cannot sleep,) 8/4/21  Yes Rhea Lin MD             Review of Systems:   The Review of Systems is negative other than noted in the HPI          Physical Exam:   Blood pressure 137/81, pulse 88, temperature 98.1  F (36.7  C), temperature source Oral, resp. rate 18, height 1.499 m (4' 11\"), weight 63.5 kg (140 lb), SpO2 100 %, not currently breastfeeding.    General - This is a well developed, well nourished female in no apparent distress.  HEENT - Normocephalic. Atraumatic. Moist mucous membranes. Pupils equal.  No scleral icterus.  Neck - Supple without masses.  Lungs - Clear to ascultation bilaterally.  Non labored breathing.  Heart - Regular rate & rhythm without murmur.  Abdomen - Soft, nondistended with hypoactive bowel sounds.   + TTP along right abdomen, but mild and no guarding.   Extremities - Moves all extremities. Warm without edema.  Neurologic - Nonfocal.  Pleasant, bright affect.  Answers questions appropriately          Data:   Labs:  WBC -   WBC   Date Value Ref Range Status   04/28/2021 6.2 4.0 - 11.0 10e9/L Final     WBC Count   Date Value Ref Range Status   11/05/2021 5.4 4.0 - 11.0 10e3/uL Final     Hgb -   Hemoglobin   Date Value Ref Range Status   11/05/2021 12.1 11.7 - 15.7 g/dL Final   04/28/2021 13.7 11.7 - 15.7 g/dL Final       Liver Function Studies -   Recent Labs   Lab Test 11/04/21  1307   PROTTOTAL 7.2   ALBUMIN 3.6   BILITOTAL 0.7   ALKPHOS 85   AST 14   ALT 19       CT scan of the abdomen:   IMPRESSION:   1.  Multiple " dilated loops of fluid-filled small bowel could represent an incomplete small bowel obstruction. There is also fluid within the ascending and transverse colon and an enterocolitis also possible. This could be further assessed with a small   bowel follow-through exam.  2.  Mild stranding adjacent to the bladder could be secondary to cystitis. Clinical and laboratory correlation recommended.  3.  Sigmoid diverticulosis.  4.  Cortical atrophy right kidney, and prominent cortical and parapelvic cysts left kidney, unchanged.    As read by Radiology            Assessment:   Small Bowel Obstruction         Plan:     Discussed findings, but optimistic that she has had partial return of bowel function already.  Dr. Soria will evaluate patient independently.  Likely try clear liquids today to see how she does.   If nausea returns or abdominal pain worsens, would return to NPO and plan for gastrografin challenge.  Add Pepcid   Encouraged to change position often, ambulate in halls  Possible discharge next 1-2 days when tolerating diet.      Marques Howell PA-C, physician assistant for Shelton Yeh  [facility], [phone]  Pager [phone]

## 2021-11-06 VITALS
WEIGHT: 140 LBS | TEMPERATURE: 97.9 F | HEIGHT: 59 IN | SYSTOLIC BLOOD PRESSURE: 148 MMHG | RESPIRATION RATE: 16 BRPM | OXYGEN SATURATION: 100 % | DIASTOLIC BLOOD PRESSURE: 88 MMHG | BODY MASS INDEX: 28.22 KG/M2 | HEART RATE: 77 BPM

## 2021-11-06 PROCEDURE — 99238 HOSP IP/OBS DSCHRG MGMT 30/<: CPT | Performed by: INTERNAL MEDICINE

## 2021-11-06 PROCEDURE — 258N000003 HC RX IP 258 OP 636: Performed by: HOSPITALIST

## 2021-11-06 PROCEDURE — 250N000013 HC RX MED GY IP 250 OP 250 PS 637: Performed by: INTERNAL MEDICINE

## 2021-11-06 PROCEDURE — 99231 SBSQ HOSP IP/OBS SF/LOW 25: CPT | Performed by: SURGERY

## 2021-11-06 RX ORDER — ONDANSETRON 4 MG/1
4 TABLET, ORALLY DISINTEGRATING ORAL EVERY 6 HOURS PRN
Qty: 30 TABLET | Refills: 0 | Status: SHIPPED | OUTPATIENT
Start: 2021-11-06 | End: 2022-07-05

## 2021-11-06 RX ADMIN — POTASSIUM CHLORIDE, DEXTROSE MONOHYDRATE AND SODIUM CHLORIDE: 150; 5; 450 INJECTION, SOLUTION INTRAVENOUS at 08:17

## 2021-11-06 RX ADMIN — VENLAFAXINE HYDROCHLORIDE 75 MG: 75 CAPSULE, EXTENDED RELEASE ORAL at 08:05

## 2021-11-06 RX ADMIN — FAMOTIDINE 10 MG: 10 TABLET ORAL at 08:05

## 2021-11-06 RX ADMIN — LEVOTHYROXINE SODIUM 75 MCG: 75 TABLET ORAL at 07:06

## 2021-11-06 ASSESSMENT — ACTIVITIES OF DAILY LIVING (ADL)
ADLS_ACUITY_SCORE: 4

## 2021-11-06 NOTE — PROGRESS NOTES
[facility]    Internal Medicine Hospitalist Progress Note  11/06/2021  I evaluated patient on the above date.    Ahsan Pineda Jr., MD  [phone] (p)  Text Page  Vocera        Assessment & Plan New actions/orders today (11/06/2021) are underlined.    Maggie Navarrete is a 71 year old female with a history including hypothyroidism, depression/anxiety, GERd and h/o leiomyosarcoma, s/p surgery, who presented 11/4/2021 with abdominal pain, nausea, and vomiting and was found with evidence of partial small bowel obstruction.      Likely partial small bowel obstruction.  * Has a history of multiple prior intra-abdominal surgeries.  No prior history of bowel obstruction.  * Presented 11/4 with several day history of abdominal pain, nausea, vomiting.  CT 11/4 suggestive of partial small bowel obstruction. Surgery consulted on admit.   * Started on clears 11/5. Had BM 11/5.  * Advanced to solid diet 11/6 and tolerated.  - Continue low fiber diet.     H/o leiomyosarcoma.  * Pt was to have repeat CT for Oncology follow-up.  * Pt had CT on admit 11/4 as above.  - Follow-up with Oncology regarding need for repeat imaging.    Hypothyroidism.  Chronic and stable.  - Continue levothyroxine.    Depression/anxiety.  - Continue venlafaxine.    COVID-19 testing.  She received the Pfizer Covid vaccine doses on 1/28/2021, 2/18/2021, and 8/27/2021.  COVID-19 PCR Results    COVID-19 PCR Results 6/12/20 6/27/20 6/27/20 10/15/21 11/4/21     0044 0044     COVID-19 Virus PCR to [facility] - Result  Test received-See reflex to IDDL test SARS CoV2 (COVID-19) Virus RT-PCR      COVID-19 Virus PCR to [facility] - Source  Nasopharyngeal      COVID-19 Virus by PCR (External Result) Undetected       SARS-CoV-2 Virus Specimen Source   Nasopharyngeal     SARS-CoV-2 PCR Result   NEGATIVE     SARS CoV2 PCR    Negative Negative      Comments are available for some flowsheets but are not being displayed.         COVID-19 Antibody Results,  "Testing for Immunity    COVID-19 Antibody Results, Testing for Immunity   No data to display.             Diet: Low Fiber Diet    Prophylaxis: PCD's, ambulation.   Kwan Catheter: Not present  Central Lines: None  Code Status: Full Code    Disposition Plan   Expected discharge: Today recommended to prior living arrangement if tolerates diet.  Entered: Ahsan Pineda MD 11/06/2021, 1:04 PM         Interval History   Doing well.  Tolerating diet.    -Data reviewed today: I reviewed all new labs and imaging over the last 24 hours. I personally reviewed no images or EKG's today.    Physical Exam    , Blood pressure (!) 148/88, pulse 77, temperature 97.9  F (36.6  C), temperature source Oral, resp. rate 16, height 1.499 m (4' 11\"), weight 63.5 kg (140 lb), SpO2 100 %, not currently breastfeeding.  Vitals:    11/04/21 1253   Weight: 63.5 kg (140 lb)     Vital Signs with Ranges  Temp:  [97.5  F (36.4  C)-97.9  F (36.6  C)] 97.9  F (36.6  C)  Pulse:  [76-83] 77  Resp:  [16-18] 16  BP: (124-148)/(77-88) 148/88  SpO2:  [93 %-100 %] 100 %  Patient Vitals for the past 24 hrs:   BP Temp Temp src Pulse Resp SpO2   11/06/21 1130 (!) 148/88 97.9  F (36.6  C) Oral 77 16 100 %   11/06/21 0730 (!) 142/85 97.5  F (36.4  C) Oral 76 16 100 %   11/06/21 0048 124/77 97.5  F (36.4  C) Oral 80 18 93 %   11/1950 (!) 140/77 97.6  F (36.4  C) Oral 81 18 100 %   11/05/21 1530 136/80 97.8  F (36.6  C) Oral 83 -- 100 %     I/O's Last 24 hours  I/O last 3 completed shifts:  In: 1160 [P.O.:360; I.V.:800]  Out: -     Constitutional: Awake, alert, pleasant.  Respiratory:   Cardiovascular:   GI: Fairly soft, +BS, nt.  Skin/Integumen:   Other:        Data   Recent Labs   Lab 11/05/21  0539 11/04/21  1307   WBC 5.4 7.8   HGB 12.1 14.9   MCV 91 89    262    139   POTASSIUM 3.8 4.2   CHLORIDE 114* 109   CO2 25 27   BUN 17 21   CR 0.74 0.86   ANIONGAP 2* 3   SUSAN 7.6* 8.9   * 113*   ALBUMIN  --  3.6   PROTTOTAL  --  7.2 "   BILITOTAL  --  0.7   ALKPHOS  --  85   ALT  --  19   AST  --  14   LIPASE  --  63*     Recent Labs   Lab Test 11/05/21  0539 11/04/21  1307 08/05/21  0946 07/28/21  1045 04/28/21  1033   * 113* 83 66* 63*     No results for input(s): CRP, DD, LDH, FIBR, JOAO, IL6B in the last 168 hours.      No results found for this or any previous visit (from the past 24 hour(s)).    Medications   All medications were reviewed.    dextrose 5% and 0.45% NaCl + KCl 20 mEq/L 100 mL/hr at 11/06/21 0817       famotidine  10 mg Oral BID     levothyroxine  75 mcg Oral QAM AC     sodium chloride (PF)  3 mL Intracatheter Q8H     venlafaxine  75 mg Oral Daily     calcium carbonate, carboxymethylcellulose PF, HYDROmorphone, lidocaine 4%, lidocaine (buffered or not buffered), melatonin, naloxone **OR** naloxone **OR** naloxone **OR** naloxone, ondansetron **OR** ondansetron, prochlorperazine **OR** prochlorperazine **OR** prochlorperazine, sodium chloride (PF), zolpidem

## 2021-11-06 NOTE — PROGRESS NOTES
[facility]    General Surgery  Daily Note       Assessment and Plan:   Maggie Navarrete is a 71 year old female with small bowel obstruction     -Medical management per primary team  -Okay to start low fiber diet  -If tolerating low fiber diet, okay from surgical standpoint to discharge to home.  Importance of adhering to low fiber diet and proper hydration were discussed at length today at bedside..        Interval History:   Patient reports she is doing well.  Minimal abdominal pain.  No nausea.  She is tolerating liquids.           Physical Exam:   Temp: 97.5  F (36.4  C) Temp src: Oral BP: (!) 142/85 Pulse: 76   Resp: 16 SpO2: 100 % O2 Device: None (Room air)      I/O last 3 completed shifts:  In: 1160 [P.O.:360; I.V.:800]  Out: -       Constitutional: healthy, alert and no distress   Respiratory: Breathing nonlabored  Abdomen: Soft, not significantly distended, minimal tenderness with palpation      Data   Recent Labs   Lab 11/05/21  0539 11/04/21  1307   WBC 5.4 7.8   HGB 12.1 14.9   MCV 91 89    262    139   POTASSIUM 3.8 4.2   CHLORIDE 114* 109   CO2 25 27   BUN 17 21   CR 0.74 0.86   ANIONGAP 2* 3   SUSAN 7.6* 8.9   * 113*   ALBUMIN  --  3.6   PROTTOTAL  --  7.2   BILITOTAL  --  0.7   ALKPHOS  --  85   ALT  --  19   AST  --  14       Peggy Aguirre MD

## 2021-11-06 NOTE — PLAN OF CARE
Independent, alert and oriented with appropriate use of the call light.  Lung sounds clear, encouraged respiratory exercises.  Bowel sounds present, passing flatus, tolerating diet.  Voiding with adequate urine output.  Complaints of mild abdominal discomfort denying the need for intervention.  Continue to monitor.

## 2021-11-06 NOTE — PLAN OF CARE
Alert and oriented x4. Vital signs stable on room air. Independent. Tolerating low fiber diet. Lung sounds clear. Bowel sounds hypoactive, passing flatus, no BM during shift, adequate urine output. Denies pain. Denies nausea.    Pt discharged to home approx 1400 via personal ride by car. All discharge instructions reviewed with patient. All questions and concerns answered.

## 2021-11-06 NOTE — DISCHARGE SUMMARY
[facility]  Discharge Summary        Maggie Navarrete MRN# [id number]   YOB: 1950 Age: 71 year old     Date of Admission: 11/4/2021  Date of Discharge: 11/6/2021  Admitting Physician: Zaid Dorantes MD  Discharge Physician: Ahsan Pineda MD     Primary Provider: Rhea Lin  Primary Care Physician Phone Number: [phone]         Discharge Diagnoses:   Partial small bowel obstruction.        Other Chronic Medical Problems:      1. Hypothyroidism.  2. Depression/anxiety.  3. H/o leiomyosarcoma.       Allergies:         Allergies   Allergen Reactions     Erythromycin GI Disturbance     Tramadol Nausea           Discharge Medications:        Current Discharge Medication List      START taking these medications    Details   ondansetron (ZOFRAN-ODT) 4 MG ODT tab Take 1 tablet (4 mg) by mouth every 6 hours as needed for nausea or vomiting  Qty: 30 tablet, Refills: 0    Associated Diagnoses: Intractable vomiting with nausea, unspecified vomiting type         CONTINUE these medications which have NOT CHANGED    Details   alendronate (FOSAMAX) 70 MG tablet Take 1 tablet (70 mg) by mouth every 7 days  Qty: 12 tablet, Refills: 3    Associated Diagnoses: Osteopenia of multiple sites      carboxymethylcellulose PF (REFRESH PLUS) 0.5 % ophthalmic solution Place 1 drop into both eyes 4 times daily as needed for dry eyes      levothyroxine (SYNTHROID/LEVOTHROID) 75 MCG tablet Take 1 tablet (75 mcg) by mouth daily  Qty: 90 tablet, Refills: 3    Associated Diagnoses: Acquired hypothyroidism      venlafaxine (EFFEXOR-XR) 75 MG 24 hr capsule Take 1 capsule (75 mg) by mouth daily  Qty: 90 capsule, Refills: 1    Associated Diagnoses: Generalized anxiety disorder      zolpidem (AMBIEN) 5 MG tablet Take 1 tablet (5 mg) by mouth nightly as needed for sleep (Only uses if traveling or cannot sleep,)  Qty: 20 tablet, Refills: 0    Associated Diagnoses: Insomnia, unspecified type                  Discharge Instructions and Follow-Up:      Discharge Orders      Reason for your hospital stay    Partial small bowel obstruction.     Follow-up and recommended labs and tests    Follow-up with primary care provider, Rhea Lin, within 7 days for hospital follow-up.  No follow up labs or test are needed.  Follow-up with primary Oncologist as previously scheduled.     Activity    Your activity upon discharge: activity as tolerated     Diet    Follow this diet upon discharge: Orders Placed This Encounter      Low Fiber Diet             Consultations This Hospital Stay:      Surgery.        Admission History:      Please see the H&P by Zaid Dorantes MD on 11/4/2021 for complete details. Briefly, Maggie Navarrete is a 71 year old female with a history including hypothyroidism, depression/anxiety, GERd and h/o leiomyosarcoma, s/p surgery, who presented 11/4/2021 with abdominal pain, nausea, and vomiting and was found with evidence of partial small bowel obstruction.         Problem Oriented Hospital Course:        Partial small bowel obstruction.  * Has a history of multiple prior intra-abdominal surgeries.  No prior history of bowel obstruction, though had similar symptoms earlier this year which was self limiting.  * Presented 11/4 with several day history of abdominal pain, nausea, vomiting.  CT 11/4 suggestive of partial small bowel obstruction. Surgery consulted on admit.   * Started on clears 11/5. Had BM 11/5.  * Advanced to solid diet 11/6 and tolerated.  - Continue low fiber diet.      H/o leiomyosarcoma.  * Pt was to have repeat CT for Oncology follow-up.  * Pt had CT on admit 11/4 as above.  - Follow-up with Oncology regarding need for repeat imaging.     Hypothyroidism.  Chronic and stable.  - Continue levothyroxine.     Depression/anxiety.  - Continue venlafaxine.     COVID-19 testing.  She received the Pfizer Covid vaccine doses on 1/28/2021, 2/18/2021, and 8/27/2021.            COVID-19 PCR Results             COVID-19 PCR Results 6/12/20 6/27/20 6/27/20 10/15/21 11/4/21        0044 [id number]        COVID-19 Virus PCR to [facility] - Result   Test received-See reflex to IDDL test SARS CoV2 (COVID-19) Virus RT-PCR          COVID-19 Virus PCR to [facility] - Source   Nasopharyngeal          COVID-19 Virus by PCR (External Result) Undetected            SARS-CoV-2 Virus Specimen Source     Nasopharyngeal        SARS-CoV-2 PCR Result     NEGATIVE        SARS CoV2 PCR       Negative Negative         Comments are available for some flowsheets but are not being displayed.           COVID-19 Antibody Results, Testing for Immunity    COVID-19 Antibody Results, Testing for Immunity   No data to display.                 Pending Results:        Unresulted Labs Ordered in the Past 30 Days of this Admission     No orders found from 10/5/2021 to 11/5/2021.                Discharge Disposition:      Discharged to home.        Discharge Time:      Less than 30 minutes.        Key Imaging Studies, Lab Findings and Procedures/Surgeries:        Results for orders placed or performed during the hospital encounter of 11/04/21   CT Abdomen Pelvis w Contrast    Narrative    EXAM: CT ABDOMEN PELVIS W CONTRAST  LOCATION: [facility]  DATE/TIME: 11/4/2021 6:34 PM    INDICATION: Nausea/vomiting Abdominal pain, acute, nonlocalized  COMPARISON: CT chest, abdomen and pelvis 07/20/2021  TECHNIQUE: CT scan of the abdomen and pelvis was performed following injection of IV contrast. Multiplanar reformats were obtained. Dose reduction techniques were used.  CONTRAST: 71 mL Isovue-370    FINDINGS:   LOWER CHEST: A few tiny pleural-based pulmonary nodules left lower lobe are unchanged.    HEPATOBILIARY: Normal.    PANCREAS: Normal.    SPLEEN: Calcified granuloma.    ADRENAL GLANDS: Normal.    KIDNEYS/BLADDER: Cortical atrophy and scarring right kidney. No hydronephrosis. Cortical and parapelvic cysts left  kidney. No stones., Subtle stranding adjacent to a mildly distended bladder.    BOWEL: Multiple dilated loops of fluid-filled small bowel. There is also fluid within the cecum, ascending, and transverse colon. Diverticula sigmoid colon, without evidence for diverticulitis.    LYMPH NODES: Normal.    VASCULATURE: Unremarkable.    PELVIC ORGANS: Hysterectomy.    MUSCULOSKELETAL: Degenerative disc disease L4 level.      Impression    IMPRESSION:   1.  Multiple dilated loops of fluid-filled small bowel could represent an incomplete small bowel obstruction. There is also fluid within the ascending and transverse colon and an enterocolitis also possible. This could be further assessed with a small   bowel follow-through exam.  2.  Mild stranding adjacent to the bladder could be secondary to cystitis. Clinical and laboratory correlation recommended.  3.  Sigmoid diverticulosis.  4.  Cortical atrophy right kidney, and prominent cortical and parapelvic cysts left kidney, unchanged.

## 2021-11-06 NOTE — PLAN OF CARE
A&O x4, VSS on RA. Tolerating clear liquids. Hypoactive bowel sound. +flatus. Reported bowel movt yestersday. Denies N/V/pain. Up independently. Plans to advanced diet.

## 2021-11-08 ENCOUNTER — NURSE TRIAGE (OUTPATIENT)
Dept: FAMILY MEDICINE | Facility: CLINIC | Age: 71
End: 2021-11-08
Payer: MEDICARE

## 2021-11-08 ENCOUNTER — TELEPHONE (OUTPATIENT)
Dept: INTERNAL MEDICINE | Facility: CLINIC | Age: 71
End: 2021-11-08
Payer: MEDICARE

## 2021-11-08 NOTE — TELEPHONE ENCOUNTER
Patient called   Discharged from hospital Saturday - SBO  Low fiber diet.    No BM since Friday 11/5/21  Passing flatus  Drinking water  Denies nausea or vomiting  Abdominal pain: denies  Denies fever  Activity  New problem: yes, few days  New medications: fosamax, once a week  Pattern: normal, once a day or twice a day  Denies rectal pain or hemorrhoids (only internal)- just had coloscopy  Denies medical interventions    Triaged per Epic Triage Protocol, gave care advice which patient plans to follow.  See Care advice tab for more information.  Patent to call back if further questions or concerns.  Will do home care, will call back tomorrow if no BM tomorrow.    Lakshmi Da Silva RN  [facility]      Reason for Disposition    Mild constipation    Additional Information    Negative: Abdomen pain is the main symptom and adult male    Negative: Abdomen pain is the main symptom and adult female    Negative: Rectal bleeding or blood in stool is the main symptom    Negative: Patient sounds very sick or weak to the triager    Negative: Constant abdominal pain lasting > 2 hours    Negative: Vomiting bile (green color)    Negative: Vomiting and abdomen looks much more swollen than usual    Negative: Rectal pain or fullness from fecal impaction (rectum full of stool) and NOT better after SITZ bath, suppository or enema    Negative: Abdomen is more swollen than usual    Negative: Last bowel movement (BM) > 4 days ago    Negative: Leaking stool    Negative: Intermittent mild abdominal pain and fever    Negative: Unable to have a bowel movement (BM) without manually removing stool (using finger to pull out stool or perform disimpaction)    Negative: Unable to have a bowel movement (BM) without using a laxative, suppository, or enema    Negative: Constipation persists > 1 week and no improvement after using CARE ADVICE    Negative: Weight loss greater than 10 pounds (5 kg) and not dieting    Negative:  Pencil-like, narrow stools    Negative: Patient wants to be seen    Negative: Uses laxative (e.g., PEG / Miralax. milk of magnesia) or enema more than once a month    Negative: Constipation is a recurrent ongoing problem (i.e., < 3 BMs / week or straining > 25% of the time)    Negative: Minor bleeding from rectum (e.g., blood just on toilet paper, few drops, streaks on surface of normal formed BM) occurs more than twice    Protocols used: CONSTIPATION-A-[place]

## 2021-11-10 DIAGNOSIS — C55 LEIOMYOSARCOMA OF UTERUS (H): Primary | ICD-10-CM

## 2021-11-16 ENCOUNTER — THERAPY VISIT (OUTPATIENT)
Dept: PHYSICAL THERAPY | Facility: CLINIC | Age: 71
End: 2021-11-16
Payer: MEDICARE

## 2021-11-16 DIAGNOSIS — G89.29 CHRONIC PAIN OF RIGHT KNEE: Primary | ICD-10-CM

## 2021-11-16 DIAGNOSIS — M25.561 CHRONIC PAIN OF RIGHT KNEE: Primary | ICD-10-CM

## 2021-11-16 DIAGNOSIS — M79.671 RIGHT FOOT PAIN: ICD-10-CM

## 2021-11-16 DIAGNOSIS — R10.32 LEFT GROIN PAIN: ICD-10-CM

## 2021-11-16 PROCEDURE — 97112 NEUROMUSCULAR REEDUCATION: CPT | Mod: GP | Performed by: PHYSICAL THERAPIST

## 2021-11-16 PROCEDURE — 97110 THERAPEUTIC EXERCISES: CPT | Mod: GP | Performed by: PHYSICAL THERAPIST

## 2021-11-17 ENCOUNTER — OFFICE VISIT (OUTPATIENT)
Dept: FAMILY MEDICINE | Facility: CLINIC | Age: 71
End: 2021-11-17
Payer: MEDICARE

## 2021-11-17 ENCOUNTER — LAB (OUTPATIENT)
Dept: LAB | Facility: CLINIC | Age: 71
End: 2021-11-17
Attending: INTERNAL MEDICINE
Payer: MEDICARE

## 2021-11-17 ENCOUNTER — ANCILLARY PROCEDURE (OUTPATIENT)
Dept: GENERAL RADIOLOGY | Facility: CLINIC | Age: 71
End: 2021-11-17
Attending: INTERNAL MEDICINE
Payer: MEDICARE

## 2021-11-17 ENCOUNTER — ANCILLARY PROCEDURE (OUTPATIENT)
Dept: CT IMAGING | Facility: CLINIC | Age: 71
End: 2021-11-17
Attending: INTERNAL MEDICINE
Payer: MEDICARE

## 2021-11-17 VITALS
TEMPERATURE: 97.4 F | DIASTOLIC BLOOD PRESSURE: 81 MMHG | WEIGHT: 144 LBS | OXYGEN SATURATION: 100 % | HEIGHT: 59 IN | RESPIRATION RATE: 16 BRPM | HEART RATE: 85 BPM | BODY MASS INDEX: 29.03 KG/M2 | SYSTOLIC BLOOD PRESSURE: 140 MMHG

## 2021-11-17 DIAGNOSIS — C55 LEIOMYOSARCOMA OF UTERUS (H): ICD-10-CM

## 2021-11-17 DIAGNOSIS — M25.522 ELBOW PAIN, LEFT: ICD-10-CM

## 2021-11-17 DIAGNOSIS — C49.9 LEIOMYOSARCOMA (H): ICD-10-CM

## 2021-11-17 DIAGNOSIS — K56.600 PARTIAL SMALL BOWEL OBSTRUCTION (H): ICD-10-CM

## 2021-11-17 DIAGNOSIS — R35.0 URINARY FREQUENCY: ICD-10-CM

## 2021-11-17 DIAGNOSIS — Z23 NEED FOR VACCINATION: Primary | ICD-10-CM

## 2021-11-17 PROBLEM — E03.9 HYPOTHYROIDISM: Chronic | Status: ACTIVE | Noted: 2021-05-11

## 2021-11-17 PROBLEM — M81.0 OSTEOPOROSIS OF FOREARM: Chronic | Status: ACTIVE | Noted: 2021-08-01

## 2021-11-17 LAB
ALBUMIN SERPL-MCNC: 3.4 G/DL (ref 3.4–5)
ALBUMIN UR-MCNC: NEGATIVE MG/DL
ALP SERPL-CCNC: 94 U/L (ref 40–150)
ALT SERPL W P-5'-P-CCNC: 23 U/L (ref 0–50)
ANION GAP SERPL CALCULATED.3IONS-SCNC: 8 MMOL/L (ref 3–14)
APPEARANCE UR: CLEAR
AST SERPL W P-5'-P-CCNC: 12 U/L (ref 0–45)
BACTERIA #/AREA URNS HPF: ABNORMAL /HPF
BASOPHILS # BLD AUTO: 0 10E3/UL (ref 0–0.2)
BASOPHILS NFR BLD AUTO: 0 %
BILIRUB SERPL-MCNC: 0.3 MG/DL (ref 0.2–1.3)
BILIRUB UR QL STRIP: NEGATIVE
BUN SERPL-MCNC: 24 MG/DL (ref 7–30)
CALCIUM SERPL-MCNC: 8.4 MG/DL (ref 8.5–10.1)
CHLORIDE BLD-SCNC: 111 MMOL/L (ref 94–109)
CO2 SERPL-SCNC: 25 MMOL/L (ref 20–32)
COLOR UR AUTO: YELLOW
CREAT SERPL-MCNC: 0.78 MG/DL (ref 0.52–1.04)
EOSINOPHIL # BLD AUTO: 0.2 10E3/UL (ref 0–0.7)
EOSINOPHIL NFR BLD AUTO: 2 %
ERYTHROCYTE [DISTWIDTH] IN BLOOD BY AUTOMATED COUNT: 14.6 % (ref 10–15)
GFR SERPL CREATININE-BSD FRML MDRD: 77 ML/MIN/1.73M2
GLUCOSE BLD-MCNC: 97 MG/DL (ref 70–99)
GLUCOSE UR STRIP-MCNC: NEGATIVE MG/DL
HCT VFR BLD AUTO: 41.8 % (ref 35–47)
HGB BLD-MCNC: 13.4 G/DL (ref 11.7–15.7)
HGB UR QL STRIP: ABNORMAL
IMM GRANULOCYTES # BLD: 0 10E3/UL
IMM GRANULOCYTES NFR BLD: 0 %
KETONES UR STRIP-MCNC: NEGATIVE MG/DL
LEUKOCYTE ESTERASE UR QL STRIP: NEGATIVE
LYMPHOCYTES # BLD AUTO: 2.3 10E3/UL (ref 0.8–5.3)
LYMPHOCYTES NFR BLD AUTO: 29 %
MCH RBC QN AUTO: 29.6 PG (ref 26.5–33)
MCHC RBC AUTO-ENTMCNC: 32.1 G/DL (ref 31.5–36.5)
MCV RBC AUTO: 93 FL (ref 78–100)
MONOCYTES # BLD AUTO: 0.5 10E3/UL (ref 0–1.3)
MONOCYTES NFR BLD AUTO: 7 %
NEUTROPHILS # BLD AUTO: 4.8 10E3/UL (ref 1.6–8.3)
NEUTROPHILS NFR BLD AUTO: 62 %
NITRATE UR QL: NEGATIVE
NRBC # BLD AUTO: 0 10E3/UL
NRBC BLD AUTO-RTO: 0 /100
PH UR STRIP: 6 [PH] (ref 5–7)
PLATELET # BLD AUTO: 258 10E3/UL (ref 150–450)
POTASSIUM BLD-SCNC: 3.9 MMOL/L (ref 3.4–5.3)
PROT SERPL-MCNC: 6.7 G/DL (ref 6.8–8.8)
RBC # BLD AUTO: 4.52 10E6/UL (ref 3.8–5.2)
RBC #/AREA URNS AUTO: ABNORMAL /HPF
SODIUM SERPL-SCNC: 144 MMOL/L (ref 133–144)
SP GR UR STRIP: 1.01 (ref 1–1.03)
SQUAMOUS #/AREA URNS AUTO: ABNORMAL /LPF
UROBILINOGEN UR STRIP-ACNC: 0.2 E.U./DL
WBC # BLD AUTO: 7.8 10E3/UL (ref 4–11)
WBC #/AREA URNS AUTO: ABNORMAL /HPF

## 2021-11-17 PROCEDURE — 90715 TDAP VACCINE 7 YRS/> IM: CPT | Performed by: INTERNAL MEDICINE

## 2021-11-17 PROCEDURE — 73080 X-RAY EXAM OF ELBOW: CPT | Mod: LT | Performed by: RADIOLOGY

## 2021-11-17 PROCEDURE — 71250 CT THORAX DX C-: CPT | Mod: GC | Performed by: RADIOLOGY

## 2021-11-17 PROCEDURE — 80053 COMPREHEN METABOLIC PANEL: CPT | Performed by: PATHOLOGY

## 2021-11-17 PROCEDURE — 81001 URINALYSIS AUTO W/SCOPE: CPT | Performed by: INTERNAL MEDICINE

## 2021-11-17 PROCEDURE — 99495 TRANSJ CARE MGMT MOD F2F 14D: CPT | Mod: 25 | Performed by: INTERNAL MEDICINE

## 2021-11-17 PROCEDURE — 36415 COLL VENOUS BLD VENIPUNCTURE: CPT | Performed by: PATHOLOGY

## 2021-11-17 PROCEDURE — 85025 COMPLETE CBC W/AUTO DIFF WBC: CPT | Performed by: PATHOLOGY

## 2021-11-17 PROCEDURE — 90471 IMMUNIZATION ADMIN: CPT | Performed by: INTERNAL MEDICINE

## 2021-11-17 RX ORDER — FAMOTIDINE 10 MG
10 TABLET ORAL 2 TIMES DAILY
COMMUNITY
End: 2021-12-28

## 2021-11-17 ASSESSMENT — MIFFLIN-ST. JEOR: SCORE: 1073.81

## 2021-11-17 NOTE — PROGRESS NOTES
Assessment & Plan     Elbow pain, left  1 year. Check xray and refer to PT  If significant abnormalities, will refer to ortho  RTC if not improving or worsening  - XR Elbow Left 2 Views  - Physical Therapy Referral    Partial small bowel obstruction (H)  Resolved.     Urinary frequency  R/O UTI and negative, referral provided to urology for eval/recs  Have also referred her to PT for pelvic floor strenthening  - UA Macro with Reflex to Micro and Culture - lab collect  - Adult Urology Referral  - Physical Therapy Referral    Return if symptoms worsen or fail to improve.    Yandy Blancas, DO  [facility] [place] Serrano is a 71 year old who presents for the following health issues     HPI       Hospital Follow-up Visit:    Hospital/Nursing Home/IP Rehab Facility: [facility]  Date of Admission: 11/4/21  Date of Discharge: 11/6/21  Reason(s) for Admission: Partial small bowel obstruction.      Was your hospitalization related to COVID-19? No   Problems taking medications regularly:  None  Medication changes since discharge: None  Problems adhering to non-medication therapy:  None         PCP: Riley    Maggie presents for L elbow pain. One year. States fell at that time and landed on left side and left elbow. Did not seek medical attention. Has felt abnormal since then with intermittent pain. No swelling or redness. Worse with flexion.     She happened to be in hospital recently for partial SBO.  Hx of several abd surgeries. States this has resolved. No n/v.  No f/c. No c/d. No abd pain. States she was not drinking enough water    Follows oncology for her leiomycosarcoma    Concerns of frequent urination. Incontinence.     Would like tdap today, has been 10 years    Reports mood well controlled on current regimen.      Review of Systems   Constitutional, HEENT, cardiovascular, pulmonary, gi and gu systems are negative, except as otherwise noted.      Objective   "  BP (!) 140/81 (BP Location: Left arm, Patient Position: Chair, Cuff Size: Adult Large)   Pulse 85   Temp 97.4  F (36.3  C) (Tympanic)   Resp 16   Ht 1.499 m (4' 11\")   Wt 65.3 kg (144 lb)   SpO2 100%   Breastfeeding No   BMI 29.08 kg/m    Body mass index is 29.08 kg/m .  Physical Exam     GENERAL APPEARANCE: AAOx3, no distress. Well developed.     ABDOMEN:  soft, nontender, no distention. No rebound or guarding.     MSK: left elbow without edema or erythema; reproducible discomfort with full active flexion. Extension intact.     LYMPHATICS: No cervical adenopathy          "

## 2021-11-19 ENCOUNTER — ONCOLOGY VISIT (OUTPATIENT)
Dept: ONCOLOGY | Facility: CLINIC | Age: 71
End: 2021-11-19
Attending: INTERNAL MEDICINE
Payer: MEDICARE

## 2021-11-19 VITALS
DIASTOLIC BLOOD PRESSURE: 83 MMHG | BODY MASS INDEX: 29.01 KG/M2 | HEIGHT: 59 IN | WEIGHT: 143.9 LBS | HEART RATE: 99 BPM | OXYGEN SATURATION: 98 % | TEMPERATURE: 98.4 F | RESPIRATION RATE: 16 BRPM | SYSTOLIC BLOOD PRESSURE: 134 MMHG

## 2021-11-19 DIAGNOSIS — C55 LEIOMYOSARCOMA OF UTERUS (H): Primary | ICD-10-CM

## 2021-11-19 PROCEDURE — G0463 HOSPITAL OUTPT CLINIC VISIT: HCPCS

## 2021-11-19 PROCEDURE — 99214 OFFICE O/P EST MOD 30 MIN: CPT | Performed by: INTERNAL MEDICINE

## 2021-11-19 ASSESSMENT — MIFFLIN-ST. JEOR: SCORE: 1073.61

## 2021-11-19 ASSESSMENT — PAIN SCALES - GENERAL: PAINLEVEL: NO PAIN (0)

## 2021-11-19 NOTE — LETTER
11/19/2021         RE: Maggie Navarrete  [address]  [facility] [address]        Dear Colleague,    Thank you for referring your patient, Maggie Navarrete, to the [facility]. Please see a copy of my visit note below.      MEDICAL ONCOLOGY PROGRESS NOTE  [facility]  Nov 19, 2021    Oncologic history:  1. January 2016, she presents to her PCP with 3 weeks of abdominal pain and severe constipation. She is treated for constipation and improves.  2. 3/21/2016, worsening pelvic pressure prompts pelvic examination with an enlarged uterus noted. Transvaginal ultrasound shows enlargement of subserosal fibroids originally seen on MRI in 2006.  3. 5/9/2016, MRI pelvis is obtained, which shows several enhancing uterine masses involving an enlarged uterus, felt radiographically compatible with benign uterine leiomyomata.  3. Summer 2016, she experiences worsening low back pain thought at first thought to be sacroiliitis, but pain persists after corticosteroid injection. Continues with severe constipation.  4. 9/8/2016, she undergoes colonoscopy for persistent abdominal pain. She has sigmoid polyp snared. Abdominal pain and diarrhea persist after colonoscopy prep.  5. 9/14/2016, she has worsening abdominal pain and is evaluated in the ED. CT-scan shows a large subserosal fibroid measuring 9.4 x 9.6 x 9.8 cm, as well as very large fibroids along the posterior uterine body more inferiorly, increased in size to 14 cm in greatest diameter compared to 10 cm in greatest diameter on MRI of 5/9/2016.  6. 9/29/2016, she has staging CT-CAP, which shows a small left sided pleural effusion and a 3 mm pulmonary nodule in the RML.  7. October 2016, self-refers to [facility].  8. 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  9. 11/4/2016, undergoes total abdominal hysterectomy and BSO, under Dr. Harsh Camarillo. Surgery was originally presumed for  leiomyoma, however, intraoperatively there was focal adherence of a mass to the pelvic right sidewall and cul-de-sac, which led to peritoneal resection and omentectomy. Final pathology, pT2b: showed leiomyosarcoma involving the cul-de-sac and forming two separate masses measuring 10.5 x 6.5 x 4.5 cm and 10.7 x 7.9 x 7.5 cm.  10. 12/26/2016, she starts adjuvant chemotherapy with doxorubicin 25 mg/m2/day and DTIC 250 mg/m2/day , days 1-3 every 21 weeks.  11. 3/23/2017, she completes 5 cycles of adjuvant doxorubicin and dacarbazine  12. 6/3/2020, restaging MRI shows a locally recurrent pelvic sidewall mass measuring 2.8 x 3.3 cm in size. This new area of concern was not previously reported, presumably due to proximity to bowel and the similar appearance to adjacent loops of bowel on the contrast enhanced imaging. On review of prior imaging studies, the lesion can be seen as far back as January 2019 as a small nodule measuring 1 cm in size directly adjacent to bowel, close to the pelvic sidewall.  13. 6/16/2020, she had resection of the recurrent leiomyosarcoma tumor, which was densely attached to the right ureter and attached to the right side of the sigmoid mesentery and external and internal iliac vessels. There was external iliac vein injury with primary repair due to involvement of the mass. The ureter was also partially removed as part of the tumor mass. All margins and the ureter that was resected were negative for tumor. Pathology (JR-) showed a 3.7 x 3.5 x 2.2 cm thinly encapsulated mass, which was submitted for frozen sections.      HISTORY OF PRESENT ILLNESS  Ms. Navarrete presents today in follow-up and to restaging.    She is doing well. She denies nausea, vomiting, or diarrhea. She denies fevers, chills, nausea, vomiting, diarrhea.     ECOG performance status is 0.    REVIEW OF SYSTEMS  A 12-point ROS negative except as in HPI    Current Outpatient Medications   Medication Sig Dispense Refill      "alendronate (FOSAMAX) 70 MG tablet Take 1 tablet (70 mg) by mouth every 7 days 12 tablet 3     carboxymethylcellulose PF (REFRESH PLUS) 0.5 % ophthalmic solution Place 1 drop into both eyes 4 times daily as needed for dry eyes       famotidine (PEPCID) 10 MG tablet Take 10 mg by mouth 2 times daily       levothyroxine (SYNTHROID/LEVOTHROID) 75 MCG tablet Take 1 tablet (75 mcg) by mouth daily 90 tablet 3     ondansetron (ZOFRAN-ODT) 4 MG ODT tab Take 1 tablet (4 mg) by mouth every 6 hours as needed for nausea or vomiting 30 tablet 0     venlafaxine (EFFEXOR-XR) 75 MG 24 hr capsule Take 1 capsule (75 mg) by mouth daily 90 capsule 1     zolpidem (AMBIEN) 5 MG tablet Take 1 tablet (5 mg) by mouth nightly as needed for sleep (Only uses if traveling or cannot sleep,) 20 tablet 0       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     GERD (gastroesophageal reflux disease)      Hx of previous reproductive problem 1980     Kidney stone 2011     Leiomyosarcoma (H) 11/14/2016     Personal history of colonic polyps 2016    Small, benign     PONV (postoperative nausea and vomiting)      Posterior vitreous detachment of left eye 2011     Posterior vitreous detachment of right eye 2011     Ptosis of eyelid, bilateral      Sepsis (H) 07/2020     Stomach problems 2015    Abdominal pain, Diarreha     Thyroid disease     thyroid nodule resolved 2014       PHYSICAL EXAMINATION  /83 (BP Location: Right arm, Patient Position: Sitting, Cuff Size: Adult Regular)   Pulse 99   Temp 98.4  F (36.9  C) (Oral)   Resp 16   Ht 1.499 m (4' 11.02\")   Wt 65.3 kg (143 lb 14.4 oz)   SpO2 98%   BMI 29.05 kg/m    Wt Readings from Last 2 Encounters:   11/19/21 65.3 kg (143 lb 14.4 oz)   11/17/21 65.3 kg (144 lb)   General: Well appearing, at home, in " no distress  Head: Normocephalic  Eyes: EOMI, No icterus  ENT: Oropharynx clear  Lungs: Easy breathing on room air, speaking in complete sentences without dyspnea  Neuro: Cranial nerves 2-12 intact, no focal deficits  Skin: No rashes on exposed skin    Labs:  Lab on 11/17/2021   Component Date Value Ref Range Status     Sodium 11/17/2021 144  133 - 144 mmol/L Final     Potassium 11/17/2021 3.9  3.4 - 5.3 mmol/L Final     Chloride 11/17/2021 111* 94 - 109 mmol/L Final     Carbon Dioxide (CO2) 11/17/2021 25  20 - 32 mmol/L Final     Anion Gap 11/17/2021 8  3 - 14 mmol/L Final     Urea Nitrogen 11/17/2021 24  7 - 30 mg/dL Final     Creatinine 11/17/2021 0.78  0.52 - 1.04 mg/dL Final     Calcium 11/17/2021 8.4* 8.5 - 10.1 mg/dL Final     Glucose 11/17/2021 97  70 - 99 mg/dL Final     Alkaline Phosphatase 11/17/2021 94  40 - 150 U/L Final     AST 11/17/2021 12  0 - 45 U/L Final     ALT 11/17/2021 23  0 - 50 U/L Final     Protein Total 11/17/2021 6.7* 6.8 - 8.8 g/dL Final     Albumin 11/17/2021 3.4  3.4 - 5.0 g/dL Final     Bilirubin Total 11/17/2021 0.3  0.2 - 1.3 mg/dL Final     GFR Estimate 11/17/2021 77  >60 mL/min/1.73m2 Final     WBC Count 11/17/2021 7.8  4.0 - 11.0 10e3/uL Final     RBC Count 11/17/2021 4.52  3.80 - 5.20 10e6/uL Final     Hemoglobin 11/17/2021 13.4  11.7 - 15.7 g/dL Final     Hematocrit 11/17/2021 41.8  35.0 - 47.0 % Final     MCV 11/17/2021 93  78 - 100 fL Final     MCH 11/17/2021 29.6  26.5 - 33.0 pg Final     MCHC 11/17/2021 32.1  31.5 - 36.5 g/dL Final     RDW 11/17/2021 14.6  10.0 - 15.0 % Final     Platelet Count 11/17/2021 258  150 - 450 10e3/uL Final     % Neutrophils 11/17/2021 62  % Final     % Lymphocytes 11/17/2021 29  % Final     % Monocytes 11/17/2021 7  % Final     % Eosinophils 11/17/2021 2  % Final     % Basophils 11/17/2021 0  % Final     % Immature Granulocytes 11/17/2021 0  % Final     NRBCs per 100 WBC 11/17/2021 0  <1 /100 Final     Absolute Neutrophils 11/17/2021 4.8  1.6 -  8.3 10e3/uL Final     Absolute Lymphocytes 11/17/2021 2.3  0.8 - 5.3 10e3/uL Final     Absolute Monocytes 11/17/2021 0.5  0.0 - 1.3 10e3/uL Final     Absolute Eosinophils 11/17/2021 0.2  0.0 - 0.7 10e3/uL Final     Absolute Basophils 11/17/2021 0.0  0.0 - 0.2 10e3/uL Final     Absolute Immature Granulocytes 11/17/2021 0.0  <=0.0 10e3/uL Final     Absolute NRBCs 11/17/2021 0.0  10e3/uL Final   Office Visit on 11/17/2021   Component Date Value Ref Range Status     Color Urine 11/17/2021 Yellow  Colorless, Straw, Light Yellow, Yellow Final     Appearance Urine 11/17/2021 Clear  Clear Final     Glucose Urine 11/17/2021 Negative  Negative mg/dL Final     Bilirubin Urine 11/17/2021 Negative  Negative Final     Ketones Urine 11/17/2021 Negative  Negative mg/dL Final     Specific Gravity Urine 11/17/2021 1.015  1.003 - 1.035 Final     Blood Urine 11/17/2021 Trace* Negative Final     pH Urine 11/17/2021 6.0  5.0 - 7.0 Final     Protein Albumin Urine 11/17/2021 Negative  Negative mg/dL Final     Urobilinogen Urine 11/17/2021 0.2  0.2, 1.0 E.U./dL Final     Nitrite Urine 11/17/2021 Negative  Negative Final     Leukocyte Esterase Urine 11/17/2021 Negative  Negative Final     Bacteria Urine 11/17/2021 Few* None Seen /HPF Final     RBC Urine 11/17/2021 0-2  0-2 /HPF /HPF Final     WBC Urine 11/17/2021 0-5  0-5 /HPF /HPF Final     Squamous Epithelials Urine 11/17/2021 Few* None Seen /LPF Final       Imaging:  CT Chest w/o contrast  Narrative: EXAMINATION: CT CHEST W/O CONTRAST, 11/17/2021 2:27 PM    CLINICAL HISTORY: Leiomyosarcoma of uterus (H)    COMPARISON: 7/28/2021 and 4/28/2021 CT CAP.    TECHNIQUE: CT imaging obtained through the chest without contrast.  Coronal and axial MIP reformatted images obtained.     FINDINGS:    The central tracheobronchial tree is patent. Heart size is normal. No  pericardial effusion. Normal three vessel arch anatomy. No axillary  adenopathy. No mediastinal adenopathy. Numerous calcified  mediastinal  lymph nodes. Thyroid lobes are normal    Calcified right middle lobe pulmonary nodule unchanged. No airspace  consolidation. No pleural effusion or pneumothorax.    No acute findings in the visualized upper abdomen. Redemonstration of  simple appearing, well-circumscribed, fluid-attenuating cyst within  the superior pole of the left kidney.     No suspicious osseus or soft tissue lesions.   Impression: IMPRESSION:     No evidence of metastatic disease in the chest. No acute  cardiopulmonary findings.     I have personally reviewed the examination and initial interpretation  and I agree with the findings.    RAVI HANLEY MD         SYSTEM ID:  [id number]  XR Elbow Left G/E 3 Views  Narrative: XR ELBOW LEFT G/E 3 VIEWS 11/17/2021 11:47 AM     HISTORY: left elbow pain chronic; fall one year ago; Elbow pain, left  Impression: IMPRESSION: Negative exam.    BROOKS WAGNER MD         SYSTEM ID:  [id number]    EXAM: CT ABDOMEN PELVIS W CONTRAST  LOCATION: [facility]  DATE/TIME: 11/4/2021 6:34 PM     INDICATION: Nausea/vomiting Abdominal pain, acute, nonlocalized  COMPARISON: CT chest, abdomen and pelvis 07/20/2021  TECHNIQUE: CT scan of the abdomen and pelvis was performed following injection of IV contrast. Multiplanar reformats were obtained. Dose reduction techniques were used.  CONTRAST: 71 mL [id number]     FINDINGS:   LOWER CHEST: A few tiny pleural-based pulmonary nodules left lower lobe are unchanged.     HEPATOBILIARY: Normal.     PANCREAS: Normal.     SPLEEN: Calcified granuloma.     ADRENAL GLANDS: Normal.     KIDNEYS/BLADDER: Cortical atrophy and scarring right kidney. No hydronephrosis. Cortical and parapelvic cysts left kidney. No stones., Subtle stranding adjacent to a mildly distended bladder.     BOWEL: Multiple dilated loops of fluid-filled small bowel. There is also fluid within the cecum, ascending, and transverse colon. Diverticula sigmoid colon, without  evidence for diverticulitis.     LYMPH NODES: Normal.     VASCULATURE: Unremarkable.     PELVIC ORGANS: Hysterectomy.     MUSCULOSKELETAL: Degenerative disc disease L4 level.                                                                      IMPRESSION:   1.  Multiple dilated loops of fluid-filled small bowel could represent an incomplete small bowel obstruction. There is also fluid within the ascending and transverse colon and an enterocolitis also possible. This could be further assessed with a small   bowel follow-through exam.  2.  Mild stranding adjacent to the bladder could be secondary to cystitis. Clinical and laboratory correlation recommended.  3.  Sigmoid diverticulosis.  4.  Cortical atrophy right kidney, and prominent cortical and parapelvic cysts left kidney, unchanged.      ASSESSMENT AND PLAN    #1 Locally advanced leiomyosarcoma of the uterus, pT2b Nx M0, Stage IIB, status-post resection (11/2016) and 5 cycles of adjuvant doxorubicin and dacarbazine  #2 Local recurrence, leiomyosarcoma of the uterus, right pelvic sidewall adherent to iliac vessels and ureter s/p resection June 2020  It was a pleasure to see Ms. Navarrete today. We reviewed her scans, which show nothing for metastatic disease or recurrence in the right pelvis, only small, benign intra abdominal lymph nodes and pelvic nodes.  It has been 5 years since her original surgery and 1 1/2 years since the leiomyosarcoma recurrent nodule was removed. We discussed a monitoring plan adjustment given her follow-up to date, and we will plan to lengthen her returns and imaging to every 6 months.    -RTC with CT-CAP in 6 months.    Questions answered.    Ab Warner M.D.   of Medicine  Hematology, Oncology and Transplantation

## 2021-11-19 NOTE — PROGRESS NOTES
MEDICAL ONCOLOGY PROGRESS NOTE  [facility]  Nov 19, 2021    Oncologic history:  1. January 2016, she presents to her PCP with 3 weeks of abdominal pain and severe constipation. She is treated for constipation and improves.  2. 3/21/2016, worsening pelvic pressure prompts pelvic examination with an enlarged uterus noted. Transvaginal ultrasound shows enlargement of subserosal fibroids originally seen on MRI in 2006.  3. 5/9/2016, MRI pelvis is obtained, which shows several enhancing uterine masses involving an enlarged uterus, felt radiographically compatible with benign uterine leiomyomata.  3. Summer 2016, she experiences worsening low back pain thought at first thought to be sacroiliitis, but pain persists after corticosteroid injection. Continues with severe constipation.  4. 9/8/2016, she undergoes colonoscopy for persistent abdominal pain. She has sigmoid polyp snared. Abdominal pain and diarrhea persist after colonoscopy prep.  5. 9/14/2016, she has worsening abdominal pain and is evaluated in the ED. CT-scan shows a large subserosal fibroid measuring 9.4 x 9.6 x 9.8 cm, as well as very large fibroids along the posterior uterine body more inferiorly, increased in size to 14 cm in greatest diameter compared to 10 cm in greatest diameter on MRI of 5/9/2016.  6. 9/29/2016, she has staging CT-CAP, which shows a small left sided pleural effusion and a 3 mm pulmonary nodule in the RML.  7. October 2016, self-refers to [facility].  8. 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  9. 11/4/2016, undergoes total abdominal hysterectomy and BSO, under Dr. Harsh Camarillo. Surgery was originally presumed for leiomyoma, however, intraoperatively there was focal adherence of a mass to the pelvic right sidewall and cul-de-sac, which led to peritoneal resection and omentectomy. Final pathology, pT2b: showed leiomyosarcoma involving the cul-de-sac and forming two separate  masses measuring 10.5 x 6.5 x 4.5 cm and 10.7 x 7.9 x 7.5 cm.  10. 12/26/2016, she starts adjuvant chemotherapy with doxorubicin 25 mg/m2/day and DTIC 250 mg/m2/day , days 1-3 every 21 weeks.  11. 3/23/2017, she completes 5 cycles of adjuvant doxorubicin and dacarbazine  12. 6/3/2020, restaging MRI shows a locally recurrent pelvic sidewall mass measuring 2.8 x 3.3 cm in size. This new area of concern was not previously reported, presumably due to proximity to bowel and the similar appearance to adjacent loops of bowel on the contrast enhanced imaging. On review of prior imaging studies, the lesion can be seen as far back as January 2019 as a small nodule measuring 1 cm in size directly adjacent to bowel, close to the pelvic sidewall.  13. 6/16/2020, she had resection of the recurrent leiomyosarcoma tumor, which was densely attached to the right ureter and attached to the right side of the sigmoid mesentery and external and internal iliac vessels. There was external iliac vein injury with primary repair due to involvement of the mass. The ureter was also partially removed as part of the tumor mass. All margins and the ureter that was resected were negative for tumor. Pathology (JR-) showed a 3.7 x 3.5 x 2.2 cm thinly encapsulated mass, which was submitted for frozen sections.      HISTORY OF PRESENT ILLNESS  Ms. Navarrete presents today in follow-up and to restaging.    She is doing well. She denies nausea, vomiting, or diarrhea. She denies fevers, chills, nausea, vomiting, diarrhea.     ECOG performance status is 0.    REVIEW OF SYSTEMS  A 12-point ROS negative except as in HPI    Current Outpatient Medications   Medication Sig Dispense Refill     alendronate (FOSAMAX) 70 MG tablet Take 1 tablet (70 mg) by mouth every 7 days 12 tablet 3     carboxymethylcellulose PF (REFRESH PLUS) 0.5 % ophthalmic solution Place 1 drop into both eyes 4 times daily as needed for dry eyes       famotidine (PEPCID) 10 MG tablet Take  "10 mg by mouth 2 times daily       levothyroxine (SYNTHROID/LEVOTHROID) 75 MCG tablet Take 1 tablet (75 mcg) by mouth daily 90 tablet 3     ondansetron (ZOFRAN-ODT) 4 MG ODT tab Take 1 tablet (4 mg) by mouth every 6 hours as needed for nausea or vomiting 30 tablet 0     venlafaxine (EFFEXOR-XR) 75 MG 24 hr capsule Take 1 capsule (75 mg) by mouth daily 90 capsule 1     zolpidem (AMBIEN) 5 MG tablet Take 1 tablet (5 mg) by mouth nightly as needed for sleep (Only uses if traveling or cannot sleep,) 20 tablet 0       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     GERD (gastroesophageal reflux disease)      Hx of previous reproductive problem 1980     Kidney stone 2011     Leiomyosarcoma (H) 11/14/2016     Personal history of colonic polyps 2016    Small, benign     PONV (postoperative nausea and vomiting)      Posterior vitreous detachment of left eye 2011     Posterior vitreous detachment of right eye 2011     Ptosis of eyelid, bilateral      Sepsis (H) 07/2020     Stomach problems 2015    Abdominal pain, Diarreha     Thyroid disease     thyroid nodule resolved 2014       PHYSICAL EXAMINATION  /83 (BP Location: Right arm, Patient Position: Sitting, Cuff Size: Adult Regular)   Pulse 99   Temp 98.4  F (36.9  C) (Oral)   Resp 16   Ht 1.499 m (4' 11.02\")   Wt 65.3 kg (143 lb 14.4 oz)   SpO2 98%   BMI 29.05 kg/m    Wt Readings from Last 2 Encounters:   11/19/21 65.3 kg (143 lb 14.4 oz)   11/17/21 65.3 kg (144 lb)   General: Well appearing, at home, in no distress  Head: Normocephalic  Eyes: EOMI, No icterus  ENT: Oropharynx clear  Lungs: Easy breathing on room air, speaking in complete sentences without dyspnea  Neuro: Cranial nerves 2-12 intact, no focal deficits  Skin: No rashes on exposed skin    Labs:  Lab on " 11/17/2021   Component Date Value Ref Range Status     Sodium 11/17/2021 144  133 - 144 mmol/L Final     Potassium 11/17/2021 3.9  3.4 - 5.3 mmol/L Final     Chloride 11/17/2021 111* 94 - 109 mmol/L Final     Carbon Dioxide (CO2) 11/17/2021 25  20 - 32 mmol/L Final     Anion Gap 11/17/2021 8  3 - 14 mmol/L Final     Urea Nitrogen 11/17/2021 24  7 - 30 mg/dL Final     Creatinine 11/17/2021 0.78  0.52 - 1.04 mg/dL Final     Calcium 11/17/2021 8.4* 8.5 - 10.1 mg/dL Final     Glucose 11/17/2021 97  70 - 99 mg/dL Final     Alkaline Phosphatase 11/17/2021 94  40 - 150 U/L Final     AST 11/17/2021 12  0 - 45 U/L Final     ALT 11/17/2021 23  0 - 50 U/L Final     Protein Total 11/17/2021 6.7* 6.8 - 8.8 g/dL Final     Albumin 11/17/2021 3.4  3.4 - 5.0 g/dL Final     Bilirubin Total 11/17/2021 0.3  0.2 - 1.3 mg/dL Final     GFR Estimate 11/17/2021 77  >60 mL/min/1.73m2 Final     WBC Count 11/17/2021 7.8  4.0 - 11.0 10e3/uL Final     RBC Count 11/17/2021 4.52  3.80 - 5.20 10e6/uL Final     Hemoglobin 11/17/2021 13.4  11.7 - 15.7 g/dL Final     Hematocrit 11/17/2021 41.8  35.0 - 47.0 % Final     MCV 11/17/2021 93  78 - 100 fL Final     MCH 11/17/2021 29.6  26.5 - 33.0 pg Final     MCHC 11/17/2021 32.1  31.5 - 36.5 g/dL Final     RDW 11/17/2021 14.6  10.0 - 15.0 % Final     Platelet Count 11/17/2021 258  150 - 450 10e3/uL Final     % Neutrophils 11/17/2021 62  % Final     % Lymphocytes 11/17/2021 29  % Final     % Monocytes 11/17/2021 7  % Final     % Eosinophils 11/17/2021 2  % Final     % Basophils 11/17/2021 0  % Final     % Immature Granulocytes 11/17/2021 0  % Final     NRBCs per 100 WBC 11/17/2021 0  <1 /100 Final     Absolute Neutrophils 11/17/2021 4.8  1.6 - 8.3 10e3/uL Final     Absolute Lymphocytes 11/17/2021 2.3  0.8 - 5.3 10e3/uL Final     Absolute Monocytes 11/17/2021 0.5  0.0 - 1.3 10e3/uL Final     Absolute Eosinophils 11/17/2021 0.2  0.0 - 0.7 10e3/uL Final     Absolute Basophils 11/17/2021 0.0  0.0 - 0.2 10e3/uL  Final     Absolute Immature Granulocytes 11/17/2021 0.0  <=0.0 10e3/uL Final     Absolute NRBCs 11/17/2021 0.0  10e3/uL Final   Office Visit on 11/17/2021   Component Date Value Ref Range Status     Color Urine 11/17/2021 Yellow  Colorless, Straw, Light Yellow, Yellow Final     Appearance Urine 11/17/2021 Clear  Clear Final     Glucose Urine 11/17/2021 Negative  Negative mg/dL Final     Bilirubin Urine 11/17/2021 Negative  Negative Final     Ketones Urine 11/17/2021 Negative  Negative mg/dL Final     Specific Gravity Urine 11/17/2021 1.015  1.003 - 1.035 Final     Blood Urine 11/17/2021 Trace* Negative Final     pH Urine 11/17/2021 6.0  5.0 - 7.0 Final     Protein Albumin Urine 11/17/2021 Negative  Negative mg/dL Final     Urobilinogen Urine 11/17/2021 0.2  0.2, 1.0 E.U./dL Final     Nitrite Urine 11/17/2021 Negative  Negative Final     Leukocyte Esterase Urine 11/17/2021 Negative  Negative Final     Bacteria Urine 11/17/2021 Few* None Seen /HPF Final     RBC Urine 11/17/2021 0-2  0-2 /HPF /HPF Final     WBC Urine 11/17/2021 0-5  0-5 /HPF /HPF Final     Squamous Epithelials Urine 11/17/2021 Few* None Seen /LPF Final       Imaging:  CT Chest w/o contrast  Narrative: EXAMINATION: CT CHEST W/O CONTRAST, 11/17/2021 2:27 PM    CLINICAL HISTORY: Leiomyosarcoma of uterus (H)    COMPARISON: 7/28/2021 and 4/28/2021 CT CAP.    TECHNIQUE: CT imaging obtained through the chest without contrast.  Coronal and axial MIP reformatted images obtained.     FINDINGS:    The central tracheobronchial tree is patent. Heart size is normal. No  pericardial effusion. Normal three vessel arch anatomy. No axillary  adenopathy. No mediastinal adenopathy. Numerous calcified mediastinal  lymph nodes. Thyroid lobes are normal    Calcified right middle lobe pulmonary nodule unchanged. No airspace  consolidation. No pleural effusion or pneumothorax.    No acute findings in the visualized upper abdomen. Redemonstration of  simple appearing,  well-circumscribed, fluid-attenuating cyst within  the superior pole of the left kidney.     No suspicious osseus or soft tissue lesions.   Impression: IMPRESSION:     No evidence of metastatic disease in the chest. No acute  cardiopulmonary findings.     I have personally reviewed the examination and initial interpretation  and I agree with the findings.    RAVI HANLEY MD         SYSTEM ID:  [id number]  XR Elbow Left G/E 3 Views  Narrative: XR ELBOW LEFT G/E 3 VIEWS 11/17/2021 11:47 AM     HISTORY: left elbow pain chronic; fall one year ago; Elbow pain, left  Impression: IMPRESSION: Negative exam.    BROOKS WAGNER MD         SYSTEM ID:  [id number]    EXAM: CT ABDOMEN PELVIS W CONTRAST  LOCATION: [facility]  DATE/TIME: 11/4/2021 6:34 PM     INDICATION: Nausea/vomiting Abdominal pain, acute, nonlocalized  COMPARISON: CT chest, abdomen and pelvis 07/20/2021  TECHNIQUE: CT scan of the abdomen and pelvis was performed following injection of IV contrast. Multiplanar reformats were obtained. Dose reduction techniques were used.  CONTRAST: 71 mL [id number]     FINDINGS:   LOWER CHEST: A few tiny pleural-based pulmonary nodules left lower lobe are unchanged.     HEPATOBILIARY: Normal.     PANCREAS: Normal.     SPLEEN: Calcified granuloma.     ADRENAL GLANDS: Normal.     KIDNEYS/BLADDER: Cortical atrophy and scarring right kidney. No hydronephrosis. Cortical and parapelvic cysts left kidney. No stones., Subtle stranding adjacent to a mildly distended bladder.     BOWEL: Multiple dilated loops of fluid-filled small bowel. There is also fluid within the cecum, ascending, and transverse colon. Diverticula sigmoid colon, without evidence for diverticulitis.     LYMPH NODES: Normal.     VASCULATURE: Unremarkable.     PELVIC ORGANS: Hysterectomy.     MUSCULOSKELETAL: Degenerative disc disease L4 level.                                                                      IMPRESSION:   1.  Multiple  dilated loops of fluid-filled small bowel could represent an incomplete small bowel obstruction. There is also fluid within the ascending and transverse colon and an enterocolitis also possible. This could be further assessed with a small   bowel follow-through exam.  2.  Mild stranding adjacent to the bladder could be secondary to cystitis. Clinical and laboratory correlation recommended.  3.  Sigmoid diverticulosis.  4.  Cortical atrophy right kidney, and prominent cortical and parapelvic cysts left kidney, unchanged.      ASSESSMENT AND PLAN    #1 Locally advanced leiomyosarcoma of the uterus, pT2b Nx M0, Stage IIB, status-post resection (11/2016) and 5 cycles of adjuvant doxorubicin and dacarbazine  #2 Local recurrence, leiomyosarcoma of the uterus, right pelvic sidewall adherent to iliac vessels and ureter s/p resection June 2020  It was a pleasure to see Ms. Navarrete today. We reviewed her scans, which show nothing for metastatic disease or recurrence in the right pelvis, only small, benign intra abdominal lymph nodes and pelvic nodes.  It has been 5 years since her original surgery and 1 1/2 years since the leiomyosarcoma recurrent nodule was removed. We discussed a monitoring plan adjustment given her follow-up to date, and we will plan to lengthen her returns and imaging to every 6 months.    -RTC with CT-CAP in 6 months.    Questions answered.    Ab Warner M.D.   of Medicine  Hematology, Oncology and Transplantation

## 2021-11-19 NOTE — NURSING NOTE
"Oncology Rooming Note    November 19, 2021 3:06 PM   Maggie Navarrete is a 71 year old female who presents for:    Chief Complaint   Patient presents with     Oncology Clinic Visit     LIEOMYOSARCOMA OF UTERUS     Initial Vitals: /83 (BP Location: Right arm, Patient Position: Sitting, Cuff Size: Adult Regular)   Pulse 99   Temp 98.4  F (36.9  C) (Oral)   Resp 16   Ht 1.499 m (4' 11.02\")   Wt 65.3 kg (143 lb 14.4 oz)   SpO2 98%   BMI 29.05 kg/m   Estimated body mass index is 29.05 kg/m  as calculated from the following:    Height as of this encounter: 1.499 m (4' 11.02\").    Weight as of this encounter: 65.3 kg (143 lb 14.4 oz). Body surface area is 1.65 meters squared.  No Pain (0) Comment: Data Unavailable   No LMP recorded. Patient has had a hysterectomy.  Allergies reviewed: Yes  Medications reviewed: Yes    Medications: Medication refills not needed today.  Pharmacy name entered into [employer]:    [facility] - [place], [place] - [phone] AT PORTAL TO REGISTERED [facility] SITES  [id number] [place], [place] - [address] [facility] [place], [place] - [address] [facility] [phone]    Clinical concerns: None.       Karis Briscoe CMA            "

## 2021-11-22 ENCOUNTER — VIRTUAL VISIT (OUTPATIENT)
Dept: UROLOGY | Facility: CLINIC | Age: 71
End: 2021-11-22
Attending: INTERNAL MEDICINE
Payer: MEDICARE

## 2021-11-22 DIAGNOSIS — R39.15 URGENCY OF URINATION: Primary | ICD-10-CM

## 2021-11-22 DIAGNOSIS — N39.46 MIXED INCONTINENCE: ICD-10-CM

## 2021-11-22 DIAGNOSIS — R35.0 URINARY FREQUENCY: ICD-10-CM

## 2021-11-22 PROCEDURE — 99203 OFFICE O/P NEW LOW 30 MIN: CPT | Mod: 95 | Performed by: OBSTETRICS & GYNECOLOGY

## 2021-11-22 PROCEDURE — G0463 HOSPITAL OUTPT CLINIC VISIT: HCPCS | Mod: PN,RTG | Performed by: OBSTETRICS & GYNECOLOGY

## 2021-11-22 NOTE — PROGRESS NOTES
Maggie Navarrete is a 71 year old year old who is being evaluated via a billable video visit.      How would you like to obtain your AVS? MyChart  If the video visit is dropped, the invitation should be resent by: phone [phone]  Will anyone else be joining your video visit? No      Video Start Time: 2:14 PM      Chief complaint: Urgency incontinence    Subjective     Maggie Navarrete is a 71 year old year old  () who presents for a video visit today for urgency incontinence which has gotten worse recently. Her hx is sif leiyomyosarcoma s/p RACHEL/BSO and omentectomy ( [place]), chemo in 2016 with locally recurrent disease in 2020 resulting in  resection of the recurrent leiomyosarcoma tumor along with partial removal of her right ureter.  First surgery was complicated with DVT and her second surgery complicated by sepsis. She was also recently admitted ( 21-21) for partial SBOs.Has had 3 open and 2 laparascopy abdominal procedures likely the risk factor for her bowel obstruction.     Today she reports that that she is trying to drink more fluids since her hospitalization ( 64oz) but says that she has been having urgency leakage particularly when walking her dog.  She empties her bladder every hour or so during the day and wakes about once during the night. She drinks 64 oz of water, one cup of coffee and a cup of milk. No soda. Occasional alcohol. Occasional stress incontinence. Wears a pantiliner as a precaution. No voiding difficulties or recurrent UTI.        Pelvic Organ prolapse symptoms: Denies vaginal bulge or pressure sensation.    GI: Denies chronic diarrhea, constipation. Denies bothersome fecal or flatal incontinence. No defecatory disfunction      Sexual/Gyn:   Not sexually active. No partners    Objective:   Deferred ( virtual visit)     Asssessment and plan  Encounter Diagnoses   Name Primary?     Urinary frequency      Urgency of urination Yes     Mixed incontinence        We  discussed today about bladder irritants and the pelvic floor exercises for better bladder control. Probably not a good candidate for vaginal estrogen cream given her hx of gyn cancer. We will bring her in for pelvic exam and will consider pelvic PT if she is unable to do pelvic floor exercises properly. We discussed that if conservative management fails, we will try other options.           Video-Visit Details    Type of service:  Video Visit    Video End Time:2:40 PM    Originating Location (pt. Location): [place]    Distant Location (provider location):  [facility] [place]     Platform used for Video Visit: [place] ROBB spent a total of 30 minutes on  Video with  Maggie Navarrete on the date of the encounter in chart review, patient visit, review of tests, documentation and/or discussion with other providers about the issues documented above.

## 2021-11-22 NOTE — LETTER
2021       RE: Maggie Navarrete  [address]  [facility] [address]     Dear Colleague,    Thank you for referring your patient, Maggie Navarrete, to the [facility] [place] at [facility]. Please see a copy of my visit note below.    Maggie Navarrete is a 71 year old year old who is being evaluated via a billable video visit.      How would you like to obtain your AVS? MyChart  If the video visit is dropped, the invitation should be resent by: phone [phone]  Will anyone else be joining your video visit? No      Video Start Time: 2:14 PM      Chief complaint: Urgency incontinence    Subjective     Maggie Navarrete is a 71 year old year old  () who presents for a video visit today for urgency incontinence which has gotten worse recently. Her hx is sif leiyomyosarcoma s/p RACHEL/BSO and omentectomy ( [place]), chemo in  with locally recurrent disease in  resulting in  resection of the recurrent leiomyosarcoma tumor along with partial removal of her right ureter.  First surgery was complicated with DVT and her second surgery complicated by sepsis. She was also recently admitted ( 21-21) for partial SBOs.Has had 3 open and 2 laparascopy abdominal procedures likely the risk factor for her bowel obstruction.     Today she reports that that she is trying to drink more fluids since her hospitalization ( 64oz) but says that she has been having urgency leakage particularly when walking her dog.  She empties her bladder every hour or so during the day and wakes about once during the night. She drinks 64 oz of water, one cup of coffee and a cup of milk. No soda. Occasional alcohol. Occasional stress incontinence. Wears a pantiliner as a precaution. No voiding difficulties or recurrent UTI.        Pelvic Organ prolapse symptoms: Denies vaginal bulge or pressure sensation.    GI: Denies chronic diarrhea, constipation.  Denies bothersome fecal or flatal incontinence. No defecatory disfunction      Sexual/Gyn:   Not sexually active. No partners    Objective:   Deferred ( virtual visit)     Asssessment and plan  Encounter Diagnoses   Name Primary?     Urinary frequency      Urgency of urination Yes     Mixed incontinence        We discussed today about bladder irritants and the pelvic floor exercises for better bladder control. Probably not a good candidate for vaginal estrogen cream given her hx of gyn cancer. We will bring her in for pelvic exam and will consider pelvic PT if she is unable to do pelvic floor exercises properly. We discussed that if conservative management fails, we will try other options.           Video-Visit Details    Type of service:  Video Visit    Video End Time:2:40 PM    Originating Location (pt. Location): Home    Distant Location (provider location):  [facility]     Platform used for Video Visit: [place] ROBB spent a total of 30 minutes on  Video with  Maggie Navarrete on the date of the encounter in chart review, patient visit, review of tests, documentation and/or discussion with other providers about the issues documented above.         Sincerely,    Suad Kennedy MD

## 2021-11-29 ENCOUNTER — TRANSFERRED RECORDS (OUTPATIENT)
Dept: HEALTH INFORMATION MANAGEMENT | Facility: CLINIC | Age: 71
End: 2021-11-29

## 2021-11-29 ENCOUNTER — OFFICE VISIT (OUTPATIENT)
Dept: FAMILY MEDICINE | Facility: CLINIC | Age: 71
End: 2021-11-29
Payer: MEDICARE

## 2021-11-29 VITALS
BODY MASS INDEX: 28.45 KG/M2 | HEART RATE: 96 BPM | TEMPERATURE: 97.3 F | DIASTOLIC BLOOD PRESSURE: 74 MMHG | SYSTOLIC BLOOD PRESSURE: 134 MMHG | OXYGEN SATURATION: 96 % | HEIGHT: 59 IN | WEIGHT: 141.12 LBS
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
## 2021-11-29 DIAGNOSIS — S09.92XA BLUNT TRAUMA OF NOSE, INITIAL ENCOUNTER: ICD-10-CM

## 2021-11-29 DIAGNOSIS — C78.5 SECONDARY MALIGNANT NEOPLASM OF LARGE INTESTINE AND RECTUM (H): ICD-10-CM

## 2021-11-29 PROCEDURE — 99213 OFFICE O/P EST LOW 20 MIN: CPT | Performed by: NURSE PRACTITIONER

## 2021-11-29 ASSESSMENT — ANXIETY QUESTIONNAIRES
8. IF YOU CHECKED OFF ANY PROBLEMS, HOW DIFFICULT HAVE THESE MADE IT FOR YOU TO DO YOUR WORK, TAKE CARE OF THINGS AT HOME, OR GET ALONG WITH OTHER PEOPLE?: NOT DIFFICULT AT ALL
7. FEELING AFRAID AS IF SOMETHING AWFUL MIGHT HAPPEN: NOT AT ALL
2. NOT BEING ABLE TO STOP OR CONTROL WORRYING: NOT AT ALL
6. BECOMING EASILY ANNOYED OR IRRITABLE: NOT AT ALL
1. FEELING NERVOUS, ANXIOUS, OR ON EDGE: SEVERAL DAYS
GAD7 TOTAL SCORE: 1
GAD7 TOTAL SCORE: 1
7. FEELING AFRAID AS IF SOMETHING AWFUL MIGHT HAPPEN: NOT AT ALL
5. BEING SO RESTLESS THAT IT IS HARD TO SIT STILL: NOT AT ALL
4. TROUBLE RELAXING: NOT AT ALL
3. WORRYING TOO MUCH ABOUT DIFFERENT THINGS: NOT AT ALL
GAD7 TOTAL SCORE: 1

## 2021-11-29 ASSESSMENT — PATIENT HEALTH QUESTIONNAIRE - PHQ9
SUM OF ALL RESPONSES TO PHQ QUESTIONS 1-9: 0
SUM OF ALL RESPONSES TO PHQ QUESTIONS 1-9: 0

## 2021-11-29 ASSESSMENT — MIFFLIN-ST. JEOR: SCORE: 1060.75

## 2021-11-29 NOTE — PATIENT INSTRUCTIONS
-400mg Ibuprofen 3 times a day  -Afrin Nasal spray as needed for congestion  -Continue Ice packs for swelling  -Return to clinic if symptoms worsen.

## 2021-11-29 NOTE — PROGRESS NOTES
"  Assessment & Plan     Accident due to mechanical fall without injury, initial encounter  Blunt trauma of nose, initial encounter  Nose does not look misaligned, mild swelling and bruising on bridge of nose and left nohemi-orbital area appears to be resolving.  -Ibuprofen 3 times daily PRN for pain  -Afrin x 3 days for congestion  -Continue Ice packs for bruising and swelling.  -If symptoms worsen once swelling resolved, will place ENT referral.      6}     BMI:   Estimated body mass index is 28.5 kg/m  as calculated from the following:    Height as of this encounter: 1.499 m (4' 11\").    Weight as of this encounter: 64 kg (141 lb 1.9 oz).       See Patient Instructions    Return if symptoms worsen or fail to improve.    Leila Moreno NP  [facility]    Bimal Serrano is a 71 year old who presents for the following health issues:    History of Present Illness       She eats 4 or more servings of fruits and vegetables daily.She consumes 0 sweetened beverage(s) daily.She exercises with enough effort to increase her heart rate 30 to 60 minutes per day.  She exercises with enough effort to increase her heart rate 6 days per week.   She is taking medications regularly.       Answers for HPI/ROS submitted by the patient on 11/29/2021  PHQ9 TOTAL SCORE: 0  CAROLA 7 TOTAL SCORE: 1    She called the nurse line     Concern - fall   Onset: friday night   Description: when she was walking her dog she fell down, landed on her nose with bilateral shoulder pain    Intensity: mild  Progression of Symptoms:  Constant but getting better with time   Accompanying Signs & Symptoms: bruising on her face, bilateral shoulder pain, some pain in her left eye when she applies drops due to her wearing contacts   Previous history of similar problem: no   Precipitating factors:        Worsened by: pressure   Alleviating factors:        Improved by: ice   Therapies tried and outcome: pinched nose when she was " bleeding, ice 3x day       Dog planted feet, pulled over sideways.  Remember landing on nose, but sore in shoulders and arms.  Did not lose consciousness.  No dizziness, no constant headaches, no changes in vision in hearing.  Bleeding stopped with pressure. No nasal congestion noted.  Overall pain minimal        Review of Systems   Constitutional, HEENT, cardiovascular, pulmonary, gi and gu systems are negative, except as otherwise noted.      Objective    There were no vitals taken for this visit.  There is no height or weight on file to calculate BMI.  Physical Exam   GENERAL: healthy, alert and no distress  EYES: Eyes grossly normal to inspection, PERRL and conjunctivae and sclerae normal, resolving left nohemi-orbital ecchymosis  HENT: ear canals and TM's normal, nose with mild ecchymosis and swelling localized to the bridge, mild surrounding tenderness.  NECK: no adenopathy, no asymmetry, masses, or scars and thyroid normal to palpation  MS: no gross musculoskeletal defects noted, no edema

## 2021-11-30 ASSESSMENT — ANXIETY QUESTIONNAIRES: GAD7 TOTAL SCORE: 1

## 2021-11-30 ASSESSMENT — PATIENT HEALTH QUESTIONNAIRE - PHQ9: SUM OF ALL RESPONSES TO PHQ QUESTIONS 1-9: 0

## 2021-12-09 ENCOUNTER — THERAPY VISIT (OUTPATIENT)
Dept: PHYSICAL THERAPY | Facility: CLINIC | Age: 71
End: 2021-12-09
Payer: MEDICARE

## 2021-12-09 DIAGNOSIS — R10.32 LEFT GROIN PAIN: ICD-10-CM

## 2021-12-09 DIAGNOSIS — M79.671 RIGHT FOOT PAIN: ICD-10-CM

## 2021-12-09 DIAGNOSIS — M25.561 CHRONIC PAIN OF RIGHT KNEE: ICD-10-CM

## 2021-12-09 DIAGNOSIS — G89.29 CHRONIC PAIN OF RIGHT KNEE: ICD-10-CM

## 2021-12-09 PROCEDURE — 97110 THERAPEUTIC EXERCISES: CPT | Mod: GP | Performed by: PHYSICAL THERAPIST

## 2021-12-09 PROCEDURE — 97112 NEUROMUSCULAR REEDUCATION: CPT | Mod: GP | Performed by: PHYSICAL THERAPIST

## 2021-12-09 NOTE — PROGRESS NOTES
Subjective:  HPI  Physical Exam                    Objective:  System    Physical Exam    General     ROS    Assessment/Plan:    PROGRESS  REPORT    Progress reporting period is from 10/7/21 to 12/9/21.       SUBJECTIVE  Subjective changes noted by patient:   Wearing winter boots walking has caused pain in superior/dorsum of R foot and occasionally at lateral foot too.  The knees still hurt even though she feels stronger.  Is going to return to knee MD next week.  The pain is at medial and lateral joint lines.  THe groin is much better.  Rarely feels this.      Current Pain level:  (0-5/10).     Initial Pain level: 7/10.   Changes in function:  Yes (See Goal flowsheet attached for changes in current functional level)  Adverse reaction to treatment or activity: activity - walking in winter boots    OBJECTIVE  Changes noted in objective findings:  Yes,     B ankle AROM WNL and painfree.    Strength also 5/5 except PF on the R is 4/5 with MMT and also pain with resisted R ankle DF and great toe ext.    Able to do 12 SL calf raises on the L and 0 on the R due to pain.    B hip strength 5/5 except B hip abd 4+/5 this is greatly improved on the R side.    B knee AROM WNL and pain free.    TTP B joint lines and around R patella.       ASSESSMENT/PLAN  Updated problem list and treatment plan: Diagnosis 1:  L groin pain  Pain -  self management, education and home program  Decreased strength - therapeutic exercise, therapeutic activities and home program  Diagnosis 2:  R>L knee pain   Pain -  self management, education and home program  Decreased strength - therapeutic exercise, therapeutic activities and home program  Impaired muscle performance - neuro re-education and home program  Decreased function - therapeutic activities and home program  Diagnosis 3:  R foot pain  Pain -  self management, education and home program  Decreased strength - therapeutic exercise, therapeutic activities and home program  Impaired muscle  performance - neuro re-education and home program  Decreased function - therapeutic activities and home program   STG/LTGs have been met or progress has been made towards goals:  Yes (See Goal flow sheet completed today.)  Assessment of Progress: The patient has had set backs in their progress.  She is improving the most with her groin pain and plans to return to MD to re-evaluate the knee.  Self Management Plans:  Patient has been instructed in a home treatment program.  Patient  has been instructed in self management of symptoms.  I have re-evaluated this patient and find that the nature, scope, duration and intensity of the therapy is appropriate for the medical condition of the patient.  Maggie continues to require the following intervention to meet STG and LTG's:  PT    Recommendations:  This patient would benefit from continued therapy.     Frequency:  2 X a month, once daily  Duration:  for 2 months        Please refer to the daily flowsheet for treatment today, total treatment time and time spent performing 1:1 timed codes.

## 2021-12-13 ENCOUNTER — OFFICE VISIT (OUTPATIENT)
Dept: UROLOGY | Facility: CLINIC | Age: 71
End: 2021-12-13
Attending: OBSTETRICS & GYNECOLOGY
Payer: MEDICARE

## 2021-12-13 VITALS
HEIGHT: 59 IN | HEART RATE: 94 BPM | BODY MASS INDEX: 28.63 KG/M2 | WEIGHT: 142 LBS | SYSTOLIC BLOOD PRESSURE: 147 MMHG | DIASTOLIC BLOOD PRESSURE: 86 MMHG

## 2021-12-13 DIAGNOSIS — N39.41 URGE INCONTINENCE: Primary | ICD-10-CM

## 2021-12-13 PROCEDURE — G0463 HOSPITAL OUTPT CLINIC VISIT: HCPCS

## 2021-12-13 PROCEDURE — 99214 OFFICE O/P EST MOD 30 MIN: CPT | Performed by: OBSTETRICS & GYNECOLOGY

## 2021-12-13 ASSESSMENT — PAIN SCALES - GENERAL: PAINLEVEL: NO PAIN (0)

## 2021-12-13 ASSESSMENT — MIFFLIN-ST. JEOR: SCORE: 1064.99

## 2021-12-13 NOTE — PROGRESS NOTES
2021    Referring Provider: Referred Self, MD  No address on file    Primary Care Provider: Rhea Lin    CC: Urgency urinary incontinence    HPI:  Maggie Navarrete is a 71 year old female who presents for pelvic exam and discussion of management of her urgency incontinence following her new patient virtual visit on 21    This note was copied and pasted from virtual visit with Dr Kennedy on 21    Chief complaint: Urgency incontinence     Subjective      Maggie Navarrete is a 71 year old year old  () who presents for a video visit today for urgency incontinence which has gotten worse recently. Her hx is sif leiyomyosarcoma s/p RACHEL/BSO and omentectomy ( [place]), chemo in  with locally recurrent disease in  resulting in  resection of the recurrent leiomyosarcoma tumor along with partial removal of her right ureter.  First surgery was complicated with DVT and her second surgery complicated by sepsis. She was also recently admitted ( 21-21) for partial SBOs.Has had 3 open and 2 laparascopy abdominal procedures likely the risk factor for her bowel obstruction.      Today she reports that that she is trying to drink more fluids since her hospitalization ( 64oz) but says that she has been having urgency leakage particularly when walking her dog.  She empties her bladder every hour or so during the day and wakes about once during the night. She drinks 64 oz of water, one cup of coffee and a cup of milk. No soda. Occasional alcohol. Occasional stress incontinence. Wears a pantiliner as a precaution. No voiding difficulties or recurrent UTI.           Pelvic Organ prolapse symptoms: Denies vaginal bulge or pressure sensation.     GI: Denies chronic diarrhea, constipation. Denies bothersome fecal or flatal incontinence. No defecatory disfunction        Sexual/Gyn:   Not sexually active. No partners     Objective:   Deferred ( virtual visit)      Asssessment and  plan  Encounter Diagnoses   Name Primary?     Urinary frequency       Urgency of urination Yes     Mixed incontinence           We discussed today about bladder irritants and the pelvic floor exercises for better bladder control. Probably not a good candidate for vaginal estrogen cream given her hx of gyn cancer. We will bring her in for pelvic exam and will consider pelvic PT if she is unable to do pelvic floor exercises properly. We discussed that if conservative management fails, we will try other options.            Relevant Medical History:    Diabetes? No  High Blood pressure? no     Recurrent UTIs? no  Sleep Apnea? no  Obesity? BMI 28.66  History of Blood clots? History of DVT  Other medical problems:Cardiomyopathy,   Leiomyosarcoma s/p RACHEL/BSO and omentectomy ( [place]), chemo in 2016 with locally recurrent disease in 2020 resulting in  resection of the recurrent leiomyosarcoma tumor along with partial removal of her right ureter    Surgical History:      Past Surgical History:   Procedure Laterality Date     ABDOMEN SURGERY  Now    Pain, diarrhea     BREAST SURGERY  2002    right breast benign     COLONOSCOPY  2008    due in 2018     COLONOSCOPY N/A 09/08/2016    Procedure: COMBINED COLONOSCOPY, SINGLE OR MULTIPLE BIOPSY/POLYPECTOMY BY BIOPSY;  Surgeon: Abner Pepper MD;  Location:  GI     COLONOSCOPY N/A 10/18/2021    Procedure: COLONOSCOPY;  Surgeon: Jamila Villarreal MD;  Location: [facility] OR     ENT SURGERY  1975    tonsillectomy     GENITOURINARY SURGERY  1953    bladder surgery      GI SURGERY  2015    Severe constipation     GYN SURGERY  1977    C section     GYN SURGERY  1981    laparoscopy     GYN SURGERY  2007    myomectomy     hysterecomy  11/14/2016    leiomyosarcoma     OTHER SURGICAL HISTORY Right 10/27/2017    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     REMOVE PORT VASCULAR ACCESS Right 05/09/2017    Procedure: REMOVE PORT VASCULAR ACCESS;  Right Port  Removal;  Surgeon: Eric Gibson PA-C;  [facility]: [facility]     REPAIR PTOSIS BILATERAL  02/15/2012    Procedure:REPAIR PTOSIS BILATERAL; BILATERAL UPPER LID PTOSIS REPAIR; Surgeon:BRYCE REYNOLDS; Location: [facility]     TUMOR REMOVAL  06/2020       OB/Gyn History:  OB History   No obstetric history on file.       Medications/Vitamins/Supplements:   Current Outpatient Medications   Medication     alendronate (FOSAMAX) 70 MG tablet     carboxymethylcellulose PF (REFRESH PLUS) 0.5 % ophthalmic solution     famotidine (PEPCID) 10 MG tablet     levothyroxine (SYNTHROID/LEVOTHROID) 75 MCG tablet     ondansetron (ZOFRAN-ODT) 4 MG ODT tab     venlafaxine (EFFEXOR-XR) 75 MG 24 hr capsule     zolpidem (AMBIEN) 5 MG tablet     No current facility-administered medications for this visit.         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     GERD (gastroesophageal reflux disease)      Hx of previous reproductive problem 1980     Kidney stone 2011     Leiomyosarcoma (H) 11/14/2016     Personal history of colonic polyps 2016    Small, benign     PONV (postoperative nausea and vomiting)      Posterior vitreous detachment of left eye 2011     Posterior vitreous detachment of right eye 2011     Ptosis of eyelid, bilateral      Sepsis (H) 07/2020     Stomach problems 2015    Abdominal pain, Diarreha     Thyroid disease     thyroid nodule resolved 2014     ROS  Social History    Social History     Socioeconomic History     Marital status:      Spouse name: Not on file     Number of children: Not on file     Years of education: Not on file     Highest education level: Not on file   Occupational History     Not on file   Tobacco Use     Smoking status: Never Smoker     Smokeless tobacco: Never Used    Substance and Sexual Activity     Alcohol use: Yes     Comment: Social, not often per patient.     Drug use: No     Sexual activity: Not Currently     Partners: Male   Other Topics Concern     Parent/sibling w/ CABG, MI or angioplasty before 65F 55M? 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 Walking daily.    How much calcium per day? Supplement and through diet       How much caffeine per day? 3 times a week    How much vitamin D per day? Supplement    Do you/your family wear seatbelts?  Yes    Do you/your family use safety helmets? NA    Do you/your family use sunscreen? Sometimes    Do you/your family keep firearms in the home? No    Do you/your family have a smoke detector(s)? Yes    Do you feel safe in your home? Yes    Has anyone ever touched you in an unwanted manner? 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  2 grandsons now in [place] with the mother.  In a house.  .          How much exercise per week? Daily walking, will start going to the y    How much calcium per day? Through foods       How much caffeine per day? One cup    How much vitamin D per day? Through foods    Do you/your family wear seatbelts?  Yes    Do you/your family use safety helmets? N/a    Do you/your family use sunscreen? Yes    Do you/your family  keep firearms in the home? No    Do you/your family have a smoke detector(s)? Yes        Reviewed by Nicki Cornejo 10-11-21         Social Determinants of Health     Financial Resource Strain: Not on file   Food Insecurity: Not on file   Transportation Needs: Not on file   Physical Activity: Not on file   Stress: Not on file   Social Connections: Not on file   Intimate Partner Violence: Not At Risk     Fear of Current or Ex-Partner: No     Emotionally Abused: No     Physically Abused: No     Sexually Abused: No   Housing Stability: Not on file       Family History  Family History   Problem Relation Age of Onset     Family History Negative Mother         glaucoma     Glaucoma Mother      Anxiety Disorder Mother      Heart Failure Mother      Diabetes Mother      Coronary Artery Disease Mother      Hypertension Mother      Breast Cancer Mother      Depression Mother      Obesity Mother      Heart Disease Father      Glaucoma Father      Diabetes Father      Coronary Artery Disease Father      Depression Father      Osteoporosis Father      Heart Disease Maternal Grandmother      Diabetes Maternal Grandmother      Heart Disease Paternal Grandmother      Cancer Paternal Grandfather         stomach     Thyroid Disease Sister      Retinal detachment Sister      Hypertension Sister      Hypertension Sister      Lipids Sister      Cerebrovascular Disease Sister      Depression Sister      Heart Disease Paternal Aunt      Heart Disease Paternal Uncle      Anxiety Disorder Sister      Depression Sister      Thyroid Disease Sister      Thyroid Disease Sister      Osteoporosis Other        Allergy    Allergies   Allergen Reactions     Erythromycin GI Disturbance     Tramadol Nausea       Current Outpatient Medications   Medication     alendronate (FOSAMAX) 70 MG tablet     carboxymethylcellulose PF (REFRESH PLUS) 0.5 % ophthalmic solution     famotidine (PEPCID) 10 MG tablet     levothyroxine (SYNTHROID/LEVOTHROID) 75 MCG  "tablet     ondansetron (ZOFRAN-ODT) 4 MG ODT tab     venlafaxine (EFFEXOR-XR) 75 MG 24 hr capsule     zolpidem (AMBIEN) 5 MG tablet     No current facility-administered medications for this visit.       Physical Exam: BP (!) 147/86   Pulse 94   Ht 1.499 m (4' 11.02\")   Wt 64.4 kg (142 lb)   BMI 28.66 kg/m      Gen:  is alert, comfortable in no acute distress,   Abdomen: Abdomen is soft, non-tender, non-distended,   Lungs: non-labored breathing.       Pelvic Exam:   Normal external female genitalia. The urethra was normal.    Vagina: severe atrophy, well supported, no abnormal discharge.   Uterus: surgically absent  Ovaries: surgically absent  Vulva: no lesions, 2/4 vestibular pain  Rectal: deferred    Pelvic floor strength: 4/5 kegels.    Pelvic floor muscles: Tight levators, some relaxation when instructed. Myalgia mostly in her obturator internus muscles. Mild myalgia in the levators.     POPQ EXAM FOR PROLAPSE SEVERITY  No prolapse    Voiding trial:    VOID 75 ml  PVR 7 mL by Bladder ultrasound  Leak with Cough stress test :not done    Labs:   Color Urine (no units)   Date Value   11/17/2021 Yellow   06/27/2020 Yellow     Appearance Urine (no units)   Date Value   11/17/2021 Clear   06/27/2020 Slightly Cloudy     Glucose Urine (mg/dL)   Date Value   11/17/2021 Negative   06/27/2020 Negative     Bilirubin Urine (no units)   Date Value   11/17/2021 Negative   06/27/2020 Negative     Ketones Urine (mg/dL)   Date Value   11/17/2021 Negative   06/27/2020 Negative     Specific Gravity Urine (no units)   Date Value   11/17/2021 1.015   06/27/2020 1.016     pH Urine   Date Value   11/17/2021 6.0   06/27/2020 7.5 pH (H)     Protein Albumin Urine (mg/dL)   Date Value   11/17/2021 Negative   06/27/2020 300 (A)     Urobilinogen Urine (E.U./dL)   Date Value   11/17/2021 0.2     Nitrite Urine (no units)   Date Value   11/17/2021 Negative   06/27/2020 Positive (A)     Leukocyte Esterase Urine (no units)   Date Value "   11/17/2021 Negative   06/27/2020 Large (A)     CBC RESULTS:   Recent Labs   Lab Test 11/17/21  1411   WBC 7.8   RBC 4.52   HGB 13.4   HCT 41.8   MCV 93   MCH 29.6   MCHC 32.1   RDW 14.6          A/P: Maggie Navarrete is a 71 year old F with    Encounter Diagnosis   Name Primary?     Urge incontinence Yes       -Has Pelvic PT appointment in January. She will do this along with the behavioral modifications we discussed.  We discussed today that her levator myalgia may be contributing to the irritative bladder symptoms that she has. Her atrophy may also be contributing although she is not a great candidate for vaginal estrogen cream given recent recurrent disease of leimyosarcoma of the uterus. Encouraged her to use replens and/or coconut oil for vaginal lubrication  -   I spent a total of 30 minutes with  Maggie Navarrete  on the date of the encounter in chart review, face to face patient visit, review of tests, documentation and/or discussion with other providers about the issues documented above.     Suad Kennedy MD, [facility]  , Department of OBGYN  Female Pelvic Medicine and Reconstructive Surgery ( Urogynecology)  CC  Patient Care Team:  Rhea Lin MD as PCP - General (Internal Medicine)  Filemon Rodríguez MD as MD (Family Practice)  Omaira Saul APRN CNP as Nurse Practitioner (Nurse Practitioner)  Ambika Dickerson RN as Clinic Care Coordinator (Oncology)  Ab Gutierrez MD as Assigned Cancer Care Provider  Jose M Keys MD as MD (Otolaryngology)  Jose M Keys MD as Assigned Surgical Provider  Kristine Flynn MD PhD as Assigned PCP  Suad Kennedy MD as MD (OB/Gyn)  Kym Ferraro GC as Assigned OBGYN Provider  SELF, REFERRED

## 2021-12-14 ENCOUNTER — TRANSFERRED RECORDS (OUTPATIENT)
Dept: HEALTH INFORMATION MANAGEMENT | Facility: CLINIC | Age: 71
End: 2021-12-14
Payer: MEDICARE

## 2021-12-21 ENCOUNTER — THERAPY VISIT (OUTPATIENT)
Dept: PHYSICAL THERAPY | Facility: CLINIC | Age: 71
End: 2021-12-21
Payer: MEDICARE

## 2021-12-21 DIAGNOSIS — M25.561 CHRONIC PAIN OF RIGHT KNEE: ICD-10-CM

## 2021-12-21 DIAGNOSIS — G89.29 CHRONIC PAIN OF RIGHT KNEE: ICD-10-CM

## 2021-12-21 DIAGNOSIS — M79.671 RIGHT FOOT PAIN: ICD-10-CM

## 2021-12-21 DIAGNOSIS — R10.32 LEFT GROIN PAIN: ICD-10-CM

## 2021-12-21 PROCEDURE — 97110 THERAPEUTIC EXERCISES: CPT | Mod: GP | Performed by: PHYSICAL THERAPIST

## 2021-12-21 PROCEDURE — 97112 NEUROMUSCULAR REEDUCATION: CPT | Mod: GP | Performed by: PHYSICAL THERAPIST

## 2021-12-21 ASSESSMENT — ACTIVITIES OF DAILY LIVING (ADL)
GO DOWN STAIRS: ACTIVITY IS NOT DIFFICULT
KNEEL ON THE FRONT OF YOUR KNEE: ACTIVITY IS MINIMALLY DIFFICULT
GIVING WAY, BUCKLING OR SHIFTING OF KNEE: I DO NOT HAVE THE SYMPTOM
LIMPING: I DO NOT HAVE THE SYMPTOM
SWELLING: I DO NOT HAVE THE SYMPTOM
STIFFNESS: I HAVE THE SYMPTOM BUT IT DOES NOT AFFECT MY ACTIVITY
WALK: ACTIVITY IS NOT DIFFICULT
KNEE_ACTIVITY_OF_DAILY_LIVING_SUM: 65
GO UP STAIRS: ACTIVITY IS NOT DIFFICULT
PAIN: I HAVE THE SYMPTOM BUT IT DOES NOT AFFECT MY ACTIVITY
KNEE_ACTIVITY_OF_DAILY_LIVING_SCORE: 92.86
SIT WITH YOUR KNEE BENT: ACTIVITY IS NOT DIFFICULT
STAND: ACTIVITY IS NOT DIFFICULT
WEAKNESS: I DO NOT HAVE THE SYMPTOM
HOW_WOULD_YOU_RATE_THE_CURRENT_FUNCTION_OF_YOUR_KNEE_DURING_YOUR_USUAL_DAILY_ACTIVITIES_ON_A_SCALE_FROM_0_TO_100_WITH_100_BEING_YOUR_LEVEL_OF_KNEE_FUNCTION_PRIOR_TO_YOUR_INJURY_AND_0_BEING_THE_INABILITY_TO_PERFORM_ANY_OF_YOUR_USUAL_DAILY_ACTIVITIES?: 90
RISE FROM A CHAIR: ACTIVITY IS NOT DIFFICULT
SQUAT: ACTIVITY IS SOMEWHAT DIFFICULT
HOW_WOULD_YOU_RATE_THE_OVERALL_FUNCTION_OF_YOUR_KNEE_DURING_YOUR_USUAL_DAILY_ACTIVITIES?: NEARLY NORMAL
AS_A_RESULT_OF_YOUR_KNEE_INJURY,_HOW_WOULD_YOU_RATE_YOUR_CURRENT_LEVEL_OF_DAILY_ACTIVITY?: NORMAL
RAW_SCORE: 65

## 2021-12-28 ENCOUNTER — HOSPITAL ENCOUNTER (INPATIENT)
Facility: CLINIC | Age: 71
LOS: 2 days | Discharge: HOME | DRG: 390 | End: 2021-12-30
Attending: EMERGENCY MEDICINE | Admitting: INTERNAL MEDICINE
Payer: MEDICARE

## 2021-12-28 ENCOUNTER — APPOINTMENT (OUTPATIENT)
Dept: CT IMAGING | Facility: CLINIC | Age: 71
DRG: 390 | End: 2021-12-28
Attending: EMERGENCY MEDICINE
Payer: MEDICARE

## 2021-12-28 DIAGNOSIS — K56.609 SMALL BOWEL OBSTRUCTION (H): ICD-10-CM

## 2021-12-28 PROBLEM — D72.829 LEUKOCYTOSIS: Status: ACTIVE | Noted: 2021-12-28

## 2021-12-28 LAB
ALBUMIN SERPL-MCNC: 3.8 G/DL (ref 3.4–5)
ALP SERPL-CCNC: 80 U/L (ref 40–150)
ALT SERPL W P-5'-P-CCNC: 25 U/L (ref 0–50)
ANION GAP SERPL CALCULATED.3IONS-SCNC: 6 MMOL/L (ref 3–14)
AST SERPL W P-5'-P-CCNC: 13 U/L (ref 0–45)
BASOPHILS # BLD AUTO: 0.1 10E3/UL (ref 0–0.2)
BASOPHILS NFR BLD AUTO: 0 %
BILIRUB SERPL-MCNC: 0.7 MG/DL (ref 0.2–1.3)
BUN SERPL-MCNC: 23 MG/DL (ref 7–30)
CALCIUM SERPL-MCNC: 10 MG/DL (ref 8.5–10.1)
CHLORIDE BLD-SCNC: 104 MMOL/L (ref 94–109)
CO2 SERPL-SCNC: 27 MMOL/L (ref 20–32)
CREAT SERPL-MCNC: 0.98 MG/DL (ref 0.52–1.04)
EOSINOPHIL # BLD AUTO: 0.1 10E3/UL (ref 0–0.7)
EOSINOPHIL NFR BLD AUTO: 1 %
ERYTHROCYTE [DISTWIDTH] IN BLOOD BY AUTOMATED COUNT: 14.7 % (ref 10–15)
GFR SERPL CREATININE-BSD FRML MDRD: 61 ML/MIN/1.73M2
GLUCOSE BLD-MCNC: 148 MG/DL (ref 70–99)
HCT VFR BLD AUTO: 49 % (ref 35–47)
HGB BLD-MCNC: 15.8 G/DL (ref 11.7–15.7)
IMM GRANULOCYTES # BLD: 0.1 10E3/UL
IMM GRANULOCYTES NFR BLD: 1 %
LIPASE SERPL-CCNC: 71 U/L (ref 73–393)
LYMPHOCYTES # BLD AUTO: 1.9 10E3/UL (ref 0.8–5.3)
LYMPHOCYTES NFR BLD AUTO: 11 %
MCH RBC QN AUTO: 29.2 PG (ref 26.5–33)
MCHC RBC AUTO-ENTMCNC: 32.2 G/DL (ref 31.5–36.5)
MCV RBC AUTO: 90 FL (ref 78–100)
MONOCYTES # BLD AUTO: 1.3 10E3/UL (ref 0–1.3)
MONOCYTES NFR BLD AUTO: 7 %
NEUTROPHILS # BLD AUTO: 13.4 10E3/UL (ref 1.6–8.3)
NEUTROPHILS NFR BLD AUTO: 80 %
NRBC # BLD AUTO: 0 10E3/UL
NRBC BLD AUTO-RTO: 0 /100
PLATELET # BLD AUTO: 262 10E3/UL (ref 150–450)
POTASSIUM BLD-SCNC: 4.7 MMOL/L (ref 3.4–5.3)
PROT SERPL-MCNC: 7.3 G/DL (ref 6.8–8.8)
RBC # BLD AUTO: 5.42 10E6/UL (ref 3.8–5.2)
SARS-COV-2 RNA RESP QL NAA+PROBE: NEGATIVE
SODIUM SERPL-SCNC: 137 MMOL/L (ref 133–144)
WBC # BLD AUTO: 16.8 10E3/UL (ref 4–11)

## 2021-12-28 PROCEDURE — C9803 HOPD COVID-19 SPEC COLLECT: HCPCS

## 2021-12-28 PROCEDURE — 250N000011 HC RX IP 250 OP 636: Performed by: EMERGENCY MEDICINE

## 2021-12-28 PROCEDURE — 96376 TX/PRO/DX INJ SAME DRUG ADON: CPT

## 2021-12-28 PROCEDURE — 96374 THER/PROPH/DIAG INJ IV PUSH: CPT

## 2021-12-28 PROCEDURE — 83690 ASSAY OF LIPASE: CPT | Performed by: EMERGENCY MEDICINE

## 2021-12-28 PROCEDURE — 250N000011 HC RX IP 250 OP 636: Performed by: INTERNAL MEDICINE

## 2021-12-28 PROCEDURE — 250N000009 HC RX 250: Performed by: EMERGENCY MEDICINE

## 2021-12-28 PROCEDURE — C9113 INJ PANTOPRAZOLE SODIUM, VIA: HCPCS | Performed by: PHYSICIAN ASSISTANT

## 2021-12-28 PROCEDURE — 36415 COLL VENOUS BLD VENIPUNCTURE: CPT | Performed by: EMERGENCY MEDICINE

## 2021-12-28 PROCEDURE — 258N000003 HC RX IP 258 OP 636: Performed by: EMERGENCY MEDICINE

## 2021-12-28 PROCEDURE — 87635 SARS-COV-2 COVID-19 AMP PRB: CPT | Performed by: EMERGENCY MEDICINE

## 2021-12-28 PROCEDURE — 99223 1ST HOSP IP/OBS HIGH 75: CPT | Mod: AI | Performed by: INTERNAL MEDICINE

## 2021-12-28 PROCEDURE — 250N000011 HC RX IP 250 OP 636: Performed by: PHYSICIAN ASSISTANT

## 2021-12-28 PROCEDURE — 120N000001 HC R&B MED SURG/OB

## 2021-12-28 PROCEDURE — 74177 CT ABD & PELVIS W/CONTRAST: CPT | Mod: MG

## 2021-12-28 PROCEDURE — 99285 EMERGENCY DEPT VISIT HI MDM: CPT | Mod: 25

## 2021-12-28 PROCEDURE — 85025 COMPLETE CBC W/AUTO DIFF WBC: CPT | Performed by: EMERGENCY MEDICINE

## 2021-12-28 PROCEDURE — 99222 1ST HOSP IP/OBS MODERATE 55: CPT | Performed by: SURGERY

## 2021-12-28 PROCEDURE — 96375 TX/PRO/DX INJ NEW DRUG ADDON: CPT

## 2021-12-28 PROCEDURE — 82040 ASSAY OF SERUM ALBUMIN: CPT | Performed by: EMERGENCY MEDICINE

## 2021-12-28 PROCEDURE — 96361 HYDRATE IV INFUSION ADD-ON: CPT

## 2021-12-28 PROCEDURE — 80053 COMPREHEN METABOLIC PANEL: CPT | Performed by: EMERGENCY MEDICINE

## 2021-12-28 RX ORDER — ONDANSETRON 2 MG/ML
INJECTION INTRAMUSCULAR; INTRAVENOUS
Status: DISCONTINUED
Start: 2021-12-28 | End: 2021-12-28 | Stop reason: HOSPADM

## 2021-12-28 RX ORDER — NALOXONE HYDROCHLORIDE 0.4 MG/ML
0.4 INJECTION, SOLUTION INTRAMUSCULAR; INTRAVENOUS; SUBCUTANEOUS
Status: DISCONTINUED | OUTPATIENT
Start: 2021-12-28 | End: 2021-12-30 | Stop reason: HOSPADM

## 2021-12-28 RX ORDER — PROCHLORPERAZINE 25 MG
12.5 SUPPOSITORY, RECTAL RECTAL EVERY 12 HOURS PRN
Status: DISCONTINUED | OUTPATIENT
Start: 2021-12-28 | End: 2021-12-30 | Stop reason: HOSPADM

## 2021-12-28 RX ORDER — MULTIPLE VITAMINS W/ MINERALS TAB 9MG-400MCG
1 TAB ORAL DAILY PRN
COMMUNITY

## 2021-12-28 RX ORDER — NALOXONE HYDROCHLORIDE 0.4 MG/ML
0.2 INJECTION, SOLUTION INTRAMUSCULAR; INTRAVENOUS; SUBCUTANEOUS
Status: DISCONTINUED | OUTPATIENT
Start: 2021-12-28 | End: 2021-12-30 | Stop reason: HOSPADM

## 2021-12-28 RX ORDER — SODIUM CHLORIDE AND POTASSIUM CHLORIDE 150; 900 MG/100ML; MG/100ML
INJECTION, SOLUTION INTRAVENOUS CONTINUOUS
Status: DISCONTINUED | OUTPATIENT
Start: 2021-12-28 | End: 2021-12-30 | Stop reason: HOSPADM

## 2021-12-28 RX ORDER — ONDANSETRON 4 MG/1
4 TABLET, ORALLY DISINTEGRATING ORAL EVERY 6 HOURS PRN
Status: DISCONTINUED | OUTPATIENT
Start: 2021-12-28 | End: 2021-12-30 | Stop reason: HOSPADM

## 2021-12-28 RX ORDER — MORPHINE SULFATE 4 MG/ML
4 INJECTION, SOLUTION INTRAMUSCULAR; INTRAVENOUS ONCE
Status: COMPLETED | OUTPATIENT
Start: 2021-12-28 | End: 2021-12-28

## 2021-12-28 RX ORDER — PROCHLORPERAZINE MALEATE 5 MG
5 TABLET ORAL EVERY 6 HOURS PRN
Status: DISCONTINUED | OUTPATIENT
Start: 2021-12-28 | End: 2021-12-30 | Stop reason: HOSPADM

## 2021-12-28 RX ORDER — IOPAMIDOL 755 MG/ML
71 INJECTION, SOLUTION INTRAVASCULAR ONCE
Status: COMPLETED | OUTPATIENT
Start: 2021-12-28 | End: 2021-12-28

## 2021-12-28 RX ORDER — FAMOTIDINE 10 MG
10 TABLET ORAL 2 TIMES DAILY PRN
COMMUNITY
End: 2023-09-11

## 2021-12-28 RX ORDER — LIDOCAINE 40 MG/G
CREAM TOPICAL
Status: DISCONTINUED | OUTPATIENT
Start: 2021-12-28 | End: 2021-12-30 | Stop reason: HOSPADM

## 2021-12-28 RX ORDER — ONDANSETRON 2 MG/ML
4 INJECTION INTRAMUSCULAR; INTRAVENOUS EVERY 6 HOURS PRN
Status: DISCONTINUED | OUTPATIENT
Start: 2021-12-28 | End: 2021-12-30 | Stop reason: HOSPADM

## 2021-12-28 RX ORDER — ZOLPIDEM TARTRATE 5 MG/1
5 TABLET ORAL
Status: DISCONTINUED | OUTPATIENT
Start: 2021-12-28 | End: 2021-12-30 | Stop reason: HOSPADM

## 2021-12-28 RX ORDER — ONDANSETRON 2 MG/ML
4 INJECTION INTRAMUSCULAR; INTRAVENOUS ONCE
Status: COMPLETED | OUTPATIENT
Start: 2021-12-28 | End: 2021-12-28

## 2021-12-28 RX ORDER — HYDROMORPHONE HCL IN WATER/PF 6 MG/30 ML
0.2 PATIENT CONTROLLED ANALGESIA SYRINGE INTRAVENOUS
Status: DISCONTINUED | OUTPATIENT
Start: 2021-12-28 | End: 2021-12-30 | Stop reason: HOSPADM

## 2021-12-28 RX ADMIN — ONDANSETRON 4 MG: 2 INJECTION INTRAMUSCULAR; INTRAVENOUS at 23:19

## 2021-12-28 RX ADMIN — SODIUM CHLORIDE 1000 ML: 9 INJECTION, SOLUTION INTRAVENOUS at 09:19

## 2021-12-28 RX ADMIN — POTASSIUM CHLORIDE AND SODIUM CHLORIDE: 900; 150 INJECTION, SOLUTION INTRAVENOUS at 16:32

## 2021-12-28 RX ADMIN — ONDANSETRON 4 MG: 2 INJECTION INTRAMUSCULAR; INTRAVENOUS at 09:20

## 2021-12-28 RX ADMIN — HYDROMORPHONE HYDROCHLORIDE 0.2 MG: 0.2 INJECTION, SOLUTION INTRAMUSCULAR; INTRAVENOUS; SUBCUTANEOUS at 23:28

## 2021-12-28 RX ADMIN — MORPHINE SULFATE 4 MG: 4 INJECTION INTRAVENOUS at 09:48

## 2021-12-28 RX ADMIN — DIATRIZOATE MEGLUMINE AND DIATRIZOATE SODIUM 90 ML: 660; 100 SOLUTION ORAL; RECTAL at 16:37

## 2021-12-28 RX ADMIN — PANTOPRAZOLE SODIUM 40 MG: 40 INJECTION, POWDER, FOR SOLUTION INTRAVENOUS at 20:20

## 2021-12-28 RX ADMIN — ONDANSETRON 4 MG: 2 INJECTION INTRAMUSCULAR; INTRAVENOUS at 02:06

## 2021-12-28 RX ADMIN — HYDROMORPHONE HYDROCHLORIDE 0.2 MG: 0.2 INJECTION, SOLUTION INTRAMUSCULAR; INTRAVENOUS; SUBCUTANEOUS at 20:12

## 2021-12-28 RX ADMIN — IOPAMIDOL 71 ML: 755 INJECTION, SOLUTION INTRAVENOUS at 02:33

## 2021-12-28 RX ADMIN — ONDANSETRON 4 MG: 2 INJECTION INTRAMUSCULAR; INTRAVENOUS at 16:48

## 2021-12-28 RX ADMIN — SODIUM CHLORIDE 60 ML: 900 INJECTION INTRAVENOUS at 02:33

## 2021-12-28 ASSESSMENT — ACTIVITIES OF DAILY LIVING (ADL)
ADLS_ACUITY_SCORE: 4
ADLS_ACUITY_SCORE: 8
ADLS_ACUITY_SCORE: 4
DOING_ERRANDS_INDEPENDENTLY_DIFFICULTY: NO
ADLS_ACUITY_SCORE: 8
DRESSING/BATHING_DIFFICULTY: NO
ADLS_ACUITY_SCORE: 4
TOILETING_ISSUES: NO
WEAR_GLASSES_OR_BLIND: YES
FALL_HISTORY_WITHIN_LAST_SIX_MONTHS: NO
DIFFICULTY_COMMUNICATING: NO
WALKING_OR_CLIMBING_STAIRS_DIFFICULTY: NO
DIFFICULTY_EATING/SWALLOWING: NO
ADLS_ACUITY_SCORE: 4
CONCENTRATING,_REMEMBERING_OR_MAKING_DECISIONS_DIFFICULTY: NO
ADLS_ACUITY_SCORE: 8
ADLS_ACUITY_SCORE: 8
HEARING_DIFFICULTY_OR_DEAF: NO
ADLS_ACUITY_SCORE: 8
ADLS_ACUITY_SCORE: 8

## 2021-12-28 ASSESSMENT — ENCOUNTER SYMPTOMS
SHORTNESS OF BREATH: 0
BLOOD IN STOOL: 0
NAUSEA: 1
ABDOMINAL PAIN: 1
FEVER: 0
VOMITING: 1

## 2021-12-28 NOTE — PROGRESS NOTES
Admission    Patient arrives to room 612 via cart from ER.  Care plan note: Patient alert/oriented X4, up independently. Lungs clear on RA. Bowel sounds hypo, no nausea/vomitting. Vss, on RA. Gatrograffin/gastroview given (zofran before).    Inpatient nursing criteria listed below were met:    Did you put disposition on whiteboard and in sticky note: NA  Full skin assessment done (add LDA if skin issue present) :Yes  Isolation education started/completed NA  Patient allergies verified with patient: NA  Fall Risk? (Care plan updated, Education given and documented) NA  Primary Care Plan initiated: Yes  Home medications documented in belongings flowsheet: NA  Patient belongings documented in belongings flowsheet: Yes  Reminder note (belongings/ medications) placed in discharge instructions:Yes  Admission profile/ required documentation complete: Yes  If patient is a 72 hour hold/Commitment are belongings removed from room and locked up? NA

## 2021-12-28 NOTE — PROGRESS NOTES
RECEIVING UNIT ED HANDOFF REVIEW    ED Nurse Handoff Report was reviewed by: Isabella Perrin RN on December 28, 2021 at 2:49 PM

## 2021-12-28 NOTE — ED NOTES
[facility]  ED Nurse Handoff Report    ED Chief complaint: Abdominal Pain and Nausea & Vomiting      ED Diagnosis:   Final diagnoses:   Small bowel obstruction (H)       Code Status: hospitalist to address    Allergies:   Allergies   Allergen Reactions     Erythromycin GI Disturbance     Tramadol Nausea       Patient Story: patient brought to ED by friend for abdominal pain since Sunday and vomiting. Patient has hx of bowel obstructions and has had multiple surgeries on her abdomen.     Patient had CT scan showing:IMPRESSION:   1.  Mechanical small bowel obstruction with transition point in the anterior left lower quadrant. No free air.      Focused Assessment:  Patient given IV fluids, pain medication and zofran for nausea.    Treatments and/or interventions provided: see MAR  Patient's response to treatments and/or interventions: pain decreased, nausea controlled    To be done/followed up on inpatient unit:  none at this time    Does this patient have any cognitive concerns?: N/A    Activity level - Baseline/Home:  Independent  Activity Level - Current:   Independent    Patient's Preferred language: English   Needed?: No    Isolation: None  Infection: Not Applicable  Patient tested for COVID 19 prior to admission: YES  Bariatric?: No    Vital Signs:   Vitals:    12/28/21 0153 12/28/21 0850 12/28/21 0851 12/28/21 0906   BP: (!) 152/81 (!) 143/80 (!) 143/80    Pulse: 103 94 96    Resp: 13  13    Temp: 97.7  F (36.5  C)      TempSrc: Temporal      SpO2: 100%  98% 99%   Weight: 63.5 kg (140 lb)          Cardiac Rhythm:     Was the PSS-3 completed:   Yes  What interventions are required if any?               Family Comments:   OBS brochure/video discussed/provided to patient/family: N/A              Name of person given brochure if not patient:               Relationship to patient:     For the majority of the shift this patient's behavior was Green.   Behavioral interventions performed were  .    ED NURSE PHONE NUMBER: [phone]

## 2021-12-28 NOTE — ED PROVIDER NOTES
History   Chief Complaint:  Abdominal Pain and Nausea & Vomiting     Maggie Navarrete is a 71 year old female with history of sepsis, leiomyosarcoma, among others, who presents with abdominal pain, nausea and vomiting. The patient reports that the symptoms started on Monday morning at 0200, became too intense and did not improve like her past bowel obstructions. This is her third bowel obstruction, and she was seen here in [facility] on 2021 for the last episode. She denies any chest pain, shortness of breath, fever, or bloody stools.     Review of Systems   Constitutional: Negative for fever.   Respiratory: Negative for shortness of breath.    Cardiovascular: Negative for chest pain.   Gastrointestinal: Positive for abdominal pain, nausea and vomiting. Negative for blood in stool.   All other systems reviewed and are negative.    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  Closed nondisplaced fracture of right metatarsal  DVT  Hypothyroidism  Neuropathy, peripheral  Osteoporosis  Partial small bowel obstruction  Dyslipidemia  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    Past Surgical History:    Abdominal surgery  Right breast benign  Colonoscopy x3  Tonsillectomy  Bladder surgery  GI surgery for severe constipation   section  Laparoscopy  Myomectomy  Hysterectomy  Leiomyosarcoma  Right thyroid lobectomy  Remove port vascular access, right  Repair ptosis  bilateral  Tumor removal  Ureteroneocystostomy, right  Right ureter stent placement  Resection of pelvic mass, right  Blepharoptosis repair, bilateral  Salpingo-oophorectomy, bilateral    Family History:    Glaucoma  Anxiety disorder  Heart failure  Diabetes mellitus  CAD  Hypertension  Breast cancer  Depression  Obesity  Osteoporosis  Thyroid disease  Retinal detachment  Arrhythmia  Traumatic brain injury    Social History:  The patient presented alone.  The patient arrived in a private vehicle.    Physical Exam     Patient Vitals for the past 24 hrs:   BP Temp Temp src Pulse Resp SpO2 Weight   12/28/21 0906 -- -- -- -- -- 99 % --   12/28/21 0851 (!) 143/80 -- -- 96 13 98 % --   12/28/21 0850 (!) 143/80 -- -- 94 -- -- --   12/28/21 0153 (!) 152/81 97.7  F (36.5  C) Temporal 103 13 100 % 63.5 kg (140 lb)     Physical Exam    General:  Sitting on bed with her friend at bedside.   HENT:  No obvious trauma to head  Right Ear:  External ear normal.   Left Ear:  External ear normal.   Nose:  Nose normal.   Eyes:  Conjunctivae and EOM are normal. Pupils are equal, round, and reactive.   Neck: Normal range of motion. Neck supple. No tracheal deviation present.   CV:  Normal heart sounds. No murmur heard.  Pulm/Chest: Effort normal and breath sounds normal.   Abd: Soft. No distension. Mild diffuse abdominal tenderness. There is no rigidity, no rebound and no guarding.   M/S: Normal range of motion.   Neuro: Alert. GCS 15.  Skin: Skin is warm and dry. No rash noted. Not diaphoretic.   Psych: Normal mood and affect. Behavior is normal.     Mild diffuse abnormal tenderness    Emergency Department Course     Imaging:  CT Abdomen Pelvis w Contrast   Final Result   IMPRESSION:    1.  Mechanical small bowel obstruction with transition point in the anterior left lower quadrant. No free air.          Laboratory:  Labs Ordered and Resulted from Time of ED Arrival to Time of ED Departure   COMPREHENSIVE METABOLIC PANEL - Abnormal        Result Value    Sodium 137      Potassium 4.7      Chloride 104      Carbon Dioxide (CO2) 27      Anion Gap 6      Urea Nitrogen 23      Creatinine 0.98      Calcium 10.0      Glucose 148 (*)     Alkaline Phosphatase 80      AST 13      ALT 25      Protein Total 7.3      Albumin 3.8      Bilirubin Total 0.7      GFR Estimate 61     LIPASE - Abnormal    Lipase 71 (*)    CBC WITH PLATELETS AND DIFFERENTIAL - Abnormal    WBC Count 16.8 (*)     RBC Count 5.42 (*)     Hemoglobin 15.8 (*)     Hematocrit 49.0 (*)     MCV 90      MCH 29.2      MCHC 32.2      RDW 14.7      Platelet Count 262      % Neutrophils 80      % Lymphocytes 11      % Monocytes 7      % Eosinophils 1      % Basophils 0      % Immature Granulocytes 1      NRBCs per 100 WBC 0      Absolute Neutrophils 13.4 (*)     Absolute Lymphocytes 1.9      Absolute Monocytes 1.3      Absolute Eosinophils 0.1      Absolute Basophils 0.1      Absolute Immature Granulocytes 0.1      Absolute NRBCs 0.0     COVID-19 VIRUS (CORONAVIRUS) BY PCR          Emergency Department Course:    Reviewed:  I reviewed nursing notes, vitals, past medical history and Care Everywhere.    Assessments:  0930 I obtained history and examined the patient as noted above. I also explained findings.     Interventions:  Medications   ondansetron (ZOFRAN) 2 MG/ML injection (has no administration in time range)   ondansetron (ZOFRAN) injection 4 mg (4 mg Intravenous Given 12/28/21 0206)   iopamidol (ISOVUE-370) solution 71 mL (71 mLs Intravenous Given 12/28/21 0233)   Saline (60 mLs Intravenous Given 12/28/21 0233)   0.9% sodium chloride BOLUS (1,000 mLs Intravenous New Bag 12/28/21 0919)   ondansetron (ZOFRAN) injection 4 mg (4 mg Intravenous Given 12/28/21 0920)   morphine (PF) injection 4 mg (4 mg Intravenous Given 12/28/21 0948)     Disposition:  The patient was admitted to the hospital under the care of Dr. Durant.     Impression & Plan   Medical Decision Making:  Maggie Navarrete is a very  pleasant 71 year old year old patient who presents to the emergency department with concern of abdominal pain.  The patient has had prior abdominal surgeries and 2 prior bowel obstructions.  She was not seen for the first one, but was for the second one which was in November of this year.  It resolved at that time without surgery.  She has had pain for approximately 24 hours prior to presentation to the ED.  Labs reveal leukocytosis and CT confirmed small bowel obstruction with a transition point in the left lower quadrant.  She felt better after the above interventions.  I spoke to the hospitalist, Dr. Durant who has agreed to admit the patient for continued evaluation and treatment.    Diagnosis:    ICD-10-CM    1. Small bowel obstruction (H)  K56.609        Discharge Medications:  New Prescriptions    No medications on file       Scribe Disclosure:  Vivian ROBB, am serving as a scribe at 6:17 AM on 12/28/2021 to document services personally performed by Alex Krueger DO based on my observations and the provider's statements to me.      Alex Krueger DO  12/28/21 1026

## 2021-12-28 NOTE — H&P
"[facility]    History and Physical - Hospitalist Service       Date of Admission:  12/28/2021    Assessment & Plan      Maggie Navarrete is a 71 year old female admitted on 12/28/2021. She has a history of leiomyosarcoma, follows with Oncology, and history of partial small bowel obstruction, admitted to [facility] from 11/4-11/6/2021.  She returns with abdominal pain, CT shows findings of mechanical small bowel obstruction.    Principal Problem:    Small bowel obstruction (H)    Admit as inpatient    N.p.o.    Treat with IV fluids, pain relief    General surgery consult requested    No plan to place NG tube unless patient develops intractable vomiting, or patient desires for symptom relief  Active Problems:    Generalized anxiety disorder    Resume Effexor when taking oral    Leiomyosarcoma (H)    Follow-up with oncology as outpatient    Hypothyroidism    Okay to hold thyroid hormone while n.p.o., resume when taking oral    Leukocytosis    Possibly related to stress    Recheck CBC        Diet:   NPO  DVT Prophylaxis: Pneumatic Compression Devices.  Begin heparin if no surgery or procedure planned.  Kwan Catheter: Not present  Central Lines: None  Code Status:   Full    Clinically Significant Risk Factors Present on Admission                # Overweight: last Body mass index is 28.26 kg/m .      Disposition Plan   Expected Discharge:  3-4 days   Anticipated discharge location:  Awaiting care coordination huddle  Delays:     when bowel obstruction resolves        The patient's care was discussed with the Patient and ED MD, Dr. Krueger.    Cyn Durant MD  [facility]  Securely message with the Vocera Web Console (learn more here)  Text page via [employer] Paging/Directory        ______________________________________________________________________    Chief Complaint   \"It started yesterday.\"      History of Present Illness   Maggie Navarrete is a 71 " year old female with history of leiomyosarcoma, history of partial small bowel obstruction and multiple prior abdominal surgeries, who presents with abdominal and vomiting.    Maggie was admitted to [facility] from 11/4-11/6/2021 with similar symptoms, found to have abdominal pain.  CT done then showed partial small bowel obstruction.  Surgery was consulted, symptoms resolved with conservative care.    She returns saying she had sudden onset of low abdominal pain yesterday.  She describes the pain as crampy, pain reaches 8 out of 10 in intensity at its worst.  She vomited once.  She had several bowel movements yesterday, no black or bloody stools.  She denies fever or chills.    Because pain persisted, she came to the emergency department for evaluation where CT scan of the abdomen suggest mechanical small bowel obstruction with transition point in the anterior left lower quadrant.  She is admitted for further evaluation and treatment.    Review of Systems    The 10 point Review of Systems is negative other than noted in the HPI or here.     Past Medical History    I have reviewed this patient's medical history and updated it with pertinent information if needed.   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     GERD (gastroesophageal reflux disease)      Hx of previous reproductive problem 1980     Kidney stone 2011     Leiomyosarcoma (H) 11/14/2016     Personal history of colonic polyps 2016    Small, benign     PONV (postoperative nausea and vomiting)      Posterior vitreous detachment of left eye 2011     Posterior vitreous detachment of right eye 2011     Ptosis of eyelid, bilateral      Sepsis (H) 07/2020     Stomach problems 2015    Abdominal pain, Diarreha     Thyroid disease     thyroid nodule  resolved 2014       Past Surgical History   I have reviewed this patient's surgical history and updated it with pertinent information if needed.  Past Surgical History:   Procedure Laterality Date     ABDOMEN SURGERY  Now    Pain, diarrhea     BREAST SURGERY  2002    right breast benign     COLONOSCOPY  2008    due in 2018     COLONOSCOPY N/A 09/08/2016    Procedure: COMBINED COLONOSCOPY, SINGLE OR MULTIPLE BIOPSY/POLYPECTOMY BY BIOPSY;  Surgeon: Abner Pepper MD;  Location:  GI     COLONOSCOPY N/A 10/18/2021    Procedure: COLONOSCOPY;  Surgeon: Jamila Villarreal MD;  Location: [facility] OR     ENT SURGERY  1975    tonsillectomy     GENITOURINARY SURGERY  1953    bladder surgery      GI SURGERY  2015    Severe constipation     GYN SURGERY  1977    C section     GYN SURGERY  1981    laparoscopy     GYN SURGERY  2007    myomectomy     hysterecomy  11/14/2016    leiomyosarcoma     OTHER SURGICAL HISTORY Right 10/27/2017    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     REMOVE PORT VASCULAR ACCESS Right 05/09/2017    Procedure: REMOVE PORT VASCULAR ACCESS;  Right Port Removal;  Surgeon: Eric Gibson PA-C;  Location:  OR     REPAIR PTOSIS BILATERAL  02/15/2012    Procedure:REPAIR PTOSIS BILATERAL; BILATERAL UPPER LID PTOSIS REPAIR; Surgeon:BRYCE REYNOLDS; Location: [place]     TUMOR REMOVAL  06/2020       Social History   I have reviewed this patient's social history and updated it with pertinent information if needed.  Social History     Tobacco Use     Smoking status: Never Smoker     Smokeless tobacco: Never Used   Vaping Use     Vaping Use: Never used   Substance Use Topics     Alcohol use: Yes     Comment: Social, not often per patient.     Drug use: No       Family History   I have reviewed this patient's family history and updated it with pertinent information if needed.  Family History   Problem Relation Age of Onset     Family History Negative Mother          glaucoma     Glaucoma Mother      Anxiety Disorder Mother      Heart Failure Mother      Diabetes Mother      Coronary Artery Disease Mother      Hypertension Mother      Breast Cancer Mother      Depression Mother      Obesity Mother      Heart Disease Father      Glaucoma Father      Diabetes Father      Coronary Artery Disease Father      Depression Father      Osteoporosis Father      Heart Disease Maternal Grandmother      Diabetes Maternal Grandmother      Heart Disease Paternal Grandmother      Cancer Paternal Grandfather         stomach     Thyroid Disease Sister      Retinal detachment Sister      Hypertension Sister      Hypertension Sister      Lipids Sister      Cerebrovascular Disease Sister      Depression Sister      Heart Disease Paternal Aunt      Heart Disease Paternal Uncle      Anxiety Disorder Sister      Depression Sister      Thyroid Disease Sister      Thyroid Disease Sister      Osteoporosis Other        Prior to Admission Medications   Prior to Admission Medications   Prescriptions Last Dose Informant Patient Reported? Taking?   alendronate (FOSAMAX) 70 MG tablet   No No   Sig: Take 1 tablet (70 mg) by mouth every 7 days   carboxymethylcellulose PF (REFRESH PLUS) 0.5 % ophthalmic solution   Yes No   Sig: Place 1 drop into both eyes 4 times daily as needed for dry eyes   famotidine (PEPCID) 10 MG tablet   Yes No   Sig: Take 10 mg by mouth 2 times daily   levothyroxine (SYNTHROID/LEVOTHROID) 75 MCG tablet   No No   Sig: Take 1 tablet (75 mcg) by mouth daily   ondansetron (ZOFRAN-ODT) 4 MG ODT tab   No No   Sig: Take 1 tablet (4 mg) by mouth every 6 hours as needed for nausea or vomiting   venlafaxine (EFFEXOR-XR) 75 MG 24 hr capsule   No No   Sig: Take 1 capsule (75 mg) by mouth daily   zolpidem (AMBIEN) 5 MG tablet   No No   Sig: Take 1 tablet (5 mg) by mouth nightly as needed for sleep (Only uses if traveling or cannot sleep,)      Facility-Administered Medications: None     Allergies    Allergies   Allergen Reactions     Erythromycin GI Disturbance     Tramadol Nausea       Physical Exam   Vital Signs: Temp: 97.7  F (36.5  C) Temp src: Temporal BP: (!) 143/80 Pulse: 96   Resp: 13 SpO2: 99 % O2 Device: None (Room air)    Weight: 140 lbs 0 oz    Constitutional: Awake, alert, cooperative, no apparent distress.  Respiratory: Clear to auscultation bilaterally, no crackles or wheezing.  Cardiovascular: Regular rate and rhythm, normal S1 and S2, and no murmur noted.  GI: Soft with well-healed midline scar.  There are no bowel sounds present.  Abdomen is nontender to light palpation.  Lymph/Hematologic: No anterior cervical or supraclavicular adenopathy.  Skin: No rash on exposed skin  Musculoskeletal: No joint swelling, erythema or tenderness.  Neurologic: Moves arms and legs, exam is nonfocal  Psychiatric: Mood is pleasant      Data   Data reviewed today: I reviewed all medications, new labs and imaging results over the last 24 hours. I personally reviewed the abdominal CT image(s) showing mechanical SBO, with transition point in the left lower quadrant.    Recent Labs   Lab 12/28/21  0200   WBC 16.8*   HGB 15.8*   MCV 90         POTASSIUM 4.7   CHLORIDE 104   CO2 27   BUN 23   CR 0.98   ANIONGAP 6   SUSAN 10.0   *   ALBUMIN 3.8   PROTTOTAL 7.3   BILITOTAL 0.7   ALKPHOS 80   ALT 25   AST 13   LIPASE 71*     Recent Results (from the past 24 hour(s))   CT Abdomen Pelvis w Contrast    Narrative    EXAM: CT ABDOMEN PELVIS W CONTRAST  LOCATION: [facility]  DATE/TIME: 12/28/2021 2:26 AM    INDICATION: Nausea and vomiting. History of bowel obstruction.  COMPARISON: CT from 11/04/2021.  TECHNIQUE: CT scan of the abdomen and pelvis was performed following injection of IV contrast. Multiplanar reformats were obtained. Dose reduction techniques were used.  CONTRAST: 71mL Isovue-370    FINDINGS:   LOWER CHEST: Small hiatal hernia.    HEPATOBILIARY: Normal.    PANCREAS:  Normal.    SPLEEN: Normal.    ADRENAL GLANDS: Normal.    KIDNEYS/BLADDER: Lobulated renal cortical contours. Multiple parapelvic cysts on the left with left upper pole benign cortical cyst. Mild urothelial thickening in the right renal pelvis. No calcified ureteral stone. Bladder wall is thickened and there is   perivesicular edema.    BOWEL: Multiple loops of fluid-filled, distended small bowel with narrowing of bowel caliber anteriorly in the left lower quadrant in the region of axial image 122 and sagittal image 84. No free air.    LYMPH NODES: Normal.    VASCULATURE: Unremarkable.    PELVIC ORGANS: Status post hysterectomy.    MUSCULOSKELETAL: Normal.      Impression    IMPRESSION:   1.  Mechanical small bowel obstruction with transition point in the anterior left lower quadrant. No free air.

## 2021-12-28 NOTE — PHARMACY-ADMISSION MEDICATION HISTORY
Pharmacy Medication History  Admission medication history interview status for the 12/28/2021  admission is complete. See EPIC admission navigator for prior to admission medications     Location of Interview: Patient room  Medication history sources: Patient, Surescripts and Care Everywhere    Significant changes made to the medication list:  Adjusted famotidine to PRN   Added calcium and multivitamin     In the past week, patient estimated taking medication this percent of the time: greater than 90%    Additional medication history information:   Patient is a reliable historian. Patient inquired about potential medication therapies/pain relievers in interview to prevent hospitalization with future SBO episodes.     Medication reconciliation completed by provider prior to medication history? No    Time spent in this activity: 15 minutes    Prior to Admission medications    Medication Sig Last Dose Taking? Auth Provider   alendronate (FOSAMAX) 70 MG tablet Take 1 tablet (70 mg) by mouth every 7 days 12/26/2021 at takes Sunday Yes Kristine Flynn MD PhD   CALCIUM PO Take 1 capsule by mouth daily as needed (if diet insufficient to provide daily recommended values) Past Week at Unknown time Yes Unknown, Entered By History   carboxymethylcellulose PF (REFRESH PLUS) 0.5 % ophthalmic solution Place 1 drop into both eyes 4 times daily as needed for dry eyes 12/27/2021 at PM Yes Unknown, Entered By History   famotidine (PEPCID) 10 MG tablet Take 10 mg by mouth 2 times daily as needed 12/27/2021 at Unknown time Yes Unknown, Entered By History   levothyroxine (SYNTHROID/LEVOTHROID) 75 MCG tablet Take 1 tablet (75 mcg) by mouth daily 12/27/2021 at AM Yes Rhea Lin MD   multivitamin w/minerals (THERA-VIT-M) tablet Take 1 tablet by mouth daily as needed (if diet insufficient to provide recommended nutrients) Past Week at Unknown time Yes Unknown, Entered By History   ondansetron (ZOFRAN-ODT) 4 MG ODT tab Take 1  tablet (4 mg) by mouth every 6 hours as needed for nausea or vomiting 12/27/2021 at Unknown time Yes Ahsan Pineda MD   venlafaxine (EFFEXOR-XR) 75 MG 24 hr capsule Take 1 capsule (75 mg) by mouth daily 12/27/2021 at AM Yes Rhea Lin MD   zolpidem (AMBIEN) 5 MG tablet Take 1 tablet (5 mg) by mouth nightly as needed for sleep (Only uses if traveling or cannot sleep,) Past Month at Unknown time Yes Rhea Lin MD       The information provided in this note is only as accurate as the sources available at the time of update(s)   Kia Damon, ServandoD

## 2021-12-29 ENCOUNTER — APPOINTMENT (OUTPATIENT)
Dept: GENERAL RADIOLOGY | Facility: CLINIC | Age: 71
DRG: 390 | End: 2021-12-29
Attending: SURGERY
Payer: MEDICARE

## 2021-12-29 LAB
ANION GAP SERPL CALCULATED.3IONS-SCNC: 6 MMOL/L (ref 3–14)
BUN SERPL-MCNC: 17 MG/DL (ref 7–30)
CALCIUM SERPL-MCNC: 8.1 MG/DL (ref 8.5–10.1)
CHLORIDE BLD-SCNC: 115 MMOL/L (ref 94–109)
CO2 SERPL-SCNC: 22 MMOL/L (ref 20–32)
CREAT SERPL-MCNC: 0.81 MG/DL (ref 0.52–1.04)
ERYTHROCYTE [DISTWIDTH] IN BLOOD BY AUTOMATED COUNT: 14.9 % (ref 10–15)
GFR SERPL CREATININE-BSD FRML MDRD: 77 ML/MIN/1.73M2
GLUCOSE BLD-MCNC: 104 MG/DL (ref 70–99)
HCT VFR BLD AUTO: 40.4 % (ref 35–47)
HGB BLD-MCNC: 13.3 G/DL (ref 11.7–15.7)
MCH RBC QN AUTO: 29.6 PG (ref 26.5–33)
MCHC RBC AUTO-ENTMCNC: 32.9 G/DL (ref 31.5–36.5)
MCV RBC AUTO: 90 FL (ref 78–100)
PLATELET # BLD AUTO: 221 10E3/UL (ref 150–450)
POTASSIUM BLD-SCNC: 4 MMOL/L (ref 3.4–5.3)
RBC # BLD AUTO: 4.5 10E6/UL (ref 3.8–5.2)
SODIUM SERPL-SCNC: 143 MMOL/L (ref 133–144)
WBC # BLD AUTO: 8.7 10E3/UL (ref 4–11)

## 2021-12-29 PROCEDURE — 120N000001 HC R&B MED SURG/OB

## 2021-12-29 PROCEDURE — 99231 SBSQ HOSP IP/OBS SF/LOW 25: CPT | Performed by: SURGERY

## 2021-12-29 PROCEDURE — 82310 ASSAY OF CALCIUM: CPT | Performed by: INTERNAL MEDICINE

## 2021-12-29 PROCEDURE — 250N000011 HC RX IP 250 OP 636: Performed by: INTERNAL MEDICINE

## 2021-12-29 PROCEDURE — 85027 COMPLETE CBC AUTOMATED: CPT | Performed by: INTERNAL MEDICINE

## 2021-12-29 PROCEDURE — 250N000013 HC RX MED GY IP 250 OP 250 PS 637: Performed by: INTERNAL MEDICINE

## 2021-12-29 PROCEDURE — 36415 COLL VENOUS BLD VENIPUNCTURE: CPT | Performed by: INTERNAL MEDICINE

## 2021-12-29 PROCEDURE — 999N000065 XR ABDOMEN 2VIEWS

## 2021-12-29 PROCEDURE — 99232 SBSQ HOSP IP/OBS MODERATE 35: CPT | Performed by: INTERNAL MEDICINE

## 2021-12-29 RX ORDER — LEVOTHYROXINE SODIUM 75 UG/1
75 TABLET ORAL DAILY
Status: DISCONTINUED | OUTPATIENT
Start: 2021-12-29 | End: 2021-12-30 | Stop reason: HOSPADM

## 2021-12-29 RX ORDER — VENLAFAXINE HYDROCHLORIDE 75 MG/1
75 CAPSULE, EXTENDED RELEASE ORAL DAILY
Status: DISCONTINUED | OUTPATIENT
Start: 2021-12-29 | End: 2021-12-30 | Stop reason: HOSPADM

## 2021-12-29 RX ORDER — CARBOXYMETHYLCELLULOSE SODIUM 5 MG/ML
1 SOLUTION/ DROPS OPHTHALMIC 4 TIMES DAILY PRN
Status: DISCONTINUED | OUTPATIENT
Start: 2021-12-29 | End: 2021-12-30 | Stop reason: HOSPADM

## 2021-12-29 RX ADMIN — ZOLPIDEM TARTRATE 5 MG: 5 TABLET ORAL at 22:27

## 2021-12-29 RX ADMIN — POTASSIUM CHLORIDE AND SODIUM CHLORIDE: 900; 150 INJECTION, SOLUTION INTRAVENOUS at 04:40

## 2021-12-29 ASSESSMENT — ACTIVITIES OF DAILY LIVING (ADL)
ADLS_ACUITY_SCORE: 4
ADLS_ACUITY_SCORE: 6
ADLS_ACUITY_SCORE: 4
ADLS_ACUITY_SCORE: 4
ADLS_ACUITY_SCORE: 6
ADLS_ACUITY_SCORE: 4
ADLS_ACUITY_SCORE: 6
ADLS_ACUITY_SCORE: 4
ADLS_ACUITY_SCORE: 6
ADLS_ACUITY_SCORE: 4
ADLS_ACUITY_SCORE: 6
ADLS_ACUITY_SCORE: 6
ADLS_ACUITY_SCORE: 4
ADLS_ACUITY_SCORE: 6
ADLS_ACUITY_SCORE: 6
ADLS_ACUITY_SCORE: 4
ADLS_ACUITY_SCORE: 6
ADLS_ACUITY_SCORE: 6
ADLS_ACUITY_SCORE: 4
ADLS_ACUITY_SCORE: 4

## 2021-12-29 NOTE — PROGRESS NOTES
Surgery    Doing much better. Mild pain. Passing gas and had multiple BM'S. No nausea and feels hungry.    Abd- Soft. Minimal tenderness throughout.    A/P  AXR shows contrast throughout colon. Having good bowel function now but still has slight tenderness. OK to ADAT to low fiber. Likely home in AM if tolerates. Patient in agreement.     Abdoulaye Mack M.D.  [facility]  [phone]

## 2021-12-29 NOTE — PROGRESS NOTES
"[facility]    Medicine Progress Note - Hospitalist Service       Date of Admission:  12/28/2021    Assessment & Plan           Maggie Navarrete is a 71 year old female admitted on 12/28/2021. She has a history of leiomyosarcoma, follows with Oncology, and history of partial small bowel obstruction, admitted to [facility] from 11/4-11/6/2021.  She returns with abdominal pain, CT shows findings of mechanical small bowel obstruction.    Principal Problem:    Small bowel obstruction (H)    Treated with IV fluids, pain relief    General surgery consult requested, I appreciate Irma Saldaña and Dr. Mack's evaluation and recommendations    Had gastrografin contrast challenge followed by abdominal x-ray    Symptoms are improved, advance diet per surgery  Active Problems:    Generalized anxiety disorder    Resume Effexor tomorrow    Leiomyosarcoma (H)    Follow-up with oncology as outpatient    Hypothyroidism    Resume thyroid hormone tomorrow    Leukocytosis    Possibly related to stress    Rechecked CBC     Diet: NPO for Medical/Clinical Reasons Except for: No Exceptions    DVT Prophylaxis: Pneumatic Compression Devices  Kwan Catheter: Not present  Central Lines: None  Code Status: Full Code      Disposition Plan   Expected Discharge: 12/30/2021  Anticipated discharge location:  Awaiting care coordination huddle  Delays:       The patient's care was discussed with the Bedside Nurse and Patient.    Cyn Durant MD  Hospitalist Service  [facility]  Securely message with the Vocera Web Console (learn more here)  Text page via [employer] Paging/Directory    Clinically Significant Risk Factors Present on Admission             # Overweight: last Body mass index is 28.26 kg/m .      ______________________________________________________________________    Interval History   \"I am ordering supper.\"  Patient says her abdominal pain has resolved.  She denies " nausea.  She is looking forward to eating dinner with tentative plan for discharge tomorrow.  She has no new respiratory complaints.    Data reviewed today: I reviewed all medications, new labs and imaging results over the last 24 hours. I personally reviewed the abdominal x-ray image(s) showing Oral contrast material seen throughout the colon. Bowel gas pattern is nonobstructive..    Physical Exam   Vital Signs: Temp: 97.3  F (36.3  C) Temp src: Axillary BP: 132/72 Pulse: 86   Resp: 18 SpO2: 97 % O2 Device: None (Room air)    Weight: 140 lbs 0 oz  Constitutional: Awake, alert  Respiratory: Clear to auscultation bilaterally, no crackles or wheezing  Cardiovascular: Regular rate and rhythm, normal S1 and S2, and no murmur noted  GI: long, midline scar.  Abdomen is soft, nontender, with occasional bowel sounds  Skin/Integumen: No rash on exposed skin  Other: Mood is pleasant      Data   Recent Labs   Lab 12/29/21  0922 12/28/21  0200   WBC 8.7 16.8*   HGB 13.3 15.8*   MCV 90 90    262    137   POTASSIUM 4.0 4.7   CHLORIDE 115* 104   CO2 22 27   BUN 17 23   CR 0.81 0.98   ANIONGAP 6 6   SUSAN 8.1* 10.0   * 148*   ALBUMIN  --  3.8   PROTTOTAL  --  7.3   BILITOTAL  --  0.7   ALKPHOS  --  80   ALT  --  25   AST  --  13   LIPASE  --  71*     Recent Results (from the past 24 hour(s))   XR Abdomen 2 Views    Narrative    EXAM: XR ABDOMEN 2VIEWS  LOCATION: [facility]  DATE/TIME: 12/29/2021 6:23 AM    INDICATION: SBO- eval contrast study  COMPARISON: 12/28/2021 CT      Impression    IMPRESSION: Oral contrast material seen throughout the colon. Bowel gas pattern is nonobstructive. No subdiaphragmatic free air. Right pelvic clips.     Medications     0.9% sodium chloride + KCl 20 mEq/L 100 mL/hr at 12/29/21 0440       [Held by provider] levothyroxine  75 mcg Oral Daily     pantoprazole (PROTONIX) IV  40 mg Intravenous BID     sodium chloride (PF)  3 mL Intracatheter Q8H     [Held by  provider] venlafaxine  75 mg Oral Daily

## 2021-12-29 NOTE — PLAN OF CARE
Summary:  Small bowel obstruction   DATE & TIME: 12/28/21, [id number]   Cognitive Concerns/ Orientation : A&Ox4   BEHAVIOR & AGGRESSION TOOL COLOR: Green   ABNL VS/O2: VSS on RA  MOBILITY: Independent, SBA when taking dilaudid  PAIN MANAGMENT: Abdominal pain and cramping, dilaudid 0.2mg given x2, effective. Using heat packs intermittently.   DIET: NPO, swabs used  BOWEL/BLADDER: Continent, several large BM this shift, passing flatus. Abdomen tender, bowel sounds hypoactive.  ABNL LAB/BG: Lipase 71, WBC 16.8  DRAIN/DEVICES: PIV infiltrated. Infusion  sodium chloride + KCL 20 mEq/L @ 100 ml/hr stopped @0645. Difficult stick, IV team paged.  TELEMETRY RHYTHM: N/A  SKIN: WDL  TESTS/PROCEDURES: Abdominal xray  D/C DAY/GOALS/PLACE: Pending relief of symptoms, obstruction resolves  OTHER IMPORTANT INFO: Pt had n/v episode, given zofran 4mg, effective.

## 2021-12-29 NOTE — PROVIDER NOTIFICATION
MD Notification    Notified Person: MD    Notified Person Name: Dr Witt     Notification Date/Time:12/28/21 1841    Notification Interaction: web page    Purpose of Notification: Patient requesting ambien for sleep, melatonin does not work for her, thank you    Orders Received: not at this time    Comments:

## 2021-12-30 VITALS
OXYGEN SATURATION: 98 % | SYSTOLIC BLOOD PRESSURE: 141 MMHG | DIASTOLIC BLOOD PRESSURE: 91 MMHG | RESPIRATION RATE: 18 BRPM | TEMPERATURE: 97.2 F | BODY MASS INDEX: 28.26 KG/M2 | HEART RATE: 85 BPM | WEIGHT: 140 LBS

## 2021-12-30 LAB
ANION GAP SERPL CALCULATED.3IONS-SCNC: 4 MMOL/L (ref 3–14)
BUN SERPL-MCNC: 21 MG/DL (ref 7–30)
CALCIUM SERPL-MCNC: 8.6 MG/DL (ref 8.5–10.1)
CHLORIDE BLD-SCNC: 114 MMOL/L (ref 94–109)
CO2 SERPL-SCNC: 24 MMOL/L (ref 20–32)
CREAT SERPL-MCNC: 0.96 MG/DL (ref 0.52–1.04)
ERYTHROCYTE [DISTWIDTH] IN BLOOD BY AUTOMATED COUNT: 14.8 % (ref 10–15)
GFR SERPL CREATININE-BSD FRML MDRD: 63 ML/MIN/1.73M2
GLUCOSE BLD-MCNC: 110 MG/DL (ref 70–99)
HCT VFR BLD AUTO: 39.5 % (ref 35–47)
HGB BLD-MCNC: 13 G/DL (ref 11.7–15.7)
MCH RBC QN AUTO: 29.6 PG (ref 26.5–33)
MCHC RBC AUTO-ENTMCNC: 32.9 G/DL (ref 31.5–36.5)
MCV RBC AUTO: 90 FL (ref 78–100)
PLATELET # BLD AUTO: 213 10E3/UL (ref 150–450)
POTASSIUM BLD-SCNC: 4.5 MMOL/L (ref 3.4–5.3)
RBC # BLD AUTO: 4.39 10E6/UL (ref 3.8–5.2)
SODIUM SERPL-SCNC: 142 MMOL/L (ref 133–144)
WBC # BLD AUTO: 6.3 10E3/UL (ref 4–11)

## 2021-12-30 PROCEDURE — 99231 SBSQ HOSP IP/OBS SF/LOW 25: CPT | Performed by: SURGERY

## 2021-12-30 PROCEDURE — 85027 COMPLETE CBC AUTOMATED: CPT | Performed by: INTERNAL MEDICINE

## 2021-12-30 PROCEDURE — 82310 ASSAY OF CALCIUM: CPT | Performed by: INTERNAL MEDICINE

## 2021-12-30 PROCEDURE — 36415 COLL VENOUS BLD VENIPUNCTURE: CPT | Performed by: INTERNAL MEDICINE

## 2021-12-30 PROCEDURE — 250N000013 HC RX MED GY IP 250 OP 250 PS 637: Performed by: INTERNAL MEDICINE

## 2021-12-30 PROCEDURE — 99239 HOSP IP/OBS DSCHRG MGMT >30: CPT | Performed by: INTERNAL MEDICINE

## 2021-12-30 RX ORDER — PANTOPRAZOLE SODIUM 40 MG/1
40 TABLET, DELAYED RELEASE ORAL
Status: DISCONTINUED | OUTPATIENT
Start: 2021-12-30 | End: 2021-12-30 | Stop reason: HOSPADM

## 2021-12-30 RX ADMIN — VENLAFAXINE HYDROCHLORIDE 75 MG: 75 CAPSULE, EXTENDED RELEASE ORAL at 08:56

## 2021-12-30 RX ADMIN — LEVOTHYROXINE SODIUM 75 MCG: 75 TABLET ORAL at 06:50

## 2021-12-30 RX ADMIN — PANTOPRAZOLE SODIUM 40 MG: 40 TABLET, DELAYED RELEASE ORAL at 08:56

## 2021-12-30 ASSESSMENT — ACTIVITIES OF DAILY LIVING (ADL)
ADLS_ACUITY_SCORE: 4

## 2021-12-30 NOTE — CONSULTS
"NUTRITION EDUCATION      REASON FOR ASSESSMENT:  Patient/Family request \"Requests advice on high fiber, plant based or other healthy diet options\"      ---> Note pt actually needs education on low fiber.     NUTRITION HISTORY:  Information obtained from patient.   - Recent admission in November for similar problem. She states that once she got home she started on a high fiber diet with assistance from a research-based company that rates foods based on nutrient-density (called \"[employer]\").   - She has been trying to eat healthier for some time in general - eats mostly lean proteins, veggies, seeds, nuts, adding in more legumes.     CURRENT DIET:  Low fiber diet    NUTRITION DIAGNOSIS:  Food- and nutrition-related knowledge deficit R/t low fiber diet AEB patient has been following a high fiber diet leading to bowel obstruction in setting of previous abdominal surgeries.     INTERVENTIONS:    Nutrition Prescription:  Low fiber diet ~13g /day     Implementation:      *  Nutrition Education (Content):   A)  Provided handout     Low fiber diet    B)  Discussed    Foods low in fiber    Foods high in fiber    General healthy diet       *  Nutrition Education (Application):   A)  Discussed current eating habits and recommended alternative food choices    May want to follow up with RD outpatient       *  Anticipate good compliance      *  Diet Education - refer to Education Flowsheet    Goals:      *  Patient will verbalize understanding of diet       *  All of the above goals met during the education session    Follow Up/Monitoring:      *  Provided RD contact information for future questions      *  Recommended Out-Patient Nutrition Referral, if further diet instructions are needed    Kami Chapman RD, LD  [facility], [facility], [facility]  Pager: [phone]  Weekend Pager: [phone]      "

## 2021-12-30 NOTE — PLAN OF CARE
Discharge    Patient discharged to home  Alert and oriented x4. VSS on RA. No c/o pain. Up ind. Tolerating a low fiber diet. Discharging home with no new medications. AVS reviewed with patient.     Listed belongings gathered and given to patient (including from security/pharmacy). Yes  Care Plan and Patient education resolved: Yes  Prescriptions if needed, hard copies sent with patient  NA  Medication Bin checked and emptied on discharge Yes  SW/care coordinator/charge RN aware of discharge: Yes

## 2021-12-30 NOTE — PROGRESS NOTES
Surgery    Continued improvement.  No pain today.  Tolerating diet without nausea.  Continues to pass gas without tenderness.    Abd- Soft. Minimal tenderness throughout.    A/P  Small bowel obstruction resolved.  Tolerating diet without issues.  Advised the patient to stick to a soft diet until she meets with a nutritionist as an outpatient.  She agreed.  Okay to discharge today.    Abdoulaye Mack M.D.  [facility]  [phone]

## 2021-12-30 NOTE — PLAN OF CARE
Summary:  Small bowel obstruction   DATE & TIME: 12/29/21, [id number]  Cognitive Concerns/ Orientation : A&Ox4   BEHAVIOR & AGGRESSION TOOL COLOR: Green   ABNL VS/O2: VSS on RA  MOBILITY: Independent  PAIN MANAGMENT: no c/o pain, mild abd tenderness   DIET: low fiber, tolerating  BOWEL/BLADDER: Continent, several large BM this shift, passing flatus. Abdomen tender, bowel sounds +.  ABNL LAB/BG: none  DRAIN/DEVICES: declined new MD BLAYNE aware  TELEMETRY RHYTHM: N/A  SKIN: WDL  TESTS/PROCEDURES: NA  D/C DAY/GOALS/PLACE: discharge tomorrow

## 2021-12-30 NOTE — DISCHARGE SUMMARY
"[facility]  Hospitalist Discharge Summary      Date of Admission:  12/28/2021  Date of Discharge:  12/30/2021  Discharging Provider: Cyn Durant MD      Discharge Diagnoses   Mechanical small bowel obstruction, resolved  Generalized anxiety disorder  Leiomyosarcoma  Hypothyroidism  Leukocytosis    Follow-ups Needed After Discharge   Follow-up Appointments     Follow-up and recommended labs and tests       Follow up with primary care provider, Rhea Lin, within 7 days   for hospital follow- up.  No follow up labs or test are needed.             Unresulted Labs Ordered in the Past 30 Days of this Admission     No orders found from 11/28/2021 to 12/29/2021.          Discharge Disposition   Discharged to home  Condition at discharge: Stable      Hospital Course            Maggie Navarrete is a 71 year old female admitted on 12/28/2021. She has a history of leiomyosarcoma, follows with Oncology, and history of partial small bowel obstruction, admitted to [facility] from 11/4-11/6/2021.  She returns with abdominal pain, CT showed findings of mechanical small bowel obstruction.    Principal Problem:    Small bowel obstruction (H)    Treated with IV fluids, pain relief    General surgery consult requested, I appreciate Irma Saldaña and Dr. Mack's evaluation and recommendations    Had gastrografin contrast challenge followed by abdominal x-ray    Patient symptoms resolved, she is tolerating diet without issues    Per Dr. Mack, \"advised the patient to stick to a soft diet until she meets with the nutritionist as an outpatient.\"  Active Problems:    Generalized anxiety disorder    Resumed Effexor     Leiomyosarcoma (H)    Follow-up with oncology as outpatient    Hypothyroidism    Resumed thyroid hormone     Leukocytosis    Possibly related to stress    Rechecked CBC    Consultations This Hospital Stay   SURGERY GENERAL IP CONSULT  NUTRITION SERVICES ADULT IP " CONSULT    Code Status   Full Code    Time Spent on this Encounter   I, Cyn Durant MD, personally saw the patient today and spent greater than 30 minutes discharging this patient.       Cyn Durant MD  [place] [address] [facility]  [address] [place] [address]  Phone: [phone]  ______________________________________________________________________    Physical Exam   Vital Signs: Temp: 98  F (36.7  C) Temp src: Oral BP: 107/65 Pulse: 86   Resp: 18 SpO2: 96 % O2 Device: None (Room air)    Weight: 140 lbs 0 oz  I saw and examined the patient on the date of discharge.         Primary Care Physician   Rhea Lin    Discharge Orders      Reason for your hospital stay    You had a small bowel obstruction.     Follow-up and recommended labs and tests     Follow up with primary care provider, Rhea Lin, within 7 days for hospital follow- up.  No follow up labs or test are needed.     Activity    Your activity upon discharge: activity as tolerated     Diet    Follow this diet upon discharge: Orders Placed This Encounter      Low Fiber Diet       Significant Results and Procedures   Most Recent 3 CBC's:Recent Labs   Lab Test 12/30/21  0742 12/29/21  0922 12/28/21  0200   WBC 6.3 8.7 16.8*   HGB 13.0 13.3 15.8*   MCV 90 90 90    221 262     Most Recent 3 BMP's:Recent Labs   Lab Test 12/30/21  0742 12/29/21  0922 12/28/21  0200    143 137   POTASSIUM 4.5 4.0 4.7   CHLORIDE 114* 115* 104   CO2 24 22 27   BUN 21 17 23   CR 0.96 0.81 0.98   ANIONGAP 4 6 6   SUSAN 8.6 8.1* 10.0   * 104* 148*   ,   Results for orders placed or performed during the hospital encounter of 12/28/21   CT Abdomen Pelvis w Contrast    Narrative    EXAM: CT ABDOMEN PELVIS W CONTRAST  LOCATION: [facility]  DATE/TIME: 12/28/2021 2:26 AM    INDICATION: Nausea and vomiting. History of bowel obstruction.  COMPARISON: CT from 11/04/2021.  TECHNIQUE: CT scan  of the abdomen and pelvis was performed following injection of IV contrast. Multiplanar reformats were obtained. Dose reduction techniques were used.  CONTRAST: 71mL Isovue-370    FINDINGS:   LOWER CHEST: Small hiatal hernia.    HEPATOBILIARY: Normal.    PANCREAS: Normal.    SPLEEN: Normal.    ADRENAL GLANDS: Normal.    KIDNEYS/BLADDER: Lobulated renal cortical contours. Multiple parapelvic cysts on the left with left upper pole benign cortical cyst. Mild urothelial thickening in the right renal pelvis. No calcified ureteral stone. Bladder wall is thickened and there is   perivesicular edema.    BOWEL: Multiple loops of fluid-filled, distended small bowel with narrowing of bowel caliber anteriorly in the left lower quadrant in the region of axial image 122 and sagittal image 84. No free air.    LYMPH NODES: Normal.    VASCULATURE: Unremarkable.    PELVIC ORGANS: Status post hysterectomy.    MUSCULOSKELETAL: Normal.      Impression    IMPRESSION:   1.  Mechanical small bowel obstruction with transition point in the anterior left lower quadrant. No free air.   XR Abdomen 2 Views    Narrative    EXAM: XR ABDOMEN 2VIEWS  LOCATION: [facility]  DATE/TIME: 12/29/2021 6:23 AM    INDICATION: SBO- eval contrast study  COMPARISON: 12/28/2021 CT      Impression    IMPRESSION: Oral contrast material seen throughout the colon. Bowel gas pattern is nonobstructive. No subdiaphragmatic free air. Right pelvic clips.       Discharge Medications   Current Discharge Medication List      CONTINUE these medications which have NOT CHANGED    Details   alendronate (FOSAMAX) 70 MG tablet Take 1 tablet (70 mg) by mouth every 7 days  Qty: 12 tablet, Refills: 3    Associated Diagnoses: Osteopenia of multiple sites      CALCIUM PO Take 1 capsule by mouth daily as needed (if diet insufficient to provide daily recommended values)      carboxymethylcellulose PF (REFRESH PLUS) 0.5 % ophthalmic solution Place 1 drop into  both eyes 4 times daily as needed for dry eyes      famotidine (PEPCID) 10 MG tablet Take 10 mg by mouth 2 times daily as needed      levothyroxine (SYNTHROID/LEVOTHROID) 75 MCG tablet Take 1 tablet (75 mcg) by mouth daily  Qty: 90 tablet, Refills: 3    Associated Diagnoses: Acquired hypothyroidism      multivitamin w/minerals (THERA-VIT-M) tablet Take 1 tablet by mouth daily as needed (if diet insufficient to provide recommended nutrients)      ondansetron (ZOFRAN-ODT) 4 MG ODT tab Take 1 tablet (4 mg) by mouth every 6 hours as needed for nausea or vomiting  Qty: 30 tablet, Refills: 0    Associated Diagnoses: Intractable vomiting with nausea, unspecified vomiting type      venlafaxine (EFFEXOR-XR) 75 MG 24 hr capsule Take 1 capsule (75 mg) by mouth daily  Qty: 90 capsule, Refills: 1    Associated Diagnoses: Generalized anxiety disorder      zolpidem (AMBIEN) 5 MG tablet Take 1 tablet (5 mg) by mouth nightly as needed for sleep (Only uses if traveling or cannot sleep,)  Qty: 20 tablet, Refills: 0    Associated Diagnoses: Insomnia, unspecified type           Allergies   Allergies   Allergen Reactions     Erythromycin GI Disturbance     Tramadol Nausea

## 2021-12-30 NOTE — PLAN OF CARE
Cognitive Concerns/ Orientation : A&Ox4   BEHAVIOR & AGGRESSION TOOL COLOR: Green   ABNL VS/O2: VSS on RA  MOBILITY: Independent  PAIN MANAGMENT: no c/o pain  DIET: low fiber, tolerating  BOWEL/BLADDER: Continent, passing flatus. Abdomen tender, bowel sounds +.  ABNL LAB/BG: none  DRAIN/DEVICES: declined new PIV  TELEMETRY RHYTHM: N/A  SKIN: WDL  TESTS/PROCEDURES: NA  D/C DAY/GOALS/PLACE: Hoping to go home today

## 2022-01-06 ENCOUNTER — TELEPHONE (OUTPATIENT)
Dept: ONCOLOGY | Facility: CLINIC | Age: 72
End: 2022-01-06
Payer: MEDICARE

## 2022-01-17 DIAGNOSIS — R79.89 LOW TSH LEVEL: ICD-10-CM

## 2022-01-17 DIAGNOSIS — C78.5 SECONDARY MALIGNANT NEOPLASM OF LARGE INTESTINE AND RECTUM (H): Primary | ICD-10-CM

## 2022-01-17 DIAGNOSIS — E03.9 HYPOTHYROIDISM: ICD-10-CM

## 2022-01-17 DIAGNOSIS — C55 LEIOMYOSARCOMA OF UTERUS (H): ICD-10-CM

## 2022-01-18 ENCOUNTER — THERAPY VISIT (OUTPATIENT)
Dept: PHYSICAL THERAPY | Facility: CLINIC | Age: 72
End: 2022-01-18
Attending: INTERNAL MEDICINE
Payer: MEDICARE

## 2022-01-18 DIAGNOSIS — M99.05 PELVIC SOMATIC DYSFUNCTION: ICD-10-CM

## 2022-01-18 DIAGNOSIS — N39.46 MIXED INCONTINENCE: ICD-10-CM

## 2022-01-18 DIAGNOSIS — R35.0 URINARY FREQUENCY: ICD-10-CM

## 2022-01-18 DIAGNOSIS — M25.522 ELBOW PAIN, LEFT: ICD-10-CM

## 2022-01-18 PROCEDURE — 97140 MANUAL THERAPY 1/> REGIONS: CPT | Mod: GP | Performed by: PHYSICAL THERAPIST

## 2022-01-18 PROCEDURE — 97535 SELF CARE MNGMENT TRAINING: CPT | Mod: GP | Performed by: PHYSICAL THERAPIST

## 2022-01-18 PROCEDURE — 97110 THERAPEUTIC EXERCISES: CPT | Mod: GP | Performed by: PHYSICAL THERAPIST

## 2022-01-18 PROCEDURE — 97161 PT EVAL LOW COMPLEX 20 MIN: CPT | Mod: GP | Performed by: PHYSICAL THERAPIST

## 2022-01-18 NOTE — PROGRESS NOTES
"Physical Therapy Initial Evaluation  Subjective:  HPI  SUBJECTIVE:    Per MD chart from visit with Dr. Kennedy on 2021:    Maggie Navarrete is a 71 year old year old  () who presents for a video visit today for urgency incontinence which has gotten worse recently. Her hx is sif leiyomyosarcoma s/p RACHEL/BSO and omentectomy ( [facility]), chemo in 2016 with locally recurrent disease in  resulting in  resection of the recurrent leiomyosarcoma tumor along with partial removal of her right ureter.  First surgery was complicated with DVT and her second surgery complicated by sepsis. She was also recently admitted ( 21-21) for partial SBOs.Has had 3 open and 2 laparascopy abdominal procedures likely the risk factor for her bowel obstruction.   Pt has been hospitalized 2 times (2021 and 2021) due to bowel obstruction from eating high fiber diet.  Pt goal is to \"Control incontinence\"  Urination:  Do you leak on the way to the bathroom or with a strong urge to void? Yes    Do you leak with cough,sneeze, jumping, running?Yes   Any other activities that cause leaking? walking  Do you have triggers that make you feel you can't wait to go to the bathroom? Yes   what are they:  Being near toilet.  Type of pad and number used per day? Honey Pot (incontinence pad size medium to large absorbancy) 1 per day  When you leak what is the amount? Medium to large    How long can you delay the need to urinate? Reports she voids every hour, delaying can vary.   How many times do you get up to urinate at night? 1  Can you stop the flow of urine when on the toilet? Yes  Is the volume of urine passed usually: medium. (8sec rule=  250ml with average bladder storing  400-600ml)    Do you strain to pass urine? No  Do you have a slow or hesitant urinary stream? No  Do you have difficulty initiating the urine stream? No  Do you feel you fully empty your bladder?  [place]    How many bladder infections have you had in " "last 12 months?0    Fluid intake(one glass is 8oz or one cup) water = 36 oz, 3 glasses milk (36 oz)  glasses/day, 8 oz caffinated glasses/day  0 alcohol glasses/day.    Bowel habits:  Frequency of bowel movements?1 times a day  Consistancy of stool? soft formed, [place] 3-4  Do you ignore the urge to defecate? No  Do you strain to pass stool? No    Pelvic Pain:  When do you have pelvic pain? none  Given birth? Yes Any complications?infection of incision, # of vaginal delieveries?0, # of C-sections?1,.  Are you sexually active?No - did not have pain if sexual partner was \"straight\"  Have you ever been worried for your physical safety? No  Any abdominal or pelvic surgeries? Yes - 4 abdominal and 2 laproscopic surgeries  Are you having any regular exercise?yes - walking daily around [facility] with 2 dogs  Have you practiced the PF(kegel) exercises for 4 or more weeks?no               Objective:  System         Lumbar/SI Evaluation  ROM:  AROM Lumbar: normal                                                Pelvic Dysfunction Evaluation:      Diagnostic Tests:        Post Void Residual:  7 mL on 2021                  Flexibility:    Tightness present at:Adductors; Piriformis and Gluteals    Abdominal Wall:        Scar Mobility:  Decreased lower abdominal scar over  and along umbulicus.  Decreased fascial mobility lower abdomen  Pelvic Clock Exam:    Ischiocavernosis pain:  -  Bulbocavernosis pain:  -  Transverse Perineal:  -  Levator ANI:  ++  Perineal Body:  -      External Assessment:  External assessment pelvic: cough noted min bulge of perineum, good lift, delayed drop with relaxation.  Skin Condition:  Atrophic          Muscle Contraction/Perineal Mobility:  Elevation and urogential triangle descent  Internal Assessment:  Internal assessment pelvic: good contraction/strong, delayed relaxation.    Contraction/Grade:  Good squeeze, good hold with lift, repeatable (4)                       "         General     ROS    Assessment/Plan:    Patient is a 71 year old female with pelvic complaints.    Patient has the following significant findings with corresponding treatment plan.                Diagnosis 1:  Mixed incontinence  Pain -  manual therapy, self management, education and home program  Decreased ROM/flexibility - manual therapy, therapeutic exercise, therapeutic activity and home program  Impaired muscle performance - neuro re-education and home program  Decreased function - therapeutic activities and home program  Impaired posture - neuro re-education, therapeutic activities and home program    Therapy Evaluation Codes:   1) History comprised of:   Personal factors that impact the plan of care:      Time since onset of symptoms.    Comorbidity factors that impact the plan of care are:      Bowel/bladder changes, Cancer and Overweight.     Medications impacting care: None.  2) Examination of Body Systems comprised of:   Body structures and functions that impact the plan of care:      Pelvis.   Activity limitations that impact the plan of care are:      Stress incontinence and Urge incontinence.  3) Clinical presentation characteristics are:   Stable/Uncomplicated.  4) Decision-Making    Low complexity using standardized patient assessment instrument and/or measureable assessment of functional outcome.  Cumulative Therapy Evaluation is: Low complexity.    Previous and current functional limitations:  (See Goal Flow Sheet for this information)    Short term and Long term goals: (See Goal Flow Sheet for this information)     Communication ability:  Patient appears to be able to clearly communicate and understand verbal and written communication and follow directions correctly.  Treatment Explanation - The following has been discussed with the patient:   RX ordered/plan of care  Anticipated outcomes  Possible risks and side effects  This patient would benefit from PT intervention to resume normal  activities.   Rehab potential is good.    Frequency:  1 X week, once daily  Duration:  for 6 weeks  Discharge Plan:  Achieve all LTG.  Independent in home treatment program.  Reach maximal therapeutic benefit.    Please refer to the daily flowsheet for treatment today, total treatment time and time spent performing 1:1 timed codes.

## 2022-01-19 NOTE — PROGRESS NOTES
[facility]    OUTPATIENT Physical Therapy ORTHOPEDIC EVALUATION  PLAN OF TREATMENT FOR OUTPATIENT REHABILITATION  (COMPLETE FOR INITIAL CLAIMS ONLY)  Patient's Last Name, First Name, M.I.  YOB: 1950  Maggie Navarrete    Provider s Name:  [facility]   Medical Record No.  [id number]   Start of Care Date:  01/18/22   Onset Date:   11/17/22 (MD order)   Type:     _X__PT   ___OT Medical Diagnosis:    Encounter Diagnoses   Name Primary?     Elbow pain, left      Urinary frequency      Pelvic somatic dysfunction      Mixed incontinence         Treatment Diagnosis:           Goals:     01/18/22 0700   Urinary Leakage   Previous Functional Level No problems   Current Functional Level Leaking with urge   Performance Level daily   STG Target Performance Decrease leakage with urge   Performance Level decrease to 3 times a week   Rationale continence throughout the day;for healthy hygiene and to prevent skin breakdown   Due Date 02/12/22   LTG Target Performance Decrease urinary leakage episodes in a month to   Performance Level 1 time per week   Rationale continence throughout the day;for healthy hygiene and to prevent skin breakdown   Due Date 03/19/22       Therapy Frequency:  1 x a week  Predicted Duration of Therapy Intervention:  6 weeks    Everett Fisher PT                 I CERTIFY THE NEED FOR THESE SERVICES FURNISHED UNDER        THIS PLAN OF TREATMENT AND WHILE UNDER MY CARE     (Physician attestation of this document indicates review and certification of the therapy plan).                       Certification Date From:  01/18/22   Certification Date To:  04/17/22    Referring Provider:  Yandy Blancas    Initial Assessment        See Epic Evaluation SOC Date: 01/18/22

## 2022-01-19 NOTE — PROGRESS NOTES
Physical Therapy Initial Evaluation  Subjective:    Patient Health History             Pertinent medical history includes: cancer, depression, incontinence, overweight and thyroid problems.   Red flags:  Changes in bowel and bladder habits.     Surgeries include:  Cancer surgery (Hysterectomy).    Current medications:  Anti-depressants, bone density and thyroid medication.    Current occupation is Retired.   Primary job tasks include:  Prolonged sitting.                                    Objective:  System    Physical Exam    General     ROS    Assessment/Plan:

## 2022-01-25 ENCOUNTER — THERAPY VISIT (OUTPATIENT)
Dept: PHYSICAL THERAPY | Facility: CLINIC | Age: 72
End: 2022-01-25
Payer: MEDICARE

## 2022-01-25 DIAGNOSIS — M99.05 PELVIC SOMATIC DYSFUNCTION: Primary | ICD-10-CM

## 2022-01-25 DIAGNOSIS — N39.46 MIXED INCONTINENCE: ICD-10-CM

## 2022-01-25 PROCEDURE — 97530 THERAPEUTIC ACTIVITIES: CPT | Mod: GP | Performed by: PHYSICAL THERAPIST

## 2022-01-25 PROCEDURE — 97110 THERAPEUTIC EXERCISES: CPT | Mod: GP | Performed by: PHYSICAL THERAPIST

## 2022-01-25 PROCEDURE — 97140 MANUAL THERAPY 1/> REGIONS: CPT | Mod: GP | Performed by: PHYSICAL THERAPIST

## 2022-02-16 ENCOUNTER — THERAPY VISIT (OUTPATIENT)
Dept: PHYSICAL THERAPY | Facility: CLINIC | Age: 72
End: 2022-02-16
Payer: MEDICARE

## 2022-02-16 DIAGNOSIS — N39.46 MIXED INCONTINENCE: ICD-10-CM

## 2022-02-16 DIAGNOSIS — M99.05 PELVIC SOMATIC DYSFUNCTION: ICD-10-CM

## 2022-02-16 PROCEDURE — 97530 THERAPEUTIC ACTIVITIES: CPT | Mod: GP | Performed by: PHYSICAL THERAPIST

## 2022-02-16 PROCEDURE — 97112 NEUROMUSCULAR REEDUCATION: CPT | Mod: GP | Performed by: PHYSICAL THERAPIST

## 2022-02-17 PROBLEM — M79.671 RIGHT FOOT PAIN: Status: RESOLVED | Noted: 2021-10-07 | Resolved: 2022-02-17

## 2022-02-17 PROBLEM — G89.29 CHRONIC PAIN OF RIGHT KNEE: Status: RESOLVED | Noted: 2021-10-07 | Resolved: 2022-02-17

## 2022-02-17 PROBLEM — M25.561 CHRONIC PAIN OF RIGHT KNEE: Status: RESOLVED | Noted: 2021-10-07 | Resolved: 2022-02-17

## 2022-02-17 PROBLEM — R10.32 LEFT GROIN PAIN: Status: RESOLVED | Noted: 2021-10-07 | Resolved: 2022-02-17

## 2022-02-17 NOTE — PROGRESS NOTES
Discharge Note    Progress reporting period is from initial evaluation date (please see noted date below) to Dec 21, 2021.  Linked Episodes   Type: Episode: Status: Noted: Resolved: Last update: Updated by:   PHYSICAL THERAPY Rknee/foot/groin10/7/21 Active 10/7/2021  12/21/2021 11:04 AM Jackelyn Bowie, PT      Comments:       Maggie failed to follow up and current status is unknown.  Please see information below for last relevant information on current status.  Patient seen for 6 visits.    SUBJECTIVE  Subjective changes noted by patient:  Patient saw MD and had more xrays which showed no arthritis.  He gave her a cortisone injection and this time it seems to have helped.  She did try the wall sit exercise and even though it feels good for her thighs, it hurts the knee so she stopped it.   The foot feels better though and she likes the new exercise.   Current pain level is  (0-3/10).     Previous pain level was  7/10.   Changes in function:  Yes (See Goal flowsheet attached for changes in current functional level)  Adverse reaction to treatment or activity: None    OBJECTIVE  Changes noted in objective findings: B ankle AROM WNL and pain free.  Srength all 5/5 except R PF 4/5.  ALso pain with resisted ankle DF and unable to do toe yoga.  Able to do 12 SL calf raises on the L and 0 on the R due to pain.  B hip strength 5/5 except hip abd 4+/5.  B knee AROM WNL and pain free.  TTP B joint lines and around R patella.       ASSESSMENT/PLAN  Diagnosis: L SI/groin pain, R medial meniscus and R foot/ankle pain   Updated problem list and treatment plan:   Pain - HEP  Decreased function - HEP  Decreased strength - HEP  STG/LTGs have been met or progress has been made towards goals:  Yes, please see goal flowsheet for most current information  Assessment of Progress: current status is unknown.    Last current status:     Self Management Plans:  HEP  I have re-evaluated this patient and find that the nature, scope, duration  and intensity of the therapy is appropriate for the medical condition of the patient.  Maggie continues to require the following intervention to meet STG and LTG's:  HEP.    Recommendations:  Discharge with current home program.  Patient to follow up with MD as needed.    Please refer to the daily flowsheet for treatment today, total treatment time and time spent performing 1:1 timed codes.

## 2022-02-22 ENCOUNTER — THERAPY VISIT (OUTPATIENT)
Dept: PHYSICAL THERAPY | Facility: CLINIC | Age: 72
End: 2022-02-22
Payer: MEDICARE

## 2022-02-22 DIAGNOSIS — N39.46 MIXED INCONTINENCE: ICD-10-CM

## 2022-02-22 DIAGNOSIS — M99.05 PELVIC SOMATIC DYSFUNCTION: ICD-10-CM

## 2022-02-22 PROCEDURE — 97112 NEUROMUSCULAR REEDUCATION: CPT | Mod: GP | Performed by: PHYSICAL THERAPIST

## 2022-02-22 PROCEDURE — 97530 THERAPEUTIC ACTIVITIES: CPT | Mod: GP | Performed by: PHYSICAL THERAPIST

## 2022-02-22 PROCEDURE — 97140 MANUAL THERAPY 1/> REGIONS: CPT | Mod: GP | Performed by: PHYSICAL THERAPIST

## 2022-03-24 ENCOUNTER — TELEPHONE (OUTPATIENT)
Dept: ONCOLOGY | Facility: CLINIC | Age: 72
End: 2022-03-24

## 2022-03-24 ENCOUNTER — NURSE TRIAGE (OUTPATIENT)
Dept: FAMILY MEDICINE | Facility: CLINIC | Age: 72
End: 2022-03-24
Payer: MEDICARE

## 2022-03-24 ENCOUNTER — APPOINTMENT (OUTPATIENT)
Dept: CT IMAGING | Facility: CLINIC | Age: 72
End: 2022-03-24
Attending: EMERGENCY MEDICINE
Payer: MEDICARE

## 2022-03-24 ENCOUNTER — HOSPITAL ENCOUNTER (EMERGENCY)
Facility: CLINIC | Age: 72
Discharge: HOME | End: 2022-03-24
Attending: EMERGENCY MEDICINE | Admitting: EMERGENCY MEDICINE
Payer: MEDICARE

## 2022-03-24 VITALS
SYSTOLIC BLOOD PRESSURE: 159 MMHG | WEIGHT: 140 LBS | OXYGEN SATURATION: 96 % | DIASTOLIC BLOOD PRESSURE: 76 MMHG | HEART RATE: 100 BPM | TEMPERATURE: 99.1 F | RESPIRATION RATE: 18 BRPM | HEIGHT: 60 IN | BODY MASS INDEX: 27.48 KG/M2

## 2022-03-24 DIAGNOSIS — C18.8 MALIGNANT NEOPLASM OF OVERLAPPING SITES OF COLON (H): ICD-10-CM

## 2022-03-24 DIAGNOSIS — C49.9 LEIOMYOSARCOMA (H): ICD-10-CM

## 2022-03-24 DIAGNOSIS — C78.1 SECONDARY MALIGNANT NEOPLASM OF MEDIASTINUM (H): ICD-10-CM

## 2022-03-24 DIAGNOSIS — R93.5 ABNORMAL ABDOMINAL CT SCAN: ICD-10-CM

## 2022-03-24 DIAGNOSIS — R10.32 ABDOMINAL PAIN, LEFT LOWER QUADRANT: ICD-10-CM

## 2022-03-24 DIAGNOSIS — C55 LEIOMYOSARCOMA OF UTERUS (H): Primary | ICD-10-CM

## 2022-03-24 DIAGNOSIS — C49.9 LEIOMYOSARCOMA (H): Primary | ICD-10-CM

## 2022-03-24 LAB
ALBUMIN SERPL-MCNC: 3.7 G/DL (ref 3.4–5)
ALBUMIN UR-MCNC: NEGATIVE MG/DL
ALP SERPL-CCNC: 68 U/L (ref 40–150)
ALT SERPL W P-5'-P-CCNC: 23 U/L (ref 0–50)
ANION GAP SERPL CALCULATED.3IONS-SCNC: 3 MMOL/L (ref 3–14)
APPEARANCE UR: CLEAR
AST SERPL W P-5'-P-CCNC: 15 U/L (ref 0–45)
BASOPHILS # BLD AUTO: 0 10E3/UL (ref 0–0.2)
BASOPHILS NFR BLD AUTO: 0 %
BILIRUB SERPL-MCNC: 0.8 MG/DL (ref 0.2–1.3)
BILIRUB UR QL STRIP: NEGATIVE
BUN SERPL-MCNC: 18 MG/DL (ref 7–30)
CALCIUM SERPL-MCNC: 9.1 MG/DL (ref 8.5–10.1)
CHLORIDE BLD-SCNC: 108 MMOL/L (ref 94–109)
CO2 SERPL-SCNC: 28 MMOL/L (ref 20–32)
COLOR UR AUTO: YELLOW
CREAT SERPL-MCNC: 0.88 MG/DL (ref 0.52–1.04)
EOSINOPHIL # BLD AUTO: 0.1 10E3/UL (ref 0–0.7)
EOSINOPHIL NFR BLD AUTO: 1 %
ERYTHROCYTE [DISTWIDTH] IN BLOOD BY AUTOMATED COUNT: 14.9 % (ref 10–15)
GFR SERPL CREATININE-BSD FRML MDRD: 70 ML/MIN/1.73M2
GLUCOSE BLD-MCNC: 86 MG/DL (ref 70–99)
GLUCOSE UR STRIP-MCNC: NEGATIVE MG/DL
HCT VFR BLD AUTO: 45.2 % (ref 35–47)
HGB BLD-MCNC: 14.8 G/DL (ref 11.7–15.7)
HGB UR QL STRIP: ABNORMAL
IMM GRANULOCYTES # BLD: 0 10E3/UL
IMM GRANULOCYTES NFR BLD: 0 %
KETONES UR STRIP-MCNC: 40 MG/DL
LEUKOCYTE ESTERASE UR QL STRIP: ABNORMAL
LYMPHOCYTES # BLD AUTO: 2.1 10E3/UL (ref 0.8–5.3)
LYMPHOCYTES NFR BLD AUTO: 26 %
MCH RBC QN AUTO: 29.9 PG (ref 26.5–33)
MCHC RBC AUTO-ENTMCNC: 32.7 G/DL (ref 31.5–36.5)
MCV RBC AUTO: 91 FL (ref 78–100)
MONOCYTES # BLD AUTO: 0.7 10E3/UL (ref 0–1.3)
MONOCYTES NFR BLD AUTO: 9 %
MUCOUS THREADS #/AREA URNS LPF: PRESENT /LPF
NEUTROPHILS # BLD AUTO: 5.1 10E3/UL (ref 1.6–8.3)
NEUTROPHILS NFR BLD AUTO: 64 %
NITRATE UR QL: NEGATIVE
NRBC # BLD AUTO: 0 10E3/UL
NRBC BLD AUTO-RTO: 0 /100
PH UR STRIP: 5.5 [PH] (ref 5–7)
PLATELET # BLD AUTO: 239 10E3/UL (ref 150–450)
POTASSIUM BLD-SCNC: 4.1 MMOL/L (ref 3.4–5.3)
PROT SERPL-MCNC: 7.1 G/DL (ref 6.8–8.8)
RBC # BLD AUTO: 4.95 10E6/UL (ref 3.8–5.2)
RBC URINE: 1 /HPF
SODIUM SERPL-SCNC: 139 MMOL/L (ref 133–144)
SP GR UR STRIP: 1.02 (ref 1–1.03)
SQUAMOUS EPITHELIAL: 1 /HPF
UROBILINOGEN UR STRIP-MCNC: NORMAL MG/DL
WBC # BLD AUTO: 8 10E3/UL (ref 4–11)
WBC URINE: 10 /HPF

## 2022-03-24 PROCEDURE — 99285 EMERGENCY DEPT VISIT HI MDM: CPT | Mod: 25

## 2022-03-24 PROCEDURE — 36415 COLL VENOUS BLD VENIPUNCTURE: CPT | Performed by: EMERGENCY MEDICINE

## 2022-03-24 PROCEDURE — 85014 HEMATOCRIT: CPT | Performed by: EMERGENCY MEDICINE

## 2022-03-24 PROCEDURE — 82040 ASSAY OF SERUM ALBUMIN: CPT | Performed by: EMERGENCY MEDICINE

## 2022-03-24 PROCEDURE — 80053 COMPREHEN METABOLIC PANEL: CPT | Performed by: EMERGENCY MEDICINE

## 2022-03-24 PROCEDURE — 250N000011 HC RX IP 250 OP 636: Performed by: EMERGENCY MEDICINE

## 2022-03-24 PROCEDURE — 250N000009 HC RX 250: Performed by: EMERGENCY MEDICINE

## 2022-03-24 PROCEDURE — 74177 CT ABD & PELVIS W/CONTRAST: CPT | Mod: ME

## 2022-03-24 PROCEDURE — 81001 URINALYSIS AUTO W/SCOPE: CPT | Performed by: EMERGENCY MEDICINE

## 2022-03-24 PROCEDURE — 85025 COMPLETE CBC W/AUTO DIFF WBC: CPT | Performed by: EMERGENCY MEDICINE

## 2022-03-24 RX ORDER — IOPAMIDOL 755 MG/ML
71 INJECTION, SOLUTION INTRAVASCULAR ONCE
Status: COMPLETED | OUTPATIENT
Start: 2022-03-24 | End: 2022-03-24

## 2022-03-24 RX ADMIN — IOPAMIDOL 71 ML: 755 INJECTION, SOLUTION INTRAVENOUS at 12:37

## 2022-03-24 RX ADMIN — SODIUM CHLORIDE 60 ML: 9 INJECTION, SOLUTION INTRAVENOUS at 12:37

## 2022-03-24 ASSESSMENT — ENCOUNTER SYMPTOMS
DYSURIA: 0
NAUSEA: 0
VOMITING: 0
BLOOD IN STOOL: 0
ABDOMINAL PAIN: 1

## 2022-03-24 NOTE — TELEPHONE ENCOUNTER
Pt called Triage.  In ED at [facility] for possible bowel obstruction.  Had CT of abd. Pt reports they saw something and would like to see if an MRI is needed.  Pt states provider contacted Dr. Aguilar two hours ago and has not heard back.   Pt states she is going to [place] tomorrow and doesn't know what to do.    Contacted Ambika Dickerson, GEORGECC who asked for call to be transferred to her. Dr. Aguilar will be back on Monday.    Call transferred to Ambika.

## 2022-03-24 NOTE — TELEPHONE ENCOUNTER
"Patient is leaving for out of the country tomorrow. She has a history of bowel obstruction. Yesterday. She started with mild abdominal pain with no other symptoms.     Patient was offered a visit today with clinic provider but she decided she was going to go to the ED for evaluation.     Reason for Disposition    [1] MILD-MODERATE pain AND [2] constant AND [3] present > 2 hours    [1] MILD pain (e.g., does not interfere with normal activities) AND [2] pain comes and goes (cramps) AND [3] present > 48 hours    Age > 60 years    Additional Information    Negative: Shock suspected (e.g., cold/pale/clammy skin, too weak to stand, low BP, rapid pulse)    Negative: Difficult to awaken or acting confused (e.g., disoriented, slurred speech)    Negative: Passed out (i.e., lost consciousness, collapsed and was not responding)    Negative: Sounds like a life-threatening emergency to the triager    Negative: Chest pain    Negative: Pain is mainly in upper abdomen  (if needed ask: \"is it mainly above the belly button?\")    Negative: Followed an abdomen (stomach) injury    Negative: [1] Abdominal pain AND [2] pregnant < 20 weeks    Negative: [1] Abdominal pain AND [2] pregnant > 20 weeks    Negative: [1] Abdominal pain AND [2] postpartum (from 0 to 6 weeks after delivery)    Negative: [1] SEVERE pain (e.g., excruciating) AND [2] present > 1 hour    Negative: [1] SEVERE pain AND [2] age > 60    Negative: [1] Vomiting AND [2] contains red blood or black (\"coffee ground\") material  (Exception: few red streaks in vomit that only happened once)    Negative: Blood in bowel movements   (Exception: blood on surface of BM with constipation)    Negative: Black or tarry bowel movements  (Exception: chronic-unchanged  black-grey bowel movements AND is taking iron pills or Pepto-bismol)    Negative: Patient sounds very sick or weak to the triager    Negative: [1] Vomiting AND [2] abdomen looks much more swollen than usual    Negative: [1] " "Vomiting AND [2] contains bile (green color)    Negative: White of the eyes have turned yellow (i.e., jaundice)    Negative: Fever > 103 F (39.4 C)    Negative: [1] Fever > 101 F (38.3 C) AND [2] age > 60    Negative: [1] Fever > 100.0 F (37.8 C) AND [2] bedridden (e.g., nursing home patient, CVA, chronic illness, recovering from surgery)    Negative: [1] Fever > 100.0 F (37.8 C) AND [2] diabetes mellitus or weak immune system (e.g., HIV positive, cancer chemo, splenectomy, organ transplant, chronic steroids)    Negative: [1] SEVERE pain AND [2] present < 1 hour    Negative: [1] MODERATE pain (e.g., interferes with normal activities) AND [2] pain comes and goes (cramps) AND [3] present > 24 hours  (Exception: pain with Vomiting or Diarrhea - see that Guideline)    Negative: Pregnancy suspected (e.g., missed last menstrual period)    Negative: Unusual vaginal discharge (e.g., bad smelling, yellow, green, or foamy-white)    Negative: Blood in urine (red, pink, or tea-colored)    Negative: Abdominal pain is a chronic symptom (recurrent or ongoing AND present > 4 weeks)    Negative: Pain with sexual intercourse (dyspareunia)    Negative: [1] MILD-MODERATE pain AND [2] constant and [3] present < 2 hours    Negative: [1] MILD-MODERATE pain AND [2] comes and goes (cramps)    Answer Assessment - Initial Assessment Questions  1. LOCATION: \"Where does it hurt?\"       Abdominal area.   2. RADIATION: \"Does the pain shoot anywhere else?\" (e.g., chest, back)      No  3. ONSET: \"When did the pain begin?\" (e.g., minutes, hours or days ago)       Yesterday  4. SUDDEN: \"Gradual or sudden onset?\"      Gradual  5. PATTERN \"Does the pain come and go, or is it constant?\"     - If constant: \"Is it getting better, staying the same, or worsening?\"       (Note: Constant means the pain never goes away completely; most serious pain is constant and it progresses)      - If intermittent: \"How long does it last?\" \"Do you have pain now?\"      " "(Note: Intermittent means the pain goes away completely between bouts)      No  6. SEVERITY: \"How bad is the pain?\"  (e.g., Scale 1-10; mild, moderate, or severe)    - MILD (1-3): doesn't interfere with normal activities, abdomen soft and not tender to touch     - MODERATE (4-7): interferes with normal activities or awakens from sleep, tender to touch     - SEVERE (8-10): excruciating pain, doubled over, unable to do any normal activities       No  7. RECURRENT SYMPTOM: \"Have you ever had this type of abdominal pain before?\" If so, ask: \"When was the last time?\" and \"What happened that time?\"       Yes, past bowel obstruction.   8. CAUSE: \"What do you think is causing the abdominal pain?\"      Not sure?  9. RELIEVING/AGGRAVATING FACTORS: \"What makes it better or worse?\" (e.g., movement, antacids, bowel movement)      No  10. OTHER SYMPTOMS: \"Has there been any vomiting, diarrhea, constipation, or urine problems?\"        No  11. PREGNANCY: \"Is there any chance you are pregnant?\" \"When was your last menstrual period?\"        No    Protocols used: ABDOMINAL PAIN - FEMALE--    Linda Cano RN  [facility]     "

## 2022-03-24 NOTE — ED TRIAGE NOTES
Abd pain  - left, lower abd. Began 2 days ago. 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 Pt did have regular BM yesterday and no vomiting. Pt is planning to go to [place] tomorrow. Has not eaten since yesterday for bowel rest.

## 2022-03-24 NOTE — ED PROVIDER NOTES
History   Chief Complaint:  Abdominal Pain     HPI   Maggie Navarrete is a 71 year old female with history of uterine cancer with total hysterectomy and bowel obstruction who presents with lower-left abdominal pain starting two days ago. She rates the pain 4-5/10 when pressure is applied.  Denies abdominal pain if she is not pushing on the abdomen.  She has no nausea or vomiting.  No diarrhea or constipation.  No black or bloody stool.  Denies any new urinary symptoms.  No fever.  No back pain.  Does not feel faint or lightheaded.  No other complaints.    Review of Systems   Gastrointestinal: Positive for abdominal pain (LLQ). Negative for blood in stool, nausea and vomiting.   Genitourinary: Negative for dyspareunia and dysuria.   All other systems reviewed and are negative.    Allergies:  Erythromycin  Tramadol    Medications:  alendronate  carboxymethylcellulose   famotidine  levothyroxine  ondansetron  venlafaxine  zolpidem  ranitidine  Estrogens conjugated  Progesterone micronized  Trazodone  Paroxetine    Past Medical History:     Anxiety   Arthritis   Disorder of bone and cartilage  Esophageal reflux  Follicular adenoma of thyroid gland   Gastroesophageal reflux disease  Reproductive problem  Kidney stone  Leiomyosarcoma  Personal history of colonic polyps  Postoperative nausea and vomiting  Posterior vitreous detachment of left eye  Posterior vitreous detachment of right eye  Ptosis of eyelid   Sepsis  Stomach problems  Thyroid disease   Presbyopia - Both Eyes  Osteopenia  Generalized anxiety disorder  Vaginal atrophy  Chemotherapy-induced neutropenia  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  Overweight  Closed nondisplaced fracture of fifth right metatarsal bone  Deep venous thrombosis  Hypothyroidism  Myopia of both eyes  Neuropathy, peripheral  Low TSH level  Arthralgia  Dyslipidemia  Small bowel obstruction  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  Leukocytosis  Pelvic somatic  dysfunction  Mixed incontinence    Past Surgical History:    Abdominal surgery  Breast surgery  tonsillectomy  Bladder surgery  GI surgery   section  Myomectomy  Total hyesterectomy  right thyroid lobectomy  Remove port vasucular access  Repair ptosis bilateral  Tumor removal  Blepharoptosis repair  Stent placement, right ureter    Family History:    Mother: Glaucoma, Anxiety, Heart failure, Diabetes, Coronary artery disease, Hypertension, Breast cancer, Depression, Obesity, Arrhythmia  Father: Glaucoma, Diabetes, Coronary artery disease, Osteoporosis, Depression, Macular degeneration  Sister: Thyroid disease, Retinal detachment, Hypertension, Lipids, Cerebrovascular disease, Depression, Anxiety    Social History:  Patient arrived at ed by car.  She is currently unaccompanied.    Physical Exam     Patient Vitals for the past 24 hrs:   BP Temp Temp src Pulse Resp SpO2 Height Weight   22 1018 (!) 159/76 99.1  F (37.3  C) Temporal 100 18 96 % 1.524 m (5') 63.5 kg (140 lb)     Physical Exam  SKIN:  Warm, dry.  No jaundice.  HEMATOLOGIC/IMMUNOLOGIC/LYMPHATIC:  No pallor.  EYES:  Conjunctivae normal.  CARDIOVASCULAR:  Regular rate and rhythm.  No murmur.  RESPIRATORY:  No respiratory distress, breath sounds equal and normal.  GASTROINTESTINAL:  Soft tender abdomen at the left lower quadrant with active bowel sounds.  No palpable mass.  No distention.  MUSCULOSKELETAL: Normal body habitus.  NEUROLOGIC:  Alert, conversant.  PSYCHIATRIC:  Normal mood.    Emergency Department Course     Imaging:  CT Abdomen Pelvis w Contrast   Final Result   IMPRESSION:    1. There is approximately 3.2 cm hypoattenuating area in the left   lower quadrant abutting small bowel loops, indeterminate, could   represent metastatic lesion versus small abscess versus hematoma.   2. Trace amount of free fluid in the pelvis, indeterminate, could be   reactive.   3. Diffuse urinary bladder wall thickening, nonspecific, can be seen   with  chronic cystitis, recommend correlation with urinalysis.   4. Atrophic appearance of the right kidney as compared to the left.      EVE KOO MD            SYSTEM ID:  [id number]        Report per radiology    Laboratory:  Labs Ordered and Resulted from Time of ED Arrival to Time of ED Departure   ROUTINE UA WITH MICROSCOPIC REFLEX TO CULTURE - Abnormal       Result Value    Color Urine Yellow      Appearance Urine Clear      Glucose Urine Negative      Bilirubin Urine Negative      Ketones Urine 40  (*)     Specific Gravity Urine 1.024      Blood Urine Trace (*)     pH Urine 5.5      Protein Albumin Urine Negative      Urobilinogen Urine Normal      Nitrite Urine Negative      Leukocyte Esterase Urine Trace (*)     Mucus Urine Present (*)     RBC Urine 1      WBC Urine 10 (*)     Squamous Epithelials Urine 1     COMPREHENSIVE METABOLIC PANEL - Normal    Sodium 139      Potassium 4.1      Chloride 108      Carbon Dioxide (CO2) 28      Anion Gap 3      Urea Nitrogen 18      Creatinine 0.88      Calcium 9.1      Glucose 86      Alkaline Phosphatase 68      AST 15      ALT 23      Protein Total 7.1      Albumin 3.7      Bilirubin Total 0.8      GFR Estimate 70     CBC WITH PLATELETS AND DIFFERENTIAL    WBC Count 8.0      RBC Count 4.95      Hemoglobin 14.8      Hematocrit 45.2      MCV 91      MCH 29.9      MCHC 32.7      RDW 14.9      Platelet Count 239      % Neutrophils 64      % Lymphocytes 26      % Monocytes 9      % Eosinophils 1      % Basophils 0      % Immature Granulocytes 0      NRBCs per 100 WBC 0      Absolute Neutrophils 5.1      Absolute Lymphocytes 2.1      Absolute Monocytes 0.7      Absolute Eosinophils 0.1      Absolute Basophils 0.0      Absolute Immature Granulocytes 0.0      Absolute NRBCs 0.0          Procedures  None    Emergency Department Course:             Reviewed:  I reviewed nursing notes, vitals, past medical history and Care Everywhere    Assessments:  1204 I obtained history  and examined the patient as noted above.   1408 I rechecked the patient and explained findings.   1700    I rechecked the patient and gave guidance for outpatient follow-up.      Consults:  1608 I consulted with Dr. Umaña of Oncology.  1652    I consulted Dr. Curtis Obando of radiology.    Interventions:  none    Disposition:  The patient was discharged to home.     Impression & Plan     CMS Diagnoses: None    Medical Decision Making:  This patient presents with concern of left lower quadrant abdominal/pelvic pain in the context of history of uterine cancer, total hysterectomy and small bowel obstruction.  Exam was relatively reassuring with mild tenderness.  Underwent the above evaluation which revealed a approximate 3 cm circumscribed mass versus hematoma versus abscess.  Mass seems most likely in clinical context of history of cancer.  The patient lacks fever nor leukocytosis so a random abscess development seems quite unlikely.  Certainly does not seem toxic or ill.  Hematoma in that region would be unlikely without anticoagulation but also would not require any distinct intervention.  I was unable to contact the patient's primary oncologist regarding these imaging findings as he is on medical leave.  I was able to speak to Dr. Umaña, an oncologist, who was not convinced an MRI would definitively define this abnormality noted on CT scan.  He recommended I speak to the radiologist who interpreted CT scan for further guidance.  I did speak to the radiologist who thought a CT PET scan versus biopsy would be more helpful as opposed to an MRI which also may be inconclusive regarding a definitive diagnosis.  The patient prefers to follow-up with oncology as an outpatient.  We both agree she does not require further diagnostics at this time in the ED.  Pending follow-up I advised to return if any concerns such as fever worsening pain or otherwise.    Diagnosis:    ICD-10-CM    1. Abdominal pain, left lower quadrant   R10.32    2. Abnormal abdominal CT scan  R93.5        Discharge Medications:  New Prescriptions    No medications on file       Scribe Disclosure:  I, Jeb Montez, am serving as a scribe at 12:03 PM on 3/24/2022 to document services personally performed by Jaden Hyatt MD based on my observations and the provider's statements to me.         Jaden Hyatt MD  03/24/22 [id number]

## 2022-03-26 ENCOUNTER — HOSPITAL ENCOUNTER (OUTPATIENT)
Dept: PET IMAGING | Facility: CLINIC | Age: 72
Discharge: HOME | End: 2022-03-26
Attending: INTERNAL MEDICINE | Admitting: INTERNAL MEDICINE
Payer: MEDICARE

## 2022-03-26 DIAGNOSIS — C49.9 LEIOMYOSARCOMA (H): ICD-10-CM

## 2022-03-26 DIAGNOSIS — C18.8 MALIGNANT NEOPLASM OF OVERLAPPING SITES OF COLON (H): ICD-10-CM

## 2022-03-26 PROCEDURE — A9552 F18 FDG: HCPCS | Performed by: INTERNAL MEDICINE

## 2022-03-26 PROCEDURE — 78816 PET IMAGE W/CT FULL BODY: CPT | Mod: 26 | Performed by: RADIOLOGY

## 2022-03-26 PROCEDURE — G1004 CDSM NDSC: HCPCS | Mod: GC | Performed by: RADIOLOGY

## 2022-03-26 PROCEDURE — 71260 CT THORAX DX C+: CPT | Mod: 26 | Performed by: RADIOLOGY

## 2022-03-26 PROCEDURE — 250N000011 HC RX IP 250 OP 636: Performed by: INTERNAL MEDICINE

## 2022-03-26 PROCEDURE — 74177 CT ABD & PELVIS W/CONTRAST: CPT | Mod: 26 | Performed by: RADIOLOGY

## 2022-03-26 PROCEDURE — 343N000001 HC RX 343: Performed by: INTERNAL MEDICINE

## 2022-03-26 PROCEDURE — 74177 CT ABD & PELVIS W/CONTRAST: CPT | Mod: 59,MG,PI

## 2022-03-26 PROCEDURE — G1004 CDSM NDSC: HCPCS | Mod: PI

## 2022-03-26 RX ORDER — IOPAMIDOL 755 MG/ML
50-135 INJECTION, SOLUTION INTRAVASCULAR ONCE
Status: COMPLETED | OUTPATIENT
Start: 2022-03-26 | End: 2022-03-26

## 2022-03-26 RX ADMIN — IOPAMIDOL 86 ML: 755 INJECTION, SOLUTION INTRAVENOUS at 09:05

## 2022-03-26 RX ADMIN — FLUDEOXYGLUCOSE F-18 10.13 MCI.: 500 INJECTION, SOLUTION INTRAVENOUS at 08:05

## 2022-04-01 ENCOUNTER — ONCOLOGY VISIT (OUTPATIENT)
Dept: ONCOLOGY | Facility: CLINIC | Age: 72
End: 2022-04-01
Attending: INTERNAL MEDICINE
Payer: MEDICARE

## 2022-04-01 VITALS
HEART RATE: 105 BPM | WEIGHT: 140.5 LBS | DIASTOLIC BLOOD PRESSURE: 96 MMHG | OXYGEN SATURATION: 100 % | HEIGHT: 59 IN | SYSTOLIC BLOOD PRESSURE: 151 MMHG | BODY MASS INDEX: 28.32 KG/M2 | TEMPERATURE: 98.1 F

## 2022-04-01 DIAGNOSIS — C78.5 SECONDARY MALIGNANT NEOPLASM OF LARGE INTESTINE AND RECTUM (H): ICD-10-CM

## 2022-04-01 DIAGNOSIS — C55 LEIOMYOSARCOMA OF UTERUS (H): Primary | ICD-10-CM

## 2022-04-01 PROCEDURE — G0463 HOSPITAL OUTPT CLINIC VISIT: HCPCS

## 2022-04-01 PROCEDURE — 99215 OFFICE O/P EST HI 40 MIN: CPT | Performed by: INTERNAL MEDICINE

## 2022-04-01 ASSESSMENT — PAIN SCALES - GENERAL: PAINLEVEL: NO PAIN (0)

## 2022-04-01 NOTE — NURSING NOTE
"Oncology Rooming Note    April 1, 2022 9:42 AM   Maggie Navarrete is a 71 year old female who presents for:    Chief Complaint   Patient presents with     Oncology Clinic Visit     Leiomyosarcoma of uterus      Initial Vitals: BP (!) 151/96 (BP Location: Right arm, Patient Position: Sitting, Cuff Size: Adult Regular)   Pulse 105   Temp 98.1  F (36.7  C) (Oral)   Ht 1.5 m (4' 11.06\")   Wt 63.7 kg (140 lb 8 oz)   SpO2 100%   BMI 28.32 kg/m   Estimated body mass index is 28.32 kg/m  as calculated from the following:    Height as of this encounter: 1.5 m (4' 11.06\").    Weight as of this encounter: 63.7 kg (140 lb 8 oz). Body surface area is 1.63 meters squared.  No Pain (0) Comment: Data Unavailable   No LMP recorded. Patient has had a hysterectomy.  Allergies reviewed: Yes  Medications reviewed: Yes    Medications: MEDICATION REFILLS NEEDED TODAY. Provider was notified.     Pt needs VENLAFAXINE refilled.      Pharmacy name entered into EPIC:    [facility] - [place], [place] - [address] AT PORTAL TO REGISTERED [facility] SITES  [id number] - [place], [place] - [address] [facility] [place], [place] - [phone]    Clinical concerns: Provider notified     Dudley Armstrong            "

## 2022-04-01 NOTE — LETTER
4/1/2022     RE: Maggie Navarrete  [address]  [facility] [id number]    Dear Colleague,    Thank you for referring your patient, Maggie Navarrete, to the [facility]. Please see a copy of my visit note below.    MEDICAL ONCOLOGY PROGRESS NOTE  [facility]  Apr 1, 2022    Oncologic history:  1. January 2016, she presents to her PCP with 3 weeks of abdominal pain and severe constipation. She is treated for constipation and improves.  2. 3/21/2016, worsening pelvic pressure prompts pelvic examination with an enlarged uterus noted. Transvaginal ultrasound shows enlargement of subserosal fibroids originally seen on MRI in 2006.  3. 5/9/2016, MRI pelvis is obtained, which shows several enhancing uterine masses involving an enlarged uterus, felt radiographically compatible with benign uterine leiomyomata.  3. Summer 2016, she experiences worsening low back pain thought at first thought to be sacroiliitis, but pain persists after corticosteroid injection. Continues with severe constipation.  4. 9/8/2016, she undergoes colonoscopy for persistent abdominal pain. She has sigmoid polyp snared. Abdominal pain and diarrhea persist after colonoscopy prep.  5. 9/14/2016, she has worsening abdominal pain and is evaluated in the ED. CT-scan shows a large subserosal fibroid measuring 9.4 x 9.6 x 9.8 cm, as well as very large fibroids along the posterior uterine body more inferiorly, increased in size to 14 cm in greatest diameter compared to 10 cm in greatest diameter on MRI of 5/9/2016.  6. 9/29/2016, she has staging CT-CAP, which shows a small left sided pleural effusion and a 3 mm pulmonary nodule in the RML.  7. October 2016, self-refers to [facility].  8. 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  9. 11/4/2016, undergoes total abdominal hysterectomy and BSO, under Dr. Harsh Camarillo. Surgery was originally presumed for leiomyoma,  however, intraoperatively there was focal adherence of a mass to the pelvic right sidewall and cul-de-sac, which led to peritoneal resection and omentectomy. Final pathology, pT2b: showed leiomyosarcoma involving the cul-de-sac and forming two separate masses measuring 10.5 x 6.5 x 4.5 cm and 10.7 x 7.9 x 7.5 cm.  10. 12/26/2016, she starts adjuvant chemotherapy with doxorubicin 25 mg/m2/day and DTIC 250 mg/m2/day , days 1-3 every 21 weeks.  11. 3/23/2017, she completes 5 cycles of adjuvant doxorubicin and dacarbazine  12. 6/3/2020, restaging MRI shows a locally recurrent pelvic sidewall mass measuring 2.8 x 3.3 cm in size. This new area of concern was not previously reported, presumably due to proximity to bowel and the similar appearance to adjacent loops of bowel on the contrast enhanced imaging. On review of prior imaging studies, the lesion can be seen as far back as January 2019 as a small nodule measuring 1 cm in size directly adjacent to bowel, close to the pelvic sidewall.  13. 6/16/2020, she had resection of the recurrent leiomyosarcoma tumor, which was densely attached to the right ureter and attached to the right side of the sigmoid mesentery and external and internal iliac vessels. There was external iliac vein injury with primary repair due to involvement of the mass. The ureter was also partially removed as part of the tumor mass. All margins and the ureter that was resected were negative for tumor. Pathology (JR-) showed a 3.7 x 3.5 x 2.2 cm thinly encapsulated mass, which was submitted for frozen sections.      HISTORY OF PRESENT ILLNESS  Ms. Navarrete presents today in follow-up on he recent scans on 3/26. The PET-CT showed a hypermetabolic left bowel metastasis against the anterior abdominal wall. Fortunately, she saw Dr. Camarillo at [facility] yesterday and there is surgery scheduledMonday for removal of the tumor.     She is doing well. She is nervous about the new discovery. She had been having  intermittent left abdominal discomfort for the last 3 months or so, and there was question of bowel obstructive type symptoms. She has not had melena or hematochezia. She denies nausea, vomiting, or currently any constipation/diarrhea. No fevers, chills.    ECOG performance status is 0.    REVIEW OF SYSTEMS  A 12-point ROS negative except as in HPI    Current Outpatient Medications   Medication Sig Dispense Refill     alendronate (FOSAMAX) 70 MG tablet Take 1 tablet (70 mg) by mouth every 7 days 12 tablet 3     CALCIUM PO Take 1 capsule by mouth daily as needed (if diet insufficient to provide daily recommended values)       carboxymethylcellulose PF (REFRESH PLUS) 0.5 % ophthalmic solution Place 1 drop into both eyes 4 times daily as needed for dry eyes       famotidine (PEPCID) 10 MG tablet Take 10 mg by mouth 2 times daily as needed       levothyroxine (SYNTHROID/LEVOTHROID) 75 MCG tablet Take 1 tablet (75 mcg) by mouth daily 90 tablet 3     multivitamin w/minerals (THERA-VIT-M) tablet Take 1 tablet by mouth daily as needed (if diet insufficient to provide recommended nutrients)       ondansetron (ZOFRAN-ODT) 4 MG ODT tab Take 1 tablet (4 mg) by mouth every 6 hours as needed for nausea or vomiting 30 tablet 0     venlafaxine (EFFEXOR-XR) 75 MG 24 hr capsule Take 1 capsule (75 mg) by mouth daily 90 capsule 1     zolpidem (AMBIEN) 5 MG tablet Take 1 tablet (5 mg) by mouth nightly as needed for sleep (Only uses if traveling or cannot sleep,) 20 tablet 0       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     GERD (gastroesophageal reflux disease)      Hx of previous reproductive problem 1980     Kidney stone 2011     Leiomyosarcoma (H) 11/14/2016     Personal history of colonic polyps 2016    Small, benign     PONV  (postoperative nausea and vomiting)      Posterior vitreous detachment of left eye 2011     Posterior vitreous detachment of right eye 2011     Ptosis of eyelid, bilateral      Sepsis (H) 07/2020     Stomach problems 2015    Abdominal pain, Diarreha     Thyroid disease     thyroid nodule resolved 2014       PHYSICAL EXAMINATION  There were no vitals taken for this visit.  Wt Readings from Last 2 Encounters:   03/24/22 63.5 kg (140 lb)   12/28/21 63.5 kg (140 lb)   General: Well appearing, at home, in no distress  Head: Normocephalic  Eyes: EOMI, No icterus  ENT: Oropharynx clear  Lungs: Easy breathing on room air, speaking in complete sentences without dyspnea  Neuro: Cranial nerves 2-12 intact, no focal deficits  Skin: No rashes on exposed skin    Labs:  No visits with results within 1 Week(s) from this visit.   Latest known visit with results is:   Admission on 03/24/2022, Discharged on 03/24/2022   Component Date Value Ref Range Status     Sodium 03/24/2022 139  133 - 144 mmol/L Final     Potassium 03/24/2022 4.1  3.4 - 5.3 mmol/L Final     Chloride 03/24/2022 108  94 - 109 mmol/L Final     Carbon Dioxide (CO2) 03/24/2022 28  20 - 32 mmol/L Final     Anion Gap 03/24/2022 3  3 - 14 mmol/L Final     Urea Nitrogen 03/24/2022 18  7 - 30 mg/dL Final     Creatinine 03/24/2022 0.88  0.52 - 1.04 mg/dL Final     Calcium 03/24/2022 9.1  8.5 - 10.1 mg/dL Final     Glucose 03/24/2022 86  70 - 99 mg/dL Final     Alkaline Phosphatase 03/24/2022 68  40 - 150 U/L Final     AST 03/24/2022 15  0 - 45 U/L Final     ALT 03/24/2022 23  0 - 50 U/L Final     Protein Total 03/24/2022 7.1  6.8 - 8.8 g/dL Final     Albumin 03/24/2022 3.7  3.4 - 5.0 g/dL Final     Bilirubin Total 03/24/2022 0.8  0.2 - 1.3 mg/dL Final     GFR Estimate 03/24/2022 70  >60 mL/min/1.73m2 Final     WBC Count 03/24/2022 8.0  4.0 - 11.0 10e3/uL Final     RBC Count 03/24/2022 4.95  3.80 - 5.20 10e6/uL Final     Hemoglobin 03/24/2022 14.8  11.7 - 15.7 g/dL Final      Hematocrit 03/24/2022 45.2  35.0 - 47.0 % Final     MCV 03/24/2022 91  78 - 100 fL Final     MCH 03/24/2022 29.9  26.5 - 33.0 pg Final     MCHC 03/24/2022 32.7  31.5 - 36.5 g/dL Final     RDW 03/24/2022 14.9  10.0 - 15.0 % Final     Platelet Count 03/24/2022 239  150 - 450 10e3/uL Final     % Neutrophils 03/24/2022 64  % Final     % Lymphocytes 03/24/2022 26  % Final     % Monocytes 03/24/2022 9  % Final     % Eosinophils 03/24/2022 1  % Final     % Basophils 03/24/2022 0  % Final     % Immature Granulocytes 03/24/2022 0  % Final     NRBCs per 100 WBC 03/24/2022 0  <1 /100 Final     Absolute Neutrophils 03/24/2022 5.1  1.6 - 8.3 10e3/uL Final     Absolute Lymphocytes 03/24/2022 2.1  0.8 - 5.3 10e3/uL Final     Absolute Monocytes 03/24/2022 0.7  0.0 - 1.3 10e3/uL Final     Absolute Eosinophils 03/24/2022 0.1  0.0 - 0.7 10e3/uL Final     Absolute Basophils 03/24/2022 0.0  0.0 - 0.2 10e3/uL Final     Absolute Immature Granulocytes 03/24/2022 0.0  <=0.4 10e3/uL Final     Absolute NRBCs 03/24/2022 0.0  10e3/uL Final     Color Urine 03/24/2022 Yellow  Colorless, Straw, Light Yellow, Yellow Final     Appearance Urine 03/24/2022 Clear  Clear Final     Glucose Urine 03/24/2022 Negative  Negative mg/dL Final     Bilirubin Urine 03/24/2022 Negative  Negative Final     Ketones Urine 03/24/2022 40  (A) Negative mg/dL Final     Specific Gravity Urine 03/24/2022 1.024  1.003 - 1.035 Final     Blood Urine 03/24/2022 Trace (A) Negative Final     pH Urine 03/24/2022 5.5  5.0 - 7.0 Final     Protein Albumin Urine 03/24/2022 Negative  Negative mg/dL Final     Urobilinogen Urine 03/24/2022 Normal  Normal, 2.0 mg/dL Final     Nitrite Urine 03/24/2022 Negative  Negative Final     Leukocyte Esterase Urine 03/24/2022 Trace (A) Negative Final     Mucus Urine 03/24/2022 Present (A) None Seen /LPF Final     RBC Urine 03/24/2022 1  <=2 /HPF Final     WBC Urine 03/24/2022 10 (A) <=5 /HPF Final     Squamous Epithelials Urine 03/24/2022 1  <=1  /[facility] Final         Imaging:  PET Oncology Whole Body  Narrative: Combined Report of:    PET and CT on  3/26/2022 9:30 AM :    1. PET of the neck, chest, abdomen, and pelvis.  2. PET CT Fusion for Attenuation Correction and Anatomical  Localization:    3. Diagnostic CT scan of the chest, abdomen, and pelvis with  intravenous contrast for interpretation.  3. CT of the chest, abdomen and pelvis obtained for diagnostic  interpretation.  4. 3D MIP and PET-CT fused images were processed on an independent  workstation and archived to PACS and reviewed by a radiologist.    Technique:    1. PET: The patient received 10.13 mCi of F-18-FDG; the serum glucose  was 107 prior to administration, body weight was 63.5 kg. Images were  evaluated in the axial, sagittal, and coronal planes as well as the  rotational whole body MIP. Images were acquired from the Vertex to the  Feet.    UPTAKE WAS MEASURED AT 60 MINUTES.     BACKGROUND:  Liver SUV max= 4.33,   Aorta Blood SUV Max: 3.63.     2. CT: Volumetric acquisition for clinical interpretation of the  chest, abdomen, and pelvis acquired at 3 mm sections . The chest,  abdomen, and pelvis were evaluated at 5 mm sections in bone, soft  tissue, and lung windows.      The patient received 86 cc of Isovue 370 intravenously for the  examination.      3. 3D MIP and PET-CT fused images were processed on an independent  workstation and archived to PACS and reviewed by a radiologist.    INDICATION: Leiomyosarcoma (H); Malignant neoplasm of overlapping  sites of colon (H)    ADDITIONAL INFORMATION OBTAINED FROM EMR: This patient presents with  concern of left lower quadrant abdominal/pelvic pain in the context of  history of uterine cancer, total hysterectomy and small bowel  obstruction. ?Exam was relatively reassuring with mild tenderness.  ?Underwent the above evaluation which revealed a approximate 3 cm  circumscribed mass versus hematoma versus abscess. ?Mass seems most  likely in clinical  context of history of cancer.    COMPARISON: CT 3/24/2022.    FINDINGS:     HEAD/NECK:  There is no  suspicious FDG uptake in the neck.     The paranasal sinuses are clear. The mastoid air cells are clear.     The mucosal pharyngeal space, the , prevertebral and carotid  spaces are within normal limits.     No masses, mass effect or pathologically enlarged lymph nodes are  evident. The thyroid gland is unremarkable.    CHEST:  There is no suspicious FDG uptake in the chest.     There are no pathologically enlarged mediastinal, hilar or axillary  lymph nodes.  There are no suspicious lung nodules or evidence for infection.  Calcified mediastinal nodes.    There is no significant pericardial or pleural effusions.    ABDOMEN AND PELVIS:  3.2 x 2.7 cm partially cystic/solid hypermetabolic mesenteric mass in  the anterior left lower quadrant with SUV max of 7.4 (Series 3 image  214). The mass is inseparable from the adjacent small bowel loops in  the left lower quadrant anterior abdomen. No evidence of bowel  obstruction.    There are no suspicious hepatic lesions. There is no splenomegaly or  evidence for splenic or pancreatic mass lesion.  There are no suspicious adrenal mass lesions or opaque gallbladder  calculi. There is symmetric nephrographic renal phase without  hydronephrosis. Left peripelvic cysts. Right kidney is slightly  asymmetrically smaller in size.   Mild hiatal hernia. Postsurgical changes of hysterectomy.    LOWER EXTREMITIES:   No abnormal masses or hypermetabolic lesions.    BONES:   There are no suspicious lytic or blastic osseous lesions.  There is no  abnormal FDG uptake in the skeleton. Multilevel spondylosis with mild  anterolisthesis of L4 on L5.  Impression: IMPRESSION: In this patient with a history of uterina leiomyosarcoma  status post-hysterectomy:    3.2 x 2.7 cm partially cystic/solid hypermetabolic mesenteric mass in  the anterior left lower quadrant is highly concerning for a  metastatic  tumor deposit. It is inseparable from the adjacent small bowel loops  however there is no evidence of bowel obstruction.     I have personally reviewed the examination and initial interpretation  and I agree with the findings.    CUAUHTEMOC AVELAR MD         SYSTEM ID:  [id number]    ASSESSMENT AND PLAN    #1 New bowel wall mass, concerning for recurrence of leiomyosarcoma  #2 History of locally advanced leiomyosarcoma of the uterus, pT2b Nx M0, Stage IIB, status-post resection (11/2016) and 5 cycles of adjuvant doxorubicin and dacarbazine  #3 History of local recurrence, leiomyosarcoma, right pelvic sidewall adherent to iliac vessels and ureter, s/p resection June 2020    It was a pleasure to see Ms. Navarrete today.  We reviewed her current PET-CT scan and reviewed the older imaging going back to a CT-AP obtained 12/28/21 and 11/4/21, which also show the left abdominal mass. The CT-AP in 7/21 does not clearly show a mass, but there is perhaps a small nodule in the area in question.    Again the tumor is intimate with bowel and it appears a segment of small bowel will likely need to be removed. Fortunately there is just one mass seen. For now we will await pathology and intra-operative findings. I will plan to see back to review pathololgy and any potential treatment considerations.    -Return in 6 weeks.    Questions answered.    Ab Warner M.D.   of Medicine  Hematology, Oncology and Transplantation

## 2022-04-01 NOTE — PROGRESS NOTES
MEDICAL ONCOLOGY PROGRESS NOTE  [facility]  Apr 1, 2022    Oncologic history:  1. January 2016, she presents to her PCP with 3 weeks of abdominal pain and severe constipation. She is treated for constipation and improves.  2. 3/21/2016, worsening pelvic pressure prompts pelvic examination with an enlarged uterus noted. Transvaginal ultrasound shows enlargement of subserosal fibroids originally seen on MRI in 2006.  3. 5/9/2016, MRI pelvis is obtained, which shows several enhancing uterine masses involving an enlarged uterus, felt radiographically compatible with benign uterine leiomyomata.  3. Summer 2016, she experiences worsening low back pain thought at first thought to be sacroiliitis, but pain persists after corticosteroid injection. Continues with severe constipation.  4. 9/8/2016, she undergoes colonoscopy for persistent abdominal pain. She has sigmoid polyp snared. Abdominal pain and diarrhea persist after colonoscopy prep.  5. 9/14/2016, she has worsening abdominal pain and is evaluated in the ED. CT-scan shows a large subserosal fibroid measuring 9.4 x 9.6 x 9.8 cm, as well as very large fibroids along the posterior uterine body more inferiorly, increased in size to 14 cm in greatest diameter compared to 10 cm in greatest diameter on MRI of 5/9/2016.  6. 9/29/2016, she has staging CT-CAP, which shows a small left sided pleural effusion and a 3 mm pulmonary nodule in the RML.  7. October 2016, self-refers to [facility].  8. 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  9. 11/4/2016, undergoes total abdominal hysterectomy and BSO, under Dr. Harsh Camarillo. Surgery was originally presumed for leiomyoma, however, intraoperatively there was focal adherence of a mass to the pelvic right sidewall and cul-de-sac, which led to peritoneal resection and omentectomy. Final pathology, pT2b: showed leiomyosarcoma involving the cul-de-sac and forming two separate  masses measuring 10.5 x 6.5 x 4.5 cm and 10.7 x 7.9 x 7.5 cm.  10. 12/26/2016, she starts adjuvant chemotherapy with doxorubicin 25 mg/m2/day and DTIC 250 mg/m2/day , days 1-3 every 21 weeks.  11. 3/23/2017, she completes 5 cycles of adjuvant doxorubicin and dacarbazine  12. 6/3/2020, restaging MRI shows a locally recurrent pelvic sidewall mass measuring 2.8 x 3.3 cm in size. This new area of concern was not previously reported, presumably due to proximity to bowel and the similar appearance to adjacent loops of bowel on the contrast enhanced imaging. On review of prior imaging studies, the lesion can be seen as far back as January 2019 as a small nodule measuring 1 cm in size directly adjacent to bowel, close to the pelvic sidewall.  13. 6/16/2020, she had resection of the recurrent leiomyosarcoma tumor, which was densely attached to the right ureter and attached to the right side of the sigmoid mesentery and external and internal iliac vessels. There was external iliac vein injury with primary repair due to involvement of the mass. The ureter was also partially removed as part of the tumor mass. All margins and the ureter that was resected were negative for tumor. Pathology (JR-) showed a 3.7 x 3.5 x 2.2 cm thinly encapsulated mass, which was submitted for frozen sections.      HISTORY OF PRESENT ILLNESS  Ms. Navarrete presents today in follow-up on he recent scans on 3/26. The PET-CT showed a hypermetabolic left bowel metastasis against the anterior abdominal wall. Fortunately, she saw Dr. Camarillo at [place] yesterday and there is surgery scheduledMonday for removal of the tumor.     She is doing well. She is nervous about the new discovery. She had been having intermittent left abdominal discomfort for the last 3 months or so, and there was question of bowel obstructive type symptoms. She has not had melena or hematochezia. She denies nausea, vomiting, or currently any constipation/diarrhea. No fevers,  chills.    ECOG performance status is 0.    REVIEW OF SYSTEMS  A 12-point ROS negative except as in HPI    Current Outpatient Medications   Medication Sig Dispense Refill     alendronate (FOSAMAX) 70 MG tablet Take 1 tablet (70 mg) by mouth every 7 days 12 tablet 3     CALCIUM PO Take 1 capsule by mouth daily as needed (if diet insufficient to provide daily recommended values)       carboxymethylcellulose PF (REFRESH PLUS) 0.5 % ophthalmic solution Place 1 drop into both eyes 4 times daily as needed for dry eyes       famotidine (PEPCID) 10 MG tablet Take 10 mg by mouth 2 times daily as needed       levothyroxine (SYNTHROID/LEVOTHROID) 75 MCG tablet Take 1 tablet (75 mcg) by mouth daily 90 tablet 3     multivitamin w/minerals (THERA-VIT-M) tablet Take 1 tablet by mouth daily as needed (if diet insufficient to provide recommended nutrients)       ondansetron (ZOFRAN-ODT) 4 MG ODT tab Take 1 tablet (4 mg) by mouth every 6 hours as needed for nausea or vomiting 30 tablet 0     venlafaxine (EFFEXOR-XR) 75 MG 24 hr capsule Take 1 capsule (75 mg) by mouth daily 90 capsule 1     zolpidem (AMBIEN) 5 MG tablet Take 1 tablet (5 mg) by mouth nightly as needed for sleep (Only uses if traveling or cannot sleep,) 20 tablet 0       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     GERD (gastroesophageal reflux disease)      Hx of previous reproductive problem 1980     Kidney stone 2011     Leiomyosarcoma (H) 11/14/2016     Personal history of colonic polyps 2016    Small, benign     PONV (postoperative nausea and vomiting)      Posterior vitreous detachment of left eye 2011     Posterior vitreous detachment of right eye 2011     Ptosis of eyelid, bilateral      Sepsis (H) 07/2020     Stomach problems 2015    Abdominal pain, Diarreha      Thyroid disease     thyroid nodule resolved 2014       PHYSICAL EXAMINATION  There were no vitals taken for this visit.  Wt Readings from Last 2 Encounters:   03/24/22 63.5 kg (140 lb)   12/28/21 63.5 kg (140 lb)   General: Well appearing, at home, in no distress  Head: Normocephalic  Eyes: EOMI, No icterus  ENT: Oropharynx clear  Lungs: Easy breathing on room air, speaking in complete sentences without dyspnea  Neuro: Cranial nerves 2-12 intact, no focal deficits  Skin: No rashes on exposed skin    Labs:  No visits with results within 1 Week(s) from this visit.   Latest known visit with results is:   Admission on 03/24/2022, Discharged on 03/24/2022   Component Date Value Ref Range Status     Sodium 03/24/2022 139  133 - 144 mmol/L Final     Potassium 03/24/2022 4.1  3.4 - 5.3 mmol/L Final     Chloride 03/24/2022 108  94 - 109 mmol/L Final     Carbon Dioxide (CO2) 03/24/2022 28  20 - 32 mmol/L Final     Anion Gap 03/24/2022 3  3 - 14 mmol/L Final     Urea Nitrogen 03/24/2022 18  7 - 30 mg/dL Final     Creatinine 03/24/2022 0.88  0.52 - 1.04 mg/dL Final     Calcium 03/24/2022 9.1  8.5 - 10.1 mg/dL Final     Glucose 03/24/2022 86  70 - 99 mg/dL Final     Alkaline Phosphatase 03/24/2022 68  40 - 150 U/L Final     AST 03/24/2022 15  0 - 45 U/L Final     ALT 03/24/2022 23  0 - 50 U/L Final     Protein Total 03/24/2022 7.1  6.8 - 8.8 g/dL Final     Albumin 03/24/2022 3.7  3.4 - 5.0 g/dL Final     Bilirubin Total 03/24/2022 0.8  0.2 - 1.3 mg/dL Final     GFR Estimate 03/24/2022 70  >60 mL/min/1.73m2 Final     WBC Count 03/24/2022 8.0  4.0 - 11.0 10e3/uL Final     RBC Count 03/24/2022 4.95  3.80 - 5.20 10e6/uL Final     Hemoglobin 03/24/2022 14.8  11.7 - 15.7 g/dL Final     Hematocrit 03/24/2022 45.2  35.0 - 47.0 % Final     MCV 03/24/2022 91  78 - 100 fL Final     MCH 03/24/2022 29.9  26.5 - 33.0 pg Final     MCHC 03/24/2022 32.7  31.5 - 36.5 g/dL Final     RDW 03/24/2022 14.9  10.0 - 15.0 % Final     Platelet Count  03/24/2022 239  150 - 450 10e3/uL Final     % Neutrophils 03/24/2022 64  % Final     % Lymphocytes 03/24/2022 26  % Final     % Monocytes 03/24/2022 9  % Final     % Eosinophils 03/24/2022 1  % Final     % Basophils 03/24/2022 0  % Final     % Immature Granulocytes 03/24/2022 0  % Final     NRBCs per 100 WBC 03/24/2022 0  <1 /100 Final     Absolute Neutrophils 03/24/2022 5.1  1.6 - 8.3 10e3/uL Final     Absolute Lymphocytes 03/24/2022 2.1  0.8 - 5.3 10e3/uL Final     Absolute Monocytes 03/24/2022 0.7  0.0 - 1.3 10e3/uL Final     Absolute Eosinophils 03/24/2022 0.1  0.0 - 0.7 10e3/uL Final     Absolute Basophils 03/24/2022 0.0  0.0 - 0.2 10e3/uL Final     Absolute Immature Granulocytes 03/24/2022 0.0  <=0.4 10e3/uL Final     Absolute NRBCs 03/24/2022 0.0  10e3/uL Final     Color Urine 03/24/2022 Yellow  Colorless, Straw, Light Yellow, Yellow Final     Appearance Urine 03/24/2022 Clear  Clear Final     Glucose Urine 03/24/2022 Negative  Negative mg/dL Final     Bilirubin Urine 03/24/2022 Negative  Negative Final     Ketones Urine 03/24/2022 40  (A) Negative mg/dL Final     Specific Gravity Urine 03/24/2022 1.024  1.003 - 1.035 Final     Blood Urine 03/24/2022 Trace (A) Negative Final     pH Urine 03/24/2022 5.5  5.0 - 7.0 Final     Protein Albumin Urine 03/24/2022 Negative  Negative mg/dL Final     Urobilinogen Urine 03/24/2022 Normal  Normal, 2.0 mg/dL Final     Nitrite Urine 03/24/2022 Negative  Negative Final     Leukocyte Esterase Urine 03/24/2022 Trace (A) Negative Final     Mucus Urine 03/24/2022 Present (A) None Seen /LPF Final     RBC Urine 03/24/2022 1  <=2 /HPF Final     WBC Urine 03/24/2022 10 (A) <=5 /HPF Final     Squamous Epithelials Urine 03/24/2022 1  <=1 /HPF Final         Imaging:  PET Oncology Whole Body  Narrative: Combined Report of:    PET and CT on  3/26/2022 9:30 AM :    1. PET of the neck, chest, abdomen, and pelvis.  2. PET CT Fusion for Attenuation Correction and Anatomical  Localization:     3. Diagnostic CT scan of the chest, abdomen, and pelvis with  intravenous contrast for interpretation.  3. CT of the chest, abdomen and pelvis obtained for diagnostic  interpretation.  4. 3D MIP and PET-CT fused images were processed on an independent  workstation and archived to PACS and reviewed by a radiologist.    Technique:    1. PET: The patient received 10.13 mCi of F-18-FDG; the serum glucose  was 107 prior to administration, body weight was 63.5 kg. Images were  evaluated in the axial, sagittal, and coronal planes as well as the  rotational whole body MIP. Images were acquired from the Vertex to the  Feet.    UPTAKE WAS MEASURED AT 60 MINUTES.     BACKGROUND:  Liver SUV max= 4.33,   Aorta Blood SUV Max: 3.63.     2. CT: Volumetric acquisition for clinical interpretation of the  chest, abdomen, and pelvis acquired at 3 mm sections . The chest,  abdomen, and pelvis were evaluated at 5 mm sections in bone, soft  tissue, and lung windows.      The patient received 86 cc of Isovue 370 intravenously for the  examination.      3. 3D MIP and PET-CT fused images were processed on an independent  workstation and archived to PACS and reviewed by a radiologist.    INDICATION: Leiomyosarcoma (H); Malignant neoplasm of overlapping  sites of colon (H)    ADDITIONAL INFORMATION OBTAINED FROM EMR: This patient presents with  concern of left lower quadrant abdominal/pelvic pain in the context of  history of uterine cancer, total hysterectomy and small bowel  obstruction. ?Exam was relatively reassuring with mild tenderness.  ?Underwent the above evaluation which revealed a approximate 3 cm  circumscribed mass versus hematoma versus abscess. ?Mass seems most  likely in clinical context of history of cancer.    COMPARISON: CT 3/24/2022.    FINDINGS:     HEAD/NECK:  There is no  suspicious FDG uptake in the neck.     The paranasal sinuses are clear. The mastoid air cells are clear.     The mucosal pharyngeal space, the  , prevertebral and carotid  spaces are within normal limits.     No masses, mass effect or pathologically enlarged lymph nodes are  evident. The thyroid gland is unremarkable.    CHEST:  There is no suspicious FDG uptake in the chest.     There are no pathologically enlarged mediastinal, hilar or axillary  lymph nodes.  There are no suspicious lung nodules or evidence for infection.  Calcified mediastinal nodes.    There is no significant pericardial or pleural effusions.    ABDOMEN AND PELVIS:  3.2 x 2.7 cm partially cystic/solid hypermetabolic mesenteric mass in  the anterior left lower quadrant with SUV max of 7.4 (Series 3 image  214). The mass is inseparable from the adjacent small bowel loops in  the left lower quadrant anterior abdomen. No evidence of bowel  obstruction.    There are no suspicious hepatic lesions. There is no splenomegaly or  evidence for splenic or pancreatic mass lesion.  There are no suspicious adrenal mass lesions or opaque gallbladder  calculi. There is symmetric nephrographic renal phase without  hydronephrosis. Left peripelvic cysts. Right kidney is slightly  asymmetrically smaller in size.   Mild hiatal hernia. Postsurgical changes of hysterectomy.    LOWER EXTREMITIES:   No abnormal masses or hypermetabolic lesions.    BONES:   There are no suspicious lytic or blastic osseous lesions.  There is no  abnormal FDG uptake in the skeleton. Multilevel spondylosis with mild  anterolisthesis of L4 on L5.  Impression: IMPRESSION: In this patient with a history of uterina leiomyosarcoma  status post-hysterectomy:    3.2 x 2.7 cm partially cystic/solid hypermetabolic mesenteric mass in  the anterior left lower quadrant is highly concerning for a metastatic  tumor deposit. It is inseparable from the adjacent small bowel loops  however there is no evidence of bowel obstruction.     I have personally reviewed the examination and initial interpretation  and I agree with the  findings.    CUAUHTEMOC AVELAR MD         SYSTEM ID:  [id number]    ASSESSMENT AND PLAN    #1 New bowel wall mass, concerning for recurrence of leiomyosarcoma  #2 History of locally advanced leiomyosarcoma of the uterus, pT2b Nx M0, Stage IIB, status-post resection (11/2016) and 5 cycles of adjuvant doxorubicin and dacarbazine  #3 History of local recurrence, leiomyosarcoma, right pelvic sidewall adherent to iliac vessels and ureter, s/p resection June 2020    It was a pleasure to see Ms. Navarrete today.  We reviewed her current PET-CT scan and reviewed the older imaging going back to a CT-AP obtained 12/28/21 and 11/4/21, which also show the left abdominal mass. The CT-AP in 7/21 does not clearly show a mass, but there is perhaps a small nodule in the area in question.    Again the tumor is intimate with bowel and it appears a segment of small bowel will likely need to be removed. Fortunately there is just one mass seen. For now we will await pathology and intra-operative findings. I will plan to see back to review pathololgy and any potential treatment considerations.    -Return in 6 weeks.    Questions answered.    Ab Warner M.D.   of Medicine  Hematology, Oncology and Transplantation

## 2022-04-21 ENCOUNTER — OFFICE VISIT (OUTPATIENT)
Dept: URGENT CARE | Facility: URGENT CARE | Age: 72
End: 2022-04-21
Payer: MEDICARE

## 2022-04-21 ENCOUNTER — TELEPHONE (OUTPATIENT)
Dept: FAMILY MEDICINE | Facility: CLINIC | Age: 72
End: 2022-04-21
Payer: MEDICARE

## 2022-04-21 VITALS
HEART RATE: 85 BPM | BODY MASS INDEX: 28.22 KG/M2 | TEMPERATURE: 97.2 F | SYSTOLIC BLOOD PRESSURE: 138 MMHG | WEIGHT: 140 LBS | DIASTOLIC BLOOD PRESSURE: 82 MMHG | OXYGEN SATURATION: 100 %

## 2022-04-21 DIAGNOSIS — R30.0 DYSURIA: ICD-10-CM

## 2022-04-21 DIAGNOSIS — N39.0 URINARY TRACT INFECTION WITHOUT HEMATURIA, SITE UNSPECIFIED: Primary | ICD-10-CM

## 2022-04-21 LAB
ALBUMIN UR-MCNC: >=300 MG/DL
APPEARANCE UR: ABNORMAL
BACTERIA #/AREA URNS HPF: ABNORMAL /HPF
BASOPHILS # BLD AUTO: 0 10E3/UL (ref 0–0.2)
BASOPHILS NFR BLD AUTO: 0 %
BILIRUB UR QL STRIP: NEGATIVE
COLOR UR AUTO: YELLOW
EOSINOPHIL # BLD AUTO: 0.1 10E3/UL (ref 0–0.7)
EOSINOPHIL NFR BLD AUTO: 1 %
ERYTHROCYTE [DISTWIDTH] IN BLOOD BY AUTOMATED COUNT: 15.3 % (ref 10–15)
GLUCOSE UR STRIP-MCNC: NEGATIVE MG/DL
HCT VFR BLD AUTO: 35.1 % (ref 35–47)
HGB BLD-MCNC: 11.3 G/DL (ref 11.7–15.7)
HGB UR QL STRIP: ABNORMAL
IMM GRANULOCYTES # BLD: 0.1 10E3/UL
IMM GRANULOCYTES NFR BLD: 1 %
KETONES UR STRIP-MCNC: NEGATIVE MG/DL
LEUKOCYTE ESTERASE UR QL STRIP: ABNORMAL
LYMPHOCYTES # BLD AUTO: 2 10E3/UL (ref 0.8–5.3)
LYMPHOCYTES NFR BLD AUTO: 14 %
MCH RBC QN AUTO: 29.6 PG (ref 26.5–33)
MCHC RBC AUTO-ENTMCNC: 32.2 G/DL (ref 31.5–36.5)
MCV RBC AUTO: 92 FL (ref 78–100)
MONOCYTES # BLD AUTO: 1.8 10E3/UL (ref 0–1.3)
MONOCYTES NFR BLD AUTO: 12 %
NEUTROPHILS # BLD AUTO: 11 10E3/UL (ref 1.6–8.3)
NEUTROPHILS NFR BLD AUTO: 74 %
NITRATE UR QL: POSITIVE
PH UR STRIP: 6 [PH] (ref 5–7)
PLATELET # BLD AUTO: 376 10E3/UL (ref 150–450)
RBC # BLD AUTO: 3.82 10E6/UL (ref 3.8–5.2)
RBC #/AREA URNS AUTO: ABNORMAL /HPF
SP GR UR STRIP: 1.02 (ref 1–1.03)
SQUAMOUS #/AREA URNS AUTO: ABNORMAL /LPF
UROBILINOGEN UR STRIP-ACNC: 0.2 E.U./DL
WBC # BLD AUTO: 15 10E3/UL (ref 4–11)
WBC #/AREA URNS AUTO: ABNORMAL /HPF
WBC CLUMPS #/AREA URNS HPF: PRESENT /HPF

## 2022-04-21 PROCEDURE — 99215 OFFICE O/P EST HI 40 MIN: CPT | Mod: 25 | Performed by: STUDENT IN AN ORGANIZED HEALTH CARE EDUCATION/TRAINING PROGRAM

## 2022-04-21 PROCEDURE — 85025 COMPLETE CBC W/AUTO DIFF WBC: CPT | Performed by: STUDENT IN AN ORGANIZED HEALTH CARE EDUCATION/TRAINING PROGRAM

## 2022-04-21 PROCEDURE — 36415 COLL VENOUS BLD VENIPUNCTURE: CPT | Performed by: STUDENT IN AN ORGANIZED HEALTH CARE EDUCATION/TRAINING PROGRAM

## 2022-04-21 PROCEDURE — 96372 THER/PROPH/DIAG INJ SC/IM: CPT | Performed by: STUDENT IN AN ORGANIZED HEALTH CARE EDUCATION/TRAINING PROGRAM

## 2022-04-21 PROCEDURE — 81001 URINALYSIS AUTO W/SCOPE: CPT

## 2022-04-21 PROCEDURE — 80048 BASIC METABOLIC PNL TOTAL CA: CPT | Performed by: STUDENT IN AN ORGANIZED HEALTH CARE EDUCATION/TRAINING PROGRAM

## 2022-04-21 PROCEDURE — 87040 BLOOD CULTURE FOR BACTERIA: CPT | Performed by: STUDENT IN AN ORGANIZED HEALTH CARE EDUCATION/TRAINING PROGRAM

## 2022-04-21 PROCEDURE — 87086 URINE CULTURE/COLONY COUNT: CPT | Performed by: STUDENT IN AN ORGANIZED HEALTH CARE EDUCATION/TRAINING PROGRAM

## 2022-04-21 RX ORDER — CEFTRIAXONE SODIUM 1 G
1 VIAL (EA) INJECTION ONCE
Status: COMPLETED | OUTPATIENT
Start: 2022-04-21 | End: 2022-04-21

## 2022-04-21 RX ADMIN — Medication 1 G: at 14:01

## 2022-04-21 NOTE — TELEPHONE ENCOUNTER
Pt called had surgery done 4/4/22 at [facility]     Thinks she had a UTI and wants an OV today     Declined VV, wants in person visit and not tomorrow     Discussed [facility] and [facility] - pt will plan to see if she can get a ride to the [facility] walk in clinic     Omaira ROWELL, Triage RN  [facility]

## 2022-04-21 NOTE — PROGRESS NOTES
Assessment & Plan     History of Proteus UTI, bacteremia  Urinary tract infection without hematuria, site unspecified  - Previous history of urosepsis in 2022 -- Proteus was R to Nitrofurantoin, otherwise pan-sensitive. Treated with Zosyn and Ciprofloxacin at that time (14 day total course)  - UA and microscopy today consistent with UTI  - Urine culture pending  - CBC with platelets and differential, BMP and blood culture ordered  - cefTRIAXone (ROCEPHIN) injection 1 g given in clinic  - [place] antibiogram reviewed -- Proteus is 99% sensitive to CTX, 95% sensitive to ampicillin/sulbactam   - Rx sent to amoxicillin-clavulanate (AUGMENTIN) 875-125mg, BID x 14 days (first dose tomorrow after CTX given in clinic today)  - Discussed red flag signs/symptoms that should prompt ED visit extensively with patient, including inability to tolerate PO intake, lethargy, worsening pain, confusion, etc.  - Patient communicated understanding. All questions addressed. She is comfortable with plan for outpatient treatment, if an effort to avoid needing to go to the ED today     Patient was advised to return to clinic if symptoms do not improve or if symptoms worsen.     50 minutes spent on the date of the encounter doing chart review, history and exam, documentation and further activities per the note        Alfie Reynaga MD   [facility]    Bimal Serrano is a 72 year old female who presents to clinic today for the following health issues:  Chief Complaint   Patient presents with     Urgent Care     UTI     C/O dysuria for 2 days     HPI    Patient's symptoms started Tuesday night with chills. On Wednesday, she developed mild, intermittent lower abdominal pain with dysuria, as well as urinary incontinence.     Today, symptoms persisted. She also developed nausea, for which she took Zofran. Still having a decrease appetite this PM. She denies any vomiting.     No blood in urine, diarrhea, hematochezia. 102 F,  this morning, improved to 98 F with Advil.     Recently had surgery at [place] for Uterine Leiomyosarcoma on 4/4/2022, no complications. Scar is healing well.     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  left leg post op- hysterectomy       Hypothyroidism 05/11/2021     Priority: Medium     Formatting of this note might be different from the original.  on replacement       Neuropathy, peripheral 05/11/2021     Priority: Medium     Formatting of this note might be different from the original.  legs       Post-operative nausea and vomiting 05/11/2021     Priority: Medium     Dry eyes 02/11/2019     Priority: Medium     Last Assessment & Plan:   - Continue OTC ATs QID PRN both eyes  - Recommend OTC AT ointment QHS both eyes   - Recommend humidifier use at night    Last Assessment & Plan:   Formatting of this note might be different from the original.  - Continue OTC ATs QID PRN both eyes  - Recommend OTC AT ointment QHS both eyes   - Recommend humidifier use at night       Myopia of both eyes 02/11/2019     Priority: Medium     Last Assessment & Plan:   Formatting of this note might be different from the original.  - New RGPs with good  "fit, comfort, and vision (20/20 in both eyes).   - Lenses dispensed today and Rx final per \"Contact Exam\" section below.   - Level 1 fitting fee paid in full today.   - Reviewed proper hygiene and wear instructions.   - Remove lenses and return to or call clinic if any redness, irritation, pain or vision changes occur with contact lens wear.       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  Added automatically from request for surgery [id number]       Generalized anxiety disorder 03/21/2016     Priority: Medium     Vaginal atrophy 03/21/2016     Priority: Medium     Osteopenia 08/03/2015     Priority: Medium     Presbyopia - Both Eyes 05/05/2014     Priority: Medium     Ptosis 12/02/2011     Priority: Medium       Current Outpatient Medications   Medication     alendronate (FOSAMAX) 70 MG tablet     CALCIUM PO     carboxymethylcellulose PF (REFRESH PLUS) 0.5 % ophthalmic solution     famotidine (PEPCID) 10 MG tablet     levothyroxine (SYNTHROID/LEVOTHROID) 75 MCG tablet     multivitamin w/minerals (THERA-VIT-M) tablet     venlafaxine (EFFEXOR-XR) 75 MG 24 hr capsule     zolpidem (AMBIEN) 5 MG tablet     ondansetron (ZOFRAN-ODT) 4 MG ODT tab     No current facility-administered medications for this visit.           Objective    /82   Pulse 85   Temp 97.2  F (36.2  C) (Tympanic)   Wt 63.5 kg (140 lb)   SpO2 100%   BMI 28.22 kg/m       Physical Exam     GENERAL: Alert and no distress. Slightly ill-appearing.   EYES: Eyes grossly normal to inspection, PERRL and conjunctivae and " sclerae normal  HENT: MMM. Pharynx clear. Nares patent.   NECK: Supple, no asymmetry  RESP: lungs clear to auscultation - no rales, rhonchi or wheezes  CV: regular rate and rhythm, normal S1 S2, no S3 or S4, no murmur, click or rub. Cap refill 3 seconds. Radial pulses +1. Extremities warm.   ABDOMEN: soft, midline laparotomy incision clean/dry/intact without purulence. No CVA tenderness. Minimal RLQ tenderness to deep palpation. Otherwise non-tender with no rebound or guarding. Bowel sounds normal  MS: no gross musculoskeletal defects noted, no edema  NEURO: Normal strength and tone, mentation intact and speech normal    Results for orders placed or performed in visit on 04/21/22   UA macro with reflex to Microscopic and Culture - Clinc Collect     Status: Abnormal    Specimen: Urine, Midstream   Result Value Ref Range    Color Urine Yellow Colorless, Straw, Light Yellow, Yellow    Appearance Urine Slightly Cloudy (A) Clear    Glucose Urine Negative Negative mg/dL    Bilirubin Urine Negative Negative    Ketones Urine Negative Negative mg/dL    Specific Gravity Urine 1.020 1.003 - 1.035    Blood Urine Moderate (A) Negative    pH Urine 6.0 5.0 - 7.0    Protein Albumin Urine >=300 (A) Negative mg/dL    Urobilinogen Urine 0.2 0.2, 1.0 E.U./dL    Nitrite Urine Positive (A) Negative    Leukocyte Esterase Urine Small (A) Negative   Urine Microscopic Exam     Status: Abnormal   Result Value Ref Range    Bacteria Urine Many (A) None Seen /HPF    RBC Urine 2-5 (A) 0-2 /HPF /HPF    WBC Urine 25-50 (A) 0-5 /HPF /HPF    Squamous Epithelials Urine Few (A) None Seen /LPF    WBC Clumps Urine Present (A) None Seen /HPF   CBC with platelets and differential     Status: None (In process)    Narrative    The following orders were created for panel order CBC with platelets and differential.  Procedure                               Abnormality         Status                     ---------                               -----------          ------                     CBC with platelets and d...[[id number]]                      In process                   Please view results for these tests on the individual orders.             The use of Dragon/PowerMic dictation services may have been used to construct the content in this note; any grammatical or spelling errors are non-intentional. Please contact the author of this note directly if you are in need of any clarification.

## 2022-04-21 NOTE — PATIENT INSTRUCTIONS
- Take the antibiotic as prescribed, twice daily, starting tomorrow (4/22/2022)  - Drink plenty of fluids (at least 6 glasses of water/Gatorade per day) and rest  - You can take small doses of ibuprofen (200-400mg) up to three times per day as needed for fever and pain  - You can also use acetaminophen (Tylenol) 500mg up to 4 times per day as needed for fevers and chills    - You should go to the emergency room if you cannot take your antibiotics or keep fluids down due to vomiting, if you have fainting, inability to walk or wake up, chest pain, vision changes, shortness of breath, severe or worsening pain, or any other concerning symptoms

## 2022-04-22 ENCOUNTER — TELEPHONE (OUTPATIENT)
Dept: NURSING | Facility: CLINIC | Age: 72
End: 2022-04-22
Payer: MEDICARE

## 2022-04-22 LAB
ANION GAP SERPL CALCULATED.3IONS-SCNC: 7 MMOL/L (ref 3–14)
BUN SERPL-MCNC: 23 MG/DL (ref 7–30)
CALCIUM SERPL-MCNC: 8.8 MG/DL (ref 8.5–10.1)
CHLORIDE BLD-SCNC: 108 MMOL/L (ref 94–109)
CO2 SERPL-SCNC: 25 MMOL/L (ref 20–32)
CREAT SERPL-MCNC: 1.32 MG/DL (ref 0.52–1.04)
GFR SERPL CREATININE-BSD FRML MDRD: 43 ML/MIN/1.73M2
GLUCOSE BLD-MCNC: 116 MG/DL (ref 70–99)
POTASSIUM BLD-SCNC: 4.3 MMOL/L (ref 3.4–5.3)
SODIUM SERPL-SCNC: 140 MMOL/L (ref 133–144)

## 2022-04-22 NOTE — TELEPHONE ENCOUNTER
Pt is requesting message to be sent to the Urgent Care Clinic about lab test results, but then she noticed results are just now showing up in Mychart. Pt declined sending message to the clinic.     Tc Caceres RN on 4/22/2022 at 10:44 AM

## 2022-04-22 NOTE — RESULT ENCOUNTER NOTE
Elevated creatinine, a kidney function test, is elevated-hopefully just due to present illness, but should be re-checked when you recover

## 2022-04-23 LAB — BACTERIA UR CULT: NORMAL

## 2022-04-26 LAB — BACTERIA BLD CULT: NO GROWTH

## 2022-05-10 ENCOUNTER — LAB (OUTPATIENT)
Dept: LAB | Facility: CLINIC | Age: 72
End: 2022-05-10

## 2022-05-10 ENCOUNTER — VIRTUAL VISIT (OUTPATIENT)
Dept: FAMILY MEDICINE | Facility: CLINIC | Age: 72
End: 2022-05-10
Payer: MEDICARE

## 2022-05-10 DIAGNOSIS — N17.9 AKI (ACUTE KIDNEY INJURY) (H): ICD-10-CM

## 2022-05-10 DIAGNOSIS — R30.0 DYSURIA: ICD-10-CM

## 2022-05-10 DIAGNOSIS — R35.0 INCREASED FREQUENCY OF URINATION: ICD-10-CM

## 2022-05-10 DIAGNOSIS — R35.0 INCREASED FREQUENCY OF URINATION: Primary | ICD-10-CM

## 2022-05-10 DIAGNOSIS — E03.9 HYPOTHYROIDISM, UNSPECIFIED TYPE: ICD-10-CM

## 2022-05-10 DIAGNOSIS — F41.1 GENERALIZED ANXIETY DISORDER: ICD-10-CM

## 2022-05-10 LAB
ALBUMIN UR-MCNC: NEGATIVE MG/DL
APPEARANCE UR: CLEAR
BACTERIA #/AREA URNS HPF: ABNORMAL /HPF
BILIRUB UR QL STRIP: NEGATIVE
COLOR UR AUTO: YELLOW
GLUCOSE UR STRIP-MCNC: NEGATIVE MG/DL
HGB UR QL STRIP: ABNORMAL
KETONES UR STRIP-MCNC: NEGATIVE MG/DL
LEUKOCYTE ESTERASE UR QL STRIP: NEGATIVE
NITRATE UR QL: NEGATIVE
PH UR STRIP: 5 [PH] (ref 5–7)
RBC #/AREA URNS AUTO: ABNORMAL /HPF
SP GR UR STRIP: 1.02 (ref 1–1.03)
UROBILINOGEN UR STRIP-ACNC: 0.2 E.U./DL
WBC #/AREA URNS AUTO: ABNORMAL /HPF

## 2022-05-10 PROCEDURE — 84443 ASSAY THYROID STIM HORMONE: CPT

## 2022-05-10 PROCEDURE — 81001 URINALYSIS AUTO W/SCOPE: CPT

## 2022-05-10 PROCEDURE — 99214 OFFICE O/P EST MOD 30 MIN: CPT | Mod: 95 | Performed by: INTERNAL MEDICINE

## 2022-05-10 PROCEDURE — 80048 BASIC METABOLIC PNL TOTAL CA: CPT

## 2022-05-10 PROCEDURE — 36415 COLL VENOUS BLD VENIPUNCTURE: CPT

## 2022-05-10 RX ORDER — VENLAFAXINE HYDROCHLORIDE 75 MG/1
75 CAPSULE, EXTENDED RELEASE ORAL DAILY
Qty: 90 CAPSULE | Refills: 0 | Status: SHIPPED | OUTPATIENT
Start: 2022-05-10 | End: 2022-07-05

## 2022-05-10 RX ORDER — VENLAFAXINE HYDROCHLORIDE 75 MG/1
75 CAPSULE, EXTENDED RELEASE ORAL DAILY
Qty: 90 CAPSULE | Refills: 1 | Status: CANCELLED | OUTPATIENT
Start: 2022-05-10

## 2022-05-10 ASSESSMENT — ANXIETY QUESTIONNAIRES
GAD7 TOTAL SCORE: 1
IF YOU CHECKED OFF ANY PROBLEMS ON THIS QUESTIONNAIRE, HOW DIFFICULT HAVE THESE PROBLEMS MADE IT FOR YOU TO DO YOUR WORK, TAKE CARE OF THINGS AT HOME, OR GET ALONG WITH OTHER PEOPLE: NOT DIFFICULT AT ALL
1. FEELING NERVOUS, ANXIOUS, OR ON EDGE: NOT AT ALL
6. BECOMING EASILY ANNOYED OR IRRITABLE: NOT AT ALL
2. NOT BEING ABLE TO STOP OR CONTROL WORRYING: NOT AT ALL
5. BEING SO RESTLESS THAT IT IS HARD TO SIT STILL: NOT AT ALL
7. FEELING AFRAID AS IF SOMETHING AWFUL MIGHT HAPPEN: SEVERAL DAYS
3. WORRYING TOO MUCH ABOUT DIFFERENT THINGS: NOT AT ALL

## 2022-05-10 ASSESSMENT — PATIENT HEALTH QUESTIONNAIRE - PHQ9
SUM OF ALL RESPONSES TO PHQ QUESTIONS 1-9: 1
5. POOR APPETITE OR OVEREATING: NOT AT ALL

## 2022-05-10 NOTE — PROGRESS NOTES
Maggie is a 72 year old who is being evaluated via a billable video visit.      How would you like to obtain your AVS? MyChart  If the video visit is dropped, the invitation should be resent by: Text to cell phone: [phone]  Will anyone else be joining your video visit? No      Video Start Time: 10:05 AM    Assessment & Plan   Problem List Items Addressed This Visit        Endocrine    Hypothyroidism (Chronic)    Relevant Orders    TSH with free T4 reflex       Behavioral    Generalized anxiety disorder (Chronic)    Relevant Medications    venlafaxine (EFFEXOR XR) 75 MG 24 hr capsule      Other Visit Diagnoses     Increased frequency of urination    -  Primary    Relevant Orders    UA Macro with Reflex to Micro and Culture - lab collect    Dysuria        Relevant Orders    UA Macro with Reflex to Micro and Culture - lab collect    BILLY (acute kidney injury) (H)        Relevant Orders    Basic metabolic panel  (Ca, Cl, CO2, Creat, Gluc, K, Na, BUN)           Repeat UA culture given her ongoing symptoms discussed to avoid repetitive antibiotics as possible due to the concern for his resistant C. difficile.  She has minimal lower urinary tract symptoms.  Advised keep well-hydrated.  We discussed about anxiety and depression seems well treated with ongoing medication of Effexor at current dose we will hold on weaning the medication as she is undergoing stress with her body healing and having surgery recently.  Also she would like a thyroid test checked as she has been doing chronic levothyroxine last was TSH 0.5 in August slight decrease from prior.  Patient will call the clinic to schedule labs         See Patient Instructions    Return if symptoms worsen or fail to improve, for As needed and if symptoms worsen.    Km Paulino MD  [facility]    Bimal Serrano is a 72 year old who presents for the following health issues     HPI      Patient presenting to discuss multiple health  "concerns, she wants to establish in future with a new PCP.  She just finished a course of Augmentin last Thursday for a urine tract infection.  She continues to have some lower urinary tract symptoms including some frequency and minimal burning/dysuria and some incontinence issues.  She reports history of surgery resection of leiomyosarcoma and has a stent placed in her ureter which will be removed next Monday at [facility].  She follows there with her surgeon and oncology team.  She denies any current systemic symptoms no fevers or chills.  No hematuria or gross hematuria no vomiting or lethargy.  She is keeping well-hydrated she is drinking around 45 to 48 ounce of fluids per day.  Denies any flank pain.  Denies any other systemic symptoms she is tired.  She does not know her blood pressure.  Also has history of anxiety maintained on chronic Effexor she tried to decrease dose at 1 time to 37.5 currently is on 75 mg daily working well for her her PHQ-9 and CAROLA-7 scores of 1.  She does a lot of relaxation techniques meditation exercise and yoga and [facility].  Review of Systems   Constitutional, HEENT, cardiovascular, pulmonary, gi and gu systems are negative, except as otherwise noted.      Objective    Vitals - Patient Reported  Weight (Patient Reported): 61.2 kg (135 lb)  Height (Patient Reported): 151.1 cm (4' 11.5\")  BMI (Based on Pt Reported Ht/Wt): 26.81  Temperature (Patient Reported): 98  F (36.7  C)      Vitals:  No vitals were obtained today due to virtual visit.    Physical Exam   GENERAL: Healthy, alert and no distress  EYES: Eyes grossly normal to inspection.  No discharge or erythema, or obvious scleral/conjunctival abnormalities.  RESP: No audible wheeze, cough, or visible cyanosis.  No visible retractions or increased work of breathing.    SKIN: Visible skin clear. No significant rash, abnormal pigmentation or lesions.  NEURO: Cranial nerves grossly " intact.  Mentation and speech appropriate for age.  PSYCH: Mentation appears normal, affect normal/bright, judgement and insight intact, normal speech and appearance well-groomed.    Office Visit on 04/21/2022   Component Date Value Ref Range Status     Color Urine 04/21/2022 Yellow  Colorless, Straw, Light Yellow, Yellow Final     Appearance Urine 04/21/2022 Slightly Cloudy (A) Clear Final     Glucose Urine 04/21/2022 Negative  Negative mg/dL Final     Bilirubin Urine 04/21/2022 Negative  Negative Final     Ketones Urine 04/21/2022 Negative  Negative mg/dL Final     Specific Gravity Urine 04/21/2022 1.020  1.003 - 1.035 Final     Blood Urine 04/21/2022 Moderate (A) Negative Final     pH Urine 04/21/2022 6.0  5.0 - 7.0 Final     Protein Albumin Urine 04/21/2022 >=300 (A) Negative mg/dL Final     Urobilinogen Urine 04/21/2022 0.2  0.2, 1.0 E.U./dL Final     Nitrite Urine 04/21/2022 Positive (A) Negative Final     Leukocyte Esterase Urine 04/21/2022 Small (A) Negative Final     Bacteria Urine 04/21/2022 Many (A) None Seen /HPF Final     RBC Urine 04/21/2022 2-5 (A) 0-2 /HPF /HPF Final     WBC Urine 04/21/2022 25-50 (A) 0-5 /HPF /HPF Final     Squamous Epithelials Urine 04/21/2022 Few (A) None Seen /LPF Final     WBC Clumps Urine 04/21/2022 Present (A) None Seen /HPF Final     Culture 04/21/2022 >100,000 CFU/mL Mixture of urogenital duane   Final     Sodium 04/21/2022 140  133 - 144 mmol/L Final     Potassium 04/21/2022 4.3  3.4 - 5.3 mmol/L Final     Chloride 04/21/2022 108  94 - 109 mmol/L Final     Carbon Dioxide (CO2) 04/21/2022 25  20 - 32 mmol/L Final     Anion Gap 04/21/2022 7  3 - 14 mmol/L Final     Urea Nitrogen 04/21/2022 23  7 - 30 mg/dL Final     Creatinine 04/21/2022 1.32 (A) 0.52 - 1.04 mg/dL Final     Calcium 04/21/2022 8.8  8.5 - 10.1 mg/dL Final     Glucose 04/21/2022 116 (A) 70 - 99 mg/dL Final     GFR Estimate 04/21/2022 43 (A) >60 mL/min/1.73m2 Final    Effective December 21, 2021 eGFRcr in adults  is calculated using the 2021 CKD-EPI creatinine equation which includes age and gender (Mona et al., [facility], DOI: 10.1056/UMMCbu1927387)     Culture 04/21/2022 No Growth   Final     WBC Count 04/21/2022 15.0 (A) 4.0 - 11.0 10e3/uL Final     RBC Count 04/21/2022 3.82  3.80 - 5.20 10e6/uL Final     Hemoglobin 04/21/2022 11.3 (A) 11.7 - 15.7 g/dL Final     Hematocrit 04/21/2022 35.1  35.0 - 47.0 % Final     MCV 04/21/2022 92  78 - 100 fL Final     MCH 04/21/2022 29.6  26.5 - 33.0 pg Final     MCHC 04/21/2022 32.2  31.5 - 36.5 g/dL Final     RDW 04/21/2022 15.3 (A) 10.0 - 15.0 % Final     Platelet Count 04/21/2022 376  150 - 450 10e3/uL Final     % Neutrophils 04/21/2022 74  % Final     % Lymphocytes 04/21/2022 14  % Final     % Monocytes 04/21/2022 12  % Final     % Eosinophils 04/21/2022 1  % Final     % Basophils 04/21/2022 0  % Final     % Immature Granulocytes 04/21/2022 1  % Final     Absolute Neutrophils 04/21/2022 11.0 (A) 1.6 - 8.3 10e3/uL Final     Absolute Lymphocytes 04/21/2022 2.0  0.8 - 5.3 10e3/uL Final     Absolute Monocytes 04/21/2022 1.8 (A) 0.0 - 1.3 10e3/uL Final     Absolute Eosinophils 04/21/2022 0.1  0.0 - 0.7 10e3/uL Final     Absolute Basophils 04/21/2022 0.0  0.0 - 0.2 10e3/uL Final     Absolute Immature Granulocytes 04/21/2022 0.1  <=0.4 10e3/uL Final               Video-Visit Details    Type of service:  Video Visit    Video End Time:10:24 am    Originating Location (pt. Location): Home    Distant Location (provider location):  [facility]     Platform used for Video Visit: [facility]

## 2022-05-11 LAB
ANION GAP SERPL CALCULATED.3IONS-SCNC: 7 MMOL/L (ref 3–14)
BUN SERPL-MCNC: 22 MG/DL (ref 7–30)
CALCIUM SERPL-MCNC: 8.7 MG/DL (ref 8.5–10.1)
CHLORIDE BLD-SCNC: 112 MMOL/L (ref 94–109)
CO2 SERPL-SCNC: 23 MMOL/L (ref 20–32)
CREAT SERPL-MCNC: 0.74 MG/DL (ref 0.52–1.04)
GFR SERPL CREATININE-BSD FRML MDRD: 85 ML/MIN/1.73M2
GLUCOSE BLD-MCNC: 87 MG/DL (ref 70–99)
POTASSIUM BLD-SCNC: 4.2 MMOL/L (ref 3.4–5.3)
SODIUM SERPL-SCNC: 142 MMOL/L (ref 133–144)
TSH SERPL DL<=0.005 MIU/L-ACNC: 1 MU/L (ref 0.4–4)

## 2022-05-11 ASSESSMENT — ANXIETY QUESTIONNAIRES: GAD7 TOTAL SCORE: 1

## 2022-05-13 ENCOUNTER — VIRTUAL VISIT (OUTPATIENT)
Dept: ONCOLOGY | Facility: CLINIC | Age: 72
End: 2022-05-13
Attending: INTERNAL MEDICINE
Payer: MEDICARE

## 2022-05-13 DIAGNOSIS — C78.4 SECONDARY MALIGNANT NEOPLASM OF SMALL INTESTINE (H): ICD-10-CM

## 2022-05-13 DIAGNOSIS — C49.9 LEIOMYOSARCOMA (H): Primary | ICD-10-CM

## 2022-05-13 PROCEDURE — G0463 HOSPITAL OUTPT CLINIC VISIT: HCPCS | Mod: PN,RTG | Performed by: INTERNAL MEDICINE

## 2022-05-13 PROCEDURE — 99215 OFFICE O/P EST HI 40 MIN: CPT | Mod: 95 | Performed by: INTERNAL MEDICINE

## 2022-05-13 NOTE — LETTER
5/13/2022         RE: Maggie Navarrete  [address]  [place] [facility] [address]        Dear Colleague,    Thank you for referring your patient, Maggie Navarrete, to the [facility]. Please see a copy of my visit note below.    Maggie is a 72 year old who is being evaluated via a billable video visit.      How would you like to obtain your AVS? MyChart  If the video visit is dropped, the invitation should be resent by: Text to cell phone: [phone]  Will anyone else be joining your video visit? No       Amanda JAIME    Video-Visit Details  Type of service:  Video Visit  Video Start Time: 4:00 PM  Video End Time:4:30 PM  Originating Location (pt. Location): Home  Distant Location (provider location):  [facility]   Platform used for Video Visit: [facility] ONCOLOGY PROGRESS NOTE  [facility]  May 13, 2022    Oncologic history:  1. January 2016, she presents to her PCP with 3 weeks of abdominal pain and severe constipation. She is treated for constipation and improves.  2. 3/21/2016, worsening pelvic pressure prompts pelvic examination with an enlarged uterus noted. Transvaginal ultrasound shows enlargement of subserosal fibroids originally seen on MRI in 2006.  3. 5/9/2016, MRI pelvis is obtained, which shows several enhancing uterine masses involving an enlarged uterus, felt radiographically compatible with benign uterine leiomyomata.  3. Summer 2016, she experiences worsening low back pain thought at first thought to be sacroiliitis, but pain persists after corticosteroid injection. Continues with severe constipation.  4. 9/8/2016, she undergoes colonoscopy for persistent abdominal pain. She has sigmoid polyp snared. Abdominal pain and diarrhea persist after colonoscopy prep.  5. 9/14/2016, she has worsening abdominal pain and is evaluated in the ED. CT-scan shows a large subserosal fibroid measuring 9.4 x 9.6 x 9.8 cm, as well as  very large fibroids along the posterior uterine body more inferiorly, increased in size to 14 cm in greatest diameter compared to 10 cm in greatest diameter on MRI of 5/9/2016.  6. 9/29/2016, she has staging CT-CAP, which shows a small left sided pleural effusion and a 3 mm pulmonary nodule in the RML.  7. October 2016, self-refers to [facility].  8. 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  9. 11/4/2016, undergoes total abdominal hysterectomy and BSO, under Dr. Harsh Camarillo. Surgery was originally presumed for leiomyoma, however, intraoperatively there was focal adherence of a mass to the pelvic right sidewall and cul-de-sac, which led to peritoneal resection and omentectomy. Final pathology, pT2b: showed leiomyosarcoma involving the cul-de-sac and forming two separate masses measuring 10.5 x 6.5 x 4.5 cm and 10.7 x 7.9 x 7.5 cm.  10. 12/26/2016, she starts adjuvant chemotherapy with doxorubicin 25 mg/m2/day and DTIC 250 mg/m2/day , days 1-3 every 21 weeks.  11. 3/23/2017, she completes 5 cycles of adjuvant doxorubicin and dacarbazine  12. 6/3/2020, restaging MRI shows a locally recurrent pelvic sidewall mass measuring 2.8 x 3.3 cm in size. This new area of concern was not previously reported, presumably due to proximity to bowel and the similar appearance to adjacent loops of bowel on the contrast enhanced imaging. On review of prior imaging studies, the lesion can be seen as far back as January 2019 as a small nodule measuring 1 cm in size directly adjacent to bowel, close to the pelvic sidewall.  13. 6/16/2020, she had resection of the recurrent leiomyosarcoma tumor, which was densely attached to the right ureter and attached to the right side of the sigmoid mesentery and external and internal iliac vessels. There was external iliac vein injury with primary repair due to involvement of the mass. The ureter was also partially removed as part of the tumor  mass. All margins and the ureter that was resected were negative for tumor. Pathology (JR-) showed a 3.7 x 3.5 x 2.2 cm thinly encapsulated mass, which was submitted for frozen sections.  14. 4/4/2022, she had laparotomy with small bowel resection with anastamosis, tumor debulking, urinary stent placement, and lysis of adhesions.       HISTORY OF PRESENT ILLNESS  Ms. Navarrete presents today in follow-up following surgery for removal of a 2nd recurrence of her leiomyosarcoma. She required significant lysis of adhesions to free the tumor from multiple loops of bowel and she did require ureteral stent placement and bowel resection with anastomosis. The tumor measured 3.2 cm in size involving the serosa, and it was removed in its entirety with negative margins. Peritoneal washings were negative for malignancy.     Since the time of surgery she has been having some mild troubles with incontinence. Mostly stress incontinence episodes. She had stent placement, which was complicated by the development of UTI, which set her back some. She will start doing her pelvic exercises again later after post-surgical check up.    ECOG performance status is 0.    REVIEW OF SYSTEMS  A 12-point ROS negative except as in HPI    Current Outpatient Medications   Medication Sig Dispense Refill     alendronate (FOSAMAX) 70 MG tablet Take 1 tablet (70 mg) by mouth every 7 days 12 tablet 3     CALCIUM PO Take 1 capsule by mouth daily as needed (if diet insufficient to provide daily recommended values)       carboxymethylcellulose PF (REFRESH PLUS) 0.5 % ophthalmic solution Place 1 drop into both eyes 4 times daily as needed for dry eyes       famotidine (PEPCID) 10 MG tablet Take 10 mg by mouth 2 times daily as needed       levothyroxine (SYNTHROID/LEVOTHROID) 75 MCG tablet Take 1 tablet (75 mcg) by mouth daily 90 tablet 3     multivitamin w/minerals (THERA-VIT-M) tablet Take 1 tablet by mouth daily as needed (if diet insufficient to  provide recommended nutrients)       venlafaxine (EFFEXOR XR) 75 MG 24 hr capsule Take 1 capsule (75 mg) by mouth daily 90 capsule 0     zolpidem (AMBIEN) 5 MG tablet Take 1 tablet (5 mg) by mouth nightly as needed for sleep (Only uses if traveling or cannot sleep,) 20 tablet 0     ondansetron (ZOFRAN-ODT) 4 MG ODT tab Take 1 tablet (4 mg) by mouth every 6 hours as needed for nausea or vomiting 30 tablet 0       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  Wt Readings from Last 2 Encounters:   04/21/22 63.5 kg (140 lb)   04/01/22 63.7 kg (140 lb 8 oz)   General: Well appearing, at home, in no distress  Head: Normocephalic  Eyes: EOMI, No icterus  ENT: Oropharynx clear  Lungs: Easy breathing on room air, speaking in complete sentences without dyspnea  Neuro: Cranial nerves 2-12 intact, no focal deficits  Skin: No rashes on exposed skin    Labs:  Lab on 05/10/2022   Component Date Value Ref Range Status     Color Urine 05/10/2022 Yellow  Colorless, Straw, Light Yellow, Yellow Final     Appearance Urine 05/10/2022 Clear  Clear Final     Glucose Urine 05/10/2022 Negative  Negative mg/dL Final     Bilirubin Urine 05/10/2022 Negative  Negative Final      Ketones Urine 05/10/2022 Negative  Negative mg/dL Final     Specific Gravity Urine 05/10/2022 1.025  1.003 - 1.035 Final     Blood Urine 05/10/2022 Trace (A) Negative Final     pH Urine 05/10/2022 5.0  5.0 - 7.0 Final     Protein Albumin Urine 05/10/2022 Negative  Negative mg/dL Final     Urobilinogen Urine 05/10/2022 0.2  0.2, 1.0 E.U./dL Final     Nitrite Urine 05/10/2022 Negative  Negative Final     Leukocyte Esterase Urine 05/10/2022 Negative  Negative Final     Sodium 05/10/2022 142  133 - 144 mmol/L Final     Potassium 05/10/2022 4.2  3.4 - 5.3 mmol/L Final     Chloride 05/10/2022 112 (A) 94 - 109 mmol/L Final     Carbon Dioxide (CO2) 05/10/2022 23  20 - 32 mmol/L Final     Anion Gap 05/10/2022 7  3 - 14 mmol/L Final     Urea Nitrogen 05/10/2022 22  7 - 30 mg/dL Final     Creatinine 05/10/2022 0.74  0.52 - 1.04 mg/dL Final     Calcium 05/10/2022 8.7  8.5 - 10.1 mg/dL Final     Glucose 05/10/2022 87  70 - 99 mg/dL Final     GFR Estimate 05/10/2022 85  >60 mL/min/1.73m2 Final     TSH 05/10/2022 1.00  0.40 - 4.00 mU/L Final     Bacteria Urine 05/10/2022 Few (A) None Seen /HPF Final     RBC Urine 05/10/2022 0-2  0-2 /HPF /HPF Final     WBC Urine 05/10/2022 0-5  0-5 /HPF /HPF Final       Imaging:  PET Oncology Whole Body  Narrative: Combined Report of:    PET and CT on  3/26/2022 9:30 AM :    1. PET of the neck, chest, abdomen, and pelvis.  2. PET CT Fusion for Attenuation Correction and Anatomical  Localization:    3. Diagnostic CT scan of the chest, abdomen, and pelvis with  intravenous contrast for interpretation.  3. CT of the chest, abdomen and pelvis obtained for diagnostic  interpretation.  4. 3D MIP and PET-CT fused images were processed on an independent  workstation and archived to PACS and reviewed by a radiologist.    Technique:    1. PET: The patient received 10.13 mCi of F-18-FDG; the serum glucose  was 107 prior to administration, body weight was 63.5 kg. Images were  evaluated in the axial,  sagittal, and coronal planes as well as the  rotational whole body MIP. Images were acquired from the Vertex to the  Feet.    UPTAKE WAS MEASURED AT 60 MINUTES.     BACKGROUND:  Liver SUV max= 4.33,   Aorta Blood SUV Max: 3.63.     2. CT: Volumetric acquisition for clinical interpretation of the  chest, abdomen, and pelvis acquired at 3 mm sections . The chest,  abdomen, and pelvis were evaluated at 5 mm sections in bone, soft  tissue, and lung windows.      The patient received 86 cc of Isovue 370 intravenously for the  examination.      3. 3D MIP and PET-CT fused images were processed on an independent  workstation and archived to PACS and reviewed by a radiologist.    INDICATION: Leiomyosarcoma (H); Malignant neoplasm of overlapping  sites of colon (H)    ADDITIONAL INFORMATION OBTAINED FROM EMR: This patient presents with  concern of left lower quadrant abdominal/pelvic pain in the context of  history of uterine cancer, total hysterectomy and small bowel  obstruction. ?Exam was relatively reassuring with mild tenderness.  ?Underwent the above evaluation which revealed a approximate 3 cm  circumscribed mass versus hematoma versus abscess. ?Mass seems most  likely in clinical context of history of cancer.    COMPARISON: CT 3/24/2022.    FINDINGS:     HEAD/NECK:  There is no  suspicious FDG uptake in the neck.     The paranasal sinuses are clear. The mastoid air cells are clear.     The mucosal pharyngeal space, the , prevertebral and carotid  spaces are within normal limits.     No masses, mass effect or pathologically enlarged lymph nodes are  evident. The thyroid gland is unremarkable.    CHEST:  There is no suspicious FDG uptake in the chest.     There are no pathologically enlarged mediastinal, hilar or axillary  lymph nodes.  There are no suspicious lung nodules or evidence for infection.  Calcified mediastinal nodes.    There is no significant pericardial or pleural effusions.    ABDOMEN AND  PELVIS:  3.2 x 2.7 cm partially cystic/solid hypermetabolic mesenteric mass in  the anterior left lower quadrant with SUV max of 7.4 (Series 3 image  214). The mass is inseparable from the adjacent small bowel loops in  the left lower quadrant anterior abdomen. No evidence of bowel  obstruction.    There are no suspicious hepatic lesions. There is no splenomegaly or  evidence for splenic or pancreatic mass lesion.  There are no suspicious adrenal mass lesions or opaque gallbladder  calculi. There is symmetric nephrographic renal phase without  hydronephrosis. Left peripelvic cysts. Right kidney is slightly  asymmetrically smaller in size.   Mild hiatal hernia. Postsurgical changes of hysterectomy.    LOWER EXTREMITIES:   No abnormal masses or hypermetabolic lesions.    BONES:   There are no suspicious lytic or blastic osseous lesions.  There is no  abnormal FDG uptake in the skeleton. Multilevel spondylosis with mild  anterolisthesis of L4 on L5.  Impression: IMPRESSION: In this patient with a history of uterina leiomyosarcoma  status post-hysterectomy:    3.2 x 2.7 cm partially cystic/solid hypermetabolic mesenteric mass in  the anterior left lower quadrant is highly concerning for a metastatic  tumor deposit. It is inseparable from the adjacent small bowel loops  however there is no evidence of bowel obstruction.     I have personally reviewed the examination and initial interpretation  and I agree with the findings.    CUAUHTEMOC AVELAR MD         SYSTEM ID:  [id number]    ASSESSMENT AND PLAN    #1 Second recurrence, leiomyosarcoma, small bowel serosa, s/p resection April 2022  #2 First local recurrence, leiomyosarcoma, right pelvic sidewall adherent to iliac vessels and ureter, s/p resection June 2020  #3 History of locally advanced leiomyosarcoma of the uterus, pT2b Nx M0, Stage IIB, status-post resection (11/2016) and 5 cycles of adjuvant doxorubicin and dacarbazine    It was a pleasure to see Ms. Navarrete today.  We  reviewed her PET-CT scan and reviewed the older imaging going back to a CT-AP obtained 12/28/21 and 11/4/21, which also show the left abdominal mass. The CT-AP in 7/21 does not clearly show a mass, but there is perhaps a small nodule in the area in question.    She is understandably upset that the nodule was not picked up on until it was already greater than 3 cm in size. She is looking into circulating tumor DNA based follow-up in the future.     She is particularly upset that the nodule was missed by the ER and radiology when she presented in December with obstruction symptoms. Overall she feels our teams did not do enough to catch her recurrence sooner and she has decided to have her imaging follow-up done at [facility] going forward.    I explained some of the difficulties of identifying bowel wall recurrences. I also apologized for the unfortunate delay in discovery of the recurrent tumor on her imaging studies. She was appreciative of the cares she has received, but will receive scans at [facility] for her ongoing follow-up. She will reach out if I and/or our clinical team can be of any service going forward.    Questions answered.        Again, thank you for allowing me to participate in the care of your patient.      Sincerely,    Ab Aguilar MD

## 2022-05-13 NOTE — PROGRESS NOTES
Maggie is a 72 year old who is being evaluated via a billable video visit.      How would you like to obtain your AVS? MyChart  If the video visit is dropped, the invitation should be resent by: Text to cell phone: [phone]  Will anyone else be joining your video visit? Kenia Titus VF    Video-Visit Details  Type of service:  Video Visit  Video Start Time: 4:00 PM  Video End Time:4:30 PM  Originating Location (pt. Location): Home  Distant Location (provider location):  [facility]   Platform used for Video Visit: [facility] ONCOLOGY PROGRESS NOTE  [facility]  May 13, 2022    Oncologic history:  1. January 2016, she presents to her PCP with 3 weeks of abdominal pain and severe constipation. She is treated for constipation and improves.  2. 3/21/2016, worsening pelvic pressure prompts pelvic examination with an enlarged uterus noted. Transvaginal ultrasound shows enlargement of subserosal fibroids originally seen on MRI in 2006.  3. 5/9/2016, MRI pelvis is obtained, which shows several enhancing uterine masses involving an enlarged uterus, felt radiographically compatible with benign uterine leiomyomata.  3. Summer 2016, she experiences worsening low back pain thought at first thought to be sacroiliitis, but pain persists after corticosteroid injection. Continues with severe constipation.  4. 9/8/2016, she undergoes colonoscopy for persistent abdominal pain. She has sigmoid polyp snared. Abdominal pain and diarrhea persist after colonoscopy prep.  5. 9/14/2016, she has worsening abdominal pain and is evaluated in the ED. CT-scan shows a large subserosal fibroid measuring 9.4 x 9.6 x 9.8 cm, as well as very large fibroids along the posterior uterine body more inferiorly, increased in size to 14 cm in greatest diameter compared to 10 cm in greatest diameter on MRI of 5/9/2016.  6. 9/29/2016, she has staging CT-CAP, which shows a small left sided pleural  effusion and a 3 mm pulmonary nodule in the RML.  7. October 2016, self-refers to [facility].  8. 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  9. 11/4/2016, undergoes total abdominal hysterectomy and BSO, under Dr. Harsh Camarillo. Surgery was originally presumed for leiomyoma, however, intraoperatively there was focal adherence of a mass to the pelvic right sidewall and cul-de-sac, which led to peritoneal resection and omentectomy. Final pathology, pT2b: showed leiomyosarcoma involving the cul-de-sac and forming two separate masses measuring 10.5 x 6.5 x 4.5 cm and 10.7 x 7.9 x 7.5 cm.  10. 12/26/2016, she starts adjuvant chemotherapy with doxorubicin 25 mg/m2/day and DTIC 250 mg/m2/day , days 1-3 every 21 weeks.  11. 3/23/2017, she completes 5 cycles of adjuvant doxorubicin and dacarbazine  12. 6/3/2020, restaging MRI shows a locally recurrent pelvic sidewall mass measuring 2.8 x 3.3 cm in size. This new area of concern was not previously reported, presumably due to proximity to bowel and the similar appearance to adjacent loops of bowel on the contrast enhanced imaging. On review of prior imaging studies, the lesion can be seen as far back as January 2019 as a small nodule measuring 1 cm in size directly adjacent to bowel, close to the pelvic sidewall.  13. 6/16/2020, she had resection of the recurrent leiomyosarcoma tumor, which was densely attached to the right ureter and attached to the right side of the sigmoid mesentery and external and internal iliac vessels. There was external iliac vein injury with primary repair due to involvement of the mass. The ureter was also partially removed as part of the tumor mass. All margins and the ureter that was resected were negative for tumor. Pathology (JR-) showed a 3.7 x 3.5 x 2.2 cm thinly encapsulated mass, which was submitted for frozen sections.  14. 4/4/2022, she had laparotomy with small bowel resection  with anastamosis, tumor debulking, urinary stent placement, and lysis of adhesions.       HISTORY OF PRESENT ILLNESS  Ms. Navarrete presents today in follow-up following surgery for removal of a 2nd recurrence of her leiomyosarcoma. She required significant lysis of adhesions to free the tumor from multiple loops of bowel and she did require ureteral stent placement and bowel resection with anastomosis. The tumor measured 3.2 cm in size involving the serosa, and it was removed in its entirety with negative margins. Peritoneal washings were negative for malignancy.     Since the time of surgery she has been having some mild troubles with incontinence. Mostly stress incontinence episodes. She had stent placement, which was complicated by the development of UTI, which set her back some. She will start doing her pelvic exercises again later after post-surgical check up.    ECOG performance status is 0.    REVIEW OF SYSTEMS  A 12-point ROS negative except as in HPI    Current Outpatient Medications   Medication Sig Dispense Refill     alendronate (FOSAMAX) 70 MG tablet Take 1 tablet (70 mg) by mouth every 7 days 12 tablet 3     CALCIUM PO Take 1 capsule by mouth daily as needed (if diet insufficient to provide daily recommended values)       carboxymethylcellulose PF (REFRESH PLUS) 0.5 % ophthalmic solution Place 1 drop into both eyes 4 times daily as needed for dry eyes       famotidine (PEPCID) 10 MG tablet Take 10 mg by mouth 2 times daily as needed       levothyroxine (SYNTHROID/LEVOTHROID) 75 MCG tablet Take 1 tablet (75 mcg) by mouth daily 90 tablet 3     multivitamin w/minerals (THERA-VIT-M) tablet Take 1 tablet by mouth daily as needed (if diet insufficient to provide recommended nutrients)       venlafaxine (EFFEXOR XR) 75 MG 24 hr capsule Take 1 capsule (75 mg) by mouth daily 90 capsule 0     zolpidem (AMBIEN) 5 MG tablet Take 1 tablet (5 mg) by mouth nightly as needed for sleep (Only uses if traveling or cannot  sleep,) 20 tablet 0     ondansetron (ZOFRAN-ODT) 4 MG ODT tab Take 1 tablet (4 mg) by mouth every 6 hours as needed for nausea or vomiting 30 tablet 0       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  Wt Readings from Last 2 Encounters:   04/21/22 63.5 kg (140 lb)   04/01/22 63.7 kg (140 lb 8 oz)   General: Well appearing, at home, in no distress  Head: Normocephalic  Eyes: EOMI, No icterus  ENT: Oropharynx clear  Lungs: Easy breathing on room air, speaking in complete sentences without dyspnea  Neuro: Cranial nerves 2-12 intact, no focal deficits  Skin: No rashes on exposed skin    Labs:  Lab on 05/10/2022   Component Date Value Ref Range Status     Color Urine 05/10/2022 Yellow  Colorless, Straw, Light Yellow, Yellow Final     Appearance Urine 05/10/2022 Clear  Clear Final     Glucose Urine 05/10/2022 Negative  Negative mg/dL Final     Bilirubin Urine 05/10/2022 Negative  Negative Final     Ketones Urine 05/10/2022 Negative  Negative mg/dL Final     Specific Gravity Urine 05/10/2022 1.025  1.003 - 1.035 Final     Blood Urine 05/10/2022 Trace (A) Negative Final     pH Urine 05/10/2022 5.0  5.0 - 7.0 Final     Protein Albumin Urine 05/10/2022  Negative  Negative mg/dL Final     Urobilinogen Urine 05/10/2022 0.2  0.2, 1.0 E.U./dL Final     Nitrite Urine 05/10/2022 Negative  Negative Final     Leukocyte Esterase Urine 05/10/2022 Negative  Negative Final     Sodium 05/10/2022 142  133 - 144 mmol/L Final     Potassium 05/10/2022 4.2  3.4 - 5.3 mmol/L Final     Chloride 05/10/2022 112 (A) 94 - 109 mmol/L Final     Carbon Dioxide (CO2) 05/10/2022 23  20 - 32 mmol/L Final     Anion Gap 05/10/2022 7  3 - 14 mmol/L Final     Urea Nitrogen 05/10/2022 22  7 - 30 mg/dL Final     Creatinine 05/10/2022 0.74  0.52 - 1.04 mg/dL Final     Calcium 05/10/2022 8.7  8.5 - 10.1 mg/dL Final     Glucose 05/10/2022 87  70 - 99 mg/dL Final     GFR Estimate 05/10/2022 85  >60 mL/min/1.73m2 Final     TSH 05/10/2022 1.00  0.40 - 4.00 mU/L Final     Bacteria Urine 05/10/2022 Few (A) None Seen /HPF Final     RBC Urine 05/10/2022 0-2  0-2 /HPF /HPF Final     WBC Urine 05/10/2022 0-5  0-5 /HPF /HPF Final       Imaging:  PET Oncology Whole Body  Narrative: Combined Report of:    PET and CT on  3/26/2022 9:30 AM :    1. PET of the neck, chest, abdomen, and pelvis.  2. PET CT Fusion for Attenuation Correction and Anatomical  Localization:    3. Diagnostic CT scan of the chest, abdomen, and pelvis with  intravenous contrast for interpretation.  3. CT of the chest, abdomen and pelvis obtained for diagnostic  interpretation.  4. 3D MIP and PET-CT fused images were processed on an independent  workstation and archived to PACS and reviewed by a radiologist.    Technique:    1. PET: The patient received 10.13 mCi of F-18-FDG; the serum glucose  was 107 prior to administration, body weight was 63.5 kg. Images were  evaluated in the axial, sagittal, and coronal planes as well as the  rotational whole body MIP. Images were acquired from the Vertex to the  Feet.    UPTAKE WAS MEASURED AT 60 MINUTES.     BACKGROUND:  Liver SUV max= 4.33,   Aorta Blood SUV Max: 3.63.     2. CT: Volumetric acquisition  for clinical interpretation of the  chest, abdomen, and pelvis acquired at 3 mm sections . The chest,  abdomen, and pelvis were evaluated at 5 mm sections in bone, soft  tissue, and lung windows.      The patient received 86 cc of Isovue 370 intravenously for the  examination.      3. 3D MIP and PET-CT fused images were processed on an independent  workstation and archived to PACS and reviewed by a radiologist.    INDICATION: Leiomyosarcoma (H); Malignant neoplasm of overlapping  sites of colon (H)    ADDITIONAL INFORMATION OBTAINED FROM EMR: This patient presents with  concern of left lower quadrant abdominal/pelvic pain in the context of  history of uterine cancer, total hysterectomy and small bowel  obstruction. ?Exam was relatively reassuring with mild tenderness.  ?Underwent the above evaluation which revealed a approximate 3 cm  circumscribed mass versus hematoma versus abscess. ?Mass seems most  likely in clinical context of history of cancer.    COMPARISON: CT 3/24/2022.    FINDINGS:     HEAD/NECK:  There is no  suspicious FDG uptake in the neck.     The paranasal sinuses are clear. The mastoid air cells are clear.     The mucosal pharyngeal space, the , prevertebral and carotid  spaces are within normal limits.     No masses, mass effect or pathologically enlarged lymph nodes are  evident. The thyroid gland is unremarkable.    CHEST:  There is no suspicious FDG uptake in the chest.     There are no pathologically enlarged mediastinal, hilar or axillary  lymph nodes.  There are no suspicious lung nodules or evidence for infection.  Calcified mediastinal nodes.    There is no significant pericardial or pleural effusions.    ABDOMEN AND PELVIS:  3.2 x 2.7 cm partially cystic/solid hypermetabolic mesenteric mass in  the anterior left lower quadrant with SUV max of 7.4 (Series 3 image  214). The mass is inseparable from the adjacent small bowel loops in  the left lower quadrant anterior abdomen. No  evidence of bowel  obstruction.    There are no suspicious hepatic lesions. There is no splenomegaly or  evidence for splenic or pancreatic mass lesion.  There are no suspicious adrenal mass lesions or opaque gallbladder  calculi. There is symmetric nephrographic renal phase without  hydronephrosis. Left peripelvic cysts. Right kidney is slightly  asymmetrically smaller in size.   Mild hiatal hernia. Postsurgical changes of hysterectomy.    LOWER EXTREMITIES:   No abnormal masses or hypermetabolic lesions.    BONES:   There are no suspicious lytic or blastic osseous lesions.  There is no  abnormal FDG uptake in the skeleton. Multilevel spondylosis with mild  anterolisthesis of L4 on L5.  Impression: IMPRESSION: In this patient with a history of uterina leiomyosarcoma  status post-hysterectomy:    3.2 x 2.7 cm partially cystic/solid hypermetabolic mesenteric mass in  the anterior left lower quadrant is highly concerning for a metastatic  tumor deposit. It is inseparable from the adjacent small bowel loops  however there is no evidence of bowel obstruction.     I have personally reviewed the examination and initial interpretation  and I agree with the findings.    CUAUHTEMOC AVELAR MD         SYSTEM ID:  [id number]    ASSESSMENT AND PLAN    #1 Second recurrence, leiomyosarcoma, small bowel serosa, s/p resection April 2022  #2 First local recurrence, leiomyosarcoma, right pelvic sidewall adherent to iliac vessels and ureter, s/p resection June 2020  #3 History of locally advanced leiomyosarcoma of the uterus, pT2b Nx M0, Stage IIB, status-post resection (11/2016) and 5 cycles of adjuvant doxorubicin and dacarbazine    It was a pleasure to see Ms. Navarrete today.  We reviewed her PET-CT scan and reviewed the older imaging going back to a CT-AP obtained 12/28/21 and 11/4/21, which also show the left abdominal mass. The CT-AP in 7/21 does not clearly show a mass, but there is perhaps a small nodule in the area in question.    She  is understandably upset that the nodule was not picked up on until it was already greater than 3 cm in size. She is looking into circulating tumor DNA based follow-up in the future.     She is particularly upset that the nodule was missed by the ER and radiology when she presented in December with obstruction symptoms. Overall she feels our teams did not do enough to catch her recurrence sooner and she has decided to have her imaging follow-up done at [place] going forward.    I explained some of the difficulties of identifying bowel wall recurrences. I also apologized for the unfortunate delay in discovery of the recurrent tumor on her imaging studies. She was appreciative of the cares she has received, but will receive scans at [place] for her ongoing follow-up. She will reach out if I and/or our clinical team can be of any service going forward.    Questions answered.    Ab Warner M.D.   of Medicine  Hematology, Oncology and Transplantation

## 2022-06-06 ENCOUNTER — VIRTUAL VISIT (OUTPATIENT)
Dept: UROLOGY | Facility: CLINIC | Age: 72
End: 2022-06-06
Attending: OBSTETRICS & GYNECOLOGY
Payer: MEDICARE

## 2022-06-06 VITALS — WEIGHT: 135 LBS | BODY MASS INDEX: 27.21 KG/M2 | HEIGHT: 59 IN

## 2022-06-06 DIAGNOSIS — N39.46 MIXED INCONTINENCE: Primary | ICD-10-CM

## 2022-06-06 PROBLEM — N39.41 URGE INCONTINENCE: Status: ACTIVE | Noted: 2022-06-06

## 2022-06-06 PROCEDURE — G0463 HOSPITAL OUTPT CLINIC VISIT: HCPCS | Mod: PN,RTG | Performed by: OBSTETRICS & GYNECOLOGY

## 2022-06-06 PROCEDURE — 99214 OFFICE O/P EST MOD 30 MIN: CPT | Mod: 95 | Performed by: OBSTETRICS & GYNECOLOGY

## 2022-06-06 NOTE — LETTER
6/6/2022       RE: Maggie Navarrete  [address]  [place] [id number]     Dear Colleague,    Thank you for referring your patient, Maggie Navarrete, to the [facility] [place] [facility]. Please see a copy of my visit note below.    Maggie is a 72 year old who is being evaluated via a billable video visit.      How would you like to obtain your AVS? MyChart  If the video visit is dropped, the invitation should be resent by: Send to e-mail at: [email or web address]  Will anyone else be joining your video visit? No   Karla Louis, Virtual Facilitator       Video Start Time: 1:07 PM      Subjective     Maggie Navarrete is a 72 year old year old who presents for a video visit today to follow up on her urgency urinary incontinence. I last saw her on 12/13/21 at which point we discussed behavioral modification and pelvic PT ( had levator myalgia as well) as first line. Her hx is sig for leiyomyosarcoma s/p RACHEL/BSO and omentectomy ( [place]), chemo in 2016 with locally recurrent disease in 2020 resulting in  resection of the recurrent leiomyosarcoma tumor along with partial removal of her right ureter.  First surgery was complicated with DVT and her second surgery complicated by sepsis. She was also recently admitted ( 11/4/21-11/06/21) for partial SBOs.Has had 3 open and 2 laparascopy abdominal procedures likely the risk factor for her bowel obstruction. 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 Says she has never been with a cardiologist. Since I saw her, she had another surgery for recurrent leiyomyosarcoma and had to have ureteric stent for 6 weeks and a UTI and she says her incontinence got worse since.     She said she was doing better with pelvic PT and would like to get back on track. She feels that she has learned enough and can get back to it herself. She was also using vaginal dialators and would like to get back to that  but wants to do it when her grand child are not around. Her incontinence is just when she is walking. She walks everyday and says it is not too bad but had to stop at the bathroom once or so during her walk. Prior to her surgery, she could go through her whole walk without needing to use the toilet. She voids every 1.5 hours or so. She wears just one pad a day.       Objective:   Deferred ( virtual visit)     Asssessment and plan  Encounter Diagnosis   Name Primary?     Mixed incontinence Yes       Says that her pelvic PT was helping quite a bit but her symptoms worsened after her most recent surgery for recurrent leiyomyosarcoma. However, really motivated to start doing the pelvic exercises and she is already making some improvement. We discussed today that if her symptoms worsen, we can consider a urethral bulking agent for her as most of her bother currently is related to leakage with walking and less so with urgency as long as she empties her bladder regularly.          Video-Visit Details    Type of service:  Video Visit    Video End Time:1:29 PM    Originating Location (pt. Location): Home    Distant Location (provider location):  [facility] [place]     Platform used for Video Visit: [place] ROBB spent a total of 30 minutes on  Video with  Maggie Navarrete on the date of the encounter in chart review, patient visit, review of tests, documentation and/or discussion with other providers about the issues documented above.       Suad Kennedy MD

## 2022-06-06 NOTE — PROGRESS NOTES
Maggie is a 72 year old who is being evaluated via a billable video visit.      How would you like to obtain your AVS? MyChart  If the video visit is dropped, the invitation should be resent by: Send to e-mail at: [email or web address]  Will anyone else be joining your video visit? No   Karla Louis, Virtual Facilitator       Video Start Time: 1:07 PM      Subjective     Maggie Navarrete is a 72 year old year old who presents for a video visit today to follow up on her urgency urinary incontinence. I last saw her on 12/13/21 at which point we discussed behavioral modification and pelvic PT ( had levator myalgia as well) as first line. Her hx is sig for leiyomyosarcoma s/p RACHEL/BSO and omentectomy ( [place]), chemo in 2016 with locally recurrent disease in 2020 resulting in  resection of the recurrent leiomyosarcoma tumor along with partial removal of her right ureter.  First surgery was complicated with DVT and her second surgery complicated by sepsis. She was also recently admitted ( 11/4/21-11/06/21) for partial SBOs.Has had 3 open and 2 laparascopy abdominal procedures likely the risk factor for her bowel obstruction. 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 Says she has never been with a cardiologist. Since I saw her, she had another surgery for recurrent leiyomyosarcoma and had to have ureteric stent for 6 weeks and a UTI and she says her incontinence got worse since.     She said she was doing better with pelvic PT and would like to get back on track. She feels that she has learned enough and can get back to it herself. She was also using vaginal dialators and would like to get back to that but wants to do it when her grand child are not around. Her incontinence is just when she is walking. She walks everyday and says it is not too bad but had to stop at the bathroom once or so during her walk. Prior to her surgery, she could go through her whole walk without needing to use the toilet. She voids every  1.5 hours or so. She wears just one pad a day.       Objective:   Deferred ( virtual visit)     Asssessment and plan  Encounter Diagnosis   Name Primary?     Mixed incontinence Yes       Says that her pelvic PT was helping quite a bit but her symptoms worsened after her most recent surgery for recurrent leiyomyosarcoma. However, really motivated to start doing the pelvic exercises and she is already making some improvement. We discussed today that if her symptoms worsen, we can consider a urethral bulking agent for her as most of her bother currently is related to leakage with walking and less so with urgency as long as she empties her bladder regularly.          Video-Visit Details    Type of service:  Video Visit    Video End Time:1:29 PM    Originating Location (pt. Location): Home    Distant Location (provider location):  [facility]     Platform used for Video Visit: [place]        I spent a total of 30 minutes on  Video with  Maggie Navarrete on the date of the encounter in chart review, patient visit, review of tests, documentation and/or discussion with other providers about the issues documented above.

## 2022-07-05 ENCOUNTER — OFFICE VISIT (OUTPATIENT)
Dept: FAMILY MEDICINE | Facility: CLINIC | Age: 72
End: 2022-07-05
Payer: MEDICARE

## 2022-07-05 VITALS
OXYGEN SATURATION: 100 % | HEART RATE: 97 BPM | TEMPERATURE: 98.7 F | WEIGHT: 137 LBS | DIASTOLIC BLOOD PRESSURE: 78 MMHG | SYSTOLIC BLOOD PRESSURE: 139 MMHG | BODY MASS INDEX: 27.62 KG/M2 | RESPIRATION RATE: 20 BRPM | HEIGHT: 59 IN

## 2022-07-05 DIAGNOSIS — Z90.79 S/P TOTAL ABDOMINAL HYSTERECTOMY AND BILATERAL SALPINGO-OOPHORECTOMY: ICD-10-CM

## 2022-07-05 DIAGNOSIS — Z13.220 SCREENING FOR LIPID DISORDERS: ICD-10-CM

## 2022-07-05 DIAGNOSIS — E03.9 ACQUIRED HYPOTHYROIDISM: ICD-10-CM

## 2022-07-05 DIAGNOSIS — E89.0 S/P PARTIAL THYROIDECTOMY: ICD-10-CM

## 2022-07-05 DIAGNOSIS — Z90.722 S/P TOTAL ABDOMINAL HYSTERECTOMY AND BILATERAL SALPINGO-OOPHORECTOMY: ICD-10-CM

## 2022-07-05 DIAGNOSIS — R73.9 ELEVATED BLOOD SUGAR: ICD-10-CM

## 2022-07-05 DIAGNOSIS — F41.1 GENERALIZED ANXIETY DISORDER: ICD-10-CM

## 2022-07-05 DIAGNOSIS — C49.9 LEIOMYOSARCOMA (H): Primary | ICD-10-CM

## 2022-07-05 DIAGNOSIS — M85.89 OSTEOPENIA OF MULTIPLE SITES: ICD-10-CM

## 2022-07-05 DIAGNOSIS — Z90.710 S/P TOTAL ABDOMINAL HYSTERECTOMY AND BILATERAL SALPINGO-OOPHORECTOMY: ICD-10-CM

## 2022-07-05 PROCEDURE — 99214 OFFICE O/P EST MOD 30 MIN: CPT | Performed by: INTERNAL MEDICINE

## 2022-07-05 RX ORDER — VENLAFAXINE HYDROCHLORIDE 75 MG/1
75 CAPSULE, EXTENDED RELEASE ORAL DAILY
Qty: 90 CAPSULE | Refills: 3 | Status: SHIPPED | OUTPATIENT
Start: 2022-07-05 | End: 2023-03-30

## 2022-07-05 RX ORDER — LEVOTHYROXINE SODIUM 75 UG/1
75 TABLET ORAL DAILY
Qty: 90 TABLET | Refills: 1 | Status: SHIPPED | OUTPATIENT
Start: 2022-07-05 | End: 2023-02-16

## 2022-07-05 ASSESSMENT — PAIN SCALES - GENERAL: PAINLEVEL: SEVERE PAIN (7)

## 2022-07-05 NOTE — PROGRESS NOTES
Assessment & Plan     Maggie was seen today for new patient, gastrophageal reflux and tremors.    Diagnoses and all orders for this visit:    Leiomyosarcoma (H)  status post laparotomy, lysis of adhesions, small bowel resection with anastomosis, tumor debulking, and right ureteral stent placement on 4/4/2022 with Dr. Camarillo. Final pathology from surgery showed recurrent leiomyosarcoma  Second recurrence, leiomyosarcoma, small bowel serosa, s/p resection April 2022  First local recurrence, leiomyosarcoma, right pelvic sidewall adherent to iliac vessels and ureter, s/p resection June 2020  History of locally advanced leiomyosarcoma of the uterus, pT2b Nx M0, Stage IIB, status-post resection (11/2016) and 5 cycles of adjuvant doxorubicin and dacarbazine      Osteopenia of multiple sites  -     Vitamin D Deficiency; Future  -     Adult Endocrinology  Referral; Future  Patient has been taking Fosamax for a long time  She was seen by Kristine Flynn  She is taking adequate calcium and vitamin D    Per note Dr. Kristine Flynn dated August 24, 2020  She was on drug holiday since fall 2017 and then it was resumed again  Patient would like to see endocrinology for another opinion  Referral is done  She is not having any side effects with Fosamax      Generalized anxiety disorder  -     venlafaxine (EFFEXOR XR) 75 MG 24 hr capsule; Take 1 capsule (75 mg) by mouth daily  She is a stable with venlafaxine    Acquired hypothyroidism  -     levothyroxine (SYNTHROID/LEVOTHROID) 75 MCG tablet; Take 1 tablet (75 mcg) by mouth daily for thyroid  She is taking levothyroxine  Lab Results   Component Value Date    TSH 1.00 05/10/2022    TSH 1.44 12/16/2020       S/P total abdominal hysterectomy and bilateral salpingo-oophorectomy  Due to uterine fibroid but pathology report showed leiomyosarcoma    S/P partial thyroidectomy  For benign reason    Screening for lipid disorders  -     Lipid panel reflex to direct LDL Fasting;  "Future    Elevated blood sugar  -     Hemoglobin A1c    Her medical records was reviewed       BMI:   Estimated body mass index is 27.67 kg/m  as calculated from the following:    Height as of this encounter: 1.499 m (4' 11\").    Weight as of this encounter: 62.1 kg (137 lb).       See Patient Instructions  Patient Instructions   Schedule fasting labs   Make appointment with endocrinology  Follow up after 10/11 for wellness visit   Seek sooner medical attention if there is any worsening of symptoms or problems.        Return in about 4 months (around 11/5/2022) for wellness visit.    Vandana Morris MD  [facility] KYREE Serrano is a 72 year old, presenting for the following health issues:  New Patient, Gastrophageal Reflux (Pt states she is having more issues with reflux), and Tremors (Pt c/o new tremor in left hand)      HPI     Patient is here to get established  Not having any major side effects with medication       #1 Locally advanced leiomyosarcoma of the uterus, pT2b Nx M0, Stage IIB, status-post resection (11/2016) and 5 cycles of adjuvant doxorubicin and dacarbazine  #2 Local recurrence, leiomyosarcoma of the uterus, right pelvic sidewall adherent to iliac vessels and ureter s/p resection June 2020      Review of Systems   Constitutional, HEENT, cardiovascular, pulmonary, GI, , musculoskeletal, neuro, skin, endocrine and psych systems are negative, except as otherwise noted.      Objective    /78 (BP Location: Right arm, Cuff Size: Adult Regular)   Pulse 97   Temp 98.7  F (37.1  C) (Tympanic)   Resp 20   Ht 1.499 m (4' 11\")   Wt 62.1 kg (137 lb)   SpO2 100%   BMI 27.67 kg/m    Body mass index is 27.67 kg/m .  Physical Exam       GENERAL APPEARANCE: healthy, alert and no distress  EYES: Eyes grossly normal to inspection, PERRL and conjunctivae and sclerae normal  HENT: ear canals and TM's normal and nose and mouth without ulcers or lesions  NECK: no " adenopathy  RESP: lungs clear to auscultation - no rales, rhonchi or wheezes  CV: regular rates and rhythm, normal S1 S2, no S3      Disclaimer: This note consists of symbols derived from keyboarding, dictation and/or voice recognition software. As a result, there may be errors in the script that have gone undetected. Please consider this when interpreting information found in this chart.            .  ..

## 2022-07-05 NOTE — PATIENT INSTRUCTIONS
Schedule fasting labs   Make appointment with endocrinology  Follow up after 10/11 for wellness visit   Seek sooner medical attention if there is any worsening of symptoms or problems.

## 2022-07-20 ENCOUNTER — VIRTUAL VISIT (OUTPATIENT)
Dept: ENDOCRINOLOGY | Facility: CLINIC | Age: 72
End: 2022-07-20
Attending: INTERNAL MEDICINE
Payer: MEDICARE

## 2022-07-20 DIAGNOSIS — M85.89 OSTEOPENIA OF MULTIPLE SITES: ICD-10-CM

## 2022-07-20 DIAGNOSIS — M81.6 LOCALIZED OSTEOPOROSIS WITHOUT CURRENT PATHOLOGICAL FRACTURE: Primary | ICD-10-CM

## 2022-07-20 DIAGNOSIS — Z92.29 HISTORY OF BISPHOSPHONATE THERAPY: ICD-10-CM

## 2022-07-20 PROCEDURE — 99204 OFFICE O/P NEW MOD 45 MIN: CPT | Mod: 95 | Performed by: INTERNAL MEDICINE

## 2022-07-20 NOTE — LETTER
7/20/2022         RE: Maggie Navarrete  [address]  [facility] [address]        Dear Colleague,    Thank you for referring your patient, Maggie Navarrete, to the [facility]. Please see a copy of my visit note below.    Patient is being evaluated via a billable video visit.      How would you like to obtain your AVS? Reviewed verbally  If the video visit is dropped, the invitation should be resent by cell phone  Will anyone else be joining your video visit? no      Video Start Time: 2:05 pm     Video-Visit Details    Type of service:  Video Visit    Video End Time: 2:30 pm    Originating Location (pt. Location): home    Distant Location (provider location):  [facility]/home    Platform used for Video Visit:  Endoart      Name: Maggie Navarrete is a 72 year old woman, seen at the request of Dr. Vandana Morris for evaluation of     Chief Complaint   Patient presents with     Endocrine Problem       HPI:  Recent issues:  Here for evaluation of osteopenia and osteoporosis  Reviewed medical history from patient and Epic chart record        History of osteopenia  Previous medical evaluations at [facility]   Has seen practitioners at [facility], had seen Dr. Eid  Took estrogen HRT for several years  Began medication treatment for osteopenia   Took alendronate 35 mg weekly, reported treatment course 10 years   1-yr drug holiday, then restarted [id number].   2017. Discontinued alendronate for dental work   Subsequently restarted alendronate treatment    Previous DEXA scans include: 8/20/09, 9/14/11, 5/2/13, 5/6/15, 5/25/17, 9/5/18, 8/20/20,  8/30/21 DEXA:   L1-4   T-score: -2.1   L3   T-score: -2.8   Left fem neck:  T-score: -1.9   Right fem neck: T-score: -2.3  Comparison trend data:   Lumbar spine:      Hips:         Previous [facility] labs include:     Lab Test 05/10/22  1608 04/21/22  1416 03/24/22  1139 12/30/21  0742 12/29/21  0922  11/04/21  1307 08/05/21  0947 06/27/20  0028 07/29/19  1509   SUSAN 8.7 8.8 9.1 8.6 8.1*   < >  --    < > 9.4   VITDT  --   --   --   --   --   --  45  --  42    < > = values in this interval not displayed.     Osteoporosis med use:    Alendronate 35 mg/wk 5/2013-12/2017   Alendronate 70 mg/wk 10/2021-present    Health history includes:  Menopause:  Age 52   Bone fractures: 5th metatarsal  Vit D deficiency: no    Kidney stones: Yes? Had acute abdomen vs flank pain which resolved  Steroid med use: Short course years ago, also knee cortisone injections   Supplements:   Calcium tablet and/or MVI occasionally     May take a vitamin D pill in winter months       Dairy intake:  Drinks milk 3 gl/maria   FamHx osteoporosis: Father, PA, sister    Recent [facility] labs include:  Lab Results   Component Value Date     05/10/2022    POTASSIUM 4.2 05/10/2022    CHLORIDE 112 (H) 05/10/2022    CO2 23 05/10/2022    ANIONGAP 7 05/10/2022    GLC 87 05/10/2022    BUN 22 05/10/2022    CR 0.74 05/10/2022    GFRESTIMATED 85 05/10/2022    GFRESTBLACK 75 04/28/2021    SUSAN 8.7 05/10/2022    TSH 1.00 05/10/2022    VITDT 45 08/05/2021    PTHI 46 06/28/2011         Lives in [place], [place], previously worked with osteoporosis research at [employer]  Sees Dr. Vandana Morris/[facility] for general medicine evaluations.    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     GERD (gastroesophageal reflux disease)      Hx of previous reproductive problem 1980     Kidney stone 2011     Leiomyosarcoma (H) 11/14/2016     Personal history of colonic polyps 2016    Small, benign     PONV (postoperative nausea and vomiting)      Posterior vitreous detachment of left eye 2011     Posterior vitreous detachment of right eye 2011     Ptosis of eyelid, bilateral       Sepsis (H) 07/2020     Stomach problems 2015    Abdominal pain, Diarreha     Thyroid disease     thyroid nodule resolved 2014     Past Surgical History:   Procedure Laterality Date     ABDOMEN SURGERY  Now    Pain, diarrhea     BREAST SURGERY  2002    right breast benign     COLONOSCOPY  2008    due in 2018     COLONOSCOPY N/A 09/08/2016    Procedure: COMBINED COLONOSCOPY, SINGLE OR MULTIPLE BIOPSY/POLYPECTOMY BY BIOPSY;  Surgeon: Abner Pepper MD;  Location:  GI     COLONOSCOPY N/A 10/18/2021    Procedure: COLONOSCOPY;  Surgeon: Jamila Villarreal MD;  [place] [facility]     ENT SURGERY  1975    tonsillectomy     GENITOURINARY SURGERY  1953    bladder surgery      GI SURGERY  2015    Severe constipation     GYN SURGERY  1977    C section     GYN SURGERY  1981    laparoscopy     GYN SURGERY  2007    myomectomy     hysterecomy  11/14/2016    leiomyosarcoma     OTHER SURGICAL HISTORY Right 10/27/2017    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     REMOVE PORT VASCULAR ACCESS Right 05/09/2017    Procedure: REMOVE PORT VASCULAR ACCESS;  Right Port Removal;  Surgeon: Eric Gibson PA-C;  Location:  OR     REPAIR PTOSIS BILATERAL  02/15/2012    Procedure:REPAIR PTOSIS BILATERAL; BILATERAL UPPER LID PTOSIS REPAIR; Surgeon:BRYCE REYNOLDS; Location: [facility]     TUMOR REMOVAL  06/2020       Family Hx:  Family History   Problem Relation Age of Onset     Family History Negative Mother         glaucoma     Glaucoma Mother      Anxiety Disorder Mother      Heart Failure Mother      Diabetes Mother      Coronary Artery Disease Mother      Hypertension Mother      Breast Cancer Mother      Depression Mother      Obesity Mother      Heart Disease Father      Glaucoma Father      Diabetes Father      Coronary Artery Disease Father      Depression Father      Osteoporosis Father      Heart Disease Maternal Grandmother      Diabetes Maternal Grandmother      Heart Disease  Paternal Grandmother      Cancer Paternal Grandfather         stomach     Thyroid Disease Sister      Retinal detachment Sister      Hypertension Sister      Hypertension Sister      Lipids Sister      Cerebrovascular Disease Sister      Depression Sister      Anxiety Disorder Sister      Depression Sister      Thyroid Disease Sister      Thyroid Disease Sister      Heart Disease Paternal Aunt      Heart Disease Paternal Uncle      Osteoporosis Other          Social Hx:  Social History     Socioeconomic History     Marital status:      Spouse name: Not on file     Number of children: Not on file     Years of education: Not on file     Highest education level: Not on file   Occupational History     Not on file   Tobacco Use     Smoking status: Never Smoker     Smokeless tobacco: Never Used   Vaping Use     Vaping Use: Never used   Substance and Sexual Activity     Alcohol use: Yes     Comment: Social, not often per patient.     Drug use: No     Sexual activity: Not Currently     Partners: Male   Other Topics Concern     Parent/sibling w/ CABG, MI or angioplasty before 65F 55M? 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 Walking daily.    How much calcium per day? Supplement and through diet       How much caffeine per day? 3 times a week    How much vitamin D per day? Supplement    Do you/your family wear seatbelts?  Yes    Do you/your family use safety helmets? NA    Do you/your family use sunscreen?  Sometimes    Do you/your family keep firearms in the home? No    Do you/your family have a smoke detector(s)? Yes    Do you feel safe in your home? Yes    Has anyone ever touched you in an unwanted manner? 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  2 grandsons now in [place] with the mother.  In a house.  .          How much exercise per week? Daily walking, will start going to the y    How much calcium per day? Through foods       How much caffeine per day? One cup    How much vitamin D per day? Through foods    Do you/your family wear seatbelts?  Yes    Do you/your family use safety helmets? N/a    Do you/your family use sunscreen? Yes    Do you/your family keep firearms in the home? No    Do you/your family have a smoke detector(s)? Yes        Reviewed by Nicki Cornejo 10-11-21         Social Determinants of Health     Financial Resource Strain: Not on file   Food Insecurity: Not on file   Transportation Needs: Not on file   Physical Activity: Not on file   Stress: Not on file   Social Connections: Not on file   Intimate Partner Violence: Not At Risk     Fear of Current or Ex-Partner: No     Emotionally Abused: No     Physically Abused: No     Sexually Abused: No   Housing Stability: Not on file          MEDICATIONS:  has a current medication list which includes the following prescription(s): alendronate, calcium, carboxymethylcellulose pf, famotidine, levothyroxine, multivitamin w/minerals, venlafaxine, and zolpidem.    ROS:     ROS: 10 point ROS neg other than the symptoms noted above in the HPI.    GENERAL: some fatigue, wt stable; denies fevers, chills, night sweats.    HEENT: no dysphagia, odonophagia, diplopia, neck pain  THYROID:  no apparent hyper or hypothyroid symptoms  CV: no chest pain, pressure,  palpitations  LUNGS: no SOB, BARTLETT, cough, wheezing   ABDOMEN: heartburn; no diarrhea, constipation, abdominal pain  EXTREMITIES: no rashes, ulcers, edema  NEUROLOGY: no headaches, denies changes in vision, tingling, extremitiy numbness   MSK: back and knee pains; denies muscle weakness  SKIN: no rashes or lesions  : no menses, denies hot flashes  PSYCH:  stable mood, no significant anxiety or depression  ENDOCRINE: no heat or cold intolerance    Physical Exam (visual exam)  VS:  no vital signs taken for video visit  CONSTITUTIONAL: healthy, alert and NAD, well dressed, answering questions appropriately  ENT: no nose swelling or nasal discharge, mouth redness or gum changes.  EYES: eyes grossly normal to inspection, conjunctivae and sclerae normal, no exophthalmos or proptosis  THYROID:  no apparent nodules or goiter  LUNGS: no audible wheeze, cough or visible cyanosis, no visible retractions or increased work of breathing  ABDOMEN: abdomen not evaluated  EXTREMITIES: no hand tremors, limited exam  NEUROLOGY: CN grossly intact, mentation intact and speech normal   SKIN:  no apparent skin lesions, rash, or edema with visualized skin appearance  PSYCH: mentation appears normal, affect normal/bright, judgement and insight intact,   normal speech and appearance well groomed      LABS:    All pertinent notes, labs, and images personally reviewed by me.     A/P:  Encounter Diagnoses   Name Primary?     Localized osteoporosis without current pathological fracture Yes     Osteopenia of multiple sites      History of bisphosphonate therapy        Comments:  Reviewed health history and osteoporosis issues.  Recent DEXA showed osteoporosis range bone thinning at L3, osteopenia thinning at lumbar spine and hips   Progressive bone thinning despite use of alendronate medication    Plan:  Discussed general issues with the osteoporosis diagnosis and management  Reviewed concept of using the DEXA scan imaging to assess bone mineral  density (BMD)  Discussed terminology of the T-score  We discussed lab tests to assess for secondary causes of bone thinning  Reviewed treatment options with oral bisphosphonate, SERM, IV Boniva vs Reclast, SQ Prolia vs Forteo    Recommend:  Progressive decline in BMD, heartburn symptom, and high risk for atypical femur fractures with alendronate treatment  Advise stopping the alendronate medication at this time  Consider alternatives raloxifene daily, Prolia Q 6 mo, or IV Reclast annually.  Suspect Prolia ideal med option.  Reviewed Prolia med, potential benefits and SE's, dosing  Arrange repeat lab testing soon   Check vit D, PTH   She has lab appt planned at [facility]  Continue calcium and vitamin D supplement use  Avoid heavy lifting and fall injuries  Repeat DEXA scan in 9/2023  Will summarize lab result when available, offer recommendations with WhatsOpenhart message    Addressed patient questions today    There are no Patient Instructions on file for this visit.    Future labs ordered today:   Orders Placed This Encounter   Procedures     Parathyroid Hormone Intact     Radiology/Consults ordered today:     Total time spent in with the patient evaluation:  25 min  Additional time spent reviewing pertinent lab tests and chart notes, and documentation:  20 min    Follow-up:  9/2023, after DEXA scan    MYKEL Valderrama MD, MS  Endocrinology  [facility]    CC: Vandana Morris         Again, thank you for allowing me to participate in the care of your patient.        Sincerely,        Edgardo Valderrama MD

## 2022-07-20 NOTE — PROGRESS NOTES
Patient is being evaluated via a billable video visit.      How would you like to obtain your AVS? Reviewed verbally  If the video visit is dropped, the invitation should be resent by cell phone  Will anyone else be joining your video visit? no      Video Start Time: 2:05 pm     Video-Visit Details    Type of service:  Video Visit    Video End Time: 2:30 pm    Originating Location (pt. Location): home    Distant Location (provider location):  [facility]    Platform used for Video Visit:  Eoscene      Name: Maggie Navarrete is a 72 year old woman, seen at the request of Dr. Vandana Morris for evaluation of     Chief Complaint   Patient presents with     Endocrine Problem       HPI:  Recent issues:  Here for evaluation of osteopenia and osteoporosis  Reviewed medical history from patient and Epic chart record        History of osteopenia  Previous medical evaluations at [place]   Has seen practitioners at [place] [facility], had seen Dr. Eid  Took estrogen HRT for several years  Began medication treatment for osteopenia   Took alendronate 35 mg weekly, reported treatment course 10 years   1-yr drug holiday, then restarted [id number].   2017. Discontinued alendronate for dental work   Subsequently restarted alendronate treatment    Previous DEXA scans include: 8/20/09, 9/14/11, 5/2/13, 5/6/15, 5/25/17, 9/5/18, 8/20/20,  8/30/21 DEXA:   L1-4   T-score: -2.1   L3   T-score: -2.8   Left fem neck:  T-score: -1.9   Right fem neck: T-score: -2.3  Comparison trend data:   Lumbar spine:      Hips:         Previous [facility] labs include:     Lab Test 05/10/22  1608 04/21/22  1416 03/24/22  1139 12/30/21  0742 12/29/21  0922 11/04/21  1307 08/05/21  0947 06/27/20  0028 07/29/19  1509   SUSAN 8.7 8.8 9.1 8.6 8.1*   < >  --    < > 9.4   VITDT  --   --   --   --   --   --  45  --  42    < > = values in this interval not displayed.     Osteoporosis med use:    Alendronate 35  mg/wk 5/2013-12/2017   Alendronate 70 mg/wk 10/2021-present    Health history includes:  Menopause:  Age 52   Bone fractures: 5th metatarsal  Vit D deficiency: no    Kidney stones: Yes? Had acute abdomen vs flank pain which resolved  Steroid med use: Short course years ago, also knee cortisone injections   Supplements:   Calcium tablet and/or MVI occasionally     May take a vitamin D pill in winter months       Dairy intake:  Drinks milk 3 gl/maria   FamHx osteoporosis: Father, PA, sister    Recent [facility] labs include:  Lab Results   Component Value Date     05/10/2022    POTASSIUM 4.2 05/10/2022    CHLORIDE 112 (H) 05/10/2022    CO2 23 05/10/2022    ANIONGAP 7 05/10/2022    GLC 87 05/10/2022    BUN 22 05/10/2022    CR 0.74 05/10/2022    GFRESTIMATED 85 05/10/2022    GFRESTBLACK 75 04/28/2021    SUSAN 8.7 05/10/2022    TSH 1.00 05/10/2022    VITDT 45 08/05/2021    PTHI 46 06/28/2011         Lives in [place], [place], previously worked with osteoporosis research at [employer]  Sees Dr. Vandana Morris/[facility] for general medicine evaluations.    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     GERD (gastroesophageal reflux disease)      Hx of previous reproductive problem 1980     Kidney stone 2011     Leiomyosarcoma (H) 11/14/2016     Personal history of colonic polyps 2016    Small, benign     PONV (postoperative nausea and vomiting)      Posterior vitreous detachment of left eye 2011     Posterior vitreous detachment of right eye 2011     Ptosis of eyelid, bilateral      Sepsis (H) 07/2020     Stomach problems 2015    Abdominal pain, Diarreha     Thyroid disease     thyroid nodule resolved 2014     Past Surgical History:   Procedure Laterality Date     ABDOMEN SURGERY  Now    Pain, diarrhea     BREAST SURGERY   2002    right breast benign     COLONOSCOPY  2008    due in 2018     COLONOSCOPY N/A 09/08/2016    Procedure: COMBINED COLONOSCOPY, SINGLE OR MULTIPLE BIOPSY/POLYPECTOMY BY BIOPSY;  Surgeon: Abner Pepper MD;  Location:  GI     COLONOSCOPY N/A 10/18/2021    Procedure: COLONOSCOPY;  Surgeon: Jamila Villarreal MD;  Location: [facility] [place]     ENT SURGERY  1975    tonsillectomy     GENITOURINARY SURGERY  1953    bladder surgery      GI SURGERY  2015    Severe constipation     GYN SURGERY  1977    C section     GYN SURGERY  1981    laparoscopy     GYN SURGERY  2007    myomectomy     hysterecomy  11/14/2016    leiomyosarcoma     OTHER SURGICAL HISTORY Right 10/27/2017    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     REMOVE PORT VASCULAR ACCESS Right 05/09/2017    Procedure: REMOVE PORT VASCULAR ACCESS;  Right Port Removal;  Surgeon: Eric Gibson PA-C;  Location:  OR     REPAIR PTOSIS BILATERAL  02/15/2012    Procedure:REPAIR PTOSIS BILATERAL; BILATERAL UPPER LID PTOSIS REPAIR; Surgeon:BRYCE REYNOLDS; Location: [facility]     TUMOR REMOVAL  06/2020       Family Hx:  Family History   Problem Relation Age of Onset     Family History Negative Mother         glaucoma     Glaucoma Mother      Anxiety Disorder Mother      Heart Failure Mother      Diabetes Mother      Coronary Artery Disease Mother      Hypertension Mother      Breast Cancer Mother      Depression Mother      Obesity Mother      Heart Disease Father      Glaucoma Father      Diabetes Father      Coronary Artery Disease Father      Depression Father      Osteoporosis Father      Heart Disease Maternal Grandmother      Diabetes Maternal Grandmother      Heart Disease Paternal Grandmother      Cancer Paternal Grandfather         stomach     Thyroid Disease Sister      Retinal detachment Sister      Hypertension Sister      Hypertension Sister      Lipids Sister      Cerebrovascular Disease Sister      Depression  Sister      Anxiety Disorder Sister      Depression Sister      Thyroid Disease Sister      Thyroid Disease Sister      Heart Disease Paternal Aunt      Heart Disease Paternal Uncle      Osteoporosis Other          Social Hx:  Social History     Socioeconomic History     Marital status:      Spouse name: Not on file     Number of children: Not on file     Years of education: Not on file     Highest education level: Not on file   Occupational History     Not on file   Tobacco Use     Smoking status: Never Smoker     Smokeless tobacco: Never Used   Vaping Use     Vaping Use: Never used   Substance and Sexual Activity     Alcohol use: Yes     Comment: Social, not often per patient.     Drug use: No     Sexual activity: Not Currently     Partners: Male   Other Topics Concern     Parent/sibling w/ CABG, MI or angioplasty before 65F 55M? 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 Walking daily.    How much calcium per day? Supplement and through diet       How much caffeine per day? 3 times a week    How much vitamin D per day? Supplement    Do you/your family wear seatbelts?  Yes    Do you/your family use safety helmets? NA    Do you/your family use sunscreen? Sometimes    Do you/your family keep firearms in the home? No    Do you/your family have a smoke detector(s)? Yes    Do you feel safe in your home? Yes    Has anyone ever touched you in an unwanted manner? 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  2 grandsons now in [place] with the mother.  In a house.  .          How much exercise per week? Daily walking, will start going to the y    How much calcium per day? Through foods       How much caffeine per day? One cup    How much vitamin D per day? Through foods    Do you/your family wear seatbelts?  Yes    Do you/your family use safety helmets? N/a    Do you/your family use sunscreen? Yes    Do you/your family keep firearms in the home? No    Do you/your family have a smoke detector(s)? Yes        Reviewed by Nicki Cornejo 10-11-21         Social Determinants of Health     Financial Resource Strain: Not on file   Food Insecurity: Not on file   Transportation Needs: Not on file   Physical Activity: Not on file   Stress: Not on file   Social Connections: Not on file   Intimate Partner Violence: Not At Risk     Fear of Current or Ex-Partner: No     Emotionally Abused: No     Physically Abused: No     Sexually Abused: No   Housing Stability: Not on file          MEDICATIONS:  has a current medication list which includes the following prescription(s): alendronate, calcium, carboxymethylcellulose pf, famotidine, levothyroxine, multivitamin w/minerals, venlafaxine, and zolpidem.    ROS:     ROS: 10 point ROS neg other than the symptoms noted above in the HPI.    GENERAL: some fatigue, wt stable; denies fevers, chills, night sweats.    HEENT: no dysphagia, odonophagia, diplopia, neck pain  THYROID:  no apparent hyper or hypothyroid symptoms  CV: no chest pain, pressure, palpitations  LUNGS: no SOB, BARTLETT, cough, wheezing   ABDOMEN: heartburn; no diarrhea, constipation, abdominal pain  EXTREMITIES: no rashes, ulcers, edema  NEUROLOGY: no headaches, denies changes in vision, tingling, extremitiy numbness   MSK: back and knee pains; denies  muscle weakness  SKIN: no rashes or lesions  : no menses, denies hot flashes  PSYCH:  stable mood, no significant anxiety or depression  ENDOCRINE: no heat or cold intolerance    Physical Exam (visual exam)  VS:  no vital signs taken for video visit  CONSTITUTIONAL: healthy, alert and NAD, well dressed, answering questions appropriately  ENT: no nose swelling or nasal discharge, mouth redness or gum changes.  EYES: eyes grossly normal to inspection, conjunctivae and sclerae normal, no exophthalmos or proptosis  THYROID:  no apparent nodules or goiter  LUNGS: no audible wheeze, cough or visible cyanosis, no visible retractions or increased work of breathing  ABDOMEN: abdomen not evaluated  EXTREMITIES: no hand tremors, limited exam  NEUROLOGY: CN grossly intact, mentation intact and speech normal   SKIN:  no apparent skin lesions, rash, or edema with visualized skin appearance  PSYCH: mentation appears normal, affect normal/bright, judgement and insight intact,   normal speech and appearance well groomed      LABS:    All pertinent notes, labs, and images personally reviewed by me.     A/P:  Encounter Diagnoses   Name Primary?     Localized osteoporosis without current pathological fracture Yes     Osteopenia of multiple sites      History of bisphosphonate therapy        Comments:  Reviewed health history and osteoporosis issues.  Recent DEXA showed osteoporosis range bone thinning at L3, osteopenia thinning at lumbar spine and hips   Progressive bone thinning despite use of alendronate medication    Plan:  Discussed general issues with the osteoporosis diagnosis and management  Reviewed concept of using the DEXA scan imaging to assess bone mineral density (BMD)  Discussed terminology of the T-score  We discussed lab tests to assess for secondary causes of bone thinning  Reviewed treatment options with oral bisphosphonate, SERM, IV Boniva vs Reclast, SQ Prolia vs Forteo    Recommend:  Progressive decline in BMD,  heartburn symptom, and high risk for atypical femur fractures with alendronate treatment  Advise stopping the alendronate medication at this time  Consider alternatives raloxifene daily, Prolia Q 6 mo, or IV Reclast annually.  Suspect Prolia ideal med option.  Reviewed Prolia med, potential benefits and SE's, dosing  Arrange repeat lab testing soon   Check vit D, PTH   She has lab appt planned at [facility]  Continue calcium and vitamin D supplement use  Avoid heavy lifting and fall injuries  Repeat DEXA scan in 9/2023  Will summarize lab result when available, offer recommendations with Cardiahart message    Addressed patient questions today    There are no Patient Instructions on file for this visit.    Future labs ordered today:   Orders Placed This Encounter   Procedures     Parathyroid Hormone Intact     Radiology/Consults ordered today:     Total time spent in with the patient evaluation:  25 min  Additional time spent reviewing pertinent lab tests and chart notes, and documentation:  20 min    Follow-up:  9/2023, after DEXA scan    MYKEL Valderrama MD, MS  Endocrinology  [facility]    CC: Vandana Morris

## 2022-07-25 ENCOUNTER — LAB (OUTPATIENT)
Dept: LAB | Facility: CLINIC | Age: 72
End: 2022-07-25
Payer: MEDICARE

## 2022-07-25 DIAGNOSIS — Z13.220 SCREENING FOR LIPID DISORDERS: ICD-10-CM

## 2022-07-25 DIAGNOSIS — C78.5 SECONDARY MALIGNANT NEOPLASM OF LARGE INTESTINE AND RECTUM (H): ICD-10-CM

## 2022-07-25 DIAGNOSIS — E03.9 HYPOTHYROIDISM: ICD-10-CM

## 2022-07-25 DIAGNOSIS — R79.89 LOW TSH LEVEL: ICD-10-CM

## 2022-07-25 DIAGNOSIS — C55 LEIOMYOSARCOMA OF UTERUS (H): ICD-10-CM

## 2022-07-25 DIAGNOSIS — M81.6 LOCALIZED OSTEOPOROSIS WITHOUT CURRENT PATHOLOGICAL FRACTURE: ICD-10-CM

## 2022-07-25 DIAGNOSIS — M85.89 OSTEOPENIA OF MULTIPLE SITES: ICD-10-CM

## 2022-07-25 LAB
ALBUMIN SERPL-MCNC: 3.5 G/DL (ref 3.4–5)
ALP SERPL-CCNC: 69 U/L (ref 40–150)
ALT SERPL W P-5'-P-CCNC: 19 U/L (ref 0–50)
ANION GAP SERPL CALCULATED.3IONS-SCNC: 4 MMOL/L (ref 3–14)
AST SERPL W P-5'-P-CCNC: 14 U/L (ref 0–45)
BASOPHILS # BLD AUTO: 0 10E3/UL (ref 0–0.2)
BASOPHILS NFR BLD AUTO: 1 %
BILIRUB SERPL-MCNC: 0.4 MG/DL (ref 0.2–1.3)
BUN SERPL-MCNC: 24 MG/DL (ref 7–30)
CALCIUM SERPL-MCNC: 9.1 MG/DL (ref 8.5–10.1)
CHLORIDE BLD-SCNC: 110 MMOL/L (ref 94–109)
CHOLEST SERPL-MCNC: 193 MG/DL
CO2 SERPL-SCNC: 26 MMOL/L (ref 20–32)
CREAT SERPL-MCNC: 0.92 MG/DL (ref 0.52–1.04)
DEPRECATED CALCIDIOL+CALCIFEROL SERPL-MC: 47 UG/L (ref 20–75)
EOSINOPHIL # BLD AUTO: 0.2 10E3/UL (ref 0–0.7)
EOSINOPHIL NFR BLD AUTO: 3 %
ERYTHROCYTE [DISTWIDTH] IN BLOOD BY AUTOMATED COUNT: 14.2 % (ref 10–15)
FASTING STATUS PATIENT QL REPORTED: YES
GFR SERPL CREATININE-BSD FRML MDRD: 66 ML/MIN/1.73M2
GLUCOSE BLD-MCNC: 83 MG/DL (ref 70–99)
HBA1C MFR BLD: 5.2 % (ref 0–5.6)
HCT VFR BLD AUTO: 41.5 % (ref 35–47)
HDLC SERPL-MCNC: 62 MG/DL
HGB BLD-MCNC: 13.5 G/DL (ref 11.7–15.7)
IMM GRANULOCYTES # BLD: 0 10E3/UL
IMM GRANULOCYTES NFR BLD: 0 %
LDLC SERPL CALC-MCNC: 103 MG/DL
LYMPHOCYTES # BLD AUTO: 2.7 10E3/UL (ref 0.8–5.3)
LYMPHOCYTES NFR BLD AUTO: 45 %
MCH RBC QN AUTO: 29 PG (ref 26.5–33)
MCHC RBC AUTO-ENTMCNC: 32.5 G/DL (ref 31.5–36.5)
MCV RBC AUTO: 89 FL (ref 78–100)
MONOCYTES # BLD AUTO: 0.5 10E3/UL (ref 0–1.3)
MONOCYTES NFR BLD AUTO: 8 %
NEUTROPHILS # BLD AUTO: 2.6 10E3/UL (ref 1.6–8.3)
NEUTROPHILS NFR BLD AUTO: 43 %
NONHDLC SERPL-MCNC: 131 MG/DL
PLATELET # BLD AUTO: 244 10E3/UL (ref 150–450)
POTASSIUM BLD-SCNC: 4.4 MMOL/L (ref 3.4–5.3)
PROT SERPL-MCNC: 6.6 G/DL (ref 6.8–8.8)
PTH-INTACT SERPL-MCNC: 48 PG/ML (ref 15–65)
RBC # BLD AUTO: 4.66 10E6/UL (ref 3.8–5.2)
SODIUM SERPL-SCNC: 140 MMOL/L (ref 133–144)
TRIGL SERPL-MCNC: 138 MG/DL
TSH SERPL DL<=0.005 MIU/L-ACNC: 1.26 MU/L (ref 0.4–4)
WBC # BLD AUTO: 5.9 10E3/UL (ref 4–11)

## 2022-07-25 PROCEDURE — 83970 ASSAY OF PARATHORMONE: CPT

## 2022-07-25 PROCEDURE — 80053 COMPREHEN METABOLIC PANEL: CPT

## 2022-07-25 PROCEDURE — 83036 HEMOGLOBIN GLYCOSYLATED A1C: CPT | Performed by: INTERNAL MEDICINE

## 2022-07-25 PROCEDURE — 84443 ASSAY THYROID STIM HORMONE: CPT

## 2022-07-25 PROCEDURE — 82306 VITAMIN D 25 HYDROXY: CPT

## 2022-07-25 PROCEDURE — 85025 COMPLETE CBC W/AUTO DIFF WBC: CPT

## 2022-07-25 PROCEDURE — 36415 COLL VENOUS BLD VENIPUNCTURE: CPT

## 2022-07-25 PROCEDURE — 80061 LIPID PANEL: CPT

## 2022-07-26 NOTE — RESULT ENCOUNTER NOTE
Parmjit Serrano,    This is to inform you regarding your test result.    Vitamin D level is normal.  Your total cholesterol is normal.  HDL which is called good cholesterol is normal.  Your LDL which is called bad cholesterol is elevated.  Eat low cholesterol low fat  diet and do regular physical activity.      Sincerely,      Dr.Nasima Alexandra MD,FACP

## 2022-07-26 NOTE — RESULT ENCOUNTER NOTE
Parmjit Serrano,    This is to inform you regarding your test result.    HbA1c which is average glucose during last 3 months is normal.      Sincerely,      Dr.Nasima Alexandra MD,FACP

## 2022-09-08 ENCOUNTER — ANCILLARY PROCEDURE (OUTPATIENT)
Dept: MAMMOGRAPHY | Facility: CLINIC | Age: 72
End: 2022-09-08
Payer: MEDICARE

## 2022-09-08 DIAGNOSIS — Z12.31 VISIT FOR SCREENING MAMMOGRAM: ICD-10-CM

## 2022-09-08 PROCEDURE — 77063 BREAST TOMOSYNTHESIS BI: CPT | Mod: GC | Performed by: STUDENT IN AN ORGANIZED HEALTH CARE EDUCATION/TRAINING PROGRAM

## 2022-09-08 PROCEDURE — 77067 SCR MAMMO BI INCL CAD: CPT | Mod: GC | Performed by: STUDENT IN AN ORGANIZED HEALTH CARE EDUCATION/TRAINING PROGRAM

## 2022-09-09 NOTE — RESULT ENCOUNTER NOTE
Parmjit Serrano,    This is to inform you regarding your test result.    Mammogram result is satisfactory .  Recommendation: annual mammography      Sincerely,      Dr.Nasima Alexandra MD,FACP

## 2022-09-11 DIAGNOSIS — M81.6 LOCALIZED OSTEOPOROSIS WITHOUT CURRENT PATHOLOGICAL FRACTURE: Primary | ICD-10-CM

## 2022-09-11 DIAGNOSIS — M81.0 AGE-RELATED OSTEOPOROSIS WITHOUT CURRENT PATHOLOGICAL FRACTURE: ICD-10-CM

## 2022-09-11 DIAGNOSIS — Z92.29 HISTORY OF BISPHOSPHONATE THERAPY: ICD-10-CM

## 2022-09-22 ENCOUNTER — ALLIED HEALTH/NURSE VISIT (OUTPATIENT)
Dept: ENDOCRINOLOGY | Facility: CLINIC | Age: 72
End: 2022-09-22
Payer: MEDICARE

## 2022-09-22 DIAGNOSIS — Z92.29 HISTORY OF BISPHOSPHONATE THERAPY: ICD-10-CM

## 2022-09-22 DIAGNOSIS — M81.0 AGE-RELATED OSTEOPOROSIS WITHOUT CURRENT PATHOLOGICAL FRACTURE: Primary | ICD-10-CM

## 2022-09-22 PROCEDURE — 96372 THER/PROPH/DIAG INJ SC/IM: CPT | Performed by: INTERNAL MEDICINE

## 2022-09-22 PROCEDURE — 99207 PR NO CHARGE NURSE ONLY: CPT

## 2022-09-22 NOTE — NURSING NOTE
Indication: Prolia  (denosumab) is a prescription medicine used to treat osteoporosis in patients who:     Are at high risk for fracture, meaning patients who have had a fracture related to osteoporosis, or who have multiple risk factors for fracture     Cannot use another osteoporosis medicine or other osteoporosis medicines did not work well   The timeline for early/late injections would be 4 weeks early and any time after the 6 month jose. If a patient receives their injection late, then the subsequent injection would be 6 months from the date that they actually received the injection    1.  When was the last injection?  First injection td  2.  Did they check with their insurance for this calendar year?  Yes  3.  Is there an order in the chart?  Yes  4.  Has the patient had dental work involving the bone in the past month or will have work in the next 6 months?  No  5.  Did you have the patient wait 15 minutes after the injection?  Yes  6.  Remember to use .injection under the medication notes    The following steps were completed to comply with the REMS program for Prolia:    Reviewed information in the Medication Guide and Patient Counseling Chart, including the serious risks of Prolia  and the symptoms of each risk.    Advised patient to seek prompt medical attention if they have signs or symptoms of any of the serious risks.  Provided each patient a copy of the Medication Guide and Patient Brochure.  The following medication was given:     MEDICATION: Denosumab (Prolia) 60 mg/ml SOLN  ROUTE: SQ  SITE: Arm - Right  DOSE: 60 mg  LOT #: [id number]  :  [facility]  EXPIRATION DATE:  [id number]    Dr. Valderrama is in clinic td.  Patricia Anthony RN

## 2022-10-02 ENCOUNTER — NURSE TRIAGE (OUTPATIENT)
Dept: NURSING | Facility: CLINIC | Age: 72
End: 2022-10-02

## 2022-10-02 NOTE — TELEPHONE ENCOUNTER
Triage Call:     Pt calling to report that on 9/29/2022 she started feeling like she was getting sick with a sore throat and cough. Today she did an at home test and tested positive for COVID-19.    Current sx:   Temp: 100F  Cough   Sore throat in the morning  Runny nose  Headache yesterday    Pt is in the high risk category for developing COVID-19 complications and is interested in a virtual appt with a provider to discuss treatment options.     Disposition: Virtual visit with provider. Pt was given the care advice and was warm transferred to [facility] to make a telephone appt.     Davida Leone RN  [facility] Nurse Advisor 11:40 AM 10/2/2022    How can I protect others?    These guidelines are for isolating before returning to work, school or .       If you DO have symptoms:  o Stay home and away from others  - For at least 5 days after your symptoms started, AND   - You are fever free for 24 hours (with no medicine that reduces fever), AND  - Your other symptoms are better.  o Wear a mask for 10 full days any time you are around others.    If you DON'T have symptoms:  o Stay at home and away from others for at least 5 days after your positive test.  o Wear a mask for 10 full days any time you are around others.    There may be different guidelines for healthcare facilities. Please check with the specific sites before arriving.     If you've been told by a doctor that you were severely ill with COVID-19 or are immunocompromised, you should isolate for at least 10 days.    You should not go back to work until you meet the guidelines above for ending your home isolation. You don't need to be retested for COVID-19 before going back to work--studies show that you won't spread the virus if it's been at least 10 days since your symptoms started (or 20 days, if you have a weak immune system).        Reason for Disposition    [1] HIGH RISK for severe COVID complications (e.g., weak immune system, age > 64  years, obesity with BMI of 30 or higher, pregnant, chronic lung disease or other chronic medical condition) AND [2] COVID symptoms (e.g., cough, fever)  (Exceptions: Already seen by PCP and no new or worsening symptoms.)    Additional Information    Negative: SEVERE difficulty breathing (e.g., struggling for each breath, speaks in single words)    Negative: Difficult to awaken or acting confused (e.g., disoriented, slurred speech)    Negative: Bluish (or gray) lips or face now    Negative: Shock suspected (e.g., cold/pale/clammy skin, too weak to stand, low BP, rapid pulse)    Negative: Sounds like a life-threatening emergency to the triager    Negative: [1] Diagnosed or suspected COVID-19 AND [2] symptoms lasting 3 or more weeks    Negative: [1] COVID-19 exposure AND [2] no symptoms    Negative: COVID-19 vaccine reaction suspected (e.g., fever, headache, muscle aches) occurring 1 to 3 days after getting vaccine    Negative: COVID-19 vaccine, questions about    Negative: [1] Lives with someone known to have influenza (flu test positive) AND [2] flu-like symptoms (e.g., cough, runny nose, sore throat, SOB; with or without fever)    Negative: [1] Adult with possible COVID-19 symptoms AND [2] triager concerned about severity of symptoms or other causes    Negative: COVID-19 and breastfeeding, questions about    Negative: SEVERE or constant chest pain or pressure  (Exception: Mild central chest pain, present only when coughing.)    Negative: MODERATE difficulty breathing (e.g., speaks in phrases, SOB even at rest, pulse 100-120)    Negative: Headache and stiff neck (can't touch chin to chest)    Negative: Oxygen level (e.g., pulse oximetry) 90 percent or lower    Negative: Chest pain or pressure  (Exception: MILD central chest pain, present only when coughing)    Negative: Patient sounds very sick or weak to the triager    Negative: MILD difficulty breathing (e.g., minimal/no SOB at rest, SOB with walking, pulse  <100)    Negative: Fever > 103 F (39.4 C)    Negative: [1] Fever > 101 F (38.3 C) AND [2] over 60 years of age    Negative: [1] Fever > 100.0 F (37.8 C) AND [2] bedridden (e.g., nursing home patient, CVA, chronic illness, recovering from surgery)    Protocols used: CORONAVIRUS (COVID-19) DIAGNOSED OR [id number]    COVID 19 Nurse Triage Plan/Patient Instructions    Please be aware that novel coronavirus (COVID-19) may be circulating in the community. If you develop symptoms such as fever, cough, or SOB or if you have concerns about the presence of another infection including coronavirus (COVID-19), please contact your health care provider or visit [email or web address].     Disposition/Instructions    Virtual Visit with provider recommended. Reference Visit Selection Guide.    Thank you for taking steps to prevent the spread of this virus.  o Limit your contact with others.  o Wear a simple mask to cover your cough.  o Wash your hands well and often.    Resources    [facility]: About COVID-19: [email or web address]    CDC: What to Do If You're Sick: www.cdc.gov/coronavirus/2019-ncov/about/steps-when-sick.html    CDC: Ending Home Isolation: www.cdc.gov/coronavirus/2019-ncov/hcp/disposition-in-home-patients.html     CDC: Caring for Someone: www.cdc.gov/coronavirus/2019-ncov/if-you-are-sick/care-for-someone.html     [facility]: Interim Guidance for Hospital Discharge to Home: www.health.[facility].[place]/diseases/coronavirus/hcp/hospdischarge.pdf    [facility] clinical trials (COVID-19 research studies): [email or web address].edu/um-clinical-trials     Below are the COVID-19 hotlines at the [place] [facility] ([facility]). Interpreters are available.   o For health questions: Call 710-842-7335 or 1-748.567.6126 (7 a.m. to 7 p.m.)  o For questions about schools and childcare: Call 577-897-5282 or 1-593.625.3882 (7 a.m. to 7 p.m.)

## 2022-10-03 ENCOUNTER — VIRTUAL VISIT (OUTPATIENT)
Dept: FAMILY MEDICINE | Facility: CLINIC | Age: 72
End: 2022-10-03
Payer: MEDICARE

## 2022-10-03 DIAGNOSIS — U07.1 INFECTION DUE TO 2019 NOVEL CORONAVIRUS: Primary | ICD-10-CM

## 2022-10-03 PROCEDURE — 99213 OFFICE O/P EST LOW 20 MIN: CPT | Mod: CS | Performed by: FAMILY MEDICINE

## 2022-10-03 NOTE — PROGRESS NOTES
Maggie is a 72 year old who is being evaluated via a billable video visit.      How would you like to obtain your AVS? MyChart  If the video visit is dropped, the invitation should be resent by: Call: [phone]  Will anyone else be joining your video visit? No        A/P;      ICD-10-CM    1. Infection due to 2019 novel coronavirus  U07.1 nirmatrelvir and ritonavir (PAXLOVID) therapy pack     Discussed EUA for Paxlovid, role in reduction of risk of severe disease, hospitalization and death.  Also discussed potential to shorten course of symptoms by an unknown amount.  Reviewed side effects and drug drug interactions.  Also reviewed risk of rebound infection.    Pt states she is not taking zolpidem regularly, only with travel.  No other significant drug interactions exist.    Reviewed that as she is on day 5 of symptoms today this is the last day she can begin medication.  She verbalized understanding.    Reviewed isolation recommendations.    F/u prn        Subjective   Maggie is a 72 year old, presenting for the following health issues:  Covid Concern      HPI       COVID-19 Symptom Review  How many days ago did these symptoms start? 5 days    Are any of the following symptoms significant for you?    New or worsening difficulty breathing? No    Worsening cough? Yes, I am coughing up mucus.    Fever or chills? Yes, I felt feverish or had chills.    Headache: YES    Sore throat: YES- in the morning     Chest pain: No    Diarrhea: No    Body aches? No    What treatments has patient tried? Acetaminophen   Does patient live in a nursing home, group home, or shelter? No  Does patient have a way to get food/medications during quarantined? Yes, I have a friend or family member who can help me.    Symptoms began with sore throat on Thursday 9/29.  Began feeling worse on Friday.  Tested negative and then tested positive on Saturday.    Has been having cough and fever, HA and sore throat.  Nasal congestion as  well.    Has been fully vaccinated but has not yet had the newest booster.        Review of Systems   Constitutional, HEENT, cardiovascular, pulmonary, gi and gu systems are negative, except as otherwise noted.      Objective           Vitals:  No vitals were obtained today due to virtual visit.    Physical Exam   GENERAL: Healthy, alert and no distress  EYES: Eyes grossly normal to inspection.  No discharge or erythema, or obvious scleral/conjunctival abnormalities.  RESP: No audible wheeze, cough, or visible cyanosis.  No visible retractions or increased work of breathing.    SKIN: Visible skin clear. No significant rash, abnormal pigmentation or lesions.  NEURO: Cranial nerves grossly intact.  Mentation and speech appropriate for age.  PSYCH: Mentation appears normal, affect normal/bright, judgement and insight intact, normal speech and appearance well-groomed.    Epic reviewed            Video-Visit Details    Video Start Time: 9:07 AM      Type of service:  Video/telephone Visit    telephone End Time: 9:20AM    Originating Location (pt. Location): Home    Distant Location (provider location):  [facility]     Platform used for Video Visit: [place] initially.  Pt was unable to unmute herself so visit was carried out over telephone.  Able to assess pt's physical appearance with initial contact as outlined in exam portion

## 2022-10-04 PROBLEM — R11.2 INTRACTABLE VOMITING WITH NAUSEA: Status: ACTIVE | Noted: 2021-11-04

## 2022-10-09 ENCOUNTER — HEALTH MAINTENANCE LETTER (OUTPATIENT)
Age: 72
End: 2022-10-09

## 2022-10-12 ASSESSMENT — ENCOUNTER SYMPTOMS
SORE THROAT: 0
HEARTBURN: 0
EYE PAIN: 0
COUGH: 0
HEMATURIA: 0
ABDOMINAL PAIN: 0
JOINT SWELLING: 0
CHILLS: 0
NAUSEA: 0
WEAKNESS: 0
DIARRHEA: 0
NERVOUS/ANXIOUS: 0
PARESTHESIAS: 0
PALPITATIONS: 0
DYSURIA: 0
MYALGIAS: 0
BREAST MASS: 0
SHORTNESS OF BREATH: 0
ARTHRALGIAS: 0
HEMATOCHEZIA: 0
FEVER: 0
DIZZINESS: 0
HEADACHES: 0
FREQUENCY: 0
CONSTIPATION: 0

## 2022-10-12 ASSESSMENT — ACTIVITIES OF DAILY LIVING (ADL): CURRENT_FUNCTION: NO ASSISTANCE NEEDED

## 2022-10-13 ENCOUNTER — OFFICE VISIT (OUTPATIENT)
Dept: FAMILY MEDICINE | Facility: CLINIC | Age: 72
End: 2022-10-13
Payer: MEDICARE

## 2022-10-13 VITALS
RESPIRATION RATE: 18 BRPM | DIASTOLIC BLOOD PRESSURE: 79 MMHG | BODY MASS INDEX: 27.72 KG/M2 | SYSTOLIC BLOOD PRESSURE: 137 MMHG | OXYGEN SATURATION: 100 % | WEIGHT: 137.5 LBS | TEMPERATURE: 98 F | HEIGHT: 59 IN | HEART RATE: 94 BPM

## 2022-10-13 DIAGNOSIS — Z90.722 S/P TOTAL ABDOMINAL HYSTERECTOMY AND BILATERAL SALPINGO-OOPHORECTOMY: ICD-10-CM

## 2022-10-13 DIAGNOSIS — C49.9 LEIOMYOSARCOMA (H): ICD-10-CM

## 2022-10-13 DIAGNOSIS — Z86.16 HISTORY OF COVID-19: ICD-10-CM

## 2022-10-13 DIAGNOSIS — E03.9 HYPOTHYROIDISM, UNSPECIFIED TYPE: Chronic | ICD-10-CM

## 2022-10-13 DIAGNOSIS — Z13.1 SCREENING FOR DIABETES MELLITUS: ICD-10-CM

## 2022-10-13 DIAGNOSIS — E78.5 HYPERLIPIDEMIA, UNSPECIFIED HYPERLIPIDEMIA TYPE: ICD-10-CM

## 2022-10-13 DIAGNOSIS — I42.7 DILATED CARDIOMYOPATHY SECONDARY TO DRUG (H): ICD-10-CM

## 2022-10-13 DIAGNOSIS — Z23 NEED FOR PROPHYLACTIC VACCINATION AND INOCULATION AGAINST INFLUENZA: ICD-10-CM

## 2022-10-13 DIAGNOSIS — E89.0 S/P PARTIAL THYROIDECTOMY: ICD-10-CM

## 2022-10-13 DIAGNOSIS — M26.609 TEMPOROMANDIBULAR JOINT DISORDER: ICD-10-CM

## 2022-10-13 DIAGNOSIS — Z00.00 ENCOUNTER FOR MEDICARE ANNUAL WELLNESS EXAM: Primary | ICD-10-CM

## 2022-10-13 DIAGNOSIS — D70.1 CHEMOTHERAPY-INDUCED NEUTROPENIA (H): ICD-10-CM

## 2022-10-13 DIAGNOSIS — Z90.710 S/P TOTAL ABDOMINAL HYSTERECTOMY AND BILATERAL SALPINGO-OOPHORECTOMY: ICD-10-CM

## 2022-10-13 DIAGNOSIS — Z90.79 S/P TOTAL ABDOMINAL HYSTERECTOMY AND BILATERAL SALPINGO-OOPHORECTOMY: ICD-10-CM

## 2022-10-13 DIAGNOSIS — T45.1X5A CHEMOTHERAPY-INDUCED NEUTROPENIA (H): ICD-10-CM

## 2022-10-13 PROCEDURE — 90662 IIV NO PRSV INCREASED AG IM: CPT | Performed by: INTERNAL MEDICINE

## 2022-10-13 PROCEDURE — G0439 PPPS, SUBSEQ VISIT: HCPCS | Performed by: INTERNAL MEDICINE

## 2022-10-13 PROCEDURE — G0008 ADMIN INFLUENZA VIRUS VAC: HCPCS | Performed by: INTERNAL MEDICINE

## 2022-10-13 PROCEDURE — 99214 OFFICE O/P EST MOD 30 MIN: CPT | Mod: 25 | Performed by: INTERNAL MEDICINE

## 2022-10-13 ASSESSMENT — ENCOUNTER SYMPTOMS
NERVOUS/ANXIOUS: 0
DIZZINESS: 0
PALPITATIONS: 0
SHORTNESS OF BREATH: 0
EYE PAIN: 0
CONSTIPATION: 0
PARESTHESIAS: 0
JOINT SWELLING: 0
ARTHRALGIAS: 0
DIARRHEA: 0
MYALGIAS: 0
CHILLS: 0
DYSURIA: 0
HEMATOCHEZIA: 0
NAUSEA: 0
FREQUENCY: 0
COUGH: 0
ABDOMINAL PAIN: 0
SORE THROAT: 0
HEADACHES: 0
WEAKNESS: 0
BREAST MASS: 0
HEMATURIA: 0
FEVER: 0
HEARTBURN: 0

## 2022-10-13 ASSESSMENT — ACTIVITIES OF DAILY LIVING (ADL): CURRENT_FUNCTION: NO ASSISTANCE NEEDED

## 2022-10-13 ASSESSMENT — PAIN SCALES - GENERAL: PAINLEVEL: NO PAIN (0)

## 2022-10-13 NOTE — PATIENT INSTRUCTIONS
Schedule fasting labs   Please call [facility]  [phone] to schedule Echocardiogram   It is done at [address]    Follow up in one year for physical   Seek sooner medical attention if there is any worsening of symptoms or problems.        Patient Education   Personalized Prevention Plan  You are due for the preventive services outlined below.  Your care team is available to assist you in scheduling these services.  If you have already completed any of these items, please share that information with your care team to update in your medical record.  Health Maintenance Due   Topic Date Due    COVID-19 Vaccine (5 - Booster for Pfizer series) 07/15/2022    Depression Assessment  08/10/2022    Flu Vaccine (1) 09/01/2022       Signs of Hearing Loss      Hearing much better with one ear can be a sign of hearing loss.   Hearing loss is a problem shared by many people. In fact, it is one of the most common health problems, particularly as people age. Most people age 65 and older have some hearing loss. By age 80, almost everyone does. Hearing loss often occurs slowly over the years. So you may not realize your hearing has gotten worse.  Have your hearing checked  Call your healthcare provider if you:  Have to strain to hear normal conversation  Have to watch other people s faces very carefully to follow what they re saying  Need to ask people to repeat what they ve said  Often misunderstand what people are saying  Turn the volume of the television or radio up so high that others complain  Feel that people are mumbling when they re talking to you  Find that the effort to hear leaves you feeling tired and irritated  Notice, when using the phone, that you hear better with one ear than the other  Sandag last reviewed this educational content on 1/1/2020 2000-2021 [employer]. All rights reserved. This information is not intended as a substitute for professional medical care. Always follow your  healthcare professional's instructions.          Urinary Incontinence, Female (Adult)   Urinary incontinence means loss of bladder control. This problem affects many women, especially as they get older. If you have incontinence, you may be embarrassed to ask for help. But know that this problem can be treated.   Types of Incontinence  There are different types of incontinence. Two of the main types are described here. You can have more than one type.   Stress incontinence. With this type, urine leaks when pressure (stress) is put on the bladder. This may happen when you cough, sneeze, or laugh. Stress incontinence most often occurs because the pelvic floor muscles that support the bladder and urethra are weak. This can happen after pregnancy and vaginal childbirth or a hysterectomy. It can also be due to excess body weight or hormone changes.  Urge incontinence (also called overactive bladder). With this type, a sudden urge to urinate is felt often. This may happen even though there may not be much urine in the bladder. The need to urinate often during the night is common. Urge incontinence most often occurs because of bladder spasms. This may be due to bladder irritation or infection. Damage to bladder nerves or pelvic muscles, constipation, and certain medicines can also lead to urge incontinence.  Treatment depends on the cause. Further evaluation is needed to find the type you have. This will likely include an exam and certain tests. Based on the results, you and your healthcare provider can then plan treatment. Until a diagnosis is made, the home care tips below can help ease symptoms.   Home care  Do pelvic floor muscle exercises, if they are prescribed. The pelvic floor muscles help support the bladder and urethra. Many women find that their symptoms improve when doing special exercises that strengthen these muscles. To do the exercises, contract the muscles you would use to stop your stream of urine. But do  this when you re not urinating. Hold for 10 seconds, then relax. Repeat 10 to 20 times in a row, at least 3 times a day. Your healthcare provider may give you other instructions for how to do the exercises and how often.  Keep a bladder diary. This helps track how often and how much you urinate over a set period of time. Bring this diary with you to your next visit with the provider. The information can help your provider learn more about your bladder problem.  Lose weight, if advised to by your provider. Extra weight puts pressure on the bladder. Your provider can help you create a weight-loss plan that s right for you. This may include exercising more and making certain diet changes.  Don't have foods and drinks that may irritate the bladder. These can include alcohol and caffeinated drinks.  Quit smoking. Smoking and other tobacco use can lead to a long-term (chronic) cough that strains the pelvic floor muscles. Smoking may also damage the bladder and urethra. Talk with your provider about treatments or methods you can use to quit smoking.  If drinking large amounts of fluid makes you have symptoms, you may be advised to limit your fluid intake. You may also be advised to drink most of your fluids during the day and to limit fluids at night.  If you re worried about urine leakage or accidents, you may wear absorbent pads to catch urine. Change the pads often. This helps reduce discomfort. It may also reduce the risk of skin or bladder infections.    Follow-up care  Follow up with your healthcare provider, or as directed. It may take some to find the right treatment for your problem. But healthy lifestyle changes can be made right away. These include such things as exercising on a regular basis, eating a healthy diet, losing weight (if needed), and quitting smoking. Your treatment plan may include special therapies or medicines. Certain procedures or surgery may also be options. Talk about any questions you have  with your provider.   When to seek medical advice  Call the healthcare provider right away if any of these occur:  Fever of 100.4 F (38 C) or higher, or as directed by your provider  Bladder pain or fullness  Belly swelling  Nausea or vomiting  Back pain  Weakness, dizziness, or fainting  Chidi last reviewed this educational content on 1/1/2020 2000-2021 [employer]. All rights reserved. This information is not intended as a substitute for professional medical care. Always follow your healthcare professional's instructions.

## 2022-10-13 NOTE — PROGRESS NOTES
"SUBJECTIVE:   Maggie is a 72 year old who presents for Preventive Visit.      Patient has been advised of split billing requirements and indicates understanding: Yes  Are you in the first 12 months of your Medicare coverage?  No    Healthy Habits:     In general, how would you rate your overall health?  Good    Frequency of exercise:  6-7 days/week    Duration of exercise:  30-45 minutes    Do you usually eat at least 4 servings of fruit and vegetables a day, include whole grains    & fiber and avoid regularly eating high fat or \"junk\" foods?  Yes    Taking medications regularly:  Yes    Medication side effects:  None    Ability to successfully perform activities of daily living:  No assistance needed    Home Safety:  No safety concerns identified    Hearing Impairment:  Difficulty following a conversation in a noisy restaurant or crowded room and difficulty understanding soft or whispered speech    In the past 6 months, have you been bothered by leaking of urine? Yes    In general, how would you rate your overall mental or emotional health?  Good      PHQ-2 Total Score: 0    Additional concerns today:  Yes    Do you feel safe in your environment? Yes    Have you ever done Advance Care Planning? (For example, a Health Directive, POLST, or a discussion with a medical provider or your loved ones about your wishes): Yes, patient states has an Advance Care Planning document and will bring a copy to the clinic.       Fall risk  Fallen 2 or more times in the past year?: Yes  Any fall with injury in the past year?: No    Cognitive Screening   1) Repeat 3 items (Leader, Season, Table)    2) Clock draw: NORMAL  3) 3 item recall: Recalls 3 objects  Results: 3 items recalled: COGNITIVE IMPAIRMENT LESS LIKELY    Mini-CogTM Copyright VERONICA Godinez. Licensed by the author for use in [facility]; reprinted with permission (luis alfredo@.[facility]). All rights reserved.      Do you have sleep apnea, excessive snoring or daytime " drowsiness?: Patient states they do not know    Reviewed and updated as needed this visit by clinical staff   Tobacco  Allergies  Meds  Problems             Reviewed and updated as needed this visit by Provider    Allergies  Meds  Problems            Social History     Tobacco Use     Smoking status: Never     Smokeless tobacco: Never   Substance Use Topics     Alcohol use: Yes     Comment: Social, not often per patient.     If you drink alcohol do you typically have >3 drinks per day or >7 drinks per week? No    Alcohol Use 10/13/2022   Prescreen: >3 drinks/day or >7 drinks/week? -   Prescreen: >3 drinks/day or >7 drinks/week? No               Current providers sharing in care for this patient include:   Patient Care Team:  Vandana Morris MD as PCP - General (Internal Medicine)  Filemon Rodríguez MD as MD (Family Practice)  Omaira Saul APRN CNP as Nurse Practitioner (Nurse Practitioner)  Ambika Dickerson RN as Specialty Care Coordinator (Hematology & Oncology)  Ab Gutierrez MD as Assigned Cancer Care Provider  Jose M Keys MD as MD (Otolaryngology)  Suad Kennedy MD as MD (OB/Gyn)  Suad Kennedy MD as Assigned OBGYN Provider  Jose M Keys MD as Assigned Surgical Provider  Edgardo Valderrama MD as MD (Endocrinology, Diabetes, and Metabolism)  Vandana Morris MD as Assigned PCP  Edgardo Valderrama MD as Assigned Endocrinology Provider    The following health maintenance items are reviewed in Epic and correct as of today:  Health Maintenance   Topic Date Due     COVID-19 Vaccine (5 - Booster for Pfizer series) 07/15/2022     PHQ-9  08/10/2022     ANNUAL REVIEW OF HM ORDERS  11/29/2022     MAMMO SCREENING  09/08/2023     MEDICARE ANNUAL WELLNESS VISIT  10/13/2023     FALL RISK ASSESSMENT  10/13/2023     LIPID  07/25/2027     ADVANCE CARE PLANNING  10/13/2027     COLORECTAL CANCER SCREENING  10/18/2031     DTAP/TDAP/TD IMMUNIZATION (3 - Td or Tdap) 11/17/2031      "JACKIE  08/30/2036     HEPATITIS C SCREENING  Completed     PHQ-2 (once per calendar year)  Completed     INFLUENZA VACCINE  Completed     Pneumococcal Vaccine: 65+ Years  Completed     ZOSTER IMMUNIZATION  Completed     HEPATITIS B IMMUNIZATION  Completed     IPV IMMUNIZATION  Aged Out     MENINGITIS IMMUNIZATION  Aged Out           Review of Systems   Constitutional: Negative for chills and fever.   HENT: Negative for congestion, ear pain, hearing loss and sore throat.    Eyes: Negative for pain and visual disturbance.   Respiratory: Negative for cough and shortness of breath.    Cardiovascular: Negative for chest pain, palpitations and peripheral edema.   Gastrointestinal: Negative for abdominal pain, constipation, diarrhea, heartburn, hematochezia and nausea.   Breasts:  Negative for tenderness, breast mass and discharge.   Genitourinary: Negative for dysuria, frequency, genital sores, hematuria, pelvic pain, urgency, vaginal bleeding and vaginal discharge.   Musculoskeletal: Negative for arthralgias, joint swelling and myalgias.   Skin: Negative for rash.   Neurological: Negative for dizziness, weakness, headaches and paresthesias.   Psychiatric/Behavioral: Negative for mood changes. The patient is not nervous/anxious.          OBJECTIVE:   /79 (BP Location: Right arm, Patient Position: Sitting, Cuff Size: Adult Regular)   Pulse 94   Temp 98  F (36.7  C)   Resp 18   Ht 1.499 m (4' 11\")   Wt 62.4 kg (137 lb 8 oz)   SpO2 100%   BMI 27.77 kg/m   Estimated body mass index is 27.77 kg/m  as calculated from the following:    Height as of this encounter: 1.499 m (4' 11\").    Weight as of this encounter: 62.4 kg (137 lb 8 oz).  Physical Exam  GENERAL APPEARANCE: healthy, alert and no distress  EYES: Eyes grossly normal to inspection, PERRL and conjunctivae and sclerae normal  HENT: ear canals and TM's normal, nose and mouth without ulcers or lesions, oropharynx clear and oral mucous membranes moist  NECK: no " adenopathy,   RESP: lungs clear to auscultation - no rales, rhonchi or wheezes  BREAST: normal without masses, tenderness or nipple discharge and no palpable axillary masses or adenopathy  CV: regular rate and rhythm, normal S1 S2, no S3  ABDOMEN: soft, nontender, no hepatosplenomegaly, no masses and bowel sounds normal  Post surgical scar in lower abdomen  MS: no musculoskeletal defects are noted and gait is age appropriate without ataxia  SKIN: no suspicious lesions or rashes  She has moles and freckles   She has prominent spider veins and peripheral edema  NEURO: mentation intact and speech normal  PSYCH: mentation appears normal and affect normal/bright        ASSESSMENT / PLAN:   Maggie was seen today for physical and imm/inj.    Diagnoses and all orders for this visit:    Encounter for Medicare annual wellness exam  Preventive health counseling was also done.  Mammogram was done on September 8, 2022  Colonoscopy was done on October 18, 2021    History of COVID-19  She is recovering from recent COVID infection she is  Asymptomatic and tested negative at home  She wants to wait for the COVID booster shot  Agreed to get the flu shot    Leiomyosarcoma (H)  status post laparotomy, lysis of adhesions, small bowel resection with anastomosis, tumor debulking, and right ureteral stent placement on 4/4/2022 with Dr. Camarillo. Final pathology from surgery showed recurrent leiomyosarcoma  Second recurrence, leiomyosarcoma, small bowel serosa, s/p resection April 2022  First local recurrence, leiomyosarcoma, right pelvic sidewall adherent to iliac vessels and ureter, s/p resection June 2020  History of locally advanced leiomyosarcoma of the uterus, pT2b Nx M0, Stage IIB, status-post resection (11/2016) and 5 cycles of adjuvant doxorubicin and dacarbazine    Chemotherapy-induced neutropenia (H)  Comments:  past  history when on chemo    S/P total abdominal hysterectomy and bilateral salpingo-oophorectomy  For uterine  "fibroid    S/P partial thyroidectomy  Past history    Hyperlipidemia, unspecified hyperlipidemia type  -     Lipid panel reflex to direct LDL Fasting; Future    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)  Comments:  past history per record review  last echo was in 2017 showed satifactory finding   Orders:  -     Echocardiogram Complete; Future  This was documented in her records  Last echo was normal  We will recheck    Hypothyroidism, unspecified type  -     TSH with free T4 reflex; Future  Lab Results   Component Value Date    TSH 1.26 07/25/2022    TSH 1.44 12/16/2020         Screening for diabetes mellitus  -     Glucose; Future    Temporomandibular joint disorder  Comments:  pt wants referral to  and they need referral  Orders:  -     Pain Management  Referral; Future  Patient has TMJ pain on both side and would like to see Dr. Ramirez  who is integrative  pain specialist  She had that evaluation done in the past by other specialists  At this point she would like to go to her  Requesting a referral    Need for prophylactic vaccination and inoculation against influenza  -     INFLUENZA, QUAD, HIGH DOSE, PF, 65YR + (FLUZONE HD)        Patient has been advised of split billing requirements and indicates understanding: Yes    COUNSELING:  Reviewed preventive health counseling, as reflected in patient instructions       Regular exercise       Healthy diet/nutrition       Osteoporosis prevention/bone health    Estimated body mass index is 27.77 kg/m  as calculated from the following:    Height as of this encounter: 1.499 m (4' 11\").    Weight as of this encounter: 62.4 kg (137 lb 8 oz).        She reports that she has never smoked. She has never used smokeless tobacco.      Appropriate preventive services were discussed with this patient, including applicable screening as appropriate for cardiovascular disease, diabetes, osteopenia/osteoporosis, and glaucoma.  As appropriate for age/gender, " discussed screening for colorectal cancer, prostate cancer, breast cancer, and cervical cancer. Checklist reviewing preventive services available has been given to the patient.    Reviewed patients plan of care and provided an AVS. The Basic Care Plan (routine screening as documented in Health Maintenance) for Maggie meets the Care Plan requirement. This Care Plan has been established and reviewed with the Patient.    Counseling Resources:  ATP IV Guidelines  Pooled Cohorts Equation Calculator  Breast Cancer Risk Calculator  Breast Cancer: Medication to Reduce Risk  FRAX Risk Assessment  ICSI Preventive Guidelines  Dietary Guidelines for Americans, 2010  USDA's MyPlate  ASA Prophylaxis  Lung CA Screening    Vandana Morris MD  [facility]    Identified Health Risks:

## 2022-11-01 ENCOUNTER — HOSPITAL ENCOUNTER (OUTPATIENT)
Dept: CARDIOLOGY | Facility: CLINIC | Age: 72
Discharge: HOME | End: 2022-11-01
Attending: INTERNAL MEDICINE | Admitting: INTERNAL MEDICINE
Payer: MEDICARE

## 2022-11-01 DIAGNOSIS — I42.7 DILATED CARDIOMYOPATHY SECONDARY TO DRUG (H): ICD-10-CM

## 2022-11-01 LAB — LVEF ECHO: NORMAL

## 2022-11-01 PROCEDURE — 93306 TTE W/DOPPLER COMPLETE: CPT | Mod: 26 | Performed by: INTERNAL MEDICINE

## 2022-11-01 PROCEDURE — 93306 TTE W/DOPPLER COMPLETE: CPT

## 2022-11-02 NOTE — RESULT ENCOUNTER NOTE
Parmjit Serrano,    This is to inform you regarding your test result.    Echocardiogram result is satisfactory   It shows good systolic function.      Sincerely,      Dr.Nasima Alexandra MD,FACP

## 2022-12-07 ENCOUNTER — NURSE TRIAGE (OUTPATIENT)
Dept: FAMILY MEDICINE | Facility: CLINIC | Age: 72
End: 2022-12-07

## 2022-12-07 ENCOUNTER — OFFICE VISIT (OUTPATIENT)
Dept: FAMILY MEDICINE | Facility: CLINIC | Age: 72
End: 2022-12-07
Payer: MEDICARE

## 2022-12-07 VITALS
RESPIRATION RATE: 18 BRPM | SYSTOLIC BLOOD PRESSURE: 124 MMHG | BODY MASS INDEX: 28.08 KG/M2 | HEIGHT: 59 IN | HEART RATE: 100 BPM | TEMPERATURE: 97.3 F | WEIGHT: 139.3 LBS | OXYGEN SATURATION: 99 % | DIASTOLIC BLOOD PRESSURE: 83 MMHG

## 2022-12-07 DIAGNOSIS — K52.9 GASTROENTERITIS: Primary | ICD-10-CM

## 2022-12-07 DIAGNOSIS — L03.319 CELLULITIS AND ABSCESS OF TRUNK: ICD-10-CM

## 2022-12-07 DIAGNOSIS — L02.219 CELLULITIS AND ABSCESS OF TRUNK: ICD-10-CM

## 2022-12-07 PROCEDURE — 10060 I&D ABSCESS SIMPLE/SINGLE: CPT | Performed by: PHYSICIAN ASSISTANT

## 2022-12-07 PROCEDURE — 99213 OFFICE O/P EST LOW 20 MIN: CPT | Mod: 25 | Performed by: PHYSICIAN ASSISTANT

## 2022-12-07 RX ORDER — DOXYCYCLINE 100 MG/1
100 CAPSULE ORAL 2 TIMES DAILY
Qty: 14 CAPSULE | Refills: 0 | Status: SHIPPED | OUTPATIENT
Start: 2022-12-07 | End: 2023-04-28

## 2022-12-07 ASSESSMENT — PAIN SCALES - GENERAL: PAINLEVEL: MILD PAIN (2)

## 2022-12-07 NOTE — PROGRESS NOTES
"Assessment and Plan:     (K52.9) Gastroenteritis  (primary encounter diagnosis)  Comment: doing better today, abdomen benign, onset after heavy meal  Plan: has zofran at home, instructed to start, increase oral hydration, she declined labs, discussed when to be seen promptly    (L03.319,  L02.219) Cellulitis and abscess of trunk  Comment: left groin, I and D today w/good return, no e/o surrounding cellulitis   Plan: doxycycline hyclate (VIBRAMYCIN) 100 MG capsule--only fill and take if worsening  Warm compress  See me next week for follow-up    Latonya Herring PA-C  40  minutes on the day of the encounter doing chart review, history and exam, documentation and further activities as noted above.        Bimal Serrano is a 72 year old presenting for the following health issues:  Derm Problem (Cyst in pelvic area- left side)      NATI Serrano is here for an abscess of her left groin  It is tender and painful  She noticed it about 5-6 days ago  She has been using warm compresses to the area  She denies fever/chills, drainage    She ate out two days ago and had some heavy cream sauce--she had diarrhea and some nausea/vomiting yesterday and the day before  She is feeling much better today and denies any vomiting today  She is feeling a little nauseated     Review of Systems   See above      Objective    Wt 63.2 kg (139 lb 4.8 oz)   BMI 28.14 kg/m    Body mass index is 28.14 kg/m .     /83 (BP Location: Left arm, Patient Position: Sitting, Cuff Size: Adult Regular)   Pulse 100   Temp 97.3  F (36.3  C) (Temporal)   Resp 18   Ht 1.499 m (4' 11\")   Wt 63.2 kg (139 lb 4.8 oz)   SpO2 99%   BMI 28.14 kg/m        Physical Exam     GENERAL: healthy, alert and no distress  ENT: mmm, op clear   RESP: lungs clear to auscultation - no rales, no rhonchi, no wheezes  CV: regular rates and rhythm, normal S1 S2, no S3 or S4 and no murmur, no click or rub   ABD: soft, NT, +BS, no masses   MS: extremities- no " gross deformities noted, no edema  SKIN: abscess, quarter-size, left groin, no surrounding induration or erythema    I and D  Location: left groin abscess  Cleansed with betadine x 3  Anesthetized with 1% lido with epi, 2.5cc  After adequate anesthesia, 0.5cm linear incision made with an 11 blade  Expressed about 2cc of purulent drainage from wound  Sterile dressing applied  Patient tolerated well

## 2022-12-07 NOTE — PATIENT INSTRUCTIONS
Take zofran every 8 hours as needed    Increase fluids    Apply warm compress to area 3-4 times per day    Fill and take doxycycline if symptoms worsen

## 2022-12-07 NOTE — TELEPHONE ENCOUNTER
Pt reports a sore cyst in groin for 5 days. Is sore and red. She has used heat but questions what else can she should do. Triage advised OV for evaluation of symptoms and advice on Tx. Appt was scheduled today.    Reason for Disposition    Red streak from area of infection    Additional Information    Negative: Widespread rash and bright red, sunburn-like and too weak to stand    Negative: Sounds like a life-threatening emergency to the triager    Negative: Painful lump or swelling at opening to anus (rectum)    Negative: Painful lump or swelling at opening to vagina (on labia)    Negative: Painful lump or swelling on scrotum    Negative: Doesn't match the SYMPTOMS of a boil    Negative: Widespread red rash    Negative: Black (necrotic) color or blisters develop in wound    Negative: Patient sounds very sick or weak to the triager    Negative: SEVERE pain (e.g., excruciating)    Protocols used: BOIL (SKIN ABSCESS)-A-OH

## 2022-12-12 ENCOUNTER — OFFICE VISIT (OUTPATIENT)
Dept: DERMATOLOGY | Facility: CLINIC | Age: 72
End: 2022-12-12
Payer: MEDICARE

## 2022-12-12 DIAGNOSIS — D18.01 CHERRY ANGIOMA: ICD-10-CM

## 2022-12-12 DIAGNOSIS — L82.1 SK (SEBORRHEIC KERATOSIS): Primary | ICD-10-CM

## 2022-12-12 DIAGNOSIS — L72.9 CYST OF SKIN: ICD-10-CM

## 2022-12-12 PROCEDURE — 99203 OFFICE O/P NEW LOW 30 MIN: CPT | Performed by: DERMATOLOGY

## 2022-12-12 ASSESSMENT — PAIN SCALES - GENERAL: PAINLEVEL: NO PAIN (0)

## 2022-12-12 ASSESSMENT — PATIENT HEALTH QUESTIONNAIRE - PHQ9: SUM OF ALL RESPONSES TO PHQ QUESTIONS 1-9: 0

## 2022-12-12 NOTE — LETTER
12/12/2022       RE: Maggie Navarrete  [address]  [facility] [address]     Dear Colleague,    Thank you for referring your patient, Maggie Navarrete, to the [facility]MATOLOG[facility] [place] at [facility]. Please see a copy of my visit note below.    [facility] Dermatology Note  Encounter Date: Dec 12, 2022  Office Visit     Dermatology Problem List:  1. 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   - continue warm compresses 2 to 3 times daily   - Agree with plan to hold oral antibiotics at this time       # Benign findings: nevi, seborrheic keratoses, dermatofibroma, cherry angiomas, lentigo.   - Discussed warning signs for skin cancer    Follow-up: 1 year(s) in-person, or earlier for new or changing lesions    Staff:     Chapis Styles MD  ____________________________________________    CC: Skin Check (Skin check per pt- Maggie states she has an infected cyst that is not draining )    HPI:  Ms. Maggie Navarrete is a(n) 72 year old female who presents today as a new patient for a skin check.  History of leiomyosarcoma of uterus and follows closely with oncology.  Recent inflamed cyst of groin.  Seen in primary care clinic with drainage of purulent material.  Started warm compresses which are helping.  Was given oral antibiotic prescription but did not need to fill as the area is improving.      Patient is otherwise feeling well, without additional skin concerns.     Labs Reviewed:  N/A    Physical Exam:  Vitals: There were no vitals taken for this visit.  SKIN: Full skin, which includes the head/face, both arms, chest, back, abdomen,both legs, genitalia and/or groin buttocks, digits and/or nails, was examined.  - resolving cystic nodule left groin- no purulence, warmth or erythema  - There are  dome shaped bright red papules on the exam.  - There are waxy stuck on tan to brown papules on the exam.  - There is a firm tan/flesh colored papule that dimples with lateral pressure on the left lower leg.  - benign nevi with no atypia on dermoscopy  - slightly thickened left 5th toenail  - No other lesions of concern on areas examined.     Medications:  Current Outpatient Medications   Medication     CALCIUM PO     carboxymethylcellulose PF (REFRESH PLUS) 0.5 % ophthalmic solution     doxycycline hyclate (VIBRAMYCIN) 100 MG capsule     famotidine (PEPCID) 10 MG tablet     levothyroxine (SYNTHROID/LEVOTHROID) 75 MCG tablet     multivitamin w/minerals (THERA-VIT-M) tablet     venlafaxine (EFFEXOR XR) 75 MG 24 hr capsule     zolpidem (AMBIEN) 5 MG tablet     Current Facility-Administered Medications   Medication     denosumab (PROLIA) injection 60 mg     denosumab (PROLIA) injection 60 mg      Past Medical History:   Patient Active Problem List   Diagnosis     Presbyopia - Both Eyes     Osteopenia     Generalized anxiety disorder     Vaginal atrophy     Leiomyosarcoma of uterus (H)     Chemotherapy-induced neutropenia (H)     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)     Overweight     Leiomyosarcoma (H)     Ptosis     Dry eyes     Closed nondisplaced fracture of fifth right metatarsal bone     History of deep venous thrombosis     Hypothyroidism     Myopia of both eyes     Neuropathy, peripheral     Post-operative nausea and vomiting     Osteoporosis of forearm     Low TSH level     Arthralgia, unspecified joint     Dyslipidemia     Small bowel obstruction (H)     Intractable vomiting with nausea, unspecified vomiting type     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)     Leukocytosis     Pelvic somatic dysfunction     Mixed incontinence     Urge incontinence     S/P total abdominal hysterectomy and bilateral salpingo-oophorectomy     S/P partial thyroidectomy     History of COVID-19      Temporomandibular joint disorder     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 on close of this encounter.

## 2022-12-12 NOTE — NURSING NOTE
Dermatology Rooming Note    Maggie Navarrete's goals for this visit include:   Chief Complaint   Patient presents with     Skin Check     Skin check per pt- Maggie states she has an infected cyst that is not draining      Yessenia Ramos, Visit Facilitator

## 2022-12-12 NOTE — PROGRESS NOTES
[facility] Dermatology Note  Encounter Date: Dec 12, 2022  Office Visit     Dermatology Problem List:  1. 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   - continue warm compresses 2 to 3 times daily   - Agree with plan to hold oral antibiotics at this time       # Benign findings: nevi, seborrheic keratoses, dermatofibroma, cherry angiomas, lentigo.   - Discussed warning signs for skin cancer    Follow-up: 1 year(s) in-person, or earlier for new or changing lesions    Staff:     Chapis Styles MD  ____________________________________________    CC: Skin Check (Skin check per pt- Maggie states she has an infected cyst that is not draining )    HPI:  Ms. Maggie Navarrete is a(n) 72 year old female who presents today as a new patient for a skin check.  History of leiomyosarcoma of uterus and follows closely with oncology.  Recent inflamed cyst of groin.  Seen in primary care clinic with drainage of purulent material.  Started warm compresses which are helping.  Was given oral antibiotic prescription but did not need to fill as the area is improving.      Patient is otherwise feeling well, without additional skin concerns.     Labs Reviewed:  N/A    Physical Exam:  Vitals: There were no vitals taken for this visit.  SKIN: Full skin, which includes the head/face, both arms, chest, back, abdomen,both legs, genitalia and/or groin buttocks, digits and/or nails, was examined.  - resolving cystic nodule left groin- no purulence, warmth or erythema  - There are dome shaped bright red papules on the exam.  - There are waxy stuck on tan to brown papules on the exam.  - There is a firm tan/flesh colored papule that dimples with lateral pressure on the left lower leg.  - benign nevi with no atypia on dermoscopy  - slightly thickened left 5th toenail  - No other lesions of concern  on areas examined.     Medications:  Current Outpatient Medications   Medication     CALCIUM PO     carboxymethylcellulose PF (REFRESH PLUS) 0.5 % ophthalmic solution     doxycycline hyclate (VIBRAMYCIN) 100 MG capsule     famotidine (PEPCID) 10 MG tablet     levothyroxine (SYNTHROID/LEVOTHROID) 75 MCG tablet     multivitamin w/minerals (THERA-VIT-M) tablet     venlafaxine (EFFEXOR XR) 75 MG 24 hr capsule     zolpidem (AMBIEN) 5 MG tablet     Current Facility-Administered Medications   Medication     denosumab (PROLIA) injection 60 mg     denosumab (PROLIA) injection 60 mg      Past Medical History:   Patient Active Problem List   Diagnosis     Presbyopia - Both Eyes     Osteopenia     Generalized anxiety disorder     Vaginal atrophy     Leiomyosarcoma of uterus (H)     Chemotherapy-induced neutropenia (H)     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)     Overweight     Leiomyosarcoma (H)     Ptosis     Dry eyes     Closed nondisplaced fracture of fifth right metatarsal bone     History of deep venous thrombosis     Hypothyroidism     Myopia of both eyes     Neuropathy, peripheral     Post-operative nausea and vomiting     Osteoporosis of forearm     Low TSH level     Arthralgia, unspecified joint     Dyslipidemia     Small bowel obstruction (H)     Intractable vomiting with nausea, unspecified vomiting type     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)     Leukocytosis     Pelvic somatic dysfunction     Mixed incontinence     Urge incontinence     S/P total abdominal hysterectomy and bilateral salpingo-oophorectomy     S/P partial thyroidectomy     History of COVID-19     Temporomandibular joint disorder     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 on close of this encounter.

## 2022-12-19 ENCOUNTER — LAB (OUTPATIENT)
Dept: LAB | Facility: CLINIC | Age: 72
End: 2022-12-19
Payer: MEDICARE

## 2022-12-19 DIAGNOSIS — E03.9 HYPOTHYROIDISM, UNSPECIFIED TYPE: Chronic | ICD-10-CM

## 2022-12-19 DIAGNOSIS — Z13.1 SCREENING FOR DIABETES MELLITUS: ICD-10-CM

## 2022-12-19 DIAGNOSIS — E78.5 HYPERLIPIDEMIA, UNSPECIFIED HYPERLIPIDEMIA TYPE: ICD-10-CM

## 2022-12-19 LAB
CHOLEST SERPL-MCNC: 207 MG/DL
FASTING STATUS PATIENT QL REPORTED: YES
GLUCOSE SERPL-MCNC: 85 MG/DL (ref 70–99)
HDLC SERPL-MCNC: 62 MG/DL
LDLC SERPL CALC-MCNC: 121 MG/DL
NONHDLC SERPL-MCNC: 145 MG/DL
TRIGL SERPL-MCNC: 120 MG/DL
TSH SERPL DL<=0.005 MIU/L-ACNC: 1.58 UIU/ML (ref 0.3–4.2)

## 2022-12-19 PROCEDURE — 82947 ASSAY GLUCOSE BLOOD QUANT: CPT

## 2022-12-19 PROCEDURE — 80061 LIPID PANEL: CPT

## 2022-12-19 PROCEDURE — 84443 ASSAY THYROID STIM HORMONE: CPT

## 2022-12-19 PROCEDURE — 36415 COLL VENOUS BLD VENIPUNCTURE: CPT

## 2022-12-20 NOTE — RESULT ENCOUNTER NOTE
Parmjit Serrano    This is to inform you regarding your test result.    TSH which is thyroid hormone is normal.  Glucose which is your blood sugar is normal.  Your total cholesterol is elevated.  HDL which is called good cholesterol is normal.  Your LDL which is called bad cholesterol is elevated.  Eat low cholesterol low fat  diet and do regular physical activity.         Sincerely,      Dr.Nasima Alexandra MD,FACP

## 2023-01-26 NOTE — PROGRESS NOTES
"8/18/2020    Maggie Navarrete is a 70 year old female who is being evaluated via a billable video visit.      The patient has been notified of following:     \"This video visit will be conducted via a call between you and your provider. We have found that certain health care needs can be provided without the need for an in-person physical exam. This service lets us provide the care you need with a video conversation. If lab work is needed we can place an order for that and you can then stop by our lab to have the test done at a later time.    Video visits are billed at different rates depending on your insurance coverage. Please reach out to your insurance provider with any questions.    If during the course of the call the provider feels a video visit is not appropriate, you will not be charged for this service.\"    Patient has given verbal consent for Video visit? Yes  How would you like to obtain your AVS? MyChart  If you are dropped from the video visit, the video invite should be resent to: Send to e-mail at: [email or web address]  Will anyone else be joining your video visit? No    Referring Provider: Ab Aguilar MD    Presenting Information:   Given concerns regarding the potential for COVID-19 exposure during a clinic visit, Maggie elected for a video genetic counseling visit through the Cancer Risk Management Program to discuss her personal and family history of leiomyosarcoma, glioblastoma, breast cancer, and stomach cancer. We reviewed this history, cancer screening recommendations, and available genetic testing options.    Personal History:  Maggie is a 70 year old female. 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 The surgery also identified three intramural leiomyomas; a cellular uterine leiomyoma was previously removed at age 56. She had a right thyroidectomy at age 66 that identified a follicular " "adenoma with Hurthle cells. Maggie also reports having two lipomas removed from her leg and thigh.    She had her first menstrual period at age 12, her first child at age 27, and went through menopause at age 52. She reports that she used hormone replacement therapy for approximately 8-10 years and stopped at age [age].      She has annual mammograms and her most recent mammogram in August 2019 was normal. She reports having a benign right-sided breast biopsy in her 40's. Her most recent colonoscopy in September 2016 identified one polyp (lymphoid aggregate) and follow-up was recommended in five years. She does not regularly do any other cancer screening at this time.     Of note, Maggie previously met with Fabiola Correa MS, [facility] in April 2011 and Comprehensive BRCAnalysis (BRCA1 and BRCA2 sequencing and deletion/duplication) was ordered from [facility]. This testing was negative/normal and a copy of the report was available for review today.    Family History: (Please see scanned pedigree for detailed family history information)    Maggie's son was diagnosed with a glioblastoma at age 30 and passed away at age 32. She also reports that he had multiple \"dorothy\" cysts removed from around his ears.    Maggie has two sisters, ages 68 and 73.    Her younger sister was diagnosed with a meningioma in her 40's and has a history of multiple \"dorothy\" cysts.    Both sisters have a history of uterine fibroids and her older sister has several \"skin tags\".    Maggie's mother was diagnosed with breast cancer in her 80's and passed away at age 92; treatment included a lumpectomy, radiation, and endocrine therapy. She also had an unknown type of skin cancer and uterine fibroids.    One sister was diagnosed with and passed away from leukemia in her 20's.    One sister passed away at age 18 from an infection.    Her mother passed away in her 70's and her father passed away in his 30/40's; neither had a cancer.    Maggie's father " [TextBox_4] : NICOLE DAVIS is a 64 year old male, with history of Obstructive sleep apnea, who presents to the office for follow up evaluation.  Reports no current respiratory symptoms or sleep symptoms. Using PAP on a nightly basis without difficulty. Reports no symptoms of daytime sleepiness. Getting supplies regularly. \par \par Device: S9\par \par Vendor: [employer]\par \par Settin-15\par \par  Issues: discussed device replacement passed away at age 94 and never had a cancer.    One sister was diagnosed with and passed away from breast cancer in her 60/70's.    One sister was diagnosed with and passed away from stomach cancer in her 70's; she reportedly had an H. pylori infection and smoking history.    One sister has two children with cancer, including one son with esophageal cancer (unlikely to have a smoking history) and one daughter with thyroid cancer in her 40's.    His two brothers passed away in their 80's and never had a cancer history. One brother's son was diagnosed with unilateral kidney cancer at age 71.    His father was diagnosed with and passed away from stomach cancer in his 70's; he had a history of smoking.    His mother passed away in her 60's and never had a cancer.    Her maternal and paternal ethnicity is [place] and Ashkenazi Buddhism.    Discussion:    Bryans personal and family history of multiple cancers is suggestive of a hereditary cancer syndrome.    We reviewed the features of sporadic, familial, and hereditary cancers. In looking at Maggie's family history, it is possible that a cancer susceptibility gene is present as Maggie has been diagnosed with a rare cancer type and has multiple close relatives diagnosed with related cancers (brain, breast, kidney, stomach); several of these relatives were also diagnosed under age 50.    We discussed the natural history and genetics of several hereditary cancer syndromes, including Li-Fraumeni syndrome (LFS) and FH Tumor Predisposition Syndrome/Hereditary Leiomyomatosis and Renal Cell Cancer (HLRCC).     We first reviewed Maggie's prior genetic testing results, which did not identify a mutation in the BRCA1 and BRCA2 genes. This testing would be expected to identify the vast majority of currently identifiable mutations. That being said, this testing was completed nearly 10 years ago, so repeat testing may be reasonable to consider.    We then reviewed Li-Fraumeni  syndrome (LFS), which is caused by a mutation in the TP53 gene. Cancers associated with LFS include: sarcomas, breast cancer, brain cancer, leukemia, lymphoma, adrenocortical carcinoma, and others. The hallmark cancer of LFS is sarcoma, while the most frequent cancer is female breast. Individuals with LFS are also at increased risk for developing multiple primary cancers in their lifetime. Given her history of a sarcoma and family history of brain, leukemia, and breast cancer, testing of the TP53 gene would be appropriate for Maggie.     We then reviewed HLRCC, which is caused by mutations in the FH gene. Individuals with HLRCC are at increased risk for kidney cancer and cutaneous/uterine leiomyomas (with possible progression to leiomyosarcoma). Given her personal and family history of leiomyosarcoma, uterine fibroids, and kidney cancer, testing of the FH gene is also reasonable to consider.    A detailed handout regarding these syndromes and the information we discussed will be provided to Maggie via [employer] and can be found in the after visit summary. Topics included: inheritance pattern, cancer risks, cancer screening recommendations, and also risks, benefits and limitations of testing.    We discussed that there are also other additional genes that could cause increased risk for the cancers in her family. As many of these genes present with overlapping features in a family and accurate cancer risk cannot always be established based upon the pedigree analysis alone, it would be reasonable for Maggie to consider panel genetic testing to analyze multiple genes at once.    We reviewed genetic testing options for hereditary cancers: high/moderate risk custom panel (CustomNext-Cancer, 38 genes) and expanded pan-cancer panel (CancerNext-Expanded, 77 genes). Maggie expressed interest in learning as much information as possible from the testing. She opted for the CancerNext-Expanded panel.    Genetic testing is  available for 77 genes associated with increased risk for many different cancers: CancerNext-Expanded (AIP, ALK, APC, DARIO, AXIN2, BAP1, BARD1, BLM, BMPR1A, BRCA1, BRCA2, BRIP1, CDC73, CDH1, CDK4, CDKN1B, CDKN2A, CHEK2, CTNNA1, DICER1, EPCAM, EGFR, EGLN1, FANCC, FH, FLCN, GALNT12, GREM1, HOXB13, KIF1B, KIT, LZTR1, MAX, MEN1, MET, MITF, MLH1, MRE11A, MSH2, MSH3, MSH6, MUTYH, NBN, NF1, NF2, NTHL1, PALB2, PDGFRA, PHOX2B, PMS2, POLD1, POLE, POT1, EDZKE1D, PTCH1, PTEN, RAD50, RAD51C, RAD51D, RB1, RECQL, RET, SDHA, SDHAF2, SDHB, SDHC, SDHD, SMAD4, SMARCA4, SMARCB1, SMARCE1, STK11, SUFU, ZLRL356, TP53, TSC1, TSC2, VHL, and XRCC2).    We discussed that many of the genes in the CancerNext-Expanded panel are associated with specific hereditary cancer syndromes and have published management guidelines:  Hereditary Breast and Ovarian Cancer syndrome (BRCA1, BRCA2), Moraes syndrome (MLH1, MSH2, MSH6, PMS2, EPCAM), Familial Adenomatous Polyposis (APC), Hereditary Diffuse Gastric Cancer (CDH1), Familial Atypical Multiple Mole Melanoma syndrome (CDK4, CDKN2A), Juvenile Polyposis syndrome (BMPR1A, SMAD4), Cowden syndrome (PTEN), Li Fraumeni syndrome (TP53), Peutz-Jeghers syndrome (STK11), MUTYH Associated Polyposis (MUTYH), Hereditary Leiomyomatosis and Renal Cell Cancer (FH), Ovjn-Nhgt-Vunq (FLCN), Hereditary Papillary Renal Carcinoma (MET), Hereditary Paraganglioma and Pheochromocytoma syndrome (SDHA, SDHAF2, SDHB, SDHC, SDHD), Multiple Endocrine Neoplasia type 2 (RET), Tuberous sclerosis complex (TSC1, TSC2), Von Hippel-Lindau disease (VHL), Neurofibromatosis type 1 (NF1), Neurofibromatosis type 2 (NF2), Multiple Endocrine Neoplasia type 1 (MEN1), Multiple Endocrine Neoplasia type 4 (CDKN1B), Gorlin syndrome (SUFU, PTCH1), and Collazo complex (XVLHM5G).    The DARIO, AXIN2, BRIP1, CHEK2, GREM1, MSH3, NBN, NTHL1, PALB2, POLD1, POLE, RAD51C, and RAD51D genes are associated with increased cancer risk and have published management  guidelines for certain cancers.      The remaining genes (AIP, ALK, BAP1, BARD1, BLM, CDC73, CTNNA1, DICER1, EGFR, EGLN1, FANCC, GALNT12, HOXB13, KIF1B, KIT, LZTR1, MRE11A, MAX, MITF, PDGFRA, PHOX2B, POT1, RAD50, RECQL, RB1, SMARCA4, SMARCB1, SMARCE1, FJPI084, and XRCC2) are associated with increased cancer risk and may allow us to make medical recommendations when mutations are identified.      Consent was obtained over the video. Maggie requested that a saliva collection kit be shipped to her home. Once a saliva sample is collected, genetic testing using the CancerNext-Expanded panel will be sent to "[facility]. Turn around time: 3-4 weeks after Humberto receives her saliva sample.     Medical Management: For Maggie, we reviewed that the information from genetic testing may determine:    additional cancer screening for which Maggie may qualify (i.e. regular full body/abdominal imaging, regular blood/urine tests, mammogram and breast MRI, more frequent colonoscopies, more frequent dermatologic exams, etc.),    options for risk reducing surgeries Maggie could consider (i.e. bilateral mastectomy, etc.),      and targeted chemotherapies for Maggie's if she were to develop certain cancers in the future (i.e. immunotherapy for individuals with Moraes syndrome, PARP inhibitors, etc.).     These recommendations and possible targeted chemotherapies will be discussed in detail once genetic testing is completed.     Plan:  1) Today Maggie elected to proceed with genetic testing using the CancerNext-Expanded panel offered by "[facility]. A saliva collection kit will be shipped to her home.  2) The results should be available in 3-4 weeks.  3) Maggie will be called to discuss the results.    Kym Ferraro MS, [facility]  Licensed Genetic Counselor  Office: [phone]  Pager: [phone]    Video-Visit Details  Type of service:  Video Visit  Video Start Time: 2:19pm  Video End Time: 3:32pm  Originating Location  (pt. Location): Home  Distant Location (provider location):  [facility]   Platform used for Video Visit: [place]

## 2023-02-15 ENCOUNTER — TRANSFERRED RECORDS (OUTPATIENT)
Dept: HEALTH INFORMATION MANAGEMENT | Facility: CLINIC | Age: 73
End: 2023-02-15

## 2023-02-15 DIAGNOSIS — E03.9 ACQUIRED HYPOTHYROIDISM: ICD-10-CM

## 2023-02-16 RX ORDER — LEVOTHYROXINE SODIUM 75 UG/1
TABLET ORAL
Qty: 90 TABLET | Refills: 1 | Status: SHIPPED | OUTPATIENT
Start: 2023-02-16 | End: 2023-08-28

## 2023-03-10 ENCOUNTER — TRANSFERRED RECORDS (OUTPATIENT)
Dept: HEALTH INFORMATION MANAGEMENT | Facility: CLINIC | Age: 73
End: 2023-03-10

## 2023-03-17 ENCOUNTER — TRANSFERRED RECORDS (OUTPATIENT)
Dept: HEALTH INFORMATION MANAGEMENT | Facility: CLINIC | Age: 73
End: 2023-03-17

## 2023-03-20 ENCOUNTER — TELEPHONE (OUTPATIENT)
Dept: FAMILY MEDICINE | Facility: CLINIC | Age: 73
End: 2023-03-20
Payer: MEDICARE

## 2023-03-20 DIAGNOSIS — C49.9 LEIOMYOSARCOMA (H): Primary | ICD-10-CM

## 2023-03-20 DIAGNOSIS — Z92.21 S/P CHEMOTHERAPY, TIME SINCE GREATER THAN 12 WEEKS: ICD-10-CM

## 2023-03-20 DIAGNOSIS — E78.5 DYSLIPIDEMIA: ICD-10-CM

## 2023-03-20 NOTE — TELEPHONE ENCOUNTER
Pt requesting referral for outside nutritionist  Pended for review     [facility] at [facility] ([facility]) to see Paola Patrick     Reason for referral: Pt has history of Cancer x2, and is trying to change diet, and wants to work with them on the best diet for her situation.    Phone number:   [phone]

## 2023-03-20 NOTE — CONFIDENTIAL NOTE
Let her know that when I try to sign nutritional referral   Waiver Is coming  It does not le me me sign the referral as insurance would not cover this  They would cover it if patient has advanced  chronic kidney disease or diabetes  Dr.Nasima Alexandra MD

## 2023-03-22 NOTE — TELEPHONE ENCOUNTER
Left a deatiled voice message with providers message below. Advised her to call triage back if further questions or concerns.        Let her know that when I try to sign nutritional referral   Waiver Is coming  It does not le me me sign the referral as insurance would not cover this  They would cover it if patient has advanced  chronic kidney disease or diabetes  Dr.Nasima Alexandra MD

## 2023-03-23 ENCOUNTER — ALLIED HEALTH/NURSE VISIT (OUTPATIENT)
Dept: ENDOCRINOLOGY | Facility: CLINIC | Age: 73
End: 2023-03-23
Payer: MEDICARE

## 2023-03-23 DIAGNOSIS — M81.6 LOCALIZED OSTEOPOROSIS WITHOUT PATHOLOGICAL FRACTURE: Primary | ICD-10-CM

## 2023-03-23 PROCEDURE — 99207 PR NO CHARGE NURSE ONLY: CPT

## 2023-03-23 PROCEDURE — 96372 THER/PROPH/DIAG INJ SC/IM: CPT | Performed by: INTERNAL MEDICINE

## 2023-03-23 NOTE — NURSING NOTE
Indication: Prolia  (denosumab) is a prescription medicine used to treat osteoporosis in patients who:     Are at high risk for fracture, meaning patients who have had a fracture related to osteoporosis, or who have multiple risk factors for fracture     Cannot use another osteoporosis medicine or other osteoporosis medicines did not work well   The timeline for early/late injections would be 4 weeks early and any time after the 6 month jose. If a patient receives their injection late, then the subsequent injection would be 6 months from the date that they actually received the injection    1.  When was the last injection?  9/22/22  2.  Did they check with their insurance for this calendar year?  Yes  3.  Is there an order in the chart?  Yes  4.  Has the patient had dental work involving the bone in the past month or will have work in the next 6 months?  No  5.  Did you have the patient wait 15 minutes after the injection?  Yes  6.  Remember to use .injection under the medication notes    The following steps were completed to comply with the REMS program for Prolia:    Reviewed information in the Medication Guide and Patient Counseling Chart, including the serious risks of Prolia  and the symptoms of each risk.    Advised patient to seek prompt medical attention if they have signs or symptoms of any of the serious risks.  Provided each patient a copy of the Medication Guide and Patient Brochure.    The following medication was given:     MEDICATION: Denosumab (Prolia) 60 mg/ml SOLN  ROUTE: SQ  SITE: Arm - Right  DOSE: 60mg/mL  LOT #: [id number]  :  [employer]  EXPIRATION DATE:  07/31/2025    Joni Mcclain RN on 3/23/2023 at 1:26 PM

## 2023-03-23 NOTE — PATIENT INSTRUCTIONS
You received your Prolia injection today  Your next injection is due in 6 months, scheduled 9/28/23 at 1pm  If you plan on having any dental work done within the next 6 months, please let your dentist know that you are on this medication.   Make sure you do not have any dental work completed involving the jaw bone within 1 month prior to your scheduled injection.   Provided Prolia VIS Sheet to patient.     Joni Mcclain RN

## 2023-03-24 ENCOUNTER — TELEPHONE (OUTPATIENT)
Dept: ENDOCRINOLOGY | Facility: CLINIC | Age: 73
End: 2023-03-24

## 2023-03-24 ENCOUNTER — TRANSFERRED RECORDS (OUTPATIENT)
Dept: HEALTH INFORMATION MANAGEMENT | Facility: CLINIC | Age: 73
End: 2023-03-24

## 2023-03-24 DIAGNOSIS — Z78.0 MENOPAUSE: ICD-10-CM

## 2023-03-24 DIAGNOSIS — M81.0 AGE-RELATED OSTEOPOROSIS WITHOUT CURRENT PATHOLOGICAL FRACTURE: Primary | ICD-10-CM

## 2023-03-24 DIAGNOSIS — Z92.29 HISTORY OF BISPHOSPHONATE THERAPY: ICD-10-CM

## 2023-03-24 NOTE — TELEPHONE ENCOUNTER
Message noted. I agree... would like patient to have lab tests (with [facility] clinic lab appt) and also a bone density DEXA scan in early 9/2023, prior to her follow-up endocrinology appt with me.  I have placed the lab orders and sent the ([facility], [phone]) DEXA order.  Please relay message to patient, thanks.    MYKEL Valderrama MD, MS  Endocrinology  [facility]

## 2023-03-24 NOTE — TELEPHONE ENCOUNTER
From: Rachelle Mcgraw   Sent: 3/23/2023   1:46 PM CDT   To: [facility]   Subject: Bone Density Scan                                 Pt was in clinic today for a Prolia Injection and she stopped by the  to schedule a follow up with Dr Valedrrama for Sept 2023.     She is asking to have a bone density scan done before that appointment. Can we order her one?     Also she is wondering if Dr Valderrama wants her to get any labs done, I don't see any orders in, but she wanted me to ask.     Please follow up with patient.     Thank you, Rachelle

## 2023-03-30 DIAGNOSIS — F41.1 GENERALIZED ANXIETY DISORDER: ICD-10-CM

## 2023-03-30 DIAGNOSIS — T75.3XXS MOTION SICKNESS, SEQUELA: Primary | ICD-10-CM

## 2023-03-30 RX ORDER — VENLAFAXINE HYDROCHLORIDE 37.5 MG/1
37.5 CAPSULE, EXTENDED RELEASE ORAL DAILY
Qty: 90 CAPSULE | Refills: 0 | Status: SHIPPED | OUTPATIENT
Start: 2023-03-30 | End: 2023-06-22

## 2023-03-30 RX ORDER — SCOLOPAMINE TRANSDERMAL SYSTEM 1 MG/1
1 PATCH, EXTENDED RELEASE TRANSDERMAL
Qty: 12 PATCH | Refills: 0 | Status: SHIPPED | OUTPATIENT
Start: 2023-03-30 | End: 2023-04-05

## 2023-03-30 NOTE — TELEPHONE ENCOUNTER
Received a call from the patient with a couple medication requests...    1. Patient is wondering if she can decrease her dose of Effexor? Patient states she was going to try to stop taking the medication all together but she feels like this would not go well and will still like to be on the medication but a lower dose?    2. Patient states she is leaving for a trip in May and will be on a boat. Patient is requesting a script for Scopolamine patches.     Pharmacy: [facility]     Will route to PCP for review.     Caryn Renteria RN

## 2023-03-30 NOTE — CONFIDENTIAL NOTE
She needs to taper her Effexor slowly  She is currently on 75 mg  We can lower it to 37.5 mg  She can take that daily for 4 weeks after that she can take it every other day for 2 to 4 weeks    Then she can do every third day for another 2 weeks and then stop  Script is sent to pharmacy along with scopolamine patch  Dr.Nasima Alexandra MD

## 2023-04-04 NOTE — TELEPHONE ENCOUNTER
Patient Contact    Attempt # 1    Was call answered?  No.  Left message on voicemail with information to call back. Or review [facility] message    Sent [facility] [facility] message to patient     Omaira ROWELL, Triage RN  [facility]

## 2023-04-05 RX ORDER — SCOLOPAMINE TRANSDERMAL SYSTEM 1 MG/1
1 PATCH, EXTENDED RELEASE TRANSDERMAL
Qty: 4 PATCH | Refills: 0 | Status: SHIPPED | OUTPATIENT
Start: 2023-04-05 | End: 2023-04-28

## 2023-04-05 NOTE — TELEPHONE ENCOUNTER
Pt requesting scolpamine patch rx refill be sent to different pharmacy; [facility] [address]; cost $22.00 with good rx. Pt requesting 4 patches so that it matches what the coupon.     [facility] reviewed with the patient the cost of rx would be $163.00.     Sending refill request to new pharmacy.

## 2023-04-20 ENCOUNTER — TELEPHONE (OUTPATIENT)
Dept: FAMILY MEDICINE | Facility: CLINIC | Age: 73
End: 2023-04-20
Payer: MEDICARE

## 2023-04-20 NOTE — TELEPHONE ENCOUNTER
Pt calling stating she has Pre- op physical on 5/5 at . Requesting appt at [place] Location. Pt scheuduled for pre op appt opening with Dr Beasley on 4/28/23 at 0830.     Sasha JORDAN RN  [facility] Triage Team

## 2023-04-28 ENCOUNTER — OFFICE VISIT (OUTPATIENT)
Dept: FAMILY MEDICINE | Facility: CLINIC | Age: 73
End: 2023-04-28
Payer: MEDICARE

## 2023-04-28 VITALS
TEMPERATURE: 97.2 F | SYSTOLIC BLOOD PRESSURE: 117 MMHG | WEIGHT: 139.3 LBS | BODY MASS INDEX: 28.14 KG/M2 | HEART RATE: 85 BPM | RESPIRATION RATE: 18 BRPM | OXYGEN SATURATION: 100 % | DIASTOLIC BLOOD PRESSURE: 81 MMHG

## 2023-04-28 DIAGNOSIS — C55 LEIOMYOSARCOMA OF UTERUS (H): Chronic | ICD-10-CM

## 2023-04-28 DIAGNOSIS — Z01.818 PRE-OP EXAM: Primary | ICD-10-CM

## 2023-04-28 PROCEDURE — 99214 OFFICE O/P EST MOD 30 MIN: CPT | Performed by: PHYSICIAN ASSISTANT

## 2023-04-28 ASSESSMENT — PATIENT HEALTH QUESTIONNAIRE - PHQ9
SUM OF ALL RESPONSES TO PHQ QUESTIONS 1-9: 0
SUM OF ALL RESPONSES TO PHQ QUESTIONS 1-9: 0

## 2023-04-28 ASSESSMENT — PAIN SCALES - GENERAL: PAINLEVEL: MILD PAIN (2)

## 2023-04-28 NOTE — PROGRESS NOTES
Answers for HPI/ROS submitted by the patient on 4/28/2023  PHQ9 TOTAL SCORE: 0    [address], [address]  [facility] [address]  Phone: [phone]  Primary Provider: Vandana Morris  Pre-op Performing Provider: MIREYA ZAFAR      PREOPERATIVE EVALUATION:  Today's date: 4/28/2023    Maggie Navarrete is a 73 year old female who presents for a preoperative evaluation.    Surgical Information:  Surgery/Procedure:   LAPAROTOMY - EXPLORATORY   Leiomyosarcoma Uterus (HCC)   05/09/2023 1:46 PM CDT   DEBULKING TUMOR PELVIS   Leiomyosarcoma Uterus (HCC)   05/09/2023 1:46 PM CDT   RESECTION SMALL INTESTINE WITH ANASTOMOSIS   Leiomyosarcoma Uterus (HCC)   05/09/2023 1:46 PM CDT   CYSTOSCOPY INSERTION STENT URETER   Leiomyosarcoma Uterus (HCC)   05/09/2023 1:46 PM CDT   EXAMINATION UNDER ANESTHESIA GENITAL TRACT   Leiomyosarcoma Uterus (HCC)   05/09/2023 1:46 PM CDT   CYSTOSCOPY INSERTION STENT URETER           Surgery Location: [place]  Surgeon: Harsh Camarillo MD  Surgery Date: 5/9/2023  Time of Surgery: 1:30 PM  Where patient plans to recover: At home with family  Fax number for surgical facility: [phone].    Assessment & Plan     The proposed surgical procedure is considered INTERMEDIATE risk.    Pre-op exam  Leiomyosarcoma of uterus (H)  Cleared for surgery.  Reviewed blood work from 4/10/2023.  No indication for EKG today.  Recommend postoperative anticoagulation given history of DVT in 2016.  Hold NSAIDs/vitamins 1 week prior to surgery.  Continue all medications morning of surgery.  Last Prolia injection in March.      RECOMMENDATION:  APPROVAL GIVEN to proceed with proposed procedure, without further diagnostic evaluation.      30 minutes spent by me on the date of the encounter doing chart review, review of outside records, review of test results, patient visit and documentation       Subjective     HPI related to upcoming procedure:    Here today for preoperative  clearance for exploratory laparotomy, resection/debulking of leiomyosarcoma, as well as ureteral stent placement.    Overall she is feeling well.  No chest pain, shortness of breath, or new leg swelling.  She has a history of hypothyroidism, anxiety, dyslipidemia, SBO, and did have a DVT following a hysterectomy in 2016.    Did have a resection of her leiomyosarcoma last year.  Given DVT in 2016.  Was anticoagulated postoperatively with Eliquis.  This was coordinated with her surgical team at [facility].        4/27/2023     9:31 PM   Preop Questions   1. Have you ever had a heart attack or stroke? No   2. Have you ever had surgery on your heart or blood vessels, such as a stent placement, a coronary artery bypass, or surgery on an artery in your head, neck, heart, or legs? No   3. Do you have chest pain with activity? No   4. Do you have a history of  heart failure? No   5. Do you currently have a cold, bronchitis or symptoms of other infection? No   6. Do you have a cough, shortness of breath, or wheezing? No   7. Do you or anyone in your family have previous history of blood clots? YES - DVT post-op hysterectomy 2016   8. Do you or does anyone in your family have a serious bleeding problem such as prolonged bleeding following surgeries or cuts? No   9. Have you ever had problems with anemia or been told to take iron pills? UNKNOWN   10. Have you had any abnormal blood loss such as black, tarry or bloody stools, or abnormal vaginal bleeding? No   11. Have you ever had a blood transfusion? YES   11a. Have you ever had a transfusion reaction? No   12. Are you willing to have a blood transfusion if it is medically needed before, during, or after your surgery? Yes   13. Have you or any of your relatives ever had problems with anesthesia? No   14. Do you have sleep apnea, excessive snoring or daytime drowsiness? No   15. Do you have any artifical heart valves or other implanted medical devices like a pacemaker,  defibrillator, or continuous glucose monitor? No   16. Do you have artificial joints? No   17. Are you allergic to latex? No       Preoperative Review of :   reviewed - controlled substances reflected in medication list.        Review of Systems  CONSTITUTIONAL: NEGATIVE for fever, chills, change in weight  INTEGUMENTARY/SKIN: NEGATIVE for worrisome rashes, moles or lesions  EYES: NEGATIVE for vision changes or irritation  ENT/MOUTH: NEGATIVE for ear, mouth and throat problems  RESP: NEGATIVE for significant cough or SOB  CV: NEGATIVE for chest pain, palpitations or peripheral edema  GI: NEGATIVE for nausea, abdominal pain, heartburn, or change in bowel habits  : NEGATIVE for frequency, dysuria, or hematuria  MUSCULOSKELETAL: NEGATIVE for significant arthralgias or myalgia  NEURO: NEGATIVE for weakness, dizziness or paresthesias  ENDOCRINE: NEGATIVE for temperature intolerance, skin/hair changes  HEME: NEGATIVE for bleeding problems  PSYCHIATRIC: NEGATIVE for changes in mood or affect    Patient Active Problem List    Diagnosis Date Noted     History of COVID-19 10/13/2022     Priority: Medium     Temporomandibular joint disorder 10/13/2022     Priority: Medium     pt wants referral to  and they need referral       S/P total abdominal hysterectomy and bilateral salpingo-oophorectomy 07/05/2022     Priority: Medium     S/P partial thyroidectomy 07/05/2022     Priority: Medium     Urge incontinence 06/06/2022     Priority: Medium     Pelvic somatic dysfunction 01/18/2022     Priority: Medium     Mixed incontinence 01/18/2022     Priority: Medium     Leukocytosis 12/28/2021     Priority: Medium     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  left leg post op- hysterectomy       Hypothyroidism 05/11/2021     Priority: Medium     Formatting of this note might be different from the original.  on replacement       Neuropathy, peripheral 05/11/2021     Priority: Medium     Formatting of this note might be different from the original.  legs       Post-operative nausea and vomiting 05/11/2021     Priority: Medium     Dry eyes 02/11/2019     Priority: Medium     Last Assessment & Plan:   - Continue OTC ATs QID PRN both eyes  - Recommend OTC AT ointment QHS both eyes   - Recommend humidifier use at night    Last Assessment & Plan:   Formatting of this note might be different from the original.  - Continue OTC ATs QID PRN both eyes  - Recommend OTC AT ointment QHS both eyes   - Recommend humidifier use at night       Myopia of both eyes 02/11/2019     Priority: Medium     Last Assessment & Plan:   Formatting of this note might be different from the original.  - New RGPs with good fit, comfort, and vision (20/20 in both eyes).   - Lenses dispensed today and Rx final per \"Contact Exam\" section below.   - Level 1 fitting fee paid in full today.   - Reviewed proper hygiene and wear instructions.   - Remove lenses and return to or call clinic if any redness, irritation, pain or vision changes occur with contact lens wear.       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     GERD (gastroesophageal reflux disease)      Hx of previous reproductive problem 1980     Kidney stone 2011     Leiomyosarcoma (H) 11/14/2016     Personal history of colonic polyps 2016    Small, benign     PONV (postoperative nausea and vomiting)      Posterior vitreous detachment of left eye 2011     Posterior vitreous detachment of right eye 2011     Ptosis of eyelid, bilateral      Sepsis (H) 07/2020     Stomach problems 2015    Abdominal pain, Diarreha     Thyroid disease     thyroid nodule resolved 2014     Past Surgical History:   Procedure Laterality Date     ABDOMEN SURGERY  Now    Pain, diarrhea     BREAST SURGERY  2002    right breast benign     COLONOSCOPY  2008    due in 2018     COLONOSCOPY N/A 09/08/2016    Procedure: COMBINED COLONOSCOPY, SINGLE OR MULTIPLE BIOPSY/POLYPECTOMY BY BIOPSY;  Surgeon: Abner Pepper MD;  Location:  GI     COLONOSCOPY N/A 10/18/2021    Procedure: COLONOSCOPY;  Surgeon: Jamila Villarreal MD;  Location: [facility] OR     ENT SURGERY  1975    tonsillectomy     GENITOURINARY SURGERY  1953    bladder surgery      GI SURGERY  2015    Severe constipation     GYN SURGERY  1977    C section     GYN SURGERY  1981    laparoscopy     GYN SURGERY  2007    myomectomy     hysterecomy   11/14/2016    leiomyosarcoma     OTHER SURGICAL HISTORY Right 10/27/2017    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     REMOVE PORT VASCULAR ACCESS Right 05/09/2017    Procedure: REMOVE PORT VASCULAR ACCESS;  Right Port Removal;  Surgeon: Eric Gibson PA-C;  Location:  OR     REPAIR PTOSIS BILATERAL  02/15/2012    Procedure:REPAIR PTOSIS BILATERAL; BILATERAL UPPER LID PTOSIS REPAIR; Surgeon:BRYCE REYNOLDS; Location: [facility]     TUMOR REMOVAL  06/2020     Current Outpatient Medications   Medication Sig Dispense Refill     CALCIUM PO Take 1 capsule by mouth daily as needed (if diet insufficient to provide daily recommended values)       carboxymethylcellulose PF (REFRESH PLUS) 0.5 % ophthalmic solution Place 1 drop into both eyes 4 times daily as needed for dry eyes       famotidine (PEPCID) 10 MG tablet Take 10 mg by mouth 2 times daily as needed       levothyroxine (SYNTHROID/LEVOTHROID) 75 MCG tablet TAKE 1 TABLET DAILY FOR    THYROID 90 tablet 1     multivitamin w/minerals (THERA-VIT-M) tablet Take 1 tablet by mouth daily as needed (if diet insufficient to provide recommended nutrients)       venlafaxine (EFFEXOR XR) 37.5 MG 24 hr capsule Take 1 capsule (37.5 mg) by mouth daily 90 capsule 0     zolpidem (AMBIEN) 5 MG tablet Take 1 tablet (5 mg) by mouth nightly as needed for sleep (Only uses if traveling or cannot sleep,) 20 tablet 0     doxycycline hyclate (VIBRAMYCIN) 100 MG capsule Take 1 capsule (100 mg) by mouth 2 times daily 14 capsule 0     scopolamine (TRANSDERM) 1 MG/3DAYS 72 hr patch Place 1 patch onto the skin every 72 hours 4 patch 0       Allergies   Allergen Reactions     Erythromycin GI Disturbance     Tramadol Nausea        Social History     Tobacco Use     Smoking status: Never     Smokeless tobacco: Never   Vaping Use     Vaping status: Never Used   Substance Use Topics     Alcohol use: Yes     Comment: Social, not often per patient.     Family  History   Problem Relation Age of Onset     Family History Negative Mother         glaucoma     Glaucoma Mother      Anxiety Disorder Mother      Heart Failure Mother      Diabetes Mother      Coronary Artery Disease Mother      Hypertension Mother      Breast Cancer Mother      Depression Mother      Obesity Mother      Heart Disease Father      Glaucoma Father      Diabetes Father      Coronary Artery Disease Father      Depression Father      Osteoporosis Father      Heart Disease Maternal Grandmother      Diabetes Maternal Grandmother      Heart Disease Paternal Grandmother      Cancer Paternal Grandfather         stomach     Thyroid Disease Sister      Retinal detachment Sister      Hypertension Sister      Hypertension Sister      Lipids Sister      Cerebrovascular Disease Sister      Depression Sister      Anxiety Disorder Sister      Depression Sister      Thyroid Disease Sister      Thyroid Disease Sister      Heart Disease Paternal Aunt      Heart Disease Paternal Uncle      Osteoporosis Other      History   Drug Use No         Objective     /81 (BP Location: Right arm, Patient Position: Sitting, Cuff Size: Adult Regular)   Pulse 85   Temp 97.2  F (36.2  C) (Temporal)   Resp 18   Wt 63.2 kg (139 lb 4.8 oz)   SpO2 100%   BMI 28.14 kg/m      Physical Exam    GENERAL APPEARANCE: healthy, alert and no distress     EYES: EOMI,  PERRL     HENT: ear canals and TM's normal and nose and mouth without ulcers or lesions     NECK: no adenopathy, no asymmetry, masses, or scars and thyroid normal to palpation     RESP: lungs clear to auscultation - no rales, rhonchi or wheezes     CV: regular rates and rhythm, normal S1 S2, no S3 or S4 and no murmur, click or rub     ABDOMEN:  soft, nontender, no HSM or masses and bowel sounds normal     MS: extremities normal- no gross deformities noted, no evidence of inflammation in joints, FROM in all extremities.     SKIN: no suspicious lesions or rashes     NEURO:  Normal strength and tone, sensory exam grossly normal, mentation intact and speech normal     PSYCH: mentation appears normal. and affect normal/bright     LYMPHATICS: No cervical adenopathy    Recent Labs   Lab Test 07/25/22  0939 05/10/22  1608 04/21/22  1416   HGB 13.5  --  11.3*     --  376    142 140   POTASSIUM 4.4 4.2 4.3   CR 0.92 0.74 1.32*   A1C 5.2  --   --         Diagnostics:  Labs pending at this time.  Results will be reviewed when available.   No EKG required, no history of coronary heart disease, significant arrhythmia, peripheral arterial disease or other structural heart disease.    Revised Cardiac Risk Index (RCRI):  The patient has the following serious cardiovascular risks for perioperative complications:   - No serious cardiac risks = 0 points     RCRI Interpretation: 0 points: Class I (very low risk - 0.4% complication rate)    The likelihood of other entities in the differential is insufficient to justify any further testing for them at this time. This was explained to the patient. The patient was advised that persistent or worsening symptoms would require further evaluation. Patient advised to call the office and if unable to reach to go to the emergency room if they develop any new or worsening symptoms. Expressed understanding and agreement with above stated plan.       Signed Electronically by: Christiano Beasley PA-C  Copy of this evaluation report is provided to requesting physician.

## 2023-05-13 ENCOUNTER — HOSPITAL ENCOUNTER (EMERGENCY)
Facility: CLINIC | Age: 73
Discharge: HOME | End: 2023-05-14
Attending: EMERGENCY MEDICINE | Admitting: EMERGENCY MEDICINE
Payer: MEDICARE

## 2023-05-13 DIAGNOSIS — Z46.6 URINARY CATHETER (FOLEY) CHANGE REQUIRED: ICD-10-CM

## 2023-05-13 LAB
ALBUMIN SERPL BCG-MCNC: 3.9 G/DL (ref 3.5–5.2)
ALP SERPL-CCNC: 61 U/L (ref 35–104)
ALT SERPL W P-5'-P-CCNC: 21 U/L (ref 10–35)
ANION GAP SERPL CALCULATED.3IONS-SCNC: 14 MMOL/L (ref 7–15)
AST SERPL W P-5'-P-CCNC: 24 U/L (ref 10–35)
BASOPHILS # BLD AUTO: 0 10E3/UL (ref 0–0.2)
BASOPHILS NFR BLD AUTO: 0 %
BILIRUB SERPL-MCNC: 0.2 MG/DL
BUN SERPL-MCNC: 25.4 MG/DL (ref 8–23)
CALCIUM SERPL-MCNC: 9.3 MG/DL (ref 8.8–10.2)
CHLORIDE SERPL-SCNC: 105 MMOL/L (ref 98–107)
CREAT SERPL-MCNC: 1.05 MG/DL (ref 0.51–0.95)
DEPRECATED HCO3 PLAS-SCNC: 22 MMOL/L (ref 22–29)
EOSINOPHIL # BLD AUTO: 0.3 10E3/UL (ref 0–0.7)
EOSINOPHIL NFR BLD AUTO: 4 %
ERYTHROCYTE [DISTWIDTH] IN BLOOD BY AUTOMATED COUNT: 14.9 % (ref 10–15)
GFR SERPL CREATININE-BSD FRML MDRD: 56 ML/MIN/1.73M2
GLUCOSE SERPL-MCNC: 117 MG/DL (ref 70–99)
HCT VFR BLD AUTO: 38.6 % (ref 35–47)
HGB BLD-MCNC: 12.6 G/DL (ref 11.7–15.7)
IMM GRANULOCYTES # BLD: 0 10E3/UL
IMM GRANULOCYTES NFR BLD: 0 %
INR PPP: 0.93 (ref 0.85–1.15)
LYMPHOCYTES # BLD AUTO: 1.9 10E3/UL (ref 0.8–5.3)
LYMPHOCYTES NFR BLD AUTO: 26 %
MCH RBC QN AUTO: 29.7 PG (ref 26.5–33)
MCHC RBC AUTO-ENTMCNC: 32.6 G/DL (ref 31.5–36.5)
MCV RBC AUTO: 91 FL (ref 78–100)
MONOCYTES # BLD AUTO: 0.5 10E3/UL (ref 0–1.3)
MONOCYTES NFR BLD AUTO: 7 %
NEUTROPHILS # BLD AUTO: 4.5 10E3/UL (ref 1.6–8.3)
NEUTROPHILS NFR BLD AUTO: 63 %
NRBC # BLD AUTO: 0 10E3/UL
NRBC BLD AUTO-RTO: 0 /100
PLAT MORPH BLD: NORMAL
PLATELET # BLD AUTO: 208 10E3/UL (ref 150–450)
POTASSIUM SERPL-SCNC: 4.5 MMOL/L (ref 3.4–5.3)
PROT SERPL-MCNC: 6.3 G/DL (ref 6.4–8.3)
RBC # BLD AUTO: 4.24 10E6/UL (ref 3.8–5.2)
RBC MORPH BLD: NORMAL
SODIUM SERPL-SCNC: 141 MMOL/L (ref 136–145)
WBC # BLD AUTO: 7.2 10E3/UL (ref 4–11)

## 2023-05-13 PROCEDURE — 80053 COMPREHEN METABOLIC PANEL: CPT | Performed by: EMERGENCY MEDICINE

## 2023-05-13 PROCEDURE — 36415 COLL VENOUS BLD VENIPUNCTURE: CPT | Performed by: EMERGENCY MEDICINE

## 2023-05-13 PROCEDURE — 87086 URINE CULTURE/COLONY COUNT: CPT | Performed by: EMERGENCY MEDICINE

## 2023-05-13 PROCEDURE — 87040 BLOOD CULTURE FOR BACTERIA: CPT | Performed by: EMERGENCY MEDICINE

## 2023-05-13 PROCEDURE — 85025 COMPLETE CBC W/AUTO DIFF WBC: CPT | Performed by: EMERGENCY MEDICINE

## 2023-05-13 PROCEDURE — 51798 US URINE CAPACITY MEASURE: CPT | Performed by: EMERGENCY MEDICINE

## 2023-05-13 PROCEDURE — 86140 C-REACTIVE PROTEIN: CPT | Performed by: EMERGENCY MEDICINE

## 2023-05-13 PROCEDURE — 81001 URINALYSIS AUTO W/SCOPE: CPT | Performed by: EMERGENCY MEDICINE

## 2023-05-13 PROCEDURE — 85610 PROTHROMBIN TIME: CPT | Performed by: EMERGENCY MEDICINE

## 2023-05-13 PROCEDURE — 99284 EMERGENCY DEPT VISIT MOD MDM: CPT | Mod: 25 | Performed by: EMERGENCY MEDICINE

## 2023-05-13 PROCEDURE — 76705 ECHO EXAM OF ABDOMEN: CPT | Performed by: EMERGENCY MEDICINE

## 2023-05-13 PROCEDURE — 99285 EMERGENCY DEPT VISIT HI MDM: CPT | Performed by: EMERGENCY MEDICINE

## 2023-05-13 ASSESSMENT — ACTIVITIES OF DAILY LIVING (ADL): ADLS_ACUITY_SCORE: 35

## 2023-05-14 VITALS
DIASTOLIC BLOOD PRESSURE: 80 MMHG | SYSTOLIC BLOOD PRESSURE: 131 MMHG | OXYGEN SATURATION: 99 % | TEMPERATURE: 98.2 F | HEART RATE: 88 BPM | RESPIRATION RATE: 14 BRPM

## 2023-05-14 LAB
ALBUMIN UR-MCNC: 70 MG/DL
APPEARANCE UR: ABNORMAL
BILIRUB UR QL STRIP: NEGATIVE
COLOR UR AUTO: ABNORMAL
CRP SERPL-MCNC: 8.71 MG/L
GLUCOSE UR STRIP-MCNC: NEGATIVE MG/DL
HGB UR QL STRIP: ABNORMAL
KETONES UR STRIP-MCNC: NEGATIVE MG/DL
LEUKOCYTE ESTERASE UR QL STRIP: ABNORMAL
NITRATE UR QL: NEGATIVE
PH UR STRIP: 6 [PH] (ref 5–7)
RBC URINE: >182 /HPF
SP GR UR STRIP: 1.02 (ref 1–1.03)
UROBILINOGEN UR STRIP-MCNC: NORMAL MG/DL
WBC URINE: 43 /HPF

## 2023-05-14 PROCEDURE — 250N000011 HC RX IP 250 OP 636: Performed by: EMERGENCY MEDICINE

## 2023-05-14 PROCEDURE — 96374 THER/PROPH/DIAG INJ IV PUSH: CPT | Mod: 59 | Performed by: EMERGENCY MEDICINE

## 2023-05-14 PROCEDURE — 258N000003 HC RX IP 258 OP 636: Performed by: EMERGENCY MEDICINE

## 2023-05-14 PROCEDURE — 96361 HYDRATE IV INFUSION ADD-ON: CPT | Mod: 59 | Performed by: EMERGENCY MEDICINE

## 2023-05-14 PROCEDURE — 51702 INSERT TEMP BLADDER CATH: CPT | Mod: 59 | Performed by: EMERGENCY MEDICINE

## 2023-05-14 RX ORDER — LORAZEPAM 2 MG/ML
0.25 INJECTION INTRAMUSCULAR ONCE
Status: COMPLETED | OUTPATIENT
Start: 2023-05-14 | End: 2023-05-14

## 2023-05-14 RX ADMIN — LORAZEPAM 0.25 MG: 2 INJECTION INTRAMUSCULAR; INTRAVENOUS at 02:23

## 2023-05-14 RX ADMIN — SODIUM CHLORIDE 500 ML: 9 INJECTION, SOLUTION INTRAVENOUS at 01:17

## 2023-05-14 ASSESSMENT — ACTIVITIES OF DAILY LIVING (ADL)
ADLS_ACUITY_SCORE: 35
ADLS_ACUITY_SCORE: 35

## 2023-05-14 NOTE — ED PROVIDER NOTES
[place] EMERGENCY DEPARTMENT ([facility])    5/13/23        History   No chief complaint on file.    NATI Navarrete is a 73 year old female who has a PMH notable for uterine leiomyosarcoma (prior RACHEL/BSO 2016 and most recent ex lap/tumor debulking) dilated cardiomyopathy, prior DVT, CKD, hypothyroidism, prior SBO, GERD, mixed incontinence, anxiety, et al., who presents today w/ catheter problem.     RECENT HISTORY REVIEW  Patient was admitted 5/9/2023 - 5/11/2023 at [facility] with diagnosis of uterine leiomyosarcoma, who underwent exploratory laparotomy, tumor debulking, cystoscopy with insertion of ureteral stent, cystourethroscopic with placement of internal ureteral stent and open ureterolysis on 5/9/2023.    Discharge instructions from a urologic standpoint report that the patient could have her catheter removed 1 week after surgery (scheduled for 5/17/23 at [place]), and they are planning a follow-up CT urogram and cystoscopy with stent removal on 6/21.    CURRENT PRESENTATION:   Patient reports that she had a catheter placed as part of the surgery with plan to have it removed on the 17th (the patient admits that they were planning on taking it out themselves as opposed to going down there for this visit), but noticed that the catheter may not of have been draining quite right and today felt leaking coming around the catheter. Therefore, they felt comfortable removing the catheter (loved one/guest is a retired ), and so they said when they thought the catheter wasn't working, they took down the balloon and removed the catheter.  Prior to removing, she felt as though she could feel the catheter moved in and out somewhat which was odd for her and she was not sure if that was right.  The output from the catheter was not changed today, but reports that it continues to be bloody since her most recent surgery;  occasional clot.  No particular dysuria.  No abdominal  pain, no flank or back pain symptoms.  No fevers. Patient systemically have been feeling well.  She has been passing gas since her surgery not yet having any bowel movements, she feels as though the Dulcolax was not sufficient and recently tried some MiraLAX to assist.    Patient otherwise reports feeling well.  No acute issues.  Full ROS completed without any additional findings.    This part of the medical record was transcribed by ERIC DEL REAL, Medical Scribe, from a dictation done by Rayna Wells MD.     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     GERD (gastroesophageal reflux disease)      Hx of previous reproductive problem 1980     Kidney stone 2011     Leiomyosarcoma (H) 11/14/2016     Personal history of colonic polyps 2016    Small, benign     PONV (postoperative nausea and vomiting)      Posterior vitreous detachment of left eye 2011     Posterior vitreous detachment of right eye 2011     Ptosis of eyelid, bilateral      Sepsis (H) 07/2020     Stomach problems 2015    Abdominal pain, Diarreha     Thyroid disease     thyroid nodule resolved 2014     Past Surgical History:   Procedure Laterality Date     ABDOMEN SURGERY  Now    Pain, diarrhea     BREAST SURGERY  2002    right breast benign     COLONOSCOPY  2008    due in 2018     COLONOSCOPY N/A 09/08/2016    Procedure: COMBINED COLONOSCOPY, SINGLE OR MULTIPLE BIOPSY/POLYPECTOMY BY BIOPSY;  Surgeon: Abner Pepper MD;  Location:  GI     COLONOSCOPY N/A 10/18/2021    Procedure: COLONOSCOPY;  Surgeon: Jamila Villarreal MD;  Location: [facility] OR     ENT SURGERY  1975    tonsillectomy     GENITOURINARY SURGERY  1953    bladder surgery      GI SURGERY  2015    Severe constipation     GYN SURGERY  1977    C section     GYN SURGERY   1981    laparoscopy     GYN SURGERY  2007    myomectomy     hysterecomy  11/14/2016    leiomyosarcoma     OTHER SURGICAL HISTORY Right 10/27/2017    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     REMOVE PORT VASCULAR ACCESS Right 05/09/2017    Procedure: REMOVE PORT VASCULAR ACCESS;  Right Port Removal;  Surgeon: Eric Gibson PA-C;  Location:  OR     REPAIR PTOSIS BILATERAL  02/15/2012    Procedure:REPAIR PTOSIS BILATERAL; BILATERAL UPPER LID PTOSIS REPAIR; Surgeon:BRYCE REYNOLDS; Location: [facility]     TUMOR REMOVAL  06/2020     CALCIUM PO  carboxymethylcellulose PF (REFRESH PLUS) 0.5 % ophthalmic solution  famotidine (PEPCID) 10 MG tablet  levothyroxine (SYNTHROID/LEVOTHROID) 75 MCG tablet  multivitamin w/minerals (THERA-VIT-M) tablet  venlafaxine (EFFEXOR XR) 37.5 MG 24 hr capsule  zolpidem (AMBIEN) 5 MG tablet      Allergies   Allergen Reactions     Erythromycin GI Disturbance     Tramadol Nausea     Family History  Family History   Problem Relation Age of Onset     Family History Negative Mother         glaucoma     Glaucoma Mother      Anxiety Disorder Mother      Heart Failure Mother      Diabetes Mother      Coronary Artery Disease Mother      Hypertension Mother      Breast Cancer Mother      Depression Mother      Obesity Mother      Heart Disease Father      Glaucoma Father      Diabetes Father      Coronary Artery Disease Father      Depression Father      Osteoporosis Father      Heart Disease Maternal Grandmother      Diabetes Maternal Grandmother      Heart Disease Paternal Grandmother      Cancer Paternal Grandfather         stomach     Thyroid Disease Sister      Retinal detachment Sister      Hypertension Sister      Hypertension Sister      Lipids Sister      Cerebrovascular Disease Sister      Depression Sister      Anxiety Disorder Sister      Depression Sister      Thyroid Disease Sister      Thyroid Disease Sister      Heart Disease Paternal Aunt       Heart Disease Paternal Uncle      Osteoporosis Other      Social History   Social History     Tobacco Use     Smoking status: Never     Smokeless tobacco: Never   Vaping Use     Vaping status: Never Used   Substance Use Topics     Alcohol use: Yes     Comment: Social, not often per patient.     Drug use: No      Past medical history, past surgical history, medications, allergies, family history, and social history were reviewed with the patient. No additional pertinent items.      A complete review of systems was performed with pertinent positives and negatives noted in the HPI, and all other systems negative.    Physical Exam      Physical Exam  CONSTITUTIONAL: Well-developed and well-nourished. Awake and alert. Non-toxic appearance. No acute distress.   HENT:   - Head: Normocephalic and atraumatic.   - Ears: External ear grossly normal.   - Nose: Nose normal. No rhinorrhea. No epistaxis.   - Mouth/Throat: MMM  EYES: Conjunctivae and lids are normal. No scleral icterus.   NECK: Normal range of motion and phonation normal. Neck supple.  No tracheal deviation, no stridor. No edema or erythema noted.  CARDIOVASCULAR: Normal rate, regular rhythm and no appreciable abnormal heart sounds.  PULMONARY/CHEST: Normal work of breathing. No accessory muscle usage or stridor. No respiratory distress.  No appreciable abnormal breath sounds.  ABDOMEN: Soft, non-distended. No tenderness. No peritoneal findings, no rigidity, rebound or guarding.  No palpable masses or abnormal pulsatility appreciated.  MUSCULOSKELETAL: Extremities warm and seemingly well perfused. No edema or calf tenderness.  NEUROLOGIC: Awake, alert. Not disoriented. No seizure activity. GCS 15  SKIN: Skin is warm and dry. No rash noted. No diaphoresis. No pallor.   PSYCHIATRIC: Normal mood and affect. Speech and behavior normal. Thought processes linear. Cognition and memory are normal. No SI/HI reported.      ED Course, Procedures, & Data     ED Course as  of 05/14/23 0026   Sat May 13, 2023   2215 Spoke with the [facility] , they are paging the patient's usual team for us.   2225 Spoke w/ on-call team at [facility]. Their initial thoughts were to place 16f catheter, though we asked about if patient was having blood as she reported if they wanted 3-way indwelling w/ irrigation or anything to facilitate drainage, but didn't want to disrupt any of their surgical procedure/process they've done. They discussed this as a team there and got bact to us. They would like us to place a 3-way catheter for irrigation and would like us to flush and then have patient leave in until her next appointment scheduled w/ them.    2251 Discussed with Urology here.  They request that our team put the catheter in that could be used for irrigation, see if it functions, then do a bladder scan to see if anything is retained.  If anything is retained he will come in and do the irrigation. Appreciate their time/thoughts.   2312 The [facility] team and originally recommended a 16 French catheter, which we do not have as far as her irrigation sizes.  We have an 18 French and up.  Paging the [facility] team again to see if they have a preference or any additional recommendations.   Sun May 14, 2023   0015 Spoke w/ [facility]. -They are ok w/ 18f if comfortable for patient as may have more irrigation capabilities if needed. If uncomfortable they would recommend 16f even if not 3-way/irrigation capable to see if functions.        Critical care was not performed.     Medical Decision Making  The patient's presentation was of low complexity (an acute and uncomplicated illness or injury).    The patient's evaluation involved:  review of external note(s) from 1 sources (see separate area of note for details)  ordering and/or review of 3+ test(s) in this encounter (see separate area of note for details)  discussion of management or test interpretation with another health professional (see separate  area of note for details)    The patient's management necessitated moderate risk (a decision regarding minor procedure (catheter management) with risk factors of recent gyn/urologic procedure) and further care after sign-out to overnight EM physician (see their note for further management).      Assessment & Plan    IMPRESSION:   73 year old female w/ PMH notable for uterine leiomyosarcoma (prior RACHEL/BSO 2016 and most recent ex lap/tumor debulking) dilated cardiomyopathy, prior DVT, CKD, hypothyroidism, prior SBO, GERD, mixed incontinence, anxiety, et al., who presents today w/ catheter problem (they removed her catheter at home, and presenting to ED at the recommendation of her usual providers to have a new catheter placed.)     Clinically, patient appears nontoxic, NAD.  On vitals has some elevated BP but seemingly asymptomatic for such.  Otherwise on examination, no acute abdominal findings.    Sounds as though the patient was still making urine, but just not able to pass such, so differential diagnosis includes, not limited to, clogged catheter in general, blood/clot as she has been reporting hematuria, less likely decreased urine output presents for renal issue given still having such, considered infection but otherwise not having systemic symptoms for such so thought to be a relatively less likely.     PLAN:   - I will discuss with her usual team down at [facility] to see if they have any particular recommendations.  - She will need a new catheter placed, but see if they want any irrigation or additional things or any recommendations given that she just had a surgery, talk to any of our specialist as needed as well.  We will also get some screening labs to look for hemoglobin levels, renal function, etc.  - Risks/benefits of pursuing imaging reviewed and accepted.     RESULTS:  Labs:   - Labs creatinine 1.05 (was 0.89 most recently at [facility])  - No leukocytosis, CRP was 8.71, Hgb 12.6 (was 12.1 most recently at  [facility] [facility])  Urine:   - Has blood and some WBCs but just post-procedural and no sx's for UTI (Pt would like to hold off on Abx and await cultures and discuss w/ her usual teams.)  Imaging: Bladder scan does not look like shes retaining urine.   Results/reports reviewed w/ patient who expresses understanding of findings and F/U recommendations.    INTERVENTIONS:   - Discussions with Gynecology at [facility], Urology here  - IV fluid  - New catheter placement  --- Attempted 18f, uncomfortable, taken out and replaced w/ 16f  --- Did get small dose of ativan for catheter placement as patient was reportedly a bit tense for first attempt, this went well. We are going to observe in the ED to make sure does well thereafter. No driving, safety instructions etc. Discussed.     RE-EVALUATION:  - Pt otherwise continues to do well here in the ED, no acute issues or apparent concerning changes in vitals or clinical appearance.    DISCUSSIONS:  - w/ [facility] Gyn and [facility] Urology: See discussions above in ED Course.  - w/ Patient: I have reviewed the available findings, recommendations from the [facility] Gynecology team as well as our Urology team here.    - Also discussed how the [facility] Gynecology team recommended that she keep the catheter in until he can see her in clinic have it removed.  They were not wanting to do this and wanted to be able to remove it themselves.  I reiterated that I am passing along their recommendations, and for safety reasons recommend they follow those instructions to leave in until removed by medical team in F/U, but certainly they are welcome to call and talk to their usual team to discuss their thoughts with her usual providers.   - Reviewed tentative plan, need for close follow up, strict return/safety instructions with the patient that was available prior to shift change.    SIGNOUT:  - Patient signed out to overnight EM Physician.  - Impression at shift change:   --- Self-removed catheter, new catheter  placed  --- RBCs and WBCs in urine but in post-procedure setting and not having any URI sx's, no fever, no leukocytosis (Blood and urine culture pending)  - Pending at shift change / Tentative plan:   --- Clinical reassessment to ensure catheter working appropriately  --- Monitor to make sure completely clears from the ativan given for catheter placement  --- Reiterate F/U and safety plans, should leave catheter in until the F/U appointment, pt to call her team to discuss futher, strict return/safety instructions.           ______________________________________________________________________  I, ERIC DEL REAL, am serving as a trained medical scribe to document services personally performed by Rayna Wells MD, based on the provider's statements to me.     I, Rayna Wells MD, was physically present and have reviewed and verified the accuracy of this note documented by ERIC DEL REAL.    Rayna Wells MD  [facility] EMERGENCY DEPARTMENT  5/13/2023     Rayna Wells MD  05/14/23 1921

## 2023-05-14 NOTE — ED NOTES
16 f serrato catheter placed using sterile technique. Pt medicated with ativan prior to insertion to help her relax. Ativan effective. Pt tolerated well. Catheter draining clear yellow urine. No visible clots at this time. Will continue to monitor.

## 2023-05-14 NOTE — ED TRIAGE NOTES
Arrives ambulatory to triage c/o had tumor removal surgery on her abdomen on Tuesday at [facility], had a urinary catheter placed and a ureteral stent. Leaking urine around the catheter site tonight, so pt and family removed this before coming to the hospital. Was not to come out until Wednesday per post op instructions. No other sx. Team at [facility] told her to come to the nearest ED to have another catheter placed.      Triage Assessment     Row Name 05/13/23 1384       Triage Assessment (Adult)    Airway WDL WDL       Respiratory WDL    Respiratory WDL WDL       Skin Circulation/Temperature WDL    Skin Circulation/Temperature WDL WDL       Cardiac WDL    Cardiac WDL WDL       Peripheral/Neurovascular WDL    Peripheral Neurovascular WDL WDL       Cognitive/Neuro/Behavioral WDL    Cognitive/Neuro/Behavioral WDL WDL

## 2023-05-14 NOTE — DISCHARGE INSTRUCTIONS
TODAY'S VISIT:  - You should discuss all imaging/radiology tests and laboratory tests that were performed during this visit with your usual providers to ensure you continue to improve and do not need any further evaluation, testing or management.   - Your urine culture is still pending. If it is positive someone from our team will call you and discuss next steps (which may even include returning for IV antibiotics, but will be discussed at that time).   - Please call your usual teams to discuss and arrange a follow-up appointment.   Immediately return to the nearest Emergency Department with any new or worsening symptoms or concerns.    FOLLOW-UP:  Please make an appointment to follow up with:  - Your usual Gyn/Urology team (contact them on Monday to discuss follow-up plan and appointments.)  - If you do not have a primary care provider, you can be seen in follow-up and establish care with one of our providers by calling of the the clinics below:  --- [facility] (phone: [phone])  --- [facility] (phone: [phone])   - Have your provider review the results from today's visit with you again to make sure no further follow-up or additional testing is needed based on those results.     PRESCRIPTIONS / MEDICATIONS:  - Continue the previously prescribed antibiotic until completed or told otherwise by a medical provider.   - Notify a medical provider of any abnormal reactions to this medication.     OTHER INSTRUCTIONS:  - Do your best to stay hydrated.  - Do not remove the urinary catheter until seen in clinic by your usual providers on 5/17/23.     RETURN TO THE EMERGENCY DEPARTMENT  Return to the Emergency Department immediately for any new or worsening symptoms or any concerns.     Remember that you can always come back to the Emergency Department if you are not able to see your regular doctor in the amount of time listed above, if you get any new symptoms, or if there  is anything that worries you.

## 2023-05-15 LAB — BACTERIA UR CULT: NORMAL

## 2023-05-18 LAB — BACTERIA BLD CULT: NO GROWTH

## 2023-05-19 LAB — BACTERIA BLD CULT: NO GROWTH

## 2023-05-27 ENCOUNTER — OFFICE VISIT (OUTPATIENT)
Dept: URGENT CARE | Facility: URGENT CARE | Age: 73
End: 2023-05-27
Payer: MEDICARE

## 2023-05-27 VITALS
HEART RATE: 98 BPM | TEMPERATURE: 97.7 F | OXYGEN SATURATION: 100 % | SYSTOLIC BLOOD PRESSURE: 123 MMHG | DIASTOLIC BLOOD PRESSURE: 72 MMHG

## 2023-05-27 DIAGNOSIS — R31.9 HEMATURIA, UNSPECIFIED TYPE: ICD-10-CM

## 2023-05-27 DIAGNOSIS — R82.81 PYURIA: Primary | ICD-10-CM

## 2023-05-27 DIAGNOSIS — R82.71 BACTERIURIA: ICD-10-CM

## 2023-05-27 LAB
ALBUMIN UR-MCNC: 100 MG/DL
APPEARANCE UR: ABNORMAL
BACTERIA #/AREA URNS HPF: ABNORMAL /HPF
BILIRUB UR QL STRIP: NEGATIVE
COLOR UR AUTO: ABNORMAL
GLUCOSE UR STRIP-MCNC: NEGATIVE MG/DL
HGB UR QL STRIP: ABNORMAL
KETONES UR STRIP-MCNC: NEGATIVE MG/DL
LEUKOCYTE ESTERASE UR QL STRIP: ABNORMAL
NITRATE UR QL: NEGATIVE
PH UR STRIP: 5.5 [PH] (ref 5–7)
RBC #/AREA URNS AUTO: >100 /HPF
SP GR UR STRIP: 1.02 (ref 1–1.03)
UROBILINOGEN UR STRIP-ACNC: 0.2 E.U./DL
WBC #/AREA URNS AUTO: ABNORMAL /HPF

## 2023-05-27 PROCEDURE — 81001 URINALYSIS AUTO W/SCOPE: CPT

## 2023-05-27 PROCEDURE — 87086 URINE CULTURE/COLONY COUNT: CPT

## 2023-05-27 PROCEDURE — 99214 OFFICE O/P EST MOD 30 MIN: CPT

## 2023-05-27 RX ORDER — APIXABAN 2.5 MG/1
TABLET, FILM COATED ORAL
COMMUNITY
Start: 2023-05-11 | End: 2023-09-11

## 2023-05-27 RX ORDER — CEFDINIR 300 MG/1
300 CAPSULE ORAL 2 TIMES DAILY
Qty: 14 CAPSULE | Refills: 0 | Status: SHIPPED | OUTPATIENT
Start: 2023-05-27 | End: 2023-05-27

## 2023-05-27 RX ORDER — CEFDINIR 300 MG/1
300 CAPSULE ORAL
COMMUNITY
Start: 2023-05-11 | End: 2023-09-10

## 2023-05-27 NOTE — PROGRESS NOTES
Assessment & Plan     (R82.81) Pyuria  (primary encounter diagnosis)  (R31.9) Hematuria, unspecified type  (R82.71) Bacteriuria    UA shows 10-25 wbc and >100 rbc with moderate bacteria  Will defer possible treatment to urology at [place]  Advised patient to call urology for advice as to whether to treat this as a new infection with a different antibiotic.  Urine culture pending        No follow-ups on file.    Kvng Escoto MD  [facility]    Subjective     Maggie Navarrete is a 73 year old year old female who presents to clinic today for the following health issues:    Patient presents with:  Urinary Problem: Had bladder surgery recently on 05/09/2023, had a stent placed, is concerned about a UTI  Hematuria  Urge Incontinence  Abdominal Pain: Lower right side    This is a 72 yo female with uterine leiomyosarcoma tumor debulking 3 weeks ago.  Had urinary catheter removed 2 weeks ago.  Has had hematuria since.  No significant urgency of frequency of urination, no fever/n/v/d/back pain.  Otherwise feels ok.  Wonders if she has a uti.  Has been taking omnicef 300 mg daily since surgery.    Patient Active Problem List   Diagnosis     Presbyopia - Both Eyes     Osteopenia     Generalized anxiety disorder     Vaginal atrophy     Leiomyosarcoma of uterus (H)     Chemotherapy-induced neutropenia (H)     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)     Overweight     Leiomyosarcoma (H)     Ptosis     Dry eyes     Closed nondisplaced fracture of fifth right metatarsal bone     History of deep venous thrombosis     Hypothyroidism     Myopia of both eyes     Neuropathy, peripheral     Post-operative nausea and vomiting     Osteoporosis of forearm     Low TSH level     Arthralgia, unspecified joint     Dyslipidemia     Small bowel obstruction (H)     Intractable vomiting with nausea, unspecified vomiting type     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)     Leukocytosis      Pelvic somatic dysfunction     Mixed incontinence     Urge incontinence     S/P total abdominal hysterectomy and bilateral salpingo-oophorectomy     S/P partial thyroidectomy     History of COVID-19     Temporomandibular joint disorder       Current Outpatient Medications   Medication     CALCIUM PO     carboxymethylcellulose PF (REFRESH PLUS) 0.5 % ophthalmic solution     cefdinir (OMNICEF) 300 MG capsule     ELIQUIS ANTICOAGULANT 2.5 MG tablet     famotidine (PEPCID) 10 MG tablet     levothyroxine (SYNTHROID/LEVOTHROID) 75 MCG tablet     multivitamin w/minerals (THERA-VIT-M) tablet     venlafaxine (EFFEXOR XR) 37.5 MG 24 hr capsule     zolpidem (AMBIEN) 5 MG tablet     Current Facility-Administered Medications   Medication     denosumab (PROLIA) injection 60 mg     denosumab (PROLIA) injection 60 mg       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 She has never used smokeless tobacco. She reports current alcohol use. She reports that she does not use drugs.    Family History   Problem Relation Age of Onset     Family History Negative Mother         glaucoma     Glaucoma Mother      Anxiety Disorder Mother      Heart Failure Mother      Diabetes Mother       Coronary Artery Disease Mother      Hypertension Mother      Breast Cancer Mother      Depression Mother      Obesity Mother      Heart Disease Father      Glaucoma Father      Diabetes Father      Coronary Artery Disease Father      Depression Father      Osteoporosis Father      Heart Disease Maternal Grandmother      Diabetes Maternal Grandmother      Heart Disease Paternal Grandmother      Cancer Paternal Grandfather         stomach     Thyroid Disease Sister      Retinal detachment Sister      Hypertension Sister      Hypertension Sister      Lipids Sister      Cerebrovascular Disease Sister      Depression Sister      Anxiety Disorder Sister      Depression Sister      Thyroid Disease Sister      Thyroid Disease Sister      Heart Disease Paternal Aunt      Heart Disease Paternal Uncle      Osteoporosis Other        Review of Systems  Constitutional, HEENT, cardiovascular, pulmonary, GI, , musculoskeletal, neuro, skin, endocrine and psych systems are negative, except as otherwise noted.      Objective    /72 (BP Location: Right arm, Patient Position: Sitting, Cuff Size: Adult Large)   Pulse 98   Temp 97.7  F (36.5  C) (Tympanic)   SpO2 100%   Physical Exam   GENERAL: healthy, alert and no distress  EYES: Eyes grossly normal to inspection, PERRL and conjunctivae and sclerae normal  HENT: ear canals and TM's normal, nose and mouth without ulcers or lesions  NECK: no adenopathy, no asymmetry, masses, or scars and thyroid normal to palpation  RESP: lungs clear to auscultation - no rales, rhonchi or wheezes  CV: regular rate and rhythm, normal S1 S2, no S3 or S4, no murmur, click or rub, no peripheral edema and peripheral pulses strong  ABDOMEN: soft, nontender, no hepatosplenomegaly, no masses and bowel sounds normal  MS: no gross musculoskeletal defects noted, no edema  SKIN: no suspicious lesions or rashes  NEURO: Normal strength and tone, mentation intact and speech normal  PSYCH: mentation appears  normal, affect normal/bright    Results for orders placed or performed in visit on 05/27/23   UA Macroscopic with reflex to Microscopic and Culture     Status: Abnormal    Specimen: Urine, Midstream   Result Value Ref Range    Color Urine Brown (A) Colorless, Straw, Light Yellow, Yellow    Appearance Urine Cloudy (A) Clear    Glucose Urine Negative Negative mg/dL    Bilirubin Urine Negative Negative    Ketones Urine Negative Negative mg/dL    Specific Gravity Urine 1.025 1.003 - 1.035    Blood Urine Large (A) Negative    pH Urine 5.5 5.0 - 7.0    Protein Albumin Urine 100 (A) Negative mg/dL    Urobilinogen Urine 0.2 0.2, 1.0 E.U./dL    Nitrite Urine Negative Negative    Leukocyte Esterase Urine Small (A) Negative   UA Microscopic with Reflex to Culture     Status: Abnormal   Result Value Ref Range    Bacteria Urine Moderate (A) None Seen /HPF    RBC Urine >100 (A) 0-2 /HPF /HPF    WBC Urine 10-25 (A) 0-5 /HPF /HPF

## 2023-05-29 LAB — BACTERIA UR CULT: NORMAL

## 2023-06-22 DIAGNOSIS — F41.1 GENERALIZED ANXIETY DISORDER: ICD-10-CM

## 2023-06-22 RX ORDER — VENLAFAXINE HYDROCHLORIDE 37.5 MG/1
37.5 CAPSULE, EXTENDED RELEASE ORAL DAILY
Qty: 90 CAPSULE | Refills: 0 | Status: SHIPPED | OUTPATIENT
Start: 2023-06-22 | End: 2023-11-07

## 2023-07-19 NOTE — LETTER
10/11/2021       RE: Maggie Navarrete  [address]  [facility] [address]     Dear Colleague,    Thank you for referring your patient, Maggie Navarrete, to the [facility] [place] [facility]. Please see a copy of my visit note below.    ASSESSMENT:  This is a 71 PM female with osteopenia by T scores - she has several risk factors - she has been on alendronate in the past - last time 4 yrs ago - her recent DXA shows loss in the spine - hips are stable - her FRAX is moderate risk but it does not include the spine.  We discussed calcium and vit D.  She would benefit from going back on alendronate and repeat the DXA in 2 yrs.  She apparently did not start an aromatase inhibitor.     PLAN:  Start alendronate 70mg/wk   Let me know if you get stomach burning  Patient should take 1200mg of calcium/day in divided doses - diet and all supplements  and vitamin D3 1000IU/day.  DEXA in 8/2023   See me 1 yr   Watch your blood pressure - goal is 130/80 or less       Thank you for allowing me to participate in the care of your patient.  Please do not hesitate to call with questions or concerns.    Sincerely,    Kristine Flynn MD, PhD      Time note (e5, 40'): The total time (on the date of service) for this service was >60 minutes, including discussion/face-to-face, chart review, interpretation not otherwise reported, documentation, and updating of the computerized record.            Maggie is a  71 year old female post menopausal] [GR1, P1] that presents today for osteoporosis follow up patient was last seen 8/2020. She took alendronate 35mg for 10 yrs and had a 1 yr drug holiday  and then re-started alendronate in 2013 35mg from 5400-7766. Had dental work fall 2017  so stopped alendronate.  Has been off meds now 4yr. Last DXA was 9/2018 - there was some thought about an aromatase inhibitor for leiomyoma sarcoma and IV reclast  - but she is  not on it.   Referring Physician: Referred  [facility]      HPI     Have you ever had a bone density test? Yes  Where = [facility]  When = 8/2021 8/2020 9/2018,2017,2015,2013,2011,2009   Spine Tscore = L1-3  -2.4  LOSS -6.5%  Left neck Tscore = -1,9  Total left hip Tscore = -1.5  Right neck Tscore = -2.3   Total Right hip Tscore = -1.9  Have you received any x-ray dye or contrast in the last ten days? No  How many servings of dairy products do you consume per day? 3 Type: milk - 3 glasses/day  [facility]   Do you take a multi-vitamin daily? No  Do you take a vitamin D supplement? Yes in winter   Do you take a calcium supplement daily?  Calcium citrate 315mg  1 /day if misses a glass of milk  Do you take a supplement containing strontium? No  Are you exposed to natural sunlight at least 20 minutes three times a week? Yes    Social History   reports that she has never smoked. She has never used smokeless tobacco. She reports current alcohol use. She reports that she does not use drugs.  Do you smoke cigarettes? No  Do you exercise? Yes. Details: walking every day 45 minutes  - strength class   Do you drink alcohol? Rarely     Medication History  Have you used any of the following medications?   Actonel (Risedronate): No   Aredia (Pamidronate): No   Boniva (Ibindronate): No   Didronil (Etidronate): No   Evista (Raloxifene): No   Fosamax (Alendronate): in the past 8229-4700  35mg/wk      Forteo (Parathyroid hormone) injections: No   HCTZ (Thiazide): No   Calcitonin nasal spray: No   Reclast or Zometa (Zolendronate): No   Prolia (Denosumab): No    Current Outpatient Medications   Medication Sig Dispense Refill     levothyroxine (SYNTHROID/LEVOTHROID) 75 MCG tablet Take 1 tablet (75 mcg) by mouth daily 90 tablet 3     venlafaxine (EFFEXOR-XR) 75 MG 24 hr capsule Take 1 capsule (75 mg) by mouth daily 90 capsule 1     zolpidem (AMBIEN) 5 MG tablet Take 1 tablet (5 mg) by mouth nightly as needed for sleep (Only uses if  "traveling or cannot sleep,) 20 tablet 0          Allergies   Allergen Reactions     Erythromycin GI Disturbance     Tramadol Nausea       Past Medical History    Family History   Problem Relation Age of Onset     Family History Negative Mother         glaucoma     Glaucoma Mother      Anxiety Disorder Mother      Heart Failure Mother      Diabetes Mother      Coronary Artery Disease Mother      Hypertension Mother      Breast Cancer Mother      Depression Mother      Obesity Mother      Heart Disease Father      Glaucoma Father      Diabetes Father      Coronary Artery Disease Father      Depression Father      Osteoporosis Father      Heart Disease Maternal Grandmother      Diabetes Maternal Grandmother      Heart Disease Paternal Grandmother      Cancer Paternal Grandfather         stomach     Thyroid Disease Sister      Retinal detachment Sister      Hypertension Sister      Hypertension Sister      Lipids Sister      Cerebrovascular Disease Sister      Depression Sister      Heart Disease Paternal Aunt      Heart Disease Paternal Uncle      Anxiety Disorder Sister      Depression Sister      Thyroid Disease Sister      Thyroid Disease Sister      Osteoporosis Other        ROS:  General: weight gain  Head/Eyes: hair loss  Ears/Nose/Throat: none  Cardiovascular: none  Respiratory: none  Gastrointestinal: none  Breast: none  Genitourinary: incontinence and vaginal dryness  Sexual Function: none  Musculoskeletal: joint pain  Skin: none  Neurological: numbness/tingling  Mental Health: none  Endocrine: temperature intolerance    Clinic Measurements  Vitals: BP (!) 161/74   Pulse 84   Ht 1.511 m (4' 11.5\")   Wt 66.6 kg (146 lb 14.4 oz)   BMI 29.17 kg/m    BMI= Body mass index is 29.17 kg/m .    Physical exam  Constitutional: Well appearing woman in no acute distress.   Psychological: appropriate mood.  Neck: No thyroidmegaly.  no carotid bruits.  Cardiovascular: regular rate and rhythm, normal S1 and S2, no " murmurs, rubs or gallops, peripheral pulses full and symmetric   Respiratory: clear to auscultation, no wheezes or crackles, normal breath sounds.  Musculoskeletal: full range of motion, no edema and motor strength is equal in the upper and lower extremities    Spine: Straight, not tender, Flexion good, Extension good, Lateral movement good, Rotational movement adequate  Skin: no concerning lesions, no jaundice.  Neurological: cranial nerves intact, normal strength, reflexes at patella and biceps normal, normal gait, no tremor.     LAB  Vertebra; Fracture Assessment: NA  Dexa Scan: 8/2021    FRAX Assessment Tool:  11% for 10 risk Major Osteoporotic, 2.2% for 10 risk of Hip Fracture]  Risk Factors: Risk Factors:  age, sex, post-menopausal, chemo therapy, hypothyroidism, +FHx    DXA reviewed and old notes.   Kristine Flynn MD, PhD       Chart(s)/Other

## 2023-07-21 ENCOUNTER — TRANSFERRED RECORDS (OUTPATIENT)
Dept: HEALTH INFORMATION MANAGEMENT | Facility: CLINIC | Age: 73
End: 2023-07-21
Payer: MEDICARE

## 2023-08-09 ENCOUNTER — PATIENT OUTREACH (OUTPATIENT)
Dept: CARE COORDINATION | Facility: CLINIC | Age: 73
End: 2023-08-09
Payer: MEDICARE

## 2023-08-25 ENCOUNTER — TELEPHONE (OUTPATIENT)
Dept: FAMILY MEDICINE | Facility: CLINIC | Age: 73
End: 2023-08-25
Payer: MEDICARE

## 2023-08-25 NOTE — TELEPHONE ENCOUNTER
Patient Returning Call    Reason for call:  Patient would like to be put on waiting list for any sooner appt dates.    Information relayed to patient:  message sent     Patient has additional questions:  No    What are your questions/concerns:  Sooner appt options     Could we send this information to you in [employer] or would you prefer to receive a phone call?:   Patient would prefer a phone call   Okay to leave a detailed message?: Yes at Home number on file [phone] (home)

## 2023-08-25 NOTE — TELEPHONE ENCOUNTER
Try to use my approval required slot  If that is not available then okay to use one of my virtual slot on Friday at 1:00  Dr.Nasima Alexandra MD

## 2023-08-28 DIAGNOSIS — E03.9 ACQUIRED HYPOTHYROIDISM: ICD-10-CM

## 2023-08-28 RX ORDER — LEVOTHYROXINE SODIUM 75 UG/1
TABLET ORAL
Qty: 90 TABLET | Refills: 0 | Status: SHIPPED | OUTPATIENT
Start: 2023-08-28 | End: 2023-12-01

## 2023-08-30 ENCOUNTER — TRANSFERRED RECORDS (OUTPATIENT)
Dept: HEALTH INFORMATION MANAGEMENT | Facility: CLINIC | Age: 73
End: 2023-08-30
Payer: MEDICARE

## 2023-09-01 ENCOUNTER — ANCILLARY PROCEDURE (OUTPATIENT)
Dept: BONE DENSITY | Facility: CLINIC | Age: 73
End: 2023-09-01
Attending: INTERNAL MEDICINE
Payer: MEDICARE

## 2023-09-01 DIAGNOSIS — M81.0 AGE-RELATED OSTEOPOROSIS WITHOUT CURRENT PATHOLOGICAL FRACTURE: ICD-10-CM

## 2023-09-01 DIAGNOSIS — Z78.0 MENOPAUSE: ICD-10-CM

## 2023-09-01 DIAGNOSIS — Z92.29 HISTORY OF BISPHOSPHONATE THERAPY: ICD-10-CM

## 2023-09-01 PROCEDURE — 77080 DXA BONE DENSITY AXIAL: CPT

## 2023-09-05 ENCOUNTER — LAB (OUTPATIENT)
Dept: LAB | Facility: CLINIC | Age: 73
End: 2023-09-05
Payer: MEDICARE

## 2023-09-05 DIAGNOSIS — Z78.0 MENOPAUSE: ICD-10-CM

## 2023-09-05 DIAGNOSIS — Z92.29 HISTORY OF BISPHOSPHONATE THERAPY: ICD-10-CM

## 2023-09-05 DIAGNOSIS — M81.0 AGE-RELATED OSTEOPOROSIS WITHOUT CURRENT PATHOLOGICAL FRACTURE: ICD-10-CM

## 2023-09-05 LAB
ANION GAP SERPL CALCULATED.3IONS-SCNC: 10 MMOL/L (ref 7–15)
BUN SERPL-MCNC: 24.2 MG/DL (ref 8–23)
CALCIUM SERPL-MCNC: 9.3 MG/DL (ref 8.8–10.2)
CHLORIDE SERPL-SCNC: 107 MMOL/L (ref 98–107)
CREAT SERPL-MCNC: 0.82 MG/DL (ref 0.51–0.95)
DEPRECATED HCO3 PLAS-SCNC: 26 MMOL/L (ref 22–29)
GFR SERPL CREATININE-BSD FRML MDRD: 75 ML/MIN/1.73M2
GLUCOSE SERPL-MCNC: 111 MG/DL (ref 70–99)
POTASSIUM SERPL-SCNC: 4.1 MMOL/L (ref 3.4–5.3)
SODIUM SERPL-SCNC: 143 MMOL/L (ref 136–145)

## 2023-09-05 PROCEDURE — 80048 BASIC METABOLIC PNL TOTAL CA: CPT

## 2023-09-05 PROCEDURE — 82306 VITAMIN D 25 HYDROXY: CPT

## 2023-09-05 PROCEDURE — 36415 COLL VENOUS BLD VENIPUNCTURE: CPT

## 2023-09-06 LAB — DEPRECATED CALCIDIOL+CALCIFEROL SERPL-MC: 45 UG/L (ref 20–75)

## 2023-09-07 DIAGNOSIS — M81.0 AGE-RELATED OSTEOPOROSIS WITHOUT CURRENT PATHOLOGICAL FRACTURE: Primary | ICD-10-CM

## 2023-09-07 DIAGNOSIS — Z92.29 HISTORY OF BISPHOSPHONATE THERAPY: ICD-10-CM

## 2023-09-09 ENCOUNTER — NURSE TRIAGE (OUTPATIENT)
Dept: NURSING | Facility: CLINIC | Age: 73
End: 2023-09-09
Payer: MEDICARE

## 2023-09-09 DIAGNOSIS — U07.1 INFECTION DUE TO 2019 NOVEL CORONAVIRUS: Primary | ICD-10-CM

## 2023-09-09 NOTE — TELEPHONE ENCOUNTER
Pt is phoning stating that she just tested positive for COVID via a home test 09/09/2023    Pt has a sore throat, headache, runny nose and fatigue - symptoms started Thursday 09/07/2023    Temperature 98.4 - orally     Per disposition: Call PCP Now     Pt is having mild symptoms at this time and no breathing difficulty     Pt is interested Paxlovid       COVID Positive/Requesting COVID treatment    Patient is positive for COVID and requesting treatment options.    Date of positive COVID test (PCR or at home)09/09/2023  Current COVID symptoms: cough, fatigue, muscle or body aches, headache, sore throat, and congestion or runny nose  Date COVID symptoms began: 09/07/2023    Message should be routed to clinic RN pool. Best phone number to use for call back [phone]    Care advice given per protocol and when to call back. Pt verbalized understanding and agrees to plan of care.    Jody Guidry RN  [facility]  12:31 PM 9/9/2023      Reason for Disposition   [1] HIGH RISK for severe COVID complications (e.g., weak immune system, age > 64 years, obesity with BMI > 25, pregnant, chronic lung disease or other chronic medical condition) AND [2] COVID symptoms (e.g., cough, fever)  (Exceptions: Already seen by PCP and no new or worsening symptoms.)    Additional Information   Negative: SEVERE difficulty breathing (e.g., struggling for each breath, speaks in single words)   Negative: Difficult to awaken or acting confused (e.g., disoriented, slurred speech)   Negative: Bluish (or gray) lips or face now   Negative: Shock suspected (e.g., cold/pale/clammy skin, too weak to stand, low BP, rapid pulse)   Negative: Sounds like a life-threatening emergency to the triager   Negative: [1] Diagnosed or suspected COVID-19 AND [2] symptoms lasting 3 or more weeks   Negative: [1] COVID-19 exposure AND [2] no symptoms   Negative: COVID-19 vaccine reaction suspected (e.g., fever, headache, muscle aches) occurring 1 to 3  days after getting vaccine   Negative: COVID-19 vaccine, questions about   Negative: [1] Lives with someone known to have influenza (flu test positive) AND [2] flu-like symptoms (e.g., cough, runny nose, sore throat, SOB; with or without fever)   Negative: [1] Adult with possible COVID-19 symptoms AND [2] triager concerned about severity of symptoms or other causes   Negative: COVID-19 and breastfeeding, questions about   Negative: SEVERE or constant chest pain or pressure  (Exception: Mild central chest pain, present only when coughing.)   Negative: MODERATE difficulty breathing (e.g., speaks in phrases, SOB even at rest, pulse 100-120)   Negative: [1] Headache AND [2] stiff neck (can't touch chin to chest)   Negative: Oxygen level (e.g., pulse oximetry) 90 percent or lower   Negative: Chest pain or pressure   Negative: Patient sounds very sick or weak to the triager   Negative: MILD difficulty breathing (e.g., minimal/no SOB at rest, SOB with walking, pulse <100)   Negative: Fever > 103 F (39.4 C)   Negative: [1] Fever > 101 F (38.3 C) AND [2] age > 60 years   Negative: [1] Fever > 100.0 F (37.8 C) AND [2] bedridden (e.g., nursing home patient, CVA, chronic illness, recovering from surgery)    Protocols used: Coronavirus (COVID-19) Diagnosed or [id number]

## 2023-09-10 ENCOUNTER — NURSE TRIAGE (OUTPATIENT)
Dept: NURSING | Facility: CLINIC | Age: 73
End: 2023-09-10

## 2023-09-10 NOTE — TELEPHONE ENCOUNTER
Maggie received a call today and is returning that call.    I'm unsure what the call was related to. I found a note written today by Dr Morris so will route to her high priority.    Patient wondering if this is related to her new diagnosis of Covid and stated if it is, she would like to get started on Paxlovid as soon as possible.    Maggie would like a call back. I advised she keep her phone close by.  Maggie gave her consent to have a detailed message left if she doesn't answer.    [facility] [address] in [place] is preferred pharmacy.    Patient is on a medication that she doesn't think Dr Morris is aware of.  Anastrozole (arimidex)  Just recently started this. It is for her leiomyosarcoma.      Rosio FARRELL RN [facility]

## 2023-09-11 NOTE — TELEPHONE ENCOUNTER
I spoke to the patient she is not on Eliquis  I prescribed Paxlovid  started her symptoms on Thursday  And was having mild to moderate symptoms which are improving   not too bad   discussed the symptomatic treatment  Checked interactions  She is not taking Eliquis  Dr.Nasima Alexandra MD

## 2023-09-12 ENCOUNTER — TELEPHONE (OUTPATIENT)
Dept: ENDOCRINOLOGY | Facility: CLINIC | Age: 73
End: 2023-09-12
Payer: MEDICARE

## 2023-09-12 NOTE — TELEPHONE ENCOUNTER
sent pt a [employer] message to reschedule the [facility] appt. Can be the Wed before or the Friday after.

## 2023-09-12 NOTE — TELEPHONE ENCOUNTER
----- Message from Joni Mcclain RN sent at 9/12/2023  8:57 AM CDT -----  Regarding: Prolia Reschedule  Hi ,    Can you reach out to this patient to reschedule her Prolia injection to either Wed or Fri on the week that she is currently scheduled? I called her but she didn't answer.    Thank you,  Don

## 2023-09-14 ENCOUNTER — OFFICE VISIT (OUTPATIENT)
Dept: ENDOCRINOLOGY | Facility: CLINIC | Age: 73
End: 2023-09-14
Payer: MEDICARE

## 2023-09-14 DIAGNOSIS — M81.0 AGE-RELATED OSTEOPOROSIS WITHOUT CURRENT PATHOLOGICAL FRACTURE: Primary | ICD-10-CM

## 2023-09-14 DIAGNOSIS — Z92.29 HISTORY OF BISPHOSPHONATE THERAPY: ICD-10-CM

## 2023-09-14 PROCEDURE — 99213 OFFICE O/P EST LOW 20 MIN: CPT | Performed by: INTERNAL MEDICINE

## 2023-09-14 NOTE — PROGRESS NOTES
Recent issues:  Osteoporosis follow-up evaluation  Diagnosis of uterine leiomyosarcoma cancer, GYN surgeries in [id number],   [facility] evaluations, started anastrazole aromatase inhibitor   Recent COVID-19 illness, took paxlovid, feeling better        History of osteopenia  Previous medical evaluations at [facility]   Has seen practitioners at [facility], had seen Dr. Eid  Took estrogen HRT for several years  Began medication treatment for osteopenia   Took alendronate 35 mg weekly, reported treatment course 10 years   1-yr drug holiday, then restarted [id number].   2017. Discontinued alendronate for dental work   Subsequently restarted alendronate treatment    Previous DEXA scans include: 8/20/09, 9/14/11, 5/2/13, 5/6/15, 5/25/17, 9/5/18, 8/20/20,  8/30/21 DEXA:   L1-4   T-score: -2.1   L3   T-score: -2.8   Left fem neck:  T-score: -1.9   Right fem neck: T-score: -2.3  Comparison trend data:   Lumbar spine:      Hips:         Previous [facility] labs include:     Lab Test 05/10/22  1608 04/21/22  1416 03/24/22  1139 12/30/21  0742 12/29/21  0922 11/04/21  1307 08/05/21  0947 06/27/20  0028 07/29/19  1509   SUSAN 8.7 8.8 9.1 8.6 8.1*   < >  --    < > 9.4   VITDT  --   --   --   --   --   --  45  --  42    < > = values in this interval not displayed.     Osteoporosis med use:   Alendronate 35 mg/wk 5/2013-12/2017  Alendronate 70 mg/wk 10/2021-present    Health history includes:  Menopause:   Age 52   Bone fractures:  5th metatarsal  Vit D deficiency:  no    Kidney stones:  Yes? Had acute abdomen vs flank pain which resolved  Steroid med use:  Short course years ago, also knee cortisone injections   Supplements:    Calcium tablet and/or MVI occasionally      May take a vitamin D pill in winter months       Dairy intake:   Drinks milk 3 gl/maria   FamHx osteoporosis:  Father, PA, sister        7/20/22 Initial endocrinology evaluation with me at [place]  Reviewed health history and osteoporosis issues.  9/2022. Began  Prolia treatment plan    9/1/23 DEXA:  L1-L3   T-score -1.9     Left Femur neck T-score -1.9   Right Femur neck T-score -2.4    Recent [facility] labs include:  Lab Results   Component Value Date     09/05/2023    POTASSIUM 4.1 09/05/2023    CHLORIDE 107 09/05/2023    CO2 26 09/05/2023    ANIONGAP 10 09/05/2023     (H) 09/05/2023    BUN 24.2 (H) 09/05/2023    CR 0.82 09/05/2023    GFRESTIMATED 75 09/05/2023    GFRESTBLACK 75 04/28/2021    SUSAN 9.3 09/05/2023    TSH 1.58 12/19/2022    VITDT 45 09/05/2023    PTHI 48 07/25/2022     Prolia treatments: 9/22/22, 3/23/23        Lives in [place], [place], 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 [facility]  Sees Dr. Vandana Morris/[facility] for general medicine evaluations.    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    GERD (gastroesophageal reflux disease)     Hx of previous reproductive problem 1980    Kidney stone 2011    Leiomyosarcoma (H) 11/14/2016    Personal history of colonic polyps 2016    Small, benign    PONV (postoperative nausea and vomiting)     Posterior vitreous detachment of left eye 2011    Posterior vitreous detachment of right eye 2011    Ptosis of eyelid, bilateral     Sepsis (H) 07/2020    Stomach problems 2015    Abdominal pain, Diarreha    Thyroid disease     thyroid nodule resolved 2014     Past Surgical History:   Procedure Laterality Date    ABDOMEN SURGERY  Now    Pain, diarrhea    BREAST SURGERY  2002    right breast benign    COLONOSCOPY  2008    due in 2018    COLONOSCOPY N/A 09/08/2016    Procedure: COMBINED COLONOSCOPY, SINGLE OR MULTIPLE BIOPSY/POLYPECTOMY BY BIOPSY;  Surgeon: Abner Pepper MD;  Location:  GI    COLONOSCOPY N/A 10/18/2021    Procedure: COLONOSCOPY;  Surgeon: Jamila Villarreal MD;  Location: [facility] OR     ENT SURGERY  1975    tonsillectomy    GENITOURINARY SURGERY  1953    bladder surgery     GI SURGERY  2015    Severe constipation    GYN SURGERY  1977    C section    GYN SURGERY  1981    laparoscopy    GYN SURGERY  2007    myomectomy    hysterecomy  11/14/2016    leiomyosarcoma    OTHER SURGICAL HISTORY Right 10/27/2017    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    REMOVE PORT VASCULAR ACCESS Right 05/09/2017    Procedure: REMOVE PORT VASCULAR ACCESS;  Right Port Removal;  Surgeon: Eric Gibson PA-C;  Location: [facility] OR    REPAIR PTOSIS BILATERAL  02/15/2012    Procedure:REPAIR PTOSIS BILATERAL; BILATERAL UPPER LID PTOSIS REPAIR; Surgeon:BRYCE REYNOLDS; Location:[facility]    TUMOR REMOVAL  06/2020       Family Hx:  Family History   Problem Relation Age of Onset    Family History Negative Mother         glaucoma    Glaucoma Mother     Anxiety Disorder Mother     Heart Failure Mother     Diabetes Mother     Coronary Artery Disease Mother     Hypertension Mother     Breast Cancer Mother     Depression Mother     Obesity Mother     Heart Disease Father     Glaucoma Father     Diabetes Father     Coronary Artery Disease Father     Depression Father     Osteoporosis Father     Heart Disease Maternal Grandmother     Diabetes Maternal Grandmother     Heart Disease Paternal Grandmother     Cancer Paternal Grandfather         stomach    Thyroid Disease Sister     Retinal detachment Sister     Hypertension Sister     Hypertension Sister     Lipids Sister     Cerebrovascular Disease Sister     Depression Sister     Anxiety Disorder Sister     Depression Sister     Thyroid Disease Sister     Thyroid Disease Sister     Heart Disease Paternal Aunt     Heart Disease Paternal Uncle     Osteoporosis Other          Social Hx:  Social History     Socioeconomic History    Marital status:      Spouse name: Not on file    Number of children: Not on file    Years of education: Not on  file    Highest education level: Not on file   Occupational History    Not on file   Tobacco Use    Smoking status: Never    Smokeless tobacco: Never   Vaping Use    Vaping Use: Never used   Substance and Sexual Activity    Alcohol use: Yes     Comment: Social, not often per patient.    Drug use: No    Sexual activity: Not Currently     Partners: Male   Other Topics Concern    Parent/sibling w/ CABG, MI or angioplasty before 65F 55M? 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 Walking daily.    How much calcium per day? Supplement and through diet       How much caffeine per day? 3 times a week    How much vitamin D per day? Supplement    Do you/your family wear seatbelts?  Yes    Do you/your family use safety helmets? NA    Do you/your family use sunscreen? Sometimes    Do you/your family keep firearms in the home? No    Do you/your family have a smoke detector(s)? Yes    Do you feel safe in your home? Yes    Has anyone ever touched you in an unwanted manner? 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  2 grandsons now in [place] with the mother.  In a house.  .          How much exercise per week? Daily walking, will start going to the y    How much calcium per day? Through foods       How much caffeine per day? One cup    How much vitamin D per  day? Through foods    Do you/your family wear seatbelts?  Yes    Do you/your family use safety helmets? N/a    Do you/your family use sunscreen? Yes    Do you/your family keep firearms in the home? No    Do you/your family have a smoke detector(s)? Yes        Reviewed by Nicki Cornejo 10-11-21         Social Determinants of Health     Financial Resource Strain: Not on file   Food Insecurity: Not on file   Transportation Needs: Not on file   Physical Activity: Not on file   Stress: Not on file   Social Connections: Not on file   Intimate Partner Violence: Not At Risk (11/19/2021)    Humiliation, Afraid, Rape, and Kick questionnaire     Fear of Current or Ex-Partner: No     Emotionally Abused: No     Physically Abused: No     Sexually Abused: No   Housing Stability: Not on file          MEDICATIONS:  has a current medication list which includes the following prescription(s): calcium, carboxymethylcellulose pf, levothyroxine, multivitamin w/minerals, nirmatrelvir and ritonavir, venlafaxine, and zolpidem, and the following Facility-Administered Medications: [START ON 9/21/2023] denosumab, denosumab, and denosumab.    ROS:     ROS: 10 point ROS neg other than the symptoms noted above in the HPI.    GENERAL: some fatigue, wt stable; denies fevers, chills, night sweats.    HEENT: no dysphagia, odonophagia, diplopia, neck pain  THYROID:  no apparent hyper or hypothyroid symptoms  CV: no chest pain, pressure, palpitations  LUNGS: no SOB, BARTLETT, cough, wheezing   ABDOMEN: heartburn; no diarrhea, constipation, abdominal pain  EXTREMITIES: no rashes, ulcers, edema  NEUROLOGY: no headaches, denies changes in vision, tingling, extremitiy numbness   MSK: back and knee pains; denies muscle weakness  SKIN: no rashes or lesions  : no menses, denies hot flashes  PSYCH:  stable mood, no significant anxiety or depression  ENDOCRINE: no heat or cold intolerance    Physical Exam (visual exam)  VS:  no vital signs taken for video  visit  CONSTITUTIONAL: healthy, alert and NAD, well dressed, answering questions appropriately  ENT: no nose swelling or nasal discharge, mouth redness or gum changes.  EYES: eyes grossly normal to inspection, conjunctivae and sclerae normal, no exophthalmos or proptosis  THYROID:  no apparent nodules or goiter  LUNGS: no audible wheeze, cough or visible cyanosis, no visible retractions or increased work of breathing  ABDOMEN: abdomen not evaluated  EXTREMITIES: no hand tremors, limited exam  NEUROLOGY: CN grossly intact, mentation intact and speech normal   SKIN:  no apparent skin lesions, rash, or edema with visualized skin appearance  PSYCH: mentation appears normal, affect normal/bright, judgement and insight intact,   normal speech and appearance well groomed      LABS:    All pertinent notes, labs, and images personally reviewed by me.     A/P:  Encounter Diagnoses   Name Primary?    Age-related osteoporosis without current pathological fracture Yes    History of bisphosphonate therapy      Comments:  Reviewed health history and osteoporosis issues.  Recent DEXA showed osteoporosis range bone thinning at L3, osteopenia thinning at lumbar spine and hips  Patient tolerating Prolia treatment well  Reviewed and interpreted tests that I previously ordered.   Ordered appropriate tests for the endocrinology disease management.    Management options discussed and implemented after shared medical decision making with the patient.  Osteoporosis problem is chronic-stable     Plan:  Discussed general issues with the osteoporosis diagnosis and management  Reviewed concept of using the DEXA scan imaging to assess bone mineral density (BMD)  Discussed terminology of the T-score  We discussed lab tests to assess for secondary causes of bone thinning  Reviewed treatment options with oral bisphosphonate, SERM, IV Boniva vs Reclast, SQ Prolia vs Forteo    Recommend:  Continue Prolia 60 mg subcutaneous injections every 6-months,  next dose late 9/2023  We have reviewed Prolia med, potential benefits and SE's, dosing  Continue calcium and vitamin D supplement use  Avoid heavy lifting and fall injuries  Repeat DEXA scan in 9/2025    Keep scheduled PCP and [facility] med onc appointments  Addressed patient questions today    There are no Patient Instructions on file for this visit.    Future labs ordered today: No orders of the defined types were placed in this encounter.    Radiology/Consults ordered today: None    Total time spent on day of encounter:  20 min    Follow-up:  9/2024, Return    MYKEL Valderrama MD, MS  Endocrinology  [facility]    CC: Vandana Morris

## 2023-09-14 NOTE — Clinical Note
9/14/2023         RE: Maggie Navarrete  [address]  [facility] [address]        Dear Colleague,    Thank you for referring your patient, Maggie Navarrete, to the [facility]. Please see a copy of my visit note below.      Recent issues:  Osteoporosis follow-up evaluation  Diagnosis of uterine leiomyosarcoma cancer, GYN surgeries in 1330-2251, [facility] evaluations, started anastrazole aromatase inhibitor   Recent COVID-19 illness, took paxlovid, feeling better        History of osteopenia  Previous medical evaluations at [facility]   Has seen practitioners at [facility] Women's clinic, had seen Dr. Eid  Took estrogen HRT for several years  Began medication treatment for osteopenia   Took alendronate 35 mg weekly, reported treatment course 10 years   1-yr drug holiday, then restarted [id number].   2017. Discontinued alendronate for dental work   Subsequently restarted alendronate treatment    Previous DEXA scans include: 8/20/09, 9/14/11, 5/2/13, 5/6/15, 5/25/17, 9/5/18, 8/20/20,  8/30/21 DEXA:   L1-4   T-score: -2.1   L3   T-score: -2.8   Left fem neck:  T-score: -1.9   Right fem neck: T-score: -2.3  Comparison trend data:   Lumbar spine:      Hips:         Previous [facility] labs include:     Lab Test 05/10/22  1608 04/21/22  1416 03/24/22  1139 12/30/21  0742 12/29/21  0922 11/04/21  1307 08/05/21  0947 06/27/20  0028 07/29/19  1509   SUSAN 8.7 8.8 9.1 8.6 8.1*   < >  --    < > 9.4   VITDT  --   --   --   --   --   --  45  --  42    < > = values in this interval not displayed.     Osteoporosis med use:   Alendronate 35 mg/wk 5/2013-12/2017  Alendronate 70 mg/wk 10/2021-present    Health history includes:  Menopause:   Age 52   Bone fractures:  5th metatarsal  Vit D deficiency:  no    Kidney stones:  Yes? Had acute abdomen vs flank pain which resolved  Steroid med use:  Short course years ago, also knee cortisone injections   Supplements:    Calcium tablet and/or MVI  occasionally      May take a vitamin D pill in winter months       Dairy intake:   Drinks milk 3 gl/maria   FamHx osteoporosis:  Father, PA, sister          7/20/22 Initial endocrinology evaluation with me at [place]  Reviewed health history and osteoporosis issues.  9/2022. Began Prolia treatment plan    9/1/23 DEXA:  L1-L3   T-score -1.9     Left Femur neck T-score -1.9   Right Femur neck T-score -2.4    Recent [facility] labs include:  Lab Results   Component Value Date     09/05/2023    POTASSIUM 4.1 09/05/2023    CHLORIDE 107 09/05/2023    CO2 26 09/05/2023    ANIONGAP 10 09/05/2023     (H) 09/05/2023    BUN 24.2 (H) 09/05/2023    CR 0.82 09/05/2023    GFRESTIMATED 75 09/05/2023    GFRESTBLACK 75 04/28/2021    SUSAN 9.3 09/05/2023    TSH 1.58 12/19/2022    VITDT 45 09/05/2023    PTHI 48 07/25/2022     Prolia treatments: 9/22/22, 3/23/23        Lives in [place], [place], 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 Vandana Morris/[facility] for general medicine evaluations.    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    GERD (gastroesophageal reflux disease)     Hx of previous reproductive problem 1980    Kidney stone 2011    Leiomyosarcoma (H) 11/14/2016    Personal history of colonic polyps 2016    Small, benign    PONV (postoperative nausea and vomiting)     Posterior vitreous detachment of left eye 2011    Posterior vitreous detachment of right eye 2011    Ptosis of eyelid, bilateral     Sepsis (H) 07/2020    Stomach problems 2015    Abdominal pain, Diarreha    Thyroid disease     thyroid nodule resolved 2014     Past Surgical History:   Procedure Laterality Date    ABDOMEN SURGERY  Now    Pain, diarrhea    BREAST SURGERY  2002    right breast benign    COLONOSCOPY  2008    due in  2018    COLONOSCOPY N/A 09/08/2016    Procedure: COMBINED COLONOSCOPY, SINGLE OR MULTIPLE BIOPSY/POLYPECTOMY BY BIOPSY;  Surgeon: Abner Pepper MD;  Location:  GI    COLONOSCOPY N/A 10/18/2021    Procedure: COLONOSCOPY;  Surgeon: Jamila Villarreal MD;  Location: [facility] OR    ENT SURGERY  1975    tonsillectomy    GENITOURINARY SURGERY  1953    bladder surgery     GI SURGERY  2015    Severe constipation    GYN SURGERY  1977    C section    GYN SURGERY  1981    laparoscopy    GYN SURGERY  2007    myomectomy    hysterecomy  11/14/2016    leiomyosarcoma    OTHER SURGICAL HISTORY Right 10/27/2017    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    REMOVE PORT VASCULAR ACCESS Right 05/09/2017    Procedure: REMOVE PORT VASCULAR ACCESS;  Right Port Removal;  Surgeon: Eric Gibson PA-C;  Location:  OR    REPAIR PTOSIS BILATERAL  02/15/2012    Procedure:REPAIR PTOSIS BILATERAL; BILATERAL UPPER LID PTOSIS REPAIR; Surgeon:BRYCE REYNOLDS; Location: [place]    TUMOR REMOVAL  06/2020       Family Hx:  Family History   Problem Relation Age of Onset    Family History Negative Mother         glaucoma    Glaucoma Mother     Anxiety Disorder Mother     Heart Failure Mother     Diabetes Mother     Coronary Artery Disease Mother     Hypertension Mother     Breast Cancer Mother     Depression Mother     Obesity Mother     Heart Disease Father     Glaucoma Father     Diabetes Father     Coronary Artery Disease Father     Depression Father     Osteoporosis Father     Heart Disease Maternal Grandmother     Diabetes Maternal Grandmother     Heart Disease Paternal Grandmother     Cancer Paternal Grandfather         stomach    Thyroid Disease Sister     Retinal detachment Sister     Hypertension Sister     Hypertension Sister     Lipids Sister     Cerebrovascular Disease Sister     Depression Sister     Anxiety Disorder Sister     Depression Sister     Thyroid Disease Sister     Thyroid  Disease Sister     Heart Disease Paternal Aunt     Heart Disease Paternal Uncle     Osteoporosis Other          Social Hx:  Social History     Socioeconomic History    Marital status:      Spouse name: Not on file    Number of children: Not on file    Years of education: Not on file    Highest education level: Not on file   Occupational History    Not on file   Tobacco Use    Smoking status: Never    Smokeless tobacco: Never   Vaping Use    Vaping Use: Never used   Substance and Sexual Activity    Alcohol use: Yes     Comment: Social, not often per patient.    Drug use: No    Sexual activity: Not Currently     Partners: Male   Other Topics Concern    Parent/sibling w/ CABG, MI or angioplasty before 65F 55M? 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 Walking daily.    How much calcium per day? Supplement and through diet       How much caffeine per day? 3 times a week    How much vitamin D per day? Supplement    Do you/your family wear seatbelts?  Yes    Do you/your family use safety helmets? NA    Do you/your family use sunscreen? Sometimes    Do you/your family keep firearms in the home? No    Do you/your family have a smoke detector(s)? Yes    Do you feel safe in your home? Yes    Has anyone ever touched you in an unwanted manner? 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  2 grandsons now in [place] with the mother.  In a house.  .          How much exercise per week? Daily walking, will start going to the y    How much calcium per day? Through foods       How much caffeine per day? One cup    How much vitamin D per day? Through foods    Do you/your family wear seatbelts?  Yes    Do you/your family use safety helmets? N/a    Do you/your family use sunscreen? Yes    Do you/your family keep firearms in the home? No    Do you/your family have a smoke detector(s)? Yes        Reviewed by Nicki Cornejo 10-11-21         Social Determinants of Health     Financial Resource Strain: Not on file   Food Insecurity: Not on file   Transportation Needs: Not on file   Physical Activity: Not on file   Stress: Not on file   Social Connections: Not on file   Intimate Partner Violence: Not At Risk (11/19/2021)    Humiliation, Afraid, Rape, and Kick questionnaire     Fear of Current or Ex-Partner: No     Emotionally Abused: No     Physically Abused: No     Sexually Abused: No   Housing Stability: Not on file          MEDICATIONS:  has a current medication list which includes the following prescription(s): calcium, carboxymethylcellulose pf, levothyroxine, multivitamin w/minerals, nirmatrelvir and ritonavir, venlafaxine, and zolpidem, and the following Facility-Administered Medications: [START ON 9/21/2023] denosumab, denosumab, and denosumab.    ROS:     ROS: 10 point ROS neg other than the symptoms noted above in the HPI.    GENERAL: some fatigue, wt stable; denies fevers, chills, night sweats.    HEENT: no dysphagia, odonophagia, diplopia, neck pain  THYROID:  no apparent hyper or hypothyroid symptoms  CV: no chest pain, pressure, palpitations  LUNGS: no SOB, BARTLETT, cough, wheezing   ABDOMEN: heartburn; no diarrhea, constipation, abdominal pain  EXTREMITIES: no rashes, ulcers, edema  NEUROLOGY: no headaches, denies changes in vision, tingling, extremitiy numbness    MSK: back and knee pains; denies muscle weakness  SKIN: no rashes or lesions  : no menses, denies hot flashes  PSYCH:  stable mood, no significant anxiety or depression  ENDOCRINE: no heat or cold intolerance    Physical Exam (visual exam)  VS:  no vital signs taken for video visit  CONSTITUTIONAL: healthy, alert and NAD, well dressed, answering questions appropriately  ENT: no nose swelling or nasal discharge, mouth redness or gum changes.  EYES: eyes grossly normal to inspection, conjunctivae and sclerae normal, no exophthalmos or proptosis  THYROID:  no apparent nodules or goiter  LUNGS: no audible wheeze, cough or visible cyanosis, no visible retractions or increased work of breathing  ABDOMEN: abdomen not evaluated  EXTREMITIES: no hand tremors, limited exam  NEUROLOGY: CN grossly intact, mentation intact and speech normal   SKIN:  no apparent skin lesions, rash, or edema with visualized skin appearance  PSYCH: mentation appears normal, affect normal/bright, judgement and insight intact,   normal speech and appearance well groomed      LABS:    All pertinent notes, labs, and images personally reviewed by me.     A/P:  Encounter Diagnoses   Name Primary?    Age-related osteoporosis without current pathological fracture Yes    History of bisphosphonate therapy          Comments:  Reviewed health history and osteoporosis issues.  Recent DEXA showed osteoporosis range bone thinning at L3, osteopenia thinning at lumbar spine and hips  Patient tolerating Prolia treatment well  Reviewed and interpreted tests that I previously ordered.   Ordered appropriate tests for the endocrinology disease management.    Management options discussed and implemented after shared medical decision making with the patient.  Osteoporosis problem is chronic-stable     Plan:  Discussed general issues with the osteoporosis diagnosis and management  Reviewed concept of using the DEXA scan imaging to assess bone mineral density  (BMD)  Discussed terminology of the T-score  We discussed lab tests to assess for secondary causes of bone thinning  Reviewed treatment options with oral bisphosphonate, SERM, IV Boniva vs Reclast, SQ Prolia vs Forteo    Recommend:  Continue Prolia 60 mg subcutaneous injections every 6-months, next dose late 9/2023  We have reviewed Prolia med, potential benefits and SE's, dosing  Continue calcium and vitamin D supplement use  Avoid heavy lifting and fall injuries  Repeat DEXA scan in 9/2025    Keep scheduled PCP and [facility] med onc appointments  Addressed patient questions today    There are no Patient Instructions on file for this visit.    Future labs ordered today: No orders of the defined types were placed in this encounter.    Radiology/Consults ordered today: None    Total time spent on day of encounter:  ***    Follow-up:  9/2024, Return    MYKEL Valderrama MD, MS  Endocrinology  [facility]    CC: Vandana Morris         Recent issues:  Osteoporosis follow-up evaluation  Diagnosis of uterine leiomyosarcoma cancer, GYN surgeries in [id number],   [facility] evaluations, started anastrazole aromatase inhibitor   Recent COVID-19 illness, took paxlovid, feeling better        History of osteopenia  Previous medical evaluations at [facility]   Has seen practitioners at [facility] Women's clinic, had seen Dr. Eid  Took estrogen HRT for several years  Began medication treatment for osteopenia   Took alendronate 35 mg weekly, reported treatment course 10 years   1-yr drug holiday, then restarted [id number].   2017. Discontinued alendronate for dental work   Subsequently restarted alendronate treatment    Previous DEXA scans include: 8/20/09, 9/14/11, 5/2/13, 5/6/15, 5/25/17, 9/5/18, 8/20/20,  8/30/21 DEXA:   L1-4   T-score: -2.1   L3   T-score: -2.8   Left fem neck:  T-score: -1.9   Right fem neck: T-score: -2.3  Comparison trend data:   Lumbar spine:      Hips:         Previous [facility] labs include:     Lab Test  05/10/22  1608 04/21/22  1416 03/24/22  1139 12/30/21  0742 12/29/21  0922 11/04/21  1307 08/05/21  0947 06/27/20  0028 07/29/19  1509   SUSAN 8.7 8.8 9.1 8.6 8.1*   < >  --    < > 9.4   VITDT  --   --   --   --   --   --  45  --  42    < > = values in this interval not displayed.     Osteoporosis med use:   Alendronate 35 mg/wk 5/2013-12/2017  Alendronate 70 mg/wk 10/2021-present    Health history includes:  Menopause:   Age 52   Bone fractures:  5th metatarsal  Vit D deficiency:  no    Kidney stones:  Yes? Had acute abdomen vs flank pain which resolved  Steroid med use:  Short course years ago, also knee cortisone injections   Supplements:    Calcium tablet and/or MVI occasionally      May take a vitamin D pill in winter months       Dairy intake:   Drinks milk 3 gl/maria   FamHx osteoporosis:  Father, PA, sister        7/20/22 Initial endocrinology evaluation with me at [place]  Reviewed health history and osteoporosis issues.  9/2022. Began Prolia treatment plan    9/1/23 DEXA:  L1-L3   T-score -1.9     Left Femur neck T-score -1.9   Right Femur neck T-score -2.4    Recent [facility] labs include:  Lab Results   Component Value Date     09/05/2023    POTASSIUM 4.1 09/05/2023    CHLORIDE 107 09/05/2023    CO2 26 09/05/2023    ANIONGAP 10 09/05/2023     (H) 09/05/2023    BUN 24.2 (H) 09/05/2023    CR 0.82 09/05/2023    GFRESTIMATED 75 09/05/2023    GFRESTBLACK 75 04/28/2021    SUSAN 9.3 09/05/2023    TSH 1.58 12/19/2022    VITDT 45 09/05/2023    PTHI 48 07/25/2022     Prolia treatments: 9/22/22, 3/23/23        Lives in [place], [place], previously worked with osteoporosis research at [employer]  Sees Dr. Vandana Morris/[facility] for general medicine evaluations.    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      GERD (gastroesophageal reflux disease)      Hx of previous reproductive problem 1980     Kidney stone 2011     Leiomyosarcoma (H) 11/14/2016     Personal history of colonic polyps 2016    Small, benign     PONV (postoperative nausea and vomiting)      Posterior vitreous detachment of left eye 2011     Posterior vitreous detachment of right eye 2011     Ptosis of eyelid, bilateral      Sepsis (H) 07/2020     Stomach problems 2015    Abdominal pain, Diarreha     Thyroid disease     thyroid nodule resolved 2014     Past Surgical History:   Procedure Laterality Date     ABDOMEN SURGERY  Now    Pain, diarrhea     BREAST SURGERY  2002    right breast benign     COLONOSCOPY  2008    due in 2018     COLONOSCOPY N/A 09/08/2016    Procedure: COMBINED COLONOSCOPY, SINGLE OR MULTIPLE BIOPSY/POLYPECTOMY BY BIOPSY;  Surgeon: Abner Pepper MD;  Location:  GI     COLONOSCOPY N/A 10/18/2021    Procedure: COLONOSCOPY;  Surgeon: Jamila Villarreal MD;  Location: [facility] OR     ENT SURGERY  1975    tonsillectomy     GENITOURINARY SURGERY  1953    bladder surgery      GI SURGERY  2015    Severe constipation     GYN SURGERY  1977    C section     GYN SURGERY  1981    laparoscopy     GYN SURGERY  2007    myomectomy     hysterecomy  11/14/2016    leiomyosarcoma     OTHER SURGICAL HISTORY Right 10/27/2017    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     REMOVE PORT VASCULAR ACCESS Right 05/09/2017    Procedure: REMOVE PORT VASCULAR ACCESS;  Right Port Removal;  Surgeon: Eric Gibson PA-C;  Location:  OR     REPAIR PTOSIS BILATERAL  02/15/2012    Procedure:REPAIR PTOSIS BILATERAL; BILATERAL UPPER LID PTOSIS REPAIR; Surgeon:BRYCE REYNOLDS; Location: [place]     TUMOR REMOVAL  06/2020       Family Hx:  Family History   Problem Relation Age of Onset     Family History Negative Mother         glaucoma     Glaucoma Mother      Anxiety Disorder Mother      Heart Failure Mother      Diabetes  Mother      Coronary Artery Disease Mother      Hypertension Mother      Breast Cancer Mother      Depression Mother      Obesity Mother      Heart Disease Father      Glaucoma Father      Diabetes Father      Coronary Artery Disease Father      Depression Father      Osteoporosis Father      Heart Disease Maternal Grandmother      Diabetes Maternal Grandmother      Heart Disease Paternal Grandmother      Cancer Paternal Grandfather         stomach     Thyroid Disease Sister      Retinal detachment Sister      Hypertension Sister      Hypertension Sister      Lipids Sister      Cerebrovascular Disease Sister      Depression Sister      Anxiety Disorder Sister      Depression Sister      Thyroid Disease Sister      Thyroid Disease Sister      Heart Disease Paternal Aunt      Heart Disease Paternal Uncle      Osteoporosis Other          Social Hx:  Social History     Socioeconomic History     Marital status:      Spouse name: Not on file     Number of children: Not on file     Years of education: Not on file     Highest education level: Not on file   Occupational History     Not on file   Tobacco Use     Smoking status: Never     Smokeless tobacco: Never   Vaping Use     Vaping Use: Never used   Substance and Sexual Activity     Alcohol use: Yes     Comment: Social, not often per patient.     Drug use: No     Sexual activity: Not Currently     Partners: Male   Other Topics Concern     Parent/sibling w/ CABG, MI or angioplasty before 65F 55M? 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 Walking daily.    How much calcium per day? Supplement and through diet       How much caffeine per day? 3 times a week    How much vitamin D per day? Supplement    Do you/your family wear seatbelts?  Yes    Do you/your family use safety helmets? NA    Do you/your family use sunscreen? Sometimes    Do you/your family keep firearms in the home? No    Do you/your family have a smoke detector(s)? Yes    Do you feel safe in your home? Yes    Has anyone ever touched you in an unwanted manner? 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  2 grandsons now in [place] with the mother.  In a house.  .          How much exercise per week? Daily walking, will start going to the y    How much calcium per day? Through foods       How much caffeine per day? One cup    How much vitamin D per day? Through foods    Do you/your family wear seatbelts?  Yes    Do you/your family use safety helmets? N/a    Do you/your family use sunscreen? Yes    Do you/your family keep firearms in the home? No    Do you/your family have a smoke detector(s)? Yes        Reviewed by Nicki Cornejo 10-11-21         Social Determinants of Health     Financial Resource Strain: Not on file   Food Insecurity: Not on file   Transportation Needs: Not on file   Physical Activity: Not on file   Stress: Not on file   Social Connections: Not on file   Intimate Partner Violence: Not At Risk (2021)    Humiliation, Afraid, Rape, and Kick questionnaire      Fear of Current or Ex-Partner: No      Emotionally Abused: No      Physically Abused: No      Sexually Abused: No   Housing Stability: Not on file          MEDICATIONS:  has a current medication list which includes the following prescription(s):  calcium, carboxymethylcellulose pf, levothyroxine, multivitamin w/minerals, nirmatrelvir and ritonavir, venlafaxine, and zolpidem, and the following Facility-Administered Medications: [START ON 9/21/2023] denosumab, denosumab, and denosumab.    ROS:     ROS: 10 point ROS neg other than the symptoms noted above in the HPI.    GENERAL: some fatigue, wt stable; denies fevers, chills, night sweats.    HEENT: no dysphagia, odonophagia, diplopia, neck pain  THYROID:  no apparent hyper or hypothyroid symptoms  CV: no chest pain, pressure, palpitations  LUNGS: no SOB, BARTLETT, cough, wheezing   ABDOMEN: heartburn; no diarrhea, constipation, abdominal pain  EXTREMITIES: no rashes, ulcers, edema  NEUROLOGY: no headaches, denies changes in vision, tingling, extremitiy numbness   MSK: back and knee pains; denies muscle weakness  SKIN: no rashes or lesions  : no menses, denies hot flashes  PSYCH:  stable mood, no significant anxiety or depression  ENDOCRINE: no heat or cold intolerance    Physical Exam (visual exam)  VS:  no vital signs taken for video visit  CONSTITUTIONAL: healthy, alert and NAD, well dressed, answering questions appropriately  ENT: no nose swelling or nasal discharge, mouth redness or gum changes.  EYES: eyes grossly normal to inspection, conjunctivae and sclerae normal, no exophthalmos or proptosis  THYROID:  no apparent nodules or goiter  LUNGS: no audible wheeze, cough or visible cyanosis, no visible retractions or increased work of breathing  ABDOMEN: abdomen not evaluated  EXTREMITIES: no hand tremors, limited exam  NEUROLOGY: CN grossly intact, mentation intact and speech normal   SKIN:  no apparent skin lesions, rash, or edema with visualized skin appearance  PSYCH: mentation appears normal, affect normal/bright, judgement and insight intact,   normal speech and appearance well groomed      LABS:    All pertinent notes, labs, and images personally reviewed by me.     A/P:  Encounter Diagnoses   Name  Primary?     Age-related osteoporosis without current pathological fracture Yes     History of bisphosphonate therapy      Comments:  Reviewed health history and osteoporosis issues.  Recent DEXA showed osteoporosis range bone thinning at L3, osteopenia thinning at lumbar spine and hips  Patient tolerating Prolia treatment well  Reviewed and interpreted tests that I previously ordered.   Ordered appropriate tests for the endocrinology disease management.    Management options discussed and implemented after shared medical decision making with the patient.  Osteoporosis problem is chronic-stable     Plan:  Discussed general issues with the osteoporosis diagnosis and management  Reviewed concept of using the DEXA scan imaging to assess bone mineral density (BMD)  Discussed terminology of the T-score  We discussed lab tests to assess for secondary causes of bone thinning  Reviewed treatment options with oral bisphosphonate, SERM, IV Boniva vs Reclast, SQ Prolia vs Forteo    Recommend:  Continue Prolia 60 mg subcutaneous injections every 6-months, next dose late 9/2023  We have reviewed Prolia med, potential benefits and SE's, dosing  Continue calcium and vitamin D supplement use  Avoid heavy lifting and fall injuries  Repeat DEXA scan in 9/2025    Keep scheduled PCP and [facility] med onc appointments  Addressed patient questions today    There are no Patient Instructions on file for this visit.    Future labs ordered today: No orders of the defined types were placed in this encounter.    Radiology/Consults ordered today: None    Total time spent on day of encounter:  20 min    Follow-up:  9/2024, Return    MYKEL Valderrama MD, MS  Endocrinology  [facility]    CC: Vandana Morris         Again, thank you for allowing me to participate in the care of your patient.        Sincerely,        Edgardo Valderrama MD

## 2023-09-22 ENCOUNTER — ANCILLARY PROCEDURE (OUTPATIENT)
Dept: MAMMOGRAPHY | Facility: CLINIC | Age: 73
End: 2023-09-22
Attending: INTERNAL MEDICINE
Payer: MEDICARE

## 2023-09-22 ENCOUNTER — TELEPHONE (OUTPATIENT)
Dept: FAMILY MEDICINE | Facility: CLINIC | Age: 73
End: 2023-09-22

## 2023-09-22 DIAGNOSIS — Z12.31 VISIT FOR SCREENING MAMMOGRAM: ICD-10-CM

## 2023-09-22 PROCEDURE — 77067 SCR MAMMO BI INCL CAD: CPT | Mod: GC | Performed by: STUDENT IN AN ORGANIZED HEALTH CARE EDUCATION/TRAINING PROGRAM

## 2023-09-22 PROCEDURE — 77063 BREAST TOMOSYNTHESIS BI: CPT | Mod: GC | Performed by: STUDENT IN AN ORGANIZED HEALTH CARE EDUCATION/TRAINING PROGRAM

## 2023-09-29 ENCOUNTER — TELEPHONE (OUTPATIENT)
Dept: FAMILY MEDICINE | Facility: CLINIC | Age: 73
End: 2023-09-29

## 2023-09-29 NOTE — TELEPHONE ENCOUNTER
Reason for Call:  Appointment Request    Patient requesting this type of appt:  Prolia Injection    Requested provider: MA/LPN/RN    Reason patient unable to be scheduled: Needs to be scheduled by clinic    When does patient want to be seen/preferred time:  Within reasonable time frame    Comments: Last Inj 3/23/23    Could we send this information to you in [employer] or would you prefer to receive a phone call?:   Patient would prefer a phone call   Okay to leave a detailed message?: Yes at Cell number on file:    Telephone Information:   Mobile [phone]       Call taken on 9/29/2023 at 2:26 PM by Dana Herrera

## 2023-10-03 ENCOUNTER — ALLIED HEALTH/NURSE VISIT (OUTPATIENT)
Dept: NURSING | Facility: CLINIC | Age: 73
End: 2023-10-03
Payer: MEDICARE

## 2023-10-03 DIAGNOSIS — M81.6 LOCALIZED OSTEOPOROSIS WITHOUT CURRENT PATHOLOGICAL FRACTURE: Primary | ICD-10-CM

## 2023-10-03 PROCEDURE — 99207 PR NO CHARGE NURSE ONLY: CPT

## 2023-10-03 PROCEDURE — 96372 THER/PROPH/DIAG INJ SC/IM: CPT | Performed by: INTERNAL MEDICINE

## 2023-10-03 NOTE — PROGRESS NOTES
Clinic Administered Medication Documentation    Prolia Documentation    Indication: Prolia  (denosumab) is a prescription medicine used to treat osteoporosis in patients who:   Are at high risk for fracture, meaning patients who have had a fracture related to osteoporosis, or who have multiple risk factors for fracture.  Cannot use another osteoporosis medicine or other osteoporosis medicines did not work well.  The timeline for early/late injections would be 4 weeks early and any time after the 6 month jose. If a patient receives their injection late, then the subsequent injection would be 6 months from the date that they actually received the injection.    When was the last injection? 3/23/2023  Was the last injection at least 6 months ago? Yes  Has the prior authorization been completed?  Yes  Is there an active order (written within the past 365 days, with administrations remaining, not ) in the chart?  Yes  Patient denies any dental work involving the bone (e.g. tooth extraction or dental implants) in the past 4 weeks?  Yes  Patient denies plans for any dental work involving the bone (e.g. tooth extraction or dental implants) in the next 4 weeks? Yes    The following steps were completed to comply with the REMS program for Prolia:  Reviewed information in the Medication Guide and Patient Counseling Chart, including the serious risks of Prolia  and the symptoms of each risk.  Advised patient to seek prompt medical attention if they have signs or symptoms of any of the serious risks.  Provided each patient a copy of the Medication Guide and Patient Brochure.    Prior to injection, verified patient identity using patient's name and date of birth. Medication was administered. Please see MAR and medication order for additional information. Patient instructed to remain in clinic for 15 minutes and report any adverse reaction to staff immediately.    Vial/Syringe: Syringe  Was this medication supplied by the  patient? No  Verified that the patient has refills remaining in their prescription.    Jo-Ann Alexis RN  [facility]

## 2023-11-02 ENCOUNTER — TRANSFERRED RECORDS (OUTPATIENT)
Dept: HEALTH INFORMATION MANAGEMENT | Facility: CLINIC | Age: 73
End: 2023-11-02
Payer: MEDICARE

## 2023-11-07 ENCOUNTER — TRANSCRIBE ORDERS (OUTPATIENT)
Dept: SURGERY | Facility: CLINIC | Age: 73
End: 2023-11-07
Payer: MEDICARE

## 2023-11-07 ENCOUNTER — OFFICE VISIT (OUTPATIENT)
Dept: FAMILY MEDICINE | Facility: CLINIC | Age: 73
End: 2023-11-07
Payer: MEDICARE

## 2023-11-07 ENCOUNTER — NURSE TRIAGE (OUTPATIENT)
Dept: FAMILY MEDICINE | Facility: CLINIC | Age: 73
End: 2023-11-07

## 2023-11-07 VITALS
WEIGHT: 140.6 LBS | HEIGHT: 59 IN | BODY MASS INDEX: 28.35 KG/M2 | SYSTOLIC BLOOD PRESSURE: 135 MMHG | TEMPERATURE: 97.7 F | HEART RATE: 55 BPM | OXYGEN SATURATION: 98 % | DIASTOLIC BLOOD PRESSURE: 81 MMHG | RESPIRATION RATE: 15 BRPM

## 2023-11-07 DIAGNOSIS — L60.0 INGROWING TOENAIL: Primary | ICD-10-CM

## 2023-11-07 DIAGNOSIS — H66.002 ACUTE SUPPURATIVE OTITIS MEDIA OF LEFT EAR WITHOUT SPONTANEOUS RUPTURE OF TYMPANIC MEMBRANE, RECURRENCE NOT SPECIFIED: Primary | ICD-10-CM

## 2023-11-07 DIAGNOSIS — M79.674 PAIN OF RIGHT GREAT TOE: ICD-10-CM

## 2023-11-07 PROCEDURE — 99213 OFFICE O/P EST LOW 20 MIN: CPT | Performed by: PHYSICIAN ASSISTANT

## 2023-11-07 RX ORDER — ANASTROZOLE 1 MG/1
1 TABLET ORAL
COMMUNITY
Start: 2023-08-03 | End: 2023-11-17

## 2023-11-07 RX ORDER — RESPIRATORY SYNCYTIAL VIRUS VACCINE 120MCG/0.5
0.5 KIT INTRAMUSCULAR ONCE
Qty: 1 EACH | Refills: 0 | Status: CANCELLED | OUTPATIENT
Start: 2023-11-07 | End: 2023-11-07

## 2023-11-07 RX ORDER — FLUOROMETHOLONE 0.1 %
SUSPENSION, DROPS(FINAL DOSAGE FORM)(ML) OPHTHALMIC (EYE)
COMMUNITY
Start: 2023-11-06 | End: 2024-03-01

## 2023-11-07 ASSESSMENT — PATIENT HEALTH QUESTIONNAIRE - PHQ9
SUM OF ALL RESPONSES TO PHQ QUESTIONS 1-9: 1
SUM OF ALL RESPONSES TO PHQ QUESTIONS 1-9: 1
10. IF YOU CHECKED OFF ANY PROBLEMS, HOW DIFFICULT HAVE THESE PROBLEMS MADE IT FOR YOU TO DO YOUR WORK, TAKE CARE OF THINGS AT HOME, OR GET ALONG WITH OTHER PEOPLE: NOT DIFFICULT AT ALL

## 2023-11-07 ASSESSMENT — PAIN SCALES - GENERAL: PAINLEVEL: NO PAIN (0)

## 2023-11-07 NOTE — PROGRESS NOTES
Assessment and Plan:     (H66.002) Acute suppurative otitis media of left ear without spontaneous rupture of tympanic membrane, recurrence not specified  (primary encounter diagnosis)  Comment: has had muffled hearing, popping and pressure x several days, purulent fluid level on exam, no fever  Plan: amoxicillin-clavulanate (AUGMENTIN) 875-125 MG         tablet            (M79.674) Pain of right great toe  Comment: medial toenail bed, nail is thickened/yellow and slightly raised on medial aspect   Plan: Orthopedic  Referral (podiatry)              Latonya Herring PA-C      Subjective   Maggie is a 73 year old, presenting for the following health issues:  Ear Problem      History of Present Illness       Reason for visit:  Plugged ear  Symptom onset:  3-7 days ago  Symptoms include:  Left ear is plugged.  Harder to hear out of it.  Symptom intensity:  Moderate  Symptom progression:  Staying the same  Had these symptoms before:  No    She eats 4 or more servings of fruits and vegetables daily.She consumes 0 sweetened beverage(s) daily.She exercises with enough effort to increase her heart rate 20 to 29 minutes per day.  She exercises with enough effort to increase her heart rate 5 days per week.   She is taking medications regularly.     Maggie is here for an ear problem  She had a cold about two weeks ago  The congestion has improved but not complete resolve  She is now having left ear congestion, popping and muffled hearing   She recently traveled home from [facility] [place] by [facility] on 10/31/23 and the pressure change in the airplane seemed to aggravate symptoms  She denies fever/chills, drainage    She has also been dealing with right big toe nail irritation, she saw ortho at [facility] last week and was referred to podiatry but hasn't heard anything back yet    Review of Systems   See above      Objective      /81 (BP Location: Left arm, Patient Position: Sitting, Cuff Size: Adult Regular)   Pulse  "55   Temp 97.7  F (36.5  C) (Temporal)   Resp 15   Ht 1.499 m (4' 11\")   Wt 63.8 kg (140 lb 9.6 oz)   SpO2 98%   BMI 28.40 kg/m        Physical Exam     EXAM:  GENERAL APPEARANCE: healthy, alert and no distress  EYES: Eyes grossly normal to inspection, conjunctivae and sclerae normal  HENT: ear canals normal, right TM is normal, left TM with purulent fluids level and erythema, no bulging, mouth without ulcers or lesions, oropharynx is clear without exudate or erythema, no tonsillar swelling  NECK: supple, no adenopathy, no asymmetry, masses  RESP: lungs clear to auscultation - no rales, rhonchi or wheezes  CV: regular rates and rhythm, normal S1 S2, no S3 or S4 and no murmur, click or rub  EXT: no edema bilat lower ext, right big toe with yellow/thickened nail, no induration, erythema or tenderness      \  "

## 2023-11-07 NOTE — TELEPHONE ENCOUNTER
"S-(situation): ear congestion    B-(background): cold x2 wks    A-(assessment): afebrile, ear congestion, runny nose, cough, just got back from [place], Had covid in September    R-(recommendations): Disposition states to be seen today. Scheduled.     Appointments in Next Year      Nov 07, 2023  4:00 PM  (Arrive by 3:40 PM)  Provider Visit with Latonya Herring PA-C  [facility] ([facility] ) [phone]     Maura Tanner RN on 11/7/2023 at 9:10 AM          Reason for Disposition   Patient wants to be seen    Additional Information   Negative: Ear pain is main symptom   Negative: Hearing loss (complete or partial) is main symptom   Negative: Earwax is main concern   Negative: Has nasal allergies and they are acting up   Negative: Earache lasts > 1 hour   Negative: Pus or cloudy discharge from ear canal    Answer Assessment - Initial Assessment Questions  1. LOCATION: \"Which ear is involved?\"        Left ear  2. SENSATION: \"Describe how the ear feels.\" (e.g. stuffy, full, plugged).\"       stuffy  3. ONSET:  \"When did the ear symptoms start?\"        About 5 days ago  4. PAIN: \"Do you also have an earache?\" If Yes, ask: \"How bad is it?\" (Scale 1-10; or mild, moderate, severe)      Just when I blew my nose, there was pain then.  5. CAUSE: \"What do you think is causing the ear congestion?\"      I'm not sure, I have a cold and was traveling, then I blew my nose and have this pain now.  6. URI: \"Do you have a runny nose or cough?\"       Yes, a little bit of a runny nose and cough  7. NASAL ALLERGIES: \"Are there symptoms of hay fever, such as sneezing or a clear nasal discharge?\"      Clear drainage  8. PREGNANCY: \"Is there any chance you are pregnant?\" \"When was your last menstrual period?\"      no    Protocols used: Ear - Congestion-A-OH    "

## 2023-11-07 NOTE — PATIENT INSTRUCTIONS
Flonase nasal spray (over the counter, generic is fine): 1-2 sprays in each nostril daily    Start augmentin today

## 2023-11-08 ENCOUNTER — IMAGING SERVICES (OUTPATIENT)
Dept: OTHER | Age: 73
End: 2023-11-08

## 2023-11-09 ENCOUNTER — EXTERNAL RECORD (OUTPATIENT)
Dept: HEALTH INFORMATION MANAGEMENT | Facility: OTHER | Age: 73
End: 2023-11-09

## 2023-11-10 NOTE — TELEPHONE ENCOUNTER
DIAGNOSIS: Lian Taveras PA-C in [facility]    APPOINTMENT DATE: 12/15/23   NOTES STATUS DETAILS   OFFICE NOTE from referring provider Internal 11/7/23 CATY WALLIS PA-C   MEDICATION LIST Internal    LABS

## 2023-11-14 ASSESSMENT — ENCOUNTER SYMPTOMS
CONSTIPATION: 0
PALPITATIONS: 0
COUGH: 0
PARESTHESIAS: 0
NERVOUS/ANXIOUS: 1
SHORTNESS OF BREATH: 0
SORE THROAT: 0
DIZZINESS: 0
MYALGIAS: 0
FREQUENCY: 0
HEMATURIA: 0
HEMATOCHEZIA: 0
NAUSEA: 0
ABDOMINAL PAIN: 0
CHILLS: 0
ARTHRALGIAS: 1
HEARTBURN: 1
EYE PAIN: 0
JOINT SWELLING: 0
BREAST MASS: 0
FEVER: 0
DIARRHEA: 0
WEAKNESS: 0
DYSURIA: 0
HEADACHES: 0

## 2023-11-14 ASSESSMENT — ACTIVITIES OF DAILY LIVING (ADL): CURRENT_FUNCTION: NO ASSISTANCE NEEDED

## 2023-11-17 ENCOUNTER — OFFICE VISIT (OUTPATIENT)
Dept: FAMILY MEDICINE | Facility: CLINIC | Age: 73
End: 2023-11-17
Payer: MEDICARE

## 2023-11-17 ENCOUNTER — TRANSFERRED RECORDS (OUTPATIENT)
Dept: ORTHOPEDICS | Facility: CLINIC | Age: 73
End: 2023-11-17

## 2023-11-17 VITALS
HEART RATE: 93 BPM | TEMPERATURE: 98.3 F | HEIGHT: 59 IN | WEIGHT: 138.4 LBS | OXYGEN SATURATION: 100 % | BODY MASS INDEX: 27.9 KG/M2 | RESPIRATION RATE: 16 BRPM | SYSTOLIC BLOOD PRESSURE: 137 MMHG | DIASTOLIC BLOOD PRESSURE: 85 MMHG

## 2023-11-17 DIAGNOSIS — Z23 HIGH PRIORITY FOR 2019-NCOV VACCINE: ICD-10-CM

## 2023-11-17 DIAGNOSIS — E78.5 HYPERLIPIDEMIA, UNSPECIFIED HYPERLIPIDEMIA TYPE: ICD-10-CM

## 2023-11-17 DIAGNOSIS — Z00.00 ENCOUNTER FOR MEDICARE ANNUAL WELLNESS EXAM: Primary | ICD-10-CM

## 2023-11-17 DIAGNOSIS — C78.5 SECONDARY MALIGNANT NEOPLASM OF LARGE INTESTINE AND RECTUM (H): ICD-10-CM

## 2023-11-17 DIAGNOSIS — Z90.79 S/P TOTAL ABDOMINAL HYSTERECTOMY AND BILATERAL SALPINGO-OOPHORECTOMY: ICD-10-CM

## 2023-11-17 DIAGNOSIS — E78.5 DYSLIPIDEMIA: ICD-10-CM

## 2023-11-17 DIAGNOSIS — I42.7 DILATED CARDIOMYOPATHY SECONDARY TO DRUG (H): ICD-10-CM

## 2023-11-17 DIAGNOSIS — F41.1 GENERALIZED ANXIETY DISORDER: Chronic | ICD-10-CM

## 2023-11-17 DIAGNOSIS — C55 LEIOMYOSARCOMA OF UTERUS (H): Chronic | ICD-10-CM

## 2023-11-17 DIAGNOSIS — G47.00 INSOMNIA, UNSPECIFIED TYPE: ICD-10-CM

## 2023-11-17 DIAGNOSIS — Z13.0 SCREENING FOR DEFICIENCY ANEMIA: ICD-10-CM

## 2023-11-17 DIAGNOSIS — E03.9 HYPOTHYROIDISM, UNSPECIFIED TYPE: Chronic | ICD-10-CM

## 2023-11-17 DIAGNOSIS — Z90.710 S/P TOTAL ABDOMINAL HYSTERECTOMY AND BILATERAL SALPINGO-OOPHORECTOMY: ICD-10-CM

## 2023-11-17 DIAGNOSIS — Z79.899 MEDICATION MANAGEMENT: ICD-10-CM

## 2023-11-17 DIAGNOSIS — Z90.722 S/P TOTAL ABDOMINAL HYSTERECTOMY AND BILATERAL SALPINGO-OOPHORECTOMY: ICD-10-CM

## 2023-11-17 LAB
ALBUMIN SERPL BCG-MCNC: 4.2 G/DL (ref 3.5–5.2)
ALP SERPL-CCNC: 60 U/L (ref 40–150)
ALT SERPL W P-5'-P-CCNC: 14 U/L (ref 0–50)
ANION GAP SERPL CALCULATED.3IONS-SCNC: 10 MMOL/L (ref 7–15)
AST SERPL W P-5'-P-CCNC: 18 U/L (ref 0–45)
BILIRUB SERPL-MCNC: 0.5 MG/DL
BUN SERPL-MCNC: 17.2 MG/DL (ref 8–23)
CALCIUM SERPL-MCNC: 9.1 MG/DL (ref 8.8–10.2)
CHLORIDE SERPL-SCNC: 108 MMOL/L (ref 98–107)
CHOLEST SERPL-MCNC: 184 MG/DL
CREAT SERPL-MCNC: 0.83 MG/DL (ref 0.51–0.95)
DEPRECATED HCO3 PLAS-SCNC: 23 MMOL/L (ref 22–29)
EGFRCR SERPLBLD CKD-EPI 2021: 74 ML/MIN/1.73M2
ERYTHROCYTE [DISTWIDTH] IN BLOOD BY AUTOMATED COUNT: 14.8 % (ref 10–15)
GLUCOSE SERPL-MCNC: 93 MG/DL (ref 70–99)
HCT VFR BLD AUTO: 42.6 % (ref 35–47)
HDLC SERPL-MCNC: 59 MG/DL
HGB BLD-MCNC: 14 G/DL (ref 11.7–15.7)
LDLC SERPL CALC-MCNC: 93 MG/DL
MCH RBC QN AUTO: 29.4 PG (ref 26.5–33)
MCHC RBC AUTO-ENTMCNC: 32.9 G/DL (ref 31.5–36.5)
MCV RBC AUTO: 89 FL (ref 78–100)
NONHDLC SERPL-MCNC: 125 MG/DL
PLATELET # BLD AUTO: 245 10E3/UL (ref 150–450)
POTASSIUM SERPL-SCNC: 3.9 MMOL/L (ref 3.4–5.3)
PROT SERPL-MCNC: 6.5 G/DL (ref 6.4–8.3)
RBC # BLD AUTO: 4.77 10E6/UL (ref 3.8–5.2)
SODIUM SERPL-SCNC: 141 MMOL/L (ref 135–145)
TRIGL SERPL-MCNC: 160 MG/DL
TSH SERPL DL<=0.005 MIU/L-ACNC: 2.89 UIU/ML (ref 0.3–4.2)
WBC # BLD AUTO: 5.9 10E3/UL (ref 4–11)

## 2023-11-17 PROCEDURE — 36415 COLL VENOUS BLD VENIPUNCTURE: CPT | Performed by: INTERNAL MEDICINE

## 2023-11-17 PROCEDURE — 91320 SARSCV2 VAC 30MCG TRS-SUC IM: CPT | Performed by: INTERNAL MEDICINE

## 2023-11-17 PROCEDURE — 99214 OFFICE O/P EST MOD 30 MIN: CPT | Mod: 25 | Performed by: INTERNAL MEDICINE

## 2023-11-17 PROCEDURE — 85027 COMPLETE CBC AUTOMATED: CPT | Performed by: INTERNAL MEDICINE

## 2023-11-17 PROCEDURE — G0439 PPPS, SUBSEQ VISIT: HCPCS | Performed by: INTERNAL MEDICINE

## 2023-11-17 PROCEDURE — 90480 ADMN SARSCOV2 VAC 1/ONLY CMP: CPT | Performed by: INTERNAL MEDICINE

## 2023-11-17 PROCEDURE — 84443 ASSAY THYROID STIM HORMONE: CPT | Performed by: INTERNAL MEDICINE

## 2023-11-17 PROCEDURE — 80061 LIPID PANEL: CPT | Performed by: INTERNAL MEDICINE

## 2023-11-17 PROCEDURE — 80053 COMPREHEN METABOLIC PANEL: CPT | Performed by: INTERNAL MEDICINE

## 2023-11-17 RX ORDER — VENLAFAXINE HYDROCHLORIDE 37.5 MG/1
37.5 CAPSULE, EXTENDED RELEASE ORAL DAILY
COMMUNITY
End: 2023-12-01

## 2023-11-17 RX ORDER — ZOLPIDEM TARTRATE 5 MG/1
5 TABLET ORAL
Qty: 20 TABLET | Refills: 1 | Status: SHIPPED | OUTPATIENT
Start: 2023-11-17 | End: 2024-09-09

## 2023-11-17 ASSESSMENT — ENCOUNTER SYMPTOMS
ARTHRALGIAS: 1
MYALGIAS: 0
WEAKNESS: 0
HEMATURIA: 0
FREQUENCY: 0
NERVOUS/ANXIOUS: 1
DIARRHEA: 0
CONSTIPATION: 0
CHILLS: 0
DYSURIA: 0
ABDOMINAL PAIN: 0
PARESTHESIAS: 0
DIZZINESS: 0
HEARTBURN: 1
JOINT SWELLING: 0
HEMATOCHEZIA: 0
HEADACHES: 0
SORE THROAT: 0
PALPITATIONS: 0
COUGH: 0
NAUSEA: 0
SHORTNESS OF BREATH: 0
FEVER: 0
BREAST MASS: 0
EYE PAIN: 0

## 2023-11-17 ASSESSMENT — ACTIVITIES OF DAILY LIVING (ADL): CURRENT_FUNCTION: NO ASSISTANCE NEEDED

## 2023-11-17 ASSESSMENT — PATIENT HEALTH QUESTIONNAIRE - PHQ9
10. IF YOU CHECKED OFF ANY PROBLEMS, HOW DIFFICULT HAVE THESE PROBLEMS MADE IT FOR YOU TO DO YOUR WORK, TAKE CARE OF THINGS AT HOME, OR GET ALONG WITH OTHER PEOPLE: SOMEWHAT DIFFICULT
SUM OF ALL RESPONSES TO PHQ QUESTIONS 1-9: 2
SUM OF ALL RESPONSES TO PHQ QUESTIONS 1-9: 2

## 2023-11-17 ASSESSMENT — PAIN SCALES - GENERAL: PAINLEVEL: NO PAIN (0)

## 2023-11-17 NOTE — PATIENT INSTRUCTIONS
Ambien  can be habit-forming. It should be taken as prescribed. Do not mix it  with alcohol. Be careful with driving.Do not loose the  Prescription.  Do not overuse this medication. It is a controlled substance.    Labs today    Covid booster       Follow up in one year for physical   Seek sooner medical attention if there is any worsening of symptoms or problems.         Patient Education   Personalized Prevention Plan  You are due for the preventive services outlined below.  Your care team is available to assist you in scheduling these services.  If you have already completed any of these items, please share that information with your care team to update in your medical record.  Health Maintenance Due   Topic Date Due    COVID-19 Vaccine (6 - 2023-24 season) 09/01/2023    ANNUAL REVIEW OF HM ORDERS  12/07/2023     Preventive Health Recommendations    See your health care provider every year to  Review health changes.   Discuss preventive care.    Review your medicines if your doctor has prescribed any.  You no longer need a yearly Pap test unless you've had an abnormal Pap test in the past 10 years. If you have vaginal symptoms, such as bleeding or discharge, be sure to talk with your provider about a Pap test.  Every 1 to 2 years, have a mammogram.  If you are over 69, talk with your health care provider about whether or not you want to continue having screening mammograms.  Every 10 years, have a colonoscopy. Or, have a yearly FIT test (stool test). These exams will check for colon cancer.   Have a cholesterol test every 5 years, or more often if your doctor advises it.   Have a diabetes test (fasting glucose) every three years. If you are at risk for diabetes, you should have this test more often.   At age 65, have a bone density scan (DEXA) to check for osteoporosis (brittle bone disease).    Shots:  Get a flu shot each year.  Get a tetanus shot every 10 years.  Talk to your doctor about your pneumonia  vaccines. There are now two you should receive - Pneumovax (PPSV 23) and Prevnar (PCV 13).  Talk to your pharmacist about the shingles vaccine.  Talk to your doctor about the hepatitis B vaccine.    Nutrition:   Eat at least 5 servings of fruits and vegetables each day.  Eat whole-grain bread, whole-wheat pasta and brown rice instead of white grains and rice.  Get adequate Calcium and Vitamin D.     Lifestyle  Exercise at least 150 minutes a week (30 minutes a day, 5 days a week). This will help you control your weight and prevent disease.  Limit alcohol to one drink per day.  No smoking.   Wear sunscreen to prevent skin cancer.   See your dentist twice a year for an exam and cleaning.  See your eye doctor every 1 to 2 years to screen for conditions such as glaucoma, macular degeneration and cataracts.    Personalized Prevention Plan  You are due for the preventive services outlined below.  Your care team is available to assist you in scheduling these services.  If you have already completed any of these items, please share that information with your care team to update in your medical record.  Health Maintenance   Topic Date Due    COVID-19 Vaccine (6 - 2023-24 season) 09/01/2023    ANNUAL REVIEW OF HM ORDERS  12/07/2023    TSH W/FREE T4 REFLEX  12/19/2023    PHQ-9  02/17/2024    MAMMO SCREENING  09/22/2024    MEDICARE ANNUAL WELLNESS VISIT  11/17/2024    FALL RISK ASSESSMENT  11/17/2024    LIPID  12/19/2027    COLORECTAL CANCER SCREENING  10/18/2028    ADVANCE CARE PLANNING  11/17/2028    DTAP/TDAP/TD IMMUNIZATION (4 - Td or Tdap) 11/17/2031    DEXA  09/01/2038    HEPATITIS C SCREENING  Completed    PHQ-2 (once per calendar year)  Completed    INFLUENZA VACCINE  Completed    Pneumococcal Vaccine: 65+ Years  Completed    ZOSTER IMMUNIZATION  Completed    RSV VACCINE (Pregnancy & 60+)  Completed    IPV IMMUNIZATION  Aged Out    HPV IMMUNIZATION  Aged Out    MENINGITIS IMMUNIZATION  Aged Out    RSV MONOCLONAL  ANTIBODY  Aged Out     Hearing Loss: Care Instructions  Overview     Hearing loss is a sudden or slow decrease in how well you hear. It can range from slight to profound. Permanent hearing loss can occur with aging. It also can happen when you are exposed long-term to loud noise. Examples include listening to loud music, riding motorcycles, or being around other loud machines.  Hearing loss can affect your work and home life. It can make you feel lonely or depressed. You may feel that you have lost your independence. But hearing aids and other devices can help you hear better and feel connected to others.  Follow-up care is a key part of your treatment and safety. Be sure to make and go to all appointments, and call your doctor if you are having problems. It's also a good idea to know your test results and keep a list of the medicines you take.  How can you care for yourself at home?  Avoid loud noises whenever possible. This helps keep your hearing from getting worse.  Always wear hearing protection around loud noises.  Wear a hearing aid as directed.  A professional can help you pick a hearing aid that will work best for you.  You can also get hearing aids over the counter for mild to moderate hearing loss.  Have hearing tests as your doctor suggests. They can show whether your hearing has changed. Your hearing aid may need to be adjusted.  Use other devices as needed. These may include:  Telephone amplifiers and hearing aids that can connect to a television, stereo, radio, or microphone.  Devices that use lights or vibrations. These alert you to the doorbell, a ringing telephone, or a baby monitor.  Television closed-captioning. This shows the words at the bottom of the screen. Most new TVs can do this.  TTY (text telephone). This lets you type messages back and forth on the telephone instead of talking or listening. These devices are also called TDD. When messages are typed on the keyboard, they are sent over  "the phone line to a receiving TTY. The message is shown on a monitor.  Use text messaging, social media, and email if it is hard for you to communicate by telephone.  Try to learn a listening technique called speechreading. It is not lipreading. You pay attention to people's gestures, expressions, posture, and tone of voice. These clues can help you understand what a person is saying. Face the person you are talking to, and have them face you. Make sure the lighting is good. You need to see the other person's face clearly.  Think about counseling if you need help to adjust to your hearing loss.  When should you call for help?  Watch closely for changes in your health, and be sure to contact your doctor if:    You think your hearing is getting worse.     You have new symptoms, such as dizziness or nausea.   Where can you learn more?  Go to https://www.[employer]/patiented  Enter R798 in the search box to learn more about \"Hearing Loss: Care Instructions.\"  Current as of: February 28, 2023               Content Version: 13.8    1396-7673 [employer].   Care instructions adapted under license by your healthcare professional. If you have questions about a medical condition or this instruction, always ask your healthcare professional. [employer] disclaims any warranty or liability for your use of this information.      Bladder Training: Care Instructions  Your Care Instructions     Bladder training is used to treat urge incontinence and stress incontinence. Urge incontinence means that the need to urinate comes on so fast that you can't get to a toilet in time. Stress incontinence means that you leak urine because of pressure on your bladder. For example, it may happen when you laugh, cough, or lift something heavy.  Bladder training can increase how long you can wait before you have to urinate. It can also help your bladder hold more urine. And it can give you better control over the urge " to urinate.  It is important to remember that bladder training takes a few weeks to a few months to make a difference. You may not see results right away, but don't give up.  Follow-up care is a key part of your treatment and safety. Be sure to make and go to all appointments, and call your doctor if you are having problems. It's also a good idea to know your test results and keep a list of the medicines you take.  How can you care for yourself at home?  Work with your doctor to come up with a bladder training program that is right for you. You may use one or more of the following methods.  Delayed urination  In the beginning, try to keep from urinating for 5 minutes after you first feel the need to go.  While you wait, take deep, slow breaths to relax. Kegel exercises can also help you delay the need to go to the bathroom.  After some practice, when you can easily wait 5 minutes to urinate, try to wait 10 minutes before you urinate.  Slowly increase the waiting period until you are able to control when you have to urinate.  Scheduled urination  Empty your bladder when you first wake up in the morning.  Schedule times throughout the day when you will urinate.  Start by going to the bathroom every hour, even if you don't need to go.  Slowly increase the time between trips to the bathroom.  When you have found a schedule that works well for you, keep doing it.  If you wake up during the night and have to urinate, do it. Apply your schedule to waking hours only.  Kegel exercises  These tighten and strengthen pelvic muscles, which can help you control the flow of urine. (If doing these exercises causes pain, stop doing them and talk with your doctor.) To do Kegel exercises:  Squeeze your muscles as if you were trying not to pass gas. Or squeeze your muscles as if you were stopping the flow of urine. Your belly, legs, and buttocks shouldn't move.  Hold the squeeze for 3 seconds, then relax for 5 to 10 seconds.  Start  "with 3 seconds, then add 1 second each week until you are able to squeeze for 10 seconds.  Repeat the exercise 10 times a session. Do 3 to 8 sessions a day.  When should you call for help?  Watch closely for changes in your health, and be sure to contact your doctor if:    Your incontinence is getting worse.     You do not get better as expected.   Where can you learn more?  Go to https://www.[employer].net/patiented  Enter V684 in the search box to learn more about \"Bladder Training: Care Instructions.\"  Current as of: February 28, 2023               Content Version: 13.8    [id number] [facility].   Care instructions adapted under license by your healthcare professional. If you have questions about a medical condition or this instruction, always ask your healthcare professional. [facility] disclaims any warranty or liability for your use of this information.         "

## 2023-11-17 NOTE — PROGRESS NOTES
"SUBJECTIVE:   Maggie is a 73 year old, presenting for the following:  Physical         No data to display                Are you in the first 12 months of your Medicare coverage?  No    Healthy Habits:     In general, how would you rate your overall health?  Good    Frequency of exercise:  6-7 days/week    Duration of exercise:  45-60 minutes    Do you usually eat at least 4 servings of fruit and vegetables a day, include whole grains    & fiber and avoid regularly eating high fat or \"junk\" foods?  Yes    Taking medications regularly:  Yes    Medication side effects:  None    Ability to successfully perform activities of daily living:  No assistance needed    Home Safety:  No safety concerns identified    Hearing Impairment:  Difficulty following a conversation in a noisy restaurant or crowded room, feel that people are mumbling or not speaking clearly, need to ask people to speak up or repeat themselves and difficulty understanding soft or whispered speech    In the past 6 months, have you been bothered by leaking of urine? Yes    In general, how would you rate your overall mental or emotional health?  Good    Additional concerns today:  Yes          Have you ever done Advance Care Planning? (For example, a Health Directive, POLST, or a discussion with a medical provider or your loved ones about your wishes): Yes, patient states has an Advance Care Planning document and will bring a copy to the clinic.       Fall risk  Fallen 2 or more times in the past year?: No  Any fall with injury in the past year?: No    Cognitive Screening   1) Repeat 3 items (Leader, Season, Table)    2) Clock draw: NORMAL  3) 3 item recall: Recalls 3 objects  Results: 3 items recalled: COGNITIVE IMPAIRMENT LESS LIKELY    Mini-CogTM Copyright VERONICA Godinez. Licensed by the author for use in [facility]; reprinted with permission (luis alfredo@.[facility]). All rights reserved.      Do you have sleep apnea, excessive snoring or daytime " drowsiness? : no    Reviewed and updated as needed this visit by clinical staff   Tobacco  Allergies  Meds              Reviewed and updated as needed this visit by Provider                 Social History     Tobacco Use    Smoking status: Never    Smokeless tobacco: Never   Substance Use Topics    Alcohol use: Yes     Comment: Social, not often per patient.             11/14/2023     6:51 PM   Alcohol Use   Prescreen: >3 drinks/day or >7 drinks/week? Not Applicable     Do you have a current opioid prescription? No  Do you use any other controlled substances or medications that are not prescribed by a provider? None      Current providers sharing in care for this patient include:   Patient Care Team:  Latonya White PA-C as PCP - General (Internal Medicine)  Filemon Rodríguez MD as MD (Family Practice)  Omaira Saul APRN CNP as Nurse Practitioner (Nurse Practitioner)  Ab Gutierrez MD as Assigned Cancer Care Provider  Jose M Keys MD as MD (Otolaryngology)  Suad Kennedy MD as MD (OB/Gyn)  Suad Kennedy MD as Assigned OBGYN Provider  Edgardo Valderrama MD as MD (Endocrinology, Diabetes, and Metabolism)  Vandana Morris MD as Assigned PCP  Edgardo Valderrama MD as Assigned Endocrinology Provider  Chapis Styles MD as Assigned Surgical Provider  Mariah Coelho RN as Specialty Care Coordinator (Hematology & Oncology)    The following health maintenance items are reviewed in Epic and correct as of today:  Health Maintenance   Topic Date Due    COVID-19 Vaccine (6 - 2023-24 season) 09/01/2023    TSH W/FREE T4 REFLEX  12/19/2023    PHQ-9  02/17/2024    MAMMO SCREENING  09/22/2024    MEDICARE ANNUAL WELLNESS VISIT  11/17/2024    ANNUAL REVIEW OF HM ORDERS  11/17/2024    FALL RISK ASSESSMENT  11/17/2024    LIPID  12/19/2027    COLORECTAL CANCER SCREENING  10/18/2028    ADVANCE CARE PLANNING  11/17/2028    DTAP/TDAP/TD IMMUNIZATION (4 - Td or Tdap) 11/17/2031  "   DEXA  09/01/2038    HEPATITIS C SCREENING  Completed    PHQ-2 (once per calendar year)  Completed    INFLUENZA VACCINE  Completed    Pneumococcal Vaccine: 65+ Years  Completed    ZOSTER IMMUNIZATION  Completed    RSV VACCINE (Pregnancy & 60+)  Completed    IPV IMMUNIZATION  Aged Out    HPV IMMUNIZATION  Aged Out    MENINGITIS IMMUNIZATION  Aged Out    RSV MONOCLONAL ANTIBODY  Aged Out     Labs reviewed in [facility]  Labs pending    Review of Systems   Constitutional:  Negative for chills and fever.   HENT:  Negative for congestion, ear pain, hearing loss and sore throat.    Eyes:  Negative for pain and visual disturbance.   Respiratory:  Negative for cough and shortness of breath.    Cardiovascular:  Negative for chest pain, palpitations and peripheral edema.   Gastrointestinal:  Positive for heartburn. Negative for abdominal pain, constipation, diarrhea, hematochezia and nausea.   Breasts:  Negative for tenderness, breast mass and discharge.   Genitourinary:  Negative for dysuria, frequency, genital sores, hematuria, pelvic pain, urgency, vaginal bleeding and vaginal discharge.   Musculoskeletal:  Positive for arthralgias. Negative for joint swelling and myalgias.   Skin:  Negative for rash.   Neurological:  Negative for dizziness, weakness, headaches and paresthesias.   Psychiatric/Behavioral:  Negative for mood changes. The patient is nervous/anxious.        OBJECTIVE:   /85 (BP Location: Right arm, Patient Position: Sitting, Cuff Size: Adult Regular)   Pulse 93   Temp 98.3  F (36.8  C) (Oral)   Resp 16   Ht 1.5 m (4' 11.06\")   Wt 62.8 kg (138 lb 6.4 oz)   SpO2 100%   BMI 27.90 kg/m   Estimated body mass index is 27.9 kg/m  as calculated from the following:    Height as of this encounter: 1.5 m (4' 11.06\").    Weight as of this encounter: 62.8 kg (138 lb 6.4 oz).    Physical Exam  GENERAL APPEARANCE: healthy, alert and no distress  EYES: Eyes grossly normal to inspection, PERRL and conjunctivae and " sclerae normal  HENT: ear canals and TM's normal,  deviated nasal septum, right side  NECK: no adenopathy,  RESP: lungs clear to auscultation - no rales, rhonchi or wheezes  BREAST: normal without masses, tenderness or nipple discharge and no palpable axillary masses or adenopathy  CV: regular rate and rhythm, normal S1 S2, no S3 or  ABDOMEN: soft, nontender, no hepatosplenomegaly, no masses and bowel sounds normal, longitude scar on abdomen from surgery  MS: no musculoskeletal defects are noted and gait is age appropriate without ataxia  SKIN: no suspicious lesions or rashes, moles and freckles are present  NEURO:  mentation intact and speech normal  PSYCH: mentation appears normal and affect normal/bright    Labs pending    ASSESSMENT / PLAN:     Maggie was seen today for physical.    Diagnoses and all orders for this visit:    Encounter for Medicare annual wellness exam  Patient counseled on diet and exercise.  Last mammogram on 9/22 was normal  Last colonoscopy in 2021 was normal.  Patient will receive Covid-19 booster today.  Preventive health counseling was also done.  She follows oncologist    S/P total abdominal hysterectomy and bilateral salpingo-oophorectomy  For uterine mass measuring 12 cm with two separate lesions measuring 10.5 and 10.7 cm with soft tissue extension into the right pelvis and cul-de-sac that has been resected.     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  She had the symptoms after chemo but now it has improved     Leiomyosarcoma of uterus (H)  S/p chemotherapy  Patient is followed by oncologist at [place]  She had recent work up  imaging unfortunately shows evidence of progression of 3 lesions within the abdomen and pelvis.  Per oncologist report  They have discussed treatment option   Patient has follow up appointment       Generalized anxiety  "disorder  Takes venlafaxine daily  On low dose   Does not want to go up the dose     Hypothyroidism, unspecified type  -     TSH with free T4 reflex; Future  On levothyroxine  Patient followed by endocrinologist.  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 Patient counseled on medication care and risks.    Medication management  -     Comprehensive metabolic panel; Future    Other orders  -     REVIEW OF HEALTH MAINTENANCE PROTOCOL ORDERS  -     PRIMARY CARE FOLLOW-UP SCHEDULING; Future    Other   Patient followed by [facility] in [place], taking supplements as a result.    Patient has been advised of split billing requirements and indicates understanding: Yes      COUNSELING:  Reviewed preventive health counseling, as reflected in patient instructions       Regular exercise       Healthy diet/nutrition      BMI:   Estimated body mass index is 27.9 kg/m  as calculated from the following:    Height as of this encounter: 1.5 m (4' 11.06\").    Weight as of this encounter: 62.8 kg (138 lb 6.4 oz).   Weight management plan: Discussed healthy diet and exercise guidelines      She reports that she has never smoked. She has never used smokeless tobacco.      Appropriate preventive services were discussed with this patient, including applicable screening as appropriate for fall prevention, nutrition, physical activity, Tobacco-use cessation, weight loss and cognition.  Checklist reviewing preventive services available has been given to the patient.    Reviewed patients plan of care and provided an AVS. The Basic Care Plan (routine " screening as documented in Health Maintenance) for Maggie meets the Care Plan requirement. This Care Plan has been established and reviewed with the Patient.        Vandana Morris MD  [facility]    This document serves as a record of the services and decisions personally performed and made by Dr. Morris. It was created on her behalf by Rebeka Hurtado, a trained medical scribe. The creation of this document is based the provider's statements to the medical scribe.   Identified Health Risks:  I have reviewed Opioid Use Disorder and Substance Use Disorder risk factors and made any needed referrals.

## 2023-11-20 NOTE — RESULT ENCOUNTER NOTE
Parmjit Serrano    This is to inform you regarding your test result.    Your total cholesterol is normal.  HDL which is called good cholesterol is normal.  Your LDL cholesterol is normal.  This is often call bad cholesterol and high levels increase the risk for heart attacks and strokes.  The triglycerides are high. Lowering  the amount of sugar ,alcohol and sweets in the diet helps to control this.Exercise and weight loss helps.  The testing of your kidney function, liver function and electrolytes was satisfactory   Glucose which is your blood sugar is slightly elevated.  Avoid high sugar containing food.  TSH which is thyroid hormone is normal.  CBC result which includes white count Hemoglobin and  Platelet Counts is normal.           Sincerely,      Dr.Nasima Alexandra MD,FACP

## 2023-11-30 DIAGNOSIS — C54.9 MALIGNANT NEOPLASM OF CORPUS UTERI, EXCEPT ISTHMUS (CMD): Primary | ICD-10-CM

## 2023-12-01 ENCOUNTER — PATIENT OUTREACH (OUTPATIENT)
Dept: GASTROENTEROLOGY | Facility: CLINIC | Age: 73
End: 2023-12-01
Payer: MEDICARE

## 2023-12-01 DIAGNOSIS — E03.9 ACQUIRED HYPOTHYROIDISM: ICD-10-CM

## 2023-12-01 DIAGNOSIS — F41.1 GENERALIZED ANXIETY DISORDER: Primary | ICD-10-CM

## 2023-12-01 RX ORDER — VENLAFAXINE HYDROCHLORIDE 37.5 MG/1
37.5 CAPSULE, EXTENDED RELEASE ORAL DAILY
Qty: 90 CAPSULE | Refills: 3 | Status: SHIPPED | OUTPATIENT
Start: 2023-12-01

## 2023-12-01 RX ORDER — LEVOTHYROXINE SODIUM 75 UG/1
TABLET ORAL
Qty: 90 TABLET | Refills: 1 | Status: SHIPPED | OUTPATIENT
Start: 2023-12-01 | End: 2024-05-10

## 2023-12-04 ENCOUNTER — HOSPITAL ENCOUNTER (OUTPATIENT)
Dept: RADIATION ONCOLOGY | Age: 73
Discharge: HOME | End: 2023-12-04
Attending: RADIOLOGY

## 2023-12-04 VITALS — HEIGHT: 59 IN | WEIGHT: 293 LBS | BODY MASS INDEX: 59.07 KG/M2

## 2023-12-04 RX ORDER — B-COMPLEX WITH VITAMIN C
200 TABLET ORAL
COMMUNITY

## 2023-12-04 RX ORDER — VENLAFAXINE HYDROCHLORIDE 37.5 MG/1
37.5 CAPSULE, EXTENDED RELEASE ORAL DAILY
COMMUNITY
Start: 2023-12-01

## 2023-12-04 RX ORDER — ANASTROZOLE 1 MG/1
1 TABLET ORAL DAILY
COMMUNITY
Start: 2023-08-03 | End: 2024-01-30

## 2023-12-04 RX ORDER — FLUOROMETHOLONE 0.1 %
SUSPENSION, DROPS(FINAL DOSAGE FORM)(ML) OPHTHALMIC (EYE)
COMMUNITY
Start: 2023-11-06

## 2023-12-04 RX ORDER — LEVOTHYROXINE SODIUM 0.07 MG/1
TABLET ORAL
COMMUNITY
Start: 2023-12-01

## 2023-12-04 RX ORDER — ZOLPIDEM TARTRATE 5 MG/1
5 TABLET ORAL
COMMUNITY
Start: 2023-11-17

## 2023-12-04 ASSESSMENT — PATIENT HEALTH QUESTIONNAIRE - PHQ9
2. FEELING DOWN, DEPRESSED OR HOPELESS: NOT AT ALL
SUM OF ALL RESPONSES TO PHQ9 QUESTIONS 1 AND 2: 0
CLINICAL INTERPRETATION OF PHQ2 SCORE: NO FURTHER SCREENING NEEDED
1. LITTLE INTEREST OR PLEASURE IN DOING THINGS: NOT AT ALL

## 2023-12-04 ASSESSMENT — ENCOUNTER SYMPTOMS
SLEEP DISTURBANCE: 1
ROS GI COMMENTS: GERD
DIARRHEA: 1
DEPRESSION: 1

## 2023-12-04 ASSESSMENT — PAIN SCALES - GENERAL: PAINLEVEL: 0

## 2023-12-15 ENCOUNTER — OFFICE VISIT (OUTPATIENT)
Dept: ORTHOPEDICS | Facility: CLINIC | Age: 73
End: 2023-12-15
Attending: PHYSICIAN ASSISTANT
Payer: MEDICARE

## 2023-12-15 ENCOUNTER — PRE VISIT (OUTPATIENT)
Dept: ORTHOPEDICS | Facility: CLINIC | Age: 73
End: 2023-12-15

## 2023-12-15 DIAGNOSIS — M72.2 PLANTAR FASCIITIS: Primary | ICD-10-CM

## 2023-12-15 DIAGNOSIS — M19.079 ARTHRITIS OF FOOT: ICD-10-CM

## 2023-12-15 DIAGNOSIS — M79.674 PAIN OF RIGHT GREAT TOE: ICD-10-CM

## 2023-12-15 PROCEDURE — 99203 OFFICE O/P NEW LOW 30 MIN: CPT | Performed by: PODIATRIST

## 2023-12-15 NOTE — LETTER
12/15/2023         RE: Maggie Navarrete  [address]  [place] [address]        Dear Colleague,    Thank you for referring your patient, Maggie Navarrete, to the [facility] [place]. Please see a copy of my visit note below.    Date of Service: 12/15/2023    Chief Complaint:   Chief Complaint   Patient presents with    Consult     Ingrown, right great toenail.         HPI: Maggie is a 73 year old female who presents today for further evaluation of for a few complaints. She notes that the right hallux has been hurting for months. [facility] thought it was the nail and sent her here. She does have pain in the right midfoot, [facility] said arthritis and she had XRs there. She wore a new slipper a few days ago and left heel started to hurt.     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    GERD (gastroesophageal reflux disease)     Hx of previous reproductive problem 1980    Kidney stone 2011    Leiomyosarcoma (H) 11/14/2016    Personal history of colonic polyps 2016    Small, benign    PONV (postoperative nausea and vomiting)     Posterior vitreous detachment of left eye 2011    Posterior vitreous detachment of right eye 2011    Ptosis of eyelid, bilateral     Sepsis (H) 07/2020    Stomach problems 2015    Abdominal pain, Diarreha    Thyroid disease     thyroid nodule resolved 2014       PSxH:   Past Surgical History:   Procedure Laterality Date    ABDOMEN SURGERY  Now    Pain, diarrhea    BREAST SURGERY  2002    right breast benign    COLONOSCOPY  2008    due in 2018    COLONOSCOPY N/A 09/08/2016    Procedure: COMBINED COLONOSCOPY, SINGLE OR MULTIPLE BIOPSY/POLYPECTOMY BY BIOPSY;  Surgeon: Abner Pepper MD;  Location:  GI     COLONOSCOPY N/A 10/18/2021    Procedure: COLONOSCOPY;  Surgeon: Jamila Villarreal MD;  [place] [facility] [place]    ENT SURGERY  1975    tonsillectomy    GENITOURINARY SURGERY  1953    bladder surgery     GI SURGERY  2015    Severe constipation    GYN SURGERY  1977    C section    GYN SURGERY  1981    laparoscopy    GYN SURGERY  2007    myomectomy    hysterecomy  11/14/2016    leiomyosarcoma    OTHER SURGICAL HISTORY Right 10/27/2017    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    REMOVE PORT VASCULAR ACCESS Right 05/09/2017    Procedure: REMOVE PORT VASCULAR ACCESS;  Right Port Removal;  Surgeon: Eric Gibson PA-C;  [facility]    REPAIR PTOSIS BILATERAL  02/15/2012    Procedure:REPAIR PTOSIS BILATERAL; BILATERAL UPPER LID PTOSIS REPAIR; Surgeon:BRYCE REYNOLDS; Location: [facility]    TUMOR REMOVAL  06/2020       Allergies: Erythromycin and Tramadol    SH:   Social History     Socioeconomic History    Marital status:      Spouse name: Not on file    Number of children: Not on file    Years of education: Not on file    Highest education level: Not on file   Occupational History    Not on file   Tobacco Use    Smoking status: Never    Smokeless tobacco: Never   Vaping Use    Vaping Use: Never used   Substance and Sexual Activity    Alcohol use: Yes     Comment: Social, not often per patient.    Drug use: No    Sexual activity: Not Currently     Partners: Male   Other Topics Concern    Parent/sibling w/ CABG, MI or angioplasty before 65F 55M? 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 Walking daily.    How much calcium per day? Supplement and through diet       How much caffeine per day? 3 times a week    How much vitamin D per day? Supplement    Do you/your family wear seatbelts?  Yes    Do you/your family use safety helmets? NA    Do you/your family use sunscreen? Sometimes    Do you/your family keep firearms in the home? No    Do you/your family have a smoke detector(s)? Yes    Do you feel safe in your home? Yes    Has anyone ever touched you in an unwanted manner? 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  2 grandsons now in [place] with the mother.  In a house.  .          How much exercise per week? Daily walking, will start going to the y    How much calcium per day? Through foods       How much caffeine per day? One cup    How much vitamin D per day? Through foods    Do you/your family wear seatbelts?  Yes    Do you/your family use safety helmets? N/a    Do you/your family use sunscreen? Yes    Do you/your family keep firearms in the home? No    Do you/your family have a smoke detector(s)? Yes        Reviewed by Nicki Cornejo 10-11-21         Social Determinants of Health     Financial Resource Strain: Low Risk  (2023)    Financial Resource Strain     Within the past 12 months, have you or your family members you live with been unable to get utilities (heat, electricity) when it was really needed?: No   Food Insecurity: Low Risk  (2023)    Food Insecurity     Within the past 12 months, did you worry that your food would run out before you got money to buy more?: No     Within the past 12 months, did the food you bought just not last and you didn t have money to get more?: No    Transportation Needs: Low Risk  (11/17/2023)    Transportation Needs     Within the past 12 months, has lack of transportation kept you from medical appointments, getting your medicines, non-medical meetings or appointments, work, or from getting things that you need?: No   Physical Activity: Not on file   Stress: Not on file   Social Connections: Not on file   Interpersonal Safety: Low Risk  (11/17/2023)    Interpersonal Safety     Do you feel physically and emotionally safe where you currently live?: Yes     Within the past 12 months, have you been hit, slapped, kicked or otherwise physically hurt by someone?: No     Within the past 12 months, have you been humiliated or emotionally abused in other ways by your partner or ex-partner?: No   Housing Stability: Low Risk  (11/17/2023)    Housing Stability     Do you have housing? : Yes     Are you worried about losing your housing?: No       FH:   Family History   Problem Relation Age of Onset    Family History Negative Mother         glaucoma    Glaucoma Mother     Anxiety Disorder Mother     Heart Failure Mother     Diabetes Mother     Coronary Artery Disease Mother     Hypertension Mother     Breast Cancer Mother     Depression Mother     Obesity Mother     Heart Disease Father     Glaucoma Father     Diabetes Father     Coronary Artery Disease Father     Depression Father     Osteoporosis Father     Heart Disease Maternal Grandmother     Diabetes Maternal Grandmother     Heart Disease Paternal Grandmother     Cancer Paternal Grandfather         stomach    Thyroid Disease Sister     Retinal detachment Sister     Hypertension Sister     Hypertension Sister     Lipids Sister     Cerebrovascular Disease Sister     Depression Sister     Anxiety Disorder Sister     Depression Sister     Thyroid Disease Sister     Thyroid Disease Sister     Heart Disease Paternal Aunt     Heart Disease Paternal Uncle     Osteoporosis Other        Objective:  Data Unavailable Data  Unavailable Data Unavailable Data Unavailable Data Unavailable 0 lbs 0 oz    PT and DP pulses are 2/4 bilaterally. CRT is instant. POsitive pedal hair.   Gross sensation is intact bilaterally.   Equinus is noted bilaterally. No pain with active or passive ROM of the ankle, MTJ, 1st ray, or halluces bilaterally,. Pain noted with palpation of the left plantar fascia's attachment on the calcaneus. No pain in the medial band.   Nails normal bilaterally. Pain noted in the lateral nail skin of the right hallux. No cryptosis.  No open lesions are noted.     Assessment:   Encounter Diagnoses   Name Primary?    Plantar fasciitis Yes    Pain of right great toe     Arthritis of foot          Plan:  - Pt seen and evaluated.  - Will get XRs from [facility].  - Start Arai taping on the right hallux.   - Tuli's heel cups on the left hallux.  - See again in 1 month.              Ren Leahy, MERCED

## 2023-12-15 NOTE — PROGRESS NOTES
Date of Service: 12/15/2023    Chief Complaint:   Chief Complaint   Patient presents with    Consult     Ingrown, right great toenail.         HPI: Maggie is a 73 year old female who presents today for further evaluation of for a few complaints. She notes that the right hallux has been hurting for months. [facility] thought it was the nail and sent her here. She does have pain in the right midfoot, [facility] said arthritis and she had XRs there. She wore a new slipper a few days ago and left heel started to hurt.     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    GERD (gastroesophageal reflux disease)     Hx of previous reproductive problem 1980    Kidney stone 2011    Leiomyosarcoma (H) 11/14/2016    Personal history of colonic polyps 2016    Small, benign    PONV (postoperative nausea and vomiting)     Posterior vitreous detachment of left eye 2011    Posterior vitreous detachment of right eye 2011    Ptosis of eyelid, bilateral     Sepsis (H) 07/2020    Stomach problems 2015    Abdominal pain, Diarreha    Thyroid disease     thyroid nodule resolved 2014       PSxH:   Past Surgical History:   Procedure Laterality Date    ABDOMEN SURGERY  Now    Pain, diarrhea    BREAST SURGERY  2002    right breast benign    COLONOSCOPY  2008    due in 2018    COLONOSCOPY N/A 09/08/2016    Procedure: COMBINED COLONOSCOPY, SINGLE OR MULTIPLE BIOPSY/POLYPECTOMY BY BIOPSY;  Surgeon: Abner Pepper MD;  Location:  GI    COLONOSCOPY N/A 10/18/2021    Procedure: COLONOSCOPY;  Surgeon: Jamila Villarreal MD;  Location: [facility] OR    ENT SURGERY  1975    tonsillectomy    GENITOURINARY SURGERY  1953    bladder surgery     GI SURGERY  2015    Severe constipation    GYN SURGERY  1977    C section    GYN  SURGERY  1981    laparoscopy    GYN SURGERY  2007    myomectomy    hysterecomy  11/14/2016    leiomyosarcoma    OTHER SURGICAL HISTORY Right 10/27/2017    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    REMOVE PORT VASCULAR ACCESS Right 05/09/2017    Procedure: REMOVE PORT VASCULAR ACCESS;  Right Port Removal;  Surgeon: Eric Gibson PA-C;  Location: [facility]    REPAIR PTOSIS BILATERAL  02/15/2012    Procedure:REPAIR PTOSIS BILATERAL; BILATERAL UPPER LID PTOSIS REPAIR; Surgeon:BRYCE REYNOLDS; Location: [facility]    TUMOR REMOVAL  06/2020       Allergies: Erythromycin and Tramadol    SH:   Social History     Socioeconomic History    Marital status:      Spouse name: Not on file    Number of children: Not on file    Years of education: Not on file    Highest education level: Not on file   Occupational History    Not on file   Tobacco Use    Smoking status: Never    Smokeless tobacco: Never   Vaping Use    Vaping Use: Never used   Substance and Sexual Activity    Alcohol use: Yes     Comment: Social, not often per patient.    Drug use: No    Sexual activity: Not Currently     Partners: Male   Other Topics Concern    Parent/sibling w/ CABG, MI or angioplasty before 65F 55M? 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 Walking daily.    How much calcium per day? Supplement and through diet       How much caffeine per day? 3 times a week    How much vitamin D per day? Supplement    Do  you/your family wear seatbelts?  Yes    Do you/your family use safety helmets? NA    Do you/your family use sunscreen? Sometimes    Do you/your family keep firearms in the home? No    Do you/your family have a smoke detector(s)? Yes    Do you feel safe in your home? Yes    Has anyone ever touched you in an unwanted manner? 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  2 grandsons now in [place] with the mother.  In a house.  .          How much exercise per week? Daily walking, will start going to the y    How much calcium per day? Through foods       How much caffeine per day? One cup    How much vitamin D per day? Through foods    Do you/your family wear seatbelts?  Yes    Do you/your family use safety helmets? N/a    Do you/your family use sunscreen? Yes    Do you/your family keep firearms in the home? No    Do you/your family have a smoke detector(s)? Yes        Reviewed by Nicki Cornejo 10-11-21         Social Determinants of Health     Financial Resource Strain: Low Risk  (2023)    Financial Resource Strain     Within the past 12 months, have you or your family members you live with been unable to get utilities (heat, electricity) when it was really needed?: No   Food Insecurity: Low Risk  (2023)    Food Insecurity     Within the past 12 months, did you worry that your food would run out before you got money to buy more?: No     Within the past 12 months, did the food you bought just not last and you didn t have money to get more?: No   Transportation Needs: Low Risk  (2023)    Transportation Needs     Within the past 12 months, has lack of transportation kept you from medical appointments, getting your medicines, non-medical meetings or appointments, work, or from getting things that you need?: No   Physical  Activity: Not on file   Stress: Not on file   Social Connections: Not on file   Interpersonal Safety: Low Risk  (11/17/2023)    Interpersonal Safety     Do you feel physically and emotionally safe where you currently live?: Yes     Within the past 12 months, have you been hit, slapped, kicked or otherwise physically hurt by someone?: No     Within the past 12 months, have you been humiliated or emotionally abused in other ways by your partner or ex-partner?: No   Housing Stability: Low Risk  (11/17/2023)    Housing Stability     Do you have housing? : Yes     Are you worried about losing your housing?: No       FH:   Family History   Problem Relation Age of Onset    Family History Negative Mother         glaucoma    Glaucoma Mother     Anxiety Disorder Mother     Heart Failure Mother     Diabetes Mother     Coronary Artery Disease Mother     Hypertension Mother     Breast Cancer Mother     Depression Mother     Obesity Mother     Heart Disease Father     Glaucoma Father     Diabetes Father     Coronary Artery Disease Father     Depression Father     Osteoporosis Father     Heart Disease Maternal Grandmother     Diabetes Maternal Grandmother     Heart Disease Paternal Grandmother     Cancer Paternal Grandfather         stomach    Thyroid Disease Sister     Retinal detachment Sister     Hypertension Sister     Hypertension Sister     Lipids Sister     Cerebrovascular Disease Sister     Depression Sister     Anxiety Disorder Sister     Depression Sister     Thyroid Disease Sister     Thyroid Disease Sister     Heart Disease Paternal Aunt     Heart Disease Paternal Uncle     Osteoporosis Other        Objective:  Data Unavailable Data Unavailable Data Unavailable Data Unavailable Data Unavailable 0 lbs 0 oz    PT and DP pulses are 2/4 bilaterally. CRT is instant. POsitive pedal hair.   Gross sensation is intact bilaterally.   Equinus is noted bilaterally. No pain with active or passive ROM of the ankle, MTJ, 1st ray, or  halluces bilaterally,. Pain noted with palpation of the left plantar fascia's attachment on the calcaneus. No pain in the medial band.   Nails normal bilaterally. Pain noted in the lateral nail skin of the right hallux. No cryptosis.  No open lesions are noted.     Assessment:   Encounter Diagnoses   Name Primary?    Plantar fasciitis Yes    Pain of right great toe     Arthritis of foot          Plan:  - Pt seen and evaluated.  - Will get XRs from [place].  - Start Arai taping on the right hallux.   - Tuli's heel cups on the left hallux.  - See again in 1 month.

## 2023-12-18 ENCOUNTER — OFFICE VISIT (OUTPATIENT)
Dept: DERMATOLOGY | Facility: CLINIC | Age: 73
End: 2023-12-18
Payer: MEDICARE

## 2023-12-18 DIAGNOSIS — D18.01 CHERRY ANGIOMA: ICD-10-CM

## 2023-12-18 DIAGNOSIS — L72.9 CYST OF SKIN: ICD-10-CM

## 2023-12-18 DIAGNOSIS — L82.1 SK (SEBORRHEIC KERATOSIS): Primary | ICD-10-CM

## 2023-12-18 PROCEDURE — 99213 OFFICE O/P EST LOW 20 MIN: CPT | Performed by: DERMATOLOGY

## 2023-12-18 ASSESSMENT — PAIN SCALES - GENERAL: PAINLEVEL: NO PAIN (0)

## 2023-12-18 NOTE — NURSING NOTE
Dermatology Rooming Note    Maggie Navarrete's goals for this visit include:   Chief Complaint   Patient presents with    Derm Problem     FBSE- spot of concern on R side of forehead at hairline. Noticed spot a few months ago. Denies itching, bleeding, pain.      Anahi Perkins, EMT

## 2023-12-18 NOTE — LETTER
"12/18/2023       RE: Maggie Navarrete  [address]  [facility] [address]     Dear Colleague,    Thank you for referring your patient, Maggie Navarrete, to the [facility] [place] at [facility]. Please see a copy of my visit note below.    [facility] Dermatology Note  Encounter Date: Dec 18, 2023  Office Visit     Dermatology Problem List:  1. 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 Noticed spot a few months ago. Denies itching, bleeding, pain. )    HPI:  Ms. Maggie Navarrete is a(n) 73 year old female who presents today as a return patient for a skin check.  Notes a scaly \"barnacle\" of the forehead.  Unfortunately does have recurrent leiomyosarcoma of the uterus and is hoping to get into a clinical trial in [place]. No other spots of concern today    Patient is otherwise feeling well, without additional skin concerns.     Labs Reviewed:  N/A    Physical Exam:  Vitals: There were no vitals taken for this visit.  SKIN: Total skin excluding the undergarment areas and including the buttocks was performed. The exam included the head/face, neck, both arms, chest, back, abdomen, both legs, digits and/or nails.   - waxy stuck on papules of the forehead, face, trunk, arms and legs  - cherry angiomas  - lentigo with no atypia  - nevi with no atypia  - There is a firm tan/flesh colored papule that dimples with lateral pressure on the left leg.   - No other " lesions of concern on areas examined.     Medications:  Current Outpatient Medications   Medication    CALCIUM PO    carboxymethylcellulose PF (REFRESH PLUS) 0.5 % ophthalmic solution    levothyroxine (SYNTHROID/LEVOTHROID) 75 MCG tablet    multivitamin w/minerals (THERA-VIT-M) tablet    venlafaxine (EFFEXOR XR) 37.5 MG 24 hr capsule    zolpidem (AMBIEN) 5 MG tablet    fluorometholone (FML LIQUIFILM) 0.1 % ophthalmic suspension     Current Facility-Administered Medications   Medication    denosumab (PROLIA) injection 60 mg      Past Medical History:   Patient Active Problem List   Diagnosis    Presbyopia - Both Eyes    Osteopenia    Generalized anxiety disorder    Vaginal atrophy    Leiomyosarcoma of uterus (H)    Chemotherapy-induced neutropenia     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)    Overweight    Leiomyosarcoma (H)    Ptosis    Dry eyes    Closed nondisplaced fracture of fifth right metatarsal bone    History of deep venous thrombosis    Hypothyroidism    Myopia of both eyes    Neuropathy, peripheral    Post-operative nausea and vomiting    Osteoporosis of forearm    Low TSH level    Arthralgia, unspecified joint    Dyslipidemia    Small bowel obstruction (H)    Intractable vomiting with nausea, unspecified vomiting type    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)    Leukocytosis    Pelvic somatic dysfunction    Mixed incontinence    Urge incontinence    S/P total abdominal hysterectomy and bilateral salpingo-oophorectomy    S/P partial thyroidectomy    History of COVID-19    Temporomandibular joint disorder     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 on close of this encounter.

## 2023-12-18 NOTE — PROGRESS NOTES
"[facility] Dermatology Note  Encounter Date: Dec 18, 2023  Office Visit     Dermatology Problem List:  1. 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 Noticed spot a few months ago. Denies itching, bleeding, pain. )    HPI:  Ms. Maggie Navarrete is a(n) 73 year old female who presents today as a return patient for a skin check.  Notes a scaly \"barnacle\" of the forehead.  Unfortunately does have recurrent leiomyosarcoma of the uterus and is hoping to get into a clinical trial in [place]. No other spots of concern today    Patient is otherwise feeling well, without additional skin concerns.     Labs Reviewed:  N/A    Physical Exam:  Vitals: There were no vitals taken for this visit.  SKIN: Total skin excluding the undergarment areas and including the buttocks was performed. The exam included the head/face, neck, both arms, chest, back, abdomen, both legs, digits and/or nails.   - waxy stuck on papules of the forehead, face, trunk, arms and legs  - cherry angiomas  - lentigo with no atypia  - nevi with no atypia  - There is a firm tan/flesh colored papule that dimples with lateral pressure on the left leg.   - No other lesions of concern on areas examined.     Medications:  Current Outpatient Medications   Medication    CALCIUM PO    carboxymethylcellulose PF (REFRESH PLUS) 0.5 % ophthalmic solution    levothyroxine (SYNTHROID/LEVOTHROID) 75 MCG tablet    multivitamin w/minerals (THERA-VIT-M) tablet    venlafaxine (EFFEXOR XR) 37.5 MG 24 " hr capsule    zolpidem (AMBIEN) 5 MG tablet    fluorometholone (FML LIQUIFILM) 0.1 % ophthalmic suspension     Current Facility-Administered Medications   Medication    denosumab (PROLIA) injection 60 mg      Past Medical History:   Patient Active Problem List   Diagnosis    Presbyopia - Both Eyes    Osteopenia    Generalized anxiety disorder    Vaginal atrophy    Leiomyosarcoma of uterus (H)    Chemotherapy-induced neutropenia     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)    Overweight    Leiomyosarcoma (H)    Ptosis    Dry eyes    Closed nondisplaced fracture of fifth right metatarsal bone    History of deep venous thrombosis    Hypothyroidism    Myopia of both eyes    Neuropathy, peripheral    Post-operative nausea and vomiting    Osteoporosis of forearm    Low TSH level    Arthralgia, unspecified joint    Dyslipidemia    Small bowel obstruction (H)    Intractable vomiting with nausea, unspecified vomiting type    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)    Leukocytosis    Pelvic somatic dysfunction    Mixed incontinence    Urge incontinence    S/P total abdominal hysterectomy and bilateral salpingo-oophorectomy    S/P partial thyroidectomy    History of COVID-19    Temporomandibular joint disorder     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 on close of this encounter.

## 2023-12-19 ENCOUNTER — TELEPHONE (OUTPATIENT)
Dept: DERMATOLOGY | Facility: CLINIC | Age: 73
End: 2023-12-19
Payer: MEDICARE

## 2023-12-19 NOTE — TELEPHONE ENCOUNTER
Left Voicemail (1st Attempt) and Sent Mychart (1st Attempt) for the patient to call back and schedule the following:        Appointment type: Return  Provider: Dr. Styles  Return date: Dec 2024  Specialty phone number: [phone]

## 2023-12-29 ENCOUNTER — TELEPHONE (OUTPATIENT)
Dept: DERMATOLOGY | Facility: CLINIC | Age: 73
End: 2023-12-29
Payer: MEDICARE

## 2023-12-29 NOTE — TELEPHONE ENCOUNTER
Lvm sent my chart msg for patient to scheduled the following:    Appointment type: Return  Provider: Dr. Styles  Return date: 12-18-24  Specialty phone number: [phone]

## 2024-01-06 ENCOUNTER — HEALTH MAINTENANCE LETTER (OUTPATIENT)
Age: 74
End: 2024-01-06

## 2024-01-09 ENCOUNTER — TELEPHONE (OUTPATIENT)
Dept: DERMATOLOGY | Facility: CLINIC | Age: 74
End: 2024-01-09
Payer: MEDICARE

## 2024-01-09 DIAGNOSIS — H01.119 CONTACT DERMATITIS OF EYELID, UNSPECIFIED LATERALITY: Primary | ICD-10-CM

## 2024-01-09 NOTE — TELEPHONE ENCOUNTER
[facility] Call Center    Phone Message    May a detailed message be left on voicemail: yes     Reason for Call: Patient's eye doctor believes patient may have eczema under her eyelid - patient would like to discuss treatment options - please call back [phone] Thank you    Action Taken: Message routed to:  [facility] Derm    Travel Screening: Not Applicable

## 2024-01-10 RX ORDER — TACROLIMUS 1 MG/G
OINTMENT TOPICAL 2 TIMES DAILY
Qty: 30 G | Refills: 3 | Status: SHIPPED | OUTPATIENT
Start: 2024-01-10

## 2024-01-10 NOTE — TELEPHONE ENCOUNTER
Chapis Styles MD  [facility]at[facility]1 hour ago (7:40 AM)     KB  If the patient wishes, we could try some Protopic ointment ( a non steroid dermatitis treatment) for the rash around the eyes.  Please get a pharmacy     Shy Rodas, [facility]  Chapis Styles MD; Anahi Perkins18 hours ago (3:15 PM)     AC  Would you like to set up a phone visit??

## 2024-01-10 NOTE — TELEPHONE ENCOUNTER
Pt returned call. She would like to try the protopic ointment. Please send rx to [facility] pharmacy in [place] on [address].  Mikki Hubbard RN

## 2024-01-12 ENCOUNTER — OFFICE VISIT (OUTPATIENT)
Dept: ORTHOPEDICS | Facility: CLINIC | Age: 74
End: 2024-01-12
Payer: MEDICARE

## 2024-01-12 ENCOUNTER — ANCILLARY PROCEDURE (OUTPATIENT)
Dept: GENERAL RADIOLOGY | Facility: CLINIC | Age: 74
End: 2024-01-12
Attending: PODIATRIST
Payer: MEDICARE

## 2024-01-12 DIAGNOSIS — M79.671 RIGHT FOOT PAIN: ICD-10-CM

## 2024-01-12 DIAGNOSIS — M19.079 ARTHRITIS OF FOOT: Primary | ICD-10-CM

## 2024-01-12 DIAGNOSIS — M19.079 ARTHRITIS OF FOOT: ICD-10-CM

## 2024-01-12 DIAGNOSIS — M53.3 SI (SACROILIAC) JOINT DYSFUNCTION: ICD-10-CM

## 2024-01-12 DIAGNOSIS — M72.2 PLANTAR FASCIITIS: ICD-10-CM

## 2024-01-12 PROCEDURE — 73630 X-RAY EXAM OF FOOT: CPT | Mod: RT | Performed by: RADIOLOGY

## 2024-01-12 PROCEDURE — 99214 OFFICE O/P EST MOD 30 MIN: CPT | Performed by: PODIATRIST

## 2024-01-12 NOTE — LETTER
1/12/2024         RE: Maggie Navarrete  [address]  [facility] [address]        Dear Colleague,    Thank you for referring your patient, Maggie Navarrete, to the [facility] [place]. Please see a copy of my visit note below.    Date of Service: 12/15/2023    Chief Complaint:   Chief Complaint   Patient presents with    Follow Up     1 month follow up.         HPI: Maggie is a 73 year old female who presents today for further evaluation of for a few complaints. She notes that the right hallux has been hurting for months. [place] thought it was the nail and sent her here. She does have pain in the right midfoot, [place] said arthritis and she had XRs there. She wore a new slipper a few days ago and left heel started to hurt.     Interval history: She relates that she did get the tuli's heel cup.  Does not help much.  She notes that the taping of the toe does help somewhat.  We did not get her records from [facility].  She relates that she still has pain in the midtarsal area on the right foot.    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    GERD (gastroesophageal reflux disease)     Hx of previous reproductive problem 1980    Kidney stone 2011    Leiomyosarcoma (H) 11/14/2016    Personal history of colonic polyps 2016    Small, benign    PONV (postoperative nausea and vomiting)     Posterior vitreous detachment of left eye 2011    Posterior vitreous detachment of right eye 2011    Ptosis of eyelid, bilateral     Sepsis (H) 07/2020    Stomach problems 2015    Abdominal pain, Diarreha    Thyroid disease     thyroid nodule resolved 2014       PSxH:   Past Surgical History:   Procedure Laterality Date    ABDOMEN SURGERY  Now    Pain,  diarrhea    BREAST SURGERY  2002    right breast benign    COLONOSCOPY  2008    due in 2018    COLONOSCOPY N/A 09/08/2016    Procedure: COMBINED COLONOSCOPY, SINGLE OR MULTIPLE BIOPSY/POLYPECTOMY BY BIOPSY;  Surgeon: Abner Pepper MD;  Location:  GI    COLONOSCOPY N/A 10/18/2021    Procedure: COLONOSCOPY;  Surgeon: Jamila Villarreal MD;  Location: [facility] [place]    ENT SURGERY  1975    tonsillectomy    GENITOURINARY SURGERY  1953    bladder surgery     GI SURGERY  2015    Severe constipation    GYN SURGERY  1977    C section    GYN SURGERY  1981    laparoscopy    GYN SURGERY  2007    myomectomy    hysterecomy  11/14/2016    leiomyosarcoma    OTHER SURGICAL HISTORY Right 10/27/2017    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    REMOVE PORT VASCULAR ACCESS Right 05/09/2017    Procedure: REMOVE PORT VASCULAR ACCESS;  Right Port Removal;  Surgeon: Eric Gibson PA-C;  Location:  OR    REPAIR PTOSIS BILATERAL  02/15/2012    Procedure:REPAIR PTOSIS BILATERAL; BILATERAL UPPER LID PTOSIS REPAIR; Surgeon:BRYCE REYNOLDS; Location: [facility]    TUMOR REMOVAL  06/2020       Allergies: Erythromycin and Tramadol    SH:   Social History     Socioeconomic History    Marital status:      Spouse name: Not on file    Number of children: Not on file    Years of education: Not on file    Highest education level: Not on file   Occupational History    Not on file   Tobacco Use    Smoking status: Never    Smokeless tobacco: Never   Vaping Use    Vaping Use: Never used   Substance and Sexual Activity    Alcohol use: Yes     Comment: Social, not often per patient.    Drug use: No    Sexual activity: Not Currently     Partners: Male   Other Topics Concern    Parent/sibling w/ CABG, MI or angioplasty before 65F 55M? 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 Walking daily.    How much calcium per day? Supplement and through diet       How much caffeine per day? 3 times a week    How much vitamin D per day? Supplement    Do you/your family wear seatbelts?  Yes    Do you/your family use safety helmets? NA    Do you/your family use sunscreen? Sometimes    Do you/your family keep firearms in the home? No    Do you/your family have a smoke detector(s)? Yes    Do you feel safe in your home? Yes    Has anyone ever touched you in an unwanted manner? 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  2 grandsons now in [place] with the mother.  In a house.  .          How much exercise per week? Daily walking, will start going to the y    How much calcium per day? Through foods       How much caffeine per day? One cup    How much vitamin D per day? Through foods    Do you/your family wear seatbelts?  Yes    Do you/your family use safety helmets? N/a    Do you/your family use sunscreen? Yes    Do you/your family keep firearms in the home? No    Do you/your family have a smoke detector(s)? Yes        Reviewed by Nicki Cornejo 10-11-21         Social Determinants of Health     Financial Resource Strain: Low Risk  (2023)    Financial Resource Strain     Within the past 12 months, have you or your family members you live with been unable to get utilities (heat, electricity) when it was really needed?: No   Food Insecurity: Low Risk   (11/17/2023)    Food Insecurity     Within the past 12 months, did you worry that your food would run out before you got money to buy more?: No     Within the past 12 months, did the food you bought just not last and you didn t have money to get more?: No   Transportation Needs: Low Risk  (11/17/2023)    Transportation Needs     Within the past 12 months, has lack of transportation kept you from medical appointments, getting your medicines, non-medical meetings or appointments, work, or from getting things that you need?: No   Physical Activity: Not on file   Stress: Not on file   Social Connections: Not on file   Interpersonal Safety: Low Risk  (11/17/2023)    Interpersonal Safety     Do you feel physically and emotionally safe where you currently live?: Yes     Within the past 12 months, have you been hit, slapped, kicked or otherwise physically hurt by someone?: No     Within the past 12 months, have you been humiliated or emotionally abused in other ways by your partner or ex-partner?: No   Housing Stability: Low Risk  (11/17/2023)    Housing Stability     Do you have housing? : Yes     Are you worried about losing your housing?: No       FH:   Family History   Problem Relation Age of Onset    Skin Cancer Mother     Family History Negative Mother         glaucoma    Glaucoma Mother     Anxiety Disorder Mother     Heart Failure Mother     Diabetes Mother     Coronary Artery Disease Mother     Hypertension Mother     Breast Cancer Mother     Depression Mother     Obesity Mother     Heart Disease Father     Glaucoma Father     Diabetes Father     Coronary Artery Disease Father     Depression Father     Osteoporosis Father     Heart Disease Maternal Grandmother     Diabetes Maternal Grandmother     Heart Disease Paternal Grandmother     Cancer Paternal Grandfather         stomach    Skin Cancer Sister     Depression Sister     Thyroid Disease Sister     Retinal detachment Sister     Hypertension Sister      Hypertension Sister     Lipids Sister     Cerebrovascular Disease Sister     Depression Sister     Heart Disease Paternal Aunt     Heart Disease Paternal Uncle     Osteoporosis Other        Objective:      PT and DP pulses are 2/4 bilaterally. CRT is instant. POsitive pedal hair.   Gross sensation is intact bilaterally.   Equinus is noted bilaterally. No pain with active or passive ROM of the ankle, MTJ, 1st ray, or halluces bilaterally,. Pain noted with palpation of the left plantar fascia's attachment on the calcaneus. No pain in the medial band.   Nails normal bilaterally. Pain noted in the lateral nail skin of the right hallux. No cryptosis.  No open lesions are noted.     right foot xrays indicated in 3 weightbearing views.    No fractures or acute processes noted.  She does have some decrease in the joint space between the fourth and fifth metatarsals that there junction with the cuboid.      Assessment:   Encounter Diagnoses   Name Primary?    Arthritis of foot Yes    Right foot pain     Plantar fasciitis     SI (sacroiliac) joint dysfunction          Plan:  - Pt seen and evaluated.  -X-rays were taken and independently interpreted by myself.  Discussed with patient.  I discussed with her that we could get an MRI to look more closely at the joints to see if any therapy could be targeted.  She will defer this for now.  -Continue Arai taping on the right hallux.   -We discussed treatment escalation for the plantar fasciitis.  She would like to defer steroid injections for now.  Will send her to physical therapy.  - See again in 6 weeks.   On the date of service, I spent 30 minutes with patient care, documentation, chart review, image review, and care coordination.  I sent her to see the spine PA clinic for SI joint dysfunction.           Ren Leahy DPM

## 2024-01-12 NOTE — PROGRESS NOTES
Date of Service: 12/15/2023    Chief Complaint:   Chief Complaint   Patient presents with    Follow Up     1 month follow up.         HPI: Maggie is a 73 year old female who presents today for further evaluation of for a few complaints. She notes that the right hallux has been hurting for months. [place] thought it was the nail and sent her here. She does have pain in the right midfoot, [facility] said arthritis and she had XRs there. She wore a new slipper a few days ago and left heel started to hurt.     Interval history: She relates that she did get the tuli's heel cup.  Does not help much.  She notes that the taping of the toe does help somewhat.  We did not get her records from [facility].  She relates that she still has pain in the midtarsal area on the right foot.    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    GERD (gastroesophageal reflux disease)     Hx of previous reproductive problem 1980    Kidney stone 2011    Leiomyosarcoma (H) 11/14/2016    Personal history of colonic polyps 2016    Small, benign    PONV (postoperative nausea and vomiting)     Posterior vitreous detachment of left eye 2011    Posterior vitreous detachment of right eye 2011    Ptosis of eyelid, bilateral     Sepsis (H) 07/2020    Stomach problems 2015    Abdominal pain, Diarreha    Thyroid disease     thyroid nodule resolved 2014       PSxH:   Past Surgical History:   Procedure Laterality Date    ABDOMEN SURGERY  Now    Pain, diarrhea    BREAST SURGERY  2002    right breast benign    COLONOSCOPY  2008    due in 2018    COLONOSCOPY N/A 09/08/2016    Procedure: COMBINED COLONOSCOPY, SINGLE OR MULTIPLE BIOPSY/POLYPECTOMY BY BIOPSY;  Surgeon: Abner Pepper MD;  Location:  GI     COLONOSCOPY N/A 10/18/2021    Procedure: COLONOSCOPY;  Surgeon: Jamila Villarreal MD;  Location: [facility]    ENT SURGERY  1975    tonsillectomy    GENITOURINARY SURGERY  1953    bladder surgery     GI SURGERY  2015    Severe constipation    GYN SURGERY  1977    C section    GYN SURGERY  1981    laparoscopy    GYN SURGERY  2007    myomectomy    hysterecomy  11/14/2016    leiomyosarcoma    OTHER SURGICAL HISTORY Right 10/27/2017    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    REMOVE PORT VASCULAR ACCESS Right 05/09/2017    Procedure: REMOVE PORT VASCULAR ACCESS;  Right Port Removal;  Surgeon: Eric Gibson PA-C;  Location[facility]    REPAIR PTOSIS BILATERAL  02/15/2012    Procedure:REPAIR PTOSIS BILATERAL; BILATERAL UPPER LID PTOSIS REPAIR; Surgeon:BRYCE REYNOLDS; Location: [facility]    TUMOR REMOVAL  06/2020       Allergies: Erythromycin and Tramadol    SH:   Social History     Socioeconomic History    Marital status:      Spouse name: Not on file    Number of children: Not on file    Years of education: Not on file    Highest education level: Not on file   Occupational History    Not on file   Tobacco Use    Smoking status: Never    Smokeless tobacco: Never   Vaping Use    Vaping Use: Never used   Substance and Sexual Activity    Alcohol use: Yes     Comment: Social, not often per patient.    Drug use: No    Sexual activity: Not Currently     Partners: Male   Other Topics Concern    Parent/sibling w/ CABG, MI or angioplasty before 65F 55M? 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 Walking daily.    How much calcium per day? Supplement and through diet       How much caffeine per day? 3 times a week    How much vitamin D per day? Supplement    Do you/your family wear seatbelts?  Yes    Do you/your family use safety helmets? NA    Do you/your family use sunscreen? Sometimes    Do you/your family keep firearms in the home? No    Do you/your family have a smoke detector(s)? Yes    Do you feel safe in your home? Yes    Has anyone ever touched you in an unwanted manner? 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  2 grandsons now in [place] with the mother.  In a house.  .          How much exercise per week? Daily walking, will start going to the y    How much calcium per day? Through foods       How much caffeine per day? One cup    How much vitamin D per day? Through foods    Do you/your family wear seatbelts?  Yes    Do you/your family use safety helmets? N/a    Do you/your family use sunscreen? Yes    Do you/your family keep firearms in the home? No    Do you/your family have a smoke detector(s)? Yes        Reviewed by Nicki Cornejo 10-11-21         Social Determinants of Health     Financial Resource Strain: Low Risk  (2023)    Financial Resource Strain     Within the past 12 months, have you or your family members you live with been unable to get utilities (heat, electricity) when it was really needed?: No   Food Insecurity: Low Risk  (2023)    Food Insecurity     Within the past 12 months, did you worry that your food would run out before you got money to buy more?: No     Within the past 12 months, did the food you bought just not last and you didn t have money to get more?: No    Transportation Needs: Low Risk  (11/17/2023)    Transportation Needs     Within the past 12 months, has lack of transportation kept you from medical appointments, getting your medicines, non-medical meetings or appointments, work, or from getting things that you need?: No   Physical Activity: Not on file   Stress: Not on file   Social Connections: Not on file   Interpersonal Safety: Low Risk  (11/17/2023)    Interpersonal Safety     Do you feel physically and emotionally safe where you currently live?: Yes     Within the past 12 months, have you been hit, slapped, kicked or otherwise physically hurt by someone?: No     Within the past 12 months, have you been humiliated or emotionally abused in other ways by your partner or ex-partner?: No   Housing Stability: Low Risk  (11/17/2023)    Housing Stability     Do you have housing? : Yes     Are you worried about losing your housing?: No       FH:   Family History   Problem Relation Age of Onset    Skin Cancer Mother     Family History Negative Mother         glaucoma    Glaucoma Mother     Anxiety Disorder Mother     Heart Failure Mother     Diabetes Mother     Coronary Artery Disease Mother     Hypertension Mother     Breast Cancer Mother     Depression Mother     Obesity Mother     Heart Disease Father     Glaucoma Father     Diabetes Father     Coronary Artery Disease Father     Depression Father     Osteoporosis Father     Heart Disease Maternal Grandmother     Diabetes Maternal Grandmother     Heart Disease Paternal Grandmother     Cancer Paternal Grandfather         stomach    Skin Cancer Sister     Depression Sister     Thyroid Disease Sister     Retinal detachment Sister     Hypertension Sister     Hypertension Sister     Lipids Sister     Cerebrovascular Disease Sister     Depression Sister     Heart Disease Paternal Aunt     Heart Disease Paternal Uncle     Osteoporosis Other        Objective:      PT and DP pulses are 2/4 bilaterally. CRT is instant.  POsitive pedal hair.   Gross sensation is intact bilaterally.   Equinus is noted bilaterally. No pain with active or passive ROM of the ankle, MTJ, 1st ray, or halluces bilaterally,. Pain noted with palpation of the left plantar fascia's attachment on the calcaneus. No pain in the medial band.   Nails normal bilaterally. Pain noted in the lateral nail skin of the right hallux. No cryptosis.  No open lesions are noted.     right foot xrays indicated in 3 weightbearing views.    No fractures or acute processes noted.  She does have some decrease in the joint space between the fourth and fifth metatarsals that there junction with the cuboid.      Assessment:   Encounter Diagnoses   Name Primary?    Arthritis of foot Yes    Right foot pain     Plantar fasciitis     SI (sacroiliac) joint dysfunction          Plan:  - Pt seen and evaluated.  -X-rays were taken and independently interpreted by myself.  Discussed with patient.  I discussed with her that we could get an MRI to look more closely at the joints to see if any therapy could be targeted.  She will defer this for now.  -Continue Arai taping on the right hallux.   -We discussed treatment escalation for the plantar fasciitis.  She would like to defer steroid injections for now.  Will send her to physical therapy.  - See again in 6 weeks.   On the date of service, I spent 30 minutes with patient care, documentation, chart review, image review, and care coordination.  I sent her to see the spine PA clinic for SI joint dysfunction.

## 2024-01-24 DIAGNOSIS — C78.30 SECONDARY MALIGNANT NEOPLASM OF RESPIRATORY AND DIGESTIVE SYSTEMS (H): Primary | ICD-10-CM

## 2024-01-24 DIAGNOSIS — C78.89 SECONDARY MALIGNANT NEOPLASM OF RESPIRATORY AND DIGESTIVE SYSTEMS (H): Primary | ICD-10-CM

## 2024-01-25 ASSESSMENT — ACTIVITIES OF DAILY LIVING (ADL)
LIFTING_AN_OBJECT,_LIKE_A_BAG_OF_GROCERIES_FROM_THE_FLOOR: NO DIFFICULTY
YOUR_USUAL_HOBBIES,_RECREATIONAL_OR_SPORTING_ACTIVITIES: A LITTLE BIT OF DIFFICULTY
MAKING_SHARP_TURNS_WHILE_RUNNING_FAST: A LITTLE BIT OF DIFFICULTY
STANDING_FOR_1_HOUR: A LITTLE BIT OF DIFFICULTY
PUTTING_ON_YOUR_SHOES_OR_SOCKS: NO DIFFICULTY
ROLLING_OVER_IN_BED: NO DIFFICULTY
PLEASE_INDICATE_YOR_PRIMARY_REASON_FOR_REFERRAL_TO_THERAPY:: FOOT AND/OR ANKLE
LEFS_SCORE(%): 0
WALKING_BETWEEN_ROOMS: A LITTLE BIT OF DIFFICULTY
GETTING_INTO_AND_OUT_OF_A_BATH: NO DIFFICULTY
PERFORMING_LIGHT_ACTIVITIES_AROUND_YOUR_HOME: NO DIFFICULTY
WALKING_2_BLOCKS: A LITTLE BIT OF DIFFICULTY
SHOPPING: A LITTLE BIT OF DIFFICULTY
RUNNING_ON_UNEVEN_GROUND: A LITTLE BIT OF DIFFICULTY
LEFS_RAW_SCORE: 0
PERFORMING_HEAVY_ACTIVITIES_AROUND_YOUR_HOME: NO DIFFICULTY
SITTING_FOR_1_HOUR: NO DIFFICULTY
RUNNING_ON_EVEN_GROUND: A LITTLE BIT OF DIFFICULTY
GOING_UP_OR_DOWN_10_STAIRS: A LITTLE BIT OF DIFFICULTY
SQUATTING: EXTREME DIFFICULTY OR UNABLE TO PERFORM ACTIVITY
GETTING_INTO_OR_OUT_OF_A_CAR: NO DIFFICULTY
WALKING_A_MILE: A LITTLE BIT OF DIFFICULTY
ANY_OF_YOUR_USUAL_WORK,_HOUSEWORK_OR_SCHOOL_ACTIVITIES: NO DIFFICULTY

## 2024-01-26 ENCOUNTER — TELEPHONE (OUTPATIENT)
Dept: DERMATOLOGY | Facility: CLINIC | Age: 74
End: 2024-01-26

## 2024-01-26 ENCOUNTER — THERAPY VISIT (OUTPATIENT)
Dept: PHYSICAL THERAPY | Facility: CLINIC | Age: 74
End: 2024-01-26
Attending: PODIATRIST
Payer: MEDICARE

## 2024-01-26 DIAGNOSIS — M79.672 PAIN OF LEFT HEEL: Primary | ICD-10-CM

## 2024-01-26 DIAGNOSIS — M72.2 PLANTAR FASCIITIS: ICD-10-CM

## 2024-01-26 PROCEDURE — 97161 PT EVAL LOW COMPLEX 20 MIN: CPT | Mod: GP | Performed by: PHYSICAL THERAPIST

## 2024-01-26 PROCEDURE — 97110 THERAPEUTIC EXERCISES: CPT | Mod: GP | Performed by: PHYSICAL THERAPIST

## 2024-01-26 NOTE — TELEPHONE ENCOUNTER
Left Voicemail (1st Attempt) and Sent Mychart (1st Attempt) for the patient to call back and schedule the following:        Appointment type: Return  Provider: Dr. Styles  Return date: 12/24/24  Specialty phone number: [phone]

## 2024-01-26 NOTE — PROGRESS NOTES
PHYSICAL THERAPY EVALUATION  Type of Visit: Evaluation    See electronic medical record for Abuse and Falls Screening details.    Subjective       Presenting condition or subjective complaint: Heal plantar fasciitis, pain started 2 months ago, started wearing winter shoes and walked a lot which started the pain, she has 2 dogs, pain in the morning with first steps, pain with bare foot walking, wears insert in her shoes - feels like somtimes they helps and sometimes they dont, also has slipperswith good support, walks her dog for 45 mins, pain in the begining and then it ges better, reports pain end of the walk; pt also reports pain over R great toe for which the podiatrist has recommended taping; h/o R 5th MT frature 2018, arthritis present over the R foot  Date of onset: 01/12/24    Relevant medical history:    has a past medical history of Anxiety, Arthritis, Disorder of bone and cartilage, unspecified, Esophageal reflux (2015), Follicular adenoma of thyroid gland, GERD (gastroesophageal reflux disease), previous reproductive problem (1980), Kidney stone (2011), 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    She has no past medical history of Difficult intubation, Malignant hyperthermia, or Spinal headache.    H/o L SI joint pain on and off for years; B knee pain; has had injections for the knee jt - has had PT for this   H/o R foot plantar fascitis - no PT just gentle toe stretching     Dates & types of surgery:   has a past surgical history that includes Repair ptosis bilateral (02/15/2012); ENT surgery (1975); GYN surgery (1977); GYN surgery (1981); GYN surgery (2007); Abdomen surgery (1953); Breast surgery (2002); colonoscopy (2008);  Colonoscopy (N/A, 2016); Abdomen surgery (Now); GI surgery (); Remove port vascular access (Right, 2017); OTHER SURGICAL HISTORY (Right, 10/27/2017); hysterecomy (2016); Tumor Removal (2020); and Colonoscopy (N/A, 10/18/2021).      Prior diagnostic imaging/testing results:       Prior therapy history for the same diagnosis, illness or injury: No      Prior Level of Function  Transfers: Independent  Ambulation: Independent  ADL: Independent  IADL:     Living Environment  Social support: Alone   Type of home: House   Stairs to enter the home: Yes 3 Is there a railing: No   Ramp: No   Stairs inside the home: Yes 13 Is there a railing: Yes   Help at home: None  Equipment owned:       Employment: No    Hobbies/Interests:      Patient goals for therapy: Walk without pain    Pain assessment: Pain present  Location: L heel pain /Ratin/10     Objective   FOOT/ANKLE EVALUATION  PAIN: Pain Level at Rest: 0/10  Pain Level with Use: 7/10  Pain Location: foot   Pain Quality: Sharp  Pain Frequency: intermittent  Pain is Worst: daytime  Pain is Exacerbated By: first steps, standing on bare foot  Pain is Relieved By: cold, NSAIDs, stretch, and use  Pain Progression: Unchanged  INTEGUMENTARY (edema, incisions):   POSTURE:   GAIT:   Weightbearing Status: WBAT  Assistive Device(s): None  Gait Deviations:  reduced talo calceneal mobility during ambulation   BALANCE/PROPRIOCEPTION: Single Leg Stance Eyes Open (seconds): unable unsupported  WEIGHT BEARING ALIGNMENT:   NON-WEIGHTBEARING ALIGNMENT:    ROM:   (Degrees) Left AROM Left PROM  Right AROM Right PROM   Ankle Dorsiflexion 20 25 20 25   Ankle Plantarflexion 50  40    Ankle Inversion 30  30    Ankle Eversion 20  15    Great Toe Flexion       Great Toe Extension 30  50    Pain:   End feel:     STRENGTH:   Pain: - none + mild ++ moderate +++ severe  Strength Scale: 0-5/5 Left Right   Ankle Dorsiflexion 5- 5-   Ankle Plantarflexion 3+ 2   Ankle Inversion 4+  4+   Ankle Eversion 4 4                                          PALPATION:  tenderness over L heel   JOINT MOBILITY:     Assessment & Plan   CLINICAL IMPRESSIONS  Medical Diagnosis: Plantar fasciitis    Treatment Diagnosis: Plantar fascitis, heel pain   Impression/Assessment: Patient is a 73 year old female with L plantar fascia complaints.  The following significant findings have been identified: Pain, Decreased ROM/flexibility, Decreased joint mobility, Decreased strength, and Impaired balance. These impairments interfere with their ability to perform self care tasks, recreational activities, household chores, and community mobility as compared to previous level of function.     Clinical Decision Making (Complexity):  Clinical Presentation: Stable/Uncomplicated  Clinical Presentation Rationale: based on medical and personal factors listed in PT evaluation  Clinical Decision Making (Complexity): Low complexity    PLAN OF CARE  Treatment Interventions:  Modalities: Cryotherapy, Ultrasound  Interventions: Manual Therapy, Neuromuscular Re-education, Therapeutic Activity, Therapeutic Exercise    Long Term Goals     PT Goal 1  Goal Identifier: walking  Goal Description: no pain with first steps, no pain after walking for 45 mins with comfortable shoes  Rationale: to maximize safety and independence with performance of ADLs and functional tasks;to maximize safety and independence within the home;to maximize safety and independence within the community;to maximize safety and independence with self cares  Target Date: 03/22/24      Frequency of Treatment: 1x/week  Duration of Treatment: 8 weeks    Recommended Referrals to Other Professionals: Physical Therapy  Education Assessment:   Learner/Method: Patient    Risks and benefits of evaluation/treatment have been explained.   Patient/Family/caregiver agrees with Plan of Care.     Evaluation Time:     PT Eval, Low Complexity Minutes (63279): 20       Signing Clinician:  Rina Juarez, PT      [facility]                                                                                   OUTPATIENT PHYSICAL THERAPY      PLAN OF TREATMENT FOR OUTPATIENT REHABILITATION   Patient's Last Name, First Name, Maggie Crenshaw YOB: 1950   Provider's Name   [facility]   Medical Record No.  [id number]     Onset Date: 01/12/24  Start of Care Date: 01/26/24     Medical Diagnosis:  Plantar fasciitis      PT Treatment Diagnosis:  Plantar fascitis, heel pain Plan of Treatment  Frequency/Duration: 1x/week/ 8 weeks    Certification date from 01/26/24 to 03/22/24         See note for plan of treatment details and functional goals     Rina Juarez, PT                         I CERTIFY THE NEED FOR THESE SERVICES FURNISHED UNDER        THIS PLAN OF TREATMENT AND WHILE UNDER MY CARE     (Physician attestation of this document indicates review and certification of the therapy plan).              Referring Provider:  Ren Leahy    Initial Assessment  See Epic Evaluation- Start of Care Date: 01/26/24

## 2024-01-29 ENCOUNTER — LAB (OUTPATIENT)
Dept: LAB | Facility: CLINIC | Age: 74
End: 2024-01-29
Payer: MEDICARE

## 2024-01-29 DIAGNOSIS — C78.89 SECONDARY MALIGNANT NEOPLASM OF RESPIRATORY AND DIGESTIVE SYSTEMS (H): ICD-10-CM

## 2024-01-29 DIAGNOSIS — C78.30 SECONDARY MALIGNANT NEOPLASM OF RESPIRATORY AND DIGESTIVE SYSTEMS (H): ICD-10-CM

## 2024-01-29 LAB
ALBUMIN SERPL BCG-MCNC: 4.1 G/DL (ref 3.5–5.2)
ALP SERPL-CCNC: 56 U/L (ref 40–150)
ALT SERPL W P-5'-P-CCNC: 23 U/L (ref 0–50)
ANION GAP SERPL CALCULATED.3IONS-SCNC: 8 MMOL/L (ref 7–15)
AST SERPL W P-5'-P-CCNC: 19 U/L (ref 0–45)
BASOPHILS # BLD AUTO: 0 10E3/UL (ref 0–0.2)
BASOPHILS NFR BLD AUTO: 0 %
BILIRUB DIRECT SERPL-MCNC: <0.2 MG/DL (ref 0–0.3)
BILIRUB SERPL-MCNC: 0.3 MG/DL
BUN SERPL-MCNC: 20.8 MG/DL (ref 8–23)
CALCIUM SERPL-MCNC: 8.7 MG/DL (ref 8.8–10.2)
CHLORIDE SERPL-SCNC: 108 MMOL/L (ref 98–107)
CREAT SERPL-MCNC: 1.47 MG/DL (ref 0.51–0.95)
DEPRECATED HCO3 PLAS-SCNC: 25 MMOL/L (ref 22–29)
EGFRCR SERPLBLD CKD-EPI 2021: 37 ML/MIN/1.73M2
EOSINOPHIL # BLD AUTO: 0.1 10E3/UL (ref 0–0.7)
EOSINOPHIL NFR BLD AUTO: 2 %
ERYTHROCYTE [DISTWIDTH] IN BLOOD BY AUTOMATED COUNT: 14.5 % (ref 10–15)
GLUCOSE SERPL-MCNC: 85 MG/DL (ref 70–99)
HCT VFR BLD AUTO: 40.6 % (ref 35–47)
HGB BLD-MCNC: 13.1 G/DL (ref 11.7–15.7)
IMM GRANULOCYTES # BLD: 0 10E3/UL
IMM GRANULOCYTES NFR BLD: 0 %
LYMPHOCYTES # BLD AUTO: 2.3 10E3/UL (ref 0.8–5.3)
LYMPHOCYTES NFR BLD AUTO: 39 %
MAGNESIUM SERPL-MCNC: 2 MG/DL (ref 1.7–2.3)
MCH RBC QN AUTO: 29.5 PG (ref 26.5–33)
MCHC RBC AUTO-ENTMCNC: 32.3 G/DL (ref 31.5–36.5)
MCV RBC AUTO: 91 FL (ref 78–100)
MONOCYTES # BLD AUTO: 0.2 10E3/UL (ref 0–1.3)
MONOCYTES NFR BLD AUTO: 4 %
NEUTROPHILS # BLD AUTO: 3.2 10E3/UL (ref 1.6–8.3)
NEUTROPHILS NFR BLD AUTO: 55 %
PLATELET # BLD AUTO: 170 10E3/UL (ref 150–450)
POTASSIUM SERPL-SCNC: 4.3 MMOL/L (ref 3.4–5.3)
PROT SERPL-MCNC: 6.5 G/DL (ref 6.4–8.3)
RBC # BLD AUTO: 4.44 10E6/UL (ref 3.8–5.2)
SODIUM SERPL-SCNC: 141 MMOL/L (ref 135–145)
WBC # BLD AUTO: 5.8 10E3/UL (ref 4–11)

## 2024-01-29 PROCEDURE — 82248 BILIRUBIN DIRECT: CPT

## 2024-01-29 PROCEDURE — 83735 ASSAY OF MAGNESIUM: CPT

## 2024-01-29 PROCEDURE — 36415 COLL VENOUS BLD VENIPUNCTURE: CPT

## 2024-01-29 PROCEDURE — 80053 COMPREHEN METABOLIC PANEL: CPT

## 2024-01-29 PROCEDURE — 85025 COMPLETE CBC W/AUTO DIFF WBC: CPT

## 2024-02-14 DIAGNOSIS — M53.3 SI (SACROILIAC) JOINT DYSFUNCTION: Primary | ICD-10-CM

## 2024-03-01 ENCOUNTER — OFFICE VISIT (OUTPATIENT)
Dept: ORTHOPEDICS | Facility: CLINIC | Age: 74
End: 2024-03-01
Payer: MEDICARE

## 2024-03-01 DIAGNOSIS — M19.079 ARTHRITIS OF FOOT: Primary | ICD-10-CM

## 2024-03-01 DIAGNOSIS — M72.2 PLANTAR FASCIITIS: ICD-10-CM

## 2024-03-01 DIAGNOSIS — M79.674 PAIN OF RIGHT GREAT TOE: ICD-10-CM

## 2024-03-01 DIAGNOSIS — M79.671 RIGHT FOOT PAIN: ICD-10-CM

## 2024-03-01 PROCEDURE — 99213 OFFICE O/P EST LOW 20 MIN: CPT | Performed by: PODIATRIST

## 2024-03-01 RX ORDER — PSEUDOEPHEDRINE HCL 30 MG
500 TABLET ORAL DAILY
COMMUNITY

## 2024-03-01 RX ORDER — FAMOTIDINE 20 MG/1
20 TABLET, FILM COATED ORAL DAILY PRN
COMMUNITY

## 2024-03-01 RX ORDER — PHENOL 1.4 %
10 AEROSOL, SPRAY (ML) MUCOUS MEMBRANE AT BEDTIME
COMMUNITY

## 2024-03-01 RX ORDER — ONDANSETRON 4 MG/1
4 TABLET, FILM COATED ORAL
COMMUNITY
End: 2024-07-29

## 2024-03-01 NOTE — LETTER
3/1/2024         RE: Maggie Navarrete  [address]  [facility] [address]        Dear Colleague,    Thank you for referring your patient, Maggie Navarrete, to the [facility] [place]. Please see a copy of my visit note below.    Date of Service: 12/15/2023    Chief Complaint:   Chief Complaint   Patient presents with    RECHECK     7 week follow up post PT. Plantar fasciitis.            HPI: Maggie is a 73 year old female who presents today for further evaluation of for a few complaints. She notes that the right hallux has been hurting for months. [facility] thought it was the nail and sent her here. She does have pain in the right midfoot, [facility] said arthritis and she had XRs there. She wore a new slipper a few days ago and left heel started to hurt.     Interval history: She relates that she did get the tuli's heel cup.  She has been to PT. This has been helpful. Nail does not hurt as much after trimming.    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    GERD (gastroesophageal reflux disease)     Hx of previous reproductive problem 1980    Kidney stone 2011    Leiomyosarcoma (H) 11/14/2016    Personal history of colonic polyps 2016    Small, benign    PONV (postoperative nausea and vomiting)     Posterior vitreous detachment of left eye 2011    Posterior vitreous detachment of right eye 2011    Ptosis of eyelid, bilateral     Sepsis (H) 07/2020    Stomach problems 2015    Abdominal pain, Diarreha    Thyroid disease     thyroid nodule resolved 2014       PSxH:   Past Surgical History:   Procedure Laterality Date    ABDOMEN SURGERY  Now    Pain, diarrhea    BREAST SURGERY  2002    right breast benign    COLONOSCOPY  2008    due in 2018     COLONOSCOPY N/A 09/08/2016    Procedure: COMBINED COLONOSCOPY, SINGLE OR MULTIPLE BIOPSY/POLYPECTOMY BY BIOPSY;  Surgeon: Abner Pepper MD;  Location:  GI    COLONOSCOPY N/A 10/18/2021    Procedure: COLONOSCOPY;  Surgeon: Jamila Villarreal MD;  Location: [facility] [place]    ENT SURGERY  1975    tonsillectomy    GENITOURINARY SURGERY  1953    bladder surgery     GI SURGERY  2015    Severe constipation    GYN SURGERY  1977    C section    GYN SURGERY  1981    laparoscopy    GYN SURGERY  2007    myomectomy    hysterecomy  11/14/2016    leiomyosarcoma    OTHER SURGICAL HISTORY Right 10/27/2017    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    REMOVE PORT VASCULAR ACCESS Right 05/09/2017    Procedure: REMOVE PORT VASCULAR ACCESS;  Right Port Removal;  Surgeon: Eric Gibson PA-C;  Location: [facility] OR    REPAIR PTOSIS BILATERAL  02/15/2012    Procedure:REPAIR PTOSIS BILATERAL; BILATERAL UPPER LID PTOSIS REPAIR; Surgeon:BRYCE REYNOLDS; Location: [facility]    TUMOR REMOVAL  06/2020       Allergies: Erythromycin and Tramadol    SH:   Social History     Socioeconomic History    Marital status:      Spouse name: Not on file    Number of children: Not on file    Years of education: Not on file    Highest education level: Not on file   Occupational History    Not on file   Tobacco Use    Smoking status: Never    Smokeless tobacco: Never   Vaping Use    Vaping Use: Never used   Substance and Sexual Activity    Alcohol use: Yes     Comment: Social, not often per patient.    Drug use: No    Sexual activity: Not Currently     Partners: Male   Other Topics Concern    Parent/sibling w/ CABG, MI or angioplasty before 65F 55M? 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 Walking daily.    How much calcium per day? Supplement and through diet       How much caffeine per day? 3 times a week    How much vitamin D per day? Supplement    Do you/your family wear seatbelts?  Yes    Do you/your family use safety helmets? NA    Do you/your family use sunscreen? Sometimes    Do you/your family keep firearms in the home? No    Do you/your family have a smoke detector(s)? Yes    Do you feel safe in your home? Yes    Has anyone ever touched you in an unwanted manner? 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  2 grandsons now in [place] with the mother.  In a house.  .          How much exercise per week? Daily walking, will start going to the y    How much calcium per day? Through foods       How much caffeine per day? One cup    How much vitamin D per day? Through foods    Do you/your family wear seatbelts?  Yes    Do you/your family use safety helmets? N/a    Do you/your family use sunscreen? Yes    Do you/your family keep firearms in the home? No    Do you/your family have a smoke detector(s)? Yes        Reviewed by Nicki Cornejo 10-11-21         Social Determinants of Health     Financial Resource Strain: Low Risk  (2023)    Financial Resource Strain     Within the past 12 months, have you or your family members you live with been unable to get utilities (heat, electricity) when it was really needed?: No   Food Insecurity: Low Risk  (2023)    Food Insecurity     Within the past 12 months, did you worry that your food would run  out before you got money to buy more?: No     Within the past 12 months, did the food you bought just not last and you didn t have money to get more?: No   Transportation Needs: Low Risk  (11/17/2023)    Transportation Needs     Within the past 12 months, has lack of transportation kept you from medical appointments, getting your medicines, non-medical meetings or appointments, work, or from getting things that you need?: No   Physical Activity: Not on file   Stress: Not on file   Social Connections: Not on file   Interpersonal Safety: Low Risk  (11/17/2023)    Interpersonal Safety     Do you feel physically and emotionally safe where you currently live?: Yes     Within the past 12 months, have you been hit, slapped, kicked or otherwise physically hurt by someone?: No     Within the past 12 months, have you been humiliated or emotionally abused in other ways by your partner or ex-partner?: No   Housing Stability: Low Risk  (11/17/2023)    Housing Stability     Do you have housing? : Yes     Are you worried about losing your housing?: No       FH:   Family History   Problem Relation Age of Onset    Skin Cancer Mother     Family History Negative Mother         glaucoma    Glaucoma Mother     Anxiety Disorder Mother     Heart Failure Mother     Diabetes Mother     Coronary Artery Disease Mother     Hypertension Mother     Breast Cancer Mother     Depression Mother     Obesity Mother     Heart Disease Father     Glaucoma Father     Diabetes Father     Coronary Artery Disease Father     Depression Father     Osteoporosis Father     Heart Disease Maternal Grandmother     Diabetes Maternal Grandmother     Heart Disease Paternal Grandmother     Cancer Paternal Grandfather         stomach    Skin Cancer Sister     Depression Sister     Thyroid Disease Sister     Retinal detachment Sister     Hypertension Sister     Hypertension Sister     Lipids Sister     Cerebrovascular Disease Sister     Depression Sister     Heart  Disease Paternal Aunt     Heart Disease Paternal Uncle     Osteoporosis Other        Objective:      PT and DP pulses are 2/4 bilaterally. CRT is instant. POsitive pedal hair.   Gross sensation is intact bilaterally.   Equinus is noted bilaterally. No pain with active or passive ROM of the ankle, MTJ, 1st ray, or halluces bilaterally,. No pain noted with palpation of the left plantar fascia's attachment on the calcaneus. No pain in the medial band.   Nails normal bilaterally. No pain noted in the lateral nail skin of the right hallux. No cryptosis.  No open lesions are noted.     right foot xrays indicated in 3 weightbearing views.    No fractures or acute processes noted.  She does have some decrease in the joint space between the fourth and fifth metatarsals that there junction with the cuboid.      Assessment:   Encounter Diagnoses   Name Primary?    Arthritis of foot Yes    Plantar fasciitis     Right foot pain     Pain of right great toe          Plan:  - Pt seen and evaluated.  - She has been doing better.   - Activity as tolerated.  - See again PRN.            Ren Leahy DPM

## 2024-03-01 NOTE — PROGRESS NOTES
Date of Service: 12/15/2023    Chief Complaint:   Chief Complaint   Patient presents with    RECHECK     7 week follow up post PT. Plantar fasciitis.            HPI: Maggie is a 73 year old female who presents today for further evaluation of for a few complaints. She notes that the right hallux has been hurting for months. [facility] thought it was the nail and sent her here. She does have pain in the right midfoot, [facility] said arthritis and she had XRs there. She wore a new slipper a few days ago and left heel started to hurt.     Interval history: She relates that she did get the tulBomboard's heel cup.  She has been to PT. This has been helpful. Nail does not hurt as much after trimming.    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    GERD (gastroesophageal reflux disease)     Hx of previous reproductive problem 1980    Kidney stone 2011    Leiomyosarcoma (H) 11/14/2016    Personal history of colonic polyps 2016    Small, benign    PONV (postoperative nausea and vomiting)     Posterior vitreous detachment of left eye 2011    Posterior vitreous detachment of right eye 2011    Ptosis of eyelid, bilateral     Sepsis (H) 07/2020    Stomach problems 2015    Abdominal pain, Diarreha    Thyroid disease     thyroid nodule resolved 2014       PSxH:   Past Surgical History:   Procedure Laterality Date    ABDOMEN SURGERY  Now    Pain, diarrhea    BREAST SURGERY  2002    right breast benign    COLONOSCOPY  2008    due in 2018    COLONOSCOPY N/A 09/08/2016    Procedure: COMBINED COLONOSCOPY, SINGLE OR MULTIPLE BIOPSY/POLYPECTOMY BY BIOPSY;  Surgeon: Abner Pepper MD;  Location:  GI    COLONOSCOPY N/A 10/18/2021    Procedure: COLONOSCOPY;  Surgeon: Jamila Villarreal MD;  Location: [facility]  OR    ENT SURGERY  1975    tonsillectomy    GENITOURINARY SURGERY  1953    bladder surgery     GI SURGERY  2015    Severe constipation    GYN SURGERY  1977    C section    GYN SURGERY  1981    laparoscopy    GYN SURGERY  2007    myomectomy    hysterecomy  11/14/2016    leiomyosarcoma    OTHER SURGICAL HISTORY Right 10/27/2017    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    REMOVE PORT VASCULAR ACCESS Right 05/09/2017    Procedure: REMOVE PORT VASCULAR ACCESS;  Right Port Removal;  Surgeon: Eric Gibson PA-C;  Location: [facility] OR    REPAIR PTOSIS BILATERAL  02/15/2012    Procedure:REPAIR PTOSIS BILATERAL; BILATERAL UPPER LID PTOSIS REPAIR; Surgeon:BRYCE REYNOLDS; Location:[facility]    TUMOR REMOVAL  06/2020       Allergies: Erythromycin and Tramadol    SH:   Social History     Socioeconomic History    Marital status:      Spouse name: Not on file    Number of children: Not on file    Years of education: Not on file    Highest education level: Not on file   Occupational History    Not on file   Tobacco Use    Smoking status: Never    Smokeless tobacco: Never   Vaping Use    Vaping Use: Never used   Substance and Sexual Activity    Alcohol use: Yes     Comment: Social, not often per patient.    Drug use: No    Sexual activity: Not Currently     Partners: Male   Other Topics Concern    Parent/sibling w/ CABG, MI or angioplasty before 65F 55M? 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 Walking daily.    How much calcium per day? Supplement and through diet       How much caffeine per day? 3 times a week    How much vitamin D per day? Supplement    Do you/your family wear seatbelts?  Yes    Do you/your family use safety helmets? NA    Do you/your family use sunscreen? Sometimes    Do you/your family keep firearms in the home? No    Do you/your family have a smoke detector(s)? Yes    Do you feel safe in your home? Yes    Has anyone ever touched you in an unwanted manner? 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  2 grandsons now in [place] with the mother.  In a house.  .          How much exercise per week? Daily walking, will start going to the y    How much calcium per day? Through foods       How much caffeine per day? One cup    How much vitamin D per day? Through foods    Do you/your family wear seatbelts?  Yes    Do you/your family use safety helmets? N/a    Do you/your family use sunscreen? Yes    Do you/your family keep firearms in the home? No    Do you/your family have a smoke detector(s)? Yes        Reviewed by Nicki Cornejo 10-11-21         Social Determinants of Health     Financial Resource Strain: Low Risk  (2023)    Financial Resource Strain     Within the past 12 months, have you or your family members you live with been unable to get utilities (heat, electricity) when it was really needed?: No   Food Insecurity: Low Risk  (2023)    Food Insecurity     Within the past 12 months, did you worry that your food would run out before you got money to buy more?: No     Within the past 12 months, did the food you bought just not last and you didn t have money to get more?: No   Transportation Needs: Low Risk  (2023)    Transportation Needs     Within the past 12 months, has lack  of transportation kept you from medical appointments, getting your medicines, non-medical meetings or appointments, work, or from getting things that you need?: No   Physical Activity: Not on file   Stress: Not on file   Social Connections: Not on file   Interpersonal Safety: Low Risk  (11/17/2023)    Interpersonal Safety     Do you feel physically and emotionally safe where you currently live?: Yes     Within the past 12 months, have you been hit, slapped, kicked or otherwise physically hurt by someone?: No     Within the past 12 months, have you been humiliated or emotionally abused in other ways by your partner or ex-partner?: No   Housing Stability: Low Risk  (11/17/2023)    Housing Stability     Do you have housing? : Yes     Are you worried about losing your housing?: No       FH:   Family History   Problem Relation Age of Onset    Skin Cancer Mother     Family History Negative Mother         glaucoma    Glaucoma Mother     Anxiety Disorder Mother     Heart Failure Mother     Diabetes Mother     Coronary Artery Disease Mother     Hypertension Mother     Breast Cancer Mother     Depression Mother     Obesity Mother     Heart Disease Father     Glaucoma Father     Diabetes Father     Coronary Artery Disease Father     Depression Father     Osteoporosis Father     Heart Disease Maternal Grandmother     Diabetes Maternal Grandmother     Heart Disease Paternal Grandmother     Cancer Paternal Grandfather         stomach    Skin Cancer Sister     Depression Sister     Thyroid Disease Sister     Retinal detachment Sister     Hypertension Sister     Hypertension Sister     Lipids Sister     Cerebrovascular Disease Sister     Depression Sister     Heart Disease Paternal Aunt     Heart Disease Paternal Uncle     Osteoporosis Other        Objective:      PT and DP pulses are 2/4 bilaterally. CRT is instant. POsitive pedal hair.   Gross sensation is intact bilaterally.   Equinus is noted bilaterally. No pain with active or  passive ROM of the ankle, MTJ, 1st ray, or halluces bilaterally,. No pain noted with palpation of the left plantar fascia's attachment on the calcaneus. No pain in the medial band.   Nails normal bilaterally. No pain noted in the lateral nail skin of the right hallux. No cryptosis.  No open lesions are noted.     right foot xrays indicated in 3 weightbearing views.    No fractures or acute processes noted.  She does have some decrease in the joint space between the fourth and fifth metatarsals that there junction with the cuboid.      Assessment:   Encounter Diagnoses   Name Primary?    Arthritis of foot Yes    Plantar fasciitis     Right foot pain     Pain of right great toe              Plan:  - Pt seen and evaluated.  - She has been doing better.   - Activity as tolerated.  - See again PRN.

## 2024-03-02 ENCOUNTER — LAB (OUTPATIENT)
Dept: LAB | Facility: CLINIC | Age: 74
End: 2024-03-02
Payer: MEDICARE

## 2024-03-02 DIAGNOSIS — C79.89 METASTATIC LEIOMYOSARCOMA TO INTRA-ABDOMINAL SITE (H): ICD-10-CM

## 2024-03-02 LAB
BASOPHILS # BLD AUTO: 0.1 10E3/UL (ref 0–0.2)
BASOPHILS NFR BLD AUTO: 1 %
EOSINOPHIL # BLD AUTO: 0 10E3/UL (ref 0–0.7)
EOSINOPHIL NFR BLD AUTO: 1 %
ERYTHROCYTE [DISTWIDTH] IN BLOOD BY AUTOMATED COUNT: 17.4 % (ref 10–15)
HCT VFR BLD AUTO: 35.4 % (ref 35–47)
HGB BLD-MCNC: 11.8 G/DL (ref 11.7–15.7)
IMM GRANULOCYTES # BLD: 0 10E3/UL
IMM GRANULOCYTES NFR BLD: 0 %
LYMPHOCYTES # BLD AUTO: 2.4 10E3/UL (ref 0.8–5.3)
LYMPHOCYTES NFR BLD AUTO: 47 %
MCH RBC QN AUTO: 30.2 PG (ref 26.5–33)
MCHC RBC AUTO-ENTMCNC: 33.3 G/DL (ref 31.5–36.5)
MCV RBC AUTO: 91 FL (ref 78–100)
MONOCYTES # BLD AUTO: 0.3 10E3/UL (ref 0–1.3)
MONOCYTES NFR BLD AUTO: 6 %
NEUTROPHILS # BLD AUTO: 2.4 10E3/UL (ref 1.6–8.3)
NEUTROPHILS NFR BLD AUTO: 46 %
PLATELET # BLD AUTO: 303 10E3/UL (ref 150–450)
RBC # BLD AUTO: 3.91 10E6/UL (ref 3.8–5.2)
WBC # BLD AUTO: 5.2 10E3/UL (ref 4–11)

## 2024-03-02 PROCEDURE — 80053 COMPREHEN METABOLIC PANEL: CPT

## 2024-03-02 PROCEDURE — 82248 BILIRUBIN DIRECT: CPT

## 2024-03-02 PROCEDURE — 36415 COLL VENOUS BLD VENIPUNCTURE: CPT

## 2024-03-02 PROCEDURE — 85025 COMPLETE CBC W/AUTO DIFF WBC: CPT

## 2024-03-02 PROCEDURE — 83735 ASSAY OF MAGNESIUM: CPT

## 2024-03-03 LAB
ALBUMIN SERPL BCG-MCNC: 4.2 G/DL (ref 3.5–5.2)
ALP SERPL-CCNC: 44 U/L (ref 40–150)
ALT SERPL W P-5'-P-CCNC: 21 U/L (ref 0–50)
ANION GAP SERPL CALCULATED.3IONS-SCNC: 10 MMOL/L (ref 7–15)
AST SERPL W P-5'-P-CCNC: 17 U/L (ref 0–45)
BILIRUB DIRECT SERPL-MCNC: <0.2 MG/DL (ref 0–0.3)
BILIRUB SERPL-MCNC: 0.4 MG/DL
BUN SERPL-MCNC: 25.8 MG/DL (ref 8–23)
CALCIUM SERPL-MCNC: 9.4 MG/DL (ref 8.8–10.2)
CHLORIDE SERPL-SCNC: 106 MMOL/L (ref 98–107)
CREAT SERPL-MCNC: 1.27 MG/DL (ref 0.51–0.95)
DEPRECATED HCO3 PLAS-SCNC: 25 MMOL/L (ref 22–29)
EGFRCR SERPLBLD CKD-EPI 2021: 44 ML/MIN/1.73M2
GLUCOSE SERPL-MCNC: 90 MG/DL (ref 70–99)
MAGNESIUM SERPL-MCNC: 2 MG/DL (ref 1.7–2.3)
POTASSIUM SERPL-SCNC: 4.2 MMOL/L (ref 3.4–5.3)
PROT SERPL-MCNC: 6.2 G/DL (ref 6.4–8.3)
SODIUM SERPL-SCNC: 141 MMOL/L (ref 135–145)

## 2024-03-04 ENCOUNTER — HOSPITAL ENCOUNTER (OUTPATIENT)
Dept: CT IMAGING | Facility: CLINIC | Age: 74
Discharge: HOME | End: 2024-03-04
Admitting: INTERNAL MEDICINE
Payer: MEDICARE

## 2024-03-04 ENCOUNTER — TELEPHONE (OUTPATIENT)
Dept: ORTHOPEDICS | Facility: CLINIC | Age: 74
End: 2024-03-04
Payer: MEDICARE

## 2024-03-04 DIAGNOSIS — C79.89 METASTATIC LEIOMYOSARCOMA TO INTRA-ABDOMINAL SITE (H): ICD-10-CM

## 2024-03-04 PROCEDURE — 250N000009 HC RX 250

## 2024-03-04 PROCEDURE — 250N000011 HC RX IP 250 OP 636

## 2024-03-04 PROCEDURE — 71260 CT THORAX DX C+: CPT

## 2024-03-04 RX ORDER — IOPAMIDOL 755 MG/ML
68 INJECTION, SOLUTION INTRAVASCULAR ONCE
Status: COMPLETED | OUTPATIENT
Start: 2024-03-04 | End: 2024-03-04

## 2024-03-04 RX ADMIN — IOPAMIDOL 68 ML: 755 INJECTION, SOLUTION INTRAVENOUS at 17:44

## 2024-03-04 RX ADMIN — SODIUM CHLORIDE 59 ML: 9 INJECTION, SOLUTION INTRAVENOUS at 17:44

## 2024-03-04 NOTE — TELEPHONE ENCOUNTER
[facility] Call Center    Phone Message    May a detailed message be left on voicemail: yes     Reason for Call: Other: Maggie Parnell is calling because she is having a CT done today and is wondering if Betsy could use that CT instead of the xray . Please call her appt is tomorrow. Thank you     Action Taken: Other: CSC    Travel Screening: Not Applicable

## 2024-03-04 NOTE — PROGRESS NOTES
"Maggie Navarrete complains of    Chief Complaint   Patient presents with    Consult      NEW SURGICAL SPINE        I have reviewed and updated the patient's Past Medical History, Social History, Family History and Medication List.    ALLERGIES  Erythromycin and Tramadol    Spine Surgery Consultation    REFERRING PHYSICIAN: Ren Leahy   PRIMARY CARE PHYSICIAN: Vandana Morris           Chief Complaint:   Consult ( NEW SURGICAL SPINE )      History of Present Illness:  Symptom Profile Including: location of symptoms, onset, severity, exacerbating/alleviating factors, previous treatments:        Maggie Navarrete is a 73 year old female who presents today for evaluation of left>right SI pain. She reports she has dealt with this pain over the past 15-20 years; pain is \"on and off\" over this time. She has completed PT various times and is currently engaged in gym yoga & strengthening classes. Yoga and certain exercise classes have recently exacerbated symptoms. Left is worse than right. Pain localized over PSIS; does not radiate into legs. Denies leg weakness. Denies numbness/ tingling in legs. Denies red flag symptoms.     Past treatments tried:  - Physical therapy: Completed in the past, does a lot of strength training exercises   - Injections: none  - Medications: occasional OTC medications  - other: has used SI belt in past, helpful.     Social:  Tobacco use: denies  Walks without use of assistive devices  Work: retired, former research coordinator at [employer]    REHAN Scores:  Oswestry (REHAN) Questionnaire        3/5/2024     1:56 PM   OSWESTRY DISABILITY INDEX   Count 5   Sum 2   Oswestry Score (%) 8 %             Physical Exam:    Constitutional - Patient is healthy, well-nourished and appears stated age   Respiratory - Patient is breathing normally and in no respiratory distress.   Skin - No suspicious rashes or lesions.   Psychiatric - Normal mood and affect.   Cardiovascular - Extremities warm and well " perfused.   Eyes - Visual acuity is normal to the written word.   ENT - Hearing intact to the spoken word.   GI - No abdominal distention.   Musculoskeletal - Non-antalgic gait without use of assistive devices.  Able to walk on heels and toes without weakness.  Neutral coronal and sagittal alignment.        Lumbar Spine:    Appearance - Normal . No surgical incisions. Localizes pain to left PSIS    Palpation - Non-tender to palpation midline, paraspinals. Tender to palpation L>R PSIS and bilateral greater trochanters    ROM - Full    Motor -        LOWER EXTREMITY Left Right   Hip flexion 5/5 5/5   Knee flexion 5/5 5/5   Knee extension 5/5 5/5   Ankle dorsiflexion 5/5 5/5   Ankle plantarflexion 5/5 5/5   Great toe extension 5/5 5/5        Special tests -     Straight leg raise - negative     Pain with hip ROM - negative     Neurologic - Sensation intact to light touch bilaterally.      REFLEXES Left Right             clonus 0 beats 0 beats   Patella 2+ 2+   Ankle jerk 2+ 2+   Babinski downgoing downgoing         SI provocation tests:    Right Left   Ahmet Finger Test  - +   SARIKA  - +   Thigh Thrust: - -   Pelvic Compression Test  - +   Palpation  - +   Pelvic Gapping  - +   Gaenslen s Test  - -   Sacral Thrust (SI) - -            Imaging:     I independently reviewed & provided my own interpretation of new radiographs and/ or advanced imaging at this clinic visit. The results were discussed with the patient.  Findings include:    3/5/24 EOS full body standing AP/lateral view XR: neutral coronal balance. Thoracic hyperkyphosis with compensatory increased lumbar lordosis and pelvic retroversion. L4-5 grade 1 spondylolisthesis. No significant leg length discrepancy. No evidence of fracture. No significant hip degenerative changes.   Sagittal measurements:  PI: 61 degrees  LL: 68 degrees  L4-S1: 38.6 degrees  T10-L2: 4 degrees kyphosis  TK: 85.2 degrees  PT: 25.7 degrees      3/4/24 CT chest/abdomen/pelvis: L4-5  degenerative spondylolisthesis with bilateral facet arthropathy, left foraminal stenosis. L5-S1 L>R facet arthropathy. Bilateral SI sclerotic changes, mild.    9/1/2023 DEXA:  Impression: The most negative and valid T-score of -2.4 at the level of the right femoral neck corresponds with low bone density according to WHO criteria for postmenopausal females and men age 50 and over. The risk of osteoporotic fracture increases approximately 2-fold for each 1.0 SD decrease in T-score.            Assessment and Plan:   Assessment:  73 year old female with   Left SI joint pain  L4-5 degenerative spondylolisthesis, L4-S1 facet arthropathy, Scheuermann's kyphosis  Osteopenia (most negative T-score -2.4) on Prolia,  followed by endocrinologist     Plan:  Reviewed patient's own XR and CT imaging with her in clinic today. She does have increased thoracic kyphosis (Scheuermann's), L4-5 degenerative spondylolisthesis, and lumbar facet arthropathy all noted on imaging. However, exam findings (+FF and >3/5 positive provacative findings) and history do suggest sacroiliitis; we discussed painful and non-painful SI joints can look similar on imaging. Fortunately, SI joint pain has improved recently. Recommend continued strength training with focus on core strengthening. Offered referral to PT in future if desired. Continue with bone health management by endocrine team. If symptoms return, we can order left SI joint injection. This would serve both diagnostic and hopefully therapeutic benefit. If injection is helpful, she could repeat about 3 times per year. Patient asked several intelligent questions which were answered to the best of my ability.    - continue osteopenia treatment per Endocrinologist  - may mychart in if/when she desires to try injection. Left CT guided therapeutic SI joint injection with Gen Corbett PA-C.  - Follow up PRN. If injection ordered, schedule virtual visit 4-6 weeks after to review response and next  steps.      Respectfully,  Betsy Mccann (margarita Douglass), JOLLY  Orthopaedic Spine Surgery  Dept Orthopaedic Surgery, [facility]  [facility] [place] Phone: [phone]      >45 minutes spent on the date of the encounter doing chart review, review of outside records, review of test results, my own interpretation of tests, documentation, patient history, physical exam & discussion of plan with patient and family.    Dictation Disclaimer: Some of this Note has been completed with voice-recognition dictation software. Although errors are generally corrected real-time, there is the potential for a rare error to be present in the completed chart.

## 2024-03-05 ENCOUNTER — OFFICE VISIT (OUTPATIENT)
Dept: ORTHOPEDICS | Facility: CLINIC | Age: 74
End: 2024-03-05
Attending: PODIATRIST
Payer: MEDICARE

## 2024-03-05 ENCOUNTER — ANCILLARY PROCEDURE (OUTPATIENT)
Dept: GENERAL RADIOLOGY | Facility: CLINIC | Age: 74
End: 2024-03-05
Attending: PHYSICIAN ASSISTANT
Payer: MEDICARE

## 2024-03-05 ENCOUNTER — PRE VISIT (OUTPATIENT)
Dept: ORTHOPEDICS | Facility: CLINIC | Age: 74
End: 2024-03-05

## 2024-03-05 VITALS — WEIGHT: 129 LBS | HEIGHT: 59 IN | BODY MASS INDEX: 26 KG/M2

## 2024-03-05 DIAGNOSIS — M53.3 SI (SACROILIAC) JOINT DYSFUNCTION: ICD-10-CM

## 2024-03-05 DIAGNOSIS — M43.10 DEGENERATIVE SPONDYLOLISTHESIS: ICD-10-CM

## 2024-03-05 DIAGNOSIS — M85.89 OSTEOPENIA OF MULTIPLE SITES: ICD-10-CM

## 2024-03-05 DIAGNOSIS — M53.3 SI (SACROILIAC) JOINT DYSFUNCTION: Primary | ICD-10-CM

## 2024-03-05 PROCEDURE — 99204 OFFICE O/P NEW MOD 45 MIN: CPT | Performed by: PHYSICIAN ASSISTANT

## 2024-03-05 PROCEDURE — 77073 BONE LENGTH STUDIES: CPT | Performed by: STUDENT IN AN ORGANIZED HEALTH CARE EDUCATION/TRAINING PROGRAM

## 2024-03-05 PROCEDURE — 72082 X-RAY EXAM ENTIRE SPI 2/3 VW: CPT | Performed by: STUDENT IN AN ORGANIZED HEALTH CARE EDUCATION/TRAINING PROGRAM

## 2024-03-05 NOTE — LETTER
"    3/5/2024         RE: Maggie Navarrete  [address]  [facility] [address]        Dear Colleague,    Thank you for referring your patient, Maggie Navarrete, to the [facility] [place]. Please see a copy of my visit note below.    Maggie Navarrete complains of    Chief Complaint   Patient presents with    Consult      NEW SURGICAL SPINE        I have reviewed and updated the patient's Past Medical History, Social History, Family History and Medication List.    ALLERGIES  Erythromycin and Tramadol    Spine Surgery Consultation    REFERRING PHYSICIAN: Ren Leahy   PRIMARY CARE PHYSICIAN: Vandana Morris           Chief Complaint:   Consult ( NEW SURGICAL SPINE )      History of Present Illness:  Symptom Profile Including: location of symptoms, onset, severity, exacerbating/alleviating factors, previous treatments:        Maggie Navarrete is a 73 year old female who presents today for evaluation of left>right SI pain. She reports she has dealt with this pain over the past 15-20 years; pain is \"on and off\" over this time. She has completed PT various times and is currently engaged in gym yoga & strengthening classes. Yoga and certain exercise classes have recently exacerbated symptoms. Left is worse than right. Pain localized over PSIS; does not radiate into legs. Denies leg weakness. Denies numbness/ tingling in legs. Denies red flag symptoms.     Past treatments tried:  - Physical therapy: Completed in the past, does a lot of strength training exercises   - Injections: none  - Medications: occasional OTC medications  - other: has used SI belt in past, helpful.     Social:  Tobacco use: denies  Walks without use of assistive devices  Work: retired, former research coordinator at [employer]    REHAN Scores:  Oswestry (REHAN) Questionnaire        3/5/2024     1:56 PM   OSWESTRY DISABILITY INDEX   Count 5   Sum 2   Oswestry Score (%) 8 %             Physical Exam:    Constitutional - " Patient is healthy, well-nourished and appears stated age   Respiratory - Patient is breathing normally and in no respiratory distress.   Skin - No suspicious rashes or lesions.   Psychiatric - Normal mood and affect.   Cardiovascular - Extremities warm and well perfused.   Eyes - Visual acuity is normal to the written word.   ENT - Hearing intact to the spoken word.   GI - No abdominal distention.   Musculoskeletal - Non-antalgic gait without use of assistive devices.  Able to walk on heels and toes without weakness.  Neutral coronal and sagittal alignment.        Lumbar Spine:    Appearance - Normal . No surgical incisions. Localizes pain to left PSIS    Palpation - Non-tender to palpation midline, paraspinals. Tender to palpation L>R PSIS and bilateral greater trochanters    ROM - Full    Motor -        LOWER EXTREMITY Left Right   Hip flexion 5/5 5/5   Knee flexion 5/5 5/5   Knee extension 5/5 5/5   Ankle dorsiflexion 5/5 5/5   Ankle plantarflexion 5/5 5/5   Great toe extension 5/5 5/5        Special tests -     Straight leg raise - negative     Pain with hip ROM - negative     Neurologic - Sensation intact to light touch bilaterally.      REFLEXES Left Right             clonus 0 beats 0 beats   Patella 2+ 2+   Ankle jerk 2+ 2+   Babinski downgoing downgoing         SI provocation tests:    Right Left   Ahmet Finger Test  - +   SARIKA  - +   Thigh Thrust: - -   Pelvic Compression Test  - +   Palpation  - +   Pelvic Gapping  - +   Gaenslen s Test  - -   Sacral Thrust (SI) - -            Imaging:     I independently reviewed & provided my own interpretation of new radiographs and/ or advanced imaging at this clinic visit. The results were discussed with the patient.  Findings include:    3/5/24 EOS full body standing AP/lateral view XR: neutral coronal balance. Thoracic hyperkyphosis with compensatory increased lumbar lordosis and pelvic retroversion. L4-5 grade 1 spondylolisthesis. No significant leg length  discrepancy. No evidence of fracture. No significant hip degenerative changes.   Sagittal measurements:  PI: 61 degrees  LL: 68 degrees  L4-S1: 38.6 degrees  T10-L2: 4 degrees kyphosis  TK: 85.2 degrees  PT: 25.7 degrees      3/4/24 CT chest/abdomen/pelvis: L4-5 degenerative spondylolisthesis with bilateral facet arthropathy, left foraminal stenosis. L5-S1 L>R facet arthropathy. Bilateral SI sclerotic changes, mild.    9/1/2023 DEXA:  Impression: The most negative and valid T-score of -2.4 at the level of the right femoral neck corresponds with low bone density according to WHO criteria for postmenopausal females and men age 50 and over. The risk of osteoporotic fracture increases approximately 2-fold for each 1.0 SD decrease in T-score.            Assessment and Plan:   Assessment:  73 year old female with   Left SI joint pain  L4-5 degenerative spondylolisthesis, L4-S1 facet arthropathy, Scheuermann's kyphosis  Osteopenia (most negative T-score -2.4) on Prolia,  followed by endocrinologist     Plan:  Reviewed patient's own XR and CT imaging with her in clinic today. She does have increased thoracic kyphosis (Scheuermann's), L4-5 degenerative spondylolisthesis, and lumbar facet arthropathy all noted on imaging. However, exam findings (+FF and >3/5 positive provacative findings) and history do suggest sacroiliitis; we discussed painful and non-painful SI joints can look similar on imaging. Fortunately, SI joint pain has improved recently. Recommend continued strength training with focus on core strengthening. Offered referral to PT in future if desired. Continue with bone health management by endocrine team. If symptoms return, we can order left SI joint injection. This would serve both diagnostic and hopefully therapeutic benefit. If injection is helpful, she could repeat about 3 times per year. Patient asked several intelligent questions which were answered to the best of my ability.    - continue osteopenia  treatment per Endocrinologist  - may mychart in if/when she desires to try injection. Left CT guided therapeutic SI joint injection with Gen Corbett PA-C.  - Follow up PRN. If injection ordered, schedule virtual visit 4-6 weeks after to review response and next steps.      Respectfully,  Betsy Mccann (a.kchad Douglass)JOLLY  Orthopaedic Spine Surgery  Dept Orthopaedic Surgery, [facility]  [facility] [place] Phone: [phone]      >45 minutes spent on the date of the encounter doing chart review, review of outside records, review of test results, my own interpretation of tests, documentation, patient history, physical exam & discussion of plan with patient and family.    Dictation Disclaimer: Some of this Note has been completed with voice-recognition dictation software. Although errors are generally corrected real-time, there is the potential for a rare error to be present in the completed chart.

## 2024-04-08 ENCOUNTER — HOSPITAL ENCOUNTER (OUTPATIENT)
Dept: CT IMAGING | Facility: CLINIC | Age: 74
Discharge: HOME | End: 2024-04-08
Attending: INTERNAL MEDICINE | Admitting: INTERNAL MEDICINE
Payer: MEDICARE

## 2024-04-08 DIAGNOSIS — C79.89 METASTATIC LEIOMYOSARCOMA TO INTRA-ABDOMINAL SITE (H): ICD-10-CM

## 2024-04-08 LAB
CREAT BLD-MCNC: 1.7 MG/DL (ref 0.5–1)
EGFRCR SERPLBLD CKD-EPI 2021: 31 ML/MIN/1.73M2

## 2024-04-08 PROCEDURE — 250N000009 HC RX 250: Performed by: RADIOLOGY

## 2024-04-08 PROCEDURE — 250N000011 HC RX IP 250 OP 636: Performed by: RADIOLOGY

## 2024-04-08 PROCEDURE — 82565 ASSAY OF CREATININE: CPT

## 2024-04-08 PROCEDURE — 71260 CT THORAX DX C+: CPT

## 2024-04-08 RX ORDER — IOPAMIDOL 755 MG/ML
64 INJECTION, SOLUTION INTRAVASCULAR ONCE
Status: COMPLETED | OUTPATIENT
Start: 2024-04-08 | End: 2024-04-08

## 2024-04-08 RX ADMIN — IOPAMIDOL 64 ML: 755 INJECTION, SOLUTION INTRAVENOUS at 14:45

## 2024-04-08 RX ADMIN — SODIUM CHLORIDE 58 ML: 9 INJECTION, SOLUTION INTRAVENOUS at 14:45

## 2024-05-09 DIAGNOSIS — E03.9 ACQUIRED HYPOTHYROIDISM: ICD-10-CM

## 2024-05-10 RX ORDER — LEVOTHYROXINE SODIUM 75 UG/1
TABLET ORAL
Qty: 90 TABLET | Refills: 0 | Status: SHIPPED | OUTPATIENT
Start: 2024-05-10 | End: 2024-08-08

## 2024-06-03 ENCOUNTER — HOSPITAL ENCOUNTER (OUTPATIENT)
Dept: CT IMAGING | Facility: CLINIC | Age: 74
Discharge: HOME | End: 2024-06-03
Attending: INTERNAL MEDICINE | Admitting: INTERNAL MEDICINE
Payer: MEDICARE

## 2024-06-03 DIAGNOSIS — C79.89 METASTATIC LEIOMYOSARCOMA TO INTRA-ABDOMINAL SITE (H): ICD-10-CM

## 2024-06-03 LAB
CREAT BLD-MCNC: 1.5 MG/DL (ref 0.5–1)
EGFRCR SERPLBLD CKD-EPI 2021: 36 ML/MIN/1.73M2

## 2024-06-03 PROCEDURE — 71260 CT THORAX DX C+: CPT

## 2024-06-03 PROCEDURE — 82565 ASSAY OF CREATININE: CPT

## 2024-06-03 PROCEDURE — 250N000011 HC RX IP 250 OP 636

## 2024-06-03 PROCEDURE — 250N000009 HC RX 250

## 2024-06-03 RX ORDER — IOPAMIDOL 755 MG/ML
64 INJECTION, SOLUTION INTRAVASCULAR ONCE
Status: COMPLETED | OUTPATIENT
Start: 2024-06-03 | End: 2024-06-03

## 2024-06-03 RX ADMIN — SODIUM CHLORIDE 58 ML: 9 INJECTION, SOLUTION INTRAVENOUS at 12:16

## 2024-06-03 RX ADMIN — IOPAMIDOL 64 ML: 755 INJECTION, SOLUTION INTRAVENOUS at 12:16

## 2024-06-17 DIAGNOSIS — C55 LEIOMYOSARCOMA OF UTERUS (H): Primary | ICD-10-CM

## 2024-06-18 ENCOUNTER — TELEPHONE (OUTPATIENT)
Dept: FAMILY MEDICINE | Facility: CLINIC | Age: 74
End: 2024-06-18
Payer: MEDICARE

## 2024-06-18 DIAGNOSIS — R06.83 SNORING: Primary | ICD-10-CM

## 2024-06-18 NOTE — TELEPHONE ENCOUNTER
Referral is placed.  Please provide the phone number to make the appointment   Dr.Nasima Alexandra MD

## 2024-06-18 NOTE — TELEPHONE ENCOUNTER
"Dr. Morris,    Pt requesting advice/referral for snoring concern.  Reports she's known she's had issues in past with snoring, sleep with mouth open but her grandson who is staying with her notes she has been \"snoring very loudly, then went suddenly quiet.\"   Wondering if a sleep referral would be appropriate.     Pended for review if appropriate.      "

## 2024-06-19 NOTE — TELEPHONE ENCOUNTER
Informed pt of referral, and sent pt mychart with referral information.  Ashley Gao, RN  [facility] RN Triage Team

## 2024-06-22 ENCOUNTER — LAB (OUTPATIENT)
Dept: LAB | Facility: CLINIC | Age: 74
End: 2024-06-22
Payer: MEDICARE

## 2024-06-22 DIAGNOSIS — C55 LEIOMYOSARCOMA OF UTERUS (H): ICD-10-CM

## 2024-06-22 LAB
ALBUMIN SERPL BCG-MCNC: 4.2 G/DL (ref 3.5–5.2)
ALP SERPL-CCNC: 58 U/L (ref 40–150)
ALT SERPL W P-5'-P-CCNC: 15 U/L (ref 0–50)
ANION GAP SERPL CALCULATED.3IONS-SCNC: 9 MMOL/L (ref 7–15)
AST SERPL W P-5'-P-CCNC: 20 U/L (ref 0–45)
BASOPHILS # BLD AUTO: 0 10E3/UL (ref 0–0.2)
BASOPHILS NFR BLD AUTO: 1 %
BILIRUB SERPL-MCNC: 0.3 MG/DL
BUN SERPL-MCNC: 18.2 MG/DL (ref 8–23)
CALCIUM SERPL-MCNC: 9.3 MG/DL (ref 8.8–10.2)
CHLORIDE SERPL-SCNC: 104 MMOL/L (ref 98–107)
CREAT SERPL-MCNC: 0.98 MG/DL (ref 0.51–0.95)
DEPRECATED HCO3 PLAS-SCNC: 25 MMOL/L (ref 22–29)
EGFRCR SERPLBLD CKD-EPI 2021: 60 ML/MIN/1.73M2
EOSINOPHIL # BLD AUTO: 0.2 10E3/UL (ref 0–0.7)
EOSINOPHIL NFR BLD AUTO: 3 %
ERYTHROCYTE [DISTWIDTH] IN BLOOD BY AUTOMATED COUNT: 13 % (ref 10–15)
GLUCOSE SERPL-MCNC: 80 MG/DL (ref 70–99)
HCT VFR BLD AUTO: 36.9 % (ref 35–47)
HGB BLD-MCNC: 12 G/DL (ref 11.7–15.7)
IMM GRANULOCYTES # BLD: 0 10E3/UL
IMM GRANULOCYTES NFR BLD: 0 %
LYMPHOCYTES # BLD AUTO: 2.4 10E3/UL (ref 0.8–5.3)
LYMPHOCYTES NFR BLD AUTO: 36 %
MCH RBC QN AUTO: 32.7 PG (ref 26.5–33)
MCHC RBC AUTO-ENTMCNC: 32.5 G/DL (ref 31.5–36.5)
MCV RBC AUTO: 101 FL (ref 78–100)
MONOCYTES # BLD AUTO: 0.6 10E3/UL (ref 0–1.3)
MONOCYTES NFR BLD AUTO: 10 %
NEUTROPHILS # BLD AUTO: 3.3 10E3/UL (ref 1.6–8.3)
NEUTROPHILS NFR BLD AUTO: 50 %
PLATELET # BLD AUTO: 253 10E3/UL (ref 150–450)
POTASSIUM SERPL-SCNC: 4.5 MMOL/L (ref 3.4–5.3)
PROT SERPL-MCNC: 6.4 G/DL (ref 6.4–8.3)
RBC # BLD AUTO: 3.67 10E6/UL (ref 3.8–5.2)
SODIUM SERPL-SCNC: 138 MMOL/L (ref 135–145)
WBC # BLD AUTO: 6.6 10E3/UL (ref 4–11)

## 2024-06-22 PROCEDURE — 80053 COMPREHEN METABOLIC PANEL: CPT

## 2024-06-22 PROCEDURE — 36415 COLL VENOUS BLD VENIPUNCTURE: CPT

## 2024-06-22 PROCEDURE — 85025 COMPLETE CBC W/AUTO DIFF WBC: CPT

## 2024-07-17 ENCOUNTER — ONCOLOGY VISIT (OUTPATIENT)
Dept: ONCOLOGY | Facility: CLINIC | Age: 74
End: 2024-07-17
Attending: STUDENT IN AN ORGANIZED HEALTH CARE EDUCATION/TRAINING PROGRAM
Payer: MEDICARE

## 2024-07-17 VITALS
BODY MASS INDEX: 25.13 KG/M2 | WEIGHT: 124.4 LBS | RESPIRATION RATE: 16 BRPM | OXYGEN SATURATION: 100 % | SYSTOLIC BLOOD PRESSURE: 143 MMHG | HEART RATE: 109 BPM | DIASTOLIC BLOOD PRESSURE: 93 MMHG | TEMPERATURE: 98.1 F

## 2024-07-17 DIAGNOSIS — T45.1X5A CHEMOTHERAPY-INDUCED NEUTROPENIA (H): Primary | ICD-10-CM

## 2024-07-17 DIAGNOSIS — C49.4 MALIGNANT NEOPLASM OF CONNECTIVE AND SOFT TISSUE OF ABDOMEN (H): ICD-10-CM

## 2024-07-17 DIAGNOSIS — D70.1 CHEMOTHERAPY-INDUCED NEUTROPENIA (H): Primary | ICD-10-CM

## 2024-07-17 DIAGNOSIS — I42.7 DILATED CARDIOMYOPATHY SECONDARY TO DRUG (H): ICD-10-CM

## 2024-07-17 DIAGNOSIS — C55 LEIOMYOSARCOMA OF UTERUS (H): ICD-10-CM

## 2024-07-17 PROCEDURE — 99215 OFFICE O/P EST HI 40 MIN: CPT | Performed by: STUDENT IN AN ORGANIZED HEALTH CARE EDUCATION/TRAINING PROGRAM

## 2024-07-17 PROCEDURE — 99417 PROLNG OP E/M EACH 15 MIN: CPT | Performed by: STUDENT IN AN ORGANIZED HEALTH CARE EDUCATION/TRAINING PROGRAM

## 2024-07-17 PROCEDURE — G0463 HOSPITAL OUTPT CLINIC VISIT: HCPCS | Performed by: STUDENT IN AN ORGANIZED HEALTH CARE EDUCATION/TRAINING PROGRAM

## 2024-07-17 RX ORDER — LORAZEPAM 2 MG/ML
0.5 INJECTION INTRAMUSCULAR EVERY 4 HOURS PRN
Status: CANCELLED | OUTPATIENT
Start: 2024-08-05

## 2024-07-17 RX ORDER — METHYLPREDNISOLONE SODIUM SUCCINATE 125 MG/2ML
125 INJECTION, POWDER, LYOPHILIZED, FOR SOLUTION INTRAMUSCULAR; INTRAVENOUS
Status: CANCELLED
Start: 2024-07-29

## 2024-07-17 RX ORDER — HEPARIN SODIUM,PORCINE 10 UNIT/ML
5-20 VIAL (ML) INTRAVENOUS DAILY PRN
Status: CANCELLED | OUTPATIENT
Start: 2024-07-29

## 2024-07-17 RX ORDER — ALBUTEROL SULFATE 0.83 MG/ML
2.5 SOLUTION RESPIRATORY (INHALATION)
Status: CANCELLED | OUTPATIENT
Start: 2024-07-29

## 2024-07-17 RX ORDER — ALBUTEROL SULFATE 0.83 MG/ML
2.5 SOLUTION RESPIRATORY (INHALATION)
Status: CANCELLED | OUTPATIENT
Start: 2024-08-05

## 2024-07-17 RX ORDER — EPINEPHRINE 1 MG/ML
0.3 INJECTION, SOLUTION INTRAMUSCULAR; SUBCUTANEOUS EVERY 5 MIN PRN
Status: CANCELLED | OUTPATIENT
Start: 2024-07-29

## 2024-07-17 RX ORDER — HEPARIN SODIUM,PORCINE 10 UNIT/ML
5-20 VIAL (ML) INTRAVENOUS DAILY PRN
Status: CANCELLED | OUTPATIENT
Start: 2024-08-05

## 2024-07-17 RX ORDER — HEPARIN SODIUM (PORCINE) LOCK FLUSH IV SOLN 100 UNIT/ML 100 UNIT/ML
5 SOLUTION INTRAVENOUS
Status: CANCELLED | OUTPATIENT
Start: 2024-07-29

## 2024-07-17 RX ORDER — ALBUTEROL SULFATE 90 UG/1
1-2 AEROSOL, METERED RESPIRATORY (INHALATION)
Status: CANCELLED
Start: 2024-08-05

## 2024-07-17 RX ORDER — LORAZEPAM 2 MG/ML
0.5 INJECTION INTRAMUSCULAR EVERY 4 HOURS PRN
Status: CANCELLED | OUTPATIENT
Start: 2024-07-29

## 2024-07-17 RX ORDER — ONDANSETRON 2 MG/ML
8 INJECTION INTRAMUSCULAR; INTRAVENOUS ONCE
Status: CANCELLED | OUTPATIENT
Start: 2024-07-29

## 2024-07-17 RX ORDER — HEPARIN SODIUM (PORCINE) LOCK FLUSH IV SOLN 100 UNIT/ML 100 UNIT/ML
5 SOLUTION INTRAVENOUS
Status: CANCELLED | OUTPATIENT
Start: 2024-08-05

## 2024-07-17 RX ORDER — MEPERIDINE HYDROCHLORIDE 25 MG/ML
25 INJECTION INTRAMUSCULAR; INTRAVENOUS; SUBCUTANEOUS EVERY 30 MIN PRN
Status: CANCELLED | OUTPATIENT
Start: 2024-07-29

## 2024-07-17 RX ORDER — ALBUTEROL SULFATE 90 UG/1
1-2 AEROSOL, METERED RESPIRATORY (INHALATION)
Status: CANCELLED
Start: 2024-07-29

## 2024-07-17 RX ORDER — MEPERIDINE HYDROCHLORIDE 25 MG/ML
25 INJECTION INTRAMUSCULAR; INTRAVENOUS; SUBCUTANEOUS EVERY 30 MIN PRN
Status: CANCELLED | OUTPATIENT
Start: 2024-08-05

## 2024-07-17 RX ORDER — DIPHENHYDRAMINE HYDROCHLORIDE 50 MG/ML
50 INJECTION INTRAMUSCULAR; INTRAVENOUS
Status: CANCELLED
Start: 2024-08-05

## 2024-07-17 RX ORDER — DIPHENHYDRAMINE HYDROCHLORIDE 50 MG/ML
50 INJECTION INTRAMUSCULAR; INTRAVENOUS
Status: CANCELLED
Start: 2024-07-29

## 2024-07-17 RX ORDER — METHYLPREDNISOLONE SODIUM SUCCINATE 125 MG/2ML
125 INJECTION, POWDER, LYOPHILIZED, FOR SOLUTION INTRAMUSCULAR; INTRAVENOUS
Status: CANCELLED
Start: 2024-08-05

## 2024-07-17 RX ORDER — EPINEPHRINE 1 MG/ML
0.3 INJECTION, SOLUTION INTRAMUSCULAR; SUBCUTANEOUS EVERY 5 MIN PRN
Status: CANCELLED | OUTPATIENT
Start: 2024-08-05

## 2024-07-17 ASSESSMENT — PAIN SCALES - GENERAL: PAINLEVEL: NO PAIN (0)

## 2024-07-17 NOTE — PROGRESS NOTES
[facility] CANCER CLINIC    NEW PATIENT VISIT NOTE    PATIENT NAME: Maggie Navarrete MRN # [id number]  DATE OF VISIT: July 17, 2024 YOB: 1950    REFERRING PROVIDER: Referred Self, MD  No address on file    CANCER TYPE: metastatic uterine leiomyosarcoma       CHIEF COMPLAIN   None     HISTORY OF PRESENT ILLNESS   Maggie is a 74 year old women with PMH of metastatic uterine leiomyosarcoma. She was originally diagnosed on 2016 showing an up to 12 cm mass. She underwent surgical resection followed by adjuvant doxorubicin/dacarbazine for 5 cycles. She had local recurrence on 2020 treated with surgical resection. Then  again recurrence on 2022 again underwent surgical resection. Followed by amd2wkl with anastrozole. Since January of 2024 she has been on trial a BOAST clinical trial with Dr. Nik Schilling at the [facility]. She received abemaciclib there. Now, she was taken off of trial due to progression on 1 of 3 intra-abdominal lesions.     She comes here to continue medical management, she has seen Dr. Michaud at [place] for a second opinion. She is interested in surgical resection which has been offered at [facility] with possible Gemzar HIPEC.      She feels very well now, she exercises regularly and has no related GI symptoms.      PAST ONCOLOGIC HISTORY   Leiomyosarcoma Uterus   11/3/2016 Initial Diagnosis     11/4/2016 Surgery and Procedures   Open hyst, BSO, stage II with peritoneal involvement    12/26/2016 - 3/23/2017 Chemotherapy   Received elsewhere: adjuvant chemotherapy with doxorubicin 25 mg/m2/day and DTIC 250 mg/m2/day , days 1-3 every 21 weeks.  Completed 5 cycles of adjuvant doxorubicin and dacarbazine      6/1/2020 Recurrence Local   Isolated lesion in the RLQ    6/16/2020 Surgery and Procedures   Laparotomy, excision of side wall mass, ureteroneocystotomy. 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 The  excised margins were negative for tumor.    3/31/2022 [facility]   PET with isolated bowel lesion in LLQ.     4/4/2022 Surgery and Procedures   Exlap, FANNY, small bowel lesion excised with negative margins    5/9/2023 Surgery and Procedures   Dr. Camarillo and Dr. Krueger    Exploratory laparotomy, tumor debulking, cystoscopy and placement of internal ureteral stent and ureterolysis     Findings  Two lesions in the abdomen consistent with very indolent appearing sarcoma on frozen section, 1 in the right upper quadrant in the large bowel mesentery. And 1 on the left sidewall between the left sidewall and the left ureter and the sigmoid colon    5/9/2023 Biopsy/Pathology   FINAL DIAGNOSIS     A. Soft tissue, hepatic flexure nodule, excision: Involved   by smooth muscle neoplasm, 1.4 cm. See comment.     B. Soft tissue, left sidewall mass, excision: Recurrent   leiomyosarcoma, 2.9 cm. See comment.     C. Colon, sigmoid mass, excision: Involved by smooth   muscle neoplasm with increased mitotic activity, 6 mm. See   comment.     D. Soft tissue, left nohemi-ureteral, biopsy: Involved by   smooth muscle neoplasm, 3 mm. See comment.     E. Peritoneum, left pelvic sidewall margin, excision:   Involved by smooth muscle neoplasm, 1 mm. See comment.     F. Soft tissue, sigmoid colon margin, excision: Negative   for tumor.     Comment: Immunohistochemical stains performed at [facility] demonstrate that the left sidewall mass (B2) is   positive for desmin, caldesmon, ER (moderate, 60-70%), and   AZ (moderate, 20-30%), but is negative for HMB45, Melan A,   STAT6, ALK, DOG1, and KIT; this immunophenotype supports a   smooth muscle neoplasm. The left sidewall mass shows   increased mitotic activity (greater than 10 mitoses/10 HPF)   and atypia sufficient to meet criteria for a   leiomyosarcoma. The remaining lesion show minimal atypia   but increased mitotic activity (though less than 10   mitoses/10 HPF) where evaluable, but  evaluation is limited   by the small size of the lesions. In the context of a known   history of leiomyosarcoma and a concurrent tumor meeting   diagnostic criteria, these likely represent deceptively   bland additional foci of recurrent leiomyosarcoma.     2023 - Biological/Targeted/Hormone Therapy   Anastrozole     2023 Progression/Relapse   CT scan chest-abdomen-pelvis shows progression of 3 nodules within the peritoneal cavity.    2024 - 2024 Chemotherapy   BOAST clinical trial with Dr. Nik Schilling at the [facility]. She received abemaciclib there. Was taken off of trial due to progression.      PAST MEDICAL HISTORY   None    PAST SURGICAL HISTORY   As per HPI     FAMILY HISTORY   Pending     ALLERGIES      Allergies   Allergen Reactions    Erythromycin GI Disturbance, Other (See Comments), Nausea and Nausea and Vomiting     PN: LW Reaction: Nausea    Tramadol Nausea, Other (See Comments) and Nausea and Vomiting          SOCIAL HISTORY     Social History     Social History Narrative    How much exercise per week? Walking daily.    How much calcium per day? Supplement and through diet       How much caffeine per day? 3 times a week    How much vitamin D per day? Supplement    Do you/your family wear seatbelts?  Yes    Do you/your family use safety helmets? NA    Do you/your family use sunscreen? Sometimes    Do you/your family keep firearms in the home? No    Do you/your family have a smoke detector(s)? Yes    Do you feel safe in your home? Yes    Has anyone ever touched you in an unwanted manner? 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  2 grandsons now in [place] with the mother.  In a house.  .          How much exercise per week? Daily walking, will start going to the y    How much calcium  per day? Through foods       How much caffeine per day? One cup    How much vitamin D per day? Through foods    Do you/your family wear seatbelts?  Yes    Do you/your family use safety helmets? N/a    Do you/your family use sunscreen? Yes    Do you/your family keep firearms in the home? No    Do you/your family have a smoke detector(s)? Yes        Reviewed by Nicki Cornejo 10-11-21               CURRENT OUTPATIENT MEDICATIONS     Current Outpatient Medications   Medication Sig Dispense Refill    ABEMACICLIB PO Take 200 mg by mouth every 12 hours      Ascorbic Acid 250 MG CHEW Take 500 mg by mouth      calcium citrate 250 MG TABS Take 500 mg by mouth daily      CALCIUM PO Take 1 capsule by mouth daily as needed (if diet insufficient to provide daily recommended values)      carboxymethylcellulose PF (REFRESH PLUS) 0.5 % ophthalmic solution Place 1 drop into both eyes 4 times daily as needed for dry eyes      famotidine (PEPCID) 20 MG tablet Take 20 mg by mouth daily as needed      levothyroxine (SYNTHROID/LEVOTHROID) 75 MCG tablet TAKE 1 TABLET DAILY FOR    THYROID 90 tablet 0    Melatonin 10 MG TABS tablet Take 10 mg by mouth      multivitamin w/minerals (THERA-VIT-M) tablet Take 1 tablet by mouth daily as needed (if diet insufficient to provide recommended nutrients)      ondansetron (ZOFRAN) 4 MG tablet Take 4 mg by mouth      study - abemaciclib, IDS# [id number], 50 mg tablet CHEMOTHERAPY       tacrolimus (PROTOPIC) 0.1 % external ointment Apply topically 2 times daily 30 g 3    venlafaxine (EFFEXOR XR) 37.5 MG 24 hr capsule Take 1 capsule (37.5 mg) by mouth daily 90 capsule 3    zolpidem (AMBIEN) 5 MG tablet Take 1 tablet (5 mg) by mouth nightly as needed for sleep (Only uses if traveling or cannot sleep,) 20 tablet 1          REVIEW OF SYSTEMS   As above in the HPI, o/w complete 12-point ROS was negative.     PHYSICAL EXAM   BP (!) 143/93 (BP Location: Right arm, Patient Position: Sitting, Cuff Size: Adult Regular)    Pulse 109   Temp 98.1  F (36.7  C) (Oral)   Resp 16   Wt 56.4 kg (124 lb 6.4 oz)   SpO2 100%   BMI 25.13 kg/m      EGO  GEN: NAD  HEENT: PERRL, EOMI, no icterus, injection or pallor. Oropharynx is clear.  NECK: no cervical or supraclavicular lymphadenopathy  LUNGS: clear bilaterally  CV: regular, no murmurs, rubs, or gallops  ABDOMEN: soft, non-tender, non-distended, normal bowel sounds, no hepatosplenomegaly by percussion or palpation. Surgical scars from prior surgeries well healed.   EXT: warm, well perfused, no edema  NEURO: alert  SKIN: no rashes     LABORATORY AND IMAGING STUDIES     Recent Labs   Lab Test 24  1330 24  1200 24  1438 24  1043 24  1539 20  0028 19  1509 18  0951     --   --  141 141   < >  --   --    POTASSIUM 4.5  --   --  4.2 4.3   < >  --   --    CHLORIDE 104  --   --  106 108*   < >  --   --    CO2 25  --   --  25 25   < >  --   --    ANIONGAP 9  --   --  10 8   < >  --   --    BUN 18.2  --   --  25.8* 20.8   < >  --   --    CR 0.98* 1.5*   < > 1.27* 1.47*   < >  --   --    GLC 80  --   --  90 85   < >  --   --    SUSAN 9.3  --   --  9.4 8.7*   < > 9.4 9.1   MAG  --   --   --  2.0 2.0   < > 2.2 2.1   PHOS  --   --   --   --   --   --  3.0 2.9    < > = values in this interval not displayed.     Recent Labs   Lab Test 24  1330 24  1043 24  1539   WBC 6.6 5.2 5.8   HGB 12.0 11.8 13.1    303 170   * 91 91   NEUTROPHIL 50 46 55     Recent Labs   Lab Test 24  1043 24  1539   BILITOTAL 0.3 0.4 0.3   ALKPHOS 58 44 56   ALT 15 21 23   AST 20 17 19   ALBUMIN 4.2 4.2 4.1     TSH   Date Value Ref Range Status   2023 2.89 0.30 - 4.20 uIU/mL Final   2022 1.26 0.40 - 4.00 mU/L Final   2020 1.44 0.40 - 4.00 mU/L Final       Results for orders placed or performed during the hospital encounter of 24   CT Chest/Abdomen/Pelvis w Contrast    Narrative    CT  CHEST/ABDOMEN/PELVIS W CONTRAST 6/3/2024 12:25 PM    CLINICAL HISTORY: Metastatic sarcoma, assess for response to  chemotherapy. 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).    TECHNIQUE: CT scan of the chest, abdomen, and pelvis was performed  following injection of IV contrast. Multiplanar reformats were  obtained. Dose reduction techniques were used.   CONTRAST: 64 mL Isovue-370    COMPARISON: CT chest, abdomen and pelvis 4/8/2024.    FINDINGS:   LOWER NECK: Unremarkable.    LUNGS AND PLEURA: No focal consolidation or pleural effusion.  Calcified granulomas. No new or growing suspicious pulmonary nodule.    AIRWAYS: Patent.    HEART: No pericardial effusion.  No coronary calcifications. No  thoracic aortic aneurysm.    PULMONARY ARTERIES: Unremarkable.    MEDIASTINUM AND BEATRIZ: No lymphadenopathy. Calcified lymph nodes.    HEPATOBILIARY: Unremarkable. Gallbladder is present without  gallstones.    SPLEEN: Unremarkable.    PANCREAS: Unremarkable.    ADRENAL GLANDS: Unremarkable.    KIDNEYS/URETERS/BLADDER: Similar right renal atrophy/cortical  scarring. Left parapelvic and cortical cysts; no follow-up necessary.    BOWEL: No bowel dilatation or wall thickening.    LYMPH NODES AND PERITONEUM: Redemonstrated abdominal soft tissue  masses as on series 3:  -Left upper quadrant, 2.6 x 4 x 2.2 cm, previously 2.1 x 2.9 x 2.1 cm  when measured similarly, image 110   -Soft tissue lesion between the transverse colon and stomach, 3.1 x  2.8 cm, previously 3.1 x 2.6 cm, image 168  -Superior to the pancreas, 1.4 cm, image 125    No new lesion.    VASCULATURE: Nonaneurysmal abdominal aorta.    PELVIS: Uterus is surgically absent. No pelvic mass.    MUSCULOSKELETAL: No aggressive osseous lesion. Similar L4/L5 grade 1  anterolisthesis.    ADDITIONAL FINDINGS: None.      Impression    IMPRESSION:  1.  Since 4/8/2024, increased in size left upper quadrant soft tissue  masses. Other abdominal soft tissue lesions have not  significantly  changed.  2.  No convincing new site of disease.    ORIN PAUL MD         SYSTEM ID:  [id number]          PATHOLOGY   12/12/2016  FINAL DIAGNOSIS:   CASE FROM [facility], [place], [place] ([id number], OBTAINED 11/04/2016):   A. UTERINE MASS, RESECTION:   - Leiomyosarcoma, 12 cm in aggregate (per report)   - Please see comment     COMMENT:   Per the received report, a right cul-de-sac biopsy was involved by   leiomyosarcoma.   The diagnosis of leiomyosarcoma is made on the basis of examination of   the one slide submitted.  There is moderate nuclear atypia, tumor   necrosis and increased mitotic activities (10/10 HPFs).     Tempus profiling from her 4/5/22 specimen showed CDKN2A and CDKN2B loss.     I reviewed the medical records including prior oncology notes, laboratory studies which are notable for adequate renal and hepatic function, and normal blood counts.   I personally reviewed imaging including CT of the CAP done on 6/12/2024 which demonstrates enlargement of the peritoneal mass adjacent to the spleen, now up to 4 x 2.6 cm. She has 2 other peritoneal lesions up to 2.8 cm that have remained stable.       ASSESSMENT AND PLAN     Maggie is a 74 year old women with PMH of metastatic uterine leiomyosarcoma. She was originally diagnosed on 2016 showing an up to 12 cm mass. She underwent surgical resection followed by adjuvant doxorubicin/dacarbazine for 5 cycles. She had local recurrence on 2020 treated with surgical resection. Then  again recurrence on 2022 again underwent surgical resection. Followed by [id number] with anastrozole. Since January of 2024 she has been on trial a BOAST clinical trial with Dr. Nik Schilling at the [facility]. She received abemaciclib there. Now, she was taken off of trial due to progression on 1 of 3 intra-abdominal lesions.     She comes here to continue medical management, she has seen Dr. Michaud at [place] for a second opinion. She is  interested in surgical resection which has been offered at [facility] with possible Gemzar HIPEC.      She feels very well now, she exercises regularly and has no related GI symptoms.     She saw Dr. Michaud at [place], who recommended against further surgical resection and for systemic therapy with other agents with activity in leiomyosarcoma including gemcitabine +/- docetaxel, trabectedin and pazopanib.     I agree that without good systemic control and in the absence of intra-abdominal symptoms, surgical resection is more likely to cause more harm. However we discussed that if she has good systemic response to gemzar, we may consider that option in the future.     Plan:     -Ct DNA to assess new mutations. (Argy to send)  -PET scan STAT if possible prior to 7/20 or shortly after   -C1 gemzar single agent 7/29   -SINTIA toxicity check on 8/14 - virtual   -C2 8/19 SINTIA toxicity check 9/4 - virtual   -C3 9/9   -PET scan 9/23   -Follow up Dr. Powers on 9/25     60 minutes spent on the date of the encounter doing chart review, review of outside records, review of test results, interpretation of tests, patient visit, documentation, and discussion with other provider(s)     Beto Galvan MD   of Medicine  Hematology, Oncology and Transplantation

## 2024-07-17 NOTE — NURSING NOTE
"Oncology Rooming Note    July 17, 2024 4:02 PM   Maggie Navarrete is a 74 year old female who presents for:    Chief Complaint   Patient presents with    Oncology Clinic Visit     Leiomyosarcoma     Initial Vitals: BP (!) 143/93 (BP Location: Right arm, Patient Position: Sitting, Cuff Size: Adult Regular)   Pulse 109   Temp 98.1  F (36.7  C) (Oral)   Resp 16   Wt 56.4 kg (124 lb 6.4 oz)   SpO2 100%   BMI 25.13 kg/m   Estimated body mass index is 25.13 kg/m  as calculated from the following:    Height as of 3/5/24: 1.499 m (4' 11\").    Weight as of this encounter: 56.4 kg (124 lb 6.4 oz). Body surface area is 1.53 meters squared.  No Pain (0) Comment: Data Unavailable   No LMP recorded. Patient has had a hysterectomy.  Allergies reviewed: Yes  Medications reviewed: Yes    Medications: Medication refills not needed today.  Pharmacy name entered into ADVIZE:    [facility] - MICHELLE TINOCO - [facility] AT PORTAL TO REGISTERED [facility] SITES  [id number] - [place], [place] - [address] [facility] [place], [place] - [address] [place] [address] [id number]  [id number] - [place], [place] - [address] AT [facility] OF [address]  [facility] - [facility], [place] - [facility]    Frailty Screening:   Is the patient here for a new oncology consult visit in cancer care? 2. No      Clinical concerns: Pt reports no new concerns.       Regi Maldonado, EMT   "

## 2024-07-17 NOTE — LETTER
7/17/2024      Maggie Navarrete  [address]  [facility] [address]      Dear Colleague,    Thank you for referring your patient, Maggie Navarrete, to the [facility]. Please see a copy of my visit note below.    [facility]    NEW PATIENT VISIT NOTE    PATIENT NAME: Maggie Navarrete MRN # [id number]  DATE OF VISIT: July 17, 2024 YOB: 1950    REFERRING PROVIDER: Referred Self, MD  No address on file    CANCER TYPE: metastatic uterine leiomyosarcoma       CHIEF COMPLAIN   None     HISTORY OF PRESENT ILLNESS   Maggie is a 74 year old women with PMH of metastatic uterine leiomyosarcoma. She was originally diagnosed on 2016 showing an up to 12 cm mass. She underwent surgical resection followed by adjuvant doxorubicin/dacarbazine for 5 cycles. She had local recurrence on 2020 treated with surgical resection. Then  again recurrence on 2022 again underwent surgical resection. Followed by [id number] with anastrozole. Since January of 2024 she has been on trial a BOAST clinical trial with Dr. Nik Schilling at the [facility]. She received abemaciclib there. Now, she was taken off of trial due to progression on 1 of 3 intra-abdominal lesions.     She comes here to continue medical management, she has seen Dr. Michaud at [place] for a second opinion. She is interested in surgical resection which has been offered at  with possible Gemzar HIPEC.      She feels very well now, she exercises regularly and has no related GI symptoms.      PAST ONCOLOGIC HISTORY   Leiomyosarcoma Uterus   11/3/2016 Initial Diagnosis     11/4/2016 Surgery and Procedures   Open hyst, BSO, stage II with peritoneal involvement    12/26/2016 - 3/23/2017 Chemotherapy   Received elsewhere: adjuvant chemotherapy with doxorubicin 25 mg/m2/day and DTIC 250 mg/m2/day , days 1-3 every 21 weeks.  Completed 5 cycles of adjuvant doxorubicin and  dacarbazine      6/1/2020 Recurrence Local   Isolated lesion in the RLQ    6/16/2020 Surgery and Procedures   Laparotomy, excision of side wall mass, ureteroneocystotomy. 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 The excised margins were negative for tumor.    3/31/2022 [facility]   PET with isolated bowel lesion in LLQ.     4/4/2022 Surgery and Procedures   Exlap, FANNY, small bowel lesion excised with negative margins    5/9/2023 Surgery and Procedures   Dr. Camarillo and Dr. Kreuger    Exploratory laparotomy, tumor debulking, cystoscopy and placement of internal ureteral stent and ureterolysis     Findings  Two lesions in the abdomen consistent with very indolent appearing sarcoma on frozen section, 1 in the right upper quadrant in the large bowel mesentery. And 1 on the left sidewall between the left sidewall and the left ureter and the sigmoid colon    5/9/2023 Biopsy/Pathology   FINAL DIAGNOSIS     A. Soft tissue, hepatic flexure nodule, excision: Involved   by smooth muscle neoplasm, 1.4 cm. See comment.     B. Soft tissue, left sidewall mass, excision: Recurrent   leiomyosarcoma, 2.9 cm. See comment.     C. Colon, sigmoid mass, excision: Involved by smooth   muscle neoplasm with increased mitotic activity, 6 mm. See   comment.     D. Soft tissue, left nohemi-ureteral, biopsy: Involved by   smooth muscle neoplasm, 3 mm. See comment.     E. Peritoneum, left pelvic sidewall margin, excision:   Involved by smooth muscle neoplasm, 1 mm. See comment.     F. Soft tissue, sigmoid colon margin, excision: Negative   for tumor.     Comment: Immunohistochemical stains performed at [facility] demonstrate that the left sidewall mass (B2) is   positive for desmin, caldesmon, ER (moderate, 60-70%), and   UT (moderate, 20-30%), but is negative for HMB45, Melan A,   STAT6, ALK, DOG1, and KIT; this immunophenotype supports a   smooth muscle neoplasm. The left sidewall mass shows    increased mitotic activity (greater than 10 mitoses/10 HPF)   and atypia sufficient to meet criteria for a   leiomyosarcoma. The remaining lesion show minimal atypia   but increased mitotic activity (though less than 10   mitoses/10 HPF) where evaluable, but evaluation is limited   by the small size of the lesions. In the context of a known   history of leiomyosarcoma and a concurrent tumor meeting   diagnostic criteria, these likely represent deceptively   bland additional foci of recurrent leiomyosarcoma.     8/2023 - Biological/Targeted/Hormone Therapy   Anastrozole     11/8/2023 Progression/Relapse   CT scan chest-abdomen-pelvis shows progression of 3 nodules within the peritoneal cavity.    1/18/2024 - 6/5/2024 Chemotherapy   BOAST clinical trial with Dr. Nik Schilling at [facility]. She received abemaciclib there. Was taken off of trial due to progression.      PAST MEDICAL HISTORY   None    PAST SURGICAL HISTORY   As per HPI     FAMILY HISTORY   Pending     ALLERGIES      Allergies   Allergen Reactions     Erythromycin GI Disturbance, Other (See Comments), Nausea and Nausea and Vomiting     PN: LW Reaction: Nausea     Tramadol Nausea, Other (See Comments) and Nausea and Vomiting          SOCIAL HISTORY     Social History     Social History Narrative    How much exercise per week? Walking daily.    How much calcium per day? Supplement and through diet       How much caffeine per day? 3 times a week    How much vitamin D per day? Supplement    Do you/your family wear seatbelts?  Yes    Do you/your family use safety helmets? NA    Do you/your family use sunscreen? Sometimes    Do you/your family keep firearms in the home? No    Do you/your family have a smoke detector(s)? Yes    Do you feel safe in your home? Yes    Has anyone ever touched you in an unwanted manner? 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  2 grandsons now in [place] with the mother.  In a house.  .          How much exercise per week? Daily walking, will start going to the y    How much calcium per day? Through foods       How much caffeine per day? One cup    How much vitamin D per day? Through foods    Do you/your family wear seatbelts?  Yes    Do you/your family use safety helmets? N/a    Do you/your family use sunscreen? Yes    Do you/your family keep firearms in the home? No    Do you/your family have a smoke detector(s)? Yes        Reviewed by Nicki Cornejo 10-11-21               CURRENT OUTPATIENT MEDICATIONS     Current Outpatient Medications   Medication Sig Dispense Refill     ABEMACICLIB PO Take 200 mg by mouth every 12 hours       Ascorbic Acid 250 MG CHEW Take 500 mg by mouth       calcium citrate 250 MG TABS Take 500 mg by mouth daily       CALCIUM PO Take 1 capsule by mouth daily as needed (if diet insufficient to provide daily recommended values)       carboxymethylcellulose PF (REFRESH PLUS) 0.5 % ophthalmic solution Place 1 drop into both eyes 4 times daily as needed for dry eyes       famotidine (PEPCID) 20 MG tablet Take 20 mg by mouth daily as needed       levothyroxine (SYNTHROID/LEVOTHROID) 75 MCG tablet TAKE 1 TABLET DAILY FOR    THYROID 90 tablet 0     Melatonin 10 MG TABS tablet Take 10 mg by mouth       multivitamin w/minerals (THERA-VIT-M) tablet Take 1 tablet by mouth daily as needed (if diet insufficient to provide recommended nutrients)       ondansetron (ZOFRAN) 4 MG tablet Take 4 mg by mouth       study - abemaciclib, IDS# [id number], 50 mg tablet CHEMOTHERAPY        tacrolimus (PROTOPIC) 0.1 % external ointment Apply topically 2 times daily 30 g 3     venlafaxine (EFFEXOR XR) 37.5 MG 24 hr capsule Take 1 capsule (37.5 mg) by mouth daily 90 capsule 3     zolpidem (AMBIEN) 5 MG tablet Take 1 tablet (5 mg) by mouth  nightly as needed for sleep (Only uses if traveling or cannot sleep,) 20 tablet 1          REVIEW OF SYSTEMS   As above in the HPI, o/w complete 12-point ROS was negative.     PHYSICAL EXAM   BP (!) 143/93 (BP Location: Right arm, Patient Position: Sitting, Cuff Size: Adult Regular)   Pulse 109   Temp 98.1  F (36.7  C) (Oral)   Resp 16   Wt 56.4 kg (124 lb 6.4 oz)   SpO2 100%   BMI 25.13 kg/m      EGO  GEN: NAD  HEENT: PERRL, EOMI, no icterus, injection or pallor. Oropharynx is clear.  NECK: no cervical or supraclavicular lymphadenopathy  LUNGS: clear bilaterally  CV: regular, no murmurs, rubs, or gallops  ABDOMEN: soft, non-tender, non-distended, normal bowel sounds, no hepatosplenomegaly by percussion or palpation. Surgical scars from prior surgeries well healed.   EXT: warm, well perfused, no edema  NEURO: alert  SKIN: no rashes     LABORATORY AND IMAGING STUDIES     Recent Labs   Lab Test 24  1330 24  1200 24  1438 24  1043 24  1539 20  0028 19  1509 18  0951     --   --  141 141   < >  --   --    POTASSIUM 4.5  --   --  4.2 4.3   < >  --   --    CHLORIDE 104  --   --  106 108*   < >  --   --    CO2 25  --   --  25 25   < >  --   --    ANIONGAP 9  --   --  10 8   < >  --   --    BUN 18.2  --   --  25.8* 20.8   < >  --   --    CR 0.98* 1.5*   < > 1.27* 1.47*   < >  --   --    GLC 80  --   --  90 85   < >  --   --    SUSAN 9.3  --   --  9.4 8.7*   < > 9.4 9.1   MAG  --   --   --  2.0 2.0   < > 2.2 2.1   PHOS  --   --   --   --   --   --  3.0 2.9    < > = values in this interval not displayed.     Recent Labs   Lab Test 24  1330 24  1043 24  1539   WBC 6.6 5.2 5.8   HGB 12.0 11.8 13.1    303 170   * 91 91   NEUTROPHIL 50 46 55     Recent Labs   Lab Test 24  1330 24  1043 24  1539   BILITOTAL 0.3 0.4 0.3   ALKPHOS 58 44 56   ALT 15 21 23   AST 20 17 19   ALBUMIN 4.2 4.2 4.1     TSH   Date Value Ref Range  Status   11/17/2023 2.89 0.30 - 4.20 uIU/mL Final   07/25/2022 1.26 0.40 - 4.00 mU/L Final   12/16/2020 1.44 0.40 - 4.00 mU/L Final       Results for orders placed or performed during the hospital encounter of 06/03/24   CT Chest/Abdomen/Pelvis w Contrast    Narrative    CT CHEST/ABDOMEN/PELVIS W CONTRAST 6/3/2024 12:25 PM    CLINICAL HISTORY: Metastatic sarcoma, assess for response to  chemotherapy. 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).    TECHNIQUE: CT scan of the chest, abdomen, and pelvis was performed  following injection of IV contrast. Multiplanar reformats were  obtained. Dose reduction techniques were used.   CONTRAST: 64 mL Isovue-370    COMPARISON: CT chest, abdomen and pelvis 4/8/2024.    FINDINGS:   LOWER NECK: Unremarkable.    LUNGS AND PLEURA: No focal consolidation or pleural effusion.  Calcified granulomas. No new or growing suspicious pulmonary nodule.    AIRWAYS: Patent.    HEART: No pericardial effusion.  No coronary calcifications. No  thoracic aortic aneurysm.    PULMONARY ARTERIES: Unremarkable.    MEDIASTINUM AND BEATRIZ: No lymphadenopathy. Calcified lymph nodes.    HEPATOBILIARY: Unremarkable. Gallbladder is present without  gallstones.    SPLEEN: Unremarkable.    PANCREAS: Unremarkable.    ADRENAL GLANDS: Unremarkable.    KIDNEYS/URETERS/BLADDER: Similar right renal atrophy/cortical  scarring. Left parapelvic and cortical cysts; no follow-up necessary.    BOWEL: No bowel dilatation or wall thickening.    LYMPH NODES AND PERITONEUM: Redemonstrated abdominal soft tissue  masses as on series 3:  -Left upper quadrant, 2.6 x 4 x 2.2 cm, previously 2.1 x 2.9 x 2.1 cm  when measured similarly, image 110   -Soft tissue lesion between the transverse colon and stomach, 3.1 x  2.8 cm, previously 3.1 x 2.6 cm, image 168  -Superior to the pancreas, 1.4 cm, image 125    No new lesion.    VASCULATURE: Nonaneurysmal abdominal aorta.    PELVIS: Uterus is surgically absent. No pelvic  mass.    MUSCULOSKELETAL: No aggressive osseous lesion. Similar L4/L5 grade 1  anterolisthesis.    ADDITIONAL FINDINGS: None.      Impression    IMPRESSION:  1.  Since 4/8/2024, increased in size left upper quadrant soft tissue  masses. Other abdominal soft tissue lesions have not significantly  changed.  2.  No convincing new site of disease.    ORIN PAUL MD         SYSTEM ID:  [id number]          PATHOLOGY   12/12/2016  FINAL DIAGNOSIS:   CASE FROM [place], [place] ([id number], OBTAINED 11/04/2016):   A. UTERINE MASS, RESECTION:   - Leiomyosarcoma, 12 cm in aggregate (per report)   - Please see comment     COMMENT:   Per the received report, a right cul-de-sac biopsy was involved by   leiomyosarcoma.   The diagnosis of leiomyosarcoma is made on the basis of examination of   the one slide submitted.  There is moderate nuclear atypia, tumor   necrosis and increased mitotic activities (10/10 HPFs).     Tempus profiling from her 4/5/22 specimen showed CDKN2A and CDKN2B loss.     I reviewed the medical records including prior oncology notes, laboratory studies which are notable for adequate renal and hepatic function, and normal blood counts.   I personally reviewed imaging including CT of the CAP done on 6/12/2024 which demonstrates enlargement of the peritoneal mass adjacent to the spleen, now up to 4 x 2.6 cm. She has 2 other peritoneal lesions up to 2.8 cm that have remained stable.       ASSESSMENT AND PLAN     Maggie is a 74 year old women with PMH of metastatic uterine leiomyosarcoma. She was originally diagnosed on 2016 showing an up to 12 cm mass. She underwent surgical resection followed by adjuvant doxorubicin/dacarbazine for 5 cycles. She had local recurrence on 2020 treated with surgical resection. Then  again recurrence on 2022 again underwent surgical resection. Followed by [id number] with anastrozole. Since January of 2024 she has been on trial a BOAST clinical trial with Dr. Zaragoza  Tonie at the [facility]. She received abemaciclib there. Now, she was taken off of trial due to progression on 1 of 3 intra-abdominal lesions.     She comes here to continue medical management, she has seen Dr. Michaud at [place] for a second opinion. She is interested in surgical resection which has been offered at [facility] with possible Gemzar HIPEC.      She feels very well now, she exercises regularly and has no related GI symptoms.     She saw Dr. Michaud at [place], who recommended against further surgical resection and for systemic therapy with other agents with activity in leiomyosarcoma including gemcitabine +/- docetaxel, trabectedin and pazopanib.     I agree that without good systemic control and in the absence of intra-abdominal symptoms, surgical resection is more likely to cause more harm. However we discussed that if she has good systemic response to gemzar, we may consider that option in the future.     Plan:     -Ct DNA to assess new mutations. (Argy to send)  -PET scan STAT if possible prior to 7/20 or shortly after   -C1 gemzar single agent 7/29   -SINTIA toxicity check on 8/14 - virtual   -C2 8/19 SINTIA toxicity check 9/4 - virtual   -C3 9/9   -PET scan 9/23   -Follow up Dr. Powers on 9/25     60 minutes spent on the date of the encounter doing chart review, review of outside records, review of test results, interpretation of tests, patient visit, documentation, and discussion with other provider(s)     Beto Galvan MD   of Medicine  Hematology, Oncology and Transplantation        Again, thank you for allowing me to participate in the care of your patient.        Sincerely,        Beto Galvan MD

## 2024-07-24 DIAGNOSIS — D70.1 CHEMOTHERAPY-INDUCED NEUTROPENIA (H): Primary | ICD-10-CM

## 2024-07-24 DIAGNOSIS — C55 LEIOMYOSARCOMA OF UTERUS (H): ICD-10-CM

## 2024-07-24 DIAGNOSIS — I42.7 DILATED CARDIOMYOPATHY SECONDARY TO DRUG (H): ICD-10-CM

## 2024-07-24 DIAGNOSIS — T45.1X5A CHEMOTHERAPY-INDUCED NEUTROPENIA (H): Primary | ICD-10-CM

## 2024-07-26 ENCOUNTER — ANCILLARY PROCEDURE (OUTPATIENT)
Dept: PET IMAGING | Facility: CLINIC | Age: 74
End: 2024-07-26
Attending: STUDENT IN AN ORGANIZED HEALTH CARE EDUCATION/TRAINING PROGRAM
Payer: MEDICARE

## 2024-07-26 DIAGNOSIS — C55 LEIOMYOSARCOMA OF UTERUS (H): ICD-10-CM

## 2024-07-26 DIAGNOSIS — C49.4 MALIGNANT NEOPLASM OF CONNECTIVE AND SOFT TISSUE OF ABDOMEN (H): ICD-10-CM

## 2024-07-26 DIAGNOSIS — Z08 ENCOUNTER FOR FOLLOW-UP EXAMINATION AFTER COMPLETED TREATMENT FOR MALIGNANT NEOPLASM: ICD-10-CM

## 2024-07-26 LAB — GLUCOSE SERPL-MCNC: 69 MG/DL (ref 70–99)

## 2024-07-26 RX ORDER — FLUDEOXYGLUCOSE F 18 200 MCI/ML
8-18 INJECTION, SOLUTION INTRAVENOUS ONCE
Status: COMPLETED | OUTPATIENT
Start: 2024-07-26 | End: 2024-07-26

## 2024-07-26 RX ORDER — PROCHLORPERAZINE MALEATE 10 MG
10 TABLET ORAL EVERY 6 HOURS PRN
Qty: 30 TABLET | Refills: 2 | Status: SHIPPED | OUTPATIENT
Start: 2024-07-28 | End: 2024-08-22

## 2024-07-26 RX ORDER — FUROSEMIDE 10 MG/ML
40 INJECTION INTRAMUSCULAR; INTRAVENOUS ONCE
Status: COMPLETED | OUTPATIENT
Start: 2024-07-26 | End: 2024-07-26

## 2024-07-26 RX ADMIN — FUROSEMIDE 40 MG: 10 INJECTION INTRAMUSCULAR; INTRAVENOUS at 13:18

## 2024-07-26 RX ADMIN — FLUDEOXYGLUCOSE F 18 14.16 MILLICURIE: 200 INJECTION, SOLUTION INTRAVENOUS at 11:59

## 2024-07-29 ENCOUNTER — INFUSION THERAPY VISIT (OUTPATIENT)
Dept: ONCOLOGY | Facility: CLINIC | Age: 74
End: 2024-07-29
Attending: STUDENT IN AN ORGANIZED HEALTH CARE EDUCATION/TRAINING PROGRAM
Payer: MEDICARE

## 2024-07-29 ENCOUNTER — APPOINTMENT (OUTPATIENT)
Dept: LAB | Facility: CLINIC | Age: 74
End: 2024-07-29
Attending: STUDENT IN AN ORGANIZED HEALTH CARE EDUCATION/TRAINING PROGRAM
Payer: MEDICARE

## 2024-07-29 VITALS
TEMPERATURE: 98.5 F | RESPIRATION RATE: 16 BRPM | WEIGHT: 124.2 LBS | SYSTOLIC BLOOD PRESSURE: 138 MMHG | HEART RATE: 94 BPM | OXYGEN SATURATION: 100 % | BODY MASS INDEX: 25.04 KG/M2 | HEIGHT: 59 IN | DIASTOLIC BLOOD PRESSURE: 78 MMHG

## 2024-07-29 DIAGNOSIS — D70.1 CHEMOTHERAPY-INDUCED NEUTROPENIA (H): Primary | ICD-10-CM

## 2024-07-29 DIAGNOSIS — C55 LEIOMYOSARCOMA OF UTERUS (H): ICD-10-CM

## 2024-07-29 DIAGNOSIS — T45.1X5A CHEMOTHERAPY-INDUCED NEUTROPENIA (H): Primary | ICD-10-CM

## 2024-07-29 DIAGNOSIS — R11.2 CINV (CHEMOTHERAPY-INDUCED NAUSEA AND VOMITING): Primary | ICD-10-CM

## 2024-07-29 DIAGNOSIS — T45.1X5A CINV (CHEMOTHERAPY-INDUCED NAUSEA AND VOMITING): Primary | ICD-10-CM

## 2024-07-29 DIAGNOSIS — I42.7 DILATED CARDIOMYOPATHY SECONDARY TO DRUG (H): ICD-10-CM

## 2024-07-29 LAB
ALBUMIN SERPL BCG-MCNC: 4.2 G/DL (ref 3.5–5.2)
ALP SERPL-CCNC: 70 U/L (ref 40–150)
ALT SERPL W P-5'-P-CCNC: 17 U/L (ref 0–50)
ANION GAP SERPL CALCULATED.3IONS-SCNC: 10 MMOL/L (ref 7–15)
AST SERPL W P-5'-P-CCNC: 17 U/L (ref 0–45)
BASOPHILS # BLD AUTO: 0 10E3/UL (ref 0–0.2)
BASOPHILS NFR BLD AUTO: 1 %
BILIRUB SERPL-MCNC: 0.2 MG/DL
BUN SERPL-MCNC: 30 MG/DL (ref 8–23)
CALCIUM SERPL-MCNC: 9.6 MG/DL (ref 8.8–10.4)
CHLORIDE SERPL-SCNC: 105 MMOL/L (ref 98–107)
CREAT SERPL-MCNC: 1.06 MG/DL (ref 0.51–0.95)
EGFRCR SERPLBLD CKD-EPI 2021: 55 ML/MIN/1.73M2
EOSINOPHIL # BLD AUTO: 0.2 10E3/UL (ref 0–0.7)
EOSINOPHIL NFR BLD AUTO: 2 %
ERYTHROCYTE [DISTWIDTH] IN BLOOD BY AUTOMATED COUNT: 12.9 % (ref 10–15)
GLUCOSE SERPL-MCNC: 82 MG/DL (ref 70–99)
HCO3 SERPL-SCNC: 26 MMOL/L (ref 22–29)
HCT VFR BLD AUTO: 38.9 % (ref 35–47)
HGB BLD-MCNC: 12.7 G/DL (ref 11.7–15.7)
IMM GRANULOCYTES # BLD: 0 10E3/UL
IMM GRANULOCYTES NFR BLD: 0 %
LYMPHOCYTES # BLD AUTO: 2.9 10E3/UL (ref 0.8–5.3)
LYMPHOCYTES NFR BLD AUTO: 39 %
MCH RBC QN AUTO: 31.4 PG (ref 26.5–33)
MCHC RBC AUTO-ENTMCNC: 32.6 G/DL (ref 31.5–36.5)
MCV RBC AUTO: 96 FL (ref 78–100)
MONOCYTES # BLD AUTO: 0.6 10E3/UL (ref 0–1.3)
MONOCYTES NFR BLD AUTO: 9 %
NEUTROPHILS # BLD AUTO: 3.8 10E3/UL (ref 1.6–8.3)
NEUTROPHILS NFR BLD AUTO: 49 %
NRBC # BLD AUTO: 0 10E3/UL
NRBC BLD AUTO-RTO: 0 /100
PLATELET # BLD AUTO: 207 10E3/UL (ref 150–450)
POTASSIUM SERPL-SCNC: 4.5 MMOL/L (ref 3.4–5.3)
PROT SERPL-MCNC: 6.5 G/DL (ref 6.4–8.3)
RBC # BLD AUTO: 4.05 10E6/UL (ref 3.8–5.2)
SODIUM SERPL-SCNC: 141 MMOL/L (ref 135–145)
WBC # BLD AUTO: 7.6 10E3/UL (ref 4–11)

## 2024-07-29 PROCEDURE — 258N000003 HC RX IP 258 OP 636: Performed by: STUDENT IN AN ORGANIZED HEALTH CARE EDUCATION/TRAINING PROGRAM

## 2024-07-29 PROCEDURE — 250N000011 HC RX IP 250 OP 636: Performed by: STUDENT IN AN ORGANIZED HEALTH CARE EDUCATION/TRAINING PROGRAM

## 2024-07-29 PROCEDURE — 96415 CHEMO IV INFUSION ADDL HR: CPT

## 2024-07-29 PROCEDURE — 96413 CHEMO IV INFUSION 1 HR: CPT

## 2024-07-29 PROCEDURE — 85025 COMPLETE CBC W/AUTO DIFF WBC: CPT | Performed by: STUDENT IN AN ORGANIZED HEALTH CARE EDUCATION/TRAINING PROGRAM

## 2024-07-29 PROCEDURE — 82040 ASSAY OF SERUM ALBUMIN: CPT

## 2024-07-29 PROCEDURE — 36415 COLL VENOUS BLD VENIPUNCTURE: CPT

## 2024-07-29 PROCEDURE — 96375 TX/PRO/DX INJ NEW DRUG ADDON: CPT

## 2024-07-29 RX ORDER — ONDANSETRON 8 MG/1
8 TABLET, FILM COATED ORAL EVERY 8 HOURS PRN
Qty: 30 TABLET | Refills: 4 | Status: SHIPPED | OUTPATIENT
Start: 2024-07-29

## 2024-07-29 RX ORDER — ONDANSETRON 2 MG/ML
8 INJECTION INTRAMUSCULAR; INTRAVENOUS ONCE
Status: COMPLETED | OUTPATIENT
Start: 2024-07-29 | End: 2024-07-29

## 2024-07-29 RX ADMIN — GEMCITABINE 1200 MG: 38 INJECTION, SOLUTION INTRAVENOUS at 15:26

## 2024-07-29 RX ADMIN — ONDANSETRON 8 MG: 2 INJECTION INTRAMUSCULAR; INTRAVENOUS at 15:06

## 2024-07-29 RX ADMIN — SODIUM CHLORIDE 250 ML: 9 INJECTION, SOLUTION INTRAVENOUS at 15:06

## 2024-07-29 ASSESSMENT — PAIN SCALES - GENERAL: PAINLEVEL: NO PAIN (0)

## 2024-07-29 NOTE — PROGRESS NOTES
Infusion Nursing Note:  Maggie Navarrete presents today for Cycle 1 Day 1 Gemcitabine.    Patient seen by provider today: No   present during visit today: Not Applicable.    Note: Patient denies any signs or symptoms of infection. Patient reports no changes or concerns since her visit with Dr. Galvan on 07/17/24. Patient agreeable to treatment plan today    Pt receiving new chemotherapy today. All medications reviewed and questions answered. Pt oriented to infusion room, call light, bathrooms and unit routines. Pt aware to call [facility] with chills and/or temperature >100.4F, uncontrolled nausea/vomiting/diarrhea, shortness of breath, chest pain, bleeding or any questions/concerns. Patient confirms having thermometer at home.    Patient requesting Zofran instead of Compazine for her take home antiemetic. Orders adjusted.     Intravenous Access:  Peripheral IV placed.    Treatment Conditions:  Lab Results   Component Value Date    HGB 12.7 07/29/2024    WBC 7.6 07/29/2024    ANEU 3.3 04/28/2021    ANEUTAUTO 3.8 07/29/2024     07/29/2024        Lab Results   Component Value Date     07/29/2024    POTASSIUM 4.5 07/29/2024    MAG 2.0 03/02/2024    CR 1.06 (H) 07/29/2024    SUSAN 9.6 07/29/2024    BILITOTAL 0.2 07/29/2024    ALBUMIN 4.2 07/29/2024    ALT 17 07/29/2024    AST 17 07/29/2024     Results reviewed, labs MET treatment parameters, ok to proceed with treatment.    Post Infusion Assessment:  Patient tolerated infusion without incident.  Blood return noted pre and post infusion.  Site patent and intact, free from redness, edema or discomfort.  No evidence of extravasations.  Access discontinued per protocol.     Discharge Plan:   Prescription refills given for Zofran.  Discharge instructions reviewed with: Patient.  Patient and/or family verbalized understanding of discharge instructions and all questions answered.  Copy of AVS reviewed with patient and/or family.  Patient will  return 08/06/24 for next appointment. IB sent to care team to clarify SINTAI follow-up, currently scheduled for 08/07/24.  Patient discharged in stable condition accompanied by: self.  Departure Mode: Ambulatory.      Donna Xiao RN

## 2024-07-29 NOTE — NURSING NOTE
Chief Complaint   Patient presents with    Blood Draw     Labs drawn via piv placement by RN in lab. VS taken.      Labs drawn from PIV placed by RN. Line flushed with saline. Vitals taken. Pt checked in for appointment(s).    Dianne Turner RN

## 2024-07-29 NOTE — PATIENT INSTRUCTIONS
[facility] Triage and after hours / weekends / holidays:  [phone]    Please call the triage or after hours line if you experience a temperature greater than or equal to 100.4, shaking chills, have uncontrolled nausea, vomiting and/or diarrhea, dizziness, shortness of breath, chest pain, bleeding, unexplained bruising, or if you have any other new/concerning symptoms, questions or concerns.      If you are having any concerning symptoms or wish to speak to a provider before your next infusion visit, please call triage to notify them so we can adequately serve you.     If you need a refill on a narcotic prescription or other medication, please call before your infusion appointment.

## 2024-07-30 DIAGNOSIS — C55 LEIOMYOSARCOMA OF UTERUS (H): Primary | ICD-10-CM

## 2024-08-06 ENCOUNTER — TRANSFERRED RECORDS (OUTPATIENT)
Dept: HEALTH INFORMATION MANAGEMENT | Facility: CLINIC | Age: 74
End: 2024-08-06

## 2024-08-06 ENCOUNTER — APPOINTMENT (OUTPATIENT)
Dept: LAB | Facility: CLINIC | Age: 74
End: 2024-08-06
Attending: STUDENT IN AN ORGANIZED HEALTH CARE EDUCATION/TRAINING PROGRAM
Payer: MEDICARE

## 2024-08-06 ENCOUNTER — INFUSION THERAPY VISIT (OUTPATIENT)
Dept: ONCOLOGY | Facility: CLINIC | Age: 74
End: 2024-08-06
Attending: STUDENT IN AN ORGANIZED HEALTH CARE EDUCATION/TRAINING PROGRAM
Payer: MEDICARE

## 2024-08-06 VITALS
OXYGEN SATURATION: 100 % | WEIGHT: 125.7 LBS | TEMPERATURE: 98.8 F | HEART RATE: 92 BPM | RESPIRATION RATE: 16 BRPM | DIASTOLIC BLOOD PRESSURE: 84 MMHG | SYSTOLIC BLOOD PRESSURE: 132 MMHG | BODY MASS INDEX: 25.34 KG/M2

## 2024-08-06 DIAGNOSIS — I42.7 DILATED CARDIOMYOPATHY SECONDARY TO DRUG (H): ICD-10-CM

## 2024-08-06 DIAGNOSIS — C55 LEIOMYOSARCOMA OF UTERUS (H): ICD-10-CM

## 2024-08-06 DIAGNOSIS — C55 LEIOMYOSARCOMA OF UTERUS (H): Primary | ICD-10-CM

## 2024-08-06 DIAGNOSIS — T45.1X5A CHEMOTHERAPY-INDUCED NEUTROPENIA (H): Primary | ICD-10-CM

## 2024-08-06 DIAGNOSIS — D70.1 CHEMOTHERAPY-INDUCED NEUTROPENIA (H): Primary | ICD-10-CM

## 2024-08-06 LAB
ALBUMIN SERPL BCG-MCNC: 4.2 G/DL (ref 3.5–5.2)
ALP SERPL-CCNC: 70 U/L (ref 40–150)
ALT SERPL W P-5'-P-CCNC: 20 U/L (ref 0–50)
ANION GAP SERPL CALCULATED.3IONS-SCNC: 10 MMOL/L (ref 7–15)
AST SERPL W P-5'-P-CCNC: 19 U/L (ref 0–45)
BASOPHILS # BLD AUTO: 0 10E3/UL (ref 0–0.2)
BASOPHILS NFR BLD AUTO: 0 %
BILIRUB DIRECT SERPL-MCNC: <0.2 MG/DL (ref 0–0.3)
BILIRUB SERPL-MCNC: 0.2 MG/DL
BUN SERPL-MCNC: 27.8 MG/DL (ref 8–23)
CALCIUM SERPL-MCNC: 9.7 MG/DL (ref 8.8–10.4)
CHLORIDE SERPL-SCNC: 104 MMOL/L (ref 98–107)
CREAT SERPL-MCNC: 0.96 MG/DL (ref 0.51–0.95)
EGFRCR SERPLBLD CKD-EPI 2021: 62 ML/MIN/1.73M2
EOSINOPHIL # BLD AUTO: 0.1 10E3/UL (ref 0–0.7)
EOSINOPHIL NFR BLD AUTO: 2 %
ERYTHROCYTE [DISTWIDTH] IN BLOOD BY AUTOMATED COUNT: 12.7 % (ref 10–15)
GLUCOSE SERPL-MCNC: 87 MG/DL (ref 70–99)
HCO3 SERPL-SCNC: 26 MMOL/L (ref 22–29)
HCT VFR BLD AUTO: 36.7 % (ref 35–47)
HGB BLD-MCNC: 12.4 G/DL (ref 11.7–15.7)
IMM GRANULOCYTES # BLD: 0 10E3/UL
IMM GRANULOCYTES NFR BLD: 0 %
LYMPHOCYTES # BLD AUTO: 2.7 10E3/UL (ref 0.8–5.3)
LYMPHOCYTES NFR BLD AUTO: 49 %
MCH RBC QN AUTO: 31.9 PG (ref 26.5–33)
MCHC RBC AUTO-ENTMCNC: 33.8 G/DL (ref 31.5–36.5)
MCV RBC AUTO: 94 FL (ref 78–100)
MONOCYTES # BLD AUTO: 0.4 10E3/UL (ref 0–1.3)
MONOCYTES NFR BLD AUTO: 7 %
NEUTROPHILS # BLD AUTO: 2.3 10E3/UL (ref 1.6–8.3)
NEUTROPHILS NFR BLD AUTO: 42 %
NRBC # BLD AUTO: 0 10E3/UL
NRBC BLD AUTO-RTO: 0 /100
PLATELET # BLD AUTO: 155 10E3/UL (ref 150–450)
POTASSIUM SERPL-SCNC: 4.3 MMOL/L (ref 3.4–5.3)
PROT SERPL-MCNC: 6.5 G/DL (ref 6.4–8.3)
RBC # BLD AUTO: 3.89 10E6/UL (ref 3.8–5.2)
SODIUM SERPL-SCNC: 140 MMOL/L (ref 135–145)
WBC # BLD AUTO: 5.5 10E3/UL (ref 4–11)

## 2024-08-06 PROCEDURE — 258N000003 HC RX IP 258 OP 636: Performed by: STUDENT IN AN ORGANIZED HEALTH CARE EDUCATION/TRAINING PROGRAM

## 2024-08-06 PROCEDURE — G2211 COMPLEX E/M VISIT ADD ON: HCPCS | Performed by: PHYSICIAN ASSISTANT

## 2024-08-06 PROCEDURE — 250N000011 HC RX IP 250 OP 636: Performed by: STUDENT IN AN ORGANIZED HEALTH CARE EDUCATION/TRAINING PROGRAM

## 2024-08-06 PROCEDURE — 85048 AUTOMATED LEUKOCYTE COUNT: CPT | Performed by: STUDENT IN AN ORGANIZED HEALTH CARE EDUCATION/TRAINING PROGRAM

## 2024-08-06 PROCEDURE — 99213 OFFICE O/P EST LOW 20 MIN: CPT | Performed by: PHYSICIAN ASSISTANT

## 2024-08-06 PROCEDURE — 250N000011 HC RX IP 250 OP 636: Performed by: PHYSICIAN ASSISTANT

## 2024-08-06 PROCEDURE — 96413 CHEMO IV INFUSION 1 HR: CPT

## 2024-08-06 PROCEDURE — 36591 DRAW BLOOD OFF VENOUS DEVICE: CPT | Performed by: STUDENT IN AN ORGANIZED HEALTH CARE EDUCATION/TRAINING PROGRAM

## 2024-08-06 PROCEDURE — 96375 TX/PRO/DX INJ NEW DRUG ADDON: CPT

## 2024-08-06 PROCEDURE — 80076 HEPATIC FUNCTION PANEL: CPT | Performed by: STUDENT IN AN ORGANIZED HEALTH CARE EDUCATION/TRAINING PROGRAM

## 2024-08-06 PROCEDURE — G0463 HOSPITAL OUTPT CLINIC VISIT: HCPCS | Mod: 25 | Performed by: PHYSICIAN ASSISTANT

## 2024-08-06 PROCEDURE — 82310 ASSAY OF CALCIUM: CPT | Performed by: PHYSICIAN ASSISTANT

## 2024-08-06 RX ORDER — ONDANSETRON 2 MG/ML
8 INJECTION INTRAMUSCULAR; INTRAVENOUS ONCE
Status: COMPLETED | OUTPATIENT
Start: 2024-08-06 | End: 2024-08-06

## 2024-08-06 RX ORDER — ONDANSETRON 2 MG/ML
8 INJECTION INTRAMUSCULAR; INTRAVENOUS ONCE
Status: CANCELLED | OUTPATIENT
Start: 2024-08-06

## 2024-08-06 RX ADMIN — ONDANSETRON 8 MG: 2 INJECTION INTRAMUSCULAR; INTRAVENOUS at 14:47

## 2024-08-06 RX ADMIN — SODIUM CHLORIDE 250 ML: 9 INJECTION, SOLUTION INTRAVENOUS at 14:46

## 2024-08-06 RX ADMIN — GEMCITABINE 1200 MG: 38 INJECTION, SOLUTION INTRAVENOUS at 14:50

## 2024-08-06 ASSESSMENT — PAIN SCALES - GENERAL: PAINLEVEL: NO PAIN (0)

## 2024-08-06 NOTE — NURSING NOTE
Chief Complaint   Patient presents with    Oncology Clinic Visit     RTN for Leiomyosarcoma of Uterus    Blood Draw     Labs drawn via PIV placed by RN in lab.      Labs drawn from PIV placed by RN. Line flushed with saline. Vitals taken. Pt checked in for appointment(s).     Mabel DONOVAN RN PHN BSN  BMT/Oncology Lab

## 2024-08-06 NOTE — PROGRESS NOTES
[facility] CANCER CLINIC  RETURN PATIENT VISIT NOTE    PATIENT NAME: Maggie Navarrete MRN # [id number]  DATE OF VISIT: Aug 6, 2024 YOB: 1950    CANCER TYPE: metastatic uterine leiomyosarcoma     HISTORY OF PRESENT ILLNESS   Maggie is a 74 year old women with PMH of metastatic uterine leiomyosarcoma. She was originally diagnosed on 2016 showing an up to 12 cm mass. She underwent surgical resection followed by adjuvant doxorubicin/dacarbazine for 5 cycles. She had local recurrence on 2020 treated with surgical resection. Then  again recurrence on 2022 again underwent surgical resection. Followed by [id number] with anastrozole. Since January of 2024 she has been on trial a BOAST clinical trial with Dr. Nik Schilling at the [facility]. She received abemaciclib there. Now, she was taken off of trial due to progression on 1 of 3 intra-abdominal lesions.     She comes here to continue medical management. She has seen Dr. Michaud at [facility] for a second opinion. She is interested in surgical resection which has been offered at  with possible [facility] HIPEC.      She started on Gemzar on 7/29/24. She is here today for routine follow-up.     PAST ONCOLOGIC HISTORY   Leiomyosarcoma Uterus   11/3/2016 Initial Diagnosis     11/4/2016 Surgery and Procedures   Open hyst, BSO, stage II with peritoneal involvement    12/26/2016 - 3/23/2017 Chemotherapy   Received elsewhere: adjuvant chemotherapy with doxorubicin 25 mg/m2/day and DTIC 250 mg/m2/day , days 1-3 every 21 weeks.  Completed 5 cycles of adjuvant doxorubicin and dacarbazine    6/1/2020 Recurrence Local   Isolated lesion in the RLQ    6/16/2020 Surgery and Procedures   Laparotomy, excision of side wall mass, ureteroneocystotomy. 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 The excised margins were negative for tumor.    3/31/2022 [facility]   PET with isolated  bowel lesion in LLQ.     4/4/2022 Surgery and Procedures   Exlap, FANNY, small bowel lesion excised with negative margins    5/9/2023 Surgery and Procedures   Dr. Camarillo and Dr. Krueger    Exploratory laparotomy, tumor debulking, cystoscopy and placement of internal ureteral stent and ureterolysis     Findings  Two lesions in the abdomen consistent with very indolent appearing sarcoma on frozen section, 1 in the right upper quadrant in the large bowel mesentery. And 1 on the left sidewall between the left sidewall and the left ureter and the sigmoid colon    5/9/2023 Biopsy/Pathology   FINAL DIAGNOSIS     A. Soft tissue, hepatic flexure nodule, excision: Involved   by smooth muscle neoplasm, 1.4 cm. See comment.     B. Soft tissue, left sidewall mass, excision: Recurrent   leiomyosarcoma, 2.9 cm. See comment.     C. Colon, sigmoid mass, excision: Involved by smooth   muscle neoplasm with increased mitotic activity, 6 mm. See   comment.     D. Soft tissue, left nohemi-ureteral, biopsy: Involved by   smooth muscle neoplasm, 3 mm. See comment.     E. Peritoneum, left pelvic sidewall margin, excision:   Involved by smooth muscle neoplasm, 1 mm. See comment.     F. Soft tissue, sigmoid colon margin, excision: Negative   for tumor.     Comment: Immunohistochemical stains performed at [facility] demonstrate that the left sidewall mass (B2) is   positive for desmin, caldesmon, ER (moderate, 60-70%), and   VA (moderate, 20-30%), but is negative for HMB45, Melan A,   STAT6, ALK, DOG1, and KIT; this immunophenotype supports a   smooth muscle neoplasm. The left sidewall mass shows   increased mitotic activity (greater than 10 mitoses/10 HPF)   and atypia sufficient to meet criteria for a   leiomyosarcoma. The remaining lesion show minimal atypia   but increased mitotic activity (though less than 10   mitoses/10 HPF) where evaluable, but evaluation is limited   by the small size of the lesions. In the context of a known   history  of leiomyosarcoma and a concurrent tumor meeting   diagnostic criteria, these likely represent deceptively   bland additional foci of recurrent leiomyosarcoma.     8/2023 - Biological/Targeted/Hormone Therapy   Anastrozole     11/8/2023 Progression/Relapse   CT scan chest-abdomen-pelvis shows progression of 3 nodules within the peritoneal cavity.    1/18/2024 - 6/5/2024 Chemotherapy   BOAST clinical trial with Dr. Nik Schilling at the [facility]. She received abemaciclib there. Was taken off of trial due to progression.     7/29/24: Cycle 1 [facility]    Interval History:  Patient reports mild constipation that she was able to manage with prune juice.  She was still having 1-2 stools per day even when her stools were harder.  She denies any nausea with the chemotherapy.  She has been exercising regularly with doing strength exercises, walking her dog, and going to a swing class.  She is up about twice at night to urinate and has been trying to push fluids.  She notes a history of urinary incontinence.  She previously tried pelvic floor exercises without relief.  She is looking forward to a solo trip to [facility] from September 30 to October 12.  She has some questions about medications she may take when she is traveling.  She denies other concerns.      CURRENT OUTPATIENT MEDICATIONS     Current Outpatient Medications   Medication Sig Dispense Refill    calcium citrate 250 MG TABS Take 500 mg by mouth daily      carboxymethylcellulose PF (REFRESH PLUS) 0.5 % ophthalmic solution Place 1 drop into both eyes 4 times daily as needed for dry eyes      famotidine (PEPCID) 20 MG tablet Take 20 mg by mouth daily as needed      Fish Oil-Cholecalciferol (FISH OIL + D3 PO)       levothyroxine (SYNTHROID/LEVOTHROID) 75 MCG tablet TAKE 1 TABLET DAILY FOR    THYROID 90 tablet 0    Melatonin 10 MG TABS tablet Take 10 mg by mouth      multivitamin w/minerals (THERA-VIT-M) tablet Take 1 tablet by mouth daily as  needed (if diet insufficient to provide recommended nutrients)      ondansetron (ZOFRAN) 8 MG tablet Take 1 tablet (8 mg) by mouth every 8 hours as needed for nausea 30 tablet 4    prochlorperazine (COMPAZINE) 10 MG tablet Take 1 tablet (10 mg) by mouth every 6 hours as needed for nausea or vomiting 30 tablet 2    tacrolimus (PROTOPIC) 0.1 % external ointment Apply topically 2 times daily 30 g 3    venlafaxine (EFFEXOR XR) 37.5 MG 24 hr capsule Take 1 capsule (37.5 mg) by mouth daily 90 capsule 3    VITAMIN D PO       zolpidem (AMBIEN) 5 MG tablet Take 1 tablet (5 mg) by mouth nightly as needed for sleep (Only uses if traveling or cannot sleep,) 20 tablet 1      PHYSICAL EXAM   General: The patient is a pleasant female in no acute distress.  /84   Pulse 92   Temp 98.8  F (37.1  C) (Oral)   Resp 16   Wt 57 kg (125 lb 11.2 oz)   SpO2 100%   BMI 25.34 kg/m    Wt Readings from Last 10 Encounters:   08/06/24 57 kg (125 lb 11.2 oz)   07/29/24 56.3 kg (124 lb 3.2 oz)   07/17/24 56.4 kg (124 lb 6.4 oz)   03/05/24 58.5 kg (129 lb)   11/17/23 62.8 kg (138 lb 6.4 oz)   11/07/23 63.8 kg (140 lb 9.6 oz)   04/28/23 63.2 kg (139 lb 4.8 oz)   12/07/22 63.2 kg (139 lb 4.8 oz)   10/13/22 62.4 kg (137 lb 8 oz)   07/05/22 62.1 kg (137 lb)   HEENT: EOMI. Sclerae are anicteric.   Lymph: Neck is supple with no lymphadenopathy in the cervical or supraclavicular areas.   Heart: Regular rate and rhythm.   Lungs: Clear to auscultation bilaterally.   Abdomen: Bowel sounds present, soft, nontender with no palpable hepatosplenomegaly or masses.   Extremities: No lower extremity edema noted bilaterally.   Neuro: Cranial nerves II through XII are grossly intact.  Skin: No rashes, petechiae, or bruising noted on exposed skin.     LABORATORY AND IMAGING STUDIES   Most Recent 3 CBC's:  Recent Labs   Lab Test 08/06/24  1339 07/29/24  1403 06/22/24  1330   WBC 5.5 7.6 6.6   HGB 12.4 12.7 12.0   MCV 94 96 101*    207 253   ANEUTAUTO  2.3 3.8 3.3     Most Recent 3 BMP's:  Recent Labs   Lab Test 08/06/24  1339 07/29/24  1403 07/26/24  1155 06/22/24  1330    141  --  138   POTASSIUM 4.3 4.5  --  4.5   CHLORIDE 104 105  --  104   CO2 26 26  --  25   BUN 27.8* 30.0*  --  18.2   CR 0.96* 1.06*  --  0.98*   ANIONGAP 10 10  --  9   SUSAN 9.7 9.6  --  9.3   GLC 87 82 69* 80   PROTTOTAL 6.5 6.5  --  6.4   ALBUMIN 4.2 4.2  --  4.2    Most Recent 3 LFT's:  Recent Labs   Lab Test 08/06/24  1339 07/29/24  1403 06/22/24  1330   AST 19 17 20   ALT 20 17 15   ALKPHOS 70 70 58   BILITOTAL 0.2 0.2 0.3   I reviewed the above labs today.     PET Oncology Whole Body  Narrative: Combined Report of: PET and CT on  7/26/2024 2:08 PM:    FOLLOW-UP APPOINTMENT: 9/25/2024    1. PET of the neck, chest, abdomen, and pelvis.  2. PET CT Fusion for Attenuation Correction and Anatomical  Localization:    3. 3D MIP and PET-CT fused images were processed on an independent  workstation and archived to PACS and reviewed by a radiologist.    Technique:    1. PET: The patient received 14.16 mCi of F-18-FDG; the serum glucose  was 69 mg/dL prior to administration, body weight was 56.4 kg. Images  were evaluated in the axial, sagittal, and coronal planes as well as  the rotational whole body MIP. Images were acquired from the Vertex to  the Feet.    UPTAKE WAS MEASURED AT 60 MINUTES.     2. CT: CT only obtained for attenuation correction and not diagnostic  purposes.    Additional Medication: The patient received 40 mg intravenous Lasix,  after which additional postvoid PET and CT images were obtained.    INDICATION: Leiomyosarcoma of uterus (H); Malignant neoplasm of  connective and soft tissue of abdomen (H)    ADDITIONAL INFORMATION OBTAINED FROM EMR: 74-year-old with metastatic  uterine leiomyosarcoma status post surgical resection and chemotherapy  with multiple local recurrences, treated with resection. Most recently  entered clinical trial January through June 2024 with  abemaciclib  though was taken off trial due to progression.    COMPARISON: PET/CT 3/26/2022; CT CAP 4/8/2024, 3/4/2024.    FINDINGS:   BACKGROUND: Liver SUV max = 3.6, Aorta Blood SUV max = 2.5.     HEAD/NECK:  No abnormal uptake.     CHEST:  Calcified mediastinal and right hilar lymph nodes with below  background uptake, for example nonenlarged 1.1 x 0.8 cm right hilar  node (2/166) with SUV max 3.5, likely reactive.    Mild uptake along the proximal and distal esophagus, which may be  secondary to esophagitis. Small hiatal hernia.    ABDOMEN AND PELVIS:  Mild uptake associated with the mesenteric soft tissue masses,  including:  - 3.2 x 3.1 x 4.1 cm anterior mid abdomen mass (2/269) with SUV max  5.6, previously 3.1 x 2.6 cm on CT 6/3/2024 and 3.0 x 2.4 cm on CT  4/8/2024  - 4.4 x 2.9 cm mass anterior to the spleen (2/217) with SUV max 3.8,  previously 4.0 x 2.6 cm on CT 6/3/2024 and 2.6 x 1.9 cm on CT 4/8/2024  - 1.5 x 1.3 cm lesion anterosuperior to the pancreas (2/244) with SUV  max 3.3, previously 1.5 x 0.9 cm and not definitively visualized on CT  4/8/2024    Increased size of the enlarged 1.8 x 1.1 cm left external iliac node  (2/327) without significant uptake, previously 1.5 x 0.8 cm on CT  6/30/2024 and 1.1 x 0.6 cm on CT 4/18/2024.    Calcified splenic granuloma. Left renal cyst. Bowel anastomosis in the  left lower quadrant. Colonic diverticulosis. Hysterectomy and  bilateral salpingo-oophorectomy.    LOWER EXTREMITIES:   No abnormal uptake.     BONES:   No abnormal uptake. Degenerative uptake associated with L3-4 and L4-5  facet arthropathy. Degenerative changes in the spine with similar  anterior compression fracture deformities of thoracic vertebrae and  anterolisthesis of L4 on L5.  Impression: IMPRESSION:     Disease progression when compared to PET/CT 3/26/2022 as evidenced by:  - Enlarging mildly hypermetabolic mesenteric metastatic deposits.  - Enlarging left external iliac lymph node.    I have  personally reviewed the examination and initial interpretation  and I agree with the findings.    EJ CUETO MD         SYSTEM ID:  [id number]    I reviewed the above imaging report today.     ASSESSMENT AND PLAN   Metastatic uterine leiomyosarcoma. She was originally diagnosed on 2016 showing an up to 12 cm mass. She underwent surgical resection followed by adjuvant doxorubicin/dacarbazine for 5 cycles. She had local recurrence on 2020 treated with surgical resection. Then again recurrence on 2022 again underwent surgical resection. Followed by therapy with anastrozole. Since January of 2024 she has been on trial a BOAST clinical trial with Dr. Nik Schilling at the [facility]. She received abemaciclib there. Now, she was taken off of trial due to progression on 1 of 3 intra-abdominal lesions.     She comes here to continue medical management, she has seen Dr. Michaud at [facility] for a second opinion. She is interested in surgical resection which has been offered at [facility] with possible [facility] HIPEC. She saw Dr. Michaud at [facility], who recommended against further surgical resection and for systemic therapy with other agents with activity in leiomyosarcoma including gemcitabine +/- docetaxel, trabectedin and pazopanib. Dr. Powers agreed that without good systemic control and in the absence of intra-abdominal symptoms, surgical resection is more likely to cause more harm.     She started on treatment with single agent Gemzar on 7/29/24. ctDNA testing was sent today (8/6/24). She tolerated the first dose well and will continue with cycle 1 day 8 [facility] today. She will follow-up with us prior to cycle 3. Will repeat imaging after 3 cycles of [facility].     Medications. Discussed minimizing use of aspirin and meloxicam while on chemotherapy due to potentially increased bleeding risk, but if on a break from chemotherapy, should be okay to use. Discussed no known interaction of boswellia with her  chemotherapy, though many interactions are unknown. Recommend against taking boswellia with aspirin due to increased bleeding risk.     Rebeka Kim PA-C  [facility] [id number] [place], [place] [address] [phone]    25 minutes spent on the date of the encounter doing chart review, review of test results, interpretation of tests, patient visit, and documentation     The longitudinal plan of care for the diagnosis(es)/condition(s) as documented were addressed during this visit. Due to the added complexity in care, I will continue to support Maggie Navarrete in the subsequent management and with ongoing continuity of care.

## 2024-08-06 NOTE — PATIENT INSTRUCTIONS
[facility] Triage and after hours / weekends / holidays:  [phone]    Please call the triage or after hours line if you experience a temperature greater than or equal to 100.4, shaking chills, have uncontrolled nausea, vomiting and/or diarrhea, dizziness, shortness of breath, chest pain, bleeding, unexplained bruising, or if you have any other new/concerning symptoms, questions or concerns.      If you are having any concerning symptoms or wish to speak to a provider before your next infusion visit, please call triage to notify them so we can adequately serve you.     If you need a refill on a narcotic prescription or other medication, please call before your infusion appointment.           August 2024 Sunday Monday Tuesday Wednesday Thursday Friday Saturday                       1     2     3       4     5     6    LAB PERIPHERAL  12:45 PM   (15 min.)   [facility] LAB DRAW   [facility]    RETURN CCSL   1:00 PM   (45 min.)   Rebeka Kim PA-C   [facility]    ONC INFUSION 2.5 HR (150 MIN)   3:00 PM   (150 min.)    ONC INFUSION NURSE   [facility] 7     8     9     10       11     12     13     14     15     16     17       18     19    LAB PERIPHERAL   2:00 PM   (15 min.)   [facility] LAB DRAW   [facility]    ONC INFUSION 2 HR (120 MIN)   2:30 PM   (120 min.)    ONC INFUSION NURSE   [facility] 20     21     22     23     24       25     26    LAB PERIPHERAL   2:30 PM   (15 min.)   [facility] LAB DRAW   [facility]    ONC INFUSION 2.5 HR (150 MIN)   3:00 PM   (150 min.)    ONC INFUSION NURSE   [facility] 27     28     29     30     31                 September 2024 Sunday Monday Tuesday Wednesday Thursday Friday Saturday   1     2     3     4     5     6    RETURN CCSL   1:15 PM   (45 min.)    Scheierl, Amber J, APRN CNP   [facility] 7       8     9    LAB PERIPHERAL  11:15 AM   (15 min.)    [facility] DRAW   [facility]    ONC INFUSION 2 HR (120 MIN)  12:00 PM   (120 min.)   [facility] ONC INFUSION NURSE   [facility] 10     11     12     13     14       15     16    RETURN ENDOCRINE  10:30 AM   (30 min.)   Edgardo Valderrama MD   [facility]    LAB PERIPHERAL   2:30 PM   (15 min.)    [facility] DRAW   [facility]    ONC INFUSION 2.5 HR (150 MIN)   3:00 PM   (150 min.)    ONC INFUSION NURSE   [facility] 17     18     19     20     21       22     23    PE EYE/TH ONCOLOGY  12:30 PM   (30 min.)   [id number]   [facility] 24    RETURN CCSL  12:45 PM   (30 min.)   Beto Galvan MD   [facility] 25     26     27     28       29     30                                             Recent Results (from the past 24 hour(s))   Hepatic panel    Collection Time: 08/06/24  1:39 PM   Result Value Ref Range    Protein Total 6.5 6.4 - 8.3 g/dL    Albumin 4.2 3.5 - 5.2 g/dL    Bilirubin Total 0.2 <=1.2 mg/dL    Alkaline Phosphatase 70 40 - 150 U/L    AST 19 0 - 45 U/L    ALT 20 0 - 50 U/L    Bilirubin Direct <0.20 0.00 - 0.30 mg/dL   CBC with platelets and differential    Collection Time: 08/06/24  1:39 PM   Result Value Ref Range    WBC Count 5.5 4.0 - 11.0 10e3/uL    RBC Count 3.89 3.80 - 5.20 10e6/uL    Hemoglobin 12.4 11.7 - 15.7 g/dL    Hematocrit 36.7 35.0 - 47.0 %    MCV 94 78 - 100 fL    MCH 31.9 26.5 - 33.0 pg    MCHC 33.8 31.5 - 36.5 g/dL    RDW 12.7 10.0 - 15.0 %    Platelet Count 155 150 - 450 10e3/uL    % Neutrophils 42 %    % Lymphocytes 49 %    % Monocytes 7 %    % Eosinophils 2 %    % Basophils 0 %    % Immature Granulocytes 0 %    NRBCs per 100 WBC 0 <1 /100    Absolute Neutrophils 2.3  1.6 - 8.3 10e3/uL    Absolute Lymphocytes 2.7 0.8 - 5.3 10e3/uL    Absolute Monocytes 0.4 0.0 - 1.3 10e3/uL    Absolute Eosinophils 0.1 0.0 - 0.7 10e3/uL    Absolute Basophils 0.0 0.0 - 0.2 10e3/uL    Absolute Immature Granulocytes 0.0 <=0.4 10e3/uL    Absolute NRBCs 0.0 10e3/uL   Basic metabolic panel    Collection Time: 08/06/24  1:39 PM   Result Value Ref Range    Sodium 140 135 - 145 mmol/L    Potassium 4.3 3.4 - 5.3 mmol/L    Chloride 104 98 - 107 mmol/L    Carbon Dioxide (CO2) 26 22 - 29 mmol/L    Anion Gap 10 7 - 15 mmol/L    Urea Nitrogen 27.8 (H) 8.0 - 23.0 mg/dL    Creatinine 0.96 (H) 0.51 - 0.95 mg/dL    GFR Estimate 62 >60 mL/min/1.73m2    Calcium 9.7 8.8 - 10.4 mg/dL    Glucose 87 70 - 99 mg/dL

## 2024-08-06 NOTE — LETTER
8/6/2024      Maggie Navarrete  [address]  [facility] [address]      Dear Colleague,    Thank you for referring your patient, Maggie Navarrete, to the [facility]. Please see a copy of my visit note below.    [facility]  RETURN PATIENT VISIT NOTE    PATIENT NAME: Maggie Navarrete MRN # [id number]  DATE OF VISIT: Aug 6, 2024 YOB: 1950    CANCER TYPE: metastatic uterine leiomyosarcoma     HISTORY OF PRESENT ILLNESS   Maggie is a 74 year old women with PMH of metastatic uterine leiomyosarcoma. She was originally diagnosed on 2016 showing an up to 12 cm mass. She underwent surgical resection followed by adjuvant doxorubicin/dacarbazine for 5 cycles. She had local recurrence on 2020 treated with surgical resection. Then  again recurrence on 2022 again underwent surgical resection. Followed by [id number] with anastrozole. Since January of 2024 she has been on trial a BOAST clinical trial with Dr. Nik Schilling at the [facility]. She received abemaciclib there. Now, she was taken off of trial due to progression on 1 of 3 intra-abdominal lesions.     She comes here to continue medical management. She has seen Dr. Michaud at [facility] for a second opinion. She is interested in surgical resection which has been offered at [facility] with possible Gemzar HIPEC.      She started on Gemzar on 7/29/24. She is here today for routine follow-up.     PAST ONCOLOGIC HISTORY   Leiomyosarcoma Uterus   11/3/2016 Initial Diagnosis     11/4/2016 Surgery and Procedures   Open hyst, BSO, stage II with peritoneal involvement    12/26/2016 - 3/23/2017 Chemotherapy   Received elsewhere: adjuvant chemotherapy with doxorubicin 25 mg/m2/day and DTIC 250 mg/m2/day , days 1-3 every 21 weeks.  Completed 5 cycles of adjuvant doxorubicin and dacarbazine    6/1/2020 Recurrence Local   Isolated lesion in the RLQ    6/16/2020 Surgery and  Procedures   Laparotomy, excision of side wall mass, ureteroneocystotomy. 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 The excised margins were negative for tumor.    3/31/2022 [facility]   PET with isolated bowel lesion in LLQ.     4/4/2022 Surgery and Procedures   Exlap, FANNY, small bowel lesion excised with negative margins    5/9/2023 Surgery and Procedures   Dr. Camarillo and Dr. Krueger    Exploratory laparotomy, tumor debulking, cystoscopy and placement of internal ureteral stent and ureterolysis     Findings  Two lesions in the abdomen consistent with very indolent appearing sarcoma on frozen section, 1 in the right upper quadrant in the large bowel mesentery. And 1 on the left sidewall between the left sidewall and the left ureter and the sigmoid colon    5/9/2023 Biopsy/Pathology   FINAL DIAGNOSIS     A. Soft tissue, hepatic flexure nodule, excision: Involved   by smooth muscle neoplasm, 1.4 cm. See comment.     B. Soft tissue, left sidewall mass, excision: Recurrent   leiomyosarcoma, 2.9 cm. See comment.     C. Colon, sigmoid mass, excision: Involved by smooth   muscle neoplasm with increased mitotic activity, 6 mm. See   comment.     D. Soft tissue, left nohemi-ureteral, biopsy: Involved by   smooth muscle neoplasm, 3 mm. See comment.     E. Peritoneum, left pelvic sidewall margin, excision:   Involved by smooth muscle neoplasm, 1 mm. See comment.     F. Soft tissue, sigmoid colon margin, excision: Negative   for tumor.     Comment: Immunohistochemical stains performed at [facility] demonstrate that the left sidewall mass (B2) is   positive for desmin, caldesmon, ER (moderate, 60-70%), and   UT (moderate, 20-30%), but is negative for HMB45, Melan A,   STAT6, ALK, DOG1, and KIT; this immunophenotype supports a   smooth muscle neoplasm. The left sidewall mass shows   increased mitotic activity (greater than 10 mitoses/10 HPF)   and atypia sufficient to meet criteria  for a   leiomyosarcoma. The remaining lesion show minimal atypia   but increased mitotic activity (though less than 10   mitoses/10 HPF) where evaluable, but evaluation is limited   by the small size of the lesions. In the context of a known   history of leiomyosarcoma and a concurrent tumor meeting   diagnostic criteria, these likely represent deceptively   bland additional foci of recurrent leiomyosarcoma.     8/2023 - Biological/Targeted/Hormone Therapy   Anastrozole     11/8/2023 Progression/Relapse   CT scan chest-abdomen-pelvis shows progression of 3 nodules within the peritoneal cavity.    1/18/2024 - 6/5/2024 Chemotherapy   BOAST clinical trial with Dr. Nik Schilling at the [facility]. She received abemaciclib there. Was taken off of trial due to progression.     7/29/24: Cycle 1 Gemzar    Interval History:  Patient reports mild constipation that she was able to manage with prune juice.  She was still having 1-2 stools per day even when her stools were harder.  She denies any nausea with the chemotherapy.  She has been exercising regularly with doing strength exercises, walking her dog, and going to a swing class.  She is up about twice at night to urinate and has been trying to push fluids.  She notes a history of urinary incontinence.  She previously tried pelvic floor exercises without relief.  She is looking forward to a solo trip to [facility] from September 30 to October 12.  She has some questions about medications she may take when she is traveling.  She denies other concerns.      CURRENT OUTPATIENT MEDICATIONS     Current Outpatient Medications   Medication Sig Dispense Refill     calcium citrate 250 MG TABS Take 500 mg by mouth daily       carboxymethylcellulose PF (REFRESH PLUS) 0.5 % ophthalmic solution Place 1 drop into both eyes 4 times daily as needed for dry eyes       famotidine (PEPCID) 20 MG tablet Take 20 mg by mouth daily as needed       Fish Oil-Cholecalciferol (FISH  OIL + D3 PO)        levothyroxine (SYNTHROID/LEVOTHROID) 75 MCG tablet TAKE 1 TABLET DAILY FOR    THYROID 90 tablet 0     Melatonin 10 MG TABS tablet Take 10 mg by mouth       multivitamin w/minerals (THERA-VIT-M) tablet Take 1 tablet by mouth daily as needed (if diet insufficient to provide recommended nutrients)       ondansetron (ZOFRAN) 8 MG tablet Take 1 tablet (8 mg) by mouth every 8 hours as needed for nausea 30 tablet 4     prochlorperazine (COMPAZINE) 10 MG tablet Take 1 tablet (10 mg) by mouth every 6 hours as needed for nausea or vomiting 30 tablet 2     tacrolimus (PROTOPIC) 0.1 % external ointment Apply topically 2 times daily 30 g 3     venlafaxine (EFFEXOR XR) 37.5 MG 24 hr capsule Take 1 capsule (37.5 mg) by mouth daily 90 capsule 3     VITAMIN D PO        zolpidem (AMBIEN) 5 MG tablet Take 1 tablet (5 mg) by mouth nightly as needed for sleep (Only uses if traveling or cannot sleep,) 20 tablet 1      PHYSICAL EXAM   General: The patient is a pleasant female in no acute distress.  /84   Pulse 92   Temp 98.8  F (37.1  C) (Oral)   Resp 16   Wt 57 kg (125 lb 11.2 oz)   SpO2 100%   BMI 25.34 kg/m    Wt Readings from Last 10 Encounters:   08/06/24 57 kg (125 lb 11.2 oz)   07/29/24 56.3 kg (124 lb 3.2 oz)   07/17/24 56.4 kg (124 lb 6.4 oz)   03/05/24 58.5 kg (129 lb)   11/17/23 62.8 kg (138 lb 6.4 oz)   11/07/23 63.8 kg (140 lb 9.6 oz)   04/28/23 63.2 kg (139 lb 4.8 oz)   12/07/22 63.2 kg (139 lb 4.8 oz)   10/13/22 62.4 kg (137 lb 8 oz)   07/05/22 62.1 kg (137 lb)   HEENT: EOMI. Sclerae are anicteric.   Lymph: Neck is supple with no lymphadenopathy in the cervical or supraclavicular areas.   Heart: Regular rate and rhythm.   Lungs: Clear to auscultation bilaterally.   Abdomen: Bowel sounds present, soft, nontender with no palpable hepatosplenomegaly or masses.   Extremities: No lower extremity edema noted bilaterally.   Neuro: Cranial nerves II through XII are grossly intact.  Skin: No rashes,  petechiae, or bruising noted on exposed skin.     LABORATORY AND IMAGING STUDIES   Most Recent 3 CBC's:  Recent Labs   Lab Test 08/06/24  1339 07/29/24  1403 06/22/24  1330   WBC 5.5 7.6 6.6   HGB 12.4 12.7 12.0   MCV 94 96 101*    207 253   ANEUTAUTO 2.3 3.8 3.3     Most Recent 3 BMP's:  Recent Labs   Lab Test 08/06/24  1339 07/29/24  1403 07/26/24  1155 06/22/24  1330    141  --  138   POTASSIUM 4.3 4.5  --  4.5   CHLORIDE 104 105  --  104   CO2 26 26  --  25   BUN 27.8* 30.0*  --  18.2   CR 0.96* 1.06*  --  0.98*   ANIONGAP 10 10  --  9   SUSAN 9.7 9.6  --  9.3   GLC 87 82 69* 80   PROTTOTAL 6.5 6.5  --  6.4   ALBUMIN 4.2 4.2  --  4.2    Most Recent 3 LFT's:  Recent Labs   Lab Test 08/06/24  1339 07/29/24  1403 06/22/24  1330   AST 19 17 20   ALT 20 17 15   ALKPHOS 70 70 58   BILITOTAL 0.2 0.2 0.3   I reviewed the above labs today.     PET Oncology Whole Body  Narrative: Combined Report of: PET and CT on  7/26/2024 2:08 PM:    FOLLOW-UP APPOINTMENT: 9/25/2024    1. PET of the neck, chest, abdomen, and pelvis.  2. PET CT Fusion for Attenuation Correction and Anatomical  Localization:    3. 3D MIP and PET-CT fused images were processed on an independent  workstation and archived to PACS and reviewed by a radiologist.    Technique:    1. PET: The patient received 14.16 mCi of F-18-FDG; the serum glucose  was 69 mg/dL prior to administration, body weight was 56.4 kg. Images  were evaluated in the axial, sagittal, and coronal planes as well as  the rotational whole body MIP. Images were acquired from the Vertex to  the Feet.    UPTAKE WAS MEASURED AT 60 MINUTES.     2. CT: CT only obtained for attenuation correction and not diagnostic  purposes.    Additional Medication: The patient received 40 mg intravenous Lasix,  after which additional postvoid PET and CT images were obtained.    INDICATION: Leiomyosarcoma of uterus (H); Malignant neoplasm of  connective and soft tissue of abdomen (H)    ADDITIONAL  INFORMATION OBTAINED FROM EMR: 74-year-old with metastatic  uterine leiomyosarcoma status post surgical resection and chemotherapy  with multiple local recurrences, treated with resection. Most recently  entered clinical trial January through June 2024 with abemaciclib  though was taken off trial due to progression.    COMPARISON: PET/CT 3/26/2022; CT CAP 4/8/2024, 3/4/2024.    FINDINGS:   BACKGROUND: Liver SUV max = 3.6, Aorta Blood SUV max = 2.5.     HEAD/NECK:  No abnormal uptake.     CHEST:  Calcified mediastinal and right hilar lymph nodes with below  background uptake, for example nonenlarged 1.1 x 0.8 cm right hilar  node (2/166) with SUV max 3.5, likely reactive.    Mild uptake along the proximal and distal esophagus, which may be  secondary to esophagitis. Small hiatal hernia.    ABDOMEN AND PELVIS:  Mild uptake associated with the mesenteric soft tissue masses,  including:  - 3.2 x 3.1 x 4.1 cm anterior mid abdomen mass (2/269) with SUV max  5.6, previously 3.1 x 2.6 cm on CT 6/3/2024 and 3.0 x 2.4 cm on CT  4/8/2024  - 4.4 x 2.9 cm mass anterior to the spleen (2/217) with SUV max 3.8,  previously 4.0 x 2.6 cm on CT 6/3/2024 and 2.6 x 1.9 cm on CT 4/8/2024  - 1.5 x 1.3 cm lesion anterosuperior to the pancreas (2/244) with SUV  max 3.3, previously 1.5 x 0.9 cm and not definitively visualized on CT  4/8/2024    Increased size of the enlarged 1.8 x 1.1 cm left external iliac node  (2/327) without significant uptake, previously 1.5 x 0.8 cm on CT  6/30/2024 and 1.1 x 0.6 cm on CT 4/18/2024.    Calcified splenic granuloma. Left renal cyst. Bowel anastomosis in the  left lower quadrant. Colonic diverticulosis. Hysterectomy and  bilateral salpingo-oophorectomy.    LOWER EXTREMITIES:   No abnormal uptake.     BONES:   No abnormal uptake. Degenerative uptake associated with L3-4 and L4-5  facet arthropathy. Degenerative changes in the spine with similar  anterior compression fracture deformities of thoracic  vertebrae and  anterolisthesis of L4 on L5.  Impression: IMPRESSION:     Disease progression when compared to PET/CT 3/26/2022 as evidenced by:  - Enlarging mildly hypermetabolic mesenteric metastatic deposits.  - Enlarging left external iliac lymph node.    I have personally reviewed the examination and initial interpretation  and I agree with the findings.    EJ CUETO MD         SYSTEM ID:  [id number]    I reviewed the above imaging report today.     ASSESSMENT AND PLAN   Metastatic uterine leiomyosarcoma. She was originally diagnosed on 2016 showing an up to 12 cm mass. She underwent surgical resection followed by adjuvant doxorubicin/dacarbazine for 5 cycles. She had local recurrence on 2020 treated with surgical resection. Then again recurrence on 2022 again underwent surgical resection. Followed by therapy with anastrozole. Since January of 2024 she has been on trial a BOAST clinical trial with Dr. Nik Schilling at the [facility]. She received abemaciclib there. Now, she was taken off of trial due to progression on 1 of 3 intra-abdominal lesions.     She comes here to continue medical management, she has seen Dr. Michaud at [facility] for a second opinion. She is interested in surgical resection which has been offered at [facility] with possible Gemzar HIPEC. She saw Dr. Michaud at [facility], who recommended against further surgical resection and for systemic therapy with other agents with activity in leiomyosarcoma including gemcitabine +/- docetaxel, trabectedin and pazopanib. Dr. Powers agreed that without good systemic control and in the absence of intra-abdominal symptoms, surgical resection is more likely to cause more harm.     She started on treatment with single agent Gemzar on 7/29/24. ctDNA testing was sent today (8/6/24). She tolerated the first dose well and will continue with cycle 1 day 8 Gemzar today. She will follow-up with us prior to cycle 3. Will repeat imaging after  3 cycles of Gemzar.     Medications. Discussed minimizing use of aspirin and meloxicam while on chemotherapy due to potentially increased bleeding risk, but if on a break from chemotherapy, should be okay to use. Discussed no known interaction of boswellia with her chemotherapy, though many interactions are unknown. Recommend against taking boswellia with aspirin due to increased bleeding risk.     Rebeka Kim PA-C  [facility] [address] [id number] [phone]    25 minutes spent on the date of the encounter doing chart review, review of test results, interpretation of tests, patient visit, and documentation     The longitudinal plan of care for the diagnosis(es)/condition(s) as documented were addressed during this visit. Due to the added complexity in care, I will continue to support Maggie Navarrete in the subsequent management and with ongoing continuity of care.    Again, thank you for allowing me to participate in the care of your patient.        Sincerely,        Rebeka Kim PA-C

## 2024-08-06 NOTE — NURSING NOTE
"Oncology Rooming Note    August 6, 2024 1:50 PM   Maggie Navarrete is a 74 year old female who presents for:    Chief Complaint   Patient presents with    Oncology Clinic Visit     RTN for Leiomyosarcoma of Uterus     Initial Vitals: /84   Pulse 92   Temp 98.8  F (37.1  C) (Oral)   Resp 16   Wt 57 kg (125 lb 11.2 oz)   SpO2 100%   BMI 25.34 kg/m   Estimated body mass index is 25.34 kg/m  as calculated from the following:    Height as of 7/29/24: 1.5 m (4' 11.06\").    Weight as of this encounter: 57 kg (125 lb 11.2 oz). Body surface area is 1.54 meters squared.  No Pain (0) Comment: Data Unavailable   No LMP recorded. Patient has had a hysterectomy.  Allergies reviewed: Yes  Medications reviewed: Yes    Medications: Medication refills not needed today.  Pharmacy name entered into EPIC:    [facility] - ONE [facility] AT PORTAL TO REGISTERED [facility] SITES  [id number] - [place], [place] - [address] [facility] [place] - [address] [place] [address] [phone]  [id number] - [place], [place] - [address] AT [facility] OF [address]  [facility] - [facility], [place] - [address] [place] [address]    Frailty Screening:   Is the patient here for a new oncology consult visit in cancer care? 2. No      Clinical concerns: Has questions about medicine       Berenice Cole MA             "

## 2024-08-07 DIAGNOSIS — E03.9 ACQUIRED HYPOTHYROIDISM: ICD-10-CM

## 2024-08-07 LAB
PERFORMING LABORATORY: NORMAL
SPECIMEN STATUS: NORMAL
TEST NAME: NORMAL

## 2024-08-08 RX ORDER — LEVOTHYROXINE SODIUM 75 UG/1
TABLET ORAL
Qty: 90 TABLET | Refills: 0 | Status: SHIPPED | OUTPATIENT
Start: 2024-08-08

## 2024-08-09 RX ORDER — EPINEPHRINE 1 MG/ML
0.3 INJECTION, SOLUTION INTRAMUSCULAR; SUBCUTANEOUS EVERY 5 MIN PRN
Status: CANCELLED | OUTPATIENT
Start: 2024-08-26

## 2024-08-09 RX ORDER — ALBUTEROL SULFATE 0.83 MG/ML
2.5 SOLUTION RESPIRATORY (INHALATION)
Status: CANCELLED | OUTPATIENT
Start: 2024-08-26

## 2024-08-09 RX ORDER — DIPHENHYDRAMINE HYDROCHLORIDE 50 MG/ML
50 INJECTION INTRAMUSCULAR; INTRAVENOUS
Status: CANCELLED
Start: 2024-08-26

## 2024-08-09 RX ORDER — LORAZEPAM 2 MG/ML
0.5 INJECTION INTRAMUSCULAR EVERY 4 HOURS PRN
Status: CANCELLED | OUTPATIENT
Start: 2024-08-19

## 2024-08-09 RX ORDER — LORAZEPAM 2 MG/ML
0.5 INJECTION INTRAMUSCULAR EVERY 4 HOURS PRN
Status: CANCELLED | OUTPATIENT
Start: 2024-08-26

## 2024-08-09 RX ORDER — METHYLPREDNISOLONE SODIUM SUCCINATE 125 MG/2ML
125 INJECTION, POWDER, LYOPHILIZED, FOR SOLUTION INTRAMUSCULAR; INTRAVENOUS
Status: CANCELLED
Start: 2024-08-26

## 2024-08-09 RX ORDER — HEPARIN SODIUM (PORCINE) LOCK FLUSH IV SOLN 100 UNIT/ML 100 UNIT/ML
5 SOLUTION INTRAVENOUS
Status: CANCELLED | OUTPATIENT
Start: 2024-08-26

## 2024-08-09 RX ORDER — ALBUTEROL SULFATE 90 UG/1
1-2 AEROSOL, METERED RESPIRATORY (INHALATION)
Status: CANCELLED
Start: 2024-08-26

## 2024-08-09 RX ORDER — HEPARIN SODIUM,PORCINE 10 UNIT/ML
5-20 VIAL (ML) INTRAVENOUS DAILY PRN
Status: CANCELLED | OUTPATIENT
Start: 2024-08-19

## 2024-08-09 RX ORDER — DIPHENHYDRAMINE HYDROCHLORIDE 50 MG/ML
50 INJECTION INTRAMUSCULAR; INTRAVENOUS
Status: CANCELLED
Start: 2024-08-19

## 2024-08-09 RX ORDER — ALBUTEROL SULFATE 0.83 MG/ML
2.5 SOLUTION RESPIRATORY (INHALATION)
Status: CANCELLED | OUTPATIENT
Start: 2024-08-19

## 2024-08-09 RX ORDER — EPINEPHRINE 1 MG/ML
0.3 INJECTION, SOLUTION INTRAMUSCULAR; SUBCUTANEOUS EVERY 5 MIN PRN
Status: CANCELLED | OUTPATIENT
Start: 2024-08-19

## 2024-08-09 RX ORDER — HEPARIN SODIUM,PORCINE 10 UNIT/ML
5-20 VIAL (ML) INTRAVENOUS DAILY PRN
Status: CANCELLED | OUTPATIENT
Start: 2024-08-26

## 2024-08-09 RX ORDER — ALBUTEROL SULFATE 90 UG/1
1-2 AEROSOL, METERED RESPIRATORY (INHALATION)
Status: CANCELLED
Start: 2024-08-19

## 2024-08-09 RX ORDER — ONDANSETRON 2 MG/ML
8 INJECTION INTRAMUSCULAR; INTRAVENOUS ONCE
Status: CANCELLED | OUTPATIENT
Start: 2024-08-26

## 2024-08-09 RX ORDER — MEPERIDINE HYDROCHLORIDE 25 MG/ML
25 INJECTION INTRAMUSCULAR; INTRAVENOUS; SUBCUTANEOUS EVERY 30 MIN PRN
Status: CANCELLED | OUTPATIENT
Start: 2024-08-19

## 2024-08-09 RX ORDER — HEPARIN SODIUM (PORCINE) LOCK FLUSH IV SOLN 100 UNIT/ML 100 UNIT/ML
5 SOLUTION INTRAVENOUS
Status: CANCELLED | OUTPATIENT
Start: 2024-08-19

## 2024-08-09 RX ORDER — METHYLPREDNISOLONE SODIUM SUCCINATE 125 MG/2ML
125 INJECTION, POWDER, LYOPHILIZED, FOR SOLUTION INTRAMUSCULAR; INTRAVENOUS
Status: CANCELLED
Start: 2024-08-19

## 2024-08-09 RX ORDER — MEPERIDINE HYDROCHLORIDE 25 MG/ML
25 INJECTION INTRAMUSCULAR; INTRAVENOUS; SUBCUTANEOUS EVERY 30 MIN PRN
Status: CANCELLED | OUTPATIENT
Start: 2024-08-26

## 2024-08-09 RX ORDER — ONDANSETRON 2 MG/ML
8 INJECTION INTRAMUSCULAR; INTRAVENOUS ONCE
Status: CANCELLED | OUTPATIENT
Start: 2024-08-19

## 2024-08-14 ENCOUNTER — TELEPHONE (OUTPATIENT)
Dept: ORTHOPEDICS | Facility: CLINIC | Age: 74
End: 2024-08-14
Payer: MEDICARE

## 2024-08-14 DIAGNOSIS — M53.3 SI (SACROILIAC) JOINT DYSFUNCTION: Primary | ICD-10-CM

## 2024-08-14 NOTE — TELEPHONE ENCOUNTER
See phone message from pt & MY Chart messages with replies.    I called pt back.  I advised the OTCS treatments in my chart message & said there is not much else to do until seen for appt next week after injection.  Pt knows pt was very kiana to be able to schedule the injection for Tues. Call back prn. Pt agreed,.  Jaqueline Evans RN

## 2024-08-14 NOTE — TELEPHONE ENCOUNTER
Order(s): Other:   Reason for requested: SI pain, previously (03/05) Betsy recommended she try an injection if the pain worsened or returned.  By the time she met with Betsy, most of the pain had resolved.  It has returned and pt is struggling with the same SI pain when she sits and stands, and walks.  She is on Chemo and can't take NSAIDS. Please contact her to discuss this request.    Date needed: asap  Provider name: Betsy Mccann referred to Amaury Corbett for injection.      Could we send this information to you in [employer] or would you prefer to receive a phone call?:   Patient would prefer a phone call   Okay to leave a detailed message?: Yes at Cell number on file:    Telephone Information:   Mobile [phone]       She is free until 1p and unavailable until 4p by phone, if she can't be reached MyC is ok.

## 2024-08-14 NOTE — TELEPHONE ENCOUNTER
See phone message from pt requesting injection discussed in dictation of 3-5-24 by Betsy MARTINEZ.    I left pt VM & will send My chart message to pt per pt request to send My Chart  This is plan in dictation::    - continue osteopenia treatment per Endocrinologist  - may mychart in if/when she desires to try injection. Left CT guided therapeutic SI joint injection with Gen Corbett PA-C.  - Follow up PRN. If injection ordered, schedule virtual visit 4-6 weeks after to review response and next steps.    I left VM that I ordered injection & pt needs to call Rad [phone] & schedule with Gen MARTINEZ at [facility] & then call our Ortho clinic [phone] & change Aug RTN appt with Betsy to virtual in 4-6 weeks after injection.    Call back prn. W.O.Sebastian MARTINEZ?Jaguar Evans RN.

## 2024-08-14 NOTE — TELEPHONE ENCOUNTER
[facility] Call Center    Phone Message    May a detailed message be left on voicemail: yes     Reason for Call: Other: Patient stated that she got Jaqueline's message but still wants to talk to her about the pain she's having.      Action Taken: Message routed to:  [facility] Orthopedics    Travel Screening: Not Applicable     Date of Service:

## 2024-08-19 ENCOUNTER — TELEPHONE (OUTPATIENT)
Dept: ORTHOPEDICS | Facility: CLINIC | Age: 74
End: 2024-08-19

## 2024-08-19 ENCOUNTER — APPOINTMENT (OUTPATIENT)
Dept: LAB | Facility: CLINIC | Age: 74
End: 2024-08-19
Attending: STUDENT IN AN ORGANIZED HEALTH CARE EDUCATION/TRAINING PROGRAM
Payer: MEDICARE

## 2024-08-19 ENCOUNTER — INFUSION THERAPY VISIT (OUTPATIENT)
Dept: ONCOLOGY | Facility: CLINIC | Age: 74
End: 2024-08-19
Attending: STUDENT IN AN ORGANIZED HEALTH CARE EDUCATION/TRAINING PROGRAM
Payer: MEDICARE

## 2024-08-19 VITALS
RESPIRATION RATE: 16 BRPM | HEART RATE: 84 BPM | SYSTOLIC BLOOD PRESSURE: 143 MMHG | DIASTOLIC BLOOD PRESSURE: 81 MMHG | WEIGHT: 125.2 LBS | BODY MASS INDEX: 25.24 KG/M2 | TEMPERATURE: 98 F | OXYGEN SATURATION: 100 %

## 2024-08-19 DIAGNOSIS — I42.7 DILATED CARDIOMYOPATHY SECONDARY TO DRUG (H): ICD-10-CM

## 2024-08-19 DIAGNOSIS — D70.1 CHEMOTHERAPY-INDUCED NEUTROPENIA (H): Primary | ICD-10-CM

## 2024-08-19 DIAGNOSIS — C55 LEIOMYOSARCOMA OF UTERUS (H): ICD-10-CM

## 2024-08-19 DIAGNOSIS — C55 LEIOMYOSARCOMA OF UTERUS (H): Primary | ICD-10-CM

## 2024-08-19 DIAGNOSIS — T45.1X5A CHEMOTHERAPY-INDUCED NEUTROPENIA (H): Primary | ICD-10-CM

## 2024-08-19 LAB
ALBUMIN SERPL BCG-MCNC: 4.2 G/DL (ref 3.5–5.2)
ALP SERPL-CCNC: 67 U/L (ref 40–150)
ALT SERPL W P-5'-P-CCNC: 22 U/L (ref 0–50)
ANION GAP SERPL CALCULATED.3IONS-SCNC: 11 MMOL/L (ref 7–15)
AST SERPL W P-5'-P-CCNC: 23 U/L (ref 0–45)
BASOPHILS # BLD AUTO: 0 10E3/UL (ref 0–0.2)
BASOPHILS NFR BLD AUTO: 0 %
BILIRUB SERPL-MCNC: 0.2 MG/DL
BUN SERPL-MCNC: 21.7 MG/DL (ref 8–23)
CALCIUM SERPL-MCNC: 9.5 MG/DL (ref 8.8–10.4)
CHLORIDE SERPL-SCNC: 104 MMOL/L (ref 98–107)
CREAT SERPL-MCNC: 0.95 MG/DL (ref 0.51–0.95)
EGFRCR SERPLBLD CKD-EPI 2021: 63 ML/MIN/1.73M2
EOSINOPHIL # BLD AUTO: 0.1 10E3/UL (ref 0–0.7)
EOSINOPHIL NFR BLD AUTO: 1 %
ERYTHROCYTE [DISTWIDTH] IN BLOOD BY AUTOMATED COUNT: 14.2 % (ref 10–15)
GLUCOSE SERPL-MCNC: 81 MG/DL (ref 70–99)
HCO3 SERPL-SCNC: 24 MMOL/L (ref 22–29)
HCT VFR BLD AUTO: 35.2 % (ref 35–47)
HGB BLD-MCNC: 11.9 G/DL (ref 11.7–15.7)
IMM GRANULOCYTES # BLD: 0 10E3/UL
IMM GRANULOCYTES NFR BLD: 0 %
LYMPHOCYTES # BLD AUTO: 2.9 10E3/UL (ref 0.8–5.3)
LYMPHOCYTES NFR BLD AUTO: 48 %
MCH RBC QN AUTO: 31.6 PG (ref 26.5–33)
MCHC RBC AUTO-ENTMCNC: 33.8 G/DL (ref 31.5–36.5)
MCV RBC AUTO: 93 FL (ref 78–100)
MONOCYTES # BLD AUTO: 0.8 10E3/UL (ref 0–1.3)
MONOCYTES NFR BLD AUTO: 13 %
NEUTROPHILS # BLD AUTO: 2.3 10E3/UL (ref 1.6–8.3)
NEUTROPHILS NFR BLD AUTO: 38 %
NRBC # BLD AUTO: 0 10E3/UL
NRBC BLD AUTO-RTO: 0 /100
PLATELET # BLD AUTO: 353 10E3/UL (ref 150–450)
POTASSIUM SERPL-SCNC: 4.4 MMOL/L (ref 3.4–5.3)
PROT SERPL-MCNC: 6.5 G/DL (ref 6.4–8.3)
RBC # BLD AUTO: 3.77 10E6/UL (ref 3.8–5.2)
SODIUM SERPL-SCNC: 139 MMOL/L (ref 135–145)
WBC # BLD AUTO: 6.1 10E3/UL (ref 4–11)

## 2024-08-19 PROCEDURE — 96415 CHEMO IV INFUSION ADDL HR: CPT

## 2024-08-19 PROCEDURE — 85025 COMPLETE CBC W/AUTO DIFF WBC: CPT | Performed by: PHYSICIAN ASSISTANT

## 2024-08-19 PROCEDURE — 96413 CHEMO IV INFUSION 1 HR: CPT

## 2024-08-19 PROCEDURE — 250N000011 HC RX IP 250 OP 636: Performed by: PHYSICIAN ASSISTANT

## 2024-08-19 PROCEDURE — 258N000003 HC RX IP 258 OP 636: Performed by: PHYSICIAN ASSISTANT

## 2024-08-19 PROCEDURE — 82247 BILIRUBIN TOTAL: CPT | Performed by: PHYSICIAN ASSISTANT

## 2024-08-19 PROCEDURE — 36415 COLL VENOUS BLD VENIPUNCTURE: CPT | Performed by: PHYSICIAN ASSISTANT

## 2024-08-19 RX ORDER — ACETAMINOPHEN 500 MG
500-1000 TABLET ORAL EVERY 6 HOURS PRN
COMMUNITY

## 2024-08-19 RX ADMIN — SODIUM CHLORIDE 250 ML: 9 INJECTION, SOLUTION INTRAVENOUS at 15:25

## 2024-08-19 RX ADMIN — GEMCITABINE 1200 MG: 38 INJECTION, SOLUTION INTRAVENOUS at 15:27

## 2024-08-19 ASSESSMENT — PAIN SCALES - GENERAL: PAINLEVEL: NO PAIN (0)

## 2024-08-19 NOTE — NURSING NOTE
Chief Complaint   Patient presents with    Blood Draw     IV placement with blood draw by vascular ultrasound       Labs drawn via IV placed by vascular ultrasound. Vitals taken and appointment arrived.    Rebeka Stuart RN

## 2024-08-19 NOTE — PATIENT INSTRUCTIONS
[facility] Triage and after hours / weekends / holidays:  [phone]    Please call the triage or after hours line if you experience a temperature greater than or equal to 100.4, shaking chills, have uncontrolled nausea, vomiting and/or diarrhea, dizziness, shortness of breath, chest pain, bleeding, unexplained bruising, or if you have any other new/concerning symptoms, questions or concerns.      If you are having any concerning symptoms or wish to speak to a provider before your next infusion visit, please call triage to notify them so we can adequately serve you.     If you need a refill on a narcotic prescription or other medication, please call before your infusion appointment.

## 2024-08-19 NOTE — TELEPHONE ENCOUNTER
RN called patient and discussed the importance of the injection to help with symptom management. Patient verbalized understanding    Whit Freeman RN

## 2024-08-19 NOTE — PROGRESS NOTES
Infusion Nursing Note:  Maggie Navarrete presents today for Cycle 2, Day 1- Gemzar Infusion.    Patient seen by provider today: No   present during visit today: Not Applicable.    Note: Patient reports feeling well on arrival to infusion suite today. Denies the following signs of infection: no fever, cough, chills, rash, chest pain, shortness of breath, diarrhea, or urinary symptoms. Reports sciatic nerve pain in her left hip and leg. Denies balance changes. Has an appointment for an injection coming up. Reports constipation controlled with prunes/prune juice. No new concerns or questions. Wishes to proceed with treatment today.     Patient declined Zofran as a premedication today. She wanted to see how she does without it. Reports she has plenty of Zofran at home and she will take if she needs it.       Intravenous Access:  Peripheral IV placed.    Treatment Conditions:  Lab Results   Component Value Date    HGB 11.9 08/19/2024    WBC 6.1 08/19/2024    ANEU 3.3 04/28/2021    ANEUTAUTO 2.3 08/19/2024     08/19/2024        Lab Results   Component Value Date     08/19/2024    POTASSIUM 4.4 08/19/2024    MAG 2.0 03/02/2024    CR 0.95 08/19/2024    SUSAN 9.5 08/19/2024    BILITOTAL 0.2 08/19/2024    ALBUMIN 4.2 08/19/2024    ALT 22 08/19/2024    AST 23 08/19/2024       Results reviewed, labs MET treatment parameters, ok to proceed with treatment.      Post Infusion Assessment:  Patient tolerated infusion without incident.  Blood return noted pre and post infusion.  Site patent and intact, free from redness, edema or discomfort.  No evidence of extravasations.  Access discontinued per protocol.       Discharge Plan:   Patient declined prescription refills.  Discharge instructions reviewed with: Patient.  Patient and/or family verbalized understanding of discharge instructions and all questions answered.  AVS to patient via [employer].  Patient will return 8/26/24 for next appointment.   Patient  discharged in stable condition accompanied by: self.  Departure Mode: Ambulatory.      Татьяна Cuellar RN

## 2024-08-19 NOTE — TELEPHONE ENCOUNTER
[facility] Call Center    Phone Message    May a detailed message be left on voicemail: yes     Reason for Call: Other: pt calling as she is due for an injection tomorrow. She thought she was having problems with her SI joint - actually she is having problems with the pain going down her leg. She is assuming she should still have this injection tomorrow but thought it gudino to check with a nurse. She also wants more information on how they do this injection. You may call or send You[employer].She would like a call for the best option.        Action Taken: Other: te    Travel Screening: Not Applicable     Date of Service:

## 2024-08-20 ENCOUNTER — HOSPITAL ENCOUNTER (OUTPATIENT)
Dept: CT IMAGING | Facility: CLINIC | Age: 74
Discharge: HOME | End: 2024-08-20
Attending: PHYSICIAN ASSISTANT | Admitting: PHYSICIAN ASSISTANT
Payer: MEDICARE

## 2024-08-20 VITALS — HEART RATE: 83 BPM | OXYGEN SATURATION: 99 % | DIASTOLIC BLOOD PRESSURE: 64 MMHG | SYSTOLIC BLOOD PRESSURE: 160 MMHG

## 2024-08-20 DIAGNOSIS — M53.3 SI (SACROILIAC) JOINT DYSFUNCTION: ICD-10-CM

## 2024-08-20 PROCEDURE — 272N000431 CT SACROILIAC THERAPEUTIC JOINT INJECTION

## 2024-08-20 PROCEDURE — 255N000002 HC RX 255 OP 636: Performed by: PHYSICIAN ASSISTANT

## 2024-08-20 PROCEDURE — 250N000011 HC RX IP 250 OP 636: Performed by: PHYSICIAN ASSISTANT

## 2024-08-20 PROCEDURE — 250N000009 HC RX 250: Performed by: PHYSICIAN ASSISTANT

## 2024-08-20 RX ORDER — IOPAMIDOL 408 MG/ML
10 INJECTION, SOLUTION INTRATHECAL ONCE
Status: COMPLETED | OUTPATIENT
Start: 2024-08-20 | End: 2024-08-20

## 2024-08-20 RX ORDER — LIDOCAINE HYDROCHLORIDE 10 MG/ML
10 INJECTION, SOLUTION EPIDURAL; INFILTRATION; INTRACAUDAL; PERINEURAL ONCE
Status: COMPLETED | OUTPATIENT
Start: 2024-08-20 | End: 2024-08-20

## 2024-08-20 RX ORDER — TRIAMCINOLONE ACETONIDE 40 MG/ML
40 INJECTION, SUSPENSION INTRA-ARTICULAR; INTRAMUSCULAR ONCE
Status: COMPLETED | OUTPATIENT
Start: 2024-08-20 | End: 2024-08-20

## 2024-08-20 RX ORDER — BUPIVACAINE HYDROCHLORIDE 5 MG/ML
2 INJECTION, SOLUTION PERINEURAL ONCE
Status: COMPLETED | OUTPATIENT
Start: 2024-08-20 | End: 2024-08-20

## 2024-08-20 RX ADMIN — BUPIVACAINE HYDROCHLORIDE 10 MG: 5 INJECTION, SOLUTION EPIDURAL; INTRACAUDAL; PERINEURAL at 13:51

## 2024-08-20 RX ADMIN — TRIAMCINOLONE ACETONIDE 40 MG: 40 INJECTION, SUSPENSION INTRA-ARTICULAR; INTRAMUSCULAR at 13:50

## 2024-08-20 RX ADMIN — IOPAMIDOL 1 ML: 408 INJECTION, SOLUTION INTRATHECAL at 13:50

## 2024-08-20 RX ADMIN — LIDOCAINE HYDROCHLORIDE 7 ML: 10 INJECTION, SOLUTION EPIDURAL; INFILTRATION; INTRACAUDAL; PERINEURAL at 13:50

## 2024-08-20 NOTE — DISCHARGE INSTRUCTIONS
Orthopedic Discharge Instructions:   After Your Injection or Aspiration  ________________________________________    Patient Name:  Maggie Navarrete  Today's Date:  August 20, 2024  The doctor who performed your Joint Injection was Gen Guan PA-C at [facility]) in the CT Department  Care of needle site  If you have new numbness down your leg, this may last up to 6 hours, but it should go away. You may need help with walking until your leg feels normal.   Over the next 24 to 48 hours, pain at the needle site may increase before it gets better.   For the next 48 hours, use ice packs for 15 minutes, three to four times a day for pain.  If you have a bandage, you may remove it the next morning.   No tub baths, hot tubs or swimming for 48 hours. You may shower the next day.   Activity  Do not drive until morning.   Limit your activity based on your pain level. Follow your doctor s orders for activity.   You may eat a normal diet.   If you had sedation,   - You may feel sleepy, forgetful or unsteady.   - Do not drink alcohol for 24 hours.  Medicines  If you take aspirin or platelet inhibitors, you can restart them tomorrow.   Restart all other medicines today at your regular dose, including Coumadin (warfarin).  If you are restarting Coumadin, talk to your doctor about having your INR checked.   If you had a steroid shot   The medicine should help reduce swelling and pain. This may take from 7 to 10 days.   Side effects from the shot will be mild and go away in 2 to 3 days. Common side effects may include:  -  Insomnia (trouble sleeping).  -  Heartburn.  -  Flushed face.  -  Water retention (bloating or fluid build-up).  -  Increased appetite (feeling more hungry than usual).  -  Increased blood sugar.  If you have diabetes, watch your blood sugar closely. If needed, call your doctor to help you control your blood sugar.  Some patients will get lasting relief from a single shot. Others may  require up to three shots to get results. If you have more than one steroid shot, they should be given two weeks apart.  Some patients do not have relief of symptoms.    Follow-up:  No follow-up needed     Call your doctor or go to the Emergency Room if you have severe pain, fever or problems with bowel or bladder control.

## 2024-08-20 NOTE — PROCEDURES
[facility]    Procedure: Left SI joint steroid injection    Date/Time: 8/20/2024 1:58 PM    Performed by: Gen Guan PA-C  Authorized by: Gen Guan PA-C      UNIVERSAL PROTOCOL   Site Marked: Yes  Prior Images Obtained and Reviewed:  Yes  Required items: Required blood products, implants, devices and special equipment available    Patient identity confirmed:  Verbally with patient  Patient was reevaluated immediately before administering moderate or deep sedation or anesthesia  Confirmation Checklist:  Patient's identity using two indicators, relevant allergies, procedure was appropriate and matched the consent or emergent situation and correct equipment/implants were available  Time out: Immediately prior to the procedure a time out was called    Universal Protocol: the Joint Commission Universal Protocol was followed    Preparation: Patient was prepped and draped in usual sterile fashion       ANESTHESIA    Local Anesthetic:  Lidocaine 1% without epinephrine      SEDATION    Patient Sedated: No    See dictated procedure note for full details.    PROCEDURE    Patient Tolerance:  Patient tolerated the procedure well with no immediate complications  Length of time physician/provider present for 1:1 monitoring during sedation: 0

## 2024-08-21 ENCOUNTER — HOSPITAL ENCOUNTER (OUTPATIENT)
Facility: CLINIC | Age: 74
End: 2024-08-21
Admitting: RADIOLOGY
Payer: MEDICARE

## 2024-08-21 ENCOUNTER — MYC MEDICAL ADVICE (OUTPATIENT)
Dept: INTERVENTIONAL RADIOLOGY/VASCULAR | Facility: CLINIC | Age: 74
End: 2024-08-21

## 2024-08-21 ENCOUNTER — TELEPHONE (OUTPATIENT)
Dept: ORTHOPEDICS | Facility: CLINIC | Age: 74
End: 2024-08-21

## 2024-08-21 RX ORDER — LIDOCAINE 40 MG/G
CREAM TOPICAL
OUTPATIENT
Start: 2024-08-21

## 2024-08-21 RX ORDER — SODIUM CHLORIDE 9 MG/ML
INJECTION, SOLUTION INTRAVENOUS CONTINUOUS
OUTPATIENT
Start: 2024-08-21

## 2024-08-21 RX ORDER — CEFAZOLIN SODIUM 2 G/100ML
2 INJECTION, SOLUTION INTRAVENOUS
OUTPATIENT
Start: 2024-08-21

## 2024-08-21 NOTE — TELEPHONE ENCOUNTER
Other: pt called had SI joint injection 08/20 and is needing 4 weeks follow up, next follow up open appt is 09/09, pt declined because it would be too soon, following after than would be 10/29. Pt is requesting for virtual if possible, writer unable to schedule. Please call back if can squeeze pt in per pt request     Could we send this information to you in Native or would you prefer to receive a phone call?:   Patient would prefer a phone call   Okay to leave a detailed message?: Yes at Cell number on file:    Telephone Information:   Mobile [phone]

## 2024-08-22 ENCOUNTER — NURSE TRIAGE (OUTPATIENT)
Dept: FAMILY MEDICINE | Facility: CLINIC | Age: 74
End: 2024-08-22

## 2024-08-22 ENCOUNTER — NURSE TRIAGE (OUTPATIENT)
Dept: ONCOLOGY | Facility: CLINIC | Age: 74
End: 2024-08-22

## 2024-08-22 ENCOUNTER — OFFICE VISIT (OUTPATIENT)
Dept: FAMILY MEDICINE | Facility: CLINIC | Age: 74
End: 2024-08-22
Payer: MEDICARE

## 2024-08-22 ENCOUNTER — MYC MEDICAL ADVICE (OUTPATIENT)
Dept: ORTHOPEDICS | Facility: CLINIC | Age: 74
End: 2024-08-22

## 2024-08-22 ENCOUNTER — TELEPHONE (OUTPATIENT)
Dept: INTERVENTIONAL RADIOLOGY/VASCULAR | Facility: CLINIC | Age: 74
End: 2024-08-22

## 2024-08-22 VITALS
WEIGHT: 125.5 LBS | HEIGHT: 59 IN | OXYGEN SATURATION: 100 % | TEMPERATURE: 97.2 F | RESPIRATION RATE: 20 BRPM | HEART RATE: 84 BPM | SYSTOLIC BLOOD PRESSURE: 122 MMHG | DIASTOLIC BLOOD PRESSURE: 70 MMHG | BODY MASS INDEX: 25.3 KG/M2

## 2024-08-22 DIAGNOSIS — M54.16 LEFT LUMBAR RADICULOPATHY: Primary | ICD-10-CM

## 2024-08-22 DIAGNOSIS — Z12.31 VISIT FOR SCREENING MAMMOGRAM: ICD-10-CM

## 2024-08-22 PROCEDURE — 99213 OFFICE O/P EST LOW 20 MIN: CPT | Performed by: NURSE PRACTITIONER

## 2024-08-22 RX ORDER — MELOXICAM 7.5 MG/1
7.5 TABLET ORAL DAILY
Qty: 60 TABLET | Refills: 1 | Status: SHIPPED | OUTPATIENT
Start: 2024-08-22

## 2024-08-22 ASSESSMENT — PATIENT HEALTH QUESTIONNAIRE - PHQ9
SUM OF ALL RESPONSES TO PHQ QUESTIONS 1-9: 0
SUM OF ALL RESPONSES TO PHQ QUESTIONS 1-9: 0

## 2024-08-22 ASSESSMENT — PAIN SCALES - GENERAL: PAINLEVEL: MILD PAIN (3)

## 2024-08-22 NOTE — PATIENT INSTRUCTIONS
[facility]   Anjel Villegas, PT or Salome Silverman PT   Located in: [facility]  Address: [address], [place] [address]  Phone: [phone]  Fax: [phone]      [email or web address]  [facility]  [address] [place] [address], [place] [address]      [email or web address]      [facility]   Dr Cindy Pedraza

## 2024-08-22 NOTE — PROGRESS NOTES
"  Assessment & Plan     Left lumbar radiculopathy  Recommend PT with [facility]. Sent with meloxicam as she has had this in the past. Knows to take sparingly. Follow-up prn   - Physical Therapy  Referral; Future  - meloxicam (MOBIC) 7.5 MG tablet; Take 1 tablet (7.5 mg) by mouth daily.    Visit for screening mammogram  - MA Screening Bilateral w/ Salomon; Future            Bimal Serrano is a 74 year old, presenting for the following health issues:  Musculoskeletal Problem (Patient is here with left leg pain.  Would like a prescription for Meloxicam before long flight to [place].  )        8/22/2024     3:21 PM   Additional Questions   Roomed by Lion MCGRAW MA   Accompanied by Self     History of Present Illness       Back Pain:  She presents for follow up of back pain. Patient's back pain is a recurring problem.  Location of back pain:  Left lower back and left buttock  Description of back pain: other  Back pain spreads: left buttocks    Since patient first noticed back pain, pain is: gradually worsening  Does back pain interfere with her job:  Not applicable       She eats 4 or more servings of fruits and vegetables daily.She consumes 0 sweetened beverage(s) daily.She exercises with enough effort to increase her heart rate 30 to 60 minutes per day.  She exercises with enough effort to increase her heart rate 6 days per week.   She is taking medications regularly.     Just had a SI joint injection 2 days ago that didn't help thus far   Has had sciatica type pain down the back of the leg to ankle, starts in the glute   After sitting for awhile can get severe pain     Going to [place] to see a new oncologist very soon and worried about the plane ride       Review of Systems  Detailed as above         Objective    /70 (BP Location: Left arm, Patient Position: Sitting, Cuff Size: Adult Regular)   Pulse 84   Temp 97.2  F (36.2  C) (Temporal)   Resp 20   Ht 1.499 m (4' 11\")   Wt 56.9 kg (125 lb 8 " oz)   SpO2 100%   BMI 25.35 kg/m    Body mass index is 25.35 kg/m .  Physical Exam  Constitutional:       Appearance: Normal appearance.   Pulmonary:      Effort: Pulmonary effort is normal.   Neurological:      Mental Status: She is alert.   Psychiatric:         Mood and Affect: Mood normal.                    Signed Electronically by: SHIRAZ Dumont CNP

## 2024-08-22 NOTE — TELEPHONE ENCOUNTER
Nurse Triage SBAR    Is this a 2nd Level Triage? YES, LICENSED PRACTITIONER REVIEW IS REQUIRED    Situation:     Left leg. I had an injection on Monday. I've been seen by the ortho clinic    Background: states had this pain a long time ago    Assessment:   Afebrile, no swelling, no redness, states she was seen by ortho and they said she possibly has sciatica and needs to follow up with primary. 5/10 pain scale, going on for 3 wks, constant pain.      Protocol Recommended Disposition:   No disposition on file.    Recommendation: appt scheduled     Does the patient meet one of the following criteria for ADS visit consideration? 16+ years old, with an [facility] PCP     TIP  Providers, please consider if this condition is appropriate for management at one of our Acute and Diagnostic Services sites.     If patient is a good candidate, please use dotphrase <dot>triageresponse and select Refer to ADS to document.   Reason for Disposition   MODERATE pain (e.g., interferes with normal activities, limping) and present > 3 days    Additional Information   Negative: Looks like a broken bone or dislocated joint (e.g., crooked or deformed)   Negative: Sounds like a life-threatening emergency to the triager   Negative: Followed a hip injury   Negative: Followed a knee injury   Negative: Followed an ankle or foot injury   Negative: Back pain radiating (shooting) into leg(s)   Negative: Foot pain is main symptom   Negative: Ankle pain is main symptom   Negative: Knee pain is main symptom   Negative: Leg swelling is main symptom   Negative: Chest pain   Negative: Difficulty breathing   Negative: Entire foot is cool or blue in comparison to other side   Negative: Unable to walk   Negative: Fever and red area (or area very tender to touch)   Negative: Swollen joint and fever   Negative: Thigh or calf pain in only one leg and present > 1 hour   Negative: Thigh, calf, or ankle swelling in only one leg   Negative: Thigh, calf, or ankle  "swelling in both legs, but one side is definitely more swollen   Negative: History of prior 'blood clot' in leg or lungs (i.e., deep vein thrombosis, pulmonary embolism)   Negative: History of inherited increased risk of blood clots (e.g., factor 5 Leiden, antithrombin 3, protein C or protein S deficiency, prothrombin mutation)   Negative: Major surgery in past month   Negative: Hip or leg fracture (broken bone) in past month (or had cast on leg or ankle in past month)   Negative: Illness requiring prolonged bedrest in past month (e.g., immobilization, long hospital stay)   Negative: Long-distance travel in past month (e.g., car, bus, train, plane; with trip lasting 6 or more hours)   Negative: Cancer treatment in past six months (or has cancer now)   Negative: Patient sounds very sick or weak to the triager   Negative: SEVERE pain (e.g., excruciating, unable to do any normal activities)   Negative: Cast on leg or ankle and now has increasing pain   Negative: Red area or streak > 2 inches (or 5 cm)   Negative: Painful rash with multiple small blisters grouped together (i.e., dermatomal distribution or 'band' or 'stripe')   Negative: Looks like a boil, infected sore, deep ulcer, or other infected rash (spreading redness, pus)   Negative: Localized rash is very painful (no fever)   Negative: Numbness in a leg or foot (i.e., loss of sensation)   Negative: Localized pain, redness or hard lump along vein   Negative: Patient wants to be seen    Answer Assessment - Initial Assessment Questions  1. ONSET: \"When did the pain start?\"       3 wks ago  2. LOCATION: \"Where is the pain located?\"       3 wks ago  3. PAIN: \"How bad is the pain?\"    (Scale 1-10; or mild, moderate, severe)    -  MILD (1-3): doesn't interfere with normal activities     -  MODERATE (4-7): interferes with normal activities (e.g., work or school) or awakens from sleep, limping     -  SEVERE (8-10): excruciating pain, unable to do any normal activities, " "unable to walk      moderate  4. WORK OR EXERCISE: \"Has there been any recent work or exercise that involved this part of the body?\"       Sitting long periods of time  5. CAUSE: \"What do you think is causing the leg pain?\"      sciatica  6. OTHER SYMPTOMS: \"Do you have any other symptoms?\" (e.g., chest pain, back pain, breathing difficulty, swelling, rash, fever, numbness, weakness)      no  7. PREGNANCY: \"Is there any chance you are pregnant?\" \"When was your last menstrual period?\"      no    Protocols used: Leg Pain-[facility]-[place]    "

## 2024-08-22 NOTE — TELEPHONE ENCOUNTER
See My chart message from pt.    I called & told pt that per our Ortho protocol, we only prescribe meds after surgery so pt will need to contact primary provider about meds & pt agreed.  Call back prn. Jaqueline Evans RN. .

## 2024-08-22 NOTE — TELEPHONE ENCOUNTER
Oncology Nurse Triage - Reporting Symptoms    Situation:   Maggie reporting the following symptoms: sciatica on left side       Background:   Treating Provider:   Dr Powers     Date of last office visit: 8/6/24 Chelsea Judy     Recent treatments: Yes: 8/19/24 Cycle 2, Day 1- [facility].       Assessment  Onset of symptoms: had a cortisone injection on Tuesday and unfortunately it did not help the sciatica on left side. Knows she is not suppose to take Nsaids and she is wondering why?   She would really like to try taking Meloxicam (this is like celebrex) wondering if she can take that or ibuprofen to help with her pain    Next week on Wednesday she is going to [place] and is worried about sitting on the plan for four hours.      She has tried the topical Voltaren gel and that does not help with her pain     Let Maggie know that she can take an occasional dose of meloxicam but ideally not every day.     Paged provider: 9:51 message sent to chelsea Kim   She would like her to try voltaren gel, and to ask the doctor managing her pain about a muscle relaxer     Per Chelsea Judy she can take an occasional dose of meloxicam but ideally not every day.   Recommendations:

## 2024-08-23 ENCOUNTER — HOSPITAL ENCOUNTER (OUTPATIENT)
Dept: INTERVENTIONAL RADIOLOGY/VASCULAR | Facility: CLINIC | Age: 74
Discharge: HOME | End: 2024-08-23
Attending: STUDENT IN AN ORGANIZED HEALTH CARE EDUCATION/TRAINING PROGRAM | Admitting: RADIOLOGY
Payer: MEDICARE

## 2024-08-23 ENCOUNTER — PATIENT OUTREACH (OUTPATIENT)
Dept: CARE COORDINATION | Facility: CLINIC | Age: 74
End: 2024-08-23
Payer: MEDICARE

## 2024-08-23 VITALS
OXYGEN SATURATION: 100 % | DIASTOLIC BLOOD PRESSURE: 67 MMHG | HEART RATE: 85 BPM | SYSTOLIC BLOOD PRESSURE: 127 MMHG | RESPIRATION RATE: 26 BRPM | TEMPERATURE: 98.5 F

## 2024-08-23 DIAGNOSIS — C55 LEIOMYOSARCOMA OF UTERUS (H): ICD-10-CM

## 2024-08-23 PROCEDURE — 250N000011 HC RX IP 250 OP 636: Performed by: STUDENT IN AN ORGANIZED HEALTH CARE EDUCATION/TRAINING PROGRAM

## 2024-08-23 PROCEDURE — C1769 GUIDE WIRE: HCPCS

## 2024-08-23 PROCEDURE — 250N000011 HC RX IP 250 OP 636: Performed by: RADIOLOGY

## 2024-08-23 PROCEDURE — 99152 MOD SED SAME PHYS/QHP 5/>YRS: CPT

## 2024-08-23 PROCEDURE — C1788 PORT, INDWELLING, IMP: HCPCS

## 2024-08-23 PROCEDURE — 250N000009 HC RX 250: Performed by: RADIOLOGY

## 2024-08-23 PROCEDURE — 272N000500 HC NEEDLE CR2

## 2024-08-23 PROCEDURE — 36561 INSERT TUNNELED CV CATH: CPT

## 2024-08-23 RX ORDER — SODIUM CHLORIDE 9 MG/ML
INJECTION, SOLUTION INTRAVENOUS CONTINUOUS
Status: DISCONTINUED | OUTPATIENT
Start: 2024-08-23 | End: 2024-08-24 | Stop reason: HOSPADM

## 2024-08-23 RX ORDER — ONDANSETRON 2 MG/ML
4 INJECTION INTRAMUSCULAR; INTRAVENOUS EVERY 6 HOURS PRN
Status: DISCONTINUED | OUTPATIENT
Start: 2024-08-23 | End: 2024-08-24 | Stop reason: HOSPADM

## 2024-08-23 RX ORDER — LIDOCAINE HYDROCHLORIDE AND EPINEPHRINE 10; 10 MG/ML; UG/ML
1-20 INJECTION, SOLUTION INFILTRATION; PERINEURAL ONCE
Status: COMPLETED | OUTPATIENT
Start: 2024-08-23 | End: 2024-08-23

## 2024-08-23 RX ORDER — NALOXONE HYDROCHLORIDE 0.4 MG/ML
0.2 INJECTION, SOLUTION INTRAMUSCULAR; INTRAVENOUS; SUBCUTANEOUS
Status: DISCONTINUED | OUTPATIENT
Start: 2024-08-23 | End: 2024-08-24 | Stop reason: HOSPADM

## 2024-08-23 RX ORDER — FENTANYL CITRATE 50 UG/ML
25-50 INJECTION, SOLUTION INTRAMUSCULAR; INTRAVENOUS EVERY 5 MIN PRN
Status: DISCONTINUED | OUTPATIENT
Start: 2024-08-23 | End: 2024-08-24 | Stop reason: HOSPADM

## 2024-08-23 RX ORDER — FLUMAZENIL 0.1 MG/ML
0.2 INJECTION, SOLUTION INTRAVENOUS
Status: DISCONTINUED | OUTPATIENT
Start: 2024-08-23 | End: 2024-08-24 | Stop reason: HOSPADM

## 2024-08-23 RX ORDER — NALOXONE HYDROCHLORIDE 0.4 MG/ML
0.4 INJECTION, SOLUTION INTRAMUSCULAR; INTRAVENOUS; SUBCUTANEOUS
Status: DISCONTINUED | OUTPATIENT
Start: 2024-08-23 | End: 2024-08-24 | Stop reason: HOSPADM

## 2024-08-23 RX ORDER — LIDOCAINE 40 MG/G
CREAM TOPICAL
Status: DISCONTINUED | OUTPATIENT
Start: 2024-08-23 | End: 2024-08-24 | Stop reason: HOSPADM

## 2024-08-23 RX ORDER — CEFAZOLIN SODIUM/WATER 2 G/20 ML
2 SYRINGE (ML) INTRAVENOUS
Status: COMPLETED | OUTPATIENT
Start: 2024-08-23 | End: 2024-08-23

## 2024-08-23 RX ORDER — HEPARIN SODIUM (PORCINE) LOCK FLUSH IV SOLN 100 UNIT/ML 100 UNIT/ML
500 SOLUTION INTRAVENOUS ONCE
Status: COMPLETED | OUTPATIENT
Start: 2024-08-23 | End: 2024-08-23

## 2024-08-23 RX ADMIN — ONDANSETRON 4 MG: 2 INJECTION INTRAMUSCULAR; INTRAVENOUS at 14:44

## 2024-08-23 RX ADMIN — MIDAZOLAM HYDROCHLORIDE 0.5 MG: 1 INJECTION, SOLUTION INTRAMUSCULAR; INTRAVENOUS at 15:06

## 2024-08-23 RX ADMIN — FENTANYL CITRATE 25 MCG: 50 INJECTION, SOLUTION INTRAMUSCULAR; INTRAVENOUS at 15:01

## 2024-08-23 RX ADMIN — MIDAZOLAM HYDROCHLORIDE 0.5 MG: 1 INJECTION, SOLUTION INTRAMUSCULAR; INTRAVENOUS at 14:57

## 2024-08-23 RX ADMIN — MIDAZOLAM HYDROCHLORIDE 0.5 MG: 1 INJECTION, SOLUTION INTRAMUSCULAR; INTRAVENOUS at 15:00

## 2024-08-23 RX ADMIN — Medication 2 G: at 14:35

## 2024-08-23 RX ADMIN — LIDOCAINE HYDROCHLORIDE 20 ML: 10 INJECTION, SOLUTION INFILTRATION; PERINEURAL at 15:02

## 2024-08-23 RX ADMIN — LIDOCAINE HYDROCHLORIDE,EPINEPHRINE BITARTRATE 20 ML: 10; .01 INJECTION, SOLUTION INFILTRATION; PERINEURAL at 15:02

## 2024-08-23 RX ADMIN — FENTANYL CITRATE 25 MCG: 50 INJECTION, SOLUTION INTRAMUSCULAR; INTRAVENOUS at 15:06

## 2024-08-23 RX ADMIN — HEPARIN 500 UNITS: 100 SYRINGE at 15:08

## 2024-08-23 NOTE — DISCHARGE INSTRUCTIONS
Port Placement Procedure Discharge Instructions:  You had a port placed. A port is a small medical device that is placed under the skin and is connected to a vein with a catheter (thin, flexible tube). Ports can be used to administer IV medications (including chemotherapy), fluids or blood products or for blood lab draws. Please follow the below instructions after your procedure:    Care Instructions:  - If you received sedation for your procedure, do not drive or operate heavy machinery for the rest of the day.  - You may shower beginning tomorrow (post procedure day #1). Do not scrub site until well healed; pat dry gently with a towel.  - You likely have skin adhesive over your port site. Skin adhesive works like a bandage to keep the site covered and protected. Do not use antibiotic ointment or creams/lotions over adhesive as it can break it down. The skin adhesive will peel off on its own (typically in 5-14 days).  - Avoid submerging the port site under water (ex: tub baths, Jacuzzis, lakes, hot tubs and pools) for 10 days or until your site is well healed.  - You may have some discomfort, minimal swelling, redness and/or bruising at your port site/procedure site. You may take over the counter pain medication for discomfort (follow the package directions) or apply an ice pack wrapped in a towel over the site (rotating 20 minutes with ice pack on and 20 minutes with ice pack off) for comfort as needed. It can take several days for these to resolve.  - Avoid heavy lifting (greater than 10 pounds) and strenuous activities for 2 days following your procedure.   - If you experience significant bleeding at site, apply pressure with hands above the clavicle bone, sit upright and seek immediate medical assistance.  - Ports need to be flushed approximately every 4-6 weeks, if not being used more frequently. Follow up with the provider who ordered your port placement for further instructions for this.    Seek medical  evaluation or contact [facility] RN Line at [phone] if you experience the following:  - Uncontrolled bleeding from port site  - Fever (greater than 101 F (38.3C))  - Purulent (yellow/green/foul smelling) drainage from port insertion site  - Increasing pain at port site  - Increasing redness at port site          * Recovery After Conscious Sedation (Adult)  We gave you medicine by vein to make you sleepy or relaxed during your procedure. This may have included both a pain medicine and sleeping medicine. Most of the effects have worn off. But you may still feel sleepy for the next 6 to 8 hours.  Home care  Follow these guidelines when you get home:  You may feel sleepy and clumsy and have poor balance for the next few hours.  A responsible adult should stay with you for the next 8 hours. This person should make sure your condition doesn t get worse.  Don't drink any alcohol for the next 24 hours.  Don't drive, operate dangerous machinery, make important business or personal decisions or sign legal documents during the next 24 hours.  You may vomit (throw up) if you eat too soon after the procedure. If this happens, drink small amounts of water, juice or clear broth. Wait to try solid food until you no longer have nausea (upset stomach).  Note: Your care team may tell you not to take any medicine by mouth for pain or sleep in the next 4 hours. These medicines may react with the medicines you had in the hospital. This could cause a much stronger response than usual.  Follow-up care  Follow up with your care team if you are not alert and back to your usual level of activity within 12 hours.  When to seek medical advice  Call your care team right away if any of these occur:  You still feel sleepy or clumsy after 12 hours, or your sleepiness gets worse  Weakness or dizziness gets worse  Repeated vomiting  If you can't be woken up and someone is staying with you, they should call 911.  For informational purposes only.  Not to replace the advice of your health care provider.  Copyright   2018 [email or web address]. All rights reserved.

## 2024-08-23 NOTE — PROCEDURES
RADIOLOGY POST PROCEDURE NOTE WITH SEDATION  Patient name: Mgagie Navarrete  MRN: [id number]  : 1950    Pre-procedure diagnosis: Uterine cancer  Post-procedure diagnosis: Same    Procedure Date/Time: 2024  3:11 PM    Procedure: Port-a-cath placement  Estimated blood loss: Minimal  Sedation: Moderate sedation was employed. The patient was monitored by a nurse at all times during the procedure under my direct supervision.    Specimen(s) collected with description: None    I determined this patient to be an appropriate candidate for the planned sedation and procedure and reassessed the patient IMMEDIATELY PRIOR to sedation and procedure.     The patient tolerated the procedure well with no immediate complications.    Significant findings: Successful placement of port-a-cath. Ready for immediate use.    See imaging dictation for procedural details.    Provider name: Go Elam M.D.  Assistant(s):None

## 2024-08-23 NOTE — PRE-PROCEDURE
GENERAL PRE-PROCEDURE:   Procedure:  Chest port placement  Date/Time:  8/23/2024 2:48 PM    Written consent obtained?: Yes    Risks and benefits: Risks, benefits and alternatives were discussed    Consent given by:  Patient  Patient states understanding of procedure being performed: Yes    Patient's understanding of procedure matches consent: Yes    Procedure consent matches procedure scheduled: Yes    Expected level of sedation:  Moderate  Appropriately NPO:  Yes  ASA Class:  2  Mallampati  :  Grade 2- soft palate, base of uvula, tonsillar pillars, and portion of posterior pharyngeal wall visible  Lungs:  Lungs clear with good breath sounds bilaterally  Heart:  Normal heart sounds and rate  History & Physical reviewed:  History and physical reviewed and no updates needed  Statement of review:  I have reviewed the lab findings, diagnostic data, medications, and the plan for sedation

## 2024-08-23 NOTE — IR NOTE
Patient Name: Maggie Navarrete  Medical Record Number: [id number]  Today's Date: 8/23/2024    Procedure: Image guided chest port placement with sedation  Proceduralist: Dr. Elam  Pathology present: NA    Procedure Start: 1500  Procedure end: 1509  Sedation medications administered: 1.5 mg versed, 50 mcg fentanyl       : SHA    Other Notes: Pt arrived to IR room 3 from Radiology waiting room. Consent reviewed. Pt denies any questions or concerns regarding procedure. Pt positioned supine and monitored per protocol. Pt tolerated procedure without any noted complications. Pt transferred back to IR pre/post 3.    [employer] SmartPort implanted today. Lot # [id number]. 8 [place].     Discharge education given to patient and  (friend) SETH Awad provided. Wheelchair to car without incident.

## 2024-08-26 ENCOUNTER — INFUSION THERAPY VISIT (OUTPATIENT)
Dept: ONCOLOGY | Facility: CLINIC | Age: 74
End: 2024-08-26
Attending: STUDENT IN AN ORGANIZED HEALTH CARE EDUCATION/TRAINING PROGRAM
Payer: MEDICARE

## 2024-08-26 ENCOUNTER — APPOINTMENT (OUTPATIENT)
Dept: LAB | Facility: CLINIC | Age: 74
End: 2024-08-26
Attending: STUDENT IN AN ORGANIZED HEALTH CARE EDUCATION/TRAINING PROGRAM
Payer: MEDICARE

## 2024-08-26 VITALS
BODY MASS INDEX: 25.25 KG/M2 | WEIGHT: 125 LBS | RESPIRATION RATE: 16 BRPM | HEART RATE: 110 BPM | TEMPERATURE: 98.5 F | DIASTOLIC BLOOD PRESSURE: 75 MMHG | OXYGEN SATURATION: 98 % | SYSTOLIC BLOOD PRESSURE: 149 MMHG

## 2024-08-26 DIAGNOSIS — T45.1X5A CHEMOTHERAPY-INDUCED NEUTROPENIA (H): Primary | ICD-10-CM

## 2024-08-26 DIAGNOSIS — I42.7 DILATED CARDIOMYOPATHY SECONDARY TO DRUG (H): ICD-10-CM

## 2024-08-26 DIAGNOSIS — D70.1 CHEMOTHERAPY-INDUCED NEUTROPENIA (H): Primary | ICD-10-CM

## 2024-08-26 DIAGNOSIS — M54.16 LEFT LUMBAR RADICULOPATHY: ICD-10-CM

## 2024-08-26 DIAGNOSIS — C55 LEIOMYOSARCOMA OF UTERUS (H): ICD-10-CM

## 2024-08-26 LAB
BASOPHILS # BLD AUTO: 0 10E3/UL (ref 0–0.2)
BASOPHILS NFR BLD AUTO: 1 %
EOSINOPHIL # BLD AUTO: 0 10E3/UL (ref 0–0.7)
EOSINOPHIL NFR BLD AUTO: 1 %
ERYTHROCYTE [DISTWIDTH] IN BLOOD BY AUTOMATED COUNT: 14 % (ref 10–15)
HCT VFR BLD AUTO: 33.2 % (ref 35–47)
HGB BLD-MCNC: 11.4 G/DL (ref 11.7–15.7)
IMM GRANULOCYTES # BLD: 0.1 10E3/UL
IMM GRANULOCYTES NFR BLD: 1 %
LYMPHOCYTES # BLD AUTO: 2.8 10E3/UL (ref 0.8–5.3)
LYMPHOCYTES NFR BLD AUTO: 48 %
MCH RBC QN AUTO: 31.8 PG (ref 26.5–33)
MCHC RBC AUTO-ENTMCNC: 34.3 G/DL (ref 31.5–36.5)
MCV RBC AUTO: 93 FL (ref 78–100)
MONOCYTES # BLD AUTO: 0.6 10E3/UL (ref 0–1.3)
MONOCYTES NFR BLD AUTO: 10 %
NEUTROPHILS # BLD AUTO: 2.2 10E3/UL (ref 1.6–8.3)
NEUTROPHILS NFR BLD AUTO: 39 %
NRBC # BLD AUTO: 0 10E3/UL
NRBC BLD AUTO-RTO: 0 /100
PLATELET # BLD AUTO: 440 10E3/UL (ref 150–450)
RBC # BLD AUTO: 3.58 10E6/UL (ref 3.8–5.2)
WBC # BLD AUTO: 5.7 10E3/UL (ref 4–11)

## 2024-08-26 PROCEDURE — 85025 COMPLETE CBC W/AUTO DIFF WBC: CPT | Performed by: PHYSICIAN ASSISTANT

## 2024-08-26 PROCEDURE — 250N000011 HC RX IP 250 OP 636: Performed by: STUDENT IN AN ORGANIZED HEALTH CARE EDUCATION/TRAINING PROGRAM

## 2024-08-26 PROCEDURE — 250N000013 HC RX MED GY IP 250 OP 250 PS 637: Performed by: PHYSICIAN ASSISTANT

## 2024-08-26 PROCEDURE — 250N000011 HC RX IP 250 OP 636: Performed by: PHYSICIAN ASSISTANT

## 2024-08-26 PROCEDURE — 96413 CHEMO IV INFUSION 1 HR: CPT

## 2024-08-26 PROCEDURE — 36591 DRAW BLOOD OFF VENOUS DEVICE: CPT | Performed by: PHYSICIAN ASSISTANT

## 2024-08-26 PROCEDURE — 258N000003 HC RX IP 258 OP 636: Performed by: PHYSICIAN ASSISTANT

## 2024-08-26 RX ORDER — HEPARIN SODIUM (PORCINE) LOCK FLUSH IV SOLN 100 UNIT/ML 100 UNIT/ML
5 SOLUTION INTRAVENOUS
Status: DISCONTINUED | OUTPATIENT
Start: 2024-08-26 | End: 2024-08-26 | Stop reason: HOSPADM

## 2024-08-26 RX ORDER — HEPARIN SODIUM (PORCINE) LOCK FLUSH IV SOLN 100 UNIT/ML 100 UNIT/ML
5 SOLUTION INTRAVENOUS EVERY 8 HOURS
Status: DISCONTINUED | OUTPATIENT
Start: 2024-08-26 | End: 2024-08-26 | Stop reason: HOSPADM

## 2024-08-26 RX ORDER — ONDANSETRON 2 MG/ML
8 INJECTION INTRAMUSCULAR; INTRAVENOUS ONCE
Status: DISCONTINUED | OUTPATIENT
Start: 2024-08-26 | End: 2024-08-26 | Stop reason: HOSPADM

## 2024-08-26 RX ORDER — OXYCODONE HYDROCHLORIDE 5 MG/1
5 TABLET ORAL EVERY 6 HOURS PRN
Qty: 2 TABLET | Refills: 0 | Status: SHIPPED | OUTPATIENT
Start: 2024-08-26 | End: 2024-08-29

## 2024-08-26 RX ORDER — ACETAMINOPHEN 325 MG/1
650 TABLET ORAL ONCE
Status: COMPLETED | OUTPATIENT
Start: 2024-08-26 | End: 2024-08-26

## 2024-08-26 RX ADMIN — GEMCITABINE 1200 MG: 38 INJECTION, SOLUTION INTRAVENOUS at 15:54

## 2024-08-26 RX ADMIN — Medication 5 ML: at 15:02

## 2024-08-26 RX ADMIN — SODIUM CHLORIDE 250 ML: 9 INJECTION, SOLUTION INTRAVENOUS at 15:52

## 2024-08-26 RX ADMIN — Medication 5 ML: at 17:24

## 2024-08-26 RX ADMIN — ACETAMINOPHEN 650 MG: 325 TABLET ORAL at 15:59

## 2024-08-26 ASSESSMENT — PAIN SCALES - GENERAL: PAINLEVEL: MILD PAIN (3)

## 2024-08-26 NOTE — PROGRESS NOTES
Infusion Nursing Note:  Maggie Navarrete presents today for Cycle 2 Day 8 Gemcitabine.    Patient seen by provider today: No   present during visit today: Not Applicable.    Note: Pt arrives to infusion feeling okay. She is having bad sciatica pain and pain near her new port, pt requested and received Tylenol during her infusion visit. Pt has been afebrile and denies signs and symptoms of infection including: cough, SOB, sore throat, diarrhea, vomiting, rash, or pain with urination. Pt wishes to proceed with treatment today.         Intravenous Access:  Implanted Port.    Treatment Conditions:  Lab Results   Component Value Date    HGB 11.4 (L) 08/26/2024    WBC 5.7 08/26/2024    ANEU 3.3 04/28/2021    ANEUTAUTO 2.2 08/26/2024     08/26/2024        Results reviewed, labs MET treatment parameters, ok to proceed with treatment.      Post Infusion Assessment:  Patient tolerated infusion without incident.  Blood return noted pre and post infusion.  Site patent and intact, free from redness, edema or discomfort.  No evidence of extravasations.  Access discontinued per protocol.       Discharge Plan:   Patient declined prescription refills.  Discharge instructions reviewed with: Patient.  Patient and/or family verbalized understanding of discharge instructions and all questions answered.  AVS to patient via [employer].  Patient will return 9/9 for next appointment.   Patient discharged in stable condition accompanied by: self.  Departure Mode: Ambulatory.      Martha Borja RN

## 2024-08-26 NOTE — NURSING NOTE
Chief Complaint   Patient presents with    Port Draw     Power needle, heparin locked, vitals checked     Cherie Tubbs RN on 8/26/2024 at 3:07 PM

## 2024-08-26 NOTE — PATIENT INSTRUCTIONS
Contact Numbers  [facility]: [phone] (for symptom and scheduling needs)    Please call the [facility] Triage line if you experience a temperature greater than or equal to 100.4, shaking chills, have uncontrolled nausea, vomiting and/or diarrhea, dizziness, shortness of breath, chest pain, bleeding, unexplained bruising, or if you have any other new/concerning symptoms, questions or concerns.     If you are having any concerning symptoms or wish to speak to a provider before your next infusion visit, please call your care coordinator or triage to notify them so we can adequately serve you.     If you need a refill on a narcotic prescription or other medication, please call triage before your infusion appointment.       August 2024 Sunday Monday Tuesday Wednesday Thursday Friday Saturday                       1     2     3       4     5     6    LAB PERIPHERAL  12:45 PM   (15 min.)   [facility] LAB DRAW   [facility]    RETURN CCSL   1:00 PM   (45 min.)   Rebeka Kim PA-C   [facility]    ONC INFUSION 2.5 HR (150 MIN)   3:00 PM   (150 min.)    ONC INFUSION NURSE   [facility] 7     8     9     10       11     12     13     14     15     16     17       18     19    LAB PERIPHERAL   2:00 PM   (15 min.)   [facility] LAB DRAW   [facility]    ONC INFUSION 2 HR (120 MIN)   2:30 PM   (120 min.)    ONC INFUSION NURSE   [facility] 20    CT SI THERAPEUTIC JOINT INJ  12:45 PM   (60 min.)   [id number]   [facility] 21     22    OFFICE VISIT   3:10 PM   (30 min.)   Everardo Pepper APRN CNP   [facility] 23    IR CHEST PORT PLACEMENT >5 YRS   2:00 PM   (60 min.)   [email or web address]   [facility] 24       25     26    LAB PERIPHERAL   2:30 PM   (15 min.)   [facility] LAB  DRAW   [facility]    ONC INFUSION 2.5 HR (150 MIN)   3:00 PM   (150 min.)   [facility] ONC INFUSION NURSE   [facility] 27     28     29     30     31 September 2024 Sunday Monday Tuesday Wednesday Thursday Friday Saturday   1     2     3     4     5     6    RETURN CCSL   1:15 PM   (45 min.)   Scheierl, Amber J, APRN CNP   [facility] 7       8     9    LAB PERIPHERAL  11:15 AM   (15 min.)    [facility] DRAW   [facility]    ONC INFUSION 2 HR (120 MIN)  12:00 PM   (120 min.)   [facility] ONC INFUSION NURSE   [facility] 10     11     12     13     14       15     16    RETURN ENDOCRINE  10:30 AM   (30 min.)   Edgardo Valderrama MD   [facility]    LAB PERIPHERAL   2:30 PM   (15 min.)    [facility] LAB DRAW   [facility]    ONC INFUSION 2.5 HR (150 MIN)   3:00 PM   (150 min.)    ONC INFUSION NURSE   [facility] 17     18     19     20    VIDEO VISIT RETURN   9:00 AM   (30 min.)   Bishop NIKKI Ricketts PA-C   [facility] [place] 21       22     23    PE EYE/TH ONCOLOGY  12:30 PM   (60 min.)   [id number]   [facility] 24    RETURN CCSL  12:45 PM   (30 min.)   Beto Galvan MD   [facility] 25     26     27     28       29     30                                              Lab Results:  Recent Results (from the past 12 hour(s))   CBC with platelets and differential    Collection Time: 08/26/24  3:01 PM   Result Value Ref Range    WBC Count 5.7 4.0 - 11.0 10e3/uL    RBC Count 3.58 (L) 3.80 - 5.20 10e6/uL    Hemoglobin 11.4 (L) 11.7 - 15.7 g/dL    Hematocrit 33.2 (L) 35.0 - 47.0 %    MCV 93 78 - 100 fL    MCH 31.8 26.5 - 33.0 pg    MCHC 34.3 31.5 - 36.5 g/dL    RDW 14.0 10.0 - 15.0 %    Platelet  Count 440 150 - 450 10e3/uL    % Neutrophils 39 %    % Lymphocytes 48 %    % Monocytes 10 %    % Eosinophils 1 %    % Basophils 1 %    % Immature Granulocytes 1 %    NRBCs per 100 WBC 0 <1 /100    Absolute Neutrophils 2.2 1.6 - 8.3 10e3/uL    Absolute Lymphocytes 2.8 0.8 - 5.3 10e3/uL    Absolute Monocytes 0.6 0.0 - 1.3 10e3/uL    Absolute Eosinophils 0.0 0.0 - 0.7 10e3/uL    Absolute Basophils 0.0 0.0 - 0.2 10e3/uL    Absolute Immature Granulocytes 0.1 <=0.4 10e3/uL    Absolute NRBCs 0.0 10e3/uL

## 2024-08-27 ENCOUNTER — TRANSFERRED RECORDS (OUTPATIENT)
Dept: HEALTH INFORMATION MANAGEMENT | Facility: CLINIC | Age: 74
End: 2024-08-27

## 2024-08-27 ENCOUNTER — TELEPHONE (OUTPATIENT)
Dept: FAMILY MEDICINE | Facility: CLINIC | Age: 74
End: 2024-08-27
Payer: MEDICARE

## 2024-08-27 NOTE — TELEPHONE ENCOUNTER
Staff at [facility] calling to request help with referral to be faxed over ASAP, as pt is onsite at their clinic now. Routed to their clinic via [employer]weet[employer], but staff said they had not received it.    [facility] team - please print and fax a paper copy as well, as their staff has said they didn't receive any referrals that had been requested and sent today.    Radha Turner RN

## 2024-09-06 ENCOUNTER — VIRTUAL VISIT (OUTPATIENT)
Dept: ONCOLOGY | Facility: CLINIC | Age: 74
End: 2024-09-06
Attending: STUDENT IN AN ORGANIZED HEALTH CARE EDUCATION/TRAINING PROGRAM
Payer: MEDICARE

## 2024-09-06 ENCOUNTER — TELEPHONE (OUTPATIENT)
Dept: ORTHOPEDICS | Facility: CLINIC | Age: 74
End: 2024-09-06

## 2024-09-06 DIAGNOSIS — T45.1X5A CHEMOTHERAPY-INDUCED NEUTROPENIA (H): Primary | ICD-10-CM

## 2024-09-06 DIAGNOSIS — I42.7 DILATED CARDIOMYOPATHY SECONDARY TO DRUG (H): ICD-10-CM

## 2024-09-06 DIAGNOSIS — C55 LEIOMYOSARCOMA OF UTERUS (H): ICD-10-CM

## 2024-09-06 DIAGNOSIS — D70.1 CHEMOTHERAPY-INDUCED NEUTROPENIA (H): Primary | ICD-10-CM

## 2024-09-06 PROCEDURE — 99214 OFFICE O/P EST MOD 30 MIN: CPT | Mod: 95 | Performed by: STUDENT IN AN ORGANIZED HEALTH CARE EDUCATION/TRAINING PROGRAM

## 2024-09-06 RX ORDER — ALBUTEROL SULFATE 0.83 MG/ML
2.5 SOLUTION RESPIRATORY (INHALATION)
Status: CANCELLED | OUTPATIENT
Start: 2024-09-09

## 2024-09-06 RX ORDER — EPINEPHRINE 1 MG/ML
0.3 INJECTION, SOLUTION INTRAMUSCULAR; SUBCUTANEOUS EVERY 5 MIN PRN
Status: CANCELLED | OUTPATIENT
Start: 2024-09-16

## 2024-09-06 RX ORDER — DIPHENHYDRAMINE HYDROCHLORIDE 50 MG/ML
50 INJECTION INTRAMUSCULAR; INTRAVENOUS
Status: CANCELLED
Start: 2024-09-16

## 2024-09-06 RX ORDER — MEPERIDINE HYDROCHLORIDE 25 MG/ML
25 INJECTION INTRAMUSCULAR; INTRAVENOUS; SUBCUTANEOUS EVERY 30 MIN PRN
Status: CANCELLED | OUTPATIENT
Start: 2024-09-16

## 2024-09-06 RX ORDER — ALBUTEROL SULFATE 90 UG/1
1-2 AEROSOL, METERED RESPIRATORY (INHALATION)
Status: CANCELLED
Start: 2024-09-09

## 2024-09-06 RX ORDER — ALBUTEROL SULFATE 90 UG/1
1-2 AEROSOL, METERED RESPIRATORY (INHALATION)
Status: CANCELLED
Start: 2024-09-16

## 2024-09-06 RX ORDER — METHYLPREDNISOLONE SODIUM SUCCINATE 125 MG/2ML
125 INJECTION, POWDER, LYOPHILIZED, FOR SOLUTION INTRAMUSCULAR; INTRAVENOUS
Status: CANCELLED
Start: 2024-09-16

## 2024-09-06 RX ORDER — LORAZEPAM 2 MG/ML
0.5 INJECTION INTRAMUSCULAR EVERY 4 HOURS PRN
Status: CANCELLED | OUTPATIENT
Start: 2024-09-09

## 2024-09-06 RX ORDER — HEPARIN SODIUM (PORCINE) LOCK FLUSH IV SOLN 100 UNIT/ML 100 UNIT/ML
5 SOLUTION INTRAVENOUS
Status: CANCELLED | OUTPATIENT
Start: 2024-09-16

## 2024-09-06 RX ORDER — MEPERIDINE HYDROCHLORIDE 25 MG/ML
25 INJECTION INTRAMUSCULAR; INTRAVENOUS; SUBCUTANEOUS EVERY 30 MIN PRN
Status: CANCELLED | OUTPATIENT
Start: 2024-09-09

## 2024-09-06 RX ORDER — DIPHENHYDRAMINE HYDROCHLORIDE 50 MG/ML
50 INJECTION INTRAMUSCULAR; INTRAVENOUS
Status: CANCELLED
Start: 2024-09-09

## 2024-09-06 RX ORDER — ALBUTEROL SULFATE 0.83 MG/ML
2.5 SOLUTION RESPIRATORY (INHALATION)
Status: CANCELLED | OUTPATIENT
Start: 2024-09-16

## 2024-09-06 RX ORDER — HEPARIN SODIUM,PORCINE 10 UNIT/ML
5-20 VIAL (ML) INTRAVENOUS DAILY PRN
Status: CANCELLED | OUTPATIENT
Start: 2024-09-09

## 2024-09-06 RX ORDER — HEPARIN SODIUM (PORCINE) LOCK FLUSH IV SOLN 100 UNIT/ML 100 UNIT/ML
5 SOLUTION INTRAVENOUS
Status: CANCELLED | OUTPATIENT
Start: 2024-09-09

## 2024-09-06 RX ORDER — HEPARIN SODIUM,PORCINE 10 UNIT/ML
5-20 VIAL (ML) INTRAVENOUS DAILY PRN
Status: CANCELLED | OUTPATIENT
Start: 2024-09-16

## 2024-09-06 RX ORDER — METHYLPREDNISOLONE SODIUM SUCCINATE 125 MG/2ML
125 INJECTION, POWDER, LYOPHILIZED, FOR SOLUTION INTRAMUSCULAR; INTRAVENOUS
Status: CANCELLED
Start: 2024-09-09

## 2024-09-06 RX ORDER — LORAZEPAM 2 MG/ML
0.5 INJECTION INTRAMUSCULAR EVERY 4 HOURS PRN
Status: CANCELLED | OUTPATIENT
Start: 2024-09-16

## 2024-09-06 RX ORDER — EPINEPHRINE 1 MG/ML
0.3 INJECTION, SOLUTION INTRAMUSCULAR; SUBCUTANEOUS EVERY 5 MIN PRN
Status: CANCELLED | OUTPATIENT
Start: 2024-09-09

## 2024-09-06 NOTE — TELEPHONE ENCOUNTER
[facility] Call Center    Phone Message    May a detailed message be left on voicemail: yes     Reason for Call: Other: Call pt or send MC message. Patient is calling as she has back pain and planning a long plane trip. She would like an MRI ordered.      Action Taken: Other: te    Travel Screening: Not Applicable     Date of Service:

## 2024-09-06 NOTE — PROGRESS NOTES
Virtual Visit Details    Type of service:  Video Visit   Video Start Time:  1:36PM  Video End Time: 2:00PM    Originating Location (pt. Location): Home    Distant Location (provider location):  On-site  Platform used for Video Visit: [facility]  RETURN PATIENT VISIT NOTE    PATIENT NAME: Maggie Navarrete MRN # [id number]  DATE OF VISIT: Sep 6, 2024 YOB: 1950    CANCER TYPE: metastatic uterine leiomyosarcoma     HISTORY OF PRESENT ILLNESS   Maggie is a 74 year old women with PMH of metastatic uterine leiomyosarcoma. She was originally diagnosed on 2016 showing an up to 12 cm mass. She underwent surgical resection followed by adjuvant doxorubicin/dacarbazine for 5 cycles. She had local recurrence on 2020 treated with surgical resection. Then  again recurrence on 2022 again underwent surgical resection. Followed by [id number] with anastrozole. Since January of 2024 she has been on trial a BOAST clinical trial with Dr. Nik Schilling at the [facility]. She received abemaciclib there. Now, she was taken off of trial due to progression on 1 of 3 intra-abdominal lesions.     She comes here to continue medical management. She has seen Dr. Michaud at [place] for a second opinion. She is interested in surgical resection which has been offered at [facility] with possible Gemzar HIPEC.      She started on single agent Gemzar on 7/29/24. She is here today for follow up prior to cycle 3.      PAST ONCOLOGIC HISTORY   Leiomyosarcoma Uterus   11/3/2016 Initial Diagnosis     11/4/2016 Surgery and Procedures   Open hyst, BSO, stage II with peritoneal involvement    12/26/2016 - 3/23/2017 Chemotherapy   Received elsewhere: adjuvant chemotherapy with doxorubicin 25 mg/m2/day and DTIC 250 mg/m2/day , days 1-3 every 21 weeks.  Completed 5 cycles of adjuvant doxorubicin and dacarbazine    6/1/2020 Recurrence Local   Isolated lesion in the RLQ    6/16/2020  Surgery and Procedures   Laparotomy, excision of side wall mass, ureteroneocystotomy. 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 The excised margins were negative for tumor.    3/31/2022 [facility]   PET with isolated bowel lesion in LLQ.     4/4/2022 Surgery and Procedures   Exlap, FANNY, small bowel lesion excised with negative margins    5/9/2023 Surgery and Procedures   Dr. Camarillo and Dr. Krueger    Exploratory laparotomy, tumor debulking, cystoscopy and placement of internal ureteral stent and ureterolysis     Findings  Two lesions in the abdomen consistent with very indolent appearing sarcoma on frozen section, 1 in the right upper quadrant in the large bowel mesentery. And 1 on the left sidewall between the left sidewall and the left ureter and the sigmoid colon    5/9/2023 Biopsy/Pathology   FINAL DIAGNOSIS     A. Soft tissue, hepatic flexure nodule, excision: Involved   by smooth muscle neoplasm, 1.4 cm. See comment.     B. Soft tissue, left sidewall mass, excision: Recurrent   leiomyosarcoma, 2.9 cm. See comment.     C. Colon, sigmoid mass, excision: Involved by smooth   muscle neoplasm with increased mitotic activity, 6 mm. See   comment.     D. Soft tissue, left nohemi-ureteral, biopsy: Involved by   smooth muscle neoplasm, 3 mm. See comment.     E. Peritoneum, left pelvic sidewall margin, excision:   Involved by smooth muscle neoplasm, 1 mm. See comment.     F. Soft tissue, sigmoid colon margin, excision: Negative   for tumor.     Comment: Immunohistochemical stains performed at [facility] demonstrate that the left sidewall mass (B2) is   positive for desmin, caldesmon, ER (moderate, 60-70%), and   SC (moderate, 20-30%), but is negative for HMB45, Melan A,   STAT6, ALK, DOG1, and KIT; this immunophenotype supports a   smooth muscle neoplasm. The left sidewall mass shows   increased mitotic activity (greater than 10 mitoses/10 HPF)   and atypia sufficient to  meet criteria for a   leiomyosarcoma. The remaining lesion show minimal atypia   but increased mitotic activity (though less than 10   mitoses/10 HPF) where evaluable, but evaluation is limited   by the small size of the lesions. In the context of a known   history of leiomyosarcoma and a concurrent tumor meeting   diagnostic criteria, these likely represent deceptively   bland additional foci of recurrent leiomyosarcoma.     8/2023 - Biological/Targeted/Hormone Therapy   Anastrozole     11/8/2023 Progression/Relapse   CT scan chest-abdomen-pelvis shows progression of 3 nodules within the peritoneal cavity.    1/18/2024 - 6/5/2024 Chemotherapy   BOAST clinical trial with Dr. Nik Schilling at the [facility]. She received abemaciclib there. Was taken off of trial due to progression.     7/29/24: Cycle 1 Gemzar    Interval History:  Maggie reports that she feels well on the chemo overall. No nausea or fatigue. She continues to walk for activity. No SOB. No rashes. No leg swelling.  -Some constipation which responds to prunes  -no fevers  -her biggest current issue is ongoing low back pain which migrates to left buttock and sometimes down leg. Present for about 6 weeks. Steroid injection didn't help. She's following with PT. She uses ice and heat for management. She took oxycodone once and this helped a little. She has not been taking NSAIDs. She is wondering if she needs an MRI of her back. She reports on and off issues with back pain since her 40's       PHYSICAL EXAM   There were no vitals taken for this visit.   8/23/2024  4:00 PM 8/23/2024  4:10 PM 8/26/2024  2:51 PM   Vital Signs      Systolic 127   149 !    Diastolic 67   75 !    Pulse 85  85  110    Temperature   98.5  F (36.9  C)    Respirations 21  26  16    Weight (LB)   125 lb    Height      BMI (Calculated)      Pain Score   3 (Mild)    O2 100 %  100 %  98 %      Wt Readings from Last 10 Encounters:   08/26/24 56.7 kg (125 lb)   08/22/24  56.9 kg (125 lb 8 oz)   08/19/24 56.8 kg (125 lb 3.2 oz)   08/06/24 57 kg (125 lb 11.2 oz)   07/29/24 56.3 kg (124 lb 3.2 oz)   07/17/24 56.4 kg (124 lb 6.4 oz)   03/05/24 58.5 kg (129 lb)   11/17/23 62.8 kg (138 lb 6.4 oz)   11/07/23 63.8 kg (140 lb 9.6 oz)   04/28/23 63.2 kg (139 lb 4.8 oz)   Video physical exam  General: Patient appears well in no acute distress.   Skin: No visualized rash or lesions on visualized skin  Eyes: EOMI, no erythema, sclera icterus or discharge noted  Resp: Appears to be breathing comfortably without accessory muscle usage, speaking in full sentences, no cough  MSK: Appears to have normal range of motion based on visualized movements  Neurologic: No apparent tremors, facial movements symmetric. Hearing intact. No dysarthria  Psych: affect appropriate, alert and oriented. Pleasant     LABORATORY AND IMAGING STUDIES   Most Recent 3 CBC's:  Recent Labs   Lab Test 08/26/24  1501 08/19/24  1420 08/06/24  1339   WBC 5.7 6.1 5.5   HGB 11.4* 11.9 12.4   MCV 93 93 94    353 155   ANEUTAUTO 2.2 2.3 2.3     Most Recent 3 BMP's:  Recent Labs   Lab Test 08/19/24  1420 08/06/24  1339 07/29/24  1403    140 141   POTASSIUM 4.4 4.3 4.5   CHLORIDE 104 104 105   CO2 24 26 26   BUN 21.7 27.8* 30.0*   CR 0.95 0.96* 1.06*   ANIONGAP 11 10 10   SUSAN 9.5 9.7 9.6   GLC 81 87 82   PROTTOTAL 6.5 6.5 6.5   ALBUMIN 4.2 4.2 4.2    Most Recent 3 LFT's:  Recent Labs   Lab Test 08/19/24  1420 08/06/24  1339 07/29/24  1403   AST 23 19 17   ALT 22 20 17   ALKPHOS 67 70 70   BILITOTAL 0.2 0.2 0.2   I reviewed the above labs today.     IR Chest Port Placement > 5 Yrs of Age  Narrative: [facility]  LOCATION: [facility]  DATE: 8/23/2024    PROCEDURE: IMPLANTABLE VENOUS CHEST PORT PLACEMENT (POWER INJECTABLE)    INTERVENTIONAL RADIOLOGIST: Go Elam MD.    INDICATION: 74-year-old female with uterine leiomyosarcoma presents for chest replacement.    CONSENT: The risks, benefits  and alternatives of implantable venous chest port placement were discussed with the patient  in detail. All questions were answered. Informed consent was given to proceed with the procedure.    MODERATE SEDATION: IV fentanyl and Versed were administered for sedation.  During the timeout, immediately prior to the administration of medications, the patient was reassessed for adequacy to receive conscious sedation. Under physician supervision,   Versed and fentanyl were administered for moderate sedation. Pulse oximetry, heart rate and blood pressure were continuously monitored by an independent trained observer. The physician spent 15 minutes of face-to-face sedation time with the patient.    CONTRAST: None.  ANTIBIOTICS: Ancef 2 g IV.  ADDITIONAL MEDICATIONS: None.    FLUOROSCOPIC TIME: 0.5 minutes.  RADIATION DOSE: Air Kerma: 3 mGy.    COMPLICATIONS: No immediate complications.    STERILE BARRIER TECHNIQUE: Maximum sterile barrier technique was used. Cutaneous antisepsis was performed at the operative site with application of 2% chlorhexidine and large sterile drape. Prior to the procedure, the  and assistant performed   hand hygiene and wore hat, mask, sterile gown, and sterile gloves during the entire procedure.    PROCEDURE:    Using real-time ultrasound guidance the right internal jugular vein was accessed.    A subcutaneous pocket was created and irrigated with sterile normal saline. The catheter tubing was tunneled in an antegrade fashion from the port pocket to the dermatotomy site. Over a guidewire, and under direct fluoroscopic visualization a peel-away   sheath was advanced over the wire. Through the peel-away sheath, the catheter tubing was advanced until the tip was at the cavoatrial junction. The catheter tubing was cut to length and attached firmly to the port. The port was placed within the   subcutaneous pocket and tested. The port pocket incision was closed with absorbable suture and  surgical glue. The dermatotomy site was closed with surgical glue.     FINDINGS:  Ultrasound demonstrates an anechoic and compressible jugular vein. A permanent image was stored.    At the completion of the study the port tip lies near the cavoatrial junction.  Impression: IMPRESSION:    Successful implantable venous chest port placement as detailed above.    I reviewed the above imaging report today.     ASSESSMENT AND PLAN   Metastatic uterine leiomyosarcoma.   She was originally diagnosed on 2016 showing an up to 12 cm mass. She underwent surgical resection followed by adjuvant doxorubicin/dacarbazine for 5 cycles. She had local recurrence on 2020 treated with surgical resection. Then again recurrence on 2022 again underwent surgical resection. Followed by therapy with anastrozole. Since January of 2024 she has been on trial a BOAST clinical trial with Dr. Nik Schilling [facility]. She received abemaciclib there. Now, she was taken off of trial due to progression on 1 of 3 intra-abdominal lesions.     She comes here to continue medical management, she has seen Dr. Michaud at [facility] for a second opinion. She is interested in surgical resection which has been offered at [facility] with possible Gemzar HIPEC. She saw Dr. Michaud at [facility], who recommended against further surgical resection and for systemic therapy with other agents with activity in leiomyosarcoma including gemcitabine +/- docetaxel, trabectedin and pazopanib. Dr. Powers agreed that without good systemic control and in the absence of intra-abdominal symptoms, surgical resection is more likely to cause more harm.     She started on treatment with single agent Gemzar on 7/29/24. ctDNA testing was sent (8/6/24). I will ask for follow up on result with RN[facility]. She is tolerating Gemzar well. She has some reduction in Hgb but not markedly so. Likely s/t chemo. We will continue to trend on labs.    She saw [facility] on 8/29/24,  They recommended continuing Gemzar (though dosing and frequency and duration varies from our current). Maggie is stil following with Dr. Eddie Doll for possible surgical resection. After consultation, Maggie is tentatively planning on surgical resection on 11/15/24. I will update Dr Powers on this plan. Their preferred upcoming imaging is CT so I will inquire on whether we can change from PET scan.    Oncology Follow-Up/Plan:  -Proceed with Cycle 3 day 1 Gemzar on 9/9 pending labs prior with day 8 and labs a week later  -Plan to repeat imaging after 3 cycles followed by visit with Dr Powers    ADDENDUM: Update from RN[facility]: Caris sample hemolyzed.  RNCC will see if repeat can be drawn in clinic.      Vascular Access  S/p port placement on 8/26/24    Low Back pain  -Status post  joint injection on 8/6/24  Follows with PT  -On meloxicam per PCP but not taking regular; avoiding NSAIDs for bleeding risk  -we discussed that given persistence in symptoms an MRI seems reasonable. She will reach out to ortho regarding this. We discussed red flag symptoms (leg weakness, bowel/bladder incontinence changed from baseline) that would require urgent evaluation.       35 minutes spent on the date of the encounter doing chart review, review of test results, interpretation of tests, patient visit, and documentation     Amber Scheierl, CNP  [facility] [address] [address]  [phone]

## 2024-09-06 NOTE — NURSING NOTE
Current patient location: [address]  [facility] [address]    Is the patient currently in the state of [place]? YES    Visit mode:VIDEO    If the visit is dropped, the patient can be reconnected by: VIDEO VISIT: Text to cell phone:   Telephone Information:   Mobile [phone]       Will anyone else be joining the visit? NO  (If patient encounters technical issues they should call [phone]    How would you like to obtain your AVS? MyChart    Are changes needed to the allergy or medication list? Pt stated no changes to allergies and Pt stated no med changes    Are refills needed on medications prescribed by this physician? NO    Rooming Documentation:  Questionnaire(s) completed      Reason for visit: RECHECK    Summer SOFIA

## 2024-09-06 NOTE — LETTER
9/6/2024      Maggie Navarrete  [address]  [facility] [address]      Dear Colleague,    Thank you for referring your patient, Maggie Navarrete, to the [facility]. Please see a copy of my visit note below.    Virtual Visit Details    Type of service:  Video Visit   Video Start Time:  1:36PM  Video End Time: 2:00PM    Originating Location (pt. Location): Home    Distant Location (provider location):  On-site  Platform used for Video Visit: [facility]  RETURN PATIENT VISIT NOTE    PATIENT NAME: Maggie Navarrete MRN # [id number]  DATE OF VISIT: Sep 6, 2024 YOB: 1950    CANCER TYPE: metastatic uterine leiomyosarcoma     HISTORY OF PRESENT ILLNESS   Maggie is a 74 year old women with PMH of metastatic uterine leiomyosarcoma. She was originally diagnosed on 2016 showing an up to 12 cm mass. She underwent surgical resection followed by adjuvant doxorubicin/dacarbazine for 5 cycles. She had local recurrence on 2020 treated with surgical resection. Then  again recurrence on 2022 again underwent surgical resection. Followed by [id number] with anastrozole. Since January of 2024 she has been on trial a BOAST clinical trial with Dr. Nik Schilling at the [facility]. She received abemaciclib there. Now, she was taken off of trial due to progression on 1 of 3 intra-abdominal lesions.     She comes here to continue medical management. She has seen Dr. Micahud at [place] for a second opinion. She is interested in surgical resection which has been offered at [facility] with possible Gemzar HIPEC.      She started on single agent Gemzar on 7/29/24. She is here today for follow up prior to cycle 3.      PAST ONCOLOGIC HISTORY   Leiomyosarcoma Uterus   11/3/2016 Initial Diagnosis     11/4/2016 Surgery and Procedures   Open hyst, BSO, stage II with peritoneal involvement    12/26/2016 - 3/23/2017 Chemotherapy    Received elsewhere: adjuvant chemotherapy with doxorubicin 25 mg/m2/day and DTIC 250 mg/m2/day , days 1-3 every 21 weeks.  Completed 5 cycles of adjuvant doxorubicin and dacarbazine    6/1/2020 Recurrence Local   Isolated lesion in the RLQ    6/16/2020 Surgery and Procedures   Laparotomy, excision of side wall mass, ureteroneocystotomy. 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 The excised margins were negative for tumor.    3/31/2022 [facility]   PET with isolated bowel lesion in LLQ.     4/4/2022 Surgery and Procedures   Exlap, FANNY, small bowel lesion excised with negative margins    5/9/2023 Surgery and Procedures   Dr. Camarillo and Dr. Krueger    Exploratory laparotomy, tumor debulking, cystoscopy and placement of internal ureteral stent and ureterolysis     Findings  Two lesions in the abdomen consistent with very indolent appearing sarcoma on frozen section, 1 in the right upper quadrant in the large bowel mesentery. And 1 on the left sidewall between the left sidewall and the left ureter and the sigmoid colon    5/9/2023 Biopsy/Pathology   FINAL DIAGNOSIS     A. Soft tissue, hepatic flexure nodule, excision: Involved   by smooth muscle neoplasm, 1.4 cm. See comment.     B. Soft tissue, left sidewall mass, excision: Recurrent   leiomyosarcoma, 2.9 cm. See comment.     C. Colon, sigmoid mass, excision: Involved by smooth   muscle neoplasm with increased mitotic activity, 6 mm. See   comment.     D. Soft tissue, left nohemi-ureteral, biopsy: Involved by   smooth muscle neoplasm, 3 mm. See comment.     E. Peritoneum, left pelvic sidewall margin, excision:   Involved by smooth muscle neoplasm, 1 mm. See comment.     F. Soft tissue, sigmoid colon margin, excision: Negative   for tumor.     Comment: Immunohistochemical stains performed at [facility] demonstrate that the left sidewall mass (B2) is   positive for desmin, caldesmon, ER (moderate, 60-70%), and   CO  (moderate, 20-30%), but is negative for HMB45, Melan A,   STAT6, ALK, DOG1, and KIT; this immunophenotype supports a   smooth muscle neoplasm. The left sidewall mass shows   increased mitotic activity (greater than 10 mitoses/10 HPF)   and atypia sufficient to meet criteria for a   leiomyosarcoma. The remaining lesion show minimal atypia   but increased mitotic activity (though less than 10   mitoses/10 HPF) where evaluable, but evaluation is limited   by the small size of the lesions. In the context of a known   history of leiomyosarcoma and a concurrent tumor meeting   diagnostic criteria, these likely represent deceptively   bland additional foci of recurrent leiomyosarcoma.     8/2023 - Biological/Targeted/Hormone Therapy   Anastrozole     11/8/2023 Progression/Relapse   CT scan chest-abdomen-pelvis shows progression of 3 nodules within the peritoneal cavity.    1/18/2024 - 6/5/2024 Chemotherapy   BOAST clinical trial with Dr. Nik Schilling at the [facility]. She received abemaciclib there. Was taken off of trial due to progression.     7/29/24: Cycle 1 Gemzar    Interval History:  Maggie reports that she feels well on the chemo overall. No nausea or fatigue. She continues to walk for activity. No SOB. No rashes. No leg swelling.  -Some constipation which responds to prunes  -no fevers  -her biggest current issue is ongoing low back pain which migrates to left buttock and sometimes down leg. Present for about 6 weeks. Steroid injection didn't help. She's following with PT. She uses ice and heat for management. She took oxycodone once and this helped a little. She has not been taking NSAIDs. She is wondering if she needs an MRI of her back. She reports on and off issues with back pain since her 40's       PHYSICAL EXAM   There were no vitals taken for this visit.   8/23/2024  4:00 PM 8/23/2024  4:10 PM 8/26/2024  2:51 PM   Vital Signs      Systolic 127   149 !    Diastolic 67   75 !    Pulse 85   85  110    Temperature   98.5  F (36.9  C)    Respirations 21  26  16    Weight (LB)   125 lb    Height      BMI (Calculated)      Pain Score   3 (Mild)    O2 100 %  100 %  98 %      Wt Readings from Last 10 Encounters:   08/26/24 56.7 kg (125 lb)   08/22/24 56.9 kg (125 lb 8 oz)   08/19/24 56.8 kg (125 lb 3.2 oz)   08/06/24 57 kg (125 lb 11.2 oz)   07/29/24 56.3 kg (124 lb 3.2 oz)   07/17/24 56.4 kg (124 lb 6.4 oz)   03/05/24 58.5 kg (129 lb)   11/17/23 62.8 kg (138 lb 6.4 oz)   11/07/23 63.8 kg (140 lb 9.6 oz)   04/28/23 63.2 kg (139 lb 4.8 oz)   Video physical exam  General: Patient appears well in no acute distress.   Skin: No visualized rash or lesions on visualized skin  Eyes: EOMI, no erythema, sclera icterus or discharge noted  Resp: Appears to be breathing comfortably without accessory muscle usage, speaking in full sentences, no cough  MSK: Appears to have normal range of motion based on visualized movements  Neurologic: No apparent tremors, facial movements symmetric. Hearing intact. No dysarthria  Psych: affect appropriate, alert and oriented. Pleasant     LABORATORY AND IMAGING STUDIES   Most Recent 3 CBC's:  Recent Labs   Lab Test 08/26/24  1501 08/19/24  1420 08/06/24  1339   WBC 5.7 6.1 5.5   HGB 11.4* 11.9 12.4   MCV 93 93 94    353 155   ANEUTAUTO 2.2 2.3 2.3     Most Recent 3 BMP's:  Recent Labs   Lab Test 08/19/24  1420 08/06/24  1339 07/29/24  1403    140 141   POTASSIUM 4.4 4.3 4.5   CHLORIDE 104 104 105   CO2 24 26 26   BUN 21.7 27.8* 30.0*   CR 0.95 0.96* 1.06*   ANIONGAP 11 10 10   SUSAN 9.5 9.7 9.6   GLC 81 87 82   PROTTOTAL 6.5 6.5 6.5   ALBUMIN 4.2 4.2 4.2    Most Recent 3 LFT's:  Recent Labs   Lab Test 08/19/24  1420 08/06/24  1339 07/29/24  1403   AST 23 19 17   ALT 22 20 17   ALKPHOS 67 70 70   BILITOTAL 0.2 0.2 0.2   I reviewed the above labs today.     IR Chest Port Placement > 5 Yrs of Age  Narrative: [facility]  LOCATION: [facility]  HOSPITAL  DATE: 8/23/2024    PROCEDURE: IMPLANTABLE VENOUS CHEST PORT PLACEMENT (POWER INJECTABLE)    INTERVENTIONAL RADIOLOGIST: Go Elam MD.    INDICATION: 74-year-old female with uterine leiomyosarcoma presents for chest replacement.    CONSENT: The risks, benefits and alternatives of implantable venous chest port placement were discussed with the patient  in detail. All questions were answered. Informed consent was given to proceed with the procedure.    MODERATE SEDATION: IV fentanyl and Versed were administered for sedation.  During the timeout, immediately prior to the administration of medications, the patient was reassessed for adequacy to receive conscious sedation. Under physician supervision,   Versed and fentanyl were administered for moderate sedation. Pulse oximetry, heart rate and blood pressure were continuously monitored by an independent trained observer. The physician spent 15 minutes of face-to-face sedation time with the patient.    CONTRAST: None.  ANTIBIOTICS: Ancef 2 g IV.  ADDITIONAL MEDICATIONS: None.    FLUOROSCOPIC TIME: 0.5 minutes.  RADIATION DOSE: Air Kerma: 3 mGy.    COMPLICATIONS: No immediate complications.    STERILE BARRIER TECHNIQUE: Maximum sterile barrier technique was used. Cutaneous antisepsis was performed at the operative site with application of 2% chlorhexidine and large sterile drape. Prior to the procedure, the  and assistant performed   hand hygiene and wore hat, mask, sterile gown, and sterile gloves during the entire procedure.    PROCEDURE:    Using real-time ultrasound guidance the right internal jugular vein was accessed.    A subcutaneous pocket was created and irrigated with sterile normal saline. The catheter tubing was tunneled in an antegrade fashion from the port pocket to the dermatotomy site. Over a guidewire, and under direct fluoroscopic visualization a peel-away   sheath was advanced over the wire. Through the peel-away sheath, the catheter  tubing was advanced until the tip was at the cavoatrial junction. The catheter tubing was cut to length and attached firmly to the port. The port was placed within the   subcutaneous pocket and tested. The port pocket incision was closed with absorbable suture and surgical glue. The dermatotomy site was closed with surgical glue.     FINDINGS:  Ultrasound demonstrates an anechoic and compressible jugular vein. A permanent image was stored.    At the completion of the study the port tip lies near the cavoatrial junction.  Impression: IMPRESSION:    Successful implantable venous chest port placement as detailed above.    I reviewed the above imaging report today.     ASSESSMENT AND PLAN   Metastatic uterine leiomyosarcoma.   She was originally diagnosed on 2016 showing an up to 12 cm mass. She underwent surgical resection followed by adjuvant doxorubicin/dacarbazine for 5 cycles. She had local recurrence on 2020 treated with surgical resection. Then again recurrence on 2022 again underwent surgical resection. Followed by therapy with anastrozole. Since January of 2024 she has been on trial a BOAST clinical trial with Dr. Nik Schilling at the [facility]. She received abemaciclib there. Now, she was taken off of trial due to progression on 1 of 3 intra-abdominal lesions.     She comes here to continue medical management, she has seen Dr. Michaud at [place] for a second opinion. She is interested in surgical resection which has been offered at [facility] with possible Gemzar HIPEC. She saw Dr. Michaud at [facility], who recommended against further surgical resection and for systemic therapy with other agents with activity in leiomyosarcoma including gemcitabine +/- docetaxel, trabectedin and pazopanib. Dr. Powers agreed that without good systemic control and in the absence of intra-abdominal symptoms, surgical resection is more likely to cause more harm.     She started on treatment with single  agent Gemzar on 7/29/24. ctDNA testing was sent (8/6/24). I will ask for follow up on result with [facility]. She is tolerating Gemzar well. She has some reduction in Hgb but not markedly so. Likely s/t chemo. We will continue to trend on labs.    She saw [facility] on 8/29/24, They recommended continuing Gemzar (though dosing and frequency and duration varies from our current). Maggie is stil following with Dr. Eddie Doll for possible surgical resection. After consultation, Maggie is tentatively planning on surgical resection on 11/15/24. I will update Dr Powers on this plan. Their preferred upcoming imaging is CT so I will inquire on whether we can change from PET scan.    Oncology Follow-Up/Plan:  -Proceed with Cycle 3 day 1 Gemzar on 9/9 pending labs prior with day 8 and labs a week later  -Plan to repeat imaging after 3 cycles followed by visit with Dr Powers    Vascular Access  S/p port placement on 8/26/24    Low Back pain  -Status post  joint injection on 8/6/24  Follows with PT  -On meloxicam per PCP but not taking regular; avoiding NSAIDs for bleeding risk  -we discussed that given persistence in symptoms an MRI seems reasonable. She will reach out to ortho regarding this. We discussed red flag symptoms (leg weakness, bowel/bladder incontinence changed from baseline) that would require urgent evaluation.       35 minutes spent on the date of the encounter doing chart review, review of test results, interpretation of tests, patient visit, and documentation     Amber Scheierl, CNP  [facility] [address] [address]  [phone]      Again, thank you for allowing me to participate in the care of your patient.        Sincerely,        Amber J. Scheierl, APRN CNP

## 2024-09-06 NOTE — TELEPHONE ENCOUNTER
ATC returned patient's phone call and discussed MRI request.  Patient had SI joint steroid injection done by Gen Corbett at [facility] on 8/20/24.  Is scheduled to follow up with Bishop Ricketts on 9/20/24.    Patient has a flight planned on 9/30/24 to [place] for a hiking trip.  She is in a lot of pain, feels as though the injection did not work.  Does not want to be in severe pain the entire trip.    Patient would like to get in to see someone sooner and have MRI done so a plan can be made prior to her tip.    KATI MARES, ATC

## 2024-09-09 ENCOUNTER — VIRTUAL VISIT (OUTPATIENT)
Dept: FAMILY MEDICINE | Facility: CLINIC | Age: 74
End: 2024-09-09
Payer: MEDICARE

## 2024-09-09 ENCOUNTER — INFUSION THERAPY VISIT (OUTPATIENT)
Dept: ONCOLOGY | Facility: CLINIC | Age: 74
End: 2024-09-09
Attending: STUDENT IN AN ORGANIZED HEALTH CARE EDUCATION/TRAINING PROGRAM
Payer: MEDICARE

## 2024-09-09 ENCOUNTER — MYC MEDICAL ADVICE (OUTPATIENT)
Dept: FAMILY MEDICINE | Facility: CLINIC | Age: 74
End: 2024-09-09

## 2024-09-09 ENCOUNTER — APPOINTMENT (OUTPATIENT)
Dept: LAB | Facility: CLINIC | Age: 74
End: 2024-09-09
Attending: STUDENT IN AN ORGANIZED HEALTH CARE EDUCATION/TRAINING PROGRAM
Payer: MEDICARE

## 2024-09-09 VITALS
BODY MASS INDEX: 24.54 KG/M2 | RESPIRATION RATE: 18 BRPM | SYSTOLIC BLOOD PRESSURE: 135 MMHG | WEIGHT: 121.5 LBS | TEMPERATURE: 98.2 F | OXYGEN SATURATION: 100 % | DIASTOLIC BLOOD PRESSURE: 82 MMHG | HEART RATE: 94 BPM

## 2024-09-09 DIAGNOSIS — I42.7 DILATED CARDIOMYOPATHY SECONDARY TO DRUG (H): ICD-10-CM

## 2024-09-09 DIAGNOSIS — D70.1 CHEMOTHERAPY-INDUCED NEUTROPENIA (H): Primary | ICD-10-CM

## 2024-09-09 DIAGNOSIS — C55 LEIOMYOSARCOMA OF UTERUS (H): Primary | ICD-10-CM

## 2024-09-09 DIAGNOSIS — C55 LEIOMYOSARCOMA OF UTERUS (H): ICD-10-CM

## 2024-09-09 DIAGNOSIS — T45.1X5A CHEMOTHERAPY-INDUCED NEUTROPENIA (H): Primary | ICD-10-CM

## 2024-09-09 DIAGNOSIS — M54.16 LEFT LUMBAR RADICULOPATHY: Primary | ICD-10-CM

## 2024-09-09 DIAGNOSIS — G47.00 INSOMNIA, UNSPECIFIED TYPE: ICD-10-CM

## 2024-09-09 LAB
ALBUMIN SERPL BCG-MCNC: 4 G/DL (ref 3.5–5.2)
ALP SERPL-CCNC: 61 U/L (ref 40–150)
ALT SERPL W P-5'-P-CCNC: 16 U/L (ref 0–50)
ANION GAP SERPL CALCULATED.3IONS-SCNC: 11 MMOL/L (ref 7–15)
AST SERPL W P-5'-P-CCNC: 18 U/L (ref 0–45)
BASOPHILS # BLD AUTO: 0 10E3/UL (ref 0–0.2)
BASOPHILS NFR BLD AUTO: 0 %
BILIRUB SERPL-MCNC: 0.3 MG/DL
BUN SERPL-MCNC: 24.1 MG/DL (ref 8–23)
CALCIUM SERPL-MCNC: 9.3 MG/DL (ref 8.8–10.4)
CHLORIDE SERPL-SCNC: 106 MMOL/L (ref 98–107)
CREAT SERPL-MCNC: 0.92 MG/DL (ref 0.51–0.95)
EGFRCR SERPLBLD CKD-EPI 2021: 65 ML/MIN/1.73M2
EOSINOPHIL # BLD AUTO: 0.1 10E3/UL (ref 0–0.7)
EOSINOPHIL NFR BLD AUTO: 1 %
ERYTHROCYTE [DISTWIDTH] IN BLOOD BY AUTOMATED COUNT: 16.4 % (ref 10–15)
GLUCOSE SERPL-MCNC: 142 MG/DL (ref 70–99)
HCO3 SERPL-SCNC: 25 MMOL/L (ref 22–29)
HCT VFR BLD AUTO: 34.6 % (ref 35–47)
HGB BLD-MCNC: 11.5 G/DL (ref 11.7–15.7)
IMM GRANULOCYTES # BLD: 0 10E3/UL
IMM GRANULOCYTES NFR BLD: 0 %
LYMPHOCYTES # BLD AUTO: 2.3 10E3/UL (ref 0.8–5.3)
LYMPHOCYTES NFR BLD AUTO: 36 %
MCH RBC QN AUTO: 31.3 PG (ref 26.5–33)
MCHC RBC AUTO-ENTMCNC: 33.2 G/DL (ref 31.5–36.5)
MCV RBC AUTO: 94 FL (ref 78–100)
MONOCYTES # BLD AUTO: 0.7 10E3/UL (ref 0–1.3)
MONOCYTES NFR BLD AUTO: 10 %
NEUTROPHILS # BLD AUTO: 3.4 10E3/UL (ref 1.6–8.3)
NEUTROPHILS NFR BLD AUTO: 53 %
NRBC # BLD AUTO: 0 10E3/UL
NRBC BLD AUTO-RTO: 0 /100
PLATELET # BLD AUTO: 232 10E3/UL (ref 150–450)
POTASSIUM SERPL-SCNC: 4.1 MMOL/L (ref 3.4–5.3)
PROT SERPL-MCNC: 6.2 G/DL (ref 6.4–8.3)
RBC # BLD AUTO: 3.67 10E6/UL (ref 3.8–5.2)
SODIUM SERPL-SCNC: 142 MMOL/L (ref 135–145)
WBC # BLD AUTO: 6.5 10E3/UL (ref 4–11)

## 2024-09-09 PROCEDURE — 36591 DRAW BLOOD OFF VENOUS DEVICE: CPT | Performed by: STUDENT IN AN ORGANIZED HEALTH CARE EDUCATION/TRAINING PROGRAM

## 2024-09-09 PROCEDURE — 250N000011 HC RX IP 250 OP 636: Performed by: STUDENT IN AN ORGANIZED HEALTH CARE EDUCATION/TRAINING PROGRAM

## 2024-09-09 PROCEDURE — 82040 ASSAY OF SERUM ALBUMIN: CPT | Performed by: STUDENT IN AN ORGANIZED HEALTH CARE EDUCATION/TRAINING PROGRAM

## 2024-09-09 PROCEDURE — 96413 CHEMO IV INFUSION 1 HR: CPT

## 2024-09-09 PROCEDURE — 99213 OFFICE O/P EST LOW 20 MIN: CPT | Mod: 95 | Performed by: NURSE PRACTITIONER

## 2024-09-09 PROCEDURE — 258N000003 HC RX IP 258 OP 636: Performed by: STUDENT IN AN ORGANIZED HEALTH CARE EDUCATION/TRAINING PROGRAM

## 2024-09-09 PROCEDURE — 85025 COMPLETE CBC W/AUTO DIFF WBC: CPT | Performed by: STUDENT IN AN ORGANIZED HEALTH CARE EDUCATION/TRAINING PROGRAM

## 2024-09-09 RX ORDER — ZOLPIDEM TARTRATE 5 MG/1
5 TABLET ORAL
Qty: 20 TABLET | Refills: 0 | Status: SHIPPED | OUTPATIENT
Start: 2024-09-09

## 2024-09-09 RX ORDER — HEPARIN SODIUM (PORCINE) LOCK FLUSH IV SOLN 100 UNIT/ML 100 UNIT/ML
5 SOLUTION INTRAVENOUS
Status: DISCONTINUED | OUTPATIENT
Start: 2024-09-09 | End: 2024-09-09 | Stop reason: HOSPADM

## 2024-09-09 RX ORDER — HEPARIN SODIUM (PORCINE) LOCK FLUSH IV SOLN 100 UNIT/ML 100 UNIT/ML
5 SOLUTION INTRAVENOUS ONCE
Status: COMPLETED | OUTPATIENT
Start: 2024-09-09 | End: 2024-09-09

## 2024-09-09 RX ORDER — LIDOCAINE/PRILOCAINE 2.5 %-2.5%
CREAM (GRAM) TOPICAL
Qty: 5 G | Refills: 1 | Status: SHIPPED | OUTPATIENT
Start: 2024-09-09

## 2024-09-09 RX ADMIN — SODIUM CHLORIDE 250 ML: 9 INJECTION, SOLUTION INTRAVENOUS at 12:30

## 2024-09-09 RX ADMIN — Medication 5 ML: at 11:38

## 2024-09-09 RX ADMIN — GEMCITABINE 1200 MG: 38 INJECTION, SOLUTION INTRAVENOUS at 13:01

## 2024-09-09 RX ADMIN — Medication 5 ML: at 14:30

## 2024-09-09 ASSESSMENT — PAIN SCALES - GENERAL: PAINLEVEL: MODERATE PAIN (5)

## 2024-09-09 NOTE — PROGRESS NOTES
Infusion Nursing Note:  Maggie Navarrete presents today for Cycle 3 Day 1 Gemzar.    Patient spoke with provider today: No    Treatment Conditions:    Component      Latest Ref Rng 9/9/2024  11:53 AM   WBC      4.0 - 11.0 10e3/uL 6.5    RBC Count      3.80 - 5.20 10e6/uL 3.67 (L)    Hemoglobin      11.7 - 15.7 g/dL 11.5 (L)    Hematocrit      35.0 - 47.0 % 34.6 (L)    MCV      78 - 100 fL 94    MCH      26.5 - 33.0 pg 31.3    MCHC      31.5 - 36.5 g/dL 33.2    RDW      10.0 - 15.0 % 16.4 (H)    Platelet Count      150 - 450 10e3/uL 232    % Neutrophils      % 53    % Lymphocytes      % 36    % Monocytes      % 10    % Eosinophils      % 1    % Basophils      % 0    % Immature Granulocytes      % 0    NRBCs per 100 WBC      <1 /100 0    Absolute Neutrophils      1.6 - 8.3 10e3/uL 3.4    Absolute Lymphocytes      0.8 - 5.3 10e3/uL 2.3    Absolute Monocytes      0.0 - 1.3 10e3/uL 0.7    Absolute Eosinophils      0.0 - 0.7 10e3/uL 0.1    Absolute Basophils      0.0 - 0.2 10e3/uL 0.0    Absolute Immature Granulocytes      <=0.4 10e3/uL 0.0    Absolute NRBCs      10e3/uL 0.0    Sodium      135 - 145 mmol/L 142    Potassium      3.4 - 5.3 mmol/L 4.1    Carbon Dioxide (CO2)      22 - 29 mmol/L 25    Anion Gap      7 - 15 mmol/L 11    Urea Nitrogen      8.0 - 23.0 mg/dL 24.1 (H)    Creatinine      0.51 - 0.95 mg/dL 0.92    GFR Estimate      >60 mL/min/1.73m2 65    Calcium      8.8 - 10.4 mg/dL 9.3    Chloride      98 - 107 mmol/L 106    Glucose      70 - 99 mg/dL 142 (H)    Alkaline Phosphatase      40 - 150 U/L 61    AST      0 - 45 U/L 18    ALT      0 - 50 U/L 16    Protein Total      6.4 - 8.3 g/dL 6.2 (L)    Albumin      3.5 - 5.2 g/dL 4.0    Bilirubin Total      <=1.2 mg/dL 0.3       Legend:  (L) Low  (H) High      Note: Denies fever/chills, SOB or cough.     Requesting bed for infusion appointment today due to low back pain (pt stated this pain started about 6 weeks ago).  Rating back pain at 5/10. Uses hot packs and  cold pack at home for pain.   Has a virtual appointment this afternoon to discuss back pain.     Pt has a trip planned to [place], leaving 9/30/24.       Intravenous Access:  Implanted Port.    Post Infusion Assessment:  Patient tolerated infusion without incident.  Blood return noted pre and post infusion.  Access discontinued per protocol.    Discharge Plan:   Prescription(s) requested: Ambien - script was sent to local pharmacy by Amber Scheierl.     Discharge instructions reviewed with: Patient.  Patient and/or family verbalized understanding of discharge instructions and all questions answered.  AVS to patient via [employer]Five.  Patient will return 9/16/24 for next appointment.   Patient discharged in stable condition accompanied by: self.  Departure Mode: Ambulatory.      Jenny Lopez RN

## 2024-09-09 NOTE — TELEPHONE ENCOUNTER
See MY CHART ,     Everardo,   Do you want to cancel [facility] order and reorder MRI for [facility] ?

## 2024-09-09 NOTE — PROGRESS NOTES
Maggie is a 74 year old who is being evaluated via a billable video visit.    How would you like to obtain your AVS? MyChart  If the video visit is dropped, the invitation should be resent by: Text to cell phone: [phone]  Will anyone else be joining your video visit? No      Assessment & Plan     Left lumbar radiculopathy  Severe lumbar radiculopathy. Doing PT, actually made it worse. Going back tomorrow. Tried SI joint injection without improvement. With severe pain and worsening with PT, MRI would be recommended.   - MR Lumbar Spine w/o Contrast; Future    Leiomyosarcoma of uterus (H)  Less likely mets causing symptoms. MRI to ensure no lumbar mets             Subjective   Maggie is a 74 year old, presenting for the following health issues:  Back Pain (Lower back pain)      Video Start Time: 5:00 PM    History of Present Illness       She eats 4 or more servings of fruits and vegetables daily.She consumes 0 sweetened beverage(s) daily.        LBP for about 6 weeks. Had SI joint injection without improvement   Just had chemo today     Back still very painful   Saw Salome at [facility]. Made pain worse. Seeing Anjel tomorrow   Traveled to [place] for cancer consult. Plane was terrible for the perla. This visit went well.      Going to [place] on 9/30. Wanting to get the back pain controlled before this.         Review of Systems  Detailed as above         Objective             Physical Exam   GENERAL: alert and no distress  EYES: Eyes grossly normal to inspection.  No discharge or erythema, or obvious scleral/conjunctival abnormalities.  RESP: No audible wheeze, cough, or visible cyanosis.    SKIN: Visible skin clear. No significant rash, abnormal pigmentation or lesions.  NEURO: Cranial nerves grossly intact.  Mentation and speech appropriate for age.  PSYCH: Appropriate affect, tone, and pace of words          Video-Visit Details    Type of service:  Video Visit   Video End Time:5:15 PM  Originating  Location (pt. Location): Home    Distant Location (provider location):  On-site  Platform used for Video Visit: [place]  Signed Electronically by: SHIRAZ Dumont CNP

## 2024-09-09 NOTE — NURSING NOTE
Chief Complaint   Patient presents with    Port Draw     Labs drawn via port by RN in lab     Port accessed with 20g flat needle by RN, labs collected, line flushed with saline and heparin.  Vitals taken. Pt checked in for appointment(s).     Mabel DONOVAN RN PHN BSN  BMT/Oncology Lab

## 2024-09-10 NOTE — TELEPHONE ENCOUNTER
Patient called again inquiring about orders. Patient says they have not received faxed orders and she is unable to make an appointment without it. Fax Number is [phone]

## 2024-09-11 ENCOUNTER — TELEPHONE (OUTPATIENT)
Dept: ONCOLOGY | Facility: CLINIC | Age: 74
End: 2024-09-11
Payer: MEDICARE

## 2024-09-11 NOTE — TELEPHONE ENCOUNTER
Retail Pharmacy Prior Authorization Team   Phone: [phone]    PA Initiation    Medication:    Insurance Company:    Pharmacy Filling the Rx: [id number] - [place] - [address]  Filling Pharmacy Phone: [phone]  Filling Pharmacy Fax: [phone]  Start Date:  9/11/24

## 2024-09-11 NOTE — TELEPHONE ENCOUNTER
Prior Authorization Retail Medication Request    Medication/Dose: Lidocaine-prilocaine cream  Diagnosis and ICD code (if different than what is on RX):  C55   New/renewal/insurance change PA/secondary ins. PA:  Previously Tried and Failed:    Rationale:      Insurance   Primary:   Insurance ID:      Secondary (if applicable):  Insurance ID:      Pharmacy Information (if different than what is on RX)  Name:  [facility]   Phone:   [phone]  Fax:[phone]

## 2024-09-12 ENCOUNTER — TRANSFERRED RECORDS (OUTPATIENT)
Dept: HEALTH INFORMATION MANAGEMENT | Facility: CLINIC | Age: 74
End: 2024-09-12
Payer: MEDICARE

## 2024-09-13 ENCOUNTER — TELEPHONE (OUTPATIENT)
Dept: FAMILY MEDICINE | Facility: CLINIC | Age: 74
End: 2024-09-13
Payer: MEDICARE

## 2024-09-13 NOTE — TELEPHONE ENCOUNTER
"S-(situation): Patient calling stating, \"I would like to discuss [facility] results after my visit with Maria Esther on 09/09/24. Writer advised telephone visit to discuss. Assisted patient in scheduling.  Appointments in Next Year      Sep 16, 2024 8:30 AM  Provider Visit with SHIRAZ Dumont CNP  [facility] ([facility] ) [phone]     "

## 2024-09-16 ENCOUNTER — INFUSION THERAPY VISIT (OUTPATIENT)
Dept: ONCOLOGY | Facility: CLINIC | Age: 74
End: 2024-09-16
Attending: STUDENT IN AN ORGANIZED HEALTH CARE EDUCATION/TRAINING PROGRAM
Payer: MEDICARE

## 2024-09-16 ENCOUNTER — OFFICE VISIT (OUTPATIENT)
Dept: ENDOCRINOLOGY | Facility: CLINIC | Age: 74
End: 2024-09-16
Payer: MEDICARE

## 2024-09-16 ENCOUNTER — VIRTUAL VISIT (OUTPATIENT)
Dept: FAMILY MEDICINE | Facility: CLINIC | Age: 74
End: 2024-09-16
Payer: MEDICARE

## 2024-09-16 ENCOUNTER — MYC MEDICAL ADVICE (OUTPATIENT)
Dept: FAMILY MEDICINE | Facility: CLINIC | Age: 74
End: 2024-09-16

## 2024-09-16 ENCOUNTER — APPOINTMENT (OUTPATIENT)
Dept: LAB | Facility: CLINIC | Age: 74
End: 2024-09-16
Attending: STUDENT IN AN ORGANIZED HEALTH CARE EDUCATION/TRAINING PROGRAM
Payer: MEDICARE

## 2024-09-16 VITALS — TEMPERATURE: 98.6 F | OXYGEN SATURATION: 98 %

## 2024-09-16 VITALS
WEIGHT: 120.7 LBS | BODY MASS INDEX: 24.38 KG/M2 | SYSTOLIC BLOOD PRESSURE: 138 MMHG | HEART RATE: 95 BPM | DIASTOLIC BLOOD PRESSURE: 74 MMHG

## 2024-09-16 DIAGNOSIS — C55 LEIOMYOSARCOMA OF UTERUS (H): ICD-10-CM

## 2024-09-16 DIAGNOSIS — C55 LEIOMYOSARCOMA OF UTERUS (H): Primary | ICD-10-CM

## 2024-09-16 DIAGNOSIS — Z92.29 PERSONAL HISTORY OF OTHER DRUG THERAPY: ICD-10-CM

## 2024-09-16 DIAGNOSIS — T45.1X5A CHEMOTHERAPY-INDUCED NEUTROPENIA (H): Primary | ICD-10-CM

## 2024-09-16 DIAGNOSIS — M54.16 LEFT LUMBAR RADICULOPATHY: ICD-10-CM

## 2024-09-16 DIAGNOSIS — D70.1 CHEMOTHERAPY-INDUCED NEUTROPENIA (H): Primary | ICD-10-CM

## 2024-09-16 DIAGNOSIS — M71.30 SYNOVIAL CYST: Primary | ICD-10-CM

## 2024-09-16 DIAGNOSIS — I42.7 DILATED CARDIOMYOPATHY SECONDARY TO DRUG (H): ICD-10-CM

## 2024-09-16 DIAGNOSIS — M81.0 AGE-RELATED OSTEOPOROSIS WITHOUT CURRENT PATHOLOGICAL FRACTURE: Primary | ICD-10-CM

## 2024-09-16 LAB
BASOPHILS # BLD AUTO: 0 10E3/UL (ref 0–0.2)
BASOPHILS NFR BLD AUTO: 0 %
EOSINOPHIL # BLD AUTO: 0 10E3/UL (ref 0–0.7)
EOSINOPHIL NFR BLD AUTO: 1 %
ERYTHROCYTE [DISTWIDTH] IN BLOOD BY AUTOMATED COUNT: 16.3 % (ref 10–15)
HCT VFR BLD AUTO: 32 % (ref 35–47)
HGB BLD-MCNC: 11.1 G/DL (ref 11.7–15.7)
IMM GRANULOCYTES # BLD: 0 10E3/UL
IMM GRANULOCYTES NFR BLD: 0 %
LYMPHOCYTES # BLD AUTO: 2.7 10E3/UL (ref 0.8–5.3)
LYMPHOCYTES NFR BLD AUTO: 58 %
MCH RBC QN AUTO: 32.6 PG (ref 26.5–33)
MCHC RBC AUTO-ENTMCNC: 34.7 G/DL (ref 31.5–36.5)
MCV RBC AUTO: 94 FL (ref 78–100)
MONOCYTES # BLD AUTO: 0.4 10E3/UL (ref 0–1.3)
MONOCYTES NFR BLD AUTO: 9 %
NEUTROPHILS # BLD AUTO: 1.5 10E3/UL (ref 1.6–8.3)
NEUTROPHILS NFR BLD AUTO: 32 %
NRBC # BLD AUTO: 0 10E3/UL
NRBC BLD AUTO-RTO: 0 /100
PLATELET # BLD AUTO: 283 10E3/UL (ref 150–450)
RBC # BLD AUTO: 3.41 10E6/UL (ref 3.8–5.2)
WBC # BLD AUTO: 4.7 10E3/UL (ref 4–11)

## 2024-09-16 PROCEDURE — 99214 OFFICE O/P EST MOD 30 MIN: CPT | Performed by: INTERNAL MEDICINE

## 2024-09-16 PROCEDURE — 99441 PR PHYSICIAN TELEPHONE EVALUATION 5-10 MIN: CPT | Mod: 93 | Performed by: NURSE PRACTITIONER

## 2024-09-16 PROCEDURE — 96413 CHEMO IV INFUSION 1 HR: CPT

## 2024-09-16 PROCEDURE — 250N000011 HC RX IP 250 OP 636: Performed by: STUDENT IN AN ORGANIZED HEALTH CARE EDUCATION/TRAINING PROGRAM

## 2024-09-16 PROCEDURE — 258N000003 HC RX IP 258 OP 636: Performed by: STUDENT IN AN ORGANIZED HEALTH CARE EDUCATION/TRAINING PROGRAM

## 2024-09-16 PROCEDURE — 36591 DRAW BLOOD OFF VENOUS DEVICE: CPT

## 2024-09-16 PROCEDURE — G2211 COMPLEX E/M VISIT ADD ON: HCPCS | Performed by: INTERNAL MEDICINE

## 2024-09-16 PROCEDURE — 36591 DRAW BLOOD OFF VENOUS DEVICE: CPT | Performed by: STUDENT IN AN ORGANIZED HEALTH CARE EDUCATION/TRAINING PROGRAM

## 2024-09-16 PROCEDURE — 85025 COMPLETE CBC W/AUTO DIFF WBC: CPT | Performed by: STUDENT IN AN ORGANIZED HEALTH CARE EDUCATION/TRAINING PROGRAM

## 2024-09-16 PROCEDURE — 96415 CHEMO IV INFUSION ADDL HR: CPT

## 2024-09-16 RX ORDER — HEPARIN SODIUM (PORCINE) LOCK FLUSH IV SOLN 100 UNIT/ML 100 UNIT/ML
5 SOLUTION INTRAVENOUS ONCE
Status: COMPLETED | OUTPATIENT
Start: 2024-09-16 | End: 2024-09-16

## 2024-09-16 RX ORDER — HEPARIN SODIUM (PORCINE) LOCK FLUSH IV SOLN 100 UNIT/ML 100 UNIT/ML
5 SOLUTION INTRAVENOUS
Status: DISCONTINUED | OUTPATIENT
Start: 2024-09-16 | End: 2024-09-16 | Stop reason: HOSPADM

## 2024-09-16 RX ADMIN — GEMCITABINE 1200 MG: 38 INJECTION, SOLUTION INTRAVENOUS at 16:09

## 2024-09-16 RX ADMIN — Medication 5 ML: at 15:05

## 2024-09-16 RX ADMIN — Medication 5 ML: at 17:43

## 2024-09-16 RX ADMIN — SODIUM CHLORIDE 250 ML: 9 INJECTION, SOLUTION INTRAVENOUS at 16:07
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
## 2024-09-16 NOTE — PROGRESS NOTES
Maggie is a 74 year old who is being evaluated via a billable telephone visit.     What phone number would you like to be contacted at? [phone]  How would you like to obtain your AVS? -MyChart  -Originating Location (pt. Location): Home    Distant Location (provider location):  On-site    Assessment & Plan     Synovial cyst  Quite severe lumbar radiculopathy recently.  Just had MRI that showed synovial cyst.  She is referred to spine considering the severity of the pain.  She has already gone to PT without any improvement of pain  - Spine  Referral; Future    Left lumbar radiculopathy            Subjective   Maggie is a 74 year old, presenting for the following health issues:  No chief complaint on file.    History of Present Illness       She eats 4 or more servings of fruits and vegetables daily.She consumes 0 sweetened beverage(s) daily.      Facet 3mm synovial cyst L5-S1 along medial left with nerve impingement             Review of Systems  Detailed as above         Objective           Vitals:  No vitals were obtained today due to virtual visit.    Physical Exam   General: Alert and no distress //Respiratory: No audible wheeze, cough, or shortness of breath // Psychiatric:  Appropriate affect, tone, and pace of words            Phone call duration: 6 minutes  Signed Electronically by: SHIRAZ Dumont CNP

## 2024-09-16 NOTE — PROGRESS NOTES
Infusion Nursing Note:  Maggie Navarrete presents today for Cycle 3, Day 8- Gemcitabine Infusion.    Patient seen by provider today: No   present during visit today: Not Applicable.    Note: Patient reports feeling at baseline on arrival to infusion suite today. Denies the following signs of infection: no fever, cough, chills, rash, chest pain, shortness of breath, diarrhea, or urinary symptoms. Reports ongoing back pain from cyst, given hot pack with relief. Reports no new changes or concerns wishes to proceed with treatment today.     Per Secure Chat: Amber Scheierl, NP/ Татьяна Cuellar RN   -no interventions with ANC of 1.5  -go over neutropenic precautions and infection prevention  -patient can receive COVID and flu vaccines as she wishes.     Informed patient the infusion clinic is currently not administering these vaccines. Discussed invention prevention and neutropenic precautions.  Patient verbalized understanding.     Intravenous Access:  Implanted Port.    Treatment Conditions:  Lab Results   Component Value Date    HGB 11.1 (L) 09/16/2024    WBC 4.7 09/16/2024    ANEU 3.3 04/28/2021    ANEUTAUTO 1.5 (L) 09/16/2024     09/16/2024        Lab Results   Component Value Date     09/09/2024    POTASSIUM 4.1 09/09/2024    MAG 2.0 03/02/2024    CR 0.92 09/09/2024    SUSAN 9.3 09/09/2024    BILITOTAL 0.3 09/09/2024    ALBUMIN 4.0 09/09/2024    ALT 16 09/09/2024    AST 18 09/09/2024       Results reviewed, labs MET treatment parameters, ok to proceed with treatment.      Post Infusion Assessment:  Patient tolerated infusion without incident.  Blood return noted pre and post infusion.  Site patent and intact, free from redness, edema or discomfort.  No evidence of extravasations.  Access discontinued per protocol.       Discharge Plan:   Patient declined prescription refills.  Discharge instructions reviewed with: Patient.  Patient and/or family verbalized understanding of discharge instructions  and all questions answered.  AVS to patient via Language Cloud.  Patient will return 10/14/24 for next appointment.   Patient discharged in stable condition accompanied by: self.  Departure Mode: Ambulatory.      Татьяна Cuellar RN

## 2024-09-16 NOTE — PATIENT INSTRUCTIONS
"  [facility] T[facility] and after hours / weekends / holidays:  [phone]    Please call the triage or after hours line if you experience a temperature greater than or equal to 100.4, shaking chills, have uncontrolled nausea, vomiting and/or diarrhea, dizziness, shortness of breath, chest pain, bleeding, unexplained bruising, or if you have any other new/concerning symptoms, questions or concerns.      If you are having any concerning symptoms or wish to speak to a provider before your next infusion visit, please call triage to notify them so we can adequately serve you.     If you need a refill on a narcotic prescription or other medication, please call before your infusion appointment.     Neutropenia: Care Instructions  Overview  Neutropenia (say \"[id number]-MPF-nee-[id number]\") means that your blood has too few neutrophils. These are white blood cells that help protect the body from infection. They do this by killing bacteria.  Neutropenia can be caused by some types of infection. Some medicines can cause it too. It is most often caused by treatments for certain health problems, such as chemotherapy and radiation treatment for cancer.  Mild neutropenia usually causes no symptoms. But when it's severe, it increases the risk of infection of your skin and organs. That's because your body can't fight off germs as well as it should.  Follow-up care is a key part of your treatment and safety. Be sure to make and go to all appointments, and call your doctor if you are having problems. It's also a good idea to know your test results and keep a list of the medicines you take.  How can you care for yourself at home?  Take your medicines exactly as prescribed. Call your doctor if you have any problems with your medicine.  Eat a healthy, balanced diet and drink plenty of fluids.  Prevent infections  Take your temperature as your doctor suggests. Keep a written record of your temperature readings. Fever is a common symptom of infection. " And it may be the only symptom.  Use a soft toothbrush. Do not floss your teeth.   Use salt and soda rinses after every meal and before bed  Wash your hands often with soap and water, especially before you eat and after you use the bathroom.  If you are a woman, use sanitary napkins (pads) instead of tampons. Do not douche.  Do not use rectal thermometers or suppositories.  Avoid tasks that might expose you to germs, such as disposing of pet feces or urine.  Avoid crowds of people and anyone who might have an infection or an illness such as a cold or the flu. You may need to avoid people who have recently had certain kinds of vaccinations.  Even small injuries can get infected. Take steps to prevent cuts, burns, and sunburns.  If you have severe neutropenia, your doctor may advise you to avoid fresh fruits, vegetables, and flowers.  Call the triage line and/or present to the emergency room if you have a temperature of 100.4 or higher or if you notice any new or worsening symptoms such as cough, chills, runny nose, congestion, rash, shortness of breath, diarrhea, or urinary symptoms.

## 2024-09-16 NOTE — NURSING NOTE
Chief Complaint   Patient presents with    Blood Draw     Port blood draw with heparin flush by lab RN       Port accessed with blood draw and heparin flush by lab RN. Vitals taken and next appointment arrived.    Rebeka Stuart RN

## 2024-09-17 ENCOUNTER — PRE VISIT (OUTPATIENT)
Dept: ORTHOPEDICS | Facility: CLINIC | Age: 74
End: 2024-09-17

## 2024-09-17 ENCOUNTER — OFFICE VISIT (OUTPATIENT)
Dept: ORTHOPEDICS | Facility: CLINIC | Age: 74
End: 2024-09-17
Attending: NURSE PRACTITIONER
Payer: MEDICARE

## 2024-09-17 DIAGNOSIS — M54.16 LUMBAR RADICULOPATHY: Primary | ICD-10-CM

## 2024-09-17 DIAGNOSIS — M54.50 LUMBAR PAIN: ICD-10-CM

## 2024-09-17 DIAGNOSIS — M71.30 SYNOVIAL CYST: ICD-10-CM

## 2024-09-17 PROCEDURE — 99214 OFFICE O/P EST MOD 30 MIN: CPT | Mod: GC | Performed by: ORTHOPAEDIC SURGERY

## 2024-09-17 RX ORDER — GABAPENTIN 100 MG/1
100 CAPSULE ORAL 3 TIMES DAILY PRN
Qty: 90 CAPSULE | Refills: 0 | Status: SHIPPED | OUTPATIENT
Start: 2024-09-17

## 2024-09-17 NOTE — TELEPHONE ENCOUNTER
DIAGNOSIS: LOW BACK PAIN   APPOINTMENT DATE: 09/17/2024   NOTES STATUS DETAILS   OFFICE NOTE from referring provider Internal 09/05/2024 - Telephone [facility]    03/05/2024 - Betsy Mccann PA-C - [facility]   OFFICE NOTE from other specialist Media Tab 08/29/2024 - Inna Yee MD - [facility]    01/17/2021 - Nik Schilling MD - [facility]    11/17/2023 - [facility]    07/21/2023 - Joel Dumont MD - [facility]    05/13/2022 - Ab Gutierrez MD - [facility]   LABS     DEXA Internal Most Recent:  09/01/2023 - Hip/Spine   MRI PACS Internal   CT SCAN PACS Internal    01/17/2024 - Chest/Abd/Pel    Smith:  11/08/2023, 08/03/2023 - Chest  11/08/2023, 08/03/2023 - Abdomen/Pelvis   XRAYS (IMAGES & REPORTS) PACS Internal

## 2024-09-17 NOTE — PROGRESS NOTES
Spine Surgery Consultation    REFERRING PHYSICIAN: Everardo Pepper   PRIMARY CARE PHYSICIAN: Vandana Morris           Chief Complaint:   Consult (Synovial cyst MRI done at [facility], [place] last week )      History of Present Illness:  Symptom Profile Including: location of symptoms, onset, severity, exacerbating/alleviating factors, previous treatments:        Maggie Navarrete is a 74 year old female who presents for evaluation of lower back pain.  Patient states that she has been having lower back pain for the last couple of months.  The pain is limiting her activities of daily living such as prolonged walking and standing.  She states that her symptoms do improve with leaning forward.  The majority of her symptoms are in the lower back.  She reports that some of her symptoms are related to radiating pain down the posterior aspect of her thigh on the left lower extremity.  The pain rarely radiates below the knee on the posterior aspect of the calf.  She denies any numbness or tingling at this time.  Denies any other concerns.    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    GERD (gastroesophageal reflux disease)     Hx of previous reproductive problem 1980    Kidney stone 2011    Leiomyosarcoma (H) 11/14/2016    Personal history of colonic polyps 2016    Small, benign    PONV (postoperative nausea and vomiting)     Posterior vitreous detachment of left eye 2011    Posterior vitreous detachment of right eye 2011    Ptosis of eyelid, bilateral     Sepsis (H) 07/2020    Stomach problems 2015    Abdominal pain, Diarreha    Thyroid disease      thyroid nodule resolved 2014            Past Surgical History:     Past Surgical History:   Procedure Laterality Date    ABDOMEN SURGERY  Now    Pain, diarrhea    BREAST SURGERY  2002    right breast benign    COLONOSCOPY  2008    due in 2018    COLONOSCOPY N/A 09/08/2016    Procedure: COMBINED COLONOSCOPY, SINGLE OR MULTIPLE BIOPSY/POLYPECTOMY BY BIOPSY;  Surgeon: Abner Pepper MD;  Location:  GI    COLONOSCOPY N/A 10/18/2021    Procedure: COLONOSCOPY;  Surgeon: Jamila Villarreal MD;  Location: [facility] OR    ENT SURGERY  1975    tonsillectomy    GENITOURINARY SURGERY  1953    bladder surgery     GI SURGERY  2015    Severe constipation    GYN SURGERY  1977    C section    GYN SURGERY  1981    laparoscopy    GYN SURGERY  2007    myomectomy    hysterecomy  11/14/2016    leiomyosarcoma    IR CHEST PORT PLACEMENT > 5 YRS OF AGE  8/23/2024    OTHER SURGICAL HISTORY Right 10/27/2017    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    REMOVE PORT VASCULAR ACCESS Right 05/09/2017    Procedure: REMOVE PORT VASCULAR ACCESS;  Right Port Removal;  Surgeon: Eric Gibson PA-C;  Location:  OR    REPAIR PTOSIS BILATERAL  02/15/2012    Procedure:REPAIR PTOSIS BILATERAL; BILATERAL UPPER LID PTOSIS REPAIR; Surgeon:BRYCE REYNOLDS; Location: [place]    TUMOR REMOVAL  06/2020            Social History:     Social History     Tobacco Use    Smoking status: Never    Smokeless tobacco: Never   Substance Use Topics    Alcohol use: Yes     Comment: Social, not often per patient.            Family History:     Family History   Problem Relation Age of Onset    Skin Cancer Mother     Family History Negative Mother         glaucoma    Glaucoma Mother     Anxiety Disorder Mother     Heart Failure Mother     Diabetes Mother     Coronary Artery Disease Mother     Hypertension Mother     Breast Cancer Mother     Depression Mother     Obesity Mother     Heart Disease Father     Glaucoma Father      Diabetes Father     Coronary Artery Disease Father     Depression Father     Osteoporosis Father     Heart Disease Maternal Grandmother     Diabetes Maternal Grandmother     Heart Disease Paternal Grandmother     Cancer Paternal Grandfather         stomach    Skin Cancer Sister     Depression Sister     Thyroid Disease Sister     Retinal detachment Sister     Hypertension Sister     Hypertension Sister     Lipids Sister     Cerebrovascular Disease Sister     Depression Sister     Heart Disease Paternal Aunt     Heart Disease Paternal Uncle     Osteoporosis Other             Allergies:     Allergies   Allergen Reactions    Dust Mite Extract Other (See Comments)    Erythromycin GI Disturbance, Other (See Comments), Nausea and Nausea and Vomiting     PN: LW Reaction: Nausea    Tramadol Nausea, Other (See Comments) and Nausea and Vomiting            Medications:     Current Outpatient Medications   Medication Sig Dispense Refill    acetaminophen (TYLENOL) 500 MG tablet Take 500-1,000 mg by mouth every 6 hours as needed for mild pain Uses arthritis dose, believes it is 625 mg, takes 2 at a time, does not exceed 4g a day      calcium citrate 250 MG TABS Take 500 mg by mouth daily      carboxymethylcellulose PF (REFRESH PLUS) 0.5 % ophthalmic solution Place 1 drop into both eyes 4 times daily as needed for dry eyes      famotidine (PEPCID) 20 MG tablet Take 20 mg by mouth daily as needed (Patient not taking: Reported on 9/9/2024)      Fish Oil-Cholecalciferol (FISH OIL + D3 PO)       levothyroxine (SYNTHROID/LEVOTHROID) 75 MCG tablet TAKE 1 TABLET DAILY FOR    THYROID 90 tablet 0    lidocaine-prilocaine (EMLA) 2.5-2.5 % external cream Apply a thin layer on skin overlying port site 10-15 minutes prior to port access for labs or infusion 5 g 1    Melatonin 10 MG TABS tablet Take 10 mg by mouth      meloxicam (MOBIC) 7.5 MG tablet Take 1 tablet (7.5 mg) by mouth daily. (Patient not taking: Reported on 9/9/2024) 60 tablet 1     multivitamin w/minerals (THERA-VIT-M) tablet Take 1 tablet by mouth daily as needed (if diet insufficient to provide recommended nutrients)      ondansetron (ZOFRAN) 8 MG tablet Take 1 tablet (8 mg) by mouth every 8 hours as needed for nausea (Patient not taking: Reported on 9/9/2024) 30 tablet 4    tacrolimus (PROTOPIC) 0.1 % external ointment Apply topically 2 times daily 30 g 3    venlafaxine (EFFEXOR XR) 37.5 MG 24 hr capsule Take 1 capsule (37.5 mg) by mouth daily 90 capsule 3    VITAMIN D PO       zolpidem (AMBIEN) 5 MG tablet Take 1 tablet (5 mg) by mouth nightly as needed for sleep (Only uses if traveling or cannot sleep,). 20 tablet 0     Current Facility-Administered Medications   Medication Dose Route Frequency Provider Last Rate Last Admin    denosumab (PROLIA) injection 60 mg  60 mg Subcutaneous Q6 Months Edgardo Valderrama MD                 Review of Systems:     A 10 point ROS was performed and reviewed. Specific responses to these questions are noted at the end of the document.         Physical Exam:   Vitals: There were no vitals taken for this visit.  Constitutional: awake, alert, cooperative, no apparent distress, appears stated age.    Eyes: The sclera are white.  Ears, Nose, Throat: The trachea is midline.  Psychiatric: The patient has a normal affect.  Respiratory: breathing non-labored  Cardiovascular: The extremities are warm and perfused.  Skin: no obvious rashes or lesions.  Musculoskeletal, Neurologic, and Spine:        Lumbar Spine:    Appearance - No gross stepoffs or deformities    Motor -     L2-3: Hip flexion 5/5 R and 5/5 L strength          L3/4:  Knee extension R 5/5 and L 5/5 strength         L4/5:  Foot dorsiflexion R 5/5 L 5/5 and       EHL dorsiflexion R 4/5 L 4/5 strength         S1:  Plantarflexion/Peroneal Muscles  R 5/5 and L 5/5 strength    Sensation: intact to light touch L3-S1 distribution BLE      Neurologic:      REFLEXES Left Right   Patella 2+ 2+   Ankle jerk 2+ 2+    Babinski No upgoing great toe No upgoing great toe   Clonus 0 beats 0 beats            Imaging:   We ordered and independently reviewed new radiographs at this clinic visit. The results were discussed with the patient.  Findings include:    Reviewed lumbar MRI obtained on 9/12/2024.  This demonstrates grade 1 anterolisthesis of L4 on L5 with moderate spinal stenosis.  There is multilevel lumbar spondylosis with facet arthropathy and synovial facet cyst at left L5-S1 impinging left S1 nerve root.             Assessment and Plan:   Assessment:  74 year old female with   L4-5 degenerative spondylolisthesis, L4-S1 facet arthropathy with associated lef L5-S1 synovial cyst, Scheuermann's kyphosis  Osteopenia (most negative T-score -2.4) on Prolia,  followed by endocrinologist     Had a long discussion with the patient regarding her radiographic and clinical findings.  Discussed with patient that she would probably benefit from decompression, cyst resection, and L4 to pelvis fusion.  However, patient is planning to go to [facility] on a hiking trip by the end of this month.  She also has a surgery scheduled in November for her leiomyosarcoma that will be done in [place].  She is planning to return back to [place] in December of this year.  Considering this timeline, discussed with patient conservative management including injections, over-the-counter medications and physical therapy.  Patient is interested in injections for temporary relief at this time until she returns to [place].  She is also requesting for synovial cyst aspiration.    Will plan referral for L4-5, L5-S1 interlaminar epidural steroid injection, and L5-S1 left facet cyst aspiration of possible.  Will also prescribe gabapentin for pain control as she cannot take NSAIDs with her chemotherapy.  Continue physical therapy at this time.  Return to clinic in January of next year once back in [place] for further evaluation and surgical  discussion.    All questions answered.    Iftikhar Castellano MD  Orthopedic Surgery Resident    Patient was seen and examined with Dr. Moreno who independently evaluated the patient and agrees with the assessment and exam.     Respectfully,  Eric Moreno MD  Spine Surgery  [facility]      Attending MD (Dr. Eric Moreno) : I personally performed greater than 50% of the effort related to this patient visit and am responsible for the medical decision making and billing in this case.  I met with the patient, reviewed and verified the history and physical exam of the patient and discussed the patient's management with the other clinical providers involved in this patient's care including any involved residents or physicians assistants. I also personally reviewed the imaging and I personally formulated the treatment plan and diagnosis in their entirety.  I reviewed the above note and agree with the documented findings and plan of care, which were communicated to the patient.      Eric Moreno MD

## 2024-09-17 NOTE — LETTER
9/17/2024      Maggie Navarrete  [address]  [facility] [address]      Dear Colleague,    Thank you for referring your patient, Maggie Navarrete, to the [facility] [place]. Please see a copy of my visit note below.    Spine Surgery Consultation    REFERRING PHYSICIAN: Everardo Pepepr   PRIMARY CARE PHYSICIAN: Vandana Morris           Chief Complaint:   Consult (Synovial cyst MRI done at [facility] last week )      History of Present Illness:  Symptom Profile Including: location of symptoms, onset, severity, exacerbating/alleviating factors, previous treatments:        Maggie Navarrete is a 74 year old female who presents for evaluation of lower back pain.  Patient states that she has been having lower back pain for the last couple of months.  The pain is limiting her activities of daily living such as prolonged walking and standing.  She states that her symptoms do improve with leaning forward.  The majority of her symptoms are in the lower back.  She reports that some of her symptoms are related to radiating pain down the posterior aspect of her thigh on the left lower extremity.  The pain rarely radiates below the knee on the posterior aspect of the calf.  She denies any numbness or tingling at this time.  Denies any other concerns.    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     GERD (gastroesophageal reflux disease)      Hx of previous reproductive problem 1980     Kidney stone 2011     Leiomyosarcoma (H) 11/14/2016     Personal history of colonic polyps 2016    Small, benign     PONV  (postoperative nausea and vomiting)      Posterior vitreous detachment of left eye 2011     Posterior vitreous detachment of right eye 2011     Ptosis of eyelid, bilateral      Sepsis (H) 07/2020     Stomach problems 2015    Abdominal pain, Diarreha     Thyroid disease     thyroid nodule resolved 2014            Past Surgical History:     Past Surgical History:   Procedure Laterality Date     ABDOMEN SURGERY  Now    Pain, diarrhea     BREAST SURGERY  2002    right breast benign     COLONOSCOPY  2008    due in 2018     COLONOSCOPY N/A 09/08/2016    Procedure: COMBINED COLONOSCOPY, SINGLE OR MULTIPLE BIOPSY/POLYPECTOMY BY BIOPSY;  Surgeon: Abner Pepper MD;  Location:  GI     COLONOSCOPY N/A 10/18/2021    Procedure: COLONOSCOPY;  Surgeon: Jamila Villarreal MD;  Location: [facility] [place]     ENT SURGERY  1975    tonsillectomy     GENITOURINARY SURGERY  1953    bladder surgery      GI SURGERY  2015    Severe constipation     GYN SURGERY  1977    C section     GYN SURGERY  1981    laparoscopy     GYN SURGERY  2007    myomectomy     hysterecomy  11/14/2016    leiomyosarcoma     IR CHEST PORT PLACEMENT > 5 YRS OF AGE  8/23/2024     OTHER SURGICAL HISTORY Right 10/27/2017    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     REMOVE PORT VASCULAR ACCESS Right 05/09/2017    Procedure: REMOVE PORT VASCULAR ACCESS;  Right Port Removal;  Surgeon: Eric Gibson PA-C;  Location:  OR     REPAIR PTOSIS BILATERAL  02/15/2012    Procedure:REPAIR PTOSIS BILATERAL; BILATERAL UPPER LID PTOSIS REPAIR; Surgeon:BRYCE REYNOLDS; Location: [place]     TUMOR REMOVAL  06/2020            Social History:     Social History     Tobacco Use     Smoking status: Never     Smokeless tobacco: Never   Substance Use Topics     Alcohol use: Yes     Comment: Social, not often per patient.            Family History:     Family History   Problem Relation Age of Onset     Skin Cancer Mother      Family History  Negative Mother         glaucoma     Glaucoma Mother      Anxiety Disorder Mother      Heart Failure Mother      Diabetes Mother      Coronary Artery Disease Mother      Hypertension Mother      Breast Cancer Mother      Depression Mother      Obesity Mother      Heart Disease Father      Glaucoma Father      Diabetes Father      Coronary Artery Disease Father      Depression Father      Osteoporosis Father      Heart Disease Maternal Grandmother      Diabetes Maternal Grandmother      Heart Disease Paternal Grandmother      Cancer Paternal Grandfather         stomach     Skin Cancer Sister      Depression Sister      Thyroid Disease Sister      Retinal detachment Sister      Hypertension Sister      Hypertension Sister      Lipids Sister      Cerebrovascular Disease Sister      Depression Sister      Heart Disease Paternal Aunt      Heart Disease Paternal Uncle      Osteoporosis Other             Allergies:     Allergies   Allergen Reactions     Dust Mite Extract Other (See Comments)     Erythromycin GI Disturbance, Other (See Comments), Nausea and Nausea and Vomiting     PN: LW Reaction: Nausea     Tramadol Nausea, Other (See Comments) and Nausea and Vomiting            Medications:     Current Outpatient Medications   Medication Sig Dispense Refill     acetaminophen (TYLENOL) 500 MG tablet Take 500-1,000 mg by mouth every 6 hours as needed for mild pain Uses arthritis dose, believes it is 625 mg, takes 2 at a time, does not exceed 4g a day       calcium citrate 250 MG TABS Take 500 mg by mouth daily       carboxymethylcellulose PF (REFRESH PLUS) 0.5 % ophthalmic solution Place 1 drop into both eyes 4 times daily as needed for dry eyes       famotidine (PEPCID) 20 MG tablet Take 20 mg by mouth daily as needed (Patient not taking: Reported on 9/9/2024)       Fish Oil-Cholecalciferol (FISH OIL + D3 PO)        levothyroxine (SYNTHROID/LEVOTHROID) 75 MCG tablet TAKE 1 TABLET DAILY FOR    THYROID 90 tablet 0      lidocaine-prilocaine (EMLA) 2.5-2.5 % external cream Apply a thin layer on skin overlying port site 10-15 minutes prior to port access for labs or infusion 5 g 1     Melatonin 10 MG TABS tablet Take 10 mg by mouth       meloxicam (MOBIC) 7.5 MG tablet Take 1 tablet (7.5 mg) by mouth daily. (Patient not taking: Reported on 9/9/2024) 60 tablet 1     multivitamin w/minerals (THERA-VIT-M) tablet Take 1 tablet by mouth daily as needed (if diet insufficient to provide recommended nutrients)       ondansetron (ZOFRAN) 8 MG tablet Take 1 tablet (8 mg) by mouth every 8 hours as needed for nausea (Patient not taking: Reported on 9/9/2024) 30 tablet 4     tacrolimus (PROTOPIC) 0.1 % external ointment Apply topically 2 times daily 30 g 3     venlafaxine (EFFEXOR XR) 37.5 MG 24 hr capsule Take 1 capsule (37.5 mg) by mouth daily 90 capsule 3     VITAMIN D PO        zolpidem (AMBIEN) 5 MG tablet Take 1 tablet (5 mg) by mouth nightly as needed for sleep (Only uses if traveling or cannot sleep,). 20 tablet 0     Current Facility-Administered Medications   Medication Dose Route Frequency Provider Last Rate Last Admin     denosumab (PROLIA) injection 60 mg  60 mg Subcutaneous Q6 Months Edgardo Valderrama MD                 Review of Systems:     A 10 point ROS was performed and reviewed. Specific responses to these questions are noted at the end of the document.         Physical Exam:   Vitals: There were no vitals taken for this visit.  Constitutional: awake, alert, cooperative, no apparent distress, appears stated age.    Eyes: The sclera are white.  Ears, Nose, Throat: The trachea is midline.  Psychiatric: The patient has a normal affect.  Respiratory: breathing non-labored  Cardiovascular: The extremities are warm and perfused.  Skin: no obvious rashes or lesions.  Musculoskeletal, Neurologic, and Spine:        Lumbar Spine:    Appearance - No gross stepoffs or deformities    Motor -     L2-3: Hip flexion 5/5 R and 5/5 L strength           L3/4:  Knee extension R 5/5 and L 5/5 strength         L4/5:  Foot dorsiflexion R 5/5 L 5/5 and       EHL dorsiflexion R 4/5 L 4/5 strength         S1:  Plantarflexion/Peroneal Muscles  R 5/5 and L 5/5 strength    Sensation: intact to light touch L3-S1 distribution BLE      Neurologic:      REFLEXES Left Right   Patella 2+ 2+   Ankle jerk 2+ 2+   Babinski No upgoing great toe No upgoing great toe   Clonus 0 beats 0 beats            Imaging:   We ordered and independently reviewed new radiographs at this clinic visit. The results were discussed with the patient.  Findings include:    Reviewed lumbar MRI obtained on 9/12/2024.  This demonstrates grade 1 anterolisthesis of L4 on L5 with moderate spinal stenosis.  There is multilevel lumbar spondylosis with facet arthropathy and synovial facet cyst at left L5-S1 impinging left S1 nerve root.             Assessment and Plan:   Assessment:  74 year old female with   L4-5 degenerative spondylolisthesis, L4-S1 facet arthropathy with associated lef L5-S1 synovial cyst, Scheuermann's kyphosis  Osteopenia (most negative T-score -2.4) on Prolia,  followed by endocrinologist     Had a long discussion with the patient regarding her radiographic and clinical findings.  Discussed with patient that she would probably benefit from decompression, cyst resection, and L4 to pelvis fusion.  However, patient is planning to go to [facility] on a hiking trip by the end of this month.  She also has a surgery scheduled in November for her leiomyosarcoma that will be done in [place].  She is planning to return back to [place] in December of this year.  Considering this timeline, discussed with patient conservative management including injections, over-the-counter medications and physical therapy.  Patient is interested in injections for temporary relief at this time until she returns to [place].  She is also requesting for synovial cyst aspiration.    Will plan referral for  L4-5, L5-S1 interlaminar epidural steroid injection, and L5-S1 left facet cyst aspiration of possible.  Will also prescribe gabapentin for pain control as she cannot take NSAIDs with her chemotherapy.  Continue physical therapy at this time.  Return to clinic in January of next year once back in [place] for further evaluation and surgical discussion.    All questions answered.    Iftikhar Castellano MD  Orthopedic Surgery Resident    Patient was seen and examined with Dr. Moreno who independently evaluated the patient and agrees with the assessment and exam.     Respectfully,  Eric Moreno MD  Spine Surgery  [facility]      Attending MD (Dr. Eric Moreno) : I personally performed greater than 50% of the effort related to this patient visit and am responsible for the medical decision making and billing in this case.  I met with the patient, reviewed and verified the history and physical exam of the patient and discussed the patient's management with the other clinical providers involved in this patient's care including any involved residents or physicians assistants. I also personally reviewed the imaging and I personally formulated the treatment plan and diagnosis in their entirety.  I reviewed the above note and agree with the documented findings and plan of care, which were communicated to the patient.      Eric Moreno MD      Again, thank you for allowing me to participate in the care of your patient.        Sincerely,        Eric Moreno MD

## 2024-09-17 NOTE — NURSING NOTE
Reason For Visit:   Chief Complaint   Patient presents with    Consult     Synovial cyst MRI done at [facility] last week        Primary MD: Vandana Morris  Ref. MD: Everardo Pepper, CNP     ?  No  Occupation Retired.  Currently working? No.  Work status?  Retired.  Date of injury: N/A  Type of injury: NA.  Date of surgery: N/A  Type of surgery: N/A.  Smoker: No  Request smoking cessation information: No    There were no vitals taken for this visit.    Pain Assessment  Patient Currently in Pain: Yes  Patient's Stated Pain Goal: 2  0-10 Pain Scale: 2  Primary Pain Location: Back    Oswestry (REHAN) Questionnaire        8/21/2024     2:12 PM   OSWESTRY DISABILITY INDEX   Count 9   Sum 7   Oswestry Score (%) 15.56 %            Neck Disability Index (NDI) Questionnaire         No data to display                       Visual Analog Pain Scale  Back Pain Scale 0-10: 2  Right leg pain: 0  Left leg pain: 0  Neck Pain Scale 0-10: 0  Right arm pain: 0  Left arm pain: 0    Promis 10 Assessment        3/5/2024     1:59 PM   PROMIS 10   In general, would you say your health is: Good   In general, would you say your quality of life is: Very good   In general, how would you rate your physical health? Good   In general, how would you rate your mental health, including your mood and your ability to think? Good   In general, how would you rate your satisfaction with your social activities and relationships? Good   In general, please rate how well you carry out your usual social activities and roles Very good   To what extent are you able to carry out your everyday physical activities such as walking, climbing stairs, carrying groceries, or moving a chair? Mostly   In the past 7 days, how often have you been bothered by emotional problems such as feeling anxious, depressed, or irritable? Rarely   In the past 7 days, how would you rate your fatigue on average? Mild   In the past 7 days, how would you rate your pain on  average, where 0 means no pain, and 10 means worst imaginable pain? 2   In general, would you say your health is: 3   In general, would you say your quality of life is: 4   In general, how would you rate your physical health? 3   In general, how would you rate your mental health, including your mood and your ability to think? 3   In general, how would you rate your satisfaction with your social activities and relationships? 3   In general, please rate how well you carry out your usual social activities and roles. (This includes activities at home, at work and in your community, and responsibilities as a parent, child, spouse, employee, friend, etc.) 4   To what extent are you able to carry out your everyday physical activities such as walking, climbing stairs, carrying groceries, or moving a chair? 4   In the past 7 days, how often have you been bothered by emotional problems such as feeling anxious, depressed, or irritable? 2   In the past 7 days, how would you rate your fatigue on average? 2   In the past 7 days, how would you rate your pain on average, where 0 means no pain, and 10 means worst imaginable pain? 2   Global Mental Health Score 14   Global Physical Health Score 15   PROMIS TOTAL - SUBSCORES 29                Kailash Garcia CMA

## 2024-09-18 ENCOUNTER — NURSE TRIAGE (OUTPATIENT)
Dept: ONCOLOGY | Facility: CLINIC | Age: 74
End: 2024-09-18

## 2024-09-18 ENCOUNTER — PATIENT OUTREACH (OUTPATIENT)
Dept: ONCOLOGY | Facility: CLINIC | Age: 74
End: 2024-09-18

## 2024-09-18 ENCOUNTER — ALLIED HEALTH/NURSE VISIT (OUTPATIENT)
Dept: ENDOCRINOLOGY | Facility: CLINIC | Age: 74
End: 2024-09-18
Payer: MEDICARE

## 2024-09-18 DIAGNOSIS — M81.0 AGE-RELATED OSTEOPOROSIS WITHOUT CURRENT PATHOLOGICAL FRACTURE: Primary | ICD-10-CM

## 2024-09-18 DIAGNOSIS — Z92.29 PERSONAL HISTORY OF OTHER DRUG THERAPY: ICD-10-CM

## 2024-09-18 PROCEDURE — 96372 THER/PROPH/DIAG INJ SC/IM: CPT | Performed by: INTERNAL MEDICINE

## 2024-09-18 PROCEDURE — 99207 PR NO CHARGE NURSE ONLY: CPT | Performed by: INTERNAL MEDICINE

## 2024-09-18 NOTE — TELEPHONE ENCOUNTER
CT scan Monday AM, another procedure at same time, tried to rescheduled CT but they have no availability at any location. Pt requesting CT order be sent to [facility] in hopes she can have CT done Friday or Monday.     Pt having cyst drained at [facility] at 0940 on Monday AM- is going to [place] and cannot sit, needs this cyst drained.     Pt is supposed to see Dr. Galvan on Tuesday 9/24.  Pt requesting call from [facility] when order has been faxed to [facility].

## 2024-09-18 NOTE — PATIENT INSTRUCTIONS
You received your Prolia injection today  Your next injection is due in 6 months, scheduled 3/18/25  If you plan on having any dental work done within the next 6 months, please let your dentist know that you are on this medication.   Make sure you do not have any dental work completed involving the jaw bone within 1 month prior to your scheduled injection.   Provided Prolia VIS Sheet to patient.     Joni Mcclain RN

## 2024-09-18 NOTE — PROGRESS NOTES
Clinic Administered Medication Documentation      Prolia Documentation    Indication: Prolia  (denosumab) is a prescription medicine used to treat osteoporosis in patients who:   Are at high risk for fracture, meaning patients who have had a fracture related to osteoporosis, or who have multiple risk factors for fracture.  Cannot use another osteoporosis medicine or other osteoporosis medicines did not work well.  The timeline for early/late injections would be 4 weeks early and any time after the 6 month jose. If a patient receives their injection late, then the subsequent injection would be 6 months from the date that they actually received the injection.    When was the last injection?  10/3/23  Was the last injection at least 6 months ago? Yes  Has the prior authorization been completed?  Yes  Is there an active order (written within the past 365 days, with administrations remaining, not ) in the chart?  Yes   GFR Estimate   Date Value Ref Range Status   2024 65 >60 mL/min/1.73m2 Final     Comment:     eGFR calculated using  CKD-EPI equation.   2021 62 >60 mL/min/[1.73_m2] Final     GFR, ESTIMATED POCT   Date Value Ref Range Status   2024 36 (L) >60 mL/min/1.73m2 Final     Has patient had a GFR within the last 12 months? Yes   Is GFR under 30, or patient has a diagnosis of CKD4 or CKD5? No   Patient denies gastric bypass or parathyroid surgery in past 6 months? Yes - patient denies.   Patient denies dental work in the past two months involving drilling into the bone, such as implants/extractions, oral surgery or a tooth extraction that has not healed yet?  Yes  Patient denies plans for an emergency tooth extraction within the next week? Yes    The following steps were completed to comply with the REMS program for Prolia:  Reviewed information in the Medication Guide, including the serious risks of Prolia  and the symptoms of each risk.  Advised patient to seek prompt medical  attention if they have signs or symptoms of any of the serious risks.  Provided each patient a copy of the Medication Guide and Patient Guide.    Prior to injection, verified patient identity using patient's name and date of birth. Medication was administered. Please see MAR and medication order for additional information. Patient instructed to remain in clinic for 15 minutes and report any adverse reaction to staff immediately.    Vial/Syringe: Syringe  Was this medication supplied by the patient? No  Verified that the patient has refills remaining in their prescription.     Maggie Navarrete comes into clinic today at the request of Dr Valderrama Ordering Provider for Med Injection only Prolia .    This service provided today was under the supervising provider of the day Dr Valderrama, who was available if needed.    Joni Mcclain RN

## 2024-09-18 NOTE — PROGRESS NOTES
[facility]: Cancer Care                                                                                          CT scan orders were faxed to [facility] at [phone].  Also responded to Maggie via [employer] letting her know that this needs to be done sometimes this week or at the latest Monday 9/23.      Jose Alberto Morris RN

## 2024-09-19 ENCOUNTER — TELEPHONE (OUTPATIENT)
Dept: NEUROSURGERY | Facility: CLINIC | Age: 74
End: 2024-09-19
Payer: MEDICARE

## 2024-09-19 NOTE — TELEPHONE ENCOUNTER
[facility] Call Center    Phone Message    May a detailed message be left on voicemail: yes     Reason for Call: Other: Dianne calling from [employer] She needs carnification on an order for  injection.     Action Taken: Other: te    Travel Screening: Not Applicable     Date of Service:

## 2024-09-19 NOTE — TELEPHONE ENCOUNTER
ATC called [facility] back to relay verbal orders for patient's injection order: L4-5 ILESI and L5-S1 synovial cyst rupture.  Orders were confirmed by [facility].    Christiano Guerra MS, ATC, [facility], [facility]  Dr. Eric Vargas

## 2024-09-19 NOTE — TELEPHONE ENCOUNTER
JANES confirmed with Dr. Castellano on injection order.  Will call back [facility] to verbally ok L4-5 ILESI and L5-S1 synovial cyst rupture.    Christiano Guerra MS, ATC, [facility], [facility]  Dr. Eric Vargas

## 2024-09-20 ENCOUNTER — PATIENT OUTREACH (OUTPATIENT)
Dept: CARE COORDINATION | Facility: CLINIC | Age: 74
End: 2024-09-20

## 2024-09-23 ENCOUNTER — TRANSFERRED RECORDS (OUTPATIENT)
Dept: HEALTH INFORMATION MANAGEMENT | Facility: CLINIC | Age: 74
End: 2024-09-23

## 2024-09-23 NOTE — PROGRESS NOTES
[facility] CANCER CLINIC    NEW PATIENT VISIT NOTE    PATIENT NAME: Maggie Navarrete MRN # [id number]  DATE OF VISIT: July 17, 2024 YOB: 1950    REFERRING PROVIDER: Referred Self, MD  No address on file    CANCER TYPE: metastatic uterine leiomyosarcoma       CHIEF COMPLAIN   None     HISTORY OF PRESENT ILLNESS   Maggie is a 74 year old women with PMH of metastatic uterine leiomyosarcoma. She was originally diagnosed on 2016 showing an up to 12 cm mass. She underwent surgical resection followed by adjuvant doxorubicin/dacarbazine for 5 cycles. She had local recurrence on 2020 treated with surgical resection. Then  again recurrence on 2022 again underwent surgical resection. Followed by [id number] with anastrozole. Since January of 2024 she has been on trial a BOAST clinical trial with Dr. Nik Schilling at the [facility]. She received abemaciclib there. Now, she was taken off of trial due to progression on 1 of 3 intra-abdominal lesions.     She comes here to continue medical management, she has seen Dr. Michaud at [place] for a second opinion. She is interested in surgical resection which has been offered at [facility] with possible Gemzar HIPEC.      She started on single agent Gemzar on 7/29/24. She is here today for follow up prior to cycle 3.     Interval Hx  She feels very well now, she exercises regularly and has no related GI symptoms. She has been tolerating gemzar well with no significant side effects.   She refused to get a PET scan because of concern with contrast.      PAST ONCOLOGIC HISTORY   Leiomyosarcoma Uterus   11/3/2016 Initial Diagnosis     11/4/2016 Surgery and Procedures   Open hyst, BSO, stage II with peritoneal involvement    12/26/2016 - 3/23/2017 Chemotherapy   Received elsewhere: adjuvant chemotherapy with doxorubicin 25 mg/m2/day and DTIC 250 mg/m2/day , days 1-3 every 21 weeks.  Completed 5 cycles of adjuvant doxorubicin and  dacarbazine      6/1/2020 Recurrence Local   Isolated lesion in the RLQ    6/16/2020 Surgery and Procedures   Laparotomy, excision of side wall mass, ureteroneocystotomy. 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 The excised margins were negative for tumor.    3/31/2022 [facility]   PET with isolated bowel lesion in LLQ.     4/4/2022 Surgery and Procedures   Exlap, FANNY, small bowel lesion excised with negative margins    5/9/2023 Surgery and Procedures   Dr. Camarillo and Dr. Krueger    Exploratory laparotomy, tumor debulking, cystoscopy and placement of internal ureteral stent and ureterolysis     Findings  Two lesions in the abdomen consistent with very indolent appearing sarcoma on frozen section, 1 in the right upper quadrant in the large bowel mesentery. And 1 on the left sidewall between the left sidewall and the left ureter and the sigmoid colon    5/9/2023 Biopsy/Pathology   FINAL DIAGNOSIS     A. Soft tissue, hepatic flexure nodule, excision: Involved   by smooth muscle neoplasm, 1.4 cm. See comment.     B. Soft tissue, left sidewall mass, excision: Recurrent   leiomyosarcoma, 2.9 cm. See comment.     C. Colon, sigmoid mass, excision: Involved by smooth   muscle neoplasm with increased mitotic activity, 6 mm. See   comment.     D. Soft tissue, left nohemi-ureteral, biopsy: Involved by   smooth muscle neoplasm, 3 mm. See comment.     E. Peritoneum, left pelvic sidewall margin, excision:   Involved by smooth muscle neoplasm, 1 mm. See comment.     F. Soft tissue, sigmoid colon margin, excision: Negative   for tumor.     Comment: Immunohistochemical stains performed at [facility] demonstrate that the left sidewall mass (B2) is   positive for desmin, caldesmon, ER (moderate, 60-70%), and   MS (moderate, 20-30%), but is negative for HMB45, Melan A,   STAT6, ALK, DOG1, and KIT; this immunophenotype supports a   smooth muscle neoplasm. The left sidewall mass shows    increased mitotic activity (greater than 10 mitoses/10 HPF)   and atypia sufficient to meet criteria for a   leiomyosarcoma. The remaining lesion show minimal atypia   but increased mitotic activity (though less than 10   mitoses/10 HPF) where evaluable, but evaluation is limited   by the small size of the lesions. In the context of a known   history of leiomyosarcoma and a concurrent tumor meeting   diagnostic criteria, these likely represent deceptively   bland additional foci of recurrent leiomyosarcoma.     8/2023 - Biological/Targeted/Hormone Therapy   Anastrozole     11/8/2023 Progression/Relapse   CT scan chest-abdomen-pelvis shows progression of 3 nodules within the peritoneal cavity.    1/18/2024 - 6/5/2024 Chemotherapy   BOAST clinical trial with Dr. Nik Schilling at [facility]. She received abemaciclib there. Was taken off of trial due to progression.      PAST MEDICAL HISTORY   None    PAST SURGICAL HISTORY   As per HPI     FAMILY HISTORY   Pending     ALLERGIES      Allergies   Allergen Reactions    Dust Mite Extract Other (See Comments)    Erythromycin GI Disturbance, Other (See Comments), Nausea and Nausea and Vomiting     PN: LW Reaction: Nausea    Tramadol Nausea, Other (See Comments) and Nausea and Vomiting          SOCIAL HISTORY     Social History     Social History Narrative    How much exercise per week? Walking daily.    How much calcium per day? Supplement and through diet       How much caffeine per day? 3 times a week    How much vitamin D per day? Supplement    Do you/your family wear seatbelts?  Yes    Do you/your family use safety helmets? NA    Do you/your family use sunscreen? Sometimes    Do you/your family keep firearms in the home? No    Do you/your family have a smoke detector(s)? Yes    Do you feel safe in your home? Yes    Has anyone ever touched you in an unwanted manner? 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  2 grandsons now in [place] with the mother.  In a house.  .          How much exercise per week? Daily walking, will start going to the y    How much calcium per day? Through foods       How much caffeine per day? One cup    How much vitamin D per day? Through foods    Do you/your family wear seatbelts?  Yes    Do you/your family use safety helmets? N/a    Do you/your family use sunscreen? Yes    Do you/your family keep firearms in the home? No    Do you/your family have a smoke detector(s)? Yes        Reviewed by Nicki Cornejo 10-11-21               CURRENT OUTPATIENT MEDICATIONS     Current Outpatient Medications   Medication Sig Dispense Refill    acetaminophen (TYLENOL) 500 MG tablet Take 500-1,000 mg by mouth every 6 hours as needed for mild pain Uses arthritis dose, believes it is 625 mg, takes 2 at a time, does not exceed 4g a day      calcium citrate 250 MG TABS Take 500 mg by mouth daily      carboxymethylcellulose PF (REFRESH PLUS) 0.5 % ophthalmic solution Place 1 drop into both eyes 4 times daily as needed for dry eyes      famotidine (PEPCID) 20 MG tablet Take 20 mg by mouth daily as needed (Patient not taking: Reported on 2024)      Fish Oil-Cholecalciferol (FISH OIL + D3 PO)       gabapentin (NEURONTIN) 100 MG capsule Take 1 capsule (100 mg) by mouth 3 times daily as needed for neuropathic pain. 90 capsule 0    levothyroxine (SYNTHROID/LEVOTHROID) 75 MCG tablet TAKE 1 TABLET DAILY FOR    THYROID 90 tablet 0    lidocaine-prilocaine (EMLA) 2.5-2.5 % external cream Apply a thin layer on skin overlying port site 10-15 minutes prior to port access for labs or infusion 5 g 1    Melatonin 10 MG TABS tablet Take 10 mg by mouth      meloxicam (MOBIC) 7.5 MG tablet Take 1 tablet (7.5 mg) by mouth daily. (Patient not taking: Reported on 2024) 60 tablet 1     multivitamin w/minerals (THERA-VIT-M) tablet Take 1 tablet by mouth daily as needed (if diet insufficient to provide recommended nutrients)      ondansetron (ZOFRAN) 8 MG tablet Take 1 tablet (8 mg) by mouth every 8 hours as needed for nausea (Patient not taking: Reported on 2024) 30 tablet 4    tacrolimus (PROTOPIC) 0.1 % external ointment Apply topically 2 times daily 30 g 3    venlafaxine (EFFEXOR XR) 37.5 MG 24 hr capsule Take 1 capsule (37.5 mg) by mouth daily 90 capsule 3    VITAMIN D PO       zolpidem (AMBIEN) 5 MG tablet Take 1 tablet (5 mg) by mouth nightly as needed for sleep (Only uses if traveling or cannot sleep,). 20 tablet 0          REVIEW OF SYSTEMS   As above in the HPI, o/w complete 12-point ROS was negative.     PHYSICAL EXAM   /82 (BP Location: Right arm, Patient Position: Sitting, Cuff Size: Adult Regular)   Pulse 105   Temp 98.8  F (37.1  C) (Oral)   Resp 16   Wt 54.8 kg (120 lb 12.8 oz)   SpO2 100%   BMI 24.40 kg/m      EGO  GEN: NAD  HEENT: PERRL, EOMI, no icterus, injection or pallor. Oropharynx is clear.  NECK: no cervical or supraclavicular lymphadenopathy  LUNGS: clear bilaterally  CV: regular, no murmurs, rubs, or gallops  ABDOMEN: soft, non-tender, non-distended, normal bowel sounds, no hepatosplenomegaly by percussion or palpation. Surgical scars from prior surgeries well healed.   EXT: warm, well perfused, no edema  NEURO: alert  SKIN: no rashes     LABORATORY AND IMAGING STUDIES     Recent Labs   Lab Test 24  1330 24  1200 24  1438 24  1043 24  1539 20  0028 19  1509 18  0951     --   --  141 141   < >  --   --    POTASSIUM 4.5  --   --  4.2 4.3   < >  --   --    CHLORIDE 104  --   --  106 108*   < >  --   --    CO2 25  --   --  25 25   < >  --   --    ANIONGAP 9  --   --  10 8   < >  --   --    BUN 18.2  --   --  25.8* 20.8   < >  --   --    CR 0.98* 1.5*   < > 1.27* 1.47*   < >  --   --    GLC 80  --   --  90  85   < >  --   --    SUSAN 9.3  --   --  9.4 8.7*   < > 9.4 9.1   MAG  --   --   --  2.0 2.0   < > 2.2 2.1   PHOS  --   --   --   --   --   --  3.0 2.9    < > = values in this interval not displayed.     Recent Labs   Lab Test 06/22/24  1330 03/02/24  1043 01/29/24  1539   WBC 6.6 5.2 5.8   HGB 12.0 11.8 13.1    303 170   * 91 91   NEUTROPHIL 50 46 55     Recent Labs   Lab Test 06/22/24  1330 03/02/24  1043 01/29/24  1539   BILITOTAL 0.3 0.4 0.3   ALKPHOS 58 44 56   ALT 15 21 23   AST 20 17 19   ALBUMIN 4.2 4.2 4.1     TSH   Date Value Ref Range Status   11/17/2023 2.89 0.30 - 4.20 uIU/mL Final   07/25/2022 1.26 0.40 - 4.00 mU/L Final   12/16/2020 1.44 0.40 - 4.00 mU/L Final       Results for orders placed or performed during the hospital encounter of 06/03/24   CT Chest/Abdomen/Pelvis w Contrast    Narrative    CT CHEST/ABDOMEN/PELVIS W CONTRAST 6/3/2024 12:25 PM    CLINICAL HISTORY: Metastatic sarcoma, assess for response to  chemotherapy. 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).    TECHNIQUE: CT scan of the chest, abdomen, and pelvis was performed  following injection of IV contrast. Multiplanar reformats were  obtained. Dose reduction techniques were used.   CONTRAST: 64 mL Isovue-370    COMPARISON: CT chest, abdomen and pelvis 4/8/2024.    FINDINGS:   LOWER NECK: Unremarkable.    LUNGS AND PLEURA: No focal consolidation or pleural effusion.  Calcified granulomas. No new or growing suspicious pulmonary nodule.    AIRWAYS: Patent.    HEART: No pericardial effusion.  No coronary calcifications. No  thoracic aortic aneurysm.    PULMONARY ARTERIES: Unremarkable.    MEDIASTINUM AND BEATRIZ: No lymphadenopathy. Calcified lymph nodes.    HEPATOBILIARY: Unremarkable. Gallbladder is present without  gallstones.    SPLEEN: Unremarkable.    PANCREAS: Unremarkable.    ADRENAL GLANDS: Unremarkable.    KIDNEYS/URETERS/BLADDER: Similar right renal atrophy/cortical  scarring. Left parapelvic and cortical  cysts; no follow-up necessary.    BOWEL: No bowel dilatation or wall thickening.    LYMPH NODES AND PERITONEUM: Redemonstrated abdominal soft tissue  masses as on series 3:  -Left upper quadrant, 2.6 x 4 x 2.2 cm, previously 2.1 x 2.9 x 2.1 cm  when measured similarly, image 110   -Soft tissue lesion between the transverse colon and stomach, 3.1 x  2.8 cm, previously 3.1 x 2.6 cm, image 168  -Superior to the pancreas, 1.4 cm, image 125    No new lesion.    VASCULATURE: Nonaneurysmal abdominal aorta.    PELVIS: Uterus is surgically absent. No pelvic mass.    MUSCULOSKELETAL: No aggressive osseous lesion. Similar L4/L5 grade 1  anterolisthesis.    ADDITIONAL FINDINGS: None.      Impression    IMPRESSION:  1.  Since 4/8/2024, increased in size left upper quadrant soft tissue  masses. Other abdominal soft tissue lesions have not significantly  changed.  2.  No convincing new site of disease.    ORIN PAUL MD         SYSTEM ID:  [id number]     CT CAP 9/24/24  Mesentery/peritoneum/retroperitoneum: Soft tissue mass in the  transverse mesial colon measuring 4.0 x 3.8 cm with decreased  enhancement compared to prior. Previously measured 2.8 x 3.1 cm.  Mildly enhancing mass anterior to the superior pole of the spleen  measuring 2.8 x 5.2 cm, previously 2.6 x 4.0 cm. Stable size of soft  tissue nodule anterior to the pancreas measuring 1.5 cm (series 11  image 37). There is interval central hypoenhancement. Trace central  mesenteric free fluid.     Lymph nodes: No significant lymphadenopathy.     Vasculature: Scattered atherosclerotic calcification of abdominal  aorta with no aneurysmal dilation.  Portal veins are patent.     Pelvis: Urinary bladder is normal.     Osseous structures: No aggressive or acute osseous lesion.  Multilevel  degenerative changes of the spine most pronounced at L4-L5. Grade 1  anterolisthesis of L4 on L5. Enhancing circumscribed extradural lesion  at the level of L5-S1 measuring 1.1 cm in  craniocaudal dimension  (series 14 image 100).      Soft tissues: Tiny fat containing ventral/umbilical hernia.                                                                      IMPRESSION:   1. Compared to CT dated 6/3/2024, increased size of peritoneal soft  tissue masses in the transverse mesial colon and left upper quadrant.  Stable size of soft tissue nodule anterior to the pancreas with  interval central hypoenhancement suspicious for necrosis.  2. No evidence of new disease in the chest abdomen and pelvis.  3. Enhancing circumscribed extradural lesion at the level of L5-S1,  may represent synovial cyst with hemorrhage as described on recent  lumbar MRI.  4.Additional findings similar to prior CT.        PATHOLOGY   12/12/2016  FINAL DIAGNOSIS:   CASE FROM [place], [place] ([id number], OBTAINED 11/04/2016):   A. UTERINE MASS, RESECTION:   - Leiomyosarcoma, 12 cm in aggregate (per report)   - Please see comment     COMMENT:   Per the received report, a right cul-de-sac biopsy was involved by   leiomyosarcoma.   The diagnosis of leiomyosarcoma is made on the basis of examination of   the one slide submitted.  There is moderate nuclear atypia, tumor   necrosis and increased mitotic activities (10/10 HPFs).     Tempus profiling from her 4/5/22 specimen showed CDKN2A and CDKN2B loss.     I reviewed the medical records including prior oncology notes, laboratory studies which are notable for adequate renal and hepatic function, and normal blood counts.   I personally reviewed imaging including CT of the CAP done on 6/12/2024 which demonstrates enlargement of the peritoneal mass adjacent to the spleen, now up to 4 x 2.6 cm. She has 2 other peritoneal lesions up to 2.8 cm that have remained stable.       ASSESSMENT AND PLAN     Maggie is a 74 year old women with PMH of metastatic uterine leiomyosarcoma. She was originally diagnosed on 2016 showing an up to 12 cm mass. She underwent surgical resection  followed by adjuvant doxorubicin/dacarbazine for 5 cycles. She had local recurrence on 2020 treated with surgical resection. Then  again recurrence on 2022 again underwent surgical resection. Followed by [id number] with anastrozole. Since January of 2024 she has been on trial a BOAST clinical trial with Dr. Nik Schilling at the [facility]. She received abemaciclib there. Now, she was taken off of trial due to progression on 1 of 3 intra-abdominal lesions.     She comes here to continue medical management, she has seen Dr. Michaud at [facility] for a second opinion. She is interested in surgical resection which has been offered at [facility] with possible Gemzar HIPEC.      She feels very well now, she exercises regularly and has no related GI symptoms.     She saw Dr. Michaud at [facility], who recommended against further surgical resection and for systemic therapy with other agents with activity in leiomyosarcoma including gemcitabine +/- docetaxel, trabectedin and pazopanib.     She is s/p 2 cycles of gemcitabine single agent. She is feeling well and tells me that surgery will be planned for 11/15. I explained that in her situation I do not think that surgery has chances of a meaningful response as she has disease in different areas. She insists that the team at [facility] has great expertise in surgery and HIPEC for cases of advanced leiomyosarcoma. I have not been able to communicate with her team there. She tells me they plan on intraoperative chemo with cisplatin.     I personally reviewed the most recent CT CAP and the overread done here at the [facility]. Two of the prior metastatic deposits have increased in size, although it seems that he enhancement is less suggesting possible increase in the necrosis. Other pancreatic lesion stable in size but more necrotic. Unfortunately we do not have a PET to better assess the metabolic response.   I will not recommend for surgical resection if we believe she  has progressive disease. Sometimes sarcomas do enlarge as necrosis ensues. I recommend to continue chemo for 2 more cycles and repeat the scans. Patient is adamant she wants to move forward with surgical resection.     Addendum: After reviewing imaging at the [facility], we saw that Maggie had disease progression with enlarging lesions in the abdomen, and no new lesions. For this reason, I recommend to switch therapy to doxil/dacarbazine which is one of the most active regimens for leiomyosarcoma. She received free doxorubicin/dacarbazine after diagnosis and had almost 4 years of disease free interval, so I think this combination has highest chances of a meaningful response in her case.     Plan:     -SINTIA visit on 10/14/24  -scheduled  -Infusion appointment for doxil/dacarbazine on 10/15 or 10/16 if feasible  -I have communicated with her surgeon and surgery will be deferred until better systemic control with chemo.   -SINTIA toxicity check after chemo on 10/31  -C2 of chemo (4 weeks from C1)  -CT CAP 12/2  -Follow up Dr. Powers 12/4    40 minutes spent on the date of the encounter doing chart review, review of outside records, review of test results, interpretation of tests, patient visit, documentation, and discussion with other provider(s)     Beto Galvan MD   of Medicine  Hematology, Oncology and Transplantation

## 2024-09-24 ENCOUNTER — HOSPITAL ENCOUNTER (OUTPATIENT)
Dept: GENERAL RADIOLOGY | Facility: CLINIC | Age: 74
Discharge: HOME | End: 2024-09-24
Attending: STUDENT IN AN ORGANIZED HEALTH CARE EDUCATION/TRAINING PROGRAM | Admitting: STUDENT IN AN ORGANIZED HEALTH CARE EDUCATION/TRAINING PROGRAM
Payer: MEDICARE

## 2024-09-24 ENCOUNTER — ONCOLOGY VISIT (OUTPATIENT)
Dept: ONCOLOGY | Facility: CLINIC | Age: 74
End: 2024-09-24
Attending: STUDENT IN AN ORGANIZED HEALTH CARE EDUCATION/TRAINING PROGRAM
Payer: MEDICARE

## 2024-09-24 VITALS
OXYGEN SATURATION: 100 % | WEIGHT: 120.8 LBS | RESPIRATION RATE: 16 BRPM | BODY MASS INDEX: 24.4 KG/M2 | SYSTOLIC BLOOD PRESSURE: 139 MMHG | HEART RATE: 105 BPM | DIASTOLIC BLOOD PRESSURE: 82 MMHG | TEMPERATURE: 98.8 F

## 2024-09-24 DIAGNOSIS — C49.9 LEIOMYOSARCOMA (H): Primary | ICD-10-CM

## 2024-09-24 PROCEDURE — 99215 OFFICE O/P EST HI 40 MIN: CPT | Performed by: STUDENT IN AN ORGANIZED HEALTH CARE EDUCATION/TRAINING PROGRAM

## 2024-09-24 PROCEDURE — 71260 CT THORAX DX C+: CPT | Mod: 26 | Performed by: STUDENT IN AN ORGANIZED HEALTH CARE EDUCATION/TRAINING PROGRAM

## 2024-09-24 PROCEDURE — 999N000122 CT OUTSIDE READ

## 2024-09-24 PROCEDURE — 74177 CT ABD & PELVIS W/CONTRAST: CPT | Mod: 26 | Performed by: STUDENT IN AN ORGANIZED HEALTH CARE EDUCATION/TRAINING PROGRAM

## 2024-09-24 PROCEDURE — G0463 HOSPITAL OUTPT CLINIC VISIT: HCPCS | Performed by: STUDENT IN AN ORGANIZED HEALTH CARE EDUCATION/TRAINING PROGRAM

## 2024-09-24 RX ORDER — LORAZEPAM 2 MG/ML
0.5 INJECTION INTRAMUSCULAR EVERY 4 HOURS PRN
OUTPATIENT
Start: 2024-10-14

## 2024-09-24 RX ORDER — HEPARIN SODIUM,PORCINE 10 UNIT/ML
5-20 VIAL (ML) INTRAVENOUS DAILY PRN
OUTPATIENT
Start: 2024-10-21

## 2024-09-24 RX ORDER — LORAZEPAM 2 MG/ML
0.5 INJECTION INTRAMUSCULAR EVERY 4 HOURS PRN
OUTPATIENT
Start: 2024-10-21

## 2024-09-24 RX ORDER — ALBUTEROL SULFATE 90 UG/1
1-2 AEROSOL, METERED RESPIRATORY (INHALATION)
Start: 2024-10-14

## 2024-09-24 RX ORDER — EPINEPHRINE 1 MG/ML
0.3 INJECTION, SOLUTION INTRAMUSCULAR; SUBCUTANEOUS EVERY 5 MIN PRN
OUTPATIENT
Start: 2024-10-21

## 2024-09-24 RX ORDER — HEPARIN SODIUM,PORCINE 10 UNIT/ML
5-20 VIAL (ML) INTRAVENOUS DAILY PRN
OUTPATIENT
Start: 2024-10-14

## 2024-09-24 RX ORDER — DIPHENHYDRAMINE HYDROCHLORIDE 50 MG/ML
50 INJECTION INTRAMUSCULAR; INTRAVENOUS
Start: 2024-10-21

## 2024-09-24 RX ORDER — METHYLPREDNISOLONE SODIUM SUCCINATE 125 MG/2ML
125 INJECTION, POWDER, LYOPHILIZED, FOR SOLUTION INTRAMUSCULAR; INTRAVENOUS
Start: 2024-10-14

## 2024-09-24 RX ORDER — ALBUTEROL SULFATE 90 UG/1
1-2 AEROSOL, METERED RESPIRATORY (INHALATION)
Start: 2024-10-21

## 2024-09-24 RX ORDER — HEPARIN SODIUM (PORCINE) LOCK FLUSH IV SOLN 100 UNIT/ML 100 UNIT/ML
5 SOLUTION INTRAVENOUS
OUTPATIENT
Start: 2024-10-14

## 2024-09-24 RX ORDER — METHYLPREDNISOLONE SODIUM SUCCINATE 125 MG/2ML
125 INJECTION, POWDER, LYOPHILIZED, FOR SOLUTION INTRAMUSCULAR; INTRAVENOUS
Start: 2024-10-21

## 2024-09-24 RX ORDER — ALBUTEROL SULFATE 0.83 MG/ML
2.5 SOLUTION RESPIRATORY (INHALATION)
OUTPATIENT
Start: 2024-10-21

## 2024-09-24 RX ORDER — MEPERIDINE HYDROCHLORIDE 25 MG/ML
25 INJECTION INTRAMUSCULAR; INTRAVENOUS; SUBCUTANEOUS EVERY 30 MIN PRN
OUTPATIENT
Start: 2024-10-21

## 2024-09-24 RX ORDER — MEPERIDINE HYDROCHLORIDE 25 MG/ML
25 INJECTION INTRAMUSCULAR; INTRAVENOUS; SUBCUTANEOUS EVERY 30 MIN PRN
OUTPATIENT
Start: 2024-10-14

## 2024-09-24 RX ORDER — EPINEPHRINE 1 MG/ML
0.3 INJECTION, SOLUTION INTRAMUSCULAR; SUBCUTANEOUS EVERY 5 MIN PRN
OUTPATIENT
Start: 2024-10-14

## 2024-09-24 RX ORDER — HEPARIN SODIUM (PORCINE) LOCK FLUSH IV SOLN 100 UNIT/ML 100 UNIT/ML
5 SOLUTION INTRAVENOUS
OUTPATIENT
Start: 2024-10-21

## 2024-09-24 RX ORDER — ALBUTEROL SULFATE 0.83 MG/ML
2.5 SOLUTION RESPIRATORY (INHALATION)
OUTPATIENT
Start: 2024-10-14

## 2024-09-24 RX ORDER — DIPHENHYDRAMINE HYDROCHLORIDE 50 MG/ML
50 INJECTION INTRAMUSCULAR; INTRAVENOUS
Start: 2024-10-14

## 2024-09-24 ASSESSMENT — PAIN SCALES - GENERAL: PAINLEVEL: NO PAIN (0)

## 2024-09-24 NOTE — NURSING NOTE
"Oncology Rooming Note    September 24, 2024 12:57 PM   Maggie Navarrete is a 74 year old female who presents for:    Chief Complaint   Patient presents with    Oncology Clinic Visit     Leiomyosarcoma of uterus     Initial Vitals: /82 (BP Location: Right arm, Patient Position: Sitting, Cuff Size: Adult Regular)   Pulse 105   Temp 98.8  F (37.1  C) (Oral)   Resp 16   Wt 54.8 kg (120 lb 12.8 oz)   SpO2 100%   BMI 24.40 kg/m   Estimated body mass index is 24.4 kg/m  as calculated from the following:    Height as of 8/22/24: 1.499 m (4' 11\").    Weight as of this encounter: 54.8 kg (120 lb 12.8 oz). Body surface area is 1.51 meters squared.  No Pain (0) Comment: Data Unavailable   No LMP recorded. Patient has had a hysterectomy.  Allergies reviewed: Yes  Medications reviewed: Yes    Medications: Medication refills not needed today.  Pharmacy name entered into [employer]:    [facility] - MICHELLE TINOCO - [facility] AT PORTAL TO REGISTERED [facility] SITES  [id number] - [place], [place] - [address] [facility] [place] [address] [place] [address] [id number]  [id number] - [place] - [address] AT [facility] [address]  [facility] - [facility], [place] - [facility]    Frailty Screening:   Is the patient here for a new oncology consult visit in cancer care? 2. No      Clinical concerns: Patient would like to ask about NSAIDs.      Ildefonso Pineda EMT            "

## 2024-09-24 NOTE — LETTER
9/24/2024      Maggie Navarrete  [address]  [facility] [address]      Dear Colleague,    Thank you for referring your patient, Maggie Navarrete, to the [facility]. Please see a copy of my visit note below.    [facility]    NEW PATIENT VISIT NOTE    PATIENT NAME: Maggie Navarrete MRN # [id number]  DATE OF VISIT: July 17, 2024 YOB: 1950    REFERRING PROVIDER: Referred Self, MD  No address on file    CANCER TYPE: metastatic uterine leiomyosarcoma       CHIEF COMPLAIN   None     HISTORY OF PRESENT ILLNESS   Maggie is a 74 year old women with PMH of metastatic uterine leiomyosarcoma. She was originally diagnosed on 2016 showing an up to 12 cm mass. She underwent surgical resection followed by adjuvant doxorubicin/dacarbazine for 5 cycles. She had local recurrence on 2020 treated with surgical resection. Then  again recurrence on 2022 again underwent surgical resection. Followed by [id number] with anastrozole. Since January of 2024 she has been on trial a BOAST clinical trial with Dr. Nik Schilling at the [facility]. She received abemaciclib there. Now, she was taken off of trial due to progression on 1 of 3 intra-abdominal lesions.     She comes here to continue medical management, she has seen Dr. Michaud at [place] for a second opinion. She is interested in surgical resection which has been offered at [facility] with possible Gemzar HIPEC.      She started on single agent Gemzar on 7/29/24. She is here today for follow up prior to cycle 3.     Interval Hx  She feels very well now, she exercises regularly and has no related GI symptoms. She has been tolerating gemzar well with no significant side effects.   She refused to get a PET scan because of concern with contrast.      PAST ONCOLOGIC HISTORY   Leiomyosarcoma Uterus   11/3/2016 Initial Diagnosis     11/4/2016 Surgery and Procedures   Open hyst, BSO,  stage II with peritoneal involvement    12/26/2016 - 3/23/2017 Chemotherapy   Received elsewhere: adjuvant chemotherapy with doxorubicin 25 mg/m2/day and DTIC 250 mg/m2/day , days 1-3 every 21 weeks.  Completed 5 cycles of adjuvant doxorubicin and dacarbazine      6/1/2020 Recurrence Local   Isolated lesion in the RLQ    6/16/2020 Surgery and Procedures   Laparotomy, excision of side wall mass, ureteroneocystotomy. 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 The excised margins were negative for tumor.    3/31/2022 [facility]   PET with isolated bowel lesion in LLQ.     4/4/2022 Surgery and Procedures   Exlap, FANNY, small bowel lesion excised with negative margins    5/9/2023 Surgery and Procedures   Dr. Camarillo and Dr. Krueger    Exploratory laparotomy, tumor debulking, cystoscopy and placement of internal ureteral stent and ureterolysis     Findings  Two lesions in the abdomen consistent with very indolent appearing sarcoma on frozen section, 1 in the right upper quadrant in the large bowel mesentery. And 1 on the left sidewall between the left sidewall and the left ureter and the sigmoid colon    5/9/2023 Biopsy/Pathology   FINAL DIAGNOSIS     A. Soft tissue, hepatic flexure nodule, excision: Involved   by smooth muscle neoplasm, 1.4 cm. See comment.     B. Soft tissue, left sidewall mass, excision: Recurrent   leiomyosarcoma, 2.9 cm. See comment.     C. Colon, sigmoid mass, excision: Involved by smooth   muscle neoplasm with increased mitotic activity, 6 mm. See   comment.     D. Soft tissue, left nohemi-ureteral, biopsy: Involved by   smooth muscle neoplasm, 3 mm. See comment.     E. Peritoneum, left pelvic sidewall margin, excision:   Involved by smooth muscle neoplasm, 1 mm. See comment.     F. Soft tissue, sigmoid colon margin, excision: Negative   for tumor.     Comment: Immunohistochemical stains performed at [facility] demonstrate that the left sidewall mass (B2)  is   positive for desmin, caldesmon, ER (moderate, 60-70%), and   AK (moderate, 20-30%), but is negative for HMB45, Melan A,   STAT6, ALK, DOG1, and KIT; this immunophenotype supports a   smooth muscle neoplasm. The left sidewall mass shows   increased mitotic activity (greater than 10 mitoses/10 HPF)   and atypia sufficient to meet criteria for a   leiomyosarcoma. The remaining lesion show minimal atypia   but increased mitotic activity (though less than 10   mitoses/10 HPF) where evaluable, but evaluation is limited   by the small size of the lesions. In the context of a known   history of leiomyosarcoma and a concurrent tumor meeting   diagnostic criteria, these likely represent deceptively   bland additional foci of recurrent leiomyosarcoma.     8/2023 - Biological/Targeted/Hormone Therapy   Anastrozole     11/8/2023 Progression/Relapse   CT scan chest-abdomen-pelvis shows progression of 3 nodules within the peritoneal cavity.    1/18/2024 - 6/5/2024 Chemotherapy   BOAST clinical trial with Dr. Nik Schilling at the [facility]. She received abemaciclib there. Was taken off of trial due to progression.      PAST MEDICAL HISTORY   None    PAST SURGICAL HISTORY   As per HPI     FAMILY HISTORY   Pending     ALLERGIES      Allergies   Allergen Reactions     Dust Mite Extract Other (See Comments)     Erythromycin GI Disturbance, Other (See Comments), Nausea and Nausea and Vomiting     PN: LW Reaction: Nausea     Tramadol Nausea, Other (See Comments) and Nausea and Vomiting          SOCIAL HISTORY     Social History     Social History Narrative    How much exercise per week? Walking daily.    How much calcium per day? Supplement and through diet       How much caffeine per day? 3 times a week    How much vitamin D per day? Supplement    Do you/your family wear seatbelts?  Yes    Do you/your family use safety helmets? NA    Do you/your family use sunscreen? Sometimes    Do you/your family keep firearms  in the home? No    Do you/your family have a smoke detector(s)? Yes    Do you feel safe in your home? Yes    Has anyone ever touched you in an unwanted manner? 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  2 grandsons now in [place] with the mother.  In a house.  .          How much exercise per week? Daily walking, will start going to the y    How much calcium per day? Through foods       How much caffeine per day? One cup    How much vitamin D per day? Through foods    Do you/your family wear seatbelts?  Yes    Do you/your family use safety helmets? N/a    Do you/your family use sunscreen? Yes    Do you/your family keep firearms in the home? No    Do you/your family have a smoke detector(s)? Yes        Reviewed by Nicki Cornejo 10-11-21               CURRENT OUTPATIENT MEDICATIONS     Current Outpatient Medications   Medication Sig Dispense Refill     acetaminophen (TYLENOL) 500 MG tablet Take 500-1,000 mg by mouth every 6 hours as needed for mild pain Uses arthritis dose, believes it is 625 mg, takes 2 at a time, does not exceed 4g a day       calcium citrate 250 MG TABS Take 500 mg by mouth daily       carboxymethylcellulose PF (REFRESH PLUS) 0.5 % ophthalmic solution Place 1 drop into both eyes 4 times daily as needed for dry eyes       famotidine (PEPCID) 20 MG tablet Take 20 mg by mouth daily as needed (Patient not taking: Reported on 2024)       Fish Oil-Cholecalciferol (FISH OIL + D3 PO)        gabapentin (NEURONTIN) 100 MG capsule Take 1 capsule (100 mg) by mouth 3 times daily as needed for neuropathic pain. 90 capsule 0     levothyroxine (SYNTHROID/LEVOTHROID) 75 MCG tablet TAKE 1 TABLET DAILY FOR    THYROID 90 tablet 0     lidocaine-prilocaine (EMLA) 2.5-2.5 % external cream Apply a thin layer on skin overlying port  site 10-15 minutes prior to port access for labs or infusion 5 g 1     Melatonin 10 MG TABS tablet Take 10 mg by mouth       meloxicam (MOBIC) 7.5 MG tablet Take 1 tablet (7.5 mg) by mouth daily. (Patient not taking: Reported on 2024) 60 tablet 1     multivitamin w/minerals (THERA-VIT-M) tablet Take 1 tablet by mouth daily as needed (if diet insufficient to provide recommended nutrients)       ondansetron (ZOFRAN) 8 MG tablet Take 1 tablet (8 mg) by mouth every 8 hours as needed for nausea (Patient not taking: Reported on 2024) 30 tablet 4     tacrolimus (PROTOPIC) 0.1 % external ointment Apply topically 2 times daily 30 g 3     venlafaxine (EFFEXOR XR) 37.5 MG 24 hr capsule Take 1 capsule (37.5 mg) by mouth daily 90 capsule 3     VITAMIN D PO        zolpidem (AMBIEN) 5 MG tablet Take 1 tablet (5 mg) by mouth nightly as needed for sleep (Only uses if traveling or cannot sleep,). 20 tablet 0          REVIEW OF SYSTEMS   As above in the HPI, o/w complete 12-point ROS was negative.     PHYSICAL EXAM   /82 (BP Location: Right arm, Patient Position: Sitting, Cuff Size: Adult Regular)   Pulse 105   Temp 98.8  F (37.1  C) (Oral)   Resp 16   Wt 54.8 kg (120 lb 12.8 oz)   SpO2 100%   BMI 24.40 kg/m      EGO  GEN: NAD  HEENT: PERRL, EOMI, no icterus, injection or pallor. Oropharynx is clear.  NECK: no cervical or supraclavicular lymphadenopathy  LUNGS: clear bilaterally  CV: regular, no murmurs, rubs, or gallops  ABDOMEN: soft, non-tender, non-distended, normal bowel sounds, no hepatosplenomegaly by percussion or palpation. Surgical scars from prior surgeries well healed.   EXT: warm, well perfused, no edema  NEURO: alert  SKIN: no rashes     LABORATORY AND IMAGING STUDIES     Recent Labs   Lab Test 24  1330 24  1200 24  1438 24  1043 24  1539 20  0028 19  1509 18  0951     --   --  141 141   < >  --   --    POTASSIUM 4.5  --   --  4.2 4.3   < >  --    --    CHLORIDE 104  --   --  106 108*   < >  --   --    CO2 25  --   --  25 25   < >  --   --    ANIONGAP 9  --   --  10 8   < >  --   --    BUN 18.2  --   --  25.8* 20.8   < >  --   --    CR 0.98* 1.5*   < > 1.27* 1.47*   < >  --   --    GLC 80  --   --  90 85   < >  --   --    SUSAN 9.3  --   --  9.4 8.7*   < > 9.4 9.1   MAG  --   --   --  2.0 2.0   < > 2.2 2.1   PHOS  --   --   --   --   --   --  3.0 2.9    < > = values in this interval not displayed.     Recent Labs   Lab Test 06/22/24  1330 03/02/24  1043 01/29/24  1539   WBC 6.6 5.2 5.8   HGB 12.0 11.8 13.1    303 170   * 91 91   NEUTROPHIL 50 46 55     Recent Labs   Lab Test 06/22/24  1330 03/02/24  1043 01/29/24  1539   BILITOTAL 0.3 0.4 0.3   ALKPHOS 58 44 56   ALT 15 21 23   AST 20 17 19   ALBUMIN 4.2 4.2 4.1     TSH   Date Value Ref Range Status   11/17/2023 2.89 0.30 - 4.20 uIU/mL Final   07/25/2022 1.26 0.40 - 4.00 mU/L Final   12/16/2020 1.44 0.40 - 4.00 mU/L Final       Results for orders placed or performed during the hospital encounter of 06/03/24   CT Chest/Abdomen/Pelvis w Contrast    Narrative    CT CHEST/ABDOMEN/PELVIS W CONTRAST 6/3/2024 12:25 PM    CLINICAL HISTORY: Metastatic sarcoma, assess for response to  chemotherapy. 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).    TECHNIQUE: CT scan of the chest, abdomen, and pelvis was performed  following injection of IV contrast. Multiplanar reformats were  obtained. Dose reduction techniques were used.   CONTRAST: 64 mL Isovue-370    COMPARISON: CT chest, abdomen and pelvis 4/8/2024.    FINDINGS:   LOWER NECK: Unremarkable.    LUNGS AND PLEURA: No focal consolidation or pleural effusion.  Calcified granulomas. No new or growing suspicious pulmonary nodule.    AIRWAYS: Patent.    HEART: No pericardial effusion.  No coronary calcifications. No  thoracic aortic aneurysm.    PULMONARY ARTERIES: Unremarkable.    MEDIASTINUM AND BAETRIZ: No lymphadenopathy. Calcified lymph  nodes.    HEPATOBILIARY: Unremarkable. Gallbladder is present without  gallstones.    SPLEEN: Unremarkable.    PANCREAS: Unremarkable.    ADRENAL GLANDS: Unremarkable.    KIDNEYS/URETERS/BLADDER: Similar right renal atrophy/cortical  scarring. Left parapelvic and cortical cysts; no follow-up necessary.    BOWEL: No bowel dilatation or wall thickening.    LYMPH NODES AND PERITONEUM: Redemonstrated abdominal soft tissue  masses as on series 3:  -Left upper quadrant, 2.6 x 4 x 2.2 cm, previously 2.1 x 2.9 x 2.1 cm  when measured similarly, image 110   -Soft tissue lesion between the transverse colon and stomach, 3.1 x  2.8 cm, previously 3.1 x 2.6 cm, image 168  -Superior to the pancreas, 1.4 cm, image 125    No new lesion.    VASCULATURE: Nonaneurysmal abdominal aorta.    PELVIS: Uterus is surgically absent. No pelvic mass.    MUSCULOSKELETAL: No aggressive osseous lesion. Similar L4/L5 grade 1  anterolisthesis.    ADDITIONAL FINDINGS: None.      Impression    IMPRESSION:  1.  Since 4/8/2024, increased in size left upper quadrant soft tissue  masses. Other abdominal soft tissue lesions have not significantly  changed.  2.  No convincing new site of disease.    ORIN PAUL MD         SYSTEM ID:  [id number]     CT CAP 9/24/24  Mesentery/peritoneum/retroperitoneum: Soft tissue mass in the  transverse mesial colon measuring 4.0 x 3.8 cm with decreased  enhancement compared to prior. Previously measured 2.8 x 3.1 cm.  Mildly enhancing mass anterior to the superior pole of the spleen  measuring 2.8 x 5.2 cm, previously 2.6 x 4.0 cm. Stable size of soft  tissue nodule anterior to the pancreas measuring 1.5 cm (series 11  image 37). There is interval central hypoenhancement. Trace central  mesenteric free fluid.     Lymph nodes: No significant lymphadenopathy.     Vasculature: Scattered atherosclerotic calcification of abdominal  aorta with no aneurysmal dilation.  Portal veins are patent.     Pelvis: Urinary bladder  is normal.     Osseous structures: No aggressive or acute osseous lesion.  Multilevel  degenerative changes of the spine most pronounced at L4-L5. Grade 1  anterolisthesis of L4 on L5. Enhancing circumscribed extradural lesion  at the level of L5-S1 measuring 1.1 cm in craniocaudal dimension  (series 14 image 100).      Soft tissues: Tiny fat containing ventral/umbilical hernia.                                                                      IMPRESSION:   1. Compared to CT dated 6/3/2024, increased size of peritoneal soft  tissue masses in the transverse mesial colon and left upper quadrant.  Stable size of soft tissue nodule anterior to the pancreas with  interval central hypoenhancement suspicious for necrosis.  2. No evidence of new disease in the chest abdomen and pelvis.  3. Enhancing circumscribed extradural lesion at the level of L5-S1,  may represent synovial cyst with hemorrhage as described on recent  lumbar MRI.  4.Additional findings similar to prior CT.        PATHOLOGY   12/12/2016  FINAL DIAGNOSIS:   CASE FROM [place], [place] ([id number], OBTAINED 11/04/2016):   A. UTERINE MASS, RESECTION:   - Leiomyosarcoma, 12 cm in aggregate (per report)   - Please see comment     COMMENT:   Per the received report, a right cul-de-sac biopsy was involved by   leiomyosarcoma.   The diagnosis of leiomyosarcoma is made on the basis of examination of   the one slide submitted.  There is moderate nuclear atypia, tumor   necrosis and increased mitotic activities (10/10 HPFs).     Tempus profiling from her 4/5/22 specimen showed CDKN2A and CDKN2B loss.     I reviewed the medical records including prior oncology notes, laboratory studies which are notable for adequate renal and hepatic function, and normal blood counts.   I personally reviewed imaging including CT of the CAP done on 6/12/2024 which demonstrates enlargement of the peritoneal mass adjacent to the spleen, now up to 4 x 2.6 cm. She has 2 other  peritoneal lesions up to 2.8 cm that have remained stable.       ASSESSMENT AND PLAN     Maggie is a 74 year old women with PMH of metastatic uterine leiomyosarcoma. She was originally diagnosed on 2016 showing an up to 12 cm mass. She underwent surgical resection followed by adjuvant doxorubicin/dacarbazine for 5 cycles. She had local recurrence on 2020 treated with surgical resection. Then  again recurrence on 2022 again underwent surgical resection. Followed by pve3nee with anastrozole. Since January of 2024 she has been on trial a BOAST clinical trial with Dr. Nik Schilling at the [facility]. She received abemaciclib there. Now, she was taken off of trial due to progression on 1 of 3 intra-abdominal lesions.     She comes here to continue medical management, she has seen Dr. Michaud at [place] for a second opinion. She is interested in surgical resection which has been offered at [facility] with possible Gemzar HIPEC.      She feels very well now, she exercises regularly and has no related GI symptoms.     She saw Dr. Michaud at [place], who recommended against further surgical resection and for systemic therapy with other agents with activity in leiomyosarcoma including gemcitabine +/- docetaxel, trabectedin and pazopanib.     She is s/p 2 cycles of gemcitabine single agent. She is feeling well and tells me that surgery will be planned for 11/15. I explained that in her situation I do not think that surgery has chances of a meaningful response as she has disease in different areas. She insists that the team at [place] has great expertise in surgery and HIPEC for cases of advanced leiomyosarcoma. I have not been able to communicate with her team there. She tells me they plan on intraoperative chemo with cisplatin.     I personally reviewed the most recent CT CAP and the overread done here at the [facility]. Two of the prior metastatic deposits have increased in size, although it seems that he  enhancement is less suggesting possible increase in the necrosis. Other pancreatic lesion stable in size but more necrotic. Unfortunately we do not have a PET to better assess the metabolic response.   I will not recommend for surgical resection if we believe she has progressive disease. Sometimes sarcomas do enlarge as necrosis ensues. I recommend to continue chemo for 2 more cycles and repeat the scans. Patient is adamant she wants to move forward with surgical resection.     Plan:     -Infusion for C5 of gemcitabine and SINTIA visit on 10/14/24  -scheduled  -She will likely have surgery on 11/15  -Follow up Dr. Powers after surgery on 12/4    40 minutes spent on the date of the encounter doing chart review, review of outside records, review of test results, interpretation of tests, patient visit, documentation, and discussion with other provider(s)     Beto Galvan MD   of Medicine  Hematology, Oncology and Transplantation        Again, thank you for allowing me to participate in the care of your patient.        Sincerely,        Beto Galvan MD

## 2024-09-25 ENCOUNTER — PATIENT OUTREACH (OUTPATIENT)
Dept: ONCOLOGY | Facility: CLINIC | Age: 74
End: 2024-09-25
Payer: MEDICARE

## 2024-10-03 LAB — SCANNED LAB RESULT: NORMAL

## 2024-10-03 NOTE — PROGRESS NOTES
Infusion Nursing Note:  Patient presents today for Cycle 5 day 3 Adriamycin / Dacarbazine.    Patient seen by provider today: No    Note: Patient reports she feels fatigued today but does not have other new complaints or concerns. Neulasta Onpro On-Body injector applied to R arm at 1630 with light facing down.  Writer discussed Neulasta injection would start on Thursday at 1930, approximately 27 hours after application applied today.  Written and Verbal instruction reviewed with patient.  Pt instructed when the dose delivery starts, it will take about 45 minutes to complete.  Pt aware Neulasta Onpro On-Body should have green flashing light and to call triage or on-call MD if injector flashes red or appears to be leaking. Pt aware to keep Onpro On-Body Neualsta 4 inches away from electrical equipment and to avoid showering 4 hours prior to injection.      Intravenous Access:  Implanted Port.    Treatment Conditions:  Past results reviewed, labs MET treatment parameters, ok to proceed with treatment.    Post Infusion Assessment:  Patient tolerated infusion without incident.  Blood return noted pre and post infusion.  Site patent and intact, free from redness, edema or discomfort.  No evidence of extravasations.  Access discontinued per protocol.    Discharge Plan:   Patient declined prescription refills.  Discharge instructions reviewed with: Patient.  Patient and/or family verbalized understanding of discharge instructions and all questions answered.  AVS to patient via [employer].  Patient will return in basket sent to Dr. Alana Cantrell and Sarah Spaulding to coordinate next appointment.   Patient discharged in stable condition accompanied by: self.  Departure Mode: Ambulatory.  Face to Face time: 0.    Beto Melendrez RN.    
No

## 2024-10-10 DIAGNOSIS — I42.7 DILATED CARDIOMYOPATHY SECONDARY TO DRUG (H): ICD-10-CM

## 2024-10-10 DIAGNOSIS — D70.1 CHEMOTHERAPY-INDUCED NEUTROPENIA (H): Primary | ICD-10-CM

## 2024-10-10 DIAGNOSIS — T45.1X5A CHEMOTHERAPY-INDUCED NEUTROPENIA (H): Primary | ICD-10-CM

## 2024-10-10 DIAGNOSIS — C55 LEIOMYOSARCOMA OF UTERUS (H): ICD-10-CM

## 2024-10-10 RX ORDER — MEPERIDINE HYDROCHLORIDE 25 MG/ML
25 INJECTION INTRAMUSCULAR; INTRAVENOUS; SUBCUTANEOUS EVERY 30 MIN PRN
Status: CANCELLED | OUTPATIENT
Start: 2024-10-21

## 2024-10-10 RX ORDER — HEPARIN SODIUM,PORCINE 10 UNIT/ML
5-20 VIAL (ML) INTRAVENOUS DAILY PRN
Status: CANCELLED | OUTPATIENT
Start: 2024-10-24

## 2024-10-10 RX ORDER — HEPARIN SODIUM (PORCINE) LOCK FLUSH IV SOLN 100 UNIT/ML 100 UNIT/ML
5 SOLUTION INTRAVENOUS
Status: CANCELLED | OUTPATIENT
Start: 2024-10-26

## 2024-10-10 RX ORDER — LORAZEPAM 2 MG/ML
0.5 INJECTION INTRAMUSCULAR EVERY 4 HOURS PRN
Status: CANCELLED | OUTPATIENT
Start: 2024-10-21

## 2024-10-10 RX ORDER — HEPARIN SODIUM,PORCINE 10 UNIT/ML
5-20 VIAL (ML) INTRAVENOUS DAILY PRN
Status: CANCELLED | OUTPATIENT
Start: 2024-10-26

## 2024-10-10 RX ORDER — ALBUTEROL SULFATE 0.83 MG/ML
2.5 SOLUTION RESPIRATORY (INHALATION)
Status: CANCELLED | OUTPATIENT
Start: 2024-10-24

## 2024-10-10 RX ORDER — PALONOSETRON 0.05 MG/ML
0.25 INJECTION, SOLUTION INTRAVENOUS ONCE
Status: CANCELLED | OUTPATIENT
Start: 2024-10-21

## 2024-10-10 RX ORDER — DIPHENHYDRAMINE HYDROCHLORIDE 50 MG/ML
50 INJECTION INTRAMUSCULAR; INTRAVENOUS
Status: CANCELLED
Start: 2024-10-21

## 2024-10-10 RX ORDER — EPINEPHRINE 1 MG/ML
0.3 INJECTION, SOLUTION, CONCENTRATE INTRAVENOUS EVERY 5 MIN PRN
Status: CANCELLED | OUTPATIENT
Start: 2024-10-24

## 2024-10-10 RX ORDER — HEPARIN SODIUM (PORCINE) LOCK FLUSH IV SOLN 100 UNIT/ML 100 UNIT/ML
5 SOLUTION INTRAVENOUS
Status: CANCELLED | OUTPATIENT
Start: 2024-10-24

## 2024-10-10 RX ORDER — ALBUTEROL SULFATE 90 UG/1
1-2 INHALANT RESPIRATORY (INHALATION)
Status: CANCELLED
Start: 2024-10-24

## 2024-10-10 RX ORDER — ALBUTEROL SULFATE 90 UG/1
1-2 INHALANT RESPIRATORY (INHALATION)
Status: CANCELLED
Start: 2024-10-21

## 2024-10-10 RX ORDER — EPINEPHRINE 1 MG/ML
0.3 INJECTION, SOLUTION, CONCENTRATE INTRAVENOUS EVERY 5 MIN PRN
Status: CANCELLED | OUTPATIENT
Start: 2024-10-21

## 2024-10-10 RX ORDER — DIPHENHYDRAMINE HYDROCHLORIDE 50 MG/ML
50 INJECTION INTRAMUSCULAR; INTRAVENOUS
Status: CANCELLED
Start: 2024-10-24

## 2024-10-10 RX ORDER — MEPERIDINE HYDROCHLORIDE 25 MG/ML
25 INJECTION INTRAMUSCULAR; INTRAVENOUS; SUBCUTANEOUS EVERY 30 MIN PRN
Status: CANCELLED | OUTPATIENT
Start: 2024-10-24

## 2024-10-10 RX ORDER — ALBUTEROL SULFATE 0.83 MG/ML
2.5 SOLUTION RESPIRATORY (INHALATION)
Status: CANCELLED | OUTPATIENT
Start: 2024-10-21

## 2024-10-10 RX ORDER — DEXAMETHASONE SODIUM PHOSPHATE 4 MG/ML
12 INJECTION, SOLUTION INTRA-ARTICULAR; INTRALESIONAL; INTRAMUSCULAR; INTRAVENOUS; SOFT TISSUE ONCE
Status: CANCELLED
Start: 2024-10-21

## 2024-10-10 RX ORDER — HEPARIN SODIUM,PORCINE 10 UNIT/ML
5-20 VIAL (ML) INTRAVENOUS DAILY PRN
Status: CANCELLED | OUTPATIENT
Start: 2024-10-21

## 2024-10-10 RX ORDER — HEPARIN SODIUM (PORCINE) LOCK FLUSH IV SOLN 100 UNIT/ML 100 UNIT/ML
5 SOLUTION INTRAVENOUS
Status: CANCELLED | OUTPATIENT
Start: 2024-10-21

## 2024-10-13 ENCOUNTER — NURSE TRIAGE (OUTPATIENT)
Dept: NURSING | Facility: CLINIC | Age: 74
End: 2024-10-13
Payer: MEDICARE

## 2024-10-13 NOTE — TELEPHONE ENCOUNTER
Pt is phoning stating that she tested positive for COVID via an at home test today 10/13/2024    [place]     Pt is having stuffy nose, sore throat, pt has an occasional cough     Denies fever     Symptoms started Thursday 10/10/2024    Denies any problems breathing     Denies chest discomfort     Care advice given per protocol and when to call back. Pt verbalized understanding and agrees to plan of care.    Jody Guidry RN  [facility] Nurse Advisor  12:00 PM 10/13/2024        COVID Positive/Requesting COVID treatment    Patient is positive for COVID and requesting treatment options.    Date of positive COVID test (PCR or at home)10/13/2024 - home test   Current COVID symptoms: cough, fatigue, headache, sore throat, and congestion or runny nose  Date COVID symptoms began: 10/10/2024    Message should be routed to clinic RN pool. Best phone number to use for call back: [phone]       Reason for Disposition   [1] HIGH RISK patient (e.g., weak immune system, age > 64 years, obesity with BMI 30 or higher, pregnant, chronic lung disease) AND [2] COVID symptoms (e.g., cough, fever)  (Exceptions: Already seen by PCP and no new or worsening symptoms.)    Additional Information   Negative: SEVERE difficulty breathing (e.g., struggling for each breath, speaks in single words)   Negative: Difficult to awaken or acting confused (e.g., disoriented, slurred speech)   Negative: Bluish (or gray) lips or face now   Negative: Shock suspected (e.g., cold/pale/clammy skin, too weak to stand, low BP, rapid pulse)   Negative: Sounds like a life-threatening emergency to the triager   Negative: [1] Diagnosed or suspected COVID-19 AND [2] symptoms lasting 3 or more weeks   Negative: [1] COVID-19 exposure AND [2] no symptoms   Negative: COVID-19 vaccine reaction suspected (e.g., fever, headache, muscle aches) occurring 1 to 3 days after getting vaccine   Negative: COVID-19  vaccine, questions about   Negative: [1] Exposure to someone known to have influenza (flu test positive) AND [2] flu-like symptoms (e.g., cough, runny nose, sore throat, SOB; with or without fever)   Negative: [1] Possible COVID-19 symptoms AND [2] triager concerned about severity of symptoms or other causes   Negative: COVID-19 and breastfeeding, questions about   Negative: SEVERE or constant chest pain or pressure  (Exception: Mild central chest pain, present only when coughing.)   Negative: MODERATE difficulty breathing (e.g., speaks in phrases, SOB even at rest, pulse 100-120)   Negative: [1] Headache AND [2] stiff neck (can't touch chin to chest)   Negative: Oxygen level (e.g., pulse oximetry) 90% or lower   Negative: Chest pain or pressure  (Exception: MILD central chest pain, present only when coughing.)   Negative: [1] Drinking very little AND [2] dehydration suspected (e.g., no urine > 12 hours, very dry mouth, very lightheaded)   Negative: Patient sounds very sick or weak to the triager   Negative: MILD difficulty breathing (e.g., minimal/no SOB at rest, SOB with walking, pulse <100)   Negative: Fever > 103 F (39.4 C)   Negative: [1] Fever > 101 F (38.3 C) AND [2] age > 60 years   Negative: [1] Fever > 100 F (37.8 C) AND [2] bedridden (e.g., CVA, chronic illness, recovering from surgery)   Negative: Oxygen level (e.g., pulse oximetry) 91 to 94%    Protocols used: COVID-19 - Diagnosed or [id number]

## 2024-10-14 ENCOUNTER — VIRTUAL VISIT (OUTPATIENT)
Dept: ONCOLOGY | Facility: CLINIC | Age: 74
End: 2024-10-14
Attending: STUDENT IN AN ORGANIZED HEALTH CARE EDUCATION/TRAINING PROGRAM
Payer: MEDICARE

## 2024-10-14 VITALS — HEIGHT: 59 IN | WEIGHT: 120 LBS | BODY MASS INDEX: 24.19 KG/M2

## 2024-10-14 DIAGNOSIS — U07.1 INFECTION DUE TO 2019 NOVEL CORONAVIRUS: Primary | ICD-10-CM

## 2024-10-14 DIAGNOSIS — T45.1X5A CHEMOTHERAPY-INDUCED NEUTROPENIA (H): ICD-10-CM

## 2024-10-14 DIAGNOSIS — D70.1 CHEMOTHERAPY-INDUCED NEUTROPENIA (H): ICD-10-CM

## 2024-10-14 DIAGNOSIS — I42.7 DILATED CARDIOMYOPATHY SECONDARY TO DRUG (H): ICD-10-CM

## 2024-10-14 DIAGNOSIS — C55 LEIOMYOSARCOMA OF UTERUS (H): ICD-10-CM

## 2024-10-14 PROCEDURE — 99215 OFFICE O/P EST HI 40 MIN: CPT | Mod: 95 | Performed by: STUDENT IN AN ORGANIZED HEALTH CARE EDUCATION/TRAINING PROGRAM

## 2024-10-14 ASSESSMENT — PAIN SCALES - GENERAL: PAINLEVEL: MODERATE PAIN (4)

## 2024-10-14 NOTE — NURSING NOTE
Current patient location: [address] [facility] [address]    Is the patient currently in the state of [place]? YES    Visit mode:VIDEO    If the visit is dropped, the patient can be reconnected by: VIDEO VISIT: Text to cell phone:   Telephone Information:   Mobile [phone]       Will anyone else be joining the visit? NO  (If patient encounters technical issues they should call [phone]    Are changes needed to the allergy or medication list? Pt stated no changes to allergies and Pt stated no med changes    Are refills needed on medications prescribed by this physician? NO    Rooming Documentation:  Questionnaire(s) completed    Reason for visit: KATIE Oden LPN

## 2024-10-14 NOTE — PROGRESS NOTES
Virtual Visit Details    Type of service:  Video Visit   Video Start Time:  8:46AM  Video End Time: 9:12AM    Originating Location (pt. Location): Home    Distant Location (provider location):  On-site  Platform used for Video Visit: [facility] CANCER [facility]  RETURN PATIENT VISIT NOTE    PATIENT NAME: Maggie Navarrete MRN # [id number]  DATE OF VISIT: Oct 14, 2024 YOB: 1950    CANCER TYPE: metastatic uterine leiomyosarcoma     HISTORY OF PRESENT ILLNESS   Maggie is a 74 year old women with PMH of metastatic uterine leiomyosarcoma. She was originally diagnosed on 2016 showing an up to 12 cm mass. She underwent surgical resection followed by adjuvant doxorubicin/dacarbazine for 5 cycles. She had local recurrence on 2020 treated with surgical resection. Then  again recurrence on 2022 again underwent surgical resection. Followed by [id number] with anastrozole. Since January of 2024 she has been on trial a BOAST clinical trial with Dr. Nik Schilling at the [facility]. She received abemaciclib there. Now, she was taken off of trial due to progression on 1 of 3 intra-abdominal lesions.     She comes here to continue medical management. She has seen Dr. Michaud at [place] for a second opinion. She is interested in surgical resection which has been offered at [facility] with possible Gemzar HIPEC.      She started on single agent Gemzar on 7/29/24. She is here today for follow up prior to cycle 3.     9/16/24, Caris with no clinically significant biomarker results.    Imaging after 2 cycles suggestive of progression with enlarging lesions in the abdomen, and no new lesions.      PAST ONCOLOGIC HISTORY   Leiomyosarcoma Uterus   11/3/2016 Initial Diagnosis     11/4/2016 Surgery and Procedures   Open hyst, BSO, stage II with peritoneal involvement    12/26/2016 - 3/23/2017 Chemotherapy   Received elsewhere: adjuvant chemotherapy with doxorubicin 25  mg/m2/day and DTIC 250 mg/m2/day , days 1-3 every 21 weeks.  Completed 5 cycles of adjuvant doxorubicin and dacarbazine    6/1/2020 Recurrence Local   Isolated lesion in the RLQ    6/16/2020 Surgery and Procedures   Laparotomy, excision of side wall mass, ureteroneocystotomy. 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 The excised margins were negative for tumor.    3/31/2022 [facility]   PET with isolated bowel lesion in LLQ.     4/4/2022 Surgery and Procedures   Exlap, FANNY, small bowel lesion excised with negative margins    5/9/2023 Surgery and Procedures   Dr. Camarillo and Dr. Krueger    Exploratory laparotomy, tumor debulking, cystoscopy and placement of internal ureteral stent and ureterolysis     Findings  Two lesions in the abdomen consistent with very indolent appearing sarcoma on frozen section, 1 in the right upper quadrant in the large bowel mesentery. And 1 on the left sidewall between the left sidewall and the left ureter and the sigmoid colon    5/9/2023 Biopsy/Pathology   FINAL DIAGNOSIS     A. Soft tissue, hepatic flexure nodule, excision: Involved   by smooth muscle neoplasm, 1.4 cm. See comment.     B. Soft tissue, left sidewall mass, excision: Recurrent   leiomyosarcoma, 2.9 cm. See comment.     C. Colon, sigmoid mass, excision: Involved by smooth   muscle neoplasm with increased mitotic activity, 6 mm. See   comment.     D. Soft tissue, left nohemi-ureteral, biopsy: Involved by   smooth muscle neoplasm, 3 mm. See comment.     E. Peritoneum, left pelvic sidewall margin, excision:   Involved by smooth muscle neoplasm, 1 mm. See comment.     F. Soft tissue, sigmoid colon margin, excision: Negative   for tumor.     Comment: Immunohistochemical stains performed at [facility] demonstrate that the left sidewall mass (B2) is   positive for desmin, caldesmon, ER (moderate, 60-70%), and   NM (moderate, 20-30%), but is negative for HMB45, Melan A,   STAT6, ALK,  DOG1, and KIT; this immunophenotype supports a   smooth muscle neoplasm. The left sidewall mass shows   increased mitotic activity (greater than 10 mitoses/10 HPF)   and atypia sufficient to meet criteria for a   leiomyosarcoma. The remaining lesion show minimal atypia   but increased mitotic activity (though less than 10   mitoses/10 HPF) where evaluable, but evaluation is limited   by the small size of the lesions. In the context of a known   history of leiomyosarcoma and a concurrent tumor meeting   diagnostic criteria, these likely represent deceptively   bland additional foci of recurrent leiomyosarcoma.     8/2023 - Biological/Targeted/Hormone Therapy   Anastrozole     11/8/2023 Progression/Relapse   CT scan chest-abdomen-pelvis shows progression of 3 nodules within the peritoneal cavity.    1/18/2024 - 6/5/2024 Chemotherapy   BOAST clinical trial with Dr. Nik Schilling at the [facility]. She received abemaciclib there. Was taken off of trial due to progression.     7/29/24: Cycle 1 Gemzar    Interval History:  Maggie presents for follow up.  -I reviewed triage note where she tested positive for covid on 10/13/24.   -She recently returned from [place]. She really enjoyed her trip.  -she developed symptoms of headache, nasal congestion, fatigue, sore throat around 10/11/24. Her covid test on 10/13/24 was positive. She also reports a bit of cough. Sore throat is a bit better now. Temp has not exceeded 100. She is still fatigued.  -She is trying to get rest this morning.   -She wonders about Paxlovid. She has taken this in the past with no notable side effects     PHYSICAL EXAM   There were no vitals taken for this visit.   9/16/2024  10:27 AM 9/16/2024  3:02 PM 9/24/2024  12:48 PM   Vital Signs      Systolic 138   139    Diastolic 74   82    Pulse 95   105    Temperature  98.6  F (37  C)  98.8  F (37.1  C)    Respirations   16    Weight (LB) 120 lb 11.2 oz   120 lb 12.8 oz    Height       BMI (Calculated)      Pain Score   0 (None)    O2  98 %  100 %    Systolic (Patient Reported)        Wt Readings from Last 10 Encounters:   09/24/24 54.8 kg (120 lb 12.8 oz)   09/16/24 54.7 kg (120 lb 11.2 oz)   09/09/24 55.1 kg (121 lb 8 oz)   08/26/24 56.7 kg (125 lb)   08/22/24 56.9 kg (125 lb 8 oz)   08/19/24 56.8 kg (125 lb 3.2 oz)   08/06/24 57 kg (125 lb 11.2 oz)   07/29/24 56.3 kg (124 lb 3.2 oz)   07/17/24 56.4 kg (124 lb 6.4 oz)   03/05/24 58.5 kg (129 lb)   Video physical exam  General: Patient appears in no acute distress. She is seated in her bed for visit.   ENT: Voice suggestive of nasal congestion.   Skin: No visualized rash or lesions on visualized skin  Eyes: EOMI, no erythema, sclera icterus or discharge noted  Resp: Appears to be breathing comfortably without accessory muscle usage, speaking in full sentences. 1 isolated loose cough.   MSK: Appears to have normal range of motion based on visualized movements  Neurologic: No apparent tremors, facial movements symmetric. Hearing intact. No dysarthria  Psych: affect appropriate, alert and oriented. Pleasant       LABORATORY AND IMAGING STUDIES   Most Recent 3 CBC's:  Recent Labs   Lab Test 09/16/24  1500 09/09/24  1153 08/26/24  1501   WBC 4.7 6.5 5.7   HGB 11.1* 11.5* 11.4*   MCV 94 94 93    232 440   ANEUTAUTO 1.5* 3.4 2.2     Most Recent 3 BMP's:  Recent Labs   Lab Test 09/09/24  1153 08/19/24  1420 08/06/24  1339    139 140   POTASSIUM 4.1 4.4 4.3   CHLORIDE 106 104 104   CO2 25 24 26   BUN 24.1* 21.7 27.8*   CR 0.92 0.95 0.96*   ANIONGAP 11 11 10   SUSAN 9.3 9.5 9.7   * 81 87   PROTTOTAL 6.2* 6.5 6.5   ALBUMIN 4.0 4.2 4.2    Most Recent 3 LFT's:  Recent Labs   Lab Test 09/09/24  1153 08/19/24  1420 08/06/24  1339   AST 18 23 19   ALT 16 22 20   ALKPHOS 61 67 70   BILITOTAL 0.3 0.2 0.2   I reviewed the above labs today.        ASSESSMENT AND PLAN   Metastatic uterine leiomyosarcoma.   Most recently she started on treatment  with single agent Gemzar on 7/29/24.  She saw [facility] [place] on 8/29/24, They recommended continuing Gemzar. She was following with [facility] Serrano for possible surgical resection.    Her imaging after 2 cycles of Gemzar showed disease progression with enlarging lesions in the abdomen, and no new lesions. Dr Powers recommended to switch therapy to doxil (40mg/m2) dacarbazine (750 mg/m2 over 3 days) every 4 weeks. We reviewed possible side effects including but not limited to hypersensitively with Doxil, photosensitivity, mucositis, pruritic rash, acid reflux, constipation, nausea, hand foot syndrome, bone marrow suppression, and possible hair loss. We also reviewed prevention for mucositis and HFS as well as use for topical hydrocortisone if isolated itchy rash. She agrees with proceeding with this regimen.     The plan will be to to defer surgery until we have better systemic control.    The plan was to start doxil/dacarbazine tomorrow. However, she tested positive for covid yesterday and is still symptomatic. As such, we will delay treatment and I will see her back in 1-2 weeks for consideration for starting treatment. I will also send in Rx for Paxlovid.    Oncology Follow-Up/Plan:  -Defer doxil/dacarabzine start 1-2 weeks with labs and SINTIA prior  -Weekly labs (CBC with ) after chemo x3 (need to be requested once chemo is scheduled)   -SINTIA every 4 weeks prior to cycle start  -Repeat imaging 2 cycles with CT CAP     Covid positive  Tested positive 10/13/24  We reviewed risk/benefits of Paxlovid. She will start this.  Addendum: I called her to discuss DDI of possible increased effects of Ambien with use of Paxlovid. She does not take Ambien nightly and sometimes only half tablet, so I dont anticipate a concern ehre.     Vascular Access  S/p port placement on 8/26/24    Low Back pain  -Status post  joint injection on 8/6/24  Follows with PT  -On meloxicam per PCP but not taking regular; avoiding NSAIDs for bleeding  risk  -Planning for steroid injection again per chart review--not discussed today      60 minutes spent on the date of the encounter doing chart review, review of test results, interpretation of tests, patient visit, and documentation     Amber Scheierl, CNP  [facility] [address] [id number] [phone]

## 2024-10-14 NOTE — LETTER
10/14/2024      Maggie Navarrete  [address]  [facility] [address]      Dear Colleague,    Thank you for referring your patient, Maggie Navarrete, to the [facility]. Please see a copy of my visit note below.    Virtual Visit Details    Type of service:  Video Visit   Video Start Time:  8:46AM  Video End Time: 9:12AM    Originating Location (pt. Location): Home    Distant Location (provider location):  On-site  Platform used for Video Visit: [facility]CER CLINIC  RETURN PATIENT VISIT NOTE    PATIENT NAME: Maggie Navarrete MRN # [id number]  DATE OF VISIT: Oct 14, 2024 YOB: 1950    CANCER TYPE: metastatic uterine leiomyosarcoma     HISTORY OF PRESENT ILLNESS   Maggie is a 74 year old women with PMH of metastatic uterine leiomyosarcoma. She was originally diagnosed on 2016 showing an up to 12 cm mass. She underwent surgical resection followed by adjuvant doxorubicin/dacarbazine for 5 cycles. She had local recurrence on 2020 treated with surgical resection. Then  again recurrence on 2022 again underwent surgical resection. Followed by [id number] with anastrozole. Since January of 2024 she has been on trial a BOAST clinical trial with Dr. Nik Schilling at the [facility]. She received abemaciclib there. Now, she was taken off of trial due to progression on 1 of 3 intra-abdominal lesions.     She comes here to continue medical management. She has seen Dr. Michaud at [place] for a second opinion. She is interested in surgical resection which has been offered at [facility] with possible Gemzar HIPEC.      She started on single agent Gemzar on 7/29/24. She is here today for follow up prior to cycle 3.     9/16/24, Caris with no clinically significant biomarker results.    Imaging after 2 cycles suggestive of progression with enlarging lesions in the abdomen, and no new lesions.      PAST ONCOLOGIC HISTORY    Leiomyosarcoma Uterus   11/3/2016 Initial Diagnosis     11/4/2016 Surgery and Procedures   Open hyst, BSO, stage II with peritoneal involvement    12/26/2016 - 3/23/2017 Chemotherapy   Received elsewhere: adjuvant chemotherapy with doxorubicin 25 mg/m2/day and DTIC 250 mg/m2/day , days 1-3 every 21 weeks.  Completed 5 cycles of adjuvant doxorubicin and dacarbazine    6/1/2020 Recurrence Local   Isolated lesion in the RLQ    6/16/2020 Surgery and Procedures   Laparotomy, excision of side wall mass, ureteroneocystotomy. 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 The excised margins were negative for tumor.    3/31/2022 [facility]   PET with isolated bowel lesion in LLQ.     4/4/2022 Surgery and Procedures   Exlap, FANNY, small bowel lesion excised with negative margins    5/9/2023 Surgery and Procedures   Dr. Camarillo and Dr. Krueger    Exploratory laparotomy, tumor debulking, cystoscopy and placement of internal ureteral stent and ureterolysis     Findings  Two lesions in the abdomen consistent with very indolent appearing sarcoma on frozen section, 1 in the right upper quadrant in the large bowel mesentery. And 1 on the left sidewall between the left sidewall and the left ureter and the sigmoid colon    5/9/2023 Biopsy/Pathology   FINAL DIAGNOSIS     A. Soft tissue, hepatic flexure nodule, excision: Involved   by smooth muscle neoplasm, 1.4 cm. See comment.     B. Soft tissue, left sidewall mass, excision: Recurrent   leiomyosarcoma, 2.9 cm. See comment.     C. Colon, sigmoid mass, excision: Involved by smooth   muscle neoplasm with increased mitotic activity, 6 mm. See   comment.     D. Soft tissue, left nohemi-ureteral, biopsy: Involved by   smooth muscle neoplasm, 3 mm. See comment.     E. Peritoneum, left pelvic sidewall margin, excision:   Involved by smooth muscle neoplasm, 1 mm. See comment.     F. Soft tissue, sigmoid colon margin, excision: Negative   for tumor.      Comment: Immunohistochemical stains performed at [facility] demonstrate that the left sidewall mass (B2) is   positive for desmin, caldesmon, ER (moderate, 60-70%), and   NE (moderate, 20-30%), but is negative for HMB45, Melan A,   STAT6, ALK, DOG1, and KIT; this immunophenotype supports a   smooth muscle neoplasm. The left sidewall mass shows   increased mitotic activity (greater than 10 mitoses/10 HPF)   and atypia sufficient to meet criteria for a   leiomyosarcoma. The remaining lesion show minimal atypia   but increased mitotic activity (though less than 10   mitoses/10 HPF) where evaluable, but evaluation is limited   by the small size of the lesions. In the context of a known   history of leiomyosarcoma and a concurrent tumor meeting   diagnostic criteria, these likely represent deceptively   bland additional foci of recurrent leiomyosarcoma.     8/2023 - Biological/Targeted/Hormone Therapy   Anastrozole     11/8/2023 Progression/Relapse   CT scan chest-abdomen-pelvis shows progression of 3 nodules within the peritoneal cavity.    1/18/2024 - 6/5/2024 Chemotherapy   BOAST clinical trial with Dr. Nik Schilling at the [facility]. She received abemaciclib there. Was taken off of trial due to progression.     7/29/24: Cycle 1 Gemzar    Interval History:  Maggie presents for follow up.  -I reviewed triage note where she tested positive for covid on 10/13/24.   -She recently returned from [place]. She really enjoyed her trip.  -she developed symptoms of headache, nasal congestion, fatigue, sore throat around 10/11/24. Her covid test on 10/13/24 was positive. She also reports a bit of cough. Sore throat is a bit better now. Temp has not exceeded 100. She is still fatigued.  -She is trying to get rest this morning.   -She wonders about Paxlovid. She has taken this in the past with no notable side effects     PHYSICAL EXAM   There were no vitals taken for this visit.   9/16/2024  10:27 AM  9/16/2024  3:02 PM 9/24/2024  12:48 PM   Vital Signs      Systolic 138   139    Diastolic 74   82    Pulse 95   105    Temperature  98.6  F (37  C)  98.8  F (37.1  C)    Respirations   16    Weight (LB) 120 lb 11.2 oz   120 lb 12.8 oz    Height      BMI (Calculated)      Pain Score   0 (None)    O2  98 %  100 %    Systolic (Patient Reported)        Wt Readings from Last 10 Encounters:   09/24/24 54.8 kg (120 lb 12.8 oz)   09/16/24 54.7 kg (120 lb 11.2 oz)   09/09/24 55.1 kg (121 lb 8 oz)   08/26/24 56.7 kg (125 lb)   08/22/24 56.9 kg (125 lb 8 oz)   08/19/24 56.8 kg (125 lb 3.2 oz)   08/06/24 57 kg (125 lb 11.2 oz)   07/29/24 56.3 kg (124 lb 3.2 oz)   07/17/24 56.4 kg (124 lb 6.4 oz)   03/05/24 58.5 kg (129 lb)   Video physical exam  General: Patient appears in no acute distress. She is seated in her bed for visit.   ENT: Voice suggestive of nasal congestion.   Skin: No visualized rash or lesions on visualized skin  Eyes: EOMI, no erythema, sclera icterus or discharge noted  Resp: Appears to be breathing comfortably without accessory muscle usage, speaking in full sentences. 1 isolated loose cough.   MSK: Appears to have normal range of motion based on visualized movements  Neurologic: No apparent tremors, facial movements symmetric. Hearing intact. No dysarthria  Psych: affect appropriate, alert and oriented. Pleasant       LABORATORY AND IMAGING STUDIES   Most Recent 3 CBC's:  Recent Labs   Lab Test 09/16/24  1500 09/09/24  1153 08/26/24  1501   WBC 4.7 6.5 5.7   HGB 11.1* 11.5* 11.4*   MCV 94 94 93    232 440   ANEUTAUTO 1.5* 3.4 2.2     Most Recent 3 BMP's:  Recent Labs   Lab Test 09/09/24  1153 08/19/24  1420 08/06/24  1339    139 140   POTASSIUM 4.1 4.4 4.3   CHLORIDE 106 104 104   CO2 25 24 26   BUN 24.1* 21.7 27.8*   CR 0.92 0.95 0.96*   ANIONGAP 11 11 10   SUSAN 9.3 9.5 9.7   * 81 87   PROTTOTAL 6.2* 6.5 6.5   ALBUMIN 4.0 4.2 4.2    Most Recent 3 LFT's:  Recent Labs   Lab Test  09/09/24  1153 08/19/24  1420 08/06/24  1339   AST 18 23 19   ALT 16 22 20   ALKPHOS 61 67 70   BILITOTAL 0.3 0.2 0.2   I reviewed the above labs today.        ASSESSMENT AND PLAN   Metastatic uterine leiomyosarcoma.   Most recently she started on treatment with single agent Gemzar on 7/29/24.  She saw [place] on 8/29/24, They recommended continuing Gemzar. She was following with [facility] Serrano for possible surgical resection.    Her imaging after 2 cycles of Gemzar showed disease progression with enlarging lesions in the abdomen, and no new lesions. Dr Powers recommended to switch therapy to doxil (40mg/m2) dacarbazine (750 mg/m2 over 3 days) every 4 weeks. We reviewed possible side effects including but not limited to hypersensitively with Doxil, photosensitivity, mucositis, pruritic rash, acid reflux, constipation, nausea, hand foot syndrome, bone marrow suppression, and possible hair loss. We also reviewed prevention for mucositis and HFS as well as use for topical hydrocortisone if isolated itchy rash. She agrees with proceeding with this regimen.     The plan will be to to defer surgery until we have better systemic control.    The plan was to start doxil/dacarbazine tomorrow. However, she tested positive for covid yesterday and is still symptomatic. As such, we will delay treatment and I will see her back in 1-2 weeks for consideration for starting treatment. I will also send in Rx for Paxlovid.    Oncology Follow-Up/Plan:  -Defer doxil/dacarabzine start 1-2 weeks with labs and SINTIA prior  -Weekly labs (CBC with ) after chemo x3 (need to be requested once chemo is scheduled)   -SINTIA every 4 weeks prior to cycle start  -Repeat imaging 2 cycles with CT CAP     Covid positive  Tested positive 10/13/24  We reviewed risk/benefits of Paxlovid. She will start this.  Addendum: I called her to discuss DDI of possible increased effects of Ambien with use of Paxlovid. She does not take Ambien nightly and sometimes only half  tablet, so I dont anticipate a concern ehre.     Vascular Access  S/p port placement on 8/26/24    Low Back pain  -Status post  joint injection on 8/6/24  Follows with PT  -On meloxicam per PCP but not taking regular; avoiding NSAIDs for bleeding risk  -Planning for steroid injection again per chart review--not discussed today      60 minutes spent on the date of the encounter doing chart review, review of test results, interpretation of tests, patient visit, and documentation     Amber Scheierl, CNP  [facility] [address] [address] [phone]          Again, thank you for allowing me to participate in the care of your patient.        Sincerely,        Amber J. Scheierl, APRN CNP

## 2024-10-14 NOTE — TELEPHONE ENCOUNTER
Triage Patient Outreach    Attempt # 1    Was call answered?  No.  Left voicemail to return call to Triage at [facility]    Sylwia Akers RN

## 2024-10-15 ENCOUNTER — VIRTUAL VISIT (OUTPATIENT)
Dept: ORTHOPEDICS | Facility: CLINIC | Age: 74
End: 2024-10-15
Payer: MEDICARE

## 2024-10-15 VITALS — HEIGHT: 59 IN | WEIGHT: 118 LBS | BODY MASS INDEX: 23.79 KG/M2

## 2024-10-15 DIAGNOSIS — M54.16 LUMBAR RADICULOPATHY: Primary | ICD-10-CM

## 2024-10-15 DIAGNOSIS — M48.062 SPINAL STENOSIS OF LUMBAR REGION WITH NEUROGENIC CLAUDICATION: ICD-10-CM

## 2024-10-15 DIAGNOSIS — M43.16 SPONDYLOLISTHESIS OF LUMBAR REGION: ICD-10-CM

## 2024-10-15 PROCEDURE — 99442 PR PHYSICIAN TELEPHONE EVALUATION 11-20 MIN: CPT | Mod: 93 | Performed by: ORTHOPAEDIC SURGERY

## 2024-10-15 ASSESSMENT — PAIN SCALES - GENERAL: PAINLEVEL: MODERATE PAIN (4)

## 2024-10-15 NOTE — LETTER
10/15/2024      Maggie Navarrete  [address]  [facility] [address]      Dear Colleague,    Thank you for referring your patient, Maggie Navarrete, to the [facility] [place]. Please see a copy of my visit note below.    Virtual Visit Details    Type of service:  Telephone Visit   Phone call duration: 14 minutes   Originating Location (pt. Location): Home    Distant Location (provider location):  On-site    Diagnosis: Lumbar facet cyst and L4-5 spondylolisthesis    Maggie went on her trip and it was really unbearable for her.  She could not remain seated because the radicular complaints in the left side became so severe.  She is wondering if there is a less invasive surgical option for her.  She is on chemotherapy for leiomyosarcoma.    I told her with her chemotherapy she is really not a candidate for a fusion.  Potentially if there could be a chemotherapy holiday we could consider doing a less invasive decompression surgery.  Given her spondylolisthesis that is present there certainly would be a risk of slow progression over time or need for fusion in the future.  She expressed understanding of this.  If we can get oncologic approval she would be interested in proceeding with this.  I went to reach out to the team and see what they think about a chemotherapy holiday to allow for wound healing.    Risks of this surgery include risk of infection, risk of dural tear resulting in CSF leak which might result in headaches, or possible need for lumbar drain, or possible revision surgery in the setting of a persistent leak. Risk of seroma or hematoma requiring revision surgery. Possible nerve root injury resulting in numbness weakness or paralysis into the leg. Possible radiculitis which could result in similar symptoms or could result in significant neurogenic type pain into the leg. Risk of incomplete decompression which might require revision surgery in the future.  Risk of pars  fracture or postoperative instability requiring conversion to a fusion in the future. Risk of adjacent segment problems requiring surgery in the future. Risk of incomplete relief of symptoms possibly requiring revision surgery in the future. There is a risk of blood clots in the legs or the lungs.  Furthermore, although rare, there are risks of major vessel or major organ injury from the surgery, and risks of the anesthetic including stroke heart attack and death.    There is no indication for further physical therapy in this case.  Further physical therapy would delay necessary medical treatment and prolong the patient's suffering with no benefit and the delay would increase the risk of neurologic decline and/or symptom worsening unnecessarily, with no benefit to the patient and possible harm.       The patient has already trialed oral medications as listed in the medication history, and has trialed activity modification such as decreasing their bending and twisting, and decreasing their walking distance.  In spite of this, and in spite of the conservative therapies documented above, the patient has significant functional disability in their activities of daily living such as prolonged sitting and standing, prolonged walking, and daily chores, each of which are very difficult or painful because of this spinal problem.  Therefore the proposed surgery is medically necessary and the patient has failed conservative care.      There are no untreated, underlying mental health conditions (including but not limited to psychological conditions or drug or alcohol abuse) that will preclude an appropriate recovery from this surgery or that are contraindications to proceeding with surgery.         Again, thank you for allowing me to participate in the care of your patient.        Sincerely,        Eric Moreno MD

## 2024-10-15 NOTE — NURSING NOTE
Current patient location: [address] [facility] [address]    Is the patient currently in the state of [place]? YES    Visit mode:TELEPHONE    If the visit is dropped, the patient can be reconnected by: TELEPHONE VISIT: Phone number:   Telephone Information:   Mobile [phone]       Will anyone else be joining the visit? NO  (If patient encounters technical issues they should call [phone]    Are changes needed to the allergy or medication list? No    Are refills needed on medications prescribed by this physician? NO    Rooming Documentation:  Not applicable    Reason for visit: KATIE SOFIA

## 2024-10-15 NOTE — PROGRESS NOTES
Virtual Visit Details    Type of service:  Telephone Visit   Phone call duration: 14 minutes   Originating Location (pt. Location): Home    Distant Location (provider location):  On-site    Diagnosis: Lumbar facet cyst and L4-5 spondylolisthesis    Maggie went on her trip and it was really unbearable for her.  She could not remain seated because the radicular complaints in the left side became so severe.  She is wondering if there is a less invasive surgical option for her.  She is on chemotherapy for leiomyosarcoma.    I told her with her chemotherapy she is really not a candidate for a fusion.  Potentially if there could be a chemotherapy holiday we could consider doing a less invasive decompression surgery.  Given her spondylolisthesis that is present there certainly would be a risk of slow progression over time or need for fusion in the future.  She expressed understanding of this.  If we can get oncologic approval she would be interested in proceeding with this.  I went to reach out to the team and see what they think about a chemotherapy holiday to allow for wound healing.    Risks of this surgery include risk of infection, risk of dural tear resulting in CSF leak which might result in headaches, or possible need for lumbar drain, or possible revision surgery in the setting of a persistent leak. Risk of seroma or hematoma requiring revision surgery. Possible nerve root injury resulting in numbness weakness or paralysis into the leg. Possible radiculitis which could result in similar symptoms or could result in significant neurogenic type pain into the leg. Risk of incomplete decompression which might require revision surgery in the future.  Risk of pars fracture or postoperative instability requiring conversion to a fusion in the future. Risk of adjacent segment problems requiring surgery in the future. Risk of incomplete relief of symptoms possibly requiring revision surgery in the future. There is a risk of  blood clots in the legs or the lungs.  Furthermore, although rare, there are risks of major vessel or major organ injury from the surgery, and risks of the anesthetic including stroke heart attack and death.    There is no indication for further physical therapy in this case.  Further physical therapy would delay necessary medical treatment and prolong the patient's suffering with no benefit and the delay would increase the risk of neurologic decline and/or symptom worsening unnecessarily, with no benefit to the patient and possible harm.       The patient has already trialed oral medications as listed in the medication history, and has trialed activity modification such as decreasing their bending and twisting, and decreasing their walking distance.  In spite of this, and in spite of the conservative therapies documented above, the patient has significant functional disability in their activities of daily living such as prolonged sitting and standing, prolonged walking, and daily chores, each of which are very difficult or painful because of this spinal problem.  Therefore the proposed surgery is medically necessary and the patient has failed conservative care.      There are no untreated, underlying mental health conditions (including but not limited to psychological conditions or drug or alcohol abuse) that will preclude an appropriate recovery from this surgery or that are contraindications to proceeding with surgery.

## 2024-10-21 ENCOUNTER — TELEPHONE (OUTPATIENT)
Dept: NEUROSURGERY | Facility: CLINIC | Age: 74
End: 2024-10-21
Payer: MEDICARE

## 2024-10-21 NOTE — TELEPHONE ENCOUNTER
Called patient to schedule surgery with Dr. Moreno, no answer. Callback number [phone] left on vm.     Marlys Sawyer on 10/21/2024 at 11:11 AM

## 2024-10-22 ENCOUNTER — HOME INFUSION (PRE-WILLOW HOME INFUSION) (OUTPATIENT)
Dept: PHARMACY | Facility: CLINIC | Age: 74
End: 2024-10-22
Payer: MEDICARE

## 2024-10-22 RX ORDER — ONDANSETRON 8 MG/1
8 TABLET, FILM COATED ORAL EVERY 8 HOURS PRN
Qty: 30 TABLET | Refills: 2 | Status: SHIPPED | OUTPATIENT
Start: 2024-10-22

## 2024-10-22 RX ORDER — DEXAMETHASONE 4 MG/1
4 TABLET ORAL DAILY
Qty: 7 TABLET | Refills: 4 | Status: SHIPPED | OUTPATIENT
Start: 2024-10-22 | End: 2024-11-06

## 2024-10-22 RX ORDER — PROCHLORPERAZINE MALEATE 10 MG
10 TABLET ORAL EVERY 6 HOURS PRN
Qty: 30 TABLET | Refills: 2 | Status: SHIPPED | OUTPATIENT
Start: 2024-10-22

## 2024-10-22 NOTE — PROGRESS NOTES
Therapy: Dacarbazine via Cadd  Insurance: Medicare + BCBS supplement     The patient has all Medicare products, which do not cover Dacarbazine in the home. (Pt would have coverage for short term TCU or IC). Below is what the patient would be responsible for if the patient wanted to go with [facility].   - Drug would go to the Part-D Prescription Plan and Prior Authorization is required- PT would be responsible for the co-pay per dispense. Co-pay amount will be determined once PA is Approved. IF Auth is denied, pt will be responsible for cost of drug ($77.76 for 3 day cycle)   - Cost of Cadd Rental is $57.40 per month  - Patient would have to self-pay for the per-neville ($30.00 daily)   - If not homebound, nursing would also be self-pay ($90.00 per visit)    Please contact Intake with any questions, [phone] or In Basket pool,  Home Infusion ([phone]).

## 2024-10-23 ENCOUNTER — ENROLLMENT (OUTPATIENT)
Dept: HOME HEALTH SERVICES | Facility: HOME HEALTH | Age: 74
End: 2024-10-23
Payer: MEDICARE

## 2024-10-23 ENCOUNTER — ONCOLOGY VISIT (OUTPATIENT)
Dept: ONCOLOGY | Facility: CLINIC | Age: 74
End: 2024-10-23
Attending: INTERNAL MEDICINE
Payer: MEDICARE

## 2024-10-23 ENCOUNTER — INFUSION THERAPY VISIT (OUTPATIENT)
Dept: ONCOLOGY | Facility: CLINIC | Age: 74
End: 2024-10-23
Attending: STUDENT IN AN ORGANIZED HEALTH CARE EDUCATION/TRAINING PROGRAM
Payer: MEDICARE

## 2024-10-23 ENCOUNTER — HOME INFUSION (OUTPATIENT)
Dept: HOME HEALTH SERVICES | Facility: HOME HEALTH | Age: 74
End: 2024-10-23
Payer: MEDICARE

## 2024-10-23 ENCOUNTER — APPOINTMENT (OUTPATIENT)
Dept: LAB | Facility: CLINIC | Age: 74
End: 2024-10-23
Attending: STUDENT IN AN ORGANIZED HEALTH CARE EDUCATION/TRAINING PROGRAM
Payer: MEDICARE

## 2024-10-23 VITALS
DIASTOLIC BLOOD PRESSURE: 76 MMHG | HEART RATE: 96 BPM | RESPIRATION RATE: 16 BRPM | TEMPERATURE: 97.9 F | OXYGEN SATURATION: 99 % | SYSTOLIC BLOOD PRESSURE: 155 MMHG

## 2024-10-23 DIAGNOSIS — T45.1X5A CHEMOTHERAPY-INDUCED NEUTROPENIA (H): ICD-10-CM

## 2024-10-23 DIAGNOSIS — T45.1X5A TOXIC EFFECT OF CHEMOTHERAPY: Primary | ICD-10-CM

## 2024-10-23 DIAGNOSIS — C55 LEIOMYOSARCOMA OF UTERUS (H): ICD-10-CM

## 2024-10-23 DIAGNOSIS — I42.7 DILATED CARDIOMYOPATHY SECONDARY TO DRUG (H): ICD-10-CM

## 2024-10-23 DIAGNOSIS — D70.1 CHEMOTHERAPY-INDUCED NEUTROPENIA (H): ICD-10-CM

## 2024-10-23 DIAGNOSIS — D70.1 CHEMOTHERAPY-INDUCED NEUTROPENIA (H): Primary | ICD-10-CM

## 2024-10-23 DIAGNOSIS — T45.1X5A CHEMOTHERAPY-INDUCED NEUTROPENIA (H): Primary | ICD-10-CM

## 2024-10-23 LAB
ALBUMIN SERPL BCG-MCNC: 3.9 G/DL (ref 3.5–5.2)
ALP SERPL-CCNC: 70 U/L (ref 40–150)
ALT SERPL W P-5'-P-CCNC: 14 U/L (ref 0–50)
ANION GAP SERPL CALCULATED.3IONS-SCNC: 12 MMOL/L (ref 7–15)
AST SERPL W P-5'-P-CCNC: 17 U/L (ref 0–45)
BASOPHILS # BLD AUTO: 0.1 10E3/UL (ref 0–0.2)
BASOPHILS NFR BLD AUTO: 1 %
BILIRUB SERPL-MCNC: 0.2 MG/DL
BUN SERPL-MCNC: 21.4 MG/DL (ref 8–23)
CALCIUM SERPL-MCNC: 9.2 MG/DL (ref 8.8–10.4)
CHLORIDE SERPL-SCNC: 105 MMOL/L (ref 98–107)
CREAT SERPL-MCNC: 0.92 MG/DL (ref 0.51–0.95)
EGFRCR SERPLBLD CKD-EPI 2021: 65 ML/MIN/1.73M2
EOSINOPHIL # BLD AUTO: 0.3 10E3/UL (ref 0–0.7)
EOSINOPHIL NFR BLD AUTO: 3 %
ERYTHROCYTE [DISTWIDTH] IN BLOOD BY AUTOMATED COUNT: 16.8 % (ref 10–15)
GLUCOSE SERPL-MCNC: 111 MG/DL (ref 70–99)
HCO3 SERPL-SCNC: 23 MMOL/L (ref 22–29)
HCT VFR BLD AUTO: 36.4 % (ref 35–47)
HGB BLD-MCNC: 11.9 G/DL (ref 11.7–15.7)
IMM GRANULOCYTES # BLD: 0.1 10E3/UL
IMM GRANULOCYTES NFR BLD: 1 %
LYMPHOCYTES # BLD AUTO: 3.1 10E3/UL (ref 0.8–5.3)
LYMPHOCYTES NFR BLD AUTO: 34 %
MCH RBC QN AUTO: 31.2 PG (ref 26.5–33)
MCHC RBC AUTO-ENTMCNC: 32.7 G/DL (ref 31.5–36.5)
MCV RBC AUTO: 95 FL (ref 78–100)
MONOCYTES # BLD AUTO: 0.8 10E3/UL (ref 0–1.3)
MONOCYTES NFR BLD AUTO: 9 %
NEUTROPHILS # BLD AUTO: 4.8 10E3/UL (ref 1.6–8.3)
NEUTROPHILS NFR BLD AUTO: 53 %
NRBC # BLD AUTO: 0 10E3/UL
NRBC BLD AUTO-RTO: 0 /100
PLATELET # BLD AUTO: 300 10E3/UL (ref 150–450)
POTASSIUM SERPL-SCNC: 4.4 MMOL/L (ref 3.4–5.3)
PROT SERPL-MCNC: 6.4 G/DL (ref 6.4–8.3)
RBC # BLD AUTO: 3.82 10E6/UL (ref 3.8–5.2)
SODIUM SERPL-SCNC: 140 MMOL/L (ref 135–145)
WBC # BLD AUTO: 9 10E3/UL (ref 4–11)

## 2024-10-23 PROCEDURE — 258N000003 HC RX IP 258 OP 636: Performed by: STUDENT IN AN ORGANIZED HEALTH CARE EDUCATION/TRAINING PROGRAM

## 2024-10-23 PROCEDURE — 36591 DRAW BLOOD OFF VENOUS DEVICE: CPT | Performed by: STUDENT IN AN ORGANIZED HEALTH CARE EDUCATION/TRAINING PROGRAM

## 2024-10-23 PROCEDURE — G0463 HOSPITAL OUTPT CLINIC VISIT: HCPCS | Mod: 25 | Performed by: STUDENT IN AN ORGANIZED HEALTH CARE EDUCATION/TRAINING PROGRAM

## 2024-10-23 PROCEDURE — G0498 CHEMO EXTEND IV INFUS W/PUMP: HCPCS

## 2024-10-23 PROCEDURE — 80053 COMPREHEN METABOLIC PANEL: CPT | Performed by: STUDENT IN AN ORGANIZED HEALTH CARE EDUCATION/TRAINING PROGRAM

## 2024-10-23 PROCEDURE — 96375 TX/PRO/DX INJ NEW DRUG ADDON: CPT

## 2024-10-23 PROCEDURE — 250N000011 HC RX IP 250 OP 636: Performed by: STUDENT IN AN ORGANIZED HEALTH CARE EDUCATION/TRAINING PROGRAM

## 2024-10-23 PROCEDURE — 99215 OFFICE O/P EST HI 40 MIN: CPT | Performed by: STUDENT IN AN ORGANIZED HEALTH CARE EDUCATION/TRAINING PROGRAM

## 2024-10-23 PROCEDURE — 96413 CHEMO IV INFUSION 1 HR: CPT

## 2024-10-23 PROCEDURE — 85025 COMPLETE CBC W/AUTO DIFF WBC: CPT | Performed by: STUDENT IN AN ORGANIZED HEALTH CARE EDUCATION/TRAINING PROGRAM

## 2024-10-23 RX ORDER — HEPARIN SODIUM (PORCINE) LOCK FLUSH IV SOLN 100 UNIT/ML 100 UNIT/ML
5 SOLUTION INTRAVENOUS DAILY PRN
Status: DISCONTINUED | OUTPATIENT
Start: 2024-10-23 | End: 2024-10-23 | Stop reason: HOSPADM

## 2024-10-23 RX ORDER — PALONOSETRON 0.05 MG/ML
0.25 INJECTION, SOLUTION INTRAVENOUS ONCE
Status: COMPLETED | OUTPATIENT
Start: 2024-10-23 | End: 2024-10-23

## 2024-10-23 RX ORDER — TRIAMCINOLONE ACETONIDE 1 MG/G
CREAM TOPICAL
COMMUNITY
Start: 2023-11-20

## 2024-10-23 RX ORDER — DEXAMETHASONE SODIUM PHOSPHATE 4 MG/ML
12 INJECTION, SOLUTION INTRA-ARTICULAR; INTRALESIONAL; INTRAMUSCULAR; INTRAVENOUS; SOFT TISSUE ONCE
Status: COMPLETED | OUTPATIENT
Start: 2024-10-23 | End: 2024-10-23

## 2024-10-23 RX ADMIN — Medication 5 ML: at 09:03

## 2024-10-23 RX ADMIN — DACARBAZINE 755 MG: 10 INJECTION, POWDER, FOR SOLUTION INTRAVENOUS at 13:05

## 2024-10-23 RX ADMIN — DEXAMETHASONE SODIUM PHOSPHATE 12 MG: 4 INJECTION, SOLUTION INTRA-ARTICULAR; INTRALESIONAL; INTRAMUSCULAR; INTRAVENOUS; SOFT TISSUE at 10:48

## 2024-10-23 RX ADMIN — PALONOSETRON HYDROCHLORIDE 0.25 MG: 0.25 INJECTION INTRAVENOUS at 10:52

## 2024-10-23 RX ADMIN — FAMOTIDINE 20 MG: 10 INJECTION INTRAVENOUS at 10:53

## 2024-10-23 RX ADMIN — APREPITANT 130 MG: 130 INJECTION, EMULSION INTRAVENOUS at 10:56

## 2024-10-23 RX ADMIN — DOXORUBICIN HYDROCHLORIDE 60 MG: 2 INJECTABLE, LIPOSOMAL INTRAVENOUS at 11:52

## 2024-10-23 ASSESSMENT — PAIN SCALES - GENERAL: PAINLEVEL_OUTOF10: NO PAIN (0)

## 2024-10-23 NOTE — PROGRESS NOTES
[facility]: Cancer Care Plan of Care Education Note                                    Discussion with Patient:                                                      Went over the regimen with Maggie.  Explained that it will be given every 3 weeks.  Let her know that She will be receiving doxil in the clinic and then get hooked up to the dacarbazine on a pump.  She will come back on day 3 for a bag change, then come back on day 4 for a disconnect.       Antiemetics: aloxi, aprepitant  Steroid use/side effect: dexamethasone-she will take 2 tablets on days 1-2, then 1 tablet days 3-5        Assessment:                                                      Assessment completed with:: Patient    Plan of Care Education   Yearly learning assessment completed?: Yes (see Education tab)  Diagnosis:: metastatic leiomyosarcoma  Does patient understand diagnosis?: Yes  Tx plan/regimen:: doxil/dacarbazine  Does patient understand treatment plan/regimen?: Yes (needs lots of reinforcement)  Vascular access education provided for:: Port  Side effect education:: DVT/PE;Fatigue;Hair loss;Infection;Lab value monitoring (anemia, neutropenia, thrombocytopenia);Mouth sores;Mylosuppression;Nausea/Vomiting;Neuropathy;Skin changes  Safety/self care at home reviewed with patient:: Yes  Coping - concerns/fears reviewed with patient:: Yes  Plan of Care:: SINTIA follow-up appointment;Lab appointment;Imaging;MD follow-up appointment;Treatment schedule  When to call provider:: Bleeding;Increased shortness of breath;New/worsening pain;Shaking chills;Temperature >100.4F;Uncontrolled diarrhea/constipation;Uncontrolled nausea/vomiting  Reasons for deferring treatment reviewed with patient:: Yes  Additional education provided for: : Steroid therapy;Neutropenic precautions;Bleeding precautions  Procedure education provided for: : Blood product transfusion    Evaluation of Learning  Patient Education Provided: Yes  Readiness:: Acceptance  Method::  Booklet/Handout;Explanation  Response:: Verbalizes understanding;Needs reinforcement    No assessment indicated    Intervention/Education provided during outreach:                                                       See above    Follow up call in 1-2 weeks  Patient to follow up as scheduled at next appt  Patient to call/[employer] message with updates  Medication Change: Discussed medication change with patient  Results Notification: Results dated (insert date) discussed wtih patient over telephone  Confirmed patient has clinic and triage numbers    Signature:  Jose Alberto Morris RN

## 2024-10-23 NOTE — PROGRESS NOTES
Infusion Nursing Note:  Maggie Navarrete presents today for C1D1 Doxil-Dacarbazine Pump.    Patient seen by provider today: Yes: Amber Scheierl, NP   present during visit today: Not Applicable.    Note:   Pt saw provider prior to infusion.  Maggie agrees to treatment today.    -Jose Alberto RNCC @ bedside for chemo teach  -EF not needed prior to Doxil  -confirmed with pt that she is aware to hold her topical protoptic (tacrolimus) because it can interfere with chemo  -confirmed with Amber Scheierl, NP , NO Neulasta with Cycle 1  -pt does not have [facility] coverage so all pump questions/bag changes/ D-C go through clinic  -pt and Helen discussed PO DEX, pt prefers not to take PO DEX unless she needs it and Helen is aware  -per SINTIA request, provider instructions given to pt on printout  -pt aware of weekly labs @ [facility]    Intravenous Access:  Implanted Port.    Treatment Conditions:   Latest Reference Range & Units 10/23/24 09:10   Sodium 135 - 145 mmol/L 140   Potassium 3.4 - 5.3 mmol/L 4.4   Chloride 98 - 107 mmol/L 105   Carbon Dioxide (CO2) 22 - 29 mmol/L 23   Urea Nitrogen 8.0 - 23.0 mg/dL 21.4   Creatinine 0.51 - 0.95 mg/dL 0.92   GFR Estimate >60 mL/min/1.73m2 65   Calcium 8.8 - 10.4 mg/dL 9.2   Anion Gap 7 - 15 mmol/L 12   Albumin 3.5 - 5.2 g/dL 3.9   Protein Total 6.4 - 8.3 g/dL 6.4   Alkaline Phosphatase 40 - 150 U/L 70   ALT 0 - 50 U/L 14   AST 0 - 45 U/L 17   Bilirubin Total <=1.2 mg/dL 0.2   Glucose 70 - 99 mg/dL 111 (H)   WBC 4.0 - 11.0 10e3/uL 9.0   Hemoglobin 11.7 - 15.7 g/dL 11.9   Hematocrit 35.0 - 47.0 % 36.4   Platelet Count 150 - 450 10e3/uL 300   RBC Count 3.80 - 5.20 10e6/uL 3.82   MCV 78 - 100 fL 95   MCH 26.5 - 33.0 pg 31.2   MCHC 31.5 - 36.5 g/dL 32.7   RDW 10.0 - 15.0 % 16.8 (H)   % Neutrophils % 53   % Lymphocytes % 34   % Monocytes % 9   % Eosinophils % 3   % Basophils % 1   Absolute Basophils 0.0 - 0.2 10e3/uL 0.1   Absolute Eosinophils 0.0 - 0.7 10e3/uL 0.3   Absolute Immature Granulocytes  "<=0.4 10e3/uL 0.1   Absolute Lymphocytes 0.8 - 5.3 10e3/uL 3.1   Absolute Monocytes 0.0 - 1.3 10e3/uL 0.8   % Immature Granulocytes % 1   Absolute Neutrophils 1.6 - 8.3 10e3/uL 4.8   Absolute NRBCs 10e3/uL 0.0   NRBCs per 100 WBC <1 /100 0       Post Infusion Assessment:  Patient tolerated infusion without incident.  Blood return noted pre and post infusion.  Site patent and intact, free from redness, edema or discomfort.  No evidence of extravasations.   Prior to discharge: Port is secured in place with tegaderm and flushed with 10cc NS with positive blood return noted.    Continuous home infusion CADD pump connected.    All connectors secured in place and clamps taped open.    Pump started, \"running\" noted on display (CADD): YES.   Capillary element taped to pt's skin per protocol.  Pump Connection double checked with Martha Borja RN.  Patient instructed to call our clinic or [facility] with any questions or concerns at home.  Patient verbalized understanding.    Patient set up for Dacarbazine bag change at our clinic on Friday 10/25 @1300.   IB sent to Charge Nurse and Float to assess pt Friday morning and release pump early to have it ready when pt arrives.           Discharge Plan:   Prescription refills given for Compazine, Zofran DEX.  Discharge instructions reviewed with: Patient.  Patient and/or family verbalized understanding of discharge instructions and all questions answered.  AVS to patient via [employer].  Patient will return 10/25 for next appointment.   Patient discharged in stable condition accompanied by: self.  Departure Mode: Ambulatory.    Carolyn Kauffman RN      "

## 2024-10-23 NOTE — PROGRESS NOTES
10/21/2024-  DACARBAZINE AND DOXIL TO PART D. AUTHORIZATION IS REQUIRED.  COPAY APPLIES. PER EKLLY WILL BE SELFPAY. IF CADD IS DISPENSED, RETURN TO INTAKE FOR SPQ/ABN. OTHER THERAPY: RETURN TO INTAKE FOR COVERAGE DETERMINATION.    COVERAGE ISSUE -TPA AND LINE CARE WILL ONLY BE COVERED IN OUTPATIENT SETTING.    [facility] CANNOT BILL FOR NURSING IF PT IS HOMEBOUND. IF PT IS NOT HOMEBOUND, PT CAN BE IVN IF OK TO SELF-PAY. ML

## 2024-10-23 NOTE — PROGRESS NOTES
[facility] CANCER CLINIC  RETURN PATIENT VISIT NOTE    PATIENT NAME: Maggie Navarrete MRN # [id number]  DATE OF VISIT: Oct 23, 2024 YOB: 1950    CANCER TYPE: metastatic uterine leiomyosarcoma     HISTORY OF PRESENT ILLNESS   Maggie is a 74 year old women with PMH of metastatic uterine leiomyosarcoma. She was originally diagnosed on 2016 showing an up to 12 cm mass. She underwent surgical resection followed by adjuvant doxorubicin/dacarbazine for 5 cycles. She had local recurrence on 2020 treated with surgical resection. Then  again recurrence on 2022 again underwent surgical resection. Followed by [id number] with anastrozole. Since January of 2024 she has been on trial a BOAST clinical trial with Dr. Nik Schilling at the [facility]. She received abemaciclib there. Now, she was taken off of trial due to progression on 1 of 3 intra-abdominal lesions.     She comes here to continue medical management. She has seen Dr. Michaud at [facility] for a second opinion. She is interested in surgical resection which has been offered at [facility] with possible Gemzar HIPEC.      She started on single agent Gemzar on 7/29/24. She is here today for follow up prior to cycle 3.     9/16/24, Caris with no clinically significant biomarker results.    Imaging after 2 cycles suggestive of progression with enlarging lesions in the abdomen, and no new lesions.      PAST ONCOLOGIC HISTORY   Leiomyosarcoma Uterus   11/3/2016 Initial Diagnosis     11/4/2016 Surgery and Procedures   Open hyst, BSO, stage II with peritoneal involvement    12/26/2016 - 3/23/2017 Chemotherapy   Received elsewhere: adjuvant chemotherapy with doxorubicin 25 mg/m2/day and DTIC 250 mg/m2/day , days 1-3 every 21 weeks.  Completed 5 cycles of adjuvant doxorubicin and dacarbazine    6/1/2020 Recurrence Local   Isolated lesion in the RLQ    6/16/2020 Surgery and Procedures   Laparotomy, excision of side wall  mass, ureteroneocystotomy. 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 The excised margins were negative for tumor.    3/31/2022 [facility]   PET with isolated bowel lesion in LLQ.     4/4/2022 Surgery and Procedures   Exlap, FANNY, small bowel lesion excised with negative margins    5/9/2023 Surgery and Procedures   Dr. Camarillo and Dr. Krueger    Exploratory laparotomy, tumor debulking, cystoscopy and placement of internal ureteral stent and ureterolysis     Findings  Two lesions in the abdomen consistent with very indolent appearing sarcoma on frozen section, 1 in the right upper quadrant in the large bowel mesentery. And 1 on the left sidewall between the left sidewall and the left ureter and the sigmoid colon    5/9/2023 Biopsy/Pathology   FINAL DIAGNOSIS     A. Soft tissue, hepatic flexure nodule, excision: Involved   by smooth muscle neoplasm, 1.4 cm. See comment.     B. Soft tissue, left sidewall mass, excision: Recurrent   leiomyosarcoma, 2.9 cm. See comment.     C. Colon, sigmoid mass, excision: Involved by smooth   muscle neoplasm with increased mitotic activity, 6 mm. See   comment.     D. Soft tissue, left nohemi-ureteral, biopsy: Involved by   smooth muscle neoplasm, 3 mm. See comment.     E. Peritoneum, left pelvic sidewall margin, excision:   Involved by smooth muscle neoplasm, 1 mm. See comment.     F. Soft tissue, sigmoid colon margin, excision: Negative   for tumor.     Comment: Immunohistochemical stains performed at [facility] demonstrate that the left sidewall mass (B2) is   positive for desmin, caldesmon, ER (moderate, 60-70%), and   PA (moderate, 20-30%), but is negative for HMB45, Melan A,   STAT6, ALK, DOG1, and KIT; this immunophenotype supports a   smooth muscle neoplasm. The left sidewall mass shows   increased mitotic activity (greater than 10 mitoses/10 HPF)   and atypia sufficient to meet criteria for a   leiomyosarcoma. The remaining lesion  show minimal atypia   but increased mitotic activity (though less than 10   mitoses/10 HPF) where evaluable, but evaluation is limited   by the small size of the lesions. In the context of a known   history of leiomyosarcoma and a concurrent tumor meeting   diagnostic criteria, these likely represent deceptively   bland additional foci of recurrent leiomyosarcoma.     8/2023 - Biological/Targeted/Hormone Therapy   Anastrozole     11/8/2023 Progression/Relapse   CT scan chest-abdomen-pelvis shows progression of 3 nodules within the peritoneal cavity.    1/18/2024 - 6/5/2024 Chemotherapy   BOAST clinical trial with Dr. Nik Schilling at the [facility]. She received abemaciclib there. Was taken off of trial due to progression.     7/29/24: Cycle 1 Gemzar    Interval History:  Maggie presents for follow up.  -She completed Paxlovid on 10/18.  -She reports she still has some fatigue and runny nose/phlegm production but symptoms are improved. She walked the dog for an hour last night and met with friends this weekend.  -She reports she previously only had 1 day of elevated temp and it was less than 100.  -She has no nausea, diarrhea or SOB  -She met with ortho. Recommendation was for surgery to spine. She is wondering timing of this. At present pain is controlled. It flared with sitting on plane for her recent international trip     PHYSICAL EXAM   BP (!) 155/76   Pulse 96   Temp 97.9  F (36.6  C) (Oral)   Resp 16   SpO2 99%   Wt Readings from Last 10 Encounters:   10/15/24 53.5 kg (118 lb)   10/14/24 54.4 kg (120 lb)   09/24/24 54.8 kg (120 lb 12.8 oz)   09/16/24 54.7 kg (120 lb 11.2 oz)   09/09/24 55.1 kg (121 lb 8 oz)   08/26/24 56.7 kg (125 lb)   08/22/24 56.9 kg (125 lb 8 oz)   08/19/24 56.8 kg (125 lb 3.2 oz)   08/06/24 57 kg (125 lb 11.2 oz)   07/29/24 56.3 kg (124 lb 3.2 oz)   General: Well groomed female in no acute distress.  Eyes: No scleral icterus or conjunctival injection.  ENT: Oral  mucosa is moist without lesions or thrush.   Cardiovascular: RRR No murmurs.  Respiratory: CTA bilaterally. No wheezes or crackles. No cough during visit.  Gastrointestinal: BS +. Abdomen soft, non-tender to light palpation.   Neurologic: Cranial nerves II through XII are grossly intact.  Skin: No rashes, petechiae, or bruising noted on exposed skin.  Psych: Affect appropriate. Pleasant.      LABORATORY AND IMAGING STUDIES   Most Recent 3 CBC's:  Recent Labs   Lab Test 10/23/24  0910 09/16/24  1500 09/09/24  1153   WBC 9.0 4.7 6.5   HGB 11.9 11.1* 11.5*   MCV 95 94 94    283 232   ANEUTAUTO 4.8 1.5* 3.4     Most Recent 3 BMP's:  Recent Labs   Lab Test 10/23/24  0910 09/09/24  1153 08/19/24  1420    142 139   POTASSIUM 4.4 4.1 4.4   CHLORIDE 105 106 104   CO2 23 25 24   BUN 21.4 24.1* 21.7   CR 0.92 0.92 0.95   ANIONGAP 12 11 11   SUSAN 9.2 9.3 9.5   * 142* 81   PROTTOTAL 6.4 6.2* 6.5   ALBUMIN 3.9 4.0 4.2    Most Recent 3 LFT's:  Recent Labs   Lab Test 10/23/24  0910 09/09/24  1153 08/19/24  1420   AST 17 18 23   ALT 14 16 22   ALKPHOS 70 61 67   BILITOTAL 0.2 0.3 0.2   I reviewed the above labs today.        ASSESSMENT AND PLAN   Metastatic uterine leiomyosarcoma.   Most recently she started on treatment with single agent Gemzar on 7/29/24.  She saw [place] on 8/29/24, They recommended continuing Gemzar. She was following with Nilton Serrano for possible surgical resection.    Her imaging after 2 cycles of Gemzar showed disease progression with enlarging lesions in the abdomen, and no new lesions. Dr Powers recommended to switch therapy to doxil (40mg/m2) dacarbazine (750 mg/m2 over 3 days) every 4 weeks. I discussed with Dr Powers and per her discussion  with Dr Yee, the recommendation is to achieve control of chemo before pursuing resection. Maggie agrees with proceeding with this regimen.    The plan will be to to defer surgery until we have better systemic control.    In regards to spinal  surgery. We discussed that spine surgery will need to occur at least 4 weeks from last chemo and chemo will need to be held about 2 weeks after. There is no surgical date at this time. Maggie would like to proceed with chemo today and will plan to complete 2 cycles of chemo and assess response to chemo before considering spine surgery. She will let us know if back pain escalates during that time warranting sooner intervention. I will update Dr. Moreno on this plan.     We have delayed start of doxil/dacarbazine for covid recovery. She is improving with just some lingering fatigue and congestion.     Oncology Follow-Up/Plan:  -Proceed with cycle 1 doxil/dacarabzine today  -Weekly labs (CBC with ) after chemo x3  -SINTIA toxicity check in 1-2 weeks  -SINTIA every 4 weeks prior to cycle start  -Repeat imaging 2 cycles with CT CAP     Covid positive  Tested positive 10/13/24  Completed Paxlovid  Symptoms improving  Advised monitoring for regression of symptoms and when to notify us given start of chemo    Vascular Access  S/p port placement on 8/26/24    Low Back pain  -Status post  joint injection on 8/6/24  Follows with PT  -On meloxicam per PCP but not taking regular; avoiding NSAIDs for bleeding risk  -Seen by ortho; recommendation is for possible decompression surgery if break in chemo allows for this (see above).       45 minutes spent on the date of the encounter doing chart review, review of test results, interpretation of tests, patient visit, and documentation     Amber Scheierl, CNP  [facility]  [address] [place] [id number]  [phone]

## 2024-10-23 NOTE — PATIENT INSTRUCTIONS
Instructions:    -Start salt and soda rinses (this is 1 teaspoon of salt and 1 teaspoon of baking soda mixed in 8 ounces of water), swish and SPIT 2 to 3 times per day starting today for at least 1 week. If no mouth sores, ok to stop. If any mouth sores occur, then continue until resolved or restart the rinse. Let us know if you  have mouth pain despite this rinse.  -Apply lotion to dry skin. If you develop any itchy rash to extremities, you can apply a light layer of hydrocortisone topically twice daily.  -If hands and feet are dry, then apply lotion twice daily. If hands or feet are red and burn, then ok to apply hydrocortisone topically 1 to 2 times daily.  -For constipation, take prune juice once daily or an over the counter softener such as docusate or colace once daily. Let us know if no improvement with this.    Please call the [facility] Triage line if you experience a temperature greater than or equal to 100.4, shaking chills, have uncontrolled nausea, vomiting and/or diarrhea, dizziness, shortness of breath, chest pain, bleeding, unexplained bruising, or if you have any other new/concerning symptoms, questions or concerns. If you wish to speak to a provider before your next infusion visit, please call your care coordinator to notify them so we can adequately serve you. [phone]          October 2024 Sunday Monday Tuesday Wednesday Thursday Friday Saturday             1     2     3     4     5       6     7     8     9     10     11     12       13     14    RETURN CCSL   8:30 AM   (45 min.)   Scheierl, Amber J, APRN [facility] 15    TELEPHONE VISIT RETURN   2:40 PM   (10 min.)   Eric Moreno MD   [facility] [place] 16     17     18     19       20     21     22     23    LAB PERIPHERAL   8:45 AM   (15 min.)   [facility] LAB DRAW   [facility]    RETURN CCSL   9:15 AM   (45 min.)   Scheierl,  SHIRAZ Bailon CNP   [facility]    ONC INFUSION 2.5 HR (150 MIN)  10:00 AM   (150 min.)   [facility] ONC INFUSION NURSE   [facility] 24     25    ONC INFUSION 0.5 HR (30 MIN)   1:00 PM   (30 min.)   [facility] ONC INFUSION NURSE   [facility] 26    ONC INFUSION 0.5 HR (30 MIN)   1:00 PM   (30 min.)   [facility] ONC INFUSION NURSE   [facility]   27     28     29    NEW SLEEP   9:45 AM   (60 min.)   Deja Anderson MD   [facility] [place] 30    LAB  11:40 AM   (20 min.)    LAB ONLY   [facility]    RETURN CCSL   1:00 PM   (45 min.)   Rebeka Kim PA-C   [facility] 31 November 2024 Sunday Monday Tuesday Wednesday Thursday Friday Saturday                            1     2       3     4     5     6    LAB  11:20 AM   (20 min.)   SH LAB ONLY   [facility] 7     8     9       10     11     12     13    LAB  11:20 AM   (20 min.)    LAB ONLY   [facility] 14     15     16       17     18    ANNUAL WELLNESS  10:40 AM   (30 min.)   Vandana Morris MD   [facility] 19     20     21    RETURN CCSL   7:45 AM   (45 min.)   Scheierl, Amber J, APRN CNP   [facility]    ONC INFUSION 2.5 HR (150 MIN)  11:00 AM   (150 min.)    ONC INFUSION NURSE   [facility] 22     23       24     25     26     27     28     29     30                    Recent Results (from the past 24 hours)   Comprehensive metabolic panel    Collection Time: 10/23/24  9:10 AM   Result Value Ref Range    Sodium 140 135 - 145 mmol/L    Potassium 4.4 3.4 - 5.3 mmol/L    Carbon Dioxide (CO2) 23 22 - 29 mmol/L    Anion Gap 12 7 - 15 mmol/L    Urea Nitrogen 21.4 8.0 - 23.0 mg/dL    Creatinine 0.92 0.51 - 0.95 mg/dL    GFR Estimate 65 >60  mL/min/1.73m2    Calcium 9.2 8.8 - 10.4 mg/dL    Chloride 105 98 - 107 mmol/L    Glucose 111 (H) 70 - 99 mg/dL    Alkaline Phosphatase 70 40 - 150 U/L    AST 17 0 - 45 U/L    ALT 14 0 - 50 U/L    Protein Total 6.4 6.4 - 8.3 g/dL    Albumin 3.9 3.5 - 5.2 g/dL    Bilirubin Total 0.2 <=1.2 mg/dL   CBC with platelets and differential    Collection Time: 10/23/24  9:10 AM   Result Value Ref Range    WBC Count 9.0 4.0 - 11.0 10e3/uL    RBC Count 3.82 3.80 - 5.20 10e6/uL    Hemoglobin 11.9 11.7 - 15.7 g/dL    Hematocrit 36.4 35.0 - 47.0 %    MCV 95 78 - 100 fL    MCH 31.2 26.5 - 33.0 pg    MCHC 32.7 31.5 - 36.5 g/dL    RDW 16.8 (H) 10.0 - 15.0 %    Platelet Count 300 150 - 450 10e3/uL    % Neutrophils 53 %    % Lymphocytes 34 %    % Monocytes 9 %    % Eosinophils 3 %    % Basophils 1 %    % Immature Granulocytes 1 %    NRBCs per 100 WBC 0 <1 /100    Absolute Neutrophils 4.8 1.6 - 8.3 10e3/uL    Absolute Lymphocytes 3.1 0.8 - 5.3 10e3/uL    Absolute Monocytes 0.8 0.0 - 1.3 10e3/uL    Absolute Eosinophils 0.3 0.0 - 0.7 10e3/uL    Absolute Basophils 0.1 0.0 - 0.2 10e3/uL    Absolute Immature Granulocytes 0.1 <=0.4 10e3/uL    Absolute NRBCs 0.0 10e3/uL

## 2024-10-23 NOTE — PATIENT INSTRUCTIONS
-Start salt and soda rinses (this is 1 teaspoon of salt and 1 teaspoon of baking soda mixed in 8 ounces of water), swish and SPIT 2 to 3 times per day starting today for at least 1 week. If no mouth sores, ok to stop. If any mouth sores occur, then continue until resolved or restart the rinse. Let us know if you  have mouth pain despite this rinse.  -Apply lotion to dry skin. If you develop any itchy rash to extremities, you can apply a light layer of hydrocortisone topically twice daily.  -If hands and feet are dry, then apply lotion twice daily. If hands or feet are red and burn, then ok to apply hydrocortisone topically 1 to 2 times daily.  -For constipation, take prune juice once daily or an over the counter softener such as docusate or colace once daily. Let us know if no improvement with this.

## 2024-10-23 NOTE — TELEPHONE ENCOUNTER
Called patient to schedule surgery with Dr. Moreno. Let patient know we did receive a message from her Oncologist that he felt it best that patient hold chemo 4 weeks prior to surgery and about 2 weeks after. Asked patient if she was aware when she would be completing that second round of chemo, however patient was not confident on that date. Let patient know I could see within her chart that it appears last infusion scheduled is 11/21/24 at this time. Let patient know because of that, would be best we schedule patient on 12/19/24. Patient voiced concern with that date as her grand kids usually come to visit her yearly around 12/26 and she wants to make sure she is able to be up and about during that time. Let patient know I would tentatively hold 12/19/24 for her and also send a message to Dr. Moreno's nurse asking that someone within his care team give her a call back when they are free to further discuss what her recovery and restrictions may look like. Patient also provided with callback number [phone] should she have any additional questions in the mean time.     Patient asked to be called back when you are free to discuss recovery/restrictions following surgery.     Marlys Sawyer on 10/23/2024 at 3:28 PM

## 2024-10-23 NOTE — NURSING NOTE
"Oncology Rooming Note    October 23, 2024 9:23 AM   Maggie Navarrete is a 74 year old female who presents for:    Chief Complaint   Patient presents with    Port Draw     Labs drawn via port by RN in lab. VS taken.     Oncology Clinic Visit     Chemotherapy-induced neutropenia     Initial Vitals: BP (!) 155/76   Pulse 96   Temp 97.9  F (36.6  C) (Oral)   Resp 16   SpO2 99%  Estimated body mass index is 23.83 kg/m  as calculated from the following:    Height as of 10/15/24: 1.499 m (4' 11\").    Weight as of 10/15/24: 53.5 kg (118 lb). There is no height or weight on file to calculate BSA.  No Pain (0) Comment: Data Unavailable   No LMP recorded. Patient has had a hysterectomy.  Allergies reviewed: Yes  Medications reviewed: Yes    Medications: Medication refills not needed today.  Pharmacy name entered into EPIC:    [facility] - [place]LE T[place]OCO - ONE [facility] AT PORTAL TO REGISTERED [facility] SITES  [id number] - [place], [place] - [address] [facility] [place] [address] [place] [address] [phone]  [id number] - [place], [place] - [address] AT [facility] [address]  [facility] - [facility] [place] - [address] [place] [address]    Frailty Screening:   Is the patient here for a new oncology consult visit in cancer care? 2. No      Clinical concerns: none.       Abdoulaye Mendoza"

## 2024-10-23 NOTE — NURSING NOTE
Chief Complaint   Patient presents with    Port Draw     Labs drawn via port by RN in lab. VS taken.      Labs drawn via Port accessed using 20g flat needle. Line flushed and Heparin locked. Vital signs taken. Checked into next appointment.     Omaira Sánchez RN

## 2024-10-24 ENCOUNTER — TELEPHONE (OUTPATIENT)
Dept: DERMATOLOGY | Facility: CLINIC | Age: 74
End: 2024-10-24
Payer: MEDICARE

## 2024-10-24 NOTE — TELEPHONE ENCOUNTER
----- Message from Chapis Styles sent at 10/24/2024  9:20 AM CDT -----  Regarding: RE: Topical tacrolimus while receiving chemo  Nursing pool : Please let the patient know she can substitute 1% hydrocortisone ointment which is over the counter.  ----- Message -----  From: Ericka Grant [facility]  Sent: 10/23/2024   3:10 PM CDT  To: Chapis Styles MD; #  Subject: Topical tacrolimus while receiving chemo         Hi Dr. Styles,    Maggie is on chemo, and the PI for topical tacrolimus recommends avoiding it while on chemotherapy. I spoke to Maggie today and she is wondering if you could let her know what she can use instead of topical tacrolimus.    Thanks!    Ericka Grant, PharmD, BCPS  Hematology/Oncology Clinical Pharmacist  [facility]  [facility]  [phone]

## 2024-10-24 NOTE — TELEPHONE ENCOUNTER
Pt states her eye Dr did not want her on any hydrocortisone due to changes of pressure in eye. Pt does not need the medication at this time, only when flaring. Has an upcoming appt with eye provider and will ask at that time an update clinic if a change needs to be made.

## 2024-10-25 ENCOUNTER — INFUSION THERAPY VISIT (OUTPATIENT)
Dept: ONCOLOGY | Facility: CLINIC | Age: 74
End: 2024-10-25
Attending: STUDENT IN AN ORGANIZED HEALTH CARE EDUCATION/TRAINING PROGRAM
Payer: MEDICARE

## 2024-10-25 VITALS
OXYGEN SATURATION: 100 % | DIASTOLIC BLOOD PRESSURE: 84 MMHG | SYSTOLIC BLOOD PRESSURE: 146 MMHG | RESPIRATION RATE: 16 BRPM | HEART RATE: 86 BPM | TEMPERATURE: 97.6 F

## 2024-10-25 DIAGNOSIS — D70.1 CHEMOTHERAPY-INDUCED NEUTROPENIA (H): Primary | ICD-10-CM

## 2024-10-25 DIAGNOSIS — T45.1X5A CHEMOTHERAPY-INDUCED NEUTROPENIA (H): Primary | ICD-10-CM

## 2024-10-25 DIAGNOSIS — I42.7 DILATED CARDIOMYOPATHY SECONDARY TO DRUG (H): ICD-10-CM

## 2024-10-25 DIAGNOSIS — C55 LEIOMYOSARCOMA OF UTERUS (H): ICD-10-CM

## 2024-10-25 PROCEDURE — G0498 CHEMO EXTEND IV INFUS W/PUMP: HCPCS

## 2024-10-25 PROCEDURE — 250N000011 HC RX IP 250 OP 636: Performed by: STUDENT IN AN ORGANIZED HEALTH CARE EDUCATION/TRAINING PROGRAM

## 2024-10-25 PROCEDURE — 258N000003 HC RX IP 258 OP 636: Performed by: STUDENT IN AN ORGANIZED HEALTH CARE EDUCATION/TRAINING PROGRAM

## 2024-10-25 RX ADMIN — DACARBAZINE 375 MG: 10 INJECTION, POWDER, FOR SOLUTION INTRAVENOUS at 13:39

## 2024-10-26 ENCOUNTER — INFUSION THERAPY VISIT (OUTPATIENT)
Dept: ONCOLOGY | Facility: CLINIC | Age: 74
End: 2024-10-26
Attending: STUDENT IN AN ORGANIZED HEALTH CARE EDUCATION/TRAINING PROGRAM
Payer: MEDICARE

## 2024-10-26 VITALS
DIASTOLIC BLOOD PRESSURE: 86 MMHG | OXYGEN SATURATION: 99 % | TEMPERATURE: 97.6 F | HEART RATE: 87 BPM | RESPIRATION RATE: 16 BRPM | SYSTOLIC BLOOD PRESSURE: 146 MMHG

## 2024-10-26 DIAGNOSIS — D70.1 CHEMOTHERAPY-INDUCED NEUTROPENIA (H): ICD-10-CM

## 2024-10-26 DIAGNOSIS — I42.7 DILATED CARDIOMYOPATHY SECONDARY TO DRUG (H): ICD-10-CM

## 2024-10-26 DIAGNOSIS — T45.1X5A CHEMOTHERAPY-INDUCED NEUTROPENIA (H): ICD-10-CM

## 2024-10-26 DIAGNOSIS — C55 LEIOMYOSARCOMA OF UTERUS (H): Primary | ICD-10-CM

## 2024-10-26 PROCEDURE — 250N000011 HC RX IP 250 OP 636: Performed by: STUDENT IN AN ORGANIZED HEALTH CARE EDUCATION/TRAINING PROGRAM

## 2024-10-26 RX ORDER — HEPARIN SODIUM (PORCINE) LOCK FLUSH IV SOLN 100 UNIT/ML 100 UNIT/ML
5 SOLUTION INTRAVENOUS
Status: DISCONTINUED | OUTPATIENT
Start: 2024-10-26 | End: 2024-10-26 | Stop reason: HOSPADM

## 2024-10-26 RX ADMIN — HEPARIN 5 ML: 100 SYRINGE at 13:37

## 2024-10-26 NOTE — PROGRESS NOTES
Infusion Nursing Note:  Maggie Navarrete presents today for Cycle 1 Day 4 Dacarbazine Pump Disconnect.    Patient seen by provider today: No   present during visit today: Not Applicable.    Note: Patient presents to infusion today doing well. Reports feeling slightly more fatigued. Denies any fevers, chills, SOB, chest pains or cough. No new changes or concerns overnight.    Dacarbazine pump completely infused at time of disconnect.     Intravenous Access:  Implanted Port.    Treatment Conditions:  Not Applicable.    Post Infusion Assessment:  Patient tolerated infusion without incident.  Blood return noted pre and post infusion.  Site patent and intact, free from redness, edema or discomfort.  No evidence of extravasations.  Access discontinued per protocol.     Discharge Plan:   Patient declined prescription refills.  Discharge instructions reviewed with: Patient.  Patient and/or family verbalized understanding of discharge instructions and all questions answered.  AVS to patient via [employer].  Patient will return 11/1 for next appointment with Amber Scheierl, NP.   Patient discharged in stable condition accompanied by: self.  Departure Mode: Ambulatory.      Faith King RN

## 2024-10-26 NOTE — PATIENT INSTRUCTIONS
Contact Numbers  [facility]: [phone] (for symptom and scheduling needs)    Please call the [facility] Triage line if you experience a temperature greater than or equal to 100.4, shaking chills, have uncontrolled nausea, vomiting and/or diarrhea, dizziness, shortness of breath, chest pain, bleeding, unexplained bruising, or if you have any other new/concerning symptoms, questions or concerns.     If you are having any concerning symptoms or wish to speak to a provider before your next infusion visit, please call your care coordinator or triage to notify them so we can adequately serve you.     If you need a refill on a narcotic prescription or other medication, please call triage before your infusion appointment.           October 2024 Sunday Monday Tuesday Wednesday Thursday Friday Saturday             1     2     3     4     5       6     7     8     9     10     11     12       13     14    RETURN CCSL   8:30 AM   (45 min.)   Scheierl, Amber J, APRN CNP   [facility] 15    TELEPHONE VISIT RETURN   2:40 PM   (10 min.)   Eric Moreno MD   [facility] [place] 16     17     18     19       20     21     22     23    LAB PERIPHERAL   8:45 AM   (15 min.)   [facility] D[facility]   [facility]    RETURN CCSL   9:15 AM   (45 min.)   Scheierl, Amber J, APRN CNP   [facility]    ONC INFUSION 2.5 HR (150 MIN)  10:00 AM   (150 min.)    ONC INFUSION NURSE   [facility] 24     25    ONC INFUSION 0.5 HR (30 MIN)   1:00 PM   (30 min.)    ONC INFUSION NURSE   [facility] 26    ONC INFUSION 0.5 HR (30 MIN)   1:00 PM   (30 min.)    ONC INFUSION NURSE   [facility]   27     28     29    NEW SLEEP   9:45 AM   (60 min.)   Deja Anderson MD   [facility] 30     31    LAB   2:00 PM   (15  min.)   CS LAB   [facility]                       November 2024 Sunday Monday Tuesday Wednesday Thursday Friday Saturday                            1    RETURN CCSL   7:00 AM   (45 min.)   Scheierl, Amber J, APRN CNP   [facility] 2       3     4     5     6    LAB  11:15 AM   (15 min.)   CS LAB   [facility] 7     8     9       10     11     12     13    LAB  11:15 AM   (15 min.)   CS LAB   [facility] 14     15     16       17     18    ANNUAL WELLNESS  10:40 AM   (30 min.)   Vandana Morris MD   [facility] 19     20     21    LAB CENTRAL  11:45 AM   (15 min.)    [facility] LAB DRAW   [facility]    RETURN CCSL  12:00 PM   (45 min.)   Rebeka Kim PA-C   [facility]    ONC INFUSION 2.5 HR (150 MIN)   1:30 PM   (150 min.)   [facility] ONC INFUSION NURSE   [facility] 22     23    ONC INFUSION 1 HR (60 MIN)  11:00 AM   (60 min.)   [facility] ONC INFUSION NURSE   [facility]   24    ONC INFUSION 1 HR (60 MIN)  12:00 PM   (60 min.)    ONC INFUSION NURSE   [facility] 25     26     27     28     29     30                     Lab Results:  No results found for this or any previous visit (from the past 12 hours).

## 2024-10-28 ASSESSMENT — SLEEP AND FATIGUE QUESTIONNAIRES
HOW LIKELY ARE YOU TO NOD OFF OR FALL ASLEEP WHILE SITTING AND TALKING TO SOMEONE: WOULD NEVER DOZE
HOW LIKELY ARE YOU TO NOD OFF OR FALL ASLEEP WHILE SITTING AND READING: WOULD NEVER DOZE
HOW LIKELY ARE YOU TO NOD OFF OR FALL ASLEEP WHEN YOU ARE A PASSENGER IN A CAR FOR AN HOUR WITHOUT A BREAK: SLIGHT CHANCE OF DOZING
HOW LIKELY ARE YOU TO NOD OFF OR FALL ASLEEP WHILE LYING DOWN TO REST IN THE AFTERNOON WHEN CIRCUMSTANCES PERMIT: SLIGHT CHANCE OF DOZING
HOW LIKELY ARE YOU TO NOD OFF OR FALL ASLEEP WHILE SITTING QUIETLY AFTER LUNCH WITHOUT ALCOHOL: WOULD NEVER DOZE
HOW LIKELY ARE YOU TO NOD OFF OR FALL ASLEEP IN A CAR, WHILE STOPPED FOR A FEW MINUTES IN TRAFFIC: WOULD NEVER DOZE
HOW LIKELY ARE YOU TO NOD OFF OR FALL ASLEEP WHILE WATCHING TV: WOULD NEVER DOZE
HOW LIKELY ARE YOU TO NOD OFF OR FALL ASLEEP WHILE SITTING INACTIVE IN A PUBLIC PLACE: WOULD NEVER DOZE

## 2024-10-29 ENCOUNTER — OFFICE VISIT (OUTPATIENT)
Dept: SLEEP MEDICINE | Facility: CLINIC | Age: 74
End: 2024-10-29
Attending: INTERNAL MEDICINE
Payer: MEDICARE

## 2024-10-29 VITALS
SYSTOLIC BLOOD PRESSURE: 138 MMHG | HEART RATE: 94 BPM | DIASTOLIC BLOOD PRESSURE: 84 MMHG | BODY MASS INDEX: 24.92 KG/M2 | HEIGHT: 59 IN | OXYGEN SATURATION: 100 % | RESPIRATION RATE: 16 BRPM | WEIGHT: 123.6 LBS

## 2024-10-29 DIAGNOSIS — R06.83 SNORING: ICD-10-CM

## 2024-10-29 PROCEDURE — 99205 OFFICE O/P NEW HI 60 MIN: CPT | Performed by: INTERNAL MEDICINE

## 2024-10-29 PROCEDURE — G2211 COMPLEX E/M VISIT ADD ON: HCPCS | Performed by: INTERNAL MEDICINE

## 2024-10-29 ASSESSMENT — ANXIETY QUESTIONNAIRES: GAD7 TOTAL SCORE: 1

## 2024-10-29 NOTE — NURSING NOTE
HST pick-up, HST drop-off, and follow-up appointment with provider have all been scheduled.  Carmel France, CMA

## 2024-10-29 NOTE — PROGRESS NOTES
[facility]   Outpatient Sleep Medicine Consultation  October 29, 2024      Name: Maggie Navarrete MRN# [id number]   Age: 74 year old YOB: 1950     Date of Consultation: October 29, 2024  Consultation is requested by: Vandana Morris MD  [address]  [place] [address] Vandana Morris  Primary care provider: Vandana Morris         Reason for Sleep Consult:     Maggie Navarrete is a 74 year old female for complaints of loud snoring and witnessed apneas for several years         Assessment and Plan:     Summary Sleep Diagnoses:    Suspected AXEL  High stop bang score of 4 and very typical symptoms. We discussed the pathophysiology of AXEL including the cardio-neurologic complications of significant untreated sleep apnea as well as the treatment options including CPAP, oral appliances and hypoglossal nerve stimulator.  She will be a good candidate for home sleep study due to the high pre-test probability and an order was placed for one          Summary Recommendations:    Orders Placed This Encounter   Procedures    HST-Home Sleep Apnea Test - Noxturnal Returnable       Summary Counseling:  See instructions    Counseling included a comprehensive review of diagnostic and therapeutic strategies as well as risks of inadequate therapy.  Educational materials provided in instructions.             History of Present Illness:   I had the pleasure of seeing Maggie Navarrete, who is a pleasant 74 year old female who presents for an evaluation of sleep apnea.  To briefly review, she was told by her grand children that she has loud snoring, witnessed apneas and kept mouth open during sleep for several years. These symptoms are very bothersome to patient. Historically, she has not had any daytime sleepiness nor fatigue, although more lately, they are presents which she attributes to chemotherapy which she is receiving for her cancer.  She has no symptoms suggestive of RLS  nor central hypersomnia. Her weight has been stable.         SLEEP-WAKE SCHEDULE:     -Describes themself as neither a morning or night person;     -Naps - None    Maggie Navarrete    -ON WEEKDAYS, goes to sleep at 11:30 PM during the week; awakens  7:30 AM without an alarm; falls asleep in 15 minutes; denies difficulty falling asleep.     -ON WEEKENDS, same as above    -Awakens 1-2 times a night for 15 minutes before falling back to sleep; awakens to go to the bathroom and snorts self awake      SCALES       SLEEP APNEA: Stopbang score- 4         INSOMNIA:  Insomnia severity score: 6       SLEEPINESS: [place] sleepiness scale: 2 [normal < 11]   Drowsy driving/near accidents None  Consequences: none      SLEEP COMPLAINTS:  Cardio-respiratory    Snoring- 7/week  Dyspnea- denies  Morning headaches or confusion-denies  Coexisting Lung disease: denies    Coexisting Heart disease: denies    Does patient have a bed partner: denies  Has bed partner been sleeping separately because of snoring:  denies            RLS Screen: When you try to relax in the evening or sleep at  night, do you ever have unpleasant, restless feelings in your  legs that can be relieved by walking or movement? denies    Periodic limb movement: denies    Narcolepsy:  denies sudden urges of sleep attacks  Cataplexy:  denies   Sleep paralysis:  denies    Hallucinations:   denies       Sleep Behaviors:  Leg symptoms/movements: denies  Motor restlessness:denies  Night terrors: denies  Bruxism: denies  Automatic behaviors: none    Other subjective complaints:  Anxiety or rumination- denies  Pain and discomfort at  night: denies  Waking up with heart pounding or racing: denies  GERD or aspiration:denies         Parasomnia:   NREM - denies recurrent persistent confusional arousal, night eating, sleep walking or sleep terrors   REM  - denies dream enactment; injuries   Safety: No issues             Medications:     Current Outpatient Medications   Medication  Sig Dispense Refill    acetaminophen (TYLENOL) 500 MG tablet Take 500-1,000 mg by mouth every 6 hours as needed for mild pain Uses arthritis dose, believes it is 625 mg, takes 2 at a time, does not exceed 4g a day      Ascorbic Acid 250 MG CHEW Take 250 mg by mouth daily.      calcium citrate 250 MG TABS Take 500 mg by mouth daily      carboxymethylcellulose PF (REFRESH PLUS) 0.5 % ophthalmic solution Place 1 drop into both eyes 4 times daily as needed for dry eyes      dexAMETHasone (DECADRON) 4 MG tablet Take 1 tablet (4 mg) by mouth daily. Take 8 mg (2 tablets daily for 2 days), starting on the day of after chemotherapy, and then 4 mg (1 tablet) for another 3 days. (Today of 5 days) 7 tablet 4    Fish Oil-Cholecalciferol (FISH OIL + D3 PO) Take 1 capsule by mouth daily.      levothyroxine (SYNTHROID/LEVOTHROID) 75 MCG tablet TAKE 1 TABLET DAILY FOR    THYROID 90 tablet 0    lidocaine-prilocaine (EMLA) 2.5-2.5 % external cream Apply a thin layer on skin overlying port site 10-15 minutes prior to port access for labs or infusion 5 g 1    Melatonin 10 MG TABS tablet Take 10 mg by mouth at bedtime.      multivitamin w/minerals (THERA-VIT-M) tablet Take 1 tablet by mouth daily as needed (if diet insufficient to provide recommended nutrients)      ondansetron (ZOFRAN) 8 MG tablet Take 1 tablet (8 mg) by mouth every 8 hours as needed for nausea (vomiting). 30 tablet 2    prochlorperazine (COMPAZINE) 10 MG tablet Take 1 tablet (10 mg) by mouth every 6 hours as needed for nausea or vomiting. 30 tablet 2    tacrolimus (PROTOPIC) 0.1 % external ointment Apply topically 2 times daily 30 g 3    triamcinolone (KENALOG) 0.1 % external cream Apply topically. prn      TURMERIC PO Take 1 tablet by mouth daily.      venlafaxine (EFFEXOR XR) 37.5 MG 24 hr capsule Take 1 capsule (37.5 mg) by mouth daily 90 capsule 3    VITAMIN D PO Take 1 tablet by mouth daily.      zolpidem (AMBIEN) 5 MG tablet Take 1 tablet (5 mg) by mouth nightly  as needed for sleep (Only uses if traveling or cannot sleep,). 20 tablet 0     Current Facility-Administered Medications   Medication Dose Route Frequency Provider Last Rate Last Admin    denosumab (PROLIA) injection 60 mg  60 mg Subcutaneous Q6 Months Edgardo Valderrama MD   60 mg at 09/18/24 1310        Allergies   Allergen Reactions    Dust Mite Extract Other (See Comments)    Erythromycin GI Disturbance, Other (See Comments), Nausea and Nausea and Vomiting     PN: LW Reaction: Nausea    Tramadol Nausea, Other (See Comments) and Nausea and Vomiting            Past Medical History:     Does not need 02 supplement at night   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    GERD (gastroesophageal reflux disease)     Hx of previous reproductive problem 1980    Kidney stone 2011    Leiomyosarcoma (H) 11/14/2016    Personal history of colonic polyps 2016    Small, benign    PONV (postoperative nausea and vomiting)     Posterior vitreous detachment of left eye 2011    Posterior vitreous detachment of right eye 2011    Ptosis of eyelid, bilateral     Sepsis (H) 07/2020    Stomach problems 2015    Abdominal pain, Diarreha    Thyroid disease     thyroid nodule resolved 2014             Past Surgical History:    Previous upper airway surgery- None   Past Surgical History:   Procedure Laterality Date    ABDOMEN SURGERY  Now    Pain, diarrhea    BREAST SURGERY  2002    right breast benign    COLONOSCOPY  2008    due in 2018    COLONOSCOPY N/A 09/08/2016    Procedure: COMBINED COLONOSCOPY, SINGLE OR MULTIPLE BIOPSY/POLYPECTOMY BY BIOPSY;  Surgeon: Abner Pepper MD;  Location:  GI    COLONOSCOPY N/A 10/18/2021    Procedure: COLONOSCOPY;  Surgeon: Jamila Villarreal MD;  Location: [facility] OR    ENT SURGERY  1975    tonsillectomy     GENITOURINARY SURGERY  1953    bladder surgery     GI SURGERY  2015    Severe constipation    GYN SURGERY  1977    C section    GYN SURGERY  1981    laparoscopy    GYN SURGERY  2007    myomectomy    hysterecomy  11/14/2016    leiomyosarcoma    IR CHEST PORT PLACEMENT > 5 YRS OF AGE  8/23/2024    OTHER SURGICAL HISTORY Right 10/27/2017    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    REMOVE PORT VASCULAR ACCESS Right 05/09/2017    Procedure: REMOVE PORT VASCULAR ACCESS;  Right Port Removal;  Surgeon: Eric Gibson PA-C;  Location: [facility] OR    REPAIR PTOSIS BILATERAL  02/15/2012    Procedure:REPAIR PTOSIS BILATERAL; BILATERAL UPPER LID PTOSIS REPAIR; Surgeon:BRYCE REYNOLDS; Location:[facility]    TUMOR REMOVAL  06/2020            Social History:     Social History     Tobacco Use    Smoking status: Never     Passive exposure: Never    Smokeless tobacco: Never   Substance Use Topics    Alcohol use: Yes     Comment: Social, not often per patient.         Chemical History:     Tobacco: Never      Uses 1 cups/day of coffee. Last caffeine intake is usually before 2 pm    Supplements for wakefulness: None    EtOH: None None of the patient's responses to the CAGE screening were positive / Negative CAGE score  Recreational Drugs: None     Psych Hx:     Current dangers to self or others:none           Family History:     Family History   Problem Relation Age of Onset    Skin Cancer Mother     Family History Negative Mother         glaucoma    Glaucoma Mother     Anxiety Disorder Mother     Heart Failure Mother     Diabetes Mother     Coronary Artery Disease Mother     Hypertension Mother     Breast Cancer Mother     Depression Mother     Obesity Mother     Heart Disease Father     Glaucoma Father     Diabetes Father     Coronary Artery Disease Father     Depression Father     Osteoporosis Father     Heart Disease Maternal Grandmother     Diabetes Maternal Grandmother     Heart Disease  "Paternal Grandmother     Cancer Paternal Grandfather         stomach    Skin Cancer Sister     Depression Sister     Thyroid Disease Sister     Retinal detachment Sister     Hypertension Sister     Hypertension Sister     Lipids Sister     Cerebrovascular Disease Sister     Depression Sister     Heart Disease Paternal Aunt     Heart Disease Paternal Uncle     Osteoporosis Other                Review of Systems:     A complete 10 point review of systems was negative other than HPI or as commented below:   Maggie Navarrete has gained 0-5 pounds in 10 years.               Physical Examination:     /84   Pulse 94   Resp 16   Ht 1.499 m (4' 11\")   Wt 56.1 kg (123 lb 9.6 oz)   SpO2 100%   BMI 24.96 kg/m    Exam:  Constitutional: healthy, alert, and no distress  Head: Normocephalic. No masses, lesions, tenderness or abnormalities  ENT: ENT exam normal, no neck nodes or sinus tenderness  Cardiovascular: negative, PMI normal. No lifts, heaves, or thrills. RRR. No murmurs, clicks gallops or rub  Respiratory: negative, Percussion normal. Good diaphragmatic excursion. Lungs clear  Gastrointestinal: Abdomen soft, non-tender. BS normal. No masses, organomegaly  : Deferred  Musculoskeletal: extremities normal- no gross deformities noted, gait normal, and normal muscle tone  Skin: no suspicious lesions or rashes  Neurologic: Gait normal. Reflexes normal and symmetric. Sensation grossly WNL.  Psychiatric: mentation appears normal and affect normal/bright         Data: All pertinent previous laboratory data reviewed     No results found for: \"PH\", \"PHARTERIAL\", \"PO2\", \"[id number]\", \"SAT\", \"PCO2\", \"HCO3\", \"BASEEXCESS\", \"SIM\", \"BEB\"  Lab Results   Component Value Date    TSH 2.89 11/17/2023    TSH 1.58 12/19/2022     Lab Results   Component Value Date     (H) 10/23/2024     (H) 09/09/2024     Lab Results   Component Value Date    HGB 11.9 10/23/2024    HGB 11.1 (L) 09/16/2024     Lab Results   Component " "Value Date    BUN 21.4 10/23/2024    BUN 24.1 (H) 09/09/2024    CR 0.92 10/23/2024    CR 0.92 09/09/2024     Lab Results   Component Value Date    AST 17 10/23/2024    AST 18 09/09/2024    ALT 14 10/23/2024    ALT 16 09/09/2024    ALKPHOS 70 10/23/2024    ALKPHOS 61 09/09/2024    BILITOTAL 0.2 10/23/2024    BILITOTAL 0.3 09/09/2024    BILICONJ 0.0 10/06/2009     No results found for: \"UAMP\", \"UBARB\", \"BENZODIAZEUR\", \"UCANN\", \"UCOC\", \"OPIT\", \"UPCP\"        Copy to: Vandana Morris MD 10/29/2024     RTC with HST     I spent 60 minutes on the date of the encounter doing chart review, history and exam, documentation and further coordination as noted above exclusive of time interpreting sleep study         "

## 2024-10-29 NOTE — NURSING NOTE
"Chief Complaint   Patient presents with    Sleep Problem     Patient presents to clinic for a consult on possible sleep apnea.       Initial /84   Pulse 94   Resp 16   Ht 1.499 m (4' 11\")   Wt 56.1 kg (123 lb 9.6 oz)   SpO2 100%   BMI 24.96 kg/m   Estimated body mass index is 24.96 kg/m  as calculated from the following:    Height as of this encounter: 1.499 m (4' 11\").    Weight as of this encounter: 56.1 kg (123 lb 9.6 oz).    Medication Reconciliation: complete  ESS: 2  Neck circumference: 13.50 inches / 34 centimeters.  DME: N/A  Carmel France CMA      "

## 2024-10-31 ENCOUNTER — LAB (OUTPATIENT)
Dept: LAB | Facility: CLINIC | Age: 74
End: 2024-10-31
Payer: MEDICARE

## 2024-10-31 DIAGNOSIS — E78.5 HYPERLIPIDEMIA, UNSPECIFIED HYPERLIPIDEMIA TYPE: ICD-10-CM

## 2024-10-31 DIAGNOSIS — E03.9 HYPOTHYROIDISM: ICD-10-CM

## 2024-10-31 PROCEDURE — 36415 COLL VENOUS BLD VENIPUNCTURE: CPT

## 2024-10-31 PROCEDURE — 84443 ASSAY THYROID STIM HORMONE: CPT

## 2024-10-31 PROCEDURE — 80061 LIPID PANEL: CPT

## 2024-11-01 ENCOUNTER — VIRTUAL VISIT (OUTPATIENT)
Dept: ONCOLOGY | Facility: CLINIC | Age: 74
End: 2024-11-01
Attending: STUDENT IN AN ORGANIZED HEALTH CARE EDUCATION/TRAINING PROGRAM
Payer: MEDICARE

## 2024-11-01 VITALS — WEIGHT: 122 LBS | HEIGHT: 59 IN | BODY MASS INDEX: 24.6 KG/M2

## 2024-11-01 DIAGNOSIS — D70.1 CHEMOTHERAPY-INDUCED NEUTROPENIA (H): ICD-10-CM

## 2024-11-01 DIAGNOSIS — T45.1X5A TOXIC EFFECT OF CHEMOTHERAPY: ICD-10-CM

## 2024-11-01 DIAGNOSIS — I42.7 DILATED CARDIOMYOPATHY SECONDARY TO DRUG (H): ICD-10-CM

## 2024-11-01 DIAGNOSIS — T45.1X5A CHEMOTHERAPY-INDUCED NEUTROPENIA (H): ICD-10-CM

## 2024-11-01 DIAGNOSIS — C55 LEIOMYOSARCOMA OF UTERUS (H): Primary | ICD-10-CM

## 2024-11-01 LAB
CHOLEST SERPL-MCNC: 233 MG/DL
FASTING STATUS PATIENT QL REPORTED: NO
HDLC SERPL-MCNC: 66 MG/DL
LDLC SERPL CALC-MCNC: 97 MG/DL
NONHDLC SERPL-MCNC: 167 MG/DL
TRIGL SERPL-MCNC: 351 MG/DL
TSH SERPL DL<=0.005 MIU/L-ACNC: 3.7 UIU/ML (ref 0.3–4.2)

## 2024-11-01 PROCEDURE — 99442 PR PHYSICIAN TELEPHONE EVALUATION 11-20 MIN: CPT | Mod: 93 | Performed by: STUDENT IN AN ORGANIZED HEALTH CARE EDUCATION/TRAINING PROGRAM

## 2024-11-01 ASSESSMENT — PAIN SCALES - GENERAL: PAINLEVEL_OUTOF10: NO PAIN (0)

## 2024-11-01 NOTE — TELEPHONE ENCOUNTER
Called patient to schedule surgery with Dr. Moreno, no answer. Callback number [phone] left on vm.     Marlys Sawyer on 11/1/2024 at 1:04 PM

## 2024-11-01 NOTE — NURSING NOTE
Current patient location: [address] [facility] [address]    Is the patient currently in the state of [place]? YES    Visit mode:VIDEO    If the visit is dropped, the patient can be reconnected by: VIDEO VISIT: Text to cell phone:   Telephone Information:   Mobile [phone]       Will anyone else be joining the visit? NO  (If patient encounters technical issues they should call [phone]    Are changes needed to the allergy or medication list? Pt stated no changes to allergies and Pt stated no med changes    Are refills needed on medications prescribed by this physician? NO    Rooming Documentation:  Not applicable    Reason for visit: KATIE SOFIA

## 2024-11-01 NOTE — PROGRESS NOTES
Phone Visit Details    Type of service: Phone Visit   Phone Start Time:  7:20AM  Phone End Time: 7:35AM    Originating Location (pt. Location): Home    Distant Location (provider location):  On-site  Platform used for Video Visit: Favbuy--no video

## 2024-11-01 NOTE — PROGRESS NOTES
[facility]  RETURN PATIENT VISIT NOTE    PATIENT NAME: Maggie Navarrete MRN # [id number]  DATE OF VISIT: Nov 1, 2024 YOB: 1950    CANCER TYPE: metastatic uterine leiomyosarcoma     HISTORY OF PRESENT ILLNESS   Maggie is a 74 year old women with PMH of metastatic uterine leiomyosarcoma. She was originally diagnosed on 2016 showing an up to 12 cm mass. She underwent surgical resection followed by adjuvant doxorubicin/dacarbazine for 5 cycles. She had local recurrence on 2020 treated with surgical resection. Then  again recurrence on 2022 again underwent surgical resection. Followed by [id number] with anastrozole. Since January of 2024 she has been on trial a BOAST clinical trial with Dr. Nik Schilling at [facility]. She received abemaciclib there. Now, she was taken off of trial due to progression on 1 of 3 intra-abdominal lesions.     She comes here to continue medical management. She has seen Dr. Michaud at [place] for a second opinion. She is interested in surgical resection which has been offered at [facility] with possible Gemzar HIPEC.      She started on single agent Gemzar on 7/29/24. She is here today for follow up prior to cycle 3.     9/16/24, Caris with no clinically significant biomarker results.    Imaging after 2 cycles suggestive of progression with enlarging lesions in the abdomen, and no new lesions.     10/23/24, Changed to doxil (40mg/m2) dacarbazine (750 mg/m2 over 3 days) every 4 weeks     PAST ONCOLOGIC HISTORY   Leiomyosarcoma Uterus   11/3/2016 Initial Diagnosis     11/4/2016 Surgery and Procedures   Open hyst, BSO, stage II with peritoneal involvement    12/26/2016 - 3/23/2017 Chemotherapy   Received elsewhere: adjuvant chemotherapy with doxorubicin 25 mg/m2/day and DTIC 250 mg/m2/day , days 1-3 every 21 weeks.  Completed 5 cycles of adjuvant doxorubicin and dacarbazine    6/1/2020 Recurrence Local    Isolated lesion in the RLQ    6/16/2020 Surgery and Procedures   Laparotomy, excision of side wall mass, ureteroneocystotomy. 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 The excised margins were negative for tumor.    3/31/2022 [facility]   PET with isolated bowel lesion in LLQ.     4/4/2022 Surgery and Procedures   Exlap, FANNY, small bowel lesion excised with negative margins    5/9/2023 Surgery and Procedures   Dr. Camarillo and Dr. Krueger    Exploratory laparotomy, tumor debulking, cystoscopy and placement of internal ureteral stent and ureterolysis     Findings  Two lesions in the abdomen consistent with very indolent appearing sarcoma on frozen section, 1 in the right upper quadrant in the large bowel mesentery. And 1 on the left sidewall between the left sidewall and the left ureter and the sigmoid colon    5/9/2023 Biopsy/Pathology   FINAL DIAGNOSIS     A. Soft tissue, hepatic flexure nodule, excision: Involved   by smooth muscle neoplasm, 1.4 cm. See comment.     B. Soft tissue, left sidewall mass, excision: Recurrent   leiomyosarcoma, 2.9 cm. See comment.     C. Colon, sigmoid mass, excision: Involved by smooth   muscle neoplasm with increased mitotic activity, 6 mm. See   comment.     D. Soft tissue, left nohemi-ureteral, biopsy: Involved by   smooth muscle neoplasm, 3 mm. See comment.     E. Peritoneum, left pelvic sidewall margin, excision:   Involved by smooth muscle neoplasm, 1 mm. See comment.     F. Soft tissue, sigmoid colon margin, excision: Negative   for tumor.     Comment: Immunohistochemical stains performed at [facility] demonstrate that the left sidewall mass (B2) is   positive for desmin, caldesmon, ER (moderate, 60-70%), and   WV (moderate, 20-30%), but is negative for HMB45, Melan A,   STAT6, ALK, DOG1, and KIT; this immunophenotype supports a   smooth muscle neoplasm. The left sidewall mass shows   increased mitotic activity (greater than 10  "mitoses/10 HPF)   and atypia sufficient to meet criteria for a   leiomyosarcoma. The remaining lesion show minimal atypia   but increased mitotic activity (though less than 10   mitoses/10 HPF) where evaluable, but evaluation is limited   by the small size of the lesions. In the context of a known   history of leiomyosarcoma and a concurrent tumor meeting   diagnostic criteria, these likely represent deceptively   bland additional foci of recurrent leiomyosarcoma.     8/2023 - Biological/Targeted/Hormone Therapy   Anastrozole     11/8/2023 Progression/Relapse   CT scan chest-abdomen-pelvis shows progression of 3 nodules within the peritoneal cavity.    1/18/2024 - 6/5/2024 Chemotherapy   BOAST clinical trial with Dr. Nik Schilling at the [facility]. She received abemaciclib there. Was taken off of trial due to progression.     7/29/24: Cycle 1 Gemzar    Interval History:  Maggie presents for toxicity check.   -She reports just feeling tired but manageable. She is still going for walks when the weather allows. She has only napped twice. She is hosting a belated birthday party for her sister this week.  -No nausea. She took 1 tablet of decadron daily and tolerated this well. It increased her appetite  -No rashes  -no mouth sores  -No burning or peeling to hands or feet  -No fever or chills  -No cough or SOB  -no leg swelling  -Back pain is controlled currently     PHYSICAL EXAM   Ht 1.499 m (4' 11\")   Wt 55.3 kg (122 lb)   BMI 24.64 kg/m    Wt Readings from Last 10 Encounters:   11/01/24 55.3 kg (122 lb)   10/29/24 56.1 kg (123 lb 9.6 oz)   10/15/24 53.5 kg (118 lb)   10/14/24 54.4 kg (120 lb)   09/24/24 54.8 kg (120 lb 12.8 oz)   09/16/24 54.7 kg (120 lb 11.2 oz)   09/09/24 55.1 kg (121 lb 8 oz)   08/26/24 56.7 kg (125 lb)   08/22/24 56.9 kg (125 lb 8 oz)   08/19/24 56.8 kg (125 lb 3.2 oz)      10/26/2024  1:35 PM 10/29/2024  10:00 AM   Vital Signs     Systolic 146 !  138    Diastolic 86 !  84  " "  Pulse 87  94    Temperature 97.6  F (36.4  C)     Respirations 16  16    Weight (LB)  123 lb 9.6 oz    Height  4' 11\"    BMI (Calculated)  24.96    Pain Score 0 (None)  0 (None)    O2 99 %  100 %    Systolic (Patient Reported)     Unable to do physical exam 2/2 telephone visit. Well sounding in no distress. Normal speech and thought process. Good voice quality. No audible wheezing or cough.      LABORATORY AND IMAGING STUDIES   Most Recent 3 CBC's:  Recent Labs   Lab Test 10/23/24  0910 09/16/24  1500 09/09/24  1153   WBC 9.0 4.7 6.5   HGB 11.9 11.1* 11.5*   MCV 95 94 94    283 232   ANEUTAUTO 4.8 1.5* 3.4     Most Recent 3 BMP's:  Recent Labs   Lab Test 10/23/24  0910 09/09/24  1153 08/19/24  1420    142 139   POTASSIUM 4.4 4.1 4.4   CHLORIDE 105 106 104   CO2 23 25 24   BUN 21.4 24.1* 21.7   CR 0.92 0.92 0.95   ANIONGAP 12 11 11   SUSAN 9.2 9.3 9.5   * 142* 81   PROTTOTAL 6.4 6.2* 6.5   ALBUMIN 3.9 4.0 4.2    Most Recent 3 LFT's:  Recent Labs   Lab Test 10/23/24  0910 09/09/24  1153 08/19/24  1420   AST 17 18 23   ALT 14 16 22   ALKPHOS 70 61 67   BILITOTAL 0.2 0.3 0.2   I reviewed the above labs today.        ASSESSMENT AND PLAN   Metastatic uterine leiomyosarcoma.   Most recently she started on treatment with single agent Gemzar on 7/29/24.  She saw [place] on 8/29/24, They recommended continuing Gemzar. She was following with [facility] Serrano for possible surgical resection.    Her imaging after 2 cycles of Gemzar showed disease progression with enlarging lesions in the abdomen, and no new lesions. The recommendation is to achieve control of chemo before pursuing resection.We changed to doxil (40mg/m2) dacarbazine (750 mg/m2 over 3 days) every 4 weeks. She is s/p cycle 1. She has tolerated this well so far. For unclear reasons a CBC was not drawn yesterday, we will plan to draw this Monday. We reviewed the need to monitor for signs of anemia, bleeding and infection and I educated on when to call " triage.     Oncology Follow-Up/Plan:  -Weekly labs (CBC with ) after chemo starting 11/4  -Baseline echo in 1-2 weeks  -SINTIA every 4 weeks prior to cycle start  -Repeat imaging 2 cycles with CT CAP     Covid positive  Tested positive 10/13/24  Completed Paxlovid  Symptoms largely resolved; still some runny nose, occasional cough    Vascular Access  S/p port placement on 8/26/24    Low Back pain  -Status post  joint injection on 8/6/24  Follows with PT  -On meloxicam per PCP but not taking regular; avoiding NSAIDs for bleeding risk  -Back pain controlled at present  -Seen by ortho; recommendation is for possible decompression surgery; spine surgery will need to occur at least 4 weeks from last chemo and chemo will need to be held about 2 weeks after. The plan will be to complete 2 cycles of chemo and assess response to chemo before considering spine surgery. She will let us know if back pain escalates during that time warranting sooner intervention. I updated Dr. Moreno on this plan.       15 minutes spent on the date of the encounter doing chart review, review of test results, interpretation of tests, and documentation, in addition to 15minutes spent on the phone with the patient.       Amber Scheierl, CNP  [facility] [address] [address]  [phone]

## 2024-11-01 NOTE — LETTER
11/1/2024      Maggie Navarrete  [address]  [facility] [address]      Dear Colleague,    Thank you for referring your patient, Maggie Navarrete, to the [facility]. Please see a copy of my visit note below.          [facility]  RETURN PATIENT VISIT NOTE    PATIENT NAME: Maggie Navarrete MRN # [id number]  DATE OF VISIT: Nov 1, 2024 YOB: 1950    CANCER TYPE: metastatic uterine leiomyosarcoma     HISTORY OF PRESENT ILLNESS   Maggie is a 74 year old women with PMH of metastatic uterine leiomyosarcoma. She was originally diagnosed on 2016 showing an up to 12 cm mass. She underwent surgical resection followed by adjuvant doxorubicin/dacarbazine for 5 cycles. She had local recurrence on 2020 treated with surgical resection. Then  again recurrence on 2022 again underwent surgical resection. Followed by [id number] with anastrozole. Since January of 2024 she has been on trial a BOAST clinical trial with Dr. Nik Schilling at the [facility]. She received abemaciclib there. Now, she was taken off of trial due to progression on 1 of 3 intra-abdominal lesions.     She comes here to continue medical management. She has seen Dr. Michaud at [facility] for a second opinion. She is interested in surgical resection which has been offered at [facility] with possible Gemzar HIPEC.      She started on single agent Gemzar on 7/29/24. She is here today for follow up prior to cycle 3.     9/16/24, Caris with no clinically significant biomarker results.    Imaging after 2 cycles suggestive of progression with enlarging lesions in the abdomen, and no new lesions.     10/23/24, Changed to doxil (40mg/m2) dacarbazine (750 mg/m2 over 3 days) every 4 weeks     PAST ONCOLOGIC HISTORY   Leiomyosarcoma Uterus   11/3/2016 Initial Diagnosis     11/4/2016 Surgery and Procedures   Open hyst, BSO, stage II with peritoneal involvement    12/26/2016  - 3/23/2017 Chemotherapy   Received elsewhere: adjuvant chemotherapy with doxorubicin 25 mg/m2/day and DTIC 250 mg/m2/day , days 1-3 every 21 weeks.  Completed 5 cycles of adjuvant doxorubicin and dacarbazine    6/1/2020 Recurrence Local   Isolated lesion in the RLQ    6/16/2020 Surgery and Procedures   Laparotomy, excision of side wall mass, ureteroneocystotomy. 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 The excised margins were negative for tumor.    3/31/2022 [facility]   PET with isolated bowel lesion in LLQ.     4/4/2022 Surgery and Procedures   Exlap, FANNY, small bowel lesion excised with negative margins    5/9/2023 Surgery and Procedures   Dr. Camarillo and Dr. Krueger    Exploratory laparotomy, tumor debulking, cystoscopy and placement of internal ureteral stent and ureterolysis     Findings  Two lesions in the abdomen consistent with very indolent appearing sarcoma on frozen section, 1 in the right upper quadrant in the large bowel mesentery. And 1 on the left sidewall between the left sidewall and the left ureter and the sigmoid colon    5/9/2023 Biopsy/Pathology   FINAL DIAGNOSIS     A. Soft tissue, hepatic flexure nodule, excision: Involved   by smooth muscle neoplasm, 1.4 cm. See comment.     B. Soft tissue, left sidewall mass, excision: Recurrent   leiomyosarcoma, 2.9 cm. See comment.     C. Colon, sigmoid mass, excision: Involved by smooth   muscle neoplasm with increased mitotic activity, 6 mm. See   comment.     D. Soft tissue, left nohemi-ureteral, biopsy: Involved by   smooth muscle neoplasm, 3 mm. See comment.     E. Peritoneum, left pelvic sidewall margin, excision:   Involved by smooth muscle neoplasm, 1 mm. See comment.     F. Soft tissue, sigmoid colon margin, excision: Negative   for tumor.     Comment: Immunohistochemical stains performed at [facility] demonstrate that the left sidewall mass (B2) is   positive for desmin, caldesmon, ER (moderate,  "60-70%), and   MO (moderate, 20-30%), but is negative for HMB45, Melan A,   STAT6, ALK, DOG1, and KIT; this immunophenotype supports a   smooth muscle neoplasm. The left sidewall mass shows   increased mitotic activity (greater than 10 mitoses/10 HPF)   and atypia sufficient to meet criteria for a   leiomyosarcoma. The remaining lesion show minimal atypia   but increased mitotic activity (though less than 10   mitoses/10 HPF) where evaluable, but evaluation is limited   by the small size of the lesions. In the context of a known   history of leiomyosarcoma and a concurrent tumor meeting   diagnostic criteria, these likely represent deceptively   bland additional foci of recurrent leiomyosarcoma.     8/2023 - Biological/Targeted/Hormone Therapy   Anastrozole     11/8/2023 Progression/Relapse   CT scan chest-abdomen-pelvis shows progression of 3 nodules within the peritoneal cavity.    1/18/2024 - 6/5/2024 Chemotherapy   BOAST clinical trial with Dr. Nik Schilling at the [facility]. She received abemaciclib there. Was taken off of trial due to progression.     7/29/24: Cycle 1 Gemzar    Interval History:  Maggie presents for toxicity check.   -She reports just feeling tired but manageable. She is still going for walks when the weather allows. She has only napped twice. She is hosting a belated birthday party for her sister this week.  -No nausea. She took 1 tablet of decadron daily and tolerated this well. It increased her appetite  -No rashes  -no mouth sores  -No burning or peeling to hands or feet  -No fever or chills  -No cough or SOB  -no leg swelling  -Back pain is controlled currently     PHYSICAL EXAM   Ht 1.499 m (4' 11\")   Wt 55.3 kg (122 lb)   BMI 24.64 kg/m    Wt Readings from Last 10 Encounters:   11/01/24 55.3 kg (122 lb)   10/29/24 56.1 kg (123 lb 9.6 oz)   10/15/24 53.5 kg (118 lb)   10/14/24 54.4 kg (120 lb)   09/24/24 54.8 kg (120 lb 12.8 oz)   09/16/24 54.7 kg (120 lb 11.2 oz) " "  09/09/24 55.1 kg (121 lb 8 oz)   08/26/24 56.7 kg (125 lb)   08/22/24 56.9 kg (125 lb 8 oz)   08/19/24 56.8 kg (125 lb 3.2 oz)      10/26/2024  1:35 PM 10/29/2024  10:00 AM   Vital Signs     Systolic 146 !  138    Diastolic 86 !  84    Pulse 87  94    Temperature 97.6  F (36.4  C)     Respirations 16  16    Weight (LB)  123 lb 9.6 oz    Height  4' 11\"    BMI (Calculated)  24.96    Pain Score 0 (None)  0 (None)    O2 99 %  100 %    Systolic (Patient Reported)     Unable to do physical exam 2/2 telephone visit. Well sounding in no distress. Normal speech and thought process. Good voice quality. No audible wheezing or cough.      LABORATORY AND IMAGING STUDIES   Most Recent 3 CBC's:  Recent Labs   Lab Test 10/23/24  0910 09/16/24  1500 09/09/24  1153   WBC 9.0 4.7 6.5   HGB 11.9 11.1* 11.5*   MCV 95 94 94    283 232   ANEUTAUTO 4.8 1.5* 3.4     Most Recent 3 BMP's:  Recent Labs   Lab Test 10/23/24  0910 09/09/24  1153 08/19/24  1420    142 139   POTASSIUM 4.4 4.1 4.4   CHLORIDE 105 106 104   CO2 23 25 24   BUN 21.4 24.1* 21.7   CR 0.92 0.92 0.95   ANIONGAP 12 11 11   SUSAN 9.2 9.3 9.5   * 142* 81   PROTTOTAL 6.4 6.2* 6.5   ALBUMIN 3.9 4.0 4.2    Most Recent 3 LFT's:  Recent Labs   Lab Test 10/23/24  0910 09/09/24  1153 08/19/24  1420   AST 17 18 23   ALT 14 16 22   ALKPHOS 70 61 67   BILITOTAL 0.2 0.3 0.2   I reviewed the above labs today.        ASSESSMENT AND PLAN   Metastatic uterine leiomyosarcoma.   Most recently she started on treatment with single agent Gemzar on 7/29/24.  She saw [facility] on 8/29/24, They recommended continuing Gemzar. She was following with [facility] for possible surgical resection.    Her imaging after 2 cycles of Gemzar showed disease progression with enlarging lesions in the abdomen, and no new lesions. The recommendation is to achieve control of chemo before pursuing resection.We changed to doxil (40mg/m2) dacarbazine (750 mg/m2 over 3 days) every 4 weeks. She is s/p " cycle 1. She has tolerated this well so far. For unclear reasons a CBC was not drawn yesterday, we will plan to draw this Monday. We reviewed the need to monitor for signs of anemia, bleeding and infection and I educated on when to call triage.     Oncology Follow-Up/Plan:  -Weekly labs (CBC with ) after chemo starting 11/4  -Baseline echo in 1-2 weeks  -SINTIA every 4 weeks prior to cycle start  -Repeat imaging 2 cycles with CT CAP     Covid positive  Tested positive 10/13/24  Completed Paxlovid  Symptoms largely resolved; still some runny nose, occasional cough    Vascular Access  S/p port placement on 8/26/24    Low Back pain  -Status post  joint injection on 8/6/24  Follows with PT  -On meloxicam per PCP but not taking regular; avoiding NSAIDs for bleeding risk  -Back pain controlled at present  -Seen by ortho; recommendation is for possible decompression surgery; spine surgery will need to occur at least 4 weeks from last chemo and chemo will need to be held about 2 weeks after. The plan will be to complete 2 cycles of chemo and assess response to chemo before considering spine surgery. She will let us know if back pain escalates during that time warranting sooner intervention. I updated Dr. Moreno on this plan.       15 minutes spent on the date of the encounter doing chart review, review of test results, interpretation of tests, and documentation, in addition to 15minutes spent on the phone with the patient.       Amber Scheierl, CNP  [facility] [address] [address]  [phone]            Phone Visit Details    Type of service: Phone Visit   Phone Start Time:  7:20AM  Phone End Time: 7:35AM    Originating Location (pt. Location): Home    Distant Location (provider location):  On-site  Platform used for Video Visit: [place]--no video      Again, thank you for allowing me to participate in the care of your patient.        Sincerely,        Amber J. Scheierl, APRN CNP

## 2024-11-03 NOTE — RESULT ENCOUNTER NOTE
Parmjit Serrano    This is to inform you regarding your test result.    Your total cholesterol is elevated.  The triglycerides are high. Lowering  the amount of sugar ,alcohol and sweets in the diet helps to control this.Exercise and weight loss helps.  Your LDL cholesterol is normal.  This is often call bad cholesterol and high levels increase the risk for heart attacks and strokes.  Eat low cholesterol low fat  diet and do regular physical activity.  TSH which is thyroid hormone is normal.      Sincerely,      Dr.Nasima Alexandra MD,FACP

## 2024-11-04 ENCOUNTER — LAB (OUTPATIENT)
Dept: LAB | Facility: CLINIC | Age: 74
End: 2024-11-04
Payer: MEDICARE

## 2024-11-04 DIAGNOSIS — T45.1X5A TOXIC EFFECT OF CHEMOTHERAPY: ICD-10-CM

## 2024-11-04 LAB
BASOPHILS # BLD AUTO: 0 10E3/UL (ref 0–0.2)
BASOPHILS NFR BLD AUTO: 0 %
EOSINOPHIL # BLD AUTO: 0.1 10E3/UL (ref 0–0.7)
EOSINOPHIL NFR BLD AUTO: 2 %
ERYTHROCYTE [DISTWIDTH] IN BLOOD BY AUTOMATED COUNT: 16.6 % (ref 10–15)
HCT VFR BLD AUTO: 35.5 % (ref 35–47)
HGB BLD-MCNC: 11.2 G/DL (ref 11.7–15.7)
IMM GRANULOCYTES # BLD: 0 10E3/UL
IMM GRANULOCYTES NFR BLD: 0 %
LYMPHOCYTES # BLD AUTO: 2.2 10E3/UL (ref 0.8–5.3)
LYMPHOCYTES NFR BLD AUTO: 32 %
MCH RBC QN AUTO: 31.5 PG (ref 26.5–33)
MCHC RBC AUTO-ENTMCNC: 31.5 G/DL (ref 31.5–36.5)
MCV RBC AUTO: 100 FL (ref 78–100)
MONOCYTES # BLD AUTO: 0.3 10E3/UL (ref 0–1.3)
MONOCYTES NFR BLD AUTO: 4 %
NEUTROPHILS # BLD AUTO: 4.3 10E3/UL (ref 1.6–8.3)
NEUTROPHILS NFR BLD AUTO: 63 %
PLATELET # BLD AUTO: 194 10E3/UL (ref 150–450)
RBC # BLD AUTO: 3.55 10E6/UL (ref 3.8–5.2)
WBC # BLD AUTO: 6.9 10E3/UL (ref 4–11)

## 2024-11-04 PROCEDURE — 85025 COMPLETE CBC W/AUTO DIFF WBC: CPT

## 2024-11-04 PROCEDURE — 36415 COLL VENOUS BLD VENIPUNCTURE: CPT

## 2024-11-11 ENCOUNTER — LAB (OUTPATIENT)
Dept: LAB | Facility: CLINIC | Age: 74
End: 2024-11-11
Payer: MEDICARE

## 2024-11-11 ENCOUNTER — TELEPHONE (OUTPATIENT)
Dept: ENDOCRINOLOGY | Facility: CLINIC | Age: 74
End: 2024-11-11

## 2024-11-11 DIAGNOSIS — T45.1X5A TOXIC EFFECT OF CHEMOTHERAPY: ICD-10-CM

## 2024-11-11 LAB
BASOPHILS # BLD AUTO: 0 10E3/UL (ref 0–0.2)
BASOPHILS NFR BLD AUTO: 0 %
EOSINOPHIL # BLD AUTO: 0.1 10E3/UL (ref 0–0.7)
EOSINOPHIL NFR BLD AUTO: 2 %
ERYTHROCYTE [DISTWIDTH] IN BLOOD BY AUTOMATED COUNT: 16.8 % (ref 10–15)
HCT VFR BLD AUTO: 36.3 % (ref 35–47)
HGB BLD-MCNC: 11.8 G/DL (ref 11.7–15.7)
IMM GRANULOCYTES # BLD: 0 10E3/UL
IMM GRANULOCYTES NFR BLD: 0 %
LYMPHOCYTES # BLD AUTO: 1.8 10E3/UL (ref 0.8–5.3)
LYMPHOCYTES NFR BLD AUTO: 40 %
MCH RBC QN AUTO: 32 PG (ref 26.5–33)
MCHC RBC AUTO-ENTMCNC: 32.5 G/DL (ref 31.5–36.5)
MCV RBC AUTO: 98 FL (ref 78–100)
MONOCYTES # BLD AUTO: 0.4 10E3/UL (ref 0–1.3)
MONOCYTES NFR BLD AUTO: 9 %
NEUTROPHILS # BLD AUTO: 2.2 10E3/UL (ref 1.6–8.3)
NEUTROPHILS NFR BLD AUTO: 48 %
PLATELET # BLD AUTO: 238 10E3/UL (ref 150–450)
RBC # BLD AUTO: 3.69 10E6/UL (ref 3.8–5.2)
WBC # BLD AUTO: 4.6 10E3/UL (ref 4–11)

## 2024-11-11 PROCEDURE — 36415 COLL VENOUS BLD VENIPUNCTURE: CPT

## 2024-11-11 PROCEDURE — 85025 COMPLETE CBC W/AUTO DIFF WBC: CPT

## 2024-11-11 NOTE — TELEPHONE ENCOUNTER
LVM for pt to call @ [phone] to reschedule her appt due to provider not in clinic. Please schedule to next availability. AY

## 2024-11-13 ENCOUNTER — TRANSCRIBE ORDERS (OUTPATIENT)
Dept: HOME HEALTH SERVICES | Facility: HOME HEALTH | Age: 74
End: 2024-11-13
Payer: MEDICARE

## 2024-11-13 SDOH — HEALTH STABILITY: PHYSICAL HEALTH: ON AVERAGE, HOW MANY DAYS PER WEEK DO YOU ENGAGE IN MODERATE TO STRENUOUS EXERCISE (LIKE A BRISK WALK)?: 7 DAYS

## 2024-11-13 SDOH — HEALTH STABILITY: PHYSICAL HEALTH: ON AVERAGE, HOW MANY MINUTES DO YOU ENGAGE IN EXERCISE AT THIS LEVEL?: 50 MIN

## 2024-11-13 ASSESSMENT — SOCIAL DETERMINANTS OF HEALTH (SDOH): HOW OFTEN DO YOU GET TOGETHER WITH FRIENDS OR RELATIVES?: ONCE A WEEK

## 2024-11-14 ENCOUNTER — TELEPHONE (OUTPATIENT)
Dept: ENDOCRINOLOGY | Facility: CLINIC | Age: 74
End: 2024-11-14
Payer: MEDICARE

## 2024-11-14 NOTE — TELEPHONE ENCOUNTER
RN reviewed chart.  Last dexa was done 9/2023, and per last ov notes from 9/16/24, pt isn't due for dexa until 9/2025.  Pt notified via [employer]. Patricia Anthony RN

## 2024-11-14 NOTE — TELEPHONE ENCOUNTER
[facility] Call [facility]    Phone Message    May a detailed message be left on voicemail: yes     Reason for Call: Other: Pt is requesting for a Dexa order to be placed so that she can get it set up. Please advise.      Action Taken: Other: Endo     Travel Screening: Not Applicable     Date of Service: 11/14/24

## 2024-11-18 ENCOUNTER — OFFICE VISIT (OUTPATIENT)
Dept: FAMILY MEDICINE | Facility: CLINIC | Age: 74
End: 2024-11-18
Payer: MEDICARE

## 2024-11-18 VITALS
WEIGHT: 122.7 LBS | DIASTOLIC BLOOD PRESSURE: 81 MMHG | HEIGHT: 59 IN | OXYGEN SATURATION: 100 % | RESPIRATION RATE: 16 BRPM | SYSTOLIC BLOOD PRESSURE: 134 MMHG | TEMPERATURE: 97.4 F | BODY MASS INDEX: 24.74 KG/M2 | HEART RATE: 110 BPM

## 2024-11-18 DIAGNOSIS — R73.09 ELEVATED GLUCOSE: ICD-10-CM

## 2024-11-18 DIAGNOSIS — E78.5 HYPERLIPIDEMIA, UNSPECIFIED HYPERLIPIDEMIA TYPE: ICD-10-CM

## 2024-11-18 DIAGNOSIS — C79.89 METASTATIC LEIOMYOSARCOMA TO INTRA-ABDOMINAL SITE (H): ICD-10-CM

## 2024-11-18 DIAGNOSIS — E03.9 HYPOTHYROIDISM, UNSPECIFIED TYPE: ICD-10-CM

## 2024-11-18 DIAGNOSIS — E78.5 DYSLIPIDEMIA: ICD-10-CM

## 2024-11-18 DIAGNOSIS — Z90.79 S/P TOTAL ABDOMINAL HYSTERECTOMY AND BILATERAL SALPINGO-OOPHORECTOMY: ICD-10-CM

## 2024-11-18 DIAGNOSIS — Z90.710 S/P TOTAL ABDOMINAL HYSTERECTOMY AND BILATERAL SALPINGO-OOPHORECTOMY: ICD-10-CM

## 2024-11-18 DIAGNOSIS — Z00.00 ENCOUNTER FOR MEDICARE ANNUAL WELLNESS EXAM: Primary | ICD-10-CM

## 2024-11-18 DIAGNOSIS — Z90.722 S/P TOTAL ABDOMINAL HYSTERECTOMY AND BILATERAL SALPINGO-OOPHORECTOMY: ICD-10-CM

## 2024-11-18 DIAGNOSIS — C78.5 SECONDARY MALIGNANT NEOPLASM OF LARGE INTESTINE AND RECTUM (H): ICD-10-CM

## 2024-11-18 DIAGNOSIS — F41.1 GENERALIZED ANXIETY DISORDER: ICD-10-CM

## 2024-11-18 DIAGNOSIS — I42.7 DILATED CARDIOMYOPATHY SECONDARY TO DRUG (H): ICD-10-CM

## 2024-11-18 PROCEDURE — G0439 PPPS, SUBSEQ VISIT: HCPCS | Performed by: INTERNAL MEDICINE

## 2024-11-18 PROCEDURE — 99214 OFFICE O/P EST MOD 30 MIN: CPT | Mod: 25 | Performed by: INTERNAL MEDICINE

## 2024-11-18 ASSESSMENT — PATIENT HEALTH QUESTIONNAIRE - PHQ9
SUM OF ALL RESPONSES TO PHQ QUESTIONS 1-9: 0
SUM OF ALL RESPONSES TO PHQ QUESTIONS 1-9: 0

## 2024-11-18 ASSESSMENT — PAIN SCALES - GENERAL: PAINLEVEL_OUTOF10: WORST PAIN (10)

## 2024-11-18 NOTE — PROGRESS NOTES
Preventive Care Visit  [facility]  Vandana Morris MD, Internal Medicine  Nov 18, 2024      Assessment & Plan     Maggie was seen today for physical.    Diagnoses and all orders for this visit:    Encounter for Medicare annual wellness exam  Last colonoscopy in 2021 was normal.  Last DEXA 9/1/2023.  Last mammo 9/22/2023. We discussed the importance of annual breast cancer screening. Considering her history of leiomyosarcoma, I put extra emphasis on routine cancer screenings. Patient indicated understanding.  Last pap no longer indicated.  Preventive health counseling was also done.  Counseled on diet and exercise.    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    Dyslipidemia  -     Lipid panel reflex to direct LDL Fasting; Future  She will report at her next lab draw appointment.     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)  Follows closely with Oncology. She has started doxil and dacarbazine 3 weeks ago. She is s/p one round of chemotherapy. She states she has tolerated the first round well. She notes some hair loss. Otherwise, she denies major side effects at this time.     Elevated glucose  -     Hemoglobin A1c; Future  She will report at her next lab draw appointment.     Other orders  -     REVIEW OF HEALTH MAINTENANCE PROTOCOL ORDERS    Other  She is interested in non-surgical interventions to address her synovial cyst at L5-S1. She states pain is exacerbated with sitting and wakes her from her sleep. She is unable to have any interventions d/t active chemotherapy. She  states acetaminophen, topical creams and patches leave symptoms unresolved. She had gabapentin in the past and notes it made her dizzy. I recommended she try one capsule of gabapentin at night and/or acupuncture.         Counseling  Appropriate preventive services were addressed with this patient via screening, questionnaire, or discussion as appropriate for fall prevention, nutrition, physical activity, Tobacco-use cessation, social engagement, weight loss and cognition.  Checklist reviewing preventive services available has been given to the patient.  Reviewed patient's diet, addressing concerns and/or questions.   The patient was provided with written information regarding signs of hearing loss.   Information on urinary incontinence and treatment options given to patient.         Subjective   Maggie is a 74 year old, presenting for the following:  Physical          HPI  Maggie is a 74 year old, presenting for the following:  Physical    Health Care Directive  Patient does not have a Health Care Directive: Discussed advance care planning with patient; however, patient declined at this time.      11/13/2024   General Health   How would you rate your overall physical health? Good   Feel stress (tense, anxious, or unable to sleep) Only a little      (!) STRESS CONCERN      11/13/2024   Nutrition   Diet: Regular (no restrictions)    Vegetarian/vegan       Multiple values from one day are sorted in reverse-chronological order         11/13/2024   Exercise   Days per week of moderate/strenous exercise 7 days   Average minutes spent exercising at this level 50 min            11/13/2024   Social Factors   Frequency of gathering with friends or relatives Once a week   Worry food won't last until get money to buy more No   Food not last or not have enough money for food? No   Do you have housing? (Housing is defined as stable permanent housing and does not include staying ouside in a car, in a tent, in an abandoned  building, in an overnight shelter, or couch-surfing.) Yes   Are you worried about losing your housing? No   Lack of transportation? No   Unable to get utilities (heat,electricity)? No            11/13/2024   Fall Risk   Fallen 2 or more times in the past year? No     No    Trouble with walking or balance? No     No        Patient-reported    Multiple values from one day are sorted in reverse-chronological order          11/13/2024   Activities of Daily Living- Home Safety   Needs help with the following daily activites None of the above   Safety concerns in the home None of the above            11/13/2024   Dental   Dentist two times every year? Yes            11/13/2024   Hearing Screening   Hearing concerns? (!) I NEED TO ASK PEOPLE TO SPEAK UP OR REPEAT THEMSELVES.    (!) IT'S HARD TO FOLLOW A CONVERSATION IN A NOISY RESTAURANT OR CROWDED ROOM.    (!) TROUBLE UNDERSTANDING SOFT OR WHISPERED SPEECH.       Multiple values from one day are sorted in reverse-chronological order         11/13/2024   Driving Risk Screening   Patient/family members have concerns about driving No            11/13/2024   General Alertness/Fatigue Screening   Have you been more tired than usual lately? No            11/13/2024   Urinary Incontinence Screening   Bothered by leaking urine in past 6 months Yes            11/13/2024   TB Screening   Were you born outside of the [place]? No          Today's PHQ-9 Score:       11/18/2024    10:47 AM   PHQ-9 SCORE   PHQ-9 Total Score MyChart 0   PHQ-9 Total Score 0        Patient-reported         11/13/2024   Substance Use   Alcohol more than 3/day or more than 7/wk Not Applicable   Do you have a current opioid prescription? No   How severe/bad is pain from 1 to 10? 5/10   Do you use any other substances recreationally? No        Social History     Tobacco Use    Smoking status: Never     Passive exposure: Never    Smokeless tobacco: Never   Vaping Use    Vaping status: Never Used   Substance Use  Topics    Alcohol use: Yes     Comment: Social, not often per patient.    Drug use: No           9/22/2023   LAST FHS-7 RESULTS   1st degree relative breast or ovarian cancer Yes   Any relative bilateral breast cancer No   Any male have breast cancer No   Any ONE woman have BOTH breast AND ovarian cancer No   Any woman with breast cancer before 50yrs No   2 or more relatives with breast AND/OR ovarian cancer No   2 or more relatives with breast AND/OR bowel cancer No           Mammogram Screening - Mammogram every 1-2 years updated in Health Maintenance based on mutual decision making    ASCVD Risk   The 10-year ASCVD risk score (Bharathi MIRAMONTES, et al., 2019) is: 16%    Values used to calculate the score:      Age: 74 years      Sex: Female      Is Non- : No      Diabetic: No      Tobacco smoker: No      Systolic Blood Pressure: 134 mmHg      Is BP treated: No      HDL Cholesterol: 66 mg/dL      Total Cholesterol: 233 mg/dL          Reviewed and updated as needed this visit by Provider                    Current providers sharing in care for this patient include:  Patient Care Team:  Vandana Morris MD as PCP - General (Internal Medicine)  Filemon Rodríguez MD as MD (Family Practice)  Omaira Saul APRN CNP as Nurse Practitioner (Nurse Practitioner)  Jose M Keys MD as MD (Otolaryngology)  Suad Kennedy MD as MD (OB/Gyn)  Edgardo Valderrama MD as MD (Endocrinology, Diabetes, and Metabolism)  Vandana Morris MD as Assigned PCP  Edgardo Valderrama MD as Assigned Endocrinology Provider  Chapis Styles MD as Assigned Surgical Provider  Ren Leahy DPM as Assigned Musculoskeletal Provider  Chapis Styles MD as MD (Dermatology)  Scheierl, Amber J, APRN CNP as Assigned Cancer Care Provider  Jose Alberto Morris, RN as Specialty Care Coordinator (Hematology & Oncology)    The following health maintenance items are reviewed in Epic and correct as of  "today:  Health Maintenance   Topic Date Due    COVID-19 Vaccine (9 - 2024-25 season) 11/16/2024    MAMMO SCREENING  09/22/2024    PHQ-9  02/18/2025    LIPID  10/31/2025    TSH W/FREE T4 REFLEX  10/31/2025    MEDICARE ANNUAL WELLNESS VISIT  11/18/2025    ANNUAL REVIEW OF HM ORDERS  11/18/2025    FALL RISK ASSESSMENT  11/18/2025    GLUCOSE  10/23/2027    COLORECTAL CANCER SCREENING  10/18/2028    ADVANCE CARE PLANNING  11/18/2029    DTAP/TDAP/TD IMMUNIZATION (4 - Td or Tdap) 11/17/2031    DEXA  09/01/2038    HEPATITIS C SCREENING  Completed    PHQ-2 (once per calendar year)  Completed    INFLUENZA VACCINE  Completed    Pneumococcal Vaccine: 65+ Years  Completed    ZOSTER IMMUNIZATION  Completed    RSV VACCINE  Completed    HPV IMMUNIZATION  Aged Out    MENINGITIS IMMUNIZATION  Aged Out    RSV MONOCLONAL ANTIBODY  Aged Out         Review of Systems  Constitutional, HEENT, cardiovascular, pulmonary, GI, , musculoskeletal, neuro, skin, endocrine and psych systems are negative, except as otherwise noted.     Objective    Exam  /81 (BP Location: Right arm, Patient Position: Sitting, Cuff Size: Adult Regular)   Pulse 110   Temp 97.4  F (36.3  C) (Oral)   Resp 16   Ht 1.505 m (4' 11.25\")   Wt 55.7 kg (122 lb 11.2 oz)   SpO2 100%   BMI 24.57 kg/m     Estimated body mass index is 24.57 kg/m  as calculated from the following:    Height as of this encounter: 1.505 m (4' 11.25\").    Weight as of this encounter: 55.7 kg (122 lb 11.2 oz).    Physical Exam    GENERAL APPEARANCE: healthy, alert and no distress  EYES: eyes grossly normal to inspection, PERRL and conjunctivae and sclerae normal  HENT: ear canals and TM's normal, nose and mouth without ulcers or lesions, oropharynx clear and oral mucous membranes moist  NECK: no adenopathy  RESP: lungs clear to auscultation - no rales, rhonchi or wheezes  BREAST: normal without masses, tenderness or nipple discharge and no palpable axillary masses or adenopathy, scar and " port at upper right breast  CV: regular rate and rhythm, normal S1 S2, no S3   ABDOMEN: soft, nontender, no hepatosplenomegaly, no masses and bowel sounds normal  MS: no musculoskeletal defects are noted and gait is age appropriate without ataxia  SKIN: no suspicious lesions or rashes.   NEURO: normal, mentation intact and speech normal  PSYCH: mentation appears normal and affect normal/bright           11/18/2024   Mini Cog   Clock Draw Score 2 Normal   3 Item Recall 3 objects recalled   Mini Cog Total Score 5                 Signed Electronically by: Vandana Morris MD  This document serves as a record of the services and decisions personally performed and made by Dr. Morris. It was created on her behalf by Carline Haq, a trained medical scribe. The creation of this document is based the provider's statements to the medical scribe.

## 2024-11-18 NOTE — PATIENT INSTRUCTIONS
You are due for mammogram.  Please call the following number to make appointment :  [phone]  It is located in [address]      Follow up in one year for physical   Seek sooner medical attention if there is any worsening of symptoms or problems.     Patient Education   Preventive Care Advice   This is general advice given by our system to help you stay healthy. However, your care team may have specific advice just for you. Please talk to your care team about your preventive care needs.  Nutrition  Eat 5 or more servings of fruits and vegetables each day.  Try wheat bread, brown rice and whole grain pasta (instead of white bread, rice, and pasta).  Get enough calcium and vitamin D. Check the label on foods and aim for 100% of the RDA (recommended daily allowance).  Lifestyle  Exercise at least 150 minutes each week  (30 minutes a day, 5 days a week).  Do muscle strengthening activities 2 days a week. These help control your weight and prevent disease.  No smoking.  Wear sunscreen to prevent skin cancer.  Have a dental exam and cleaning every 6 months.  Yearly exams  See your health care team every year to talk about:  Any changes in your health.  Any medicines your care team has prescribed.  Preventive care, family planning, and ways to prevent chronic diseases.  Shots (vaccines)   HPV shots (up to age 26), if you've never had them before.  Hepatitis B shots (up to age 59), if you've never had them before.  COVID-19 shot: Get this shot when it's due.  Flu shot: Get a flu shot every year.  Tetanus shot: Get a tetanus shot every 10 years.  Pneumococcal, hepatitis A, and RSV shots: Ask your care team if you need these based on your risk.  Shingles shot (for age 50 and up)  General health tests  Diabetes screening:  Starting at age 35, Get screened for diabetes at least every 3 years.  If you are younger than age 35, ask your care team if you should be screened for diabetes.  Cholesterol test: At age 39, start having a  cholesterol test every 5 years, or more often if advised.  Bone density scan (DEXA): At age 50, ask your care team if you should have this scan for osteoporosis (brittle bones).  Hepatitis C: Get tested at least once in your life.  STIs (sexually transmitted infections)  Before age 24: Ask your care team if you should be screened for STIs.  After age 24: Get screened for STIs if you're at risk. You are at risk for STIs (including HIV) if:  You are sexually active with more than one person.  You don't use condoms every time.  You or a partner was diagnosed with a sexually transmitted infection.  If you are at risk for HIV, ask about PrEP medicine to prevent HIV.  Get tested for HIV at least once in your life, whether you are at risk for HIV or not.  Cancer screening tests  Cervical cancer screening: If you have a cervix, begin getting regular cervical cancer screening tests starting at age 21.  Breast cancer scan (mammogram): If you've ever had breasts, begin having regular mammograms starting at age 40. This is a scan to check for breast cancer.  Colon cancer screening: It is important to start screening for colon cancer at age 45.  Have a colonoscopy test every 10 years (or more often if you're at risk) Or, ask your provider about stool tests like a FIT test every year or Cologuard test every 3 years.  To learn more about your testing options, visit:   .  For help making a decision, visit:   https://bit.ly/ie90453.  Prostate cancer screening test: If you have a prostate, ask your care team if a prostate cancer screening test (PSA) at age 55 is right for you.  Lung cancer screening: If you are a current or former smoker ages 50 to 80, ask your care team if ongoing lung cancer screenings are right for you.  For informational purposes only. Not to replace the advice of your health care provider. Copyright   2023 [employer]. All rights reserved. Clinically reviewed by the [facility]  Program. [employer] 399217 - REV 01/24.  Hearing Loss: Care Instructions  Overview     Hearing loss is a sudden or slow decrease in how well you hear. It can range from slight to profound. Permanent hearing loss can occur with aging. It also can happen when you are exposed long-term to loud noise. Examples include listening to loud music, riding motorcycles, or being around other loud machines.  Hearing loss can affect your work and home life. It can make you feel lonely or depressed. You may feel that you have lost your independence. But hearing aids and other devices can help you hear better and feel connected to others.  Follow-up care is a key part of your treatment and safety. Be sure to make and go to all appointments, and call your doctor if you are having problems. It's also a good idea to know your test results and keep a list of the medicines you take.  How can you care for yourself at home?  Avoid loud noises whenever possible. This helps keep your hearing from getting worse.  Always wear hearing protection around loud noises.  Wear a hearing aid as directed.  A professional can help you pick a hearing aid that will work best for you.  You can also get hearing aids over the counter for mild to moderate hearing loss.  Have hearing tests as your doctor suggests. They can show whether your hearing has changed. Your hearing aid may need to be adjusted.  Use other devices as needed. These may include:  Telephone amplifiers and hearing aids that can connect to a television, stereo, radio, or microphone.  Devices that use lights or vibrations. These alert you to the doorbell, a ringing telephone, or a baby monitor.  Television closed-captioning. This shows the words at the bottom of the screen. Most new TVs can do this.  TTY (text telephone). This lets you type messages back and forth on the telephone instead of talking or listening. These devices are also called TDD. When messages are typed on the keyboard,  "they are sent over the phone line to a receiving TTY. The message is shown on a monitor.  Use text messaging, social media, and email if it is hard for you to communicate by telephone.  Try to learn a listening technique called speechreading. It is not lipreading. You pay attention to people's gestures, expressions, posture, and tone of voice. These clues can help you understand what a person is saying. Face the person you are talking to, and have them face you. Make sure the lighting is good. You need to see the other person's face clearly.  Think about counseling if you need help to adjust to your hearing loss.  When should you call for help?  Watch closely for changes in your health, and be sure to contact your doctor if:    You think your hearing is getting worse.     You have new symptoms, such as dizziness or nausea.   Where can you learn more?  Go to https://www.[employer].net/patiented  Enter R798 in the search box to learn more about \"Hearing Loss: Care Instructions.\"  Current as of: September 27, 2023  Content Version: 14.2 2024 [employer].   Care instructions adapted under license by your healthcare professional. If you have questions about a medical condition or this instruction, always ask your healthcare professional. [email or web address] disclaims any warranty or liability for your use of this information.    Bladder Training: Care Instructions  Your Care Instructions     Bladder training is used to treat urge incontinence and stress incontinence. Urge incontinence means that the need to urinate comes on so fast that you can't get to a toilet in time. Stress incontinence means that you leak urine because of pressure on your bladder. For example, it may happen when you laugh, cough, or lift something heavy.  Bladder training can increase how long you can wait before you have to urinate. It can also help your bladder hold more urine. And it can give you better control over the urge to " urinate.  It is important to remember that bladder training takes a few weeks to a few months to make a difference. You may not see results right away, but don't give up.  Follow-up care is a key part of your treatment and safety. Be sure to make and go to all appointments, and call your doctor if you are having problems. It's also a good idea to know your test results and keep a list of the medicines you take.  How can you care for yourself at home?  Work with your doctor to come up with a bladder training program that is right for you. You may use one or more of the following methods.  Delayed urination  In the beginning, try to keep from urinating for 5 minutes after you first feel the need to go.  While you wait, take deep, slow breaths to relax. Kegel exercises can also help you delay the need to go to the bathroom.  After some practice, when you can easily wait 5 minutes to urinate, try to wait 10 minutes before you urinate.  Slowly increase the waiting period until you are able to control when you have to urinate.  Scheduled urination  Empty your bladder when you first wake up in the morning.  Schedule times throughout the day when you will urinate.  Start by going to the bathroom every hour, even if you don't need to go.  Slowly increase the time between trips to the bathroom.  When you have found a schedule that works well for you, keep doing it.  If you wake up during the night and have to urinate, do it. Apply your schedule to waking hours only.  Kegel exercises  These tighten and strengthen pelvic muscles, which can help you control the flow of urine. (If doing these exercises causes pain, stop doing them and talk with your doctor.) To do Kegel exercises:  Squeeze your muscles as if you were trying not to pass gas. Or squeeze your muscles as if you were stopping the flow of urine. Your belly, legs, and buttocks shouldn't move.  Hold the squeeze for 3 seconds, then relax for 5 to 10 seconds.  Start with 3  "seconds, then add 1 second each week until you are able to squeeze for 10 seconds.  Repeat the exercise 10 times a session. Do 3 to 8 sessions a day.  When should you call for help?  Watch closely for changes in your health, and be sure to contact your doctor if:    Your incontinence is getting worse.     You do not get better as expected.   Where can you learn more?  Go to https://www.[employer].net/patiented  Enter V684 in the search box to learn more about \"Bladder Training: Care Instructions.\"  Current as of: November 15, 2023  Content Version: 14.2 2024 [facility].   Care instructions adapted under license by your healthcare professional. If you have questions about a medical condition or this instruction, always ask your healthcare professional. [employer] disclaims any warranty or liability for your use of this information.       "

## 2024-11-19 ENCOUNTER — ANCILLARY PROCEDURE (OUTPATIENT)
Dept: CARDIOLOGY | Facility: CLINIC | Age: 74
End: 2024-11-19
Attending: STUDENT IN AN ORGANIZED HEALTH CARE EDUCATION/TRAINING PROGRAM
Payer: MEDICARE

## 2024-11-19 DIAGNOSIS — T45.1X5A TOXIC EFFECT OF CHEMOTHERAPY: ICD-10-CM

## 2024-11-19 LAB — LVEF ECHO: NORMAL

## 2024-11-19 PROCEDURE — 93356 MYOCRD STRAIN IMG SPCKL TRCK: CPT | Performed by: STUDENT IN AN ORGANIZED HEALTH CARE EDUCATION/TRAINING PROGRAM

## 2024-11-19 PROCEDURE — 93306 TTE W/DOPPLER COMPLETE: CPT | Performed by: STUDENT IN AN ORGANIZED HEALTH CARE EDUCATION/TRAINING PROGRAM

## 2024-11-19 NOTE — PROGRESS NOTES
[facility]IN[facility]  RETURN PATIENT VISIT NOTE    PATIENT NAME: Maggie Navarrete MRN # [id number]  DATE OF VISIT: Nov 20, 2024 YOB: 1950    CANCER TYPE: metastatic uterine leiomyosarcoma     HISTORY OF PRESENT ILLNESS   Maggie is a 74 year old women with PMH of metastatic uterine leiomyosarcoma. She was originally diagnosed on 2016 showing an up to 12 cm mass. She underwent surgical resection followed by adjuvant doxorubicin/dacarbazine for 5 cycles. She had local recurrence on 2020 treated with surgical resection. Then  again recurrence on 2022 again underwent surgical resection. Followed by [id number] with anastrozole. Since January of 2024 she has been on trial a BOAST clinical trial with Dr. Nik Schilling at the [facility]. She received abemaciclib there. Now, she was taken off of trial due to progression on 1 of 3 intra-abdominal lesions.     She comes here to continue medical management. She has seen Dr. Michaud at [facility] for a second opinion. She is interested in surgical resection which has been offered at [facility] with possible Gemzar HIPEC.      She started on single agent Gemzar on 7/29/24 and received 3 cycles.     9/16/24, Caris with no clinically significant biomarker results.    Imaging after 2 cycles suggestive of progression with enlarging lesions in the abdomen, and no new lesions.     10/23/24, Changed to doxil (40mg/m2) dacarbazine (750 mg/m2 over 3 days) every 4 weeks     PAST ONCOLOGIC HISTORY   Leiomyosarcoma Uterus   11/3/2016 Initial Diagnosis     11/4/2016 Surgery and Procedures   Open hyst, BSO, stage II with peritoneal involvement    12/26/2016 - 3/23/2017 Chemotherapy   Received elsewhere: adjuvant chemotherapy with doxorubicin 25 mg/m2/day and DTIC 250 mg/m2/day , days 1-3 every 21 weeks.  Completed 5 cycles of adjuvant doxorubicin and dacarbazine    6/1/2020 Recurrence Local   Isolated lesion in the  RLQ    6/16/2020 Surgery and Procedures   Laparotomy, excision of side wall mass, ureteroneocystotomy. 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 The excised margins were negative for tumor.    3/31/2022 Rec[facility]   PET with isolated bowel lesion in LLQ.     4/4/2022 Surgery and Procedures   Exlap, FANNY, small bowel lesion excised with negative margins    5/9/2023 Surgery and Procedures   Dr. Camarillo and Dr. Krueger    Exploratory laparotomy, tumor debulking, cystoscopy and placement of internal ureteral stent and ureterolysis     Findings  Two lesions in the abdomen consistent with very indolent appearing sarcoma on frozen section, 1 in the right upper quadrant in the large bowel mesentery. And 1 on the left sidewall between the left sidewall and the left ureter and the sigmoid colon    5/9/2023 Biopsy/Pathology   FINAL DIAGNOSIS     A. Soft tissue, hepatic flexure nodule, excision: Involved   by smooth muscle neoplasm, 1.4 cm. See comment.     B. Soft tissue, left sidewall mass, excision: Recurrent   leiomyosarcoma, 2.9 cm. See comment.     C. Colon, sigmoid mass, excision: Involved by smooth   muscle neoplasm with increased mitotic activity, 6 mm. See   comment.     D. Soft tissue, left nohemi-ureteral, biopsy: Involved by   smooth muscle neoplasm, 3 mm. See comment.     E. Peritoneum, left pelvic sidewall margin, excision:   Involved by smooth muscle neoplasm, 1 mm. See comment.     F. Soft tissue, sigmoid colon margin, excision: Negative   for tumor.     Comment: Immunohistochemical stains performed at [facility] demonstrate that the left sidewall mass (B2) is   positive for desmin, caldesmon, ER (moderate, 60-70%), and   NH (moderate, 20-30%), but is negative for HMB45, Melan A,   STAT6, ALK, DOG1, and KIT; this immunophenotype supports a   smooth muscle neoplasm. The left sidewall mass shows   increased mitotic activity (greater than 10 mitoses/10 HPF)   and atypia  sufficient to meet criteria for a   leiomyosarcoma. The remaining lesion show minimal atypia   but increased mitotic activity (though less than 10   mitoses/10 HPF) where evaluable, but evaluation is limited   by the small size of the lesions. In the context of a known   history of leiomyosarcoma and a concurrent tumor meeting   diagnostic criteria, these likely represent deceptively   bland additional foci of recurrent leiomyosarcoma.     8/2023 - Biological/Targeted/Hormone Therapy   Anastrozole     11/8/2023 Progression/Relapse   CT scan chest-abdomen-pelvis shows progression of 3 nodules within the peritoneal cavity.    1/18/2024 - 6/5/2024 Chemotherapy   BOAST clinical trial with Dr. Nik Schilling at the [facility]. She received abemaciclib there. Was taken off of trial due to progression.     7/29/24: Cycle 1 Gemzar, disease progression after 3 cycles  10/23/24: Cycle 1 Doxil and dacarbazine    Interval History:  Patient reports increased problems with her back pain that goes down her left leg.  She saw her primary care provider earlier this week and they recommended taking the gabapentin at bedtime.  She has started this, but has not yet noticed a significant improvement in her pain management.  She has some trouble with sleeping due to the pain and get some relief from using a heating pad.  She tried acupuncture yesterday.  She found it to be relaxing, but did not notice a significant change in her pain.  She does continue to exercise most days with going for walks when the weather allows for 45 to 60 minutes with her 2 dogs.  Alternatively if the weather is poor, she will ride on her stationary bike.  She did have some fatigue after the last round of chemo, but she was also recovering from COVID so she is unsure if it was from the COVID or chemo.  She reports having some constipation that is somewhat managed with prune juice. She has daily bowel movements, but they are sometimes hard.  She  is not interested in taking medication for the constipation.  She denies other concerns.    Review of Systems:  Patient denies any of the following except if noted above: fevers, chills, vision or hearing changes, chest pain, dyspnea, abdominal pain, nausea, vomiting, diarrhea, urinary concerns, headaches, numbness, tingling, or issues with mood.   Current Outpatient Medications   Medication Sig Dispense Refill    gabapentin (NEURONTIN) 100 MG capsule Take 100 mg by mouth 3 times daily. PRN      acetaminophen (TYLENOL) 500 MG tablet Take 500-1,000 mg by mouth every 6 hours as needed for mild pain Uses arthritis dose, believes it is 625 mg, takes 2 at a time, does not exceed 4g a day      Ascorbic Acid 250 MG CHEW Take 250 mg by mouth daily.      calcium citrate 250 MG TABS Take 500 mg by mouth daily      carboxymethylcellulose PF (REFRESH PLUS) 0.5 % ophthalmic solution Place 1 drop into both eyes 4 times daily as needed for dry eyes      Fish Oil-Cholecalciferol (FISH OIL + D3 PO) Take 1 capsule by mouth daily.      levothyroxine (SYNTHROID/LEVOTHROID) 75 MCG tablet TAKE 1 TABLET DAILY FOR    THYROID 90 tablet 0    lidocaine-prilocaine (EMLA) 2.5-2.5 % external cream Apply a thin layer on skin overlying port site 10-15 minutes prior to port access for labs or infusion 5 g 1    Melatonin 10 MG TABS tablet Take 10 mg by mouth at bedtime.      multivitamin w/minerals (THERA-VIT-M) tablet Take 1 tablet by mouth daily as needed (if diet insufficient to provide recommended nutrients)      ondansetron (ZOFRAN) 8 MG tablet Take 1 tablet (8 mg) by mouth every 8 hours as needed for nausea (vomiting). 30 tablet 2    prochlorperazine (COMPAZINE) 10 MG tablet Take 1 tablet (10 mg) by mouth every 6 hours as needed for nausea or vomiting. 30 tablet 2    tacrolimus (PROTOPIC) 0.1 % external ointment Apply topically 2 times daily 30 g 3    triamcinolone (KENALOG) 0.1 % external cream Apply topically. prn      TURMERIC PO Take 1  tablet by mouth daily.      venlafaxine (EFFEXOR XR) 37.5 MG 24 hr capsule TAKE 1 CAPSULE DAILY 90 capsule 3    VITAMIN D PO Take 1 tablet by mouth daily.      zolpidem (AMBIEN) 5 MG tablet Take 1 tablet (5 mg) by mouth nightly as needed for sleep (Only uses if traveling or cannot sleep,). 20 tablet 0      PHYSICAL EXAM   General: The patient is a pleasant female in no acute distress.  BP (!) 140/78   Pulse 99   Temp 97.9  F (36.6  C) (Oral)   Resp 16   Wt 55.7 kg (122 lb 11.2 oz)   SpO2 99%   BMI 24.57 kg/m    Wt Readings from Last 10 Encounters:   11/20/24 55.7 kg (122 lb 11.2 oz)   11/18/24 55.7 kg (122 lb 11.2 oz)   11/01/24 55.3 kg (122 lb)   10/29/24 56.1 kg (123 lb 9.6 oz)   10/15/24 53.5 kg (118 lb)   10/14/24 54.4 kg (120 lb)   09/24/24 54.8 kg (120 lb 12.8 oz)   09/16/24 54.7 kg (120 lb 11.2 oz)   09/09/24 55.1 kg (121 lb 8 oz)   08/26/24 56.7 kg (125 lb)   HEENT: EOMI. Sclerae are anicteric.   Lymph: Neck is supple with no lymphadenopathy in the cervical or supraclavicular areas.   Heart: Regular rate and rhythm.   Lungs: Clear to auscultation bilaterally.   Abdomen: Bowel sounds present, soft, nontender with no palpable hepatosplenomegaly or masses.   Extremities: No lower extremity edema noted bilaterally.   Neuro: Cranial nerves II through XII are grossly intact.  Skin: No rashes, petechiae, or bruising noted on exposed skin.     LABORATORY AND IMAGING STUDIES   Most Recent 3 CBC's:  Recent Labs   Lab Test 11/20/24  0746 11/11/24  1104 11/04/24  1508   WBC 4.8 4.6 6.9   HGB 11.8 11.8 11.2*   MCV 98 98 100    238 194   ANEUTAUTO 1.7 2.2 4.3     Most Recent 3 BMP's:  Recent Labs   Lab Test 11/20/24  0746 10/23/24  0910 09/09/24  1153    140 142   POTASSIUM 4.2 4.4 4.1   CHLORIDE 107 105 106   CO2 24 23 25   BUN 23.5* 21.4 24.1*   CR 0.94 0.92 0.92   ANIONGAP 10 12 11   SUSAN 9.2 9.2 9.3   GLC 87 111* 142*   PROTTOTAL 6.2* 6.4 6.2*   ALBUMIN 3.9 3.9 4.0    Most Recent 3 LFT's:  Recent  Labs   Lab Test 24  0746 10/23/24  0910 24  1153   AST 19 17 18   ALT 18 14 16   ALKPHOS 58 70 61   BILITOTAL 0.3 0.2 0.3   I reviewed the above labs today.     Imaging:  Echocardiogram Complete  [id number]  [id number]  [id number]  [id number]^SCHEIERL^GAGAN^EZRA     [facility]  Diagnostic and Treatment-3rd Floor  [address] [place] [address]     Name: KASHIF SWIFT  MRN: [id number]  : 1950  Study Date: 2024 05:51 PM  Age: 74 yrs  Gender: Female  Patient Location: [facility]  Reason For Study: Toxic effect of chemotherapy  Ordering Physician: SCHEIERL, AMBER J  Referring Physician: SCHEIERL, AMBER J  Performed By: Kendra Patricia RDCS     BSA: 1.5 m2  Height: 59 in  Weight: 122 lb  HR: 100  BP: 134/81 mmHg  ______________________________________________________________________________  Procedure  Complete Portable Echo Adult. Echocardiogram with two-dimensional, color and  spectral Doppler performed. Poor acoustic windows.  ______________________________________________________________________________  Interpretation Summary  Global peak LV longitudinal strain is averaged at -19.4%. This is within  reported normal limits (normal <-18%).  Left ventricular size is normal. Global and regional left ventricular function  is normal with an EF of 60-65%.  The right ventricle is normal size. Global right ventricular function is  normal.  No significant valvular abnormalities present.  The inferior vena cava is normal.  No pericardial effusion is present.     This study was compared with the study from 2022, no significant  changes .  ______________________________________________________________________________  Left Ventricle  Global and regional left ventricular function is normal with an EF of 60-65%.  Left ventricular size is normal. Left ventricular wall thickness is normal.  Left ventricular diastolic function is normal. Global peak LV  longitudinal  strain is averaged at -19.4%. This is within reported normal limits (normal <-  18%). No regional wall motion abnormalities are seen.     Right Ventricle  The right ventricle is normal size. Global right ventricular function is  normal.     Atria  Both atria appear normal.     Mitral Valve  On Doppler interrogation, there is no significant stenosis or regurgitation.  Mitral leaflet thickness is normal .     Aortic Valve  On Doppler interrogation, there is no significant stenosis or regurgitation.  The aortic valve is tricuspid.     Tricuspid Valve  The tricuspid valve is normal. Trace tricuspid insufficiency is present. The  right ventricular systolic pressure is approximated at 22.7 mmHg plus the  right atrial pressure.     Pulmonic Valve  The valve leaflets are not well visualized. On Doppler interrogation, there is  no significant stenosis or regurgitation.     Vessels  The aorta root is normal. The thoracic aorta is normal. The inferior vena cava  is normal.     Pericardium  No pericardial effusion is present.     Miscellaneous  No significant valvular abnormalities present.     Compared to Previous Study  This study was compared with the study from 2022, no significant  changes .  ______________________________________________________________________________  MMode/2D Measurements & Calculations  IVSd: 0.95 cm  LVIDd: 4.2 cm  LVIDs: 2.7 cm  LVPWd: 0.73 cm  FS: 37.3 %  LV mass(C)d: 109.9 grams  LV mass(C)dI: 73.5 grams/m2  Ao root diam: 3.0 cm  asc Aorta Diam: 2.9 cm  LVOT diam: 2.0 cm  LVOT area: 3.3 cm2  Ao root diam index Ht(cm/m): 2.0  Ao root diam index BSA (cm/m2): 2.0  Asc Ao diam index BSA (cm/m2): 1.9  Asc Ao diam index Ht(cm/m): 1.9  LA Volume (BP): 33.4 ml     LA Volume Index (BP): 22.4 ml/m2  RWT: 0.35  TAPSE: 1.8 cm     Doppler Measurements & Calculations  MV E max brian: 34.5 cm/sec  MV A max brian: 80.2 cm/sec  MV E/A: 0.43  LV V1 max P.4 mmHg  LV V1 max: 105.3 cm/sec  LV V1  VTI: 17.4 cm  SV(LVOT): 56.6 ml  SI(LVOT): 37.9 ml/m2  TR max davin: 238.1 cm/sec  TR max P.7 mmHg  E/E' av.0  Lateral E/e': 4.4  Medial E/e': 7.6     RV S Davin: 15.2 cm/sec     ______________________________________________________________________________  Report approved by: Dr Christiano Serna 2024 09:17 PM          I reviewed the above imaging today.     ASSESSMENT AND PLAN   Metastatic uterine leiomyosarcoma.   Patient received 3 cycles of Gemzar with subsequent imaging showing disease progression. She then switched to Doxil (40mg/m2) dacarbazine (750 mg/m2 over 3 days) every 4 weeks. She tolerated cycle 1 very well and will continue with cycle 2 today. Her Echo results, as above, show a normal EF. She will have repeat imaging and follow-up with Dr. Powers prior to consideration of additional therapy. Of note, she is hoping to have a minimally invasive procedure to address her back and leg pain and will discuss the timing of this with Dr. Powers when she sees her next. We reviewed that surgery would ideally happen when we have good control of the sarcoma as it would require a treatment break. She expressed understanding of this.     Vascular Access  S/p port placement on 24    Low Back pain and left leg pain  -Status post joint injection on 24  -Back pain worse recently.  -Seen by ortho; recommendation is for possible decompression surgery; spine surgery will need to occur at least 4 weeks from last chemo and chemo will need to be held about 2 weeks after. The plan will be to complete 2 cycles of chemo and assess response to chemo before considering spine surgery. As it is difficult to schedule surgery last minute, she plans to schedule surgery for about a month from now and then cancel if needed based on the upcoming CT imaging results.   -Recommend discussing dose escalation of her gabapentin with her PCP if no improvement in her pain as she is currently taking 100 mg at bedtime.  Discussed she could trial taking the 100 mg dose during the day and request an increase in the bedtime dose.    Constipation  -Not well managed with prune juice alone. Recommend adding in Senna tea.     Rebeka Kim PA-C  [facility] [address] [id number] [phone]    The longitudinal plan of care for the diagnosis(es)/condition(s) as documented were addressed during this visit. Due to the added complexity in care, I will continue to support Maggie Navarrete in the subsequent management and with ongoing continuity of care.

## 2024-11-20 ENCOUNTER — ONCOLOGY VISIT (OUTPATIENT)
Dept: ONCOLOGY | Facility: CLINIC | Age: 74
End: 2024-11-20
Attending: STUDENT IN AN ORGANIZED HEALTH CARE EDUCATION/TRAINING PROGRAM
Payer: MEDICARE

## 2024-11-20 VITALS
BODY MASS INDEX: 24.57 KG/M2 | WEIGHT: 122.7 LBS | HEART RATE: 99 BPM | DIASTOLIC BLOOD PRESSURE: 78 MMHG | RESPIRATION RATE: 16 BRPM | TEMPERATURE: 97.9 F | OXYGEN SATURATION: 99 % | SYSTOLIC BLOOD PRESSURE: 140 MMHG

## 2024-11-20 DIAGNOSIS — T45.1X5A CHEMOTHERAPY-INDUCED NEUTROPENIA (H): ICD-10-CM

## 2024-11-20 DIAGNOSIS — I42.7 DILATED CARDIOMYOPATHY SECONDARY TO DRUG (H): ICD-10-CM

## 2024-11-20 DIAGNOSIS — D70.1 CHEMOTHERAPY-INDUCED NEUTROPENIA (H): Primary | ICD-10-CM

## 2024-11-20 DIAGNOSIS — C55 LEIOMYOSARCOMA OF UTERUS (H): Primary | ICD-10-CM

## 2024-11-20 DIAGNOSIS — T45.1X5A CHEMOTHERAPY-INDUCED NEUTROPENIA (H): Primary | ICD-10-CM

## 2024-11-20 DIAGNOSIS — D70.1 CHEMOTHERAPY-INDUCED NEUTROPENIA (H): ICD-10-CM

## 2024-11-20 DIAGNOSIS — C55 LEIOMYOSARCOMA OF UTERUS (H): ICD-10-CM

## 2024-11-20 LAB
ALBUMIN SERPL BCG-MCNC: 3.9 G/DL (ref 3.5–5.2)
ALP SERPL-CCNC: 58 U/L (ref 40–150)
ALT SERPL W P-5'-P-CCNC: 18 U/L (ref 0–50)
ANION GAP SERPL CALCULATED.3IONS-SCNC: 10 MMOL/L (ref 7–15)
AST SERPL W P-5'-P-CCNC: 19 U/L (ref 0–45)
BASOPHILS # BLD AUTO: 0 10E3/UL (ref 0–0.2)
BASOPHILS NFR BLD AUTO: 1 %
BILIRUB SERPL-MCNC: 0.3 MG/DL
BUN SERPL-MCNC: 23.5 MG/DL (ref 8–23)
CALCIUM SERPL-MCNC: 9.2 MG/DL (ref 8.8–10.4)
CHLORIDE SERPL-SCNC: 107 MMOL/L (ref 98–107)
CREAT SERPL-MCNC: 0.94 MG/DL (ref 0.51–0.95)
EGFRCR SERPLBLD CKD-EPI 2021: 63 ML/MIN/1.73M2
EOSINOPHIL # BLD AUTO: 0.1 10E3/UL (ref 0–0.7)
EOSINOPHIL NFR BLD AUTO: 2 %
ERYTHROCYTE [DISTWIDTH] IN BLOOD BY AUTOMATED COUNT: 16.2 % (ref 10–15)
GLUCOSE SERPL-MCNC: 87 MG/DL (ref 70–99)
HCO3 SERPL-SCNC: 24 MMOL/L (ref 22–29)
HCT VFR BLD AUTO: 36 % (ref 35–47)
HGB BLD-MCNC: 11.8 G/DL (ref 11.7–15.7)
IMM GRANULOCYTES # BLD: 0 10E3/UL
IMM GRANULOCYTES NFR BLD: 0 %
LYMPHOCYTES # BLD AUTO: 2.2 10E3/UL (ref 0.8–5.3)
LYMPHOCYTES NFR BLD AUTO: 46 %
MCH RBC QN AUTO: 32 PG (ref 26.5–33)
MCHC RBC AUTO-ENTMCNC: 32.8 G/DL (ref 31.5–36.5)
MCV RBC AUTO: 98 FL (ref 78–100)
MONOCYTES # BLD AUTO: 0.7 10E3/UL (ref 0–1.3)
MONOCYTES NFR BLD AUTO: 15 %
NEUTROPHILS # BLD AUTO: 1.7 10E3/UL (ref 1.6–8.3)
NEUTROPHILS NFR BLD AUTO: 36 %
NRBC # BLD AUTO: 0 10E3/UL
NRBC BLD AUTO-RTO: 0 /100
PLATELET # BLD AUTO: 311 10E3/UL (ref 150–450)
POTASSIUM SERPL-SCNC: 4.2 MMOL/L (ref 3.4–5.3)
PROT SERPL-MCNC: 6.2 G/DL (ref 6.4–8.3)
RBC # BLD AUTO: 3.69 10E6/UL (ref 3.8–5.2)
SODIUM SERPL-SCNC: 141 MMOL/L (ref 135–145)
WBC # BLD AUTO: 4.8 10E3/UL (ref 4–11)

## 2024-11-20 PROCEDURE — G0498 CHEMO EXTEND IV INFUS W/PUMP: HCPCS

## 2024-11-20 PROCEDURE — 36591 DRAW BLOOD OFF VENOUS DEVICE: CPT | Performed by: PHYSICIAN ASSISTANT

## 2024-11-20 PROCEDURE — 258N000003 HC RX IP 258 OP 636: Performed by: PHYSICIAN ASSISTANT

## 2024-11-20 PROCEDURE — 82565 ASSAY OF CREATININE: CPT | Performed by: PHYSICIAN ASSISTANT

## 2024-11-20 PROCEDURE — G0463 HOSPITAL OUTPT CLINIC VISIT: HCPCS | Mod: 25 | Performed by: PHYSICIAN ASSISTANT

## 2024-11-20 PROCEDURE — 250N000011 HC RX IP 250 OP 636: Performed by: PHYSICIAN ASSISTANT

## 2024-11-20 PROCEDURE — 85014 HEMATOCRIT: CPT | Performed by: PHYSICIAN ASSISTANT

## 2024-11-20 PROCEDURE — 85025 COMPLETE CBC W/AUTO DIFF WBC: CPT | Performed by: PHYSICIAN ASSISTANT

## 2024-11-20 PROCEDURE — 84132 ASSAY OF SERUM POTASSIUM: CPT | Performed by: PHYSICIAN ASSISTANT

## 2024-11-20 PROCEDURE — 96413 CHEMO IV INFUSION 1 HR: CPT

## 2024-11-20 PROCEDURE — 96375 TX/PRO/DX INJ NEW DRUG ADDON: CPT

## 2024-11-20 PROCEDURE — 250N000011 HC RX IP 250 OP 636: Mod: JZ | Performed by: PHYSICIAN ASSISTANT

## 2024-11-20 PROCEDURE — 85041 AUTOMATED RBC COUNT: CPT | Performed by: PHYSICIAN ASSISTANT

## 2024-11-20 RX ORDER — HEPARIN SODIUM,PORCINE 10 UNIT/ML
5-20 VIAL (ML) INTRAVENOUS DAILY PRN
Status: CANCELLED | OUTPATIENT
Start: 2024-11-20

## 2024-11-20 RX ORDER — EPINEPHRINE 1 MG/ML
0.3 INJECTION, SOLUTION INTRAMUSCULAR; SUBCUTANEOUS EVERY 5 MIN PRN
Status: CANCELLED | OUTPATIENT
Start: 2024-11-20

## 2024-11-20 RX ORDER — DEXAMETHASONE 4 MG/1
4 TABLET ORAL DAILY
Qty: 7 TABLET | Refills: 4 | Status: SHIPPED | OUTPATIENT
Start: 2024-11-20

## 2024-11-20 RX ORDER — ALBUTEROL SULFATE 90 UG/1
1-2 INHALANT RESPIRATORY (INHALATION)
Status: CANCELLED
Start: 2024-11-20

## 2024-11-20 RX ORDER — DIPHENHYDRAMINE HYDROCHLORIDE 50 MG/ML
25 INJECTION INTRAMUSCULAR; INTRAVENOUS
Start: 2024-11-22

## 2024-11-20 RX ORDER — ALBUTEROL SULFATE 90 UG/1
1-2 INHALANT RESPIRATORY (INHALATION)
Start: 2024-11-22

## 2024-11-20 RX ORDER — HEPARIN SODIUM,PORCINE 10 UNIT/ML
5-20 VIAL (ML) INTRAVENOUS DAILY PRN
OUTPATIENT
Start: 2024-11-22

## 2024-11-20 RX ORDER — DIPHENHYDRAMINE HYDROCHLORIDE 50 MG/ML
50 INJECTION INTRAMUSCULAR; INTRAVENOUS
Start: 2024-11-22

## 2024-11-20 RX ORDER — GABAPENTIN 100 MG/1
100 CAPSULE ORAL 3 TIMES DAILY
COMMUNITY

## 2024-11-20 RX ORDER — HEPARIN SODIUM (PORCINE) LOCK FLUSH IV SOLN 100 UNIT/ML 100 UNIT/ML
5 SOLUTION INTRAVENOUS
OUTPATIENT
Start: 2024-11-23

## 2024-11-20 RX ORDER — PALONOSETRON 0.05 MG/ML
0.25 INJECTION, SOLUTION INTRAVENOUS ONCE
Status: CANCELLED | OUTPATIENT
Start: 2024-11-20

## 2024-11-20 RX ORDER — METHYLPREDNISOLONE SODIUM SUCCINATE 40 MG/ML
40 INJECTION INTRAMUSCULAR; INTRAVENOUS
Status: CANCELLED
Start: 2024-11-20

## 2024-11-20 RX ORDER — HEPARIN SODIUM (PORCINE) LOCK FLUSH IV SOLN 100 UNIT/ML 100 UNIT/ML
5 SOLUTION INTRAVENOUS
OUTPATIENT
Start: 2024-11-22

## 2024-11-20 RX ORDER — HEPARIN SODIUM,PORCINE 10 UNIT/ML
5-20 VIAL (ML) INTRAVENOUS DAILY PRN
OUTPATIENT
Start: 2024-11-23

## 2024-11-20 RX ORDER — DIPHENHYDRAMINE HYDROCHLORIDE 50 MG/ML
50 INJECTION INTRAMUSCULAR; INTRAVENOUS
Status: CANCELLED
Start: 2024-11-20

## 2024-11-20 RX ORDER — HEPARIN SODIUM (PORCINE) LOCK FLUSH IV SOLN 100 UNIT/ML 100 UNIT/ML
5 SOLUTION INTRAVENOUS DAILY PRN
Status: DISCONTINUED | OUTPATIENT
Start: 2024-11-20 | End: 2024-11-20 | Stop reason: HOSPADM

## 2024-11-20 RX ORDER — ALBUTEROL SULFATE 0.83 MG/ML
2.5 SOLUTION RESPIRATORY (INHALATION)
Status: CANCELLED | OUTPATIENT
Start: 2024-11-20

## 2024-11-20 RX ORDER — MEPERIDINE HYDROCHLORIDE 25 MG/ML
25 INJECTION INTRAMUSCULAR; INTRAVENOUS; SUBCUTANEOUS
OUTPATIENT
Start: 2024-11-22

## 2024-11-20 RX ORDER — DEXAMETHASONE SODIUM PHOSPHATE 4 MG/ML
12 INJECTION, SOLUTION INTRA-ARTICULAR; INTRALESIONAL; INTRAMUSCULAR; INTRAVENOUS; SOFT TISSUE ONCE
Status: COMPLETED | OUTPATIENT
Start: 2024-11-20 | End: 2024-11-20

## 2024-11-20 RX ORDER — METHYLPREDNISOLONE SODIUM SUCCINATE 40 MG/ML
40 INJECTION INTRAMUSCULAR; INTRAVENOUS
Start: 2024-11-22

## 2024-11-20 RX ORDER — PALONOSETRON 0.05 MG/ML
0.25 INJECTION, SOLUTION INTRAVENOUS ONCE
Status: COMPLETED | OUTPATIENT
Start: 2024-11-20 | End: 2024-11-20

## 2024-11-20 RX ORDER — ALBUTEROL SULFATE 0.83 MG/ML
2.5 SOLUTION RESPIRATORY (INHALATION)
OUTPATIENT
Start: 2024-11-22

## 2024-11-20 RX ORDER — DEXAMETHASONE SODIUM PHOSPHATE 4 MG/ML
12 INJECTION, SOLUTION INTRA-ARTICULAR; INTRALESIONAL; INTRAMUSCULAR; INTRAVENOUS; SOFT TISSUE ONCE
Status: CANCELLED
Start: 2024-11-20

## 2024-11-20 RX ORDER — MEPERIDINE HYDROCHLORIDE 25 MG/ML
25 INJECTION INTRAMUSCULAR; INTRAVENOUS; SUBCUTANEOUS
Status: CANCELLED | OUTPATIENT
Start: 2024-11-20

## 2024-11-20 RX ORDER — EPINEPHRINE 1 MG/ML
0.3 INJECTION, SOLUTION INTRAMUSCULAR; SUBCUTANEOUS EVERY 5 MIN PRN
OUTPATIENT
Start: 2024-11-22

## 2024-11-20 RX ORDER — DIPHENHYDRAMINE HYDROCHLORIDE 50 MG/ML
25 INJECTION INTRAMUSCULAR; INTRAVENOUS
Status: CANCELLED
Start: 2024-11-20

## 2024-11-20 RX ORDER — HEPARIN SODIUM (PORCINE) LOCK FLUSH IV SOLN 100 UNIT/ML 100 UNIT/ML
5 SOLUTION INTRAVENOUS
Status: CANCELLED | OUTPATIENT
Start: 2024-11-20

## 2024-11-20 RX ORDER — LORAZEPAM 2 MG/ML
0.5 INJECTION INTRAMUSCULAR EVERY 4 HOURS PRN
Status: CANCELLED | OUTPATIENT
Start: 2024-11-20

## 2024-11-20 RX ADMIN — DOXORUBICIN HYDROCHLORIDE 60 MG: 2 INJECTABLE, LIPOSOMAL INTRAVENOUS at 10:04

## 2024-11-20 RX ADMIN — HEPARIN SODIUM (PORCINE) LOCK FLUSH IV SOLN 100 UNIT/ML 5 ML: 100 SOLUTION at 07:39

## 2024-11-20 RX ADMIN — PALONOSETRON HYDROCHLORIDE 0.25 MG: 0.25 INJECTION INTRAVENOUS at 09:12

## 2024-11-20 RX ADMIN — DACARBAZINE 755 MG: 10 INJECTION, POWDER, FOR SOLUTION INTRAVENOUS at 11:17

## 2024-11-20 RX ADMIN — DEXAMETHASONE SODIUM PHOSPHATE 12 MG: 4 INJECTION, SOLUTION INTRA-ARTICULAR; INTRALESIONAL; INTRAMUSCULAR; INTRAVENOUS; SOFT TISSUE at 09:12

## 2024-11-20 RX ADMIN — APREPITANT 130 MG: 130 INJECTION, EMULSION INTRAVENOUS at 09:12

## 2024-11-20 ASSESSMENT — PAIN SCALES - GENERAL: PAINLEVEL_OUTOF10: NO PAIN (0)

## 2024-11-20 NOTE — NURSING NOTE
"Oncology Rooming Note    November 20, 2024 7:56 AM   Maggie Navarrete is a 74 year old female who presents for:    Chief Complaint   Patient presents with    Port Draw     Labs drawn via port by RN in lab. VS taken.     Oncology Clinic Visit     RTN for Leiomyosarcoma      Initial Vitals: BP (!) 140/78   Pulse 99   Temp 97.9  F (36.6  C) (Oral)   Resp 16   Wt 55.7 kg (122 lb 11.2 oz)   SpO2 99%   BMI 24.57 kg/m   Estimated body mass index is 24.57 kg/m  as calculated from the following:    Height as of 11/18/24: 1.505 m (4' 11.25\").    Weight as of this encounter: 55.7 kg (122 lb 11.2 oz). Body surface area is 1.53 meters squared.  No Pain (0) Comment: Data Unavailable   No LMP recorded. Patient has had a hysterectomy.  Allergies reviewed: Yes  Medications reviewed: Yes    Medications: Medication refills not needed today.  Pharmacy name entered into [employer]:    [facility]LE [facility] - [facility] AT PORTAL TO REGISTERED [facility] SITES  [id number] - [place], [place] - [address] [facility] [place], [place] - [address]  [id number] - [place], [place] - [address] AT [facility] [address]  [facility] - [facility] [place] - [facility]    Frailty Screening:   Is the patient here for a new oncology consult visit in cancer care? 2. No      Clinical concerns: Pt did Acupuncture for her cyst on her spine        Berenicechaparro Cole MA             "

## 2024-11-20 NOTE — PATIENT INSTRUCTIONS
Contact Numbers  [facility]: [phone] (for symptom and scheduling needs)    Please call the [facility] Triage line if you experience a temperature greater than or equal to 100.4, shaking chills, have uncontrolled nausea, vomiting and/or diarrhea, dizziness, shortness of breath, chest pain, bleeding, unexplained bruising, or if you have any other new/concerning symptoms, questions or concerns.     If you are having any concerning symptoms or wish to speak to a provider before your next infusion visit, please call your care coordinator or triage to notify them so we can adequately serve you.     If you need a refill on a narcotic prescription or other medication, please call triage before your infusion appointment.           November 2024 Sunday Monday Tuesday Wednesday Thursday Friday Saturday                            1    RETURN CCSL   7:00 AM   (45 min.)   Scheierl, Amber J, APRN CNP   [facility] 2       3     4    LAB   3:00 PM   (15 min.)   CS LAB   [facility] 5     6     7     8     9       10     11    LAB  11:15 AM   (15 min.)   CS LAB   [facility] 12     13     14     15     16       17     18    ANNUAL WELLNESS  10:40 AM   (30 min.)   Vandana Morris MD   [facility] 19    ECHO COMPLETE   5:45 PM   (60 min.)   [id number]   [facility] 20    LAB CENTRAL   7:30 AM   (15 min.)   [facility] DRAW   [facility]    RETURN CCSL   7:45 AM   (45 min.)   Rebeka Kim PA-C   [facility]    ONC INFUSION 2 HR (120 MIN)   8:30 AM   (120 min.)    ONC INFUSION NURSE   [facility] 21     22    ONC INFUSION 1 HR (60 MIN)  11:00 AM   (60 min.)    ONC INFUSION NURSE   [facility] 23    ONC INFUSION 1 HR (60 MIN)  12:30 PM   (60 min.)   UC ONC INFUSION  NURSE   [facility]   24     25     26     27     28     29     30                 December 2024 Sunday Monday Tuesday Wednesday Thursday Friday Saturday   1     2     3     4     5     6     7       8     9     10     11     12     13     14       15     16    CT CHEST/ABDOMEN/PELVIS W  11:30 AM   (20 min.)   SHCT1   [facility] 17     18    RETURN CCSL   2:45 PM   (30 min.)   Beto Galvan MD   [facility] 19     20    LAB CENTRAL   8:00 AM   (15 min.)    [facility] DRAW   [facility]    ONC INFUSION 2 HR (120 MIN)   8:30 AM   (120 min.)    ONC INFUSION NURSE   [facility] 21       22    ONC INFUSION 1 HR (60 MIN)  10:30 AM   (60 min.)    ONC INFUSION NURSE   [facility] 23    ONC INFUSION 0.5 HR (30 MIN)  12:00 PM   (30 min.)    ONC INFUSION NURSE   [facility] 24     25     26     27     28       29     30    RETURN DERMATOLOGY  11:00 AM   (15 min.)   Chapis Styles MD   [facility] [place] 31                                         Lab Results:  Recent Results (from the past 12 hours)   Comprehensive metabolic panel    Collection Time: 11/20/24  7:46 AM   Result Value Ref Range    Sodium 141 135 - 145 mmol/L    Potassium 4.2 3.4 - 5.3 mmol/L    Carbon Dioxide (CO2) 24 22 - 29 mmol/L    Anion Gap 10 7 - 15 mmol/L    Urea Nitrogen 23.5 (H) 8.0 - 23.0 mg/dL    Creatinine 0.94 0.51 - 0.95 mg/dL    GFR Estimate 63 >60 mL/min/1.73m2    Calcium 9.2 8.8 - 10.4 mg/dL    Chloride 107 98 - 107 mmol/L    Glucose 87 70 - 99 mg/dL    Alkaline Phosphatase 58 40 - 150 U/L    AST 19 0 - 45 U/L    ALT 18 0 - 50 U/L    Protein Total 6.2 (L) 6.4 - 8.3 g/dL    Albumin 3.9 3.5 - 5.2 g/dL    Bilirubin Total 0.3 <=1.2 mg/dL   CBC with platelets and differential    Collection  Time: 11/20/24  7:46 AM   Result Value Ref Range    WBC Count 4.8 4.0 - 11.0 10e3/uL    RBC Count 3.69 (L) 3.80 - 5.20 10e6/uL    Hemoglobin 11.8 11.7 - 15.7 g/dL    Hematocrit 36.0 35.0 - 47.0 %    MCV 98 78 - 100 fL    MCH 32.0 26.5 - 33.0 pg    MCHC 32.8 31.5 - 36.5 g/dL    RDW 16.2 (H) 10.0 - 15.0 %    Platelet Count 311 150 - 450 10e3/uL    % Neutrophils 36 %    % Lymphocytes 46 %    % Monocytes 15 %    % Eosinophils 2 %    % Basophils 1 %    % Immature Granulocytes 0 %    NRBCs per 100 WBC 0 <1 /100    Absolute Neutrophils 1.7 1.6 - 8.3 10e3/uL    Absolute Lymphocytes 2.2 0.8 - 5.3 10e3/uL    Absolute Monocytes 0.7 0.0 - 1.3 10e3/uL    Absolute Eosinophils 0.1 0.0 - 0.7 10e3/uL    Absolute Basophils 0.0 0.0 - 0.2 10e3/uL    Absolute Immature Granulocytes 0.0 <=0.4 10e3/uL    Absolute NRBCs 0.0 10e3/uL

## 2024-11-20 NOTE — PROGRESS NOTES
"Infusion Nursing Note:  Maggie Navarrete presents today for Cycle 2 Day 1 Doxil and Dacarbazine Pump Connect.    Patient seen by provider today: Yes: MICHELLE Pan   present during visit today: Not Applicable.    Note: Patient presents to infusion today doing well. No new questions or concerns following her visit with MICHELLE Pan.    Patient wanting to talk to a pharmacist about taking a supplement called Berberine at home. Per Mi Moody, [facility] \"I would recommended avoiding if able because not only does it interact with her doxil but also some of her other meds (gabapentin, ondansetron, venlafaxine, zolpidem)\". Above relayed to patient and patient verbalized understanding.     Intravenous Access:  Implanted Port.    Treatment Conditions:  Lab Results   Component Value Date    HGB 11.8 11/20/2024    WBC 4.8 11/20/2024    ANEU 3.3 04/28/2021    ANEUTAUTO 1.7 11/20/2024     11/20/2024        Lab Results   Component Value Date     11/20/2024    POTASSIUM 4.2 11/20/2024    MAG 2.0 03/02/2024    CR 0.94 11/20/2024    SUSAN 9.2 11/20/2024    BILITOTAL 0.3 11/20/2024    ALBUMIN 3.9 11/20/2024    ALT 18 11/20/2024    AST 19 11/20/2024     Results reviewed, labs MET treatment parameters, ok to proceed with treatment.  ECHO/MUGA completed 11/19/2024  EF 60-65%.    Post Infusion Assessment:  Patient tolerated infusion without incident.  Blood return noted pre and post infusion.  Site patent and intact, free from redness, edema or discomfort.  No evidence of extravasations.     Prior to discharge: Port is secured in place with tegaderm and flushed with 10cc NS with positive blood return noted.    Continuous home infusion CADD pump connected.    All connectors secured in place and clamps taped open.    Pump started, \"running\" noted on display (CADD): YES.   Capillary element taped to pt's skin per protocol.  Pump Connection double checked with Martha Borja RN.  Patient instructed to call our clinic " or [facility] with any questions or concerns at home.  Patient verbalized understanding.    Patient set up for pump bag change at our clinic on Friday 11/22 at 1100.      Discharge Plan:   Prescription refills given for Dexamethasone (patient will  at [facility] pharmacy).  Discharge instructions reviewed with: Patient.  Patient and/or family verbalized understanding of discharge instructions and all questions answered.  AVS to patient via Photo RankrHART.  Patient will return 11/22 for next appointment.   Patient discharged in stable condition accompanied by: self.  Departure Mode: Ambulatory.      Faith King RN

## 2024-11-20 NOTE — LETTER
11/20/2024      Maggie Navarrete  [address]  [facility] [address]      Dear Colleague,    Thank you for referring your patient, Maggie Navarrete, to the [facility]. Please see a copy of my visit note below.      [facility]  RETURN PATIENT VISIT NOTE    PATIENT NAME: Maggie Navarrete MRN # [id number]  DATE OF VISIT: Nov 20, 2024 YOB: 1950    CANCER TYPE: metastatic uterine leiomyosarcoma     HISTORY OF PRESENT ILLNESS   Maggie is a 74 year old women with PMH of metastatic uterine leiomyosarcoma. She was originally diagnosed on 2016 showing an up to 12 cm mass. She underwent surgical resection followed by adjuvant doxorubicin/dacarbazine for 5 cycles. She had local recurrence on 2020 treated with surgical resection. Then  again recurrence on 2022 again underwent surgical resection. Followed by [facility] with anastrozole. Since January of 2024 she has been on trial a BOAST clinical trial with Dr. Nik Schilling at the [facility] [place]. She received abemaciclib there. Now, she was taken off of trial due to progression on 1 of 3 intra-abdominal lesions.     She comes here to continue medical management. She has seen Dr. Michaud at [facility] for a second opinion. She is interested in surgical resection which has been offered at [facility] with possible Gemzar HIPEC.      She started on single agent Gemzar on 7/29/24 and received 3 cycles.     9/16/24, Caris with no clinically significant biomarker results.    Imaging after 2 cycles suggestive of progression with enlarging lesions in the abdomen, and no new lesions.     10/23/24, Changed to doxil (40mg/m2) dacarbazine (750 mg/m2 over 3 days) every 4 weeks     PAST ONCOLOGIC HISTORY   Leiomyosarcoma Uterus   11/3/2016 Initial Diagnosis     11/4/2016 Surgery and Procedures   Open hyst, BSO, stage II with peritoneal involvement    12/26/2016 - 3/23/2017 Chemotherapy    Received elsewhere: adjuvant chemotherapy with doxorubicin 25 mg/m2/day and DTIC 250 mg/m2/day , days 1-3 every 21 weeks.  Completed 5 cycles of adjuvant doxorubicin and dacarbazine    6/1/2020 Recurrence Local   Isolated lesion in the RLQ    6/16/2020 Surgery and Procedures   Laparotomy, excision of side wall mass, ureteroneocystotomy. 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 The excised margins were negative for tumor.    3/31/2022 [facility]   PET with isolated bowel lesion in LLQ.     4/4/2022 Surgery and Procedures   Exlap, FANNY, small bowel lesion excised with negative margins    5/9/2023 Surgery and Procedures   Dr. Camarillo and Dr. Krueger    Exploratory laparotomy, tumor debulking, cystoscopy and placement of internal ureteral stent and ureterolysis     Findings  Two lesions in the abdomen consistent with very indolent appearing sarcoma on frozen section, 1 in the right upper quadrant in the large bowel mesentery. And 1 on the left sidewall between the left sidewall and the left ureter and the sigmoid colon    5/9/2023 Biopsy/Pathology   FINAL DIAGNOSIS     A. Soft tissue, hepatic flexure nodule, excision: Involved   by smooth muscle neoplasm, 1.4 cm. See comment.     B. Soft tissue, left sidewall mass, excision: Recurrent   leiomyosarcoma, 2.9 cm. See comment.     C. Colon, sigmoid mass, excision: Involved by smooth   muscle neoplasm with increased mitotic activity, 6 mm. See   comment.     D. Soft tissue, left nohemi-ureteral, biopsy: Involved by   smooth muscle neoplasm, 3 mm. See comment.     E. Peritoneum, left pelvic sidewall margin, excision:   Involved by smooth muscle neoplasm, 1 mm. See comment.     F. Soft tissue, sigmoid colon margin, excision: Negative   for tumor.     Comment: Immunohistochemical stains performed at [facility] demonstrate that the left sidewall mass (B2) is   positive for desmin, caldesmon, ER (moderate, 60-70%), and   PA  (moderate, 20-30%), but is negative for HMB45, Melan A,   STAT6, ALK, DOG1, and KIT; this immunophenotype supports a   smooth muscle neoplasm. The left sidewall mass shows   increased mitotic activity (greater than 10 mitoses/10 HPF)   and atypia sufficient to meet criteria for a   leiomyosarcoma. The remaining lesion show minimal atypia   but increased mitotic activity (though less than 10   mitoses/10 HPF) where evaluable, but evaluation is limited   by the small size of the lesions. In the context of a known   history of leiomyosarcoma and a concurrent tumor meeting   diagnostic criteria, these likely represent deceptively   bland additional foci of recurrent leiomyosarcoma.     8/2023 - Biological/Targeted/Hormone Therapy   Anastrozole     11/8/2023 Progression/Relapse   CT scan chest-abdomen-pelvis shows progression of 3 nodules within the peritoneal cavity.    1/18/2024 - 6/5/2024 Chemotherapy   BOAST clinical trial with Dr. Nik Schilling at the [facility]. She received abemaciclib there. Was taken off of trial due to progression.     7/29/24: Cycle 1 Gemzar, disease progression after 3 cycles  10/23/24: Cycle 1 Doxil and dacarbazine    Interval History:  Patient reports increased problems with her back pain that goes down her left leg.  She saw her primary care provider earlier this week and they recommended taking the gabapentin at bedtime.  She has started this, but has not yet noticed a significant improvement in her pain management.  She has some trouble with sleeping due to the pain and get some relief from using a heating pad.  She tried acupuncture yesterday.  She found it to be relaxing, but did not notice a significant change in her pain.  She does continue to exercise most days with going for walks when the weather allows for 45 to 60 minutes with her 2 dogs.  Alternatively if the weather is poor, she will ride on her stationary bike.  She did have some fatigue after the last round  of chemo, but she was also recovering from COVID so she is unsure if it was from the COVID or chemo.  She reports having some constipation that is somewhat managed with prune juice. She has daily bowel movements, but they are sometimes hard.  She is not interested in taking medication for the constipation.  She denies other concerns.    Review of Systems:  Patient denies any of the following except if noted above: fevers, chills, vision or hearing changes, chest pain, dyspnea, abdominal pain, nausea, vomiting, diarrhea, urinary concerns, headaches, numbness, tingling, or issues with mood.   Current Outpatient Medications   Medication Sig Dispense Refill     gabapentin (NEURONTIN) 100 MG capsule Take 100 mg by mouth 3 times daily. PRN       acetaminophen (TYLENOL) 500 MG tablet Take 500-1,000 mg by mouth every 6 hours as needed for mild pain Uses arthritis dose, believes it is 625 mg, takes 2 at a time, does not exceed 4g a day       Ascorbic Acid 250 MG CHEW Take 250 mg by mouth daily.       calcium citrate 250 MG TABS Take 500 mg by mouth daily       carboxymethylcellulose PF (REFRESH PLUS) 0.5 % ophthalmic solution Place 1 drop into both eyes 4 times daily as needed for dry eyes       Fish Oil-Cholecalciferol (FISH OIL + D3 PO) Take 1 capsule by mouth daily.       levothyroxine (SYNTHROID/LEVOTHROID) 75 MCG tablet TAKE 1 TABLET DAILY FOR    THYROID 90 tablet 0     lidocaine-prilocaine (EMLA) 2.5-2.5 % external cream Apply a thin layer on skin overlying port site 10-15 minutes prior to port access for labs or infusion 5 g 1     Melatonin 10 MG TABS tablet Take 10 mg by mouth at bedtime.       multivitamin w/minerals (THERA-VIT-M) tablet Take 1 tablet by mouth daily as needed (if diet insufficient to provide recommended nutrients)       ondansetron (ZOFRAN) 8 MG tablet Take 1 tablet (8 mg) by mouth every 8 hours as needed for nausea (vomiting). 30 tablet 2     prochlorperazine (COMPAZINE) 10 MG tablet Take 1 tablet  (10 mg) by mouth every 6 hours as needed for nausea or vomiting. 30 tablet 2     tacrolimus (PROTOPIC) 0.1 % external ointment Apply topically 2 times daily 30 g 3     triamcinolone (KENALOG) 0.1 % external cream Apply topically. prn       TURMERIC PO Take 1 tablet by mouth daily.       venlafaxine (EFFEXOR XR) 37.5 MG 24 hr capsule TAKE 1 CAPSULE DAILY 90 capsule 3     VITAMIN D PO Take 1 tablet by mouth daily.       zolpidem (AMBIEN) 5 MG tablet Take 1 tablet (5 mg) by mouth nightly as needed for sleep (Only uses if traveling or cannot sleep,). 20 tablet 0      PHYSICAL EXAM   General: The patient is a pleasant female in no acute distress.  BP (!) 140/78   Pulse 99   Temp 97.9  F (36.6  C) (Oral)   Resp 16   Wt 55.7 kg (122 lb 11.2 oz)   SpO2 99%   BMI 24.57 kg/m    Wt Readings from Last 10 Encounters:   11/20/24 55.7 kg (122 lb 11.2 oz)   11/18/24 55.7 kg (122 lb 11.2 oz)   11/01/24 55.3 kg (122 lb)   10/29/24 56.1 kg (123 lb 9.6 oz)   10/15/24 53.5 kg (118 lb)   10/14/24 54.4 kg (120 lb)   09/24/24 54.8 kg (120 lb 12.8 oz)   09/16/24 54.7 kg (120 lb 11.2 oz)   09/09/24 55.1 kg (121 lb 8 oz)   08/26/24 56.7 kg (125 lb)   HEENT: EOMI. Sclerae are anicteric.   Lymph: Neck is supple with no lymphadenopathy in the cervical or supraclavicular areas.   Heart: Regular rate and rhythm.   Lungs: Clear to auscultation bilaterally.   Abdomen: Bowel sounds present, soft, nontender with no palpable hepatosplenomegaly or masses.   Extremities: No lower extremity edema noted bilaterally.   Neuro: Cranial nerves II through XII are grossly intact.  Skin: No rashes, petechiae, or bruising noted on exposed skin.     LABORATORY AND IMAGING STUDIES   Most Recent 3 CBC's:  Recent Labs   Lab Test 11/20/24  0746 11/11/24  1104 11/04/24  1508   WBC 4.8 4.6 6.9   HGB 11.8 11.8 11.2*   MCV 98 98 100    238 194   ANEUTAUTO 1.7 2.2 4.3     Most Recent 3 BMP's:  Recent Labs   Lab Test 11/20/24  0746 10/23/24  0910 09/09/24  1153     140 142   POTASSIUM 4.2 4.4 4.1   CHLORIDE 107 105 106   CO2 24 23 25   BUN 23.5* 21.4 24.1*   CR 0.94 0.92 0.92   ANIONGAP 10 12 11   SUSAN 9.2 9.2 9.3   GLC 87 111* 142*   PROTTOTAL 6.2* 6.4 6.2*   ALBUMIN 3.9 3.9 4.0    Most Recent 3 LFT's:  Recent Labs   Lab Test 24  0746 10/23/24  0910 24  1153   AST 19 17 18   ALT 18 14 16   ALKPHOS 58 70 61   BILITOTAL 0.3 0.2 0.3   I reviewed the above labs today.     Imaging:  Echocardiogram Complete  [id number]  [id number]  [id number]  [id number]^SCHEIERL^GAGAN^EZRA     [facility]  Diagnostic and Treatment-3rd Floor  [address] [place] [address]     Name: KASHIF SWIFT  MRN: [id number]  : 1950  Study Date: 2024 05:51 PM  Age: 74 yrs  Gender: Female  Patient [facility]: [facility]  Reason For Study: Toxic effect of chemotherapy  Ordering Physician: SCHEIERL, AMBER J  Referring Physician: SCHEIERL, AMBER J  Performed By: Kendra Patricia RDCS     BSA: 1.5 m2  Height: 59 in  Weight: 122 lb  HR: 100  BP: 134/81 mmHg  ______________________________________________________________________________  Procedure  Complete Portable Echo Adult. Echocardiogram with two-dimensional, color and  spectral Doppler performed. Poor acoustic windows.  ______________________________________________________________________________  Interpretation Summary  Global peak LV longitudinal strain is averaged at -19.4%. This is within  reported normal limits (normal <-18%).  Left ventricular size is normal. Global and regional left ventricular function  is normal with an EF of 60-65%.  The right ventricle is normal size. Global right ventricular function is  normal.  No significant valvular abnormalities present.  The inferior vena cava is normal.  No pericardial effusion is present.     This study was compared with the study from 2022, no significant  changes  .  ______________________________________________________________________________  Left Ventricle  Global and regional left ventricular function is normal with an EF of 60-65%.  Left ventricular size is normal. Left ventricular wall thickness is normal.  Left ventricular diastolic function is normal. Global peak LV longitudinal  strain is averaged at -19.4%. This is within reported normal limits (normal <-  18%). No regional wall motion abnormalities are seen.     Right Ventricle  The right ventricle is normal size. Global right ventricular function is  normal.     Atria  Both atria appear normal.     Mitral Valve  On Doppler interrogation, there is no significant stenosis or regurgitation.  Mitral leaflet thickness is normal .     Aortic Valve  On Doppler interrogation, there is no significant stenosis or regurgitation.  The aortic valve is tricuspid.     Tricuspid Valve  The tricuspid valve is normal. Trace tricuspid insufficiency is present. The  right ventricular systolic pressure is approximated at 22.7 mmHg plus the  right atrial pressure.     Pulmonic Valve  The valve leaflets are not well visualized. On Doppler interrogation, there is  no significant stenosis or regurgitation.     Vessels  The aorta root is normal. The thoracic aorta is normal. The inferior vena cava  is normal.     Pericardium  No pericardial effusion is present.     Miscellaneous  No significant valvular abnormalities present.     Compared to Previous Study  This study was compared with the study from 11/01/2022, no significant  changes .  ______________________________________________________________________________  MMode/2D Measurements & Calculations  IVSd: 0.95 cm  LVIDd: 4.2 cm  LVIDs: 2.7 cm  LVPWd: 0.73 cm  FS: 37.3 %  LV mass(C)d: 109.9 grams  LV mass(C)dI: 73.5 grams/m2  Ao root diam: 3.0 cm  asc Aorta Diam: 2.9 cm  LVOT diam: 2.0 cm  LVOT area: 3.3 cm2  Ao root diam index Ht(cm/m): 2.0  Ao root diam index BSA (cm/m2): 2.0  Asc  Ao diam index BSA (cm/m2): 1.9  Asc Ao diam index Ht(cm/m): 1.9  LA Volume (BP): 33.4 ml     LA Volume Index (BP): 22.4 ml/m2  RWT: 0.35  TAPSE: 1.8 cm     Doppler Measurements & Calculations  MV E max davin: 34.5 cm/sec  MV A max davin: 80.2 cm/sec  MV E/A: 0.43  LV V1 max P.4 mmHg  LV V1 max: 105.3 cm/sec  LV V1 VTI: 17.4 cm  SV(LVOT): 56.6 ml  SI(LVOT): 37.9 ml/m2  TR max davin: 238.1 cm/sec  TR max P.7 mmHg  E/E' av.0  Lateral E/e': 4.4  Medial E/e': 7.6     RV S Davin: 15.2 cm/sec     ______________________________________________________________________________  Report approved by: Dr Christiano Serna 2024 09:17 PM          I reviewed the above imaging today.     ASSESSMENT AND PLAN   Metastatic uterine leiomyosarcoma.   Patient received 3 cycles of Gemzar with subsequent imaging showing disease progression. She then switched to Doxil (40mg/m2) dacarbazine (750 mg/m2 over 3 days) every 4 weeks. She tolerated cycle 1 very well and will continue with cycle 2 today. Her Echo results, as above, show a normal EF. She will have repeat imaging and follow-up with Dr. Powers prior to consideration of additional therapy. Of note, she is hoping to have a minimally invasive procedure to address her back and leg pain and will discuss the timing of this with Dr. Powers when she sees her next. We reviewed that surgery would ideally happen when we have good control of the sarcoma as it would require a treatment break. She expressed understanding of this.     Vascular Access  S/p port placement on 24    Low Back pain and left leg pain  -Status post joint injection on 24  -Back pain worse recently.  -Seen by ortho; recommendation is for possible decompression surgery; spine surgery will need to occur at least 4 weeks from last chemo and chemo will need to be held about 2 weeks after. The plan will be to complete 2 cycles of chemo and assess response to chemo before considering spine surgery. As it is  difficult to schedule surgery last minute, she plans to schedule surgery for about a month from now and then cancel if needed based on the upcoming CT imaging results.   -Recommend discussing dose escalation of her gabapentin with her PCP if no improvement in her pain as she is currently taking 100 mg at bedtime. Discussed she could trial taking the 100 mg dose during the day and request an increase in the bedtime dose.    Constipation  -Not well managed with prune juice alone. Recommend adding in Senna tea.     Rebeka Kim PA-C  [facility] [place] [id number] [phone]    The longitudinal plan of care for the diagnosis(es)/condition(s) as documented were addressed during this visit. Due to the added complexity in care, I will continue to support Maggie Navarrete in the subsequent management and with ongoing continuity of care.          Again, thank you for allowing me to participate in the care of your patient.        Sincerely,        Rebeka Kim PA-C

## 2024-11-23 ENCOUNTER — INFUSION THERAPY VISIT (OUTPATIENT)
Dept: ONCOLOGY | Facility: CLINIC | Age: 74
End: 2024-11-23
Attending: PHYSICIAN ASSISTANT
Payer: MEDICARE

## 2024-11-23 VITALS
TEMPERATURE: 97.9 F | SYSTOLIC BLOOD PRESSURE: 149 MMHG | OXYGEN SATURATION: 100 % | DIASTOLIC BLOOD PRESSURE: 103 MMHG | HEART RATE: 87 BPM | RESPIRATION RATE: 18 BRPM

## 2024-11-23 DIAGNOSIS — I42.7 DILATED CARDIOMYOPATHY SECONDARY TO DRUG (H): ICD-10-CM

## 2024-11-23 DIAGNOSIS — C55 LEIOMYOSARCOMA OF UTERUS (H): ICD-10-CM

## 2024-11-23 DIAGNOSIS — D70.1 CHEMOTHERAPY-INDUCED NEUTROPENIA (H): Primary | ICD-10-CM

## 2024-11-23 DIAGNOSIS — T45.1X5A CHEMOTHERAPY-INDUCED NEUTROPENIA (H): Primary | ICD-10-CM

## 2024-11-23 PROCEDURE — 250N000011 HC RX IP 250 OP 636: Performed by: PHYSICIAN ASSISTANT

## 2024-11-23 RX ORDER — HEPARIN SODIUM (PORCINE) LOCK FLUSH IV SOLN 100 UNIT/ML 100 UNIT/ML
5 SOLUTION INTRAVENOUS
Status: DISCONTINUED | OUTPATIENT
Start: 2024-11-23 | End: 2024-11-23 | Stop reason: HOSPADM

## 2024-11-23 RX ADMIN — HEPARIN SODIUM (PORCINE) LOCK FLUSH IV SOLN 100 UNIT/ML 5 ML: 100 SOLUTION at 13:14

## 2024-11-23 NOTE — PROGRESS NOTES
Infusion Nursing Note:  Maggie Navarrete presents today for Dacabarzine pump disconnect.    Patient seen by provider today: No   present during visit today: Not Applicable.    Note: Patient reports at her baseline. Denies changes overnight.  Denies fever/chills. No new concern.      Intravenous Access:  Implanted Port.    Treatment Conditions:  Not Applicable.      Post Infusion Assessment:  Patient tolerated infusion without incident.  Blood return noted pre and post infusion.  Site patent and intact, free from redness, edema or discomfort.  No evidence of extravasations.  Access discontinued per protocol.       Discharge Plan:   Patient declined prescription refills.  Discharge instructions reviewed with: Patient.  Patient and/or family verbalized understanding of discharge instructions and all questions answered.  AVS to patient via sifonrT.  Patient will return 12/20 for next appointment.   Patient discharged in stable condition accompanied by: self.  Departure Mode: Ambulatory.      THELMA BANG RN

## 2024-12-03 ENCOUNTER — ANCILLARY PROCEDURE (OUTPATIENT)
Dept: MAMMOGRAPHY | Facility: CLINIC | Age: 74
End: 2024-12-03
Attending: NURSE PRACTITIONER
Payer: MEDICARE

## 2024-12-03 DIAGNOSIS — Z12.31 VISIT FOR SCREENING MAMMOGRAM: ICD-10-CM

## 2024-12-03 PROCEDURE — 77063 BREAST TOMOSYNTHESIS BI: CPT | Performed by: STUDENT IN AN ORGANIZED HEALTH CARE EDUCATION/TRAINING PROGRAM

## 2024-12-03 PROCEDURE — 77067 SCR MAMMO BI INCL CAD: CPT | Performed by: STUDENT IN AN ORGANIZED HEALTH CARE EDUCATION/TRAINING PROGRAM

## 2024-12-05 NOTE — TELEPHONE ENCOUNTER
DIAGNOSIS: Osteopenia/ self referral/ Medicare and BCBS/ ortho con    APPOINTMENT DATE: 1/29/25    NOTES STATUS DETAILS   OFFICE NOTE from referring provider Internal Self    MEDICATION LIST Internal

## 2024-12-11 ENCOUNTER — MYC MEDICAL ADVICE (OUTPATIENT)
Dept: ONCOLOGY | Facility: CLINIC | Age: 74
End: 2024-12-11
Payer: MEDICARE

## 2024-12-12 ENCOUNTER — LAB (OUTPATIENT)
Dept: LAB | Facility: CLINIC | Age: 74
End: 2024-12-12
Payer: MEDICARE

## 2024-12-12 DIAGNOSIS — E78.5 DYSLIPIDEMIA: ICD-10-CM

## 2024-12-12 DIAGNOSIS — R73.09 ELEVATED GLUCOSE: ICD-10-CM

## 2024-12-12 DIAGNOSIS — C55 LEIOMYOSARCOMA OF UTERUS (H): ICD-10-CM

## 2024-12-12 LAB
BASOPHILS # BLD AUTO: 0 10E3/UL (ref 0–0.2)
BASOPHILS NFR BLD AUTO: 1 %
CHOLEST SERPL-MCNC: 187 MG/DL
EOSINOPHIL # BLD AUTO: 0.1 10E3/UL (ref 0–0.7)
EOSINOPHIL NFR BLD AUTO: 2 %
ERYTHROCYTE [DISTWIDTH] IN BLOOD BY AUTOMATED COUNT: 16.2 % (ref 10–15)
EST. AVERAGE GLUCOSE BLD GHB EST-MCNC: 105 MG/DL
FASTING STATUS PATIENT QL REPORTED: YES
HBA1C MFR BLD: 5.3 % (ref 0–5.6)
HCT VFR BLD AUTO: 38.5 % (ref 35–47)
HDLC SERPL-MCNC: 64 MG/DL
HGB BLD-MCNC: 12.4 G/DL (ref 11.7–15.7)
IMM GRANULOCYTES # BLD: 0 10E3/UL
IMM GRANULOCYTES NFR BLD: 0 %
LDLC SERPL CALC-MCNC: 97 MG/DL
LYMPHOCYTES # BLD AUTO: 1.8 10E3/UL (ref 0.8–5.3)
LYMPHOCYTES NFR BLD AUTO: 41 %
MCH RBC QN AUTO: 31.4 PG (ref 26.5–33)
MCHC RBC AUTO-ENTMCNC: 32.2 G/DL (ref 31.5–36.5)
MCV RBC AUTO: 98 FL (ref 78–100)
MONOCYTES # BLD AUTO: 0.6 10E3/UL (ref 0–1.3)
MONOCYTES NFR BLD AUTO: 14 %
NEUTROPHILS # BLD AUTO: 1.9 10E3/UL (ref 1.6–8.3)
NEUTROPHILS NFR BLD AUTO: 42 %
NONHDLC SERPL-MCNC: 123 MG/DL
PLATELET # BLD AUTO: 288 10E3/UL (ref 150–450)
RBC # BLD AUTO: 3.95 10E6/UL (ref 3.8–5.2)
TRIGL SERPL-MCNC: 129 MG/DL
WBC # BLD AUTO: 4.5 10E3/UL (ref 4–11)

## 2024-12-12 NOTE — TELEPHONE ENCOUNTER
"Oncology Nurse Triage- RASH    Situation:   Patient reporting the symptom of RASH via MyC message. Triage RN call to pt to follow up.     Background:   Treating Provider:  Dr. Galvan    Date of last office visit: 11/20/24 w/ Rebeka Kim     Other recent treatments?: 11/22/24  Cycle 2 Day 3 Dacarbazine; 11/23/23 Dacabarzine pump disconnect.   Acupuncture w/ cupping on Tuesday.     Assessment:     Onset: about a week  Location(s):thighs, arms; arm pits noticed last night, creases of fingers inside of hand (behind knuckles), tiny pimples on face that pop easy- sporadic locations  Color/Texture: arms and legs- small red bumps; under armpits it red and raised. Face pimples when they pop, is fluid not pus or white head.   Any drainage or \"weeping\": No    Any other associated symptoms?: itching to under arms, redness  Had mouth sores- had been using baking soda salt water rinses, said today they are improved and almost gone.   -Denies warmth, tingling, burning, numbness, fever, headache, malaise, arthralgia, eye involvement(conjunctivitis), changes in breathing, signs of bleeding.     Any pain: Yes   Where is the patient located? In creased of fingers- hurts to pull up pants as it rubs creased. She denies pain at rest, but said there is a little discomfort. In morning, fingers are more swollen, which improves as day goes on. She uses gloves to wash dishes.     What treatments have been tried and what alleviates the rash?: Using [place] body cream to legs.     Has patient had any recent travel, been in contact with others who have similar rash, insect bites/stings, new skin products, allergies? No    Recommendations:   Apply cool compress to inflamed area  Apply topical medications as prescribed  Wear loose fitting cotton clothing  Resist itching  Avoid hot baths and showers    Seek Emergency Care Immediately if any of the following occurs:   Difficulty breathing and throat tightening  Chest pain, wheezing, shortness " of breath  High Fever  Eye involvement    MyC message sent to pt to update on recommendations.

## 2024-12-15 NOTE — PROGRESS NOTES
[facility] CANCER CLINIC    NEW PATIENT VISIT NOTE    PATIENT NAME: Maggie Navarrete MRN # [id number]  DATE OF VISIT: July 17, 2024 YOB: 1950    REFERRING PROVIDER: Referred Self, MD  No address on file    CANCER TYPE: metastatic uterine leiomyosarcoma       CHIEF COMPLAIN   None     HISTORY OF PRESENT ILLNESS   Maggie is a 74 year old women with PMH of metastatic uterine leiomyosarcoma. She was originally diagnosed on 2016 showing an up to 12 cm mass. She underwent surgical resection followed by adjuvant doxorubicin/dacarbazine for 5 cycles. She had local recurrence on 2020 treated with surgical resection. Then  again recurrence on 2022 again underwent surgical resection. Followed by [id number] with anastrozole. Since January of 2024 she has been on trial a BOAST clinical trial with Dr. Nik Schilling at the [facility]. She received abemaciclib there. Now, she was taken off of trial due to progression on 1 of 3 intra-abdominal lesions.     She comes here to continue medical management, she has seen Dr. Michaud at [place] for a second opinion. She is interested in surgical resection which has been offered at [facility] with possible Gemzar HIPEC.      She started on single agent Gemzar on 7/29/24. She is here today for follow up prior to cycle 3.   Scans on 9/23/24 showed progression of disease    Received doxil/dacarbazine C1 on 10/14/24  C2 doxil/dacarbazine on 11/20/24    Interval Hx  She feels the chemotherapy went well, but got rash and mouth ulcers which are still improving, she is using hydrocortisone cream with mild relief. Otherwise she still is having back pain and for that reason she has no been able to exercise as before.      PAST ONCOLOGIC HISTORY   Leiomyosarcoma Uterus   11/3/2016 Initial Diagnosis     11/4/2016 Surgery and Procedures   Open hyst, BSO, stage II with peritoneal involvement    12/26/2016 - 3/23/2017 Chemotherapy   Received  elsewhere: adjuvant chemotherapy with doxorubicin 25 mg/m2/day and DTIC 250 mg/m2/day , days 1-3 every 21 weeks.  Completed 5 cycles of adjuvant doxorubicin and dacarbazine      6/1/2020 Recurrence Local   Isolated lesion in the RLQ    6/16/2020 Surgery and Procedures   Laparotomy, excision of side wall mass, ureteroneocystotomy. 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 The excised margins were negative for tumor.    3/31/2022 [facility]   PET with isolated bowel lesion in LLQ.     4/4/2022 Surgery and Procedures   Exlap, FANNY, small bowel lesion excised with negative margins    5/9/2023 Surgery and Procedures   Dr. Camarillo and Dr. Krueger    Exploratory laparotomy, tumor debulking, cystoscopy and placement of internal ureteral stent and ureterolysis     Findings  Two lesions in the abdomen consistent with very indolent appearing sarcoma on frozen section, 1 in the right upper quadrant in the large bowel mesentery. And 1 on the left sidewall between the left sidewall and the left ureter and the sigmoid colon    5/9/2023 Biopsy/Pathology   FINAL DIAGNOSIS     A. Soft tissue, hepatic flexure nodule, excision: Involved   by smooth muscle neoplasm, 1.4 cm. See comment.     B. Soft tissue, left sidewall mass, excision: Recurrent   leiomyosarcoma, 2.9 cm. See comment.     C. Colon, sigmoid mass, excision: Involved by smooth   muscle neoplasm with increased mitotic activity, 6 mm. See   comment.     D. Soft tissue, left nohemi-ureteral, biopsy: Involved by   smooth muscle neoplasm, 3 mm. See comment.     E. Peritoneum, left pelvic sidewall margin, excision:   Involved by smooth muscle neoplasm, 1 mm. See comment.     F. Soft tissue, sigmoid colon margin, excision: Negative   for tumor.     Comment: Immunohistochemical stains performed at [facility] demonstrate that the left sidewall mass (B2) is   positive for desmin, caldesmon, ER (moderate, 60-70%), and   WA (moderate,  20-30%), but is negative for HMB45, Melan A,   STAT6, ALK, DOG1, and KIT; this immunophenotype supports a   smooth muscle neoplasm. The left sidewall mass shows   increased mitotic activity (greater than 10 mitoses/10 HPF)   and atypia sufficient to meet criteria for a   leiomyosarcoma. The remaining lesion show minimal atypia   but increased mitotic activity (though less than 10   mitoses/10 HPF) where evaluable, but evaluation is limited   by the small size of the lesions. In the context of a known   history of leiomyosarcoma and a concurrent tumor meeting   diagnostic criteria, these likely represent deceptively   bland additional foci of recurrent leiomyosarcoma.     8/2023 - Biological/Targeted/Hormone Therapy   Anastrozole     11/8/2023 Progression/Relapse   CT scan chest-abdomen-pelvis shows progression of 3 nodules within the peritoneal cavity.    1/18/2024 - 6/5/2024 Chemotherapy   BOAST clinical trial with Dr. Nik Schilling at the [facility]. She received abemaciclib there. Was taken off of trial due to progression.      PAST MEDICAL HISTORY   None    PAST SURGICAL HISTORY   As per HPI     FAMILY HISTORY   Pending     ALLERGIES      Allergies   Allergen Reactions    Dust Mite Extract Other (See Comments)    Erythromycin GI Disturbance, Other (See Comments), Nausea and Nausea and Vomiting     PN: LW Reaction: Nausea    Tramadol Nausea, Other (See Comments) and Nausea and Vomiting          SOCIAL HISTORY     Social History     Social History Narrative    How much exercise per week? Walking daily.    How much calcium per day? Supplement and through diet       How much caffeine per day? 3 times a week    How much vitamin D per day? Supplement    Do you/your family wear seatbelts?  Yes    Do you/your family use safety helmets? NA    Do you/your family use sunscreen? Sometimes    Do you/your family keep firearms in the home? No    Do you/your family have a smoke detector(s)? Yes    Do you feel  safe in your home? Yes    Has anyone ever touched you in an unwanted manner? 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  2 grandsons now in [place] with the mother.  In a house.  .          How much exercise per week? Daily walking, will start going to the y    How much calcium per day? Through foods       How much caffeine per day? One cup    How much vitamin D per day? Through foods    Do you/your family wear seatbelts?  Yes    Do you/your family use safety helmets? N/a    Do you/your family use sunscreen? Yes    Do you/your family keep firearms in the home? No    Do you/your family have a smoke detector(s)? Yes        Reviewed by Nicki Cornejo 10-11-21               CURRENT OUTPATIENT MEDICATIONS     Current Outpatient Medications   Medication Sig Dispense Refill    acetaminophen (TYLENOL) 500 MG tablet Take 500-1,000 mg by mouth every 6 hours as needed for mild pain Uses arthritis dose, believes it is 625 mg, takes 2 at a time, does not exceed 4g a day      Ascorbic Acid 250 MG CHEW Take 250 mg by mouth daily.      calcium citrate 250 MG TABS Take 500 mg by mouth daily      carboxymethylcellulose PF (REFRESH PLUS) 0.5 % ophthalmic solution Place 1 drop into both eyes 4 times daily as needed for dry eyes      dexAMETHasone (DECADRON) 4 MG tablet Take 1 tablet (4 mg) by mouth daily. Take 8 mg (2 tablets daily for 2 days), starting on the day of after chemotherapy, and then 4 mg (1 tablet) for another 3 days. (Today of 5 days) 7 tablet 4    Fish Oil-Cholecalciferol (FISH OIL + D3 PO) Take 1 capsule by mouth daily.      gabapentin (NEURONTIN) 100 MG capsule Take 100 mg by mouth 3 times daily. PRN      levothyroxine (SYNTHROID/LEVOTHROID) 75 MCG tablet TAKE 1 TABLET DAILY FOR    THYROID 90 tablet 0    lidocaine-prilocaine (EMLA)  2.5-2.5 % external cream Apply a thin layer on skin overlying port site 10-15 minutes prior to port access for labs or infusion 5 g 1    Melatonin 10 MG TABS tablet Take 10 mg by mouth at bedtime.      multivitamin w/minerals (THERA-VIT-M) tablet Take 1 tablet by mouth daily as needed (if diet insufficient to provide recommended nutrients)      ondansetron (ZOFRAN) 8 MG tablet Take 1 tablet (8 mg) by mouth every 8 hours as needed for nausea (vomiting). 30 tablet 2    prochlorperazine (COMPAZINE) 10 MG tablet Take 1 tablet (10 mg) by mouth every 6 hours as needed for nausea or vomiting. 30 tablet 2    tacrolimus (PROTOPIC) 0.1 % external ointment Apply topically 2 times daily 30 g 3    triamcinolone (KENALOG) 0.1 % external cream Apply topically. prn      TURMERIC PO Take 1 tablet by mouth daily.      venlafaxine (EFFEXOR XR) 37.5 MG 24 hr capsule TAKE 1 CAPSULE DAILY 90 capsule 3    VITAMIN D PO Take 1 tablet by mouth daily.      zolpidem (AMBIEN) 5 MG tablet Take 1 tablet (5 mg) by mouth nightly as needed for sleep (Only uses if traveling or cannot sleep,). 20 tablet 0          REVIEW OF SYSTEMS   As above in the HPI, o/w complete 12-point ROS was negative.     PHYSICAL EXAM   There were no vitals taken for this visit.    EGO  GEN: NAD  HEENT: PERRL, EOMI, no icterus, injection or pallor. Oropharynx is clear.  NECK: no cervical or supraclavicular lymphadenopathy  LUNGS: clear bilaterally  CV: regular, no murmurs, rubs, or gallops  ABDOMEN: soft, non-tender, non-distended, normal bowel sounds, no hepatosplenomegaly by percussion or palpation. Surgical scars from prior surgeries well healed.   EXT: warm, well perfused, no edema  NEURO: alert  SKIN: no rashes     LABORATORY AND IMAGING STUDIES     Recent Labs   Lab Test 24  1330 24  1200 24  1438 24  1043 24  1539 20  0028 19  1509 18  0951     --   --  141 141   < >  --   --    POTASSIUM 4.5  --   --  4.2 4.3    < >  --   --    CHLORIDE 104  --   --  106 108*   < >  --   --    CO2 25  --   --  25 25   < >  --   --    ANIONGAP 9  --   --  10 8   < >  --   --    BUN 18.2  --   --  25.8* 20.8   < >  --   --    CR 0.98* 1.5*   < > 1.27* 1.47*   < >  --   --    GLC 80  --   --  90 85   < >  --   --    SUSAN 9.3  --   --  9.4 8.7*   < > 9.4 9.1   MAG  --   --   --  2.0 2.0   < > 2.2 2.1   PHOS  --   --   --   --   --   --  3.0 2.9    < > = values in this interval not displayed.     Recent Labs   Lab Test 06/22/24  1330 03/02/24  1043 01/29/24  1539   WBC 6.6 5.2 5.8   HGB 12.0 11.8 13.1    303 170   * 91 91   NEUTROPHIL 50 46 55     Recent Labs   Lab Test 06/22/24  1330 03/02/24  1043 01/29/24  1539   BILITOTAL 0.3 0.4 0.3   ALKPHOS 58 44 56   ALT 15 21 23   AST 20 17 19   ALBUMIN 4.2 4.2 4.1     TSH   Date Value Ref Range Status   10/31/2024 3.70 0.30 - 4.20 uIU/mL Final   07/25/2022 1.26 0.40 - 4.00 mU/L Final   12/16/2020 1.44 0.40 - 4.00 mU/L Final       Results for orders placed or performed during the hospital encounter of 06/03/24   CT Chest/Abdomen/Pelvis w Contrast    Narrative    CT CHEST/ABDOMEN/PELVIS W CONTRAST 6/3/2024 12:25 PM    CLINICAL HISTORY: Metastatic sarcoma, assess for response to  chemotherapy. 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).    TECHNIQUE: CT scan of the chest, abdomen, and pelvis was performed  following injection of IV contrast. Multiplanar reformats were  obtained. Dose reduction techniques were used.   CONTRAST: 64 mL Isovue-370    COMPARISON: CT chest, abdomen and pelvis 4/8/2024.    FINDINGS:   LOWER NECK: Unremarkable.    LUNGS AND PLEURA: No focal consolidation or pleural effusion.  Calcified granulomas. No new or growing suspicious pulmonary nodule.    AIRWAYS: Patent.    HEART: No pericardial effusion.  No coronary calcifications. No  thoracic aortic aneurysm.    PULMONARY ARTERIES: Unremarkable.    MEDIASTINUM AND BEATRIZ: No lymphadenopathy. Calcified lymph  nodes.    HEPATOBILIARY: Unremarkable. Gallbladder is present without  gallstones.    SPLEEN: Unremarkable.    PANCREAS: Unremarkable.    ADRENAL GLANDS: Unremarkable.    KIDNEYS/URETERS/BLADDER: Similar right renal atrophy/cortical  scarring. Left parapelvic and cortical cysts; no follow-up necessary.    BOWEL: No bowel dilatation or wall thickening.    LYMPH NODES AND PERITONEUM: Redemonstrated abdominal soft tissue  masses as on series 3:  -Left upper quadrant, 2.6 x 4 x 2.2 cm, previously 2.1 x 2.9 x 2.1 cm  when measured similarly, image 110   -Soft tissue lesion between the transverse colon and stomach, 3.1 x  2.8 cm, previously 3.1 x 2.6 cm, image 168  -Superior to the pancreas, 1.4 cm, image 125    No new lesion.    VASCULATURE: Nonaneurysmal abdominal aorta.    PELVIS: Uterus is surgically absent. No pelvic mass.    MUSCULOSKELETAL: No aggressive osseous lesion. Similar L4/L5 grade 1  anterolisthesis.    ADDITIONAL FINDINGS: None.      Impression    IMPRESSION:  1.  Since 4/8/2024, increased in size left upper quadrant soft tissue  masses. Other abdominal soft tissue lesions have not significantly  changed.  2.  No convincing new site of disease.    ORIN PAUL MD         SYSTEM ID:  [id number]     CT CAP 9/24/24  Mesentery/peritoneum/retroperitoneum: Soft tissue mass in the  transverse mesial colon measuring 4.0 x 3.8 cm with decreased  enhancement compared to prior. Previously measured 2.8 x 3.1 cm.  Mildly enhancing mass anterior to the superior pole of the spleen  measuring 2.8 x 5.2 cm, previously 2.6 x 4.0 cm. Stable size of soft  tissue nodule anterior to the pancreas measuring 1.5 cm (series 11  image 37). There is interval central hypoenhancement. Trace central  mesenteric free fluid.     Lymph nodes: No significant lymphadenopathy.     Vasculature: Scattered atherosclerotic calcification of abdominal  aorta with no aneurysmal dilation.  Portal veins are patent.     Pelvis: Urinary bladder  is normal.     Osseous structures: No aggressive or acute osseous lesion.  Multilevel  degenerative changes of the spine most pronounced at L4-L5. Grade 1  anterolisthesis of L4 on L5. Enhancing circumscribed extradural lesion  at the level of L5-S1 measuring 1.1 cm in craniocaudal dimension  (series 14 image 100).      Soft tissues: Tiny fat containing ventral/umbilical hernia.        CT CAP  12/16/24                                                              IMPRESSION:   1. Compared to CT dated 6/3/2024, increased size of peritoneal soft  tissue masses in the transverse mesial colon and left upper quadrant.  Stable size of soft tissue nodule anterior to the pancreas with  interval central hypoenhancement suspicious for necrosis.  2. No evidence of new disease in the chest abdomen and pelvis.  3. Enhancing circumscribed extradural lesion at the level of L5-S1,  may represent synovial cyst with hemorrhage as described on recent  lumbar MRI.  4.Additional findings similar to prior CT.        PATHOLOGY   12/12/2016  FINAL DIAGNOSIS:   CASE FROM [place], [place] ([id number], OBTAINED 11/04/2016):   A. UTERINE MASS, RESECTION:   - Leiomyosarcoma, 12 cm in aggregate (per report)   - Please see comment     COMMENT:   Per the received report, a right cul-de-sac biopsy was involved by   leiomyosarcoma.   The diagnosis of leiomyosarcoma is made on the basis of examination of   the one slide submitted.  There is moderate nuclear atypia, tumor   necrosis and increased mitotic activities (10/10 HPFs).     Tempus profiling from her 4/5/22 specimen showed CDKN2A and CDKN2B loss.     I reviewed the medical records including prior oncology notes, laboratory studies which are notable for adequate renal and hepatic function, and normal blood counts.   I personally reviewed imaging including CT of the CAP done on 6/12/2024 which demonstrates enlargement of the peritoneal mass adjacent to the spleen, now up to 4 x 2.6 cm.  She has 2 other peritoneal lesions up to 2.8 cm that have remained stable.       ASSESSMENT AND PLAN     Maggie is a 74 year old women with PMH of metastatic uterine leiomyosarcoma. She was originally diagnosed on 2016 showing an up to 12 cm mass. She underwent surgical resection followed by adjuvant doxorubicin/dacarbazine for 5 cycles. She had local recurrence on 2020 treated with surgical resection. Then  again recurrence on 2022 again underwent surgical resection. Followed by [id number] with anastrozole. Since January of 2024 she has been on trial a BOAST clinical trial with Dr. Nik Schilling at the [facility]. She received abemaciclib there. Now, she was taken off of trial due to progression on 1 of 3 intra-abdominal lesions.     She comes here to continue medical management, she has seen Dr. Michaud at [place] for a second opinion. She is interested in surgical resection which has been offered at  with possible Gemzar HIPEC.      She feels very well now, she exercises regularly and has no related GI symptoms.     She saw Dr. Michaud at [place], who recommended against further surgical resection and for systemic therapy with other agents with activity in leiomyosarcoma including gemcitabine +/- docetaxel, trabectedin and pazopanib.     She is s/p 2 cycles of gemcitabine single agent. She is feeling well and tells me that surgery will be planned for 11/15. I explained that in her situation I do not think that surgery has chances of a meaningful response as she has disease in different areas. She insists that the team at [facility] has great expertise in surgery and HIPEC for cases of advanced leiomyosarcoma. I have not been able to communicate with her team there. She tells me they plan on intraoperative chemo with cisplatin.     I personally reviewed the most recent CT CAP and the overread done here at the [facility]. Two of the prior metastatic deposits have increased in size, although  it seems that he enhancement is less suggesting possible increase in the necrosis. Other pancreatic lesion stable in size but more necrotic. Unfortunately we do not have a PET to better assess the metabolic response.   I will not recommend for surgical resection if we believe she has progressive disease. Sometimes sarcomas do enlarge as necrosis ensues. I recommend to continue chemo for 2 more cycles and repeat the scans. Patient is adamant she wants to move forward with surgical resection.     After reviewing imaging from 9/24/25 at the [facility], we saw that Maggie had disease progression with enlarging lesions in the abdomen, and no new lesions. Now she is s/p C2 of doxil/dacarbazine.I personally reviewed the most recent CT scan and compared to the one on 9/24 prior to starting new regimen. She does have progression on disease with enlargement of 2 peritoneal masses.  We discussed today options for next lines of therapy and she also brought information about clinical trials that she may potentially enroll temozolamide in combination with PARP inh and a trial with intratumoral injection of chemotherapy +/- Immunotherapy. I discussed that given that she progressed on dacarbazine, temozolamide activity is not going to be good, however the combination may have some benefit. The [employer] trial for combination [id number] +/- immunotherapy is appealing as she has 2 lesions that are accessible by IR which may enhance the effects of immunotherapy.   She is still considering also the possibility or resection. She has back surgery planned for 12/30/24.   I'll follow up with her in January in case she wants to proceed with other lines of treatment here. In the meantime, she will be evaluated for this trial in [place].     Plan:     -Follow up Dr. Powers 1/28/25     40 minutes spent on the date of the encounter doing chart review, review of outside records, review of test results, interpretation of tests, patient visit,  documentation, and discussion with other provider(s)     Beto Galvan MD   of Medicine  Hematology, Oncology and Transplantation

## 2024-12-16 ENCOUNTER — HOSPITAL ENCOUNTER (OUTPATIENT)
Facility: CLINIC | Age: 74
Discharge: HOME | End: 2024-12-16
Payer: MEDICARE

## 2024-12-16 ENCOUNTER — HOSPITAL ENCOUNTER (OUTPATIENT)
Dept: CT IMAGING | Facility: CLINIC | Age: 74
Discharge: HOME | End: 2024-12-16
Attending: STUDENT IN AN ORGANIZED HEALTH CARE EDUCATION/TRAINING PROGRAM
Payer: MEDICARE

## 2024-12-16 DIAGNOSIS — C55 LEIOMYOSARCOMA OF UTERUS (H): ICD-10-CM

## 2024-12-16 PROCEDURE — 250N000009 HC RX 250: Performed by: STUDENT IN AN ORGANIZED HEALTH CARE EDUCATION/TRAINING PROGRAM

## 2024-12-16 PROCEDURE — 71260 CT THORAX DX C+: CPT | Mod: MG

## 2024-12-16 PROCEDURE — 250N000011 HC RX IP 250 OP 636: Performed by: STUDENT IN AN ORGANIZED HEALTH CARE EDUCATION/TRAINING PROGRAM

## 2024-12-16 PROCEDURE — 999N000154 HC STATISTIC RADIOLOGY XRAY, US, CT, MAR, NM

## 2024-12-16 RX ORDER — IOPAMIDOL 755 MG/ML
63 INJECTION, SOLUTION INTRAVASCULAR ONCE
Status: COMPLETED | OUTPATIENT
Start: 2024-12-16 | End: 2024-12-16

## 2024-12-16 RX ADMIN — IOPAMIDOL 63 ML: 755 INJECTION, SOLUTION INTRAVENOUS at 12:54

## 2024-12-16 RX ADMIN — SODIUM CHLORIDE 58 ML: 9 INJECTION, SOLUTION INTRAVENOUS at 12:54

## 2024-12-16 ASSESSMENT — ACTIVITIES OF DAILY LIVING (ADL)
ADLS_ACUITY_SCORE: 48

## 2024-12-18 ENCOUNTER — ONCOLOGY VISIT (OUTPATIENT)
Dept: ONCOLOGY | Facility: CLINIC | Age: 74
End: 2024-12-18
Attending: STUDENT IN AN ORGANIZED HEALTH CARE EDUCATION/TRAINING PROGRAM
Payer: MEDICARE

## 2024-12-18 VITALS
WEIGHT: 123 LBS | SYSTOLIC BLOOD PRESSURE: 172 MMHG | HEART RATE: 104 BPM | BODY MASS INDEX: 24.63 KG/M2 | DIASTOLIC BLOOD PRESSURE: 108 MMHG | RESPIRATION RATE: 16 BRPM | OXYGEN SATURATION: 100 % | TEMPERATURE: 98.4 F

## 2024-12-18 DIAGNOSIS — C49.9 LEIOMYOSARCOMA (H): Primary | ICD-10-CM

## 2024-12-18 PROCEDURE — G0463 HOSPITAL OUTPT CLINIC VISIT: HCPCS | Performed by: STUDENT IN AN ORGANIZED HEALTH CARE EDUCATION/TRAINING PROGRAM

## 2024-12-18 ASSESSMENT — PAIN SCALES - GENERAL: PAINLEVEL_OUTOF10: MILD PAIN (3)

## 2024-12-18 NOTE — NURSING NOTE
"Oncology Rooming Note    December 18, 2024 3:03 PM   Maggie Navarrete is a 74 year old female who presents for:    Chief Complaint   Patient presents with    Oncology Clinic Visit     Leiomyosarcoma of Uterine     Initial Vitals: BP (!) 172/108 (BP Location: Right arm, Patient Position: Sitting, Cuff Size: Adult Regular)   Pulse 104   Temp 98.4  F (36.9  C) (Oral)   Resp 16   Wt 55.8 kg (123 lb)   SpO2 100%   BMI 24.63 kg/m   Estimated body mass index is 24.63 kg/m  as calculated from the following:    Height as of 11/18/24: 1.505 m (4' 11.25\").    Weight as of this encounter: 55.8 kg (123 lb). Body surface area is 1.53 meters squared.  Mild Pain (3) Comment: when provoked (sitting to walking) it feels like 10   No LMP recorded. Patient has had a hysterectomy.  Allergies reviewed: Yes  Medications reviewed: Yes    Medications: Medication refills not needed today.  Pharmacy name entered into [facility]:    [facility] - [address] [facility] PORTAL TO REGISTER[facility] SITES  [id number] - [place] - [address] [facility] [place] - [address] [facility] [phone]  [id number] - [place], [place] - [address] AT [facility] [address]  [facility] - [facility], [place] - [address] [place] [address]    Frailty Screening:   Is the patient here for a new oncology consult visit in cancer care? 2. No      Clinical concerns: none.       Abdoulaye Mendoza"

## 2024-12-18 NOTE — LETTER
12/18/2024      Maggie Navarrete  [address]  [facility] [address]      Dear Colleague,    Thank you for referring your patient, Maggie Navarrete, to the [facility]. Please see a copy of my visit note below.    [facility] CANCER [facility]    NEW PATIENT VISIT NOTE    PATIENT NAME: Maggie Navarrete MRN # [id number]  DATE OF VISIT: July 17, 2024 YOB: 1950    REFERRING PROVIDER: Referred Self, MD  No address on file    CANCER TYPE: metastatic uterine leiomyosarcoma       CHIEF COMPLAIN   None     HISTORY OF PRESENT ILLNESS   Maggie is a 74 year old women with PMH of metastatic uterine leiomyosarcoma. She was originally diagnosed on 2016 showing an up to 12 cm mass. She underwent surgical resection followed by adjuvant doxorubicin/dacarbazine for 5 cycles. She had local recurrence on 2020 treated with surgical resection. Then  again recurrence on 2022 again underwent surgical resection. Followed by [id number] with anastrozole. Since January of 2024 she has been on trial a BOAST clinical trial with Dr. Nik Schilling at the [facility]. She received abemaciclib there. Now, she was taken off of trial due to progression on 1 of 3 intra-abdominal lesions.     She comes here to continue medical management, she has seen Dr. Michaud at [place] for a second opinion. She is interested in surgical resection which has been offered at [facility] with possible Gemzar HIPEC.      She started on single agent Gemzar on 7/29/24. She is here today for follow up prior to cycle 3.   Scans on 9/23/24 showed progression of disease    Received doxil/dacarbazine C1 on 10/14/24  C2 doxil/dacarbazine on 11/20/24    Interval Hx  She feels the chemotherapy went well, but got rash and mouth ulcers which are still improving, she is using hydrocortisone cream with mild relief. Otherwise she still is having back pain and for that reason she has no been able  to exercise as before.      PAST ONCOLOGIC HISTORY   Leiomyosarcoma Uterus   11/3/2016 Initial Diagnosis     11/4/2016 Surgery and Procedures   Open hyst, BSO, stage II with peritoneal involvement    12/26/2016 - 3/23/2017 Chemotherapy   Received elsewhere: adjuvant chemotherapy with doxorubicin 25 mg/m2/day and DTIC 250 mg/m2/day , days 1-3 every 21 weeks.  Completed 5 cycles of adjuvant doxorubicin and dacarbazine      6/1/2020 Recurrence Local   Isolated lesion in the RLQ    6/16/2020 Surgery and Procedures   Laparotomy, excision of side wall mass, ureteroneocystotomy. 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 The excised margins were negative for tumor.    3/31/2022 [facility]   PET with isolated bowel lesion in LLQ.     4/4/2022 Surgery and Procedures   Exlap, FANNY, small bowel lesion excised with negative margins    5/9/2023 Surgery and Procedures   Dr. Camarillo and Dr. Krueger    Exploratory laparotomy, tumor debulking, cystoscopy and placement of internal ureteral stent and ureterolysis     Findings  Two lesions in the abdomen consistent with very indolent appearing sarcoma on frozen section, 1 in the right upper quadrant in the large bowel mesentery. And 1 on the left sidewall between the left sidewall and the left ureter and the sigmoid colon    5/9/2023 Biopsy/Pathology   FINAL DIAGNOSIS     A. Soft tissue, hepatic flexure nodule, excision: Involved   by smooth muscle neoplasm, 1.4 cm. See comment.     B. Soft tissue, left sidewall mass, excision: Recurrent   leiomyosarcoma, 2.9 cm. See comment.     C. Colon, sigmoid mass, excision: Involved by smooth   muscle neoplasm with increased mitotic activity, 6 mm. See   comment.     D. Soft tissue, left nohemi-ureteral, biopsy: Involved by   smooth muscle neoplasm, 3 mm. See comment.     E. Peritoneum, left pelvic sidewall margin, excision:   Involved by smooth muscle neoplasm, 1 mm. See comment.     F. Soft tissue,  sigmoid colon margin, excision: Negative   for tumor.     Comment: Immunohistochemical stains performed at [facility] demonstrate that the left sidewall mass (B2) is   positive for desmin, caldesmon, ER (moderate, 60-70%), and   KY (moderate, 20-30%), but is negative for HMB45, Melan A,   STAT6, ALK, DOG1, and KIT; this immunophenotype supports a   smooth muscle neoplasm. The left sidewall mass shows   increased mitotic activity (greater than 10 mitoses/10 HPF)   and atypia sufficient to meet criteria for a   leiomyosarcoma. The remaining lesion show minimal atypia   but increased mitotic activity (though less than 10   mitoses/10 HPF) where evaluable, but evaluation is limited   by the small size of the lesions. In the context of a known   history of leiomyosarcoma and a concurrent tumor meeting   diagnostic criteria, these likely represent deceptively   bland additional foci of recurrent leiomyosarcoma.     8/2023 - Biological/Targeted/Hormone Therapy   Anastrozole     11/8/2023 Progression/Relapse   CT scan chest-abdomen-pelvis shows progression of 3 nodules within the peritoneal cavity.    1/18/2024 - 6/5/2024 Chemotherapy   BOAST clinical trial with Dr. Nik Schilling at the [facility]. She received abemaciclib there. Was taken off of trial due to progression.      PAST MEDICAL HISTORY   None    PAST SURGICAL HISTORY   As per HPI     FAMILY HISTORY   Pending     ALLERGIES      Allergies   Allergen Reactions     Dust Mite Extract Other (See Comments)     Erythromycin GI Disturbance, Other (See Comments), Nausea and Nausea and Vomiting     PN: LW Reaction: Nausea     Tramadol Nausea, Other (See Comments) and Nausea and Vomiting          SOCIAL HISTORY     Social History     Social History Narrative    How much exercise per week? Walking daily.    How much calcium per day? Supplement and through diet       How much caffeine per day? 3 times a week    How much vitamin D per day? Supplement    Do  you/your family wear seatbelts?  Yes    Do you/your family use safety helmets? NA    Do you/your family use sunscreen? Sometimes    Do you/your family keep firearms in the home? No    Do you/your family have a smoke detector(s)? Yes    Do you feel safe in your home? Yes    Has anyone ever touched you in an unwanted manner? 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  2 grandsons now in [place] with the mother.  In a house.  .          How much exercise per week? Daily walking, will start going to the y    How much calcium per day? Through foods       How much caffeine per day? One cup    How much vitamin D per day? Through foods    Do you/your family wear seatbelts?  Yes    Do you/your family use safety helmets? N/a    Do you/your family use sunscreen? Yes    Do you/your family keep firearms in the home? No    Do you/your family have a smoke detector(s)? Yes        Reviewed by Nicki Cornejo 10-11-21               CURRENT OUTPATIENT MEDICATIONS     Current Outpatient Medications   Medication Sig Dispense Refill     acetaminophen (TYLENOL) 500 MG tablet Take 500-1,000 mg by mouth every 6 hours as needed for mild pain Uses arthritis dose, believes it is 625 mg, takes 2 at a time, does not exceed 4g a day       Ascorbic Acid 250 MG CHEW Take 250 mg by mouth daily.       calcium citrate 250 MG TABS Take 500 mg by mouth daily       carboxymethylcellulose PF (REFRESH PLUS) 0.5 % ophthalmic solution Place 1 drop into both eyes 4 times daily as needed for dry eyes       dexAMETHasone (DECADRON) 4 MG tablet Take 1 tablet (4 mg) by mouth daily. Take 8 mg (2 tablets daily for 2 days), starting on the day of after chemotherapy, and then 4 mg (1 tablet) for another 3 days. (Today of 5 days) 7 tablet 4     Fish Oil-Cholecalciferol (FISH OIL + D3  PO) Take 1 capsule by mouth daily.       gabapentin (NEURONTIN) 100 MG capsule Take 100 mg by mouth 3 times daily. PRN       levothyroxine (SYNTHROID/LEVOTHROID) 75 MCG tablet TAKE 1 TABLET DAILY FOR    THYROID 90 tablet 0     lidocaine-prilocaine (EMLA) 2.5-2.5 % external cream Apply a thin layer on skin overlying port site 10-15 minutes prior to port access for labs or infusion 5 g 1     Melatonin 10 MG TABS tablet Take 10 mg by mouth at bedtime.       multivitamin w/minerals (THERA-VIT-M) tablet Take 1 tablet by mouth daily as needed (if diet insufficient to provide recommended nutrients)       ondansetron (ZOFRAN) 8 MG tablet Take 1 tablet (8 mg) by mouth every 8 hours as needed for nausea (vomiting). 30 tablet 2     prochlorperazine (COMPAZINE) 10 MG tablet Take 1 tablet (10 mg) by mouth every 6 hours as needed for nausea or vomiting. 30 tablet 2     tacrolimus (PROTOPIC) 0.1 % external ointment Apply topically 2 times daily 30 g 3     triamcinolone (KENALOG) 0.1 % external cream Apply topically. prn       TURMERIC PO Take 1 tablet by mouth daily.       venlafaxine (EFFEXOR XR) 37.5 MG 24 hr capsule TAKE 1 CAPSULE DAILY 90 capsule 3     VITAMIN D PO Take 1 tablet by mouth daily.       zolpidem (AMBIEN) 5 MG tablet Take 1 tablet (5 mg) by mouth nightly as needed for sleep (Only uses if traveling or cannot sleep,). 20 tablet 0          REVIEW OF SYSTEMS   As above in the HPI, o/w complete 12-point ROS was negative.     PHYSICAL EXAM   There were no vitals taken for this visit.    EGO  GEN: NAD  HEENT: PERRL, EOMI, no icterus, injection or pallor. Oropharynx is clear.  NECK: no cervical or supraclavicular lymphadenopathy  LUNGS: clear bilaterally  CV: regular, no murmurs, rubs, or gallops  ABDOMEN: soft, non-tender, non-distended, normal bowel sounds, no hepatosplenomegaly by percussion or palpation. Surgical scars from prior surgeries well healed.   EXT: warm, well perfused, no edema  NEURO: alert  SKIN: no  tia     LABORATORY AND IMAGING STUDIES     Recent Labs   Lab Test 06/22/24  1330 06/03/24  1200 04/08/24  1438 03/02/24  1043 01/29/24  1539 06/27/20  0028 07/29/19  1509 08/27/18  0951     --   --  141 141   < >  --   --    POTASSIUM 4.5  --   --  4.2 4.3   < >  --   --    CHLORIDE 104  --   --  106 108*   < >  --   --    CO2 25  --   --  25 25   < >  --   --    ANIONGAP 9  --   --  10 8   < >  --   --    BUN 18.2  --   --  25.8* 20.8   < >  --   --    CR 0.98* 1.5*   < > 1.27* 1.47*   < >  --   --    GLC 80  --   --  90 85   < >  --   --    SUSAN 9.3  --   --  9.4 8.7*   < > 9.4 9.1   MAG  --   --   --  2.0 2.0   < > 2.2 2.1   PHOS  --   --   --   --   --   --  3.0 2.9    < > = values in this interval not displayed.     Recent Labs   Lab Test 06/22/24  1330 03/02/24  1043 01/29/24  1539   WBC 6.6 5.2 5.8   HGB 12.0 11.8 13.1    303 170   * 91 91   NEUTROPHIL 50 46 55     Recent Labs   Lab Test 06/22/24  1330 03/02/24  1043 01/29/24  1539   BILITOTAL 0.3 0.4 0.3   ALKPHOS 58 44 56   ALT 15 21 23   AST 20 17 19   ALBUMIN 4.2 4.2 4.1     TSH   Date Value Ref Range Status   10/31/2024 3.70 0.30 - 4.20 uIU/mL Final   07/25/2022 1.26 0.40 - 4.00 mU/L Final   12/16/2020 1.44 0.40 - 4.00 mU/L Final       Results for orders placed or performed during the hospital encounter of 06/03/24   CT Chest/Abdomen/Pelvis w Contrast    Narrative    CT CHEST/ABDOMEN/PELVIS W CONTRAST 6/3/2024 12:25 PM    CLINICAL HISTORY: Metastatic sarcoma, assess for response to  chemotherapy. 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).    TECHNIQUE: CT scan of the chest, abdomen, and pelvis was performed  following injection of IV contrast. Multiplanar reformats were  obtained. Dose reduction techniques were used.   CONTRAST: 64 mL Isovue-370    COMPARISON: CT chest, abdomen and pelvis 4/8/2024.    FINDINGS:   LOWER NECK: Unremarkable.    LUNGS AND PLEURA: No focal consolidation or pleural effusion.  Calcified  granulomas. No new or growing suspicious pulmonary nodule.    AIRWAYS: Patent.    HEART: No pericardial effusion.  No coronary calcifications. No  thoracic aortic aneurysm.    PULMONARY ARTERIES: Unremarkable.    MEDIASTINUM AND BEATRIZ: No lymphadenopathy. Calcified lymph nodes.    HEPATOBILIARY: Unremarkable. Gallbladder is present without  gallstones.    SPLEEN: Unremarkable.    PANCREAS: Unremarkable.    ADRENAL GLANDS: Unremarkable.    KIDNEYS/URETERS/BLADDER: Similar right renal atrophy/cortical  scarring. Left parapelvic and cortical cysts; no follow-up necessary.    BOWEL: No bowel dilatation or wall thickening.    LYMPH NODES AND PERITONEUM: Redemonstrated abdominal soft tissue  masses as on series 3:  -Left upper quadrant, 2.6 x 4 x 2.2 cm, previously 2.1 x 2.9 x 2.1 cm  when measured similarly, image 110   -Soft tissue lesion between the transverse colon and stomach, 3.1 x  2.8 cm, previously 3.1 x 2.6 cm, image 168  -Superior to the pancreas, 1.4 cm, image 125    No new lesion.    VASCULATURE: Nonaneurysmal abdominal aorta.    PELVIS: Uterus is surgically absent. No pelvic mass.    MUSCULOSKELETAL: No aggressive osseous lesion. Similar L4/L5 grade 1  anterolisthesis.    ADDITIONAL FINDINGS: None.      Impression    IMPRESSION:  1.  Since 4/8/2024, increased in size left upper quadrant soft tissue  masses. Other abdominal soft tissue lesions have not significantly  changed.  2.  No convincing new site of disease.    ORIN PAUL MD         SYSTEM ID:  [id number]     CT CAP 9/24/24  Mesentery/peritoneum/retroperitoneum: Soft tissue mass in the  transverse mesial colon measuring 4.0 x 3.8 cm with decreased  enhancement compared to prior. Previously measured 2.8 x 3.1 cm.  Mildly enhancing mass anterior to the superior pole of the spleen  measuring 2.8 x 5.2 cm, previously 2.6 x 4.0 cm. Stable size of soft  tissue nodule anterior to the pancreas measuring 1.5 cm (series 11  image 37). There is interval  central hypoenhancement. Trace central  mesenteric free fluid.     Lymph nodes: No significant lymphadenopathy.     Vasculature: Scattered atherosclerotic calcification of abdominal  aorta with no aneurysmal dilation.  Portal veins are patent.     Pelvis: Urinary bladder is normal.     Osseous structures: No aggressive or acute osseous lesion.  Multilevel  degenerative changes of the spine most pronounced at L4-L5. Grade 1  anterolisthesis of L4 on L5. Enhancing circumscribed extradural lesion  at the level of L5-S1 measuring 1.1 cm in craniocaudal dimension  (series 14 image 100).      Soft tissues: Tiny fat containing ventral/umbilical hernia.        CT CAP  12/16/24                                                              IMPRESSION:   1. Compared to CT dated 6/3/2024, increased size of peritoneal soft  tissue masses in the transverse mesial colon and left upper quadrant.  Stable size of soft tissue nodule anterior to the pancreas with  interval central hypoenhancement suspicious for necrosis.  2. No evidence of new disease in the chest abdomen and pelvis.  3. Enhancing circumscribed extradural lesion at the level of L5-S1,  may represent synovial cyst with hemorrhage as described on recent  lumbar MRI.  4.Additional findings similar to prior CT.        PATHOLOGY   12/12/2016  FINAL DIAGNOSIS:   CASE FROM [place], [place] ([id number], OBTAINED 11/04/2016):   A. UTERINE MASS, RESECTION:   - Leiomyosarcoma, 12 cm in aggregate (per report)   - Please see comment     COMMENT:   Per the received report, a right cul-de-sac biopsy was involved by   leiomyosarcoma.   The diagnosis of leiomyosarcoma is made on the basis of examination of   the one slide submitted.  There is moderate nuclear atypia, tumor   necrosis and increased mitotic activities (10/10 HPFs).     Tempus profiling from her 4/5/22 specimen showed CDKN2A and CDKN2B loss.     I reviewed the medical records including prior oncology notes,  laboratory studies which are notable for adequate renal and hepatic function, and normal blood counts.   I personally reviewed imaging including CT of the CAP done on 6/12/2024 which demonstrates enlargement of the peritoneal mass adjacent to the spleen, now up to 4 x 2.6 cm. She has 2 other peritoneal lesions up to 2.8 cm that have remained stable.       ASSESSMENT AND PLAN     Maggie is a 74 year old women with PMH of metastatic uterine leiomyosarcoma. She was originally diagnosed on 2016 showing an up to 12 cm mass. She underwent surgical resection followed by adjuvant doxorubicin/dacarbazine for 5 cycles. She had local recurrence on 2020 treated with surgical resection. Then  again recurrence on 2022 again underwent surgical resection. Followed by [id number] with anastrozole. Since January of 2024 she has been on trial a BOAST clinical trial with Dr. Nik Schilling at the [facility]. She received abemaciclib there. Now, she was taken off of trial due to progression on 1 of 3 intra-abdominal lesions.     She comes here to continue medical management, she has seen Dr. Michaud at [place] for a second opinion. She is interested in surgical resection which has been offered at [facility] with possible Gemzar HIPEC.      She feels very well now, she exercises regularly and has no related GI symptoms.     She saw Dr. Michaud at [place], who recommended against further surgical resection and for systemic therapy with other agents with activity in leiomyosarcoma including gemcitabine +/- docetaxel, trabectedin and pazopanib.     She is s/p 2 cycles of gemcitabine single agent. She is feeling well and tells me that surgery will be planned for 11/15. I explained that in her situation I do not think that surgery has chances of a meaningful response as she has disease in different areas. She insists that the team at [place] has great expertise in surgery and HIPEC for cases of advanced leiomyosarcoma. I  have not been able to communicate with her team there. She tells me they plan on intraoperative chemo with cisplatin.     I personally reviewed the most recent CT CAP and the overread done here at the [facility]. Two of the prior metastatic deposits have increased in size, although it seems that he enhancement is less suggesting possible increase in the necrosis. Other pancreatic lesion stable in size but more necrotic. Unfortunately we do not have a PET to better assess the metabolic response.   I will not recommend for surgical resection if we believe she has progressive disease. Sometimes sarcomas do enlarge as necrosis ensues. I recommend to continue chemo for 2 more cycles and repeat the scans. Patient is adamant she wants to move forward with surgical resection.     After reviewing imaging from 9/24/25 at the [facility], we saw that Maggie had disease progression with enlarging lesions in the abdomen, and no new lesions. Now she is s/p C2 of doxil/dacarbazine.I personally reviewed the most recent CT scan and compared to the one on 9/24 prior to starting new regimen. She does have progression on disease with enlargement of 2 peritoneal masses.  We discussed today options for next lines of therapy and she also brought information about clinical trials that she may potentially enroll temozolamide in combination with PARP inh and a trial with intratumoral injection of chemotherapy +/- Immunotherapy. I discussed that given that she progressed on dacarbazine, temozolamide activity is not going to be good, however the combination may have some benefit. The [employer] trial for combination [id number] +/- immunotherapy is appealing as she has 2 lesions that are accessible by IR which may enhance the effects of immunotherapy.   She is still considering also the possibility or resection. She has back surgery planned for 12/30/24.   I'll follow up with her in January in case she wants to proceed with other lines of treatment here. In  the meantime, she will be evaluated for this trial in [place].     Plan:     -Follow up Dr. Powers 1/28/25     40 minutes spent on the date of the encounter doing chart review, review of outside records, review of test results, interpretation of tests, patient visit, documentation, and discussion with other provider(s)     Beto Galvan MD   of Medicine  Hematology, Oncology and Transplantation        Again, thank you for allowing me to participate in the care of your patient.        Sincerely,        Beto Galvan MD

## 2024-12-19 ENCOUNTER — ANESTHESIA EVENT (OUTPATIENT)
Dept: SURGERY | Facility: CLINIC | Age: 74
DRG: 516 | End: 2024-12-19
Payer: MEDICARE

## 2024-12-19 ENCOUNTER — OFFICE VISIT (OUTPATIENT)
Dept: SURGERY | Facility: CLINIC | Age: 74
End: 2024-12-19
Payer: MEDICARE

## 2024-12-19 VITALS
TEMPERATURE: 98.5 F | OXYGEN SATURATION: 100 % | RESPIRATION RATE: 16 BRPM | HEIGHT: 59 IN | SYSTOLIC BLOOD PRESSURE: 118 MMHG | HEART RATE: 100 BPM | DIASTOLIC BLOOD PRESSURE: 82 MMHG | WEIGHT: 121.9 LBS | BODY MASS INDEX: 24.57 KG/M2

## 2024-12-19 DIAGNOSIS — Z01.818 PREOP EXAMINATION: Primary | ICD-10-CM

## 2024-12-19 DIAGNOSIS — M54.16 LUMBAR RADICULOPATHY: ICD-10-CM

## 2024-12-19 RX ORDER — FAMOTIDINE 20 MG/1
20 TABLET, FILM COATED ORAL 2 TIMES DAILY PRN
COMMUNITY
Start: 2024-12-19

## 2024-12-19 RX ORDER — TRIAMCINOLONE ACETONIDE 1 MG/G
CREAM TOPICAL DAILY
COMMUNITY

## 2024-12-19 ASSESSMENT — PAIN SCALES - GENERAL: PAINLEVEL_OUTOF10: NO PAIN (0)
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
## 2024-12-19 NOTE — PATIENT INSTRUCTIONS
Preparing for Your Surgery      Name:  Maggie Navarrete   MRN:  [id number]   :  1950   Today's Date:  2024       Arriving for surgery:  Surgery date:  24  Arrival time:  09:30 am      Please come to:      [facility] [address]   [address] [place]  [address]   The Green Ramp for patients and visitors is beneath the [facility]. The parking facility entrance is at the intersection of [address] [place] [address] and [address]. Patients and visitors who self-park will receive the reduced hospital parking rate (no ticket validation needed).     [facility] parking, located at the [facility] main entrance on [address] [place] [address], is available Monday - Friday from 7 am to 3:30 pm.     Discounted parking pass options can be purchased from  attendants during business hours.     -Check in at the security desk in the [facility] ([facility])   Lobby. They will direct you to the correct elevators.   -Proceed to the 3rd floor, Adult Surgery Waiting Lounge. [phone]     If you need directions, a wheelchair or escort please stop at the Information Desk in the lobby.  Inform the information person you are here for surgery; a wheelchair and escort to [address] will be provided.   An escort to the Adult Surgery Waiting Lounge will be provided. .    What can I eat or drink?  -  You may eat and drink normally up to 8 hours prior to arrival time.   -  You may have clear liquids until 2 hours prior to arrival time.     Examples of clear liquids:  Water  Clear broth  Juices (apple, white grape, white cranberry  and cider) without pulp  Noncarbonated, powder based beverages  (lemonade and Kojo-Aid)  Sodas (Sprite, 7-Up, ginger ale and seltzer)  Coffee or tea (without milk or cream)  Gatorade    -  No Alcohol or cannabis products for at least 24 hours before surgery.     Which medicines  can I take?    Hold Aspirin for 7 days before surgery.   Hold Multivitamins for 7 days before surgery.  Hold Supplements (fish oil and turmeric) for 7 days before surgery.  Hold Ibuprofen (Advil, Motrin) for 1 day(s) before surgery--unless otherwise directed by surgeon.  Hold Naproxen (Aleve) for 4 days before surgery.    -  DO NOT take these medications the day of surgery:  Vitamin C and D, calcium, creams.    -  PLEASE TAKE these medications the day of surgery:  Tylenol if needed; take levothyroxine and effexor and pepcid.    How do I prepare myself?  - Please take 2 showers (one the night prior to surgery and one the morning of surgery) using Scrubcare or Hibiclens soap.    Use this soap only from the neck to your toes. Avoid genital area      Leave the soap on your skin for one minute--then rinse thoroughly.      You may use your own shampoo and conditioner. No other hair products.   - Please remove all jewelry and body piercings.  - No lotions, deodorants or fragrance.  - No makeup or fingernail polish.   - Bring your ID and insurance card.    -If you use a CPAP machine, please bring the CPAP machine, tubing, and mask to hospital.    -If you have a Deep Brain Stimulator, Spinal Cord Stimulator, or any Neuro Stimulator device---you must bring the remote control to the hospital.      ALL PATIENTS GOING HOME THE SAME DAY OF SURGERY ARE REQUIRED TO HAVE A RESPONSIBLE ADULT TO DRIVE AND BE IN ATTENDANCE WITH THEM FOR 24 HOURS FOLLOWING SURGERY.    Covid testing policy as of 12/06/2022  Your surgeon will notify and schedule you for a COVID test if one is needed before surgery--please direct any questions or COVID symptoms to your surgeon      Questions or Concerns:    - For any questions regarding the day of surgery or your hospital stay, please contact the Pre Admission Nursing Office at [phone].       - If you have health changes between today and your surgery, please call your surgeon.       - For questions  after surgery, please call your surgeons office.           Current Visitor Guidelines    You may have 2 visitors in the pre op area.    Visiting hours: 8 a.m. to 8:30 p.m.    Patients confirmed or suspected to have symptoms of COVID 19 or flu:     No visitors allowed for adult patients.   Children (under age 18) can have 1 named visitor.     People who are sick or showing symptoms of COVID 19 or flu:    Are not allowed to visit patients--we can only make exceptions in special situations.       Please follow these guidelines for your visit:          Please maintain social distance          Masking is optional--however at times you may be asked to wear a mask for the safety of yourself and others     Clean your hands with alcohol hand . Do this when you arrive at and leave the building and patient room,    And again after you touch your mask or anything in the room.     Go directly to and from the room you are visiting.     Stay in the patient s room during your visit. Limit going to other places in the hospital as much as possible     Leave bags and jackets at home or in the car.     For everyone s health, please don t come and go during your visit. That includes for smoking   during your visit.

## 2024-12-30 ENCOUNTER — HOSPITAL ENCOUNTER (INPATIENT)
Facility: CLINIC | Age: 74
LOS: 1 days | Discharge: HOME | DRG: 516 | End: 2024-12-31
Attending: ORTHOPAEDIC SURGERY | Admitting: ORTHOPAEDIC SURGERY
Payer: MEDICARE

## 2024-12-30 ENCOUNTER — APPOINTMENT (OUTPATIENT)
Dept: GENERAL RADIOLOGY | Facility: CLINIC | Age: 74
DRG: 516 | End: 2024-12-30
Attending: ORTHOPAEDIC SURGERY
Payer: MEDICARE

## 2024-12-30 ENCOUNTER — ANESTHESIA (OUTPATIENT)
Dept: SURGERY | Facility: CLINIC | Age: 74
DRG: 516 | End: 2024-12-30
Payer: MEDICARE

## 2024-12-30 DIAGNOSIS — M54.9 BACK PAIN, UNSPECIFIED BACK LOCATION, UNSPECIFIED BACK PAIN LATERALITY, UNSPECIFIED CHRONICITY: ICD-10-CM

## 2024-12-30 DIAGNOSIS — Z98.890 S/P MUSCULOSKELETAL SYSTEM SURGERY: Primary | ICD-10-CM

## 2024-12-30 DIAGNOSIS — Z86.718 HISTORY OF DEEP VENOUS THROMBOSIS: ICD-10-CM

## 2024-12-30 LAB — GLUCOSE BLDC GLUCOMTR-MCNC: 94 MG/DL (ref 70–99)

## 2024-12-30 PROCEDURE — 250N000013 HC RX MED GY IP 250 OP 250 PS 637: Performed by: ANESTHESIOLOGY

## 2024-12-30 PROCEDURE — 250N000011 HC RX IP 250 OP 636: Performed by: PHYSICIAN ASSISTANT

## 2024-12-30 PROCEDURE — 250N000009 HC RX 250: Performed by: ORTHOPAEDIC SURGERY

## 2024-12-30 PROCEDURE — 250N000011 HC RX IP 250 OP 636

## 2024-12-30 PROCEDURE — 360N000084 HC SURGERY LEVEL 4 W/ FLUORO, PER MIN: Performed by: ORTHOPAEDIC SURGERY

## 2024-12-30 PROCEDURE — 258N000003 HC RX IP 258 OP 636

## 2024-12-30 PROCEDURE — 250N000009 HC RX 250

## 2024-12-30 PROCEDURE — 250N000013 HC RX MED GY IP 250 OP 250 PS 637

## 2024-12-30 PROCEDURE — 250N000011 HC RX IP 250 OP 636: Performed by: ANESTHESIOLOGY

## 2024-12-30 PROCEDURE — 120N000002 HC R&B MED SURG/OB UMMC

## 2024-12-30 PROCEDURE — 370N000017 HC ANESTHESIA TECHNICAL FEE, PER MIN: Performed by: ORTHOPAEDIC SURGERY

## 2024-12-30 PROCEDURE — 258N000003 HC RX IP 258 OP 636: Performed by: ANESTHESIOLOGY

## 2024-12-30 PROCEDURE — 99222 1ST HOSP IP/OBS MODERATE 55: CPT

## 2024-12-30 PROCEDURE — 01NR0ZZ RELEASE SACRAL NERVE, OPEN APPROACH: ICD-10-PCS | Performed by: ORTHOPAEDIC SURGERY

## 2024-12-30 PROCEDURE — 250N000025 HC SEVOFLURANE, PER MIN: Performed by: ORTHOPAEDIC SURGERY

## 2024-12-30 PROCEDURE — 82962 GLUCOSE BLOOD TEST: CPT

## 2024-12-30 PROCEDURE — 250N000009 HC RX 250: Performed by: ANESTHESIOLOGY

## 2024-12-30 PROCEDURE — 01NB0ZZ RELEASE LUMBAR NERVE, OPEN APPROACH: ICD-10-PCS | Performed by: ORTHOPAEDIC SURGERY

## 2024-12-30 PROCEDURE — 710N000010 HC RECOVERY PHASE 1, LEVEL 2, PER MIN: Performed by: ORTHOPAEDIC SURGERY

## 2024-12-30 PROCEDURE — 63267 EXCISE INTRSPINL LESION LMBR: CPT | Mod: GC | Performed by: ORTHOPAEDIC SURGERY

## 2024-12-30 PROCEDURE — 258N000003 HC RX IP 258 OP 636: Performed by: ORTHOPAEDIC SURGERY

## 2024-12-30 PROCEDURE — 999N000063 XR LUMBAR SPINE PORT 1 VIEW

## 2024-12-30 PROCEDURE — 272N000001 HC OR GENERAL SUPPLY STERILE: Performed by: ORTHOPAEDIC SURGERY

## 2024-12-30 PROCEDURE — 250N000013 HC RX MED GY IP 250 OP 250 PS 637: Performed by: PEDIATRICS

## 2024-12-30 PROCEDURE — 63047 LAM FACETEC & FORAMOT LUMBAR: CPT | Mod: 51 | Performed by: ORTHOPAEDIC SURGERY

## 2024-12-30 PROCEDURE — 999N000141 HC STATISTIC PRE-PROCEDURE NURSING ASSESSMENT: Performed by: ORTHOPAEDIC SURGERY

## 2024-12-30 PROCEDURE — 250N000013 HC RX MED GY IP 250 OP 250 PS 637: Performed by: PHYSICIAN ASSISTANT

## 2024-12-30 RX ORDER — ACETAMINOPHEN 325 MG/1
975 TABLET ORAL ONCE
Status: COMPLETED | OUTPATIENT
Start: 2024-12-30 | End: 2024-12-30

## 2024-12-30 RX ORDER — OXYCODONE HYDROCHLORIDE 5 MG/1
5 TABLET ORAL EVERY 4 HOURS PRN
Status: DISCONTINUED | OUTPATIENT
Start: 2024-12-30 | End: 2024-12-31 | Stop reason: HOSPADM

## 2024-12-30 RX ORDER — NALOXONE HYDROCHLORIDE 0.4 MG/ML
0.1 INJECTION, SOLUTION INTRAMUSCULAR; INTRAVENOUS; SUBCUTANEOUS
Status: DISCONTINUED | OUTPATIENT
Start: 2024-12-30 | End: 2024-12-30 | Stop reason: HOSPADM

## 2024-12-30 RX ORDER — ONDANSETRON 2 MG/ML
4 INJECTION INTRAMUSCULAR; INTRAVENOUS EVERY 30 MIN PRN
Status: DISCONTINUED | OUTPATIENT
Start: 2024-12-30 | End: 2024-12-30 | Stop reason: HOSPADM

## 2024-12-30 RX ORDER — VENLAFAXINE HYDROCHLORIDE 37.5 MG/1
37.5 CAPSULE, EXTENDED RELEASE ORAL EVERY MORNING
Status: DISCONTINUED | OUTPATIENT
Start: 2024-12-31 | End: 2024-12-31 | Stop reason: HOSPADM

## 2024-12-30 RX ORDER — FENTANYL CITRATE 50 UG/ML
25 INJECTION, SOLUTION INTRAMUSCULAR; INTRAVENOUS
Status: DISCONTINUED | OUTPATIENT
Start: 2024-12-30 | End: 2024-12-30 | Stop reason: HOSPADM

## 2024-12-30 RX ORDER — BISACODYL 10 MG
10 SUPPOSITORY, RECTAL RECTAL DAILY PRN
Status: DISCONTINUED | OUTPATIENT
Start: 2025-01-02 | End: 2024-12-31 | Stop reason: HOSPADM

## 2024-12-30 RX ORDER — ONDANSETRON 4 MG/1
4 TABLET, ORALLY DISINTEGRATING ORAL EVERY 30 MIN PRN
Status: DISCONTINUED | OUTPATIENT
Start: 2024-12-30 | End: 2024-12-30 | Stop reason: HOSPADM

## 2024-12-30 RX ORDER — LEVOTHYROXINE SODIUM 75 UG/1
75 TABLET ORAL
Status: DISCONTINUED | OUTPATIENT
Start: 2024-12-31 | End: 2024-12-31 | Stop reason: HOSPADM

## 2024-12-30 RX ORDER — SODIUM CHLORIDE, SODIUM LACTATE, POTASSIUM CHLORIDE, CALCIUM CHLORIDE 600; 310; 30; 20 MG/100ML; MG/100ML; MG/100ML; MG/100ML
INJECTION, SOLUTION INTRAVENOUS CONTINUOUS PRN
Status: DISCONTINUED | OUTPATIENT
Start: 2024-12-30 | End: 2024-12-30

## 2024-12-30 RX ORDER — OXYCODONE HYDROCHLORIDE 10 MG/1
10 TABLET ORAL
Status: DISCONTINUED | OUTPATIENT
Start: 2024-12-30 | End: 2024-12-30 | Stop reason: HOSPADM

## 2024-12-30 RX ORDER — HYDROMORPHONE HYDROCHLORIDE 1 MG/ML
0.2 INJECTION, SOLUTION INTRAMUSCULAR; INTRAVENOUS; SUBCUTANEOUS EVERY 5 MIN PRN
Status: DISCONTINUED | OUTPATIENT
Start: 2024-12-30 | End: 2024-12-30

## 2024-12-30 RX ORDER — HYDROMORPHONE HYDROCHLORIDE 1 MG/ML
0.4 INJECTION, SOLUTION INTRAMUSCULAR; INTRAVENOUS; SUBCUTANEOUS EVERY 5 MIN PRN
Status: DISCONTINUED | OUTPATIENT
Start: 2024-12-30 | End: 2024-12-30

## 2024-12-30 RX ORDER — CALCIUM CARBONATE 500 MG/1
500 TABLET, CHEWABLE ORAL 4 TIMES DAILY PRN
Status: DISCONTINUED | OUTPATIENT
Start: 2024-12-30 | End: 2024-12-31 | Stop reason: HOSPADM

## 2024-12-30 RX ORDER — PROPOFOL 10 MG/ML
INJECTION, EMULSION INTRAVENOUS PRN
Status: DISCONTINUED | OUTPATIENT
Start: 2024-12-30 | End: 2024-12-30

## 2024-12-30 RX ORDER — LIDOCAINE 40 MG/G
CREAM TOPICAL
Status: DISCONTINUED | OUTPATIENT
Start: 2024-12-30 | End: 2024-12-31 | Stop reason: HOSPADM

## 2024-12-30 RX ORDER — HEPARIN SODIUM (PORCINE) LOCK FLUSH IV SOLN 100 UNIT/ML 100 UNIT/ML
5-10 SOLUTION INTRAVENOUS
Status: DISCONTINUED | OUTPATIENT
Start: 2024-12-30 | End: 2024-12-31 | Stop reason: HOSPADM

## 2024-12-30 RX ORDER — PROCHLORPERAZINE MALEATE 5 MG/1
5 TABLET ORAL EVERY 6 HOURS PRN
Status: DISCONTINUED | OUTPATIENT
Start: 2024-12-30 | End: 2024-12-31 | Stop reason: HOSPADM

## 2024-12-30 RX ORDER — HYDROMORPHONE HYDROCHLORIDE 1 MG/ML
0.2 INJECTION, SOLUTION INTRAMUSCULAR; INTRAVENOUS; SUBCUTANEOUS EVERY 4 HOURS PRN
Status: DISCONTINUED | OUTPATIENT
Start: 2024-12-30 | End: 2024-12-31 | Stop reason: HOSPADM

## 2024-12-30 RX ORDER — ONDANSETRON 4 MG/1
4 TABLET, ORALLY DISINTEGRATING ORAL EVERY 6 HOURS PRN
Status: DISCONTINUED | OUTPATIENT
Start: 2024-12-30 | End: 2024-12-31 | Stop reason: HOSPADM

## 2024-12-30 RX ORDER — FENTANYL CITRATE 50 UG/ML
25 INJECTION, SOLUTION INTRAMUSCULAR; INTRAVENOUS EVERY 5 MIN PRN
Status: DISCONTINUED | OUTPATIENT
Start: 2024-12-30 | End: 2024-12-30

## 2024-12-30 RX ORDER — FENTANYL CITRATE 50 UG/ML
50 INJECTION, SOLUTION INTRAMUSCULAR; INTRAVENOUS EVERY 5 MIN PRN
Status: DISCONTINUED | OUTPATIENT
Start: 2024-12-30 | End: 2024-12-30

## 2024-12-30 RX ORDER — SODIUM CHLORIDE 9 MG/ML
INJECTION, SOLUTION INTRAVENOUS CONTINUOUS
Status: DISCONTINUED | OUTPATIENT
Start: 2024-12-30 | End: 2024-12-31

## 2024-12-30 RX ORDER — FENTANYL CITRATE 50 UG/ML
INJECTION, SOLUTION INTRAMUSCULAR; INTRAVENOUS PRN
Status: DISCONTINUED | OUTPATIENT
Start: 2024-12-30 | End: 2024-12-30

## 2024-12-30 RX ORDER — SODIUM CHLORIDE, SODIUM LACTATE, POTASSIUM CHLORIDE, CALCIUM CHLORIDE 600; 310; 30; 20 MG/100ML; MG/100ML; MG/100ML; MG/100ML
INJECTION, SOLUTION INTRAVENOUS CONTINUOUS
Status: DISCONTINUED | OUTPATIENT
Start: 2024-12-30 | End: 2024-12-31

## 2024-12-30 RX ORDER — OXYCODONE HYDROCHLORIDE 5 MG/1
5 TABLET ORAL
Status: COMPLETED | OUTPATIENT
Start: 2024-12-30 | End: 2024-12-30

## 2024-12-30 RX ORDER — NALOXONE HYDROCHLORIDE 0.4 MG/ML
0.1 INJECTION, SOLUTION INTRAMUSCULAR; INTRAVENOUS; SUBCUTANEOUS
Status: DISCONTINUED | OUTPATIENT
Start: 2024-12-30 | End: 2024-12-31 | Stop reason: HOSPADM

## 2024-12-30 RX ORDER — MAGNESIUM HYDROXIDE 1200 MG/15ML
LIQUID ORAL PRN
Status: DISCONTINUED | OUTPATIENT
Start: 2024-12-30 | End: 2024-12-30 | Stop reason: HOSPADM

## 2024-12-30 RX ORDER — DEXAMETHASONE SODIUM PHOSPHATE 4 MG/ML
4 INJECTION, SOLUTION INTRA-ARTICULAR; INTRALESIONAL; INTRAMUSCULAR; INTRAVENOUS; SOFT TISSUE
Status: DISCONTINUED | OUTPATIENT
Start: 2024-12-30 | End: 2024-12-31 | Stop reason: HOSPADM

## 2024-12-30 RX ORDER — EPHEDRINE SULFATE 50 MG/ML
INJECTION, SOLUTION INTRAMUSCULAR; INTRAVENOUS; SUBCUTANEOUS PRN
Status: DISCONTINUED | OUTPATIENT
Start: 2024-12-30 | End: 2024-12-30

## 2024-12-30 RX ORDER — ONDANSETRON 2 MG/ML
4 INJECTION INTRAMUSCULAR; INTRAVENOUS EVERY 30 MIN PRN
Status: DISCONTINUED | OUTPATIENT
Start: 2024-12-30 | End: 2024-12-30

## 2024-12-30 RX ORDER — ACETAMINOPHEN 325 MG/1
975 TABLET ORAL EVERY 8 HOURS
Status: DISCONTINUED | OUTPATIENT
Start: 2024-12-30 | End: 2024-12-31 | Stop reason: HOSPADM

## 2024-12-30 RX ORDER — CEFAZOLIN SODIUM/WATER 2 G/20 ML
2 SYRINGE (ML) INTRAVENOUS
Status: COMPLETED | OUTPATIENT
Start: 2024-12-30 | End: 2024-12-30

## 2024-12-30 RX ORDER — LIDOCAINE 40 MG/G
CREAM TOPICAL
Status: COMPLETED | OUTPATIENT
Start: 2024-12-30 | End: 2024-12-30

## 2024-12-30 RX ORDER — GABAPENTIN 100 MG/1
100 CAPSULE ORAL
Status: COMPLETED | OUTPATIENT
Start: 2024-12-30 | End: 2024-12-30

## 2024-12-30 RX ORDER — AMOXICILLIN 250 MG
1 CAPSULE ORAL 2 TIMES DAILY
Status: DISCONTINUED | OUTPATIENT
Start: 2024-12-30 | End: 2024-12-31 | Stop reason: HOSPADM

## 2024-12-30 RX ORDER — DEXAMETHASONE SODIUM PHOSPHATE 4 MG/ML
4 INJECTION, SOLUTION INTRA-ARTICULAR; INTRALESIONAL; INTRAMUSCULAR; INTRAVENOUS; SOFT TISSUE
Status: DISCONTINUED | OUTPATIENT
Start: 2024-12-30 | End: 2024-12-30 | Stop reason: HOSPADM

## 2024-12-30 RX ORDER — CEFAZOLIN SODIUM 1 G/3ML
1 INJECTION, POWDER, FOR SOLUTION INTRAMUSCULAR; INTRAVENOUS EVERY 8 HOURS
Status: DISCONTINUED | OUTPATIENT
Start: 2024-12-30 | End: 2024-12-31

## 2024-12-30 RX ORDER — ONDANSETRON 2 MG/ML
4 INJECTION INTRAMUSCULAR; INTRAVENOUS EVERY 6 HOURS PRN
Status: DISCONTINUED | OUTPATIENT
Start: 2024-12-30 | End: 2024-12-31 | Stop reason: HOSPADM

## 2024-12-30 RX ORDER — CARBOXYMETHYLCELLULOSE SODIUM 5 MG/ML
1 SOLUTION/ DROPS OPHTHALMIC 4 TIMES DAILY PRN
Status: DISCONTINUED | OUTPATIENT
Start: 2024-12-30 | End: 2024-12-31 | Stop reason: HOSPADM

## 2024-12-30 RX ORDER — ONDANSETRON 4 MG/1
4 TABLET, ORALLY DISINTEGRATING ORAL EVERY 30 MIN PRN
Status: DISCONTINUED | OUTPATIENT
Start: 2024-12-30 | End: 2024-12-30

## 2024-12-30 RX ORDER — ZOLPIDEM TARTRATE 5 MG/1
5 TABLET ORAL
Status: DISCONTINUED | OUTPATIENT
Start: 2024-12-30 | End: 2024-12-31 | Stop reason: HOSPADM

## 2024-12-30 RX ORDER — BUPIVACAINE HYDROCHLORIDE AND EPINEPHRINE 2.5; 5 MG/ML; UG/ML
INJECTION, SOLUTION INFILTRATION; PERINEURAL PRN
Status: DISCONTINUED | OUTPATIENT
Start: 2024-12-30 | End: 2024-12-30 | Stop reason: HOSPADM

## 2024-12-30 RX ORDER — HEPARIN SODIUM,PORCINE 10 UNIT/ML
5-10 VIAL (ML) INTRAVENOUS
Status: DISCONTINUED | OUTPATIENT
Start: 2024-12-30 | End: 2024-12-31 | Stop reason: HOSPADM

## 2024-12-30 RX ORDER — ONDANSETRON 2 MG/ML
INJECTION INTRAMUSCULAR; INTRAVENOUS PRN
Status: DISCONTINUED | OUTPATIENT
Start: 2024-12-30 | End: 2024-12-30

## 2024-12-30 RX ORDER — POLYETHYLENE GLYCOL 3350 17 G/17G
17 POWDER, FOR SOLUTION ORAL DAILY
Status: DISCONTINUED | OUTPATIENT
Start: 2024-12-31 | End: 2024-12-31 | Stop reason: HOSPADM

## 2024-12-30 RX ORDER — HEPARIN SODIUM,PORCINE 10 UNIT/ML
5-10 VIAL (ML) INTRAVENOUS EVERY 24 HOURS
Status: DISCONTINUED | OUTPATIENT
Start: 2024-12-30 | End: 2024-12-31 | Stop reason: HOSPADM

## 2024-12-30 RX ORDER — HYDROMORPHONE HYDROCHLORIDE 1 MG/ML
0.1 INJECTION, SOLUTION INTRAMUSCULAR; INTRAVENOUS; SUBCUTANEOUS EVERY 4 HOURS PRN
Status: DISCONTINUED | OUTPATIENT
Start: 2024-12-30 | End: 2024-12-31 | Stop reason: HOSPADM

## 2024-12-30 RX ORDER — CEFAZOLIN SODIUM/WATER 2 G/20 ML
2 SYRINGE (ML) INTRAVENOUS SEE ADMIN INSTRUCTIONS
Status: DISCONTINUED | OUTPATIENT
Start: 2024-12-30 | End: 2024-12-30

## 2024-12-30 RX ADMIN — OXYCODONE HYDROCHLORIDE 2.5 MG: 5 TABLET ORAL at 20:13

## 2024-12-30 RX ADMIN — FENTANYL CITRATE 25 MCG: 50 INJECTION INTRAMUSCULAR; INTRAVENOUS at 14:17

## 2024-12-30 RX ADMIN — TRANEXAMIC ACID 600 MG: 1 INJECTION, SOLUTION INTRAVENOUS at 12:00

## 2024-12-30 RX ADMIN — SUGAMMADEX 200 MG: 100 INJECTION, SOLUTION INTRAVENOUS at 13:23

## 2024-12-30 RX ADMIN — SODIUM CHLORIDE: 9 INJECTION, SOLUTION INTRAVENOUS at 17:35

## 2024-12-30 RX ADMIN — ONDANSETRON 4 MG: 2 INJECTION INTRAMUSCULAR; INTRAVENOUS at 15:05

## 2024-12-30 RX ADMIN — SENNOSIDES AND DOCUSATE SODIUM 1 TABLET: 50; 8.6 TABLET ORAL at 20:14

## 2024-12-30 RX ADMIN — FENTANYL CITRATE 25 MCG: 50 INJECTION INTRAMUSCULAR; INTRAVENOUS at 14:08

## 2024-12-30 RX ADMIN — PHENYLEPHRINE HYDROCHLORIDE 50 MCG: 10 INJECTION INTRAVENOUS at 12:19

## 2024-12-30 RX ADMIN — ACETAMINOPHEN 975 MG: 325 TABLET, FILM COATED ORAL at 17:45

## 2024-12-30 RX ADMIN — EPHEDRINE SULFATE 5 MG: 5 INJECTION INTRAVENOUS at 12:34

## 2024-12-30 RX ADMIN — OXYCODONE 5 MG: 5 TABLET ORAL at 14:45

## 2024-12-30 RX ADMIN — CEFAZOLIN 1 G: 1 INJECTION, POWDER, FOR SOLUTION INTRAMUSCULAR; INTRAVENOUS at 17:41

## 2024-12-30 RX ADMIN — SODIUM CHLORIDE, POTASSIUM CHLORIDE, SODIUM LACTATE AND CALCIUM CHLORIDE: 600; 310; 30; 20 INJECTION, SOLUTION INTRAVENOUS at 11:35

## 2024-12-30 RX ADMIN — FENTANYL CITRATE 50 MCG: 50 INJECTION INTRAMUSCULAR; INTRAVENOUS at 11:40

## 2024-12-30 RX ADMIN — GABAPENTIN 100 MG: 100 CAPSULE ORAL at 09:18

## 2024-12-30 RX ADMIN — FENTANYL CITRATE 25 MCG: 50 INJECTION INTRAMUSCULAR; INTRAVENOUS at 11:38

## 2024-12-30 RX ADMIN — FENTANYL CITRATE 25 MCG: 50 INJECTION INTRAMUSCULAR; INTRAVENOUS at 12:13

## 2024-12-30 RX ADMIN — Medication 2 G: at 11:45

## 2024-12-30 RX ADMIN — Medication 50 MG: at 11:41

## 2024-12-30 RX ADMIN — FENTANYL CITRATE 25 MCG: 50 INJECTION INTRAMUSCULAR; INTRAVENOUS at 13:55

## 2024-12-30 RX ADMIN — CALCIUM CARBONATE (ANTACID) CHEW TAB 500 MG 500 MG: 500 CHEW TAB at 17:45

## 2024-12-30 RX ADMIN — ACETAMINOPHEN 975 MG: 325 TABLET, FILM COATED ORAL at 09:18

## 2024-12-30 RX ADMIN — PROPOFOL 120 MG: 10 INJECTION, EMULSION INTRAVENOUS at 11:40

## 2024-12-30 RX ADMIN — EPHEDRINE SULFATE 5 MG: 5 INJECTION INTRAVENOUS at 12:29

## 2024-12-30 RX ADMIN — ZOLPIDEM TARTRATE 5 MG: 5 TABLET, COATED ORAL at 23:35

## 2024-12-30 RX ADMIN — ONDANSETRON 4 MG: 2 INJECTION INTRAMUSCULAR; INTRAVENOUS at 12:43

## 2024-12-30 RX ADMIN — LIDOCAINE: 40 CREAM TOPICAL at 09:23

## 2024-12-30 RX ADMIN — PROPOFOL 30 MCG/KG/MIN: 10 INJECTION, EMULSION INTRAVENOUS at 12:04

## 2024-12-30 RX ADMIN — HYDROMORPHONE HYDROCHLORIDE 0.5 MG: 1 INJECTION, SOLUTION INTRAMUSCULAR; INTRAVENOUS; SUBCUTANEOUS at 13:40

## 2024-12-30 ASSESSMENT — ACTIVITIES OF DAILY LIVING (ADL)
ADLS_ACUITY_SCORE: 29
ADLS_ACUITY_SCORE: 39
FALL_HISTORY_WITHIN_LAST_SIX_MONTHS: YES
ADLS_ACUITY_SCORE: 39
ADLS_ACUITY_SCORE: 28
ADLS_ACUITY_SCORE: 28
ADLS_ACUITY_SCORE: 39
NUMBER_OF_TIMES_PATIENT_HAS_FALLEN_WITHIN_LAST_SIX_MONTHS: 1
ADLS_ACUITY_SCORE: 28
HEARING_DIFFICULTY_OR_DEAF: NO
CONCENTRATING,_REMEMBERING_OR_MAKING_DECISIONS_DIFFICULTY: NO
ADLS_ACUITY_SCORE: 28
ADLS_ACUITY_SCORE: 39
VISION_MANAGEMENT: GLASSES AND CONTACTS
ADLS_ACUITY_SCORE: 39
DOING_ERRANDS_INDEPENDENTLY_DIFFICULTY: NO
ADLS_ACUITY_SCORE: 39
DRESSING/BATHING_DIFFICULTY: NO
DIFFICULTY_EATING/SWALLOWING: NO
CHANGE_IN_FUNCTIONAL_STATUS_SINCE_ONSET_OF_CURRENT_ILLNESS/INJURY: NO
ADLS_ACUITY_SCORE: 39
TOILETING_ISSUES: NO
ADLS_ACUITY_SCORE: 25
ADLS_ACUITY_SCORE: 39
WALKING_OR_CLIMBING_STAIRS_DIFFICULTY: NO
DIFFICULTY_COMMUNICATING: NO
ADLS_ACUITY_SCORE: 39
WEAR_GLASSES_OR_BLIND: YES

## 2024-12-30 NOTE — ANESTHESIA POSTPROCEDURE EVALUATION
Patient: Maggie Navarrete    Procedure: Procedure(s):  Lumbar 4 to 5 Decompression and Left Lumbar 5-Sacral 1 Rafael-Laminotomy and Cyst Removal       Anesthesia Type:  General    Note:  Disposition: Admission   Postop Pain Control: Uneventful            Sign Out: Well controlled pain   PONV: No   Neuro/Psych: Uneventful            Sign Out: Acceptable/Baseline neuro status   Airway/Respiratory: Uneventful            Sign Out: Acceptable/Baseline resp. status   CV/Hemodynamics: Uneventful            Sign Out: Acceptable CV status; No obvious hypovolemia; No obvious fluid overload   Other NRE: NONE   DID A NON-ROUTINE EVENT OCCUR? No           Last vitals:  Vitals Value Taken Time   /80 12/30/24 1445   Temp 36  C (96.8  F) 12/30/24 1333   Pulse 93 12/30/24 1451   Resp 25 12/30/24 1451   SpO2 98 % 12/30/24 1451   Vitals shown include unfiled device data.    Electronically Signed By: Terri Ruiz MD  December 30, 2024  2:52 PM

## 2024-12-30 NOTE — OP NOTE
DATE OF SURGERY: 12/30/2024    PREOPERATIVE DIAGNOSIS: Lumbar radiculopathy [M54.16]  Spinal stenosis of lumbar region with neurogenic claudication [M48.062]  Spondylolisthesis of lumbar region at L4-5 [M43.16]        Left L5-S1 Facet Cyst         POSTOPERATIVE DIAGNOSIS: Same    PROCEDURES:  1. L4 and L5 laminectomy and partial medial facetectomies and foraminotomies for decompression of the L4 and L5 nerve roots.  2. Hemilaminotomy and left facet cyst removal at L5-S1    PRIMARY SURGEON: Eric Moreno MD    FIRST ASSISTANT: Cristofer Vitale, PGY4,The assistant was necessary for all phases of the operation, decompression, and all bone work and also for visualization, and closure.    Bishop Benítez also assisted with closure.        ANESTHESIA: General Endotracheal    COMPLICATIONS:  None.    SPECIMENS: None.    ESTIMATED BLOOD LOSS: 10 mL    INDICATIONS:                          Maggie Navarrete is a 74 year old female who elected surgical treatment, and understood the indications for this surgery, as well as its risks, benefits, and alternatives as documented in the pre-operative H&P.  Specifically, we reviewed the risks and benefits of the surgery in detail. The risks include, but are not limited to, the general risks associated with anesthesia, including death, pulmonary embolism, DVT, stroke, myocardial infarction, pneumonia, and urinary tract infection. Additional risks specific to the surgery include the risk of infection, dural tear with resultant CSF leak which might necessitate placement of a drain or revision surgery or could result in headaches, nerve injury resulting in weakness or paralysis, risk of adjacent segment disease, the risks of vascular injury, need for revision surgery in the future due to one of the above issues, or risk of incomplete symptom relief. Maggie Navarrete understands the risks of the surgery and wishes to proceed.  No Guarantees were given.       DESCRIPTION OF PROCEDURE:            Maggie Navarrete was taken to the operating room, where the Anesthesiology Service induced satisfactory general anesthesia. Ancef was given IV.  Venous thromboembolic prophylaxis was performed with sequential devices.  A Kwan catheter was placed under standard sterile techniques.  The patient was placed prone on an open OSI frame with the abdomen hanging free and all bony prominences well padded.  The low back was then prepped and draped in its entirety in the usual sterile fashion.  We then held a multidisciplinary time out in which we verified the patient, procedure, antibiotics, and operative plan.  All team members were in agreement.    Digital Radiography was brought into the sterile field to obtain a true lateral view and needles were placed to jose the intended point of incision.  We made a midline incision and a bilateral subperiosteal exposure was performed of the L4 through L5 spinous processes and medial lamina and the left 5-1 facet.  A needle was placed into the medial facet joint at the caudal most level and a final image was then obtained to verify our position.     The decompression was performed in the same fashion at each level sequentially including the L4 and L5 levels which resulted in the decompression of the L4 and L5 nerve roots.      For each level, the spinous process was removed with a rongeur. The dorsal cortex of the lamina was then thinned with the rongeur.  We palpated the lateral border of the pars to verify the planned width of the decompression.  I used a cautery to jose out the planned limits of the resection to provide a visual reference.  A 4mm round bur was then used to remove the remaining dorsal cortical and cancellous layers.  Eventually, the ligamentum flavum was uncovered by the bur in the midline.  The proximal origin of the ligamentum flavum and epidural fat were identified. A curette was used to split and elevate the flavum in the midline, exposing the epidural  fat underneath.  Kerrison punches were then used to resect the flavum down to the caudad laminar edge.  At this point the central decompression was complete, and I turned my attention to the partial medial facetectomy.  A 5mm strait osteotome was used to resect the remaining overhanging medial facet.  Where necessary the resection was completed with a kerrison.  This was continued laterally until the medial wall of the pedicle was readily palpable.  I then completed the foraminotomies.  A elsy was used to trace out the edge of the pedicle.  I then introduced a 2mm kerrison into the foramen below the pedicle, keeping the shoe of the kerrison facing the nerve root.  Using multiple bites in this way, I was able to remove the remaining facet capsule and osteophytes that were compressing the exiting nerve root.  In the same fashion, I was able to reach out into the foramen above the pedicle and remove any compression on the exiting nerve root above. This process was performed sequentially at each of the levels noted.       In performing the decompression, I found she was quite tight in the lateral recess and after the decompression these areas seemed quite free.    I then moved down to the 5-1 level for the left facet cyst removal.  I used a bur to remove the medial aspect of the L5-S1 facet and about 3 to 4 mm of the leading edge of the L5 lamina as well as the leading edge of the S1 lamina.  I used a curette and a King to detach the ligamentum flavum.  The facet cyst was quite adherent to the traversing S1 nerve root.  I was able to mobilize it with a combination of curette and King and peel it off of the dura.  I then removed in a piecemeal fashion with a 4 mm Kerrison and 3 mm Kerrison as well as under biting the facet to remove any remaining overhanging bone.  In this way we were able to resect the entire facet cyst and decompress the S1 nerve root along its entire course.    The wound was then thoroughly  irrigated.  Hemostasis was achieved.  A hemovac drain was placed.  The wound was closed in layers with vicryl suture, followed by monocryl and dermabond for the skin.  A sterile dressing was applied. The patient was turned supine, extubated, and returned to the recovery area in stable condition.      I was present and scrubbed for the critical portions of the procedure including the exposure and decompression.    Eric Moreno MD

## 2024-12-30 NOTE — PROGRESS NOTES
PACU to Inpatient Nursing Handoff    Patient Maggie Navarrete is a 74 year old female who speaks English.   Procedure Procedure(s):  Lumbar 4 to 5 Decompression and Left Lumbar 5-Sacral 1 Rafael-Laminotomy and Cyst Removal   Surgeon(s) Primary: Eric Moreno MD  Assisting: Bishop NIKKI Ricketts PA-C  Resident - Assisting: Cristofer Vitale MD     Allergies   Allergen Reactions    Dust Mite Extract Other (See Comments)    Erythromycin GI Disturbance, Other (See Comments), Nausea and Nausea and Vomiting     PN: LW Reaction: Nausea    Tramadol Nausea, Other (See Comments) and Nausea and Vomiting       Isolation  No active isolations     Past Medical History   has a past medical history of Anxiety, Arthritis, Disorder of bone and cartilage, unspecified, Esophageal reflux (2015), Follicular adenoma of thyroid gland, GERD (gastroesophageal reflux disease), previous reproductive problem (1980), Kidney stone (2011), 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    Anesthesia General   Dermatome Level     Preop Meds acetaminophen (Tylenol) - time given: 09:18  gabapentin (Neurontin) - time given: 11:45   Nerve block Not applicable   Intraop Meds fentanyl (Sublimaze): 100 mcg total  hydromorphone (Dilaudid): 0.5 mg total  ondansetron (Zofran): last given at 1505  TXA - 600    Local Meds No   Antibiotics cefazolin (Ancef) - last given at 11:45     Pain Patient Currently in Pain: yes   PACU meds  fentanyl (Sublimaze): 75 mcg (total dose) last given at 14:17   oxycodone (Roxicodone): 5 mg (total dose) last given at 14:45    PCA / epidural No   Capnography     Telemetry ECG Rhythm: Normal sinus rhythm   Inpatient Telemetry Monitor Ordered? No        Labs  Glucose Lab Results   Component Value Date    GLC 94 12/30/2024    GLC 69 07/26/2024       Hgb Lab Results   Component Value Date    HGB 12.4 12/12/2024    HGB 13.7 04/28/2021       INR Lab Results   Component Value Date    INR 0.93 05/13/2023    INR 0.93 05/09/2017      PACU Imaging Not applicable     Wound/Incision Incision/Surgical Site 12/30/24 Midline;Lower Back (Active)   Incision Assessment UTV 12/30/24 1430   Dressing Other (Comment) 12/30/24 1308   Tiki-Incision Assessment UTV 12/30/24 1308   Closure Sutures;Approximated 12/30/24 1308   Incision Drainage Amount None 12/30/24 1430   Drainage Description UTV 12/30/24 1308   Incision Care Normal saline 12/30/24 1308   Dressing Intervention Clean, dry, intact 12/30/24 1430   Number of days: 0      CMS        Equipment Not applicable   Other LDA       IV Access Peripheral IV 12/30/24 Right Hand (Active)   Site Assessment WDL 12/30/24 1430   Line Status Saline locked 12/30/24 1430   Dressing Transparent 12/30/24 1430   Dressing Status clean;dry;intact 12/30/24 1430   Phlebitis Scale 0-->no symptoms 12/30/24 1430   Infiltration? no 12/30/24 1430   Number of days: 0       Port A Cath Single 08/23/24 Right Chest wall (Active)   Site Assessment WDL 12/30/24 1430   Dressing Status clean;dry;intact 12/30/24 1430   Dressing Intervention Transparent 12/30/24 1430   Line Status Infusing 12/30/24 1430   Access Date 12/30/24 12/30/24 1047   Access Attempts 1 12/30/24 1047   Needle Gauge 22 gauge 12/30/24 1047   Needle Length 3/4 inch 12/30/24 1047   Line Necessity Yes, meets criteria 12/30/24 1333   Number of days: 129      Blood Products Not applicable EBL   10 mL   Intake/Output Date 12/30/24 0700 - 12/31/24 0659   Shift 9899-3199 2142-1624 5608-1282 24 Hour Total   INTAKE   I.V. 700   700   Shift Total(mL/kg) 700(12.57)   700(12.57)   OUTPUT   Blood 10   10   Shift Total(mL/kg) 10(0.18)   10(0.18)   Weight (kg) 55.7 55.7 55.7 55.7      Drains / Kwan Closed/Suction Drain  "1 Inferior;Midline;Left Back Accordion 10 Uzbek 10 Uzbek 1/8\" Medium Hemovac (Active)   Site Description UTV 12/30/24 1430   Dressing Status Normal: Clean, Dry & Intact 12/30/24 1430   Drainage Appearance Bloody/Bright Red 12/30/24 1430   Status Open to gravity drainage 12/30/24 1430   Number of days: 0      Time of void PreOp Time of Void Prior to Procedure: 0910 (12/30/24 0912)    PostOp      Diapered? No   Bladder Scan     PO    tolerating sips and applesauce     Vitals    B/P: 115/75  T: 96.8  F (36  C)    Temp src: Axillary  P:  Pulse: 94 (12/30/24 1500)          R: 20  O2:  SpO2: 99 %    O2 Device: None (Room air) (12/30/24 1355)    Oxygen Delivery: 6 LPM (12/30/24 1333)         Family/support present Daughter and her partner   Patient belongings     Patient transported on cart   DC meds/scripts (obs/outpt) Not applicable   Inpatient Pain Meds Released? Yes       Special needs/considerations None   Tasks needing completion None       Tracy Pulido, RN  Vocera     "

## 2024-12-30 NOTE — CONSULTS
[facility]  Consult Note - Hospitalist Service  Date of Admission:  12/30/2024  Consult Requested by: Cristofer Vitale MD   Reason for Consult: Medical co-managment s/p spine surgery    Assessment & Plan   Maggie Navarrete is a 74 year old female who presents for a Lumbar 4 to 5 Decompression and Left Lumbar 5-Sacral 1 Rafael-Laminotomy and Cyst Removal (Spine). She has a notable past medical history lumbar radiculopathy, spinal stenosis of the lumbar region with neurogenic claudication, spondylolisthesis of the lumbar region, recurrent metastatic uterine leiomyosarcoma, history of DVT, hypothyroidism, GERD, and anxiety.    # Lumbar 4 to 5 Decompression and Left Lumbar 5-Sacral 1 Rafael-Laminotomy and Cyst Removal  # Lumbar radiculopathy, spinal stenosis of the lumbar region with neurogenic claudication, spondylolisthesis of the lumbar region: EBL of 20 mL. Remainder of care per primary team, Orthopedics.   Activity: Up with assist until independent    Weight bearing status: WBAT.  Pain management: Transition from IV to PO narcotics as tolerated. OK for NSAIDs.  Antibiotics: Antibiotics until drain removed  Diet: Begin with clear fluids and progress diet as tolerated.   DVT prophylaxis: SCDs only. No chemical DVT ppx needed.  Imaging: XR Upright Lumbar PTDC - ordered.  Labs: Hgb POD 1  Bracing/Splinting: None.  Dressings: Keep gauze and tegaderm c/d/i x 7 days.  Drains: Document output per shift, will be discontinued at Orthopedic Surgery discretion.  Kwan catheter: Remove POD#1.   Physical Therapy/Occupational Therapy: Eval and treat  Cultures: none.    Consults: Hospitalist  Follow-up: Clinic with Dr. Moreno in 6 weeks with repeat x-rays.   Disposition: Pending progress with therapies, pain control on orals, and medical stability, anticipate discharge to home on POD #1-2.     # Recurrent metastatic uterine leiomyosarcoma: Follows with Dr. Powers. Last chemotherapy ~ 1  month ago.  - Follow-up with Dr. Powers OP    # History of LLE DVT s/p hysterectomy (2016) not on AC:  - AC per primary team, as above    # Hypothyroidism:  - PTA synthroid     #GERD:  - PTA pepcid     # Anxiety:  - PTA effexor      The patient's care was discussed with the Bedside Nurse, Patient, and Primary team (via this note).     Clinically Significant Risk Factors Present on Admission          SHIRAZ Ospina CNP  Hospitalist Service  Securely message with Vocera (more info)  Text page via [facility] Paging/Directory     This chart documentation was completed with Dragon voice-recognition software. Even though reviewed, this chart may still contain some grammatical, spelling, and word errors. Please contact the author for any questions or clarifications.   ___________________________________________________________________    Chief Complaint   Medical co-managment s/p spine surgery  History is obtained from the patient and per chart review     History of Present Illness   Maggie Navarrete is a 74 year old female who presents for a Lumbar 4 to 5 Decompression and Left Lumbar 5-Sacral 1 Rafael-Laminotomy and Cyst Removal (Spine). She has a past medical history lumbar radiculopathy, spinal stenosis of the lumbar region with neurogenic claudication, spondylolisthesis of the lumbar region, recurrent metastatic uterine leiomyosarcoma, history of DVT, hypothyroidism, GERD, and anxiety.    Upon interview and exam, noting some mild pain and nausea. Denies any headaches, fevers, dysphagia, GERD s/s, SOB, chest pain, vomiting, abdominal pain, dysuria, back pain or leg swelling.     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    GERD (gastroesophageal reflux disease)     Hx of previous reproductive problem 1980     Kidney stone 2011    Leiomyosarcoma (H) 11/14/2016    Personal history of colonic polyps 2016    Small, benign    PONV (postoperative nausea and vomiting)     Posterior vitreous detachment of left eye 2011    Posterior vitreous detachment of right eye 2011    Ptosis of eyelid, bilateral     Sepsis (H) 07/2020    Stomach problems 2015    Abdominal pain, Diarreha    Thyroid disease     thyroid nodule resolved 2014       Past Surgical History   Past Surgical History:   Procedure Laterality Date    ABDOMEN SURGERY  Now    Pain, diarrhea    BREAST SURGERY  2002    right breast benign    COLONOSCOPY  2008    due in 2018    COLONOSCOPY N/A 09/08/2016    Procedure: COMBINED COLONOSCOPY, SINGLE OR MULTIPLE BIOPSY/POLYPECTOMY BY BIOPSY;  Surgeon: Abner Pepper MD;  Location:  GI    COLONOSCOPY N/A 10/18/2021    Procedure: COLONOSCOPY;  Surgeon: Jamila Villarreal MD;  Location: [facility] OR    ENT SURGERY  1975    tonsillectomy    GENITOURINARY SURGERY  1953    bladder surgery     GI SURGERY  2015    Severe constipation    GYN SURGERY  1977    C section    GYN SURGERY  1981    laparoscopy    GYN SURGERY  2007    myomectomy    hysterecomy  11/14/2016    leiomyosarcoma    IR CHEST PORT PLACEMENT > 5 YRS OF AGE  8/23/2024    OTHER SURGICAL HISTORY Right 10/27/2017    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    REMOVE PORT VASCULAR ACCESS Right 05/09/2017    Procedure: REMOVE PORT VASCULAR ACCESS;  Right Port Removal;  Surgeon: Eric Gibson PA-C;  Location:  OR    REPAIR PTOSIS BILATERAL  02/15/2012    Procedure:REPAIR PTOSIS BILATERAL; BILATERAL UPPER LID PTOSIS REPAIR; Surgeon:BRYCE REYNOLDS; Location: [facility]    TUMOR REMOVAL  06/2020       Medications   I have reviewed this patient's current medications  Facility-Administered Medications Prior to Admission   Medication Dose Route Frequency Provider Last Rate Last Admin    denosumab (PROLIA) injection 60 mg  60 mg  Subcutaneous Q6 Months Edgardo Valderrama MD   60 mg at 09/18/24 1310     Medications Prior to Admission   Medication Sig Dispense Refill Last Dose/Taking    Ascorbic Acid 250 MG CHEW Take 250 mg by mouth daily.   Past Week    calcium citrate 250 MG TABS Take 500 mg by mouth 2 times daily.   Past Week    carboxymethylcellulose PF (REFRESH PLUS) 0.5 % ophthalmic solution Place 1 drop into both eyes 4 times daily as needed for dry eyes   Past Week    famotidine (PEPCID) 20 MG tablet Take 1 tablet (20 mg) by mouth 2 times daily as needed.   Past Month    Fish Oil-Cholecalciferol (FISH OIL + D3 PO) Take 1 capsule by mouth daily.   Past Week    levothyroxine (SYNTHROID/LEVOTHROID) 75 MCG tablet TAKE 1 TABLET DAILY FOR    THYROID (Patient taking differently: Take 75 mcg by mouth every morning (before breakfast). TAKE 1 TABLET DAILY FOR    THYROID) 90 tablet 0 12/30/2024 at  6:00 AM    lidocaine-prilocaine (EMLA) 2.5-2.5 % external cream Apply a thin layer on skin overlying port site 10-15 minutes prior to port access for labs or infusion 5 g 1 Past Month    Melatonin 10 MG TABS tablet Take 10 mg by mouth at bedtime.   12/29/2024 at  8:00 PM    multivitamin w/minerals (THERA-VIT-M) tablet Take 1 tablet by mouth daily as needed (if diet insufficient to provide recommended nutrients)   Past Week    triamcinolone (KENALOG) 0.1 % external cream Apply topically daily.   Past Month    TURMERIC PO Take 1 tablet by mouth daily.   Past Week    venlafaxine (EFFEXOR XR) 37.5 MG 24 hr capsule TAKE 1 CAPSULE DAILY (Patient taking differently: Take 37.5 mg by mouth every morning.) 90 capsule 3 12/30/2024 at  6:00 AM    VITAMIN D PO Take 1 tablet by mouth daily.   Past Week    zolpidem (AMBIEN) 5 MG tablet Take 1 tablet (5 mg) by mouth nightly as needed for sleep (Only uses if traveling or cannot sleep,). 20 tablet 0 Past Week        Review of Systems    The 10 point Review of Systems is negative other than noted in the HPI or here.      Social History   I have reviewed this patient's social history and updated it with pertinent information if needed.  Social History     Tobacco Use    Smoking status: Never     Passive exposure: Never    Smokeless tobacco: Never   Vaping Use    Vaping status: Never Used   Substance Use Topics    Alcohol use: Not Currently     Comment: Social, not often per patient.    Drug use: No     Allergies   Allergies   Allergen Reactions    Dust Mite Extract Other (See Comments)    Erythromycin GI Disturbance, Other (See Comments), Nausea and Nausea and Vomiting     PN: LW Reaction: Nausea    Tramadol Nausea, Other (See Comments) and Nausea and Vomiting        Physical Exam   Vital Signs: Temp: 96.8  F (36  C) Temp src: Axillary BP: 121/58 Pulse: 94   Resp: 16 SpO2: 98 % O2 Device: None (Room air) Oxygen Delivery: 6 LPM  Weight: 122 lbs 12.74 oz    General Appearance: In NAD, sitting in bed  Respiratory: LS clear b/l, normal RR  Cardiovascular: S1, S2, no m/r/g  GI: BS+, all 4 quadrants, no masses, non-tender upon palpation  Skin: Intact on face, arms, legs. Unable to see surgical site. LISA drain with moderate serosanguinous drainage. No wounds, bruising, or lesions noted.  Neuropsych: A&Ox4, moving all extremities    Medical Decision Making       60 MINUTES SPENT BY ME on the date of service doing chart review, history, exam, documentation & further activities per the note.      Data         Imaging results reviewed over the past 24 hrs:   Recent Results (from the past 24 hours)   XR Lumbar Spine Port 1 View    Narrative    This exam was marked as non-reportable because it will not be read by a   radiologist or a [place] non-radiologist provider.

## 2024-12-30 NOTE — PROGRESS NOTES
Post-op Check    Seen in PACU. Doing well. Pain controlled. No concerns.    Spine:  - Dressings c/d/i  - Drain patent with serosanguinous output  - DP pulses 2+ bilaterally, feet wwp bilaterally     Lumbar Spine:    Motor -     L2-3: Hip flexion R 4/5  And L 4/5 strength (limited due to pain)         L3/4:  Knee extension R 4/5 and L 4/5 strength (limited due to pain)         L4/5:  Foot dorsiflexion R 5/5 L 5/5 and       EHL dorsiflexion R 5/5 L 5/5 strength         S1:  Plantarflexion/Peroneal Muscles  R 5/5 and L 5/5 strength    Sensation: intact to light touch L3-S1 distribution BLE     A/P:  No changes to plan. See brief op note for plan.

## 2024-12-30 NOTE — BRIEF OP NOTE
Brief Operative Note    Preop Dx:   Lumbar radiculopathy [M54.16]  Spinal stenosis of lumbar region with neurogenic claudication [M48.062]  Spondylolisthesis of lumbar region [M43.16]  Post op Dx:   Same  Procedure:    Procedure(s):  Lumbar 4 to 5 Decompression and Left Lumbar 5-Sacral 1 Rafael-Laminotomy and Cyst Removal  Surgeon:     Dr. Moreno   Assistants:    Cristofer Vitale MD   Anesthesia:   General  EBL:    20mL   Total IV Fluids:  See Anesthesia Record  Specimens:   None  Findings:   See Operative Dictation      Assessment and Plan: Maggie Navarrete is a 74 year old female now s/p above procedure on 12/30/2024 with Dr. Moreno.     Ortho Primary  Activity: Up with assist until independent    Weight bearing status: WBAT.  Pain management:   Transition from IV to PO narcotics as tolerated. OK for NSAIDs.  Antibiotics: Antibiotics until drain removed  Diet: Begin with clear fluids and progress diet as tolerated.   DVT prophylaxis: SCDs only. No chemical DVT ppx needed.  Imaging: XR Upright Lumbar PTDC - ordered.  Labs: Hgb POD 1  Bracing/Splinting: None.  Dressings: Keep gauze and tegaderm c/d/i x 7 days.  Drains: Document output per shift, will be discontinued at Orthopedic Surgery discretion.  Kwan catheter: Remove POD#1.   Physical Therapy/Occupational Therapy: Eval and treat  Cultures: none.    Consults: Hospitalist  Follow-up: Clinic with Dr. Moreno in 6 weeks with repeat x-rays.   Disposition: Pending progress with therapies, pain control on orals, and medical stability, anticipate discharge to home on POD #1-2.      --  Cristofer Vitale MD  Orthopedic Surgery PGY-4
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
## 2024-12-30 NOTE — ANESTHESIA CARE TRANSFER NOTE
Patient: Maggie Navarrete    Procedure: Procedure(s):  Lumbar 4 to 5 Decompression and Left Lumbar 5-Sacral 1 Rafael-Laminotomy and Cyst Removal       Diagnosis: Lumbar radiculopathy [M54.16]  Spinal stenosis of lumbar region with neurogenic claudication [M48.062]  Spondylolisthesis of lumbar region [M43.16]  Diagnosis Additional Information: No value filed.    Anesthesia Type:   General     Note:    Oropharynx: oropharynx clear of all foreign objects and spontaneously breathing  Level of Consciousness: drowsy  Oxygen Supplementation: face mask  Level of Supplemental Oxygen (L/min / FiO2): 6  Independent Airway: airway patency satisfactory and stable  Dentition: dentition unchanged  Vital Signs Stable: post-procedure vital signs reviewed and stable  Report to RN Given: handoff report given  Patient transferred to: PACU    Handoff Report: Identifed the Patient, Identified the Reponsible Provider, Reviewed the pertinent medical history, Discussed the surgical course, Reviewed Intra-OP anesthesia mangement and issues during anesthesia, Set expectations for post-procedure period and Allowed opportunity for questions and acknowledgement of understanding      Vitals:  Vitals Value Taken Time   /71 12/30/24 1333   Temp 36.0    Pulse 96 12/30/24 1342   Resp 20 12/30/24 1342   SpO2 100 % 12/30/24 1342   Vitals shown include unfiled device data.    Electronically Signed By: SHIRAZ Botello CRNA  December 30, 2024  1:42 PM

## 2024-12-30 NOTE — ANESTHESIA PROCEDURE NOTES
Airway         Procedure Start/Stop Times: 12/30/2024 11:43 AM  Staff -        CRNA: Vickie Conner APRN CRNA       Performed By: CRNAIndications and Patient Condition       Indications for airway management: nohemi-procedural       Induction type:intravenous       Mask difficulty assessment: 1 - vent by mask    Final Airway Details       Final airway type: endotracheal airway       Successful airway: ETT - single  Endotracheal Airway Details        ETT size (mm): 6.5       Cuffed: yes       Cuff volume (mL): 10       Successful intubation technique: direct laryngoscopy       DL Blade Type: MAC 3       Grade View of Cords: 1       Adjucts: stylet       Position: Right       Measured from: lips       Secured at (cm): 21       Bite block used: Soft    Post intubation assessment        Placement verified by: capnometry, equal breath sounds and chest rise        Number of attempts at approach: 1       Number of other approaches attempted: 0       Secured with: tape       Ease of procedure: easy       Dentition: Intact and Unchanged    Medication(s) Administered   Medication Administration Time: 12/30/2024 11:43 AM

## 2024-12-31 ENCOUNTER — APPOINTMENT (OUTPATIENT)
Dept: GENERAL RADIOLOGY | Facility: CLINIC | Age: 74
DRG: 516 | End: 2024-12-31
Attending: ORTHOPAEDIC SURGERY
Payer: MEDICARE

## 2024-12-31 ENCOUNTER — APPOINTMENT (OUTPATIENT)
Dept: OCCUPATIONAL THERAPY | Facility: CLINIC | Age: 74
DRG: 516 | End: 2024-12-31
Attending: ORTHOPAEDIC SURGERY
Payer: MEDICARE

## 2024-12-31 ENCOUNTER — APPOINTMENT (OUTPATIENT)
Dept: PHYSICAL THERAPY | Facility: CLINIC | Age: 74
DRG: 516 | End: 2024-12-31
Attending: ORTHOPAEDIC SURGERY
Payer: MEDICARE

## 2024-12-31 VITALS
HEART RATE: 82 BPM | BODY MASS INDEX: 24.76 KG/M2 | HEIGHT: 59 IN | SYSTOLIC BLOOD PRESSURE: 140 MMHG | WEIGHT: 122.8 LBS | OXYGEN SATURATION: 98 % | TEMPERATURE: 98.9 F | DIASTOLIC BLOOD PRESSURE: 83 MMHG | RESPIRATION RATE: 15 BRPM

## 2024-12-31 PROBLEM — Z98.890 S/P MUSCULOSKELETAL SYSTEM SURGERY: Status: ACTIVE | Noted: 2024-12-31

## 2024-12-31 LAB
GLUCOSE BLDC GLUCOMTR-MCNC: 128 MG/DL (ref 70–99)
GLUCOSE BLDC GLUCOMTR-MCNC: 138 MG/DL (ref 70–99)
HGB BLD-MCNC: 11.6 G/DL (ref 11.7–15.7)
HOLD SPECIMEN: NORMAL

## 2024-12-31 PROCEDURE — 999N000065 XR LUMBAR SPINE 2/3 VIEWS

## 2024-12-31 PROCEDURE — 36591 DRAW BLOOD OFF VENOUS DEVICE: CPT

## 2024-12-31 PROCEDURE — 97530 THERAPEUTIC ACTIVITIES: CPT | Mod: GP | Performed by: PHYSICAL THERAPIST

## 2024-12-31 PROCEDURE — 97116 GAIT TRAINING THERAPY: CPT | Mod: GP | Performed by: PHYSICAL THERAPIST

## 2024-12-31 PROCEDURE — 97530 THERAPEUTIC ACTIVITIES: CPT | Mod: GO | Performed by: OCCUPATIONAL THERAPIST

## 2024-12-31 PROCEDURE — 99207 PR NO CHARGE LOS: CPT | Performed by: PEDIATRICS

## 2024-12-31 PROCEDURE — 85018 HEMOGLOBIN: CPT

## 2024-12-31 PROCEDURE — 97161 PT EVAL LOW COMPLEX 20 MIN: CPT | Mod: GP | Performed by: PHYSICAL THERAPIST

## 2024-12-31 PROCEDURE — 250N000011 HC RX IP 250 OP 636

## 2024-12-31 PROCEDURE — 97535 SELF CARE MNGMENT TRAINING: CPT | Mod: GO | Performed by: OCCUPATIONAL THERAPIST

## 2024-12-31 PROCEDURE — 250N000011 HC RX IP 250 OP 636: Performed by: NURSE PRACTITIONER

## 2024-12-31 PROCEDURE — 99232 SBSQ HOSP IP/OBS MODERATE 35: CPT | Performed by: INTERNAL MEDICINE

## 2024-12-31 PROCEDURE — 97166 OT EVAL MOD COMPLEX 45 MIN: CPT | Mod: GO | Performed by: OCCUPATIONAL THERAPIST

## 2024-12-31 PROCEDURE — 250N000013 HC RX MED GY IP 250 OP 250 PS 637

## 2024-12-31 RX ORDER — OXYCODONE HYDROCHLORIDE 5 MG/1
2.5-5 TABLET ORAL EVERY 4 HOURS PRN
Qty: 20 TABLET | Refills: 0 | Status: SHIPPED | OUTPATIENT
Start: 2024-12-31

## 2024-12-31 RX ORDER — HEPARIN SODIUM,PORCINE 10 UNIT/ML
5-10 VIAL (ML) INTRAVENOUS
Status: DISCONTINUED | OUTPATIENT
Start: 2024-12-31 | End: 2024-12-31 | Stop reason: HOSPADM

## 2024-12-31 RX ORDER — HEPARIN SODIUM,PORCINE 10 UNIT/ML
5-10 VIAL (ML) INTRAVENOUS EVERY 24 HOURS
Status: DISCONTINUED | OUTPATIENT
Start: 2024-12-31 | End: 2024-12-31 | Stop reason: HOSPADM

## 2024-12-31 RX ORDER — ACETAMINOPHEN 325 MG/1
975 TABLET ORAL EVERY 8 HOURS
COMMUNITY
Start: 2024-12-31

## 2024-12-31 RX ORDER — AMOXICILLIN 250 MG
1 CAPSULE ORAL 2 TIMES DAILY
COMMUNITY
Start: 2024-12-31

## 2024-12-31 RX ORDER — HEPARIN SODIUM (PORCINE) LOCK FLUSH IV SOLN 100 UNIT/ML 100 UNIT/ML
5-10 SOLUTION INTRAVENOUS
Status: DISCONTINUED | OUTPATIENT
Start: 2024-12-31 | End: 2024-12-31 | Stop reason: HOSPADM

## 2024-12-31 RX ORDER — POLYETHYLENE GLYCOL 3350 17 G/17G
17 POWDER, FOR SOLUTION ORAL DAILY
COMMUNITY
Start: 2024-12-31

## 2024-12-31 RX ADMIN — OXYCODONE HYDROCHLORIDE 2.5 MG: 5 TABLET ORAL at 02:07

## 2024-12-31 RX ADMIN — HEPARIN SODIUM (PORCINE) LOCK FLUSH IV SOLN 100 UNIT/ML 5 ML: 100 SOLUTION at 16:05

## 2024-12-31 RX ADMIN — LEVOTHYROXINE SODIUM 75 MCG: 75 TABLET ORAL at 06:52

## 2024-12-31 RX ADMIN — CEFAZOLIN 1 G: 1 INJECTION, POWDER, FOR SOLUTION INTRAMUSCULAR; INTRAVENOUS at 00:01

## 2024-12-31 RX ADMIN — ACETAMINOPHEN 975 MG: 325 TABLET, FILM COATED ORAL at 08:53

## 2024-12-31 RX ADMIN — VENLAFAXINE HYDROCHLORIDE 37.5 MG: 37.5 CAPSULE, EXTENDED RELEASE ORAL at 08:54

## 2024-12-31 RX ADMIN — ACETAMINOPHEN 975 MG: 325 TABLET, FILM COATED ORAL at 00:01

## 2024-12-31 RX ADMIN — OXYCODONE HYDROCHLORIDE 5 MG: 5 TABLET ORAL at 14:29

## 2024-12-31 RX ADMIN — OXYCODONE HYDROCHLORIDE 5 MG: 5 TABLET ORAL at 08:55

## 2024-12-31 RX ADMIN — CEFAZOLIN 1 G: 1 INJECTION, POWDER, FOR SOLUTION INTRAMUSCULAR; INTRAVENOUS at 08:58

## 2024-12-31 RX ADMIN — POLYETHYLENE GLYCOL 3350 17 G: 17 POWDER, FOR SOLUTION ORAL at 08:55

## 2024-12-31 RX ADMIN — SENNOSIDES AND DOCUSATE SODIUM 1 TABLET: 50; 8.6 TABLET ORAL at 08:54

## 2024-12-31 RX ADMIN — ONDANSETRON 4 MG: 4 TABLET, ORALLY DISINTEGRATING ORAL at 02:24

## 2024-12-31 RX ADMIN — Medication 5 ML: at 09:41

## 2024-12-31 ASSESSMENT — ACTIVITIES OF DAILY LIVING (ADL)
ADLS_ACUITY_SCORE: 29
ADLS_ACUITY_SCORE: 28
ADLS_ACUITY_SCORE: 29
PREVIOUS_RESPONSIBILITIES: MEAL PREP;HOUSEKEEPING;LAUNDRY;SHOPPING;MEDICATION MANAGEMENT;FINANCES;DRIVING
ADLS_ACUITY_SCORE: 29
ADLS_ACUITY_SCORE: 29
ADLS_ACUITY_SCORE: 28
ADLS_ACUITY_SCORE: 28
ADLS_ACUITY_SCORE: 29

## 2024-12-31 NOTE — PHARMACY-ADMISSION MEDICATION HISTORY
Pharmacist Admission Medication History    Admission medication history is complete. The information provided in this note is only as accurate as the sources available at the time of the update.    Medication reconciliation/reorder completed by provider prior to medication history? No    Information Source(s): Patient and Patient's pharmacy via in-person    Pertinent Information: Discussed medication history with patient and updated list accordingly.     Changes made to PTA medication list:  Added: None  Deleted: None  Changed: Triamcinolone from daily to as needed    Allergies reviewed with patient and updates made in EHR: yes    Medication History Completed By: SABRINA NYE [facility] 12/30/2024 8:26 PM    Prior to Admission medications    Medication Sig Last Dose Taking? Auth Provider Long Term End Date   Ascorbic Acid 250 MG CHEW Take 250 mg by mouth daily. Past Week Yes Reported, Patient     calcium citrate 250 MG TABS Take 500 mg by mouth 2 times daily. Past Week Yes Reported, Patient     carboxymethylcellulose PF (REFRESH PLUS) 0.5 % ophthalmic solution Place 1 drop into both eyes 4 times daily as needed for dry eyes Past Week Yes Unknown, Entered By History     famotidine (PEPCID) 20 MG tablet Take 1 tablet (20 mg) by mouth 2 times daily as needed. Past Month Yes Cierra Escoto APRN CNP No    Fish Oil-Cholecalciferol (FISH OIL + D3 PO) Take 1 capsule by mouth daily. Past Week Yes Reported, Patient     levothyroxine (SYNTHROID/LEVOTHROID) 75 MCG tablet TAKE 1 TABLET DAILY FOR    THYROID  Patient taking differently: Take 75 mcg by mouth every morning (before breakfast). TAKE 1 TABLET DAILY FOR    THYROID 12/30/2024 at  6:00 AM Yes Vandana Morris MD Yes    lidocaine-prilocaine (EMLA) 2.5-2.5 % external cream Apply a thin layer on skin overlying port site 10-15 minutes prior to port access for labs or infusion Past Month Yes Scheierl, Amber J, APRN CNP Yes    Melatonin 10 MG TABS tablet Take 10 mg by mouth  at bedtime. 12/29/2024 at  8:00 PM Yes Reported, Patient     multivitamin w/minerals (THERA-VIT-M) tablet Take 1 tablet by mouth daily as needed (if diet insufficient to provide recommended nutrients) Past Week Yes Unknown, Entered By History     triamcinolone (KENALOG) 0.1 % external cream Apply topically as needed for irritation. Past Month Yes Reported, Patient No    TURMERIC PO Take 1 tablet by mouth daily. Past Week Yes Reported, Patient     venlafaxine (EFFEXOR XR) 37.5 MG 24 hr capsule TAKE 1 CAPSULE DAILY  Patient taking differently: Take 37.5 mg by mouth every morning. 12/30/2024 at  6:00 AM Yes Vandana Morris MD Yes    VITAMIN D PO Take 1 tablet by mouth daily. Past Week Yes Reported, Patient     zolpidem (AMBIEN) 5 MG tablet Take 1 tablet (5 mg) by mouth nightly as needed for sleep (Only uses if traveling or cannot sleep,). Past Week Yes Scheierl, Amber J, APRN CNP Yes

## 2024-12-31 NOTE — PROGRESS NOTES
12/31/24 1008   Appointment Info   Signing Clinician's Name / Credentials (PT) Stephanie Campuzano, PT       Present no   Language english   Living Environment   People in Home other (see comments)  (grandsons staying with her 15 and 17)   Current Living Arrangements house   Home Accessibility stairs to enter home   Number of Stairs, Main Entrance 3   Stair Railings, Main Entrance railings on both sides of stairs  (but to far apart to reach both railings)   Number of Stairs, Within Home, Primary greater than 10 stairs   Stair Railings, Within Home, Primary railing on left side (ascending)   Transportation Anticipated family or friend will provide   Self-Care   Usual Activity Tolerance good   Current Activity Tolerance fair   Regular Exercise Yes   Activity/Exercise Type walking  (walks her dogs)   Exercise Amount/Frequency 45 mins   Equipment Currently Used at Home none   Fall history within last six months yes   Number of times patient has fallen within last six months 1   Activity/Exercise/Self-Care Comment Pt was independent with all ADLs at baseline.   General Information   Onset of Illness/Injury or Date of Surgery 12/30/24   Referring Physician Eric Moreno MD   Patient/Family Therapy Goals Statement (PT) Pt would like to return to exercising classes.   Pertinent History of Current Problem (include personal factors and/or comorbidities that impact the POC) Pt is a 75 y/o female s/p L4-5, L5-S1 decompression.   Existing Precautions/Restrictions fall;spinal   Weight-Bearing Status - LLE weight-bearing as tolerated   Weight-Bearing Status - RLE weight-bearing as tolerated   General Observations Pt supine in bed at start of PT evaluation, agreeable to PT eval.   Cognition   Affect/Mental Status (Cognition) WNL   Orientation Status (Cognition) other (see comments)  (Not formally tested)   Follows Commands (Cognition) WNL   Pain Assessment   Patient Currently in Pain Yes, see Vital Sign  flowsheet   Integumentary/Edema   Integumentary/Edema other (describe)   Integumentary/Edema Comments Incision not observed secondary to dressing in place, has 2 hemovacs in place.   Posture    Posture Not impaired   Range of Motion (ROM)   Range of Motion ROM is WFL   Strength (Manual Muscle Testing)   Strength (Manual Muscle Testing) strength is WFL   Bed Mobility   Comment, (Bed Mobility) Supine to sitting at EOB with mod I   Transfers   Comment, (Transfers) Sit to standing from EOB with FWW and CGA x 1.   Gait/Stairs (Locomotion)   Comment, (Gait/Stairs) Pt ambulated 20' with FWW and CGA x1.   Balance   Balance Comments Pt demonstrated good sitting balance and good standing balance with FWW.   Sensory Examination   Sensory Perception patient reports no sensory changes   Clinical Impression   Criteria for Skilled Therapeutic Intervention Yes, treatment indicated   PT Diagnosis (PT) Impaired functional mobility.   Influenced by the following impairments Post-op pain, weakness, and s/p L4-5, L5-S1 decompression   Functional limitations due to impairments Impaired bed mobility, transfers, and gait.   Clinical Presentation (PT Evaluation Complexity) stable   Clinical Presentation Rationale Per clinical judgement   Clinical Decision Making (Complexity) low complexity   Planned Therapy Interventions (PT) bed mobility training;gait training;stair training;transfer training   Risk & Benefits of therapy have been explained evaluation/treatment results reviewed;risks/benefits reviewed   PT Total Evaluation Time   PT Eval, Low Complexity Minutes (43242) 10   Physical Therapy Goals   PT Frequency One time eval and treatment only   PT Predicted Duration/Target Date for Goal Attainment 12/31/24   PT Goals Bed Mobility;Transfers;Gait;Stairs   PT: Bed Mobility Modified independent;Supine to/from sit;Rolling;Within precautions   PT: Transfers Modified independent;Sit to/from stand;Assistive device   PT: Gait Supervision/stand-by  assist;Rolling walker;150 feet   PT: Stairs Supervision/stand-by assist;3 stairs;Rail on right  (3 steps using the wall to get into the home)   Therapeutic Activity   Therapeutic Activities: dynamic activities to improve functional performance Minutes (19934) 15   Symptoms Noted During/After Treatment Fatigue;Increased pain   Treatment Detail/Skilled Intervention Pt educated on post-op spine precautions and log rolling technique.  Pt reported being familiar with log rolling technique from previous abdominal surgeries.  Pt educated on sit to/from standing transfer technique.  Sit to/from standing from EOB and toilet, FWW and SBA to mod I.  Pt needed cues for hand placement.  Pt was able to negotiate bathroom with distant supervision and demonstrated good standing balance when washing her hands.  Pt was able to complete figure 4 for dressing but had increased pain when getting into figure 4.  Pt agreeable to meet with OT to discuss adapative equipment.  Pt wanted to be as independent as possible since grandsons will be assisting.  Pt was educated on car transfer and verbalized understanding of car transfer.   Pt reported having a 4-ww at home that was her mothers but does not feel like she could use it within the home.  Educated pt on how to obtain DME and qualifications for it to be covered by medicare. Pt prefers to obtain DME outside of hospital, educated pt on size of walker and how to fit walker to appropriate height.   Gait Training   Gait Training Minutes (78926) 11   Symptoms Noted During/After Treatment (Gait Training) fatigue;dizziness;increased pain   Treatment Detail/Skilled Intervention Pt ambulated approximately 300' with FWW and SBA to mod I.  Pt demonstrated steady gait without any LOB.  Pt reported feeling she needed ambulate slowly secondary to getting oxy recently and feeling a little unsteady.  Pt educated on stair sequence with 1 railing and then with using the wall only.  Pt ascended/descended 3  steps with 1 railing and SBA x 1.  Pt educated on how to side step up/down the stairs if she feels unsteady with single UE support.  Pt was able to ascend/descend 3 steps using the wall for stability in order to get in/out of her home.  Pt educated on using HHA from her grandson if she feels unsteady getting in/out of her home.  Pt had no concerns about completing stairs at home.   PT Discharge Planning   PT Plan No further inpatient PT needs.   PT Discharge Recommendation (DC Rec) home with assist   PT Rationale for DC Rec Pt has support from her grandsons at home.  Anticipate pt will be able to discharge to home with grandsons for assist.  Pt demonstrated safe mobility with FWW.   PT Brief overview of current status Pt is mod I with FWW   PT Equipment Needed at Discharge   (Pt getting FWW on her own.)   Physical Therapy Time and Intention   Timed Code Treatment Minutes 26   Total Session Time (sum of timed and untimed services) 36

## 2024-12-31 NOTE — PLAN OF CARE
Physical Therapy Discharge Summary    Reason for therapy discharge:    All goals and outcomes met, no further needs identified.    Progress towards therapy goal(s). See goals on Care Plan in [facility] electronic health record for goal details.  Goals met    Therapy recommendation(s):    No further therapy is recommended.

## 2024-12-31 NOTE — CONSULTS
Patient has Medicare D through [employer].    Xarelto 10mg  If filled in 2024: $532 for 1 mo due to remaining $528 2024 deductible.  If filled in 2025:  $532 for 1 mo, but this would nearly fulfill patient's 2025 deductible of $590.    Eliquis 2.5mg:  Discharge Pharmacy can provide 1 mo free using one-time  voucher.  Through insurance, price would be as follows:  If filled in 2024: $537 for 1 mo due to remaining $528 2024 deductible.  If filled in 2025:  $597, but this would fulfill patient's 2025 deductible of $590.    Please reconsult if patient will be on this medication for longer than 1 mo, and I can give ongoing pricing for 2025.    Deloris Colvin  Pharmacy Technician/Liaison, Discharge Pharmacy   [phone] (voice or text)  [email or web address]  Pharmacy test claims are estimates and may not reflect final costs.   Suggested alternatives aim to be cost-effective but may not be therapeutically equivalent as this consult is informational and does not constitute medical advice.   Clinical decisions should be made by qualified healthcare providers.

## 2024-12-31 NOTE — PLAN OF CARE
Goal Outcome Evaluation:      Plan of Care Reviewed With: patient    Overall Patient Progress: improvingOverall Patient Progress: improving    Outcome Evaluation: A&Ox4. RA. Denies numbness and tingling in all extremities. SBA to bathroom with walker and gait-belt. Voiding spontaneously without difficulty. SPD's on overnight. Port-a-cath infusing 75 ml/hr. PIV SL. PRN PO oxycodone given 1x for pain. Zofran given for nausea.    Continue to monitor.

## 2024-12-31 NOTE — PROGRESS NOTES
[facility]    Medicine Progress Note - Hospitalist Service, GOLD TEAM 17    Date of Admission:  12/30/2024    Assessment & Plan   74 year old female who presents for a Lumbar 4 to 5 Decompression and Left Lumbar 5-Sacral 1 Rafael-Laminotomy and Cyst Removal (Spine). She has a notable past medical history lumbar radiculopathy, spinal stenosis of the lumbar region with neurogenic claudication, spondylolisthesis of the lumbar region, recurrent metastatic uterine leiomyosarcoma, history of DVT, hypothyroidism, GERD, and anxiety.    Today's updates   -No acute events overnight.   -Patient was pleasant.   -Patient still has one drain in place.   -HGB 11.6.   -Discussed the case with Ortho team. DVT prophylaxis per Ortho team.   -Disposition per Ortho team. Discharge home today vs tomorrow.   -Patient is hemodynamically stable.      # Lumbar 4 to 5 Decompression and Left Lumbar 5-Sacral 1 Rafael-Laminotomy and Cyst Removal  # Lumbar radiculopathy, spinal stenosis of the lumbar region with neurogenic claudication, spondylolisthesis of the lumbar region  Ortho Primary  Activity: Up with assist until independent    Weight bearing status: WBAT.  Pain management:   Transition from IV to PO narcotics as tolerated. OK for NSAIDs.  Antibiotics: Antibiotics until drain removed  Diet: Begin with clear fluids and progress diet as tolerated.   DVT prophylaxis: SCDs only. No chemical DVT ppx needed.  Imaging: XR Upright Lumbar PTDC - ordered.  Labs: Hgb 11.6  Bracing/Splinting: None.  Dressings: Keep gauze and tegaderm c/d/i x 7 days.  Drains: Document output per shift, will be discontinued at Orthopedic Surgery discretion.  Physical Therapy/Occupational Therapy: Eval and treat  Cultures: none.    DVT prophylaxis: Per Ortho team.     # Recurrent metastatic uterine leiomyosarcoma: Follows with Dr. Powers. Last chemotherapy ~ 1 month ago.  - Follow-up with Dr. Powers OP     # History of LLE DVT  s/p hysterectomy (2016) not on AC:  - AC per primary team, as above     # Hypothyroidism:  - PTA synthroid      #GERD:  - PTA pepcid      # Anxiety:  - PTA effexor           Diet: Advance Diet as Tolerated: Regular Diet Adult    DVT Prophylaxis: Defer to primary service  Kwan Catheter: Not present  Lines: PRESENT      Port A Cath Single 08/23/24 Right Chest wall-Site Assessment: WDL      Cardiac Monitoring: None  Code Status: Full Code      Clinically Significant Risk Factors Present on Admission                                        Social Drivers of Health    Social Connections: Unknown (11/13/2024)    Social Connection and Isolation Panel [NHANES]     Frequency of Social Gatherings with Friends and Family: Once a week          Disposition Plan     Medically Ready for Discharge: Ready Now             Vernon Lerma MD  Hospitalist Service, GOLD TEAM 17  [facility]  Securely message with Ning (more info)  Text page via [employer] Paging/Directory   See signed in provider for up to date coverage information  ______________________________________________________________________    Interval History     No acute events overnight   Other ROS negative   Pain well controlled    Physical Exam   Vital Signs: Temp: 98.9  F (37.2  C) Temp src: Oral BP: (!) 140/83 Pulse: 82   Resp: 15 SpO2: 98 % O2 Device: None (Room air) Oxygen Delivery: 6 LPM  Weight: 122 lbs 12.74 oz    General Appearance:  Alert, no acute distress, room air.   Respiratory: Normal respiratory effort, clear lungs.   Cardiovascular: S1, S2, no m/r/g  GI: BS+, all 4 quadrants, no masses, non-tender upon palpation  Neuropsych: A&Ox4, moving all extremities    Medical Decision Making       45 MINUTES SPENT BY ME on the date of service doing chart review, history, exam, documentation & further activities per the note.      Data     I have personally reviewed the following data over the past 24 hrs:    N/A  \    11.6 (L)   / N/A     N/A N/A N/A /  128 (H)   N/A N/A N/A \       Imaging results reviewed over the past 24 hrs:   No results found for this or any previous visit (from the past 24 hours).

## 2024-12-31 NOTE — PROGRESS NOTES
"Orthopaedic Surgery Progress Note 12/31/2024    S: No acute events overnight.  Pain well controlled on Oral meds. Denies numbness or tingling in the affected extremity. chest pain (-), SOB(-), nausea/vomiting(-). Tolerating oral diet. BM(-) flatus(+). Voiding spontaneously.  Ambulating with nursing in the hallways using a walker.    O:  Temp: 98.9  F (37.2  C) Temp src: Oral BP: (!) 146/89 Pulse: 88   Resp: 16 SpO2: 96 % O2 Device: None (Room air) Oxygen Delivery: 6 LPM    Exam:  Gen: No acute distress, resting comfortably in bed.  Resp: Non-labored breathing  MSK:  Spine:  - Dressings c/d/i  - Drain patent with serosanguinous output  - DP pulses 2+ bilaterally, feet wwp bilaterally     Lumbar Spine:    Motor -     L2-3: Hip flexion R 5/5  And L 5/5 strength          L3/4:  Knee extension R 5/5 and L 5/5 strength         L4/5:  Foot dorsiflexion R 5/5 L 5/5 and       EHL dorsiflexion R 5/5 L 5/5 strength         S1:  Plantarflexion/Peroneal Muscles  R 5/5 and L 5/5 strength    Sensation: intact to light touch L3-S1 distribution BLE        Drain output: -/10-30.    No lab results found in last 7 days.    Invalid input(s): \"SEDRATE\"  No results found for: \"SED\"      Assessment: Maggie Navarrete is a 74 year old female s/p L4-5, L5-S1 decompression on 12/30/2024 with Dr. Moreno.     OK to start therapeutic chemoprophylaxis on POD2.    Today:  - Mobilize with therapy  - Pain control p.o.  - Discontinue drain  - Likely discharge home    Plan:  Ortho Primary  Activity: Up with assist until independent    Weight bearing status: WBAT.  Pain management:   Transition from IV to PO narcotics as tolerated. OK for NSAIDs.  Antibiotics: Antibiotics until drain removed  Diet: Begin with clear fluids and progress diet as tolerated.   DVT prophylaxis: SCDs only. No chemical DVT ppx needed.  Imaging: XR Upright Lumbar PTDC - ordered.  Labs: Hgb POD 1  Bracing/Splinting: None.  Dressings: Keep gauze and tegaderm c/d/i x 7 " days.  Drains: Document output per shift, will be discontinued at Orthopedic Surgery discretion.  Physical Therapy/Occupational Therapy: Eval and treat  Cultures: none.    Consults: Hospitalist  Follow-up: Clinic with Dr. Moreno in 6 weeks with repeat x-rays.   Disposition: Pending progress with therapies, pain control on orals, and medical stability, anticipate discharge to home on POD #1-2..    Future Appointments   Date Time Provider Department Center   12/31/2024 11:15 AM Odalys Veras, OT [facility] [place]   12/31/2024  1:30 PM Stephanie Campuzano, PT [facility] [place]   1/20/2025 11:30 AM BED 7 SH SLEEP [facility]   1/21/2025  8:00 AM BED 7  SLEEP [facility]   1/28/2025  4:00 PM Beto Galvan MD [facility]   1/29/2025  1:20 PM Latonya Bass MD [facility]   2/11/2025 12:50 PM Eric Moreno MD [facility]   3/18/2025  1:00 PM CS NURSE [facility]   4/29/2025 11:00 AM Deja Anderson MD [facility]   9/15/2025  9:30 AM Edgardo Valderrama MD [facility]   9/24/2025 11:00 AM Chapis Styles MD [facility]   11/25/2025  1:30 PM Vandana Morris MD [facility]       Patient discussed with Dr. Moreno.        --  Cristofer Vitale MD  Orthopedic Surgery PGY-4

## 2024-12-31 NOTE — PLAN OF CARE
Time of care: 0961-2472  Goal Outcome Evaluation:      Plan of Care Reviewed With: patient    Overall Patient Progress: improvingOverall Patient Progress: improving    Outcome Evaluation: Patient is alert and oriented, able to make her needs known. Having pain rated 5/10. Oxy effective for pain management. Patient up with stand by assist. No dizziness with ambulation. Offers no complaints of chest pain or shortness of breath. CMS intact to bilateral lower extremities. Dressing is clean, dry and intact. Drain removed this afternoon without issues.

## 2024-12-31 NOTE — CARE PLAN
Admission Note    Reason for admission: Spinal surgery  Primary team notified of pt arrival.  Admitted from: PACU  Via: cart  Belongings: Placed in closet  Admission Required Doc Completed: Yes  Teaching: Orientation to unit and call light- call light within reach, use of console, meal times, when to call for the RN, and enforced importance of safety.  IV Access: Yes  Telemetry: No  Ht./Wt.: Completed  Code Status verified on armband: Yes  2 RN Skin Assessment Completed with: Yenny MARI  Suction/Ambu bag/Flowmeter at bedside: Yes    Pt status:     Temp:  [96.8  F (36  C)-98.1  F (36.7  C)] 97.6  F (36.4  C)  Pulse:  [84-99] 84  Resp:  [12-26] 16  BP: (115-174)/(58-98) 154/94  SpO2:  [97 %-100 %] 99 %

## 2024-12-31 NOTE — PLAN OF CARE
Goal Outcome Evaluation:  End of shift Summary: See flowsheet for VS and detail assessments.  Changes this Shift:   Pulmonary:Pt is sating adequately on RA. Denies SOB and chest pain. Pt refused Capno overnight.   Output: Pt is voiding adequately without difficulty. LBM 12/29.  Activity:Assist of 1 with walker and gait belt to bathroom. Pt ambulated the halls this shift.  Pain: Pt rates pain 3-5/10 managed with PRNs.  Neuro/CMS: Alert and oriented x 4.   Dressings/Drains: Hemovac drain- minimal output.   IV: Right chest port a cath infusing NS 75 mls/ hr. Right PIV SL.  Additional info:Pt is able to make needs known.   Plan: Continue POC.

## 2024-12-31 NOTE — CARE PLAN
Discharge note.  Pt. discharged at around 1640 and left with personal belongings. Pt. received complete discharge paperwork and medications as filled by discharge pharmacy. Pt received and signed for the narcotic medication. Pt. was given times of last dose for all discharge medications in writing on discharge medication sheets. Discharge teaching included  medication, pain management, activity restrictions, dressing changes, and signs and symptoms of infection. Pt. had no further questions at the time of discharge and no unmet needs were identified.

## 2024-12-31 NOTE — PROGRESS NOTES
12/31/24 1220   Appointment Info   Signing Clinician's Name / Credentials (OT) Odayls Veras OTR/L   Rehab Comments (OT) spine   Living Environment   People in Home alone   Current Living Arrangements house   Home Accessibility stairs to enter home   Number of Stairs, Main Entrance 3   Stair Railings, Main Entrance railings on both sides of stairs   Number of Stairs, Within Home, Primary greater than 10 stairs   Stair Railings, Within Home, Primary railing on left side (ascending)   Transportation Anticipated family or friend will provide   Living Environment Comments pt's grandsons (15 + 17) staying during the week, pt will be alone weekends   Self-Care   Usual Activity Tolerance good   Current Activity Tolerance moderate   Regular Exercise Yes   Activity/Exercise Type walking   Exercise Amount/Frequency 45 mins   Equipment Currently Used at Home none   Fall history within last six months yes   Number of times patient has fallen within last six months 1   Activity/Exercise/Self-Care Comment Pt indep with ADLs at baseline. Pt walks her 2 40# dogs daily.   Instrumental Activities of Daily Living (IADL)   Previous Responsibilities meal prep;housekeeping;laundry;shopping;medication management;finances;driving   IADL Comments grandsons can help during the week   General Information   Onset of Illness/Injury or Date of Surgery 12/30/24   Referring Physician Cristofer Vitale MD   Patient/Family Therapy Goal Statement (OT) home   Additional Occupational Profile Info/Pertinent History of Current Problem per chart: 74 year old female who presents for a Lumbar 4 to 5 Decompression and Left Lumbar 5-Sacral 1 Rafael-Laminotomy and Cyst Removal (Spine). She has a notable past medical history lumbar radiculopathy, spinal stenosis of the lumbar region with neurogenic claudication, spondylolisthesis of the lumbar region, recurrent metastatic uterine leiomyosarcoma, history of DVT, hypothyroidism, GERD, and anxiety.   Existing  Precautions/Restrictions spinal   Left Upper Extremity (Weight-bearing Status) partial weight-bearing (PWB)  (10#)   Right Upper Extremity (Weight-bearing Status) partial weight-bearing (PWB)  (10#)   Left Lower Extremity (Weight-bearing Status) weight-bearing as tolerated (WBAT)   Right Lower Extremity (Weight-bearing Status) weight-bearing as tolerated (WBAT)   General Observations and Info activity order: up with assist   Cognitive Status Examination   Affect/Mental Status (Cognitive) WNL   Follows Commands follows two-step commands;over 90% accuracy   Cognitive Status Comments no cognitive concerns noted with interview   Pain Assessment   Patient Currently in Pain Yes, see Vital Sign flowsheet   Posture   Posture forward head position;protracted shoulders   Range of Motion Comprehensive   Comment, General Range of Motion WFL   Strength Comprehensive (MMT)   Comment, General Manual Muscle Testing (MMT) Assessment NT due to surgical precautions; pt able to lift all extremities against gravity   Bed Mobility   Bed Mobility supine-sit;sit-supine   Supine-Sit [place] (Bed Mobility) contact guard;verbal cues   Sit-Supine [place] (Bed Mobility) contact guard;verbal cues   Assistive Device (Bed Mobility) bed rails   Comment (Bed Mobility) pt has adjustable bed   Transfers   Transfers bed-chair transfer;sit-stand transfer;toilet transfer;shower transfer   Transfer Skill: Bed to Chair/Chair to Bed   Bed-Chair [place] (Transfers) contact guard;verbal cues   Sit-Stand Transfer   Sit-Stand [place] (Transfers) contact guard;verbal cues   Assistive Device (Sit-Stand Transfers) walker, front-wheeled   Shower Transfer   [place] Level (Shower Transfer) minimum assist (75% patient effort)   Shower Transfer Comments tub/shower   Toilet Transfer   [place] Level (Toilet Transfer) contact guard   Balance   Balance Comments good seated; good ambulating in room with 2WW   Activities of Daily Living   BADL  Assessment/Intervention lower body dressing;bathing;grooming;toileting   Bathing Assessment/Intervention   [place] Level (Bathing) minimum assist (75% patient effort)   Lower Body Dressing Assessment/Training   [place] Level (Lower Body Dressing) minimum assist (75% patient effort)   Grooming Assessment/Training   [place] Level (Grooming) minimum assist (75% patient effort)   Toileting   [place] Level (Toileting) minimum assist (75% patient effort)   Clinical Impression   Criteria for Skilled Therapeutic Interventions Met (OT) Yes, treatment indicated   OT Diagnosis impaired ADLs   OT Problem List-Impairments impacting ADL problems related to;activity tolerance impaired;range of motion (ROM);strength;pain;post-surgical precautions   Assessment of Occupational Performance 5 or more Performance Deficits   Identified Performance Deficits dressing, toilet transfer, toileting, bathing, tub/shower tx, home chores   Planned Therapy Interventions (OT) ADL retraining;IADL retraining;transfer training;home program guidelines   Clinical Decision Making Complexity (OT) detailed assessment/moderate complexity   Risk & Benefits of therapy have been explained evaluation/treatment results reviewed;patient   OT Total Evaluation Time   OT Eval, Moderate Complexity Minutes (06001) 8   OT Goals   Therapy Frequency (OT) One time eval and treatment   OT Predicted Duration/Target Date for Goal Attainment 12/31/24   OT Goals Hygiene/Grooming;Lower Body Dressing;Lower Body Bathing;Transfers;Toilet Transfer/Toileting   OT: Hygiene/Grooming modified independent;within precautions   OT: Lower Body Dressing Modified independent;within precautions   OT: Lower Body Bathing Modified independent;using adaptive equipment;with precautions   OT: Transfer Supervision/stand-by assist;within precautions   OT: Toilet Transfer/Toileting Modified independent;toilet transfer;cleaning and garment management;using adaptive equipment;within  precautions   OT Discharge Planning   OT Plan dc   OT Discharge Recommendation (DC Rec) home with assist   OT Rationale for DC Rec Pt progressing well, anticipate she will be able to discharge to home with family to assist for iadls prn, once medically stable.   OT Brief overview of current status Mary dressing, toileting   Total Session Time   Timed Code Treatment Minutes 41   Total Session Time (sum of timed and untimed services) 49

## 2024-12-31 NOTE — PROGRESS NOTES
Patient asked nurse about anticoagulation due to history of DVT. She had a provoked DVT after hysterectomy in 2016 and is not on anticoagulation at baseline.    Given surgery, history of DVT, and thrombogenic state due to cancer would suggest chemical DVT ppx while in the hospital as able. Passed this recommendation on to ortho on call resident who said he would pass it on to the team.     Aris Cole, DO

## 2025-01-02 ENCOUNTER — PATIENT OUTREACH (OUTPATIENT)
Dept: CARE COORDINATION | Facility: CLINIC | Age: 75
End: 2025-01-02
Payer: MEDICARE

## 2025-01-02 NOTE — UTILIZATION REVIEW
Admission Status; Secondary Review Determination    No action to be taken. Please contact me on my Email : [email or web address] if you have any questions.    As part of the [place] Utilization review plan, a self-audit is done on Medicare inpatient admission with less than 2 midnights stay. The 2014 IPPS Final Rule allows outpatient billing in the event that a hospital determines that an inpatient admission was not medically necessary under utilization review process.     (x) Outpatient status would be Appropriate- Short Stay- Post discharge review.    RATIONALE FOR DETERMINATION   This patient underwent the following procedure:    1. L4 and L5 laminectomy and partial medial facetectomies and foraminotomies for decompression of the L4 and L5 nerve roots.    2. Hemilaminotomy and left facet cyst removal at L5-S1      This procedures correspond to CPT codes 70829, 32023, which are OP procedures   No perioperative vents ere noted that arrant an inpatient level of stay     Patient was admitted and discharge after one night stay. Record was sent by  for a PA review. Based on the  severity of illness, intensity of service provided, expected LOS and risk for adverse outcome make the care appropriate for further outpatient/observation; however, doesn't meet criteria for hospital inpatient admission.       The information on this document is developed by the utilization review team in order for the business office to ensure compliance.  This only denotes the appropriateness of proper admission status and does not reflect the quality of care rendered.       The definitions of Inpatient Status and Observation Status used in making the determination above are those provided in the CMS Coverage Manual, Chapter 1 and Chapter 6, section 70.4.     Please cont me if you want to discuss further about this admission episode.      Keith Olmos MD, FACP, ROSAURA  Medical Director - Utilization management  Staff Hospitalist  [facility]    Pager:  [phone]

## 2025-01-02 NOTE — PROGRESS NOTES
"Clinic Care Coordination Contact  Transitions of Care Outreach  Chief Complaint   Patient presents with    Clinic Care Coordination - Post Hospital       Most Recent Admission Date: 12/30/2024   Most Recent Admission Diagnosis: Lumbar radiculopathy - M54.16  Spinal stenosis of lumbar region with neurogenic claudication - M48.062  Spondylolisthesis of lumbar region - M43.16     Most Recent Discharge Date: 12/31/2024   Most Recent Discharge Diagnosis: History of deep venous thrombosis - Z86.718  Back pain, unspecified back location, unspecified back pain laterality, unspecified chronicity - M54.9  S/P musculoskeletal system surgery - Z98.890     Transitions of Care Assessment    Discharge Assessment  How are you doing now that you are home?: \"I'm okay I'm managing. I got my grandkids here and they are helping out. I haven't had a BM but I've been drinking prune juice and water.\"  How are your symptoms? (Red Flag symptoms escalate to triage hotline per guidelines): Improved  Do you know how to contact your clinic care team if you have future questions or changes to your health status? : Yes  Does the patient have their discharge instructions? : Yes  Does the patient have questions regarding their discharge instructions? : No  Were you started on any new medications or were there changes to any of your previous medications? : Yes  Does the patient have all of their medications?: Yes  Do you have questions regarding any of your medications? : No  Do you have all of your needed medical supplies or equipment (DME)?  (i.e. oxygen tank, CPAP, cane, etc.): Yes    Post-op ([facility]A Only)  If the patient had a surgery or procedure, do they have any questions for a nurse?: No         [facility] Explained and offered Care Coordination support to eligible patients: Yes    Patient accepted? No    Follow up Plan     Discharge Follow-Up  Discharge follow up appointment scheduled in alignment with recommended follow up timeframe or " Transitions of Risk Category? (Low = within 30 days; Moderate= within 14 days; High= within 7 days): No  Patient's follow up appointment not scheduled: Patient declined scheduling support. Education on the importance of transitions of care follow up. Provided scheduling phone number.    Future Appointments   Date Time Provider Department Center   1/20/2025 11:30 AM BED 7 SH SLEEP [facility] S[facility]   1/21/2025  8:00 AM BED 7  SLEEP [facility]   1/28/2025  4:00 PM Beto Galvan MD [facility]   1/29/2025  1:20 PM Latonya Bass MD [facility]   2/11/2025 12:50 PM Eric Moreno MD [facility]   3/18/2025  1:00 PM CS NURSE [facility]   4/29/2025 11:00 AM Deja Anderson MD [facility]   9/15/2025  9:30 AM Edgardo Valderrama MD [facility]   9/24/2025 11:00 AM Chapis Styles MD [facility]   11/25/2025  1:30 PM Vandana Morris MD [facility]       Outpatient Plan as outlined on AVS reviewed with patient.    For any urgent concerns, please contact our 24 hour nurse triage line: [phone] ([phone])       BRANDON Hilario  [phone]  Connected Care Resource [facility]

## 2025-01-03 NOTE — DISCHARGE SUMMARY
ORTHOPEDIC SURGERY DISCHARGE SUMMARY     Date of Admission: 12/30/2024  Date of Discharge: 12/31/2024  5:07 PM  Disposition: Home  Staff Physician: Dr. Moreno  Primary Care Provider: Vandana Morris    DISCHARGE DIAGNOSIS:  Lumbar radiculopathy [M54.16]  Spinal stenosis of lumbar region with neurogenic claudication [M48.062]  Spondylolisthesis of lumbar region [M43.16]    PROCEDURES: Procedure(s):  Lumbar 4 to 5 Decompression and Left Lumbar 5-Sacral 1 Rafael-Laminotomy and Cyst Removal on 12/30/2024    BRIEF HISTORY:  This is a 74 year old patient who has been followed in clinic. Please refer to that documentation for full details. Briefly, the patient has radiculopathy related to an L5 facet cyst and degenerative changes. The patient has failed conservative treatment of symptoms and desires more definitive intervention. Therefore, after reviewing non-operative and operative options including the risks and benefits associated with each, the patient elected to proceed with the above stated procedure.     HOSPITAL COURSE:    The patient was admitted following the above listed procedures for pain control and rehabilitation. Maggie Navarrete did well post-operatively. Medicine was consulted post operatively to aid in management of medical co-morbidities. The patient received routine nursing cares and at the time of discharge was medically stable. Vital signs were stable throughout admission. The patient is tolerating a regular diet and is voiding spontaneously. All PT/OT goals have been met for safe mobility. Pain is now controlled on oral medications which will be available on discharge. Stool softeners have been used while taking pain medications to help prevent constipation. Maggie Navarrete is deemed medically safe to discharge.     Antibiotics:  Ancef given periop, while drains were in place  DVT prophylaxis:  Mechanical initiated after surgery, then started on Eliquis 2.5mg BID x 10d.  PT Progress:  Has met PT/OT  goals for safe mobility.   Pain Meds:  Weaned off all IV pain meds by discharge.  Inpatient Events:  No significant postoperative events or complications.     PHYSICAL EXAM:    Please see day of discharge progress note.    FOLLOWUP:    Follow up with Dr. Moreno at 6 weeks postoperatively.    Future Appointments   Date Time Provider Department Center   1/20/2025 11:30 AM BED 7  SLEEP [facility]   1/21/2025  8:00 AM BED 7  SLEEP [facility]   1/28/2025  4:00 PM Beto Galvan MD [facility]   1/29/2025  1:20 PM Latonya Bass MD [facility]   2/11/2025 12:50 PM [facility]   3/18/2025  1:00 PM CS NURSE [facility]    4/29/2025 11:00 AM Deja Anderson MD [facility]   9/15/2025  9:30 AM Edgardo Valderrama MD [facility]   9/24/2025 11:00 AM Chapis Styles MD [facility]   11/25/2025  1:30 PM Vandana Morris MD [facility]       Orthopedic Surgery appointments are at the [facility] ([address] [place] [address]). Call [phone] to schedule a follow-up appointment at this location with your provider.     PLANNED DISCHARGE ORDERS:      Discharge Medication List as of 12/31/2024  3:56 PM        START taking these medications    Details   acetaminophen (TYLENOL) 325 MG tablet Take 3 tablets (975 mg) by mouth every 8 hours., OTC      apixaban ANTICOAGULANT (ELIQUIS) 2.5 MG tablet Take 1 tablet (2.5 mg) by mouth 2 times daily for 10 days., Disp-20 tablet, R-0, E-Prescribe      oxyCODONE (ROXICODONE) 5 MG tablet Take 0.5-1 tablets (2.5-5 mg) by mouth every 4 hours as needed for moderate to severe pain., Disp-20 tablet, R-0, E-Prescribe      polyethylene glycol (MIRALAX) 17 GM/Dose powder Take 17 g by mouth daily., OTC      senna-docusate (SENOKOT-S/PERICOLACE) 8.6-50 MG tablet Take 1 tablet by mouth 2 times daily., OTC           CONTINUE these medications which have NOT CHANGED    Details   Ascorbic  "Acid 250 MG CHEW Take 250 mg by mouth daily., Historical      calcium citrate 250 MG TABS Take 500 mg by mouth 2 times daily., Historical      carboxymethylcellulose PF (REFRESH PLUS) 0.5 % ophthalmic solution Place 1 drop into both eyes 4 times daily as needed for dry eyes, Historical      famotidine (PEPCID) 20 MG tablet Take 1 tablet (20 mg) by mouth 2 times daily as needed., Historical      Fish Oil-Cholecalciferol (FISH OIL + D3 PO) Take 1 capsule by mouth daily., Historical      levothyroxine (SYNTHROID/LEVOTHROID) 75 MCG tablet TAKE 1 TABLET DAILY FOR    THYROID, Disp-90 tablet, R-0, E-Prescribe      lidocaine-prilocaine (EMLA) 2.5-2.5 % external cream Apply a thin layer on skin overlying port site 10-15 minutes prior to port access for labs or infusionDisp-5 g, [id number]      Melatonin 10 MG TABS tablet Take 10 mg by mouth at bedtime., Historical      multivitamin w/minerals (THERA-VIT-M) tablet Take 1 tablet by mouth daily as needed (if diet insufficient to provide recommended nutrients), Historical      triamcinolone (KENALOG) 0.1 % external cream Apply topically as needed for irritation.Historical      TURMERIC PO Take 1 tablet by mouth daily., Historical      venlafaxine (EFFEXOR XR) 37.5 MG 24 hr capsule TAKE 1 CAPSULE DAILY, Disp-90 capsule, R-3, E-Prescribe      VITAMIN D PO Take 1 tablet by mouth daily., Historical      zolpidem (AMBIEN) 5 MG tablet Take 1 tablet (5 mg) by mouth nightly as needed for sleep (Only uses if traveling or cannot sleep,)., Disp-20 tablet, R-0, E-Prescribe               Discharge Procedure Orders   Reason for your hospital stay   Order Comments: Lumbar decompression     Brief Discharge Instructions   Order Comments: Post-operative Discharge Instructions:     WOUND CARE:  You have a dressing on your incision which can be worn for up to 4 days, and it should be covered in the shower. After the dressing has been removed, you do not need a dressing. There is \"surgical glue\" " directly over the incision. This will fall off on its own, which can take up to 2 weeks. It is okay to shower and wash gently with soap and water. Do not soak in the bath. No pools, hot tubs, or lakes for 6 weeks.      After surgery, you may have a sensitive scar.  When the incision has fully healed, you may massage the scar to decrease sensitivity and help break down scar tissue. Do this up to 4 times per day.       DIET & EXERCISE:  - When you get home, you may resume your normal diet as tolerated. You may not be very hungry but try to eat small healthy meals to help you heal. Remember to drink plenty of water/fluids to help keep you hydrated.     - You will be seen in the clinic at 6 weeks following surgery. You will not need to attend physical therapy during this time. You can focus on cardiovascular fitness by walking as much as you can tolerate. Avoid bending, lifting, and twisting. Your weight lifting restriction is 10 pounds until your first follow-up appointment.       PAIN MEDICATIONS:  For postoperative pain control, we have prescribed a variety of medications. Tylenol has been prescribed and should be taken as part of the complete pain management regimen. You may also have been prescribed a muscle relaxer to be taken as needed for muscle spasms. Other pain management techniques include icing the surgical area (for 15 minutes on, 15 minutes off) throughout the day to help with inflammation. Avoid NSAIDs (ibuprofen, Aleve, Advil, etc) for the first 12 weeks after surgery, unless approved by your surgeon.     You also received a prescription for a opioid medication.  This should be taken for the first few weeks following surgery.  As pain improves, decrease the amount you take (see tapering plan below). Opioid have numerous side effects including nausea, constipation, and drowsiness. If you experience nausea, this may be relieved by taking the medication with food or a light meal. To avoid constipation,  "please use an over-the-counter stool softeners and drink lots of water and eat fruits and vegetables. No operating heavy machinery or driving an automobile while on opioid medications.        Tapering opioids: As your pain symptoms improve, focus your efforts on decreasing (tapering) use of opioid medications. The most successful tapering strategy is to first, decrease the number of tablets you take every 4-6 hours to the minimum prescribed. Then, increase the amount of time between doses.  For example:  First, taper to   or 1 tablet every 4-6 hours.  Then, taper to   or 1 tablet every 6-8 hours.  Then, taper to   or 1 tablet every 8-10 hours.  Then, taper to   or 1 tablet every 10-12 hours.  Then, taper to   or 1 tablet at bedtime.  The bedtime dose can help with comfort during sleep and is typically the last dose to be discontinued after surgery.     Call the orthopedic clinic at [phone] several business days in advance of when you need a refill. Early refills will not be provided, and you may not take more than what is prescribed. Inform the nurse how much of the medication you are taking and how many tablets you have left.     WHEN TO CALL:  Please call or return if you experience the following:  - Fevers (temperature greater than 100.4 degrees Fahrenheit).  - Pain that is getting worse or does not respond to pain medications.  - Drainage from your wound.  - Increasing redness about the wound.  - Changes in strength or sensation to your arms/legs.   - Any other worrisome symptoms.     You may reach the clinic by dialing [phone].  After hours, you may reach the resident on call by dialing [phone].      When to call - Contact Surgeon Team   Order Comments: You may experience symptoms that require follow-up before your scheduled appointment. Refer to the \"[employer]\" for instructions on when to contact your Surgeon Team if you are concerned about pain control, blood clots, constipation, or if you " are unable to urinate.     When to call - Reach out to Urgent Care   Order Comments: If you are not able to reach your Surgeon Team and you need immediate care, go to the Orthopedic Walk-in Clinic or Urgent Care at your Surgeon's office.  Do NOT go to the Emergency Room unless you have shortness of breath, chest pain, or other signs of a medical emergency.     When to call - Reasons to Call 911   Order Comments: Call 911 immediately if you experience sudden-onset chest pain, arm weakness/numbness, slurred speech, or shortness of breath     Discharge Instruction - Breathing exercises   Order Comments: Perform breathing exercises using your Incentive Spirometer 10 times per hour while awake for 2 weeks.     Symptoms - Fever Management   Order Comments: A low grade fever can be expected after surgery.  Use acetaminophen (TYLENOL) as needed for fever management.  Contact your Surgeon Team if you have a fever greater than 101.5 F, chills, and/or night sweats.     Symptoms - Constipation management   Order Comments: Constipation (hard, dry bowel movements) is expected after surgery due to the combination of being less active, the anesthetic, and the opioid pain medication.  You can do the following to help reduce constipation:  ~  FLUIDS:  Drink clear liquids (water or Gatorade), or juice (apple/prune).  ~  DIET:  Eat a fiber rich diet.    ~  ACTIVITY:  Get up and move around several times a day.  Increase your activity as you are able.  MEDICATIONS:  Reduce the risk of constipation by starting medications before you are constipated.  You can take Miralax   (1 packet as directed) and/or a stool softener (Senokot 1-2 tablets 1-2 times a day).  If you already have constipation and these medications are not working, you can get magnesium citrate and use as directed.  If you continue to have constipation you can try an over the counter suppository or enema.  Call your Surgeon Team if it has been greater than 3 days since your  last bowel movement.     Symptoms - Reduced Urine Output   Order Comments: Changes in the amount of fluids you drank before and after surgery may result in problems urinating.  It is important to stay well-hydrated after surgery and drink plenty of water. If it has been greater than 8 hours since you have urinated despite drinking plenty of water, call your Surgeon Team.     Activity - Exercises to prevent blood clots   Order Comments: Unless otherwise directed by your Surgeon team, perform the following exercises at least three times per day for the first four weeks after surgery to prevent blood clots in your legs: 1) Point and flex your feet (Ankle Pumps), 2) Move your ankle around in big circles, 3) Wiggle your toes, 4) Walk, even for short distances, several times a day, will help decrease the risk of blood clots.     Order Specific Question Answer Comments   Is discharge order? Yes      Comfort and Pain Management - Pain after Surgery   Order Comments: Pain after surgery is normal and expected.  You will have some amount of pain for several weeks after surgery.  Your pain will improve with time.  There are several things you can do to help reduce your pain including: rest, ice, elevation, and using pain medications as needed. Contact your Surgeon Team if you have pain that persists or worsens after surgery despite rest, ice, elevation, and taking your medication(s) as prescribed. Contact your Surgeon Team if you have new numbness, tingling, or weakness in your operative extremity.     Comfort and Pain Management - Swelling after Surgery   Order Comments: Swelling and/or bruising of the surgical extremity is common and may persist for several months after surgery. In addition to frequent icing and elevation, gentle compressive support with an ACE wrap or tubigrip may help with swelling. Apply compression regularly, removing at least twice daily to perform skin checks. Contact your Surgeon Team if your swelling  increases and is NOT associated with an increase in your activity level, or if your swelling increases and is associated with redness and pain.     Comfort and Pain Management - Cold therapy   Order Comments: Ice can be used to control swelling and discomfort after surgery. Place a thin towel over your operative site and apply the ice pack overtop. Leave ice pack in place for 20 minutes, then remove for 20 minutes. Repeat this 20 minutes on/20 minutes off routine as often as tolerated.     Medication Instructions - Acetaminophen (TYLENOL) Instructions   Order Comments: You were discharged with acetaminophen (TYLENOL) for pain management after surgery. Acetaminophen most effectively manages pain symptoms when it is taken on a schedule without missing doses (every four, six, or eight hours). Your Provider will prescribe a safe daily dose between 3000 - 4000 mg.  Do NOT exceed this daily dose. Most patients use acetaminophen for pain control for the first four weeks after surgery.  You can wean from this medication as your pain decreases.     Medication Instructions - NSAID Instructions   Order Comments: You may use to help manage your pain. Some common anti-inflammatories include: ibuprofen (ADVIL, MOTRIN), naproxen (ALEVE, NAPROSYN), celecoxib (CELEBREX), meloxicam (MOBIC), ketorolac (TORADOL). Please only use one at a time, as taking these medications during the same day is not recommended.     Medication Instructions - Muscle relaxant Medication Instructions   Order Comments: You were discharged with a muscle relaxer medication that can be used in conjunction with acetaminophen (TYLENOL) and the opioid medication to manage pain symptoms. Take the muscle relaxant medication exactly as directed.     Follow Up Care   Order Comments: Follow-up with your Surgeon Team in 6 weeks for wound check.     Medication instructions - No pharmacologic VTE prophylaxis prescribed   Order Comments: Your Surgeon did not prescribe  medication for anticoagulation.     Opioid Instructions (Greater than or equal to 65 years)   Order Comments: You were discharged with an opioid medication (hydromorphone, oxycodone, hydrocodone, or tramadol). This medication should only be taken for breakthrough pain that is not controlled with acetaminophen (TYLENOL). If you rate your pain less than 3 you do not need this medication. Pain rating 0-3: You do not need this medication Pain rating 4-6: Take 1/2 tablet every 4-6 hours as needed Pain rating 7-10: Take 1 tablet every 4-6 hours as needed Do not exceed 4 tablets per day     Medication Instructions - Opioids - Tapering Instructions   Order Comments: In the first three days following surgery, your symptoms may warrant use of the narcotic pain medication every four to six hours as prescribed. This is normal. As your pain symptoms improve, focus your efforts on decreasing (tapering) use of narcotic medications. The most successful tapering strategy is to first, decrease the number of tablets you take every 4-6 hours to the minimum prescribed. Then, increase the amount of time between doses. For example: First, taper to   or 1 tablet every 4-6 hours. Then, taper to   or 1 tablet every 6-8 hours. Then, taper to   or 1 tablet every 8-10 hours. Then, taper to   or 1 tablet every 10-12 hours. Then, taper to   or 1 tablet at bedtime. The bedtime dose can help with comfort during sleep and is typically the last dose to be discontinued after surgery.     Medication Instructions - Alternate acetaminophen (TYLENOL) with NSAID Instructions   Order Comments: You were discharged with a prescription for or instructions to obtain acetaminophen (TYLENOL) and ibuprofen (ADVIL, MOTRIN) over the counter.  The most effective use of these medications is in a scheduled, alternating regimen.  Please start /continue these medications when you get home from the hospital as follows:  Ibuprofen (ADVIL, MOTRIN)  200-600 mg every 6  hours  Acetaminophen (TYLENOL) 1000 mg every 6 hours.  Please alternate these medications so you are taking an over-the-counter pain medication every 3 hours to reduce pain, swelling, and opioid use.     Discharge Instruction - Regular Diet Adult   Order Comments: Return to your pre-surgery diet unless instructed otherwise     Order Specific Question Answer Comments   Is discharge order? Yes        Orthopedic surgery staff for this patient is Dr. Moreno. Discussed.    --  Cristofer Vitale MD  Orthopedic Surgery PGY-4

## 2025-01-21 PROBLEM — M79.672 PAIN OF LEFT HEEL: Status: RESOLVED | Noted: 2024-01-26 | Resolved: 2025-01-21

## 2025-01-23 ENCOUNTER — TELEPHONE (OUTPATIENT)
Dept: ORTHOPEDICS | Facility: CLINIC | Age: 75
End: 2025-01-23
Payer: MEDICARE

## 2025-01-23 NOTE — TELEPHONE ENCOUNTER
----- Message from Karli ZARAGOZA sent at 1/22/2025  1:43 PM CST -----  Regarding: Cancel Appt - Bone Health  Good afternoon,     Would someone be willing to call this patient to encourage her to schedule a visit with her primary care provider or an endocrinologist as Dr. Bass is no longer seeing this type of Bone Health patients. Her visit is currently scheduled on 1/29/25 which needs to be cancelled.    Thank you,    Karli

## 2025-01-23 NOTE — TELEPHONE ENCOUNTER
Left Voicemail (1st Attempt) and Sent Mychart (1st Attempt) in regards to:    Appointment type: [facility]  Provider: Dr. Bass  Return date: 1/29/25  Specialty phone number: [phone]    This appointment has been canceled due to change in providers template and they are no longer seeing this kind of bone health. Dr. Bass is referring patient to see endocrinology or their primary care provider.

## 2025-01-28 ENCOUNTER — VIRTUAL VISIT (OUTPATIENT)
Dept: ONCOLOGY | Facility: CLINIC | Age: 75
End: 2025-01-28
Attending: STUDENT IN AN ORGANIZED HEALTH CARE EDUCATION/TRAINING PROGRAM
Payer: MEDICARE

## 2025-01-28 VITALS — WEIGHT: 122 LBS | HEIGHT: 59 IN | BODY MASS INDEX: 24.6 KG/M2

## 2025-01-28 DIAGNOSIS — C49.9 LEIOMYOSARCOMA (H): Primary | ICD-10-CM

## 2025-01-28 PROCEDURE — 98007 SYNCH AUDIO-VIDEO EST HI 40: CPT | Performed by: STUDENT IN AN ORGANIZED HEALTH CARE EDUCATION/TRAINING PROGRAM

## 2025-01-28 ASSESSMENT — PAIN SCALES - GENERAL: PAINLEVEL_OUTOF10: NO PAIN (0)

## 2025-01-28 NOTE — PROGRESS NOTES
Is the patient currently in the state of [place]? YES    Visit mode: VIDEO    If the visit is dropped, the patient can be reconnected by:VIDEO VISIT: Send to e-mail at: [email or web address]    Will anyone else be joining the visit? NO  (If patient encounters technical issues they should call [phone]    Are changes needed to the allergy or medication list? No    Are refills needed on medications prescribed by this physician? NO    Rooming Documentation:  Questionnaire(s) completed    Reason for visit: Video Visit (Follow up)    Cindi SOFIA

## 2025-01-28 NOTE — PROGRESS NOTES
Virtual Visit Details    Type of service:  Video Visit   Video Start Time:  4:05 PM  Video End Time: 4:25 PM    Originating Location (pt. Location): Home    Distant Location (provider location):  On-site  Platform used for Video Visit: [facility]  FOLLOW-UP VISIT NOTE  PATIENT NAME: Maggie Navarrete MRN # [id number]  DATE OF VISIT: July 17, 2024 YOB: 1950    REFERRING PROVIDER: Referred Self, MD  No address on file    CANCER TYPE: metastatic uterine leiomyosarcoma       CHIEF COMPLAIN   None     HISTORY OF PRESENT ILLNESS   Maggie is a 74 year old women with PMH of metastatic uterine leiomyosarcoma. She was originally diagnosed on 2016 showing an up to 12 cm mass. She underwent surgical resection followed by adjuvant doxorubicin/dacarbazine for 5 cycles. She had local recurrence on 2020 treated with surgical resection. Then  again recurrence on 2022 again underwent surgical resection. Followed by [id number] with anastrozole. Since January of 2024 she has been on trial a BOAST clinical trial with Dr. Nik Schilling at the [facility]. She received abemaciclib there. Now, she was taken off of trial due to progression on 1 of 3 intra-abdominal lesions.     She comes here to continue medical management, she has seen Dr. Michaud at [place] for a second opinion. She is interested in surgical resection which has been offered at [facility] with possible Gemzar HIPEC.      She started on single agent Gemzar on 7/29/24. She is here today for follow up prior to cycle 3.   Scans on 9/23/24 showed progression of disease    Received doxil/dacarbazine C1 on 10/14/24  C2 doxil/dacarbazine on 11/20/24 - scans showed progression after 2 cycles    Lumbar 4 to 5 Decompression and Left Lumbar 5-Sacral 1 Rafael-Laminotomy and Cyst Removal on 12/30/2024     Interval Hx  Maggie reports that she is feeling very good. She no longer has back pain and has  recovered well from back surgery. She keeps being active and walks every day, but still has restrictions on exercise because of back surgery.      PAST ONCOLOGIC HISTORY   Leiomyosarcoma Uterus   11/3/2016 Initial Diagnosis     11/4/2016 Surgery and Procedures   Open hyst, BSO, stage II with peritoneal involvement    12/26/2016 - 3/23/2017 Chemotherapy   Received elsewhere: adjuvant chemotherapy with doxorubicin 25 mg/m2/day and DTIC 250 mg/m2/day , days 1-3 every 21 weeks.  Completed 5 cycles of adjuvant doxorubicin and dacarbazine      6/1/2020 Recurrence Local   Isolated lesion in the RLQ    6/16/2020 Surgery and Procedures   Laparotomy, excision of side wall mass, ureteroneocystotomy. 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 The excised margins were negative for tumor.    3/31/2022 [facility]   PET with isolated bowel lesion in LLQ.     4/4/2022 Surgery and Procedures   Exlap, FANNY, small bowel lesion excised with negative margins    5/9/2023 Surgery and Procedures   Dr. Camarillo and Dr. Krueger    Exploratory laparotomy, tumor debulking, cystoscopy and placement of internal ureteral stent and ureterolysis     Findings  Two lesions in the abdomen consistent with very indolent appearing sarcoma on frozen section, 1 in the right upper quadrant in the large bowel mesentery. And 1 on the left sidewall between the left sidewall and the left ureter and the sigmoid colon    5/9/2023 Biopsy/Pathology   FINAL DIAGNOSIS     A. Soft tissue, hepatic flexure nodule, excision: Involved   by smooth muscle neoplasm, 1.4 cm. See comment.     B. Soft tissue, left sidewall mass, excision: Recurrent   leiomyosarcoma, 2.9 cm. See comment.     C. Colon, sigmoid mass, excision: Involved by smooth   muscle neoplasm with increased mitotic activity, 6 mm. See   comment.     D. Soft tissue, left nohemi-ureteral, biopsy: Involved by   smooth muscle neoplasm, 3 mm. See comment.     E. Peritoneum,  left pelvic sidewall margin, excision:   Involved by smooth muscle neoplasm, 1 mm. See comment.     F. Soft tissue, sigmoid colon margin, excision: Negative   for tumor.     Comment: Immunohistochemical stains performed at [facility] demonstrate that the left sidewall mass (B2) is   positive for desmin, caldesmon, ER (moderate, 60-70%), and   [place] (moderate, 20-30%), but is negative for HMB45, Melan A,   STAT6, ALK, DOG1, and KIT; this immunophenotype supports a   smooth muscle neoplasm. The left sidewall mass shows   increased mitotic activity (greater than 10 mitoses/10 HPF)   and atypia sufficient to meet criteria for a   leiomyosarcoma. The remaining lesion show minimal atypia   but increased mitotic activity (though less than 10   mitoses/10 HPF) where evaluable, but evaluation is limited   by the small size of the lesions. In the context of a known   history of leiomyosarcoma and a concurrent tumor meeting   diagnostic criteria, these likely represent deceptively   bland additional foci of recurrent leiomyosarcoma.     8/2023 - Biological/Targeted/Hormone Therapy   Anastrozole     11/8/2023 Progression/Relapse   CT scan chest-abdomen-pelvis shows progression of 3 nodules within the peritoneal cavity.    1/18/2024 - 6/5/2024 Chemotherapy   BOAST clinical trial with Dr. Nik Schilling at [facility]. She received abemaciclib there. Was taken off of trial due to progression.      PAST MEDICAL HISTORY   None    PAST SURGICAL HISTORY   As per HPI     FAMILY HISTORY   Pending     ALLERGIES      Allergies   Allergen Reactions    Dust Mite Extract Other (See Comments)    Erythromycin GI Disturbance, Other (See Comments), Nausea and Nausea and Vomiting     PN: LW Reaction: Nausea    Tramadol Nausea, Other (See Comments) and Nausea and Vomiting          SOCIAL HISTORY     Social History     Social History Narrative    How much exercise per week? Walking daily.    How much calcium per day? Supplement  and through diet       How much caffeine per day? 3 times a week    How much vitamin D per day? Supplement    Do you/your family wear seatbelts?  Yes    Do you/your family use safety helmets? NA    Do you/your family use sunscreen? Sometimes    Do you/your family keep firearms in the home? No    Do you/your family have a smoke detector(s)? Yes    Do you feel safe in your home? Yes    Has anyone ever touched you in an unwanted manner? 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  2 grandsons now in [place] with the mother.  In a house.  .          How much exercise per week? Daily walking, will start going to the y    How much calcium per day? Through foods       How much caffeine per day? One cup    How much vitamin D per day? Through foods    Do you/your family wear seatbelts?  Yes    Do you/your family use safety helmets? N/a    Do you/your family use sunscreen? Yes    Do you/your family keep firearms in the home? No    Do you/your family have a smoke detector(s)? Yes        Reviewed by Nicki Cornejo 10-11-21               CURRENT OUTPATIENT MEDICATIONS     Current Outpatient Medications   Medication Sig Dispense Refill    acetaminophen (TYLENOL) 325 MG tablet Take 3 tablets (975 mg) by mouth every 8 hours.      Ascorbic Acid 250 MG CHEW Take 250 mg by mouth daily.      calcium citrate 250 MG TABS Take 500 mg by mouth 2 times daily.      carboxymethylcellulose PF (REFRESH PLUS) 0.5 % ophthalmic solution Place 1 drop into both eyes 4 times daily as needed for dry eyes      famotidine (PEPCID) 20 MG tablet Take 1 tablet (20 mg) by mouth 2 times daily as needed.      Fish Oil-Cholecalciferol (FISH OIL + D3 PO) Take 1 capsule by mouth daily.      levothyroxine (SYNTHROID/LEVOTHROID) 75 MCG tablet TAKE 1 TABLET DAILY FOR    THYROID  (Patient taking differently: Take 75 mcg by mouth every morning (before breakfast). TAKE 1 TABLET DAILY FOR    THYROID) 90 tablet 0    lidocaine-prilocaine (EMLA) 2.5-2.5 % external cream Apply a thin layer on skin overlying port site 10-15 minutes prior to port access for labs or infusion 5 g 1    Melatonin 10 MG TABS tablet Take 10 mg by mouth at bedtime.      multivitamin w/minerals (THERA-VIT-M) tablet Take 1 tablet by mouth daily as needed (if diet insufficient to provide recommended nutrients)      oxyCODONE (ROXICODONE) 5 MG tablet Take 0.5-1 tablets (2.5-5 mg) by mouth every 4 hours as needed for moderate to severe pain. 20 tablet 0    polyethylene glycol (MIRALAX) 17 GM/Dose powder Take 17 g by mouth daily.      senna-docusate (SENOKOT-S/PERICOLACE) 8.6-50 MG tablet Take 1 tablet by mouth 2 times daily.      triamcinolone (KENALOG) 0.1 % external cream Apply topically as needed for irritation.      TURMERIC PO Take 1 tablet by mouth daily.      venlafaxine (EFFEXOR XR) 37.5 MG 24 hr capsule TAKE 1 CAPSULE DAILY (Patient taking differently: Take 37.5 mg by mouth every morning.) 90 capsule 3    VITAMIN D PO Take 1 tablet by mouth daily.      zolpidem (AMBIEN) 5 MG tablet Take 1 tablet (5 mg) by mouth nightly as needed for sleep (Only uses if traveling or cannot sleep,). 20 tablet 0          REVIEW OF SYSTEMS   As above in the HPI, o/w complete 12-point ROS was negative.     PHYSICAL EXAM   There were no vitals taken for this visit.    EGO  GEN: NAD  HEENT: PERRL, EOMI, no icterus, injection or pallor. Oropharynx is clear.  NECK: no cervical or supraclavicular lymphadenopathy  LUNGS: clear bilaterally  CV: regular, no murmurs, rubs, or gallops  ABDOMEN: soft, non-tender, non-distended, normal bowel sounds, no hepatosplenomegaly by percussion or palpation. Surgical scars from prior surgeries well healed.   EXT: warm, well perfused, no edema  NEURO: alert  SKIN: no rashes     LABORATORY AND IMAGING STUDIES      Lab Results   Component Value Date    WBC 4.5 12/12/2024    WBC 6.2 04/28/2021     Lab Results   Component Value Date    RBC 3.95 12/12/2024    RBC 4.60 04/28/2021     Lab Results   Component Value Date    HGB 11.6 12/31/2024    HGB 13.7 04/28/2021     Lab Results   Component Value Date    HCT 38.5 12/12/2024    HCT 42.4 04/28/2021     Lab Results   Component Value Date    MCV 98 12/12/2024    MCV 92 04/28/2021     Lab Results   Component Value Date    MCH 31.4 12/12/2024    MCH 29.8 04/28/2021     Lab Results   Component Value Date    MCHC 32.2 12/12/2024    MCHC 32.3 04/28/2021     Lab Results   Component Value Date    RDW 16.2 12/12/2024    RDW 14.6 04/28/2021     Lab Results   Component Value Date     12/12/2024     04/28/2021     Last Comprehensive Metabolic Panel:  Sodium   Date Value Ref Range Status   11/20/2024 141 135 - 145 mmol/L Final   04/28/2021 143 133 - 144 mmol/L Final     Potassium   Date Value Ref Range Status   11/20/2024 4.2 3.4 - 5.3 mmol/L Final   07/25/2022 4.4 3.4 - 5.3 mmol/L Final   04/28/2021 4.0 3.4 - 5.3 mmol/L Final     Chloride   Date Value Ref Range Status   11/20/2024 107 98 - 107 mmol/L Final   07/25/2022 110 (H) 94 - 109 mmol/L Final   04/28/2021 110 (H) 94 - 109 mmol/L Final     Carbon Dioxide   Date Value Ref Range Status   04/28/2021 28 20 - 32 mmol/L Final     Carbon Dioxide (CO2)   Date Value Ref Range Status   11/20/2024 24 22 - 29 mmol/L Final   07/25/2022 26 20 - 32 mmol/L Final     Anion Gap   Date Value Ref Range Status   11/20/2024 10 7 - 15 mmol/L Final   07/25/2022 4 3 - 14 mmol/L Final   04/28/2021 6 3 - 14 mmol/L Final     Glucose   Date Value Ref Range Status   07/26/2024 69 (A) 70 - 99 mg/dL Final     GLUCOSE BY METER POCT   Date Value Ref Range Status   12/31/2024 128 (H) 70 - 99 mg/dL Final     Urea Nitrogen   Date Value Ref Range Status   11/20/2024 23.5 (H) 8.0 - 23.0 mg/dL Final   07/25/2022 24 7 - 30 mg/dL Final   04/28/2021 26 7 - 30 mg/dL  Final     Creatinine   Date Value Ref Range Status   11/20/2024 0.94 0.51 - 0.95 mg/dL Final   04/28/2021 0.91 0.52 - 1.04 mg/dL Final     GFR Estimate   Date Value Ref Range Status   11/20/2024 63 >60 mL/min/1.73m2 Final     Comment:     eGFR calculated using 2021 CKD-EPI equation.   04/28/2021 62 >60 mL/min/[1.73_m2] Final     GFR, ESTIMATED POCT   Date Value Ref Range Status   06/03/2024 36 (L) >60 mL/min/1.73m2 Final     Calcium   Date Value Ref Range Status   11/20/2024 9.2 8.8 - 10.4 mg/dL Final     Comment:     Reference intervals for this test were updated on 7/16/2024 to reflect our healthy population more accurately. There may be differences in the flagging of prior results with similar values performed with this method. Those prior results can be interpreted in the context of the updated reference intervals.   04/28/2021 9.1 8.5 - 10.1 mg/dL Final     Bilirubin Total   Date Value Ref Range Status   11/20/2024 0.3 <=1.2 mg/dL Final   04/28/2021 0.5 0.2 - 1.3 mg/dL Final     Alkaline Phosphatase   Date Value Ref Range Status   11/20/2024 58 40 - 150 U/L Final   04/28/2021 94 40 - 150 U/L Final     ALT   Date Value Ref Range Status   11/20/2024 18 0 - 50 U/L Final   04/28/2021 21 0 - 50 U/L Final     AST   Date Value Ref Range Status   11/20/2024 19 0 - 45 U/L Final   04/28/2021 10 0 - 45 U/L Final       TSH   Date Value Ref Range Status   10/31/2024 3.70 0.30 - 4.20 uIU/mL Final   07/25/2022 1.26 0.40 - 4.00 mU/L Final   12/16/2020 1.44 0.40 - 4.00 mU/L Final       Results for orders placed or performed during the hospital encounter of 06/03/24   CT Chest/Abdomen/Pelvis w Contrast    Narrative    CT CHEST/ABDOMEN/PELVIS W CONTRAST 6/3/2024 12:25 PM    CLINICAL HISTORY: Metastatic sarcoma, assess for response to  chemotherapy. 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).    TECHNIQUE: CT scan of the chest, abdomen, and pelvis was performed  following injection of IV contrast. Multiplanar reformats  were  obtained. Dose reduction techniques were used.   CONTRAST: 64 mL Isovue-[id number]    COMPARISON: CT chest, abdomen and pelvis 4/8/2024.    FINDINGS:   LOWER NECK: Unremarkable.    LUNGS AND PLEURA: No focal consolidation or pleural effusion.  Calcified granulomas. No new or growing suspicious pulmonary nodule.    AIRWAYS: Patent.    HEART: No pericardial effusion.  No coronary calcifications. No  thoracic aortic aneurysm.    PULMONARY ARTERIES: Unremarkable.    MEDIASTINUM AND BEATRIZ: No lymphadenopathy. Calcified lymph nodes.    HEPATOBILIARY: Unremarkable. Gallbladder is present without  gallstones.    SPLEEN: Unremarkable.    PANCREAS: Unremarkable.    ADRENAL GLANDS: Unremarkable.    KIDNEYS/URETERS/BLADDER: Similar right renal atrophy/cortical  scarring. Left parapelvic and cortical cysts; no follow-up necessary.    BOWEL: No bowel dilatation or wall thickening.    LYMPH NODES AND PERITONEUM: Redemonstrated abdominal soft tissue  masses as on series 3:  -Left upper quadrant, 2.6 x 4 x 2.2 cm, previously 2.1 x 2.9 x 2.1 cm  when measured similarly, image 110   -Soft tissue lesion between the transverse colon and stomach, 3.1 x  2.8 cm, previously 3.1 x 2.6 cm, image 168  -Superior to the pancreas, 1.4 cm, image 125    No new lesion.    VASCULATURE: Nonaneurysmal abdominal aorta.    PELVIS: Uterus is surgically absent. No pelvic mass.    MUSCULOSKELETAL: No aggressive osseous lesion. Similar L4/L5 grade 1  anterolisthesis.    ADDITIONAL FINDINGS: None.      Impression    IMPRESSION:  1.  Since 4/8/2024, increased in size left upper quadrant soft tissue  masses. Other abdominal soft tissue lesions have not significantly  changed.  2.  No convincing new site of disease.    ORIN PAUL MD         SYSTEM ID:  [id number]     CT CAP 9/24/24  Mesentery/peritoneum/retroperitoneum: Soft tissue mass in the  transverse mesial colon measuring 4.0 x 3.8 cm with decreased  enhancement compared to prior. Previously  measured 2.8 x 3.1 cm.  Mildly enhancing mass anterior to the superior pole of the spleen  measuring 2.8 x 5.2 cm, previously 2.6 x 4.0 cm. Stable size of soft  tissue nodule anterior to the pancreas measuring 1.5 cm (series 11  image 37). There is interval central hypoenhancement. Trace central  mesenteric free fluid.     Lymph nodes: No significant lymphadenopathy.     Vasculature: Scattered atherosclerotic calcification of abdominal  aorta with no aneurysmal dilation.  Portal veins are patent.     Pelvis: Urinary bladder is normal.     Osseous structures: No aggressive or acute osseous lesion.  Multilevel  degenerative changes of the spine most pronounced at L4-L5. Grade 1  anterolisthesis of L4 on L5. Enhancing circumscribed extradural lesion  at the level of L5-S1 measuring 1.1 cm in craniocaudal dimension  (series 14 image 100).      Soft tissues: Tiny fat containing ventral/umbilical hernia.        CT CAP  12/16/24                                                              IMPRESSION:   1. Compared to CT dated 6/3/2024, increased size of peritoneal soft  tissue masses in the transverse mesial colon and left upper quadrant.  Stable size of soft tissue nodule anterior to the pancreas with  interval central hypoenhancement suspicious for necrosis.  2. No evidence of new disease in the chest abdomen and pelvis.  3. Enhancing circumscribed extradural lesion at the level of L5-S1,  may represent synovial cyst with hemorrhage as described on recent  lumbar MRI.  4.Additional findings similar to prior CT.        PATHOLOGY   12/12/2016  FINAL DIAGNOSIS:   [place] FROM [place] ([id number], OBTAINED 11/04/2016):   A. UTERINE MASS, RESECTION:   - Leiomyosarcoma, 12 cm in aggregate (per report)   - Please see comment     COMMENT:   Per the received report, a right cul-de-sac biopsy was involved by   leiomyosarcoma.   The diagnosis of leiomyosarcoma is made on the basis of examination of   the one slide  submitted.  There is moderate nuclear atypia, tumor   necrosis and increased mitotic activities (10/10 HPFs).     Tempus profiling from her 4/5/22 specimen showed CDKN2A and CDKN2B loss.     I reviewed the medical records including prior oncology notes, laboratory studies which are notable for adequate renal and hepatic function, and normal blood counts.   I personally reviewed imaging including CT of the CAP done on 6/12/2024 which demonstrates enlargement of the peritoneal mass adjacent to the spleen, now up to 4 x 2.6 cm. She has 2 other peritoneal lesions up to 2.8 cm that have remained stable.       ASSESSMENT AND PLAN     Maggie is a 74 year old women with PMH of metastatic uterine leiomyosarcoma. She was originally diagnosed on 2016 showing an up to 12 cm mass. She underwent surgical resection followed by adjuvant doxorubicin/dacarbazine for 5 cycles. She had local recurrence on 2020 treated with surgical resection. Then  again recurrence on 2022 again underwent surgical resection. Followed by [id number] with anastrozole. Since January of 2024 she has been on trial a BOAST clinical trial with Dr. Nik Schilling at the [facility]. She received abemaciclib there. Now, she was taken off of trial due to progression on 1 of 3 intra-abdominal lesions.     She comes here to continue medical management, she has seen Dr. Michaud at [place] for a second opinion. She is interested in surgical resection which has been offered at [facility] with possible Gemzar HIPEC.      She feels very well now, she exercises regularly and has no related GI symptoms.     She saw Dr. Michaud at [place], who recommended against further surgical resection and for systemic therapy with other agents with activity in leiomyosarcoma including gemcitabine +/- docetaxel, trabectedin and pazopanib.     She is s/p 2 cycles of gemcitabine single agent. She is feeling well and tells me that surgery will be planned for 11/15. I  explained that in her situation I do not think that surgery has chances of a meaningful response as she has disease in different areas. She insists that the team at [place] has great expertise in surgery and HIPEC for cases of advanced leiomyosarcoma. I have not been able to communicate with her team there. She tells me they plan on intraoperative chemo with cisplatin.     I personally reviewed the most recent CT CAP and the overread done here at [facility]. Two of the prior metastatic deposits have increased in size, although it seems that he enhancement is less suggesting possible increase in the necrosis. Other pancreatic lesion stable in size but more necrotic. Unfortunately we do not have a PET to better assess the metabolic response.   I will not recommend for surgical resection if we believe she has progressive disease. Sometimes sarcomas do enlarge as necrosis ensues. I recommend to continue chemo for 2 more cycles and repeat the scans. Patient is adamant she wants to move forward with surgical resection.     After reviewing imaging from 9/24/25 at the [facility], we saw that Maggie had disease progression with enlarging lesions in the abdomen, and no new lesions. Now she is s/p C2 of doxil/dacarbazine.I personally reviewed the most recent CT scan on 11/20/24 and compared to the one on 9/24 prior to starting new regimen. She does have progression on disease with enlargement of 2 peritoneal masses.  She has been exploring possible surgical and clinical trial options. She has met with Dr. Camarillo at [facility] and also with Dr. Kirsty Doll at [place] and she want to move forward with surgical resection and HIPEC. It appears that [place] has the most experience with the trial of HIPEC for leiomyosarcomas and she has a surgery scheduled by the end of February, 2025. I advised that she gets new updated scans prior to surgery for planning and to evaluate if she has developed other sites of metastasis. She has been off  treatment since 11/20/24. She will discuss this with her surgical team and get scans there, otherwise she will let me know.     Plan:     -Follow up Dr. Powers 3/26/25     30 minutes spent on the date of the encounter doing chart review, review of outside records, review of test results, interpretation of tests, patient visit, documentation, and discussion with other provider(s)     Beto Galvan MD   of Medicine  Hematology, Oncology and Transplantation

## 2025-01-28 NOTE — LETTER
1/28/2025      Maggie Navarrete  [address]  [facility] [address]      Dear Colleague,    Thank you for referring your patient, Maggie Navarrete, to the [facility]. Please see a copy of my visit note below.    Virtual Visit Details    Type of service:  Video Visit   Video Start Time:  4:05 PM  Video End Time: 4:25 PM    Originating Location (pt. Location): Home    Distant Location (provider location):  On-site  Platform used for Video Visit: [facility]  FOLLOW-UP VISIT NOTE  PATIENT NAME: Maggie Navarrete MRN # [id number]  DATE OF VISIT: July 17, 2024 YOB: 1950    REFERRING PROVIDER: Referred Self, MD  No address on file    CANCER TYPE: metastatic uterine leiomyosarcoma       CHIEF COMPLAIN   None     HISTORY OF PRESENT ILLNESS   Maggie is a 74 year old women with PMH of metastatic uterine leiomyosarcoma. She was originally diagnosed on 2016 showing an up to 12 cm mass. She underwent surgical resection followed by adjuvant doxorubicin/dacarbazine for 5 cycles. She had local recurrence on 2020 treated with surgical resection. Then  again recurrence on 2022 again underwent surgical resection. Followed by [id number] with anastrozole. Since January of 2024 she has been on trial a BOAST clinical trial with Dr. Nik Schilling at the [facility]. She received abemaciclib there. Now, she was taken off of trial due to progression on 1 of 3 intra-abdominal lesions.     She comes here to continue medical management, she has seen Dr. Michaud at [facility] for a second opinion. She is interested in surgical resection which has been offered at [facility] with possible Gemzar HIPEC.      She started on single agent Gemzar on 7/29/24. She is here today for follow up prior to cycle 3.   Scans on 9/23/24 showed progression of disease    Received doxil/dacarbazine C1 on 10/14/24  C2 doxil/dacarbazine on 11/20/24 -  scans showed progression after 2 cycles    Lumbar 4 to 5 Decompression and Left Lumbar 5-Sacral 1 Rafael-Laminotomy and Cyst Removal on 12/30/2024     Interval Hx  Maggie reports that she is feeling very good. She no longer has back pain and has recovered well from back surgery. She keeps being active and walks every day, but still has restrictions on exercise because of back surgery.      PAST ONCOLOGIC HISTORY   Leiomyosarcoma Uterus   11/3/2016 Initial Diagnosis     11/4/2016 Surgery and Procedures   Open hyst, BSO, stage II with peritoneal involvement    12/26/2016 - 3/23/2017 Chemotherapy   Received elsewhere: adjuvant chemotherapy with doxorubicin 25 mg/m2/day and DTIC 250 mg/m2/day , days 1-3 every 21 weeks.  Completed 5 cycles of adjuvant doxorubicin and dacarbazine      6/1/2020 Recurrence Local   Isolated lesion in the RLQ    6/16/2020 Surgery and Procedures   Laparotomy, excision of side wall mass, ureteroneocystotomy. 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 The excised margins were negative for tumor.    3/31/2022 [facility]   PET with isolated bowel lesion in LLQ.     4/4/2022 Surgery and Procedures   Exlap, FANNY, small bowel lesion excised with negative margins    5/9/2023 Surgery and Procedures   Dr. Camarillo and Dr. Krueger    Exploratory laparotomy, tumor debulking, cystoscopy and placement of internal ureteral stent and ureterolysis     Findings  Two lesions in the abdomen consistent with very indolent appearing sarcoma on frozen section, 1 in the right upper quadrant in the large bowel mesentery. And 1 on the left sidewall between the left sidewall and the left ureter and the sigmoid colon    5/9/2023 Biopsy/Pathology   FINAL DIAGNOSIS     A. Soft tissue, hepatic flexure nodule, excision: Involved   by smooth muscle neoplasm, 1.4 cm. See comment.     B. Soft tissue, left sidewall mass, excision: Recurrent   leiomyosarcoma, 2.9 cm. See comment.     C.  Colon, sigmoid mass, excision: Involved by smooth   muscle neoplasm with increased mitotic activity, 6 mm. See   comment.     D. Soft tissue, left nohemi-ureteral, biopsy: Involved by   smooth muscle neoplasm, 3 mm. See comment.     E. Peritoneum, left pelvic sidewall margin, excision:   Involved by smooth muscle neoplasm, 1 mm. See comment.     F. Soft tissue, sigmoid colon margin, excision: Negative   for tumor.     Comment: Immunohistochemical stains performed at [facility] demonstrate that the left sidewall mass (B2) is   positive for desmin, caldesmon, ER (moderate, 60-70%), and   AL (moderate, 20-30%), but is negative for HMB45, Melan A,   STAT6, ALK, DOG1, and KIT; this immunophenotype supports a   smooth muscle neoplasm. The left sidewall mass shows   increased mitotic activity (greater than 10 mitoses/10 HPF)   and atypia sufficient to meet criteria for a   leiomyosarcoma. The remaining lesion show minimal atypia   but increased mitotic activity (though less than 10   mitoses/10 HPF) where evaluable, but evaluation is limited   by the small size of the lesions. In the context of a known   history of leiomyosarcoma and a concurrent tumor meeting   diagnostic criteria, these likely represent deceptively   bland additional foci of recurrent leiomyosarcoma.     8/2023 - Biological/Targeted/Hormone Therapy   Anastrozole     11/8/2023 Progression/Relapse   CT scan chest-abdomen-pelvis shows progression of 3 nodules within the peritoneal cavity.    1/18/2024 - 6/5/2024 Chemotherapy   BOAST clinical trial with Dr. Nik Schilling at [facility]. She received abemaciclib there. Was taken off of trial due to progression.      PAST MEDICAL HISTORY   None    PAST SURGICAL HISTORY   As per HPI     FAMILY HISTORY   Pending     ALLERGIES      Allergies   Allergen Reactions     Dust Mite Extract Other (See Comments)     Erythromycin GI Disturbance, Other (See Comments), Nausea and Nausea and Vomiting      PN: LW Reaction: Nausea     Tramadol Nausea, Other (See Comments) and Nausea and Vomiting          SOCIAL HISTORY     Social History     Social History Narrative    How much exercise per week? Walking daily.    How much calcium per day? Supplement and through diet       How much caffeine per day? 3 times a week    How much vitamin D per day? Supplement    Do you/your family wear seatbelts?  Yes    Do you/your family use safety helmets? NA    Do you/your family use sunscreen? Sometimes    Do you/your family keep firearms in the home? No    Do you/your family have a smoke detector(s)? Yes    Do you feel safe in your home? Yes    Has anyone ever touched you in an unwanted manner? 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  2 grandsons now in [place] with the mother.  In a house.  .          How much exercise per week? Daily walking, will start going to the y    How much calcium per day? Through foods       How much caffeine per day? One cup    How much vitamin D per day? Through foods    Do you/your family wear seatbelts?  Yes    Do you/your family use safety helmets? N/a    Do you/your family use sunscreen? Yes    Do you/your family keep firearms in the home? No    Do you/your family have a smoke detector(s)? Yes        Reviewed by Nicki Cornejo 10-11-21               CURRENT OUTPATIENT MEDICATIONS     Current Outpatient Medications   Medication Sig Dispense Refill     acetaminophen (TYLENOL) 325 MG tablet Take 3 tablets (975 mg) by mouth every 8 hours.       Ascorbic Acid 250 MG CHEW Take 250 mg by mouth daily.       calcium citrate 250 MG TABS Take 500 mg by mouth 2 times daily.       carboxymethylcellulose PF (REFRESH PLUS) 0.5 % ophthalmic solution Place 1 drop into both eyes 4 times daily as needed for dry eyes       famotidine  (PEPCID) 20 MG tablet Take 1 tablet (20 mg) by mouth 2 times daily as needed.       Fish Oil-Cholecalciferol (FISH OIL + D3 PO) Take 1 capsule by mouth daily.       levothyroxine (SYNTHROID/LEVOTHROID) 75 MCG tablet TAKE 1 TABLET DAILY FOR    THYROID (Patient taking differently: Take 75 mcg by mouth every morning (before breakfast). TAKE 1 TABLET DAILY FOR    THYROID) 90 tablet 0     lidocaine-prilocaine (EMLA) 2.5-2.5 % external cream Apply a thin layer on skin overlying port site 10-15 minutes prior to port access for labs or infusion 5 g 1     Melatonin 10 MG TABS tablet Take 10 mg by mouth at bedtime.       multivitamin w/minerals (THERA-VIT-M) tablet Take 1 tablet by mouth daily as needed (if diet insufficient to provide recommended nutrients)       oxyCODONE (ROXICODONE) 5 MG tablet Take 0.5-1 tablets (2.5-5 mg) by mouth every 4 hours as needed for moderate to severe pain. 20 tablet 0     polyethylene glycol (MIRALAX) 17 GM/Dose powder Take 17 g by mouth daily.       senna-docusate (SENOKOT-S/PERICOLACE) 8.6-50 MG tablet Take 1 tablet by mouth 2 times daily.       triamcinolone (KENALOG) 0.1 % external cream Apply topically as needed for irritation.       TURMERIC PO Take 1 tablet by mouth daily.       venlafaxine (EFFEXOR XR) 37.5 MG 24 hr capsule TAKE 1 CAPSULE DAILY (Patient taking differently: Take 37.5 mg by mouth every morning.) 90 capsule 3     VITAMIN D PO Take 1 tablet by mouth daily.       zolpidem (AMBIEN) 5 MG tablet Take 1 tablet (5 mg) by mouth nightly as needed for sleep (Only uses if traveling or cannot sleep,). 20 tablet 0          REVIEW OF SYSTEMS   As above in the HPI, o/w complete 12-point ROS was negative.     PHYSICAL EXAM   There were no vitals taken for this visit.    EGO  GEN: NAD  HEENT: PERRL, EOMI, no icterus, injection or pallor. Oropharynx is clear.  NECK: no cervical or supraclavicular lymphadenopathy  LUNGS: clear bilaterally  CV: regular, no murmurs, rubs, or  gallops  ABDOMEN: soft, non-tender, non-distended, normal bowel sounds, no hepatosplenomegaly by percussion or palpation. Surgical scars from prior surgeries well healed.   EXT: warm, well perfused, no edema  NEURO: alert  SKIN: no rashes     LABORATORY AND IMAGING STUDIES     Lab Results   Component Value Date    WBC 4.5 12/12/2024    WBC 6.2 04/28/2021     Lab Results   Component Value Date    RBC 3.95 12/12/2024    RBC 4.60 04/28/2021     Lab Results   Component Value Date    HGB 11.6 12/31/2024    HGB 13.7 04/28/2021     Lab Results   Component Value Date    HCT 38.5 12/12/2024    HCT 42.4 04/28/2021     Lab Results   Component Value Date    MCV 98 12/12/2024    MCV 92 04/28/2021     Lab Results   Component Value Date    MCH 31.4 12/12/2024    MCH 29.8 04/28/2021     Lab Results   Component Value Date    MCHC 32.2 12/12/2024    MCHC 32.3 04/28/2021     Lab Results   Component Value Date    RDW 16.2 12/12/2024    RDW 14.6 04/28/2021     Lab Results   Component Value Date     12/12/2024     04/28/2021     Last Comprehensive Metabolic Panel:  Sodium   Date Value Ref Range Status   11/20/2024 141 135 - 145 mmol/L Final   04/28/2021 143 133 - 144 mmol/L Final     Potassium   Date Value Ref Range Status   11/20/2024 4.2 3.4 - 5.3 mmol/L Final   07/25/2022 4.4 3.4 - 5.3 mmol/L Final   04/28/2021 4.0 3.4 - 5.3 mmol/L Final     Chloride   Date Value Ref Range Status   11/20/2024 107 98 - 107 mmol/L Final   07/25/2022 110 (H) 94 - 109 mmol/L Final   04/28/2021 110 (H) 94 - 109 mmol/L Final     Carbon Dioxide   Date Value Ref Range Status   04/28/2021 28 20 - 32 mmol/L Final     Carbon Dioxide (CO2)   Date Value Ref Range Status   11/20/2024 24 22 - 29 mmol/L Final   07/25/2022 26 20 - 32 mmol/L Final     Anion Gap   Date Value Ref Range Status   11/20/2024 10 7 - 15 mmol/L Final   07/25/2022 4 3 - 14 mmol/L Final   04/28/2021 6 3 - 14 mmol/L Final     Glucose   Date Value Ref Range Status   07/26/2024 69 (A)  70 - 99 mg/dL Final     GLUCOSE BY METER POCT   Date Value Ref Range Status   12/31/2024 128 (H) 70 - 99 mg/dL Final     Urea Nitrogen   Date Value Ref Range Status   11/20/2024 23.5 (H) 8.0 - 23.0 mg/dL Final   07/25/2022 24 7 - 30 mg/dL Final   04/28/2021 26 7 - 30 mg/dL Final     Creatinine   Date Value Ref Range Status   11/20/2024 0.94 0.51 - 0.95 mg/dL Final   04/28/2021 0.91 0.52 - 1.04 mg/dL Final     GFR Estimate   Date Value Ref Range Status   11/20/2024 63 >60 mL/min/1.73m2 Final     Comment:     eGFR calculated using 2021 CKD-EPI equation.   04/28/2021 62 >60 mL/min/[1.73_m2] Final     GFR, ESTIMATED POCT   Date Value Ref Range Status   06/03/2024 36 (L) >60 mL/min/1.73m2 Final     Calcium   Date Value Ref Range Status   11/20/2024 9.2 8.8 - 10.4 mg/dL Final     Comment:     Reference intervals for this test were updated on 7/16/2024 to reflect our healthy population more accurately. There may be differences in the flagging of prior results with similar values performed with this method. Those prior results can be interpreted in the context of the updated reference intervals.   04/28/2021 9.1 8.5 - 10.1 mg/dL Final     Bilirubin Total   Date Value Ref Range Status   11/20/2024 0.3 <=1.2 mg/dL Final   04/28/2021 0.5 0.2 - 1.3 mg/dL Final     Alkaline Phosphatase   Date Value Ref Range Status   11/20/2024 58 40 - 150 U/L Final   04/28/2021 94 40 - 150 U/L Final     ALT   Date Value Ref Range Status   11/20/2024 18 0 - 50 U/L Final   04/28/2021 21 0 - 50 U/L Final     AST   Date Value Ref Range Status   11/20/2024 19 0 - 45 U/L Final   04/28/2021 10 0 - 45 U/L Final       TSH   Date Value Ref Range Status   10/31/2024 3.70 0.30 - 4.20 uIU/mL Final   07/25/2022 1.26 0.40 - 4.00 mU/L Final   12/16/2020 1.44 0.40 - 4.00 mU/L Final       Results for orders placed or performed during the hospital encounter of 06/03/24   CT Chest/Abdomen/Pelvis w Contrast    Narrative    CT CHEST/ABDOMEN/PELVIS W CONTRAST  6/3/2024 12:25 PM    CLINICAL HISTORY: Metastatic sarcoma, assess for response to  chemotherapy. 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).    TECHNIQUE: CT scan of the chest, abdomen, and pelvis was performed  following injection of IV contrast. Multiplanar reformats were  obtained. Dose reduction techniques were used.   CONTRAST: 64 mL Isovue-[id number]    COMPARISON: CT chest, abdomen and pelvis 4/8/2024.    FINDINGS:   LOWER NECK: Unremarkable.    LUNGS AND PLEURA: No focal consolidation or pleural effusion.  Calcified granulomas. No new or growing suspicious pulmonary nodule.    AIRWAYS: Patent.    HEART: No pericardial effusion.  No coronary calcifications. No  thoracic aortic aneurysm.    PULMONARY ARTERIES: Unremarkable.    MEDIASTINUM AND BEATRIZ: No lymphadenopathy. Calcified lymph nodes.    HEPATOBILIARY: Unremarkable. Gallbladder is present without  gallstones.    SPLEEN: Unremarkable.    PANCREAS: Unremarkable.    ADRENAL GLANDS: Unremarkable.    KIDNEYS/URETERS/BLADDER: Similar right renal atrophy/cortical  scarring. Left parapelvic and cortical cysts; no follow-up necessary.    BOWEL: No bowel dilatation or wall thickening.    LYMPH NODES AND PERITONEUM: Redemonstrated abdominal soft tissue  masses as on series 3:  -Left upper quadrant, 2.6 x 4 x 2.2 cm, previously 2.1 x 2.9 x 2.1 cm  when measured similarly, image 110   -Soft tissue lesion between the transverse colon and stomach, 3.1 x  2.8 cm, previously 3.1 x 2.6 cm, image 168  -Superior to the pancreas, 1.4 cm, image 125    No new lesion.    VASCULATURE: Nonaneurysmal abdominal aorta.    PELVIS: Uterus is surgically absent. No pelvic mass.    MUSCULOSKELETAL: No aggressive osseous lesion. Similar L4/L5 grade 1  anterolisthesis.    ADDITIONAL FINDINGS: None.      Impression    IMPRESSION:  1.  Since 4/8/2024, increased in size left upper quadrant soft tissue  masses. Other abdominal soft tissue lesions have not significantly  changed.  2.  No  convincing new site of disease.    ORIN PAUL MD         SYSTEM ID:  [id number]     CT CAP 9/24/24  Mesentery/peritoneum/retroperitoneum: Soft tissue mass in the  transverse mesial colon measuring 4.0 x 3.8 cm with decreased  enhancement compared to prior. Previously measured 2.8 x 3.1 cm.  Mildly enhancing mass anterior to the superior pole of the spleen  measuring 2.8 x 5.2 cm, previously 2.6 x 4.0 cm. Stable size of soft  tissue nodule anterior to the pancreas measuring 1.5 cm (series 11  image 37). There is interval central hypoenhancement. Trace central  mesenteric free fluid.     Lymph nodes: No significant lymphadenopathy.     Vasculature: Scattered atherosclerotic calcification of abdominal  aorta with no aneurysmal dilation.  Portal veins are patent.     Pelvis: Urinary bladder is normal.     Osseous structures: No aggressive or acute osseous lesion.  Multilevel  degenerative changes of the spine most pronounced at L4-L5. Grade 1  anterolisthesis of L4 on L5. Enhancing circumscribed extradural lesion  at the level of L5-S1 measuring 1.1 cm in craniocaudal dimension  (series 14 image 100).      Soft tissues: Tiny fat containing ventral/umbilical hernia.        CT CAP  12/16/24                                                              IMPRESSION:   1. Compared to CT dated 6/3/2024, increased size of peritoneal soft  tissue masses in the transverse mesial colon and left upper quadrant.  Stable size of soft tissue nodule anterior to the pancreas with  interval central hypoenhancement suspicious for necrosis.  2. No evidence of new disease in the chest abdomen and pelvis.  3. Enhancing circumscribed extradural lesion at the level of L5-S1,  may represent synovial cyst with hemorrhage as described on recent  lumbar MRI.  4.Additional findings similar to prior CT.        PATHOLOGY   12/12/2016  FINAL DIAGNOSIS:   CASE FROM [place] ([id number], OBTAINED 11/04/2016):   A. UTERINE MASS,  RESECTION:   - Leiomyosarcoma, 12 cm in aggregate (per report)   - Please see comment     COMMENT:   Per the received report, a right cul-de-sac biopsy was involved by   leiomyosarcoma.   The diagnosis of leiomyosarcoma is made on the basis of examination of   the one slide submitted.  There is moderate nuclear atypia, tumor   necrosis and increased mitotic activities (10/10 HPFs).     Tempus profiling from her 4/5/22 specimen showed CDKN2A and CDKN2B loss.     I reviewed the medical records including prior oncology notes, laboratory studies which are notable for adequate renal and hepatic function, and normal blood counts.   I personally reviewed imaging including CT of the CAP done on 6/12/2024 which demonstrates enlargement of the peritoneal mass adjacent to the spleen, now up to 4 x 2.6 cm. She has 2 other peritoneal lesions up to 2.8 cm that have remained stable.       ASSESSMENT AND PLAN     Maggie is a 74 year old women with PMH of metastatic uterine leiomyosarcoma. She was originally diagnosed on 2016 showing an up to 12 cm mass. She underwent surgical resection followed by adjuvant doxorubicin/dacarbazine for 5 cycles. She had local recurrence on 2020 treated with surgical resection. Then  again recurrence on 2022 again underwent surgical resection. Followed by [id number] with anastrozole. Since January of 2024 she has been on trial a BOAST clinical trial with Dr. Nik Schilling at the [facility]. She received abemaciclib there. Now, she was taken off of trial due to progression on 1 of 3 intra-abdominal lesions.     She comes here to continue medical management, she has seen Dr. Michaud at [facility] for a second opinion. She is interested in surgical resection which has been offered at [facility] with possible Gemzar HIPEC.      She feels very well now, she exercises regularly and has no related GI symptoms.     She saw Dr. Michaud at [facility], who recommended against further surgical  resection and for systemic therapy with other agents with activity in leiomyosarcoma including gemcitabine +/- docetaxel, trabectedin and pazopanib.     She is s/p 2 cycles of gemcitabine single agent. She is feeling well and tells me that surgery will be planned for 11/15. I explained that in her situation I do not think that surgery has chances of a meaningful response as she has disease in different areas. She insists that the team at [facility] has great expertise in surgery and HIPEC for cases of advanced leiomyosarcoma. I have not been able to communicate with her team there. She tells me they plan on intraoperative chemo with cisplatin.     I personally reviewed the most recent CT CAP and the overread done here at the [facility]. Two of the prior metastatic deposits have increased in size, although it seems that he enhancement is less suggesting possible increase in the necrosis. Other pancreatic lesion stable in size but more necrotic. Unfortunately we do not have a PET to better assess the metabolic response.   I will not recommend for surgical resection if we believe she has progressive disease. Sometimes sarcomas do enlarge as necrosis ensues. I recommend to continue chemo for 2 more cycles and repeat the scans. Patient is adamant she wants to move forward with surgical resection.     After reviewing imaging from 9/24/25 at the [facility], we saw that Maggie had disease progression with enlarging lesions in the abdomen, and no new lesions. Now she is s/p C2 of doxil/dacarbazine.I personally reviewed the most recent CT scan on 11/20/24 and compared to the one on 9/24 prior to starting new regimen. She does have progression on disease with enlargement of 2 peritoneal masses.  She has been exploring possible surgical and clinical trial options. She has met with Dr. Camarillo at [facility] and also with Dr. Kirsty Doll at [facility] and she want to move forward with surgical resection and HIPEC. It appears that [facility] has the  most experience with the trial of HIPEC for leiomyosarcomas and she has a surgery scheduled by the end of February, 2025. I advised that she gets new updated scans prior to surgery for planning and to evaluate if she has developed other sites of metastasis. She has been off treatment since 11/20/24. She will discuss this with her surgical team and get scans there, otherwise she will let me know.     Plan:     -Follow up Dr. Powers 3/26/25     30 minutes spent on the date of the encounter doing chart review, review of outside records, review of test results, interpretation of tests, patient visit, documentation, and discussion with other provider(s)     Beto Galvan MD   of Medicine  Hematology, Oncology and Transplantation        Again, thank you for allowing me to participate in the care of your patient.        Sincerely,        Beto Galvan MD    Electronically signed

## 2025-01-29 ENCOUNTER — PRE VISIT (OUTPATIENT)
Dept: ORTHOPEDICS | Facility: CLINIC | Age: 75
End: 2025-01-29

## 2025-02-03 DIAGNOSIS — E03.9 ACQUIRED HYPOTHYROIDISM: ICD-10-CM

## 2025-02-04 DIAGNOSIS — C49.9 LEIOMYOSARCOMA (H): Primary | ICD-10-CM

## 2025-02-04 RX ORDER — LEVOTHYROXINE SODIUM 75 UG/1
TABLET ORAL
Qty: 90 TABLET | Refills: 2 | Status: SHIPPED | OUTPATIENT
Start: 2025-02-04

## 2025-02-05 DIAGNOSIS — Z98.890 STATUS POST LUMBAR SPINE SURGERY FOR DECOMPRESSION OF SPINAL CORD: Primary | ICD-10-CM

## 2025-02-11 ENCOUNTER — OFFICE VISIT (OUTPATIENT)
Dept: ORTHOPEDICS | Facility: CLINIC | Age: 75
End: 2025-02-11
Payer: MEDICARE

## 2025-02-11 ENCOUNTER — ANCILLARY PROCEDURE (OUTPATIENT)
Dept: GENERAL RADIOLOGY | Facility: CLINIC | Age: 75
End: 2025-02-11
Attending: ORTHOPAEDIC SURGERY
Payer: MEDICARE

## 2025-02-11 DIAGNOSIS — M43.16 SPONDYLOLISTHESIS OF LUMBAR REGION: Primary | ICD-10-CM

## 2025-02-11 DIAGNOSIS — Z98.890 STATUS POST LUMBAR SPINE SURGERY FOR DECOMPRESSION OF SPINAL CORD: ICD-10-CM

## 2025-02-11 PROCEDURE — 72110 X-RAY EXAM L-2 SPINE 4/>VWS: CPT | Performed by: STUDENT IN AN ORGANIZED HEALTH CARE EDUCATION/TRAINING PROGRAM

## 2025-02-11 PROCEDURE — 99024 POSTOP FOLLOW-UP VISIT: CPT | Performed by: ORTHOPAEDIC SURGERY

## 2025-02-11 NOTE — PROGRESS NOTES
Spine Surgery Return Clinic Visit      Chief Complaint:   Surgical Followup (6 wk post-op Lumbar 4 to 5 Decompression and Left Lumbar 5-Sacral 1 Rafael-Laminotomy and Cyst Removal DOS 12/30/24 )      Interval HPI:  Symptom Profile Including: location of symptoms, onset, severity, exacerbating/alleviating factors, previous treatments:        Maggie Navarrete is a 74 year old female who presents s/p decompression doing very well.  Says that surgery has helped her and she is happy with her outcome.  She does still have some back pain.  No radicular leg symptoms.              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    GERD (gastroesophageal reflux disease)     Hx of previous reproductive problem 1980    Kidney stone 2011    Leiomyosarcoma (H) 11/14/2016    Personal history of colonic polyps 2016    Small, benign    PONV (postoperative nausea and vomiting)     Posterior vitreous detachment of left eye 2011    Posterior vitreous detachment of right eye 2011    Ptosis of eyelid, bilateral     Sepsis (H) 07/2020    Stomach problems 2015    Abdominal pain, Diarreha    Thyroid disease     thyroid nodule resolved 2014            Past Surgical History:     Past Surgical History:   Procedure Laterality Date    ABDOMEN SURGERY  Now    Pain, diarrhea    BREAST SURGERY  2002    right breast benign    COLONOSCOPY  2008    due in 2018    COLONOSCOPY N/A 09/08/2016    Procedure: COMBINED COLONOSCOPY, SINGLE OR MULTIPLE BIOPSY/POLYPECTOMY BY BIOPSY;  Surgeon: Abner Pepper MD;  Location:  GI    COLONOSCOPY N/A 10/18/2021    Procedure: COLONOSCOPY;  Surgeon: Jamila Villarreal MD;  Location: [facility] OR    DECOMPRESSION LUMBAR TWO LEVELS N/A 12/30/2024    Procedure: Lumbar 4 to 5 Decompression and Left Lumbar  5-Sacral 1 Rafael-Laminotomy and Cyst Removal;  Surgeon: Eric Moreno MD;  [facility]    ENT SURGERY  1975    tonsillectomy    GENITOURINARY SURGERY  1953    bladder surgery     GI SURGERY  2015    Severe constipation    GYN SURGERY  1977    C section    GYN SURGERY  1981    laparoscopy    GYN SURGERY  2007    myomectomy    hysterecomy  11/14/2016    leiomyosarcoma    IR CHEST PORT PLACEMENT > 5 YRS OF AGE  8/23/2024    OTHER SURGICAL HISTORY Right 10/27/2017    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    REMOVE PORT VASCULAR ACCESS Right 05/09/2017    Procedure: REMOVE PORT VASCULAR ACCESS;  Right Port Removal;  Surgeon: Eric Gibson PA-C;  [facility]    REPAIR PTOSIS BILATERAL  02/15/2012    Procedure:REPAIR PTOSIS BILATERAL; BILATERAL UPPER LID PTOSIS REPAIR; Surgeon:BRYCE REYNOLDS; Location: [facility]    TUMOR REMOVAL  06/2020            Social History:     Social History     Tobacco Use    Smoking status: Never     Passive exposure: Never    Smokeless tobacco: Never   Substance Use Topics    Alcohol use: Not Currently     Comment: Social, not often per patient.            Family History:     Family History   Problem Relation Age of Onset    Skin Cancer Mother     Family History Negative Mother         glaucoma    Glaucoma Mother     Anxiety Disorder Mother     Heart Failure Mother     Diabetes Mother     Coronary Artery Disease Mother     Hypertension Mother     Breast Cancer Mother     Depression Mother     Obesity Mother     Heart Disease Father     Glaucoma Father     Diabetes Father     Coronary Artery Disease Father     Depression Father     Osteoporosis Father     Skin Cancer Sister     Depression Sister     Thyroid Disease Sister     Retinal detachment Sister     Hypertension Sister     Hypertension Sister     Lipids Sister     Cerebrovascular Disease Sister     Depression Sister     Heart Disease Maternal Grandmother     Diabetes  Maternal Grandmother     Heart Disease Paternal Grandmother     Cancer Paternal Grandfather         stomach    Heart Disease Paternal Aunt     Heart Disease Paternal Uncle     Osteoporosis Other     Anesthesia Reaction No family hx of     Thrombosis No family hx of             Allergies:     Allergies   Allergen Reactions    Dust Mite Extract Other (See Comments)    Erythromycin GI Disturbance, Other (See Comments), Nausea and Nausea and Vomiting     PN: LW Reaction: Nausea    Tramadol Nausea, Other (See Comments) and Nausea and Vomiting            Medications:     Current Outpatient Medications   Medication Sig Dispense Refill    acetaminophen (TYLENOL) 325 MG tablet Take 3 tablets (975 mg) by mouth every 8 hours.      Ascorbic Acid 250 MG CHEW Take 250 mg by mouth daily.      calcium citrate 250 MG TABS Take 500 mg by mouth 2 times daily.      carboxymethylcellulose PF (REFRESH PLUS) 0.5 % ophthalmic solution Place 1 drop into both eyes 4 times daily as needed for dry eyes      famotidine (PEPCID) 20 MG tablet Take 1 tablet (20 mg) by mouth 2 times daily as needed.      Fish Oil-Cholecalciferol (FISH OIL + D3 PO) Take 1 capsule by mouth daily.      levothyroxine (SYNTHROID/LEVOTHROID) 75 MCG tablet TAKE 1 TABLET DAILY FOR    THYROID 90 tablet 2    lidocaine-prilocaine (EMLA) 2.5-2.5 % external cream Apply a thin layer on skin overlying port site 10-15 minutes prior to port access for labs or infusion 5 g 1    Melatonin 10 MG TABS tablet Take 10 mg by mouth at bedtime.      multivitamin w/minerals (THERA-VIT-M) tablet Take 1 tablet by mouth daily as needed (if diet insufficient to provide recommended nutrients)      oxyCODONE (ROXICODONE) 5 MG tablet Take 0.5-1 tablets (2.5-5 mg) by mouth every 4 hours as needed for moderate to severe pain. 20 tablet 0    polyethylene glycol (MIRALAX) 17 GM/Dose powder Take 17 g by mouth daily.      senna-docusate (SENOKOT-S/PERICOLACE) 8.6-50 MG tablet Take 1 tablet by mouth 2  times daily.      triamcinolone (KENALOG) 0.1 % external cream Apply topically as needed for irritation.      TURMERIC PO Take 1 tablet by mouth daily.      venlafaxine (EFFEXOR XR) 37.5 MG 24 hr capsule TAKE 1 CAPSULE DAILY (Patient taking differently: Take 37.5 mg by mouth every morning.) 90 capsule 3    VITAMIN D PO Take 1 tablet by mouth daily.      zolpidem (AMBIEN) 5 MG tablet Take 1 tablet (5 mg) by mouth nightly as needed for sleep (Only uses if traveling or cannot sleep,). 20 tablet 0     Current Facility-Administered Medications   Medication Dose Route Frequency Provider Last Rate Last Admin    denosumab (PROLIA) injection 60 mg  60 mg Subcutaneous Q6 Months Edgardo Valderrama MD   60 mg at 09/18/24 1310             Review of Systems:   A focused musculoskeletal and neurologic ROS was performed with pertinent positives and negatives noted in the HPI.  Additional systems were also reviewed and are documented at the bottom of the note.         Physical Exam:   Vitals: There were no vitals taken for this visit.  Musculoskeletal, Neurologic, and Spine:            Lumbar Spine:    Appearance - No gross stepoffs or deformities    Motor -     L2-3: Hip flexion 5/5 R and 5/5 L strength          L3/4:  Knee extension R 5/5 and L 5/5 strength         L4/5:  Foot dorsiflexion R 5/5 L 5/5 and                S1:  Plantarflexion/Peroneal Muscles  R 5/5 and L 5/5 strength    Sensation: intact to light touch L3-S1 distribution BLE        Incision is well healed         Imaging:   We ordered and independently reviewed new radiographs at this clinic visit. The results were discussed with the patient. Findings include:     Images show post op changes        Assessment and Plan:     74 year old female with excellent outcome at this time.    May advance activities as able.  We discussed some common sense restrictions and avoidance of heavy lifting or straining on her low back.  She has a surgery coming up in [place] later  this month.  So she can't so any PT right now.  But I offered her a PT referral for when she feels ready.  Otherwise follow up as needed and I'm glad she is doing well.           Respectfully,  Eric Moreno MD  Spine Surgery  [facility]

## 2025-02-11 NOTE — LETTER
2/11/2025      Maggie Navarrete  [address]  [facility] [address]      Dear Colleague,    Thank you for referring your patient, Maggie Navarrete, to the [facility] [place]. Please see a copy of my visit note below.    Spine Surgery Return Clinic Visit      Chief Complaint:   Surgical Followup (6 wk post-op Lumbar 4 to 5 Decompression and Left Lumbar 5-Sacral 1 Rafael-Laminotomy and Cyst Removal DOS 12/30/24 )      Interval HPI:  Symptom Profile Including: location of symptoms, onset, severity, exacerbating/alleviating factors, previous treatments:        Maggie Navarrete is a 74 year old female who presents s/p decompression doing very well.  Says that surgery has helped her and she is happy with her outcome.  She does still have some back pain.  No radicular leg symptoms.              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     GERD (gastroesophageal reflux disease)      Hx of previous reproductive problem 1980     Kidney stone 2011     Leiomyosarcoma (H) 11/14/2016     Personal history of colonic polyps 2016    Small, benign     PONV (postoperative nausea and vomiting)      Posterior vitreous detachment of left eye 2011     Posterior vitreous detachment of right eye 2011     Ptosis of eyelid, bilateral      Sepsis (H) 07/2020     Stomach problems 2015    Abdominal pain, Diarreha     Thyroid disease     thyroid nodule resolved 2014            Past Surgical History:     Past Surgical History:   Procedure Laterality Date     ABDOMEN SURGERY  Now    Pain, diarrhea     BREAST SURGERY  2002    right breast benign     COLONOSCOPY  2008    due in 2018     COLONOSCOPY N/A 09/08/2016    Procedure: COMBINED COLONOSCOPY, SINGLE OR MULTIPLE BIOPSY/POLYPECTOMY BY BIOPSY;   Surgeon: Abner Pepper MD;  Location:  GI     COLONOSCOPY N/A 10/18/2021    Procedure: COLONOSCOPY;  Surgeon: Jamila Villarreal MD;  Location: [facility] OR     DECOMPRESSION LUMBAR TWO LEVELS N/A 12/30/2024    Procedure: Lumbar 4 to 5 Decompression and Left Lumbar 5-Sacral 1 Rafael-Laminotomy and Cyst Removal;  Surgeon: Eric Moreno MD;  [facility]     ENT SURGERY  1975    tonsillectomy     GENITOURINARY SURGERY  1953    bladder surgery      GI SURGERY  2015    Severe constipation     GYN SURGERY  1977    C section     GYN SURGERY  1981    laparoscopy     GYN SURGERY  2007    myomectomy     hysterecomy  11/14/2016    leiomyosarcoma     IR CHEST PORT PLACEMENT > 5 YRS OF AGE  8/23/2024     OTHER SURGICAL HISTORY Right 10/27/2017    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     REMOVE PORT VASCULAR ACCESS Right 05/09/2017    Procedure: REMOVE PORT VASCULAR ACCESS;  Right Port Removal;  Surgeon: Eric Gibson PA-C;  [facility]     REPAIR PTOSIS BILATERAL  02/15/2012    Procedure:REPAIR PTOSIS BILATERAL; BILATERAL UPPER LID PTOSIS REPAIR; Surgeon:BRYCE REYNOLDS; Location: [facility]     TUMOR REMOVAL  06/2020            Social History:     Social History     Tobacco Use     Smoking status: Never     Passive exposure: Never     Smokeless tobacco: Never   Substance Use Topics     Alcohol use: Not Currently     Comment: Social, not often per patient.            Family History:     Family History   Problem Relation Age of Onset     Skin Cancer Mother      Family History Negative Mother         glaucoma     Glaucoma Mother      Anxiety Disorder Mother      Heart Failure Mother      Diabetes Mother      Coronary Artery Disease Mother      Hypertension Mother      Breast Cancer Mother      Depression Mother      Obesity Mother      Heart Disease Father      Glaucoma Father      Diabetes Father      Coronary Artery Disease Father      Depression Father       Osteoporosis Father      Skin Cancer Sister      Depression Sister      Thyroid Disease Sister      Retinal detachment Sister      Hypertension Sister      Hypertension Sister      Lipids Sister      Cerebrovascular Disease Sister      Depression Sister      Heart Disease Maternal Grandmother      Diabetes Maternal Grandmother      Heart Disease Paternal Grandmother      Cancer Paternal Grandfather         stomach     Heart Disease Paternal Aunt      Heart Disease Paternal Uncle      Osteoporosis Other      Anesthesia Reaction No family hx of      Thrombosis No family hx of             Allergies:     Allergies   Allergen Reactions     Dust Mite Extract Other (See Comments)     Erythromycin GI Disturbance, Other (See Comments), Nausea and Nausea and Vomiting     PN: LW Reaction: Nausea     Tramadol Nausea, Other (See Comments) and Nausea and Vomiting            Medications:     Current Outpatient Medications   Medication Sig Dispense Refill     acetaminophen (TYLENOL) 325 MG tablet Take 3 tablets (975 mg) by mouth every 8 hours.       Ascorbic Acid 250 MG CHEW Take 250 mg by mouth daily.       calcium citrate 250 MG TABS Take 500 mg by mouth 2 times daily.       carboxymethylcellulose PF (REFRESH PLUS) 0.5 % ophthalmic solution Place 1 drop into both eyes 4 times daily as needed for dry eyes       famotidine (PEPCID) 20 MG tablet Take 1 tablet (20 mg) by mouth 2 times daily as needed.       Fish Oil-Cholecalciferol (FISH OIL + D3 PO) Take 1 capsule by mouth daily.       levothyroxine (SYNTHROID/LEVOTHROID) 75 MCG tablet TAKE 1 TABLET DAILY FOR    THYROID 90 tablet 2     lidocaine-prilocaine (EMLA) 2.5-2.5 % external cream Apply a thin layer on skin overlying port site 10-15 minutes prior to port access for labs or infusion 5 g 1     Melatonin 10 MG TABS tablet Take 10 mg by mouth at bedtime.       multivitamin w/minerals (THERA-VIT-M) tablet Take 1 tablet by mouth daily as needed (if diet insufficient to provide  recommended nutrients)       oxyCODONE (ROXICODONE) 5 MG tablet Take 0.5-1 tablets (2.5-5 mg) by mouth every 4 hours as needed for moderate to severe pain. 20 tablet 0     polyethylene glycol (MIRALAX) 17 GM/Dose powder Take 17 g by mouth daily.       senna-docusate (SENOKOT-S/PERICOLACE) 8.6-50 MG tablet Take 1 tablet by mouth 2 times daily.       triamcinolone (KENALOG) 0.1 % external cream Apply topically as needed for irritation.       TURMERIC PO Take 1 tablet by mouth daily.       venlafaxine (EFFEXOR XR) 37.5 MG 24 hr capsule TAKE 1 CAPSULE DAILY (Patient taking differently: Take 37.5 mg by mouth every morning.) 90 capsule 3     VITAMIN D PO Take 1 tablet by mouth daily.       zolpidem (AMBIEN) 5 MG tablet Take 1 tablet (5 mg) by mouth nightly as needed for sleep (Only uses if traveling or cannot sleep,). 20 tablet 0     Current Facility-Administered Medications   Medication Dose Route Frequency Provider Last Rate Last Admin     denosumab (PROLIA) injection 60 mg  60 mg Subcutaneous Q6 Months Edgardo Valderrama MD   60 mg at 09/18/24 1310             Review of Systems:   A focused musculoskeletal and neurologic ROS was performed with pertinent positives and negatives noted in the HPI.  Additional systems were also reviewed and are documented at the bottom of the note.         Physical Exam:   Vitals: There were no vitals taken for this visit.  Musculoskeletal, Neurologic, and Spine:            Lumbar Spine:    Appearance - No gross stepoffs or deformities    Motor -     L2-3: Hip flexion 5/5 R and 5/5 L strength          L3/4:  Knee extension R 5/5 and L 5/5 strength         L4/5:  Foot dorsiflexion R 5/5 L 5/5 and                S1:  Plantarflexion/Peroneal Muscles  R 5/5 and L 5/5 strength    Sensation: intact to light touch L3-S1 distribution BLE        Incision is well healed         Imaging:   We ordered and independently reviewed new radiographs at this clinic visit. The results were discussed with the  patient. Findings include:     Images show post op changes        Assessment and Plan:     74 year old female with excellent outcome at this time.    May advance activities as able.  We discussed some common sense restrictions and avoidance of heavy lifting or straining on her low back.  She has a surgery coming up in [place] later this month.  So she can't so any PT right now.  But I offered her a PT referral for when she feels ready.  Otherwise follow up as needed and I'm glad she is doing well.           Respectfully,  Eric Moreno MD  Spine Surgery  [facility]      Again, thank you for allowing me to participate in the care of your patient.        Sincerely,        Eric Moreno MD    Electronically signed

## 2025-02-11 NOTE — NURSING NOTE
Reason For Visit:   Chief Complaint   Patient presents with    Surgical Followup     6 wk post-op Lumbar 4 to 5 Decompression and Left Lumbar 5-Sacral 1 Rafael-Laminotomy and Cyst Removal DOS 12/30/24        Primary MD: Vandana Morris  Ref. MD: est      Date of surgery: 12/30/24  Type of surgery: Lumbar 4 to 5 Decompression and Left Lumbar 5-Sacral 1 Rafael-Laminotomy and Cyst Removal .      There were no vitals taken for this visit.    Pain Assessment  Patient Currently in Pain: Yes  0-10 Pain Scale: 1  Primary Pain Location: Back (right lower back)               -JANES Ortega- [facility]

## 2025-02-13 ENCOUNTER — ANCILLARY PROCEDURE (OUTPATIENT)
Dept: CT IMAGING | Facility: CLINIC | Age: 75
End: 2025-02-13
Attending: STUDENT IN AN ORGANIZED HEALTH CARE EDUCATION/TRAINING PROGRAM
Payer: MEDICARE

## 2025-02-13 DIAGNOSIS — C49.9 LEIOMYOSARCOMA (H): ICD-10-CM

## 2025-02-13 PROCEDURE — 74177 CT ABD & PELVIS W/CONTRAST: CPT | Performed by: STUDENT IN AN ORGANIZED HEALTH CARE EDUCATION/TRAINING PROGRAM

## 2025-02-13 PROCEDURE — 71260 CT THORAX DX C+: CPT | Performed by: STUDENT IN AN ORGANIZED HEALTH CARE EDUCATION/TRAINING PROGRAM

## 2025-02-13 RX ORDER — IOPAMIDOL 755 MG/ML
71 INJECTION, SOLUTION INTRAVASCULAR ONCE
Status: COMPLETED | OUTPATIENT
Start: 2025-02-13 | End: 2025-02-13

## 2025-02-13 RX ORDER — HEPARIN SODIUM (PORCINE) LOCK FLUSH IV SOLN 100 UNIT/ML 100 UNIT/ML
5 SOLUTION INTRAVENOUS
Status: COMPLETED | OUTPATIENT
Start: 2025-02-13 | End: 2025-02-13

## 2025-02-13 RX ADMIN — IOPAMIDOL 71 ML: 755 INJECTION, SOLUTION INTRAVASCULAR at 15:22

## 2025-02-13 RX ADMIN — HEPARIN SODIUM (PORCINE) LOCK FLUSH IV SOLN 100 UNIT/ML 5 ML: 100 SOLUTION at 15:17

## 2025-02-13 NOTE — DISCHARGE INSTRUCTIONS

## 2025-03-27 ENCOUNTER — MYC MEDICAL ADVICE (OUTPATIENT)
Dept: ENDOCRINOLOGY | Facility: CLINIC | Age: 75
End: 2025-03-27
Payer: MEDICARE

## 2025-03-27 DIAGNOSIS — M81.0 AGE-RELATED OSTEOPOROSIS WITHOUT CURRENT PATHOLOGICAL FRACTURE: Primary | ICD-10-CM

## 2025-03-27 DIAGNOSIS — Z92.29 PERSONAL HISTORY OF OTHER DRUG THERAPY: ICD-10-CM

## 2025-03-30 ASSESSMENT — SLEEP AND FATIGUE QUESTIONNAIRES
HOW LIKELY ARE YOU TO NOD OFF OR FALL ASLEEP WHILE LYING DOWN TO REST IN THE AFTERNOON WHEN CIRCUMSTANCES PERMIT: SLIGHT CHANCE OF DOZING
HOW LIKELY ARE YOU TO NOD OFF OR FALL ASLEEP IN A CAR, WHILE STOPPED FOR A FEW MINUTES IN TRAFFIC: WOULD NEVER DOZE
HOW LIKELY ARE YOU TO NOD OFF OR FALL ASLEEP WHILE WATCHING TV: WOULD NEVER DOZE
HOW LIKELY ARE YOU TO NOD OFF OR FALL ASLEEP WHEN YOU ARE A PASSENGER IN A CAR FOR AN HOUR WITHOUT A BREAK: WOULD NEVER DOZE
HOW LIKELY ARE YOU TO NOD OFF OR FALL ASLEEP WHILE SITTING QUIETLY AFTER LUNCH WITHOUT ALCOHOL: WOULD NEVER DOZE
HOW LIKELY ARE YOU TO NOD OFF OR FALL ASLEEP WHILE SITTING AND TALKING TO SOMEONE: WOULD NEVER DOZE
HOW LIKELY ARE YOU TO NOD OFF OR FALL ASLEEP WHILE SITTING AND READING: WOULD NEVER DOZE
HOW LIKELY ARE YOU TO NOD OFF OR FALL ASLEEP WHILE SITTING INACTIVE IN A PUBLIC PLACE: WOULD NEVER DOZE

## 2025-04-01 DIAGNOSIS — C55 LEIOMYOSARCOMA OF UTERUS (H): Primary | ICD-10-CM

## 2025-04-03 ENCOUNTER — OFFICE VISIT (OUTPATIENT)
Dept: SLEEP MEDICINE | Facility: CLINIC | Age: 75
End: 2025-04-03
Attending: INTERNAL MEDICINE
Payer: MEDICARE

## 2025-04-03 ENCOUNTER — LAB (OUTPATIENT)
Dept: LAB | Facility: CLINIC | Age: 75
End: 2025-04-03
Payer: MEDICARE

## 2025-04-03 DIAGNOSIS — M81.0 AGE-RELATED OSTEOPOROSIS WITHOUT CURRENT PATHOLOGICAL FRACTURE: ICD-10-CM

## 2025-04-03 DIAGNOSIS — Z92.29 PERSONAL HISTORY OF OTHER DRUG THERAPY: ICD-10-CM

## 2025-04-03 DIAGNOSIS — R06.83 SNORING: ICD-10-CM

## 2025-04-03 LAB
ANION GAP SERPL CALCULATED.3IONS-SCNC: 12 MMOL/L (ref 7–15)
BUN SERPL-MCNC: 24.8 MG/DL (ref 8–23)
CA-I BLD-MCNC: 4.6 MG/DL (ref 4.4–5.2)
CALCIUM SERPL-MCNC: 9.6 MG/DL (ref 8.8–10.4)
CHLORIDE SERPL-SCNC: 106 MMOL/L (ref 98–107)
CREAT SERPL-MCNC: 1 MG/DL (ref 0.51–0.95)
EGFRCR SERPLBLD CKD-EPI 2021: 59 ML/MIN/1.73M2
GLUCOSE SERPL-MCNC: 89 MG/DL (ref 70–99)
HCO3 SERPL-SCNC: 26 MMOL/L (ref 22–29)
POTASSIUM SERPL-SCNC: 4.2 MMOL/L (ref 3.4–5.3)
SODIUM SERPL-SCNC: 144 MMOL/L (ref 135–145)

## 2025-04-03 NOTE — PROGRESS NOTES
Pt is completing a home sleep test. Pt was instructed on how to put on the Noxturnal T3 device and associated equipment before going to bed and given the opportunity to practice putting it on before leaving the sleep center. Pt was reminded to bring the home sleep test kit back to the center tomorrow, at the scheduled time for download and reporting. Patient was instructed to complete study using the following treatment?  None  Neck circumference: 34 CM / 13 1/4 inches.  Device number: 25  Brianna Light CMA on 4/3/2025 at 11:25 AM

## 2025-04-07 ENCOUNTER — ALLIED HEALTH/NURSE VISIT (OUTPATIENT)
Dept: ENDOCRINOLOGY | Facility: CLINIC | Age: 75
End: 2025-04-07
Payer: MEDICARE

## 2025-04-07 DIAGNOSIS — Z92.29 HISTORY OF BISPHOSPHONATE THERAPY: ICD-10-CM

## 2025-04-07 DIAGNOSIS — M81.0 AGE-RELATED OSTEOPOROSIS WITHOUT CURRENT PATHOLOGICAL FRACTURE: Primary | ICD-10-CM

## 2025-04-07 DIAGNOSIS — Z92.29 PERSONAL HISTORY OF OTHER DRUG THERAPY: ICD-10-CM

## 2025-04-07 PROCEDURE — 99207 PR NO CHARGE NURSE ONLY: CPT | Performed by: INTERNAL MEDICINE

## 2025-04-07 PROCEDURE — 96372 THER/PROPH/DIAG INJ SC/IM: CPT | Performed by: INTERNAL MEDICINE

## 2025-04-07 NOTE — PATIENT INSTRUCTIONS
You received your Prolia injection today  Your next injection is due in 6 months, scheduled 10/7/25  If you plan on having any dental work done within the next 6 months, please let your dentist know that you are on this medication.   Make sure you do not have any dental work completed involving the jaw bone within 1 month prior to your scheduled injection.   Provided Prolia VIS Sheet to patient.     Joni Mcclain RN

## 2025-04-07 NOTE — PROGRESS NOTES
Clinic Administered Medication Documentation      Prolia Documentation    Indication: Prolia  (denosumab) is a prescription medicine used to treat osteoporosis in patients who:   Are at high risk for fracture, meaning patients who have had a fracture related to osteoporosis, or who have multiple risk factors for fracture.  Cannot use another osteoporosis medicine or other osteoporosis medicines did not work well.  The timeline for early/late injections would be 4 weeks early and any time after the 6 month jose. If a patient receives their injection late, then the subsequent injection would be 6 months from the date that they actually received the injection.    When was the last injection?  24  Was the last injection at least 6 months ago? Yes  Has the prior authorization been completed?  Yes  Is there an active order (written within the past 365 days, with administrations remaining, not ) in the chart?  Yes   GFR Estimate   Date Value Ref Range Status   2025 59 (L) >60 mL/min/1.73m2 Final     Comment:     eGFR calculated using  CKD-EPI equation.   2021 62 >60 mL/min/[1.73_m2] Final     GFR, ESTIMATED POCT   Date Value Ref Range Status   2024 36 (L) >60 mL/min/1.73m2 Final     Has patient had a GFR within the last 12 months? Yes   Is GFR under 30, or patient has a diagnosis of CKD4 or CKD5? No   Patient denies gastric bypass or parathyroid surgery in past 6 months? Yes - patient denies.   Patient denies undergoing any dental procedures involving drilling into the bone, such as implants, extractions, or oral surgery, within the past two months that have not yet healed?  Yes - patient denies  Patient denies plans for an emergency tooth extraction within the next week? Yes    The following steps were completed to comply with the REMS program for Prolia:  Reviewed information in the Medication Guide, including the serious risks of Prolia  and the symptoms of each risk.  Advised patient  to seek prompt medical attention if they have signs or symptoms of any of the serious risks.  Provided each patient a copy of the Medication Guide and Patient Guide.    Prior to injection, verified patient identity using patient's name and date of birth. Medication was administered. Please see MAR and medication order for additional information. Patient instructed to remain in clinic for 15 minutes and report any adverse reaction to staff immediately.    Vial/Syringe: Syringe  Was this medication supplied by the patient? No  Verified that the patient has administrations remaining in their prescription.     Maggie Navarrete comes into clinic today at the request of Dr Valderrama Ordering Provider for Med Injection only Prolia .    This service provided today was under the supervising provider of the day Dr Flores, who was available if needed.    Joni Mcclain RN

## 2025-04-07 NOTE — PROGRESS NOTES
"Per LOV with Dr Valderrama 9/16/24  \"Repeat DEXA scan in 9/2025 \"    Patient has follow-up appt with Dr Valderrama on 9/15/25. Patient is requesting to complete DEXA prior to visit. No active orders noted in chart. Pended DEXA orders and routed to Dr Valderrama to review.    Joni Mcclain RN    "

## 2025-04-09 ENCOUNTER — HOSPITAL ENCOUNTER (OUTPATIENT)
Dept: CT IMAGING | Facility: CLINIC | Age: 75
Discharge: HOME | End: 2025-04-09
Attending: STUDENT IN AN ORGANIZED HEALTH CARE EDUCATION/TRAINING PROGRAM
Payer: MEDICARE

## 2025-04-09 DIAGNOSIS — C55 LEIOMYOSARCOMA OF UTERUS (H): ICD-10-CM

## 2025-04-09 LAB
CREAT BLD-MCNC: 0.8 MG/DL (ref 0.5–1)
EGFRCR SERPLBLD CKD-EPI 2021: >60 ML/MIN/1.73M2

## 2025-04-09 PROCEDURE — 71260 CT THORAX DX C+: CPT

## 2025-04-09 PROCEDURE — 250N000009 HC RX 250: Performed by: STUDENT IN AN ORGANIZED HEALTH CARE EDUCATION/TRAINING PROGRAM

## 2025-04-09 PROCEDURE — 74177 CT ABD & PELVIS W/CONTRAST: CPT

## 2025-04-09 PROCEDURE — 82565 ASSAY OF CREATININE: CPT

## 2025-04-09 PROCEDURE — 250N000011 HC RX IP 250 OP 636: Performed by: STUDENT IN AN ORGANIZED HEALTH CARE EDUCATION/TRAINING PROGRAM

## 2025-04-09 RX ORDER — HEPARIN SODIUM,PORCINE 10 UNIT/ML
VIAL (ML) INTRAVENOUS
Status: DISCONTINUED
Start: 2025-04-09 | End: 2025-04-10 | Stop reason: HOSPADM

## 2025-04-09 RX ORDER — HEPARIN SODIUM (PORCINE) LOCK FLUSH IV SOLN 100 UNIT/ML 100 UNIT/ML
SOLUTION INTRAVENOUS
Status: DISCONTINUED
Start: 2025-04-09 | End: 2025-04-10 | Stop reason: HOSPADM

## 2025-04-09 RX ORDER — HEPARIN SODIUM (PORCINE) LOCK FLUSH IV SOLN 100 UNIT/ML 100 UNIT/ML
5 SOLUTION INTRAVENOUS DAILY PRN
Status: DISCONTINUED | OUTPATIENT
Start: 2025-04-09 | End: 2025-04-10 | Stop reason: HOSPADM

## 2025-04-09 RX ORDER — IOPAMIDOL 755 MG/ML
75 INJECTION, SOLUTION INTRAVASCULAR ONCE
Status: COMPLETED | OUTPATIENT
Start: 2025-04-09 | End: 2025-04-09

## 2025-04-09 RX ADMIN — IOPAMIDOL 75 ML: 755 INJECTION, SOLUTION INTRAVENOUS at 11:25

## 2025-04-09 RX ADMIN — SODIUM CHLORIDE, PRESERVATIVE FREE 75 ML: 5 INJECTION INTRAVENOUS at 11:25

## 2025-04-09 RX ADMIN — HEPARIN SODIUM (PORCINE) LOCK FLUSH IV SOLN 100 UNIT/ML 5 ML: 100 SOLUTION at 11:52

## 2025-04-09 NOTE — IR NOTE
Port sterilely accessed in CT department using 0.75 inch 20 g needle. Blood return noted and drawn for POC testing, then saline locked for CT use.

## 2025-04-14 ENCOUNTER — MYC MEDICAL ADVICE (OUTPATIENT)
Dept: ENDOCRINOLOGY | Facility: CLINIC | Age: 75
End: 2025-04-14
Payer: MEDICARE

## 2025-04-21 DIAGNOSIS — C79.89 METASTATIC LEIOMYOSARCOMA TO INTRA-ABDOMINAL SITE (H): ICD-10-CM

## 2025-04-21 DIAGNOSIS — C55 LEIOMYOSARCOMA OF UTERUS (H): Primary | ICD-10-CM

## 2025-04-23 RX ORDER — HEPARIN SODIUM (PORCINE) LOCK FLUSH IV SOLN 100 UNIT/ML 100 UNIT/ML
5 SOLUTION INTRAVENOUS
Status: CANCELLED | OUTPATIENT
Start: 2025-04-30

## 2025-04-23 RX ORDER — DIPHENHYDRAMINE HYDROCHLORIDE 50 MG/ML
50 INJECTION, SOLUTION INTRAMUSCULAR; INTRAVENOUS
Status: CANCELLED
Start: 2025-04-30

## 2025-04-23 RX ORDER — DIPHENHYDRAMINE HYDROCHLORIDE 50 MG/ML
25 INJECTION, SOLUTION INTRAMUSCULAR; INTRAVENOUS
Status: CANCELLED
Start: 2025-04-30

## 2025-04-23 RX ORDER — EPINEPHRINE 1 MG/ML
0.3 INJECTION, SOLUTION INTRAMUSCULAR; SUBCUTANEOUS EVERY 5 MIN PRN
Status: CANCELLED | OUTPATIENT
Start: 2025-04-30

## 2025-04-23 RX ORDER — ALBUTEROL SULFATE 90 UG/1
1-2 INHALANT RESPIRATORY (INHALATION)
Status: CANCELLED
Start: 2025-04-30

## 2025-04-23 RX ORDER — LORAZEPAM 2 MG/ML
0.5 INJECTION INTRAMUSCULAR EVERY 4 HOURS PRN
Status: CANCELLED | OUTPATIENT
Start: 2025-04-30

## 2025-04-23 RX ORDER — ALBUTEROL SULFATE 0.83 MG/ML
2.5 SOLUTION RESPIRATORY (INHALATION)
Status: CANCELLED | OUTPATIENT
Start: 2025-04-30

## 2025-04-23 RX ORDER — MEPERIDINE HYDROCHLORIDE 25 MG/ML
25 INJECTION INTRAMUSCULAR; INTRAVENOUS; SUBCUTANEOUS
Status: CANCELLED | OUTPATIENT
Start: 2025-04-30

## 2025-04-23 RX ORDER — METHYLPREDNISOLONE SODIUM SUCCINATE 40 MG/ML
40 INJECTION INTRAMUSCULAR; INTRAVENOUS
Status: CANCELLED
Start: 2025-04-30

## 2025-04-23 RX ORDER — HEPARIN SODIUM,PORCINE 10 UNIT/ML
5-20 VIAL (ML) INTRAVENOUS DAILY PRN
Status: CANCELLED | OUTPATIENT
Start: 2025-04-30

## 2025-04-24 ENCOUNTER — DOCUMENTATION ONLY (OUTPATIENT)
Dept: SLEEP MEDICINE | Facility: CLINIC | Age: 75
End: 2025-04-24
Payer: MEDICARE

## 2025-04-24 DIAGNOSIS — G47.33 OSA (OBSTRUCTIVE SLEEP APNEA): Primary | ICD-10-CM

## 2025-04-24 DIAGNOSIS — Z51.11 ENCOUNTER FOR ANTINEOPLASTIC CHEMOTHERAPY: Primary | ICD-10-CM

## 2025-04-24 ASSESSMENT — SLEEP AND FATIGUE QUESTIONNAIRES
HOW LIKELY ARE YOU TO NOD OFF OR FALL ASLEEP WHILE SITTING QUIETLY AFTER LUNCH WITHOUT ALCOHOL: WOULD NEVER DOZE
HOW LIKELY ARE YOU TO NOD OFF OR FALL ASLEEP WHILE SITTING AND TALKING TO SOMEONE: WOULD NEVER DOZE
HOW LIKELY ARE YOU TO NOD OFF OR FALL ASLEEP WHILE WATCHING TV: WOULD NEVER DOZE
HOW LIKELY ARE YOU TO NOD OFF OR FALL ASLEEP WHILE SITTING AND READING: WOULD NEVER DOZE
HOW LIKELY ARE YOU TO NOD OFF OR FALL ASLEEP WHEN YOU ARE A PASSENGER IN A CAR FOR AN HOUR WITHOUT A BREAK: WOULD NEVER DOZE
HOW LIKELY ARE YOU TO NOD OFF OR FALL ASLEEP WHILE LYING DOWN TO REST IN THE AFTERNOON WHEN CIRCUMSTANCES PERMIT: SLIGHT CHANCE OF DOZING
HOW LIKELY ARE YOU TO NOD OFF OR FALL ASLEEP WHILE SITTING INACTIVE IN A PUBLIC PLACE: WOULD NEVER DOZE
HOW LIKELY ARE YOU TO NOD OFF OR FALL ASLEEP IN A CAR, WHILE STOPPED FOR A FEW MINUTES IN TRAFFIC: WOULD NEVER DOZE

## 2025-04-24 NOTE — PROCEDURES
[facility]    Home Sleep Study Interpretation    Patient: Maggie Navarrete  MRN: [id number]  YOB: 1950  Study Date: 4/3/2025  PCP/Referring Provider: Vandana Morris;  Ordering Provider: Deja Anderson MD    Indications for Home Study: Mgagie is a 74 Female who presents with symptoms suggestive of obstructive sleep apnea      Weight: 123.0 lbs  BMI: 24.8   Lennon:   STOP BAN/8    Data: A full night home sleep study was performed recording the standard physiologic parameters including body position, movement, sound, nasal pressure, thermal oral airflow, chest and abdominal movements with respiratory inductance plethysmography, and oxygen saturation by pulse oximetry. Pulse rate was estimated by oximetry recording. This study was considered adequate based on > 4 hours of quality oximetry and respiratory recording. As specified by the AASM Manual for the Scoring of Sleep and Associated events, version 2.3, Rule VIII.D 1B, 4% oxygen desaturation scoring for hypopneas is used as a standard of care on all home sleep apnea testing.    Analysis Time: 420.8 minutes    Respiration:  Sleep Associated Hypoxemia: Sustained hypoxemia was not present. Baseline oxygen saturation was 95 %. Time with saturation less than 89% was 0.1 minutes. Time with saturation less than 90% was 0.8 minutes. The lowest oxygen saturation was 88.0%.  Snoring: Snoring was present 5% of the time with an average level of 69 dB. Duration of time snoring above 70 dB was 22.5 minutes.    Respiratory events: The home study revealed a presence of 0 obstructive apneas and 0 mixed and central apneas. There were 51 hypopneas resulting in a combined apnea/hypopnea index [AHI] of 7 events per hour with  8 per hour supine, 0  per hour prone, 0 per hour upright, 3  per hour left side, and 4 per hour right side.    Position: Percent of time spent: Supine 87%, prone 0%, upright 0%, on left 10%, on right 3%.    Heart Rate: By  Pulse Oximetry normal rate was noted.    Assessment:  Mild obstructive sleep apnea.  Sleep associated hypoxemia was not present.    Recommendations:  Consider auto-CPAP at 5-15 cmH2O or oral appliance therapy  Suggest optimizing sleep hygiene and avoiding sleep deprivation  Weight management    Diagnosis Code(s): Obstructive Sleep Apnea G47.33    Electronically signed by: Deja Anderson MD April 24, 2025  Diplomate, American Board of Internal Medicine, Sleep Medicine

## 2025-04-29 ENCOUNTER — OFFICE VISIT (OUTPATIENT)
Dept: SLEEP MEDICINE | Facility: CLINIC | Age: 75
End: 2025-04-29
Payer: MEDICARE

## 2025-04-29 ENCOUNTER — PATIENT OUTREACH (OUTPATIENT)
Dept: ONCOLOGY | Facility: CLINIC | Age: 75
End: 2025-04-29

## 2025-04-29 VITALS
OXYGEN SATURATION: 100 % | SYSTOLIC BLOOD PRESSURE: 132 MMHG | BODY MASS INDEX: 25 KG/M2 | WEIGHT: 124 LBS | HEIGHT: 59 IN | DIASTOLIC BLOOD PRESSURE: 83 MMHG | HEART RATE: 93 BPM

## 2025-04-29 DIAGNOSIS — T45.1X5A TOXIC EFFECT OF CHEMOTHERAPY: ICD-10-CM

## 2025-04-29 DIAGNOSIS — T45.1X5A: ICD-10-CM

## 2025-04-29 DIAGNOSIS — L65.8: ICD-10-CM

## 2025-04-29 DIAGNOSIS — G47.33 OSA (OBSTRUCTIVE SLEEP APNEA): Primary | ICD-10-CM

## 2025-04-29 DIAGNOSIS — C79.89 METASTATIC LEIOMYOSARCOMA TO INTRA-ABDOMINAL SITE (H): Primary | ICD-10-CM

## 2025-04-29 PROCEDURE — 99215 OFFICE O/P EST HI 40 MIN: CPT | Performed by: INTERNAL MEDICINE

## 2025-04-29 PROCEDURE — 3079F DIAST BP 80-89 MM HG: CPT | Performed by: INTERNAL MEDICINE

## 2025-04-29 PROCEDURE — 1126F AMNT PAIN NOTED NONE PRSNT: CPT | Performed by: INTERNAL MEDICINE

## 2025-04-29 PROCEDURE — 3075F SYST BP GE 130 - 139MM HG: CPT | Performed by: INTERNAL MEDICINE

## 2025-04-29 NOTE — PROGRESS NOTES
HOME SLEEP STUDY FOLLOW UP VISIT  Maggie Navarrete was seen in our clinic for a follow up for concerns for loud snoring and witnessed apneas  She was last seen in my clinic on 10/29/2024.   She then had a sleep study on 4/3/2025, for evaluation of AXEL  The study revealed the presence of:  Respiration:  Sleep Associated Hypoxemia: Sustained hypoxemia was not present. Baseline oxygen saturation was 95 %. Time with saturation less than 89% was 0.1 minutes. Time with saturation less than 90% was 0.8 minutes. The lowest oxygen saturation was 88.0%.  Snoring: Snoring was present 5% of the time with an average level of 69 dB. Duration of time snoring above 70 dB was 22.5 minutes.  Respiratory events: The home study revealed a presence of 0 obstructive apneas and 0 mixed and central apneas. There were 51 hypopneas resulting in a combined apnea/hypopnea index [AHI] of 7 events per hour with  8 per hour supine, 0  per hour prone, 0 per hour upright, 3  per hour left side, and 4 per hour right side.  Position: Percent of time spent: Supine 87%, prone 0%, upright 0%, on left 10%, on right 3%.  Heart Rate: By Pulse Oximetry normal rate was noted.     REVIEW OF SYSTEMS: Today, detailed review of systems was not done, except as described above.   Since her last visit there has been no significant change in her overall health.   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    GERD (gastroesophageal reflux disease)     Hx of previous reproductive problem 1980    Kidney stone 2011    Leiomyosarcoma (H) 11/14/2016    Personal history of colonic polyps 2016    Small, benign    PONV (postoperative nausea and vomiting)     Posterior vitreous detachment of left eye 2011    Posterior vitreous  detachment of right eye 2011    Ptosis of eyelid, bilateral     Sepsis (H) 07/2020    Stomach problems 2015    Abdominal pain, Diarreha    Thyroid disease     thyroid nodule resolved 2014     Patient Active Problem List   Diagnosis    Presbyopia - Both Eyes    Osteopenia    Generalized anxiety disorder    Vaginal atrophy    Leiomyosarcoma of uterus (H)    Chemotherapy-induced neutropenia    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    Overweight    Leiomyosarcoma (H)    Ptosis    Dry eyes    Closed nondisplaced fracture of fifth right metatarsal bone    History of deep venous thrombosis    Hypothyroidism    Myopia of both eyes    Neuropathy, peripheral    Post-operative nausea and vomiting    Osteoporosis of forearm    Low TSH level    Arthralgia, unspecified joint    Dyslipidemia    Small bowel obstruction (H)    Intractable vomiting with nausea, unspecified vomiting type    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)    Leukocytosis    Pelvic somatic dysfunction    Mixed incontinence    Urge incontinence    S/P total abdominal hysterectomy and bilateral salpingo-oophorectomy    S/P partial thyroidectomy    History of COVID-19    Temporomandibular joint disorder    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)    Back pain    S/P musculoskeletal system surgery    Encounter for antineoplastic chemotherapy     Past Surgical History:   Procedure Laterality Date    ABDOMEN SURGERY  Now    Pain, diarrhea    BREAST SURGERY  2002    right breast benign    COLONOSCOPY  2008    due in 2018    COLONOSCOPY N/A 09/08/2016    Procedure: COMBINED COLONOSCOPY, SINGLE OR MULTIPLE BIOPSY/POLYPECTOMY BY BIOPSY;  Surgeon: Abner Pepper MD;  Location:  GI    COLONOSCOPY N/A 10/18/2021    Procedure: COLONOSCOPY;  Surgeon: Jamila Villarreal MD;  Location: [facility] OR    DECOMPRESSION LUMBAR TWO LEVELS N/A 12/30/2024    Procedure: Lumbar 4 to 5 Decompression and Left Lumbar 5-Sacral 1 Rafael-Laminotomy and  "Cyst Removal;  Surgeon: Eric Moreno MD;  Location: [facility] OR    ENT SURGERY  1975    tonsillectomy    GENITOURINARY SURGERY  1953    bladder surgery     GI SURGERY  2015    Severe constipation    GYN SURGERY  1977    C section    GYN SURGERY  1981    laparoscopy    GYN SURGERY  2007    myomectomy    hysterecomy  11/14/2016    leiomyosarcoma    IR CHEST PORT PLACEMENT > 5 YRS OF AGE  8/23/2024    OTHER SURGICAL HISTORY Right 10/27/2017    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    REMOVE PORT VASCULAR ACCESS Right 05/09/2017    Procedure: REMOVE PORT VASCULAR ACCESS;  Right Port Removal;  Surgeon: Eric Gibson PA-C;  Location: [facility] OR    REPAIR PTOSIS BILATERAL  02/15/2012    Procedure:REPAIR PTOSIS BILATERAL; BILATERAL UPPER LID PTOSIS REPAIR; Surgeon:BRYCE REYNOLDS; Location: [facility]    TUMOR REMOVAL  06/2020       SOCIAL HX (Reviwed from my previous notes):    PHYSICAL EXAMINATION:   /83   Pulse 93   Ht 1.499 m (4' 11\")   Wt 56.2 kg (124 lb)   SpO2 100%   BMI 25.04 kg/m     Exam:  Constitutional: healthy, alert, and no distress  Head: Normocephalic. No masses, lesions, tenderness or abnormalities  Cardiovascular: Deferred  Respiratory: No increased work of breathing  Gastrointestinal: Deferred  : Deferred  Musculoskeletal: extremities normal  Skin: no suspicious lesions or rashes  Neurologic: Gait normal.   Psychiatric: mentation appears normal and affect normal/bright      ICD-10-CM    1. AXEL (obstructive sleep apnea)  G47.33           Assessment:  Mild obstructive sleep apnea.  Sleep associated hypoxemia was not present.     Recommendations:  She has no significant daytime symptoms nor sleep disruptions and so no need to treat mild AXEL at this time.   Suggest optimizing sleep hygiene and avoiding sleep deprivation  Weight management    Your BMI is There is no height or weight on file to calculate BMI.    Body mass index (BMI) is one way to " tell whether you are at a healthy weight, overweight, or obese. It measures your weight in relation to your height.  A BMI of 18.5 to 24.9 is in the healthy range. A person with a BMI of 25 to 29.9 is considered overweight, and someone with a BMI of 30 or greater is considered obese.  Another way to find out if you are at risk for health problems caused by overweight and obesity is to measure your waist. If you are a woman and your waist is more than 35 inches, or if you are a man and your waist is more than 40 inches, your risk of disease may be higher.  More than two-thirds of American adults are considered overweight or obese. Being overweight or obese increases the risk for further weight gain.  Excess weight may lead to heart disease and diabetes. Creating and following plans for healthy eating and physical activity may help you improve your health.    Methods for maintaining or losing weight.    Weight control is part of healthy lifestyle and includes exercise, emotional health, and healthy eating habits.  Careful eating habits lifelong is the mainstay of weight control.  Though there are significant health benefits from weight loss, long-term weight loss with diet alone may be very difficult to achieve- studies show long-term success with dietary management in less than 10% of people. Attaining a healthy weight may be especially difficult to achieve in those with severe obesity. In some cases, medications, devices and surgical management might be considered.    What can you do?    If you are overweight or obese and are interested in methods for weight loss, you should discuss this with your provider. In addition, we recommend that you review healthy life styles and methods for weight loss available through the National Institutes of Health patient information sites:     http://win.niddk.nih.gov/publications/index.htm          PLAN: After detailed discussion with Maggie Navarrete, the following plan was agreed  upon:   There are no Patient Instructions on file for this visit.  I discussed all of the above with Maggie Navarrete, who was agreeable with the plan.    I spent 40 minutes with the patient, all of which was spent in discussing the above, and with coordination of care.  Deja Anderson MD, MD CRAIN return

## 2025-04-29 NOTE — NURSING NOTE
"Chief Complaint   Patient presents with    Study Results       Initial /83   Pulse 93   Ht 1.499 m (4' 11\")   Wt 56.2 kg (124 lb)   SpO2 100%   BMI 25.04 kg/m   Estimated body mass index is 25.04 kg/m  as calculated from the following:    Height as of this encounter: 1.499 m (4' 11\").    Weight as of this encounter: 56.2 kg (124 lb).    Medication Reconciliation: complete  ESS 1  Donna Krishnamurthy MA   "

## 2025-04-30 ENCOUNTER — INFUSION THERAPY VISIT (OUTPATIENT)
Dept: ONCOLOGY | Facility: CLINIC | Age: 75
End: 2025-04-30
Attending: STUDENT IN AN ORGANIZED HEALTH CARE EDUCATION/TRAINING PROGRAM
Payer: MEDICARE

## 2025-04-30 VITALS
SYSTOLIC BLOOD PRESSURE: 149 MMHG | TEMPERATURE: 97.5 F | OXYGEN SATURATION: 100 % | WEIGHT: 126.2 LBS | HEIGHT: 59 IN | BODY MASS INDEX: 25.44 KG/M2 | RESPIRATION RATE: 16 BRPM | HEART RATE: 105 BPM | DIASTOLIC BLOOD PRESSURE: 86 MMHG

## 2025-04-30 DIAGNOSIS — C55 LEIOMYOSARCOMA OF UTERUS (H): Primary | ICD-10-CM

## 2025-04-30 DIAGNOSIS — C79.89 METASTATIC LEIOMYOSARCOMA TO INTRA-ABDOMINAL SITE (H): ICD-10-CM

## 2025-04-30 DIAGNOSIS — Z51.11 ENCOUNTER FOR ANTINEOPLASTIC CHEMOTHERAPY: ICD-10-CM

## 2025-04-30 LAB
ALBUMIN SERPL BCG-MCNC: 4.3 G/DL (ref 3.5–5.2)
ALP SERPL-CCNC: 67 U/L (ref 40–150)
ALT SERPL W P-5'-P-CCNC: 14 U/L (ref 0–50)
ANION GAP SERPL CALCULATED.3IONS-SCNC: 12 MMOL/L (ref 7–15)
AST SERPL W P-5'-P-CCNC: 21 U/L (ref 0–45)
BASOPHILS # BLD AUTO: 0 10E3/UL (ref 0–0.2)
BASOPHILS NFR BLD AUTO: 0 %
BILIRUB SERPL-MCNC: 0.2 MG/DL
BUN SERPL-MCNC: 22.5 MG/DL (ref 8–23)
CALCIUM SERPL-MCNC: 9.2 MG/DL (ref 8.8–10.4)
CHLORIDE SERPL-SCNC: 106 MMOL/L (ref 98–107)
CREAT SERPL-MCNC: 0.64 MG/DL (ref 0.51–0.95)
EGFRCR SERPLBLD CKD-EPI 2021: >90 ML/MIN/1.73M2
EOSINOPHIL # BLD AUTO: 0 10E3/UL (ref 0–0.7)
EOSINOPHIL NFR BLD AUTO: 0 %
ERYTHROCYTE [DISTWIDTH] IN BLOOD BY AUTOMATED COUNT: 14.6 % (ref 10–15)
GLUCOSE SERPL-MCNC: 271 MG/DL (ref 70–99)
HCO3 SERPL-SCNC: 21 MMOL/L (ref 22–29)
HCT VFR BLD AUTO: 38.9 % (ref 35–47)
HGB BLD-MCNC: 12.7 G/DL (ref 11.7–15.7)
IMM GRANULOCYTES # BLD: 0 10E3/UL
IMM GRANULOCYTES NFR BLD: 0 %
LYMPHOCYTES # BLD AUTO: 1.6 10E3/UL (ref 0.8–5.3)
LYMPHOCYTES NFR BLD AUTO: 26 %
MCH RBC QN AUTO: 29.4 PG (ref 26.5–33)
MCHC RBC AUTO-ENTMCNC: 32.6 G/DL (ref 31.5–36.5)
MCV RBC AUTO: 90 FL (ref 78–100)
MONOCYTES # BLD AUTO: 0 10E3/UL (ref 0–1.3)
MONOCYTES NFR BLD AUTO: 1 %
NEUTROPHILS # BLD AUTO: 4.5 10E3/UL (ref 1.6–8.3)
NEUTROPHILS NFR BLD AUTO: 73 %
NRBC # BLD AUTO: 0 10E3/UL
NRBC BLD AUTO-RTO: 0 /100
PLATELET # BLD AUTO: 236 10E3/UL (ref 150–450)
POTASSIUM SERPL-SCNC: 4.1 MMOL/L (ref 3.4–5.3)
PROT SERPL-MCNC: 6.7 G/DL (ref 6.4–8.3)
RBC # BLD AUTO: 4.32 10E6/UL (ref 3.8–5.2)
SODIUM SERPL-SCNC: 139 MMOL/L (ref 135–145)
WBC # BLD AUTO: 6.1 10E3/UL (ref 4–11)

## 2025-04-30 PROCEDURE — 258N000003 HC RX IP 258 OP 636: Performed by: STUDENT IN AN ORGANIZED HEALTH CARE EDUCATION/TRAINING PROGRAM

## 2025-04-30 PROCEDURE — 85004 AUTOMATED DIFF WBC COUNT: CPT | Performed by: STUDENT IN AN ORGANIZED HEALTH CARE EDUCATION/TRAINING PROGRAM

## 2025-04-30 PROCEDURE — 96377 APPLICATON ON-BODY INJECTOR: CPT | Mod: 59

## 2025-04-30 PROCEDURE — 96413 CHEMO IV INFUSION 1 HR: CPT

## 2025-04-30 PROCEDURE — 36591 DRAW BLOOD OFF VENOUS DEVICE: CPT | Performed by: STUDENT IN AN ORGANIZED HEALTH CARE EDUCATION/TRAINING PROGRAM

## 2025-04-30 PROCEDURE — 84155 ASSAY OF PROTEIN SERUM: CPT | Performed by: PHYSICIAN ASSISTANT

## 2025-04-30 PROCEDURE — 250N000011 HC RX IP 250 OP 636: Performed by: STUDENT IN AN ORGANIZED HEALTH CARE EDUCATION/TRAINING PROGRAM

## 2025-04-30 PROCEDURE — 96417 CHEMO IV INFUS EACH ADDL SEQ: CPT

## 2025-04-30 PROCEDURE — 96372 THER/PROPH/DIAG INJ SC/IM: CPT | Performed by: STUDENT IN AN ORGANIZED HEALTH CARE EDUCATION/TRAINING PROGRAM

## 2025-04-30 PROCEDURE — 96367 TX/PROPH/DG ADDL SEQ IV INF: CPT

## 2025-04-30 RX ORDER — HEPARIN SODIUM (PORCINE) LOCK FLUSH IV SOLN 100 UNIT/ML 100 UNIT/ML
5 SOLUTION INTRAVENOUS ONCE
Status: COMPLETED | OUTPATIENT
Start: 2025-04-30 | End: 2025-04-30

## 2025-04-30 RX ORDER — HEPARIN SODIUM (PORCINE) LOCK FLUSH IV SOLN 100 UNIT/ML 100 UNIT/ML
5 SOLUTION INTRAVENOUS
Status: DISCONTINUED | OUTPATIENT
Start: 2025-04-30 | End: 2025-04-30 | Stop reason: HOSPADM

## 2025-04-30 RX ADMIN — GEMCITABINE 2000 MG: 38 INJECTION, SOLUTION INTRAVENOUS at 09:35

## 2025-04-30 RX ADMIN — DOCETAXEL 78 MG: 20 INJECTION, SOLUTION, CONCENTRATE INTRAVENOUS at 11:10

## 2025-04-30 RX ADMIN — HEPARIN 3 ML: 100 SYRINGE at 07:08

## 2025-04-30 RX ADMIN — PEGFILGRASTIM 6 MG: KIT SUBCUTANEOUS at 11:12

## 2025-04-30 RX ADMIN — Medication 5 ML: at 12:16

## 2025-04-30 RX ADMIN — DEXAMETHASONE SODIUM PHOSPHATE: 10 INJECTION, SOLUTION INTRAMUSCULAR; INTRAVENOUS at 09:01

## 2025-04-30 RX ADMIN — SODIUM CHLORIDE 250 ML: 0.9 INJECTION, SOLUTION INTRAVENOUS at 09:01

## 2025-04-30 ASSESSMENT — PAIN SCALES - GENERAL: PAINLEVEL_OUTOF10: NO PAIN (0)

## 2025-04-30 NOTE — NURSING NOTE
"Chief Complaint   Patient presents with    Port Draw     Labs drawn via port by RN. VS taken.     Port accessed with 20 gauge, 3/4\" power needle by RN, labs collected, line flushed with saline and heparin.  Vitals taken. Pt checked in for appointment(s).     Paola Biswas RN    "

## 2025-04-30 NOTE — PROGRESS NOTES
Infusion Nursing Note:  Maggie Navarrete presents today for Cycle 1 Day 1 Gemzar/Taxotere + Neulasta On-Pro.    Patient seen by provider today: No   present during visit today: Not Applicable.    Note: Patient starting new regimen today. Has received Gemzar in the past - completed 6 dose of Gemzar in 10/2024. Docetaxel new. Verbally reviewed chemotherapy teaching, side effects, take-home medications, and follow-up schedule with patient. Patient instructed to call triage with any questions or if he experiences a temperature >100.4, shaking chills, uncontrolled nausea/vomiting/diarrhea, dizziness, shortness of breath, bleeding not relieved with pressure, or with any other concerns. Educated patient on risk of hypersensitivity reaction with new Taxotere and reviewed associated s/sx.    Pt states she didn't receive clear instructions on her Dex, so she took 3 pills  (12mg) last night and another 12 mg this morning. Instructed patient on correct dose/frequency of the medication.    Per written communication Rebeka Kim PA-C/Gladys Rooney RN @08 4/30/25:  - Let's reduce IV Dex to 8mg just for today      Intravenous Access:  Implanted Port.    Treatment Conditions:  Lab Results   Component Value Date    HGB 12.7 04/30/2025    WBC 6.1 04/30/2025    ANEU 4.5 04/30/2025     04/30/2025        Lab Results   Component Value Date     04/30/2025    POTASSIUM 4.1 04/30/2025    MAG 2.0 03/02/2024    CR 0.64 04/30/2025    SUSAN 9.2 04/30/2025    BILITOTAL 0.2 04/30/2025    ALBUMIN 4.3 04/30/2025    ALT 14 04/30/2025    AST 21 04/30/2025       Results reviewed, labs MET treatment parameters, ok to proceed with treatment.      Post Infusion Assessment:  Patient tolerated infusion without incident.  Blood return noted pre and post infusion.  Site patent and intact, free from redness, edema or discomfort.  No evidence of extravasations.  Access discontinued per protocol.     Neulasta Onpro On-Body injector  applied to LEFT upper abdomen at 1115.  Writer discussed Neulasta injection would start tomorrow 5/1 at 1415, approximately 27 hours after application applied today.    Written and Verbal instruction reviewed with patient.  Pt instructed when the dose delivery starts, it will take about 45 minutes to complete.  Pt aware Neulasta Onpro On-Body should have green flashing light and to call triage or on-call MD if injector flashes red or appears to be leaking.   Pt aware to keep Onpro On-Body Neulasta 4 inches away from electrical equipment and to avoid showering 4 hours prior to injection.   Neulasta Onpro Lot number: See MAR. She will start taking Claritin today.      Discharge Plan:   Patient declined prescription refills.  Discharge instructions reviewed with: Patient.  Patient and/or family verbalized understanding of discharge instructions and all questions answered.  AVS to patient via [employer].  Patient will return 5/14 for next appointment.   Patient discharged in stable condition accompanied by: self.  Departure Mode: Ambulatory.      Gladys Rooney RN

## 2025-05-05 ENCOUNTER — OFFICE VISIT (OUTPATIENT)
Dept: DERMATOLOGY | Facility: CLINIC | Age: 75
End: 2025-05-05
Payer: MEDICARE

## 2025-05-05 DIAGNOSIS — D18.01 CHERRY ANGIOMA: ICD-10-CM

## 2025-05-05 DIAGNOSIS — L82.1 SK (SEBORRHEIC KERATOSIS): ICD-10-CM

## 2025-05-05 DIAGNOSIS — Z12.83 SCREENING EXAM FOR SKIN CANCER: Primary | ICD-10-CM

## 2025-05-05 DIAGNOSIS — D22.9 MULTIPLE NEVI: ICD-10-CM

## 2025-05-05 PROCEDURE — 99213 OFFICE O/P EST LOW 20 MIN: CPT | Performed by: DERMATOLOGY

## 2025-05-05 PROCEDURE — 1126F AMNT PAIN NOTED NONE PRSNT: CPT | Performed by: DERMATOLOGY

## 2025-05-05 ASSESSMENT — PAIN SCALES - GENERAL: PAINLEVEL_OUTOF10: NO PAIN (0)

## 2025-05-05 NOTE — NURSING NOTE
Dermatology Rooming Note    Maggie Navarrete's goals for this visit include:   Chief Complaint   Patient presents with    Skin Check     FBSE, no new spots of concern. Thinks pt is getting whiteheads from eating nuts     Adriana Hale - EMT

## 2025-05-05 NOTE — LETTER
5/5/2025       RE: Maggie Navarrete  [address]  [facility] [address]     Dear Colleague,    Thank you for referring your patient, Maggie Navarrete, to the [facility] [place] [facility]. Please see a copy of my visit note below.    [facility] Dermatology Note  Encounter Date: May 5, 2025  Office Visit     Dermatology Problem List:  FBSE 5/5/25  1.  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  Benign skin findings: seborrheic keratosis, lentigo, cherry angiomas, dermatofibroma, nevi  ____________________________________________    Assessment & Plan:  # Benign findings: seborrheic keratosis (including area of concern on forehead), lentigo, cherry angiomas, dermatofibroma and nevi   Patient presents for full body skin exam with no areas of concern. Exam is negative for any findings of skin cancer. Discussed importance of sunscreen protection with increased risk of developing NMSC while on chemotherapy.   - Moisturize: Recommend good OTC moisturizer to full body, such as Cera-Ve, Wendi-cream, or Cetaphil. Advised best to apply after bathing to lock in moisture.  - Sun protection: Counseled SPF30+ sunscreen, UPF clothing, sun avoidance, tanning bed avoidance.  - Monitor: Patient to continue monitoring at home and will contact the clinic for any changes.  - Follow up in one year    # Papules and pustules on the chin, excoriated  Patient shares concern of increased papules/pustules on the chin when eating nuts.  - Topical benzoyl peroxide cream as needed for recurrent lesions    Procedures Performed:   None    Follow-up: 1 year(s) in-person, or earlier for new or changing lesions    Staff and Medical Student:     Casandra Gallagher, MS  MS4 Dermatology    I was present with the medical student who participated in the service and in the documentation of the note. I have verified the history  and personally performed the physical exam and medical decision making. I agree with the assessment and plan of care as documented in the note.  Chapis Styles MD   ____________________________________________    CC: Skin Check (FBSE, no new spots of concern. Thinks pt is getting whiteheads from eating nuts)    HPI:  Ms. Maggie Navarrete is a(n) 75 year old female who presents today as a return patient for FBSE. Patient was last seen in clinic on 12/18/23 by myself. She had benign skin findings at the time. Patient has recurrent leiomyosarcoma of the uterus treated with surgery 2/28/25 at [place], and initiated chemotherapy last week. The scar from the procedure bothers her at times. The dexamethasone causing flushing. White heads on chin when she eats nuts, and she uses benzyl peroxide at home. No other spots of concern today. No areas of easy bleeding, burning, or itching. Endorses wearing a hat when going outside, but does not wear sunscreen.     Patient is otherwise feeling well, without additional skin concerns.    Labs:  CBC  and BMP  reviewed.    Physical Exam:  Vitals: There were no vitals taken for this visit.  SKIN: Full skin, which includes the head/face, both arms, chest, back, abdomen,both legs, buttocks, digits and/or nails, was examined.  - Waxy stuck on papules of the forehead, face, trunk, arms and legs  - Light brown, well defined, macule under the left eye with even pigment distribution without associated telangiectasias c/w sun freckle/lentigo  - There is a firm tan/flesh colored papule that dimples with lateral pressure on the left leg.  - Numerous diffuse, well circumscribed, small, bright red, dome-shaped papules c/w cherry angiomas   - Multiple pigmented light to dark brown macules and papules with well defined borders without dermatoscopic findings of atypia  - Numerous light brown, well defined, macules and papules c/w benign nevi without dermatoscopic findings of atypia  - Linear, pale  purple, raised fibrotic tissue along the midline abdomen c/w well healed scar from prior procedures  - No other lesions of concern on areas examined.     Medications:  Current Outpatient Medications   Medication Sig Dispense Refill     Ascorbic Acid 250 MG CHEW Take 250 mg by mouth daily.       calcium citrate 250 MG TABS Take 500 mg by mouth 2 times daily.       carboxymethylcellulose PF (REFRESH PLUS) 0.5 % ophthalmic solution Place 1 drop into both eyes 4 times daily as needed for dry eyes       Fish Oil-Cholecalciferol (FISH OIL + D3 PO) Take 1 capsule by mouth daily.       levothyroxine (SYNTHROID/LEVOTHROID) 75 MCG tablet TAKE 1 TABLET DAILY FOR    THYROID 90 tablet 2     lidocaine-prilocaine (EMLA) 2.5-2.5 % external cream Apply a thin layer on skin overlying port site 10-15 minutes prior to port access for labs or infusion 5 g 1     Quercetin 500 MG CAPS Take 500 mg by mouth daily.       RESVERATROL PO Take 1 capsule by mouth daily.       TURMERIC PO Take 1 tablet by mouth daily.       venlafaxine (EFFEXOR XR) 75 MG 24 hr capsule Take 1 capsule (75 mg) by mouth daily. 30 capsule 3     zolpidem (AMBIEN) 5 MG tablet Take 1 tablet (5 mg) by mouth nightly as needed for sleep (Only uses if traveling or cannot sleep,). 20 tablet 0     dexAMETHasone (DECADRON) 4 MG tablet Take 2 tablets (8 mg) by mouth 2 times daily (with meals) for 3 doses. Start evening of Docetaxel infusion and continue for a total of 3 doses. 6 tablet 7     prochlorperazine (COMPAZINE) 10 MG tablet Take 1 tablet (10 mg) by mouth every 6 hours as needed for nausea or vomiting. (Patient not taking: Reported on 5/5/2025) 30 tablet 2     traZODone (DESYREL) 50 MG tablet Take 1 tablet (50 mg) by mouth nightly as needed for sleep. (Patient not taking: Reported on 5/5/2025) 30 tablet 1     No current facility-administered medications for this visit.      Past Medical History:   Patient Active Problem List   Diagnosis     Presbyopia - Both Eyes      Osteopenia     Generalized anxiety disorder     Vaginal atrophy     Leiomyosarcoma of uterus (H)     Chemotherapy-induced neutropenia     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     Overweight     Leiomyosarcoma (H)     Ptosis     Dry eyes     Closed nondisplaced fracture of fifth right metatarsal bone     History of deep venous thrombosis     Hypothyroidism     Myopia of both eyes     Neuropathy, peripheral     Post-operative nausea and vomiting     Osteoporosis of forearm     Low TSH level     Arthralgia, unspecified joint     Dyslipidemia     Small bowel obstruction (H)     Intractable vomiting with nausea, unspecified vomiting type     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)     Leukocytosis     Pelvic somatic dysfunction     Mixed incontinence     Urge incontinence     S/P total abdominal hysterectomy and bilateral salpingo-oophorectomy     S/P partial thyroidectomy     History of COVID-19     Temporomandibular joint disorder     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)     Back pain     S/P musculoskeletal system surgery     Encounter for antineoplastic chemotherapy     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  on close of this encounter.      Again, thank you for allowing me to participate in the care of your patient.      Sincerely,    Chapis Styles MD

## 2025-05-05 NOTE — PROGRESS NOTES
[facility] Dermatology Note  Encounter Date: May 5, 2025  Office Visit     Dermatology Problem List:  FBSE 5/5/25  1.  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  Benign skin findings: seborrheic keratosis, lentigo, cherry angiomas, dermatofibroma, nevi  ____________________________________________    Assessment & Plan:  # Benign findings: seborrheic keratosis (including area of concern on forehead), lentigo, cherry angiomas, dermatofibroma and nevi   Patient presents for full body skin exam with no areas of concern. Exam is negative for any findings of skin cancer. Discussed importance of sunscreen protection with increased risk of developing NMSC while on chemotherapy.   - Moisturize: Recommend good OTC moisturizer to full body, such as Cera-Ve, Wendi-cream, or Cetaphil. Advised best to apply after bathing to lock in moisture.  - Sun protection: Counseled SPF30+ sunscreen, UPF clothing, sun avoidance, tanning bed avoidance.  - Monitor: Patient to continue monitoring at home and will contact the clinic for any changes.  - Follow up in one year    # Papules and pustules on the chin, excoriated  Patient shares concern of increased papules/pustules on the chin when eating nuts.  - Topical benzoyl peroxide cream as needed for recurrent lesions    Procedures Performed:   None    Follow-up: 1 year(s) in-person, or earlier for new or changing lesions    Staff and Medical Student:     Casandra Gallagher, MS  MS4 Dermatology    I was present with the medical student who participated in the service and in the documentation of the note. I have verified the history and personally performed the physical exam and medical decision making. I agree with the assessment and plan of care as documented in the note.  Chapis Styles MD   ____________________________________________    CC: Skin Check (FBSE, no new spots of concern. Thinks pt is getting whiteheads from eating  nuts)    HPI:  Ms. Maggie Navarrete is a(n) 75 year old female who presents today as a return patient for FBSE. Patient was last seen in clinic on 12/18/23 by myself. She had benign skin findings at the time. Patient has recurrent leiomyosarcoma of the uterus treated with surgery 2/28/25 at [facility], and initiated chemotherapy last week. The scar from the procedure bothers her at times. The dexamethasone causing flushing. White heads on chin when she eats nuts, and she uses benzyl peroxide at home. No other spots of concern today. No areas of easy bleeding, burning, or itching. Endorses wearing a hat when going outside, but does not wear sunscreen.     Patient is otherwise feeling well, without additional skin concerns.    Labs:  CBC  and BMP  reviewed.    Physical Exam:  Vitals: There were no vitals taken for this visit.  SKIN: Full skin, which includes the head/face, both arms, chest, back, abdomen,both legs, buttocks, digits and/or nails, was examined.  - Waxy stuck on papules of the forehead, face, trunk, arms and legs  - Light brown, well defined, macule under the left eye with even pigment distribution without associated telangiectasias c/w sun freckle/lentigo  - There is a firm tan/flesh colored papule that dimples with lateral pressure on the left leg.  - Numerous diffuse, well circumscribed, small, bright red, dome-shaped papules c/w cherry angiomas   - Multiple pigmented light to dark brown macules and papules with well defined borders without dermatoscopic findings of atypia  - Numerous light brown, well defined, macules and papules c/w benign nevi without dermatoscopic findings of atypia  - Linear, pale purple, raised fibrotic tissue along the midline abdomen c/w well healed scar from prior procedures  - No other lesions of concern on areas examined.     Medications:  Current Outpatient Medications   Medication Sig Dispense Refill    Ascorbic Acid 250 MG CHEW Take 250 mg by mouth daily.      calcium  citrate 250 MG TABS Take 500 mg by mouth 2 times daily.      carboxymethylcellulose PF (REFRESH PLUS) 0.5 % ophthalmic solution Place 1 drop into both eyes 4 times daily as needed for dry eyes      Fish Oil-Cholecalciferol (FISH OIL + D3 PO) Take 1 capsule by mouth daily.      levothyroxine (SYNTHROID/LEVOTHROID) 75 MCG tablet TAKE 1 TABLET DAILY FOR    THYROID 90 tablet 2    lidocaine-prilocaine (EMLA) 2.5-2.5 % external cream Apply a thin layer on skin overlying port site 10-15 minutes prior to port access for labs or infusion 5 g 1    Quercetin 500 MG CAPS Take 500 mg by mouth daily.      RESVERATROL PO Take 1 capsule by mouth daily.      TURMERIC PO Take 1 tablet by mouth daily.      venlafaxine (EFFEXOR XR) 75 MG 24 hr capsule Take 1 capsule (75 mg) by mouth daily. 30 capsule 3    zolpidem (AMBIEN) 5 MG tablet Take 1 tablet (5 mg) by mouth nightly as needed for sleep (Only uses if traveling or cannot sleep,). 20 tablet 0    dexAMETHasone (DECADRON) 4 MG tablet Take 2 tablets (8 mg) by mouth 2 times daily (with meals) for 3 doses. Start evening of Docetaxel infusion and continue for a total of 3 doses. 6 tablet 7    prochlorperazine (COMPAZINE) 10 MG tablet Take 1 tablet (10 mg) by mouth every 6 hours as needed for nausea or vomiting. (Patient not taking: Reported on 5/5/2025) 30 tablet 2    traZODone (DESYREL) 50 MG tablet Take 1 tablet (50 mg) by mouth nightly as needed for sleep. (Patient not taking: Reported on 5/5/2025) 30 tablet 1     No current facility-administered medications for this visit.      Past Medical History:   Patient Active Problem List   Diagnosis    Presbyopia - Both Eyes    Osteopenia    Generalized anxiety disorder    Vaginal atrophy    Leiomyosarcoma of uterus (H)    Chemotherapy-induced neutropenia    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    Overweight    Leiomyosarcoma (H)    Ptosis    Dry eyes    Closed nondisplaced fracture of fifth right metatarsal bone    History of deep venous  thrombosis    Hypothyroidism    Myopia of both eyes    Neuropathy, peripheral    Post-operative nausea and vomiting    Osteoporosis of forearm    Low TSH level    Arthralgia, unspecified joint    Dyslipidemia    Small bowel obstruction (H)    Intractable vomiting with nausea, unspecified vomiting type    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)    Leukocytosis    Pelvic somatic dysfunction    Mixed incontinence    Urge incontinence    S/P total abdominal hysterectomy and bilateral salpingo-oophorectomy    S/P partial thyroidectomy    History of COVID-19    Temporomandibular joint disorder    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)    Back pain    S/P musculoskeletal system surgery    Encounter for antineoplastic chemotherapy     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 on close of this encounter.

## 2025-05-05 NOTE — PATIENT INSTRUCTIONS
You were seen in clinic for a full body skin check and screening for skin cancer. We only found benign findings. We recommend daily sunscreen use and wearing protective clothing. Further information is provided in the handout.     If you notice any changing or new lesions, or anything that bleeds easily, please follow up sooner. You can send a QUALIA (formerly known as LocalResponse) Message directly to Dr. Styles. Thank you for allowing us to participate in your care!

## 2025-05-14 ENCOUNTER — INFUSION THERAPY VISIT (OUTPATIENT)
Dept: ONCOLOGY | Facility: CLINIC | Age: 75
End: 2025-05-14
Attending: STUDENT IN AN ORGANIZED HEALTH CARE EDUCATION/TRAINING PROGRAM
Payer: MEDICARE

## 2025-05-14 VITALS
SYSTOLIC BLOOD PRESSURE: 153 MMHG | WEIGHT: 125.5 LBS | HEART RATE: 108 BPM | RESPIRATION RATE: 16 BRPM | TEMPERATURE: 98.1 F | OXYGEN SATURATION: 99 % | BODY MASS INDEX: 25.56 KG/M2 | DIASTOLIC BLOOD PRESSURE: 84 MMHG

## 2025-05-14 DIAGNOSIS — C55 LEIOMYOSARCOMA OF UTERUS (H): Primary | ICD-10-CM

## 2025-05-14 DIAGNOSIS — Z51.11 ENCOUNTER FOR ANTINEOPLASTIC CHEMOTHERAPY: ICD-10-CM

## 2025-05-14 DIAGNOSIS — C55 LEIOMYOSARCOMA OF UTERUS (H): ICD-10-CM

## 2025-05-14 DIAGNOSIS — T45.1X5A TOXIC EFFECT OF CHEMOTHERAPY: ICD-10-CM

## 2025-05-14 DIAGNOSIS — C79.89 METASTATIC LEIOMYOSARCOMA TO INTRA-ABDOMINAL SITE (H): ICD-10-CM

## 2025-05-14 LAB
ALBUMIN SERPL BCG-MCNC: 4.1 G/DL (ref 3.5–5.2)
ALP SERPL-CCNC: 93 U/L (ref 40–150)
ALT SERPL W P-5'-P-CCNC: 12 U/L (ref 0–50)
ANION GAP SERPL CALCULATED.3IONS-SCNC: 12 MMOL/L (ref 7–15)
AST SERPL W P-5'-P-CCNC: 16 U/L (ref 0–45)
BASOPHILS # BLD AUTO: 0 10E3/UL (ref 0–0.2)
BASOPHILS NFR BLD AUTO: 0 %
BILIRUB SERPL-MCNC: 0.2 MG/DL
BUN SERPL-MCNC: 19.2 MG/DL (ref 8–23)
CALCIUM SERPL-MCNC: 9.2 MG/DL (ref 8.8–10.4)
CHLORIDE SERPL-SCNC: 106 MMOL/L (ref 98–107)
CREAT SERPL-MCNC: 0.78 MG/DL (ref 0.51–0.95)
EGFRCR SERPLBLD CKD-EPI 2021: 79 ML/MIN/1.73M2
EOSINOPHIL # BLD AUTO: 0 10E3/UL (ref 0–0.7)
EOSINOPHIL NFR BLD AUTO: 0 %
ERYTHROCYTE [DISTWIDTH] IN BLOOD BY AUTOMATED COUNT: 14.8 % (ref 10–15)
GLUCOSE SERPL-MCNC: 125 MG/DL (ref 70–99)
HCO3 SERPL-SCNC: 21 MMOL/L (ref 22–29)
HCT VFR BLD AUTO: 34.3 % (ref 35–47)
HGB BLD-MCNC: 11.4 G/DL (ref 11.7–15.7)
IMM GRANULOCYTES # BLD: 0.3 10E3/UL
IMM GRANULOCYTES NFR BLD: 2 %
LYMPHOCYTES # BLD AUTO: 2.4 10E3/UL (ref 0.8–5.3)
LYMPHOCYTES NFR BLD AUTO: 15 %
MCH RBC QN AUTO: 29.8 PG (ref 26.5–33)
MCHC RBC AUTO-ENTMCNC: 33.2 G/DL (ref 31.5–36.5)
MCV RBC AUTO: 90 FL (ref 78–100)
MONOCYTES # BLD AUTO: 0.6 10E3/UL (ref 0–1.3)
MONOCYTES NFR BLD AUTO: 4 %
NEUTROPHILS # BLD AUTO: 12.4 10E3/UL (ref 1.6–8.3)
NEUTROPHILS NFR BLD AUTO: 79 %
NRBC # BLD AUTO: 0 10E3/UL
NRBC BLD AUTO-RTO: 0 /100
PLATELET # BLD AUTO: 354 10E3/UL (ref 150–450)
POTASSIUM SERPL-SCNC: 4.5 MMOL/L (ref 3.4–5.3)
PROT SERPL-MCNC: 6.6 G/DL (ref 6.4–8.3)
RBC # BLD AUTO: 3.83 10E6/UL (ref 3.8–5.2)
SODIUM SERPL-SCNC: 139 MMOL/L (ref 135–145)
WBC # BLD AUTO: 15.8 10E3/UL (ref 4–11)

## 2025-05-14 PROCEDURE — G2211 COMPLEX E/M VISIT ADD ON: HCPCS | Performed by: STUDENT IN AN ORGANIZED HEALTH CARE EDUCATION/TRAINING PROGRAM

## 2025-05-14 PROCEDURE — 96377 APPLICATON ON-BODY INJECTOR: CPT | Mod: 59

## 2025-05-14 PROCEDURE — G0463 HOSPITAL OUTPT CLINIC VISIT: HCPCS | Mod: 25 | Performed by: STUDENT IN AN ORGANIZED HEALTH CARE EDUCATION/TRAINING PROGRAM

## 2025-05-14 PROCEDURE — 80053 COMPREHEN METABOLIC PANEL: CPT

## 2025-05-14 PROCEDURE — 250N000011 HC RX IP 250 OP 636: Performed by: STUDENT IN AN ORGANIZED HEALTH CARE EDUCATION/TRAINING PROGRAM

## 2025-05-14 PROCEDURE — G0463 HOSPITAL OUTPT CLINIC VISIT: HCPCS | Performed by: STUDENT IN AN ORGANIZED HEALTH CARE EDUCATION/TRAINING PROGRAM

## 2025-05-14 PROCEDURE — 258N000003 HC RX IP 258 OP 636: Performed by: STUDENT IN AN ORGANIZED HEALTH CARE EDUCATION/TRAINING PROGRAM

## 2025-05-14 PROCEDURE — 96417 CHEMO IV INFUS EACH ADDL SEQ: CPT

## 2025-05-14 PROCEDURE — 99215 OFFICE O/P EST HI 40 MIN: CPT | Performed by: STUDENT IN AN ORGANIZED HEALTH CARE EDUCATION/TRAINING PROGRAM

## 2025-05-14 PROCEDURE — 85025 COMPLETE CBC W/AUTO DIFF WBC: CPT

## 2025-05-14 PROCEDURE — 96372 THER/PROPH/DIAG INJ SC/IM: CPT | Performed by: STUDENT IN AN ORGANIZED HEALTH CARE EDUCATION/TRAINING PROGRAM

## 2025-05-14 PROCEDURE — 96367 TX/PROPH/DG ADDL SEQ IV INF: CPT

## 2025-05-14 PROCEDURE — 96413 CHEMO IV INFUSION 1 HR: CPT

## 2025-05-14 RX ORDER — DEXAMETHASONE 2 MG/1
6 TABLET ORAL ONCE
Qty: 3 TABLET | Refills: 0 | Status: SHIPPED | OUTPATIENT
Start: 2025-05-14 | End: 2025-05-14

## 2025-05-14 RX ORDER — MEPERIDINE HYDROCHLORIDE 25 MG/ML
25 INJECTION INTRAMUSCULAR; INTRAVENOUS; SUBCUTANEOUS
Status: CANCELLED | OUTPATIENT
Start: 2025-05-14

## 2025-05-14 RX ORDER — HEPARIN SODIUM (PORCINE) LOCK FLUSH IV SOLN 100 UNIT/ML 100 UNIT/ML
500 SOLUTION INTRAVENOUS ONCE
Status: COMPLETED | OUTPATIENT
Start: 2025-05-14 | End: 2025-05-14

## 2025-05-14 RX ORDER — HEPARIN SODIUM (PORCINE) LOCK FLUSH IV SOLN 100 UNIT/ML 100 UNIT/ML
5 SOLUTION INTRAVENOUS
Status: CANCELLED | OUTPATIENT
Start: 2025-05-14

## 2025-05-14 RX ORDER — HEPARIN SODIUM,PORCINE 10 UNIT/ML
5-20 VIAL (ML) INTRAVENOUS DAILY PRN
Status: CANCELLED | OUTPATIENT
Start: 2025-05-14

## 2025-05-14 RX ORDER — ALBUTEROL SULFATE 90 UG/1
1-2 INHALANT RESPIRATORY (INHALATION)
Status: CANCELLED
Start: 2025-05-14

## 2025-05-14 RX ORDER — DIPHENHYDRAMINE HYDROCHLORIDE 50 MG/ML
25 INJECTION, SOLUTION INTRAMUSCULAR; INTRAVENOUS
Status: CANCELLED
Start: 2025-05-14

## 2025-05-14 RX ORDER — ALBUTEROL SULFATE 0.83 MG/ML
2.5 SOLUTION RESPIRATORY (INHALATION)
Status: CANCELLED | OUTPATIENT
Start: 2025-05-14

## 2025-05-14 RX ORDER — EPINEPHRINE 1 MG/ML
0.3 INJECTION, SOLUTION INTRAMUSCULAR; SUBCUTANEOUS EVERY 5 MIN PRN
Status: CANCELLED | OUTPATIENT
Start: 2025-05-14

## 2025-05-14 RX ORDER — HEPARIN SODIUM (PORCINE) LOCK FLUSH IV SOLN 100 UNIT/ML 100 UNIT/ML
5 SOLUTION INTRAVENOUS
Status: DISCONTINUED | OUTPATIENT
Start: 2025-05-14 | End: 2025-05-14 | Stop reason: HOSPADM

## 2025-05-14 RX ORDER — LORAZEPAM 2 MG/ML
0.5 INJECTION INTRAMUSCULAR EVERY 4 HOURS PRN
Status: CANCELLED | OUTPATIENT
Start: 2025-05-14

## 2025-05-14 RX ORDER — DIPHENHYDRAMINE HYDROCHLORIDE 50 MG/ML
50 INJECTION, SOLUTION INTRAMUSCULAR; INTRAVENOUS
Status: CANCELLED
Start: 2025-05-14

## 2025-05-14 RX ORDER — METHYLPREDNISOLONE SODIUM SUCCINATE 40 MG/ML
40 INJECTION INTRAMUSCULAR; INTRAVENOUS
Status: CANCELLED
Start: 2025-05-14

## 2025-05-14 RX ADMIN — GEMCITABINE 2000 MG: 38 INJECTION, SOLUTION INTRAVENOUS at 14:22

## 2025-05-14 RX ADMIN — DOCETAXEL 78 MG: 20 INJECTION, SOLUTION, CONCENTRATE INTRAVENOUS at 15:53

## 2025-05-14 RX ADMIN — DEXAMETHASONE SODIUM PHOSPHATE: 10 INJECTION, SOLUTION INTRAMUSCULAR; INTRAVENOUS at 13:58

## 2025-05-14 RX ADMIN — Medication 500 UNITS: at 12:42

## 2025-05-14 RX ADMIN — PEGFILGRASTIM 6 MG: KIT SUBCUTANEOUS at 16:51

## 2025-05-14 RX ADMIN — SODIUM CHLORIDE 250 ML: 0.9 INJECTION, SOLUTION INTRAVENOUS at 13:58

## 2025-05-14 RX ADMIN — Medication 5 ML: at 16:53

## 2025-05-14 ASSESSMENT — PAIN SCALES - GENERAL: PAINLEVEL_OUTOF10: NO PAIN (0)

## 2025-05-14 NOTE — NURSING NOTE
Chief Complaint   Patient presents with    Port Draw     Labs drawn from port by RN     Port accessed with 20 gauge, 3/4 inch, flat needle by RN, labs collected, line flushed with saline and heparin.  Vitals taken. Pt checked in for appointment(s).     Cierra Dennison, RN

## 2025-05-14 NOTE — PROGRESS NOTES
"Infusion Nursing Note:  Maggie Navarrete presents today for Cycle 2 Day 1  Gemcitabine / DOCEtaxel.    Patient seen by provider today: Yes: Helen Butterfield CNP   present during visit today: Not Applicable.    Note: Maggie comes in feeling well for her infusion visit. She does not have any questions following her clinic visit and wishes to proceed with today's treatment.     Per Treatment plan: Dex was reduced to 6mg IV because Maggie took a dose yesterday and again this morning.     Maggie was educated on when and how to take decadron for next infusion visit. Written version in \"patient instructions.\"     Neulasta Onpro On-Body injector applied to left arm at 4:53.  Writer discussed Neulasta injection would start tomorrow 5/15 at 7:53, approximately 27 hours after application applied today.    Written and Verbal instruction reviewed with patient.  Pt instructed when the dose delivery starts, it will take about 45 minutes to complete.  Pt aware Neulasta Onpro On-Body should have green flashing light and to call triage or on-call MD if injector flashes red or appears to be leaking.   Pt aware to keep Onpro On-Body Neulasta 4 inches away from electrical equipment and to avoid showering 4 hours prior to injection.   Neulasta Onpro Lot number: see Mar      Report given to GEORGE Serrano at 1530.     Intravenous Access:  Implanted Port.    Treatment Conditions:  Lab Results   Component Value Date    HGB 11.4 (L) 05/14/2025    WBC 15.8 (H) 05/14/2025    ANEU 12.4 (H) 05/14/2025     05/14/2025        Lab Results   Component Value Date     05/14/2025    POTASSIUM 4.5 05/14/2025    MAG 2.0 03/02/2024    CR 0.78 05/14/2025    SUSAN 9.2 05/14/2025    BILITOTAL 0.2 05/14/2025    ALBUMIN 4.1 05/14/2025    ALT 12 05/14/2025    AST 16 05/14/2025       Results reviewed, labs MET treatment parameters, ok to proceed with treatment.    Per Secure Chat: Helen Butterfield CNP/ Helena Arroyo and Sona Poe RN: "   -Increased WBC due to Neulasta, okay to give Neulasta today     Post Infusion Assessment:  Patient tolerated infusion without incident.  Blood return noted pre and post infusion.  Site patent and intact, free from redness, edema or discomfort.  No evidence of extravasations.  Access discontinued per protocol.       Discharge Plan:   Prescription refills given for decadron.  Discharge instructions reviewed with: Patient.  Patient and/or family verbalized understanding of discharge instructions and all questions answered.  Copy of AVS reviewed with patient and/or family.  Patient will return 5/29/25 for next appointment.  Patient discharged in stable condition accompanied by: self.  Departure Mode: Ambulatory.      Helena Arroyo RN

## 2025-05-14 NOTE — PROGRESS NOTES
[facility] CANCER CLINIC  RETURN PATIENT VISIT NOTE    PATIENT NAME: Maggie Navarrete MRN # [id number]  DATE OF VISIT: May 14, 2025 YOB: 1950    CANCER TYPE: metastatic uterine leiomyosarcoma     HISTORY OF PRESENT ILLNESS   Maggie is a 75 year old women with PMH of metastatic uterine leiomyosarcoma. She was originally diagnosed on 2016 showing an up to 12 cm mass. She underwent surgical resection followed by adjuvant doxorubicin/dacarbazine for 5 cycles. She had local recurrence on 2020 treated with surgical resection. Then  again recurrence on 2022 again underwent surgical resection. Followed by therapy with anastrozole. In January of 2024 she began on the BOAST clinical trial with Dr. Nik Schilling at the [facility]. She received abemaciclib there. She was taken off of trial due to progression on 1 of 3 intra-abdominal lesions.     She has seen Dr. Michaud at [place] for a second opinion. She is interested in surgical resection which was offered at [facility] with possible Gemzar HIPEC.      She started on single agent Gemzar on 7/29/24 and received 3 cycles. 9/16/24, Caris with no clinically significant biomarker results. Imaging after 2 cycles suggestive of progression with enlarging lesions in the abdomen, and no new lesions.     10/23/24, Changed to doxil (40mg/m2) dacarbazine (750 mg/m2 over 3 days) every 4 weeks. Scans showed progression after 2 cycles.    Lumbar 4 to 5 Decompression and Left Lumbar 5-Sacral 1 Rafael-Laminotomy and Cyst Removal on 12/30/2024      Surgery/HIPEC with resection of all macroscopic disease by Dr. Yee at [facility] on 2/28/25 4/30/25, begins adjuvant chemotherapy with Gemcitabine 1250mg/m2 and docetaxel 50mg/m2 every 2 weeks     PAST ONCOLOGIC HISTORY   Leiomyosarcoma Uterus   11/3/2016 Initial Diagnosis     11/4/2016 Surgery and Procedures   Open hyst, BSO, stage II with peritoneal involvement    12/26/2016 -  3/23/2017 Chemotherapy   Received elsewhere: adjuvant chemotherapy with doxorubicin 25 mg/m2/day and DTIC 250 mg/m2/day , days 1-3 every 21 weeks.  Completed 5 cycles of adjuvant doxorubicin and dacarbazine    6/1/2020 Recurrence Local   Isolated lesion in the RLQ    6/16/2020 Surgery and Procedures   Laparotomy, excision of side wall mass, ureteroneocystotomy. 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 The excised margins were negative for tumor.    3/31/2022 [facility]   PET with isolated bowel lesion in LLQ.     4/4/2022 Surgery and Procedures   Exlap, FANNY, small bowel lesion excised with negative margins    5/9/2023 Surgery and Procedures   Dr. Camarillo and Dr. Krueger    Exploratory laparotomy, tumor debulking, cystoscopy and placement of internal ureteral stent and ureterolysis     Findings  Two lesions in the abdomen consistent with very indolent appearing sarcoma on frozen section, 1 in the right upper quadrant in the large bowel mesentery. And 1 on the left sidewall between the left sidewall and the left ureter and the sigmoid colon    5/9/2023 Biopsy/Pathology   FINAL DIAGNOSIS     A. Soft tissue, hepatic flexure nodule, excision: Involved   by smooth muscle neoplasm, 1.4 cm. See comment.     B. Soft tissue, left sidewall mass, excision: Recurrent   leiomyosarcoma, 2.9 cm. See comment.     C. Colon, sigmoid mass, excision: Involved by smooth   muscle neoplasm with increased mitotic activity, 6 mm. See   comment.     D. Soft tissue, left nohemi-ureteral, biopsy: Involved by   smooth muscle neoplasm, 3 mm. See comment.     E. Peritoneum, left pelvic sidewall margin, excision:   Involved by smooth muscle neoplasm, 1 mm. See comment.     F. Soft tissue, sigmoid colon margin, excision: Negative   for tumor.     Comment: Immunohistochemical stains performed at [facility] demonstrate that the left sidewall mass (B2) is   positive for desmin, caldesmon, ER (moderate,  "60-70%), and   NE (moderate, 20-30%), but is negative for HMB45, Melan A,   STAT6, ALK, DOG1, and KIT; this immunophenotype supports a   smooth muscle neoplasm. The left sidewall mass shows   increased mitotic activity (greater than 10 mitoses/10 HPF)   and atypia sufficient to meet criteria for a   leiomyosarcoma. The remaining lesion show minimal atypia   but increased mitotic activity (though less than 10   mitoses/10 HPF) where evaluable, but evaluation is limited   by the small size of the lesions. In the context of a known   history of leiomyosarcoma and a concurrent tumor meeting   diagnostic criteria, these likely represent deceptively   bland additional foci of recurrent leiomyosarcoma.     8/2023 - Biological/Targeted/Hormone Therapy   Anastrozole     11/8/2023 Progression/Relapse   CT scan chest-abdomen-pelvis shows progression of 3 nodules within the peritoneal cavity.    1/18/2024 - 6/5/2024 Chemotherapy   BOAST clinical trial with Dr. Nik Schilling at the [facility]. She received abemaciclib there. Was taken off of trial due to progression.     Interval History:  -Maggie presents for cycle 2 of [place]/Tax.   -She does notice fatigue but continues to walk the dog daily  -She didn't necessarily have nausea but did have reflux. She took \"reflux raft\" and pepcid when needed. She has prilosec at home but didn't take this.  -Her appetite is lower than baseline  -She is having a daily BM but not the same as her usual and notes some constipation. She bought some prune juice for this  -Her balance has been a bit worse. No falls. Balance was affected pre-tx but a bit more noticeable now  -no paresthesias  -No leg swelling  -No falls  -Used ambien for sleep last night and was able to get some sleep  -She took decadron yesterday morning, last night and this morning 8mg each time.  -No SOB  -No rashes  -She had flushing of skin day after chemo  -She is planning to travel to [facility] [place] in [place] for future treatments for chronomodulated chemo and vitamin infusions.      PHYSICAL EXAM   BP (!) 153/84 (BP Location: Right arm, Patient Position: Sitting, Cuff Size: Adult Regular)   Pulse 108   Temp 98.1  F (36.7  C) (Oral)   Resp 16   Wt 56.9 kg (125 lb 8 oz)   SpO2 99%   BMI 25.56 kg/m    General: Well-appearing female in no acute distress.  Eyes: EOMI, PERRL. No scleral icterus or conjunctival injection.  Cardiovascular: Tachycardic. Rhythm regular. No murmurs. No peripheral edema.  Respiratory: CTA bilaterally. No wheezes or crackles.  Gastrointestinal: BS +. Abdomen soft, non-tender.  Neurologic: Cranial nerves II through XII are grossly intact.  Skin: No rashes, petechiae, or bruising noted on exposed skin.  Psych: Affect appropriate. Pleasant       LABORATORY AND IMAGING STUDIES   Most Recent 3 CBC's:  Recent Labs   Lab Test 04/30/25  0705 12/31/24  0816 12/12/24  1148 11/20/24  0746   WBC 6.1  --  4.5 4.8   HGB 12.7 11.6* 12.4 11.8   MCV 90  --  98 98     --  288 311     Most Recent 3 BMP's:  Recent Labs   Lab Test 04/30/25  0705 04/09/25  1126 04/03/25  1338 12/31/24  0659 12/30/24  0848 11/20/24  0746 10/23/24  0910     --  144  --   --  141 140   POTASSIUM 4.1  --  4.2  --   --  4.2 4.4   CHLORIDE 106  --  106  --   --  107 105   CO2 21*  --  26  --   --  24 23   BUN 22.5  --  24.8*  --   --  23.5* 21.4   CR 0.64 0.8 1.00*  --   --  0.94 0.92   ANIONGAP 12  --  12  --   --  10 12   SUSAN 9.2  --  9.6  --   --  9.2 9.2   *  --  89 128*   < > 87 111*   PROTTOTAL 6.7  --   --   --   --  6.2* 6.4   ALBUMIN 4.3  --   --   --   --  3.9 3.9    < > = values in this interval not displayed.    Most Recent 3 LFT's:  Recent Labs   Lab Test 04/30/25  0705 11/20/24  0746 10/23/24  0910   AST 21 19 17   ALT 14 18 14   ALKPHOS 67 58 70   BILITOTAL 0.2 0.3 0.2   I reviewed the above labs today.        ASSESSMENT AND PLAN   Metastatic uterine leiomyosarcoma. She has had  disease progression, as documented above. She subsequently underwent palliative resection with HIPEC by Dr. Yee at [facility] on 2/28/25, all macroscopic disease resected, per notes evidence of small deposits in different areas. They did drug screening lab testing in most recent specimen and results indicate a possible sensitive to combination gem/tax and intermediate sensitivity to single agent trabectedin.     She began adjuvant treatment with Gemzar/Taxotere on 4/30/25 with Gemzar 1250mg/m2 and docetaxel 50mg/m2 every 2 weeks. She tolerated this fair with anticipated side effects of fatigue, flare of reflux, and constipation. She plans to manage constipation with prunes and reflux with pepcid prn. She is noting some increase in her baseline dysequilibrium but no distal neuropathy symptoms. I asked her to let us know if symptoms worsen.     We will keep gemzar at 1250mg/m2 for now. In the future, could consider an increase to 1500mg/m2 pending tolerance.     Of note, Maggie took home dex yesterday and this morning, so I will reduce her IV pre med dex to 6mg IV. She will take 8mg tonight as recommended. She will then take 6mg po tomorrow morning for final dose given flushing, reflux and insomnia with steroids.     The plan is for likely 8 cycles/4 months of chemotherapy and if no evidence of disease at that point, we could consider surveillance then. The most recent CT CAP showing areas compatible with possible post surgical changes, residual tumor is not excluded.     Of note, she is planning to travel to [facility] in [place] for future treatments for chronomodulated chemo and vitamin infusions. She will cancel upcoming infusions here once this is confirmed.     Oncology Follow Up:   -Proceed with C2 of [place] 1250 mg/m2 and docetaxel 50 mg/m2 today  -Labs and SINTIA every 14 days prior to [place]/Tax  -She will need neulasta after each cycle but we'll hold if ANC on day 1 >15,000  -CT CAP 6/17  followed by Dr Powers 6/18    Leukocytosis  Presumably s/t neulasta  Hold neulasta if ANC >00027    Vascular Access  S/p port placement on 8/26/24    Anticipated alopecia--not discussed  Secondary to chemotherapy. Will have [facility] supply wig resources and prescription for a wig.    Anxiety and associated insomnia  Increased Effexor from 37.5 mg to 75 mg daily last visit; continue to monitor for benefit  -If uses trazodone, she should not take Ambien and trazodone together which was discussed at a prior visit  -She previously declined a therapy referral    55 minutes spent on the date of the encounter doing chart review, review of test results, interpretation of tests, patient visit, and documentation     The longitudinal plan of care for the diagnosis(es)/condition(s) as documented were addressed during this visit. Due to the added complexity in care, I will continue to support Maggie in the subsequent management and with ongoing continuity of care.     Amber Scheierl, CNP  [facility] [address] [address]  [phone]

## 2025-05-14 NOTE — NURSING NOTE
"Oncology Rooming Note    May 14, 2025 12:50 PM   Maggie Navarrete is a 75 year old female who presents for:    Chief Complaint   Patient presents with    Port Draw     Labs drawn from port by RN    Oncology Clinic Visit     Leiomyosarcoma of uterus      Initial Vitals: BP (!) 153/84 (BP Location: Right arm, Patient Position: Sitting, Cuff Size: Adult Regular)   Pulse 108   Temp 98.1  F (36.7  C) (Oral)   Resp 16   Wt 56.9 kg (125 lb 8 oz)   SpO2 99%   BMI 25.56 kg/m   Estimated body mass index is 25.56 kg/m  as calculated from the following:    Height as of 4/30/25: 1.492 m (4' 10.75\").    Weight as of this encounter: 56.9 kg (125 lb 8 oz). Body surface area is 1.54 meters squared.  No Pain (0) Comment: Data Unavailable   No LMP recorded. Patient has had a hysterectomy.  Allergies reviewed: Yes  Medications reviewed: Yes    Medications: Medication refills not needed today.  Pharmacy name entered into [email or web address]:    [id number] - [place], [place] - [address] [place], [place] - [address] [place] [address]    Frailty Screening:   Is the patient here for a new oncology consult visit in cancer care? 2. No    PHQ9:  Did this patient require a PHQ9?: No      Clinical concerns:        Yohana Villela              "

## 2025-05-14 NOTE — PATIENT INSTRUCTIONS
INFO FOR NEXT INFUSION VISIT: Instructions on how to take Dexamethasone (decadron):   -do not take any Decadron at home before your infusion visit  -take the first dose in the evening after your infusion visit on 5/29/25. You will take two 4mg tabs (total of 8mg). Take this with food.   -take the next dose with breakfast the next day (5/30/25). Take two 4mg tabs (total of 8mg). Take this with food.   -take the last on the evening of 5/30/25. You will take two 4mg tabs (total of 8mg). Take this with food.           [facility] Triage and after hours / weekends / holidays:  [phone]    Please call the triage or after hours line if you experience a temperature greater than or equal to 100.4, shaking chills, have uncontrolled nausea, vomiting and/or diarrhea, dizziness, shortness of breath, chest pain, bleeding, unexplained bruising, or if you have any other new/concerning symptoms, questions or concerns.      If you are having any concerning symptoms or wish to speak to a provider before your next infusion visit, please call triage to notify them so we can adequately serve you.     If you need a refill on a narcotic prescription or other medication, please call before your infusion appointment.                May 2025      [place] Monday Tuesday Wednesday Thursday Friday Saturday                       1     2     3       4     5    Return Dermatology   8:30 AM   (15 min.)   Chapis Styles MD   [facility] [place] 6     7     8     9     10       11     12     13     14    Lab Peripheral  12:00 PM   (15 min.)   [facility]   [facility]    Return Patient  12:15 PM   (45 min.)   Scheierl, Amber J, APRN CNP   [facility]    Infusion 180   1:00 PM   (180 min.)    ONC INFUSION NURSE   [facility] 15     16     17       18     19     20     21     22     23     24       25     26     27      28     29    Lab Peripheral  12:00 PM   (15 min.)   [facility]   [facility]    Return Patient  12:15 PM   (45 min.)   Scheierl, Amber J, APRN CNP   [facility]    Infusion 180   1:30 PM   (180 min.)   [facility] INFUSION NURSE   [facility] 30     31 June 2025 Sunday Monday Tuesday Wednesday Thursday Friday Saturday   1     2     3     4     5     6     7       8     9     10     11     12    Lab Peripheral  12:00 PM   (15 min.)   [facility]   [facility]    Return Patient  12:15 PM   (45 min.)   Scheierl, Amber J, APRN CNP   [facility]    Infusion 180   1:30 PM   (180 min.)   [facility] ONC INFUSION NURSE   [facility] 13     14       15     16     17    CT CHEST/ABDOMEN/PELVIS W CONTRAST  12:30 PM   (20 min.)   [facility]   [facility] [place] [address]    Return Patient   4:45 PM   (30 min.)   Beto Galvan MD   [facility] 19     20     21       22     23     24     25     26     27     28       29     30                                                Lab Results:  Recent Results (from the past 12 hours)   Comprehensive metabolic panel    Collection Time: 05/14/25 12:41 PM   Result Value Ref Range    Sodium 139 135 - 145 mmol/L    Potassium 4.5 3.4 - 5.3 mmol/L    Carbon Dioxide (CO2) 21 (L) 22 - 29 mmol/L    Anion Gap 12 7 - 15 mmol/L    Urea Nitrogen 19.2 8.0 - 23.0 mg/dL    Creatinine 0.78 0.51 - 0.95 mg/dL    GFR Estimate 79 >60 mL/min/1.73m2    Calcium 9.2 8.8 - 10.4 mg/dL    Chloride 106 98 - 107 mmol/L    Glucose 125 (H) 70 - 99 mg/dL    Alkaline Phosphatase 93 40 - 150 U/L    AST 16 0 - 45 U/L    ALT 12 0 - 50 U/L    Protein Total 6.6 6.4 - 8.3 g/dL    Albumin 4.1 3.5 - 5.2 g/dL    Bilirubin Total 0.2 <=1.2 mg/dL   CBC with platelets and  differential    Collection Time: 05/14/25 12:41 PM   Result Value Ref Range    WBC Count 15.8 (H) 4.0 - 11.0 10e3/uL    RBC Count 3.83 3.80 - 5.20 10e6/uL    Hemoglobin 11.4 (L) 11.7 - 15.7 g/dL    Hematocrit 34.3 (L) 35.0 - 47.0 %    MCV 90 78 - 100 fL    MCH 29.8 26.5 - 33.0 pg    MCHC 33.2 31.5 - 36.5 g/dL    RDW 14.8 10.0 - 15.0 %    Platelet Count 354 150 - 450 10e3/uL    % Neutrophils 79 %    % Lymphocytes 15 %    % Monocytes 4 %    % Eosinophils 0 %    % Basophils 0 %    % Immature Granulocytes 2 %    NRBCs per 100 WBC 0 <1 /100    Absolute Neutrophils 12.4 (H) 1.6 - 8.3 10e3/uL    Absolute Lymphocytes 2.4 0.8 - 5.3 10e3/uL    Absolute Monocytes 0.6 0.0 - 1.3 10e3/uL    Absolute Eosinophils 0.0 0.0 - 0.7 10e3/uL    Absolute Basophils 0.0 0.0 - 0.2 10e3/uL    Absolute Immature Granulocytes 0.3 <=0.4 10e3/uL    Absolute NRBCs 0.0 10e3/uL

## 2025-05-14 NOTE — LETTER
5/14/2025      Maggie Navarrete  [address]  [facility] [address]      Dear Colleague,    Thank you for referring your patient, Maggie Navarrete, to the [facility]. Please see a copy of my visit note below.      [facility]  RETURN PATIENT VISIT NOTE    PATIENT NAME: Maggie Navarrete MRN # [id number]  DATE OF VISIT: May 14, 2025 YOB: 1950    CANCER TYPE: metastatic uterine leiomyosarcoma     HISTORY OF PRESENT ILLNESS   Maggie is a 75 year old women with PMH of metastatic uterine leiomyosarcoma. She was originally diagnosed on 2016 showing an up to 12 cm mass. She underwent surgical resection followed by adjuvant doxorubicin/dacarbazine for 5 cycles. She had local recurrence on 2020 treated with surgical resection. Then  again recurrence on 2022 again underwent surgical resection. Followed by therapy with anastrozole. In January of 2024 she began on the BOAST clinical trial with Dr. Nik Schilling at the [facility]. She received abemaciclib there. She was taken off of trial due to progression on 1 of 3 intra-abdominal lesions.     She has seen Dr. Michaud at [place] for a second opinion. She is interested in surgical resection which was offered at [facility] with possible Gemzar HIPEC.      She started on single agent Gemzar on 7/29/24 and received 3 cycles. 9/16/24, [place]is with no clinically significant biomarker results. Imaging after 2 cycles suggestive of progression with enlarging lesions in the abdomen, and no new lesions.     10/23/24, Changed to doxil (40mg/m2) dacarbazine (750 mg/m2 over 3 days) every 4 weeks. Scans showed progression after 2 cycles.    Lumbar 4 to 5 Decompression and Left Lumbar 5-Sacral 1 Rafael-Laminotomy and Cyst Removal on 12/30/2024      Surgery/HIPEC with resection of all macroscopic disease by Dr. Yee at [place] on 2/28/25 4/30/25, begins adjuvant chemotherapy with  Gemcitabine 1250mg/m2 and docetaxel 50mg/m2 every 2 weeks     PAST ONCOLOGIC HISTORY   Leiomyosarcoma Uterus   11/3/2016 Initial Diagnosis     11/4/2016 Surgery and Procedures   Open hyst, BSO, stage II with peritoneal involvement    12/26/2016 - 3/23/2017 Chemotherapy   Received elsewhere: adjuvant chemotherapy with doxorubicin 25 mg/m2/day and DTIC 250 mg/m2/day , days 1-3 every 21 weeks.  Completed 5 cycles of adjuvant doxorubicin and dacarbazine    6/1/2020 Recurrence Local   Isolated lesion in the RLQ    6/16/2020 Surgery and Procedures   Laparotomy, excision of side wall mass, ureteroneocystotomy. 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 The excised margins were negative for tumor.    3/31/2022 [facility]   PET with isolated bowel lesion in LLQ.     4/4/2022 Surgery and Procedures   Exlap, FANNY, small bowel lesion excised with negative margins    5/9/2023 Surgery and Procedures   Dr. Camarillo and Dr. Krueger    Exploratory laparotomy, tumor debulking, cystoscopy and placement of internal ureteral stent and ureterolysis     Findings  Two lesions in the abdomen consistent with very indolent appearing sarcoma on frozen section, 1 in the right upper quadrant in the large bowel mesentery. And 1 on the left sidewall between the left sidewall and the left ureter and the sigmoid colon    5/9/2023 Biopsy/Pathology   FINAL DIAGNOSIS     A. Soft tissue, hepatic flexure nodule, excision: Involved   by smooth muscle neoplasm, 1.4 cm. See comment.     B. Soft tissue, left sidewall mass, excision: Recurrent   leiomyosarcoma, 2.9 cm. See comment.     C. Colon, sigmoid mass, excision: Involved by smooth   muscle neoplasm with increased mitotic activity, 6 mm. See   comment.     D. Soft tissue, left nohemi-ureteral, biopsy: Involved by   smooth muscle neoplasm, 3 mm. See comment.     E. Peritoneum, left pelvic sidewall margin, excision:   Involved by smooth muscle neoplasm, 1 mm. See  "comment.     F. Soft tissue, sigmoid colon margin, excision: Negative   for tumor.     Comment: Immunohistochemical stains performed at [facility] demonstrate that the left sidewall mass (B2) is   positive for desmin, caldesmon, ER (moderate, 60-70%), and   TX (moderate, 20-30%), but is negative for HMB45, Melan A,   STAT6, ALK, DOG1, and KIT; this immunophenotype supports a   smooth muscle neoplasm. The left sidewall mass shows   increased mitotic activity (greater than 10 mitoses/10 HPF)   and atypia sufficient to meet criteria for a   leiomyosarcoma. The remaining lesion show minimal atypia   but increased mitotic activity (though less than 10   mitoses/10 HPF) where evaluable, but evaluation is limited   by the small size of the lesions. In the context of a known   history of leiomyosarcoma and a concurrent tumor meeting   diagnostic criteria, these likely represent deceptively   bland additional foci of recurrent leiomyosarcoma.     8/2023 - Biological/Targeted/Hormone Therapy   Anastrozole     11/8/2023 Progression/Relapse   CT scan chest-abdomen-pelvis shows progression of 3 nodules within the peritoneal cavity.    1/18/2024 - 6/5/2024 Chemotherapy   BOAST clinical trial with Dr. Nik Schilling at the [facility]. She received abemaciclib there. Was taken off of trial due to progression.     Interval History:  -Maggie presents for cycle 2 of [place]/Tax.   -She does notice fatigue but continues to walk the dog daily  -She didn't necessarily have nausea but did have reflux. She took \"reflux raft\" and pepcid when needed. She has prilosec at home but didn't take this.  -Her appetite is lower than baseline  -She is having a daily BM but not the same as her usual and notes some constipation. She bought some prune juice for this  -Her balance has been a bit worse. No falls. Balance was affected pre-tx but a bit more noticeable now  -no paresthesias  -No leg swelling  -No falls  -Used ambien for " sleep last night and was able to get some sleep  -She took decadron yesterday morning, last night and this morning 8mg each time.  -No SOB  -No rashes  -She had flushing of skin day after chemo  -She is planning to travel to [facility] in [place] for future treatments for chronomodulated chemo and vitamin infusions.      PHYSICAL EXAM   BP (!) 153/84 (BP Location: Right arm, Patient Position: Sitting, Cuff Size: Adult Regular)   Pulse 108   Temp 98.1  F (36.7  C) (Oral)   Resp 16   Wt 56.9 kg (125 lb 8 oz)   SpO2 99%   BMI 25.56 kg/m    General: Well-appearing female in no acute distress.  Eyes: EOMI, PERRL. No scleral icterus or conjunctival injection.  Cardiovascular: Tachycardic. Rhythm regular. No murmurs. No peripheral edema.  Respiratory: CTA bilaterally. No wheezes or crackles.  Gastrointestinal: BS +. Abdomen soft, non-tender.  Neurologic: Cranial nerves II through XII are grossly intact.  Skin: No rashes, petechiae, or bruising noted on exposed skin.  Psych: Affect appropriate. Pleasant       LABORATORY AND IMAGING STUDIES   Most Recent 3 CBC's:  Recent Labs   Lab Test 04/30/25  0705 12/31/24  0816 12/12/24  1148 11/20/24  0746   WBC 6.1  --  4.5 4.8   HGB 12.7 11.6* 12.4 11.8   MCV 90  --  98 98     --  288 311     Most Recent 3 BMP's:  Recent Labs   Lab Test 04/30/25  0705 04/09/25  1126 04/03/25  1338 12/31/24  0659 12/30/24  0848 11/20/24  0746 10/23/24  0910     --  144  --   --  141 140   POTASSIUM 4.1  --  4.2  --   --  4.2 4.4   CHLORIDE 106  --  106  --   --  107 105   CO2 21*  --  26  --   --  24 23   BUN 22.5  --  24.8*  --   --  23.5* 21.4   CR 0.64 0.8 1.00*  --   --  0.94 0.92   ANIONGAP 12  --  12  --   --  10 12   SUSAN 9.2  --  9.6  --   --  9.2 9.2   *  --  89 128*   < > 87 111*   PROTTOTAL 6.7  --   --   --   --  6.2* 6.4   ALBUMIN 4.3  --   --   --   --  3.9 3.9    < > = values in this interval not displayed.    Most Recent 3 LFT's:  Recent  Labs   Lab Test 04/30/25  0705 11/20/24  0746 10/23/24  0910   AST 21 19 17   ALT 14 18 14   ALKPHOS 67 58 70   BILITOTAL 0.2 0.3 0.2   I reviewed the above labs today.        ASSESSMENT AND PLAN   Metastatic uterine leiomyosarcoma. She has had disease progression, as documented above. She subsequently underwent palliative resection with HIPEC by Dr. Yee at [place] on 2/28/25, all macroscopic disease resected, per notes evidence of small deposits in different areas. They did drug screening lab testing in most recent specimen and results indicate a possible sensitive to combination gem/tax and intermediate sensitivity to single agent trabectedin.     She began adjuvant treatment with Gemzar/Taxotere on 4/30/25 with Gemzar 1250mg/m2 and docetaxel 50mg/m2 every 2 weeks. She tolerated this fair with anticipated side effects of fatigue, flare of reflux, and constipation. She plans to manage constipation with prunes and reflux with pepcid prn. She is noting some increase in her baseline dysequilibrium but no distal neuropathy symptoms. I asked her to let us know if symptoms worsen.     We will keep gemzar at 1250mg/m2 for now. In the future, could consider an increase to 1500mg/m2 pending tolerance.     Of note, Maggie took home dex yesterday and this morning, so I will reduce her IV pre med dex to 6mg IV. She will take 8mg tonight as recommended. She will then take 6mg po tomorrow morning for final dose given flushing, reflux and insomnia with steroids.     The plan is for likely 8 cycles/4 months of chemotherapy and if no evidence of disease at that point, we could consider surveillance then. The most recent CT CAP showing areas compatible with possible post surgical changes, residual tumor is not excluded.     Of note, she is planning to travel to [facility] in [place] for future treatments for chronomodulated chemo and vitamin infusions. She will cancel upcoming infusions here once this is  confirmed.     Oncology Follow Up:   -Proceed with C2 of [place] 1250 mg/m2 and docetaxel 50 mg/m2 today  -Labs and SINTIA every 14 days prior to [place]/Tax  -She will need neulasta after each cycle but we'll hold if ANC on day 1 >15,000  -CT CAP 6/17 followed by Dr Powers 6/18    Leukocytosis  Presumably s/t neulasta  Hold neulasta if ANC >28207    Vascular Access  S/p port placement on 8/26/24    Anticipated alopecia--not discussed  Secondary to chemotherapy. Will have [facility] supply wig resources and prescription for a wig.    Anxiety and associated insomnia  Increased Effexor from 37.5 mg to 75 mg daily last visit; continue to monitor for benefit  -If uses trazodone, she should not take Ambien and trazodone together which was discussed at a prior visit  -She previously declined a therapy referral    55 minutes spent on the date of the encounter doing chart review, review of test results, interpretation of tests, patient visit, and documentation     The longitudinal plan of care for the diagnosis(es)/condition(s) as documented were addressed during this visit. Due to the added complexity in care, I will continue to support Maggie in the subsequent management and with ongoing continuity of care.     Amber Scheierl, CNP  [facility] [address] [id number] [phone]      Again, thank you for allowing me to participate in the care of your patient.        Sincerely,        Amber J. Scheierl, APRN CNP    Electronically signed

## 2025-06-12 DIAGNOSIS — C54.9: Primary | ICD-10-CM

## 2025-06-14 ENCOUNTER — HEALTH MAINTENANCE LETTER (OUTPATIENT)
Age: 75
End: 2025-06-14

## 2025-06-16 NOTE — PROGRESS NOTES
[facility] CANCER CLINIC  FOLLOW-UP VISIT NOTE  PATIENT NAME: Maggie Navarrete MRN # [id number]  DATE OF VISIT: June 18, 2025 YOB: 1950    REFERRING PROVIDER: Referred Self, MD  No address on file    CANCER TYPE: metastatic uterine leiomyosarcoma       CHIEF COMPLAIN   None     HISTORY OF PRESENT ILLNESS   Maggie is a 75 year old women with PMH of metastatic uterine leiomyosarcoma. She was originally diagnosed on 2016 showing an up to 12 cm mass. She underwent surgical resection followed by adjuvant doxorubicin/dacarbazine for 5 cycles. She had local recurrence on 2020 treated with surgical resection. Then  again recurrence on 2022 again underwent surgical resection. Followed by [id number] with anastrozole. Since January of 2024 she has been on trial a BOAST clinical trial with Dr. Nik Schilling at the [facility]. She received abemaciclib there. Now, she was taken off of trial due to progression on 1 of 3 intra-abdominal lesions.     She comes here to continue medical management, she has seen Dr. Michaud at [place] for a second opinion. She is interested in surgical resection which has been offered at [facility] with possible Gemzar HIPEC.      She started on single agent Gemzar on 7/29/24. She is here today for follow up prior to cycle 3.   Scans on 9/23/24 showed progression of disease    Received doxil/dacarbazine C1 on 10/14/24  C2 doxil/dacarbazine on 11/20/24 - scans showed progression after 2 cycles    Lumbar 4 to 5 Decompression and Left Lumbar 5-Sacral 1 Rafael-Laminotomy and Cyst Removal on 12/30/2024     Surgery/HIPEC with resection of all macroscopic disease by Dr. Yee at [place] on 2/28/25  D1C1 Radom 1250mg/m2 docetaxel 50 mg/m2 4/30/25 every 2 weeks  D1C2 5/14/25  She started receiving her chemo at the [facility] in [place]  C3 on 5/28/25  C4 6/11/25    Interval Hx  Maggie reports that she is feeling overall well, she does have some  fatigue for the week after chemo and has lost her hair. No neuropathy noted so far. She continued to walk and exercise frequently.      PAST ONCOLOGIC HISTORY   Leiomyosarcoma Uterus   11/3/2016 Initial Diagnosis     11/4/2016 Surgery and Procedures   Open hyst, BSO, stage II with peritoneal involvement    12/26/2016 - 3/23/2017 Chemotherapy   Received elsewhere: adjuvant chemotherapy with doxorubicin 25 mg/m2/day and DTIC 250 mg/m2/day , days 1-3 every 21 weeks.  Completed 5 cycles of adjuvant doxorubicin and dacarbazine      6/1/2020 Recurrence Local   Isolated lesion in the RLQ    6/16/2020 Surgery and Procedures   Laparotomy, excision of side wall mass, ureteroneocystotomy. 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 The excised margins were negative for tumor.    3/31/2022 [facility]   PET with isolated bowel lesion in LLQ.     4/4/2022 Surgery and Procedures   Exlap, FANNY, small bowel lesion excised with negative margins    5/9/2023 Surgery and Procedures   Dr. Camarillo and Dr. Krueger    Exploratory laparotomy, tumor debulking, cystoscopy and placement of internal ureteral stent and ureterolysis     Findings  Two lesions in the abdomen consistent with very indolent appearing sarcoma on frozen section, 1 in the right upper quadrant in the large bowel mesentery. And 1 on the left sidewall between the left sidewall and the left ureter and the sigmoid colon    5/9/2023 Biopsy/Pathology   FINAL DIAGNOSIS     A. Soft tissue, hepatic flexure nodule, excision: Involved   by smooth muscle neoplasm, 1.4 cm. See comment.     B. Soft tissue, left sidewall mass, excision: Recurrent   leiomyosarcoma, 2.9 cm. See comment.     C. Colon, sigmoid mass, excision: Involved by smooth   muscle neoplasm with increased mitotic activity, 6 mm. See   comment.     D. Soft tissue, left nohemi-ureteral, biopsy: Involved by   smooth muscle neoplasm, 3 mm. See comment.     E. Peritoneum, left pelvic  sidewall margin, excision:   Involved by smooth muscle neoplasm, 1 mm. See comment.     F. Soft tissue, sigmoid colon margin, excision: Negative   for tumor.     Comment: Immunohistochemical stains performed at [facility] demonstrate that the left sidewall mass (B2) is   positive for desmin, caldesmon, ER (moderate, 60-70%), and   SC (moderate, 20-30%), but is negative for HMB45, Melan A,   STAT6, ALK, DOG1, and KIT; this immunophenotype supports a   smooth muscle neoplasm. The left sidewall mass shows   increased mitotic activity (greater than 10 mitoses/10 HPF)   and atypia sufficient to meet criteria for a   leiomyosarcoma. The remaining lesion show minimal atypia   but increased mitotic activity (though less than 10   mitoses/10 HPF) where evaluable, but evaluation is limited   by the small size of the lesions. In the context of a known   history of leiomyosarcoma and a concurrent tumor meeting   diagnostic criteria, these likely represent deceptively   bland additional foci of recurrent leiomyosarcoma.     8/2023 - Biological/Targeted/Hormone Therapy   Anastrozole     11/8/2023 Progression/Relapse   CT scan chest-abdomen-pelvis shows progression of 3 nodules within the peritoneal cavity.    1/18/2024 - 6/5/2024 Chemotherapy   BOAST clinical trial with Dr. Nik Schilling at the [facility]. She received abemaciclib there. Was taken off of trial due to progression.      PAST MEDICAL HISTORY   None    PAST SURGICAL HISTORY   As per HPI     FAMILY HISTORY   Pending     ALLERGIES      Allergies   Allergen Reactions    Dust Mite Extract Other (See Comments)    Erythromycin GI Disturbance, Other (See Comments), Nausea and Nausea and Vomiting     PN: LW Reaction: Nausea    Tramadol Nausea, Other (See Comments) and Nausea and Vomiting          SOCIAL HISTORY     Social History     Social History Narrative    How much exercise per week? Walking daily.    How much calcium per day? Supplement and through  diet       How much caffeine per day? 3 times a week    How much vitamin D per day? Supplement    Do you/your family wear seatbelts?  Yes    Do you/your family use safety helmets? NA    Do you/your family use sunscreen? Sometimes    Do you/your family keep firearms in the home? No    Do you/your family have a smoke detector(s)? Yes    Do you feel safe in your home? Yes    Has anyone ever touched you in an unwanted manner? 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  2 grandsons now in [place] with the mother.  In a house.  .          How much exercise per week? Daily walking, will start going to the y    How much calcium per day? Through foods       How much caffeine per day? One cup    How much vitamin D per day? Through foods    Do you/your family wear seatbelts?  Yes    Do you/your family use safety helmets? N/a    Do you/your family use sunscreen? Yes    Do you/your family keep firearms in the home? No    Do you/your family have a smoke detector(s)? Yes        Reviewed by Nicki Cornejo 10-11-21               CURRENT OUTPATIENT MEDICATIONS     Current Outpatient Medications   Medication Sig Dispense Refill    Ascorbic Acid 250 MG CHEW Take 250 mg by mouth daily.      calcium citrate 250 MG TABS Take 500 mg by mouth 2 times daily.      carboxymethylcellulose PF (REFRESH PLUS) 0.5 % ophthalmic solution Place 1 drop into both eyes 4 times daily as needed for dry eyes      dexAMETHasone (DECADRON) 2 MG tablet Take 3 tablets (6 mg) by mouth once for 1 dose. On 5/15/25 with breakfast 3 tablet 0    dexAMETHasone (DECADRON) 4 MG tablet Take 2 tablets (8 mg) by mouth 2 times daily (with meals) for 3 doses. Start evening of Docetaxel infusion and continue for a total of 3 doses. 6 tablet 7    Fish Oil-Cholecalciferol (FISH OIL + D3 PO)  Take 1 capsule by mouth daily.      levothyroxine (SYNTHROID/LEVOTHROID) 75 MCG tablet TAKE 1 TABLET DAILY FOR    THYROID 90 tablet 2    lidocaine-prilocaine (EMLA) 2.5-2.5 % external cream Apply a thin layer on skin overlying port site 10-15 minutes prior to port access for labs or infusion 5 g 1    prochlorperazine (COMPAZINE) 10 MG tablet Take 1 tablet (10 mg) by mouth every 6 hours as needed for nausea or vomiting. (Patient not taking: Reported on 2025) 30 tablet 2    Quercetin 500 MG CAPS Take 500 mg by mouth daily.      RESVERATROL PO Take 1 capsule by mouth daily.      TURMERIC PO Take 1 tablet by mouth daily.      venlafaxine (EFFEXOR XR) 75 MG 24 hr capsule Take 1 capsule (75 mg) by mouth daily. 30 capsule 3    zolpidem (AMBIEN) 5 MG tablet Take 1 tablet (5 mg) by mouth nightly as needed for sleep (Only uses if traveling or cannot sleep,). 20 tablet 0          REVIEW OF SYSTEMS   As above in the HPI, o/w complete 12-point ROS was negative.     PHYSICAL EXAM   /75 (BP Location: Right arm, Patient Position: Sitting, Cuff Size: Adult Regular)   Pulse 120   Temp 98.3  F (36.8  C) (Oral)   Resp 16   Wt 56.4 kg (124 lb 4.8 oz)   SpO2 100%   BMI 25.32 kg/m      EGO  GEN: NAD  HEENT: PERRL, EOMI, no icterus, injection or pallor. Oropharynx is clear.  NECK: no cervical or supraclavicular lymphadenopathy  LUNGS: clear bilaterally  CV: regular, no murmurs, rubs, or gallops  ABDOMEN: soft, non-tender, non-distended, normal bowel sounds, no hepatosplenomegaly by percussion or palpation. Surgical scars from prior surgeries well healed.   EXT: warm, well perfused, no edema  NEURO: alert  SKIN: no rashes     LABORATORY AND IMAGING STUDIES     Lab Results   Component Value Date    WBC 3.9 2025    WBC 6.2 2021     Lab Results   Component Value Date    RBC 3.06 2025    RBC 4.60 2021     Lab Results   Component Value Date    HGB 9.2 2025    HGB 13.7 2021     Lab Results    Component Value Date    HCT 27.9 06/19/2025    HCT 42.4 04/28/2021     Lab Results   Component Value Date    MCV 91 06/19/2025    MCV 92 04/28/2021     Lab Results   Component Value Date    MCH 30.1 06/19/2025    MCH 29.8 04/28/2021     Lab Results   Component Value Date    MCHC 33.0 06/19/2025    MCHC 32.3 04/28/2021     Lab Results   Component Value Date    RDW 17.0 06/19/2025    RDW 14.6 04/28/2021     Lab Results   Component Value Date     06/19/2025     04/28/2021       Last Comprehensive Metabolic Panel:  Sodium   Date Value Ref Range Status   05/14/2025 139 135 - 145 mmol/L Final   04/28/2021 143 133 - 144 mmol/L Final     Potassium   Date Value Ref Range Status   05/14/2025 4.5 3.4 - 5.3 mmol/L Final   07/25/2022 4.4 3.4 - 5.3 mmol/L Final   04/28/2021 4.0 3.4 - 5.3 mmol/L Final     Chloride   Date Value Ref Range Status   05/14/2025 106 98 - 107 mmol/L Final   07/25/2022 110 (H) 94 - 109 mmol/L Final   04/28/2021 110 (H) 94 - 109 mmol/L Final     Carbon Dioxide   Date Value Ref Range Status   04/28/2021 28 20 - 32 mmol/L Final     Carbon Dioxide (CO2)   Date Value Ref Range Status   05/14/2025 21 (L) 22 - 29 mmol/L Final   07/25/2022 26 20 - 32 mmol/L Final     Anion Gap   Date Value Ref Range Status   05/14/2025 12 7 - 15 mmol/L Final   07/25/2022 4 3 - 14 mmol/L Final   04/28/2021 6 3 - 14 mmol/L Final     Glucose   Date Value Ref Range Status   05/14/2025 125 (H) 70 - 99 mg/dL Final   07/26/2024 69 (A) 70 - 99 mg/dL Final     GLUCOSE BY METER POCT   Date Value Ref Range Status   12/31/2024 128 (H) 70 - 99 mg/dL Final     Urea Nitrogen   Date Value Ref Range Status   05/14/2025 19.2 8.0 - 23.0 mg/dL Final   07/25/2022 24 7 - 30 mg/dL Final   04/28/2021 26 7 - 30 mg/dL Final     Creatinine   Date Value Ref Range Status   05/14/2025 0.78 0.51 - 0.95 mg/dL Final   04/28/2021 0.91 0.52 - 1.04 mg/dL Final     GFR Estimate   Date Value Ref Range Status   05/14/2025 79 >60 mL/min/1.73m2 Final      Comment:     eGFR calculated using 2021 CKD-EPI equation.   04/28/2021 62 >60 mL/min/[1.73_m2] Final     GFR, ESTIMATED POCT   Date Value Ref Range Status   04/09/2025 >60 >60 mL/min/1.73m2 Final     Calcium   Date Value Ref Range Status   05/14/2025 9.2 8.8 - 10.4 mg/dL Final   04/28/2021 9.1 8.5 - 10.1 mg/dL Final     Bilirubin Total   Date Value Ref Range Status   05/14/2025 0.2 <=1.2 mg/dL Final   04/28/2021 0.5 0.2 - 1.3 mg/dL Final     Alkaline Phosphatase   Date Value Ref Range Status   05/14/2025 93 40 - 150 U/L Final   04/28/2021 94 40 - 150 U/L Final     ALT   Date Value Ref Range Status   05/14/2025 12 0 - 50 U/L Final   04/28/2021 21 0 - 50 U/L Final     AST   Date Value Ref Range Status   05/14/2025 16 0 - 45 U/L Final   04/28/2021 10 0 - 45 U/L Final       TSH   Date Value Ref Range Status   10/31/2024 3.70 0.30 - 4.20 uIU/mL Final   07/25/2022 1.26 0.40 - 4.00 mU/L Final   12/16/2020 1.44 0.40 - 4.00 mU/L Final       Results for orders placed or performed during the hospital encounter of 06/03/24   CT Chest/Abdomen/Pelvis w Contrast    Narrative    CT CHEST/ABDOMEN/PELVIS W CONTRAST 6/3/2024 12:25 PM    CLINICAL HISTORY: Metastatic sarcoma, assess for response to  chemotherapy. 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).    TECHNIQUE: CT scan of the chest, abdomen, and pelvis was performed  following injection of IV contrast. Multiplanar reformats were  obtained. Dose reduction techniques were used.   CONTRAST: 64 mL Isovue-[id number]    COMPARISON: CT chest, abdomen and pelvis 4/8/2024.    FINDINGS:   LOWER NECK: Unremarkable.    LUNGS AND PLEURA: No focal consolidation or pleural effusion.  Calcified granulomas. No new or growing suspicious pulmonary nodule.    AIRWAYS: Patent.    HEART: No pericardial effusion.  No coronary calcifications. No  thoracic aortic aneurysm.    PULMONARY ARTERIES: Unremarkable.    MEDIASTINUM AND BEATRIZ: No lymphadenopathy. Calcified lymph nodes.    HEPATOBILIARY:  Unremarkable. Gallbladder is present without  gallstones.    SPLEEN: Unremarkable.    PANCREAS: Unremarkable.    ADRENAL GLANDS: Unremarkable.    KIDNEYS/URETERS/BLADDER: Similar right renal atrophy/cortical  scarring. Left parapelvic and cortical cysts; no follow-up necessary.    BOWEL: No bowel dilatation or wall thickening.    LYMPH NODES AND PERITONEUM: Redemonstrated abdominal soft tissue  masses as on series 3:  -Left upper quadrant, 2.6 x 4 x 2.2 cm, previously 2.1 x 2.9 x 2.1 cm  when measured similarly, image 110   -Soft tissue lesion between the transverse colon and stomach, 3.1 x  2.8 cm, previously 3.1 x 2.6 cm, image 168  -Superior to the pancreas, 1.4 cm, image 125    No new lesion.    VASCULATURE: Nonaneurysmal abdominal aorta.    PELVIS: Uterus is surgically absent. No pelvic mass.    MUSCULOSKELETAL: No aggressive osseous lesion. Similar L4/L5 grade 1  anterolisthesis.    ADDITIONAL FINDINGS: None.      Impression    IMPRESSION:  1.  Since 4/8/2024, increased in size left upper quadrant soft tissue  masses. Other abdominal soft tissue lesions have not significantly  changed.  2.  No convincing new site of disease.    ORIN PAUL MD         SYSTEM ID:  [id number]     CT CAP 9/24/24  Mesentery/peritoneum/retroperitoneum: Soft tissue mass in the  transverse mesial colon measuring 4.0 x 3.8 cm with decreased  enhancement compared to prior. Previously measured 2.8 x 3.1 cm.  Mildly enhancing mass anterior to the superior pole of the spleen  measuring 2.8 x 5.2 cm, previously 2.6 x 4.0 cm. Stable size of soft  tissue nodule anterior to the pancreas measuring 1.5 cm (series 11  image 37). There is interval central hypoenhancement. Trace central  mesenteric free fluid.     Lymph nodes: No significant lymphadenopathy.     Vasculature: Scattered atherosclerotic calcification of abdominal  aorta with no aneurysmal dilation.  Portal veins are patent.     Pelvis: Urinary bladder is normal.     Osseous  structures: No aggressive or acute osseous lesion.  Multilevel  degenerative changes of the spine most pronounced at L4-L5. Grade 1  anterolisthesis of L4 on L5. Enhancing circumscribed extradural lesion  at the level of L5-S1 measuring 1.1 cm in craniocaudal dimension  (series 14 image 100).      Soft tissues: Tiny fat containing ventral/umbilical hernia.        CT CAP  12/16/24                                                              IMPRESSION:   1. Compared to CT dated 6/3/2024, increased size of peritoneal soft  tissue masses in the transverse mesial colon and left upper quadrant.  Stable size of soft tissue nodule anterior to the pancreas with  interval central hypoenhancement suspicious for necrosis.  2. No evidence of new disease in the chest abdomen and pelvis.  3. Enhancing circumscribed extradural lesion at the level of L5-S1,  may represent synovial cyst with hemorrhage as described on recent  lumbar MRI.  4.Additional findings similar to prior CT.     CT CAP 6/17/25  IMPRESSION:  1.  No convincing evidence of metastatic disease in the chest, abdomen or pelvis.  2.  Mildly prominent paraesophageal lymph node along the right posterolateral aspect of the esophagus, not significantly changed as compared to 4/9/2025 exam, indeterminate, could be reactive.  3.  0.7 cm hypodense focus in the pancreatic head/uncinate process, could represent sidebranch intraductal papillary mucinous neoplasm versus other pancreatic cystic lesions, recommend continued attention on follow-up.  4.  Colonic diverticulosis without CT evidence of acute diverticulitis.  5.  Mild diffuse urinary bladder wall thickening, nonspecific, can be seen with underdistention versus chronic cystitis.        PATHOLOGY   12/12/2016  FINAL DIAGNOSIS:   CASE FROM [facility], [place], [place] ([id number], OBTAINED 11/04/2016):   A. UTERINE MASS, RESECTION:   - Leiomyosarcoma, 12 cm in aggregate (per report)   - Please see comment     COMMENT:   Per  the received report, a right cul-de-sac biopsy was involved by   leiomyosarcoma.   The diagnosis of leiomyosarcoma is made on the basis of examination of   the one slide submitted.  There is moderate nuclear atypia, tumor   necrosis and increased mitotic activities (10/10 HPFs).     Tempus profiling from her 4/5/22 specimen showed CDKN2A and CDKN2B loss.     2/28/25       Diagnosis  [facility]   A. Peritoneum, small bowel mesenteric implant, biopsy   Involved by leiomyosarcoma      B. Appendix, appendectomy   Fibrous obliteration of appendiceal tip  Serosal adhesions  Negative for sarcoma      C. Omentum, mass, omentectomy   Involved by leiomyosarcoma (see comment)  Four benign lymph nodes     D. Omentum, lesser omental nodule, biopsy   Involved by leiomyosarcoma      E. Omentum, greater, omentectomy   Involved by leiomyosarcoma  Three lymph nodes with no significant abnormality      F. Soft tissue, right retroperitoneal nodule, biopsy   Involved by leiomyosarcoma      Jacqueline Gonzalez MD/Leila Montes De Oca MD/Alexus Liang MD     Electronically signed by Alexus Liang MD on 3/10/2025 at  2:47 PM             ASSESSMENT AND PLAN     Maggie is a 74 year old women with PMH of metastatic uterine leiomyosarcoma. She was originally diagnosed on 2016 showing an up to 12 cm mass. She underwent surgical resection followed by adjuvant doxorubicin/dacarbazine for 5 cycles. She had local recurrence on 2020 treated with surgical resection. Then  again recurrence on 2022 again underwent surgical resection. Followed by [id number] with anastrozole. Since January of 2024 she has been on trial a BOAST clinical trial with Dr. Nik Schilling at the [facility]. She received abemaciclib there. Now, she was taken off of trial due to progression on 1 of 3 intra-abdominal lesions.     She comes here to continue medical management, she has seen Dr. Michaud at [place] for a second opinion. She is interested in  surgical resection which has been offered at [facility] with possible Gemzar HIPEC.      She feels very well now, she exercises regularly and has no related GI symptoms.     She saw Dr. Michaud at [place], who recommended against further surgical resection and for systemic therapy with other agents with activity in leiomyosarcoma including gemcitabine +/- docetaxel, trabectedin and pazopanib.     She is s/p 2 cycles of gemcitabine single agent. She is feeling well and tells me that back surgery will be planned for 11/15. She insists that the team at [place] has great expertise in surgery and HIPEC for cases of advanced leiomyosarcoma. I have not been able to communicate with her team there. She tells me they plan on intraoperative chemo with cisplatin.       After reviewing imaging from 9/24/25 at the [facility], we saw that Maggie had disease progression with enlarging lesions in the abdomen, and no new lesions. She then received 2 cycles of doxil/dacarbazine unfortunately having disease progression and then received single agent gemcitabine also resulting in disease progression.     She underwent palliative resection with HIPEC by Dr. Yee at [place] on 2/28/25, all macroscopic disease resected, per notes evidence of small deposits in different areas. They did drug screening lab testing in most recent specimen and results indicate a possible sensitive to combination gem/tax and intermediate sensitivity to single agent trabectedin.  She has received now 4 cycles of gemcitabine 1250 mg/m2 and docetaxel 50 mg/m2 every 2 weeks.   I personally reviewed the most recent scans showing no evidence of disease recurrence. I recommend to continue chemo for another 4 cycles and repeat the scans, then we will consider a break if no evidence of recurrence. She will also follow up with Dr. Yee at [place].     Plan:     -CT CAP and labs 9/22   -Follow up Dr. Powers 9/24   -She has GERD and paraesophageal LN, pantoprazol  prescribed.    40 minutes spent on the date of the encounter doing chart review, review of outside records, review of test results, interpretation of tests, patient visit, documentation, and discussion with other provider(s)     Beto Galvan MD   of Medicine  Hematology, Oncology and Transplantation

## 2025-06-17 ENCOUNTER — ANCILLARY PROCEDURE (OUTPATIENT)
Dept: CT IMAGING | Facility: CLINIC | Age: 75
End: 2025-06-17
Attending: STUDENT IN AN ORGANIZED HEALTH CARE EDUCATION/TRAINING PROGRAM
Payer: MEDICARE

## 2025-06-17 DIAGNOSIS — C49.9 LEIOMYOSARCOMA (H): ICD-10-CM

## 2025-06-17 LAB
CREAT BLD-MCNC: 0.9 MG/DL (ref 0.5–1)
EGFRCR SERPLBLD CKD-EPI 2021: >60 ML/MIN/1.73M2

## 2025-06-17 PROCEDURE — 71260 CT THORAX DX C+: CPT | Performed by: RADIOLOGY

## 2025-06-17 PROCEDURE — 74177 CT ABD & PELVIS W/CONTRAST: CPT | Performed by: RADIOLOGY

## 2025-06-17 PROCEDURE — 82565 ASSAY OF CREATININE: CPT | Performed by: PATHOLOGY

## 2025-06-17 RX ORDER — IOPAMIDOL 755 MG/ML
73 INJECTION, SOLUTION INTRAVASCULAR ONCE
Status: COMPLETED | OUTPATIENT
Start: 2025-06-17 | End: 2025-06-17

## 2025-06-17 RX ORDER — HEPARIN SODIUM (PORCINE) LOCK FLUSH IV SOLN 100 UNIT/ML 100 UNIT/ML
500 SOLUTION INTRAVENOUS ONCE
Status: COMPLETED | OUTPATIENT
Start: 2025-06-17 | End: 2025-06-17

## 2025-06-17 RX ADMIN — HEPARIN SODIUM (PORCINE) LOCK FLUSH IV SOLN 100 UNIT/ML 500 UNITS: 100 SOLUTION at 12:55

## 2025-06-17 RX ADMIN — IOPAMIDOL 73 ML: 755 INJECTION, SOLUTION INTRAVASCULAR at 12:49

## 2025-06-17 NOTE — DISCHARGE INSTRUCTIONS

## 2025-06-18 ENCOUNTER — ONCOLOGY VISIT (OUTPATIENT)
Dept: ONCOLOGY | Facility: CLINIC | Age: 75
End: 2025-06-18
Attending: STUDENT IN AN ORGANIZED HEALTH CARE EDUCATION/TRAINING PROGRAM
Payer: MEDICARE

## 2025-06-18 VITALS
SYSTOLIC BLOOD PRESSURE: 138 MMHG | BODY MASS INDEX: 25.32 KG/M2 | DIASTOLIC BLOOD PRESSURE: 75 MMHG | HEART RATE: 120 BPM | WEIGHT: 124.3 LBS | TEMPERATURE: 98.3 F | RESPIRATION RATE: 16 BRPM | OXYGEN SATURATION: 100 %

## 2025-06-18 DIAGNOSIS — C49.9 LEIOMYOSARCOMA (H): ICD-10-CM

## 2025-06-18 DIAGNOSIS — K21.9 GASTROESOPHAGEAL REFLUX DISEASE WITHOUT ESOPHAGITIS: Primary | ICD-10-CM

## 2025-06-18 PROCEDURE — G0463 HOSPITAL OUTPT CLINIC VISIT: HCPCS | Performed by: STUDENT IN AN ORGANIZED HEALTH CARE EDUCATION/TRAINING PROGRAM

## 2025-06-18 PROCEDURE — 99215 OFFICE O/P EST HI 40 MIN: CPT | Performed by: STUDENT IN AN ORGANIZED HEALTH CARE EDUCATION/TRAINING PROGRAM

## 2025-06-18 RX ORDER — ONDANSETRON 4 MG/1
4 TABLET, ORALLY DISINTEGRATING ORAL PRN
COMMUNITY
Start: 2025-03-04

## 2025-06-18 RX ORDER — PANTOPRAZOLE SODIUM 20 MG/1
40 TABLET, DELAYED RELEASE ORAL DAILY
Qty: 60 TABLET | Refills: 3 | Status: SHIPPED | OUTPATIENT
Start: 2025-06-18 | End: 2025-07-18

## 2025-06-18 ASSESSMENT — PAIN SCALES - GENERAL: PAINLEVEL_OUTOF10: NO PAIN (0)

## 2025-06-18 NOTE — NURSING NOTE
"Oncology Rooming Note    June 18, 2025 5:00 PM   Maggie Navarrete is a 75 year old female who presents for:    Chief Complaint   Patient presents with    Oncology Clinic Visit     Leiomyosarcoma of uterus     Initial Vitals: /75 (BP Location: Right arm, Patient Position: Sitting, Cuff Size: Adult Regular)   Pulse 120   Temp 98.3  F (36.8  C) (Oral)   Resp 16   Wt 56.4 kg (124 lb 4.8 oz)   SpO2 100%   BMI 25.32 kg/m   Estimated body mass index is 25.32 kg/m  as calculated from the following:    Height as of 4/30/25: 1.492 m (4' 10.75\").    Weight as of this encounter: 56.4 kg (124 lb 4.8 oz). Body surface area is 1.53 meters squared.  No Pain (0) Comment: Data Unavailable   No LMP recorded. Patient has had a hysterectomy.  Allergies reviewed: Yes  Medications reviewed: Yes    Medications: Medication refills not needed today.  Pharmacy name entered into [employer] About Us:    [id number] [place], [place] - [address], [place] - [address] [place] [address]    Frailty Screening:   Is the patient here for a new oncology consult visit in cancer care? 2. No    PHQ9:  Did this patient require a PHQ9?: No      Clinical concerns: none      Humberto Lowery              "

## 2025-06-18 NOTE — LETTER
6/18/2025      Maggie Navarrete  [address]  [facility] [address]      Dear Colleague,    Thank you for referring your patient, Maggie Navarrete, to the [facility]. Please see a copy of my visit note below.        [facility]  FOLLOW-UP VISIT NOTE  PATIENT NAME: Maggie Navarrete MRN # [id number]  DATE OF VISIT: June 18, 2025 YOB: 1950    REFERRING PROVIDER: Referred Self, MD  No address on file    CANCER TYPE: metastatic uterine leiomyosarcoma       CHIEF COMPLAIN   None     HISTORY OF PRESENT ILLNESS   Maggie is a 75 year old women with PMH of metastatic uterine leiomyosarcoma. She was originally diagnosed on 2016 showing an up to 12 cm mass. She underwent surgical resection followed by adjuvant doxorubicin/dacarbazine for 5 cycles. She had local recurrence on 2020 treated with surgical resection. Then  again recurrence on 2022 again underwent surgical resection. Followed by [id number] with anastrozole. Since January of 2024 she has been on trial a BOAST clinical trial with Dr. Nik Schilling at the [facility] [place]. She received abemaciclib there. Now, she was taken off of trial due to progression on 1 of 3 intra-abdominal lesions.     She comes here to continue medical management, she has seen Dr. Michaud at [place] for a second opinion. She is interested in surgical resection which has been offered at  with possible Gemzar HIPEC.      She started on single agent Gemzar on 7/29/24. She is here today for follow up prior to cycle 3.   Scans on 9/23/24 showed progression of disease    Received doxil/dacarbazine C1 on 10/14/24  C2 doxil/dacarbazine on 11/20/24 - scans showed progression after 2 cycles    Lumbar 4 to 5 Decompression and Left Lumbar 5-Sacral 1 Rafael-Laminotomy and Cyst Removal on 12/30/2024     Surgery/HIPEC with resection of all macroscopic disease by Dr. Yee at [place] on  2/28/25  D1C1 [place] 1250mg/m2 docetaxel 50 mg/m2 4/30/25 every 2 weeks  D1C2 5/14/25  She started receiving her chemo at the [facility] in [place]  C3 on 5/28/25  C4 6/11/25    Interval Hx  Maggie reports that she is feeling overall well, she does have some fatigue for the week after chemo and has lost her hair. No neuropathy noted so far. She continued to walk and exercise frequently.      PAST ONCOLOGIC HISTORY   Leiomyosarcoma Uterus   11/3/2016 Initial Diagnosis     11/4/2016 Surgery and Procedures   Open hyst, BSO, stage II with peritoneal involvement    12/26/2016 - 3/23/2017 Chemotherapy   Received elsewhere: adjuvant chemotherapy with doxorubicin 25 mg/m2/day and DTIC 250 mg/m2/day , days 1-3 every 21 weeks.  Completed 5 cycles of adjuvant doxorubicin and dacarbazine      6/1/2020 Recurrence Local   Isolated lesion in the RLQ    6/16/2020 Surgery and Procedures   Laparotomy, excision of side wall mass, ureteroneocystotomy. 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 The excised margins were negative for tumor.    3/31/2022 [facility]   PET with isolated bowel lesion in LLQ.     4/4/2022 Surgery and Procedures   Exlap, FANNY, small bowel lesion excised with negative margins    5/9/2023 Surgery and Procedures   Dr. Camarillo and Dr. Krueger    Exploratory laparotomy, tumor debulking, cystoscopy and placement of internal ureteral stent and ureterolysis     Findings  Two lesions in the abdomen consistent with very indolent appearing sarcoma on frozen section, 1 in the right upper quadrant in the large bowel mesentery. And 1 on the left sidewall between the left sidewall and the left ureter and the sigmoid colon    5/9/2023 Biopsy/Pathology   FINAL DIAGNOSIS     A. Soft tissue, hepatic flexure nodule, excision: Involved   by smooth muscle neoplasm, 1.4 cm. See comment.     B. Soft tissue, left sidewall mass, excision: Recurrent   leiomyosarcoma, 2.9 cm. See comment.     C.  Colon, sigmoid mass, excision: Involved by smooth   muscle neoplasm with increased mitotic activity, 6 mm. See   comment.     D. Soft tissue, left nohemi-ureteral, biopsy: Involved by   smooth muscle neoplasm, 3 mm. See comment.     E. Peritoneum, left pelvic sidewall margin, excision:   Involved by smooth muscle neoplasm, 1 mm. See comment.     F. Soft tissue, sigmoid colon margin, excision: Negative   for tumor.     Comment: Immunohistochemical stains performed at [facility] demonstrate that the left sidewall mass (B2) is   positive for desmin, caldesmon, ER (moderate, 60-70%), and   WY (moderate, 20-30%), but is negative for HMB45, Melan A,   STAT6, ALK, DOG1, and KIT; this immunophenotype supports a   smooth muscle neoplasm. The left sidewall mass shows   increased mitotic activity (greater than 10 mitoses/10 HPF)   and atypia sufficient to meet criteria for a   leiomyosarcoma. The remaining lesion show minimal atypia   but increased mitotic activity (though less than 10   mitoses/10 HPF) where evaluable, but evaluation is limited   by the small size of the lesions. In the context of a known   history of leiomyosarcoma and a concurrent tumor meeting   diagnostic criteria, these likely represent deceptively   bland additional foci of recurrent leiomyosarcoma.     8/2023 - Biological/Targeted/Hormone Therapy   Anastrozole     11/8/2023 Progression/Relapse   CT scan chest-abdomen-pelvis shows progression of 3 nodules within the peritoneal cavity.    1/18/2024 - 6/5/2024 Chemotherapy   BOAST clinical trial with Dr. Nik Schilling at [facility]. She received abemaciclib there. Was taken off of trial due to progression.      PAST MEDICAL HISTORY   None    PAST SURGICAL HISTORY   As per HPI     FAMILY HISTORY   Pending     ALLERGIES      Allergies   Allergen Reactions     Dust Mite Extract Other (See Comments)     Erythromycin GI Disturbance, Other (See Comments), Nausea and Nausea and Vomiting      PN: LW Reaction: Nausea     Tramadol Nausea, Other (See Comments) and Nausea and Vomiting          SOCIAL HISTORY     Social History     Social History Narrative    How much exercise per week? Walking daily.    How much calcium per day? Supplement and through diet       How much caffeine per day? 3 times a week    How much vitamin D per day? Supplement    Do you/your family wear seatbelts?  Yes    Do you/your family use safety helmets? NA    Do you/your family use sunscreen? Sometimes    Do you/your family keep firearms in the home? No    Do you/your family have a smoke detector(s)? Yes    Do you feel safe in your home? Yes    Has anyone ever touched you in an unwanted manner? 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  2 grandsons now in [place] with the mother.  In a house.  .          How much exercise per week? Daily walking, will start going to the y    How much calcium per day? Through foods       How much caffeine per day? One cup    How much vitamin D per day? Through foods    Do you/your family wear seatbelts?  Yes    Do you/your family use safety helmets? N/a    Do you/your family use sunscreen? Yes    Do you/your family keep firearms in the home? No    Do you/your family have a smoke detector(s)? Yes        Reviewed by Nicki Cornejo 10-11-21               CURRENT OUTPATIENT MEDICATIONS     Current Outpatient Medications   Medication Sig Dispense Refill     Ascorbic Acid 250 MG CHEW Take 250 mg by mouth daily.       calcium citrate 250 MG TABS Take 500 mg by mouth 2 times daily.       carboxymethylcellulose PF (REFRESH PLUS) 0.5 % ophthalmic solution Place 1 drop into both eyes 4 times daily as needed for dry eyes       dexAMETHasone (DECADRON) 2 MG tablet Take 3 tablets (6 mg) by mouth once for 1 dose. On 5/15/25 with  breakfast 3 tablet 0     dexAMETHasone (DECADRON) 4 MG tablet Take 2 tablets (8 mg) by mouth 2 times daily (with meals) for 3 doses. Start evening of Docetaxel infusion and continue for a total of 3 doses. 6 tablet 7     Fish Oil-Cholecalciferol (FISH OIL + D3 PO) Take 1 capsule by mouth daily.       levothyroxine (SYNTHROID/LEVOTHROID) 75 MCG tablet TAKE 1 TABLET DAILY FOR    THYROID 90 tablet 2     lidocaine-prilocaine (EMLA) 2.5-2.5 % external cream Apply a thin layer on skin overlying port site 10-15 minutes prior to port access for labs or infusion 5 g 1     prochlorperazine (COMPAZINE) 10 MG tablet Take 1 tablet (10 mg) by mouth every 6 hours as needed for nausea or vomiting. (Patient not taking: Reported on 2025) 30 tablet 2     Quercetin 500 MG CAPS Take 500 mg by mouth daily.       RESVERATROL PO Take 1 capsule by mouth daily.       TURMERIC PO Take 1 tablet by mouth daily.       venlafaxine (EFFEXOR XR) 75 MG 24 hr capsule Take 1 capsule (75 mg) by mouth daily. 30 capsule 3     zolpidem (AMBIEN) 5 MG tablet Take 1 tablet (5 mg) by mouth nightly as needed for sleep (Only uses if traveling or cannot sleep,). 20 tablet 0          REVIEW OF SYSTEMS   As above in the HPI, o/w complete 12-point ROS was negative.     PHYSICAL EXAM   /75 (BP Location: Right arm, Patient Position: Sitting, Cuff Size: Adult Regular)   Pulse 120   Temp 98.3  F (36.8  C) (Oral)   Resp 16   Wt 56.4 kg (124 lb 4.8 oz)   SpO2 100%   BMI 25.32 kg/m      EGO  GEN: NAD  HEENT: PERRL, EOMI, no icterus, injection or pallor. Oropharynx is clear.  NECK: no cervical or supraclavicular lymphadenopathy  LUNGS: clear bilaterally  CV: regular, no murmurs, rubs, or gallops  ABDOMEN: soft, non-tender, non-distended, normal bowel sounds, no hepatosplenomegaly by percussion or palpation. Surgical scars from prior surgeries well healed.   EXT: warm, well perfused, no edema  NEURO: alert  SKIN: no rashes     LABORATORY AND IMAGING  STUDIES     Lab Results   Component Value Date    WBC 3.9 06/19/2025    WBC 6.2 04/28/2021     Lab Results   Component Value Date    RBC 3.06 06/19/2025    RBC 4.60 04/28/2021     Lab Results   Component Value Date    HGB 9.2 06/19/2025    HGB 13.7 04/28/2021     Lab Results   Component Value Date    HCT 27.9 06/19/2025    HCT 42.4 04/28/2021     Lab Results   Component Value Date    MCV 91 06/19/2025    MCV 92 04/28/2021     Lab Results   Component Value Date    MCH 30.1 06/19/2025    MCH 29.8 04/28/2021     Lab Results   Component Value Date    MCHC 33.0 06/19/2025    MCHC 32.3 04/28/2021     Lab Results   Component Value Date    RDW 17.0 06/19/2025    RDW 14.6 04/28/2021     Lab Results   Component Value Date     06/19/2025     04/28/2021       Last Comprehensive Metabolic Panel:  Sodium   Date Value Ref Range Status   05/14/2025 139 135 - 145 mmol/L Final   04/28/2021 143 133 - 144 mmol/L Final     Potassium   Date Value Ref Range Status   05/14/2025 4.5 3.4 - 5.3 mmol/L Final   07/25/2022 4.4 3.4 - 5.3 mmol/L Final   04/28/2021 4.0 3.4 - 5.3 mmol/L Final     Chloride   Date Value Ref Range Status   05/14/2025 106 98 - 107 mmol/L Final   07/25/2022 110 (H) 94 - 109 mmol/L Final   04/28/2021 110 (H) 94 - 109 mmol/L Final     Carbon Dioxide   Date Value Ref Range Status   04/28/2021 28 20 - 32 mmol/L Final     Carbon Dioxide (CO2)   Date Value Ref Range Status   05/14/2025 21 (L) 22 - 29 mmol/L Final   07/25/2022 26 20 - 32 mmol/L Final     Anion Gap   Date Value Ref Range Status   05/14/2025 12 7 - 15 mmol/L Final   07/25/2022 4 3 - 14 mmol/L Final   04/28/2021 6 3 - 14 mmol/L Final     Glucose   Date Value Ref Range Status   05/14/2025 125 (H) 70 - 99 mg/dL Final   07/26/2024 69 (A) 70 - 99 mg/dL Final     GLUCOSE BY METER POCT   Date Value Ref Range Status   12/31/2024 128 (H) 70 - 99 mg/dL Final     Urea Nitrogen   Date Value Ref Range Status   05/14/2025 19.2 8.0 - 23.0 mg/dL Final   07/25/2022  24 7 - 30 mg/dL Final   04/28/2021 26 7 - 30 mg/dL Final     Creatinine   Date Value Ref Range Status   05/14/2025 0.78 0.51 - 0.95 mg/dL Final   04/28/2021 0.91 0.52 - 1.04 mg/dL Final     GFR Estimate   Date Value Ref Range Status   05/14/2025 79 >60 mL/min/1.73m2 Final     Comment:     eGFR calculated using 2021 CKD-EPI equation.   04/28/2021 62 >60 mL/min/[1.73_m2] Final     GFR, ESTIMATED POCT   Date Value Ref Range Status   04/09/2025 >60 >60 mL/min/1.73m2 Final     Calcium   Date Value Ref Range Status   05/14/2025 9.2 8.8 - 10.4 mg/dL Final   04/28/2021 9.1 8.5 - 10.1 mg/dL Final     Bilirubin Total   Date Value Ref Range Status   05/14/2025 0.2 <=1.2 mg/dL Final   04/28/2021 0.5 0.2 - 1.3 mg/dL Final     Alkaline Phosphatase   Date Value Ref Range Status   05/14/2025 93 40 - 150 U/L Final   04/28/2021 94 40 - 150 U/L Final     ALT   Date Value Ref Range Status   05/14/2025 12 0 - 50 U/L Final   04/28/2021 21 0 - 50 U/L Final     AST   Date Value Ref Range Status   05/14/2025 16 0 - 45 U/L Final   04/28/2021 10 0 - 45 U/L Final       TSH   Date Value Ref Range Status   10/31/2024 3.70 0.30 - 4.20 uIU/mL Final   07/25/2022 1.26 0.40 - 4.00 mU/L Final   12/16/2020 1.44 0.40 - 4.00 mU/L Final       Results for orders placed or performed during the hospital encounter of 06/03/24   CT Chest/Abdomen/Pelvis w Contrast    Narrative    CT CHEST/ABDOMEN/PELVIS W CONTRAST 6/3/2024 12:25 PM    CLINICAL HISTORY: Metastatic sarcoma, assess for response to  chemotherapy. 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).    TECHNIQUE: CT scan of the chest, abdomen, and pelvis was performed  following injection of IV contrast. Multiplanar reformats were  obtained. Dose reduction techniques were used.   CONTRAST: 64 mL Isovue-370    COMPARISON: CT chest, abdomen and pelvis 4/8/2024.    FINDINGS:   LOWER NECK: Unremarkable.    LUNGS AND PLEURA: No focal consolidation or pleural effusion.  Calcified granulomas. No new or  growing suspicious pulmonary nodule.    AIRWAYS: Patent.    HEART: No pericardial effusion.  No coronary calcifications. No  thoracic aortic aneurysm.    PULMONARY ARTERIES: Unremarkable.    MEDIASTINUM AND BEATRIZ: No lymphadenopathy. Calcified lymph nodes.    HEPATOBILIARY: Unremarkable. Gallbladder is present without  gallstones.    SPLEEN: Unremarkable.    PANCREAS: Unremarkable.    ADRENAL GLANDS: Unremarkable.    KIDNEYS/URETERS/BLADDER: Similar right renal atrophy/cortical  scarring. Left parapelvic and cortical cysts; no follow-up necessary.    BOWEL: No bowel dilatation or wall thickening.    LYMPH NODES AND PERITONEUM: Redemonstrated abdominal soft tissue  masses as on series 3:  -Left upper quadrant, 2.6 x 4 x 2.2 cm, previously 2.1 x 2.9 x 2.1 cm  when measured similarly, image 110   -Soft tissue lesion between the transverse colon and stomach, 3.1 x  2.8 cm, previously 3.1 x 2.6 cm, image 168  -Superior to the pancreas, 1.4 cm, image 125    No new lesion.    VASCULATURE: Nonaneurysmal abdominal aorta.    PELVIS: Uterus is surgically absent. No pelvic mass.    MUSCULOSKELETAL: No aggressive osseous lesion. Similar L4/L5 grade 1  anterolisthesis.    ADDITIONAL FINDINGS: None.      Impression    IMPRESSION:  1.  Since 4/8/2024, increased in size left upper quadrant soft tissue  masses. Other abdominal soft tissue lesions have not significantly  changed.  2.  No convincing new site of disease.    ORIN PAUL MD         SYSTEM ID:  [id number]     CT CAP 9/24/24  Mesentery/peritoneum/retroperitoneum: Soft tissue mass in the  transverse mesial colon measuring 4.0 x 3.8 cm with decreased  enhancement compared to prior. Previously measured 2.8 x 3.1 cm.  Mildly enhancing mass anterior to the superior pole of the spleen  measuring 2.8 x 5.2 cm, previously 2.6 x 4.0 cm. Stable size of soft  tissue nodule anterior to the pancreas measuring 1.5 cm (series 11  image 37). There is interval central  hypoenhancement. Trace central  mesenteric free fluid.     Lymph nodes: No significant lymphadenopathy.     Vasculature: Scattered atherosclerotic calcification of abdominal  aorta with no aneurysmal dilation.  Portal veins are patent.     Pelvis: Urinary bladder is normal.     Osseous structures: No aggressive or acute osseous lesion.  Multilevel  degenerative changes of the spine most pronounced at L4-L5. Grade 1  anterolisthesis of L4 on L5. Enhancing circumscribed extradural lesion  at the level of L5-S1 measuring 1.1 cm in craniocaudal dimension  (series 14 image 100).      Soft tissues: Tiny fat containing ventral/umbilical hernia.        CT CAP  12/16/24                                                              IMPRESSION:   1. Compared to CT dated 6/3/2024, increased size of peritoneal soft  tissue masses in the transverse mesial colon and left upper quadrant.  Stable size of soft tissue nodule anterior to the pancreas with  interval central hypoenhancement suspicious for necrosis.  2. No evidence of new disease in the chest abdomen and pelvis.  3. Enhancing circumscribed extradural lesion at the level of L5-S1,  may represent synovial cyst with hemorrhage as described on recent  lumbar MRI.  4.Additional findings similar to prior CT.     CT CAP 6/17/25  IMPRESSION:  1.  No convincing evidence of metastatic disease in the chest, abdomen or pelvis.  2.  Mildly prominent paraesophageal lymph node along the right posterolateral aspect of the esophagus, not significantly changed as compared to 4/9/2025 exam, indeterminate, could be reactive.  3.  0.7 cm hypodense focus in the pancreatic head/uncinate process, could represent sidebranch intraductal papillary mucinous neoplasm versus other pancreatic cystic lesions, recommend continued attention on follow-up.  4.  Colonic diverticulosis without CT evidence of acute diverticulitis.  5.  Mild diffuse urinary bladder wall thickening, nonspecific, can be seen with  underdistention versus chronic cystitis.        PATHOLOGY   12/12/2016  FINAL DIAGNOSIS:   CASE FROM [place], [place] ([id number], OBTAINED 11/04/2016):   A. UTERINE MASS, RESECTION:   - Leiomyosarcoma, 12 cm in aggregate (per report)   - Please see comment     COMMENT:   Per the received report, a right cul-de-sac biopsy was involved by   leiomyosarcoma.   The diagnosis of leiomyosarcoma is made on the basis of examination of   the one slide submitted.  There is moderate nuclear atypia, tumor   necrosis and increased mitotic activities (10/10 HPFs).     Tempus profiling from her 4/5/22 specimen showed CDKN2A and CDKN2B loss.     2/28/25       Diagnosis  [facility]   A. Peritoneum, small bowel mesenteric implant, biopsy   Involved by leiomyosarcoma      B. Appendix, appendectomy   Fibrous obliteration of appendiceal tip  Serosal adhesions  Negative for sarcoma      C. Omentum, mass, omentectomy   Involved by leiomyosarcoma (see comment)  Four benign lymph nodes     D. Omentum, lesser omental nodule, biopsy   Involved by leiomyosarcoma      E. Omentum, greater, omentectomy   Involved by leiomyosarcoma  Three lymph nodes with no significant abnormality      F. Soft tissue, right retroperitoneal nodule, biopsy   Involved by leiomyosarcoma      Jacqueline Gonzalez MD/Leila Montes De Oca MD/Alexus Liang MD     Electronically signed by Alexus Liang MD on 3/10/2025 at  2:47 PM             ASSESSMENT AND PLAN     Maggie is a 74 year old women with PMH of metastatic uterine leiomyosarcoma. She was originally diagnosed on 2016 showing an up to 12 cm mass. She underwent surgical resection followed by adjuvant doxorubicin/dacarbazine for 5 cycles. She had local recurrence on 2020 treated with surgical resection. Then  again recurrence on 2022 again underwent surgical resection. Followed by [id number] with anastrozole. Since January of 2024 she has been on trial a BOAST clinical trial with Dr. Nik Schilling  at the [facility]. She received abemaciclib there. Now, she was taken off of trial due to progression on 1 of 3 intra-abdominal lesions.     She comes here to continue medical management, she has seen Dr. Michaud at [place] for a second opinion. She is interested in surgical resection which has been offered at [facility] with possible Gemzar HIPEC.      She feels very well now, she exercises regularly and has no related GI symptoms.     She saw Dr. Michaud at [place], who recommended against further surgical resection and for systemic therapy with other agents with activity in leiomyosarcoma including gemcitabine +/- docetaxel, trabectedin and pazopanib.     She is s/p 2 cycles of gemcitabine single agent. She is feeling well and tells me that back surgery will be planned for 11/15. She insists that the team at [facility] has great expertise in surgery and HIPEC for cases of advanced leiomyosarcoma. I have not been able to communicate with her team there. She tells me they plan on intraoperative chemo with cisplatin.       After reviewing imaging from 9/24/25 at the [facility], we saw that Maggie had disease progression with enlarging lesions in the abdomen, and no new lesions. She then received 2 cycles of doxil/dacarbazine unfortunately having disease progression and then received single agent gemcitabine also resulting in disease progression.     She underwent palliative resection with HIPEC by Dr. Yee at [place] on 2/28/25, all macroscopic disease resected, per notes evidence of small deposits in different areas. They did drug screening lab testing in most recent specimen and results indicate a possible sensitive to combination gem/tax and intermediate sensitivity to single agent trabectedin.  She has received now 4 cycles of gemcitabine 1250 mg/m2 and docetaxel 50 mg/m2 every 2 weeks.   I personally reviewed the most recent scans showing no evidence of disease recurrence. I recommend to  continue chemo for another 4 cycles and repeat the scans, then we will consider a break if no evidence of recurrence. She will also follow up with Dr. Yee at [place].     Plan:     -CT CAP and labs 9/22   -Follow up Dr. Powers 9/24   -She has GERD and paraesophageal LN, pantoprazol prescribed.    40 minutes spent on the date of the encounter doing chart review, review of outside records, review of test results, interpretation of tests, patient visit, documentation, and discussion with other provider(s)     Beto Galvan MD   of Medicine  Hematology, Oncology and Transplantation        Again, thank you for allowing me to participate in the care of your patient.        Sincerely,        Beto Galvan MD    Electronically signed

## 2025-06-19 ENCOUNTER — LAB (OUTPATIENT)
Dept: LAB | Facility: CLINIC | Age: 75
End: 2025-06-19
Attending: STUDENT IN AN ORGANIZED HEALTH CARE EDUCATION/TRAINING PROGRAM
Payer: MEDICARE

## 2025-06-19 DIAGNOSIS — C54.9: ICD-10-CM

## 2025-06-19 LAB
ALBUMIN SERPL BCG-MCNC: 3.7 G/DL (ref 3.5–5.2)
ALP SERPL-CCNC: 77 U/L (ref 40–150)
ALT SERPL W P-5'-P-CCNC: 13 U/L (ref 0–50)
ANION GAP SERPL CALCULATED.3IONS-SCNC: 11 MMOL/L (ref 7–15)
AST SERPL W P-5'-P-CCNC: 13 U/L (ref 0–45)
BASOPHILS # BLD AUTO: 0 10E3/UL (ref 0–0.2)
BASOPHILS NFR BLD AUTO: 1 %
BILIRUB SERPL-MCNC: 0.4 MG/DL
BUN SERPL-MCNC: 16.4 MG/DL (ref 8–23)
CALCIUM SERPL-MCNC: 8.7 MG/DL (ref 8.8–10.4)
CHLORIDE SERPL-SCNC: 109 MMOL/L (ref 98–107)
CREAT SERPL-MCNC: 0.81 MG/DL (ref 0.51–0.95)
EGFRCR SERPLBLD CKD-EPI 2021: 75 ML/MIN/1.73M2
EOSINOPHIL # BLD AUTO: 0 10E3/UL (ref 0–0.7)
EOSINOPHIL NFR BLD AUTO: 1 %
ERYTHROCYTE [DISTWIDTH] IN BLOOD BY AUTOMATED COUNT: 17 % (ref 10–15)
GLUCOSE SERPL-MCNC: 108 MG/DL (ref 70–99)
HCO3 SERPL-SCNC: 22 MMOL/L (ref 22–29)
HCT VFR BLD AUTO: 27.9 % (ref 35–47)
HGB BLD-MCNC: 9.2 G/DL (ref 11.7–15.7)
IMM GRANULOCYTES # BLD: 0 10E3/UL
IMM GRANULOCYTES NFR BLD: 1 %
LYMPHOCYTES # BLD AUTO: 1.6 10E3/UL (ref 0.8–5.3)
LYMPHOCYTES NFR BLD AUTO: 41 %
MCH RBC QN AUTO: 30.1 PG (ref 26.5–33)
MCHC RBC AUTO-ENTMCNC: 33 G/DL (ref 31.5–36.5)
MCV RBC AUTO: 91 FL (ref 78–100)
MONOCYTES # BLD AUTO: 0.3 10E3/UL (ref 0–1.3)
MONOCYTES NFR BLD AUTO: 9 %
NEUTROPHILS # BLD AUTO: 1.9 10E3/UL (ref 1.6–8.3)
NEUTROPHILS NFR BLD AUTO: 49 %
NRBC # BLD AUTO: 0 10E3/UL
NRBC BLD AUTO-RTO: 0 /100
PLATELET # BLD AUTO: 213 10E3/UL (ref 150–450)
POTASSIUM SERPL-SCNC: 4.4 MMOL/L (ref 3.4–5.3)
PROT SERPL-MCNC: 5.8 G/DL (ref 6.4–8.3)
RBC # BLD AUTO: 3.06 10E6/UL (ref 3.8–5.2)
SODIUM SERPL-SCNC: 142 MMOL/L (ref 135–145)
WBC # BLD AUTO: 3.9 10E3/UL (ref 4–11)

## 2025-06-19 PROCEDURE — 36591 DRAW BLOOD OFF VENOUS DEVICE: CPT

## 2025-06-19 PROCEDURE — 82310 ASSAY OF CALCIUM: CPT

## 2025-06-19 PROCEDURE — 250N000011 HC RX IP 250 OP 636: Performed by: STUDENT IN AN ORGANIZED HEALTH CARE EDUCATION/TRAINING PROGRAM

## 2025-06-19 PROCEDURE — 85004 AUTOMATED DIFF WBC COUNT: CPT

## 2025-06-19 RX ORDER — HEPARIN SODIUM (PORCINE) LOCK FLUSH IV SOLN 100 UNIT/ML 100 UNIT/ML
500 SOLUTION INTRAVENOUS ONCE
Status: COMPLETED | OUTPATIENT
Start: 2025-06-19 | End: 2025-06-19

## 2025-06-19 RX ADMIN — Medication 500 UNITS: at 11:33

## 2025-06-19 NOTE — NURSING NOTE
"Chief Complaint   Patient presents with    Port Draw     Labs drawn via port by RN in lab.        Port accessed with 20 gauge 3/4\" non Power needle by RN, labs collected, line flushed with saline and heparin, then de-accessed.      Lillie Faustin RN    "

## 2025-07-23 LAB
ALT SERPL-CCNC: 16 IU/L (ref 0–32)
AST SERPL-CCNC: 15 IU/L (ref 0–40)
CREATININE (EXTERNAL): 0.76 MG/DL (ref 0.57–1)
GFR ESTIMATED (EXTERNAL): 82 ML/MIN/1.73
GLUCOSE (EXTERNAL): 132 MG/DL (ref 70–99)
POTASSIUM (EXTERNAL): 4.5 MMOL/L (ref 3.5–5.2)

## 2025-07-24 ENCOUNTER — TRANSFERRED RECORDS (OUTPATIENT)
Dept: HEALTH INFORMATION MANAGEMENT | Facility: CLINIC | Age: 75
End: 2025-07-24
Payer: MEDICARE

## 2025-07-24 ENCOUNTER — TRANSFERRED RECORDS (OUTPATIENT)
Dept: MULTI SPECIALTY CLINIC | Facility: CLINIC | Age: 75
End: 2025-07-24
Payer: MEDICARE

## 2025-07-24 LAB — TSH SERPL-ACNC: 0.3 UIU/ML (ref 0.45–4.5)

## 2025-07-25 ENCOUNTER — TRANSFERRED RECORDS (OUTPATIENT)
Dept: HEALTH INFORMATION MANAGEMENT | Facility: CLINIC | Age: 75
End: 2025-07-25
Payer: MEDICARE

## 2025-08-07 ENCOUNTER — PATIENT OUTREACH (OUTPATIENT)
Dept: ONCOLOGY | Facility: CLINIC | Age: 75
End: 2025-08-07
Payer: MEDICARE

## 2025-08-12 ENCOUNTER — MYC MEDICAL ADVICE (OUTPATIENT)
Dept: ENDOCRINOLOGY | Facility: CLINIC | Age: 75
End: 2025-08-12
Payer: MEDICARE

## 2025-08-19 ENCOUNTER — NURSE TRIAGE (OUTPATIENT)
Dept: FAMILY MEDICINE | Facility: CLINIC | Age: 75
End: 2025-08-19
Payer: MEDICARE

## 2025-08-19 ASSESSMENT — PATIENT HEALTH QUESTIONNAIRE - PHQ9
SUM OF ALL RESPONSES TO PHQ QUESTIONS 1-9: 4
10. IF YOU CHECKED OFF ANY PROBLEMS, HOW DIFFICULT HAVE THESE PROBLEMS MADE IT FOR YOU TO DO YOUR WORK, TAKE CARE OF THINGS AT HOME, OR GET ALONG WITH OTHER PEOPLE: NOT DIFFICULT AT ALL
SUM OF ALL RESPONSES TO PHQ QUESTIONS 1-9: 4

## 2025-08-20 ENCOUNTER — OFFICE VISIT (OUTPATIENT)
Dept: FAMILY MEDICINE | Facility: CLINIC | Age: 75
End: 2025-08-20
Payer: MEDICARE

## 2025-08-20 VITALS
RESPIRATION RATE: 16 BRPM | SYSTOLIC BLOOD PRESSURE: 128 MMHG | HEIGHT: 59 IN | TEMPERATURE: 98.4 F | OXYGEN SATURATION: 100 % | HEART RATE: 94 BPM | WEIGHT: 122.1 LBS | DIASTOLIC BLOOD PRESSURE: 83 MMHG | BODY MASS INDEX: 24.61 KG/M2

## 2025-08-20 DIAGNOSIS — R20.2 PARESTHESIA OF SKIN: Primary | ICD-10-CM

## 2025-08-20 DIAGNOSIS — C55 LEIOMYOSARCOMA OF UTERUS (H): Chronic | ICD-10-CM

## 2025-08-20 DIAGNOSIS — R79.89 LOW TSH LEVEL: ICD-10-CM

## 2025-08-20 PROCEDURE — 1126F AMNT PAIN NOTED NONE PRSNT: CPT

## 2025-08-20 PROCEDURE — 99213 OFFICE O/P EST LOW 20 MIN: CPT

## 2025-08-20 PROCEDURE — 3079F DIAST BP 80-89 MM HG: CPT

## 2025-08-20 PROCEDURE — 3074F SYST BP LT 130 MM HG: CPT

## 2025-08-20 ASSESSMENT — PAIN SCALES - GENERAL: PAINLEVEL_OUTOF10: NO PAIN (0)

## 2025-08-20 ASSESSMENT — ENCOUNTER SYMPTOMS: LEG PAIN: 1

## (undated) DEVICE — SU DERMABOND ADVANCED .7ML DNX12

## (undated) DEVICE — DRSG TEGADERM 4X10" 1627

## (undated) DEVICE — NDL SPINAL 18GA 3.5" 405184

## (undated) DEVICE — EYE PREP BETADINE 5% SOLUTION 30ML 0065-0411-30

## (undated) DEVICE — SOL ISOPROPYL RUBBING ALCOHOL USP 70% 4OZ HDX-20 I0020

## (undated) DEVICE — PACK CENTRAL LINE INSERTION SAN32CLFCG

## (undated) DEVICE — SUCTION MANIFOLD NEPTUNE 2 SYS 1 PORT 702-025-000

## (undated) DEVICE — DRSG GAUZE 4X8" NON21842

## (undated) DEVICE — GLOVE PROTEXIS POWDER FREE SMT 7.5  2D72PT75X

## (undated) DEVICE — DRSG GAUZE 2X2" 8042

## (undated) DEVICE — GLOVE PROTEXIS POWDER FREE SMT 7.0  2D72PT70X

## (undated) DEVICE — SU MONOCRYL 4-0 PS-2 18" UND Y496G

## (undated) DEVICE — Device

## (undated) DEVICE — SOL NACL 0.9% INJ 1000ML BAG 2B1324X

## (undated) DEVICE — TOOL DISSECT MIDAS MR8 14CM MATCH HEAD 3MM MR8-14MH30

## (undated) DEVICE — GOWN IMPERVIOUS 2XL BLUE

## (undated) DEVICE — TUBING SUCTION 12"X1/4" N612

## (undated) DEVICE — GOWN XLG DISP 9545

## (undated) DEVICE — SOL NACL 0.9% IRRIG 1000ML BOTTLE 2F7124

## (undated) DEVICE — SPONGE COTTONOID NEURO 1/2"X1/2" 30-054

## (undated) DEVICE — PREP CHLORAPREP 26ML TINTED HI-LITE ORANGE 930815

## (undated) DEVICE — DRAPE MICROSCOPE MICRO-KOVER LEICA 48"X120" 09-MK651

## (undated) DEVICE — DRSG TEGADERM 4X4 3/4" 1626W

## (undated) DEVICE — BLADE CLIPPER SGL USE 9680

## (undated) DEVICE — LINEN TOWEL PACK X5 5464

## (undated) DEVICE — LINEN BACK PACK 5440

## (undated) DEVICE — SU VICRYL 4-0 P-3 18" UND  J494H

## (undated) DEVICE — POSITIONER ARMBOARD FOAM 1PAIR LF FP-ARMB1

## (undated) DEVICE — SU ETHILON 3-0 PS-1 18" 1663H

## (undated) DEVICE — GLOVE BIOGEL PI MICRO INDICATOR UNDERGLOVE SZ 8.0 48980

## (undated) DEVICE — DRAIN ROUND W/RESERV KIT JACKSON PRATT 10FR 400ML SU130-402D

## (undated) DEVICE — SYR 50ML LL W/O NDL 309653

## (undated) DEVICE — TUBING IRRIG CYSTO/BLADDER SET 81" LF 2C4040

## (undated) DEVICE — SUTURE VICRYL+ 2-0 CT-2 CR 8X18" VCP726D

## (undated) DEVICE — GOWN IMPERVIOUS SPECIALTY XLG/XLONG 32474

## (undated) DEVICE — COVER ULTRASOUND PROBE W/GEL FLEXI-FEEL 6"X58" LF  25-FF658

## (undated) DEVICE — SOL WATER IRRIG 1000ML BOTTLE 2F7114

## (undated) DEVICE — PREP CHLORAPREP W/ORANGE TINT 10.5ML 260715

## (undated) DEVICE — SUTURE VICRYL+ 1 CT-1 CR 8X18" VIO VCP741D

## (undated) DEVICE — SUCTION MANIFOLD NEPTUNE 2 SYS 4 PORT 0702-020-000

## (undated) DEVICE — SPECIMEN CONTAINER 3OZ W/FORMALIN 59901

## (undated) DEVICE — SU VICRYL 3-0 SH 27" J316H

## (undated) DEVICE — KNIFE HANDLE W/15 BLADE 371615

## (undated) DEVICE — GLOVE BIOGEL PI ULTRATOUCH G SZ 7.5 42175

## (undated) DEVICE — SOL WATER IRRIG 500ML BOTTLE 2F7113

## (undated) DEVICE — KIT ENDO TURNOVER/PROCEDURE CARRY-ON 101822

## (undated) DEVICE — DECANTER FLUID L3 IN TRANSFER STRL LF DYNJDEC03

## (undated) RX ORDER — LIDOCAINE 40 MG/G
CREAM TOPICAL
Status: DISPENSED
Start: 2024-12-30

## (undated) RX ORDER — ONDANSETRON 2 MG/ML
INJECTION INTRAMUSCULAR; INTRAVENOUS
Status: DISPENSED
Start: 2024-12-30

## (undated) RX ORDER — BUPIVACAINE HYDROCHLORIDE 5 MG/ML
INJECTION, SOLUTION EPIDURAL; INTRACAUDAL
Status: DISPENSED
Start: 2024-08-20

## (undated) RX ORDER — TRIAMCINOLONE ACETONIDE 40 MG/ML
INJECTION, SUSPENSION INTRA-ARTICULAR; INTRAMUSCULAR
Status: DISPENSED
Start: 2024-08-20

## (undated) RX ORDER — CEFAZOLIN SODIUM/WATER 2 G/20 ML
SYRINGE (ML) INTRAVENOUS
Status: DISPENSED
Start: 2024-08-23

## (undated) RX ORDER — HEPARIN SODIUM (PORCINE) LOCK FLUSH IV SOLN 100 UNIT/ML 100 UNIT/ML
SOLUTION INTRAVENOUS
Status: DISPENSED
Start: 2025-06-17

## (undated) RX ORDER — FENTANYL CITRATE 50 UG/ML
INJECTION, SOLUTION INTRAMUSCULAR; INTRAVENOUS
Status: DISPENSED
Start: 2024-12-30

## (undated) RX ORDER — FENTANYL CITRATE 50 UG/ML
INJECTION, SOLUTION INTRAMUSCULAR; INTRAVENOUS
Status: DISPENSED
Start: 2021-10-18

## (undated) RX ORDER — FENTANYL CITRATE 50 UG/ML
INJECTION, SOLUTION INTRAMUSCULAR; INTRAVENOUS
Status: DISPENSED
Start: 2024-08-23

## (undated) RX ORDER — HYDROMORPHONE HYDROCHLORIDE 1 MG/ML
INJECTION, SOLUTION INTRAMUSCULAR; INTRAVENOUS; SUBCUTANEOUS
Status: DISPENSED
Start: 2024-12-30

## (undated) RX ORDER — HEPARIN SODIUM (PORCINE) LOCK FLUSH IV SOLN 100 UNIT/ML 100 UNIT/ML
SOLUTION INTRAVENOUS
Status: DISPENSED
Start: 2025-02-13

## (undated) RX ORDER — HEPARIN SODIUM (PORCINE) LOCK FLUSH IV SOLN 100 UNIT/ML 100 UNIT/ML
SOLUTION INTRAVENOUS
Status: DISPENSED
Start: 2024-12-16

## (undated) RX ORDER — LIDOCAINE HYDROCHLORIDE 10 MG/ML
INJECTION, SOLUTION EPIDURAL; INFILTRATION; INTRACAUDAL; PERINEURAL
Status: DISPENSED
Start: 2024-08-20

## (undated) RX ORDER — ONDANSETRON 2 MG/ML
INJECTION INTRAMUSCULAR; INTRAVENOUS
Status: DISPENSED
Start: 2024-08-23

## (undated) RX ORDER — SCOPOLAMINE 1 MG/3D
PATCH, EXTENDED RELEASE TRANSDERMAL
Status: DISPENSED
Start: 2024-12-30

## (undated) RX ORDER — GABAPENTIN 100 MG/1
CAPSULE ORAL
Status: DISPENSED
Start: 2024-12-30

## (undated) RX ORDER — CEFAZOLIN SODIUM/WATER 2 G/20 ML
SYRINGE (ML) INTRAVENOUS
Status: DISPENSED
Start: 2024-12-30

## (undated) RX ORDER — ACETAMINOPHEN 325 MG/1
TABLET ORAL
Status: DISPENSED
Start: 2024-12-30

## (undated) RX ORDER — EPHEDRINE SULFATE 50 MG/ML
INJECTION, SOLUTION INTRAMUSCULAR; INTRAVENOUS; SUBCUTANEOUS
Status: DISPENSED
Start: 2024-12-30